# Patient Record
Sex: FEMALE | Race: WHITE | NOT HISPANIC OR LATINO | Employment: OTHER | URBAN - NONMETROPOLITAN AREA
[De-identification: names, ages, dates, MRNs, and addresses within clinical notes are randomized per-mention and may not be internally consistent; named-entity substitution may affect disease eponyms.]

---

## 2023-02-28 LAB
ALANINE AMINOTRANSFERASE (SGPT) (U/L) IN SER/PLAS: 13 U/L (ref 7–45)
ALBUMIN (G/DL) IN SER/PLAS: 4.2 G/DL (ref 3.4–5)
ALKALINE PHOSPHATASE (U/L) IN SER/PLAS: 94 U/L (ref 33–136)
ANION GAP IN SER/PLAS: 17 MMOL/L (ref 10–20)
ASPARTATE AMINOTRANSFERASE (SGOT) (U/L) IN SER/PLAS: 24 U/L (ref 9–39)
BILIRUBIN TOTAL (MG/DL) IN SER/PLAS: 0.5 MG/DL (ref 0–1.2)
CALCIUM (MG/DL) IN SER/PLAS: 8.8 MG/DL (ref 8.6–10.3)
CARBON DIOXIDE, TOTAL (MMOL/L) IN SER/PLAS: 28 MMOL/L (ref 21–32)
CHLORIDE (MMOL/L) IN SER/PLAS: 99 MMOL/L (ref 98–107)
CHOLESTEROL (MG/DL) IN SER/PLAS: 161 MG/DL (ref 0–199)
CHOLESTEROL IN HDL (MG/DL) IN SER/PLAS: 80.5 MG/DL
CHOLESTEROL/HDL RATIO: 2
CREATININE (MG/DL) IN SER/PLAS: 1.08 MG/DL (ref 0.5–1.05)
ERYTHROCYTE DISTRIBUTION WIDTH (RATIO) BY AUTOMATED COUNT: NORMAL
ERYTHROCYTE MEAN CORPUSCULAR HEMOGLOBIN CONCENTRATION (G/DL) BY AUTOMATED: NORMAL
ERYTHROCYTE MEAN CORPUSCULAR VOLUME (FL) BY AUTOMATED COUNT: NORMAL
ERYTHROCYTES (10*6/UL) IN BLOOD BY AUTOMATED COUNT: NORMAL
FERRITIN (UG/LL) IN SER/PLAS: 37 UG/L (ref 8–150)
GFR FEMALE: 55 ML/MIN/1.73M2
GLUCOSE (MG/DL) IN SER/PLAS: 86 MG/DL (ref 74–99)
HEMATOCRIT (%) IN BLOOD BY AUTOMATED COUNT: NORMAL
HEMOGLOBIN (G/DL) IN BLOOD: NORMAL
IRON (UG/DL) IN SER/PLAS: 39 UG/DL (ref 35–150)
IRON BINDING CAPACITY (UG/DL) IN SER/PLAS: >489 UG/DL (ref 240–445)
IRON SATURATION (%) IN SER/PLAS: ABNORMAL % (ref 25–45)
LDL: 50 MG/DL (ref 0–99)
LEUKOCYTES (10*3/UL) IN BLOOD BY AUTOMATED COUNT: NORMAL
NRBC (PER 100 WBCS) BY AUTOMATED COUNT: NORMAL
PLATELETS (10*3/UL) IN BLOOD AUTOMATED COUNT: NORMAL
POTASSIUM (MMOL/L) IN SER/PLAS: 4.5 MMOL/L (ref 3.5–5.3)
PROTEIN TOTAL: 6.9 G/DL (ref 6.4–8.2)
SODIUM (MMOL/L) IN SER/PLAS: 139 MMOL/L (ref 136–145)
TRIGLYCERIDE (MG/DL) IN SER/PLAS: 152 MG/DL (ref 0–149)
UREA NITROGEN (MG/DL) IN SER/PLAS: 19 MG/DL (ref 6–23)
VLDL: 30 MG/DL (ref 0–40)

## 2023-06-26 ENCOUNTER — OFFICE VISIT (OUTPATIENT)
Dept: PRIMARY CARE | Facility: CLINIC | Age: 73
End: 2023-06-26
Payer: MEDICARE

## 2023-06-26 VITALS
SYSTOLIC BLOOD PRESSURE: 132 MMHG | DIASTOLIC BLOOD PRESSURE: 60 MMHG | HEIGHT: 64 IN | WEIGHT: 174 LBS | OXYGEN SATURATION: 98 % | BODY MASS INDEX: 29.71 KG/M2 | HEART RATE: 63 BPM

## 2023-06-26 DIAGNOSIS — M54.41 ACUTE MIDLINE LOW BACK PAIN WITH BILATERAL SCIATICA: Primary | ICD-10-CM

## 2023-06-26 DIAGNOSIS — M54.42 ACUTE MIDLINE LOW BACK PAIN WITH BILATERAL SCIATICA: Primary | ICD-10-CM

## 2023-06-26 PROBLEM — I25.3 CARDIAC ANEURYSM: Status: ACTIVE | Noted: 2023-06-26

## 2023-06-26 PROBLEM — M81.0 OSTEOPOROSIS: Status: ACTIVE | Noted: 2023-06-26

## 2023-06-26 PROBLEM — B96.81: Status: RESOLVED | Noted: 2023-06-26 | Resolved: 2023-06-26

## 2023-06-26 PROBLEM — R92.8 ABNORMAL MAMMOGRAM: Status: RESOLVED | Noted: 2023-06-26 | Resolved: 2023-06-26

## 2023-06-26 PROBLEM — R50.9 FEVER, UNSPECIFIED: Status: RESOLVED | Noted: 2023-06-26 | Resolved: 2023-06-26

## 2023-06-26 PROBLEM — A37.90 PERTUSSIS: Status: RESOLVED | Noted: 2023-06-26 | Resolved: 2023-06-26

## 2023-06-26 PROBLEM — K25.3: Status: RESOLVED | Noted: 2023-06-26 | Resolved: 2023-06-26

## 2023-06-26 PROBLEM — L50.9 URTICARIA: Status: RESOLVED | Noted: 2023-06-26 | Resolved: 2023-06-26

## 2023-06-26 PROBLEM — R07.9 CHEST PAIN ON EXERTION: Status: RESOLVED | Noted: 2023-06-26 | Resolved: 2023-06-26

## 2023-06-26 PROBLEM — J45.909 ASTHMA (HHS-HCC): Status: ACTIVE | Noted: 2023-06-26

## 2023-06-26 PROBLEM — E78.5 HYPERLIPIDEMIA: Status: ACTIVE | Noted: 2023-06-26

## 2023-06-26 PROBLEM — R55 POSTURAL DIZZINESS WITH PRESYNCOPE: Status: RESOLVED | Noted: 2023-06-26 | Resolved: 2023-06-26

## 2023-06-26 PROBLEM — M79.18 MYOFASCIAL PAIN: Status: ACTIVE | Noted: 2023-06-26

## 2023-06-26 PROBLEM — G89.29 CHRONIC BILATERAL LOW BACK PAIN WITHOUT SCIATICA: Status: ACTIVE | Noted: 2023-06-26

## 2023-06-26 PROBLEM — D50.9 IRON DEFICIENCY ANEMIA: Status: ACTIVE | Noted: 2023-06-26

## 2023-06-26 PROBLEM — G47.33 OBSTRUCTIVE SLEEP APNEA: Status: ACTIVE | Noted: 2023-06-26

## 2023-06-26 PROBLEM — Z98.890 HISTORY OF ABDOMINAL SURGERY: Status: RESOLVED | Noted: 2023-06-26 | Resolved: 2023-06-26

## 2023-06-26 PROBLEM — R13.10 DYSPHAGIA: Status: RESOLVED | Noted: 2023-06-26 | Resolved: 2023-06-26

## 2023-06-26 PROBLEM — K21.9 GASTROESOPHAGEAL REFLUX DISEASE WITHOUT ESOPHAGITIS: Status: ACTIVE | Noted: 2023-06-26

## 2023-06-26 PROBLEM — R11.10 DRY HEAVES: Status: RESOLVED | Noted: 2023-06-26 | Resolved: 2023-06-26

## 2023-06-26 PROBLEM — M54.12 CERVICAL RADICULOPATHY: Status: ACTIVE | Noted: 2023-06-26

## 2023-06-26 PROBLEM — G89.29 CHRONIC RIGHT SACROILIAC PAIN: Status: ACTIVE | Noted: 2023-06-26

## 2023-06-26 PROBLEM — M47.812 SPONDYLOSIS OF CERVICAL REGION WITHOUT MYELOPATHY OR RADICULOPATHY: Status: RESOLVED | Noted: 2023-06-26 | Resolved: 2023-06-26

## 2023-06-26 PROBLEM — M54.16 LUMBAR RADICULITIS: Status: RESOLVED | Noted: 2023-06-26 | Resolved: 2023-06-26

## 2023-06-26 PROBLEM — R53.83 FATIGUE: Status: RESOLVED | Noted: 2023-06-26 | Resolved: 2023-06-26

## 2023-06-26 PROBLEM — T88.7XXA MEDICATION SIDE EFFECT: Status: RESOLVED | Noted: 2023-06-26 | Resolved: 2023-06-26

## 2023-06-26 PROBLEM — M19.011 PRIMARY OSTEOARTHRITIS OF RIGHT SHOULDER: Status: ACTIVE | Noted: 2023-06-26

## 2023-06-26 PROBLEM — E86.0 DEHYDRATION: Status: RESOLVED | Noted: 2023-06-26 | Resolved: 2023-06-26

## 2023-06-26 PROBLEM — Z86.010 HISTORY OF COLON POLYPS: Status: RESOLVED | Noted: 2023-06-26 | Resolved: 2023-06-26

## 2023-06-26 PROBLEM — Z95.810 CARDIAC DEFIBRILLATOR IN PLACE: Status: ACTIVE | Noted: 2023-06-26

## 2023-06-26 PROBLEM — R06.00 DYSPNEA: Status: RESOLVED | Noted: 2023-06-26 | Resolved: 2023-06-26

## 2023-06-26 PROBLEM — G47.37: Status: ACTIVE | Noted: 2023-06-26

## 2023-06-26 PROBLEM — H65.93 BSOM (BILATERAL SEROUS OTITIS MEDIA): Status: RESOLVED | Noted: 2023-06-26 | Resolved: 2023-06-26

## 2023-06-26 PROBLEM — Z86.0100 HISTORY OF COLON POLYPS: Status: RESOLVED | Noted: 2023-06-26 | Resolved: 2023-06-26

## 2023-06-26 PROBLEM — R52 SEVERE PAIN: Status: RESOLVED | Noted: 2023-06-26 | Resolved: 2023-06-26

## 2023-06-26 PROBLEM — R79.89 ELEVATED SERUM CREATININE: Status: RESOLVED | Noted: 2023-06-26 | Resolved: 2023-06-26

## 2023-06-26 PROBLEM — M06.9 RHEUMATOID ARTHRITIS (MULTI): Status: ACTIVE | Noted: 2023-06-26

## 2023-06-26 PROBLEM — K63.5 COLON POLYP: Status: ACTIVE | Noted: 2023-06-26

## 2023-06-26 PROBLEM — M53.3 CHRONIC RIGHT SACROILIAC PAIN: Status: ACTIVE | Noted: 2023-06-26

## 2023-06-26 PROBLEM — R05.8 DRY COUGH: Status: RESOLVED | Noted: 2023-06-26 | Resolved: 2023-06-26

## 2023-06-26 PROBLEM — M54.50 CHRONIC BILATERAL LOW BACK PAIN WITHOUT SCIATICA: Status: ACTIVE | Noted: 2023-06-26

## 2023-06-26 PROBLEM — J81.1 PULMONARY EDEMA (HHS-HCC): Status: RESOLVED | Noted: 2023-06-26 | Resolved: 2023-06-26

## 2023-06-26 PROBLEM — I25.10 ASCVD (ARTERIOSCLEROTIC CARDIOVASCULAR DISEASE): Status: ACTIVE | Noted: 2023-06-26

## 2023-06-26 PROBLEM — R20.0 NUMBNESS: Status: RESOLVED | Noted: 2023-06-26 | Resolved: 2023-06-26

## 2023-06-26 PROBLEM — M79.89 SWELLING OF LEFT HAND: Status: RESOLVED | Noted: 2023-06-26 | Resolved: 2023-06-26

## 2023-06-26 PROBLEM — M79.601 PAIN OF RIGHT UPPER EXTREMITY: Status: RESOLVED | Noted: 2023-06-26 | Resolved: 2023-06-26

## 2023-06-26 PROBLEM — R60.9 EDEMA: Status: RESOLVED | Noted: 2023-06-26 | Resolved: 2023-06-26

## 2023-06-26 PROBLEM — I49.9 IRREGULAR CARDIAC RHYTHM: Status: ACTIVE | Noted: 2023-06-26

## 2023-06-26 PROBLEM — R51.9 HEADACHE: Status: RESOLVED | Noted: 2023-06-26 | Resolved: 2023-06-26

## 2023-06-26 PROBLEM — I50.9 CONGESTIVE HEART FAILURE (MULTI): Status: ACTIVE | Noted: 2023-06-26

## 2023-06-26 PROBLEM — G56.20 ULNAR NEUROPATHY: Status: RESOLVED | Noted: 2023-06-26 | Resolved: 2023-06-26

## 2023-06-26 PROBLEM — T81.89XA NONHEALING SURGICAL WOUND: Status: RESOLVED | Noted: 2023-06-26 | Resolved: 2023-06-26

## 2023-06-26 PROBLEM — M96.1 CERVICAL POST-LAMINECTOMY SYNDROME: Status: ACTIVE | Noted: 2023-06-26

## 2023-06-26 PROBLEM — M51.16 DISPLACEMENT OF LUMBAR DISC WITH RADICULOPATHY: Status: ACTIVE | Noted: 2023-06-26

## 2023-06-26 PROBLEM — J90 PLEURAL EFFUSION, BILATERAL: Status: RESOLVED | Noted: 2023-06-26 | Resolved: 2023-06-26

## 2023-06-26 PROBLEM — I48.91 ATRIAL FIBRILLATION (MULTI): Status: ACTIVE | Noted: 2023-06-26

## 2023-06-26 PROBLEM — H65.91 RIGHT SEROUS OTITIS MEDIA: Status: RESOLVED | Noted: 2023-06-26 | Resolved: 2023-06-26

## 2023-06-26 PROBLEM — R68.89 FLU-LIKE SYMPTOMS: Status: RESOLVED | Noted: 2023-06-26 | Resolved: 2023-06-26

## 2023-06-26 PROBLEM — M47.816 LUMBAR SPONDYLOSIS: Status: RESOLVED | Noted: 2023-06-26 | Resolved: 2023-06-26

## 2023-06-26 PROBLEM — L40.9 PSORIASIS: Status: ACTIVE | Noted: 2023-06-26

## 2023-06-26 PROBLEM — T63.91XA TOXIC EFFECT OF VENOM: Status: RESOLVED | Noted: 2023-06-26 | Resolved: 2023-06-26

## 2023-06-26 PROBLEM — K56.609 SMALL BOWEL OBSTRUCTION (MULTI): Status: RESOLVED | Noted: 2023-06-26 | Resolved: 2023-06-26

## 2023-06-26 PROBLEM — G54.2 CERVICAL NEUROPATHY: Status: RESOLVED | Noted: 2023-06-26 | Resolved: 2023-06-26

## 2023-06-26 PROBLEM — G56.21 CUBITAL TUNNEL SYNDROME ON RIGHT: Status: ACTIVE | Noted: 2023-06-26

## 2023-06-26 PROBLEM — R42 POSTURAL DIZZINESS WITH PRESYNCOPE: Status: RESOLVED | Noted: 2023-06-26 | Resolved: 2023-06-26

## 2023-06-26 PROBLEM — R00.0 WIDE-COMPLEX TACHYCARDIA: Status: ACTIVE | Noted: 2023-06-26

## 2023-06-26 PROBLEM — M89.8X9 BONE PAIN: Status: RESOLVED | Noted: 2023-06-26 | Resolved: 2023-06-26

## 2023-06-26 PROBLEM — G56.10 MEDIAN NERVE NEUROPATHY: Status: RESOLVED | Noted: 2023-06-26 | Resolved: 2023-06-26

## 2023-06-26 PROBLEM — K42.9 UMBILICAL HERNIA: Status: ACTIVE | Noted: 2023-06-26

## 2023-06-26 PROBLEM — M85.80 OSTEOPENIA: Status: RESOLVED | Noted: 2023-06-26 | Resolved: 2023-06-26

## 2023-06-26 PROCEDURE — 1160F RVW MEDS BY RX/DR IN RCRD: CPT | Performed by: FAMILY MEDICINE

## 2023-06-26 PROCEDURE — 1159F MED LIST DOCD IN RCRD: CPT | Performed by: FAMILY MEDICINE

## 2023-06-26 PROCEDURE — 99213 OFFICE O/P EST LOW 20 MIN: CPT | Performed by: FAMILY MEDICINE

## 2023-06-26 PROCEDURE — 3008F BODY MASS INDEX DOCD: CPT | Performed by: FAMILY MEDICINE

## 2023-06-26 PROCEDURE — 1036F TOBACCO NON-USER: CPT | Performed by: FAMILY MEDICINE

## 2023-06-26 RX ORDER — DILTIAZEM HYDROCHLORIDE EXTENDED-RELEASE TABLETS 120 MG/1
120 TABLET, EXTENDED RELEASE ORAL DAILY
COMMUNITY

## 2023-06-26 RX ORDER — PANTOPRAZOLE SODIUM 40 MG/1
FOR SUSPENSION ORAL
COMMUNITY
Start: 2022-06-29 | End: 2023-09-15 | Stop reason: SDUPTHER

## 2023-06-26 RX ORDER — MONTELUKAST SODIUM 10 MG/1
10 TABLET ORAL NIGHTLY
COMMUNITY
End: 2024-02-22 | Stop reason: SDUPTHER

## 2023-06-26 RX ORDER — LOSARTAN POTASSIUM 50 MG/1
50 TABLET ORAL EVERY EVENING
COMMUNITY
Start: 2023-06-06 | End: 2023-08-28 | Stop reason: ALTCHOICE

## 2023-06-26 RX ORDER — EPINEPHRINE 0.3 MG/.3ML
1 INJECTION SUBCUTANEOUS AS NEEDED
COMMUNITY
Start: 2015-02-09

## 2023-06-26 RX ORDER — LANOLIN ALCOHOL/MO/W.PET/CERES
CREAM (GRAM) TOPICAL
COMMUNITY
Start: 2022-06-29 | End: 2023-08-28 | Stop reason: SDUPTHER

## 2023-06-26 RX ORDER — CLOBETASOL PROPIONATE 0.5 MG/G
CREAM TOPICAL 2 TIMES DAILY
COMMUNITY
End: 2023-08-28 | Stop reason: SDUPTHER

## 2023-06-26 RX ORDER — DICLOFENAC SODIUM 10 MG/G
4 GEL TOPICAL 2 TIMES DAILY PRN
COMMUNITY
Start: 2023-02-28 | End: 2023-08-28 | Stop reason: SDUPTHER

## 2023-06-26 RX ORDER — BUMETANIDE 2 MG/1
2 TABLET ORAL 2 TIMES DAILY
COMMUNITY
End: 2023-08-28 | Stop reason: SDUPTHER

## 2023-06-26 RX ORDER — RIVAROXABAN 20 MG/1
20 TABLET, FILM COATED ORAL
COMMUNITY
End: 2024-03-05 | Stop reason: HOSPADM

## 2023-06-26 RX ORDER — CHOLECALCIFEROL (VITAMIN D3) 25 MCG
TABLET ORAL
COMMUNITY
Start: 2022-06-29 | End: 2024-02-12 | Stop reason: WASHOUT

## 2023-06-26 RX ORDER — HYDROXYCHLOROQUINE SULFATE 200 MG/1
200 TABLET, FILM COATED ORAL DAILY
COMMUNITY
End: 2023-08-28 | Stop reason: SDUPTHER

## 2023-06-26 RX ORDER — OXYCODONE AND ACETAMINOPHEN 5; 325 MG/1; MG/1
1 TABLET ORAL 3 TIMES DAILY PRN
COMMUNITY
End: 2023-11-13 | Stop reason: SDUPTHER

## 2023-06-26 RX ORDER — NALOXONE HYDROCHLORIDE 4 MG/.1ML
SPRAY NASAL
COMMUNITY
Start: 2022-06-29 | End: 2024-04-22 | Stop reason: ALTCHOICE

## 2023-06-26 RX ORDER — ONDANSETRON 4 MG/1
4 TABLET, FILM COATED ORAL EVERY 6 HOURS PRN
COMMUNITY
End: 2024-02-15 | Stop reason: ALTCHOICE

## 2023-06-26 RX ORDER — PREDNISONE 10 MG/1
TABLET ORAL
Qty: 30 TABLET | Refills: 0 | Status: SHIPPED | OUTPATIENT
Start: 2023-06-26 | End: 2023-07-06

## 2023-06-26 RX ORDER — FERROUS SULFATE 325(65) MG
1 TABLET ORAL
COMMUNITY
Start: 2022-12-21 | End: 2023-08-28 | Stop reason: ALTCHOICE

## 2023-06-26 RX ORDER — NITROGLYCERIN 0.4 MG/1
0.4 TABLET SUBLINGUAL EVERY 5 MIN PRN
COMMUNITY
Start: 2015-07-09

## 2023-06-26 RX ORDER — BACLOFEN 10 MG/1
10 TABLET ORAL 3 TIMES DAILY
COMMUNITY
Start: 2022-06-29 | End: 2023-08-28 | Stop reason: SDUPTHER

## 2023-06-26 RX ORDER — POLYETHYLENE GLYCOL 3350 17 G/17G
17 POWDER, FOR SOLUTION ORAL DAILY
COMMUNITY

## 2023-06-26 RX ORDER — ROSUVASTATIN CALCIUM 20 MG/1
20 TABLET, COATED ORAL DAILY
COMMUNITY
End: 2023-12-29

## 2023-06-26 RX ORDER — FLUTICASONE PROPIONATE 50 MCG
1 SPRAY, SUSPENSION (ML) NASAL 2 TIMES DAILY
COMMUNITY
Start: 2017-10-11

## 2023-06-26 RX ORDER — DIGOXIN 125 MCG
125 TABLET ORAL DAILY
COMMUNITY
End: 2023-08-28 | Stop reason: SDUPTHER

## 2023-06-26 ASSESSMENT — ENCOUNTER SYMPTOMS
OCCASIONAL FEELINGS OF UNSTEADINESS: 0
DEPRESSION: 0
LOSS OF SENSATION IN FEET: 0

## 2023-06-26 NOTE — PROGRESS NOTES
Subjective   Patient ID: Trina Elias is a 72 y.o. female who presents for Back Pain (Having low back pain radiates down right leg /Started 10 days ago).  HPI  Had to jump out of the way of refrigerator falling down steps- 10 days ago  Landed on her tailbone  Pain is sharp in nature- some pain down back on right leg and on anterior thighs B/L  Worse- moving, sitting straight up  Better- nothing  Percocet and baclofen is not helping  No numbness  Legs will give out when gets the pain  No GI/ incontinence  No fever, chills  No weight loss      Current Outpatient Medications:     baclofen (Lioresal) 10 mg tablet, Take 1 tablet (10 mg) by mouth 3 times a day., Disp: , Rfl:     cholecalciferol (Vitamin D-3) 25 MCG (1000 UT) tablet, Take by mouth., Disp: , Rfl:     diclofenac sodium (Voltaren) 1 % gel gel, Apply 1 Application topically 2 times a day as needed., Disp: , Rfl:     EPINEPHrine 0.3 mg/0.3 mL injection syringe, Inject 0.3 mL (0.3 mg) as directed if needed., Disp: , Rfl:     ferrous sulfate 325 (65 Fe) MG tablet, Take 1 tablet (325 mg) by mouth 2 times a day with meals., Disp: , Rfl:     fluticasone (Flonase) 50 mcg/actuation nasal spray, Administer 1 spray into affected nostril(s) 2 times a day., Disp: , Rfl:     losartan (Cozaar) 50 mg tablet, Take 1 tablet (50 mg) by mouth once daily in the evening., Disp: , Rfl:     magnesium oxide (Mag-Ox) 400 mg (241.3 mg magnesium) tablet, Take by mouth., Disp: , Rfl:     naloxone (Narcan) 4 mg/0.1 mL nasal spray, Administer into affected nostril(s)., Disp: , Rfl:     nitroglycerin (Nitrostat) 0.4 mg SL tablet, Place 1 tablet (0.4 mg) under the tongue every 5 minutes if needed., Disp: , Rfl:     pantoprazole (ProtoNix) 40 mg packet, Take by mouth., Disp: , Rfl:     bumetanide (Bumex) 2 mg tablet, Take 1 tablet (2 mg) by mouth 2 times a day., Disp: , Rfl:     clobetasol (Temovate) 0.05 % cream, Apply topically 2 times a day., Disp: , Rfl:     digoxin (Lanoxin) 125 MCG  tablet, Take 1 tablet (125 mcg) by mouth once daily., Disp: , Rfl:     dilTIAZem LA (Cardizem LA) 120 mg 24 hr tablet, Take 1 tablet (120 mg) by mouth once daily., Disp: , Rfl:     hydroxychloroquine (Plaquenil) 200 mg tablet, Take 1 tablet (200 mg) by mouth once daily. Take with food., Disp: , Rfl:     montelukast (Singulair) 10 mg tablet, Take 1 tablet (10 mg) by mouth once daily at bedtime., Disp: , Rfl:     ondansetron (Zofran) 4 mg tablet, Take 1 tablet (4 mg) by mouth every 6 hours if needed., Disp: , Rfl:     oxyCODONE-acetaminophen (Percocet) 5-325 mg tablet, Take 1 tablet by mouth 3 times a day as needed for severe pain (7 - 10)., Disp: , Rfl:     polyethylene glycol (Miralax) 17 gram/dose powder, Take 17 g by mouth., Disp: , Rfl:     predniSONE (Deltasone) 10 mg tablet, Take 5 tablets (50 mg) by mouth once daily for 2 days, THEN 4 tablets (40 mg) once daily for 2 days, THEN 3 tablets (30 mg) once daily for 2 days, THEN 2 tablets (20 mg) once daily for 2 days, THEN 1 tablet (10 mg) once daily for 2 days., Disp: 30 tablet, Rfl: 0    rosuvastatin (Crestor) 20 mg tablet, Take 1 tablet (20 mg) by mouth once daily., Disp: , Rfl:     Xarelto 20 mg tablet, Take 1 tablet (20 mg) by mouth once daily in the evening. Take with meals., Disp: , Rfl:    Past Surgical History:   Procedure Laterality Date    APPENDECTOMY  2013    Appendectomy    CARDIAC CATHETERIZATION  2013    Cardiac Cath Procedure Summary    CARDIAC SURGERY  2014    Heart Surgery     SECTION, CLASSIC  2013     Section    CHOLECYSTECTOMY  2013    Cholecystectomy Laparoscopic    FOOT SURGERY  2013    Foot Surgery    GASTRIC FUNDOPLICATION  2013    Esophagogastric Fundoplasty Nissen Fundoplication    HERNIA REPAIR  2015    Hernia Repair    OTHER SURGICAL HISTORY  2019    [place] tooth extraction    OTHER SURGICAL HISTORY  2019    Carpal tunnel surgery    OTHER SURGICAL HISTORY   08/08/2019    Foot surgery    OTHER SURGICAL HISTORY  08/08/2019    Leg surgery    OTHER SURGICAL HISTORY  01/06/2020    Hand surgery    OTHER SURGICAL HISTORY  02/09/2015    Cardio-Defib Eval Dual Chamber With Reprogramming    OTHER SURGICAL HISTORY  01/11/2021    Intestinal surgery    ROTATOR CUFF REPAIR  08/13/2018    Rotator Cuff Repair      Past Medical History:   Diagnosis Date    Abnormal mammogram 06/26/2023    Acute sphenoidal sinusitis, unspecified 10/12/2017    Acute sphenoidal sinusitis, recurrence not specified    Bacterial meningitis, unspecified 01/26/2018    Acute bacterial meningitis    Bone pain 06/26/2023    BSOM (bilateral serous otitis media) 06/26/2023    Cervical neuropathy 06/26/2023    Cervicalgia 06/08/2021    Acute neck pain    Chest pain on exertion 06/26/2023    Chronic obstructive pulmonary disease, unspecified (CMS/HCC)     Chronic obstructive pulmonary disease    Cutaneous abscess of abdominal wall 11/17/2015    Abdominal wall abscess    Dehydration 06/26/2023    Dry cough 06/26/2023    Dry heaves 06/26/2023    Dyspnea 06/26/2023    Elevated serum creatinine 06/26/2023    Fatigue 06/26/2023    Fever, unspecified 06/26/2023    Headache 06/26/2023    History of abdominal surgery 06/26/2023    History of colon polyps 06/26/2023    Lumbar radiculitis 06/26/2023    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    Nonhealing surgical wound 06/26/2023    Numbness 06/26/2023    Osteopenia 06/26/2023    Other conditions influencing health status     Coronary Artery Disease    Other conditions influencing health status     Peptic Ulcer    Pain of right upper extremity 06/26/2023    Personal history of anaphylaxis 08/19/2015    History of anaphylaxis    Personal history of diseases of the skin and subcutaneous tissue     History of eczema    Personal history of infections of the central nervous system     History of bacterial meningitis    Personal history of other diseases of the digestive system  "11/17/2015    History of esophageal reflux    Personal history of other diseases of the digestive system     History of hiatal hernia    Personal history of other diseases of the nervous system and sense organs     History of carpal tunnel syndrome    Personal history of other diseases of the respiratory system 01/06/2020    History of acute pharyngitis    Personal history of other diseases of the respiratory system     Personal history of asthma    Personal history of other specified conditions 08/08/2019    History of epigastric pain    Personal history of other specified conditions 03/12/2018    History of angioedema    Postural dizziness with presyncope 06/26/2023    Pulmonary edema 06/26/2023    Radiculopathy, lumbar region 08/13/2018    Acute lumbar radiculopathy    Restless legs syndrome 11/17/2015    Restless legs syndrome    Right serous otitis media 06/26/2023    Severe pain 06/26/2023    Small bowel obstruction (CMS/HCC) 06/26/2023    Tarsal tunnel syndrome, unspecified lower limb     Tarsal tunnel syndrome    Thoracic aortic aneurysm, without rupture, unspecified (CMS/HCC) 09/08/2020    Aneurysm, aorta, thoracic    Toxic effect of venom 06/26/2023    Ulcer of the stomach caused by bacteria (h. pylori), acute 06/26/2023    Ulnar neuropathy 06/26/2023     Social History     Tobacco Use    Smoking status: Never    Smokeless tobacco: Never   Substance Use Topics    Alcohol use: Never    Drug use: Never      No family history on file.   Review of Systems    Objective   /60   Pulse 63   Ht 1.626 m (5' 4\")   Wt 78.9 kg (174 lb)   SpO2 98%   BMI 29.87 kg/m²    Physical Exam  Vitals and nursing note reviewed.   Constitutional:       General: She is not in acute distress.     Appearance: Normal appearance. She is not ill-appearing.   Cardiovascular:      Pulses: Normal pulses.   Musculoskeletal:      Comments: TTP in midline of low back L4-5 area with decreased flexion  Appears to be in pain  Pain upon " standing  Antalgic gait   Skin:     Capillary Refill: Capillary refill takes less than 2 seconds.   Neurological:      Mental Status: She is alert.      Sensory: No sensory deficit.      Motor: No weakness.      Deep Tendon Reflexes: Reflexes normal.   Psychiatric:         Mood and Affect: Mood normal.         Behavior: Behavior normal.      Comments: In pain         Assessment/Plan   Problem List Items Addressed This Visit    None  Visit Diagnoses       Acute midline low back pain with bilateral sciatica    -  Primary    Relevant Medications    predniSONE (Deltasone) 10 mg tablet    Other Relevant Orders    XR lumbar spine 2-3 views        Prednisone  Oxycodone per pain management  Heat  ? L5 compression fracture- to ortho if radiology agrees    Patient understands and agrees with treatment plan    Fredy Elias, DO

## 2023-08-03 DIAGNOSIS — A05.9 FOOD POISONING: Primary | ICD-10-CM

## 2023-08-03 RX ORDER — ONDANSETRON 8 MG/1
8 TABLET, ORALLY DISINTEGRATING ORAL EVERY 8 HOURS PRN
Qty: 20 TABLET | Refills: 0 | Status: SHIPPED | OUTPATIENT
Start: 2023-08-03 | End: 2023-08-10

## 2023-08-03 RX ORDER — LOPERAMIDE HCL 2 MG
2 TABLET ORAL 4 TIMES DAILY PRN
Qty: 30 TABLET | Refills: 0 | Status: SHIPPED | OUTPATIENT
Start: 2023-08-03 | End: 2023-08-13

## 2023-08-28 ENCOUNTER — OFFICE VISIT (OUTPATIENT)
Dept: PRIMARY CARE | Facility: CLINIC | Age: 73
End: 2023-08-28
Payer: MEDICARE

## 2023-08-28 VITALS
HEIGHT: 64 IN | SYSTOLIC BLOOD PRESSURE: 124 MMHG | OXYGEN SATURATION: 98 % | WEIGHT: 172 LBS | BODY MASS INDEX: 29.37 KG/M2 | DIASTOLIC BLOOD PRESSURE: 62 MMHG | HEART RATE: 64 BPM

## 2023-08-28 DIAGNOSIS — D50.8 IRON DEFICIENCY ANEMIA SECONDARY TO INADEQUATE DIETARY IRON INTAKE: ICD-10-CM

## 2023-08-28 DIAGNOSIS — J43.2 CENTRILOBULAR EMPHYSEMA (MULTI): Primary | ICD-10-CM

## 2023-08-28 DIAGNOSIS — I11.0 HYPERTENSIVE HEART DISEASE WITH HEART FAILURE (MULTI): ICD-10-CM

## 2023-08-28 DIAGNOSIS — Z79.899 ENCOUNTER FOR MONITORING DIGOXIN THERAPY: ICD-10-CM

## 2023-08-28 DIAGNOSIS — Z13.89 ENCOUNTER FOR SCREENING FOR OTHER DISORDER: ICD-10-CM

## 2023-08-28 DIAGNOSIS — R92.8 ABNORMAL MAMMOGRAM OF LEFT BREAST: ICD-10-CM

## 2023-08-28 DIAGNOSIS — I48.11 LONGSTANDING PERSISTENT ATRIAL FIBRILLATION (MULTI): ICD-10-CM

## 2023-08-28 DIAGNOSIS — D68.9 COAGULATION DEFECT, UNSPECIFIED (MULTI): ICD-10-CM

## 2023-08-28 DIAGNOSIS — M06.9 RHEUMATOID ARTHRITIS INVOLVING MULTIPLE SITES, UNSPECIFIED WHETHER RHEUMATOID FACTOR PRESENT (MULTI): ICD-10-CM

## 2023-08-28 DIAGNOSIS — E78.2 MIXED HYPERLIPIDEMIA: ICD-10-CM

## 2023-08-28 DIAGNOSIS — G89.29 CHRONIC BILATERAL LOW BACK PAIN WITHOUT SCIATICA: ICD-10-CM

## 2023-08-28 DIAGNOSIS — M54.50 CHRONIC BILATERAL LOW BACK PAIN WITHOUT SCIATICA: ICD-10-CM

## 2023-08-28 DIAGNOSIS — Z51.81 ENCOUNTER FOR MONITORING DIGOXIN THERAPY: ICD-10-CM

## 2023-08-28 PROBLEM — E11.9 TYPE 2 DIABETES MELLITUS WITHOUT COMPLICATION, WITHOUT LONG-TERM CURRENT USE OF INSULIN (MULTI): Status: ACTIVE | Noted: 2023-08-28

## 2023-08-28 LAB
ALANINE AMINOTRANSFERASE (SGPT) (U/L) IN SER/PLAS: 14 U/L (ref 7–45)
ALBUMIN (G/DL) IN SER/PLAS: 4.3 G/DL (ref 3.4–5)
ALKALINE PHOSPHATASE (U/L) IN SER/PLAS: 92 U/L (ref 33–136)
ANION GAP IN SER/PLAS: 16 MMOL/L (ref 10–20)
ASPARTATE AMINOTRANSFERASE (SGOT) (U/L) IN SER/PLAS: 16 U/L (ref 9–39)
BASOPHILS (10*3/UL) IN BLOOD BY AUTOMATED COUNT: 0.04 X10E9/L (ref 0–0.1)
BASOPHILS/100 LEUKOCYTES IN BLOOD BY AUTOMATED COUNT: 0.5 % (ref 0–2)
BILIRUBIN TOTAL (MG/DL) IN SER/PLAS: 0.6 MG/DL (ref 0–1.2)
CALCIUM (MG/DL) IN SER/PLAS: 9 MG/DL (ref 8.6–10.3)
CARBON DIOXIDE, TOTAL (MMOL/L) IN SER/PLAS: 29 MMOL/L (ref 21–32)
CHLORIDE (MMOL/L) IN SER/PLAS: 101 MMOL/L (ref 98–107)
CHOLESTEROL (MG/DL) IN SER/PLAS: 133 MG/DL (ref 0–199)
CHOLESTEROL IN HDL (MG/DL) IN SER/PLAS: 53.3 MG/DL
CHOLESTEROL/HDL RATIO: 2.5
CREATININE (MG/DL) IN SER/PLAS: 1.05 MG/DL (ref 0.5–1.05)
EOSINOPHILS (10*3/UL) IN BLOOD BY AUTOMATED COUNT: 0.19 X10E9/L (ref 0–0.4)
EOSINOPHILS/100 LEUKOCYTES IN BLOOD BY AUTOMATED COUNT: 2.4 % (ref 0–6)
ERYTHROCYTE DISTRIBUTION WIDTH (RATIO) BY AUTOMATED COUNT: 16.2 % (ref 11.5–14.5)
ERYTHROCYTE MEAN CORPUSCULAR HEMOGLOBIN CONCENTRATION (G/DL) BY AUTOMATED: 29.6 G/DL (ref 32–36)
ERYTHROCYTE MEAN CORPUSCULAR VOLUME (FL) BY AUTOMATED COUNT: 79 FL (ref 80–100)
ERYTHROCYTES (10*6/UL) IN BLOOD BY AUTOMATED COUNT: 4.04 X10E12/L (ref 4–5.2)
FERRITIN (UG/LL) IN SER/PLAS: 16 UG/L (ref 8–150)
GFR FEMALE: 56 ML/MIN/1.73M2
GLUCOSE (MG/DL) IN SER/PLAS: 89 MG/DL (ref 74–99)
HEMATOCRIT (%) IN BLOOD BY AUTOMATED COUNT: 31.8 % (ref 36–46)
HEMOGLOBIN (G/DL) IN BLOOD: 9.4 G/DL (ref 12–16)
IMMATURE GRANULOCYTES/100 LEUKOCYTES IN BLOOD BY AUTOMATED COUNT: 0.5 % (ref 0–0.9)
IRON (UG/DL) IN SER/PLAS: 26 UG/DL (ref 35–150)
IRON BINDING CAPACITY (UG/DL) IN SER/PLAS: >476 UG/DL (ref 240–445)
IRON SATURATION (%) IN SER/PLAS: ABNORMAL % (ref 25–45)
LDL: 55 MG/DL (ref 0–99)
LEUKOCYTES (10*3/UL) IN BLOOD BY AUTOMATED COUNT: 8 X10E9/L (ref 4.4–11.3)
LYMPHOCYTES (10*3/UL) IN BLOOD BY AUTOMATED COUNT: 1.58 X10E9/L (ref 0.8–3)
LYMPHOCYTES/100 LEUKOCYTES IN BLOOD BY AUTOMATED COUNT: 19.7 % (ref 13–44)
MONOCYTES (10*3/UL) IN BLOOD BY AUTOMATED COUNT: 0.72 X10E9/L (ref 0.05–0.8)
MONOCYTES/100 LEUKOCYTES IN BLOOD BY AUTOMATED COUNT: 9 % (ref 2–10)
NEUTROPHILS (10*3/UL) IN BLOOD BY AUTOMATED COUNT: 5.44 X10E9/L (ref 1.6–5.5)
NEUTROPHILS/100 LEUKOCYTES IN BLOOD BY AUTOMATED COUNT: 67.9 % (ref 40–80)
PLATELETS (10*3/UL) IN BLOOD AUTOMATED COUNT: 265 X10E9/L (ref 150–450)
POTASSIUM (MMOL/L) IN SER/PLAS: 3.8 MMOL/L (ref 3.5–5.3)
PROTEIN TOTAL: 7.2 G/DL (ref 6.4–8.2)
SODIUM (MMOL/L) IN SER/PLAS: 142 MMOL/L (ref 136–145)
THYROTROPIN (MIU/L) IN SER/PLAS BY DETECTION LIMIT <= 0.05 MIU/L: 1.71 MIU/L (ref 0.44–3.98)
TRIGLYCERIDE (MG/DL) IN SER/PLAS: 122 MG/DL (ref 0–149)
UREA NITROGEN (MG/DL) IN SER/PLAS: 16 MG/DL (ref 6–23)
VLDL: 24 MG/DL (ref 0–40)

## 2023-08-28 PROCEDURE — 3008F BODY MASS INDEX DOCD: CPT | Performed by: FAMILY MEDICINE

## 2023-08-28 PROCEDURE — 1159F MED LIST DOCD IN RCRD: CPT | Performed by: FAMILY MEDICINE

## 2023-08-28 PROCEDURE — 83550 IRON BINDING TEST: CPT

## 2023-08-28 PROCEDURE — 99214 OFFICE O/P EST MOD 30 MIN: CPT | Performed by: FAMILY MEDICINE

## 2023-08-28 PROCEDURE — 80061 LIPID PANEL: CPT

## 2023-08-28 PROCEDURE — 82728 ASSAY OF FERRITIN: CPT

## 2023-08-28 PROCEDURE — G0442 ANNUAL ALCOHOL SCREEN 15 MIN: HCPCS | Performed by: FAMILY MEDICINE

## 2023-08-28 PROCEDURE — 80053 COMPREHEN METABOLIC PANEL: CPT

## 2023-08-28 PROCEDURE — 1160F RVW MEDS BY RX/DR IN RCRD: CPT | Performed by: FAMILY MEDICINE

## 2023-08-28 PROCEDURE — 84443 ASSAY THYROID STIM HORMONE: CPT

## 2023-08-28 PROCEDURE — 83540 ASSAY OF IRON: CPT

## 2023-08-28 PROCEDURE — 85025 COMPLETE CBC W/AUTO DIFF WBC: CPT

## 2023-08-28 PROCEDURE — 1036F TOBACCO NON-USER: CPT | Performed by: FAMILY MEDICINE

## 2023-08-28 RX ORDER — DICLOFENAC SODIUM 10 MG/G
4 GEL TOPICAL 2 TIMES DAILY PRN
Qty: 450 G | Refills: 1 | Status: SHIPPED | OUTPATIENT
Start: 2023-08-28 | End: 2024-04-15 | Stop reason: SDUPTHER

## 2023-08-28 RX ORDER — BUMETANIDE 2 MG/1
2 TABLET ORAL 2 TIMES DAILY
COMMUNITY
Start: 2020-09-18

## 2023-08-28 RX ORDER — CALCIUM CARBONATE 300MG(750)
400 TABLET,CHEWABLE ORAL DAILY
COMMUNITY
Start: 2020-11-01

## 2023-08-28 RX ORDER — CLOBETASOL PROPIONATE 0.5 MG/G
CREAM TOPICAL 2 TIMES DAILY
COMMUNITY
Start: 2020-11-01 | End: 2023-08-28 | Stop reason: SDUPTHER

## 2023-08-28 RX ORDER — ALBUTEROL SULFATE 0.83 MG/ML
2.5 SOLUTION RESPIRATORY (INHALATION) EVERY 4 HOURS PRN
COMMUNITY
Start: 2020-11-01

## 2023-08-28 RX ORDER — HYDROXYCHLOROQUINE SULFATE 200 MG/1
200 TABLET, FILM COATED ORAL DAILY
COMMUNITY
Start: 2020-09-28 | End: 2024-02-22 | Stop reason: SDUPTHER

## 2023-08-28 RX ORDER — CLOBETASOL PROPIONATE 0.5 MG/G
CREAM TOPICAL 2 TIMES DAILY
Qty: 60 G | Refills: 3 | Status: SHIPPED | OUTPATIENT
Start: 2023-08-28 | End: 2024-04-15 | Stop reason: SDUPTHER

## 2023-08-28 RX ORDER — DIGOXIN 125 MCG
125 TABLET ORAL DAILY
COMMUNITY
Start: 2020-11-01

## 2023-08-28 RX ORDER — BACLOFEN 10 MG/1
10 TABLET ORAL 2 TIMES DAILY
Qty: 180 TABLET | Refills: 1 | Status: SHIPPED | OUTPATIENT
Start: 2023-08-28 | End: 2023-11-13 | Stop reason: ALTCHOICE

## 2023-08-28 ASSESSMENT — LIFESTYLE VARIABLES
HOW OFTEN DO YOU HAVE A DRINK CONTAINING ALCOHOL: NEVER
AUDIT-C TOTAL SCORE: 0
AUDIT-C TOTAL SCORE: 0
SKIP TO QUESTIONS 9-10: 1
HOW MANY STANDARD DRINKS CONTAINING ALCOHOL DO YOU HAVE ON A TYPICAL DAY: PATIENT DOES NOT DRINK
HOW OFTEN DO YOU HAVE SIX OR MORE DRINKS ON ONE OCCASION: NEVER

## 2023-08-28 NOTE — PATIENT INSTRUCTIONS

## 2023-08-28 NOTE — PROGRESS NOTES
Subjective   Patient ID: Trina Elias is a 72 y.o. female who presents for Follow-up (FOLLOW UPON HER BACK ).  HPI  No SE meds  No CP, SOB, HA, vision changes  No palpitations, dizziness  No numbness, weakness     Ophtho- 2/23  Dentist- 2/23  Colonoscopy- 2/21- repeat 5 years  HIPOLITO-  FOBT-  UA/Micro- 12/22  Mammo- 4/23  DEXA- 8/22  PAP- N/A  Lung CT- never smoked  Coronary Calcium CT Score-  AAA-  EKG-  Pneumovax- 10/21  Prevnar- refused  Flu- 1/23  Shingrix- recommended  Td-  Hep C-  Advance Directives-  MDD screen-  ETOH screen- 8/28/23  CV risk-    Current Outpatient Medications:     albuterol 2.5 mg /3 mL (0.083 %) nebulizer solution, Take 3 mL (2.5 mg) by nebulization every 4 hours if needed., Disp: , Rfl:     bumetanide (Bumex) 2 mg tablet, Take 1 tablet (2 mg) by mouth 2 times a day., Disp: , Rfl:     digoxin (Lanoxin) 125 MCG tablet, Take 1 tablet (125 mcg) by mouth once daily., Disp: , Rfl:     hydroxychloroquine (Plaquenil) 200 mg tablet, Take 1 tablet (200 mg) by mouth once daily., Disp: , Rfl:     magnesium oxide (Mag-Ox) 400 mg tablet, Take 1 tablet (400 mg) by mouth 2 times a day., Disp: , Rfl:     baclofen (Lioresal) 10 mg tablet, Take 1 tablet (10 mg) by mouth 2 times a day., Disp: 180 tablet, Rfl: 1    cholecalciferol (Vitamin D-3) 25 MCG (1000 UT) tablet, Take by mouth., Disp: , Rfl:     clobetasol (Temovate) 0.05 % cream, Apply topically 2 times a day., Disp: 60 g, Rfl: 3    diclofenac sodium (Voltaren) 1 % gel gel, Apply 1 Application topically 2 times a day as needed (joint pain)., Disp: 450 g, Rfl: 1    dilTIAZem LA (Cardizem LA) 120 mg 24 hr tablet, Take 1 tablet (120 mg) by mouth once daily., Disp: , Rfl:     EPINEPHrine 0.3 mg/0.3 mL injection syringe, Inject 0.3 mL (0.3 mg) as directed if needed., Disp: , Rfl:     fluticasone (Flonase) 50 mcg/actuation nasal spray, Administer 1 spray into affected nostril(s) 2 times a day., Disp: , Rfl:     montelukast (Singulair) 10 mg tablet, Take 1 tablet  (10 mg) by mouth once daily at bedtime., Disp: , Rfl:     naloxone (Narcan) 4 mg/0.1 mL nasal spray, Administer into affected nostril(s)., Disp: , Rfl:     nitroglycerin (Nitrostat) 0.4 mg SL tablet, Place 1 tablet (0.4 mg) under the tongue every 5 minutes if needed., Disp: , Rfl:     ondansetron (Zofran) 4 mg tablet, Take 1 tablet (4 mg) by mouth every 6 hours if needed., Disp: , Rfl:     oxyCODONE-acetaminophen (Percocet) 5-325 mg tablet, Take 1 tablet by mouth 3 times a day as needed for severe pain (7 - 10)., Disp: , Rfl:     pantoprazole (ProtoNix) 40 mg packet, Take by mouth., Disp: , Rfl:     polyethylene glycol (Miralax) 17 gram/dose powder, Take 17 g by mouth., Disp: , Rfl:     rosuvastatin (Crestor) 20 mg tablet, Take 1 tablet (20 mg) by mouth once daily., Disp: , Rfl:     Xarelto 20 mg tablet, Take 1 tablet (20 mg) by mouth once daily in the evening. Take with meals., Disp: , Rfl:    Past Surgical History:   Procedure Laterality Date    APPENDECTOMY  2013    Appendectomy    CARDIAC CATHETERIZATION  2013    Cardiac Cath Procedure Summary    CARDIAC SURGERY  2014    Heart Surgery     SECTION, CLASSIC  2013     Section    CHOLECYSTECTOMY  2013    Cholecystectomy Laparoscopic    FOOT SURGERY  2013    Foot Surgery    GASTRIC FUNDOPLICATION  2013    Esophagogastric Fundoplasty Nissen Fundoplication    HERNIA REPAIR  2015    Hernia Repair    OTHER SURGICAL HISTORY  2019    [place] tooth extraction    OTHER SURGICAL HISTORY  2019    Carpal tunnel surgery    OTHER SURGICAL HISTORY  2019    Foot surgery    OTHER SURGICAL HISTORY  2019    Leg surgery    OTHER SURGICAL HISTORY  2020    Hand surgery    OTHER SURGICAL HISTORY  2015    Cardio-Defib Eval Dual Chamber With Reprogramming    OTHER SURGICAL HISTORY  2021    Intestinal surgery    ROTATOR CUFF REPAIR  2018    Rotator Cuff Repair      Past Medical  History:   Diagnosis Date    Abnormal mammogram 06/26/2023    Acute sphenoidal sinusitis, unspecified 10/12/2017    Acute sphenoidal sinusitis, recurrence not specified    Bacterial meningitis, unspecified 01/26/2018    Acute bacterial meningitis    Bone pain 06/26/2023    BSOM (bilateral serous otitis media) 06/26/2023    Cervical neuropathy 06/26/2023    Cervicalgia 06/08/2021    Acute neck pain    Chest pain on exertion 06/26/2023    Chronic obstructive pulmonary disease, unspecified (CMS/HCC)     Chronic obstructive pulmonary disease    Cutaneous abscess of abdominal wall 11/17/2015    Abdominal wall abscess    Dehydration 06/26/2023    Dry cough 06/26/2023    Dry heaves 06/26/2023    Dyspnea 06/26/2023    Elevated serum creatinine 06/26/2023    Fatigue 06/26/2023    Fever, unspecified 06/26/2023    Headache 06/26/2023    History of abdominal surgery 06/26/2023    History of colon polyps 06/26/2023    Lumbar radiculitis 06/26/2023    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    Nonhealing surgical wound 06/26/2023    Numbness 06/26/2023    Osteopenia 06/26/2023    Other conditions influencing health status     Coronary Artery Disease    Other conditions influencing health status     Peptic Ulcer    Pain of right upper extremity 06/26/2023    Personal history of anaphylaxis 08/19/2015    History of anaphylaxis    Personal history of diseases of the skin and subcutaneous tissue     History of eczema    Personal history of infections of the central nervous system     History of bacterial meningitis    Personal history of other diseases of the digestive system 11/17/2015    History of esophageal reflux    Personal history of other diseases of the digestive system     History of hiatal hernia    Personal history of other diseases of the nervous system and sense organs     History of carpal tunnel syndrome    Personal history of other diseases of the respiratory system 01/06/2020    History of acute pharyngitis    Personal  "history of other diseases of the respiratory system     Personal history of asthma    Personal history of other specified conditions 08/08/2019    History of epigastric pain    Personal history of other specified conditions 03/12/2018    History of angioedema    Postural dizziness with presyncope 06/26/2023    Pulmonary edema 06/26/2023    Radiculopathy, lumbar region 08/13/2018    Acute lumbar radiculopathy    Restless legs syndrome 11/17/2015    Restless legs syndrome    Right serous otitis media 06/26/2023    Severe pain 06/26/2023    Small bowel obstruction (CMS/HCC) 06/26/2023    Tarsal tunnel syndrome, unspecified lower limb     Tarsal tunnel syndrome    Thoracic aortic aneurysm, without rupture, unspecified (CMS/HCC) 09/08/2020    Aneurysm, aorta, thoracic    Toxic effect of venom 06/26/2023    Ulcer of the stomach caused by bacteria (h. pylori), acute 06/26/2023    Ulnar neuropathy 06/26/2023     Social History     Tobacco Use    Smoking status: Never    Smokeless tobacco: Never   Substance Use Topics    Alcohol use: Never    Drug use: Never      No family history on file.   Review of Systems    Objective   /62   Pulse 64   Ht 1.626 m (5' 4\")   Wt 78 kg (172 lb)   SpO2 98%   BMI 29.52 kg/m²    Physical Exam  Vitals and nursing note reviewed.   Constitutional:       General: She is not in acute distress.     Appearance: Normal appearance.   HENT:      Head: Normocephalic and atraumatic.   Eyes:      Extraocular Movements: Extraocular movements intact.      Conjunctiva/sclera: Conjunctivae normal.      Pupils: Pupils are equal, round, and reactive to light.   Neck:      Vascular: No carotid bruit.   Cardiovascular:      Rate and Rhythm: Normal rate and regular rhythm.      Pulses: Normal pulses.      Heart sounds: Murmur heard.      Systolic murmur is present with a grade of 2/6.   Pulmonary:      Effort: Pulmonary effort is normal.      Breath sounds: Normal breath sounds.   Abdominal:      " General: Abdomen is flat. Bowel sounds are normal.      Palpations: Abdomen is soft. There is no mass.   Musculoskeletal:      Cervical back: Normal range of motion and neck supple.   Lymphadenopathy:      Cervical: No cervical adenopathy.   Skin:     Capillary Refill: Capillary refill takes less than 2 seconds.   Neurological:      General: No focal deficit present.      Mental Status: She is alert and oriented to person, place, and time.   Psychiatric:         Mood and Affect: Mood normal.         Behavior: Behavior normal.         Assessment/Plan   Problem List Items Addressed This Visit       Atrial fibrillation (CMS/HCC)    Relevant Medications    digoxin (Lanoxin) 125 MCG tablet    Chronic bilateral low back pain without sciatica    Relevant Medications    baclofen (Lioresal) 10 mg tablet    Mixed hyperlipidemia    Relevant Orders    Digoxin level    Lipid Panel    Iron deficiency anemia    Relevant Orders    CBC and Auto Differential    Ferritin    Iron and TIBC    Rheumatoid arthritis (CMS/HCC)    Relevant Medications    diclofenac sodium (Voltaren) 1 % gel gel    clobetasol (Temovate) 0.05 % cream    Centrilobular emphysema (CMS/HCC) - Primary    Coagulation defect, unspecified (CMS/HCC)    Type 2 diabetes mellitus without complication, without long-term current use of insulin (CMS/HCC)    Hypertensive heart disease with heart failure (CMS/HCC)    Relevant Medications    digoxin (Lanoxin) 125 MCG tablet     Other Visit Diagnoses       Encounter for screening for other disorder        Encounter for monitoring digoxin therapy        Relevant Orders    Digoxin level    Comprehensive Metabolic Panel    CBC and Auto Differential    TSH with reflex to Free T4 if abnormal    Abnormal mammogram of left breast        Relevant Orders    BI mammo left diagnostic tomosynthesis        Renewed/continued rest of medications  Checked labs  Updated Health Maintenance in HPI section  Osteoporosis- DEXA, vitamin D      Obesity- caloric restriction, increase CV exercise     Hyperlipidemia- continue meds, low fat/cholesterol diet     CHF- Bumetanide, Diltiazem     A. Fib/Wide-complex Tachycardia- digoxin, Xarelto, diltiazem, follow with cardiology     RA- Hydroxychloroquine, F/U rheumatology     Asthma- ventolin HFA prn, avoid allergens     Anemia- check CBC, iron levels, iron supplement     Cervical radiculopathy/LBP- f/u with pain management    COPD- avoid poor air quality, does not feel like need inhaler     F/U 6 months     Patient understands and agrees with treatment plan    Fredy Elias DO   Patient was identified as a fall risk.

## 2023-08-29 DIAGNOSIS — D50.8 IRON DEFICIENCY ANEMIA SECONDARY TO INADEQUATE DIETARY IRON INTAKE: Primary | ICD-10-CM

## 2023-08-29 PROBLEM — N18.31 STAGE 3A CHRONIC KIDNEY DISEASE (MULTI): Status: ACTIVE | Noted: 2023-08-29

## 2023-08-29 RX ORDER — FERROUS SULFATE 325(65) MG
1 TABLET ORAL
Qty: 90 TABLET | Refills: 1 | Status: SHIPPED | OUTPATIENT
Start: 2023-08-29 | End: 2024-02-15 | Stop reason: ALTCHOICE

## 2023-08-30 ENCOUNTER — TELEPHONE (OUTPATIENT)
Dept: PRIMARY CARE | Facility: CLINIC | Age: 73
End: 2023-08-30

## 2023-08-30 ENCOUNTER — OFFICE VISIT (OUTPATIENT)
Dept: PRIMARY CARE | Facility: CLINIC | Age: 73
End: 2023-08-30
Payer: MEDICARE

## 2023-08-30 VITALS
SYSTOLIC BLOOD PRESSURE: 126 MMHG | DIASTOLIC BLOOD PRESSURE: 72 MMHG | BODY MASS INDEX: 29.37 KG/M2 | WEIGHT: 172 LBS | OXYGEN SATURATION: 98 % | HEART RATE: 68 BPM | HEIGHT: 64 IN

## 2023-08-30 DIAGNOSIS — L03.012 CELLULITIS OF FINGER OF LEFT HAND: Primary | ICD-10-CM

## 2023-08-30 PROCEDURE — 99213 OFFICE O/P EST LOW 20 MIN: CPT | Performed by: FAMILY MEDICINE

## 2023-08-30 PROCEDURE — 3008F BODY MASS INDEX DOCD: CPT | Performed by: FAMILY MEDICINE

## 2023-08-30 PROCEDURE — 1160F RVW MEDS BY RX/DR IN RCRD: CPT | Performed by: FAMILY MEDICINE

## 2023-08-30 PROCEDURE — 1036F TOBACCO NON-USER: CPT | Performed by: FAMILY MEDICINE

## 2023-08-30 PROCEDURE — 1159F MED LIST DOCD IN RCRD: CPT | Performed by: FAMILY MEDICINE

## 2023-08-30 RX ORDER — PREDNISONE 10 MG/1
TABLET ORAL
Qty: 90 TABLET | Refills: 1 | Status: SHIPPED | OUTPATIENT
Start: 2023-08-30 | End: 2024-02-12 | Stop reason: WASHOUT

## 2023-08-30 RX ORDER — CLINDAMYCIN HYDROCHLORIDE 300 MG/1
300 CAPSULE ORAL 3 TIMES DAILY
Qty: 30 CAPSULE | Refills: 0 | Status: SHIPPED | OUTPATIENT
Start: 2023-08-30 | End: 2023-09-15 | Stop reason: ALTCHOICE

## 2023-08-30 NOTE — PROGRESS NOTES
Subjective   Patient ID: Trina Elias is a 72 y.o. female who presents for Hand Pain (Left hand swollen and painful ).  HPI  Started last night with pain and swelling around base of left thumb  Area red and hot  Worse letting it hang down, touching it and moving it  No numbness, weakness  No cuts on it  No injury  No fever, chills    Current Outpatient Medications:     albuterol 2.5 mg /3 mL (0.083 %) nebulizer solution, Take 3 mL (2.5 mg) by nebulization every 4 hours if needed., Disp: , Rfl:     baclofen (Lioresal) 10 mg tablet, Take 1 tablet (10 mg) by mouth 2 times a day., Disp: 180 tablet, Rfl: 1    bumetanide (Bumex) 2 mg tablet, Take 1 tablet (2 mg) by mouth 2 times a day., Disp: , Rfl:     cholecalciferol (Vitamin D-3) 25 MCG (1000 UT) tablet, Take by mouth., Disp: , Rfl:     clindamycin (Cleocin) 300 mg capsule, Take 1 capsule (300 mg) by mouth 3 times a day for 10 days., Disp: 30 capsule, Rfl: 0    clobetasol (Temovate) 0.05 % cream, Apply topically 2 times a day., Disp: 60 g, Rfl: 3    diclofenac sodium (Voltaren) 1 % gel gel, Apply 1 Application topically 2 times a day as needed (joint pain)., Disp: 450 g, Rfl: 1    digoxin (Lanoxin) 125 MCG tablet, Take 1 tablet (125 mcg) by mouth once daily., Disp: , Rfl:     dilTIAZem LA (Cardizem LA) 120 mg 24 hr tablet, Take 1 tablet (120 mg) by mouth once daily., Disp: , Rfl:     EPINEPHrine 0.3 mg/0.3 mL injection syringe, Inject 0.3 mL (0.3 mg) as directed if needed., Disp: , Rfl:     ferrous sulfate 325 (65 Fe) MG tablet, Take 1 tablet (325 mg) by mouth once daily with a meal., Disp: 90 tablet, Rfl: 1    fluticasone (Flonase) 50 mcg/actuation nasal spray, Administer 1 spray into affected nostril(s) 2 times a day., Disp: , Rfl:     hydroxychloroquine (Plaquenil) 200 mg tablet, Take 1 tablet (200 mg) by mouth once daily., Disp: , Rfl:     magnesium oxide (Mag-Ox) 400 mg tablet, Take 1 tablet (400 mg) by mouth 2 times a day., Disp: , Rfl:     montelukast  (Singulair) 10 mg tablet, Take 1 tablet (10 mg) by mouth once daily at bedtime., Disp: , Rfl:     naloxone (Narcan) 4 mg/0.1 mL nasal spray, Administer into affected nostril(s)., Disp: , Rfl:     nitroglycerin (Nitrostat) 0.4 mg SL tablet, Place 1 tablet (0.4 mg) under the tongue every 5 minutes if needed., Disp: , Rfl:     ondansetron (Zofran) 4 mg tablet, Take 1 tablet (4 mg) by mouth every 6 hours if needed., Disp: , Rfl:     oxyCODONE-acetaminophen (Percocet) 5-325 mg tablet, Take 1 tablet by mouth 3 times a day as needed for severe pain (7 - 10)., Disp: , Rfl:     pantoprazole (ProtoNix) 40 mg packet, Take by mouth., Disp: , Rfl:     polyethylene glycol (Miralax) 17 gram/dose powder, Take 17 g by mouth., Disp: , Rfl:     predniSONE (Deltasone) 10 mg tablet, 10-20 mg every day prn, Disp: 90 tablet, Rfl: 1    rosuvastatin (Crestor) 20 mg tablet, Take 1 tablet (20 mg) by mouth once daily., Disp: , Rfl:     Xarelto 20 mg tablet, Take 1 tablet (20 mg) by mouth once daily in the evening. Take with meals., Disp: , Rfl:    Past Surgical History:   Procedure Laterality Date    APPENDECTOMY  2013    Appendectomy    CARDIAC CATHETERIZATION  2013    Cardiac Cath Procedure Summary    CARDIAC SURGERY  2014    Heart Surgery     SECTION, CLASSIC  2013     Section    CHOLECYSTECTOMY  2013    Cholecystectomy Laparoscopic    FOOT SURGERY  2013    Foot Surgery    GASTRIC FUNDOPLICATION  2013    Esophagogastric Fundoplasty Nissen Fundoplication    HERNIA REPAIR  2015    Hernia Repair    OTHER SURGICAL HISTORY  2019    Banco tooth extraction    OTHER SURGICAL HISTORY  2019    Carpal tunnel surgery    OTHER SURGICAL HISTORY  2019    Foot surgery    OTHER SURGICAL HISTORY  2019    Leg surgery    OTHER SURGICAL HISTORY  2020    Hand surgery    OTHER SURGICAL HISTORY  2015    Cardio-Defib Eval Dual Chamber With Reprogramming    OTHER  SURGICAL HISTORY  01/11/2021    Intestinal surgery    ROTATOR CUFF REPAIR  08/13/2018    Rotator Cuff Repair      Past Medical History:   Diagnosis Date    Abnormal mammogram 06/26/2023    Acute sphenoidal sinusitis, unspecified 10/12/2017    Acute sphenoidal sinusitis, recurrence not specified    Bacterial meningitis, unspecified 01/26/2018    Acute bacterial meningitis    Bone pain 06/26/2023    BSOM (bilateral serous otitis media) 06/26/2023    Cervical neuropathy 06/26/2023    Cervicalgia 06/08/2021    Acute neck pain    Chest pain on exertion 06/26/2023    Chronic obstructive pulmonary disease, unspecified (CMS/HCC)     Chronic obstructive pulmonary disease    Cutaneous abscess of abdominal wall 11/17/2015    Abdominal wall abscess    Dehydration 06/26/2023    Dry cough 06/26/2023    Dry heaves 06/26/2023    Dyspnea 06/26/2023    Elevated serum creatinine 06/26/2023    Fatigue 06/26/2023    Fever, unspecified 06/26/2023    Headache 06/26/2023    History of abdominal surgery 06/26/2023    History of colon polyps 06/26/2023    Lumbar radiculitis 06/26/2023    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    Nonhealing surgical wound 06/26/2023    Numbness 06/26/2023    Osteopenia 06/26/2023    Other conditions influencing health status     Coronary Artery Disease    Other conditions influencing health status     Peptic Ulcer    Pain of right upper extremity 06/26/2023    Personal history of anaphylaxis 08/19/2015    History of anaphylaxis    Personal history of diseases of the skin and subcutaneous tissue     History of eczema    Personal history of infections of the central nervous system     History of bacterial meningitis    Personal history of other diseases of the digestive system 11/17/2015    History of esophageal reflux    Personal history of other diseases of the digestive system     History of hiatal hernia    Personal history of other diseases of the nervous system and sense organs     History of carpal tunnel  "syndrome    Personal history of other diseases of the respiratory system 01/06/2020    History of acute pharyngitis    Personal history of other diseases of the respiratory system     Personal history of asthma    Personal history of other specified conditions 08/08/2019    History of epigastric pain    Personal history of other specified conditions 03/12/2018    History of angioedema    Postural dizziness with presyncope 06/26/2023    Pulmonary edema 06/26/2023    Radiculopathy, lumbar region 08/13/2018    Acute lumbar radiculopathy    Restless legs syndrome 11/17/2015    Restless legs syndrome    Right serous otitis media 06/26/2023    Severe pain 06/26/2023    Small bowel obstruction (CMS/HCC) 06/26/2023    Tarsal tunnel syndrome, unspecified lower limb     Tarsal tunnel syndrome    Thoracic aortic aneurysm, without rupture, unspecified (CMS/HCC) 09/08/2020    Aneurysm, aorta, thoracic    Toxic effect of venom 06/26/2023    Ulcer of the stomach caused by bacteria (h. pylori), acute 06/26/2023    Ulnar neuropathy 06/26/2023     Social History     Tobacco Use    Smoking status: Never    Smokeless tobacco: Never   Substance Use Topics    Alcohol use: Never    Drug use: Never      No family history on file.   Review of Systems    Objective   /72   Pulse 68   Ht 1.626 m (5' 4\")   Wt 78 kg (172 lb)   SpO2 98%   BMI 29.52 kg/m²    Physical Exam  Vitals and nursing note reviewed.   Constitutional:       Appearance: Normal appearance.   Cardiovascular:      Pulses: Normal pulses.   Musculoskeletal:         General: Normal range of motion.   Skin:     Capillary Refill: Capillary refill takes less than 2 seconds.      Comments: Erythema and increase warmth with TTP at base of left thumb and adjacent hand   Neurological:      General: No focal deficit present.      Mental Status: She is alert and oriented to person, place, and time.      Sensory: No sensory deficit.      Motor: No weakness.   Psychiatric:         " Mood and Affect: Mood normal.         Behavior: Behavior normal.         Assessment/Plan   Problem List Items Addressed This Visit    None  Visit Diagnoses       Cellulitis of finger of left hand    -  Primary    Relevant Medications    clindamycin (Cleocin) 300 mg capsule    predniSONE (Deltasone) 10 mg tablet        Warm compress, Clindamycin    Told parent/patient that if no improvement in 2-3 days then please call. If worsens or new symptoms occur then please call when this occurs. If worsening then go to ER immediately      Patient understands and agrees with treatment plan    Fredy Elias, DO

## 2023-09-06 NOTE — TELEPHONE ENCOUNTER
Hemoccult cards available. Trina is currently inpatient.  Per Dr. Elias he will discuss with patient at Hospital follow up visit.

## 2023-09-07 ENCOUNTER — PATIENT OUTREACH (OUTPATIENT)
Dept: PRIMARY CARE | Facility: CLINIC | Age: 73
End: 2023-09-07
Payer: MEDICARE

## 2023-09-07 DIAGNOSIS — K56.609 SBO (SMALL BOWEL OBSTRUCTION) (MULTI): ICD-10-CM

## 2023-09-15 ENCOUNTER — OFFICE VISIT (OUTPATIENT)
Dept: PRIMARY CARE | Facility: CLINIC | Age: 73
End: 2023-09-15
Payer: MEDICARE

## 2023-09-15 VITALS
SYSTOLIC BLOOD PRESSURE: 128 MMHG | DIASTOLIC BLOOD PRESSURE: 74 MMHG | BODY MASS INDEX: 27.66 KG/M2 | HEIGHT: 64 IN | HEART RATE: 82 BPM | OXYGEN SATURATION: 98 % | WEIGHT: 162 LBS

## 2023-09-15 DIAGNOSIS — K56.609 SBO (SMALL BOWEL OBSTRUCTION) (MULTI): Primary | ICD-10-CM

## 2023-09-15 DIAGNOSIS — K29.00 ACUTE SUPERFICIAL GASTRITIS WITHOUT HEMORRHAGE: ICD-10-CM

## 2023-09-15 DIAGNOSIS — K27.9 PUD (PEPTIC ULCER DISEASE): ICD-10-CM

## 2023-09-15 DIAGNOSIS — D50.8 IRON DEFICIENCY ANEMIA SECONDARY TO INADEQUATE DIETARY IRON INTAKE: ICD-10-CM

## 2023-09-15 DIAGNOSIS — R10.13 EPIGASTRIC PAIN: ICD-10-CM

## 2023-09-15 LAB
BASOPHILS (10*3/UL) IN BLOOD BY AUTOMATED COUNT: 0.05 X10E9/L (ref 0–0.1)
BASOPHILS/100 LEUKOCYTES IN BLOOD BY AUTOMATED COUNT: 0.7 % (ref 0–2)
DIGOXIN (NG/ML) IN SER/PLAS: 1.06 NG/ML (ref 0.8–2)
EOSINOPHILS (10*3/UL) IN BLOOD BY AUTOMATED COUNT: 0.21 X10E9/L (ref 0–0.4)
EOSINOPHILS/100 LEUKOCYTES IN BLOOD BY AUTOMATED COUNT: 2.8 % (ref 0–6)
ERYTHROCYTE DISTRIBUTION WIDTH (RATIO) BY AUTOMATED COUNT: 15.6 % (ref 11.5–14.5)
ERYTHROCYTE MEAN CORPUSCULAR HEMOGLOBIN CONCENTRATION (G/DL) BY AUTOMATED: 29.7 G/DL (ref 32–36)
ERYTHROCYTE MEAN CORPUSCULAR VOLUME (FL) BY AUTOMATED COUNT: 77 FL (ref 80–100)
ERYTHROCYTES (10*6/UL) IN BLOOD BY AUTOMATED COUNT: 4.13 X10E12/L (ref 4–5.2)
HEMATOCRIT (%) IN BLOOD BY AUTOMATED COUNT: 32 % (ref 36–46)
HEMOGLOBIN (G/DL) IN BLOOD: 9.5 G/DL (ref 12–16)
HYPOCHROMIA (PRESENCE) IN BLOOD BY LIGHT MICROSCOPY: NORMAL
IMMATURE GRANULOCYTES/100 LEUKOCYTES IN BLOOD BY AUTOMATED COUNT: 0.7 % (ref 0–0.9)
LEUKOCYTES (10*3/UL) IN BLOOD BY AUTOMATED COUNT: 7.5 X10E9/L (ref 4.4–11.3)
LYMPHOCYTES (10*3/UL) IN BLOOD BY AUTOMATED COUNT: 1.8 X10E9/L (ref 0.8–3)
LYMPHOCYTES/100 LEUKOCYTES IN BLOOD BY AUTOMATED COUNT: 24.2 % (ref 13–44)
MONOCYTES (10*3/UL) IN BLOOD BY AUTOMATED COUNT: 0.75 X10E9/L (ref 0.05–0.8)
MONOCYTES/100 LEUKOCYTES IN BLOOD BY AUTOMATED COUNT: 10.1 % (ref 2–10)
NEUTROPHILS (10*3/UL) IN BLOOD BY AUTOMATED COUNT: 4.59 X10E9/L (ref 1.6–5.5)
NEUTROPHILS/100 LEUKOCYTES IN BLOOD BY AUTOMATED COUNT: 61.5 % (ref 40–80)
OVALOCYTES PRESENCE IN BLOOD BY LIGHT MICROSCOPY: NORMAL
PLATELETS (10*3/UL) IN BLOOD AUTOMATED COUNT: 365 X10E9/L (ref 150–450)
RBC MORPHOLOGY IN BLOOD: NORMAL

## 2023-09-15 PROCEDURE — 1159F MED LIST DOCD IN RCRD: CPT | Performed by: FAMILY MEDICINE

## 2023-09-15 PROCEDURE — 80162 ASSAY OF DIGOXIN TOTAL: CPT

## 2023-09-15 PROCEDURE — 99495 TRANSJ CARE MGMT MOD F2F 14D: CPT | Performed by: FAMILY MEDICINE

## 2023-09-15 PROCEDURE — 3008F BODY MASS INDEX DOCD: CPT | Performed by: FAMILY MEDICINE

## 2023-09-15 PROCEDURE — 1160F RVW MEDS BY RX/DR IN RCRD: CPT | Performed by: FAMILY MEDICINE

## 2023-09-15 PROCEDURE — 85025 COMPLETE CBC W/AUTO DIFF WBC: CPT

## 2023-09-15 PROCEDURE — 1036F TOBACCO NON-USER: CPT | Performed by: FAMILY MEDICINE

## 2023-09-15 RX ORDER — PANTOPRAZOLE SODIUM 40 MG/1
40 TABLET, DELAYED RELEASE ORAL 2 TIMES DAILY
Qty: 180 TABLET | Refills: 0 | Status: SHIPPED | OUTPATIENT
Start: 2023-09-15 | End: 2024-02-19

## 2023-09-15 RX ORDER — SUCRALFATE 1 G/1
1 TABLET ORAL
Qty: 60 TABLET | Refills: 0 | Status: SHIPPED | OUTPATIENT
Start: 2023-09-15 | End: 2023-09-30

## 2023-09-15 NOTE — PROGRESS NOTES
"Patient: Trina Elias  : 1950  PCP: Fredy Elias DO  MRN: [id number]  Program: No linked episodes     Trina Elias is a 73 y.o. female presenting today for follow-up after being discharged from the hospital 9 days ago. The main problem requiring admission was SBO. The discharge summary and/or Transitional Care Management documentation was reviewed. Medication reconciliation was performed as indicated via the \"[place] as Reviewed\" timestamp.     Trina Elias was contacted by Transitional Care Management services two days after her discharge. This encounter and supporting documentation was reviewed.    The complexity of medical decision making for this patient's transitional care is moderate.    Still having a lot of abdominal pain  Pain in upper abdomen  Burning pain- at times share  Better- ice cream  Worse- anything solid  No fever, chills  + nausea w/o vomiting  + diarrhea  No hematochezia  Still on Protonix  No CP, palpitations  + SOB this past week  No wheezing  Some pain that goes from front to back    Physical Exam  Vitals and nursing note reviewed.   Constitutional:       General: She is not in acute distress.     Appearance: Normal appearance.   HENT:      Head: Normocephalic and atraumatic.   Eyes:      Extraocular Movements: Extraocular movements intact.      Conjunctiva/sclera: Conjunctivae normal.      Pupils: Pupils are equal, round, and reactive to light.   Neck:      Vascular: No carotid bruit.   Cardiovascular:      Rate and Rhythm: Normal rate and regular rhythm.      Pulses: Normal pulses.      Heart sounds: Murmur heard.      Systolic murmur is present with a grade of 2/6.   Pulmonary:      Effort: Pulmonary effort is normal.      Breath sounds: Normal breath sounds.   Abdominal:      General: Abdomen is flat. Bowel sounds are normal.      Palpations: Abdomen is soft. There is no mass.      Tenderness: There is abdominal tenderness in the epigastric area.   Musculoskeletal:      Cervical " back: Normal range of motion and neck supple.   Lymphadenopathy:      Cervical: No cervical adenopathy.   Skin:     Capillary Refill: Capillary refill takes less than 2 seconds.   Neurological:      General: No focal deficit present.      Mental Status: She is alert and oriented to person, place, and time.   Psychiatric:         Mood and Affect: Mood normal.         Behavior: Behavior normal.         Assessment/Plan   Problem List Items Addressed This Visit       Iron deficiency anemia    Relevant Orders    CBC and Auto Differential (Completed)    Morphology (Completed)     Other Visit Diagnoses       SBO (small bowel obstruction) (CMS/HCC)    -  Primary    Epigastric pain        Acute superficial gastritis without hemorrhage        Relevant Medications    pantoprazole (ProtoNix) 40 mg EC tablet    sucralfate (Carafate) 1 gram tablet    PUD (peptic ulcer disease)        Relevant Medications    pantoprazole (ProtoNix) 40 mg EC tablet    sucralfate (Carafate) 1 gram tablet        SBO- resolved- limit opiates, continue fluids, fiber    Epigastric pain (suspect gastritis/PUD)- sucrafate, increase protonix to bid    Anemia- check labs, hemocult cards    Review of Systems    No family history on file.    Engagement  Call Start Time: 1542 (9/7/2023  3:36 PM)    Medications  Medications reviewed with patient/caregiver?: -- (No medication changes made.) (9/7/2023  3:36 PM)  Is the patient having any side effects they believe may be caused by any medication additions or changes?: No (9/7/2023  3:36 PM)  Does the patient have all medications ordered at discharge?: Yes (9/7/2023  3:36 PM)  Care Management Interventions: No intervention needed (9/7/2023  3:36 PM)    Appointments  Does the patient have a primary care provider?: Yes (9/7/2023  3:36 PM)  Care Management Interventions: -- (Pt states she will call to schedule hospital follow up.) (9/7/2023  3:36 PM)  Has the patient kept scheduled appointments due by today?: Not  applicable (9/7/2023  3:36 PM)  Care Management Interventions: Advised to schedule with specialist (9/7/2023  3:36 PM)    Self Management  What is the home health agency?: N/A (9/7/2023  3:36 PM)  What Durable Medical Equipment (DME) was ordered?: N/A (9/7/2023  3:36 PM)    Patient Teaching  Does the patient have access to their discharge instructions?: Yes (9/7/2023  3:36 PM)  Care Management Interventions: Reviewed instructions with patient (9/7/2023  3:36 PM)  What is the patient's perception of their health status since discharge?: Improving (Pt is still having abdominal discomfort and is taking tylenol. Pain is no longer severe. States bowels are moving too much now. She can't stop going.) (9/7/2023  3:36 PM)  Is the patient/caregiver able to teach back the hierarchy of who to call/visit for symptoms/problems? PCP, Specialist, Home Health nurse, Urgent Care, ED, 911: Yes (9/7/2023  3:36 PM)    Wrap Up  Wrap Up Additional Comments: Pt with h/o multiple intra-abdominal surgeries including ex lap with resection of small bowel as well as removal of mesh. Presented to ED with severe abdominal pain. Dx: SBO. Surgery consulted. No emergent surgery required. Treated with NG tube decompression and stool softner. Pt to follow up with her surgeon Dr. Grier. (9/7/2023  3:36 PM)        No follow-ups on file.

## 2023-09-19 ENCOUNTER — PATIENT OUTREACH (OUTPATIENT)
Dept: PRIMARY CARE | Facility: CLINIC | Age: 73
End: 2023-09-19

## 2023-09-19 ENCOUNTER — CLINICAL SUPPORT (OUTPATIENT)
Dept: PRIMARY CARE | Facility: CLINIC | Age: 73
End: 2023-09-19
Payer: MEDICARE

## 2023-09-19 DIAGNOSIS — J45.909 ASTHMA, UNSPECIFIED ASTHMA SEVERITY, UNSPECIFIED WHETHER COMPLICATED, UNSPECIFIED WHETHER PERSISTENT (HHS-HCC): Primary | ICD-10-CM

## 2023-09-19 DIAGNOSIS — D50.8 IRON DEFICIENCY ANEMIA SECONDARY TO INADEQUATE DIETARY IRON INTAKE: ICD-10-CM

## 2023-09-19 PROCEDURE — 82270 OCCULT BLOOD FECES: CPT | Performed by: FAMILY MEDICINE

## 2023-09-20 NOTE — PROGRESS NOTES
Call regarding appt with Dr. Elias on 9/15/2023 after hospitalization. At time of outreach call, the patient states she is still not feeling very well. The pt reports she is still nauseous and has epigastric discomfort. Pt verbalized understanding of medication changes made at appt, increasing pantoprazole to twice daily and taking Carafate four times daily. The pt is now awaiting culture results. Advised pt to call the office next week if she does not hear anything back.

## 2023-09-21 LAB
POC OCCULT BLOOD STOOL #1: POSITIVE
POC OCCULT BLOOD STOOL #2: POSITIVE
POC OCCULT BLOOD STOOL #3: POSITIVE

## 2023-10-02 ENCOUNTER — TELEPHONE (OUTPATIENT)
Dept: PRIMARY CARE | Facility: CLINIC | Age: 73
End: 2023-10-02
Payer: MEDICARE

## 2023-10-02 ENCOUNTER — APPOINTMENT (OUTPATIENT)
Dept: SURGERY | Facility: CLINIC | Age: 73
End: 2023-10-02
Payer: MEDICARE

## 2023-10-02 ENCOUNTER — HOSPITAL ENCOUNTER (OUTPATIENT)
Dept: CARDIOLOGY | Facility: CLINIC | Age: 73
Discharge: HOME | End: 2023-10-02
Payer: MEDICARE

## 2023-10-02 DIAGNOSIS — Z95.810 PRESENCE OF AUTOMATIC CARDIOVERTER/DEFIBRILLATOR (AICD): ICD-10-CM

## 2023-10-02 DIAGNOSIS — K92.2 GASTROINTESTINAL HEMORRHAGE, UNSPECIFIED GASTROINTESTINAL HEMORRHAGE TYPE: Primary | ICD-10-CM

## 2023-10-02 DIAGNOSIS — I42.9 CARDIOMYOPATHY, UNSPECIFIED TYPE (MULTI): ICD-10-CM

## 2023-10-02 DIAGNOSIS — I42.9 CARDIOMYOPATHY, UNSPECIFIED TYPE (MULTI): Primary | ICD-10-CM

## 2023-10-02 NOTE — TELEPHONE ENCOUNTER
The referral to Dr. Grier is in the chart- just needs to call her office and they will have the referral

## 2023-10-02 NOTE — PROGRESS NOTES
Subjective   Patient ID: Trina Elias is a 73 y.o. female who presents for No chief complaint on file..  HPI    Review of Systems    Objective   Physical Exam    Assessment/Plan

## 2023-10-02 NOTE — TELEPHONE ENCOUNTER
Pt wants a endoscopy and wasn't sure if she needed an upper GI or not but it needs to go to [facility] office in [place].

## 2023-10-10 PROBLEM — I50.40 COMBINED SYSTOLIC AND DIASTOLIC CONGESTIVE HEART FAILURE (MULTI): Status: ACTIVE | Noted: 2020-11-02

## 2023-10-10 PROBLEM — N18.9 CKD (CHRONIC KIDNEY DISEASE): Status: ACTIVE | Noted: 2023-10-10

## 2023-10-10 PROBLEM — Z86.718 PERSONAL HISTORY OF OTHER VENOUS THROMBOSIS AND EMBOLISM: Status: ACTIVE | Noted: 2020-11-02

## 2023-10-10 RX ORDER — DICLOFENAC SODIUM 10 MG/G
4 GEL TOPICAL 4 TIMES DAILY PRN
COMMUNITY
End: 2024-03-07 | Stop reason: SDUPTHER

## 2023-10-11 ENCOUNTER — CLINICAL SUPPORT (OUTPATIENT)
Dept: CARDIOLOGY | Facility: CLINIC | Age: 73
End: 2023-10-11
Payer: MEDICARE

## 2023-10-11 DIAGNOSIS — Z95.810 PRESENCE OF AUTOMATIC CARDIOVERTER/DEFIBRILLATOR (AICD): ICD-10-CM

## 2023-10-11 DIAGNOSIS — I47.20 VT (VENTRICULAR TACHYCARDIA) (MULTI): Primary | ICD-10-CM

## 2023-10-11 DIAGNOSIS — Z95.810 PRESENCE OF AUTOMATIC (IMPLANTABLE) CARDIAC DEFIBRILLATOR: ICD-10-CM

## 2023-10-11 DIAGNOSIS — I47.29 OTHER VENTRICULAR TACHYCARDIA (MULTI): ICD-10-CM

## 2023-10-13 ENCOUNTER — PATIENT OUTREACH (OUTPATIENT)
Dept: PRIMARY CARE | Facility: CLINIC | Age: 73
End: 2023-10-13
Payer: MEDICARE

## 2023-10-13 NOTE — PROGRESS NOTES
Call placed regarding one month post discharge follow up. At time of outreach call, the patient states she is still having stomach pain. She has a follow scheduled for 10/30/2023 with Dr. Grier regarding scheduling an endoscopy. Offered to send message to move appt forward, but patient states she has cardiac follow ups scheduled that she has to work around.

## 2023-10-30 ENCOUNTER — APPOINTMENT (OUTPATIENT)
Dept: SURGERY | Facility: CLINIC | Age: 73
End: 2023-10-30
Payer: MEDICARE

## 2023-11-06 ENCOUNTER — OFFICE VISIT (OUTPATIENT)
Dept: SURGERY | Facility: CLINIC | Age: 73
End: 2023-11-06
Payer: MEDICARE

## 2023-11-06 VITALS
HEIGHT: 64 IN | SYSTOLIC BLOOD PRESSURE: 125 MMHG | BODY MASS INDEX: 27.66 KG/M2 | HEART RATE: 62 BPM | OXYGEN SATURATION: 100 % | DIASTOLIC BLOOD PRESSURE: 44 MMHG | WEIGHT: 162 LBS

## 2023-11-06 DIAGNOSIS — K92.2 GASTROINTESTINAL HEMORRHAGE, UNSPECIFIED GASTROINTESTINAL HEMORRHAGE TYPE: ICD-10-CM

## 2023-11-06 PROCEDURE — 99213 OFFICE O/P EST LOW 20 MIN: CPT | Performed by: SURGERY

## 2023-11-06 PROCEDURE — 1160F RVW MEDS BY RX/DR IN RCRD: CPT | Performed by: SURGERY

## 2023-11-06 PROCEDURE — 1036F TOBACCO NON-USER: CPT | Performed by: SURGERY

## 2023-11-06 PROCEDURE — 1126F AMNT PAIN NOTED NONE PRSNT: CPT | Performed by: SURGERY

## 2023-11-06 PROCEDURE — 1159F MED LIST DOCD IN RCRD: CPT | Performed by: SURGERY

## 2023-11-06 ASSESSMENT — PAIN SCALES - GENERAL: PAINLEVEL: 0-NO PAIN

## 2023-11-06 NOTE — PROGRESS NOTES
Subjective   Patient ID: Trina Elias is a 73 y.o. female who presents for New Patient Visit (NPV-Endoscopy ).  HPI  This is a patient I have known for many years who has some tough to manage GI problems.  She has had multiple surgeries in the past and as a result has significant adhesive disease.  She intermittently is hospitalized for partial small bowel obstructions.  She has found that she really cannot eat solid food and as a result she has become somewhat anemic.  She states she pretty much lives on mashed potatoes and she has lost close to 20 pounds since her last hospitalization a month ago.  She is however moving her bowels without any difficulty and that is due to taking MiraLAX every day.  She also is having some acid indigestion heartburn and occasionally when she eats something solid feels like something is getting stuck in her throat.  Review of Systems the patient has a history of congestive heart failure and A-fib she takes Xarelto and has a pacemaker/defibrillator in place.    Objective   Physical Exam lungs are clear bilaterally heart is regular rate and rhythm abdomen is still protuberant but nontender.    Assessment/Plan this is a patient with very poor GI motility and significant adhesive disease who most likely would not be able to eat solid food for the rest of her life.  Certainly no high-fiber foods.  Because of the adhesive disease she has great difficulty in digesting these things and becomes obstructed very easily.  She is drinking water readily now and taking plenty of MiraLAX which should help.  I offered to her visits with a dietitian if she thought she needed more advice on some variety in other foods that are also soft that she can eat.  Also were planning an upper endoscopy to assess for the dysphagia.  We discussed the risks of that procedure including bleeding perforation and missed lesion and she agrees to proceed

## 2023-11-08 NOTE — PROGRESS NOTES
Subjective   Patient ID: Trina Elias is a 73 y.o. female.  HPI    Nancy follows up for interval reevaluation of her chronic cervical pain from cervical postlaminectomy syndrome with radiculitis and cervicalgia. She is also with low back pain from spondylosis, degenerative disc and radiculitis.      Percocet 5 mg 3 times daily and baclofen 10 mg in the evening help to reduce pain and improve function up to 80%. Average pain score is 5 out of 10 with medication. Since taking the Zofran 4 mg as needed prior to the Percocet this does help with the nausea and GI upset.      Toxicology consistent May 20, 2023. Annual controlled substance agreement and opioid risk tool are completed and scanned into the chart May 30, 2023.     Cannot take Lyrica due to negative side effects..     History of lumbar epidural steroid injection from Dr. Solis 2004 at L4 and L5 for degenerative disc.     Did follow with [facility] orthopedics 5 to 6 years ago had epidural steroid injection. History of bacterial meningitis and required hospitalization for 3 days and treated for 21 days with IV antibiotics as a complication from epidural.     Not an injection candidate.    Is with increased leg cramping at night.  Primary care doctor initiated baclofen 10 mg and increased it to twice a day.  Has not helped with leg cramps.    Discontinue baclofen.    Initiated gabapentin 100 mg to taper up to 300 mg in the evening to help with leg cramps.     On Xarelto for atrial fibrillation. Does have AICD with pacemaker. Did have cardioversion May 2, 2022. Return to normal sinus rhythm.     Last MRI of lumbar spine 2011 with degenerative disc.     Last CT of lumbar spine 2022 with degenerative disc.             For continuity:   Given the patient's report of reduced pain and improved functional ability without adverse effects, it is reasonable to treat with narcotic medications. The terms of the opioid agreement as well as the potential risks and adverse  effects of the patient's medication regimen were discussed in detail. This includes if applicable due to dosage of medication permission to discuss and coordinate care with other treatment providers relevant to the patients condition. The patient verbalized understanding.      Risks and side effects of chronic opioid therapy including but not limited to tolerance, dependence, constipation, hyperalgesia, cognitive side effects, addiction and possible death due to overuse and or misuse were discussed. I also discussed that such medications when co-administered with other sedative agents including but not limited to alcohol, benzodiazepines, sedative hypnotics and illegal drugs could pose life threatening consequences including death. I also explained the impact that the administration of such medication has on a patient with obstructive sleep apnea and continued recommendations for use of apnea devices if ordered are prescribed by other physicians. In order to effectively and safely treat the pain, I also emphasized the importance of compliance with the treatment plan, as well as compliance with the terms of the opioid agreement, which was reviewed in detail. I explained the importance of being responsible with the medications and to take these only as prescribed, never in excess and never for reasons other than pain reduction. The patient was counseled on keeping the medications safe and locked away from children and other adults as well as disposal methods and options. The patient understood the risks and instructions.      I also discussed with the patient in detail that based on the clinical response to the opioid medications and improvements of activities of daily living, sleep, and work performance in light of compliance with the treatment plan we can continue this form of therapy for the above chronic pain. The goal and rationale used for current treatment with chronic opioid medication is to control the pain  and alleviate disability induced by the chronic pain condition noted above after failures of other non-opioid and nonpharmacological modalities to treat the chronic pain and the symptoms associated with have failed. The patient understood the goals in terms of the above treatment plan and had no further questions prior to leaving the office today.      Of note, the above-mentioned diagnoses/conditions and expected fluctuating nature of pain, and pain characteristic changes may lead to prolonged functional impairment requiring frequent and multiple reassessments with continued high level medical decision making. As noted, medication and medication management may require opiate therapy in excess of a routine less than 30 day medication requirement. The patient may require daily opiate therapy necessitating month-long prescription medication as noted above in order to perform activities of daily living and achieve acceptable quality of life with respect to their chronic pain condition for the foreseeable future. We monitor our patient's carefully through drug monitoring, medication counts, urine drug testing specific to their medication as well as a myriad of other substances and with frequent follow-ups with interval reassement of the chronic pain condition, its pathophysiology and prognosis.      The level of clinical decision making at this office visit is high due to high risks and complications including mortality and morbidity related to acute and chronic pain with respects to life, bodily function, and treatment. Risks and clinical decisions with respect to under treatment, failure to maintain adequate treatment, and/or overtreatment complications and outcomes were discussed with the patient with respect to their chronic pain conditions, interventional therapies, as well as the use of various medications including possible controlled/dangerous medications. The amount and complexity of reviewed data at this in  subsequent office visits is high given patient's fluctuating clinical presentation, laboratory and radiographic reports, prescription monitoring program data, and medication history as well as other relevant data as noted above. Pertinent negatives and positives data was used in consideration for the above-mentioned high complexity.       Given the patient's total MED, general use of daily opiates, or other coadministered medications in various classes the patient was offered a prescription for Narcan. I instructed the patient that it is important that patient fill this medication in order to demonstrate understanding of the gravity of possible side effects including respiratory depression and risk of overdose of this opiate load or medication combination. As such patient will be required to bring Narcan prescription to follow-up appointments as part of compliance with continued opiate care.      With respect to opiate induced constipation I discussed multiple ways to combat this problem including staying hydrated and taking over-the-counter medications such as Dulcolax, Miralax and Senna. If these treatments are not effective we could consider such medications as Amitiza, Linzess and Movantik.      Disclaimer: This note was transcribed using an audio transcription device. As such, minor errors may be present with regard to spelling, punctuation, and inadvertent word insertion. Please disregard such errors.     Results/Data  Xray BN Spine, Lumbosacral; 2 or 3 Views [id number] 03:32PM Fredy Elias      Test Name Result Flag Reference   Xray Lumbar Spine AP + Lateral (Report)       FINAL REPORT  Interpreted by: MIGUELINA WATKINS   06/27/23 18:12  MRN: [id number]  Patient Name: VERNON CARTAGENA     STUDY:  SPINE, LUMBOSACRAL; 2 OR 3 VIEWS; 6/26/2023 3:32 pm     INDICATION:  Fell and landed on tailbone- worsened chronic pain- ? vertebral  fracture.     COMPARISON:  02/27/2013     ACCESSION NUMBER(S):  [id number]      ORDERING CLINICIAN:  SONJA SIMON     FINDINGS:  Lumbar spine, three views     Levocurvature of the lumbar spine. There is moderate disc space  narrowing osteophytosis throughout the lumbar spine. There is no  fracture. There is no spondylolisthesis. Mild facet disease lower  lumbar spine as well     IMPRESSION:  Moderate multilevel spondylosis throughout the lumbar spine. Mild  facet disease in the lower lumbar.     Electronically signed by: JUNE  06/27/23 18:12      Xray Shoulder Complete Min 2 Views [id number] 05:00PM Non Ambulatory, Provider   Ordering Provider: IRIS DRAKE [id number]      Test Name Result Flag Reference   Xray Shoulder Complete Min 2 Views (Report)       FINAL REPORT  Interpreted by: DAMARIS GIBBONS   02/25/23 18:08  MRN: [id number]  Patient Name: VERNON CARTAGENA     STUDY:  SHOULDER, CMPLT, MIN 2 VIEWS; Right; 2/25/2023 5:00 pm     INDICATION:  fall, landed on left arm, right shoulder pain, left hip pain .     COMPARISON:  Right shoulder radiographs dated 01/12/2023.     ACCESSION NUMBER(S):  [id number]     ORDERING CLINICIAN:  IRIS DRAKE     FINDINGS:  There is no evidence of acute fracture or dislocation. There is a  reverse shoulder arthroplasty without evidence of periprosthetic  fracture or malalignment. There is up to 3 mm lucency between the  humeral component of the prosthesis seen on axillary view, which may  represent non radiopaque filler material and is stable taking into  account differences in technique.     IMPRESSION:  Reverse right shoulder arthroplasty without evidence of hardware  complication.     Electronically signed by: SHAI  02/25/23 18:08      Xray Femur 2 View [id number] 05:00PM Non Ambulatory, Provider   Ordering Provider: IRIS DRAKE [id number]      Test Name Result Flag Reference   Xray Femur 2 View (Report)       FINAL REPORT  Interpreted by: DAMARIS GIBBONS   02/25/23 18:10  MRN: [id number]  Patient Name: VERNON CARTAGENA     STUDY:  PELVIS, 1 OR 2 VIEWS; FEMUR : MIN 2  VIEWS; Left; 2/25/2023 5:00 pm     INDICATION:  left fx .     COMPARISON:  Pelvic radiographs dated 02/06/2020.     ACCESSION NUMBER(S):  [id number]; [id number]     ORDERING CLINICIAN:  IRIS DRAKE     FINDINGS:  Femoral heads are maintained within their respective acetabula and  are normal in contour. There is mild bilateral acetabular spurring.  Ilioischial and iliopubic lines are maintained. The left femur  appears intact. There is serpiginous sclerosis within the distal  intramedullary femur which may represent an age-indeterminate  probably chronic bone marrow infarct.     IMPRESSION:  No evidence of fracture or dislocation. Serpiginous sclerosis in the  distal left femur may represent age-indeterminate possibly chronic  bone marrow infarct.        Electronically signed by: SHAI  02/25/23 18:10      Xray Pelvis 1 or 2 View [id number] 05:00PM Non Ambulatory, Provider   Ordering Provider: IRIS DRAKE [id number]      Test Name Result Flag Reference   Xray Pelvis 1 or 2 View (Report)       FINAL REPORT  Interpreted by: DAMARIS GIBBONS   02/25/23 18:10  MRN: [id number]  Patient Name: VERNON CARTAGENA     STUDY:  PELVIS, 1 OR 2 VIEWS; FEMUR : MIN 2 VIEWS; Left; 2/25/2023 5:00 pm     INDICATION:  left fx .     COMPARISON:  Pelvic radiographs dated 02/06/2020.     ACCESSION NUMBER(S):  [id number]; [id number]     ORDERING CLINICIAN:  IRIS DRAKE     FINDINGS:  Femoral heads are maintained within their respective acetabula and  are normal in contour. There is mild bilateral acetabular spurring.  Ilioischial and iliopubic lines are maintained. The left femur  appears intact. There is serpiginous sclerosis within the distal  intramedullary femur which may represent an age-indeterminate  probably chronic bone marrow infarct.     IMPRESSION:  No evidence of fracture or dislocation. Serpiginous sclerosis in the  distal left femur may represent age-indeterminate possibly chronic  bone marrow infarct.        Electronically signed by:  SHAI  02/25/23 18:10      CT L Spine without Contrast [id number] 09:33AM Latoya Harley      Test Name Result Flag Reference   CT L Spine without Contrast (Report)       FINAL REPORT  Interpreted by: MASOUD MATHIS FREDERICK, MD   07/14/22 10:40  MRN: [id number]  Patient Name: VERNON CARTAGENA     STUDY:  CT L-SPINE WO CONTRAST; 7/14/2022 9:33 am     INDICATION:  Fell 6 months ago after tripping. Landed on belly did not hit head.  Pain 10 out of 10 of low back along with muscle cramps and spasm  since then. Does have leg weakness. No other falls since that  initial fall 6 months ago. Failed conservative reyna M47.816: Lumbar  spondylosis M54.16: Lumbar radiculitis.     COMPARISON:  None.     ACCESSION NUMBER(S):  [id number]     ORDERING CLINICIAN:  LATOYA HARLEY     TECHNIQUE:  CT of the lumbar spine was performed. Multiplanar reconstructions  were made..     FINDINGS:  Bone density and vertebral body height are normal. The sacrum are  normal.  Images at each interspace reveal the following:  L1/L2  Normal exam  L2/L3  Normal exam  L3/L4  Vacuum disc phenomenon with mild circumferential bulging  intervertebral disc. No measurable canal stenosis or bony foraminal  narrowing. Marginal osteophyte formation bilaterally. Far lateral  neural foraminal stenosis not excluded  L4/L5  Mild bulging disc and facet hypertrophy. Suspected moderate STIR  central canal stenosis. No associated foraminal narrowing.  L5/S1  Narrowing of the intervertebral disc space with intra-articular gas.  Minimal marginal osteophyte formation without canal or foraminal  narrowing        IMPRESSION:  * Trace spondylosis     The examination was interpreted [facility]     Electronically signed by: MASOUD MATHIS  07/14/22 10:40      Xray Myelography Cervical with LP [id number] 10:47AM Connor Ga      Test Name Result Flag Reference   Xray Myelography Cervical with LP (Report)       FINAL REPORT  Interpreted by: SHAILESH PARKER ANDREW, MD  and BRIGIDA REESE  09/01/21 08:43  MRN: [id number]  Patient Name: VERNON CARTAGENA     STUDY:  MYELOGRAPHY, CERVICAL, WITH LUMBAR PUNCTURE; SPINAL PUNCTURE, LUMBAR,  DIAGNOSTIC; CT C-SPINE [facility]; 8/31/2021 10:47 am; 1/1/2018 8:15 pm;  8/31/2021 10:56 am     INDICATION:  Cervical radiculopathy; Signs/Symptoms: r/o meningitis. Myelogram  performed for evaluation of spinal stenosis as patient has a non MR  compatible cardiac pacemaker/AICD.     COMPARISON:  02/06/2020 CT head     ACCESSION NUMBER(S):  [id number]; [id number]; [id number]     ORDERING CLINICIAN:  SHEREEN HORVATH     TECHNIQUE:  After discussion of the risks, benefits and alternatives, standard  written hospital consent was obtained. The patient was placed prone  on the fluoroscopic table. The lower back was prepped and draped in  sterile fashion. One percent lidocaine was used for local  anesthesia. Under fluoroscopic guidance, a 22 gauge spinal needle  was advanced into the thecal sac at the L3-L4 level. 8 cc of  Omnipaque 300 was administered intrathecally under intermittent  fluoroscopic imaging. Contrast was advanced into the cervical region  tilting the patient/table head-down. Fluoroscopy time was 1.1  minutes. The patient tolerated the procedure well immediately after  the procedure.     Following the fluoroscopic myelogram, serial axial CT images were  obtained through the cervical spine, using a bone algorithm. Images  were reformatted into sagittal and coronal planes.     FINDINGS:  FLUOROSCOPIC MYELOGRAM: There is filling bilaterally of nerve root  sleeves without cutoff. There is normal alignment.     CT MYELOGRAM:     ALIGNMENT: Within normal limits.     VERTEBRAE/DISC SPACES: The vertebral body heights are intact. There  is solid operative fusion of the C5 through C7 vertebral bodies with  interbody spacing devices. The C4-5 disc height is decreased.     C1-2: There is no significant central canal or neural  foraminal  stenosis.     C2-3: The right lateral recess and neural foramen are mildly stenosed  by facet hypertrophy. The right facet joint is severely arthritic..     C3-4: The right neural foramen is mildly stenosed by uncovertebral  spurring and facet hypertrophy with disc bulge. More central disc  bulge indents the thecal sac but does not reach the cord. The right  facet joint is severely arthritic..     C4-5: Uncovertebral joint hypertrophy and facet arthrosis contribute  to moderate right-sided neural foraminal stenosis with mild to  moderate right lateral recess stenosis. The right facet joint is  severely arthritic with degenerative subarticular cysts posteriorly,  the inferior C4 facet cyst having vacuum phenomena. A Schmorl's node  with vacuum phenomena is also noted along the posterosuperior C5  vertebral body.     C5-6: There is discectomy and fusion with posterior osteophytosis  with effacement of the ventral thecal sac. No significant central  canal stenosis.. Mild bilateral neural foraminal narrowing.     C6-7: There is discectomy and fusion with posterior osteophytosis  with effacement of the ventral thecal sac. No significant central  canal stenosis.. Mild bilateral neural foraminal narrowing.     C7-T1: There is no significant central canal or neural foraminal  stenosis.     Incidental note is made of chronic blowout fracture of the posterior  aspect of the medial wall of the left orbit with depression of bone 6  mm similar to the prior CT. Orbital fat extends into the defect with  distortion of the posterior aspect of the medial rectus muscle.     IMPRESSION:  1. Multilevel degenerative changes are present as noted more  prominent on the right, specially at C4-5.     2. Old blowout fracture of posteromedial wall of left orbit. The  adjacent medial rectus muscle is distorted.     3. Shortly after CT imaging was completed, the patient developed  uncontrolled shaking with no focal neuro deficit,  shortness of  breath, hypotension or itching/urticaria. The patient had normal  oxygenation. Initially her only complaint of pain was a mild  headache. Shortly after the shaking began the patient also complained  of left chest/shoulder region discomfort and for this reason was  transferred to the emergency department for further evaluation.        IDr.Bury, supervised and was available throughout this procedure as  performed by fellow physician Dr. Dela Cruz. This study was  performed and interpreted at [facility]. I  agree with the procedural description and the findings as stated.     Electronically signed by: SHAILESH PARKER  09/01/21 08:43      Xray Cervical Spine Min 4 View [id number] 09:44AM Connor Luo      Test Name Result Flag Reference   Xray Cervical Spine Min 4 View (Report)       FINAL REPORT  Interpreted by: AUSTIN FLOOD KRISHNA, MD   06/10/21 18:03  MRN: [id number]  Patient Name: VERNON CARTAGENA     STUDY:  Cervical spine, 4 views.     INDICATION:  NECK PAIN.     COMPARISON:  None.     ACCESSION NUMBER(S):  [id number]     ORDERING CLINICIAN:  CONNOR LUO     FINDINGS:  Alignment is within normal limits.  Anterior cervical discectomy and fusion changes are observed at C5-6  and C6-7 with interbody bone grafts. Hardware is intact without  perihardware fractures or lucencies. There appears to be solid fusion  across the disc spaces.  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  Mild-to-moderate spondylosis noted at C4-5 with disc height loss and  osteophytes.  Left carotid region vascular calcifications noted.  Vertebral body heights are preserved. Posterior elements are intact.  No dynamic instability on flexion/extension views.  Prevertebral soft tissues are unremarkable.     IMPRESSION:  1. ACDF at C5-7 without hardware complication.  2. Mild to moderate C4-5 spondylosis.     Electronically signed by: AUSTIN FLOOD  06/10/21 18:03      CT C Spine  without Contrast [id number] 03:55PM Non Ambulatory, Provider   Ordering Provider: PHILIP ESQUEDA [id number]      Test Name Result Flag Reference   CT C Spine without Contrast (Report)       Interpreted by: MARLIN KEE  02/06/20 16:05  MRN: [id number]  Patient Name: VERNON CARTAGENA     STUDY:  CT C-SPINE WO CONTRAST; 2/6/2020 3:55 pm     INDICATION:  fall, neck pain.     COMPARISON:  None.     ACCESSION NUMBER(S):  [id number]     ORDERING CLINICIAN:  PHILIP ESQUEDA     TECHNIQUE:  Axial CT images of the cervical spine are obtained. Axial, coronal  and sagittal reconstructions are provided for review.     FINDINGS:  Status post fixation C5, C6 and C7.     Fractures: There is no evidence for an acute fracture of the cervical  spine.     Vertebral Alignment: Within normal limits.     Craniocervical Junction: The odontoid process and craniocervical  junction are intact.     Vertebrae/Disc Spaces: The cervical vertebral body heights are intact  and the disc spaces are preserved.     Prevertebral/Paraspinal Soft Tissues: The prevertebral and paraspinal  soft tissues are unremarkable.        IMPRESSION:  Status post fusion of C5 through C7. No evidence of acute fracture.     Electronically signed by: MARLIN KEE  02/06/20 16:05       Review of Systems   Constitutional: Negative.    Respiratory: Negative.     Cardiovascular: Negative.    Gastrointestinal: Negative.    Endocrine: Negative.    Genitourinary: Negative.    Musculoskeletal:  Positive for arthralgias, back pain, gait problem, joint swelling, myalgias, neck pain and neck stiffness.   Allergic/Immunologic: Negative.    Neurological:  Positive for weakness and numbness. Negative for dizziness, tremors, seizures, syncope, facial asymmetry, speech difficulty, light-headedness and headaches.   Hematological: Negative.    Psychiatric/Behavioral: Negative.         Objective   Physical Exam  Vitals and nursing note reviewed.   Constitutional:       Appearance: Normal  appearance.   HENT:      Head: Normocephalic and atraumatic.   Cardiovascular:      Pulses: Normal pulses.   Pulmonary:      Effort: Pulmonary effort is normal. No respiratory distress.   Musculoskeletal:      Right lower leg: No edema.      Left lower leg: No edema.      Comments: Range of motion of cervical spine limited secondary to pain and stiffness with axial rotation to the left and extension.    Active range of motion of left shoulder limited secondary to to pain with external rotation and abduction to the height of the shoulder.    Active range of motion of hips increases low back and hip pain.   Skin:     General: Skin is warm and dry.      Capillary Refill: Capillary refill takes 2 to 3 seconds.   Neurological:      Mental Status: She is alert and oriented to person, place, and time.      Cranial Nerves: No cranial nerve deficit.      Sensory: No sensory deficit.      Motor: Weakness present.      Gait: Gait abnormal.      Comments: Positive Neer's.  Positive Sharma.    Weakness with hip flexion and ankle dorsiflexion 4 out of 5.    Positive straight leg raise.    Negative hip impingement.    Ambulates with mildly antalgic gait.   Psychiatric:         Mood and Affect: Mood normal.         Behavior: Behavior normal.         Assessment/Plan   Problem List Items Addressed This Visit             ICD-10-CM    Cervical post-laminectomy syndrome - Primary M96.1    Relevant Medications    gabapentin (Neurontin) 100 mg capsule    oxyCODONE-acetaminophen (Percocet) 5-325 mg tablet (Start on 11/24/2023)    oxyCODONE-acetaminophen (Percocet) 5-325 mg tablet (Start on 12/22/2023)    oxyCODONE-acetaminophen (Percocet) 5-325 mg tablet (Start on 1/19/2024)    Cervical radiculopathy M54.12    Relevant Medications    gabapentin (Neurontin) 100 mg capsule    oxyCODONE-acetaminophen (Percocet) 5-325 mg tablet (Start on 11/24/2023)    oxyCODONE-acetaminophen (Percocet) 5-325 mg tablet (Start on 12/22/2023)     oxyCODONE-acetaminophen (Percocet) 5-325 mg tablet (Start on 1/19/2024)    Displacement of lumbar disc with radiculopathy M51.16    Relevant Medications    gabapentin (Neurontin) 100 mg capsule    oxyCODONE-acetaminophen (Percocet) 5-325 mg tablet (Start on 11/24/2023)    oxyCODONE-acetaminophen (Percocet) 5-325 mg tablet (Start on 12/22/2023)    oxyCODONE-acetaminophen (Percocet) 5-325 mg tablet (Start on 1/19/2024)    Primary osteoarthritis of right shoulder M19.011    Relevant Medications    gabapentin (Neurontin) 100 mg capsule    oxyCODONE-acetaminophen (Percocet) 5-325 mg tablet (Start on 11/24/2023)    oxyCODONE-acetaminophen (Percocet) 5-325 mg tablet (Start on 12/22/2023)    oxyCODONE-acetaminophen (Percocet) 5-325 mg tablet (Start on 1/19/2024)     Follow-up 12 weeks.    Reviewed and approved by NITA HARLEY on 11/13/23 at 11:23 AM.

## 2023-11-13 ENCOUNTER — OFFICE VISIT (OUTPATIENT)
Dept: PAIN MEDICINE | Facility: CLINIC | Age: 73
End: 2023-11-13
Payer: MEDICARE

## 2023-11-13 VITALS — SYSTOLIC BLOOD PRESSURE: 143 MMHG | HEART RATE: 62 BPM | DIASTOLIC BLOOD PRESSURE: 73 MMHG | RESPIRATION RATE: 16 BRPM

## 2023-11-13 DIAGNOSIS — M54.12 CERVICAL RADICULOPATHY: ICD-10-CM

## 2023-11-13 DIAGNOSIS — M19.011 PRIMARY OSTEOARTHRITIS OF RIGHT SHOULDER: ICD-10-CM

## 2023-11-13 DIAGNOSIS — M51.16 DISPLACEMENT OF LUMBAR DISC WITH RADICULOPATHY: ICD-10-CM

## 2023-11-13 DIAGNOSIS — M96.1 CERVICAL POST-LAMINECTOMY SYNDROME: Primary | ICD-10-CM

## 2023-11-13 PROCEDURE — 99213 OFFICE O/P EST LOW 20 MIN: CPT | Performed by: NURSE PRACTITIONER

## 2023-11-13 PROCEDURE — 1036F TOBACCO NON-USER: CPT | Performed by: NURSE PRACTITIONER

## 2023-11-13 PROCEDURE — 1125F AMNT PAIN NOTED PAIN PRSNT: CPT | Performed by: NURSE PRACTITIONER

## 2023-11-13 PROCEDURE — 1159F MED LIST DOCD IN RCRD: CPT | Performed by: NURSE PRACTITIONER

## 2023-11-13 PROCEDURE — 1160F RVW MEDS BY RX/DR IN RCRD: CPT | Performed by: NURSE PRACTITIONER

## 2023-11-13 RX ORDER — OXYCODONE AND ACETAMINOPHEN 5; 325 MG/1; MG/1
1 TABLET ORAL 3 TIMES DAILY PRN
Qty: 84 TABLET | Refills: 0 | Status: SHIPPED | OUTPATIENT
Start: 2024-01-19 | End: 2024-02-12 | Stop reason: SDUPTHER

## 2023-11-13 RX ORDER — OXYCODONE AND ACETAMINOPHEN 5; 325 MG/1; MG/1
1 TABLET ORAL 3 TIMES DAILY PRN
Qty: 84 TABLET | Refills: 0 | Status: SHIPPED | OUTPATIENT
Start: 2023-12-22 | End: 2024-02-12 | Stop reason: SDUPTHER

## 2023-11-13 RX ORDER — OXYCODONE AND ACETAMINOPHEN 5; 325 MG/1; MG/1
1 TABLET ORAL 3 TIMES DAILY PRN
Qty: 84 TABLET | Refills: 0 | Status: SHIPPED | OUTPATIENT
Start: 2023-11-24 | End: 2024-02-12 | Stop reason: SDUPTHER

## 2023-11-13 RX ORDER — GABAPENTIN 100 MG/1
300 CAPSULE ORAL NIGHTLY PRN
Qty: 90 CAPSULE | Refills: 5 | Status: SHIPPED | OUTPATIENT
Start: 2023-11-13 | End: 2024-02-12 | Stop reason: WASHOUT

## 2023-11-13 ASSESSMENT — ENCOUNTER SYMPTOMS
BACK PAIN: 1
NUMBNESS: 1
TREMORS: 0
GASTROINTESTINAL NEGATIVE: 1
NECK PAIN: 1
MYALGIAS: 1
LOSS OF SENSATION IN FEET: 0
DEPRESSION: 0
SPEECH DIFFICULTY: 0
RESPIRATORY NEGATIVE: 1
ENDOCRINE NEGATIVE: 1
LIGHT-HEADEDNESS: 0
JOINT SWELLING: 1
ALLERGIC/IMMUNOLOGIC NEGATIVE: 1
WEAKNESS: 1
SEIZURES: 0
HEMATOLOGIC/LYMPHATIC NEGATIVE: 1
DIZZINESS: 0
PSYCHIATRIC NEGATIVE: 1
FACIAL ASYMMETRY: 0
CONSTITUTIONAL NEGATIVE: 1
HEADACHES: 0
ARTHRALGIAS: 1
NECK STIFFNESS: 1
CARDIOVASCULAR NEGATIVE: 1
OCCASIONAL FEELINGS OF UNSTEADINESS: 1

## 2023-11-13 ASSESSMENT — PAIN DESCRIPTION - DESCRIPTORS: DESCRIPTORS: JABBING;SPASM

## 2023-11-13 ASSESSMENT — PAIN - FUNCTIONAL ASSESSMENT: PAIN_FUNCTIONAL_ASSESSMENT: 0-10

## 2023-11-13 ASSESSMENT — PAIN SCALES - GENERAL
PAINLEVEL: 5
PAINLEVEL_OUTOF10: 5 - MODERATE PAIN

## 2023-11-13 NOTE — PROGRESS NOTES
Pt here for routine refill of percocet.  About 2 weeks ago she fell onto a rail and has pain in Left shoulder as well as her ongoing neck and back pain.   Rates left shoulder pain 5/10, did not go to ED.

## 2023-11-22 ENCOUNTER — HOSPITAL ENCOUNTER (OUTPATIENT)
Dept: CARDIOLOGY | Facility: CLINIC | Age: 73
Discharge: HOME | End: 2023-11-22
Payer: MEDICARE

## 2023-11-22 DIAGNOSIS — I47.20 VT (VENTRICULAR TACHYCARDIA) (MULTI): ICD-10-CM

## 2023-11-22 DIAGNOSIS — Z95.810 PRESENCE OF AUTOMATIC CARDIOVERTER/DEFIBRILLATOR (AICD): ICD-10-CM

## 2023-11-22 DIAGNOSIS — I47.20 VT (VENTRICULAR TACHYCARDIA) (MULTI): Primary | ICD-10-CM

## 2023-11-22 PROCEDURE — 93290 INTERROG DEV EVAL ICPMS IP: CPT | Performed by: INTERNAL MEDICINE

## 2023-11-22 PROCEDURE — 93283 PRGRMG EVAL IMPLANTABLE DFB: CPT | Performed by: INTERNAL MEDICINE

## 2023-11-22 PROCEDURE — 93290 INTERROG DEV EVAL ICPMS IP: CPT

## 2023-12-05 ENCOUNTER — PATIENT OUTREACH (OUTPATIENT)
Dept: PRIMARY CARE | Facility: CLINIC | Age: 73
End: 2023-12-05
Payer: MEDICARE

## 2023-12-05 ENCOUNTER — ANESTHESIA EVENT (OUTPATIENT)
Dept: OPERATING ROOM | Facility: HOSPITAL | Age: 73
End: 2023-12-05
Payer: MEDICARE

## 2023-12-05 RX ORDER — DROPERIDOL 2.5 MG/ML
0.62 INJECTION, SOLUTION INTRAMUSCULAR; INTRAVENOUS ONCE AS NEEDED
Status: CANCELLED | OUTPATIENT
Start: 2023-12-05

## 2023-12-05 RX ORDER — ONDANSETRON HYDROCHLORIDE 2 MG/ML
4 INJECTION, SOLUTION INTRAVENOUS ONCE AS NEEDED
Status: CANCELLED | OUTPATIENT
Start: 2023-12-05

## 2023-12-05 RX ORDER — SODIUM CHLORIDE, SODIUM LACTATE, POTASSIUM CHLORIDE, CALCIUM CHLORIDE 600; 310; 30; 20 MG/100ML; MG/100ML; MG/100ML; MG/100ML
100 INJECTION, SOLUTION INTRAVENOUS CONTINUOUS
Status: CANCELLED | OUTPATIENT
Start: 2023-12-05

## 2023-12-05 NOTE — PROGRESS NOTES
Unable to reach patient for outreach call to wrap up TCM services. LVM with patient to return call for questions or concerns. The patient has met the 30 day and 90 day rehospitalization goals and is discharged from TCM services.

## 2023-12-06 ENCOUNTER — HOSPITAL ENCOUNTER (OUTPATIENT)
Dept: OPERATING ROOM | Facility: HOSPITAL | Age: 73
Setting detail: OUTPATIENT SURGERY
Discharge: HOME | End: 2023-12-06
Payer: MEDICARE

## 2023-12-06 ENCOUNTER — ANESTHESIA (OUTPATIENT)
Dept: OPERATING ROOM | Facility: HOSPITAL | Age: 73
End: 2023-12-06
Payer: MEDICARE

## 2023-12-06 VITALS
SYSTOLIC BLOOD PRESSURE: 113 MMHG | WEIGHT: 154.32 LBS | RESPIRATION RATE: 16 BRPM | BODY MASS INDEX: 27.34 KG/M2 | OXYGEN SATURATION: 100 % | TEMPERATURE: 98.1 F | HEIGHT: 63 IN | HEART RATE: 59 BPM | DIASTOLIC BLOOD PRESSURE: 50 MMHG

## 2023-12-06 DIAGNOSIS — R13.10 DYSPHAGIA, UNSPECIFIED TYPE: ICD-10-CM

## 2023-12-06 PROBLEM — Z79.01 ANTICOAGULANT LONG-TERM USE: Status: ACTIVE | Noted: 2023-12-06

## 2023-12-06 PROBLEM — Z95.0 PACEMAKER: Status: ACTIVE | Noted: 2023-12-06

## 2023-12-06 PROCEDURE — 43235 EGD DIAGNOSTIC BRUSH WASH: CPT | Performed by: SURGERY

## 2023-12-06 PROCEDURE — 3600000002 HC OR TIME - INITIAL BASE CHARGE - PROCEDURE LEVEL TWO: Performed by: REGISTERED NURSE

## 2023-12-06 PROCEDURE — 3600000007 HC OR TIME - EACH INCREMENTAL 1 MINUTE - PROCEDURE LEVEL TWO: Performed by: REGISTERED NURSE

## 2023-12-06 PROCEDURE — A43235 PR ESOPHAGOGASTRODUODENOSCOPY TRANSORAL DIAGNOSTIC: Performed by: REGISTERED NURSE

## 2023-12-06 PROCEDURE — 7100000009 HC PHASE TWO TIME - INITIAL BASE CHARGE: Performed by: REGISTERED NURSE

## 2023-12-06 PROCEDURE — 88305 TISSUE EXAM BY PATHOLOGIST: CPT | Performed by: STUDENT IN AN ORGANIZED HEALTH CARE EDUCATION/TRAINING PROGRAM

## 2023-12-06 PROCEDURE — 2500000004 HC RX 250 GENERAL PHARMACY W/ HCPCS (ALT 636 FOR OP/ED): Performed by: ANESTHESIOLOGY

## 2023-12-06 PROCEDURE — 7100000010 HC PHASE TWO TIME - EACH INCREMENTAL 1 MINUTE: Performed by: REGISTERED NURSE

## 2023-12-06 PROCEDURE — 3700000001 HC GENERAL ANESTHESIA TIME - INITIAL BASE CHARGE: Performed by: REGISTERED NURSE

## 2023-12-06 PROCEDURE — 2500000004 HC RX 250 GENERAL PHARMACY W/ HCPCS (ALT 636 FOR OP/ED): Performed by: REGISTERED NURSE

## 2023-12-06 PROCEDURE — 3700000002 HC GENERAL ANESTHESIA TIME - EACH INCREMENTAL 1 MINUTE: Performed by: REGISTERED NURSE

## 2023-12-06 PROCEDURE — 88305 TISSUE EXAM BY PATHOLOGIST: CPT | Mod: TC,SUR,GEALAB | Performed by: SURGERY

## 2023-12-06 RX ORDER — SODIUM CHLORIDE, SODIUM LACTATE, POTASSIUM CHLORIDE, CALCIUM CHLORIDE 600; 310; 30; 20 MG/100ML; MG/100ML; MG/100ML; MG/100ML
100 INJECTION, SOLUTION INTRAVENOUS CONTINUOUS
Status: DISCONTINUED | OUTPATIENT
Start: 2023-12-06 | End: 2023-12-07 | Stop reason: HOSPADM

## 2023-12-06 RX ORDER — PROPOFOL 10 MG/ML
INJECTION, EMULSION INTRAVENOUS AS NEEDED
Status: DISCONTINUED | OUTPATIENT
Start: 2023-12-06 | End: 2023-12-06

## 2023-12-06 RX ORDER — MIDAZOLAM HYDROCHLORIDE 1 MG/ML
INJECTION INTRAMUSCULAR; INTRAVENOUS AS NEEDED
Status: DISCONTINUED | OUTPATIENT
Start: 2023-12-06 | End: 2023-12-06

## 2023-12-06 RX ADMIN — PROPOFOL 20 MG: 10 INJECTION, EMULSION INTRAVENOUS at 10:20

## 2023-12-06 RX ADMIN — SODIUM CHLORIDE, POTASSIUM CHLORIDE, SODIUM LACTATE AND CALCIUM CHLORIDE 100 ML/HR: 600; 310; 30; 20 INJECTION, SOLUTION INTRAVENOUS at 09:37

## 2023-12-06 RX ADMIN — PROPOFOL 60 MG: 10 INJECTION, EMULSION INTRAVENOUS at 10:17

## 2023-12-06 RX ADMIN — MIDAZOLAM HYDROCHLORIDE 2 MG: 1 INJECTION, SOLUTION INTRAMUSCULAR; INTRAVENOUS at 10:12

## 2023-12-06 RX ADMIN — PROPOFOL 20 MG: 10 INJECTION, EMULSION INTRAVENOUS at 10:21

## 2023-12-06 SDOH — HEALTH STABILITY: MENTAL HEALTH: CURRENT SMOKER: 0

## 2023-12-06 ASSESSMENT — PAIN SCALES - GENERAL
PAINLEVEL_OUTOF10: 0 - NO PAIN
PAINLEVEL_OUTOF10: 0 - NO PAIN

## 2023-12-06 ASSESSMENT — PAIN - FUNCTIONAL ASSESSMENT: PAIN_FUNCTIONAL_ASSESSMENT: 0-10

## 2023-12-06 NOTE — ANESTHESIA POSTPROCEDURE EVALUATION
Patient: Trina Elias    Procedure Summary       Date: 12/06/23 Room / Location: [facility] OR    Anesthesia Start: 1012 Anesthesia Stop: 1032    Procedure: EGD Diagnosis: Dysphagia, unspecified type    Scheduled Providers: Lucy Grier MD Responsible Provider: BRIANA Sheriff    Anesthesia Type: MAC ASA Status: 3            Anesthesia Type: MAC    Vitals Value Taken Time   /50 12/06/23 1059   Temp 36.7 °C (98.1 °F) 12/06/23 1029   Pulse 59 12/06/23 1059   Resp 16 12/06/23 1059   SpO2 100 % 12/06/23 1059       Anesthesia Post Evaluation    Patient location during evaluation: PACU  Patient participation: complete - patient participated  Level of consciousness: awake and alert  Pain management: satisfactory to patient  Airway patency: patent  Cardiovascular status: acceptable and blood pressure returned to baseline  Respiratory status: acceptable  Hydration status: acceptable  Postoperative Nausea and Vomiting: none        There were no known notable events for this encounter.

## 2023-12-06 NOTE — ANESTHESIA PREPROCEDURE EVALUATION
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)   (+) Hypertensive heart disease with heart failure (CMS/HCC)   (+) Mixed hyperlipidemia   (+) Pacemaker ([employer] (2014) Pacemaker/AICD; DDD ; underlying SR, atrial paced/v sense; 3 VT episodes last on 2023; atrial pacing 40%.)      Endocrine (within normal limits)      GI   (+) Gastroesophageal reflux disease without esophagitis      /Renal   (+) CKD (chronic kidney disease)   (+) Stage 3a chronic kidney disease (CMS/HCC)      Neuro/Psych   (+) Cervical radiculopathy   (+) Cubital tunnel syndrome on right   (+) Displacement of lumbar disc with radiculopathy      Pulmonary   (+) Asthma   (+) Centrilobular emphysema (CMS/HCC)   (+) Obstructive sleep apnea   No history of complications Recent URI      Hematology   (+) Anticoagulant long-term use (Xarelto LD 12/3/2024)   (+) Coagulation defect, unspecified (CMS/HCC)   (+) Iron deficiency anemia      Musculoskeletal   (+) Chronic bilateral low back pain without sciatica   (+) Displacement of lumbar disc with radiculopathy   (+) Primary osteoarthritis of right shoulder   (+) Rheumatoid arthritis (CMS/HCC)      Other   (+) Rheumatoid arthritis (CMS/HCC)     Vitals:    23 0904   BP: (!) 139/43   Pulse: 63   Resp: 16   Temp: 36 °C (96.8 °F)   SpO2: 99%       Past Surgical History:   Procedure Laterality Date    APPENDECTOMY  2013    Appendectomy    CARDIAC CATHETERIZATION  2013    Cardiac Cath Procedure Summary    CARDIAC SURGERY  2014    Heart Surgery     SECTION, CLASSIC  2013      Section    CHOLECYSTECTOMY  2013    Cholecystectomy Laparoscopic    FOOT SURGERY  2013    Foot Surgery    GASTRIC FUNDOPLICATION  2013    Esophagogastric Fundoplasty Nissen Fundoplication    HERNIA REPAIR  2015    Hernia Repair    OTHER SURGICAL HISTORY  2019    Cameron tooth extraction    OTHER SURGICAL HISTORY  2019    Carpal tunnel surgery    OTHER SURGICAL HISTORY  2019    Foot surgery    OTHER SURGICAL HISTORY  2019    Leg surgery    OTHER SURGICAL HISTORY  2020    Hand surgery    OTHER SURGICAL HISTORY  2015    Cardio-Defib Eval Dual Chamber With Reprogramming    OTHER SURGICAL HISTORY  2021    Intestinal surgery    ROTATOR CUFF REPAIR  2018    Rotator Cuff Repair     Past Medical History:   Diagnosis Date    Abnormal mammogram 2023    Acute sphenoidal sinusitis, unspecified 10/12/2017    Acute sphenoidal sinusitis, recurrence not specified    Bacterial meningitis, unspecified 2018    Acute bacterial meningitis    Bone pain 2023    BSOM (bilateral serous otitis media) 2023    Cervical neuropathy 2023    Cervicalgia 2021    Acute neck pain    Chest pain on exertion 2023    Chronic obstructive pulmonary disease, unspecified (CMS/HCC)     Chronic obstructive pulmonary disease    Cutaneous abscess of abdominal wall 2015    Abdominal wall abscess    Dehydration 2023    Dry cough 2023    Dry heaves 2023    Dyspnea 2023    Elevated serum creatinine 2023    Fatigue 2023    Fever, unspecified 2023    Headache 2023    History of abdominal surgery 2023    History of colon polyps 2023    Lumbar radiculitis 2023    Median nerve neuropathy 2023    Nonhealing surgical wound 2023    Numbness 2023    Osteopenia 2023    Other conditions influencing health status     Coronary Artery Disease    Other conditions  influencing health status     Peptic Ulcer    Pain of right upper extremity 06/26/2023    Personal history of anaphylaxis 08/19/2015    History of anaphylaxis    Personal history of diseases of the skin and subcutaneous tissue     History of eczema    Personal history of infections of the central nervous system     History of bacterial meningitis    Personal history of other diseases of the digestive system 11/17/2015    History of esophageal reflux    Personal history of other diseases of the digestive system     History of hiatal hernia    Personal history of other diseases of the nervous system and sense organs     History of carpal tunnel syndrome    Personal history of other diseases of the respiratory system 01/06/2020    History of acute pharyngitis    Personal history of other diseases of the respiratory system     Personal history of asthma    Personal history of other specified conditions 08/08/2019    History of epigastric pain    Personal history of other specified conditions 03/12/2018    History of angioedema    Postural dizziness with presyncope 06/26/2023    Pulmonary edema 06/26/2023    Radiculopathy, lumbar region 08/13/2018    Acute lumbar radiculopathy    Restless legs syndrome 11/17/2015    Restless legs syndrome    Right serous otitis media 06/26/2023    Severe pain 06/26/2023    Small bowel obstruction (CMS/HCC) 06/26/2023    Tarsal tunnel syndrome, unspecified lower limb     Tarsal tunnel syndrome    Thoracic aortic aneurysm, without rupture, unspecified (CMS/HCC) 09/08/2020    Aneurysm, aorta, thoracic    Toxic effect of venom 06/26/2023    Ulcer of the stomach caused by bacteria (h. pylori), acute 06/26/2023    Ulnar neuropathy 06/26/2023       Current Outpatient Medications:     bumetanide (Bumex) 2 mg tablet, Take 1 tablet (2 mg) by mouth 2 times a day., Disp: , Rfl:     cholecalciferol (Vitamin D-3) 25 MCG (1000 UT) tablet, Take by mouth., Disp: , Rfl:     clobetasol (Temovate) 0.05 %  cream, Apply topically 2 times a day., Disp: 60 g, Rfl: 3    diclofenac sodium (Voltaren) 1 % gel gel, Apply 1 Application topically 2 times a day as needed (joint pain)., Disp: 450 g, Rfl: 1    diclofenac sodium (Voltaren) 1 % gel gel, Apply 1 Application topically 4 times a day as needed (max  16gm daily to any one affected joint)., Disp: , Rfl:     digoxin (Lanoxin) 125 MCG tablet, Take 1 tablet (125 mcg) by mouth once daily., Disp: , Rfl:     dilTIAZem LA (Cardizem LA) 120 mg 24 hr tablet, Take 1 tablet (120 mg) by mouth once daily., Disp: , Rfl:     gabapentin (Neurontin) 100 mg capsule, Take 3 capsules (300 mg) by mouth as needed at bedtime (Taper instructions given in office: may take up to 3 at bedtime.). (Patient taking differently: Take 1 capsule (100 mg) by mouth as needed at bedtime (Taper instructions given in office: may take up to 3 at bedtime.).), Disp: 90 capsule, Rfl: 5    hydroxychloroquine (Plaquenil) 200 mg tablet, Take 1 tablet (200 mg) by mouth once daily., Disp: , Rfl:     magnesium oxide (Mag-Ox) 400 mg tablet, Take 1 tablet (400 mg) by mouth 2 times a day., Disp: , Rfl:     montelukast (Singulair) 10 mg tablet, Take 1 tablet (10 mg) by mouth once daily at bedtime., Disp: , Rfl:     ondansetron (Zofran) 4 mg tablet, Take 1 tablet (4 mg) by mouth every 6 hours if needed., Disp: , Rfl:     oxyCODONE-acetaminophen (Percocet) 5-325 mg tablet, Take 1 tablet by mouth 3 times a day as needed for severe pain (7 - 10) or moderate pain (4 - 6). Do not start before November 24, 2023., Disp: 84 tablet, Rfl: 0    [START ON 12/22/2023] oxyCODONE-acetaminophen (Percocet) 5-325 mg tablet, Take 1 tablet by mouth 3 times a day as needed for severe pain (7 - 10) or moderate pain (4 - 6). Do not start before December 22, 2023., Disp: 84 tablet, Rfl: 0    [START ON 1/19/2024] oxyCODONE-acetaminophen (Percocet) 5-325 mg tablet, Take 1 tablet by mouth 3 times a day as needed for severe pain (7 - 10) or moderate  pain (4 - 6). Do not start before January 19, 2024., Disp: 84 tablet, Rfl: 0    pantoprazole (ProtoNix) 40 mg EC tablet, Take 1 tablet (40 mg) by mouth 2 times a day., Disp: 180 tablet, Rfl: 0    polyethylene glycol (Miralax) 17 gram/dose powder, Take 17 g by mouth., Disp: , Rfl:     rosuvastatin (Crestor) 20 mg tablet, Take 1 tablet (20 mg) by mouth once daily., Disp: , Rfl:     Xarelto 20 mg tablet, Take 1 tablet (20 mg) by mouth once daily in the evening. Take with meals., Disp: , Rfl:     albuterol 2.5 mg /3 mL (0.083 %) nebulizer solution, Take 3 mL (2.5 mg) by nebulization every 4 hours if needed., Disp: , Rfl:     EPINEPHrine 0.3 mg/0.3 mL injection syringe, Inject 0.3 mL (0.3 mg) as directed if needed. Per Pt has not needed., Disp: , Rfl:     ferrous sulfate 325 (65 Fe) MG tablet, Take 1 tablet (325 mg) by mouth once daily with a meal. (Patient not taking: Reported on 11/13/2023), Disp: 90 tablet, Rfl: 1    fluticasone (Flonase) 50 mcg/actuation nasal spray, Administer 1 spray into affected nostril(s) 2 times a day., Disp: , Rfl:     naloxone (Narcan) 4 mg/0.1 mL nasal spray, Administer into affected nostril(s)., Disp: , Rfl:     nitroglycerin (Nitrostat) 0.4 mg SL tablet, Place 1 tablet (0.4 mg) under the tongue every 5 minutes if needed., Disp: , Rfl:     predniSONE (Deltasone) 10 mg tablet, 10-20 mg every day prn (Patient not taking: Reported on 11/13/2023), Disp: 90 tablet, Rfl: 1    Current Facility-Administered Medications:     lactated Ringer's infusion, 100 mL/hr, intravenous, Continuous, Clay Blevins MD, Last Rate: 100 mL/hr at 12/06/23 0942, Continued by Anesthesia at 12/06/23 0942  Prior to Admission medications    Medication Sig Start Date End Date Taking? Authorizing Provider   bumetanide (Bumex) 2 mg tablet Take 1 tablet (2 mg) by mouth 2 times a day. 9/18/20  Yes Historical Provider, MD   cholecalciferol (Vitamin D-3) 25 MCG (1000 UT) tablet Take by mouth. 6/29/22  Yes Historical  Provider, MD   clobetasol (Temovate) 0.05 % cream Apply topically 2 times a day. 8/28/23  Yes Fredy Elias, DO   diclofenac sodium (Voltaren) 1 % gel gel Apply 1 Application topically 2 times a day as needed (joint pain). 8/28/23  Yes Fredy Elias, DO   diclofenac sodium (Voltaren) 1 % gel gel Apply 1 Application topically 4 times a day as needed (max  16gm daily to any one affected joint).   Yes Historical Provider, MD   digoxin (Lanoxin) 125 MCG tablet Take 1 tablet (125 mcg) by mouth once daily. 11/1/20  Yes Historical Provider, MD   dilTIAZem LA (Cardizem LA) 120 mg 24 hr tablet Take 1 tablet (120 mg) by mouth once daily.   Yes Historical Provider, MD   gabapentin (Neurontin) 100 mg capsule Take 3 capsules (300 mg) by mouth as needed at bedtime (Taper instructions given in office: may take up to 3 at bedtime.).  Patient taking differently: Take 1 capsule (100 mg) by mouth as needed at bedtime (Taper instructions given in office: may take up to 3 at bedtime.). 11/13/23  Yes DARSHANA Carnes   hydroxychloroquine (Plaquenil) 200 mg tablet Take 1 tablet (200 mg) by mouth once daily. 9/28/20  Yes Historical Provider, MD   magnesium oxide (Mag-Ox) 400 mg tablet Take 1 tablet (400 mg) by mouth 2 times a day. 11/1/20  Yes Historical Provider, MD   montelukast (Singulair) 10 mg tablet Take 1 tablet (10 mg) by mouth once daily at bedtime.   Yes Historical Provider, MD   ondansetron (Zofran) 4 mg tablet Take 1 tablet (4 mg) by mouth every 6 hours if needed.   Yes Historical Provider, MD   oxyCODONE-acetaminophen (Percocet) 5-325 mg tablet Take 1 tablet by mouth 3 times a day as needed for severe pain (7 - 10) or moderate pain (4 - 6). Do not start before November 24, 2023. 11/24/23  Yes DARSHANA Carnes   oxyCODONE-acetaminophen (Percocet) 5-325 mg tablet Take 1 tablet by mouth 3 times a day as needed for severe pain (7 - 10) or moderate pain (4 - 6). Do not start before December 22, 2023. 12/22/23   Yes Latoya Barkley APRN-CNP   oxyCODONE-acetaminophen (Percocet) 5-325 mg tablet Take 1 tablet by mouth 3 times a day as needed for severe pain (7 - 10) or moderate pain (4 - 6). Do not start before January 19, 2024. 1/19/24  Yes Latoya Barkley APRN-CNP   pantoprazole (ProtoNix) 40 mg EC tablet Take 1 tablet (40 mg) by mouth 2 times a day. 9/15/23 12/14/23 Yes Fredy Elias DO   polyethylene glycol (Miralax) 17 gram/dose powder Take 17 g by mouth.   Yes Historical Provider, MD   rosuvastatin (Crestor) 20 mg tablet Take 1 tablet (20 mg) by mouth once daily.   Yes Historical Provider, MD   Xarelto 20 mg tablet Take 1 tablet (20 mg) by mouth once daily in the evening. Take with meals.   Yes Historical Provider, MD   albuterol 2.5 mg /3 mL (0.083 %) nebulizer solution Take 3 mL (2.5 mg) by nebulization every 4 hours if needed. 11/1/20   Historical Provider, MD   EPINEPHrine 0.3 mg/0.3 mL injection syringe Inject 0.3 mL (0.3 mg) as directed if needed. Per Pt has not needed. 2/9/15   Historical Provider, MD   ferrous sulfate 325 (65 Fe) MG tablet Take 1 tablet (325 mg) by mouth once daily with a meal.  Patient not taking: Reported on 11/13/2023 8/29/23   Fredy Elias DO   fluticasone (Flonase) 50 mcg/actuation nasal spray Administer 1 spray into affected nostril(s) 2 times a day. 10/11/17   Historical Provider, MD   naloxone (Narcan) 4 mg/0.1 mL nasal spray Administer into affected nostril(s). 6/29/22   Historical Provider, MD   nitroglycerin (Nitrostat) 0.4 mg SL tablet Place 1 tablet (0.4 mg) under the tongue every 5 minutes if needed. 7/9/15   Historical Provider, MD   predniSONE (Deltasone) 10 mg tablet 10-20 mg every day prn  Patient not taking: Reported on 11/13/2023 8/30/23   Fredy Elias DO     Allergies   Allergen Reactions    Hydromorphone Anaphylaxis    Metoprolol Anaphylaxis    Abatacept Other     pulmonary edema, diarrhea, nausea    Bupropion Other    Cefepime Unknown    Ciprofloxacin Hives     Furosemide Itching    Hydrocodone-Acetaminophen Other    Levofloxacin Unknown    Methotrexate Hives and Itching     chest pain    Penicillins Hives    Pregabalin Other     caused pulmonary edema    Prochlorperazine Other     paralysis of the mouth with drooping    Aripiprazole Rash    Pineapple Rash     Social History     Tobacco Use    Smoking status: Never    Smokeless tobacco: Never   Substance Use Topics    Alcohol use: Never         Chemistry    Lab Results   Component Value Date/Time    NA CANCELED 09/07/2023 0722    K CANCELED 09/07/2023 0722    CL CANCELED 09/07/2023 0722    CO2 CANCELED 09/07/2023 0722    BUN CANCELED 09/07/2023 0722    CREATININE CANCELED 09/07/2023 0722    Lab Results   Component Value Date/Time    CALCIUM CANCELED 09/07/2023 0722    ALKPHOS CANCELED 09/07/2023 0722    AST CANCELED 09/07/2023 0722    ALT CANCELED 09/07/2023 0722    BILITOT CANCELED 09/07/2023 0722          Lab Results   Component Value Date/Time    WBC 7.5 09/15/2023 1143    HGB 9.5 (L) 09/15/2023 1143    HCT 32.0 (L) 09/15/2023 1143     09/15/2023 1143     Lab Results   Component Value Date/Time    PROTIME 19.0 (H) 09/01/2023 2347    INR 1.7 (H) 09/01/2023 2347     Clinical information reviewed:   Tobacco  Allergies  Meds  Problems  Med Hx  Surg Hx   Fam Hx  Soc   Hx      Cath 2/2022  CONCLUSIONS:  1. Normal coronary arteries. Tachycardia induced Cardiomyopathy.    Echo 2018  CONCLUSIONS:   1. The left ventricular systolic function is low normal with a 50% estimated ejection fraction.   2. Spectral Doppler shows a pseudonormal pattern of left ventricular diastolic filling.   3. There is mild aortic valve regurgitation.    NPO Detail:  NPO/Void Status  Date of Last Liquid: 12/06/23  Time of Last Liquid: 0730  Last Intake Type: Clear fluids         Physical Exam    Airway  Mallampati: III  TM distance: >3 FB  Neck ROM: limited  Comments: Decreased flexion   Cardiovascular   Rhythm: regular     Dental   (+)  upper dentures  Comments: Upper - full; Implants 17-32   Pulmonary   Breath sounds clear to auscultation     Abdominal            Anesthesia Plan    ASA 3     MAC   (IV sedation)  The patient is not a current smoker.    Anesthetic plan and risks discussed with patient.    Plan discussed with CRNA.

## 2023-12-06 NOTE — DISCHARGE INSTRUCTIONS

## 2023-12-13 LAB
LABORATORY COMMENT REPORT: NORMAL
PATH REPORT.FINAL DX SPEC: NORMAL
PATH REPORT.GROSS SPEC: NORMAL
PATH REPORT.RELEVANT HX SPEC: NORMAL
PATH REPORT.TOTAL CANCER: NORMAL

## 2023-12-15 ENCOUNTER — DOCUMENTATION (OUTPATIENT)
Dept: SURGERY | Facility: CLINIC | Age: 73
End: 2023-12-15
Payer: MEDICARE

## 2023-12-15 NOTE — PROGRESS NOTES
Spoke with patient regarding Dr. Araya recommendation of treatment for Slight Esophagitis/Hiatal Hernia with protonix or nexium.  Patient stated she was on protonix earlier by Dr. Elias just increased it to BID and she is getting much relief.  Asked to please call if treatment doesn't help in future, that we could try the Nexium.  She appreciated call and recommendation.

## 2023-12-29 DIAGNOSIS — E78.2 MIXED HYPERLIPIDEMIA: Primary | ICD-10-CM

## 2023-12-29 RX ORDER — ROSUVASTATIN CALCIUM 20 MG/1
20 TABLET, COATED ORAL DAILY
Qty: 90 TABLET | Refills: 0 | Status: SHIPPED | OUTPATIENT
Start: 2023-12-29 | End: 2024-04-22

## 2024-02-02 ENCOUNTER — HOSPITAL ENCOUNTER (OUTPATIENT)
Dept: RADIOLOGY | Facility: HOSPITAL | Age: 74
Discharge: HOME | End: 2024-02-02
Payer: MEDICARE

## 2024-02-02 DIAGNOSIS — M19.012 PRIMARY OSTEOARTHRITIS, LEFT SHOULDER: ICD-10-CM

## 2024-02-02 PROCEDURE — 73200 CT UPPER EXTREMITY W/O DYE: CPT | Mod: LT

## 2024-02-12 ENCOUNTER — OFFICE VISIT (OUTPATIENT)
Dept: PAIN MEDICINE | Facility: CLINIC | Age: 74
End: 2024-02-12
Payer: MEDICARE

## 2024-02-12 VITALS — HEART RATE: 66 BPM | RESPIRATION RATE: 16 BRPM | SYSTOLIC BLOOD PRESSURE: 150 MMHG | DIASTOLIC BLOOD PRESSURE: 71 MMHG

## 2024-02-12 DIAGNOSIS — M96.1 CERVICAL POST-LAMINECTOMY SYNDROME: ICD-10-CM

## 2024-02-12 DIAGNOSIS — G89.18 ACUTE POSTOPERATIVE PAIN OF LEFT SHOULDER: ICD-10-CM

## 2024-02-12 DIAGNOSIS — M54.12 CERVICAL RADICULOPATHY: ICD-10-CM

## 2024-02-12 DIAGNOSIS — M79.18 CERVICAL MYOFASCIAL PAIN SYNDROME: ICD-10-CM

## 2024-02-12 DIAGNOSIS — M51.16 DISPLACEMENT OF LUMBAR DISC WITH RADICULOPATHY: Primary | ICD-10-CM

## 2024-02-12 DIAGNOSIS — M19.011 PRIMARY OSTEOARTHRITIS OF RIGHT SHOULDER: ICD-10-CM

## 2024-02-12 DIAGNOSIS — M25.512 ACUTE POSTOPERATIVE PAIN OF LEFT SHOULDER: ICD-10-CM

## 2024-02-12 PROCEDURE — 99214 OFFICE O/P EST MOD 30 MIN: CPT | Performed by: NURSE PRACTITIONER

## 2024-02-12 PROCEDURE — 1125F AMNT PAIN NOTED PAIN PRSNT: CPT | Performed by: NURSE PRACTITIONER

## 2024-02-12 PROCEDURE — 1036F TOBACCO NON-USER: CPT | Performed by: NURSE PRACTITIONER

## 2024-02-12 PROCEDURE — 1159F MED LIST DOCD IN RCRD: CPT | Performed by: NURSE PRACTITIONER

## 2024-02-12 PROCEDURE — 1160F RVW MEDS BY RX/DR IN RCRD: CPT | Performed by: NURSE PRACTITIONER

## 2024-02-12 RX ORDER — BACLOFEN 10 MG/1
TABLET ORAL
Qty: 120 TABLET | Refills: 2 | Status: SHIPPED | OUTPATIENT
Start: 2024-02-12 | End: 2024-04-15 | Stop reason: SDUPTHER

## 2024-02-12 RX ORDER — OXYCODONE AND ACETAMINOPHEN 5; 325 MG/1; MG/1
1 TABLET ORAL 3 TIMES DAILY PRN
Qty: 84 TABLET | Refills: 0 | Status: SHIPPED | OUTPATIENT
Start: 2024-03-15 | End: 2024-03-05 | Stop reason: HOSPADM

## 2024-02-12 RX ORDER — BACLOFEN 10 MG/1
10 TABLET ORAL SEE ADMIN INSTRUCTIONS
Qty: 120 TABLET | Refills: 2 | Status: SHIPPED | OUTPATIENT
Start: 2024-02-12 | End: 2024-03-07 | Stop reason: SDUPTHER

## 2024-02-12 RX ORDER — OXYCODONE AND ACETAMINOPHEN 5; 325 MG/1; MG/1
1 TABLET ORAL 3 TIMES DAILY PRN
Qty: 84 TABLET | Refills: 0 | Status: SHIPPED | OUTPATIENT
Start: 2024-04-12 | End: 2024-03-05 | Stop reason: HOSPADM

## 2024-02-12 RX ORDER — BACLOFEN 10 MG/1
10 TABLET ORAL SEE ADMIN INSTRUCTIONS
Qty: 120 TABLET | Refills: 2 | Status: SHIPPED | OUTPATIENT
Start: 2024-02-12 | End: 2024-02-12 | Stop reason: SDUPTHER

## 2024-02-12 RX ORDER — OXYCODONE AND ACETAMINOPHEN 5; 325 MG/1; MG/1
1 TABLET ORAL 3 TIMES DAILY PRN
Qty: 84 TABLET | Refills: 0 | Status: SHIPPED | OUTPATIENT
Start: 2024-02-16 | End: 2024-04-10 | Stop reason: SDUPTHER

## 2024-02-12 RX ORDER — OXYCODONE AND ACETAMINOPHEN 5; 325 MG/1; MG/1
1 TABLET ORAL EVERY 4 HOURS PRN
Qty: 42 TABLET | Refills: 0 | Status: SHIPPED | OUTPATIENT
Start: 2024-03-21 | End: 2024-03-05 | Stop reason: HOSPADM

## 2024-02-12 ASSESSMENT — ENCOUNTER SYMPTOMS
BACK PAIN: 1
DIZZINESS: 0
CARDIOVASCULAR NEGATIVE: 1
ARTHRALGIAS: 1
PSYCHIATRIC NEGATIVE: 1
EYES NEGATIVE: 1
ALLERGIC/IMMUNOLOGIC NEGATIVE: 1
JOINT SWELLING: 1
NECK STIFFNESS: 1
HEADACHES: 0
NUMBNESS: 1
FACIAL ASYMMETRY: 0
SEIZURES: 0
MYALGIAS: 1
WEAKNESS: 1
NECK PAIN: 1
RESPIRATORY NEGATIVE: 1
ENDOCRINE NEGATIVE: 1
HEMATOLOGIC/LYMPHATIC NEGATIVE: 1
SPEECH DIFFICULTY: 0
TREMORS: 0
GASTROINTESTINAL NEGATIVE: 1
LIGHT-HEADEDNESS: 0
CONSTITUTIONAL NEGATIVE: 1

## 2024-02-12 ASSESSMENT — PAIN SCALES - GENERAL: PAINLEVEL: 7

## 2024-02-12 NOTE — PROGRESS NOTES
Subjective   Patient ID: Trina Elias is a 73 y.o. female presents for chronic pain management.    Med Refill  Associated symptoms include arthralgias, joint swelling, myalgias, neck pain, numbness and weakness. Pertinent negatives include no headaches.       Nancy follows up for interval reevaluation of her chronic cervical pain from cervical postlaminectomy syndrome with radiculitis and cervicalgia. She is also with low back pain from spondylosis, degenerative disc and radiculitis.      Percocet 5 mg 3 times daily reduces pain improves function up to 70% 3 hours. Average pain score is 5 out of 10 with medication with the exception of the left shoulder that is a 7 out of 10.  Since taking the Zofran 4 mg as needed prior to the Percocet this does help with the nausea and GI upset.     Reports that she has itchiness from Percocet and from gabapentin.  Discontinued the gabapentin last office visit.    Wanted to retry the baclofen to help with shoulder pain.  Will increase baclofen 10 mg once in the morning, once in the afternoon and 2 in the evening to help reduce pain and improve function since evening and nighttime is the worst for shoulder pain.    Scheduled to have a shoulder replacement by Dr. Gayle at March 21, 20 24.  Requesting that I take over to manage her postoperative pain.  Sent a separate prescription dated March 21, 2024 Percocet 5 mg every 4 hours #42 as needed for postoperative pain.     Toxicology consistent May 20, 2023. Annual controlled substance agreement and opioid risk tool are completed and scanned into the chart May 30, 2023.     Cannot take Lyrica due to negative side effects..     History of lumbar epidural steroid injection from Dr. Solis 2004 at L4 and L5 for degenerative disc.     Did follow with [facility] orthopedics 5 to 6 years ago had epidural steroid injection. History of bacterial meningitis and required hospitalization for 3 days and treated for 21 days with IV antibiotics as a  complication from epidural.     Not an injection candidate.     On Xarelto for atrial fibrillation. Does have AICD with pacemaker. Did have cardioversion May 2, 2022. Return to normal sinus rhythm.     Last MRI of lumbar spine 2011 with degenerative disc.     Last CT of lumbar spine 2022 with degenerative disc.             For continuity:   Given the patient's report of reduced pain and improved functional ability without adverse effects, it is reasonable to treat with narcotic medications. The terms of the opioid agreement as well as the potential risks and adverse effects of the patient's medication regimen were discussed in detail. This includes if applicable due to dosage of medication permission to discuss and coordinate care with other treatment providers relevant to the patients condition. The patient verbalized understanding.      Risks and side effects of chronic opioid therapy including but not limited to tolerance, dependence, constipation, hyperalgesia, cognitive side effects, addiction and possible death due to overuse and or misuse were discussed. I also discussed that such medications when co-administered with other sedative agents including but not limited to alcohol, benzodiazepines, sedative hypnotics and illegal drugs could pose life threatening consequences including death. I also explained the impact that the administration of such medication has on a patient with obstructive sleep apnea and continued recommendations for use of apnea devices if ordered are prescribed by other physicians. In order to effectively and safely treat the pain, I also emphasized the importance of compliance with the treatment plan, as well as compliance with the terms of the opioid agreement, which was reviewed in detail. I explained the importance of being responsible with the medications and to take these only as prescribed, never in excess and never for reasons other than pain reduction. The patient was counseled on  keeping the medications safe and locked away from children and other adults as well as disposal methods and options. The patient understood the risks and instructions.      I also discussed with the patient in detail that based on the clinical response to the opioid medications and improvements of activities of daily living, sleep, and work performance in light of compliance with the treatment plan we can continue this form of therapy for the above chronic pain. The goal and rationale used for current treatment with chronic opioid medication is to control the pain and alleviate disability induced by the chronic pain condition noted above after failures of other non-opioid and nonpharmacological modalities to treat the chronic pain and the symptoms associated with have failed. The patient understood the goals in terms of the above treatment plan and had no further questions prior to leaving the office today.      Of note, the above-mentioned diagnoses/conditions and expected fluctuating nature of pain, and pain characteristic changes may lead to prolonged functional impairment requiring frequent and multiple reassessments with continued high level medical decision making. As noted, medication and medication management may require opiate therapy in excess of a routine less than 30 day medication requirement. The patient may require daily opiate therapy necessitating month-long prescription medication as noted above in order to perform activities of daily living and achieve acceptable quality of life with respect to their chronic pain condition for the foreseeable future. We monitor our patient's carefully through drug monitoring, medication counts, urine drug testing specific to their medication as well as a myriad of other substances and with frequent follow-ups with interval reassement of the chronic pain condition, its pathophysiology and prognosis.      The level of clinical decision making at this office visit is  high due to high risks and complications including mortality and morbidity related to acute and chronic pain with respects to life, bodily function, and treatment. Risks and clinical decisions with respect to under treatment, failure to maintain adequate treatment, and/or overtreatment complications and outcomes were discussed with the patient with respect to their chronic pain conditions, interventional therapies, as well as the use of various medications including possible controlled/dangerous medications. The amount and complexity of reviewed data at this in subsequent office visits is high given patient's fluctuating clinical presentation, laboratory and radiographic reports, prescription monitoring program data, and medication history as well as other relevant data as noted above. Pertinent negatives and positives data was used in consideration for the above-mentioned high complexity.       Given the patient's total MED, general use of daily opiates, or other coadministered medications in various classes the patient was offered a prescription for Narcan. I instructed the patient that it is important that patient fill this medication in order to demonstrate understanding of the gravity of possible side effects including respiratory depression and risk of overdose of this opiate load or medication combination. As such patient will be required to bring Narcan prescription to follow-up appointments as part of compliance with continued opiate care.      With respect to opiate induced constipation I discussed multiple ways to combat this problem including staying hydrated and taking over-the-counter medications such as Dulcolax, Miralax and Senna. If these treatments are not effective we could consider such medications as Amitiza, Linzess and Movantik.      Disclaimer: This note was transcribed using an audio transcription device. As such, minor errors may be present with regard to spelling, punctuation, and  inadvertent word insertion. Please disregard such errors.     Results/Data  Xray BN Spine, Lumbosacral; 2 or 3 Views 26Jun2023 03:32PM Fredy Simon      Test Name Result Flag Reference   Xray Lumbar Spine AP + Lateral (Report)       FINAL REPORT  Interpreted by: MIGUELINA WATKINS   06/27/23 18:12  MRN: [id number]  Patient Name: VERNON CARTAGENA     STUDY:  SPINE, LUMBOSACRAL; 2 OR 3 VIEWS; 6/26/2023 3:32 pm     INDICATION:  Fell and landed on tailbone- worsened chronic pain- ? vertebral  fracture.     COMPARISON:  02/27/2013     ACCESSION NUMBER(S):  [id number]     ORDERING CLINICIAN:  FREDY SIMON     FINDINGS:  Lumbar spine, three views     Levocurvature of the lumbar spine. There is moderate disc space  narrowing osteophytosis throughout the lumbar spine. There is no  fracture. There is no spondylolisthesis. Mild facet disease lower  lumbar spine as well     IMPRESSION:  Moderate multilevel spondylosis throughout the lumbar spine. Mild  facet disease in the lower lumbar.     Electronically signed by: JUNE  06/27/23 18:12      Xray Shoulder Complete Min 2 Views [id number] 05:00PM Non Ambulatory, Provider   Ordering Provider: IRIS DRAKE [id number]      Test Name Result Flag Reference   Xray Shoulder Complete Min 2 Views (Report)       FINAL REPORT  Interpreted by: DAMARIS GIBBONS   02/25/23 18:08  MRN: [id number]  Patient Name: VERNON CARTAGENA     STUDY:  SHOULDER, CMPLT, MIN 2 VIEWS; Right; 2/25/2023 5:00 pm     INDICATION:  fall, landed on left arm, right shoulder pain, left hip pain .     COMPARISON:  Right shoulder radiographs dated 01/12/2023.     ACCESSION NUMBER(S):  [id number]     ORDERING CLINICIAN:  IRIS DRAEK     FINDINGS:  There is no evidence of acute fracture or dislocation. There is a  reverse shoulder arthroplasty without evidence of periprosthetic  fracture or malalignment. There is up to 3 mm lucency between the  humeral component of the prosthesis seen on axillary view, which may  represent non  radiopaque filler material and is stable taking into  account differences in technique.     IMPRESSION:  Reverse right shoulder arthroplasty without evidence of hardware  complication.     Electronically signed by: SHAI  02/25/23 18:08      Xray Femur 2 View [id number] 05:00PM Non Ambulatory, Provider   Ordering Provider: IRIS DRAKE [id number]      Test Name Result Flag Reference   Xray Femur 2 View (Report)       FINAL REPORT  Interpreted by: DAMARIS GIBBONS   02/25/23 18:10  MRN: [id number]  Patient Name: VERNON CARTAGENA     STUDY:  PELVIS, 1 OR 2 VIEWS; FEMUR : MIN 2 VIEWS; Left; 2/25/2023 5:00 pm     INDICATION:  left fx .     COMPARISON:  Pelvic radiographs dated 02/06/2020.     ACCESSION NUMBER(S):  [id number]; [id number]     ORDERING CLINICIAN:  IRIS DRAKE     FINDINGS:  Femoral heads are maintained within their respective acetabula and  are normal in contour. There is mild bilateral acetabular spurring.  Ilioischial and iliopubic lines are maintained. The left femur  appears intact. There is serpiginous sclerosis within the distal  intramedullary femur which may represent an age-indeterminate  probably chronic bone marrow infarct.     IMPRESSION:  No evidence of fracture or dislocation. Serpiginous sclerosis in the  distal left femur may represent age-indeterminate possibly chronic  bone marrow infarct.        Electronically signed by: SHAI  02/25/23 18:10      Xray Pelvis 1 or 2 View [id number] 05:00PM Non Ambulatory, Provider   Ordering Provider: IRIS DRAKE [id number]      Test Name Result Flag Reference   Xray Pelvis 1 or 2 View (Report)       FINAL REPORT  Interpreted by: DAMARIS GIBBONS   02/25/23 18:10  MRN: [id number]  Patient Name: VERNON CARTAGENA     STUDY:  PELVIS, 1 OR 2 VIEWS; FEMUR : MIN 2 VIEWS; Left; 2/25/2023 5:00 pm     INDICATION:  left fx .     COMPARISON:  Pelvic radiographs dated 02/06/2020.     ACCESSION NUMBER(S):  [id number]; [id number]     ORDERING CLINICIAN:  IRIS DRAKE     FINDINGS:  Femoral  heads are maintained within their respective acetabula and  are normal in contour. There is mild bilateral acetabular spurring.  Ilioischial and iliopubic lines are maintained. The left femur  appears intact. There is serpiginous sclerosis within the distal  intramedullary femur which may represent an age-indeterminate  probably chronic bone marrow infarct.     IMPRESSION:  No evidence of fracture or dislocation. Serpiginous sclerosis in the  distal left femur may represent age-indeterminate possibly chronic  bone marrow infarct.        Electronically signed by: SHAI  02/25/23 18:10      CT L Spine without Contrast [id number] 09:33AM Latoya Barkley      Test Name Result Flag Reference   CT L Spine without Contrast (Report)       FINAL REPORT  Interpreted by: MASOUD MATHIS FREDERICK, MD   07/14/22 10:40  MRN: [id number]  Patient Name: VERNON CARTAGENA     STUDY:  CT L-SPINE WO CONTRAST; 7/14/2022 9:33 am     INDICATION:  Fell 6 months ago after tripping. Landed on belly did not hit head.  Pain 10 out of 10 of low back along with muscle cramps and spasm  since then. Does have leg weakness. No other falls since that  initial fall 6 months ago. Failed conservative reyna M47.816: Lumbar  spondylosis M54.16: Lumbar radiculitis.     COMPARISON:  None.     ACCESSION NUMBER(S):  [id number]     ORDERING CLINICIAN:  LATOYA BARKLEY     TECHNIQUE:  CT of the lumbar spine was performed. Multiplanar reconstructions  were made..     FINDINGS:  Bone density and vertebral body height are normal. The sacrum are  normal.  Images at each interspace reveal the following:  L1/L2  Normal exam  L2/L3  Normal exam  L3/L4  Vacuum disc phenomenon with mild circumferential bulging  intervertebral disc. No measurable canal stenosis or bony foraminal  narrowing. Marginal osteophyte formation bilaterally. Far lateral  neural foraminal stenosis not excluded  L4/L5  Mild bulging disc and facet hypertrophy. Suspected moderate STIR  central canal stenosis.  No associated foraminal narrowing.  L5/S1  Narrowing of the intervertebral disc space with intra-articular gas.  Minimal marginal osteophyte formation without canal or foraminal  narrowing        IMPRESSION:  * Trace spondylosis     The examination was interpreted [facility]     Electronically signed by: MASOUD MATHIS  07/14/22 10:40      Xray Myelography Cervical with LP [id number] 10:47AM Connor Ga      Test Name Result Flag Reference   Xray Myelography Cervical with LP (Report)       FINAL REPORT  Interpreted by: SHAILESH PARKER ANDREW, MD and BRIGIDA REESE  09/01/21 08:43  MRN: [id number]  Patient Name: VERNON CARTAGENA     STUDY:  MYELOGRAPHY, CERVICAL, WITH LUMBAR PUNCTURE; SPINAL PUNCTURE, LUMBAR,  DIAGNOSTIC; CT C-SPINE [facility]; 8/31/2021 10:47 am; 1/1/2018 8:15 pm;  8/31/2021 10:56 am     INDICATION:  Cervical radiculopathy; Signs/Symptoms: r/o meningitis. Myelogram  performed for evaluation of spinal stenosis as patient has a non MR  compatible cardiac pacemaker/AICD.     COMPARISON:  02/06/2020 CT head     ACCESSION NUMBER(S):  [id number]; [id number]; [id number]     ORDERING CLINICIAN:  CONNOR HORVATH     TECHNIQUE:  After discussion of the risks, benefits and alternatives, standard  written hospital consent was obtained. The patient was placed prone  on the fluoroscopic table. The lower back was prepped and draped in  sterile fashion. One percent lidocaine was used for local  anesthesia. Under fluoroscopic guidance, a 22 gauge spinal needle  was advanced into the thecal sac at the L3-L4 level. 8 cc of  Omnipaque 300 was administered intrathecally under intermittent  fluoroscopic imaging. Contrast was advanced into the cervical region  tilting the patient/table head-down. Fluoroscopy time was 1.1  minutes. The patient tolerated the procedure well immediately after  the procedure.     Following the fluoroscopic myelogram, serial axial CT images were  obtained through the  cervical spine, using a bone algorithm. Images  were reformatted into sagittal and coronal planes.     FINDINGS:  FLUOROSCOPIC MYELOGRAM: There is filling bilaterally of nerve root  sleeves without cutoff. There is normal alignment.     CT MYELOGRAM:     ALIGNMENT: Within normal limits.     VERTEBRAE/DISC SPACES: The vertebral body heights are intact. There  is solid operative fusion of the C5 through C7 vertebral bodies with  interbody spacing devices. The C4-5 disc height is decreased.     C1-2: There is no significant central canal or neural foraminal  stenosis.     C2-3: The right lateral recess and neural foramen are mildly stenosed  by facet hypertrophy. The right facet joint is severely arthritic..     C3-4: The right neural foramen is mildly stenosed by uncovertebral  spurring and facet hypertrophy with disc bulge. More central disc  bulge indents the thecal sac but does not reach the cord. The right  facet joint is severely arthritic..     C4-5: Uncovertebral joint hypertrophy and facet arthrosis contribute  to moderate right-sided neural foraminal stenosis with mild to  moderate right lateral recess stenosis. The right facet joint is  severely arthritic with degenerative subarticular cysts posteriorly,  the inferior C4 facet cyst having vacuum phenomena. A Schmorl's node  with vacuum phenomena is also noted along the posterosuperior C5  vertebral body.     C5-6: There is discectomy and fusion with posterior osteophytosis  with effacement of the ventral thecal sac. No significant central  canal stenosis.. Mild bilateral neural foraminal narrowing.     C6-7: There is discectomy and fusion with posterior osteophytosis  with effacement of the ventral thecal sac. No significant central  canal stenosis.. Mild bilateral neural foraminal narrowing.     C7-T1: There is no significant central canal or neural foraminal  stenosis.     Incidental note is made of chronic blowout fracture of the posterior  aspect of the  medial wall of the left orbit with depression of bone 6  mm similar to the prior CT. Orbital fat extends into the defect with  distortion of the posterior aspect of the medial rectus muscle.     IMPRESSION:  1. Multilevel degenerative changes are present as noted more  prominent on the right, specially at C4-5.     2. Old blowout fracture of posteromedial wall of left orbit. The  adjacent medial rectus muscle is distorted.     3. Shortly after CT imaging was completed, the patient developed  uncontrolled shaking with no focal neuro deficit, shortness of  breath, hypotension or itching/urticaria. The patient had normal  oxygenation. Initially her only complaint of pain was a mild  headache. Shortly after the shaking began the patient also complained  of left chest/shoulder region discomfort and for this reason was  transferred to the emergency department for further evaluation.        I, , supervised and was available throughout this procedure as  performed by fellow physician Dr. Dela Cruz. This study was  performed and interpreted at [facility]. I  agree with the procedural description and the findings as stated.     Electronically signed by: SHAILESH PARKER  09/01/21 08:43      Xray Cervical Spine Min 4 View 08Jun2021 09:44AM Connor Luo      Test Name Result Flag Reference   Xray Cervical Spine Min 4 View (Report)       FINAL REPORT  Interpreted by: AUSTIN FLOOD KRISHNA, MD   06/10/21 18:03  MRN: [id number]  Patient Name: SIN CARTAGENAELA     STUDY:  Cervical spine, 4 views.     INDICATION:  NECK PAIN.     COMPARISON:  None.     ACCESSION NUMBER(S):  [id number]     ORDERING CLINICIAN:  CONNOR LUO     FINDINGS:  Alignment is within normal limits.  Anterior cervical discectomy and fusion changes are observed at C5-6  and C6-7 with interbody bone grafts. Hardware is intact without  perihardware fractures or lucencies. There appears to be solid fusion  across the disc  spaces.  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  Mild-to-moderate spondylosis noted at C4-5 with disc height loss and  osteophytes.  Left carotid region vascular calcifications noted.  Vertebral body heights are preserved. Posterior elements are intact.  No dynamic instability on flexion/extension views.  Prevertebral soft tissues are unremarkable.     IMPRESSION:  1. ACDF at C5-7 without hardware complication.  2. Mild to moderate C4-5 spondylosis.     Electronically signed by: AUSTIN FLOOD  06/10/21 18:03      CT C Spine without Contrast [id number] 03:55PM Non Ambulatory, Provider   Ordering Provider: PHILIP ESQUEDA [id number]      Test Name Result Flag Reference   CT C Spine without Contrast (Report)       Interpreted by: MARLIN KEE  02/06/20 16:05  MRN: [id number]  Patient Name: VERNON CARTAGENA     STUDY:  CT C-SPINE WO CONTRAST; 2/6/2020 3:55 pm     INDICATION:  fall, neck pain.     COMPARISON:  None.     ACCESSION NUMBER(S):  [id number]     ORDERING CLINICIAN:  PHILIP ESQUEDA     TECHNIQUE:  Axial CT images of the cervical spine are obtained. Axial, coronal  and sagittal reconstructions are provided for review.     FINDINGS:  Status post fixation C5, C6 and C7.     Fractures: There is no evidence for an acute fracture of the cervical  spine.     Vertebral Alignment: Within normal limits.     Craniocervical Junction: The odontoid process and craniocervical  junction are intact.     Vertebrae/Disc Spaces: The cervical vertebral body heights are intact  and the disc spaces are preserved.     Prevertebral/Paraspinal Soft Tissues: The prevertebral and paraspinal  soft tissues are unremarkable.        IMPRESSION:  Status post fusion of C5 through C7. No evidence of acute fracture.     Electronically signed by: MARLIN KEE  02/06/20 16:05       Review of Systems   Constitutional: Negative.    Eyes: Negative.    Respiratory: Negative.     Cardiovascular: Negative.    Gastrointestinal:  Negative.    Endocrine: Negative.    Genitourinary: Negative.    Musculoskeletal:  Positive for arthralgias, back pain, gait problem, joint swelling, myalgias, neck pain and neck stiffness.   Skin: Negative.    Allergic/Immunologic: Negative.    Neurological:  Positive for weakness and numbness. Negative for dizziness, tremors, seizures, syncope, facial asymmetry, speech difficulty, light-headedness and headaches.   Hematological: Negative.    Psychiatric/Behavioral: Negative.         Objective   Physical Exam  Vitals and nursing note reviewed.   Constitutional:       Appearance: Normal appearance.   HENT:      Head: Normocephalic and atraumatic.   Cardiovascular:      Pulses: Normal pulses.   Pulmonary:      Effort: Pulmonary effort is normal. No respiratory distress.   Musculoskeletal:      Right lower leg: No edema.      Left lower leg: No edema.      Comments: Range of motion of cervical spine limited secondary to pain and stiffness with axial rotation to the left and extension.    Active range of motion of left shoulder limited secondary to to pain with external rotation and abduction to the height of the shoulder.    Left arm in sling.    Active range of motion of hips increases low back and hip pain.   Skin:     General: Skin is warm and dry.      Capillary Refill: Capillary refill takes 2 to 3 seconds.   Neurological:      Mental Status: She is alert and oriented to person, place, and time.      Cranial Nerves: No cranial nerve deficit.      Sensory: No sensory deficit.      Motor: Weakness present.      Gait: Gait abnormal.      Comments: Positive Neer's.  Positive Sharma.    Weakness with hip flexion and ankle dorsiflexion 4 out of 5.    Positive straight leg raise.    Negative hip impingement.    Ambulates with mildly antalgic gait.   Psychiatric:         Mood and Affect: Mood normal.         Behavior: Behavior normal.         Assessment/Plan   Problem List Items Addressed This Visit    None  Follow-up  12 weeks.    Reviewed and approved by NITA HARLEY on 2/12/24 at 9:52 AM.

## 2024-02-15 ENCOUNTER — TELEMEDICINE (OUTPATIENT)
Dept: PRIMARY CARE | Facility: CLINIC | Age: 74
End: 2024-02-15
Payer: MEDICARE

## 2024-02-15 DIAGNOSIS — A08.4 VIRAL GASTROENTERITIS: Primary | ICD-10-CM

## 2024-02-15 PROCEDURE — 99442 PR PHYS/QHP TELEPHONE EVALUATION 11-20 MIN: CPT | Performed by: FAMILY MEDICINE

## 2024-02-15 PROCEDURE — 1036F TOBACCO NON-USER: CPT | Performed by: FAMILY MEDICINE

## 2024-02-15 PROCEDURE — 1160F RVW MEDS BY RX/DR IN RCRD: CPT | Performed by: FAMILY MEDICINE

## 2024-02-15 PROCEDURE — 1125F AMNT PAIN NOTED PAIN PRSNT: CPT | Performed by: FAMILY MEDICINE

## 2024-02-15 PROCEDURE — 1159F MED LIST DOCD IN RCRD: CPT | Performed by: FAMILY MEDICINE

## 2024-02-15 RX ORDER — FAMOTIDINE 40 MG/1
40 TABLET, FILM COATED ORAL DAILY
Qty: 30 TABLET | Refills: 0 | Status: SHIPPED | OUTPATIENT
Start: 2024-02-15 | End: 2024-05-02

## 2024-02-15 RX ORDER — ONDANSETRON 8 MG/1
8 TABLET, ORALLY DISINTEGRATING ORAL EVERY 8 HOURS PRN
Qty: 20 TABLET | Refills: 0 | Status: ON HOLD | OUTPATIENT
Start: 2024-02-15 | End: 2024-02-28

## 2024-02-15 NOTE — PROGRESS NOTES
Subjective   Patient ID: Trina Elias is a 73 y.o. female who presents for Diarrhea.  HPI  Sick for 4 days  + chills  + diarrhea  + nausea, vomiting  Pain above umbilicus- started before started vomiting  Hurts when eats  Still urinating  No coughing, wheezing  Taking ondansetron 4 mg  Tried imodium  Not like her blockages   not sick  Not sure if any customer sick  No questionable food and drinking    Current Outpatient Medications:     albuterol 2.5 mg /3 mL (0.083 %) nebulizer solution, Take 3 mL (2.5 mg) by nebulization every 4 hours if needed., Disp: , Rfl:     baclofen (Lioresal) 10 mg tablet, Take 1 tablet (10 mg) by mouth see administration instructions. Take one tablet in am, one tablet at noon, and one to two tablets at night  PRN, Disp: 120 tablet, Rfl: 2    baclofen (Lioresal) 10 mg tablet, One tablet in the morning and afternoon and 1 to 2 tablets in the evening., Disp: 120 tablet, Rfl: 2    bumetanide (Bumex) 2 mg tablet, Take 1 tablet (2 mg) by mouth 2 times a day., Disp: , Rfl:     clobetasol (Temovate) 0.05 % cream, Apply topically 2 times a day., Disp: 60 g, Rfl: 3    diclofenac sodium (Voltaren) 1 % gel gel, Apply 1 Application topically 2 times a day as needed (joint pain)., Disp: 450 g, Rfl: 1    diclofenac sodium (Voltaren) 1 % gel gel, Apply 1 Application topically 4 times a day as needed (max  16gm daily to any one affected joint)., Disp: , Rfl:     digoxin (Lanoxin) 125 MCG tablet, Take 1 tablet (125 mcg) by mouth once daily., Disp: , Rfl:     dilTIAZem LA (Cardizem LA) 120 mg 24 hr tablet, Take 1 tablet (120 mg) by mouth once daily., Disp: , Rfl:     EPINEPHrine 0.3 mg/0.3 mL injection syringe, Inject 0.3 mL (0.3 mg) as directed if needed. Per Pt has not needed., Disp: , Rfl:     famotidine (Pepcid) 40 mg tablet, Take 1 tablet (40 mg) by mouth once daily., Disp: 30 tablet, Rfl: 0    fluticasone (Flonase) 50 mcg/actuation nasal spray, Administer 1 spray into affected nostril(s) 2 times  a day., Disp: , Rfl:     hydroxychloroquine (Plaquenil) 200 mg tablet, Take 1 tablet (200 mg) by mouth once daily., Disp: , Rfl:     magnesium oxide (Mag-Ox) 400 mg tablet, Take 1 tablet (400 mg) by mouth 2 times a day., Disp: , Rfl:     montelukast (Singulair) 10 mg tablet, Take 1 tablet (10 mg) by mouth once daily at bedtime., Disp: , Rfl:     naloxone (Narcan) 4 mg/0.1 mL nasal spray, Administer into affected nostril(s)., Disp: , Rfl:     nitroglycerin (Nitrostat) 0.4 mg SL tablet, Place 1 tablet (0.4 mg) under the tongue every 5 minutes if needed., Disp: , Rfl:     ondansetron ODT (Zofran-ODT) 8 mg disintegrating tablet, Take 1 tablet (8 mg) by mouth every 8 hours if needed for nausea or vomiting for up to 7 days., Disp: 20 tablet, Rfl: 0    [START ON 2/16/2024] oxyCODONE-acetaminophen (Percocet) 5-325 mg tablet, Take 1 tablet by mouth 3 times a day as needed for severe pain (7 - 10) for up to 28 days. Do not start before February 16, 2024., Disp: 84 tablet, Rfl: 0    [START ON 3/15/2024] oxyCODONE-acetaminophen (Percocet) 5-325 mg tablet, Take 1 tablet by mouth 3 times a day as needed for severe pain (7 - 10) for up to 28 days. Do not start before March 15, 2024., Disp: 84 tablet, Rfl: 0    [START ON 4/12/2024] oxyCODONE-acetaminophen (Percocet) 5-325 mg tablet, Take 1 tablet by mouth 3 times a day as needed for severe pain (7 - 10) for up to 28 days. Do not start before April 12, 2024., Disp: 84 tablet, Rfl: 0    [START ON 3/21/2024] oxyCODONE-acetaminophen (Percocet) 5-325 mg tablet, Take 1 tablet by mouth every 4 hours if needed for severe pain (7 - 10) for up to 7 days. Post Op Rx for Shoulder Surgery Do not start before March 21, 2024., Disp: 42 tablet, Rfl: 0    pantoprazole (ProtoNix) 40 mg EC tablet, Take 1 tablet (40 mg) by mouth 2 times a day., Disp: 180 tablet, Rfl: 0    polyethylene glycol (Miralax) 17 gram/dose powder, Take 17 g by mouth., Disp: , Rfl:     rosuvastatin (Crestor) 20 mg tablet, TAKE  ONE TABLET BY MOUTH EVERY DAY, Disp: 90 tablet, Rfl: 0    Xarelto 20 mg tablet, Take 1 tablet (20 mg) by mouth once daily in the evening. Take with meals., Disp: , Rfl:    Past Surgical History:   Procedure Laterality Date    APPENDECTOMY  2013    Appendectomy    CARDIAC CATHETERIZATION  2013    Cardiac Cath Procedure Summary    CARDIAC SURGERY  2014    Heart Surgery     SECTION, CLASSIC  2013     Section    CHOLECYSTECTOMY  2013    Cholecystectomy Laparoscopic    FOOT SURGERY  2013    Foot Surgery    GASTRIC FUNDOPLICATION  2013    Esophagogastric Fundoplasty Nissen Fundoplication    HERNIA REPAIR  2015    Hernia Repair    OTHER SURGICAL HISTORY  2019    [facility] tooth extraction    OTHER SURGICAL HISTORY  2019    Carpal tunnel surgery    OTHER SURGICAL HISTORY  2019    Foot surgery    OTHER SURGICAL HISTORY  2019    Leg surgery    OTHER SURGICAL HISTORY  2020    Hand surgery    OTHER SURGICAL HISTORY  2015    Cardio-Defib Eval Dual Chamber With Reprogramming    OTHER SURGICAL HISTORY  2021    Intestinal surgery    ROTATOR CUFF REPAIR  2018    Rotator Cuff Repair      Past Medical History:   Diagnosis Date    Abnormal mammogram 2023    Acute sphenoidal sinusitis, unspecified 10/12/2017    Acute sphenoidal sinusitis, recurrence not specified    Bacterial meningitis, unspecified 2018    Acute bacterial meningitis    Bone pain 2023    BSOM (bilateral serous otitis media) 2023    Cervical neuropathy 2023    Cervicalgia 2021    Acute neck pain    Chest pain on exertion 2023    Chronic obstructive pulmonary disease, unspecified (CMS/HCC)     Chronic obstructive pulmonary disease    Cutaneous abscess of abdominal wall 2015    Abdominal wall abscess    Dehydration 2023    Dry cough 2023    Dry heaves 2023    Dyspnea 2023    Elevated serum  creatinine 06/26/2023    Fatigue 06/26/2023    Fever, unspecified 06/26/2023    Headache 06/26/2023    History of abdominal surgery 06/26/2023    History of colon polyps 06/26/2023    Lumbar radiculitis 06/26/2023    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    Nonhealing surgical wound 06/26/2023    Numbness 06/26/2023    Osteopenia 06/26/2023    Other conditions influencing health status     Coronary Artery Disease    Other conditions influencing health status     Peptic Ulcer    Pain of right upper extremity 06/26/2023    Personal history of anaphylaxis 08/19/2015    History of anaphylaxis    Personal history of diseases of the skin and subcutaneous tissue     History of eczema    Personal history of infections of the central nervous system     History of bacterial meningitis    Personal history of other diseases of the digestive system 11/17/2015    History of esophageal reflux    Personal history of other diseases of the digestive system     History of hiatal hernia    Personal history of other diseases of the nervous system and sense organs     History of carpal tunnel syndrome    Personal history of other diseases of the respiratory system 01/06/2020    History of acute pharyngitis    Personal history of other diseases of the respiratory system     Personal history of asthma    Personal history of other specified conditions 08/08/2019    History of epigastric pain    Personal history of other specified conditions 03/12/2018    History of angioedema    Postural dizziness with presyncope 06/26/2023    Pulmonary edema 06/26/2023    Radiculopathy, lumbar region 08/13/2018    Acute lumbar radiculopathy    Restless legs syndrome 11/17/2015    Restless legs syndrome    Right serous otitis media 06/26/2023    Severe pain 06/26/2023    Small bowel obstruction (CMS/HCC) 06/26/2023    Tarsal tunnel syndrome, unspecified lower limb     Tarsal tunnel syndrome    Thoracic aortic aneurysm, without rupture, unspecified (CMS/HCC)  "09/08/2020    Aneurysm, aorta, thoracic    Toxic effect of venom 06/26/2023    Ulcer of the stomach caused by bacteria (h. pylori), acute 06/26/2023    Ulnar neuropathy 06/26/2023     Social History     Tobacco Use    Smoking status: Never    Smokeless tobacco: Never   Substance Use Topics    Alcohol use: Never    Drug use: Never      Family History   Problem Relation Name Age of Onset    Asthma Daughter      Asthma Other      Colon cancer Other      Diabetes Other      Other (stroke syndrome) Other        Review of Systems    Objective   There were no vitals taken for this visit.   Physical Exam    Assessment/Plan   Problem List Items Addressed This Visit    None  Visit Diagnoses       Viral gastroenteritis    -  Primary    Relevant Medications    ondansetron ODT (Zofran-ODT) 8 mg disintegrating tablet    famotidine (Pepcid) 40 mg tablet        Fluids, BRAT diet  Not improving and urine output decreases more then to ER for IV, x-rays, labs    I discussed with the patient the potential benefits and risks of the use of telephone or video-conferencing that differ from in-person services (e.g., limits to patient confidentiality, limitations on the provider’s ability to observe the patient, limitations on the diagnostic tools available). I explained to the patient that I may determine at any point that telehealth services are not appropriate based on the patient’s circumstances and either party may therefore end the service to schedule an alternative in-person service or contact 911 to address a medical emergency. With the understanding of these risks, benefits and alternatives, the patient agreed to use the telephone or video-conferencing platform selected for this virtual session and further the patient confirmed his/her understanding that the services do not guarantee a specific outcome or recovery.  \"Spent 11 minutes with patient on phone discussing health concerns.\"      Patient understands and agrees with " treatment plan    Fredy Elias DO

## 2024-02-18 DIAGNOSIS — K29.00 ACUTE SUPERFICIAL GASTRITIS WITHOUT HEMORRHAGE: ICD-10-CM

## 2024-02-18 DIAGNOSIS — K27.9 PUD (PEPTIC ULCER DISEASE): ICD-10-CM

## 2024-02-19 ENCOUNTER — HOSPITAL ENCOUNTER (OUTPATIENT)
Dept: CARDIOLOGY | Facility: CLINIC | Age: 74
Discharge: HOME | End: 2024-02-19
Payer: MEDICARE

## 2024-02-19 DIAGNOSIS — I42.9 CARDIOMYOPATHY, UNSPECIFIED TYPE (MULTI): ICD-10-CM

## 2024-02-19 DIAGNOSIS — Z95.810 PRESENCE OF AUTOMATIC CARDIOVERTER/DEFIBRILLATOR (AICD): ICD-10-CM

## 2024-02-19 RX ORDER — PANTOPRAZOLE SODIUM 40 MG/1
40 TABLET, DELAYED RELEASE ORAL 2 TIMES DAILY
Qty: 180 TABLET | Refills: 0 | Status: SHIPPED | OUTPATIENT
Start: 2024-02-19 | End: 2024-06-11

## 2024-02-21 ENCOUNTER — HOSPITAL ENCOUNTER (OUTPATIENT)
Dept: CARDIOLOGY | Facility: CLINIC | Age: 74
Discharge: HOME | End: 2024-02-21
Payer: MEDICARE

## 2024-02-21 DIAGNOSIS — Z95.810 PRESENCE OF AUTOMATIC CARDIOVERTER/DEFIBRILLATOR (AICD): ICD-10-CM

## 2024-02-21 DIAGNOSIS — I47.20 VT (VENTRICULAR TACHYCARDIA) (MULTI): ICD-10-CM

## 2024-02-22 DIAGNOSIS — L40.9 PSORIASIS: Primary | ICD-10-CM

## 2024-02-22 DIAGNOSIS — J06.9 VIRAL UPPER RESPIRATORY TRACT INFECTION: ICD-10-CM

## 2024-02-22 RX ORDER — HYDROXYCHLOROQUINE SULFATE 200 MG/1
200 TABLET, FILM COATED ORAL DAILY
Qty: 90 TABLET | Refills: 0 | Status: SHIPPED | OUTPATIENT
Start: 2024-02-22 | End: 2024-04-15 | Stop reason: SDUPTHER

## 2024-02-22 RX ORDER — MONTELUKAST SODIUM 10 MG/1
10 TABLET ORAL NIGHTLY
Qty: 90 TABLET | Refills: 1 | Status: SHIPPED | OUTPATIENT
Start: 2024-02-22 | End: 2024-04-15 | Stop reason: SDUPTHER

## 2024-02-24 ENCOUNTER — APPOINTMENT (OUTPATIENT)
Dept: CARDIOLOGY | Facility: HOSPITAL | Age: 74
DRG: 811 | End: 2024-02-24
Payer: MEDICARE

## 2024-02-24 ENCOUNTER — HOSPITAL ENCOUNTER (INPATIENT)
Facility: HOSPITAL | Age: 74
LOS: 9 days | Discharge: HOME | DRG: 811 | End: 2024-03-05
Attending: STUDENT IN AN ORGANIZED HEALTH CARE EDUCATION/TRAINING PROGRAM | Admitting: INTERNAL MEDICINE
Payer: MEDICARE

## 2024-02-24 ENCOUNTER — APPOINTMENT (OUTPATIENT)
Dept: RADIOLOGY | Facility: HOSPITAL | Age: 74
DRG: 811 | End: 2024-02-24
Payer: MEDICARE

## 2024-02-24 DIAGNOSIS — K92.1 GASTROINTESTINAL HEMORRHAGE WITH MELENA: ICD-10-CM

## 2024-02-24 DIAGNOSIS — K27.9 PUD (PEPTIC ULCER DISEASE): ICD-10-CM

## 2024-02-24 DIAGNOSIS — R06.02 SOB (SHORTNESS OF BREATH): ICD-10-CM

## 2024-02-24 DIAGNOSIS — K29.00 ACUTE SUPERFICIAL GASTRITIS WITHOUT HEMORRHAGE: ICD-10-CM

## 2024-02-24 DIAGNOSIS — D64.9 ANEMIA REQUIRING TRANSFUSIONS: ICD-10-CM

## 2024-02-24 DIAGNOSIS — K83.8 DILATED CBD, ACQUIRED: ICD-10-CM

## 2024-02-24 DIAGNOSIS — M79.18 MYOFASCIAL PAIN: ICD-10-CM

## 2024-02-24 DIAGNOSIS — A08.4 VIRAL GASTROENTERITIS: ICD-10-CM

## 2024-02-24 DIAGNOSIS — I50.42 CHRONIC COMBINED SYSTOLIC AND DIASTOLIC CONGESTIVE HEART FAILURE (MULTI): ICD-10-CM

## 2024-02-24 DIAGNOSIS — R07.9 CHEST PAIN, UNSPECIFIED TYPE: Primary | ICD-10-CM

## 2024-02-24 DIAGNOSIS — J45.51 SEVERE PERSISTENT ASTHMA WITH ACUTE EXACERBATION (MULTI): ICD-10-CM

## 2024-02-24 LAB
ABO GROUP (TYPE) IN BLOOD: NORMAL
ALBUMIN SERPL BCP-MCNC: 4 G/DL (ref 3.4–5)
ALP SERPL-CCNC: 74 U/L (ref 33–136)
ALT SERPL W P-5'-P-CCNC: 10 U/L (ref 7–45)
ANION GAP SERPL CALC-SCNC: 12 MMOL/L (ref 10–20)
ANTIBODY SCREEN: NORMAL
AST SERPL W P-5'-P-CCNC: 11 U/L (ref 9–39)
BASOPHILS # BLD AUTO: 0.02 X10*3/UL (ref 0–0.1)
BASOPHILS NFR BLD AUTO: 0.2 %
BILIRUB SERPL-MCNC: 0.4 MG/DL (ref 0–1.2)
BUN SERPL-MCNC: 25 MG/DL (ref 6–23)
CALCIUM SERPL-MCNC: 9 MG/DL (ref 8.6–10.3)
CARDIAC TROPONIN I PNL SERPL HS: 26 NG/L (ref 0–13)
CARDIAC TROPONIN I PNL SERPL HS: 27 NG/L (ref 0–13)
CHLORIDE SERPL-SCNC: 101 MMOL/L (ref 98–107)
CO2 SERPL-SCNC: 27 MMOL/L (ref 21–32)
CREAT SERPL-MCNC: 1.3 MG/DL (ref 0.5–1.05)
EGFRCR SERPLBLD CKD-EPI 2021: 44 ML/MIN/1.73M*2
EOSINOPHIL # BLD AUTO: 0.23 X10*3/UL (ref 0–0.4)
EOSINOPHIL NFR BLD AUTO: 2.5 %
ERYTHROCYTE [DISTWIDTH] IN BLOOD BY AUTOMATED COUNT: 18.6 % (ref 11.5–14.5)
GLUCOSE SERPL-MCNC: 114 MG/DL (ref 74–99)
HCT VFR BLD AUTO: 23.6 % (ref 36–46)
HEMOCCULT SP1 STL QL: POSITIVE
HGB BLD-MCNC: 6.7 G/DL (ref 12–16)
IMM GRANULOCYTES # BLD AUTO: 0.05 X10*3/UL (ref 0–0.5)
IMM GRANULOCYTES NFR BLD AUTO: 0.5 % (ref 0–0.9)
LYMPHOCYTES # BLD AUTO: 1.48 X10*3/UL (ref 0.8–3)
LYMPHOCYTES NFR BLD AUTO: 16 %
MAGNESIUM SERPL-MCNC: 2.38 MG/DL (ref 1.6–2.4)
MCH RBC QN AUTO: 19.2 PG (ref 26–34)
MCHC RBC AUTO-ENTMCNC: 28.4 G/DL (ref 32–36)
MCV RBC AUTO: 68 FL (ref 80–100)
MONOCYTES # BLD AUTO: 0.8 X10*3/UL (ref 0.05–0.8)
MONOCYTES NFR BLD AUTO: 8.6 %
NEUTROPHILS # BLD AUTO: 6.68 X10*3/UL (ref 1.6–5.5)
NEUTROPHILS NFR BLD AUTO: 72.2 %
NRBC BLD-RTO: 0 /100 WBCS (ref 0–0)
PLATELET # BLD AUTO: 326 X10*3/UL (ref 150–450)
POTASSIUM SERPL-SCNC: 3.4 MMOL/L (ref 3.5–5.3)
PROT SERPL-MCNC: 7.1 G/DL (ref 6.4–8.2)
RBC # BLD AUTO: 3.49 X10*6/UL (ref 4–5.2)
RH FACTOR (ANTIGEN D): NORMAL
SODIUM SERPL-SCNC: 137 MMOL/L (ref 136–145)
WBC # BLD AUTO: 9.3 X10*3/UL (ref 4.4–11.3)

## 2024-02-24 PROCEDURE — 86920 COMPATIBILITY TEST SPIN: CPT

## 2024-02-24 PROCEDURE — 84484 ASSAY OF TROPONIN QUANT: CPT | Performed by: PHYSICIAN ASSISTANT

## 2024-02-24 PROCEDURE — 2500000004 HC RX 250 GENERAL PHARMACY W/ HCPCS (ALT 636 FOR OP/ED)

## 2024-02-24 PROCEDURE — G0378 HOSPITAL OBSERVATION PER HR: HCPCS

## 2024-02-24 PROCEDURE — 71045 X-RAY EXAM CHEST 1 VIEW: CPT | Performed by: RADIOLOGY

## 2024-02-24 PROCEDURE — 2500000004 HC RX 250 GENERAL PHARMACY W/ HCPCS (ALT 636 FOR OP/ED): Performed by: PHYSICIAN ASSISTANT

## 2024-02-24 PROCEDURE — 82270 OCCULT BLOOD FECES: CPT | Performed by: PHYSICIAN ASSISTANT

## 2024-02-24 PROCEDURE — 96361 HYDRATE IV INFUSION ADD-ON: CPT

## 2024-02-24 PROCEDURE — 93005 ELECTROCARDIOGRAM TRACING: CPT

## 2024-02-24 PROCEDURE — 85025 COMPLETE CBC W/AUTO DIFF WBC: CPT | Performed by: PHYSICIAN ASSISTANT

## 2024-02-24 PROCEDURE — 83735 ASSAY OF MAGNESIUM: CPT | Performed by: PHYSICIAN ASSISTANT

## 2024-02-24 PROCEDURE — 99223 1ST HOSP IP/OBS HIGH 75: CPT | Performed by: INTERNAL MEDICINE

## 2024-02-24 PROCEDURE — C9113 INJ PANTOPRAZOLE SODIUM, VIA: HCPCS | Performed by: PHYSICIAN ASSISTANT

## 2024-02-24 PROCEDURE — 96375 TX/PRO/DX INJ NEW DRUG ADDON: CPT

## 2024-02-24 PROCEDURE — 96374 THER/PROPH/DIAG INJ IV PUSH: CPT

## 2024-02-24 PROCEDURE — 36415 COLL VENOUS BLD VENIPUNCTURE: CPT | Performed by: PHYSICIAN ASSISTANT

## 2024-02-24 PROCEDURE — 86901 BLOOD TYPING SEROLOGIC RH(D): CPT | Performed by: PHYSICIAN ASSISTANT

## 2024-02-24 PROCEDURE — 80053 COMPREHEN METABOLIC PANEL: CPT | Performed by: PHYSICIAN ASSISTANT

## 2024-02-24 PROCEDURE — 71045 X-RAY EXAM CHEST 1 VIEW: CPT

## 2024-02-24 RX ORDER — PANTOPRAZOLE SODIUM 40 MG/10ML
80 INJECTION, POWDER, LYOPHILIZED, FOR SOLUTION INTRAVENOUS ONCE
Status: COMPLETED | OUTPATIENT
Start: 2024-02-24 | End: 2024-02-24

## 2024-02-24 RX ORDER — MORPHINE SULFATE 4 MG/ML
4 INJECTION INTRAVENOUS ONCE
Status: COMPLETED | OUTPATIENT
Start: 2024-02-24 | End: 2024-02-24

## 2024-02-24 RX ORDER — ACETAMINOPHEN 325 MG/1
650 TABLET ORAL ONCE
Status: COMPLETED | OUTPATIENT
Start: 2024-02-24 | End: 2024-02-24

## 2024-02-24 RX ORDER — MORPHINE SULFATE 4 MG/ML
INJECTION INTRAVENOUS
Status: COMPLETED
Start: 2024-02-24 | End: 2024-02-24

## 2024-02-24 RX ADMIN — MORPHINE SULFATE 4 MG: 4 INJECTION INTRAVENOUS at 21:25

## 2024-02-24 RX ADMIN — SODIUM CHLORIDE 500 ML: 9 INJECTION, SOLUTION INTRAVENOUS at 21:22

## 2024-02-24 RX ADMIN — MORPHINE SULFATE 4 MG: 4 INJECTION INTRAVENOUS at 23:38

## 2024-02-24 RX ADMIN — PANTOPRAZOLE SODIUM 80 MG: 40 INJECTION, POWDER, FOR SOLUTION INTRAVENOUS at 23:06

## 2024-02-24 RX ADMIN — ACETAMINOPHEN 650 MG: 325 TABLET ORAL at 21:22

## 2024-02-24 ASSESSMENT — COLUMBIA-SUICIDE SEVERITY RATING SCALE - C-SSRS
6. HAVE YOU EVER DONE ANYTHING, STARTED TO DO ANYTHING, OR PREPARED TO DO ANYTHING TO END YOUR LIFE?: NO
1. IN THE PAST MONTH, HAVE YOU WISHED YOU WERE DEAD OR WISHED YOU COULD GO TO SLEEP AND NOT WAKE UP?: NO
2. HAVE YOU ACTUALLY HAD ANY THOUGHTS OF KILLING YOURSELF?: NO

## 2024-02-24 ASSESSMENT — HEART SCORE
HEART SCORE: 6
ECG: NORMAL
HISTORY: MODERATELY SUSPICIOUS
RISK FACTORS: >2 RISK FACTORS OR HX OF ATHEROSCLEROTIC DISEASE
AGE: 65+
TROPONIN: 1-3 TIMES NORMAL LIMIT

## 2024-02-24 ASSESSMENT — PAIN DESCRIPTION - LOCATION
LOCATION: CHEST
LOCATION: CHEST

## 2024-02-24 ASSESSMENT — PAIN SCALES - GENERAL
PAINLEVEL_OUTOF10: 8
PAINLEVEL_OUTOF10: 8
PAINLEVEL_OUTOF10: 10 - WORST POSSIBLE PAIN

## 2024-02-24 ASSESSMENT — PAIN - FUNCTIONAL ASSESSMENT
PAIN_FUNCTIONAL_ASSESSMENT: 0-10

## 2024-02-24 ASSESSMENT — PAIN DESCRIPTION - ORIENTATION: ORIENTATION: LEFT

## 2024-02-24 ASSESSMENT — LIFESTYLE VARIABLES
EVER HAD A DRINK FIRST THING IN THE MORNING TO STEADY YOUR NERVES TO GET RID OF A HANGOVER: NO
EVER FELT BAD OR GUILTY ABOUT YOUR DRINKING: NO
HAVE PEOPLE ANNOYED YOU BY CRITICIZING YOUR DRINKING: NO
HAVE YOU EVER FELT YOU SHOULD CUT DOWN ON YOUR DRINKING: NO

## 2024-02-24 ASSESSMENT — PAIN DESCRIPTION - PROGRESSION: CLINICAL_PROGRESSION: NOT CHANGED

## 2024-02-24 ASSESSMENT — PAIN DESCRIPTION - PAIN TYPE: TYPE: ACUTE PAIN

## 2024-02-25 ENCOUNTER — APPOINTMENT (OUTPATIENT)
Dept: RADIOLOGY | Facility: HOSPITAL | Age: 74
DRG: 811 | End: 2024-02-25
Payer: MEDICARE

## 2024-02-25 ENCOUNTER — APPOINTMENT (OUTPATIENT)
Dept: CARDIOLOGY | Facility: HOSPITAL | Age: 74
DRG: 811 | End: 2024-02-25
Payer: MEDICARE

## 2024-02-25 LAB
AMYLASE SERPL-CCNC: 19 U/L (ref 29–103)
ANION GAP SERPL CALC-SCNC: 12 MMOL/L (ref 10–20)
BLOOD EXPIRATION DATE: NORMAL
BLOOD EXPIRATION DATE: NORMAL
BNP SERPL-MCNC: 279 PG/ML (ref 0–99)
BUN SERPL-MCNC: 21 MG/DL (ref 6–23)
CALCIUM SERPL-MCNC: 8.3 MG/DL (ref 8.6–10.3)
CARDIAC TROPONIN I PNL SERPL HS: 24 NG/L (ref 0–13)
CHLORIDE SERPL-SCNC: 103 MMOL/L (ref 98–107)
CO2 SERPL-SCNC: 24 MMOL/L (ref 21–32)
CREAT SERPL-MCNC: 1.06 MG/DL (ref 0.5–1.05)
DISPENSE STATUS: NORMAL
DISPENSE STATUS: NORMAL
EGFRCR SERPLBLD CKD-EPI 2021: 56 ML/MIN/1.73M*2
ERYTHROCYTE [DISTWIDTH] IN BLOOD BY AUTOMATED COUNT: 20 % (ref 11.5–14.5)
ERYTHROCYTE [DISTWIDTH] IN BLOOD BY AUTOMATED COUNT: 21.1 % (ref 11.5–14.5)
GLUCOSE SERPL-MCNC: 86 MG/DL (ref 74–99)
HCT VFR BLD AUTO: 26.6 % (ref 36–46)
HCT VFR BLD AUTO: 28.3 % (ref 36–46)
HGB BLD-MCNC: 7.9 G/DL (ref 12–16)
HGB BLD-MCNC: 8.1 G/DL (ref 12–16)
LIPASE SERPL-CCNC: 18 U/L (ref 9–82)
MCH RBC QN AUTO: 21.2 PG (ref 26–34)
MCH RBC QN AUTO: 21.6 PG (ref 26–34)
MCHC RBC AUTO-ENTMCNC: 28.6 G/DL (ref 32–36)
MCHC RBC AUTO-ENTMCNC: 29.7 G/DL (ref 32–36)
MCV RBC AUTO: 72 FL (ref 80–100)
MCV RBC AUTO: 76 FL (ref 80–100)
NRBC BLD-RTO: 0 /100 WBCS (ref 0–0)
NRBC BLD-RTO: 0 /100 WBCS (ref 0–0)
PLATELET # BLD AUTO: 231 X10*3/UL (ref 150–450)
PLATELET # BLD AUTO: 247 X10*3/UL (ref 150–450)
POTASSIUM SERPL-SCNC: 3.8 MMOL/L (ref 3.5–5.3)
PRODUCT BLOOD TYPE: 6200
PRODUCT BLOOD TYPE: 6200
PRODUCT CODE: NORMAL
PRODUCT CODE: NORMAL
RBC # BLD AUTO: 3.72 X10*6/UL (ref 4–5.2)
RBC # BLD AUTO: 3.75 X10*6/UL (ref 4–5.2)
SODIUM SERPL-SCNC: 135 MMOL/L (ref 136–145)
UNIT ABO: NORMAL
UNIT ABO: NORMAL
UNIT NUMBER: NORMAL
UNIT NUMBER: NORMAL
UNIT RH: NORMAL
UNIT RH: NORMAL
UNIT VOLUME: 350
UNIT VOLUME: 350
WBC # BLD AUTO: 6.8 X10*3/UL (ref 4.4–11.3)
WBC # BLD AUTO: 8 X10*3/UL (ref 4.4–11.3)
XM INTEP: NORMAL
XM INTEP: NORMAL

## 2024-02-25 PROCEDURE — 74177 CT ABD & PELVIS W/CONTRAST: CPT

## 2024-02-25 PROCEDURE — 2500000004 HC RX 250 GENERAL PHARMACY W/ HCPCS (ALT 636 FOR OP/ED)

## 2024-02-25 PROCEDURE — 85027 COMPLETE CBC AUTOMATED: CPT | Mod: 91 | Performed by: INTERNAL MEDICINE

## 2024-02-25 PROCEDURE — 1100000001 HC PRIVATE ROOM DAILY

## 2024-02-25 PROCEDURE — 83690 ASSAY OF LIPASE: CPT | Performed by: INTERNAL MEDICINE

## 2024-02-25 PROCEDURE — 2500000004 HC RX 250 GENERAL PHARMACY W/ HCPCS (ALT 636 FOR OP/ED): Performed by: INTERNAL MEDICINE

## 2024-02-25 PROCEDURE — 74177 CT ABD & PELVIS W/CONTRAST: CPT | Mod: FOREIGN READ | Performed by: RADIOLOGY

## 2024-02-25 PROCEDURE — 85027 COMPLETE CBC AUTOMATED: CPT | Performed by: INTERNAL MEDICINE

## 2024-02-25 PROCEDURE — 30233N1 TRANSFUSION OF NONAUTOLOGOUS RED BLOOD CELLS INTO PERIPHERAL VEIN, PERCUTANEOUS APPROACH: ICD-10-PCS | Performed by: STUDENT IN AN ORGANIZED HEALTH CARE EDUCATION/TRAINING PROGRAM

## 2024-02-25 PROCEDURE — P9016 RBC LEUKOCYTES REDUCED: HCPCS

## 2024-02-25 PROCEDURE — 84484 ASSAY OF TROPONIN QUANT: CPT | Performed by: INTERNAL MEDICINE

## 2024-02-25 PROCEDURE — 36415 COLL VENOUS BLD VENIPUNCTURE: CPT | Performed by: INTERNAL MEDICINE

## 2024-02-25 PROCEDURE — 36430 TRANSFUSION BLD/BLD COMPNT: CPT

## 2024-02-25 PROCEDURE — 2500000001 HC RX 250 WO HCPCS SELF ADMINISTERED DRUGS (ALT 637 FOR MEDICARE OP): Performed by: INTERNAL MEDICINE

## 2024-02-25 PROCEDURE — 82150 ASSAY OF AMYLASE: CPT | Performed by: INTERNAL MEDICINE

## 2024-02-25 PROCEDURE — 83880 ASSAY OF NATRIURETIC PEPTIDE: CPT | Performed by: INTERNAL MEDICINE

## 2024-02-25 PROCEDURE — 93005 ELECTROCARDIOGRAM TRACING: CPT

## 2024-02-25 PROCEDURE — 80048 BASIC METABOLIC PNL TOTAL CA: CPT | Performed by: INTERNAL MEDICINE

## 2024-02-25 PROCEDURE — 2550000001 HC RX 255 CONTRASTS: Performed by: INTERNAL MEDICINE

## 2024-02-25 PROCEDURE — C9113 INJ PANTOPRAZOLE SODIUM, VIA: HCPCS | Performed by: INTERNAL MEDICINE

## 2024-02-25 PROCEDURE — 2500000002 HC RX 250 W HCPCS SELF ADMINISTERED DRUGS (ALT 637 FOR MEDICARE OP, ALT 636 FOR OP/ED): Performed by: INTERNAL MEDICINE

## 2024-02-25 PROCEDURE — 99222 1ST HOSP IP/OBS MODERATE 55: CPT | Performed by: SURGERY

## 2024-02-25 PROCEDURE — 99233 SBSQ HOSP IP/OBS HIGH 50: CPT | Performed by: INTERNAL MEDICINE

## 2024-02-25 PROCEDURE — 2500000002 HC RX 250 W HCPCS SELF ADMINISTERED DRUGS (ALT 637 FOR MEDICARE OP, ALT 636 FOR OP/ED): Mod: MUE | Performed by: INTERNAL MEDICINE

## 2024-02-25 RX ORDER — NITROGLYCERIN 0.4 MG/1
0.4 TABLET SUBLINGUAL EVERY 5 MIN PRN
Status: DISCONTINUED | OUTPATIENT
Start: 2024-02-25 | End: 2024-03-05 | Stop reason: HOSPADM

## 2024-02-25 RX ORDER — DICLOFENAC SODIUM 10 MG/G
4 GEL TOPICAL 2 TIMES DAILY PRN
Status: DISCONTINUED | OUTPATIENT
Start: 2024-02-25 | End: 2024-03-05 | Stop reason: HOSPADM

## 2024-02-25 RX ORDER — FAMOTIDINE 20 MG/1
40 TABLET, FILM COATED ORAL DAILY
Status: DISCONTINUED | OUTPATIENT
Start: 2024-02-25 | End: 2024-02-26

## 2024-02-25 RX ORDER — POLYETHYLENE GLYCOL 3350 17 G/17G
17 POWDER, FOR SOLUTION ORAL 2 TIMES DAILY
Status: DISCONTINUED | OUTPATIENT
Start: 2024-02-25 | End: 2024-03-05 | Stop reason: HOSPADM

## 2024-02-25 RX ORDER — FLUTICASONE PROPIONATE 50 MCG
1 SPRAY, SUSPENSION (ML) NASAL 2 TIMES DAILY
Status: DISCONTINUED | OUTPATIENT
Start: 2024-02-25 | End: 2024-03-05 | Stop reason: HOSPADM

## 2024-02-25 RX ORDER — SUCRALFATE 1 G/10ML
1 SUSPENSION ORAL EVERY 6 HOURS SCHEDULED
Status: DISCONTINUED | OUTPATIENT
Start: 2024-02-25 | End: 2024-03-05 | Stop reason: HOSPADM

## 2024-02-25 RX ORDER — DILTIAZEM HYDROCHLORIDE 120 MG/1
120 CAPSULE, COATED, EXTENDED RELEASE ORAL DAILY
Status: DISCONTINUED | OUTPATIENT
Start: 2024-02-25 | End: 2024-03-05 | Stop reason: HOSPADM

## 2024-02-25 RX ORDER — ACETAMINOPHEN 325 MG/1
650 TABLET ORAL EVERY 4 HOURS PRN
Status: DISCONTINUED | OUTPATIENT
Start: 2024-02-25 | End: 2024-03-05 | Stop reason: HOSPADM

## 2024-02-25 RX ORDER — GUAIFENESIN 600 MG/1
600 TABLET, EXTENDED RELEASE ORAL EVERY 12 HOURS PRN
Status: DISCONTINUED | OUTPATIENT
Start: 2024-02-25 | End: 2024-03-05 | Stop reason: HOSPADM

## 2024-02-25 RX ORDER — ALUMINUM HYDROXIDE, MAGNESIUM HYDROXIDE, AND SIMETHICONE 1200; 120; 1200 MG/30ML; MG/30ML; MG/30ML
30 SUSPENSION ORAL EVERY 6 HOURS PRN
Status: DISCONTINUED | OUTPATIENT
Start: 2024-02-25 | End: 2024-03-05 | Stop reason: HOSPADM

## 2024-02-25 RX ORDER — ACETAMINOPHEN 650 MG/1
650 SUPPOSITORY RECTAL EVERY 4 HOURS PRN
Status: DISCONTINUED | OUTPATIENT
Start: 2024-02-25 | End: 2024-03-05 | Stop reason: HOSPADM

## 2024-02-25 RX ORDER — ALBUTEROL SULFATE 0.83 MG/ML
2.5 SOLUTION RESPIRATORY (INHALATION) EVERY 2 HOUR PRN
Status: DISCONTINUED | OUTPATIENT
Start: 2024-02-25 | End: 2024-03-05 | Stop reason: HOSPADM

## 2024-02-25 RX ORDER — CALCIUM CARBONATE 200(500)MG
500 TABLET,CHEWABLE ORAL 4 TIMES DAILY PRN
Status: DISCONTINUED | OUTPATIENT
Start: 2024-02-25 | End: 2024-03-05 | Stop reason: HOSPADM

## 2024-02-25 RX ORDER — MORPHINE SULFATE 4 MG/ML
4 INJECTION INTRAVENOUS ONCE
Status: COMPLETED | OUTPATIENT
Start: 2024-02-25 | End: 2024-02-25

## 2024-02-25 RX ORDER — MONTELUKAST SODIUM 10 MG/1
10 TABLET ORAL NIGHTLY
Status: DISCONTINUED | OUTPATIENT
Start: 2024-02-25 | End: 2024-03-05 | Stop reason: HOSPADM

## 2024-02-25 RX ORDER — ONDANSETRON 4 MG/1
4 TABLET, FILM COATED ORAL EVERY 8 HOURS PRN
Status: DISCONTINUED | OUTPATIENT
Start: 2024-02-25 | End: 2024-03-05 | Stop reason: HOSPADM

## 2024-02-25 RX ORDER — OXYCODONE AND ACETAMINOPHEN 5; 325 MG/1; MG/1
1 TABLET ORAL 3 TIMES DAILY PRN
Status: DISCONTINUED | OUTPATIENT
Start: 2024-02-25 | End: 2024-03-05 | Stop reason: HOSPADM

## 2024-02-25 RX ORDER — ROSUVASTATIN CALCIUM 20 MG/1
20 TABLET, COATED ORAL DAILY
Status: DISCONTINUED | OUTPATIENT
Start: 2024-02-25 | End: 2024-03-05 | Stop reason: HOSPADM

## 2024-02-25 RX ORDER — PANTOPRAZOLE SODIUM 40 MG/10ML
40 INJECTION, POWDER, LYOPHILIZED, FOR SOLUTION INTRAVENOUS DAILY
Status: DISCONTINUED | OUTPATIENT
Start: 2024-02-25 | End: 2024-02-29

## 2024-02-25 RX ORDER — MORPHINE SULFATE 4 MG/ML
INJECTION INTRAVENOUS
Status: COMPLETED
Start: 2024-02-25 | End: 2024-02-25

## 2024-02-25 RX ORDER — ALBUTEROL SULFATE 0.83 MG/ML
2.5 SOLUTION RESPIRATORY (INHALATION) EVERY 4 HOURS PRN
Status: DISCONTINUED | OUTPATIENT
Start: 2024-02-25 | End: 2024-02-25

## 2024-02-25 RX ORDER — ACETAMINOPHEN 160 MG/5ML
650 SOLUTION ORAL EVERY 4 HOURS PRN
Status: DISCONTINUED | OUTPATIENT
Start: 2024-02-25 | End: 2024-03-05 | Stop reason: HOSPADM

## 2024-02-25 RX ORDER — BACLOFEN 10 MG/1
10 TABLET ORAL 3 TIMES DAILY
Status: DISCONTINUED | OUTPATIENT
Start: 2024-02-25 | End: 2024-03-05 | Stop reason: HOSPADM

## 2024-02-25 RX ORDER — ONDANSETRON HYDROCHLORIDE 2 MG/ML
4 INJECTION, SOLUTION INTRAVENOUS EVERY 8 HOURS PRN
Status: DISCONTINUED | OUTPATIENT
Start: 2024-02-25 | End: 2024-03-05 | Stop reason: HOSPADM

## 2024-02-25 RX ORDER — DIGOXIN 125 MCG
125 TABLET ORAL DAILY
Status: DISCONTINUED | OUTPATIENT
Start: 2024-02-25 | End: 2024-03-05 | Stop reason: HOSPADM

## 2024-02-25 RX ORDER — HYDROXYCHLOROQUINE SULFATE 200 MG/1
200 TABLET, FILM COATED ORAL DAILY
Status: DISCONTINUED | OUTPATIENT
Start: 2024-02-25 | End: 2024-03-05 | Stop reason: HOSPADM

## 2024-02-25 RX ORDER — GUAIFENESIN/DEXTROMETHORPHAN 100-10MG/5
5 SYRUP ORAL EVERY 4 HOURS PRN
Status: DISCONTINUED | OUTPATIENT
Start: 2024-02-25 | End: 2024-03-05 | Stop reason: HOSPADM

## 2024-02-25 RX ADMIN — DILTIAZEM HYDROCHLORIDE 120 MG: 120 CAPSULE, COATED, EXTENDED RELEASE ORAL at 09:04

## 2024-02-25 RX ADMIN — IOHEXOL 90 ML: 350 INJECTION, SOLUTION INTRAVENOUS at 01:36

## 2024-02-25 RX ADMIN — BACLOFEN 10 MG: 10 TABLET ORAL at 14:58

## 2024-02-25 RX ADMIN — MONTELUKAST 10 MG: 10 TABLET, FILM COATED ORAL at 02:08

## 2024-02-25 RX ADMIN — MORPHINE SULFATE 4 MG: 4 INJECTION INTRAVENOUS at 00:47

## 2024-02-25 RX ADMIN — FLUTICASONE PROPIONATE 1 SPRAY: 50 SPRAY, METERED NASAL at 20:21

## 2024-02-25 RX ADMIN — BACLOFEN 10 MG: 10 TABLET ORAL at 09:04

## 2024-02-25 RX ADMIN — OXYCODONE HYDROCHLORIDE AND ACETAMINOPHEN 1 TABLET: 5; 325 TABLET ORAL at 09:10

## 2024-02-25 RX ADMIN — SUCRALFATE 1 G: 1 SUSPENSION ORAL at 17:23

## 2024-02-25 RX ADMIN — BACLOFEN 10 MG: 10 TABLET ORAL at 20:14

## 2024-02-25 RX ADMIN — POLYETHYLENE GLYCOL 3350 17 G: 17 POWDER, FOR SOLUTION ORAL at 20:14

## 2024-02-25 RX ADMIN — IRON SUCROSE 200 MG: 20 INJECTION, SOLUTION INTRAVENOUS at 11:52

## 2024-02-25 RX ADMIN — FLUTICASONE PROPIONATE 1 SPRAY: 50 SPRAY, METERED NASAL at 09:04

## 2024-02-25 RX ADMIN — SUCRALFATE 1 G: 1 SUSPENSION ORAL at 11:50

## 2024-02-25 RX ADMIN — MONTELUKAST 10 MG: 10 TABLET, FILM COATED ORAL at 20:14

## 2024-02-25 RX ADMIN — HYDROXYCHLOROQUINE SULFATE 200 MG: 200 TABLET, FILM COATED ORAL at 09:04

## 2024-02-25 RX ADMIN — ROSUVASTATIN CALCIUM 20 MG: 20 TABLET, FILM COATED ORAL at 09:04

## 2024-02-25 RX ADMIN — OXYCODONE HYDROCHLORIDE AND ACETAMINOPHEN 1 TABLET: 5; 325 TABLET ORAL at 17:23

## 2024-02-25 RX ADMIN — ACETAMINOPHEN 650 MG: 325 TABLET ORAL at 11:53

## 2024-02-25 RX ADMIN — POLYETHYLENE GLYCOL 3350 17 G: 17 POWDER, FOR SOLUTION ORAL at 15:03

## 2024-02-25 RX ADMIN — OXYCODONE HYDROCHLORIDE AND ACETAMINOPHEN 1 TABLET: 5; 325 TABLET ORAL at 04:50

## 2024-02-25 RX ADMIN — PANTOPRAZOLE SODIUM 40 MG: 40 INJECTION, POWDER, FOR SOLUTION INTRAVENOUS at 09:04

## 2024-02-25 RX ADMIN — SUCRALFATE 1 G: 1 SUSPENSION ORAL at 23:16

## 2024-02-25 RX ADMIN — DIGOXIN 125 MCG: 125 TABLET ORAL at 09:03

## 2024-02-25 RX ADMIN — FAMOTIDINE 40 MG: 20 TABLET ORAL at 09:04

## 2024-02-25 SDOH — SOCIAL STABILITY: SOCIAL INSECURITY: ARE YOU OR HAVE YOU BEEN THREATENED OR ABUSED PHYSICALLY, EMOTIONALLY, OR SEXUALLY BY ANYONE?: NO

## 2024-02-25 SDOH — SOCIAL STABILITY: SOCIAL INSECURITY: ARE THERE ANY APPARENT SIGNS OF INJURIES/BEHAVIORS THAT COULD BE RELATED TO ABUSE/NEGLECT?: NO

## 2024-02-25 SDOH — SOCIAL STABILITY: SOCIAL INSECURITY: HAVE YOU HAD THOUGHTS OF HARMING ANYONE ELSE?: NO

## 2024-02-25 SDOH — SOCIAL STABILITY: SOCIAL INSECURITY: DO YOU FEEL UNSAFE GOING BACK TO THE PLACE WHERE YOU ARE LIVING?: NO

## 2024-02-25 SDOH — SOCIAL STABILITY: SOCIAL INSECURITY: ABUSE: ADULT

## 2024-02-25 SDOH — SOCIAL STABILITY: SOCIAL INSECURITY: WERE YOU ABLE TO COMPLETE ALL THE BEHAVIORAL HEALTH SCREENINGS?: YES

## 2024-02-25 SDOH — SOCIAL STABILITY: SOCIAL INSECURITY: HAS ANYONE EVER THREATENED TO HURT YOUR FAMILY OR YOUR PETS?: NO

## 2024-02-25 SDOH — SOCIAL STABILITY: SOCIAL INSECURITY: DOES ANYONE TRY TO KEEP YOU FROM HAVING/CONTACTING OTHER FRIENDS OR DOING THINGS OUTSIDE YOUR HOME?: NO

## 2024-02-25 SDOH — SOCIAL STABILITY: SOCIAL INSECURITY: DO YOU FEEL ANYONE HAS EXPLOITED OR TAKEN ADVANTAGE OF YOU FINANCIALLY OR OF YOUR PERSONAL PROPERTY?: NO

## 2024-02-25 ASSESSMENT — COGNITIVE AND FUNCTIONAL STATUS - GENERAL
DAILY ACTIVITIY SCORE: 24
PATIENT BASELINE BEDBOUND: NO
MOBILITY SCORE: 24
DAILY ACTIVITIY SCORE: 24
MOBILITY SCORE: 24

## 2024-02-25 ASSESSMENT — ACTIVITIES OF DAILY LIVING (ADL)
PATIENT'S MEMORY ADEQUATE TO SAFELY COMPLETE DAILY ACTIVITIES?: YES
HEARING - RIGHT EAR: FUNCTIONAL
DRESSING YOURSELF: INDEPENDENT
TOILETING: INDEPENDENT
BATHING: INDEPENDENT
WALKS IN HOME: INDEPENDENT
ASSISTIVE_DEVICE: WALKER
ADEQUATE_TO_COMPLETE_ADL: YES
FEEDING YOURSELF: INDEPENDENT
HEARING - LEFT EAR: FUNCTIONAL
JUDGMENT_ADEQUATE_SAFELY_COMPLETE_DAILY_ACTIVITIES: YES
LACK_OF_TRANSPORTATION: NO
GROOMING: INDEPENDENT
LACK_OF_TRANSPORTATION: NO

## 2024-02-25 ASSESSMENT — ENCOUNTER SYMPTOMS
PSYCHIATRIC NEGATIVE: 1
EYES NEGATIVE: 1
MUSCULOSKELETAL NEGATIVE: 1
ROS GI COMMENTS: SEE HPI
ENDOCRINE NEGATIVE: 1
ACTIVITY CHANGE: 1
ALLERGIC/IMMUNOLOGIC NEGATIVE: 1
RESPIRATORY NEGATIVE: 1
NEUROLOGICAL NEGATIVE: 1
HEMATOLOGIC/LYMPHATIC NEGATIVE: 1

## 2024-02-25 ASSESSMENT — PAIN DESCRIPTION - LOCATION
LOCATION: ABDOMEN

## 2024-02-25 ASSESSMENT — LIFESTYLE VARIABLES
AUDIT-C TOTAL SCORE: 0
HOW OFTEN DO YOU HAVE A DRINK CONTAINING ALCOHOL: NEVER
SKIP TO QUESTIONS 9-10: 1
HOW OFTEN DO YOU HAVE 6 OR MORE DRINKS ON ONE OCCASION: NEVER
HOW MANY STANDARD DRINKS CONTAINING ALCOHOL DO YOU HAVE ON A TYPICAL DAY: PATIENT DOES NOT DRINK
AUDIT-C TOTAL SCORE: 0

## 2024-02-25 ASSESSMENT — PAIN - FUNCTIONAL ASSESSMENT
PAIN_FUNCTIONAL_ASSESSMENT: 0-10

## 2024-02-25 ASSESSMENT — PAIN SCALES - GENERAL
PAINLEVEL_OUTOF10: 2
PAINLEVEL_OUTOF10: 8
PAINLEVEL_OUTOF10: 3
PAINLEVEL_OUTOF10: 7
PAINLEVEL_OUTOF10: 0 - NO PAIN
PAINLEVEL_OUTOF10: 10 - WORST POSSIBLE PAIN
PAINLEVEL_OUTOF10: 4
PAINLEVEL_OUTOF10: 5 - MODERATE PAIN
PAINLEVEL_OUTOF10: 10 - WORST POSSIBLE PAIN

## 2024-02-25 ASSESSMENT — PATIENT HEALTH QUESTIONNAIRE - PHQ9
SUM OF ALL RESPONSES TO PHQ9 QUESTIONS 1 & 2: 0
1. LITTLE INTEREST OR PLEASURE IN DOING THINGS: NOT AT ALL
2. FEELING DOWN, DEPRESSED OR HOPELESS: NOT AT ALL

## 2024-02-25 NOTE — PROGRESS NOTES
02/25/24 1110   Discharge Planning   Living Arrangements Spouse/significant other   Support Systems Spouse/significant other   Assistance Needed Rollator   Type of Residence Private residence   Home or Post Acute Services None   Patient expects to be discharged to: Home/ TBD   Does the patient need discharge transport arranged? Yes   RoundTrip coordination needed? Yes   Has discharge transport been arranged? No   Financial Resource Strain   How hard is it for you to pay for the very basics like food, housing, medical care, and heating? Not hard   Housing Stability   In the last 12 months, was there a time when you were not able to pay the mortgage or rent on time? N   In the last 12 months, how many places have you lived? 1   In the last 12 months, was there a time when you did not have a steady place to sleep or slept in a shelter (including now)? N   Transportation Needs   In the past 12 months, has lack of transportation kept you from medical appointments or from getting medications? no   In the past 12 months, has lack of transportation kept you from meetings, work, or from getting things needed for daily living? No   Patient Choice   Provider Choice list and CMS website (https://medicare.gov/care-compare#search) for post-acute Quality and Resource Measure Data were provided and reviewed with: Patient   Patient / Family choosing to utilize agency / facility established prior to hospitalization No     2/25/2024: Patient POC: GI bleed and CP. Cardiology and surgery consulted. Patient unsure of needs, may be interested in C and would like follow up closer to NH.  to transport home.

## 2024-02-25 NOTE — CONSULTS
GENERAL SURGERY/TRAUMA  HISTORY AND PHYSICAL/CONSULT    Date: 2/25/2024 Time: 1:41 PM    Name: Trina Elias  MRN: [id number]    This is a 73 y.o. female who presented to the emergency department with chest pain.  She was found to be anemic with a hemoglobin of 6.7.  This is down from 9.5 in September 2023.  She has had episodes of anemia and GI bleeding in the past, but she states that her hemoglobin has never dropped this level.  She had a recent upper endoscopy by Dr. Grier in December 2023 that was completely normal.  3 years ago she had a colonoscopy that showed diverticulosis.  She does not endorse blood in her stool or on the tissue.  She states that she might have had some dark stools recently.  She is Hemoccult positive.  She received 1 unit of packed red blood cells yesterday which increased her hemoglobin to 8.1.  She is hemodynamically stable and there is no evidence of ongoing GI bleeding.  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  She is often admitted for small bowel obstructions which are always managed nonoperatively.  She currently is passing gas and having bowel movements normally.      PAST MEDICAL HISTORY:  Past Medical History:   Diagnosis Date    Abnormal mammogram 06/26/2023    Acute sphenoidal sinusitis, unspecified 10/12/2017    Acute sphenoidal sinusitis, recurrence not specified    Bacterial meningitis, unspecified 01/26/2018    Acute bacterial meningitis    Bone pain 06/26/2023    BSOM (bilateral serous otitis media) 06/26/2023    Cervical neuropathy 06/26/2023    Cervicalgia 06/08/2021    Acute neck pain    Chest pain on exertion 06/26/2023    Chronic obstructive pulmonary disease, unspecified (CMS/HCC)     Chronic obstructive pulmonary disease    Cutaneous abscess of abdominal wall 11/17/2015    Abdominal wall abscess    Dehydration 06/26/2023    Dry cough 06/26/2023    Dry heaves 06/26/2023    Dyspnea 06/26/2023    Elevated serum creatinine 06/26/2023     Fatigue 06/26/2023    Fever, unspecified 06/26/2023    Headache 06/26/2023    History of abdominal surgery 06/26/2023    History of colon polyps 06/26/2023    Lumbar radiculitis 06/26/2023    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    Nonhealing surgical wound 06/26/2023    Numbness 06/26/2023    Osteopenia 06/26/2023    Other conditions influencing health status     Coronary Artery Disease    Other conditions influencing health status     Peptic Ulcer    Pain of right upper extremity 06/26/2023    Personal history of anaphylaxis 08/19/2015    History of anaphylaxis    Personal history of diseases of the skin and subcutaneous tissue     History of eczema    Personal history of infections of the central nervous system     History of bacterial meningitis    Personal history of other diseases of the digestive system 11/17/2015    History of esophageal reflux    Personal history of other diseases of the digestive system     History of hiatal hernia    Personal history of other diseases of the nervous system and sense organs     History of carpal tunnel syndrome    Personal history of other diseases of the respiratory system 01/06/2020    History of acute pharyngitis    Personal history of other diseases of the respiratory system     Personal history of asthma    Personal history of other specified conditions 08/08/2019    History of epigastric pain    Personal history of other specified conditions 03/12/2018    History of angioedema    Postural dizziness with presyncope 06/26/2023    Pulmonary edema 06/26/2023    Radiculopathy, lumbar region 08/13/2018    Acute lumbar radiculopathy    Restless legs syndrome 11/17/2015    Restless legs syndrome    Right serous otitis media 06/26/2023    Severe pain 06/26/2023    Small bowel obstruction (CMS/HCC) 06/26/2023    Tarsal tunnel syndrome, unspecified lower limb     Tarsal tunnel syndrome    Thoracic aortic aneurysm, without rupture, unspecified (CMS/HCC) 09/08/2020    Aneurysm,  aorta, thoracic    Toxic effect of venom 2023    Ulcer of the stomach caused by bacteria (h. pylori), acute 2023    Ulnar neuropathy 2023        PAST SURGICAL HISTORY:  Past Surgical History:   Procedure Laterality Date    APPENDECTOMY  2013    Appendectomy    CARDIAC CATHETERIZATION  2013    Cardiac Cath Procedure Summary    CARDIAC SURGERY  2014    Heart Surgery     SECTION, CLASSIC  2013     Section    CHOLECYSTECTOMY  2013    Cholecystectomy Laparoscopic    FOOT SURGERY  2013    Foot Surgery    GASTRIC FUNDOPLICATION  2013    Esophagogastric Fundoplasty Nissen Fundoplication    HERNIA REPAIR  2015    Hernia Repair    OTHER SURGICAL HISTORY  2019    [facility] tooth extraction    OTHER SURGICAL HISTORY  2019    Carpal tunnel surgery    OTHER SURGICAL HISTORY  2019    Foot surgery    OTHER SURGICAL HISTORY  2019    Leg surgery    OTHER SURGICAL HISTORY  2020    Hand surgery    OTHER SURGICAL HISTORY  2015    Cardio-Defib Eval Dual Chamber With Reprogramming    OTHER SURGICAL HISTORY  2021    Intestinal surgery    ROTATOR CUFF REPAIR  2018    Rotator Cuff Repair       FAMILY HISTORY:  Family History   Problem Relation Name Age of Onset    Asthma Daughter      Asthma Other      Colon cancer Other      Diabetes Other      Other (stroke syndrome) Other          SOCIAL HISTORY:  Social History     Tobacco Use    Smoking status: Never    Smokeless tobacco: Never   Substance Use Topics    Alcohol use: Never    Drug use: Never       MEDICATIONS:  Prior to Admission Medications:    Current Facility-Administered Medications:     acetaminophen (Tylenol) tablet 650 mg, 650 mg, oral, q4h PRN, 650 mg at 24 1153 **OR** acetaminophen (Tylenol) oral liquid 650 mg, 650 mg, oral, q4h PRN **OR** acetaminophen (Tylenol) suppository 650 mg, 650 mg, rectal, q4h PRN, Ravin Anderson MD    albuterol 2.5 mg  /3 mL (0.083 %) nebulizer solution 2.5 mg, 2.5 mg, nebulization, q2h PRN, Ravin Anderson MD    alum-mag hydroxide-simeth (Mylanta) 200-200-20 mg/5 mL oral suspension 30 mL, 30 mL, oral, q6h PRN, Ravin Anderson MD    baclofen (Lioresal) tablet 10 mg, 10 mg, oral, TID, Ravin Anderson MD, 10 mg at 02/25/24 0904    calcium carbonate (Tums) chewable tablet 500 mg, 500 mg, oral, 4x daily PRN, Ravin Anderson MD    dextromethorphan-guaifenesin (Robitussin DM)  mg/5 mL oral liquid 5 mL, 5 mL, oral, q4h PRN, Ravin Anderson MD    diclofenac sodium (Voltaren) 1 % gel 4 g, 4 g, Topical, BID PRN, Ravin Anderson MD    digoxin (Lanoxin) tablet 125 mcg, 125 mcg, oral, Daily, Ravin Anderson MD, 125 mcg at 02/25/24 0903    dilTIAZem CD (Cardizem CD) 24 hr capsule 120 mg, 120 mg, oral, Daily, Ravin Anderson MD, 120 mg at 02/25/24 0904    famotidine (Pepcid) tablet 40 mg, 40 mg, oral, Daily, Ravin Anderson MD, 40 mg at 02/25/24 0904    fluticasone (Flonase) nasal spray 1 spray, 1 spray, Each Nostril, BID, Ravin Anderson MD, 1 spray at 02/25/24 0904    guaiFENesin (Mucinex) 12 hr tablet 600 mg, 600 mg, oral, q12h PRN, Ravin Anderson MD    hydroxychloroquine (Plaquenil) tablet 200 mg, 200 mg, oral, Daily, Ravin Anderson MD, 200 mg at 02/25/24 0904    montelukast (Singulair) tablet 10 mg, 10 mg, oral, Nightly, Ravin Anderson MD, 10 mg at 02/25/24 0208    nitroglycerin (Nitrostat) SL tablet 0.4 mg, 0.4 mg, sublingual, q5 min PRN, Ravin Anderson MD    ondansetron (Zofran) tablet 4 mg, 4 mg, oral, q8h PRN **OR** ondansetron (Zofran) injection 4 mg, 4 mg, intravenous, q8h PRN, Ravin Anderson MD    oxyCODONE-acetaminophen (Percocet) 5-325 mg per tablet 1 tablet, 1 tablet, oral, TID PRN, Ravin Anderson MD, 1 tablet at 02/25/24 0910    pantoprazole (ProtoNix) injection 40 mg, 40 mg, intravenous, Daily, Ravin Anderson MD, 40 mg at 02/25/24 0904    polyethylene glycol  (Glycolax, Miralax) packet 17 g, 17 g, oral, BID, Ayden Wiley MD    rosuvastatin (Crestor) tablet 20 mg, 20 mg, oral, Daily, Ravin Anderson MD, 20 mg at 02/25/24 0904    sucralfate (Carafate) suspension 1 g, 1 g, oral, q6h DWIGHT, Neo Gutierrez MD, 1 g at 02/25/24 1150    ALLERGIES:  Allergies   Allergen Reactions    Hydromorphone Anaphylaxis    Metoprolol Anaphylaxis    Abatacept Other     pulmonary edema, diarrhea, nausea    Bupropion Other    Cefepime Unknown    Ciprofloxacin Hives    Furosemide Itching    Hydrocodone-Acetaminophen Other    Levofloxacin Unknown    Methotrexate Hives and Itching     chest pain    Penicillins Hives    Pregabalin Other     caused pulmonary edema    Prochlorperazine Other     paralysis of the mouth with drooping    Aripiprazole Rash    Beta-Blockers (Beta-Adrenergic Blocking Agts) Other, Palpitations and Wheezing    Pineapple Rash       REVIEW OF SYSTEMS:  GENERAL: Negative for malaise, significant weight loss and fever  NECK: Negative for lumps, goiter, pain and significant neck swelling  RESPIRATORY: Negative for cough, wheezing or shortness of breath.  CARDIOVASCULAR: Chest pain  GI: Dark stools  : No history of dysuria, frequency or incontinence  MUSCULOSKELETAL: Negative for joint pain or swelling, back pain or muscle pain.  SKIN: Negative for lesions, rash, and itching.  PSYCH: Negative for sleep disturbance, mood disorder and recent psychosocial stressors.  ENDOCRINE: Negative for cold or heat intolerance, polyuria, polydipsia and goiter.    PHYSICAL EXAM:  Vitals:    02/25/24 0912   BP: 136/73   Pulse: 67   Resp:    Temp:    SpO2:      General appearance: NAD, AOx3  Skin: warm, no erythema or rashes  Lungs: clear to percussion and auscultation  Heart: regular rhythm and S1, S2 normal  Abdomen: Soft, mildly distended (the patient states this is her baseline), mildly tender to palpation diffusely without rebound or guarding (the patient states this is  her baseline)  Extremities: Normal exam of the extremities. No swelling or pain.    IMPRESSION:  Trina Elias is a 73 y.o. female with anemia and Hemoccult positive stool.  She has a known history of diverticulosis.  No active bleeding currently evident.    PLAN:  I had a long discussion about the need for endoscopy and colonoscopy with the patient.  As long as her hemoglobin remained stable, there is no need for urgent scopes.  We discussed that we would trend her hemoglobin until tomorrow and then make a decision on proceeding with scopes while inpatient or referring her for outpatient management.  Clear liquid diet until we decide about the need for endoscopy/colonoscopy.      Nnamdi Cleary MD  Bariatric and Minimally Invasive General Surgery

## 2024-02-25 NOTE — PROGRESS NOTES
Trina Elias is a 73 y.o. female on day 0 of admission presenting with Chest pain, unspecified type.      Subjective   Seen and examined, no new complaints.  No overnight events.  The plan discussed with the patient and RN.       Objective     Last Recorded Vitals  /73   Pulse 67   Temp 36.3 °C (97.3 °F) (Temporal)   Resp 18   Wt 72.8 kg (160 lb 8 oz)   SpO2 97%   Intake/Output last 3 Shifts:    Intake/Output Summary (Last 24 hours) at 2/25/2024 1209  Last data filed at 2/25/2024 0615  Gross per 24 hour   Intake 650 ml   Output --   Net 650 ml       Admission Weight  Weight: 69.9 kg (154 lb) (02/24/24 2030)    Daily Weight  02/25/24 : 72.8 kg (160 lb 8 oz)    Image Results  CT abdomen pelvis w IV contrast  Narrative: STUDY:  CT Abdomen and Pelvis with IV Contrast; 2/25/2024 1:50 AM  INDICATION:  Severe epigastric abdominal pain.  COMPARISON:  CXR 2/24/2024.  CT AP 9/2/2023, 2/25/2023.  ACCESSION NUMBER(S):  [id number]  ORDERING CLINICIAN:  LORA BEE  TECHNIQUE:  CT of the abdomen and pelvis was performed.  Contiguous axial images  were obtained at 3 mm slice thickness through the abdomen and pelvis.   Coronal and sagittal reconstructions at 3 mm slice thickness were  performed.  Omnipaque 350 90 mL was administered intravenously.  FINDINGS:  LOWER CHEST:  Cardiac size is enlarged.  No pericardial effusion.  Pacer leads are  seen in the right atrium and right ventricle.  There is a small  sliding-type hiatal hernia.  Slightly increased AP diameter of the  thorax at the lung bases suggests chronic changes of COPD.  Mosaic  attenuation of the lung bases is consistent with chronic small airway  disease and air trapping.    ABDOMEN:     LIVER:  No hepatomegaly.  Smooth surface contour.  Normal attenuation.     BILE DUCTS/GALLBLADDER:  The patient is status post cholecystectomy with moderate intrahepatic  biliary dilatation, commonly seen in postcholecystectomy patients,  however, the common bile duct  is markedly dilated, measuring 3.2 cm in  diameter.  This is slightly more pronounced when compared to the prior  exam.    STOMACH:  No abnormalities identified.  Postoperative changes are seen in the  region of the GE junction with multiple surgical clips noted of  unclear etiology, possibly prior fundoplication.       PANCREAS:  No masses or ductal dilatation.     SPLEEN:  No splenomegaly or focal splenic lesion.     ADRENAL GLANDS:  No thickening or nodules.     KIDNEYS AND URETERS:  Kidneys are normal in location.  Mild renal cortical thinning is seen  bilaterally.  Subcentimeter hypodensities in both kidneys appear to  represent simple cysts but are too small to definitively characterize.   Statistically these are most likely benign, follow-up only if  clinically indicated.  No renal or ureteral calculi.     PELVIS:     BLADDER:  No abnormalities identified.     REPRODUCTIVE ORGANS:  No acute uterine or adnexal pathology is identified.       BOWEL:  Minimal diverticulosis is noted in the sigmoid colon.  Appendix is not  identified and may be absent.  Terminal ileum is unremarkable.       VESSELS:  No abnormalities identified.  Abdominal aorta is normal in caliber.      PERITONEUM/RETROPERITONEUM/LYMPH NODES:  No free fluid.  No pneumoperitoneum.  No lymphadenopathy.     ABDOMINAL WALL:  No abnormalities identified.  Enteric anastomosis is noted in the  anterior mid abdomen in the infraumbilical region with postoperative  changes in the anterior infraumbilical abdominal wall.    SOFT TISSUES:   No abnormalities identified.     BONES:  No acute fracture or aggressive osseous lesion.  Multilevel mild to  moderate disc disease is seen in the visualized spine.  Impression: 1.  Status post cholecystectomy with a markedly dilated extrahepatic  biliary duct measuring up to 3.2 cm in diameter, increased from the  prior study.  This may be chronic, however, correlation with biliary  lab values is recommended, nonemergent  ERCP or MRCP may be of benefit  in this patient.  2.  Chronic postoperative changes are seen in the region of the GE  junction with a small sliding-type hiatal hernia, correlate with  patient's surgical history.  3.  Cardiomegaly.  4.  Chronic changes of suspected COPD.  5.  Minimal distal colonic diverticulosis.  Signed by Mesfin Seaman      Physical Exam  HENT:      Head: Normocephalic and atraumatic.      Nose: Nose normal.      Mouth/Throat:      Mouth: Mucous membranes are dry.      Pharynx: Oropharynx is clear. No oropharyngeal exudate or posterior oropharyngeal erythema.   Eyes:      General: No scleral icterus.        Right eye: No discharge.         Left eye: No discharge.      Conjunctiva/sclera: Conjunctivae normal.   Cardiovascular:      Rate and Rhythm: Normal rate and regular rhythm.      Heart sounds: No murmur heard.  Pulmonary:      Breath sounds: No wheezing, rhonchi or rales.   Abdominal:      Tenderness: There is no left CVA tenderness, guarding or rebound.      Comments: Multiple scars from prior surgeries. Ventral hernia  Tender in epigastric area.    Musculoskeletal:      Cervical back: Neck supple.      Right lower leg: No edema.      Left lower leg: No edema.   Lymphadenopathy:      Cervical: No cervical adenopathy.   Skin:     General: Skin is warm and dry.      Findings: No lesion or rash.   Neurological:      General: No focal deficit present.      Mental Status: She is alert and oriented to person, place, and time.   Psychiatric:         Mood and Affect: Mood normal.         Behavior: Behavior normal.         Relevant Results  Results for orders placed or performed during the hospital encounter of 02/24/24 (from the past 24 hour(s))   CBC and Auto Differential   Result Value Ref Range    WBC 9.3 4.4 - 11.3 x10*3/uL    nRBC 0.0 0.0 - 0.0 /100 WBCs    RBC 3.49 (L) 4.00 - 5.20 x10*6/uL    Hemoglobin 6.7 (L) 12.0 - 16.0 g/dL    Hematocrit 23.6 (L) 36.0 - 46.0 %    MCV 68 (L) 80 - 100 fL     MCH 19.2 (L) 26.0 - 34.0 pg    MCHC 28.4 (L) 32.0 - 36.0 g/dL    RDW 18.6 (H) 11.5 - 14.5 %    Platelets 326 150 - 450 x10*3/uL    Neutrophils % 72.2 40.0 - 80.0 %    Immature Granulocytes %, Automated 0.5 0.0 - 0.9 %    Lymphocytes % 16.0 13.0 - 44.0 %    Monocytes % 8.6 2.0 - 10.0 %    Eosinophils % 2.5 0.0 - 6.0 %    Basophils % 0.2 0.0 - 2.0 %    Neutrophils Absolute 6.68 (H) 1.60 - 5.50 x10*3/uL    Immature Granulocytes Absolute, Automated 0.05 0.00 - 0.50 x10*3/uL    Lymphocytes Absolute 1.48 0.80 - 3.00 x10*3/uL    Monocytes Absolute 0.80 0.05 - 0.80 x10*3/uL    Eosinophils Absolute 0.23 0.00 - 0.40 x10*3/uL    Basophils Absolute 0.02 0.00 - 0.10 x10*3/uL   Comprehensive Metabolic Panel   Result Value Ref Range    Glucose 114 (H) 74 - 99 mg/dL    Sodium 137 136 - 145 mmol/L    Potassium 3.4 (L) 3.5 - 5.3 mmol/L    Chloride 101 98 - 107 mmol/L    Bicarbonate 27 21 - 32 mmol/L    Anion Gap 12 10 - 20 mmol/L    Urea Nitrogen 25 (H) 6 - 23 mg/dL    Creatinine 1.30 (H) 0.50 - 1.05 mg/dL    eGFR 44 (L) >60 mL/min/1.73m*2    Calcium 9.0 8.6 - 10.3 mg/dL    Albumin 4.0 3.4 - 5.0 g/dL    Alkaline Phosphatase 74 33 - 136 U/L    Total Protein 7.1 6.4 - 8.2 g/dL    AST 11 9 - 39 U/L    Bilirubin, Total 0.4 0.0 - 1.2 mg/dL    ALT 10 7 - 45 U/L   Magnesium   Result Value Ref Range    Magnesium 2.38 1.60 - 2.40 mg/dL   Troponin I, High Sensitivity, Initial   Result Value Ref Range    Troponin I, High Sensitivity 27 (H) 0 - 13 ng/L   Type and screen   Result Value Ref Range    ABO TYPE A     Rh TYPE POS     ANTIBODY SCREEN NEG    Troponin, High Sensitivity, 1 Hour   Result Value Ref Range    Troponin I, High Sensitivity 26 (H) 0 - 13 ng/L   Occult Blood, Stool    Specimen: Stool   Result Value Ref Range    Occult Blood, Stool X1 Positive (A) Negative   Prepare RBC: 2 Units   Result Value Ref Range    PRODUCT CODE J2454A14     Unit Number [id number]     Unit ABO A     Unit RH POS     XM INTEP COMP     Dispense Status TR      Blood Expiration Date March 11, 2024 23:59 EDT     PRODUCT BLOOD TYPE 6200     UNIT VOLUME 350     PRODUCT CODE K1371V36     Unit Number [id number]     Unit ABO A     Unit RH POS     XM INTEP COMP     Dispense Status TR     Blood Expiration Date March 11, 2024 23:59 EDT     PRODUCT BLOOD TYPE 6200     UNIT VOLUME 350    Lipase   Result Value Ref Range    Lipase 18 9 - 82 U/L   Amylase   Result Value Ref Range    Amylase 19 (L) 29 - 103 U/L   Basic metabolic panel   Result Value Ref Range    Glucose 86 74 - 99 mg/dL    Sodium 135 (L) 136 - 145 mmol/L    Potassium 3.8 3.5 - 5.3 mmol/L    Chloride 103 98 - 107 mmol/L    Bicarbonate 24 21 - 32 mmol/L    Anion Gap 12 10 - 20 mmol/L    Urea Nitrogen 21 6 - 23 mg/dL    Creatinine 1.06 (H) 0.50 - 1.05 mg/dL    eGFR 56 (L) >60 mL/min/1.73m*2    Calcium 8.3 (L) 8.6 - 10.3 mg/dL   CBC   Result Value Ref Range    WBC 8.0 4.4 - 11.3 x10*3/uL    nRBC 0.0 0.0 - 0.0 /100 WBCs    RBC 3.75 (L) 4.00 - 5.20 x10*6/uL    Hemoglobin 8.1 (L) 12.0 - 16.0 g/dL    Hematocrit 28.3 (L) 36.0 - 46.0 %    MCV 76 (L) 80 - 100 fL    MCH 21.6 (L) 26.0 - 34.0 pg    MCHC 28.6 (L) 32.0 - 36.0 g/dL    RDW 21.1 (H) 11.5 - 14.5 %    Platelets 247 150 - 450 x10*3/uL   Troponin I, High Sensitivity   Result Value Ref Range    Troponin I, High Sensitivity 24 (H) 0 - 13 ng/L      Scheduled medications  baclofen, 10 mg, oral, TID  digoxin, 125 mcg, oral, Daily  dilTIAZem CD, 120 mg, oral, Daily  famotidine, 40 mg, oral, Daily  fluticasone, 1 spray, Each Nostril, BID  hydroxychloroquine, 200 mg, oral, Daily  montelukast, 10 mg, oral, Nightly  pantoprazole, 40 mg, intravenous, Daily  polyethylene glycol, 17 g, oral, BID  rosuvastatin, 20 mg, oral, Daily  sucralfate, 1 g, oral, q6h DWIGHT      Continuous medications     PRN medications  PRN medications: acetaminophen **OR** acetaminophen **OR** acetaminophen, albuterol, alum-mag hydroxide-simeth, calcium carbonate, dextromethorphan-guaifenesin, diclofenac  sodium, guaiFENesin, nitroglycerin, ondansetron **OR** ondansetron, oxyCODONE-acetaminophen     Assessment/Plan        Principal Problem:    Chest pain, unspecified type    Trina Elias is a 73 y.o. female with history of multiple abdominal surgeries, CAD, ICD/PPM placement, HTN and COPD, presenting with chest pain.      #Acute blood loss anemia  Hemodynamically stable, blood pressure stable today, afebrile  Baseline hemoglobin of 9  H/H 6.7/23.6  Secondary to GI bleeding  Status post 1 unit yesterday of packed blood red blood cells  Serial H/H  PPI  Keep the patient on clear liquid diet until surgical team decide on either doing colonoscopy or not  General surgery re: endoscopy consulted, recommendations are highly appreciated     #Severe abdominal pain  #dilated extrahepatic  biliary duct measuring up to 3.2 cm in diameter  -Amylase and lipase pending  -CT abdomen pelvis showed: Status post cholecystectomy with a markedly dilated extrahepatic  biliary duct measuring up to 3.2 cm in diameter, increased from the   prior study.  This may be chronic, however, correlation with biliary   lab values is recommended, nonemergent ERCP or MRCP may be of benefit  in this patient.   -Pain control.   -GI consulted recommendations are appreciated     #Near syncope  Appears to be from a combination of events including acute blood loss anemia, volume depletion and arrhythmia  Monitor with orthostatic vitals     #PPM/ICD problem  Has outpatient appointment to see electrophysiologist next week  Due to recurrent runs of v tach reported, will ask cardiology to see.      #Chest pain  Likely related to ischemia in the setting of anemia and its resultant effect on oxygen delivery to tissues   Transfusion should resolve this  Telemetry  Serial trop  Cardiology follow up  Echo and BMP ordered     #Hypokalemia  Replete and recheck level in am     #Borderline elevated troponin level  Likely type II MI in the setting of demand ischemia  resulting from acute blood loss anemia     #Dispo  -Pending improvement of symptoms, GI and general surgery recommendations, trending H&H, echo ordered.       Ayden Wiley MD

## 2024-02-25 NOTE — CONSULTS
Consults  History Of Present Illness:    Trina Elias is a 73 y.o. female presenting with Severe retrosternal and subxiphoid chest discomfort.    The discomfort is described as a burning sensation intermittent lasting for up to 20 to 30 minutes at a time and is severe.  She is uncomfortable despite receiving pain medications.  She denies any orthopnea paroxysmal nocturnal dyspnea lower extremity edema no recent change in weight.  No dysuria or hematuria.  No hematochezia or melena.  Recent upper endoscopy reportedly negative for an active source of bleeding.    She has not had any discharges from her defibrillator though the device is admitted a beeping noise about 1 week ago.    No fever or chills headache or blurred vision.  She has had fluctuating blood pressure and on occasion she has had weakness and a sensation of collapse.    Laboratory studies reviewed troponin 27, 26    Hemoglobin 5 months ago was 9.5 currently 6.7 hematocrit 32 5 months ago and now 23.6 MCV 68.  Glucose 114 potassium 3.4, BUN 25 creatinine 1.3.  Magnesium 2.38 stool occult blood positive.  Amylase and lipase are within normal limits.  BUN and creatinine 25 and 1.3 yesterday 21 and 1.06 today the BNP level elevated at 279 and it had been 189 5 months ago and 43 a year ago.    Old records reviewed    10/16/2023 Echocardiogram:  Technically good study.  Normal left ventricular size and function.  Estimated LVEF 60-65%.  Left atrial enlargement.  Moderate aortic stenosis. Mild-moderate aortic regurgitation. Mild to moderate mitral regurgitation.  Mild tricuspid regurgitation.  Borderline LVH and abnormal diastolic function.  Unable to estimate pulmonary arterial systolic pressure.   Normal estimated CVP.     5/16/2022 Echocardiogram:  Technically good study.  Normal left ventricular size and function.  Estimated LVEF 60-65%.  Left atrial enlargement.  Mild aortic stenosis. Mild-moderate aortic regurgitation. Mild to moderate mitral  regurgitation.  Mild tricuspid regurgitation.  Borderline LVH and abnormal diastolic function.  Normal pulmonary arterial systolic pressure.   Normal estimated CVP.     03/02/22:  EKG:  Underlying rhythm of atrial fibrillation with intermittent atrial pacing.    02/23/22:  Cardiac catheterization:  CONCLUSIONS:  1. Normal coronary arteries. Tachycardia induced Cardiomyopathy.    2/11/2022 Nuclear Stress Test:  Perfusion Findings: Stress perfusion images show a large area of moderate-severely reduced radiotracer uptake in the mid-distal anterolateral wall. There is minimal improvement in uptake apparent on the rest images in the above territory. Gated images showed severely reduced left ventricular contractility with anterolateral hypokinesis.  LV Function Findings:     LVEF: 31%   Global Function: Abnormal   LV Volume: Moderately enlarged   Regional Function: Abnormal  Summary:   1. No chest discomfort or ischemic ECG changes with LexiScan (regadenoson) infusion.   2. Severely reduced left ventricular systolic function.   3. Anterolateral wall heike-infarct ischmeia.    2/1/2021 Echocardiogram:  Normal left ventricular size and function.  Estimated LVEF 60-65%.  Left atrial enlargement.  Mild aortic stenosis.  Mild-moderate aortic regurgitation.  Mild to moderate mitral regurgitation.  Mild tricuspid regurgitation.  LVH and abnormal diastolic function.  No pulmonary arterial systolic HTN.   Normal estimated CVP.     11/16/20: EKG: Atrial paced, rate 59    8/1/18: Echocardiogram:   1. The left ventricular systolic function is low normal with a 50% estimated ejection fraction.   2. Spectral Doppler shows a pseudonormal pattern of left ventricular diastolic filling.   3. There is mild aortic valve regurgitation.    Problem List:   Paroxysmal atrial fibrillation  Ventricular tachycardia (disorder)  Cardiac defibrillator in situ (finding)  Obesity of adult, BMI 30-34.9 (disorder)  Dyslipidemia (disorder)  Gastroesophageal  reflux disease (disorder)  Electrocardiogram abnormal    Impression:  1. Ventricular tachycardia with associated syncope, status post [place] scientific ICD placement in June 2014.  2. Valvular Heart Disease  Aortic valvular disease with normal LV systolic function.  3. Normal coronary arteries by 2009 angiogram and 2/23/22 angiogram  4. Dyslipidemia  5. GERD  6. Obesity  7. Physical deconditioning  8. History of asthma  9 Paroxysmal Atrial Fibrillation, 11/2018, symptomatic at present  Status post Afib ablation in September 2008.  OAT=Xarelto  10. Abnormal EKG   November 2018 demonstrated atrial fibrillation.  11.  Thoracic aortic aneurysm, 4.1 cm, August of 2020.  12. Status post repeat AFib ablation and PVI in April of 2022.  13. Normal LV systolic function, mild mitral regurgitation.  14. History of abnormal EKG.  15. Reports low blood pressure readings, on losartan.  16. Moderate aortic stenosis, clinically asymptom    Past Medical History:  She has a past medical history of Abnormal mammogram (06/26/2023), Acute sphenoidal sinusitis, unspecified (10/12/2017), Bacterial meningitis, unspecified (01/26/2018), Bone pain (06/26/2023), BSOM (bilateral serous otitis media) (06/26/2023), Cervical neuropathy (06/26/2023), Cervicalgia (06/08/2021), Chest pain on exertion (06/26/2023), Chronic obstructive pulmonary disease, unspecified (CMS/HCC), Cutaneous abscess of abdominal wall (11/17/2015), Dehydration (06/26/2023), Dry cough (06/26/2023), Dry heaves (06/26/2023), Dyspnea (06/26/2023), Elevated serum creatinine (06/26/2023), Fatigue (06/26/2023), Fever, unspecified (06/26/2023), Headache (06/26/2023), History of abdominal surgery (06/26/2023), History of colon polyps (06/26/2023), Lumbar radiculitis (06/26/2023), 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), Nonhealing surgical wound (06/26/2023), Numbness (06/26/2023), Osteopenia (06/26/2023), Other conditions influencing health status, Other conditions influencing health  status, Pain of right upper extremity (2023), Personal history of anaphylaxis (2015), Personal history of diseases of the skin and subcutaneous tissue, Personal history of infections of the central nervous system, Personal history of other diseases of the digestive system (2015), Personal history of other diseases of the digestive system, Personal history of other diseases of the nervous system and sense organs, Personal history of other diseases of the respiratory system (2020), Personal history of other diseases of the respiratory system, Personal history of other specified conditions (2019), Personal history of other specified conditions (2018), Postural dizziness with presyncope (2023), Pulmonary edema (2023), Radiculopathy, lumbar region (2018), Restless legs syndrome (2015), Right serous otitis media (2023), Severe pain (2023), Small bowel obstruction (CMS/HCC) (2023), Tarsal tunnel syndrome, unspecified lower limb, Thoracic aortic aneurysm, without rupture, unspecified (CMS/HCC) (2020), Toxic effect of venom (2023), Ulcer of the stomach caused by bacteria (h. pylori), acute (2023), and Ulnar neuropathy (2023).    Past Surgical History:  She has a past surgical history that includes Cholecystectomy (2013); Appendectomy (2013); Cardiac catheterization (2013);  section, classic (2013); Gastric fundoplication (2013); Foot surgery (2013); Other surgical history (2019); Other surgical history (2019); Other surgical history (2019); Other surgical history (2019); Hernia repair (2015); Other surgical history (2020); Rotator cuff repair (2018); Other surgical history (2015); Cardiac surgery (2014); and Other surgical history (2021).      Social History:  She reports that she has never smoked. She has never used  smokeless tobacco. She reports that she does not drink alcohol and does not use drugs.    Family History:  Family History   Problem Relation Name Age of Onset    Asthma Daughter      Asthma Other      Colon cancer Other      Diabetes Other      Other (stroke syndrome) Other          Allergies:  Hydromorphone, Metoprolol, Abatacept, Bupropion, Cefepime, Ciprofloxacin, Furosemide, Hydrocodone-acetaminophen, Levofloxacin, Methotrexate, Penicillins, Pregabalin, Prochlorperazine, Aripiprazole, Beta-blockers (beta-adrenergic blocking agts), and Pineapple    Outpatient Medications:  Current Outpatient Medications   Medication Instructions    albuterol 2.5 mg, nebulization, Every 4 hours PRN    baclofen (Lioresal) 10 mg tablet One tablet in the morning and afternoon and 1 to 2 tablets in the evening.    baclofen (LIORESAL) 10 mg, oral, See admin instructions, Take one tablet in am, one tablet at noon, and one to two tablets at night <BR>PRN    bumetanide (BUMEX) 2 mg, oral, 2 times daily    clobetasol (Temovate) 0.05 % cream Topical, 2 times daily    diclofenac sodium (Voltaren) 1 % gel gel 1 Application, Topical, 2 times daily PRN    diclofenac sodium (VOLTAREN) 4 g, Topical, 4 times daily PRN    digoxin (LANOXIN) 125 mcg, oral, Daily    dilTIAZem LA (CARDIZEM LA) 120 mg, oral, Daily    EPINEPHrine 0.3 mg/0.3 mL injection syringe 1 Syringe, injection, As needed, Per Pt has not needed.     famotidine (PEPCID) 40 mg, oral, Daily    fluticasone (Flonase) 50 mcg/actuation nasal spray 1 spray, nasal, 2 times daily    hydroxychloroquine (PLAQUENIL) 200 mg, oral, Daily    magnesium oxide (MAG-OX) 400 mg, oral, 2 times daily    montelukast (SINGULAIR) 10 mg, oral, Nightly    naloxone (Narcan) 4 mg/0.1 mL nasal spray nasal    nitroglycerin (NITROSTAT) 0.4 mg, sublingual, Every 5 min PRN    oxyCODONE-acetaminophen (Percocet) 5-325 mg tablet 1 tablet, oral, 3 times daily PRN    [START ON 3/15/2024] oxyCODONE-acetaminophen (Percocet)  5-325 mg tablet 1 tablet, oral, 3 times daily PRN    [START ON 4/12/2024] oxyCODONE-acetaminophen (Percocet) 5-325 mg tablet 1 tablet, oral, 3 times daily PRN    [START ON 3/21/2024] oxyCODONE-acetaminophen (Percocet) 5-325 mg tablet 1 tablet, oral, Every 4 hours PRN, Post Op Rx for Shoulder Surgery    pantoprazole (PROTONIX) 40 mg, oral, 2 times daily    polyethylene glycol (MIRALAX) 17 g, oral    rosuvastatin (CRESTOR) 20 mg, oral, Daily    Xarelto 20 mg, oral, Daily with evening meal         Inpatient Medications:  PRN medications   Medication    acetaminophen    Or    acetaminophen    Or    acetaminophen    albuterol    alum-mag hydroxide-simeth    calcium carbonate    dextromethorphan-guaifenesin    diclofenac sodium    guaiFENesin    nitroglycerin    ondansetron    Or    ondansetron    oxyCODONE-acetaminophen     Continuous Medications   Medication Dose Last Rate       Last Labs:  Results for orders placed or performed during the hospital encounter of 02/24/24 (from the past 96 hour(s))   CBC and Auto Differential   Result Value Ref Range    WBC 9.3 4.4 - 11.3 x10*3/uL    nRBC 0.0 0.0 - 0.0 /100 WBCs    RBC 3.49 (L) 4.00 - 5.20 x10*6/uL    Hemoglobin 6.7 (L) 12.0 - 16.0 g/dL    Hematocrit 23.6 (L) 36.0 - 46.0 %    MCV 68 (L) 80 - 100 fL    MCH 19.2 (L) 26.0 - 34.0 pg    MCHC 28.4 (L) 32.0 - 36.0 g/dL    RDW 18.6 (H) 11.5 - 14.5 %    Platelets 326 150 - 450 x10*3/uL    Neutrophils % 72.2 40.0 - 80.0 %    Immature Granulocytes %, Automated 0.5 0.0 - 0.9 %    Lymphocytes % 16.0 13.0 - 44.0 %    Monocytes % 8.6 2.0 - 10.0 %    Eosinophils % 2.5 0.0 - 6.0 %    Basophils % 0.2 0.0 - 2.0 %    Neutrophils Absolute 6.68 (H) 1.60 - 5.50 x10*3/uL    Immature Granulocytes Absolute, Automated 0.05 0.00 - 0.50 x10*3/uL    Lymphocytes Absolute 1.48 0.80 - 3.00 x10*3/uL    Monocytes Absolute 0.80 0.05 - 0.80 x10*3/uL    Eosinophils Absolute 0.23 0.00 - 0.40 x10*3/uL    Basophils Absolute 0.02 0.00 - 0.10 x10*3/uL    Comprehensive Metabolic Panel   Result Value Ref Range    Glucose 114 (H) 74 - 99 mg/dL    Sodium 137 136 - 145 mmol/L    Potassium 3.4 (L) 3.5 - 5.3 mmol/L    Chloride 101 98 - 107 mmol/L    Bicarbonate 27 21 - 32 mmol/L    Anion Gap 12 10 - 20 mmol/L    Urea Nitrogen 25 (H) 6 - 23 mg/dL    Creatinine 1.30 (H) 0.50 - 1.05 mg/dL    eGFR 44 (L) >60 mL/min/1.73m*2    Calcium 9.0 8.6 - 10.3 mg/dL    Albumin 4.0 3.4 - 5.0 g/dL    Alkaline Phosphatase 74 33 - 136 U/L    Total Protein 7.1 6.4 - 8.2 g/dL    AST 11 9 - 39 U/L    Bilirubin, Total 0.4 0.0 - 1.2 mg/dL    ALT 10 7 - 45 U/L   Magnesium   Result Value Ref Range    Magnesium 2.38 1.60 - 2.40 mg/dL   Troponin I, High Sensitivity, Initial   Result Value Ref Range    Troponin I, High Sensitivity 27 (H) 0 - 13 ng/L   Type and screen   Result Value Ref Range    ABO TYPE A     Rh TYPE POS     ANTIBODY SCREEN NEG    Troponin, High Sensitivity, 1 Hour   Result Value Ref Range    Troponin I, High Sensitivity 26 (H) 0 - 13 ng/L   Occult Blood, Stool    Specimen: Stool   Result Value Ref Range    Occult Blood, Stool X1 Positive (A) Negative   Prepare RBC: 2 Units   Result Value Ref Range    PRODUCT CODE U2268I38     Unit Number [id number]     Unit ABO A     Unit RH POS     XM INTEP COMP     Dispense Status TR     Blood Expiration Date March 11, 2024 23:59 EDT     PRODUCT BLOOD TYPE 6200     UNIT VOLUME 350     PRODUCT CODE Y4307O76     Unit Number [id number]     Unit ABO A     Unit RH POS     XM INTEP COMP     Dispense Status TR     Blood Expiration Date March 11, 2024 23:59 EDT     PRODUCT BLOOD TYPE 6200     UNIT VOLUME 350    Lipase   Result Value Ref Range    Lipase 18 9 - 82 U/L   Amylase   Result Value Ref Range    Amylase 19 (L) 29 - 103 U/L   Basic metabolic panel   Result Value Ref Range    Glucose 86 74 - 99 mg/dL    Sodium 135 (L) 136 - 145 mmol/L    Potassium 3.8 3.5 - 5.3 mmol/L    Chloride 103 98 - 107 mmol/L    Bicarbonate 24 21 - 32 mmol/L     Anion Gap 12 10 - 20 mmol/L    Urea Nitrogen 21 6 - 23 mg/dL    Creatinine 1.06 (H) 0.50 - 1.05 mg/dL    eGFR 56 (L) >60 mL/min/1.73m*2    Calcium 8.3 (L) 8.6 - 10.3 mg/dL   CBC   Result Value Ref Range    WBC 8.0 4.4 - 11.3 x10*3/uL    nRBC 0.0 0.0 - 0.0 /100 WBCs    RBC 3.75 (L) 4.00 - 5.20 x10*6/uL    Hemoglobin 8.1 (L) 12.0 - 16.0 g/dL    Hematocrit 28.3 (L) 36.0 - 46.0 %    MCV 76 (L) 80 - 100 fL    MCH 21.6 (L) 26.0 - 34.0 pg    MCHC 28.6 (L) 32.0 - 36.0 g/dL    RDW 21.1 (H) 11.5 - 14.5 %    Platelets 247 150 - 450 x10*3/uL   B-type natriuretic peptide   Result Value Ref Range     (H) 0 - 99 pg/mL   Troponin I, High Sensitivity   Result Value Ref Range    Troponin I, High Sensitivity 24 (H) 0 - 13 ng/L   CBC   Result Value Ref Range    WBC 6.8 4.4 - 11.3 x10*3/uL    nRBC 0.0 0.0 - 0.0 /100 WBCs    RBC 3.72 (L) 4.00 - 5.20 x10*6/uL    Hemoglobin 7.9 (L) 12.0 - 16.0 g/dL    Hematocrit 26.6 (L) 36.0 - 46.0 %    MCV 72 (L) 80 - 100 fL    MCH 21.2 (L) 26.0 - 34.0 pg    MCHC 29.7 (L) 32.0 - 36.0 g/dL    RDW 20.0 (H) 11.5 - 14.5 %    Platelets 231 150 - 450 x10*3/uL          Last Recorded Vitals:  Vitals:    02/25/24 0615 02/25/24 0912 02/25/24 1509 02/25/24 1511   BP: 105/60 136/73 100/53    BP Location:   Left arm    Patient Position:   Lying    Pulse: 58 67 80 60   Resp: 18  18    Temp: 36.3 °C (97.3 °F)  35.8 °C (96.4 °F)    TempSrc: Temporal  Tympanic    SpO2:   95%    Weight:       Height:         I/O last 3 completed shifts:  In: 650 (8.9 mL/kg) [Blood:650]  Out: - (0 mL/kg)   Weight: 72.8 kg       Physical Exam:  Constitutional: Well developed, awake/alert/oriented x3, no distress, alert and cooperative  Eyes: PERRL, EOMI, clear sclera  ENMT: mucous membranes moist, no apparent injury, no lesions seen  Head/Neck: Neck supple, no apparent injury, thyroid without mass or tenderness, No JVD, trachea midline, no bruits  Respiratory/Thorax: Patent airways, CTAB, normal breath sounds with good chest  expansion, thorax symmetric  Cardiovascular: Regular, rate and rhythm, no murmurs, 2+ equal pulses of the extremities, normal S 1and S 2  Gastrointestinal: Nondistended, soft, non-tender, no rebound tenderness or guarding, no masses palpable, no organomegaly, +BS, no bruits  Musculoskeletal: ROM intact, no joint swelling, normal strength  Extremities: normal extremities, no cyanosis edema, contusions or wounds, no clubbing  Neurological: alert and oriented x3, intact senses, motor, response and reflexes, normal strength  Lymphatic: No significant lymphadenopathy  Psychological: Appropriate mood and behavior  Skin: Warm and dry, no lesions, no rashes     Assessment/Plan   Problem List Items Addressed This Visit          Cardiac and Vasculature    * (Principal) Chest pain, unspecified type - Primary     Other Visit Diagnoses       Anemia requiring transfusions        SOB (shortness of breath)        Relevant Orders    Transthoracic Echo (TTE) Complete           Patient has been admitted to the hospital and monitored on telemetry.  She has chronic blood loss iron deficiency anemia related to rivaroxaban use.    Endoscopy has been unrevealing for active source of bleeding.    Agree with plans to transfuse 2 units of packed red blood cells, discontinue rivaroxaban, administer intravenous iron infusions.    Elevated troponin most likely due to demand injury from severe anemia.  Last cardiac catheterization showed normal epicardial coronary arteries.  She has a well-functioning defibrillator unclear etiology of beeping sound and this will be verified.    Most recent device check available for review in 2023 shows adequate battery life, occasional episodes of nonsustained ventricular tachycardia.    There has been no evidence of atrial fibrillation since last pulmonary vein isolation.  Procedure done in May 2022.  Safe to discontinue anticoagulation at this time.    Continue proton pump inhibitors and I added Carafate 1 g 4  times daily, patient already also on famotidine.    Continue rosuvastatin for hyper lipidemia.    She will be referred for Watchman procedure so long-term anticoagulation for AF does not need to be resumed for any future recurrences of paroxysmal atrial flutter or fibrillation    Peripheral IV 02/24/24 20 G Left;Posterior Forearm (Active)   Site Assessment Clean;Dry;Intact 02/25/24 1100   Line Status Infusing 02/25/24 1100   Dressing Status Dry;Clean 02/25/24 1100   Number of days: 1       Code Status:  Full Code    I spent 30 minutes in the professional and overall care of this patient.        Neo Gutierrez MD

## 2024-02-25 NOTE — H&P
History Of Present Illness  Trina Elias is a 73 y.o. female with history of multiple abdominal surgeries, CAD, ICD/PPM placement, HTN and COPD, presenting with chest pain and fluctuating BP readings. She reports being in her USOH until the last 3 days when she began having intermittent episodes of substernal chest pain. She says she checked her BP and was getting high (up to 150/90) and low ( down to 98/40) readings. She reports feeling lightheaded and being near syncopal. Tonight, she felt like she was going to collapse with standing so her  brought her to the ED. Of note is that her ICD/PPM device began beeping a week ago. [facility] ago and they told her that she was having runs of v tach and that she has 2 years of battery life in it. But now, the device has been beeping every hour. Pt's labs on presentation in the ED were notable mainly for H/H of 6.7/23.6 with microcytic indices. She denied hematochezia or melena even though her stool was positive for occult blood. During my assessment, pt suddenly began complaining of severe upper abdominal pain. She recently had an EGD by Dr. Grier that did not show a source of bleeding.       Past Medical History  Past Medical History:   Diagnosis Date    Abnormal mammogram 06/26/2023    Acute sphenoidal sinusitis, unspecified 10/12/2017    Acute sphenoidal sinusitis, recurrence not specified    Bacterial meningitis, unspecified 01/26/2018    Acute bacterial meningitis    Bone pain 06/26/2023    BSOM (bilateral serous otitis media) 06/26/2023    Cervical neuropathy 06/26/2023    Cervicalgia 06/08/2021    Acute neck pain    Chest pain on exertion 06/26/2023    Chronic obstructive pulmonary disease, unspecified (CMS/HCC)     Chronic obstructive pulmonary disease    Cutaneous abscess of abdominal wall 11/17/2015    Abdominal wall abscess    Dehydration 06/26/2023    Dry cough 06/26/2023    Dry heaves 06/26/2023    Dyspnea 06/26/2023     Elevated serum creatinine 06/26/2023    Fatigue 06/26/2023    Fever, unspecified 06/26/2023    Headache 06/26/2023    History of abdominal surgery 06/26/2023    History of colon polyps 06/26/2023    Lumbar radiculitis 06/26/2023    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    Nonhealing surgical wound 06/26/2023    Numbness 06/26/2023    Osteopenia 06/26/2023    Other conditions influencing health status     Coronary Artery Disease    Other conditions influencing health status     Peptic Ulcer    Pain of right upper extremity 06/26/2023    Personal history of anaphylaxis 08/19/2015    History of anaphylaxis    Personal history of diseases of the skin and subcutaneous tissue     History of eczema    Personal history of infections of the central nervous system     History of bacterial meningitis    Personal history of other diseases of the digestive system 11/17/2015    History of esophageal reflux    Personal history of other diseases of the digestive system     History of hiatal hernia    Personal history of other diseases of the nervous system and sense organs     History of carpal tunnel syndrome    Personal history of other diseases of the respiratory system 01/06/2020    History of acute pharyngitis    Personal history of other diseases of the respiratory system     Personal history of asthma    Personal history of other specified conditions 08/08/2019    History of epigastric pain    Personal history of other specified conditions 03/12/2018    History of angioedema    Postural dizziness with presyncope 06/26/2023    Pulmonary edema 06/26/2023    Radiculopathy, lumbar region 08/13/2018    Acute lumbar radiculopathy    Restless legs syndrome 11/17/2015    Restless legs syndrome    Right serous otitis media 06/26/2023    Severe pain 06/26/2023    Small bowel obstruction (CMS/HCC) 06/26/2023    Tarsal tunnel syndrome, unspecified lower limb     Tarsal tunnel syndrome    Thoracic aortic aneurysm, without rupture,  unspecified (CMS/[facility]) 2020    Aneurysm, aorta, thoracic    Toxic effect of venom 2023    Ulcer of the stomach caused by bacteria (h. pylori), acute 2023    Ulnar neuropathy 2023       Past Surgical History  Past Surgical History:   Procedure Laterality Date    APPENDECTOMY  2013    Appendectomy    CARDIAC CATHETERIZATION  2013    Cardiac Cath Procedure Summary    CARDIAC SURGERY  2014    Heart Surgery     SECTION, CLASSIC  2013     Section    CHOLECYSTECTOMY  2013    Cholecystectomy Laparoscopic    FOOT SURGERY  2013    Foot Surgery    GASTRIC FUNDOPLICATION  2013    Esophagogastric Fundoplasty Nissen Fundoplication    HERNIA REPAIR  2015    Hernia Repair    OTHER SURGICAL HISTORY  2019    [place] tooth extraction    OTHER SURGICAL HISTORY  2019    Carpal tunnel surgery    OTHER SURGICAL HISTORY  2019    Foot surgery    OTHER SURGICAL HISTORY  2019    Leg surgery    OTHER SURGICAL HISTORY  2020    Hand surgery    OTHER SURGICAL HISTORY  2015    Cardio-Defib Eval Dual Chamber With Reprogramming    OTHER SURGICAL HISTORY  2021    Intestinal surgery    ROTATOR CUFF REPAIR  2018    Rotator Cuff Repair        Social History  She reports that she has never smoked. She has never used smokeless tobacco. She reports that she does not drink alcohol and does not use drugs.    Family History  Family History   Problem Relation Name Age of Onset    Asthma Daughter      Asthma Other      Colon cancer Other      Diabetes Other      Other (stroke syndrome) Other          Allergies  Hydromorphone, Metoprolol, Abatacept, Bupropion, Cefepime, Ciprofloxacin, Furosemide, Hydrocodone-acetaminophen, Levofloxacin, Methotrexate, Penicillins, Pregabalin, Prochlorperazine, Aripiprazole, Beta-blockers (beta-adrenergic blocking agts), and Pineapple    Review of Systems   Constitutional:  Positive for activity  "change.   HENT: Negative.     Eyes: Negative.    Respiratory: Negative.     Cardiovascular:         See HPI   Gastrointestinal:         See HPI   Endocrine: Negative.    Genitourinary: Negative.    Musculoskeletal: Negative.    Skin: Negative.    Allergic/Immunologic: Negative.    Neurological: Negative.    Hematological: Negative.    Psychiatric/Behavioral: Negative.          Physical Exam  Constitutional:       General: She is in acute distress.      Appearance: She is ill-appearing. She is not toxic-appearing or diaphoretic.   HENT:      Head: Normocephalic and atraumatic.      Nose: Nose normal.      Mouth/Throat:      Mouth: Mucous membranes are dry.      Pharynx: Oropharynx is clear. No oropharyngeal exudate or posterior oropharyngeal erythema.   Eyes:      General: No scleral icterus.        Right eye: No discharge.         Left eye: No discharge.      Conjunctiva/sclera: Conjunctivae normal.   Cardiovascular:      Rate and Rhythm: Normal rate and regular rhythm.      Heart sounds: No murmur heard.  Pulmonary:      Breath sounds: No wheezing, rhonchi or rales.   Abdominal:      Tenderness: There is no left CVA tenderness, guarding or rebound.      Comments: Multiple scars from prior surgeries. Ventral hernia  Tender in epigastric area.    Musculoskeletal:      Cervical back: Neck supple.      Right lower leg: No edema.      Left lower leg: No edema.   Lymphadenopathy:      Cervical: No cervical adenopathy.   Skin:     General: Skin is warm and dry.      Findings: No lesion or rash.   Neurological:      General: No focal deficit present.      Mental Status: She is alert and oriented to person, place, and time.   Psychiatric:         Mood and Affect: Mood normal.         Behavior: Behavior normal.          Last Recorded Vitals  Blood pressure (!) 114/42, pulse 61, temperature 37.4 °C (99.3 °F), temperature source Tympanic, resp. rate 14, height 1.626 m (5' 4\"), weight 69.9 kg (154 lb), SpO2 95 %.    Relevant " Results   Latest Reference Range & Units 02/24/24 20:30 02/24/24 21:33 02/24/24 21:37   GLUCOSE 74 - 99 mg/dL 114 (H)     SODIUM 136 - 145 mmol/L 137     POTASSIUM 3.5 - 5.3 mmol/L 3.4 (L)     CHLORIDE 98 - 107 mmol/L 101     Bicarbonate 21 - 32 mmol/L 27     Anion Gap 10 - 20 mmol/L 12     Blood Urea Nitrogen 6 - 23 mg/dL 25 (H)     Creatinine 0.50 - 1.05 mg/dL 1.30 (H)     EGFR >60 mL/min/1.73m*2 44 (L)     Calcium 8.6 - 10.3 mg/dL 9.0     Albumin 3.4 - 5.0 g/dL 4.0     Alkaline Phosphatase 33 - 136 U/L 74     ALT 7 - 45 U/L 10     AST 9 - 39 U/L 11     Bilirubin Total 0.0 - 1.2 mg/dL 0.4     Total Protein 6.4 - 8.2 g/dL 7.1     MAGNESIUM 1.60 - 2.40 mg/dL 2.38     Troponin I, High Sensitivity 0 - 13 ng/L 27 (H) 26 (H)    WBC 4.4 - 11.3 x10*3/uL 9.3     nRBC 0.0 - 0.0 /100 WBCs 0.0     RBC 4.00 - 5.20 x10*6/uL 3.49 (L)     HEMOGLOBIN 12.0 - 16.0 g/dL 6.7 (L)     HEMATOCRIT 36.0 - 46.0 % 23.6 (L)     MCV 80 - 100 fL 68 (L)     MCH 26.0 - 34.0 pg 19.2 (L)     MCHC 32.0 - 36.0 g/dL 28.4 (L)     RED CELL DISTRIBUTION WIDTH 11.5 - 14.5 % 18.6 (H)     Platelets 150 - 450 x10*3/uL 326     Neutrophils % 40.0 - 80.0 % 72.2     Immature Granulocytes %, Automated 0.0 - 0.9 % 0.5     Lymphocytes % 13.0 - 44.0 % 16.0     Monocytes % 2.0 - 10.0 % 8.6     Eosinophils % 0.0 - 6.0 % 2.5     Basophils % 0.0 - 2.0 % 0.2     Neutrophils Absolute 1.60 - 5.50 x10*3/uL 6.68 (H)     Immature Granulocytes Absolute, Automated 0.00 - 0.50 x10*3/uL 0.05     Lymphocytes Absolute 0.80 - 3.00 x10*3/uL 1.48     Monocytes Absolute 0.05 - 0.80 x10*3/uL 0.80     Eosinophils Absolute 0.00 - 0.40 x10*3/uL 0.23     Basophils Absolute 0.00 - 0.10 x10*3/uL 0.02     OCCULT BLOOD, STOOL X1 Negative    Positive !   (H): Data is abnormally high  (L): Data is abnormally low  !: Data is abnormal    XR CHEST:     IMPRESSION:  No acute cardiopulmonary process is evident.     Assessment/Plan   Acute blood loss anemia  H/H 6.7/23.6  Secondary to GI  bleeding  Urgent PRBC transfusion  Serial H/H  PPI  General surgery re: endoscopy consulted    GI Bleeding  As in #1  PPI  Gen surgery consulted    Severe abdominal pain  Primarily epigastric  Will check amylase and lipase  Will check CT abd/pelvis stat  Pain control.     Near syncope  Appears to be from a combination of events including acute blood loss anemia, volume depletion and arrhythmia  Monitor with orthostatic vitals    PPM/ICD problem  Has outpatient appointment to see electrophysiologist next week  Due to recurrent runs of v tach reported, will ask cardiology to see.     Chest pain  Likely related to ischemia in the setting of anemia and its resultant effect on oxygen delivery to tissues   Transfusion should resolve this  Telemetry  Serial trop  Cardiology follow up    Hypokalemia  Replete and recheck level in am    Borderline elevated troponin level  Likely type II MI in the setting of demand ischemia resulting from acute blood loss anemia        I spent 60 minutes in the professional and overall care of this patient.      Ravin Anderson MD

## 2024-02-25 NOTE — ED PROVIDER NOTES
HPI   Chief Complaint   Patient presents with    Chest Pain       This is a 73-year-old female coming in for chest pain.  She reports that over the last 3 days she has developed intermittent sharp pain to the mid sternum.  She states that is a 10 out of 10 when it occurs however then alleviates after a few seconds.  Is happening more frequently over the last 3 days.  She does have an ICD and did have interrogation recently of the ICD however has not heard results.  Patient denies any shortness of breath associated with this.  She has not had any vomiting.  She does report that she has had some diarrhea however that has since resolved.  Patient does have extensive cardiac history and sees Dr. Soto.  Patient has not had any cough congestion or URI symptoms.  She denies any fevers or chills.      History provided by:  Patient                      Epi Coma Scale Score: 15                     Patient History   Past Medical History:   Diagnosis Date    Abnormal mammogram 06/26/2023    Acute sphenoidal sinusitis, unspecified 10/12/2017    Acute sphenoidal sinusitis, recurrence not specified    Bacterial meningitis, unspecified 01/26/2018    Acute bacterial meningitis    Bone pain 06/26/2023    BSOM (bilateral serous otitis media) 06/26/2023    Cervical neuropathy 06/26/2023    Cervicalgia 06/08/2021    Acute neck pain    Chest pain on exertion 06/26/2023    Chronic obstructive pulmonary disease, unspecified (CMS/HCC)     Chronic obstructive pulmonary disease    Cutaneous abscess of abdominal wall 11/17/2015    Abdominal wall abscess    Dehydration 06/26/2023    Dry cough 06/26/2023    Dry heaves 06/26/2023    Dyspnea 06/26/2023    Elevated serum creatinine 06/26/2023    Fatigue 06/26/2023    Fever, unspecified 06/26/2023    Headache 06/26/2023    History of abdominal surgery 06/26/2023    History of colon polyps 06/26/2023    Lumbar radiculitis 06/26/2023    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    Nonhealing surgical  wound 06/26/2023    Numbness 06/26/2023    Osteopenia 06/26/2023    Other conditions influencing health status     Coronary Artery Disease    Other conditions influencing health status     Peptic Ulcer    Pain of right upper extremity 06/26/2023    Personal history of anaphylaxis 08/19/2015    History of anaphylaxis    Personal history of diseases of the skin and subcutaneous tissue     History of eczema    Personal history of infections of the central nervous system     History of bacterial meningitis    Personal history of other diseases of the digestive system 11/17/2015    History of esophageal reflux    Personal history of other diseases of the digestive system     History of hiatal hernia    Personal history of other diseases of the nervous system and sense organs     History of carpal tunnel syndrome    Personal history of other diseases of the respiratory system 01/06/2020    History of acute pharyngitis    Personal history of other diseases of the respiratory system     Personal history of asthma    Personal history of other specified conditions 08/08/2019    History of epigastric pain    Personal history of other specified conditions 03/12/2018    History of angioedema    Postural dizziness with presyncope 06/26/2023    Pulmonary edema 06/26/2023    Radiculopathy, lumbar region 08/13/2018    Acute lumbar radiculopathy    Restless legs syndrome 11/17/2015    Restless legs syndrome    Right serous otitis media 06/26/2023    Severe pain 06/26/2023    Small bowel obstruction (CMS/HCC) 06/26/2023    Tarsal tunnel syndrome, unspecified lower limb     Tarsal tunnel syndrome    Thoracic aortic aneurysm, without rupture, unspecified (CMS/HCC) 09/08/2020    Aneurysm, aorta, thoracic    Toxic effect of venom 06/26/2023    Ulcer of the stomach caused by bacteria (h. pylori), acute 06/26/2023    Ulnar neuropathy 06/26/2023     Past Surgical History:   Procedure Laterality Date    APPENDECTOMY  03/08/2013     Appendectomy    CARDIAC CATHETERIZATION  2013    Cardiac Cath Procedure Summary    CARDIAC SURGERY  2014    Heart Surgery     SECTION, CLASSIC  2013     Section    CHOLECYSTECTOMY  2013    Cholecystectomy Laparoscopic    FOOT SURGERY  2013    Foot Surgery    GASTRIC FUNDOPLICATION  2013    Esophagogastric Fundoplasty Nissen Fundoplication    HERNIA REPAIR  2015    Hernia Repair    OTHER SURGICAL HISTORY  2019    [place] tooth extraction    OTHER SURGICAL HISTORY  2019    Carpal tunnel surgery    OTHER SURGICAL HISTORY  2019    Foot surgery    OTHER SURGICAL HISTORY  2019    Leg surgery    OTHER SURGICAL HISTORY  2020    Hand surgery    OTHER SURGICAL HISTORY  2015    Cardio-Defib Eval Dual Chamber With Reprogramming    OTHER SURGICAL HISTORY  2021    Intestinal surgery    ROTATOR CUFF REPAIR  2018    Rotator Cuff Repair     Family History   Problem Relation Name Age of Onset    Asthma Daughter      Asthma Other      Colon cancer Other      Diabetes Other      Other (stroke syndrome) Other       Social History     Tobacco Use    Smoking status: Never    Smokeless tobacco: Never   Substance Use Topics    Alcohol use: Never    Drug use: Never       Physical Exam   ED Triage Vitals   Temperature Heart Rate Respirations BP   24   37.4 °C (99.3 °F) 73 18 140/61      Pulse Ox Temp Source Heart Rate Source Patient Position   24   100 % Tympanic Monitor Lying      BP Location FiO2 (%)     24 --     Left arm        Physical Exam  Vitals and nursing note reviewed.   Constitutional:       General: She is not in acute distress.     Appearance: Normal appearance. She is not ill-appearing or toxic-appearing.   HENT:      Head: Normocephalic and atraumatic.      Nose: Nose normal.      Mouth/Throat:      Mouth:  Mucous membranes are moist.      Pharynx: Oropharynx is clear.   Eyes:      Extraocular Movements: Extraocular movements intact.      Conjunctiva/sclera: Conjunctivae normal.      Pupils: Pupils are equal, round, and reactive to light.   Cardiovascular:      Rate and Rhythm: Normal rate and regular rhythm.      Pulses: Normal pulses.      Heart sounds: Normal heart sounds.   Pulmonary:      Effort: Pulmonary effort is normal. No respiratory distress.      Breath sounds: Normal breath sounds.   Abdominal:      General: Abdomen is flat. Bowel sounds are normal.      Palpations: Abdomen is soft.      Tenderness: There is no abdominal tenderness.   Musculoskeletal:         General: Normal range of motion.      Cervical back: Normal range of motion and neck supple.   Skin:     General: Skin is warm and dry.      Coloration: Skin is not jaundiced or pale.      Findings: No bruising.   Neurological:      General: No focal deficit present.      Mental Status: She is alert and oriented to person, place, and time. Mental status is at baseline.   Psychiatric:         Mood and Affect: Mood normal.         Behavior: Behavior normal.         ED Course & MDM   ED Course as of 02/24/24 2217   Sat Feb 24, 2024 2105 EKG completed at 2025 on my interpretation shows normal sinus rhythm with a ventricular rate of 74 bpm.  KS interval is 160 ms.  Slight T wave depression in leads 456. [RS]      ED Course User Index  [RS] Suhail Schrader PA-C         Diagnoses as of 02/24/24 2217   Chest pain, unspecified type   Anemia requiring transfusions       Medical Decision Making  Summary:  Medical Decision Making:   Patient presented as described in HPI. Patient case including ROS, PE, and treatment and plan discussed with ED attending if attached as cosigner. Results from labs and or imaging included below if completed. Trina Elias  is a 73 y.o. coming in for Patient presents with:  Chest Pain  .  Due to patient's complaint lab work imaging  was completed.  Workup for cardiac nature was initiated.  Consulted patient's cardiologist team, Dr. Larson, because of patient's cardiac history.  First troponin is 27.  Patient's hemoglobin however is decreased.  Prior hemoglobin is from 5 months ago.  Current hemoglobin is now 6.7.  She is on anticoagulation.  She states that she has had upper endoscopies completed and has not seen any bleeding.  This has been occurring for multiple months where she is having dark stools.  Patient is given 80 mg of Protonix IV.  She was consented for blood and does agree for blood transfusion.  Repeat troponin is still pending.  Spoke with hospitalist about patient's findings and plan that include observing in the hospital with consultation to cardiology.  They would like endoscopy consult as well.  Patient will be hospitalized under the hospitalist services with consultations to cardiology as well as general surgery, Dr. Cleary.       Shared decision making was completed for required hospital stay. I also explained the plan and treatment course. Patient/guardian is in agreement with plan, treatment course, and state that they will comply.    Labs Reviewed  OCCULT BLOOD X1, STOOL - Abnormal     Occult Blood, Stool X1        Positive (*)            CBC WITH AUTO DIFFERENTIAL - Abnormal     WBC                           9.3                    nRBC                          0.0                    RBC                           3.49 (*)               Hemoglobin                    6.7 (*)                Hematocrit                    23.6 (*)               MCV                           68 (*)                 MCH                           19.2 (*)               MCHC                          28.4 (*)               RDW                           18.6 (*)               Platelets                     326                    Neutrophils %                 72.2                   Immature Granulocytes %, Automated   0.5                     Lymphocytes %                 16.0                   Monocytes %                   8.6                    Eosinophils %                 2.5                    Basophils %                   0.2                    Neutrophils Absolute          6.68 (*)               Immature Granulocytes Absolute, Au*   0.05                   Lymphocytes Absolute          1.48                   Monocytes Absolute            0.80                   Eosinophils Absolute          0.23                   Basophils Absolute            0.02                COMPREHENSIVE METABOLIC PANEL - Abnormal     Glucose                       114 (*)                Sodium                        137                    Potassium                     3.4 (*)                Chloride                      101                    Bicarbonate                   27                     Anion Gap                     12                     Urea Nitrogen                 25 (*)                 Creatinine                    1.30 (*)               eGFR                          44 (*)                 Calcium                       9.0                    Albumin                       4.0                    Alkaline Phosphatase          74                     Total Protein                 7.1                    AST                           11                     Bilirubin, Total              0.4                    ALT                           10                  SERIAL TROPONIN-INITIAL - Abnormal     Troponin I, High Sensitivity   27 (*)                   Narrative: Less than 99th percentile of normal range cutoff-                Female and children under 18 years old <14 ng/L; Male <21 ng/L: Negative                Repeat testing should be performed if clinically indicated.                                 Female and children under 18 years old 14-50 ng/L; Male 21-50 ng/L:                Consistent with possible cardiac damage and possible increased clinical                  risk. Serial measurements may help to assess extent of myocardial damage.                                 >50 ng/L: Consistent with cardiac damage, increased clinical risk and                myocardial infarction. Serial measurements may help assess extent of                 myocardial damage.                                  NOTE: Children less than 1 year old may have higher baseline troponin                 levels and results should be interpreted in conjunction with the overall                 clinical context.                                 NOTE: Troponin I testing is performed using a different                 testing methodology at [facility] than at other                 [facility]. Direct result comparisons should only                 be made within the same method.  SERIAL TROPONIN, 1 HOUR - Abnormal     Troponin I, High Sensitivity   26 (*)                   Narrative: Less than 99th percentile of normal range cutoff-                Female and children under 18 years old <14 ng/L; Male <21 ng/L: Negative                Repeat testing should be performed if clinically indicated.                                 Female and children under 18 years old 14-50 ng/L; Male 21-50 ng/L:                Consistent with possible cardiac damage and possible increased clinical                 risk. Serial measurements may help to assess extent of myocardial damage.                                 >50 ng/L: Consistent with cardiac damage, increased clinical risk and                myocardial infarction. Serial measurements may help assess extent of                 myocardial damage.                                  NOTE: Children less than 1 year old may have higher baseline troponin                 levels and results should be interpreted in conjunction with the overall                 clinical context.                                 NOTE: Troponin I testing is performed using a different                  testing methodology at [facility] than at other                 [facility]. Direct result comparisons should only                 be made within the same method.  MAGNESIUM - Normal     Magnesium                     2.38                TROPONIN SERIES- (INITIAL, 1 HR)       Narrative: The following orders were created for panel order Troponin I Series, High Sensitivity (0, 1 HR).                Procedure                               Abnormality         Status                                   ---------                               -----------         ------                                   Troponin I, High Sensiti...[[id number]]  Abnormal            Final result                             Troponin, High Sensitivi...[[id number]]  Abnormal            Final result                                             Please view results for these tests on the individual orders.  TYPE AND SCREEN  PREPARE RBC   XR chest 1 view   Final Result    No acute cardiopulmonary process is evident.          MACRO:    None          Signed by: Nixon Orantes 2/24/2024 8:47 PM    Dictation workstation:   [id number]         Tests/Medications/Escalations of Care considered but not given: As in [facility]    Patient care discussed with: N/A  Social Determinants affecting care: N/A    Final diagnosis and disposition as documented     ED Course as of 02/24/24 2220  ------------------------------------------------------------  Time: 02/24 2105  Comment: EKG completed at 2025 on my interpretation shows normal sinus rhythm with a ventricular rate of 74 bpm.  MN interval is 160 ms.  Slight T wave depression in leads 456.  By: Suhail Schrader PA-C    ------------------------------------------------------------  Diagnoses as of 02/24/24 2220  Chest pain, unspecified type  Anemia requiring transfusions       Shared decision making was completed and determined that patient will be hospitalized. I discussed the differential; results  and admit plan with the patient and/or family/friend/caregiver if present.  I emphasized the importance of hospitalization need for re-evaluation/continued monitoring/interventions. They agreed that if they feel their condition is worsening or if they have any other concern they should alert staff immediately for further assistance. I gave the patient an opportunity to ask all questions they had and answered all of them accordingly. The patient and/or family/friend/caregiver expressed understanding verbally and that they would comply.    Disposition:  Hospitalize to [facility] floor under Dr. Anderson per their orders. Discussed findings and treatment done here in ED with admitting physician. It would be a risk to discharge the patient in their condition due to possibility of deterioration in their condition and the need for urgent interventions.    This note has been transcribed using voice recognition and may contain grammatical errors, misplaced words, incorrect words, incorrect phrases or other errors.         Procedure  Procedures     Suhail Schrader PA-C  02/24/24 1685

## 2024-02-25 NOTE — H&P (VIEW-ONLY)
GENERAL SURGERY/TRAUMA  HISTORY AND PHYSICAL/CONSULT    Date: 2/25/2024 Time: 1:41 PM    Name: Trina Elias  MRN: [id number]    This is a 73 y.o. female who presented to the emergency department with chest pain.  She was found to be anemic with a hemoglobin of 6.7.  This is down from 9.5 in September 2023.  She has had episodes of anemia and GI bleeding in the past, but she states that her hemoglobin has never dropped this level.  She had a recent upper endoscopy by Dr. Grier in December 2023 that was completely normal.  3 years ago she had a colonoscopy that showed diverticulosis.  She does not endorse blood in her stool or on the tissue.  She states that she might have had some dark stools recently.  She is Hemoccult positive.  She received 1 unit of packed red blood cells yesterday which increased her hemoglobin to 8.1.  She is hemodynamically stable and there is no evidence of ongoing GI bleeding.  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  She is often admitted for small bowel obstructions which are always managed nonoperatively.  She currently is passing gas and having bowel movements normally.      PAST MEDICAL HISTORY:  Past Medical History:   Diagnosis Date    Abnormal mammogram 06/26/2023    Acute sphenoidal sinusitis, unspecified 10/12/2017    Acute sphenoidal sinusitis, recurrence not specified    Bacterial meningitis, unspecified 01/26/2018    Acute bacterial meningitis    Bone pain 06/26/2023    BSOM (bilateral serous otitis media) 06/26/2023    Cervical neuropathy 06/26/2023    Cervicalgia 06/08/2021    Acute neck pain    Chest pain on exertion 06/26/2023    Chronic obstructive pulmonary disease, unspecified (CMS/HCC)     Chronic obstructive pulmonary disease    Cutaneous abscess of abdominal wall 11/17/2015    Abdominal wall abscess    Dehydration 06/26/2023    Dry cough 06/26/2023    Dry heaves 06/26/2023    Dyspnea 06/26/2023    Elevated serum creatinine 06/26/2023     Fatigue 06/26/2023    Fever, unspecified 06/26/2023    Headache 06/26/2023    History of abdominal surgery 06/26/2023    History of colon polyps 06/26/2023    Lumbar radiculitis 06/26/2023    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    Nonhealing surgical wound 06/26/2023    Numbness 06/26/2023    Osteopenia 06/26/2023    Other conditions influencing health status     Coronary Artery Disease    Other conditions influencing health status     Peptic Ulcer    Pain of right upper extremity 06/26/2023    Personal history of anaphylaxis 08/19/2015    History of anaphylaxis    Personal history of diseases of the skin and subcutaneous tissue     History of eczema    Personal history of infections of the central nervous system     History of bacterial meningitis    Personal history of other diseases of the digestive system 11/17/2015    History of esophageal reflux    Personal history of other diseases of the digestive system     History of hiatal hernia    Personal history of other diseases of the nervous system and sense organs     History of carpal tunnel syndrome    Personal history of other diseases of the respiratory system 01/06/2020    History of acute pharyngitis    Personal history of other diseases of the respiratory system     Personal history of asthma    Personal history of other specified conditions 08/08/2019    History of epigastric pain    Personal history of other specified conditions 03/12/2018    History of angioedema    Postural dizziness with presyncope 06/26/2023    Pulmonary edema 06/26/2023    Radiculopathy, lumbar region 08/13/2018    Acute lumbar radiculopathy    Restless legs syndrome 11/17/2015    Restless legs syndrome    Right serous otitis media 06/26/2023    Severe pain 06/26/2023    Small bowel obstruction (CMS/HCC) 06/26/2023    Tarsal tunnel syndrome, unspecified lower limb     Tarsal tunnel syndrome    Thoracic aortic aneurysm, without rupture, unspecified (CMS/HCC) 09/08/2020    Aneurysm,  aorta, thoracic    Toxic effect of venom 2023    Ulcer of the stomach caused by bacteria (h. pylori), acute 2023    Ulnar neuropathy 2023        PAST SURGICAL HISTORY:  Past Surgical History:   Procedure Laterality Date    APPENDECTOMY  2013    Appendectomy    CARDIAC CATHETERIZATION  2013    Cardiac Cath Procedure Summary    CARDIAC SURGERY  2014    Heart Surgery     SECTION, CLASSIC  2013     Section    CHOLECYSTECTOMY  2013    Cholecystectomy Laparoscopic    FOOT SURGERY  2013    Foot Surgery    GASTRIC FUNDOPLICATION  2013    Esophagogastric Fundoplasty Nissen Fundoplication    HERNIA REPAIR  2015    Hernia Repair    OTHER SURGICAL HISTORY  2019    [place] tooth extraction    OTHER SURGICAL HISTORY  2019    Carpal tunnel surgery    OTHER SURGICAL HISTORY  2019    Foot surgery    OTHER SURGICAL HISTORY  2019    Leg surgery    OTHER SURGICAL HISTORY  2020    Hand surgery    OTHER SURGICAL HISTORY  2015    Cardio-Defib Eval Dual Chamber With Reprogramming    OTHER SURGICAL HISTORY  2021    Intestinal surgery    ROTATOR CUFF REPAIR  2018    Rotator Cuff Repair       FAMILY HISTORY:  Family History   Problem Relation Name Age of Onset    Asthma Daughter      Asthma Other      Colon cancer Other      Diabetes Other      Other (stroke syndrome) Other          SOCIAL HISTORY:  Social History     Tobacco Use    Smoking status: Never    Smokeless tobacco: Never   Substance Use Topics    Alcohol use: Never    Drug use: Never       MEDICATIONS:  Prior to Admission Medications:    Current Facility-Administered Medications:     acetaminophen (Tylenol) tablet 650 mg, 650 mg, oral, q4h PRN, 650 mg at 24 1153 **OR** acetaminophen (Tylenol) oral liquid 650 mg, 650 mg, oral, q4h PRN **OR** acetaminophen (Tylenol) suppository 650 mg, 650 mg, rectal, q4h PRN, Raivn Anderson MD    albuterol 2.5 mg  /3 mL (0.083 %) nebulizer solution 2.5 mg, 2.5 mg, nebulization, q2h PRN, Ravin Anderson MD    alum-mag hydroxide-simeth (Mylanta) 200-200-20 mg/5 mL oral suspension 30 mL, 30 mL, oral, q6h PRN, Ravin Anderson MD    baclofen (Lioresal) tablet 10 mg, 10 mg, oral, TID, Ravin Anderson MD, 10 mg at 02/25/24 0904    calcium carbonate (Tums) chewable tablet 500 mg, 500 mg, oral, 4x daily PRN, Ravin Anderson MD    dextromethorphan-guaifenesin (Robitussin DM)  mg/5 mL oral liquid 5 mL, 5 mL, oral, q4h PRN, Ravin Anderson MD    diclofenac sodium (Voltaren) 1 % gel 4 g, 4 g, Topical, BID PRN, Ravin Anderson MD    digoxin (Lanoxin) tablet 125 mcg, 125 mcg, oral, Daily, Ravin Anderson MD, 125 mcg at 02/25/24 0903    dilTIAZem CD (Cardizem CD) 24 hr capsule 120 mg, 120 mg, oral, Daily, Ravin Anderson MD, 120 mg at 02/25/24 0904    famotidine (Pepcid) tablet 40 mg, 40 mg, oral, Daily, Ravin Anderson MD, 40 mg at 02/25/24 0904    fluticasone (Flonase) nasal spray 1 spray, 1 spray, Each Nostril, BID, Ravin Anderson MD, 1 spray at 02/25/24 0904    guaiFENesin (Mucinex) 12 hr tablet 600 mg, 600 mg, oral, q12h PRN, Ravin Anderson MD    hydroxychloroquine (Plaquenil) tablet 200 mg, 200 mg, oral, Daily, Ravin Anderson MD, 200 mg at 02/25/24 0904    montelukast (Singulair) tablet 10 mg, 10 mg, oral, Nightly, Ravin Anderson MD, 10 mg at 02/25/24 0208    nitroglycerin (Nitrostat) SL tablet 0.4 mg, 0.4 mg, sublingual, q5 min PRN, Ravin Anderson MD    ondansetron (Zofran) tablet 4 mg, 4 mg, oral, q8h PRN **OR** ondansetron (Zofran) injection 4 mg, 4 mg, intravenous, q8h PRN, Ravin Anderson MD    oxyCODONE-acetaminophen (Percocet) 5-325 mg per tablet 1 tablet, 1 tablet, oral, TID PRN, Ravin Anderson MD, 1 tablet at 02/25/24 0910    pantoprazole (ProtoNix) injection 40 mg, 40 mg, intravenous, Daily, Ravin Anderson MD, 40 mg at 02/25/24 0904    polyethylene glycol  (Glycolax, Miralax) packet 17 g, 17 g, oral, BID, Ayden Wiley MD    rosuvastatin (Crestor) tablet 20 mg, 20 mg, oral, Daily, Ravin Anderson MD, 20 mg at 02/25/24 0904    sucralfate (Carafate) suspension 1 g, 1 g, oral, q6h DWIGHT, Neo Gutierrez MD, 1 g at 02/25/24 1150    ALLERGIES:  Allergies   Allergen Reactions    Hydromorphone Anaphylaxis    Metoprolol Anaphylaxis    Abatacept Other     pulmonary edema, diarrhea, nausea    Bupropion Other    Cefepime Unknown    Ciprofloxacin Hives    Furosemide Itching    Hydrocodone-Acetaminophen Other    Levofloxacin Unknown    Methotrexate Hives and Itching     chest pain    Penicillins Hives    Pregabalin Other     caused pulmonary edema    Prochlorperazine Other     paralysis of the mouth with drooping    Aripiprazole Rash    Beta-Blockers (Beta-Adrenergic Blocking Agts) Other, Palpitations and Wheezing    Pineapple Rash       REVIEW OF SYSTEMS:  GENERAL: Negative for malaise, significant weight loss and fever  NECK: Negative for lumps, goiter, pain and significant neck swelling  RESPIRATORY: Negative for cough, wheezing or shortness of breath.  CARDIOVASCULAR: Chest pain  GI: Dark stools  : No history of dysuria, frequency or incontinence  MUSCULOSKELETAL: Negative for joint pain or swelling, back pain or muscle pain.  SKIN: Negative for lesions, rash, and itching.  PSYCH: Negative for sleep disturbance, mood disorder and recent psychosocial stressors.  ENDOCRINE: Negative for cold or heat intolerance, polyuria, polydipsia and goiter.    PHYSICAL EXAM:  Vitals:    02/25/24 0912   BP: 136/73   Pulse: 67   Resp:    Temp:    SpO2:      General appearance: NAD, AOx3  Skin: warm, no erythema or rashes  Lungs: clear to percussion and auscultation  Heart: regular rhythm and S1, S2 normal  Abdomen: Soft, mildly distended (the patient states this is her baseline), mildly tender to palpation diffusely without rebound or guarding (the patient states this is  her baseline)  Extremities: Normal exam of the extremities. No swelling or pain.    IMPRESSION:  Trina Elias is a 73 y.o. female with anemia and Hemoccult positive stool.  She has a known history of diverticulosis.  No active bleeding currently evident.    PLAN:  I had a long discussion about the need for endoscopy and colonoscopy with the patient.  As long as her hemoglobin remained stable, there is no need for urgent scopes.  We discussed that we would trend her hemoglobin until tomorrow and then make a decision on proceeding with scopes while inpatient or referring her for outpatient management.  Clear liquid diet until we decide about the need for endoscopy/colonoscopy.      Nnamdi Cleary MD  Bariatric and Minimally Invasive General Surgery

## 2024-02-26 ENCOUNTER — APPOINTMENT (OUTPATIENT)
Dept: PRIMARY CARE | Facility: CLINIC | Age: 74
End: 2024-02-26
Payer: MEDICARE

## 2024-02-26 ENCOUNTER — APPOINTMENT (OUTPATIENT)
Dept: RADIOLOGY | Facility: HOSPITAL | Age: 74
DRG: 811 | End: 2024-02-26
Payer: MEDICARE

## 2024-02-26 LAB
ANION GAP SERPL CALC-SCNC: 12 MMOL/L (ref 10–20)
BASOPHILS # BLD AUTO: 0.02 X10*3/UL (ref 0–0.1)
BASOPHILS NFR BLD AUTO: 0.3 %
BUN SERPL-MCNC: 14 MG/DL (ref 6–23)
CALCIUM SERPL-MCNC: 8.3 MG/DL (ref 8.6–10.3)
CHLORIDE SERPL-SCNC: 104 MMOL/L (ref 98–107)
CO2 SERPL-SCNC: 26 MMOL/L (ref 21–32)
CREAT SERPL-MCNC: 1.01 MG/DL (ref 0.5–1.05)
EGFRCR SERPLBLD CKD-EPI 2021: 59 ML/MIN/1.73M*2
EOSINOPHIL # BLD AUTO: 0.11 X10*3/UL (ref 0–0.4)
EOSINOPHIL NFR BLD AUTO: 1.5 %
ERYTHROCYTE [DISTWIDTH] IN BLOOD BY AUTOMATED COUNT: 20.8 % (ref 11.5–14.5)
GLUCOSE SERPL-MCNC: 76 MG/DL (ref 74–99)
HCT VFR BLD AUTO: 27.3 % (ref 36–46)
HGB BLD-MCNC: 8.2 G/DL (ref 12–16)
IMM GRANULOCYTES # BLD AUTO: 0.05 X10*3/UL (ref 0–0.5)
IMM GRANULOCYTES NFR BLD AUTO: 0.7 % (ref 0–0.9)
LYMPHOCYTES # BLD AUTO: 0.97 X10*3/UL (ref 0.8–3)
LYMPHOCYTES NFR BLD AUTO: 12.9 %
MAGNESIUM SERPL-MCNC: 2.64 MG/DL (ref 1.6–2.4)
MCH RBC QN AUTO: 21.4 PG (ref 26–34)
MCHC RBC AUTO-ENTMCNC: 30 G/DL (ref 32–36)
MCV RBC AUTO: 71 FL (ref 80–100)
MONOCYTES # BLD AUTO: 0.75 X10*3/UL (ref 0.05–0.8)
MONOCYTES NFR BLD AUTO: 10 %
NEUTROPHILS # BLD AUTO: 5.6 X10*3/UL (ref 1.6–5.5)
NEUTROPHILS NFR BLD AUTO: 74.6 %
NRBC BLD-RTO: 0 /100 WBCS (ref 0–0)
OVALOCYTES BLD QL SMEAR: NORMAL
PLATELET # BLD AUTO: 263 X10*3/UL (ref 150–450)
POLYCHROMASIA BLD QL SMEAR: NORMAL
POTASSIUM SERPL-SCNC: 4.1 MMOL/L (ref 3.5–5.3)
RBC # BLD AUTO: 3.83 X10*6/UL (ref 4–5.2)
RBC MORPH BLD: NORMAL
SODIUM SERPL-SCNC: 138 MMOL/L (ref 136–145)
WBC # BLD AUTO: 7.5 X10*3/UL (ref 4.4–11.3)

## 2024-02-26 PROCEDURE — 2500000004 HC RX 250 GENERAL PHARMACY W/ HCPCS (ALT 636 FOR OP/ED): Performed by: INTERNAL MEDICINE

## 2024-02-26 PROCEDURE — 99232 SBSQ HOSP IP/OBS MODERATE 35: CPT | Performed by: SURGERY

## 2024-02-26 PROCEDURE — 76705 ECHO EXAM OF ABDOMEN: CPT

## 2024-02-26 PROCEDURE — 2500000002 HC RX 250 W HCPCS SELF ADMINISTERED DRUGS (ALT 637 FOR MEDICARE OP, ALT 636 FOR OP/ED): Performed by: INTERNAL MEDICINE

## 2024-02-26 PROCEDURE — 94664 DEMO&/EVAL PT USE INHALER: CPT

## 2024-02-26 PROCEDURE — 2500000002 HC RX 250 W HCPCS SELF ADMINISTERED DRUGS (ALT 637 FOR MEDICARE OP, ALT 636 FOR OP/ED): Mod: MUE | Performed by: INTERNAL MEDICINE

## 2024-02-26 PROCEDURE — 36415 COLL VENOUS BLD VENIPUNCTURE: CPT | Performed by: INTERNAL MEDICINE

## 2024-02-26 PROCEDURE — 83735 ASSAY OF MAGNESIUM: CPT | Performed by: NURSE PRACTITIONER

## 2024-02-26 PROCEDURE — 1100000001 HC PRIVATE ROOM DAILY

## 2024-02-26 PROCEDURE — 99222 1ST HOSP IP/OBS MODERATE 55: CPT | Performed by: PHYSICIAN ASSISTANT

## 2024-02-26 PROCEDURE — 94640 AIRWAY INHALATION TREATMENT: CPT

## 2024-02-26 PROCEDURE — 99232 SBSQ HOSP IP/OBS MODERATE 35: CPT | Performed by: STUDENT IN AN ORGANIZED HEALTH CARE EDUCATION/TRAINING PROGRAM

## 2024-02-26 PROCEDURE — 85025 COMPLETE CBC W/AUTO DIFF WBC: CPT | Performed by: INTERNAL MEDICINE

## 2024-02-26 PROCEDURE — 76705 ECHO EXAM OF ABDOMEN: CPT | Performed by: RADIOLOGY

## 2024-02-26 PROCEDURE — 80048 BASIC METABOLIC PNL TOTAL CA: CPT | Performed by: INTERNAL MEDICINE

## 2024-02-26 PROCEDURE — 2500000001 HC RX 250 WO HCPCS SELF ADMINISTERED DRUGS (ALT 637 FOR MEDICARE OP): Performed by: INTERNAL MEDICINE

## 2024-02-26 PROCEDURE — C9113 INJ PANTOPRAZOLE SODIUM, VIA: HCPCS | Performed by: INTERNAL MEDICINE

## 2024-02-26 PROCEDURE — 2500000001 HC RX 250 WO HCPCS SELF ADMINISTERED DRUGS (ALT 637 FOR MEDICARE OP): Performed by: SURGERY

## 2024-02-26 RX ORDER — POLYETHYLENE GLYCOL 3350, SODIUM CHLORIDE, SODIUM BICARBONATE, POTASSIUM CHLORIDE 420; 11.2; 5.72; 1.48 G/4L; G/4L; G/4L; G/4L
4000 POWDER, FOR SOLUTION ORAL ONCE
Status: COMPLETED | OUTPATIENT
Start: 2024-02-26 | End: 2024-02-26

## 2024-02-26 RX ORDER — IPRATROPIUM BROMIDE AND ALBUTEROL SULFATE 2.5; .5 MG/3ML; MG/3ML
3 SOLUTION RESPIRATORY (INHALATION)
Status: DISCONTINUED | OUTPATIENT
Start: 2024-02-26 | End: 2024-02-26

## 2024-02-26 RX ADMIN — DIGOXIN 125 MCG: 125 TABLET ORAL at 10:08

## 2024-02-26 RX ADMIN — MONTELUKAST 10 MG: 10 TABLET, FILM COATED ORAL at 20:10

## 2024-02-26 RX ADMIN — HYDROXYCHLOROQUINE SULFATE 200 MG: 200 TABLET, FILM COATED ORAL at 10:08

## 2024-02-26 RX ADMIN — OXYCODONE HYDROCHLORIDE AND ACETAMINOPHEN 1 TABLET: 5; 325 TABLET ORAL at 10:06

## 2024-02-26 RX ADMIN — ONDANSETRON 4 MG: 2 INJECTION INTRAMUSCULAR; INTRAVENOUS at 20:10

## 2024-02-26 RX ADMIN — SUCRALFATE 1 G: 1 SUSPENSION ORAL at 10:08

## 2024-02-26 RX ADMIN — SUCRALFATE 1 G: 1 SUSPENSION ORAL at 17:01

## 2024-02-26 RX ADMIN — BACLOFEN 10 MG: 10 TABLET ORAL at 14:55

## 2024-02-26 RX ADMIN — BACLOFEN 10 MG: 10 TABLET ORAL at 10:08

## 2024-02-26 RX ADMIN — ALBUTEROL SULFATE 2.5 MG: 2.5 SOLUTION RESPIRATORY (INHALATION) at 20:28

## 2024-02-26 RX ADMIN — SUCRALFATE 1 G: 1 SUSPENSION ORAL at 20:51

## 2024-02-26 RX ADMIN — PANTOPRAZOLE SODIUM 40 MG: 40 INJECTION, POWDER, FOR SOLUTION INTRAVENOUS at 10:09

## 2024-02-26 RX ADMIN — DILTIAZEM HYDROCHLORIDE 120 MG: 120 CAPSULE, COATED, EXTENDED RELEASE ORAL at 10:08

## 2024-02-26 RX ADMIN — OXYCODONE HYDROCHLORIDE AND ACETAMINOPHEN 1 TABLET: 5; 325 TABLET ORAL at 22:20

## 2024-02-26 RX ADMIN — ROSUVASTATIN CALCIUM 20 MG: 20 TABLET, FILM COATED ORAL at 10:07

## 2024-02-26 RX ADMIN — POLYETHYLENE GLYCOL 3350 17 G: 17 POWDER, FOR SOLUTION ORAL at 20:10

## 2024-02-26 RX ADMIN — BACLOFEN 10 MG: 10 TABLET ORAL at 20:10

## 2024-02-26 RX ADMIN — FLUTICASONE PROPIONATE 1 SPRAY: 50 SPRAY, METERED NASAL at 20:11

## 2024-02-26 RX ADMIN — POLYETHYLENE GLYCOL 3350, SODIUM SULFATE ANHYDROUS, SODIUM BICARBONATE, SODIUM CHLORIDE, POTASSIUM CHLORIDE 4000 ML: 236; 22.74; 6.74; 5.86; 2.97 POWDER, FOR SOLUTION ORAL at 14:55

## 2024-02-26 RX ADMIN — FAMOTIDINE 40 MG: 20 TABLET ORAL at 10:07

## 2024-02-26 RX ADMIN — POLYETHYLENE GLYCOL 3350 17 G: 17 POWDER, FOR SOLUTION ORAL at 10:09

## 2024-02-26 RX ADMIN — ONDANSETRON 4 MG: 2 INJECTION INTRAMUSCULAR; INTRAVENOUS at 10:48

## 2024-02-26 ASSESSMENT — PAIN SCALES - GENERAL
PAINLEVEL_OUTOF10: 3
PAINLEVEL_OUTOF10: 7
PAINLEVEL_OUTOF10: 6

## 2024-02-26 ASSESSMENT — COGNITIVE AND FUNCTIONAL STATUS - GENERAL
WALKING IN HOSPITAL ROOM: A LITTLE
STANDING UP FROM CHAIR USING ARMS: A LITTLE
DAILY ACTIVITIY SCORE: 24
MOBILITY SCORE: 20
MOBILITY SCORE: 24
DAILY ACTIVITIY SCORE: 24
CLIMB 3 TO 5 STEPS WITH RAILING: A LITTLE
MOVING TO AND FROM BED TO CHAIR: A LITTLE

## 2024-02-26 ASSESSMENT — PAIN - FUNCTIONAL ASSESSMENT
PAIN_FUNCTIONAL_ASSESSMENT: 0-10
PAIN_FUNCTIONAL_ASSESSMENT: 0-10

## 2024-02-26 NOTE — PROGRESS NOTES
"Trina Elias is a 73 y.o. female on day 1 of admission presenting with Chest pain, unspecified type.      Subjective   She is resting in the bed, still with abd pain. Denies any palpitations, shortness of breath or chest pain.       Review of systems:  Constitutional: negative for fever, chills, or malaise  Neuro: negative for dizziness, headache, numbness, tingling  ENT: Negative for nasal congestion or sore throat  Resp: negative for shortness of breath, cough, or wheezing  CV: negative for chest pain, palpitations  : negative for dysuria, frequency, or urgency  Skin: negative for lesions, wounds, or rash  Musculoskeletal: Negative for weakness, myalgia, or arthralgia  Endocrine: Negative for polyuria or polydipsia         Objective   Constitutional: Well developed, awake/alert/oriented x3, no distress, alert and cooperative  Eyes: PERRL, EOMI, clear sclera  ENMT: mucous membranes moist, no apparent injury, no lesions seen  Head/Neck: Neck supple, no apparent injury, thyroid without mass or tenderness, No JVD, trachea midline, no bruits  Respiratory/Thorax: Patent airways, CTAB, normal breath sounds with good chest expansion, thorax symmetric  Cardiovascular: Regular, rate and rhythm, no murmurs, 2+ equal pulses of the extremities, normal S 1and S 2  Gastrointestinal: Nondistended, soft, tender to palpation, no masses palpable, no organomegaly, +BS, no bruits  Musculoskeletal: ROM intact, no joint swelling, normal strength  Extremities: normal extremities, no cyanosis edema, contusions or wounds, no clubbing  Neurological: alert and oriented x3, intact senses, motor, response and reflexes, normal strength  Lymphatic: No significant lymphadenopathy  Psychological: Appropriate mood and behavior  Skin: Warm and dry, no lesions, no rashes      Last Recorded Vitals  /55 (BP Location: Right arm, Patient Position: Lying)   Pulse 69   Temp 36.4 °C (97.5 °F) (Temporal)   Resp 18   Ht 1.626 m (5' 4\")   Wt 72.8 " kg (160 lb 8 oz)   SpO2 99%   BMI 27.55 kg/m²     Intake/Output last 3 Shifts:  I/O last 3 completed shifts:  In: 1010 (13.9 mL/kg) [P.O.:360; Blood:650]  Out: - (0 mL/kg)   Weight: 72.8 kg   I/O this shift:  In: 480 [P.O.:480]  Out: -     Relevant Results  Scheduled medications  baclofen, 10 mg, oral, TID  digoxin, 125 mcg, oral, Daily  dilTIAZem CD, 120 mg, oral, Daily  fluticasone, 1 spray, Each Nostril, BID  hydroxychloroquine, 200 mg, oral, Daily  montelukast, 10 mg, oral, Nightly  pantoprazole, 40 mg, intravenous, Daily  polyethylene glycol, 17 g, oral, BID  rosuvastatin, 20 mg, oral, Daily  sucralfate, 1 g, oral, q6h DWIGHT      Continuous medications     PRN medications  PRN medications: acetaminophen **OR** acetaminophen **OR** acetaminophen, albuterol, alum-mag hydroxide-simeth, calcium carbonate, dextromethorphan-guaifenesin, diclofenac sodium, guaiFENesin, nitroglycerin, ondansetron **OR** ondansetron, oxyCODONE-acetaminophen    Results for orders placed or performed during the hospital encounter of 02/24/24 (from the past 24 hour(s))   CBC and Auto Differential   Result Value Ref Range    WBC 7.5 4.4 - 11.3 x10*3/uL    nRBC 0.0 0.0 - 0.0 /100 WBCs    RBC 3.83 (L) 4.00 - 5.20 x10*6/uL    Hemoglobin 8.2 (L) 12.0 - 16.0 g/dL    Hematocrit 27.3 (L) 36.0 - 46.0 %    MCV 71 (L) 80 - 100 fL    MCH 21.4 (L) 26.0 - 34.0 pg    MCHC 30.0 (L) 32.0 - 36.0 g/dL    RDW 20.8 (H) 11.5 - 14.5 %    Platelets 263 150 - 450 x10*3/uL    Neutrophils % 74.6 40.0 - 80.0 %    Immature Granulocytes %, Automated 0.7 0.0 - 0.9 %    Lymphocytes % 12.9 13.0 - 44.0 %    Monocytes % 10.0 2.0 - 10.0 %    Eosinophils % 1.5 0.0 - 6.0 %    Basophils % 0.3 0.0 - 2.0 %    Neutrophils Absolute 5.60 (H) 1.60 - 5.50 x10*3/uL    Immature Granulocytes Absolute, Automated 0.05 0.00 - 0.50 x10*3/uL    Lymphocytes Absolute 0.97 0.80 - 3.00 x10*3/uL    Monocytes Absolute 0.75 0.05 - 0.80 x10*3/uL    Eosinophils Absolute 0.11 0.00 - 0.40 x10*3/uL     Basophils Absolute 0.02 0.00 - 0.10 x10*3/uL   Basic metabolic panel   Result Value Ref Range    Glucose 76 74 - 99 mg/dL    Sodium 138 136 - 145 mmol/L    Potassium 4.1 3.5 - 5.3 mmol/L    Chloride 104 98 - 107 mmol/L    Bicarbonate 26 21 - 32 mmol/L    Anion Gap 12 10 - 20 mmol/L    Urea Nitrogen 14 6 - 23 mg/dL    Creatinine 1.01 0.50 - 1.05 mg/dL    eGFR 59 (L) >60 mL/min/1.73m*2    Calcium 8.3 (L) 8.6 - 10.3 mg/dL   Morphology   Result Value Ref Range    RBC Morphology See Below     Polychromasia Mild     Ovalocytes Few    Magnesium   Result Value Ref Range    Magnesium 2.64 (H) 1.60 - 2.40 mg/dL       US abdomen limited liver   Final Result   Mild intrahepatic biliary ductal dilatation status post   cholecystectomy. Additionally, the common bile duct measures up to   2.1 cm in caliber, similar to recent CT examination. No sonographic   choledocholithiasis. As previously advised, further evaluation with   ERCP or MRCP may be considered.        MACRO:   None             Signed by: Suhail Silveira 2/26/2024 9:50 AM   Dictation workstation:   [id number]      CT abdomen pelvis w IV contrast   Final Result   1.  Status post cholecystectomy with a markedly dilated extrahepatic   biliary duct measuring up to 3.2 cm in diameter, increased from the   prior study.  This may be chronic, however, correlation with biliary   lab values is recommended, nonemergent ERCP or MRCP may be of benefit   in this patient.   2.  Chronic postoperative changes are seen in the region of the GE   junction with a small sliding-type hiatal hernia, correlate with   patient's surgical history.   3.  Cardiomegaly.   4.  Chronic changes of suspected COPD.   5.  Minimal distal colonic diverticulosis.   Signed by Mesfin Seaman      XR chest 1 view   Final Result   No acute cardiopulmonary process is evident.        MACRO:   None        Signed by: Nixon Orantes 2/24/2024 8:47 PM   Dictation workstation:   [id number]      Transthoracic Echo (TTE)  Complete    (Results Pending)       No echocardiogram results found for the past 12 months         Assessment/Plan   Principal Problem:    Chest pain, unspecified type    Patient has been admitted to the hospital and monitored on telemetry.  She has chronic blood loss iron deficiency anemia related to rivaroxaban use.     Endoscopy has been unrevealing for active source of bleeding.     S/p packed red blood cells, discontinue rivaroxaban, administer intravenous iron infusions.     Elevated troponin most likely due to demand injury from severe anemia.  Last cardiac catheterization showed normal epicardial coronary arteries.  She has a well-functioning defibrillator unclear etiology of beeping sound and this will be verified.     Most recent device check available for review in 2023 shows adequate battery life, occasional episodes of nonsustained ventricular tachycardia.     There has been no evidence of atrial fibrillation since last pulmonary vein isolation.  Procedure done in May 2022.  Safe to discontinue anticoagulation at this time.     Continue proton pump inhibitors and I added Carafate 1 g 4 times daily, patient already also on famotidine.     Continue rosuvastatin for hyper lipidemia.     She will be referred for Watchman procedure so long-term anticoagulation for AF does not need to be resumed for any future recurrences of paroxysmal atrial flutter or fibrillation    Discussed with GI for possible MRI related to abd pain->ICD is NOT MRI compatible    Amelia Kiser, APRN-CNP

## 2024-02-26 NOTE — CONSULTS
Consults    Reason For Consult  CBD dilatation 3.2cm on CT scan     History Of Present Illness  Trina Elias is a 73 y.o. female previous medical history multiple abdominal surgeries, ICD/PPM placement, HTN, COPD presenting with abdominal pain, near syncope.  Patient reports over the past 3 days she been having intermittent chest pain and feeling lightheaded.  She felt like she was going to collapse therefore was brought to the ED.  She reports that her ICD/PPM device began beeping a week ago and she was having runs of V. tach.  She was noted to have hemoglobin of 6.7 with microcytic indices.  She denies any knowledge of BRBPR, melena, fever, chills, vomiting.  She reports that she does have abdominal pain in the epigastric and right upper quadrant and this has been a chronic issue for the past several years.  She has seen Dr. Grier for this multiple times and most recently had EGD in December 2023 which noted no acute abnormalities.  Last colonoscopy was in February 2021 which noted 1 small hyperplastic polyp. Hgb: 8.2 s/p prbc transfusion. She reports she has had issues with anemia in the past. She is on xarelto for afib, but per Dr. Larson can be discontinued at this time. GI was consulted for CBD diltation noted 3.2cm on CT scan, however on US it was noted that CBD was 2.1cm. Chronically over the past several years CBD has been dilated at 1.5cm. MRCP was recommended, however was cancelled due to PPM/ICD. LFTs have been normal this admission.      Past Medical History  She has a past medical history of Abnormal mammogram (06/26/2023), Acute sphenoidal sinusitis, unspecified (10/12/2017), Bacterial meningitis, unspecified (01/26/2018), Bone pain (06/26/2023), BSOM (bilateral serous otitis media) (06/26/2023), Cervical neuropathy (06/26/2023), Cervicalgia (06/08/2021), Chest pain on exertion (06/26/2023), Chronic obstructive pulmonary disease, unspecified (CMS/HCC), Cutaneous abscess of abdominal wall  (11/17/2015), Dehydration (06/26/2023), Dry cough (06/26/2023), Dry heaves (06/26/2023), Dyspnea (06/26/2023), Elevated serum creatinine (06/26/2023), Fatigue (06/26/2023), Fever, unspecified (06/26/2023), Headache (06/26/2023), History of abdominal surgery (06/26/2023), History of colon polyps (06/26/2023), Lumbar radiculitis (06/26/2023), 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), Nonhealing surgical wound (06/26/2023), Numbness (06/26/2023), Osteopenia (06/26/2023), Other conditions influencing health status, Other conditions influencing health status, Pain of right upper extremity (06/26/2023), Personal history of anaphylaxis (08/19/2015), Personal history of diseases of the skin and subcutaneous tissue, Personal history of infections of the central nervous system, Personal history of other diseases of the digestive system (11/17/2015), Personal history of other diseases of the digestive system, Personal history of other diseases of the nervous system and sense organs, Personal history of other diseases of the respiratory system (01/06/2020), Personal history of other diseases of the respiratory system, Personal history of other specified conditions (08/08/2019), Personal history of other specified conditions (03/12/2018), Postural dizziness with presyncope (06/26/2023), Pulmonary edema (06/26/2023), Radiculopathy, lumbar region (08/13/2018), Restless legs syndrome (11/17/2015), Right serous otitis media (06/26/2023), Severe pain (06/26/2023), Small bowel obstruction (CMS/HCC) (06/26/2023), Tarsal tunnel syndrome, unspecified lower limb, Thoracic aortic aneurysm, without rupture, unspecified (CMS/HCC) (09/08/2020), Toxic effect of venom (06/26/2023), Ulcer of the stomach caused by bacteria (h. pylori), acute (06/26/2023), and Ulnar neuropathy (06/26/2023).    Surgical History  She has a past surgical history that includes Cholecystectomy (03/08/2013); Appendectomy (03/08/2013); Cardiac catheterization (03/08/2013);  " section, classic (2013); Gastric fundoplication (2013); Foot surgery (2013); Other surgical history (2019); Other surgical history (2019); Other surgical history (2019); Other surgical history (2019); Hernia repair (2015); Other surgical history (2020); Rotator cuff repair (2018); Other surgical history (2015); Cardiac surgery (2014); and Other surgical history (2021).     Social History  She reports that she has never smoked. She has never used smokeless tobacco. She reports that she does not drink alcohol and does not use drugs.    Family History  Family History   Problem Relation Name Age of Onset    Asthma Daughter      Asthma Other      Colon cancer Other      Diabetes Other      Other (stroke syndrome) Other          Allergies  Hydromorphone, Metoprolol, Abatacept, Bupropion, Cefepime, Ciprofloxacin, Furosemide, Hydrocodone-acetaminophen, Levofloxacin, Methotrexate, Penicillins, Pregabalin, Prochlorperazine, Aripiprazole, Beta-blockers (beta-adrenergic blocking agts), and Pineapple    Review of Systems    I performed a complete 10 point review of systems and it is negative except as noted in HPI.  All other systems have been reviewed and are negative.    Physical Exam   BP (!) 142/99 (BP Location: Right arm, Patient Position: Lying)   Pulse 60   Temp 36.1 °C (97 °F)   Resp 20   Ht 1.626 m (5' 4\")   Wt 72.8 kg (160 lb 8 oz)   SpO2 94%   BMI 27.55 kg/m²   Constitutional: well nourished, well appearing. NAD. Alert and cooperative  Skin: no jaundice   Eyes: anicteric, normal conjunctiva  ENT: MMM  Pulmonary: easy and nonlabored on RA  Abdomen: soft, mild TTP epigastric, ND. No ascites.  MSK: MAEx4  Extremities: no edema  Neuro: aaox3. No asterixis.   Psych: appropriate mood and behavior    Last Recorded Vitals  Blood pressure (!) 142/99, pulse 60, temperature 36.1 °C (97 °F), resp. rate 20, height 1.626 m (5' 4\"), weight " 72.8 kg (160 lb 8 oz), SpO2 94 %.    Relevant Results  Lab Results   Component Value Date    WBC 7.5 02/26/2024    HGB 8.2 (L) 02/26/2024    HCT 27.3 (L) 02/26/2024    MCV 71 (L) 02/26/2024     02/26/2024      Lab Results   Component Value Date    GLUCOSE 76 02/26/2024    CALCIUM 8.3 (L) 02/26/2024     02/26/2024    K 4.1 02/26/2024    CO2 26 02/26/2024     02/26/2024    BUN 14 02/26/2024    CREATININE 1.01 02/26/2024      Lab Results   Component Value Date    ALT 10 02/24/2024    AST 11 02/24/2024    ALKPHOS 74 02/24/2024    BILITOT 0.4 02/24/2024    === 02/24/24 ===    CT ABDOMEN PELVIS W IV CONTRAST    - Impression -  1.  Status post cholecystectomy with a markedly dilated extrahepatic  biliary duct measuring up to 3.2 cm in diameter, increased from the  prior study.  This may be chronic, however, correlation with biliary  lab values is recommended, nonemergent ERCP or MRCP may be of benefit  in this patient.  2.  Chronic postoperative changes are seen in the region of the GE  junction with a small sliding-type hiatal hernia, correlate with  patient's surgical history.  3.  Cardiomegaly.  4.  Chronic changes of suspected COPD.  5.  Minimal distal colonic diverticulosis.  Signed by Mesfin Seaman === 02/24/24 ===    XR CHEST 1 VIEW    - Impression -  No acute cardiopulmonary process is evident.    MACRO:  None    Signed by: Nixon Orantes 2/24/2024 8:47 PM  Dictation workstation:   [id number]      Assessment/Plan     72 yo female admitted for near syncope. Found to have acute on chronic anemia requiring PRBC transfusion, possibly r/t AVM vs inflammatory polyp. GI consulted for CBD dilatation; chronically dilated to 15mm but up to 21mm per US today.     -Agree with colonoscopy with Dr. Cleary tomorrow  -CLD today; NPO at MN  -PPI BID  -Xarelto discontinued by cardiology  -CBC, BMP daily  -Discussed PPM/ICD with cardiology; they will look into if pacemaker is MRI compatible. If it is, we will plan  for nonurgent MRCP (inpatient vs outpatient pending timing of rep availability)  -If MRCP is unable to be completed, will refer pt for ERCP nonurgently  -Supportive care per primary    Thank you for the consult. GI will continue to follow. Please dochalo with any questions.    Plan discussed with Dr. Blackburn, who is in agreement.     Tasha Lozoya PA-C     I spent 60 minutes in the professional and overall care of this patient.

## 2024-02-26 NOTE — PROGRESS NOTES
"GENERAL SURGERY PROGRESS NOTE    PATIENT NAME: Trina Elias  MRN: [id number]  DATE: 2/26/2024    SUMMARY OF CURRENT STATUS  - symptomatic anemia with some subjective dark stools and hemoccult positive  - no Bms since she was admitted  - hgb stable after transfusion of 1u PRBC on admission  - denies any major abdominal pain, nausea, SOB, chest pain, leg pain      ON EXAMINATION:  /55 (BP Location: Right arm, Patient Position: Lying)   Pulse 69   Temp 36.4 °C (97.5 °F) (Temporal)   Resp 18   Ht 1.626 m (5' 4\")   Wt 72.8 kg (160 lb 8 oz)   SpO2 99%   BMI 27.55 kg/m²   APPEARANCE: NAD, looks well.  ABDOMEN:  Soft, mild tenderness diffusely      INS/OUTS:    Intake/Output Summary (Last 24 hours) at 2/26/2024 1402  Last data filed at 2/26/2024 1334  Gross per 24 hour   Intake 840 ml   Output --   Net 840 ml         BLOOD WORK:  Results for orders placed or performed during the hospital encounter of 02/24/24 (from the past 24 hour(s))   CBC and Auto Differential   Result Value Ref Range    WBC 7.5 4.4 - 11.3 x10*3/uL    nRBC 0.0 0.0 - 0.0 /100 WBCs    RBC 3.83 (L) 4.00 - 5.20 x10*6/uL    Hemoglobin 8.2 (L) 12.0 - 16.0 g/dL    Hematocrit 27.3 (L) 36.0 - 46.0 %    MCV 71 (L) 80 - 100 fL    MCH 21.4 (L) 26.0 - 34.0 pg    MCHC 30.0 (L) 32.0 - 36.0 g/dL    RDW 20.8 (H) 11.5 - 14.5 %    Platelets 263 150 - 450 x10*3/uL    Neutrophils % 74.6 40.0 - 80.0 %    Immature Granulocytes %, Automated 0.7 0.0 - 0.9 %    Lymphocytes % 12.9 13.0 - 44.0 %    Monocytes % 10.0 2.0 - 10.0 %    Eosinophils % 1.5 0.0 - 6.0 %    Basophils % 0.3 0.0 - 2.0 %    Neutrophils Absolute 5.60 (H) 1.60 - 5.50 x10*3/uL    Immature Granulocytes Absolute, Automated 0.05 0.00 - 0.50 x10*3/uL    Lymphocytes Absolute 0.97 0.80 - 3.00 x10*3/uL    Monocytes Absolute 0.75 0.05 - 0.80 x10*3/uL    Eosinophils Absolute 0.11 0.00 - 0.40 x10*3/uL    Basophils Absolute 0.02 0.00 - 0.10 x10*3/uL   Basic metabolic panel   Result Value Ref Range    Glucose 76 74 " - 99 mg/dL    Sodium 138 136 - 145 mmol/L    Potassium 4.1 3.5 - 5.3 mmol/L    Chloride 104 98 - 107 mmol/L    Bicarbonate 26 21 - 32 mmol/L    Anion Gap 12 10 - 20 mmol/L    Urea Nitrogen 14 6 - 23 mg/dL    Creatinine 1.01 0.50 - 1.05 mg/dL    eGFR 59 (L) >60 mL/min/1.73m*2    Calcium 8.3 (L) 8.6 - 10.3 mg/dL   Morphology   Result Value Ref Range    RBC Morphology See Below     Polychromasia Mild     Ovalocytes Few    Magnesium   Result Value Ref Range    Magnesium 2.64 (H) 1.60 - 2.40 mg/dL       IMPRESSION:  - Pt is doing well - hgb stable after transfusion - no evidence of ongoing GI bleeding  - No new issues/concerns    PLAN:  - clear liquid diet  - plan for EGD and colonoscopy tomorrow - initiate golytely prep today      Nnamdi Cleary MD  Bariatric and Minimally Invasive General Surgery

## 2024-02-26 NOTE — CONSULTS
"     Recommendation(s):  Diet per team    Nutrition Assessment     73 y.o. female adm with Chest pain, unspecified type, near syncope, some diarrhea, symptomatic anemia with some subjective dark stools and hemoccult positive     Food and Nutrient History: Per [facility] pt denies appetite changes, states unsure of weight changes. Per past medical records not significant weight changes.  Per GI note (2/26) \"She denies any knowledge of BRBPR, melena, fever, chills, vomiting. She reports that she does have abdominal pain in the epigastric and right upper quadrant and this has been a chronic issue for the past several years. She has seen Dr. Grier for this multiple times and most recently had EGD in December 2023 which noted no acute abnormalities. Last colonoscopy was in February 2021 which noted 1 small hyperplastic polyp.\" GI consulted for CBD dilated. Plan for colonoscopy tomorrow.     Difficulty chewing/swallowing: none noted       Per Flowsheet Percent Meal intake:    Dietary Orders (From admission, onward)       Start     Ordered    02/27/24 0001  NPO Diet Except: Ice chips, Sips with meds; Effective midnight  Diet effective midnight        Question Answer Comment   Except: Ice chips    Except: Sips with meds        02/26/24 1101    02/26/24 1026  Adult diet Clear Liquid  Diet effective now        Question:  Diet type  Answer:  Clear Liquid    02/26/24 1026    02/25/24 0057  May Participate in Room Service  Once        Question:  .  Answer:  Yes    02/25/24 0056                  Feeding assistance level:      Current Facility-Administered Medications:     acetaminophen (Tylenol) tablet 650 mg, 650 mg, oral, q4h PRN, 650 mg at 02/25/24 1153 **OR** acetaminophen (Tylenol) oral liquid 650 mg, 650 mg, oral, q4h PRN **OR** acetaminophen (Tylenol) suppository 650 mg, 650 mg, rectal, q4h PRN, Ravin Anderson MD    albuterol 2.5 mg /3 mL (0.083 %) nebulizer solution 2.5 mg, 2.5 mg, nebulization, q2h PRN, Ravin Anderson, " MD    alum-mag hydroxide-simeth (Mylanta) 200-200-20 mg/5 mL oral suspension 30 mL, 30 mL, oral, q6h PRN, Ravin Anderson MD    baclofen (Lioresal) tablet 10 mg, 10 mg, oral, TID, Ravin Anderson MD, 10 mg at 02/26/24 1455    calcium carbonate (Tums) chewable tablet 500 mg, 500 mg, oral, 4x daily PRN, Ravin Anderson MD    dextromethorphan-guaifenesin (Robitussin DM)  mg/5 mL oral liquid 5 mL, 5 mL, oral, q4h PRN, Ravin Anderson MD    diclofenac sodium (Voltaren) 1 % gel 4 g, 4 g, Topical, BID PRN, Ravin Anderson MD    digoxin (Lanoxin) tablet 125 mcg, 125 mcg, oral, Daily, Ravin Anderson MD, 125 mcg at 02/26/24 1008    dilTIAZem CD (Cardizem CD) 24 hr capsule 120 mg, 120 mg, oral, Daily, Ravin Anderson MD, 120 mg at 02/26/24 1008    fluticasone (Flonase) nasal spray 1 spray, 1 spray, Each Nostril, BID, Ravin Anderson MD, 1 spray at 02/25/24 2021    guaiFENesin (Mucinex) 12 hr tablet 600 mg, 600 mg, oral, q12h PRN, Ravin Anderson MD    hydroxychloroquine (Plaquenil) tablet 200 mg, 200 mg, oral, Daily, Ravin Anderson MD, 200 mg at 02/26/24 1008    ipratropium-albuteroL (Duo-Neb) 0.5-2.5 mg/3 mL nebulizer solution 3 mL, 3 mL, nebulization, q6h, Carlo Benitez DO    montelukast (Singulair) tablet 10 mg, 10 mg, oral, Nightly, Ravin Anderson MD, 10 mg at 02/25/24 2014    nitroglycerin (Nitrostat) SL tablet 0.4 mg, 0.4 mg, sublingual, q5 min PRN, Ravin Anderson MD    ondansetron (Zofran) tablet 4 mg, 4 mg, oral, q8h PRN **OR** ondansetron (Zofran) injection 4 mg, 4 mg, intravenous, q8h PRN, Ravin Anderson MD, 4 mg at 02/26/24 1048    oxyCODONE-acetaminophen (Percocet) 5-325 mg per tablet 1 tablet, 1 tablet, oral, TID PRN, Ravin Anderson MD, 1 tablet at 02/26/24 1006    pantoprazole (ProtoNix) injection 40 mg, 40 mg, intravenous, Daily, Ravin Anderson MD, 40 mg at 02/26/24 1009    polyethylene glycol (Glycolax, Miralax) packet 17 g, 17 g, oral, BID, Ayden  "Rashaad Wiley MD, 17 g at 02/26/24 1009    rosuvastatin (Crestor) tablet 20 mg, 20 mg, oral, Daily, Ravin Anderson MD, 20 mg at 02/26/24 1007    sucralfate (Carafate) suspension 1 g, 1 g, oral, q6h DWIGHT, Neo Gutierrez MD, 1 g at 02/26/24 1701  Results from last 7 days   Lab Units 02/26/24  0609 02/25/24  0713 02/24/24  2030   GLUCOSE mg/dL 76 86 114*   SODIUM mmol/L 138 135* 137   POTASSIUM mmol/L 4.1 3.8 3.4*   CHLORIDE mmol/L 104 103 101   CO2 mmol/L 26 24 27   BUN mg/dL 14 21 25*   CREATININE mg/dL 1.01 1.06* 1.30*   EGFR mL/min/1.73m*2 59* 56* 44*   CALCIUM mg/dL 8.3* 8.3* 9.0   MAGNESIUM mg/dL 2.64*  --  2.38     No results found for: \"HGBA1C\"        Per Flowsheet:  Gastrointestinal  Gastrointestinal (WDL): Exceptions to WDL  Abdomen Inspection: Surgical scar  Abdominal Tenderness: Tenderness  Bowel Sounds: All quadrants  Bowel Sounds (All Quadrants): Active  Last BM Date: 02/24/24  Last bowel movement documented: 02/24/24  Past Medical History:   Diagnosis Date    Abnormal mammogram 06/26/2023    Acute sphenoidal sinusitis, unspecified 10/12/2017    Acute sphenoidal sinusitis, recurrence not specified    Bacterial meningitis, unspecified 01/26/2018    Acute bacterial meningitis    Bone pain 06/26/2023    BSOM (bilateral serous otitis media) 06/26/2023    Cervical neuropathy 06/26/2023    Cervicalgia 06/08/2021    Acute neck pain    Chest pain on exertion 06/26/2023    Chronic obstructive pulmonary disease, unspecified (CMS/HCC)     Chronic obstructive pulmonary disease    Cutaneous abscess of abdominal wall 11/17/2015    Abdominal wall abscess    Dehydration 06/26/2023    Dry cough 06/26/2023    Dry heaves 06/26/2023    Dyspnea 06/26/2023    Elevated serum creatinine 06/26/2023    Fatigue 06/26/2023    Fever, unspecified 06/26/2023    Headache 06/26/2023    History of abdominal surgery 06/26/2023    History of colon polyps 06/26/2023    Lumbar radiculitis 06/26/2023    Median nerve neuropathy " 06/26/2023    Nonhealing surgical wound 06/26/2023    Numbness 06/26/2023    Osteopenia 06/26/2023    Other conditions influencing health status     Coronary Artery Disease    Other conditions influencing health status     Peptic Ulcer    Pain of right upper extremity 06/26/2023    Personal history of anaphylaxis 08/19/2015    History of anaphylaxis    Personal history of diseases of the skin and subcutaneous tissue     History of eczema    Personal history of infections of the central nervous system     History of bacterial meningitis    Personal history of other diseases of the digestive system 11/17/2015    History of esophageal reflux    Personal history of other diseases of the digestive system     History of hiatal hernia    Personal history of other diseases of the nervous system and sense organs     History of carpal tunnel syndrome    Personal history of other diseases of the respiratory system 01/06/2020    History of acute pharyngitis    Personal history of other diseases of the respiratory system     Personal history of asthma    Personal history of other specified conditions 08/08/2019    History of epigastric pain    Personal history of other specified conditions 03/12/2018    History of angioedema    Postural dizziness with presyncope 06/26/2023    Pulmonary edema 06/26/2023    Radiculopathy, lumbar region 08/13/2018    Acute lumbar radiculopathy    Restless legs syndrome 11/17/2015    Restless legs syndrome    Right serous otitis media 06/26/2023    Severe pain 06/26/2023    Small bowel obstruction (CMS/HCC) 06/26/2023    Tarsal tunnel syndrome, unspecified lower limb     Tarsal tunnel syndrome    Thoracic aortic aneurysm, without rupture, unspecified (CMS/HCC) 09/08/2020    Aneurysm, aorta, thoracic    Toxic effect of venom 06/26/2023    Ulcer of the stomach caused by bacteria (h. pylori), acute 06/26/2023    Ulnar neuropathy 06/26/2023      Past Surgical History:   Procedure Laterality Date     "APPENDECTOMY  2013    Appendectomy    CARDIAC CATHETERIZATION  2013    Cardiac Cath Procedure Summary    CARDIAC SURGERY  2014    Heart Surgery     SECTION, CLASSIC  2013     Section    CHOLECYSTECTOMY  2013    Cholecystectomy Laparoscopic    FOOT SURGERY  2013    Foot Surgery    GASTRIC FUNDOPLICATION  2013    Esophagogastric Fundoplasty Nissen Fundoplication    HERNIA REPAIR  2015    Hernia Repair    OTHER SURGICAL HISTORY  2019    [place] tooth extraction    OTHER SURGICAL HISTORY  2019    Carpal tunnel surgery    OTHER SURGICAL HISTORY  2019    Foot surgery    OTHER SURGICAL HISTORY  2019    Leg surgery    OTHER SURGICAL HISTORY  2020    Hand surgery    OTHER SURGICAL HISTORY  2015    Cardio-Defib Eval Dual Chamber With Reprogramming    OTHER SURGICAL HISTORY  2021    Intestinal surgery    ROTATOR CUFF REPAIR  2018    Rotator Cuff Repair      Allergies: Hydromorphone, Metoprolol, Abatacept, Bupropion, Cefepime, Ciprofloxacin, Furosemide, Hydrocodone-acetaminophen, Levofloxacin, Methotrexate, Penicillins, Pregabalin, Prochlorperazine, Aripiprazole, Beta-blockers (beta-adrenergic blocking agts), and Pineapple     Anthropometrics:  Height: 162.6 cm (5' 4\")  Weight: 72.8 kg (160 lb 8 oz)  BMI (Calculated): 27.54    Daily Weight  24 : 72.8 kg (160 lb 8 oz)  23 : 70 kg (154 lb 5.2 oz)  23 : 73.5 kg (162 lb)  09/15/23 : 73.5 kg (162 lb)  23 : 78 kg (172 lb)  23 : 78 kg (172 lb)  23 : 78.9 kg (174 lb)  23 : 74.8 kg (165 lb)  22 : 77.6 kg (171 lb)  22 : 81.2 kg (179 lb)         Skin: none noted (please see nursing/wound notes for further details)  Edema: none noted  Pain Score: 7    Estimated Nutritional Needs:  Energy Needs: 1820-2184kcal (25-30kcal/kg CBW, 72.8kg)  Protein Needs: 73g (1g/kg CBW)  Fluid Needs: (1mL/kcal) or per MD    Nutrition Focused " Physical Findings: defer(2/26)                       Nutrition Diagnosis        Patient has Nutrition Diagnosis: No                                       Nutrition Interventions/Recommendations   Intervention:  Coordination of Care: Earlene BHAKTA  Individualized Nutrition Prescription Provided for : Await diet advancements    Education Documentation  Available if needs arise    Goals: less than 5 days NPO/Clear liquid       Nutrition Monitoring and Evaluation   Weights  Labs  PO intake/diet advancement

## 2024-02-26 NOTE — PROGRESS NOTES
02/26/24 1516   Discharge Planning   Living Arrangements Spouse/significant other   Support Systems Spouse/significant other   Assistance Needed patient alert and oriented x3, spouse assists with ADL's as needed   Type of Residence Private residence   Home or Post Acute Services None   Patient expects to be discharged to: Home     2/26/2024 1500: Spoke to patient and spouse at bedside who deny any home going needs at this time.

## 2024-02-26 NOTE — CARE PLAN
The patient's goals for the shift include      The clinical goals for the shift include Patient will remain free from injury througout shift    Over the shift, the patient did not make progress toward the following goals. Barriers to progression include . Recommendations to address these barriers include   Problem: Safety - Adult  Goal: Free from fall injury  Outcome: Progressing   .

## 2024-02-26 NOTE — PROGRESS NOTES
Trina Elias is a 73 y.o. female on day 1 of admission presenting with Chest pain, unspecified type.      Subjective   Patient sitting upright in bed in no acute distress.  However there is an audible wheeze noted during conversation.  Patient discussed concerns over her pacemaker and inability to get MRI.  Will follow-up with cardiology about pacemaker incompatibility with MRI.  If able to get MRI we will obtain along with MRI of shoulder on her left as her orthopedic surgeon would like one.     Objective     Last Recorded Vitals  /55 (BP Location: Right arm, Patient Position: Lying)   Pulse 69   Temp 36.4 °C (97.5 °F) (Temporal)   Resp 18   Wt 72.8 kg (160 lb 8 oz)   SpO2 99%   Intake/Output last 3 Shifts:    Intake/Output Summary (Last 24 hours) at 2/26/2024 1714  Last data filed at 2/26/2024 1334  Gross per 24 hour   Intake 840 ml   Output --   Net 840 ml       Admission Weight  Weight: 69.9 kg (154 lb) (02/24/24 2030)    Daily Weight  02/25/24 : 72.8 kg (160 lb 8 oz)    Image Results  US abdomen limited liver  Narrative: Interpreted By:  Suhail Silveira,   STUDY:  US ABDOMEN LIMITED LIVER;  2/26/2024 8:28 am      INDICATION:  Signs/Symptoms:pain.      COMPARISON:  CT abdomen pelvis dated 02/25/2024.      ACCESSION NUMBER(S):  [id number]      ORDERING CLINICIAN:  NIKOLE LOVE      TECHNIQUE:  Multiple grayscale and color Doppler images of the right upper  quadrant were obtained.      FINDINGS:  LIVER:  The liver measures 17.6 cm and is grossly unremarkable and free of  any focal lesions.      GALLBLADDER:  Prior cholecystectomy. Unremarkable sonographic assessment within the  region of the gallbladder fossa.      BILE DUCTS:  Mild intrahepatic biliary dilatation is identified; the common bile  duct measures up to 2.1 cm, similar to CT examination.      PANCREAS:  The pancreas is poorly visualized due to overlying bowel gas.      RIGHT KIDNEY:  The right kidney measures 10.5 cm in length. The  renal cortical  echogenicity and thickness are within normal limit.  No  hydronephrosis or renal calculi are seen.      PERITONEUM:  There is no free or loculated fluid seen in the abdomen.      Impression: Mild intrahepatic biliary ductal dilatation status post  cholecystectomy. Additionally, the common bile duct measures up to  2.1 cm in caliber, similar to recent CT examination. No sonographic  choledocholithiasis. As previously advised, further evaluation with  ERCP or MRCP may be considered.      MACRO:  None          Signed by: Suhail Silveira 2/26/2024 9:50 AM  Dictation workstation:   [id number]  ECG 12 lead  Sinus rhythm with 1st degree AV block  Nonspecific ST and T wave abnormality  Abnormal ECG  When compared with ECG of 24-FEB-2024 20:25, (unconfirmed)  UT interval has increased  ECG 12 lead  Normal sinus rhythm  Nonspecific ST and T wave abnormality  Abnormal ECG  When compared with ECG of 01-SEP-2023 23:53,  Previous ECG has undetermined rhythm, needs review  T wave inversion now evident in Lateral leads  ECG 12 lead  Atrial-paced rhythm with premature ventricular or aberrantly conducted complexes  ST & T wave abnormality, consider lateral ischemia  Abnormal ECG  When compared with ECG of 24-FEB-2024 21:39, (unconfirmed)  Electronic atrial pacemaker has replaced Sinus rhythm  Nonspecific T wave abnormality now evident in Inferior leads  Inverted T waves have replaced nonspecific T wave abnormality in Lateral leads      Physical Exam  Constitutional: patient is alert and cooperative with exam  skin: dry and warm  Eyes: EOMI and clear sclera  ENMT: moist mucous membranes  Head/Neck: Neck supple  Respiratory/Thorax: Clear to auscultation bilaterally, no wheezing or crackles appreciated  Cardiovascular: regular rate and rhythm, S1 and S2 present, no murmurs heard  Gastrointestinal: bowel sounds present, no pain or tenderness upon palpation, soft and nondistended  Extremities: +2 radial, posterior tibial,  and pedal pulse, no lower extremity edema noted  Neurological: AxOx3    Relevant Results  Scheduled medications  baclofen, 10 mg, oral, TID  digoxin, 125 mcg, oral, Daily  dilTIAZem CD, 120 mg, oral, Daily  fluticasone, 1 spray, Each Nostril, BID  hydroxychloroquine, 200 mg, oral, Daily  ipratropium-albuteroL, 3 mL, nebulization, q6h  montelukast, 10 mg, oral, Nightly  pantoprazole, 40 mg, intravenous, Daily  polyethylene glycol, 17 g, oral, BID  rosuvastatin, 20 mg, oral, Daily  sucralfate, 1 g, oral, q6h DWIGHT      Continuous medications     PRN medications  PRN medications: acetaminophen **OR** acetaminophen **OR** acetaminophen, albuterol, alum-mag hydroxide-simeth, calcium carbonate, dextromethorphan-guaifenesin, diclofenac sodium, guaiFENesin, nitroglycerin, ondansetron **OR** ondansetron, oxyCODONE-acetaminophen  Results for orders placed or performed during the hospital encounter of 02/24/24 (from the past 24 hour(s))   CBC and Auto Differential   Result Value Ref Range    WBC 7.5 4.4 - 11.3 x10*3/uL    nRBC 0.0 0.0 - 0.0 /100 WBCs    RBC 3.83 (L) 4.00 - 5.20 x10*6/uL    Hemoglobin 8.2 (L) 12.0 - 16.0 g/dL    Hematocrit 27.3 (L) 36.0 - 46.0 %    MCV 71 (L) 80 - 100 fL    MCH 21.4 (L) 26.0 - 34.0 pg    MCHC 30.0 (L) 32.0 - 36.0 g/dL    RDW 20.8 (H) 11.5 - 14.5 %    Platelets 263 150 - 450 x10*3/uL    Neutrophils % 74.6 40.0 - 80.0 %    Immature Granulocytes %, Automated 0.7 0.0 - 0.9 %    Lymphocytes % 12.9 13.0 - 44.0 %    Monocytes % 10.0 2.0 - 10.0 %    Eosinophils % 1.5 0.0 - 6.0 %    Basophils % 0.3 0.0 - 2.0 %    Neutrophils Absolute 5.60 (H) 1.60 - 5.50 x10*3/uL    Immature Granulocytes Absolute, Automated 0.05 0.00 - 0.50 x10*3/uL    Lymphocytes Absolute 0.97 0.80 - 3.00 x10*3/uL    Monocytes Absolute 0.75 0.05 - 0.80 x10*3/uL    Eosinophils Absolute 0.11 0.00 - 0.40 x10*3/uL    Basophils Absolute 0.02 0.00 - 0.10 x10*3/uL   Basic metabolic panel   Result Value Ref Range    Glucose 76 74 - 99 mg/dL     Sodium 138 136 - 145 mmol/L    Potassium 4.1 3.5 - 5.3 mmol/L    Chloride 104 98 - 107 mmol/L    Bicarbonate 26 21 - 32 mmol/L    Anion Gap 12 10 - 20 mmol/L    Urea Nitrogen 14 6 - 23 mg/dL    Creatinine 1.01 0.50 - 1.05 mg/dL    eGFR 59 (L) >60 mL/min/1.73m*2    Calcium 8.3 (L) 8.6 - 10.3 mg/dL   Morphology   Result Value Ref Range    RBC Morphology See Below     Polychromasia Mild     Ovalocytes Few    Magnesium   Result Value Ref Range    Magnesium 2.64 (H) 1.60 - 2.40 mg/dL          Assessment/Plan      Principal Problem:    Chest pain, unspecified type    Trina Elias is a 73 y.o. female with history of multiple abdominal surgeries, CAD, ICD/PPM placement, HTN and COPD, presenting with chest pain.     #Acute blood loss anemia  Hemodynamically stable, blood pressure stable today, afebrile  Baseline hemoglobin of 9  H/H 8.2/27.3.  Secondary to GI bleeding  PPI  Keep the patient on clear liquid diet scopes on 2/27  General surgery       #Severe abdominal pain  #dilated extrahepatic  biliary duct measuring up to 3.2 cm in diameter  -Amylase 19 and lipase 18  -CT abdomen pelvis showed: Status post cholecystectomy with a markedly dilated extrahepatic  biliary duct measuring up to 3.2 cm in diameter, increased from the prior study.  This may be chronic, however, correlation with biliary lab values is recommended, nonemergent ERCP or MRCP may be of benefit  in this patient.   -Pacemaker is MRI compatible we will plan for nonurgent MRCP   -Pain control.   -GI consulted recommendations are appreciated     #Near syncope  Appears to be from a combination of events including acute blood loss anemia, volume depletion and arrhythmia  Monitor with orthostatic vitals     #PPM/ICD problem  Has outpatient appointment to see electrophysiologist next week  Due to recurrent runs of v tach reported, will ask cardiology to see.     #Dispo  -Pending improvement of symptoms, GI and general surgery recommendations, trending H&H, echo  ordered. Scopes to be preformed on 2/27    Carlo Benitez, DO

## 2024-02-26 NOTE — NURSING NOTE
Patient resting comfortably in bed. Scheduled to have ultrasound of liver this morning, time unknown. Patient has been NPO since midnight. Morning Sulcrafate on hold till after ultrasound. Patient aware, call light within reach.

## 2024-02-27 ENCOUNTER — APPOINTMENT (OUTPATIENT)
Dept: CARDIOLOGY | Facility: HOSPITAL | Age: 74
DRG: 811 | End: 2024-02-27
Payer: MEDICARE

## 2024-02-27 ENCOUNTER — ANESTHESIA (OUTPATIENT)
Dept: OPERATING ROOM | Facility: HOSPITAL | Age: 74
DRG: 811 | End: 2024-02-27
Payer: MEDICARE

## 2024-02-27 ENCOUNTER — ANESTHESIA EVENT (OUTPATIENT)
Dept: OPERATING ROOM | Facility: HOSPITAL | Age: 74
DRG: 811 | End: 2024-02-27
Payer: MEDICARE

## 2024-02-27 ENCOUNTER — APPOINTMENT (OUTPATIENT)
Dept: OPERATING ROOM | Facility: HOSPITAL | Age: 74
DRG: 811 | End: 2024-02-27
Payer: MEDICARE

## 2024-02-27 LAB
ALBUMIN SERPL BCP-MCNC: 3.3 G/DL (ref 3.4–5)
ANION GAP SERPL CALC-SCNC: 13 MMOL/L (ref 10–20)
AORTIC VALVE MEAN GRADIENT: 16 MMHG
AORTIC VALVE PEAK VELOCITY: 2.62 M/S
AV PEAK GRADIENT: 27.5 MMHG
AVA (PEAK VEL): 1.13 CM2
AVA (VTI): 1.13 CM2
BASOPHILS # BLD AUTO: 0.03 X10*3/UL (ref 0–0.1)
BASOPHILS NFR BLD AUTO: 0.4 %
BUN SERPL-MCNC: 11 MG/DL (ref 6–23)
CALCIUM SERPL-MCNC: 8.2 MG/DL (ref 8.6–10.3)
CHLORIDE SERPL-SCNC: 102 MMOL/L (ref 98–107)
CO2 SERPL-SCNC: 28 MMOL/L (ref 21–32)
CREAT SERPL-MCNC: 0.89 MG/DL (ref 0.5–1.05)
EGFRCR SERPLBLD CKD-EPI 2021: 69 ML/MIN/1.73M*2
EJECTION FRACTION APICAL 4 CHAMBER: 55.9
EJECTION FRACTION: 56 %
EOSINOPHIL # BLD AUTO: 0.13 X10*3/UL (ref 0–0.4)
EOSINOPHIL NFR BLD AUTO: 1.6 %
ERYTHROCYTE [DISTWIDTH] IN BLOOD BY AUTOMATED COUNT: 21.2 % (ref 11.5–14.5)
GLUCOSE SERPL-MCNC: 76 MG/DL (ref 74–99)
HCT VFR BLD AUTO: 25.5 % (ref 36–46)
HGB BLD-MCNC: 7.6 G/DL (ref 12–16)
HYPOCHROMIA BLD QL SMEAR: NORMAL
IMM GRANULOCYTES # BLD AUTO: 0.07 X10*3/UL (ref 0–0.5)
IMM GRANULOCYTES NFR BLD AUTO: 0.9 % (ref 0–0.9)
LEFT ATRIUM VOLUME AREA LENGTH INDEX BSA: 46.1 ML/M2
LEFT VENTRICLE INTERNAL DIMENSION DIASTOLE: 5.7 CM (ref 3.5–6)
LEFT VENTRICULAR OUTFLOW TRACT DIAMETER: 1.95 CM
LYMPHOCYTES # BLD AUTO: 0.94 X10*3/UL (ref 0.8–3)
LYMPHOCYTES NFR BLD AUTO: 11.6 %
MAGNESIUM SERPL-MCNC: 2.25 MG/DL (ref 1.6–2.4)
MCH RBC QN AUTO: 21.2 PG (ref 26–34)
MCHC RBC AUTO-ENTMCNC: 29.8 G/DL (ref 32–36)
MCV RBC AUTO: 71 FL (ref 80–100)
MITRAL VALVE E/A RATIO: 2.06
MITRAL VALVE E/E' RATIO: 21.21
MONOCYTES # BLD AUTO: 1.18 X10*3/UL (ref 0.05–0.8)
MONOCYTES NFR BLD AUTO: 14.5 %
NEUTROPHILS # BLD AUTO: 5.76 X10*3/UL (ref 1.6–5.5)
NEUTROPHILS NFR BLD AUTO: 71 %
NRBC BLD-RTO: 0.2 /100 WBCS (ref 0–0)
OVALOCYTES BLD QL SMEAR: NORMAL
PHOSPHATE SERPL-MCNC: 3.8 MG/DL (ref 2.5–4.9)
PLATELET # BLD AUTO: 245 X10*3/UL (ref 150–450)
POLYCHROMASIA BLD QL SMEAR: NORMAL
POTASSIUM SERPL-SCNC: 3.6 MMOL/L (ref 3.5–5.3)
RBC # BLD AUTO: 3.58 X10*6/UL (ref 4–5.2)
RBC MORPH BLD: NORMAL
RIGHT VENTRICLE FREE WALL PEAK S': 18 CM/S
RIGHT VENTRICLE PEAK SYSTOLIC PRESSURE: 50.9 MMHG
SODIUM SERPL-SCNC: 139 MMOL/L (ref 136–145)
TRICUSPID ANNULAR PLANE SYSTOLIC EXCURSION: 2.7 CM
WBC # BLD AUTO: 8.1 X10*3/UL (ref 4.4–11.3)

## 2024-02-27 PROCEDURE — 3600000002 HC OR TIME - INITIAL BASE CHARGE - PROCEDURE LEVEL TWO: Performed by: NURSE ANESTHETIST, CERTIFIED REGISTERED

## 2024-02-27 PROCEDURE — 43235 EGD DIAGNOSTIC BRUSH WASH: CPT | Performed by: SURGERY

## 2024-02-27 PROCEDURE — C9113 INJ PANTOPRAZOLE SODIUM, VIA: HCPCS | Performed by: INTERNAL MEDICINE

## 2024-02-27 PROCEDURE — 7100000001 HC RECOVERY ROOM TIME - INITIAL BASE CHARGE: Performed by: NURSE ANESTHETIST, CERTIFIED REGISTERED

## 2024-02-27 PROCEDURE — 2500000005 HC RX 250 GENERAL PHARMACY W/O HCPCS: Performed by: NURSE ANESTHETIST, CERTIFIED REGISTERED

## 2024-02-27 PROCEDURE — 3700000001 HC GENERAL ANESTHESIA TIME - INITIAL BASE CHARGE: Performed by: NURSE ANESTHETIST, CERTIFIED REGISTERED

## 2024-02-27 PROCEDURE — 2500000004 HC RX 250 GENERAL PHARMACY W/ HCPCS (ALT 636 FOR OP/ED): Performed by: INTERNAL MEDICINE

## 2024-02-27 PROCEDURE — 3600000007 HC OR TIME - EACH INCREMENTAL 1 MINUTE - PROCEDURE LEVEL TWO: Performed by: NURSE ANESTHETIST, CERTIFIED REGISTERED

## 2024-02-27 PROCEDURE — 1100000001 HC PRIVATE ROOM DAILY

## 2024-02-27 PROCEDURE — A45378 PR COLONOSCOPY,DIAGNOSTIC: Performed by: NURSE ANESTHETIST, CERTIFIED REGISTERED

## 2024-02-27 PROCEDURE — 0DJ08ZZ INSPECTION OF UPPER INTESTINAL TRACT, VIA NATURAL OR ARTIFICIAL OPENING ENDOSCOPIC: ICD-10-PCS | Performed by: SURGERY

## 2024-02-27 PROCEDURE — 3700000002 HC GENERAL ANESTHESIA TIME - EACH INCREMENTAL 1 MINUTE: Performed by: NURSE ANESTHETIST, CERTIFIED REGISTERED

## 2024-02-27 PROCEDURE — 2500000001 HC RX 250 WO HCPCS SELF ADMINISTERED DRUGS (ALT 637 FOR MEDICARE OP): Performed by: INTERNAL MEDICINE

## 2024-02-27 PROCEDURE — 0DJD8ZZ INSPECTION OF LOWER INTESTINAL TRACT, VIA NATURAL OR ARTIFICIAL OPENING ENDOSCOPIC: ICD-10-PCS | Performed by: SURGERY

## 2024-02-27 PROCEDURE — 2500000004 HC RX 250 GENERAL PHARMACY W/ HCPCS (ALT 636 FOR OP/ED): Performed by: NURSE ANESTHETIST, CERTIFIED REGISTERED

## 2024-02-27 PROCEDURE — 99233 SBSQ HOSP IP/OBS HIGH 50: CPT | Performed by: INTERNAL MEDICINE

## 2024-02-27 PROCEDURE — 99232 SBSQ HOSP IP/OBS MODERATE 35: CPT | Performed by: PHYSICIAN ASSISTANT

## 2024-02-27 PROCEDURE — 93306 TTE W/DOPPLER COMPLETE: CPT

## 2024-02-27 PROCEDURE — 2500000002 HC RX 250 W HCPCS SELF ADMINISTERED DRUGS (ALT 637 FOR MEDICARE OP, ALT 636 FOR OP/ED): Performed by: INTERNAL MEDICINE

## 2024-02-27 PROCEDURE — 7100000002 HC RECOVERY ROOM TIME - EACH INCREMENTAL 1 MINUTE: Performed by: NURSE ANESTHETIST, CERTIFIED REGISTERED

## 2024-02-27 PROCEDURE — 83735 ASSAY OF MAGNESIUM: CPT | Performed by: STUDENT IN AN ORGANIZED HEALTH CARE EDUCATION/TRAINING PROGRAM

## 2024-02-27 PROCEDURE — 45378 DIAGNOSTIC COLONOSCOPY: CPT | Performed by: SURGERY

## 2024-02-27 PROCEDURE — 85025 COMPLETE CBC W/AUTO DIFF WBC: CPT | Performed by: INTERNAL MEDICINE

## 2024-02-27 PROCEDURE — 2500000004 HC RX 250 GENERAL PHARMACY W/ HCPCS (ALT 636 FOR OP/ED): Performed by: ANESTHESIOLOGY

## 2024-02-27 PROCEDURE — 36415 COLL VENOUS BLD VENIPUNCTURE: CPT | Performed by: INTERNAL MEDICINE

## 2024-02-27 PROCEDURE — 80069 RENAL FUNCTION PANEL: CPT | Performed by: STUDENT IN AN ORGANIZED HEALTH CARE EDUCATION/TRAINING PROGRAM

## 2024-02-27 PROCEDURE — 2500000002 HC RX 250 W HCPCS SELF ADMINISTERED DRUGS (ALT 637 FOR MEDICARE OP, ALT 636 FOR OP/ED): Mod: MUE | Performed by: INTERNAL MEDICINE

## 2024-02-27 RX ORDER — PROPOFOL 10 MG/ML
INJECTION, EMULSION INTRAVENOUS CONTINUOUS PRN
Status: DISCONTINUED | OUTPATIENT
Start: 2024-02-27 | End: 2024-02-27

## 2024-02-27 RX ORDER — FENTANYL CITRATE 50 UG/ML
INJECTION, SOLUTION INTRAMUSCULAR; INTRAVENOUS AS NEEDED
Status: DISCONTINUED | OUTPATIENT
Start: 2024-02-27 | End: 2024-02-27

## 2024-02-27 RX ORDER — PHENYLEPHRINE HCL IN 0.9% NACL 0.4MG/10ML
SYRINGE (ML) INTRAVENOUS AS NEEDED
Status: DISCONTINUED | OUTPATIENT
Start: 2024-02-27 | End: 2024-02-27

## 2024-02-27 RX ORDER — SODIUM CHLORIDE, SODIUM LACTATE, POTASSIUM CHLORIDE, CALCIUM CHLORIDE 600; 310; 30; 20 MG/100ML; MG/100ML; MG/100ML; MG/100ML
100 INJECTION, SOLUTION INTRAVENOUS CONTINUOUS
Status: DISCONTINUED | OUTPATIENT
Start: 2024-02-27 | End: 2024-02-29

## 2024-02-27 RX ORDER — PROPOFOL 10 MG/ML
INJECTION, EMULSION INTRAVENOUS AS NEEDED
Status: DISCONTINUED | OUTPATIENT
Start: 2024-02-27 | End: 2024-02-27

## 2024-02-27 RX ORDER — LIDOCAINE HYDROCHLORIDE 10 MG/ML
INJECTION, SOLUTION EPIDURAL; INFILTRATION; INTRACAUDAL; PERINEURAL AS NEEDED
Status: DISCONTINUED | OUTPATIENT
Start: 2024-02-27 | End: 2024-02-27

## 2024-02-27 RX ADMIN — SUCRALFATE 1 G: 1 SUSPENSION ORAL at 09:58

## 2024-02-27 RX ADMIN — SODIUM CHLORIDE, POTASSIUM CHLORIDE, SODIUM LACTATE AND CALCIUM CHLORIDE 100 ML/HR: 600; 310; 30; 20 INJECTION, SOLUTION INTRAVENOUS at 13:30

## 2024-02-27 RX ADMIN — PROPOFOL 30 MG: 10 INJECTION, EMULSION INTRAVENOUS at 13:48

## 2024-02-27 RX ADMIN — PANTOPRAZOLE SODIUM 40 MG: 40 INJECTION, POWDER, FOR SOLUTION INTRAVENOUS at 10:30

## 2024-02-27 RX ADMIN — OXYCODONE HYDROCHLORIDE AND ACETAMINOPHEN 1 TABLET: 5; 325 TABLET ORAL at 11:33

## 2024-02-27 RX ADMIN — DILTIAZEM HYDROCHLORIDE 120 MG: 120 CAPSULE, COATED, EXTENDED RELEASE ORAL at 09:51

## 2024-02-27 RX ADMIN — PROPOFOL 50 MCG/KG/MIN: 10 INJECTION, EMULSION INTRAVENOUS at 13:41

## 2024-02-27 RX ADMIN — FLUTICASONE PROPIONATE 1 SPRAY: 50 SPRAY, METERED NASAL at 21:03

## 2024-02-27 RX ADMIN — Medication 80 MCG: at 13:53

## 2024-02-27 RX ADMIN — FLUTICASONE PROPIONATE 1 SPRAY: 50 SPRAY, METERED NASAL at 09:51

## 2024-02-27 RX ADMIN — Medication 120 MCG: at 14:04

## 2024-02-27 RX ADMIN — ROSUVASTATIN CALCIUM 20 MG: 20 TABLET, FILM COATED ORAL at 09:51

## 2024-02-27 RX ADMIN — HYDROXYCHLOROQUINE SULFATE 200 MG: 200 TABLET, FILM COATED ORAL at 09:51

## 2024-02-27 RX ADMIN — BACLOFEN 10 MG: 10 TABLET ORAL at 20:58

## 2024-02-27 RX ADMIN — Medication 80 MCG: at 13:58

## 2024-02-27 RX ADMIN — OXYCODONE HYDROCHLORIDE AND ACETAMINOPHEN 1 TABLET: 5; 325 TABLET ORAL at 20:58

## 2024-02-27 RX ADMIN — FENTANYL CITRATE 50 MCG: 50 INJECTION, SOLUTION INTRAMUSCULAR; INTRAVENOUS at 13:34

## 2024-02-27 RX ADMIN — PROPOFOL 40 MG: 10 INJECTION, EMULSION INTRAVENOUS at 13:37

## 2024-02-27 RX ADMIN — ALBUTEROL SULFATE 2.5 MG: 2.5 SOLUTION RESPIRATORY (INHALATION) at 13:31

## 2024-02-27 RX ADMIN — LIDOCAINE HYDROCHLORIDE 50 MG: 10 INJECTION, SOLUTION EPIDURAL; INFILTRATION; INTRACAUDAL; PERINEURAL at 13:37

## 2024-02-27 RX ADMIN — MONTELUKAST 10 MG: 10 TABLET, FILM COATED ORAL at 20:58

## 2024-02-27 RX ADMIN — SUCRALFATE 1 G: 1 SUSPENSION ORAL at 16:39

## 2024-02-27 RX ADMIN — BACLOFEN 10 MG: 10 TABLET ORAL at 09:51

## 2024-02-27 SDOH — HEALTH STABILITY: MENTAL HEALTH: CURRENT SMOKER: 0

## 2024-02-27 ASSESSMENT — COGNITIVE AND FUNCTIONAL STATUS - GENERAL
STANDING UP FROM CHAIR USING ARMS: A LITTLE
WALKING IN HOSPITAL ROOM: A LITTLE
MOBILITY SCORE: 20
DAILY ACTIVITIY SCORE: 24
CLIMB 3 TO 5 STEPS WITH RAILING: A LITTLE
MOVING TO AND FROM BED TO CHAIR: A LITTLE

## 2024-02-27 ASSESSMENT — PAIN SCALES - GENERAL
PAINLEVEL_OUTOF10: 7
PAINLEVEL_OUTOF10: 0 - NO PAIN
PAINLEVEL_OUTOF10: 7

## 2024-02-27 ASSESSMENT — PAIN - FUNCTIONAL ASSESSMENT
PAIN_FUNCTIONAL_ASSESSMENT: 0-10
PAIN_FUNCTIONAL_ASSESSMENT: 0-10

## 2024-02-27 NOTE — NURSING NOTE
2010 IV Zofran given per request for c/o nausea, see MAR. Oral medication tolerated well. Notified Respiratory pt requesting breathing treatment.     2028 Resp therapy to floor to give pt breathing treatment for cough, SOB, and wheezing, patient placed on 2L O2 for comfort.     0130 Patient up to the BSC for small watery BM, last of Golytely drink finished at this time and patient aware she is now NPO until procedure. Bed alarm on for safety denies needs.

## 2024-02-27 NOTE — ANESTHESIA POSTPROCEDURE EVALUATION
Patient: Trina Elias    Procedure Summary       Date: 02/27/24 Room / Location: [facility] OR    Anesthesia Start: 1331 Anesthesia Stop: 1421    Procedures:       EGD      COLONOSCOPY Diagnosis:       Anemia requiring transfusions      Gastrointestinal hemorrhage with melena    Scheduled Providers: Nnamdi Cleary MD Responsible Provider: BRIANA Goss    Anesthesia Type: MAC ASA Status: 3            Anesthesia Type: MAC    Vitals Value Taken Time   /40 02/27/24 1450   Temp 36.5 °C (97.7 °F) 02/27/24 1415   Pulse 60 02/27/24 1450   Resp 12 02/27/24 1450   SpO2 98 % 02/27/24 1450       Anesthesia Post Evaluation    Patient location during evaluation: PACU  Patient participation: complete - patient participated  Level of consciousness: awake  Pain management: adequate  Multimodal analgesia pain management approach  Airway patency: patent  Two or more strategies used to mitigate risk of obstructive sleep apnea  Cardiovascular status: acceptable  Respiratory status: acceptable  Hydration status: acceptable  Postoperative Nausea and Vomiting: none        No notable events documented.

## 2024-02-27 NOTE — PROGRESS NOTES
"Trina Elias is a 73 y.o. female on day 2 of admission presenting with Chest pain, unspecified type.    Subjective   Was nauseated while drinking bowel prep for colonoscopy but otherwise no complaints.        Objective     Physical Exam  Constitutional: well nourished, well appearing. NAD. Alert and cooperative  Skin: no jaundice   Eyes: anicteric, normal conjunctiva  ENT: MMM  Pulmonary: easy and nonlabored on RA  Abdomen: soft, NT, ND. No ascites.  MSK: MAEx4  Extremities: no edema  Neuro: aaox3  Psych: appropriate mood and behavior  Last Recorded Vitals  Blood pressure 115/56, pulse 60, temperature 36 °C (96.8 °F), temperature source Temporal, resp. rate 18, height 1.626 m (5' 4\"), weight 72.8 kg (160 lb 8 oz), SpO2 100 %.  Intake/Output last 3 Shifts:  I/O last 3 completed shifts:  In: 840 (11.5 mL/kg) [P.O.:840]  Out: - (0 mL/kg)   Weight: 72.8 kg     Relevant Results      Scheduled medications  baclofen, 10 mg, oral, TID  digoxin, 125 mcg, oral, Daily  dilTIAZem CD, 120 mg, oral, Daily  fluticasone, 1 spray, Each Nostril, BID  hydroxychloroquine, 200 mg, oral, Daily  montelukast, 10 mg, oral, Nightly  pantoprazole, 40 mg, intravenous, Daily  polyethylene glycol, 17 g, oral, BID  rosuvastatin, 20 mg, oral, Daily  sucralfate, 1 g, oral, q6h DWIGHT      Continuous medications     PRN medications  PRN medications: acetaminophen **OR** acetaminophen **OR** acetaminophen, albuterol, alum-mag hydroxide-simeth, calcium carbonate, dextromethorphan-guaifenesin, diclofenac sodium, guaiFENesin, nitroglycerin, ondansetron **OR** ondansetron, oxyCODONE-acetaminophen, oxygen     Results for orders placed or performed during the hospital encounter of 02/24/24 (from the past 24 hour(s))   CBC and Auto Differential   Result Value Ref Range    WBC 8.1 4.4 - 11.3 x10*3/uL    nRBC 0.2 (H) 0.0 - 0.0 /100 WBCs    RBC 3.58 (L) 4.00 - 5.20 x10*6/uL    Hemoglobin 7.6 (L) 12.0 - 16.0 g/dL    Hematocrit 25.5 (L) 36.0 - 46.0 %    MCV 71 (L) " 80 - 100 fL    MCH 21.2 (L) 26.0 - 34.0 pg    MCHC 29.8 (L) 32.0 - 36.0 g/dL    RDW 21.2 (H) 11.5 - 14.5 %    Platelets 245 150 - 450 x10*3/uL    Neutrophils % 71.0 40.0 - 80.0 %    Immature Granulocytes %, Automated 0.9 0.0 - 0.9 %    Lymphocytes % 11.6 13.0 - 44.0 %    Monocytes % 14.5 2.0 - 10.0 %    Eosinophils % 1.6 0.0 - 6.0 %    Basophils % 0.4 0.0 - 2.0 %    Neutrophils Absolute 5.76 (H) 1.60 - 5.50 x10*3/uL    Immature Granulocytes Absolute, Automated 0.07 0.00 - 0.50 x10*3/uL    Lymphocytes Absolute 0.94 0.80 - 3.00 x10*3/uL    Monocytes Absolute 1.18 (H) 0.05 - 0.80 x10*3/uL    Eosinophils Absolute 0.13 0.00 - 0.40 x10*3/uL    Basophils Absolute 0.03 0.00 - 0.10 x10*3/uL   Renal Function Panel   Result Value Ref Range    Glucose 76 74 - 99 mg/dL    Sodium 139 136 - 145 mmol/L    Potassium 3.6 3.5 - 5.3 mmol/L    Chloride 102 98 - 107 mmol/L    Bicarbonate 28 21 - 32 mmol/L    Anion Gap 13 10 - 20 mmol/L    Urea Nitrogen 11 6 - 23 mg/dL    Creatinine 0.89 0.50 - 1.05 mg/dL    eGFR 69 >60 mL/min/1.73m*2    Calcium 8.2 (L) 8.6 - 10.3 mg/dL    Phosphorus 3.8 2.5 - 4.9 mg/dL    Albumin 3.3 (L) 3.4 - 5.0 g/dL   Magnesium   Result Value Ref Range    Magnesium 2.25 1.60 - 2.40 mg/dL   Morphology   Result Value Ref Range    RBC Morphology See Below     Polychromasia Mild     Hypochromia Mild     Ovalocytes Few    Transthoracic Echo (TTE) Complete   Result Value Ref Range    BSA 1.81 m2        US abdomen limited liver    Result Date: 2/26/2024  Interpreted By:  Suhail Silveira, STUDY: US ABDOMEN LIMITED LIVER;  2/26/2024 8:28 am   INDICATION: Signs/Symptoms:pain.   COMPARISON: CT abdomen pelvis dated 02/25/2024.   ACCESSION NUMBER(S): [id number]   ORDERING CLINICIAN: NIKOLE LOVE   TECHNIQUE: Multiple grayscale and color Doppler images of the right upper quadrant were obtained.   FINDINGS: LIVER: The liver measures 17.6 cm and is grossly unremarkable and free of any focal lesions.   GALLBLADDER: Prior  cholecystectomy. Unremarkable sonographic assessment within the region of the gallbladder fossa.   BILE DUCTS: Mild intrahepatic biliary dilatation is identified; the common bile duct measures up to 2.1 cm, similar to CT examination.   PANCREAS: The pancreas is poorly visualized due to overlying bowel gas.   RIGHT KIDNEY: The right kidney measures 10.5 cm in length. The renal cortical echogenicity and thickness are within normal limit.  No hydronephrosis or renal calculi are seen.   PERITONEUM: There is no free or loculated fluid seen in the abdomen.       Mild intrahepatic biliary ductal dilatation status post cholecystectomy. Additionally, the common bile duct measures up to 2.1 cm in caliber, similar to recent CT examination. No sonographic choledocholithiasis. As previously advised, further evaluation with ERCP or MRCP may be considered.   MACRO: None     Signed by: Suhail Silveira 2/26/2024 9:50 AM Dictation workstation:   [id number]    ECG 12 lead    Result Date: 2/26/2024  Normal sinus rhythm Nonspecific ST and T wave abnormality Abnormal ECG When compared with ECG of 01-SEP-2023 23:53, Previous ECG has undetermined rhythm, needs review T wave inversion now evident in Lateral leads    ECG 12 lead    Result Date: 2/26/2024  Sinus rhythm with 1st degree AV block Nonspecific ST and T wave abnormality Abnormal ECG When compared with ECG of 24-FEB-2024 20:25, (unconfirmed) MI interval has increased    ECG 12 lead    Result Date: 2/26/2024  Atrial-paced rhythm with premature ventricular or aberrantly conducted complexes ST & T wave abnormality, consider lateral ischemia Abnormal ECG When compared with ECG of 24-FEB-2024 21:39, (unconfirmed) Electronic atrial pacemaker has replaced Sinus rhythm Nonspecific T wave abnormality now evident in Inferior leads Inverted T waves have replaced nonspecific T wave abnormality in Lateral leads    CT abdomen pelvis w IV contrast    Result Date: 2/25/2024  STUDY: CT Abdomen and  Pelvis with IV Contrast; 2/25/2024 1:50 AM INDICATION: Severe epigastric abdominal pain. COMPARISON: CXR 2/24/2024.  CT AP 9/2/2023, 2/25/2023. ACCESSION NUMBER(S): [id number] ORDERING CLINICIAN: LORA BEE TECHNIQUE: CT of the abdomen and pelvis was performed.  Contiguous axial images were obtained at 3 mm slice thickness through the abdomen and pelvis. Coronal and sagittal reconstructions at 3 mm slice thickness were performed.  Omnipaque 350 90 mL was administered intravenously. FINDINGS: LOWER CHEST: Cardiac size is enlarged.  No pericardial effusion.  Pacer leads are seen in the right atrium and right ventricle.  There is a small sliding-type hiatal hernia.  Slightly increased AP diameter of the thorax at the lung bases suggests chronic changes of COPD.  Mosaic attenuation of the lung bases is consistent with chronic small airway disease and air trapping.  ABDOMEN:  LIVER: No hepatomegaly.  Smooth surface contour.  Normal attenuation.  BILE DUCTS/GALLBLADDER: The patient is status post cholecystectomy with moderate intrahepatic biliary dilatation, commonly seen in postcholecystectomy patients, however, the common bile duct is markedly dilated, measuring 3.2 cm in diameter.  This is slightly more pronounced when compared to the prior exam.  STOMACH: No abnormalities identified.  Postoperative changes are seen in the region of the GE junction with multiple surgical clips noted of unclear etiology, possibly prior fundoplication.   PANCREAS: No masses or ductal dilatation.  SPLEEN: No splenomegaly or focal splenic lesion.  ADRENAL GLANDS: No thickening or nodules.  KIDNEYS AND URETERS: Kidneys are normal in location.  Mild renal cortical thinning is seen bilaterally.  Subcentimeter hypodensities in both kidneys appear to represent simple cysts but are too small to definitively characterize.  Statistically these are most likely benign, follow-up only if clinically indicated.  No renal or ureteral calculi.   PELVIS:  BLADDER: No abnormalities identified.  REPRODUCTIVE ORGANS: No acute uterine or adnexal pathology is identified.   BOWEL: Minimal diverticulosis is noted in the sigmoid colon.  Appendix is not identified and may be absent.  Terminal ileum is unremarkable.   VESSELS: No abnormalities identified.  Abdominal aorta is normal in caliber.  PERITONEUM/RETROPERITONEUM/LYMPH NODES: No free fluid.  No pneumoperitoneum. No lymphadenopathy.  ABDOMINAL WALL: No abnormalities identified.  Enteric anastomosis is noted in the anterior mid abdomen in the infraumbilical region with postoperative changes in the anterior infraumbilical abdominal wall.  SOFT TISSUES: No abnormalities identified.  BONES: No acute fracture or aggressive osseous lesion.  Multilevel mild to moderate disc disease is seen in the visualized spine.    1.  Status post cholecystectomy with a markedly dilated extrahepatic biliary duct measuring up to 3.2 cm in diameter, increased from the prior study.  This may be chronic, however, correlation with biliary lab values is recommended, nonemergent ERCP or MRCP may be of benefit in this patient. 2.  Chronic postoperative changes are seen in the region of the GE junction with a small sliding-type hiatal hernia, correlate with patient's surgical history. 3.  Cardiomegaly. 4.  Chronic changes of suspected COPD. 5.  Minimal distal colonic diverticulosis. Signed by Mesfin WOLFF chest 1 view    Result Date: 2/24/2024  Interpreted By:  Nixon Orantes, STUDY: Chest dated  2/24/2024.   INDICATION: Signs/Symptoms:Chest Pain   COMPARISON: Chest dated 09/03/2023.   ACCESSION NUMBER(S): [id number]   ORDERING CLINICIAN: CECE HOOD   TECHNIQUE: One view of the chest.   FINDINGS: The lungs are clear.  No pneumothorax or effusion is evident. The cardiomediastinal silhouette is  enlarged but similar to the prior exam.There is a pulse generator on the left chest wall with leads tracking over the heart.There is a  reverse right shoulder arthroplasty.Clips are seen over the upper abdomen. Degenerative changes seen of the spine and shoulders.       No acute cardiopulmonary process is evident.   MACRO: None   Signed by: Nixon Orantes 2/24/2024 8:47 PM Dictation workstation:   [id number]    CT shoulder left wo IV contrast    Result Date: 2/2/2024  Interpreted By:  Dori Magallanes, STUDY: CT SHOULDER LEFT WO IV CONTRAST; ;  2/2/2024 8:22 am   INDICATION: Signs/Symptoms:ct left shoulder wo. Left shoulder osteoarthritis   COMPARISON: None.   ACCESSION NUMBER(S): [id number]   ORDERING CLINICIAN: IRIS CARPENTER   TECHNIQUE: Serial axial CT images obtained of the left shoulder. Images reformatted in the coronal and sagittal projection.   All CT examinations are performed with 1 or more of the following dose reduction techniques: Automated exposure control, adjustment of mA and/or kv according to patient's size, or use of iterative reconstruction techniques.   FINDINGS: Left glenohumeral articulation demonstrates mild osteophytosis along inferior margin of the humeral head neck junction. No flattening of the humeral head contour. There is minimal subcortical sclerosis superior margin of the humeral head.   Osseous glenoid demonstrates mild osteophytosis about the glenoid rim. No subcortical sclerosis demonstrated. No significant subcortical cystic change demonstrated. There is neutral glenoid version. No significant atrophy of the rotator cuff musculature.   Remainder of the visualized scapula is unremarkable. Acromioclavicular joint demonstrates mild osteoarthritic degenerative change.   Included portions visualized left ribs are unremarkable. Degenerative discogenic changes in the thoracic spine. Included portions visualized lung parenchyma demonstrates calcified granuloma at the left lung apex. Scattered bulla are demonstrated.           1. Mild left glenohumeral joint osteoarthritis.     MACRO: None   Signed by: Dori Magallanes  2/2/2024 9:34 AM Dictation workstation:   [id number]         Assessment/Plan   Principal Problem:    Chest pain, unspecified type      74 yo female admitted for near syncope. Found to have acute on chronic anemia requiring PRBC transfusion, possibly r/t AVM vs inflammatory polyp. GI consulted for CBD dilatation; chronically dilated to 15mm but up to 21mm per US today.      -EGD/colonoscopy today with Dr. Cleary   -NPO  -PPI BID  -Xarelto discontinued by cardiology  -CBC, BMP daily  -Plan for EUS outpatient to further assess dilated CBD. Discussed with patient and advanced endoscopists at [facility]. They will coordinate.   -Supportive care per primary     I spent 30 minutes in the professional and overall care of this patient.    Thank you for the consult. GI will sign off. Please dochalo with any questions.    Plan discussed with Dr. Blackburn, who is in agreement.     Tasha Lozoya PA-C

## 2024-02-27 NOTE — ANESTHESIA PREPROCEDURE EVALUATION
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 Unexplained anemia and dark stools   (+) Gastroesophageal reflux disease without esophagitis      /Renal   (+) CKD (chronic kidney disease)   (+) Stage 3a chronic kidney disease (CMS/HCC)      Neuro/Psych   (+) Cervical radiculopathy   (+) Cubital tunnel syndrome on right   (+) Displacement of lumbar disc with radiculopathy      Pulmonary   (+) Asthma   (+) Centrilobular emphysema (CMS/HCC)   (+) Obstructive sleep apnea      GI/Hepatic (within normal limits)      Hematology   (+) Anticoagulant long-term use   (+) Coagulation defect, unspecified (CMS/HCC)   (+) Iron deficiency anemia      Musculoskeletal   (+) Chronic bilateral low back pain without sciatica   (+) Displacement of lumbar disc with radiculopathy   (+) Primary osteoarthritis of right shoulder   (+) Rheumatoid arthritis (CMS/HCC)      Other   (+) Rheumatoid arthritis (CMS/HCC)       Clinical information reviewed:   Tobacco  Allergies  Meds  Problems  Med Hx  Surg Hx   Fam Hx  Soc   Hx        NPO Detail:  NPO/Void Status  Date of Last Liquid: 02/27/24  Time of Last Liquid: 0030         Physical Exam    Airway  Mallampati: II  TM distance: >3 FB  Neck ROM: limited     Cardiovascular - normal exam  Rhythm: regular  Rate: normal     Dental   (+) upper  dentures, lower dentures     Pulmonary - normal exam  Breath sounds clear to auscultation     Abdominal          Anesthesia Plan    History of general anesthesia?: yes  History of complications of general anesthesia?: no    ASA 3     MAC     The patient is not a current smoker.    intravenous induction   Anesthetic plan and risks discussed with patient.  Use of blood products discussed with patient who.    Plan discussed with CRNA and attending.

## 2024-02-27 NOTE — PROGRESS NOTES
Trina Cartagena is a 73 y.o. female on day 2 of admission presenting with Chest pain, unspecified type.      Subjective   Seen and examined, no new complaints.  No overnight events.  The plan discussed with the patient and RN.  Off the floor getting scopes.    Objective     Last Recorded Vitals  /56 (BP Location: Right arm, Patient Position: Lying)   Pulse 60   Temp 36 °C (96.8 °F) (Temporal)   Resp 18   Wt 72.8 kg (160 lb 8 oz)   SpO2 100%   Intake/Output last 3 Shifts:    Intake/Output Summary (Last 24 hours) at 2/27/2024 1132  Last data filed at 2/26/2024 1334  Gross per 24 hour   Intake 480 ml   Output --   Net 480 ml         Admission Weight  Weight: 69.9 kg (154 lb) (02/24/24 2030)    Daily Weight  02/25/24 : 72.8 kg (160 lb 8 oz)    Image Results  Transthoracic Echo (TTE) [facility], [address] [place] [address]                Tel [phone] and Fax [phone]    TRANSTHORACIC ECHOCARDIOGRAM REPORT       Patient Name:      TRINA CARTAGENA         Reading Physician:    [id number] Nohelia Soto MD  Study Date:        2/27/2024            Ordering Provider:    [id number] NIKOLE LOVE  MRN/PID:           [id number]             Fellow:  Accession#:        [id number]         Nurse:  Date of Birth/Age: 1950 / 73 years Sonographer:          Dai Diaz RDCS  Gender:            F                    Additional Staff:  Height:            162.56 cm            Admit Date:           2/24/2024  Weight:            72.57 kg             Admission Status:     Inpatient -                                                                Routine  BSA / BMI:         1.78 m2 / 27.46      Encounter#:           [id number]                     kg/m2                                           Department Location:  [facility] Non                                                                Invasive  Blood Pressure: 131 /59 mmHg    Study Type:    TRANSTHORACIC ECHO (TTE) COMPLETE  Diagnosis/ICD: Shortness of breath-R06.02  Indication:    Dyspnea  CPT Code:      Echo Complete w Full Doppler-09303    Patient History:  Pertinent History: A-Fib and Chest Pain. elevated troponin, elevated BNP, mod                     AS.    Study Detail: The following Echo studies were performed: 2D, M-Mode, Doppler and                color flow.       PHYSICIAN INTERPRETATION:  Left Ventricle: The left ventricular systolic function is normal, with an estimated ejection fraction of 55-60%. There are no regional wall motion abnormalities. The left ventricular cavity size is normal. Spectral Doppler shows an impaired relaxation pattern of left ventricular diastolic filling.  Left Atrium: The left atrium is mildly dilated.  Right Ventricle: The right ventricle is normal in size. There is normal right ventricular global systolic function.  Right Atrium: The right atrium is normal in size.  Aortic Valve: The aortic valve is trileaflet. There is mild to moderate aortic valve cusp calcification. There is evidence of moderate aortic valve stenosis.  There is trivial aortic valve regurgitation. The peak instantaneous gradient of the aortic valve is 27.5 mmHg. The mean gradient of the aortic valve is 16.0 mmHg.  Mitral Valve: The mitral valve is mildly thickened. There is evidence of mild mitral valve stenosis. The doppler estimated mean and peak diastolic pressure gradients are 7.2 mmHg and 16.3 mmHg respectively. There is mild mitral annular calcification. There is mild to moderate mitral valve regurgitation.  Tricuspid Valve: The tricuspid valve is structurally normal. There is mild to moderate tricuspid regurgitation.  Pulmonic Valve: The pulmonic valve is not well visualized. There is trace to mild pulmonic valve  regurgitation.  Pericardium: There is no pericardial effusion noted.  Aorta: The aortic root is normal.  Pulmonary Artery: The tricuspid regurgitant velocity is 3.46 m/s, and with an estimated right atrial pressure of 3 mmHg, the estimated pulmonary artery pressure is mild to moderately elevated with the RVSP at 50.9 mmHg.       CONCLUSIONS:   1. Left ventricular systolic function is normal with a 55-60% estimated ejection fraction.   2. Spectral Doppler shows an impaired relaxation pattern of left ventricular diastolic filling.   3. Mild to moderate mitral valve regurgitation.   4. Mild to moderate tricuspid regurgitation visualized.   5. Moderate aortic valve stenosis.   6. Mild to moderately elevated pulmonary artery pressure.    QUANTITATIVE DATA SUMMARY:  2D MEASUREMENTS:                            Normal Ranges:  IVSd:          0.97 cm    (0.6-1.1cm)  LVPWd:         0.98 cm    (0.6-1.1cm)  LVIDd:         5.70 cm    (3.9-5.9cm)  LVIDs:         4.26 cm  LV Mass Index: 123.2 g/m2  LV % FS        25.2 %    LA VOLUME:                               Normal Ranges:  LA Vol A4C:       77.4 ml    (22+/-6mL/m2)  LA Vol A2C:       83.5 ml  LA Vol BP:        82.1 ml  LA Vol Index A4C: 43.5 ml/m2  LA Vol Index A2C: 46.9 ml/m2  LA Vol Index BP:  46.1 ml/m2  LA Volume Index:  46.1 ml/m2  LA Vol A4C:       72.2 ml  LA Vol A2C:       77.0 ml    RA VOLUME BY A/L METHOD:                        Normal Ranges:  RA Area A4C: 21.8 cm2    M-MODE MEASUREMENTS:                   Normal Ranges:  Ao Root: 3.30 cm (2.0-3.7cm)  LAs:     4.72 cm (2.7-4.0cm)    LV SYSTOLIC FUNCTION BY 2D PLANIMETRY (MOD):                      Normal Ranges:  EF-A4C View: 55.9 % (>=55%)  EF-A2C View: 56.5 %  EF-Biplane:  55.9 %    LV DIASTOLIC FUNCTION:                              Normal Ranges:  MV Peak E:      1.91 m/s    (0.7-1.2 m/s)  MV Peak A:      0.93 m/s    (0.42-0.7 m/s)  E/A Ratio:      2.06        (1.0-2.2)  MV e'           0.09 m/s    (>8.0)  MV  lateral e'   0.09 m/s  MV medial e'    0.08 m/s  E/e' Ratio:     21.21       (<8.0)  PulmV Sys Ham:  62.35 cm/s  PulmV Alvarez Ham: 121.32 cm/s  PulmV S/D Ham:  0.51    MITRAL VALVE:                        Normal Ranges:  MV Vmax:    2.02 m/s  (<=1.3m/s)  MV peak P.3 mmHg (<5mmHg)  MV mean P.2 mmHg  (<2mmHg)  MV VTI:     48.44 cm  (10-13cm)  MV DT:      167 msec  (150-240msec)    MITRAL INSUFFICIENCY:                          Normal Ranges:  MR VTI:     192.66 cm  MR Vmax:    568.98 cm/s  MR Volume:  33.56 ml  MR Flow Rt: 99.12 ml/s  MR EROA:    0.17 cm2    AORTIC VALVE:                                     Normal Ranges:  AoV Vmax:                2.62 m/s  (<=1.7m/s)  AoV Peak P.5 mmHg (<20mmHg)  AoV Mean P.0 mmHg (1.7-11.5mmHg)  LVOT Max Ham:            0.99 m/s  (<=1.1m/s)  AoV VTI:                 58.21 cm  (18-25cm)  LVOT VTI:                21.96 cm  LVOT Diameter:           1.95 cm   (1.8-2.4cm)  AoV Area, VTI:           1.13 cm2  (2.5-5.5cm2)  AoV Area,Vmax:           1.13 cm2  (2.5-4.5cm2)  AoV Dimensionless Index: 0.38    AORTIC INSUFFICIENCY:  AI Vmax:       3.91 m/s  AI Half-time:  520 msec  AI Decel Time: 1794 msec  AI Decel Rate: 218.17 cm/s2       RIGHT VENTRICLE:  RV Basal 4.30 cm  RV Mid   2.60 cm  RV Major 7.7 cm  TAPSE:   27.0 mm  RV s'    0.18 m/s    TRICUSPID VALVE/RVSP:                              Normal Ranges:  Peak TR Velocity: 3.46 m/s  RV Syst Pressure: 50.9 mmHg (< 30mmHg)    PULMONIC VALVE:                       Normal Ranges:  PV Max Ham: 1.5 m/s  (0.6-0.9m/s)  PV Max P.8 mmHg    Pulmonary Veins:  PulmV Alvarez Ham: 121.32 cm/s  PulmV S/D Ham:  0.51  PulmV Sys Ham:  62.35 cm/s       [id number] Nohelia Soto MD  Electronically signed on 2024 at 11:22:42 AM       ** Final **      Physical Exam  Constitutional: patient is alert and cooperative with exam  skin: dry and warm  Eyes: EOMI and clear sclera  ENMT: moist mucous membranes  Head/Neck: Neck  supple  Respiratory/Thorax: Clear to auscultation bilaterally, no wheezing or crackles appreciated  Cardiovascular: regular rate and rhythm, S1 and S2 present, no murmurs heard  Gastrointestinal: bowel sounds present, no pain or tenderness upon palpation, soft and nondistended  Extremities: +2 radial, posterior tibial, and pedal pulse, no lower extremity edema noted  Neurological: AxOx3    Relevant Results  Scheduled medications  baclofen, 10 mg, oral, TID  digoxin, 125 mcg, oral, Daily  dilTIAZem CD, 120 mg, oral, Daily  fluticasone, 1 spray, Each Nostril, BID  hydroxychloroquine, 200 mg, oral, Daily  montelukast, 10 mg, oral, Nightly  pantoprazole, 40 mg, intravenous, Daily  polyethylene glycol, 17 g, oral, BID  rosuvastatin, 20 mg, oral, Daily  sucralfate, 1 g, oral, q6h DWIGHT      Continuous medications     PRN medications  PRN medications: acetaminophen **OR** acetaminophen **OR** acetaminophen, albuterol, alum-mag hydroxide-simeth, calcium carbonate, dextromethorphan-guaifenesin, diclofenac sodium, guaiFENesin, nitroglycerin, ondansetron **OR** ondansetron, oxyCODONE-acetaminophen, oxygen  Results for orders placed or performed during the hospital encounter of 02/24/24 (from the past 24 hour(s))   CBC and Auto Differential   Result Value Ref Range    WBC 8.1 4.4 - 11.3 x10*3/uL    nRBC 0.2 (H) 0.0 - 0.0 /100 WBCs    RBC 3.58 (L) 4.00 - 5.20 x10*6/uL    Hemoglobin 7.6 (L) 12.0 - 16.0 g/dL    Hematocrit 25.5 (L) 36.0 - 46.0 %    MCV 71 (L) 80 - 100 fL    MCH 21.2 (L) 26.0 - 34.0 pg    MCHC 29.8 (L) 32.0 - 36.0 g/dL    RDW 21.2 (H) 11.5 - 14.5 %    Platelets 245 150 - 450 x10*3/uL    Neutrophils % 71.0 40.0 - 80.0 %    Immature Granulocytes %, Automated 0.9 0.0 - 0.9 %    Lymphocytes % 11.6 13.0 - 44.0 %    Monocytes % 14.5 2.0 - 10.0 %    Eosinophils % 1.6 0.0 - 6.0 %    Basophils % 0.4 0.0 - 2.0 %    Neutrophils Absolute 5.76 (H) 1.60 - 5.50 x10*3/uL    Immature Granulocytes Absolute, Automated 0.07 0.00 - 0.50  x10*3/uL    Lymphocytes Absolute 0.94 0.80 - 3.00 x10*3/uL    Monocytes Absolute 1.18 (H) 0.05 - 0.80 x10*3/uL    Eosinophils Absolute 0.13 0.00 - 0.40 x10*3/uL    Basophils Absolute 0.03 0.00 - 0.10 x10*3/uL   Renal Function Panel   Result Value Ref Range    Glucose 76 74 - 99 mg/dL    Sodium 139 136 - 145 mmol/L    Potassium 3.6 3.5 - 5.3 mmol/L    Chloride 102 98 - 107 mmol/L    Bicarbonate 28 21 - 32 mmol/L    Anion Gap 13 10 - 20 mmol/L    Urea Nitrogen 11 6 - 23 mg/dL    Creatinine 0.89 0.50 - 1.05 mg/dL    eGFR 69 >60 mL/min/1.73m*2    Calcium 8.2 (L) 8.6 - 10.3 mg/dL    Phosphorus 3.8 2.5 - 4.9 mg/dL    Albumin 3.3 (L) 3.4 - 5.0 g/dL   Magnesium   Result Value Ref Range    Magnesium 2.25 1.60 - 2.40 mg/dL   Morphology   Result Value Ref Range    RBC Morphology See Below     Polychromasia Mild     Hypochromia Mild     Ovalocytes Few    Transthoracic Echo (TTE) Complete   Result Value Ref Range    AV pk denilson 2.62 m/s    AV mn grad 16.0 mmHg    LVOT diam 1.95 cm    LV biplane EF 56 %    MV avg E/e' ratio 21.21     MV E/A ratio 2.06     LA vol index A/L 46.1 ml/m2    Tricuspid annular plane systolic excursion 2.7 cm    RV free wall pk S' 18.00 cm/s    LVIDd 5.70 cm    RVSP 50.9 mmHg    Aortic Valve Area by Continuity of VTI 1.13 cm2    Aortic Valve Area by Continuity of Peak Velocity 1.13 cm2    AV pk grad 27.5 mmHg    LV A4C EF 55.9           Assessment/Plan      Principal Problem:    Chest pain, unspecified type    Trina Elias is a 73 y.o. female with history of multiple abdominal surgeries, CAD, ICD/PPM placement, HTN and COPD, presenting with chest pain.     #Acute blood loss anemia  Hemodynamically stable, blood pressure stable today, afebrile  Baseline hemoglobin of 9  H/H 8.2/27.3.  Secondary to GI bleeding  PPI  General surgery on board plan for EGD and colonoscopy today     #Severe abdominal pain  #dilated extrahepatic  biliary duct measuring up to 3.2 cm in diameter  -Amylase 19 and lipase 18  -CT  abdomen pelvis showed: Status post cholecystectomy with a markedly dilated extrahepatic  biliary duct measuring up to 3.2 cm in diameter, increased from the prior study.  This may be chronic, however, correlation with biliary lab values is recommended, nonemergent ERCP or MRCP may be of benefit  in this patient.   -Liver ultrasound showed Mild intrahepatic biliary ductal dilatation status post  cholecystectomy. Additionally, the common bile duct measures up to  2.1 cm in caliber, similar to recent CT examination. No sonographic  choledocholithiasis.   -Pacemaker is MRI compatible we will plan for nonurgent MRCP   -Pain control.   -GI consulted, plan for EUS outpatient     #Near syncope  Appears to be from a combination of events including acute blood loss anemia, volume depletion and arrhythmia  Monitor with orthostatic vitals  Echo showed normal ejection fraction, moderate valvular abnormalities  Cardiology referral as outpatient     #PPM/ICD problem  Has outpatient appointment to see electrophysiologist next week  Due to recurrent runs of v tach reported, will ask cardiology to see.     #Dispo  -Pending improvement of symptoms, GI and general surgery recommendations, Scopes to be preformed on 2/27    Ayden Wiley MD

## 2024-02-28 ENCOUNTER — APPOINTMENT (OUTPATIENT)
Dept: RADIOLOGY | Facility: HOSPITAL | Age: 74
DRG: 811 | End: 2024-02-28
Payer: MEDICARE

## 2024-02-28 LAB
ANION GAP SERPL CALC-SCNC: 11 MMOL/L (ref 10–20)
APPEARANCE UR: CLEAR
BASOPHILS # BLD AUTO: 0.01 X10*3/UL (ref 0–0.1)
BASOPHILS NFR BLD AUTO: 0.1 %
BILIRUB UR STRIP.AUTO-MCNC: NEGATIVE MG/DL
BNP SERPL-MCNC: 332 PG/ML (ref 0–99)
BUN SERPL-MCNC: 9 MG/DL (ref 6–23)
CALCIUM SERPL-MCNC: 8.5 MG/DL (ref 8.6–10.3)
CHLORIDE SERPL-SCNC: 103 MMOL/L (ref 98–107)
CO2 SERPL-SCNC: 28 MMOL/L (ref 21–32)
COLOR UR: ABNORMAL
CREAT SERPL-MCNC: 0.8 MG/DL (ref 0.5–1.05)
EGFRCR SERPLBLD CKD-EPI 2021: 78 ML/MIN/1.73M*2
EOSINOPHIL # BLD AUTO: 0.14 X10*3/UL (ref 0–0.4)
EOSINOPHIL NFR BLD AUTO: 1.9 %
ERYTHROCYTE [DISTWIDTH] IN BLOOD BY AUTOMATED COUNT: 21.5 % (ref 11.5–14.5)
FLUAV RNA RESP QL NAA+PROBE: NOT DETECTED
FLUBV RNA RESP QL NAA+PROBE: NOT DETECTED
GLUCOSE SERPL-MCNC: 80 MG/DL (ref 74–99)
GLUCOSE UR STRIP.AUTO-MCNC: NEGATIVE MG/DL
HCT VFR BLD AUTO: 24.7 % (ref 36–46)
HGB BLD-MCNC: 7.3 G/DL (ref 12–16)
HYPOCHROMIA BLD QL SMEAR: NORMAL
IMM GRANULOCYTES # BLD AUTO: 0.05 X10*3/UL (ref 0–0.5)
IMM GRANULOCYTES NFR BLD AUTO: 0.7 % (ref 0–0.9)
KETONES UR STRIP.AUTO-MCNC: NEGATIVE MG/DL
LEUKOCYTE ESTERASE UR QL STRIP.AUTO: NEGATIVE
LYMPHOCYTES # BLD AUTO: 0.85 X10*3/UL (ref 0.8–3)
LYMPHOCYTES NFR BLD AUTO: 11.5 %
MCH RBC QN AUTO: 21.4 PG (ref 26–34)
MCHC RBC AUTO-ENTMCNC: 29.6 G/DL (ref 32–36)
MCV RBC AUTO: 72 FL (ref 80–100)
MONOCYTES # BLD AUTO: 0.9 X10*3/UL (ref 0.05–0.8)
MONOCYTES NFR BLD AUTO: 12.1 %
NEUTROPHILS # BLD AUTO: 5.47 X10*3/UL (ref 1.6–5.5)
NEUTROPHILS NFR BLD AUTO: 73.7 %
NITRITE UR QL STRIP.AUTO: NEGATIVE
NRBC BLD-RTO: 0 /100 WBCS (ref 0–0)
PH UR STRIP.AUTO: 7 [PH]
PLATELET # BLD AUTO: 228 X10*3/UL (ref 150–450)
POLYCHROMASIA BLD QL SMEAR: NORMAL
POTASSIUM SERPL-SCNC: 3.8 MMOL/L (ref 3.5–5.3)
PROT UR STRIP.AUTO-MCNC: NEGATIVE MG/DL
RBC # BLD AUTO: 3.41 X10*6/UL (ref 4–5.2)
RBC # UR STRIP.AUTO: NEGATIVE /UL
RBC MORPH BLD: NORMAL
SARS-COV-2 RNA RESP QL NAA+PROBE: NOT DETECTED
SODIUM SERPL-SCNC: 138 MMOL/L (ref 136–145)
SP GR UR STRIP.AUTO: 1
UROBILINOGEN UR STRIP.AUTO-MCNC: <2 MG/DL
WBC # BLD AUTO: 7.4 X10*3/UL (ref 4.4–11.3)

## 2024-02-28 PROCEDURE — 99233 SBSQ HOSP IP/OBS HIGH 50: CPT | Performed by: INTERNAL MEDICINE

## 2024-02-28 PROCEDURE — 36415 COLL VENOUS BLD VENIPUNCTURE: CPT | Performed by: INTERNAL MEDICINE

## 2024-02-28 PROCEDURE — 71045 X-RAY EXAM CHEST 1 VIEW: CPT | Performed by: RADIOLOGY

## 2024-02-28 PROCEDURE — 83880 ASSAY OF NATRIURETIC PEPTIDE: CPT | Performed by: INTERNAL MEDICINE

## 2024-02-28 PROCEDURE — 84145 PROCALCITONIN (PCT): CPT | Mod: GEALAB | Performed by: INTERNAL MEDICINE

## 2024-02-28 PROCEDURE — 2500000004 HC RX 250 GENERAL PHARMACY W/ HCPCS (ALT 636 FOR OP/ED): Performed by: INTERNAL MEDICINE

## 2024-02-28 PROCEDURE — 81003 URINALYSIS AUTO W/O SCOPE: CPT | Performed by: INTERNAL MEDICINE

## 2024-02-28 PROCEDURE — 2500000002 HC RX 250 W HCPCS SELF ADMINISTERED DRUGS (ALT 637 FOR MEDICARE OP, ALT 636 FOR OP/ED): Performed by: INTERNAL MEDICINE

## 2024-02-28 PROCEDURE — C9113 INJ PANTOPRAZOLE SODIUM, VIA: HCPCS | Performed by: INTERNAL MEDICINE

## 2024-02-28 PROCEDURE — 94640 AIRWAY INHALATION TREATMENT: CPT

## 2024-02-28 PROCEDURE — 1100000001 HC PRIVATE ROOM DAILY

## 2024-02-28 PROCEDURE — 2500000001 HC RX 250 WO HCPCS SELF ADMINISTERED DRUGS (ALT 637 FOR MEDICARE OP): Performed by: INTERNAL MEDICINE

## 2024-02-28 PROCEDURE — 80048 BASIC METABOLIC PNL TOTAL CA: CPT | Performed by: INTERNAL MEDICINE

## 2024-02-28 PROCEDURE — 87502 INFLUENZA DNA AMP PROBE: CPT | Performed by: INTERNAL MEDICINE

## 2024-02-28 PROCEDURE — 71045 X-RAY EXAM CHEST 1 VIEW: CPT

## 2024-02-28 PROCEDURE — 85025 COMPLETE CBC W/AUTO DIFF WBC: CPT | Performed by: INTERNAL MEDICINE

## 2024-02-28 RX ORDER — ALBUTEROL SULFATE 0.83 MG/ML
2.5 SOLUTION RESPIRATORY (INHALATION)
Status: DISCONTINUED | OUTPATIENT
Start: 2024-02-29 | End: 2024-02-29

## 2024-02-28 RX ADMIN — BACLOFEN 10 MG: 10 TABLET ORAL at 21:12

## 2024-02-28 RX ADMIN — MONTELUKAST 10 MG: 10 TABLET, FILM COATED ORAL at 21:12

## 2024-02-28 RX ADMIN — OXYCODONE HYDROCHLORIDE AND ACETAMINOPHEN 1 TABLET: 5; 325 TABLET ORAL at 06:01

## 2024-02-28 RX ADMIN — OXYCODONE HYDROCHLORIDE AND ACETAMINOPHEN 1 TABLET: 5; 325 TABLET ORAL at 21:12

## 2024-02-28 RX ADMIN — FLUTICASONE PROPIONATE 1 SPRAY: 50 SPRAY, METERED NASAL at 08:48

## 2024-02-28 RX ADMIN — FLUTICASONE PROPIONATE 1 SPRAY: 50 SPRAY, METERED NASAL at 21:11

## 2024-02-28 RX ADMIN — SUCRALFATE 1 G: 1 SUSPENSION ORAL at 12:00

## 2024-02-28 RX ADMIN — POLYETHYLENE GLYCOL 3350 17 G: 17 POWDER, FOR SOLUTION ORAL at 21:12

## 2024-02-28 RX ADMIN — SUCRALFATE 1 G: 1 SUSPENSION ORAL at 16:15

## 2024-02-28 RX ADMIN — BACLOFEN 10 MG: 10 TABLET ORAL at 08:44

## 2024-02-28 RX ADMIN — DIGOXIN 125 MCG: 125 TABLET ORAL at 08:44

## 2024-02-28 RX ADMIN — ROSUVASTATIN CALCIUM 20 MG: 20 TABLET, FILM COATED ORAL at 08:44

## 2024-02-28 RX ADMIN — DILTIAZEM HYDROCHLORIDE 120 MG: 120 CAPSULE, COATED, EXTENDED RELEASE ORAL at 08:47

## 2024-02-28 RX ADMIN — SUCRALFATE 1 G: 1 SUSPENSION ORAL at 06:01

## 2024-02-28 RX ADMIN — BACLOFEN 10 MG: 10 TABLET ORAL at 16:15

## 2024-02-28 RX ADMIN — HYDROXYCHLOROQUINE SULFATE 200 MG: 200 TABLET, FILM COATED ORAL at 08:46

## 2024-02-28 RX ADMIN — PANTOPRAZOLE SODIUM 40 MG: 40 INJECTION, POWDER, FOR SOLUTION INTRAVENOUS at 08:44

## 2024-02-28 RX ADMIN — SUCRALFATE 1 G: 1 SUSPENSION ORAL at 21:12

## 2024-02-28 RX ADMIN — ALBUTEROL SULFATE 2.5 MG: 2.5 SOLUTION RESPIRATORY (INHALATION) at 21:11

## 2024-02-28 RX ADMIN — OXYCODONE HYDROCHLORIDE AND ACETAMINOPHEN 1 TABLET: 5; 325 TABLET ORAL at 13:08

## 2024-02-28 RX ADMIN — GUAIFENESIN AND DEXTROMETHORPHAN 5 ML: 100; 10 SYRUP ORAL at 21:12

## 2024-02-28 ASSESSMENT — COGNITIVE AND FUNCTIONAL STATUS - GENERAL
MOBILITY SCORE: 24
DAILY ACTIVITIY SCORE: 24
MOBILITY SCORE: 24
DAILY ACTIVITIY SCORE: 24

## 2024-02-28 ASSESSMENT — PAIN - FUNCTIONAL ASSESSMENT
PAIN_FUNCTIONAL_ASSESSMENT: 0-10

## 2024-02-28 ASSESSMENT — PAIN SCALES - GENERAL
PAINLEVEL_OUTOF10: 0 - NO PAIN
PAINLEVEL_OUTOF10: 6
PAINLEVEL_OUTOF10: 8
PAINLEVEL_OUTOF10: 0 - NO PAIN
PAINLEVEL_OUTOF10: 7

## 2024-02-28 ASSESSMENT — PAIN DESCRIPTION - LOCATION
LOCATION: SHOULDER
LOCATION: SHOULDER

## 2024-02-28 ASSESSMENT — ACTIVITIES OF DAILY LIVING (ADL): LACK_OF_TRANSPORTATION: NO

## 2024-02-28 ASSESSMENT — PAIN DESCRIPTION - ORIENTATION
ORIENTATION: LEFT
ORIENTATION: LEFT

## 2024-02-28 NOTE — PROGRESS NOTES
Trina Elias is a 73 y.o. female on day 3 of admission presenting with Chest pain, unspecified type.      Subjective   Seen and examined, patient is feeling better this morning, requesting telemetry diet as tolerated diet, diet advanced today, no new complaints.  No overnight events.  The plan discussed with the patient and RN.  Off the floor getting scopes.    Objective     Last Recorded Vitals  /51   Pulse 60   Temp 36.6 °C (97.9 °F)   Resp 18   Wt 72.8 kg (160 lb 8 oz)   SpO2 94%   Intake/Output last 3 Shifts:    Intake/Output Summary (Last 24 hours) at 2/28/2024 1205  Last data filed at 2/27/2024 1421  Gross per 24 hour   Intake 700 ml   Output --   Net 700 ml         Admission Weight  Weight: 69.9 kg (154 lb) (02/24/24 2030)    Daily Weight  02/25/24 : 72.8 kg (160 lb 8 oz)    Image Results  Colonoscopy Diagnostic  Table formatting from the original result was not included.  Impression  Extensive diverticulosis of moderate severity and causing mild luminal   narrowing in the sigmoid colon  The ileocecal valve, cecum, ascending colon, transverse colon, descending   colon and rectum appeared normal.  No evidence of bleeding.    Findings  Multiple medium, extensive diverticula of moderate severity with no   inflammation containing no content and causing mild luminal narrowing   (traversable) in the sigmoid colon; no bleeding was identified  The ileocecal valve, cecum, ascending colon, transverse colon, descending   colon and rectum appeared normal.;    Recommendation   No further screening colonoscopies necessary    Age greater than 65       Indication  Anemia requiring transfusions, Gastrointestinal hemorrhage with melena    Staff  Staff Role   Nnamdi Cleary MD Proceduralist     Medications  lactated Ringer's infusion 300 mL*    *From user-documented volume   (Totals for administrations occurring from 1330 to 1413 on 02/27/24)     Preprocedure  A history and physical has been performed, and patient  medication   allergies have been reviewed. The patient's tolerance of previous   anesthesia has been reviewed. The risks and benefits of the procedure and   the sedation options and risks were discussed with the patient. All   questions were answered and informed consent obtained.    Details of the Procedure  The patient underwent moderate sedation, which was administered by the   procedural nurse. The patient's blood pressure, ECG, ETCO2, heart rate,   level of consciousness, oxygen and respirations were monitored throughout   the procedure. A digital rectal exam was performed. A perianal exam was   performed. The scope was introduced through the anus and advanced to the   cecum. Retroflexion was performed in the rectum. The quality of bowel   preparation was evaluated using the Kerman Bowel Preparation Scale with   scores of: right colon = 2, transverse colon = 3, left colon = 3. The   total BBPS score was 8. Bowel prep was adequate. The patient's estimated   blood loss was minimal (<5 mL). The procedure was moderately difficult due   to adhesions and tortuous colon. In response to procedure difficulty,   counter pressure was applied. The patient tolerated the procedure well.   There were no apparent adverse events.     Events  Procedure Events   Event Event Time   ENDO SCOPE IN TIME 2/27/2024  1:39 PM   ENDO SCOPE OUT TIME 2/27/2024  1:46 PM   ENDO SCOPE IN TIME 2/27/2024  1:52 PM   ENDO CECUM REACHED 2/27/2024  2:00 PM   ENDO SCOPE OUT TIME 2/27/2024  2:11 PM     Specimens  No specimens collected    Procedure Location  [place] [address] [place] [address]  [phone]    Referring Provider  No referring provider defined for this encounter.    Procedure Provider  No name on file  EGD  Table formatting from the original result was not included.  Impression  Healthy previous fundoplication in the lower third of the esophagus, GE   junction and cardia  The  upper third of the esophagus, middle third of the esophagus, lower   third of the esophagus, fundus of the stomach, body of the stomach,   antrum, pylorus, duodenal bulb, 1st part of the duodenum and 2nd part of   the duodenum appeared normal.    Findings  Healthy previous fundoplication in the lower third of the esophagus, GE   junction and cardia; no bleeding was identified. Loose fundoplication  The upper third of the esophagus, middle third of the esophagus, lower   third of the esophagus, fundus of the stomach, body of the stomach,   antrum, pylorus, duodenal bulb, 1st part of the duodenum and 2nd part of   the duodenum appeared normal.    Recommendation   Follow up with PCP     Indication  Anemia requiring transfusions, Gastrointestinal hemorrhage with melena    Staff  Staff Role   Nnamdi Cleary MD Proceduralist     Medications  No administrations occurring from 1330 to 1346 on 02/27/24     Preprocedure  A history and physical has been performed, and patient medication   allergies have been reviewed. The patient's tolerance of previous   anesthesia has been reviewed. The risks and benefits of the procedure and   the sedation options and risks were discussed with the patient. All   questions were answered and informed consent obtained.    Details of the Procedure  The patient underwent moderate sedation, which was administered by the   procedural nurse. The patient's blood pressure, ECG, ETCO2, heart rate,   level of consciousness, oxygen and respirations were monitored throughout   the procedure. The scope was introduced through the mouth and advanced to   the second part of the duodenum. Retroflexion was performed in the cardia.   Prior to the procedure, the patient's H. Pylori status was unknown. The   patient's estimated blood loss was minimal (<5 mL). The procedure was not   difficult. The patient tolerated the procedure well. There were no   apparent adverse events.     Events  Procedure Events   Event  Event Time   ENDO SCOPE IN TIME 2/27/2024  1:39 PM   ENDO SCOPE OUT TIME 2/27/2024  1:46 PM     Specimens  No specimens collected    Procedure Location  [address] [address] [phone]    Referring Provider  No referring provider defined for this encounter.    Procedure Provider  No name on file  Transthoracic Echo (TTE) Complete     [facility], [address] [place], [place] [address]                Tel [phone] and Fax [phone]    TRANSTHORACIC ECHOCARDIOGRAM REPORT       Patient Name:      VERNON SALDANA VIA         Reading Physician:    [id number] Nohelia Soto MD  Study Date:        2/27/2024            Ordering Provider:    [id number] NIKOLE LOVE  MRN/PID:           [id number]             Fellow:  Accession#:        [id number]         Nurse:  Date of Birth/Age: 1950 / 73 years Sonographer:          Dai Diaz RDCS  Gender:            F                    Additional Staff:  Height:            162.56 cm            Admit Date:           2/24/2024  Weight:            72.57 kg             Admission Status:     Inpatient -                                                                Routine  BSA / BMI:         1.78 m2 / 27.46      Encounter#:           [id number]                     kg/m2                                          Department Location:  [facility] Non                                                                Invasive  Blood Pressure: 131 /59 mmHg    Study Type:    TRANSTHORACIC ECHO (TTE) COMPLETE  Diagnosis/ICD: Shortness of breath-R06.02  Indication:    Dyspnea  CPT Code:      Echo Complete w Full [id number]    Patient History:  Pertinent History: A-Fib and Chest Pain. elevated troponin, elevated BNP,  mod                     AS.    Study Detail: The following Echo studies were performed: 2D, M-Mode, Doppler and                color flow.       PHYSICIAN INTERPRETATION:  Left Ventricle: The left ventricular systolic function is normal, with an estimated ejection fraction of 55-60%. There are no regional wall motion abnormalities. The left ventricular cavity size is normal. Spectral Doppler shows an impaired relaxation pattern of left ventricular diastolic filling.  Left Atrium: The left atrium is mildly dilated.  Right Ventricle: The right ventricle is normal in size. There is normal right ventricular global systolic function.  Right Atrium: The right atrium is normal in size.  Aortic Valve: The aortic valve is trileaflet. There is mild to moderate aortic valve cusp calcification. There is evidence of moderate aortic valve stenosis.  There is trivial aortic valve regurgitation. The peak instantaneous gradient of the aortic valve is 27.5 mmHg. The mean gradient of the aortic valve is 16.0 mmHg.  Mitral Valve: The mitral valve is mildly thickened. There is evidence of mild mitral valve stenosis. The doppler estimated mean and peak diastolic pressure gradients are 7.2 mmHg and 16.3 mmHg respectively. There is mild mitral annular calcification. There is mild to moderate mitral valve regurgitation.  Tricuspid Valve: The tricuspid valve is structurally normal. There is mild to moderate tricuspid regurgitation.  Pulmonic Valve: The pulmonic valve is not well visualized. There is trace to mild pulmonic valve regurgitation.  Pericardium: There is no pericardial effusion noted.  Aorta: The aortic root is normal.  Pulmonary Artery: The tricuspid regurgitant velocity is 3.46 m/s, and with an estimated right atrial pressure of 3 mmHg, the estimated pulmonary artery pressure is mild to moderately elevated with the RVSP at 50.9 mmHg.       CONCLUSIONS:   1. Left ventricular systolic function is normal with a 55-60% estimated  ejection fraction.   2. Spectral Doppler shows an impaired relaxation pattern of left ventricular diastolic filling.   3. Mild to moderate mitral valve regurgitation.   4. Mild to moderate tricuspid regurgitation visualized.   5. Moderate aortic valve stenosis.   6. Mild to moderately elevated pulmonary artery pressure.    QUANTITATIVE DATA SUMMARY:  2D MEASUREMENTS:                            Normal Ranges:  IVSd:          0.97 cm    (0.6-1.1cm)  LVPWd:         0.98 cm    (0.6-1.1cm)  LVIDd:         5.70 cm    (3.9-5.9cm)  LVIDs:         4.26 cm  LV Mass Index: 123.2 g/m2  LV % FS        25.2 %    LA VOLUME:                               Normal Ranges:  LA Vol A4C:       77.4 ml    (22+/-6mL/m2)  LA Vol A2C:       83.5 ml  LA Vol BP:        82.1 ml  LA Vol Index A4C: 43.5 ml/m2  LA Vol Index A2C: 46.9 ml/m2  LA Vol Index BP:  46.1 ml/m2  LA Volume Index:  46.1 ml/m2  LA Vol A4C:       72.2 ml  LA Vol A2C:       77.0 ml    RA VOLUME BY A/L METHOD:                        Normal Ranges:  RA Area A4C: 21.8 cm2    M-MODE MEASUREMENTS:                   Normal Ranges:  Ao Root: 3.30 cm (2.0-3.7cm)  LAs:     4.72 cm (2.7-4.0cm)    LV SYSTOLIC FUNCTION BY 2D PLANIMETRY (MOD):                      Normal Ranges:  EF-A4C View: 55.9 % (>=55%)  EF-A2C View: 56.5 %  EF-Biplane:  55.9 %    LV DIASTOLIC FUNCTION:                              Normal Ranges:  MV Peak E:      1.91 m/s    (0.7-1.2 m/s)  MV Peak A:      0.93 m/s    (0.42-0.7 m/s)  E/A Ratio:      2.06        (1.0-2.2)  MV e'           0.09 m/s    (>8.0)  MV lateral e'   0.09 m/s  MV medial e'    0.08 m/s  E/e' Ratio:     21.21       (<8.0)  PulmV Sys Ham:  62.35 cm/s  PulmV Alvarez Ham: 121.32 cm/s  PulmV S/D Ham:  0.51    MITRAL VALVE:                        Normal Ranges:  MV Vmax:    2.02 m/s  (<=1.3m/s)  MV peak P.3 mmHg (<5mmHg)  MV mean P.2 mmHg  (<2mmHg)  MV VTI:     48.44 cm  (10-13cm)  MV DT:      167 msec  (150-240msec)    MITRAL INSUFFICIENCY:                           Normal Ranges:  MR VTI:     192.66 cm  MR Vmax:    568.98 cm/s  MR Volume:  33.56 ml  MR Flow Rt: 99.12 ml/s  MR EROA:    0.17 cm2    AORTIC VALVE:                                     Normal Ranges:  AoV Vmax:                2.62 m/s  (<=1.7m/s)  AoV Peak P.5 mmHg (<20mmHg)  AoV Mean P.0 mmHg (1.7-11.5mmHg)  LVOT Max Ham:            0.99 m/s  (<=1.1m/s)  AoV VTI:                 58.21 cm  (18-25cm)  LVOT VTI:                21.96 cm  LVOT Diameter:           1.95 cm   (1.8-2.4cm)  AoV Area, VTI:           1.13 cm2  (2.5-5.5cm2)  AoV Area,Vmax:           1.13 cm2  (2.5-4.5cm2)  AoV Dimensionless Index: 0.38    AORTIC INSUFFICIENCY:  AI Vmax:       3.91 m/s  AI Half-time:  520 msec  AI Decel Time: 1794 msec  AI Decel Rate: 218.17 cm/s2       RIGHT VENTRICLE:  RV Basal 4.30 cm  RV Mid   2.60 cm  RV Major 7.7 cm  TAPSE:   27.0 mm  RV s'    0.18 m/s    TRICUSPID VALVE/RVSP:                              Normal Ranges:  Peak TR Velocity: 3.46 m/s  RV Syst Pressure: 50.9 mmHg (< 30mmHg)    PULMONIC VALVE:                       Normal Ranges:  PV Max Ham: 1.5 m/s  (0.6-0.9m/s)  PV Max P.8 mmHg    Pulmonary Veins:  PulmV Alvarez Ham: 121.32 cm/s  PulmV S/D Ham:  0.51  PulmV Sys Ham:  62.35 cm/s       [id number] Nohelia Soto MD  Electronically signed on 2024 at 11:22:42 AM       ** Final **      Physical Exam  Constitutional: patient is alert and cooperative with exam  skin: dry and warm  Eyes: EOMI and clear sclera  ENMT: moist mucous membranes  Head/Neck: Neck supple  Respiratory/Thorax: Clear to auscultation bilaterally, no wheezing or crackles appreciated  Cardiovascular: regular rate and rhythm, S1 and S2 present, no murmurs heard  Gastrointestinal: bowel sounds present, no pain or tenderness upon palpation, soft and nondistended  Extremities: +2 radial, posterior tibial, and pedal pulse, no lower extremity edema noted  Neurological: AxOx3    Relevant Results  Scheduled  medications  baclofen, 10 mg, oral, TID  digoxin, 125 mcg, oral, Daily  dilTIAZem CD, 120 mg, oral, Daily  fluticasone, 1 spray, Each Nostril, BID  hydroxychloroquine, 200 mg, oral, Daily  montelukast, 10 mg, oral, Nightly  pantoprazole, 40 mg, intravenous, Daily  polyethylene glycol, 17 g, oral, BID  rosuvastatin, 20 mg, oral, Daily  sucralfate, 1 g, oral, q6h DWIGHT      Continuous medications  lactated Ringer's, 100 mL/hr, Last Rate: Stopped (02/27/24 1421)    PRN medications  PRN medications: acetaminophen **OR** acetaminophen **OR** acetaminophen, albuterol, alum-mag hydroxide-simeth, calcium carbonate, dextromethorphan-guaifenesin, diclofenac sodium, guaiFENesin, nitroglycerin, ondansetron **OR** ondansetron, oxyCODONE-acetaminophen, oxygen  Results for orders placed or performed during the hospital encounter of 02/24/24 (from the past 24 hour(s))   CBC and Auto Differential   Result Value Ref Range    WBC 7.4 4.4 - 11.3 x10*3/uL    nRBC 0.0 0.0 - 0.0 /100 WBCs    RBC 3.41 (L) 4.00 - 5.20 x10*6/uL    Hemoglobin 7.3 (L) 12.0 - 16.0 g/dL    Hematocrit 24.7 (L) 36.0 - 46.0 %    MCV 72 (L) 80 - 100 fL    MCH 21.4 (L) 26.0 - 34.0 pg    MCHC 29.6 (L) 32.0 - 36.0 g/dL    RDW 21.5 (H) 11.5 - 14.5 %    Platelets 228 150 - 450 x10*3/uL    Neutrophils % 73.7 40.0 - 80.0 %    Immature Granulocytes %, Automated 0.7 0.0 - 0.9 %    Lymphocytes % 11.5 13.0 - 44.0 %    Monocytes % 12.1 2.0 - 10.0 %    Eosinophils % 1.9 0.0 - 6.0 %    Basophils % 0.1 0.0 - 2.0 %    Neutrophils Absolute 5.47 1.60 - 5.50 x10*3/uL    Immature Granulocytes Absolute, Automated 0.05 0.00 - 0.50 x10*3/uL    Lymphocytes Absolute 0.85 0.80 - 3.00 x10*3/uL    Monocytes Absolute 0.90 (H) 0.05 - 0.80 x10*3/uL    Eosinophils Absolute 0.14 0.00 - 0.40 x10*3/uL    Basophils Absolute 0.01 0.00 - 0.10 x10*3/uL   Basic metabolic panel   Result Value Ref Range    Glucose 80 74 - 99 mg/dL    Sodium 138 136 - 145 mmol/L    Potassium 3.8 3.5 - 5.3 mmol/L    Chloride  103 98 - 107 mmol/L    Bicarbonate 28 21 - 32 mmol/L    Anion Gap 11 10 - 20 mmol/L    Urea Nitrogen 9 6 - 23 mg/dL    Creatinine 0.80 0.50 - 1.05 mg/dL    eGFR 78 >60 mL/min/1.73m*2    Calcium 8.5 (L) 8.6 - 10.3 mg/dL   Morphology   Result Value Ref Range    RBC Morphology See Below     Polychromasia Mild     Hypochromia Mild           Assessment/Plan      Principal Problem:    Chest pain, unspecified type  Active Problems:    Coronary artery disease involving native coronary artery    Trina Elias is a 73 y.o. female with history of multiple abdominal surgeries, CAD, ICD/PPM placement, HTN and COPD, presenting with chest pain.     #Acute blood loss anemia  Hemodynamically stable, blood pressure stable today  -Patient is still on clear liquid diet  General surgery on board, status post EGD and colonoscopy yesterday with no signs of active bleeding     #Fever  -Chest x-ray, flu, urinalysis ordered  -Will continue to monitor    #Severe abdominal pain  #dilated extrahepatic  biliary duct measuring up to 3.2 cm in diameter  -Amylase 19 and lipase 18  -CT abdomen pelvis showed: Status post cholecystectomy with a markedly dilated extrahepatic  biliary duct measuring up to 3.2 cm in diameter, increased from the prior study.  This may be chronic, however, correlation with biliary lab values is recommended, nonemergent ERCP or MRCP may be of benefit  in this patient.   -Liver ultrasound showed Mild intrahepatic biliary ductal dilatation status post  cholecystectomy. Additionally, the common bile duct measures up to  2.1 cm in caliber, similar to recent CT examination. No sonographic  choledocholithiasis.   -Pacemaker is MRI compatible we will plan for nonurgent MRCP   -Pain control.   -GI consulted, plan for EUS outpatient     #Near syncope  Appears to be from a combination of events including acute blood loss anemia, volume depletion and arrhythmia  Monitor with orthostatic vitals  Echo showed normal ejection fraction,  moderate valvular abnormalities  Seen by cardiology, anticoagulation DC'd, Watchman device as outpatient  Cardiology referral as outpatient     #PPM/ICD problem  Has outpatient appointment to see electrophysiologist next week  Due to recurrent runs of v tach reported, will ask cardiology to see.     #Dispo  -Pending improvement of symptoms    Ayden Wiley MD

## 2024-02-28 NOTE — PROGRESS NOTES
02/28/24 0951   Discharge Planning   Living Arrangements Spouse/significant other   Support Systems Spouse/significant other   Assistance Needed A&OX3; independent with ADLs with assist x1 at times; room air baseline and room air now   Type of Residence Private residence   Home or Post Acute Services None   Patient expects to be discharged to: home no needs   Does the patient need discharge transport arranged? Yes   RoundTrip coordination needed? Yes   Has discharge transport been arranged? No   Financial Resource Strain   How hard is it for you to pay for the very basics like food, housing, medical care, and heating? Not hard   Housing Stability   In the last 12 months, was there a time when you were not able to pay the mortgage or rent on time? N   In the last 12 months, how many places have you lived? 1   In the last 12 months, was there a time when you did not have a steady place to sleep or slept in a shelter (including now)? N   Transportation Needs   In the past 12 months, has lack of transportation kept you from medical appointments or from getting medications? no   In the past 12 months, has lack of transportation kept you from meetings, work, or from getting things needed for daily living? No        03/01/24 0849   Discharge Planning   Living Arrangements Spouse/significant other   Support Systems Spouse/significant other   Assistance Needed A&OX3; independent with ADLs with assist x1 at times; room air baseline and room air now   Type of Residence Private residence   Home or Post Acute Services None   Patient expects to be discharged to: home no needs   Does the patient need discharge transport arranged? Yes   RoundTrip coordination needed? Yes   Has discharge transport been arranged? No   Financial Resource Strain   How hard is it for you to pay for the very basics like food, housing, medical care, and heating? Not hard   Housing Stability   In the last 12 months, was there a time when you were not  able to pay the mortgage or rent on time? N   In the last 12 months, how many places have you lived? 1   In the last 12 months, was there a time when you did not have a steady place to sleep or slept in a shelter (including now)? N   Transportation Needs   In the past 12 months, has lack of transportation kept you from medical appointments or from getting medications? no   In the past 12 months, has lack of transportation kept you from meetings, work, or from getting things needed for daily living? No      03/05/24 1039   Discharge Planning   Living Arrangements Spouse/significant other   Support Systems Spouse/significant other   Assistance Needed A&OX3; independent with ADLs with assist x1 at times; room air baseline and room air now   Type of Residence Private residence   Home or Post Acute Services None   Patient expects to be discharged to: home with spouse. Denies home going needs   Does the patient need discharge transport arranged? Yes   RoundTrip coordination needed? Yes   Has discharge transport been arranged? No   Financial Resource Strain   How hard is it for you to pay for the very basics like food, housing, medical care, and heating? Not hard   Housing Stability   In the last 12 months, was there a time when you were not able to pay the mortgage or rent on time? N   In the last 12 months, how many places have you lived? 1   In the last 12 months, was there a time when you did not have a steady place to sleep or slept in a shelter (including now)? N   Transportation Needs   In the past 12 months, has lack of transportation kept you from medical appointments or from getting medications? no   In the past 12 months, has lack of transportation kept you from meetings, work, or from getting things needed for daily living? No       02/28/2024 0953am  Spoke with patient bedside. Patient denying and home going needs. Patient aware likely dc today and she is able to arrange transport home from .      02/28/2024 1547pm  Made aware patient not discharging today. Met with patient bedside and she is aware of no dc today.     03/01/2024 0850am  Met with patient bedside. Patient c/o left jaw pain - xrays completed but no results. Patient still denying any home going needs.     03/05/2024 1040am  Made aware patient ready to discharge today. Met with patient and she denies any home going needs. Patient to phone her  for transport home (RN on floor to alert patient when to have  come for ride) IMM obtained and patient cleared for dc from Transitions standpoint.

## 2024-02-28 NOTE — PROGRESS NOTES
"Trina Elias is a 73 y.o. female on day 3 of admission presenting with Chest pain, unspecified type.      Subjective   She is resting in the chair, states abd pain better.     S/p EGD/Colonoscopy yesterday, no signs of bleeding.       Review of systems:  Constitutional: negative for fever, chills, or malaise  Neuro: negative for dizziness, headache, numbness, tingling  ENT: Negative for nasal congestion or sore throat  Resp: negative for shortness of breath, cough, or wheezing  CV: negative for chest pain, palpitations  : negative for dysuria, frequency, or urgency  Skin: negative for lesions, wounds, or rash  Musculoskeletal: Negative for weakness, myalgia, or arthralgia  Endocrine: Negative for polyuria or polydipsia         Objective   Constitutional: Well developed, awake/alert/oriented x3, no distress, alert and cooperative  Eyes: PERRL, EOMI, clear sclera  ENMT: mucous membranes moist, no apparent injury, no lesions seen  Head/Neck: Neck supple, no apparent injury, thyroid without mass or tenderness, No JVD, trachea midline, no bruits  Respiratory/Thorax: Patent airways, CTAB, normal breath sounds with good chest expansion, thorax symmetric  Cardiovascular: Regular, rate and rhythm, no murmurs, 2+ equal pulses of the extremities, normal S 1and S 2  Gastrointestinal: Nondistended, soft, tender to palpation, no masses palpable, no organomegaly, +BS, no bruits  Musculoskeletal: ROM intact, no joint swelling, normal strength  Extremities: normal extremities, no cyanosis edema, contusions or wounds, no clubbing  Neurological: alert and oriented x3, intact senses, motor, response and reflexes, normal strength  Lymphatic: No significant lymphadenopathy  Psychological: Appropriate mood and behavior  Skin: Warm and dry, no lesions, no rashes      Last Recorded Vitals  BP (!) 107/40   Pulse 60   Temp 37.2 °C (99 °F)   Resp 18   Ht 1.626 m (5' 4\")   Wt 72.8 kg (160 lb 8 oz)   SpO2 98%   BMI 27.55 kg/m² "     Intake/Output last 3 Shifts:  I/O last 3 completed shifts:  In: 700 (9.6 mL/kg) [I.V.:700 (9.6 mL/kg)]  Out: - (0 mL/kg)   Weight: 72.8 kg   No intake/output data recorded.    Relevant Results  Scheduled medications  baclofen, 10 mg, oral, TID  digoxin, 125 mcg, oral, Daily  dilTIAZem CD, 120 mg, oral, Daily  fluticasone, 1 spray, Each Nostril, BID  hydroxychloroquine, 200 mg, oral, Daily  montelukast, 10 mg, oral, Nightly  pantoprazole, 40 mg, intravenous, Daily  polyethylene glycol, 17 g, oral, BID  rosuvastatin, 20 mg, oral, Daily  sucralfate, 1 g, oral, q6h DWIGHT      Continuous medications  lactated Ringer's, 100 mL/hr, Last Rate: Stopped (02/27/24 1421)    PRN medications  PRN medications: acetaminophen **OR** acetaminophen **OR** acetaminophen, albuterol, alum-mag hydroxide-simeth, calcium carbonate, dextromethorphan-guaifenesin, diclofenac sodium, guaiFENesin, nitroglycerin, ondansetron **OR** ondansetron, oxyCODONE-acetaminophen, oxygen    Results for orders placed or performed during the hospital encounter of 02/24/24 (from the past 24 hour(s))   CBC and Auto Differential   Result Value Ref Range    WBC 7.4 4.4 - 11.3 x10*3/uL    nRBC 0.0 0.0 - 0.0 /100 WBCs    RBC 3.41 (L) 4.00 - 5.20 x10*6/uL    Hemoglobin 7.3 (L) 12.0 - 16.0 g/dL    Hematocrit 24.7 (L) 36.0 - 46.0 %    MCV 72 (L) 80 - 100 fL    MCH 21.4 (L) 26.0 - 34.0 pg    MCHC 29.6 (L) 32.0 - 36.0 g/dL    RDW 21.5 (H) 11.5 - 14.5 %    Platelets 228 150 - 450 x10*3/uL    Neutrophils % 73.7 40.0 - 80.0 %    Immature Granulocytes %, Automated 0.7 0.0 - 0.9 %    Lymphocytes % 11.5 13.0 - 44.0 %    Monocytes % 12.1 2.0 - 10.0 %    Eosinophils % 1.9 0.0 - 6.0 %    Basophils % 0.1 0.0 - 2.0 %    Neutrophils Absolute 5.47 1.60 - 5.50 x10*3/uL    Immature Granulocytes Absolute, Automated 0.05 0.00 - 0.50 x10*3/uL    Lymphocytes Absolute 0.85 0.80 - 3.00 x10*3/uL    Monocytes Absolute 0.90 (H) 0.05 - 0.80 x10*3/uL    Eosinophils Absolute 0.14 0.00 - 0.40  x10*3/uL    Basophils Absolute 0.01 0.00 - 0.10 x10*3/uL   Basic metabolic panel   Result Value Ref Range    Glucose 80 74 - 99 mg/dL    Sodium 138 136 - 145 mmol/L    Potassium 3.8 3.5 - 5.3 mmol/L    Chloride 103 98 - 107 mmol/L    Bicarbonate 28 21 - 32 mmol/L    Anion Gap 11 10 - 20 mmol/L    Urea Nitrogen 9 6 - 23 mg/dL    Creatinine 0.80 0.50 - 1.05 mg/dL    eGFR 78 >60 mL/min/1.73m*2    Calcium 8.5 (L) 8.6 - 10.3 mg/dL   Morphology   Result Value Ref Range    RBC Morphology See Below     Polychromasia Mild     Hypochromia Mild    Influenza A, and B PCR   Result Value Ref Range    Flu A Result Not Detected Not Detected    Flu B Result Not Detected Not Detected   Sars-CoV-2 PCR   Result Value Ref Range    Coronavirus 2019, PCR Not Detected Not Detected       Transthoracic Echo (TTE) Complete   Final Result      US abdomen limited liver   Final Result   Mild intrahepatic biliary ductal dilatation status post   cholecystectomy. Additionally, the common bile duct measures up to   2.1 cm in caliber, similar to recent CT examination. No sonographic   choledocholithiasis. As previously advised, further evaluation with   ERCP or MRCP may be considered.        MACRO:   None             Signed by: Suhail Silveira 2/26/2024 9:50 AM   Dictation workstation:   [id number]      CT abdomen pelvis w IV contrast   Final Result   1.  Status post cholecystectomy with a markedly dilated extrahepatic   biliary duct measuring up to 3.2 cm in diameter, increased from the   prior study.  This may be chronic, however, correlation with biliary   lab values is recommended, nonemergent ERCP or MRCP may be of benefit   in this patient.   2.  Chronic postoperative changes are seen in the region of the GE   junction with a small sliding-type hiatal hernia, correlate with   patient's surgical history.   3.  Cardiomegaly.   4.  Chronic changes of suspected COPD.   5.  Minimal distal colonic diverticulosis.   Signed by Mesfin WOLFF  chest 1 view   Final Result   No acute cardiopulmonary process is evident.        MACRO:   None        Signed by: Nixon Orantes 2/24/2024 8:47 PM   Dictation workstation:   [id number]      XR chest 1 view    (Results Pending)       Transthoracic Echo (TTE) Complete    Result Date: 2/27/2024   [facility], [address] [place] [address]               Tel [phone] and Fax [phone] TRANSTHORACIC ECHOCARDIOGRAM REPORT  Patient Name:      VERNON SALDANA VIA         Reading Physician:    [id number] Nohelia Soto MD Study Date:        2/27/2024            Ordering Provider:    [id number] NIKOLE LOVE MRN/PID:           [id number]             Fellow: Accession#:        [id number]         Nurse: Date of Birth/Age: 1950 / 73 years Sonographer:          Dai Diaz RDCS Gender:            F                    Additional Staff: Height:            162.56 cm            Admit Date:           2/24/2024 Weight:            72.57 kg             Admission Status:     Inpatient -                                                               Routine BSA / BMI:         1.78 m2 / 27.46      Encounter#:           [id number]                    kg/m2                                         Department Location:  [facility]                                                               Invasive Blood Pressure: 131 /59 mmHg Study Type:    TRANSTHORACIC ECHO (TTE) COMPLETE Diagnosis/ICD: Shortness of breath-R06.02 Indication:    Dyspnea CPT Code:      Echo Complete w Full [id number] Patient History: Pertinent History: A-Fib and Chest Pain. elevated troponin, elevated BNP, mod                    AS. Study Detail: The following Echo studies were performed: 2D, M-Mode, Doppler and               color flow.  PHYSICIAN  INTERPRETATION: Left Ventricle: The left ventricular systolic function is normal, with an estimated ejection fraction of 55-60%. There are no regional wall motion abnormalities. The left ventricular cavity size is normal. Spectral Doppler shows an impaired relaxation pattern of left ventricular diastolic filling. Left Atrium: The left atrium is mildly dilated. Right Ventricle: The right ventricle is normal in size. There is normal right ventricular global systolic function. Right Atrium: The right atrium is normal in size. Aortic Valve: The aortic valve is trileaflet. There is mild to moderate aortic valve cusp calcification. There is evidence of moderate aortic valve stenosis. There is trivial aortic valve regurgitation. The peak instantaneous gradient of the aortic valve is 27.5 mmHg. The mean gradient of the aortic valve is 16.0 mmHg. Mitral Valve: The mitral valve is mildly thickened. There is evidence of mild mitral valve stenosis. The doppler estimated mean and peak diastolic pressure gradients are 7.2 mmHg and 16.3 mmHg respectively. There is mild mitral annular calcification. There is mild to moderate mitral valve regurgitation. Tricuspid Valve: The tricuspid valve is structurally normal. There is mild to moderate tricuspid regurgitation. Pulmonic Valve: The pulmonic valve is not well visualized. There is trace to mild pulmonic valve regurgitation. Pericardium: There is no pericardial effusion noted. Aorta: The aortic root is normal. Pulmonary Artery: The tricuspid regurgitant velocity is 3.46 m/s, and with an estimated right atrial pressure of 3 mmHg, the estimated pulmonary artery pressure is mild to moderately elevated with the RVSP at 50.9 mmHg.  CONCLUSIONS:  1. Left ventricular systolic function is normal with a 55-60% estimated ejection fraction.  2. Spectral Doppler shows an impaired relaxation pattern of left ventricular diastolic filling.  3. Mild to moderate mitral valve regurgitation.  4. Mild to  moderate tricuspid regurgitation visualized.  5. Moderate aortic valve stenosis.  6. Mild to moderately elevated pulmonary artery pressure. QUANTITATIVE DATA SUMMARY: 2D MEASUREMENTS:                           Normal Ranges: IVSd:          0.97 cm    (0.6-1.1cm) LVPWd:         0.98 cm    (0.6-1.1cm) LVIDd:         5.70 cm    (3.9-5.9cm) LVIDs:         4.26 cm LV Mass Index: 123.2 g/m2 LV % FS        25.2 % LA VOLUME:                              Normal Ranges: LA Vol A4C:       77.4 ml    (22+/-6mL/m2) LA Vol A2C:       83.5 ml LA Vol BP:        82.1 ml LA Vol Index A4C: 43.5 ml/m2 LA Vol Index A2C: 46.9 ml/m2 LA Vol Index BP:  46.1 ml/m2 LA Volume Index:  46.1 ml/m2 LA Vol A4C:       72.2 ml LA Vol A2C:       77.0 ml RA VOLUME BY A/L METHOD:                       Normal Ranges: RA Area A4C: 21.8 cm2 M-MODE MEASUREMENTS:                  Normal Ranges: Ao Root: 3.30 cm (2.0-3.7cm) LAs:     4.72 cm (2.7-4.0cm) LV SYSTOLIC FUNCTION BY 2D PLANIMETRY (MOD):                     Normal Ranges: EF-A4C View: 55.9 % (>=55%) EF-A2C View: 56.5 % EF-Biplane:  55.9 % LV DIASTOLIC FUNCTION:                             Normal Ranges: MV Peak E:      1.91 m/s    (0.7-1.2 m/s) MV Peak A:      0.93 m/s    (0.42-0.7 m/s) E/A Ratio:      2.06        (1.0-2.2) MV e'           0.09 m/s    (>8.0) MV lateral e'   0.09 m/s MV medial e'    0.08 m/s E/e' Ratio:     21.21       (<8.0) PulmV Sys Ham:  62.35 cm/s PulmV Alvarez Ham: 121.32 cm/s PulmV S/D Ham:  0.51 MITRAL VALVE:                       Normal Ranges: MV Vmax:    2.02 m/s  (<=1.3m/s) MV peak P.3 mmHg (<5mmHg) MV mean P.2 mmHg  (<2mmHg) MV VTI:     48.44 cm  (10-13cm) MV DT:      167 msec  (150-240msec) MITRAL INSUFFICIENCY:                         Normal Ranges: MR VTI:     192.66 cm MR Vmax:    568.98 cm/s MR Volume:  33.56 ml MR Flow Rt: 99.12 ml/s MR EROA:    0.17 cm2 AORTIC VALVE:                                    Normal Ranges: AoV Vmax:                2.62 m/s   (<=1.7m/s) AoV Peak P.5 mmHg (<20mmHg) AoV Mean P.0 mmHg (1.7-11.5mmHg) LVOT Max Ham:            0.99 m/s  (<=1.1m/s) AoV VTI:                 58.21 cm  (18-25cm) LVOT VTI:                21.96 cm LVOT Diameter:           1.95 cm   (1.8-2.4cm) AoV Area, VTI:           1.13 cm2  (2.5-5.5cm2) AoV Area,Vmax:           1.13 cm2  (2.5-4.5cm2) AoV Dimensionless Index: 0.38 AORTIC INSUFFICIENCY: AI Vmax:       3.91 m/s AI Half-time:  520 msec AI Decel Time: 1794 msec AI Decel Rate: 218.17 cm/s2  RIGHT VENTRICLE: RV Basal 4.30 cm RV Mid   2.60 cm RV Major 7.7 cm TAPSE:   27.0 mm RV s'    0.18 m/s TRICUSPID VALVE/RVSP:                             Normal Ranges: Peak TR Velocity: 3.46 m/s RV Syst Pressure: 50.9 mmHg (< 30mmHg) PULMONIC VALVE:                      Normal Ranges: PV Max Ham: 1.5 m/s  (0.6-0.9m/s) PV Max P.8 mmHg Pulmonary Veins: PulmV Alvarez Ham: 121.32 cm/s PulmV S/D Ham:  0.51 PulmV Sys Ham:  62.35 cm/s  [id number] Nohelia Soto MD Electronically signed on 2024 at 11:22:42 AM  ** Final **           Assessment/Plan   Principal Problem:    Chest pain, unspecified type  Active Problems:    Coronary artery disease involving native coronary artery    Patient has been admitted to the hospital and monitored on telemetry.  She has chronic blood loss iron deficiency anemia related to rivaroxaban use.     Endoscopies has been unrevealing for active source of bleeding.     S/p packed red blood cells, discontinue rivaroxaban, administer intravenous iron infusions.     Elevated troponin most likely due to demand injury from severe anemia.  Last cardiac catheterization showed normal epicardial coronary arteries.  She has a well-functioning defibrillator unclear etiology of beeping sound and this will be verified.     Most recent device check available for review in  shows adequate battery life, occasional episodes of nonsustained ventricular tachycardia.     There has been no  evidence of atrial fibrillation since last pulmonary vein isolation.  Procedure done in May 2022.  Safe to discontinue anticoagulation at this time.     Continue proton pump inhibitors and Carafate 1 g 4 times daily, patient already also on famotidine.     Continue rosuvastatin for hyper lipidemia.     She will be referred for Watchman procedure so long-term anticoagulation for AF does not need to be resumed for any future recurrences of paroxysmal atrial flutter or fibrillation    Discussed with GI for possible MRI related to abd pain->ICD is NOT MRI compatible    Amelia Kiser, APRN-CNP

## 2024-02-29 ENCOUNTER — APPOINTMENT (OUTPATIENT)
Dept: RADIOLOGY | Facility: HOSPITAL | Age: 74
DRG: 811 | End: 2024-02-29
Payer: MEDICARE

## 2024-02-29 LAB
ANION GAP SERPL CALC-SCNC: 12 MMOL/L (ref 10–20)
BASOPHILS # BLD AUTO: 0.02 X10*3/UL (ref 0–0.1)
BASOPHILS NFR BLD AUTO: 0.3 %
BUN SERPL-MCNC: 11 MG/DL (ref 6–23)
BURR CELLS BLD QL SMEAR: NORMAL
CALCIUM SERPL-MCNC: 8.3 MG/DL (ref 8.6–10.3)
CHLORIDE SERPL-SCNC: 105 MMOL/L (ref 98–107)
CO2 SERPL-SCNC: 25 MMOL/L (ref 21–32)
CREAT SERPL-MCNC: 0.84 MG/DL (ref 0.5–1.05)
EGFRCR SERPLBLD CKD-EPI 2021: 74 ML/MIN/1.73M*2
EOSINOPHIL # BLD AUTO: 0.15 X10*3/UL (ref 0–0.4)
EOSINOPHIL NFR BLD AUTO: 2.5 %
ERYTHROCYTE [DISTWIDTH] IN BLOOD BY AUTOMATED COUNT: 21.8 % (ref 11.5–14.5)
GLUCOSE SERPL-MCNC: 140 MG/DL (ref 74–99)
HCT VFR BLD AUTO: 24.4 % (ref 36–46)
HGB BLD-MCNC: 7.2 G/DL (ref 12–16)
HYPOCHROMIA BLD QL SMEAR: NORMAL
IMM GRANULOCYTES # BLD AUTO: 0.03 X10*3/UL (ref 0–0.5)
IMM GRANULOCYTES NFR BLD AUTO: 0.5 % (ref 0–0.9)
LYMPHOCYTES # BLD AUTO: 1.18 X10*3/UL (ref 0.8–3)
LYMPHOCYTES NFR BLD AUTO: 19.6 %
MCH RBC QN AUTO: 21.4 PG (ref 26–34)
MCHC RBC AUTO-ENTMCNC: 29.5 G/DL (ref 32–36)
MCV RBC AUTO: 72 FL (ref 80–100)
MONOCYTES # BLD AUTO: 0.61 X10*3/UL (ref 0.05–0.8)
MONOCYTES NFR BLD AUTO: 10.1 %
NEUTROPHILS # BLD AUTO: 4.02 X10*3/UL (ref 1.6–5.5)
NEUTROPHILS NFR BLD AUTO: 67 %
NRBC BLD-RTO: 0 /100 WBCS (ref 0–0)
PLATELET # BLD AUTO: 213 X10*3/UL (ref 150–450)
POLYCHROMASIA BLD QL SMEAR: NORMAL
POTASSIUM SERPL-SCNC: 3.4 MMOL/L (ref 3.5–5.3)
PROCALCITONIN SERPL-MCNC: 0.09 NG/ML
RBC # BLD AUTO: 3.37 X10*6/UL (ref 4–5.2)
RBC MORPH BLD: NORMAL
SCHISTOCYTES BLD QL SMEAR: NORMAL
SODIUM SERPL-SCNC: 139 MMOL/L (ref 136–145)
WBC # BLD AUTO: 6 X10*3/UL (ref 4.4–11.3)

## 2024-02-29 PROCEDURE — 2500000001 HC RX 250 WO HCPCS SELF ADMINISTERED DRUGS (ALT 637 FOR MEDICARE OP): Performed by: NURSE PRACTITIONER

## 2024-02-29 PROCEDURE — 70150 X-RAY EXAM OF FACIAL BONES: CPT

## 2024-02-29 PROCEDURE — 80048 BASIC METABOLIC PNL TOTAL CA: CPT | Performed by: INTERNAL MEDICINE

## 2024-02-29 PROCEDURE — 1200000002 HC GENERAL ROOM WITH TELEMETRY DAILY

## 2024-02-29 PROCEDURE — 2500000002 HC RX 250 W HCPCS SELF ADMINISTERED DRUGS (ALT 637 FOR MEDICARE OP, ALT 636 FOR OP/ED): Performed by: INTERNAL MEDICINE

## 2024-02-29 PROCEDURE — 85025 COMPLETE CBC W/AUTO DIFF WBC: CPT | Performed by: INTERNAL MEDICINE

## 2024-02-29 PROCEDURE — 94640 AIRWAY INHALATION TREATMENT: CPT | Mod: MUE

## 2024-02-29 PROCEDURE — 2500000004 HC RX 250 GENERAL PHARMACY W/ HCPCS (ALT 636 FOR OP/ED): Performed by: INTERNAL MEDICINE

## 2024-02-29 PROCEDURE — 36415 COLL VENOUS BLD VENIPUNCTURE: CPT | Performed by: INTERNAL MEDICINE

## 2024-02-29 PROCEDURE — 2500000001 HC RX 250 WO HCPCS SELF ADMINISTERED DRUGS (ALT 637 FOR MEDICARE OP): Performed by: INTERNAL MEDICINE

## 2024-02-29 PROCEDURE — 70150 X-RAY EXAM OF FACIAL BONES: CPT | Performed by: STUDENT IN AN ORGANIZED HEALTH CARE EDUCATION/TRAINING PROGRAM

## 2024-02-29 PROCEDURE — 2500000002 HC RX 250 W HCPCS SELF ADMINISTERED DRUGS (ALT 637 FOR MEDICARE OP, ALT 636 FOR OP/ED): Performed by: NURSE PRACTITIONER

## 2024-02-29 PROCEDURE — C9113 INJ PANTOPRAZOLE SODIUM, VIA: HCPCS | Performed by: INTERNAL MEDICINE

## 2024-02-29 PROCEDURE — 2500000004 HC RX 250 GENERAL PHARMACY W/ HCPCS (ALT 636 FOR OP/ED): Performed by: NURSE PRACTITIONER

## 2024-02-29 PROCEDURE — 99233 SBSQ HOSP IP/OBS HIGH 50: CPT | Performed by: INTERNAL MEDICINE

## 2024-02-29 RX ORDER — POTASSIUM CHLORIDE 20 MEQ/1
40 TABLET, EXTENDED RELEASE ORAL ONCE
Status: COMPLETED | OUTPATIENT
Start: 2024-02-29 | End: 2024-02-29

## 2024-02-29 RX ORDER — PANTOPRAZOLE SODIUM 40 MG/1
40 TABLET, DELAYED RELEASE ORAL
Status: DISCONTINUED | OUTPATIENT
Start: 2024-02-29 | End: 2024-03-02

## 2024-02-29 RX ORDER — BUMETANIDE 1 MG/1
2 TABLET ORAL
Status: DISCONTINUED | OUTPATIENT
Start: 2024-02-29 | End: 2024-03-05 | Stop reason: HOSPADM

## 2024-02-29 RX ORDER — OXYCODONE HYDROCHLORIDE 5 MG/1
5 TABLET ORAL ONCE
Status: COMPLETED | OUTPATIENT
Start: 2024-02-29 | End: 2024-02-29

## 2024-02-29 RX ORDER — MORPHINE SULFATE 4 MG/ML
3 INJECTION INTRAVENOUS ONCE
Status: COMPLETED | OUTPATIENT
Start: 2024-02-29 | End: 2024-02-29

## 2024-02-29 RX ORDER — DOXYCYCLINE HYCLATE 20 MG
20 TABLET ORAL 2 TIMES DAILY
Status: DISCONTINUED | OUTPATIENT
Start: 2024-02-29 | End: 2024-02-29 | Stop reason: DRUGHIGH

## 2024-02-29 RX ORDER — IPRATROPIUM BROMIDE AND ALBUTEROL SULFATE 2.5; .5 MG/3ML; MG/3ML
3 SOLUTION RESPIRATORY (INHALATION)
Status: DISCONTINUED | OUTPATIENT
Start: 2024-02-29 | End: 2024-03-01

## 2024-02-29 RX ORDER — BUMETANIDE 0.25 MG/ML
2 INJECTION INTRAMUSCULAR; INTRAVENOUS ONCE
Status: COMPLETED | OUTPATIENT
Start: 2024-02-29 | End: 2024-02-29

## 2024-02-29 RX ORDER — PANTOPRAZOLE SODIUM 40 MG/1
40 TABLET, DELAYED RELEASE ORAL
Status: DISCONTINUED | OUTPATIENT
Start: 2024-03-01 | End: 2024-02-29

## 2024-02-29 RX ORDER — DOXYCYCLINE HYCLATE 100 MG
100 TABLET ORAL EVERY 12 HOURS SCHEDULED
Status: DISCONTINUED | OUTPATIENT
Start: 2024-02-29 | End: 2024-03-01

## 2024-02-29 RX ADMIN — IPRATROPIUM BROMIDE AND ALBUTEROL SULFATE 3 ML: 2.5; .5 SOLUTION RESPIRATORY (INHALATION) at 19:27

## 2024-02-29 RX ADMIN — MORPHINE SULFATE 3 MG: 4 INJECTION INTRAVENOUS at 18:58

## 2024-02-29 RX ADMIN — ALBUTEROL SULFATE 2.5 MG: 2.5 SOLUTION RESPIRATORY (INHALATION) at 14:36

## 2024-02-29 RX ADMIN — SUCRALFATE 1 G: 1 SUSPENSION ORAL at 05:14

## 2024-02-29 RX ADMIN — MONTELUKAST 10 MG: 10 TABLET, FILM COATED ORAL at 21:18

## 2024-02-29 RX ADMIN — BUMETANIDE 2 MG: 1 TABLET ORAL at 17:32

## 2024-02-29 RX ADMIN — PANTOPRAZOLE SODIUM 40 MG: 40 TABLET, DELAYED RELEASE ORAL at 13:59

## 2024-02-29 RX ADMIN — ROSUVASTATIN CALCIUM 20 MG: 20 TABLET, FILM COATED ORAL at 08:33

## 2024-02-29 RX ADMIN — METHYLPREDNISOLONE SODIUM SUCCINATE 125 MG: 125 INJECTION, POWDER, FOR SOLUTION INTRAMUSCULAR; INTRAVENOUS at 17:31

## 2024-02-29 RX ADMIN — DOXYCYCLINE HYCLATE 100 MG: 100 TABLET, COATED ORAL at 21:18

## 2024-02-29 RX ADMIN — DILTIAZEM HYDROCHLORIDE 120 MG: 120 CAPSULE, COATED, EXTENDED RELEASE ORAL at 08:33

## 2024-02-29 RX ADMIN — DIGOXIN 125 MCG: 125 TABLET ORAL at 08:33

## 2024-02-29 RX ADMIN — POLYETHYLENE GLYCOL 3350 17 G: 17 POWDER, FOR SOLUTION ORAL at 21:18

## 2024-02-29 RX ADMIN — BACLOFEN 10 MG: 10 TABLET ORAL at 15:37

## 2024-02-29 RX ADMIN — OXYCODONE HYDROCHLORIDE AND ACETAMINOPHEN 1 TABLET: 5; 325 TABLET ORAL at 08:35

## 2024-02-29 RX ADMIN — BACLOFEN 10 MG: 10 TABLET ORAL at 21:18

## 2024-02-29 RX ADMIN — ALBUTEROL SULFATE 2.5 MG: 2.5 SOLUTION RESPIRATORY (INHALATION) at 08:32

## 2024-02-29 RX ADMIN — POTASSIUM CHLORIDE 40 MEQ: 1500 TABLET, EXTENDED RELEASE ORAL at 13:59

## 2024-02-29 RX ADMIN — ALBUTEROL SULFATE 2.5 MG: 2.5 SOLUTION RESPIRATORY (INHALATION) at 05:34

## 2024-02-29 RX ADMIN — BUMETANIDE 2 MG: 0.25 INJECTION INTRAMUSCULAR; INTRAVENOUS at 14:00

## 2024-02-29 RX ADMIN — SUCRALFATE 1 G: 1 SUSPENSION ORAL at 15:37

## 2024-02-29 RX ADMIN — SUCRALFATE 1 G: 1 SUSPENSION ORAL at 08:32

## 2024-02-29 RX ADMIN — OXYCODONE HYDROCHLORIDE 5 MG: 5 TABLET ORAL at 17:31

## 2024-02-29 RX ADMIN — FLUTICASONE PROPIONATE 1 SPRAY: 50 SPRAY, METERED NASAL at 08:37

## 2024-02-29 RX ADMIN — OXYCODONE HYDROCHLORIDE AND ACETAMINOPHEN 1 TABLET: 5; 325 TABLET ORAL at 15:37

## 2024-02-29 RX ADMIN — BACLOFEN 10 MG: 10 TABLET ORAL at 08:33

## 2024-02-29 RX ADMIN — SUCRALFATE 1 G: 1 SUSPENSION ORAL at 21:18

## 2024-02-29 RX ADMIN — OXYCODONE HYDROCHLORIDE AND ACETAMINOPHEN 1 TABLET: 5; 325 TABLET ORAL at 22:37

## 2024-02-29 RX ADMIN — HYDROXYCHLOROQUINE SULFATE 200 MG: 200 TABLET, FILM COATED ORAL at 08:33

## 2024-02-29 RX ADMIN — POLYETHYLENE GLYCOL 3350 17 G: 17 POWDER, FOR SOLUTION ORAL at 08:34

## 2024-02-29 RX ADMIN — PANTOPRAZOLE SODIUM 40 MG: 40 INJECTION, POWDER, FOR SOLUTION INTRAVENOUS at 08:33

## 2024-02-29 ASSESSMENT — COGNITIVE AND FUNCTIONAL STATUS - GENERAL
DAILY ACTIVITIY SCORE: 24
MOBILITY SCORE: 24

## 2024-02-29 ASSESSMENT — PAIN DESCRIPTION - ORIENTATION
ORIENTATION: LEFT
ORIENTATION: LEFT

## 2024-02-29 ASSESSMENT — PAIN - FUNCTIONAL ASSESSMENT
PAIN_FUNCTIONAL_ASSESSMENT: 0-10

## 2024-02-29 ASSESSMENT — PAIN SCALES - GENERAL
PAINLEVEL_OUTOF10: 2
PAINLEVEL_OUTOF10: 10 - WORST POSSIBLE PAIN
PAINLEVEL_OUTOF10: 7
PAINLEVEL_OUTOF10: 7

## 2024-02-29 ASSESSMENT — PAIN DESCRIPTION - LOCATION: LOCATION: EAR

## 2024-02-29 NOTE — PROGRESS NOTES
"Trina Elias is a 73 y.o. female on day 4 of admission presenting with Chest pain, unspecified type.      Subjective   She is resting in the chair, states she did not sleep well last night. Up coughing and short of breath. CXR showed CHF. Still with shortness of breath today.       Review of systems:  Constitutional: negative for fever, chills, or malaise  Neuro: negative for dizziness, headache, numbness, tingling  ENT: Negative for nasal congestion or sore throat  CV: negative for chest pain, palpitations  : negative for dysuria, frequency, or urgency  Skin: negative for lesions, wounds, or rash  Musculoskeletal: Negative for weakness, myalgia, or arthralgia  Endocrine: Negative for polyuria or polydipsia         Objective   Constitutional: Well developed, awake/alert/oriented x3, no distress, alert and cooperative  Eyes: PERRL, EOMI, clear sclera  ENMT: mucous membranes moist, no apparent injury, no lesions seen  Head/Neck: Neck supple, no apparent injury, thyroid without mass or tenderness, No JVD, trachea midline, no bruits  Respiratory/Thorax: Patent airways, expiratory wheezes throughout with good chest expansion, thorax symmetric  Cardiovascular: Regular, rate and rhythm, no murmurs, 2+ equal pulses of the extremities, normal S 1and S 2  Gastrointestinal: Nondistended, soft, tender to palpation, no masses palpable, no organomegaly, +BS, no bruits  Musculoskeletal: ROM intact, no joint swelling, normal strength  Extremities: normal extremities, no cyanosis edema, contusions or wounds, no clubbing  Neurological: alert and oriented x3, intact senses, motor, response and reflexes, normal strength  Lymphatic: No significant lymphadenopathy  Psychological: Appropriate mood and behavior  Skin: Warm and dry, no lesions, no rashes      Last Recorded Vitals  /69   Pulse 60   Temp 36.5 °C (97.7 °F) (Temporal)   Resp 18   Ht 1.626 m (5' 4\")   Wt 72.8 kg (160 lb 8 oz)   SpO2 95%   BMI 27.55 kg/m² "     Intake/Output last 3 Shifts:  I/O last 3 completed shifts:  In: 240 (3.3 mL/kg) [P.O.:240]  Out: - (0 mL/kg)   Weight: 72.8 kg   No intake/output data recorded.    Relevant Results  Scheduled medications  albuterol, 2.5 mg, nebulization, TID  baclofen, 10 mg, oral, TID  bumetanide, 2 mg, oral, BID with meals  digoxin, 125 mcg, oral, Daily  dilTIAZem CD, 120 mg, oral, Daily  doxycylcine, 100 mg, oral, q12h DWIGHT  fluticasone, 1 spray, Each Nostril, BID  hydroxychloroquine, 200 mg, oral, Daily  ipratropium-albuteroL, 3 mL, nebulization, q4h  methylPREDNISolone sodium succinate (PF), 125 mg, intravenous, q24h  montelukast, 10 mg, oral, Nightly  pantoprazole, 40 mg, oral, Daily before breakfast  polyethylene glycol, 17 g, oral, BID  rosuvastatin, 20 mg, oral, Daily  sucralfate, 1 g, oral, q6h DWIGHT      Continuous medications     PRN medications  PRN medications: acetaminophen **OR** acetaminophen **OR** acetaminophen, albuterol, alum-mag hydroxide-simeth, calcium carbonate, dextromethorphan-guaifenesin, diclofenac sodium, guaiFENesin, nitroglycerin, ondansetron **OR** ondansetron, oxyCODONE-acetaminophen, oxygen    Results for orders placed or performed during the hospital encounter of 02/24/24 (from the past 24 hour(s))   CBC and Auto Differential   Result Value Ref Range    WBC 6.0 4.4 - 11.3 x10*3/uL    nRBC 0.0 0.0 - 0.0 /100 WBCs    RBC 3.37 (L) 4.00 - 5.20 x10*6/uL    Hemoglobin 7.2 (L) 12.0 - 16.0 g/dL    Hematocrit 24.4 (L) 36.0 - 46.0 %    MCV 72 (L) 80 - 100 fL    MCH 21.4 (L) 26.0 - 34.0 pg    MCHC 29.5 (L) 32.0 - 36.0 g/dL    RDW 21.8 (H) 11.5 - 14.5 %    Platelets 213 150 - 450 x10*3/uL    Neutrophils % 67.0 40.0 - 80.0 %    Immature Granulocytes %, Automated 0.5 0.0 - 0.9 %    Lymphocytes % 19.6 13.0 - 44.0 %    Monocytes % 10.1 2.0 - 10.0 %    Eosinophils % 2.5 0.0 - 6.0 %    Basophils % 0.3 0.0 - 2.0 %    Neutrophils Absolute 4.02 1.60 - 5.50 x10*3/uL    Immature Granulocytes Absolute, Automated 0.03  0.00 - 0.50 x10*3/uL    Lymphocytes Absolute 1.18 0.80 - 3.00 x10*3/uL    Monocytes Absolute 0.61 0.05 - 0.80 x10*3/uL    Eosinophils Absolute 0.15 0.00 - 0.40 x10*3/uL    Basophils Absolute 0.02 0.00 - 0.10 x10*3/uL   Basic metabolic panel   Result Value Ref Range    Glucose 140 (H) 74 - 99 mg/dL    Sodium 139 136 - 145 mmol/L    Potassium 3.4 (L) 3.5 - 5.3 mmol/L    Chloride 105 98 - 107 mmol/L    Bicarbonate 25 21 - 32 mmol/L    Anion Gap 12 10 - 20 mmol/L    Urea Nitrogen 11 6 - 23 mg/dL    Creatinine 0.84 0.50 - 1.05 mg/dL    eGFR 74 >60 mL/min/1.73m*2    Calcium 8.3 (L) 8.6 - 10.3 mg/dL   Morphology   Result Value Ref Range    RBC Morphology See Below     Polychromasia Mild     Hypochromia Mild     RBC Fragments Few     [place] Cells Few        XR chest 1 view   Final Result   New perihilar and basilar interstitial edema with small left effusion.        Signed by: Rony Kowalski 2/28/2024 2:37 PM   Dictation workstation:   [id number]      Transthoracic Echo (TTE) Complete   Final Result      US abdomen limited liver   Final Result   Mild intrahepatic biliary ductal dilatation status post   cholecystectomy. Additionally, the common bile duct measures up to   2.1 cm in caliber, similar to recent CT examination. No sonographic   choledocholithiasis. As previously advised, further evaluation with   ERCP or MRCP may be considered.        MACRO:   None             Signed by: Suhail Silveira 2/26/2024 9:50 AM   Dictation workstation:   [id number]      CT abdomen pelvis w IV contrast   Final Result   1.  Status post cholecystectomy with a markedly dilated extrahepatic   biliary duct measuring up to 3.2 cm in diameter, increased from the   prior study.  This may be chronic, however, correlation with biliary   lab values is recommended, nonemergent ERCP or MRCP may be of benefit   in this patient.   2.  Chronic postoperative changes are seen in the region of the GE   junction with a small sliding-type hiatal hernia,  correlate with   patient's surgical history.   3.  Cardiomegaly.   4.  Chronic changes of suspected COPD.   5.  Minimal distal colonic diverticulosis.   Signed by Mesfin Seaman      XR chest 1 view   Final Result   No acute cardiopulmonary process is evident.        MACRO:   None        Signed by: Nixon Orantes 2/24/2024 8:47 PM   Dictation workstation:   [id number]      XR facial bones 3+ views    (Results Pending)       Transthoracic Echo (TTE) Complete    Result Date: 2/27/2024   [facility], [address] [place] [address]               Tel [phone] and Fax [phone] TRANSTHORACIC ECHOCARDIOGRAM REPORT  Patient Name:      VERNON SALDANA VIA         Reading Physician:    [id number] Nohelia Soto MD Study Date:        2/27/2024            Ordering Provider:    [id number] NIKOLE LOVE MRN/PID:           [id number]             Fellow: Accession#:        [id number]         Nurse: Date of Birth/Age: 1950 / 73 years Sonographer:          Dai Diaz                                                               [facility] Gender:            F                    Additional Staff: Height:            162.56 cm            Admit Date:           2/24/2024 Weight:            72.57 kg             Admission Status:     Inpatient -                                                               Routine BSA / BMI:         1.78 m2 / 27.46      Encounter#:           [id number]                    kg/m2                                         Department Location:  [facility] Non                                                               Invasive Blood Pressure: 131 /59 mmHg Study Type:    TRANSTHORACIC ECHO (TTE) COMPLETE Diagnosis/ICD: Shortness of breath-R06.02 Indication:    Dyspnea CPT Code:      Echo Complete w Full Doppler-87010 Patient History: Pertinent History: A-Fib and  Chest Pain. elevated troponin, elevated BNP, mod                    AS. Study Detail: The following Echo studies were performed: 2D, M-Mode, Doppler and               color flow.  PHYSICIAN INTERPRETATION: Left Ventricle: The left ventricular systolic function is normal, with an estimated ejection fraction of 55-60%. There are no regional wall motion abnormalities. The left ventricular cavity size is normal. Spectral Doppler shows an impaired relaxation pattern of left ventricular diastolic filling. Left Atrium: The left atrium is mildly dilated. Right Ventricle: The right ventricle is normal in size. There is normal right ventricular global systolic function. Right Atrium: The right atrium is normal in size. Aortic Valve: The aortic valve is trileaflet. There is mild to moderate aortic valve cusp calcification. There is evidence of moderate aortic valve stenosis. There is trivial aortic valve regurgitation. The peak instantaneous gradient of the aortic valve is 27.5 mmHg. The mean gradient of the aortic valve is 16.0 mmHg. Mitral Valve: The mitral valve is mildly thickened. There is evidence of mild mitral valve stenosis. The doppler estimated mean and peak diastolic pressure gradients are 7.2 mmHg and 16.3 mmHg respectively. There is mild mitral annular calcification. There is mild to moderate mitral valve regurgitation. Tricuspid Valve: The tricuspid valve is structurally normal. There is mild to moderate tricuspid regurgitation. Pulmonic Valve: The pulmonic valve is not well visualized. There is trace to mild pulmonic valve regurgitation. Pericardium: There is no pericardial effusion noted. Aorta: The aortic root is normal. Pulmonary Artery: The tricuspid regurgitant velocity is 3.46 m/s, and with an estimated right atrial pressure of 3 mmHg, the estimated pulmonary artery pressure is mild to moderately elevated with the RVSP at 50.9 mmHg.  CONCLUSIONS:  1. Left ventricular systolic function is normal with a  55-60% estimated ejection fraction.  2. Spectral Doppler shows an impaired relaxation pattern of left ventricular diastolic filling.  3. Mild to moderate mitral valve regurgitation.  4. Mild to moderate tricuspid regurgitation visualized.  5. Moderate aortic valve stenosis.  6. Mild to moderately elevated pulmonary artery pressure. QUANTITATIVE DATA SUMMARY: 2D MEASUREMENTS:                           Normal Ranges: IVSd:          0.97 cm    (0.6-1.1cm) LVPWd:         0.98 cm    (0.6-1.1cm) LVIDd:         5.70 cm    (3.9-5.9cm) LVIDs:         4.26 cm LV Mass Index: 123.2 g/m2 LV % FS        25.2 % LA VOLUME:                              Normal Ranges: LA Vol A4C:       77.4 ml    (22+/-6mL/m2) LA Vol A2C:       83.5 ml LA Vol BP:        82.1 ml LA Vol Index A4C: 43.5 ml/m2 LA Vol Index A2C: 46.9 ml/m2 LA Vol Index BP:  46.1 ml/m2 LA Volume Index:  46.1 ml/m2 LA Vol A4C:       72.2 ml LA Vol A2C:       77.0 ml RA VOLUME BY A/L METHOD:                       Normal Ranges: RA Area A4C: 21.8 cm2 M-MODE MEASUREMENTS:                  Normal Ranges: Ao Root: 3.30 cm (2.0-3.7cm) LAs:     4.72 cm (2.7-4.0cm) LV SYSTOLIC FUNCTION BY 2D PLANIMETRY (MOD):                     Normal Ranges: EF-A4C View: 55.9 % (>=55%) EF-A2C View: 56.5 % EF-Biplane:  55.9 % LV DIASTOLIC FUNCTION:                             Normal Ranges: MV Peak E:      1.91 m/s    (0.7-1.2 m/s) MV Peak A:      0.93 m/s    (0.42-0.7 m/s) E/A Ratio:      2.06        (1.0-2.2) MV e'           0.09 m/s    (>8.0) MV lateral e'   0.09 m/s MV medial e'    0.08 m/s E/e' Ratio:     21.21       (<8.0) PulmV Sys Ham:  62.35 cm/s PulmV Alvarez Ham: 121.32 cm/s PulmV S/D Ham:  0.51 MITRAL VALVE:                       Normal Ranges: MV Vmax:    2.02 m/s  (<=1.3m/s) MV peak P.3 mmHg (<5mmHg) MV mean P.2 mmHg  (<2mmHg) MV VTI:     48.44 cm  (10-13cm) MV DT:      167 msec  (150-240msec) MITRAL INSUFFICIENCY:                         Normal Ranges: MR VTI:     192.66 cm MR  Vmax:    568.98 cm/s MR Volume:  33.56 ml MR Flow Rt: 99.12 ml/s MR EROA:    0.17 cm2 AORTIC VALVE:                                    Normal Ranges: AoV Vmax:                2.62 m/s  (<=1.7m/s) AoV Peak P.5 mmHg (<20mmHg) AoV Mean P.0 mmHg (1.7-11.5mmHg) LVOT Max Ham:            0.99 m/s  (<=1.1m/s) AoV VTI:                 58.21 cm  (18-25cm) LVOT VTI:                21.96 cm LVOT Diameter:           1.95 cm   (1.8-2.4cm) AoV Area, VTI:           1.13 cm2  (2.5-5.5cm2) AoV Area,Vmax:           1.13 cm2  (2.5-4.5cm2) AoV Dimensionless Index: 0.38 AORTIC INSUFFICIENCY: AI Vmax:       3.91 m/s AI Half-time:  520 msec AI Decel Time: 1794 msec AI Decel Rate: 218.17 cm/s2  RIGHT VENTRICLE: RV Basal 4.30 cm RV Mid   2.60 cm RV Major 7.7 cm TAPSE:   27.0 mm RV s'    0.18 m/s TRICUSPID VALVE/RVSP:                             Normal Ranges: Peak TR Velocity: 3.46 m/s RV Syst Pressure: 50.9 mmHg (< 30mmHg) PULMONIC VALVE:                      Normal Ranges: PV Max Ham: 1.5 m/s  (0.6-0.9m/s) PV Max P.8 mmHg Pulmonary Veins: PulmV Alvarez Ham: 121.32 cm/s PulmV S/D Ham:  0.51 PulmV Sys Ham:  62.35 cm/s  [id number] Nohelia Soto MD Electronically signed on 2024 at 11:22:42 AM  ** Final **           Assessment/Plan   Principal Problem:    Chest pain, unspecified type  Active Problems:    Coronary artery disease involving native coronary artery    Patient has been admitted to the hospital and monitored on telemetry.  She has chronic blood loss iron deficiency anemia related to rivaroxaban use.     Endoscopies has been unrevealing for active source of bleeding.     S/p packed red blood cells, discontinue rivaroxaban, administer intravenous iron infusions.     Elevated troponin most likely due to demand injury from severe anemia.  Last cardiac catheterization showed normal epicardial coronary arteries.  She has a well-functioning defibrillator unclear etiology of beeping sound and this will be  verified.     Most recent device check available for review in 2023 shows adequate battery life, occasional episodes of nonsustained ventricular tachycardia.     There has been no evidence of atrial fibrillation since last pulmonary vein isolation.  Procedure done in May 2022.  Safe to discontinue anticoagulation at this time.     Continue proton pump inhibitors and Carafate 1 g 4 times daily, patient already also on famotidine.     Continue rosuvastatin for hyper lipidemia.     She will be referred for Watchman procedure so long-term anticoagulation for AF does not need to be resumed for any future recurrences of paroxysmal atrial flutter or fibrillation    Discussed with GI for possible MRI related to abd pain->ICD is NOT MRI compatible    Overnight developed shortness of breath and cough. CXR showed CHF. Home dose bumex has been on hold. Give Bumex 2mg IVx1 now and resume home dose. Re assess in am.      Amelia Kiser, NAILA-CNP

## 2024-02-29 NOTE — PROGRESS NOTES
Trina Elias is a 73 y.o. female on day 4 of admission presenting with Chest pain, unspecified type.      Subjective   Seen and examined, feeling tired this morning, and reported productive cough of yellow sputum, also she reported new pain when she is trying to chew the food; no new complaints.  No overnight events.  The plan discussed with the patient and RN.      Objective     Last Recorded Vitals  /69   Pulse 60   Temp 36.5 °C (97.7 °F) (Temporal)   Resp 18   Wt 72.8 kg (160 lb 8 oz)   SpO2 95%   Intake/Output last 3 Shifts:  No intake or output data in the 24 hours ending 02/29/24 1606      Admission Weight  Weight: 69.9 kg (154 lb) (02/24/24 2030)    Daily Weight  02/25/24 : 72.8 kg (160 lb 8 oz)    Image Results  XR chest 1 view  Narrative: Interpreted By:  Rony Kowalski,   STUDY:  XR CHEST 1 VIEW      INDICATION:  Signs/Symptoms:Fever, SOB.      COMPARISON:  February 24, 2020 for      ACCESSION NUMBER(S):  [id number]      ORDERING CLINICIAN:  NIKOLE LOVE      FINDINGS:  Cardiomegaly.      New perihilar and basilar interstitial edema with small left effusion.      Pacemaker noted unchanged. No evidence.      Impression: New perihilar and basilar interstitial edema with small left effusion.      Signed by: Rony Kowalski 2/28/2024 2:37 PM  Dictation workstation:   [id number]      Physical Exam  Constitutional: patient is alert and cooperative with exam  skin: dry and warm  Eyes: EOMI and clear sclera  ENMT: moist mucous membranes  Head/Neck: Neck supple  Respiratory/Thorax: Clear to auscultation bilaterally, no wheezing or crackles appreciated  Cardiovascular: regular rate and rhythm, S1 and S2 present, no murmurs heard  Gastrointestinal: bowel sounds present, no pain or tenderness upon palpation, soft and nondistended  Extremities: +2 radial, posterior tibial, and pedal pulse, no lower extremity edema noted  Neurological: AxOx3    Relevant Results  Scheduled medications  albuterol, 2.5 mg,  nebulization, TID  baclofen, 10 mg, oral, TID  bumetanide, 2 mg, oral, BID with meals  digoxin, 125 mcg, oral, Daily  dilTIAZem CD, 120 mg, oral, Daily  doxycycline, 20 mg, oral, BID  fluticasone, 1 spray, Each Nostril, BID  hydroxychloroquine, 200 mg, oral, Daily  ipratropium-albuteroL, 3 mL, nebulization, q4h  methylPREDNISolone sodium succinate (PF), 125 mg, intravenous, q24h  montelukast, 10 mg, oral, Nightly  pantoprazole, 40 mg, oral, Daily before breakfast  polyethylene glycol, 17 g, oral, BID  rosuvastatin, 20 mg, oral, Daily  sucralfate, 1 g, oral, q6h DWIGHT      Continuous medications     PRN medications  PRN medications: acetaminophen **OR** acetaminophen **OR** acetaminophen, albuterol, alum-mag hydroxide-simeth, calcium carbonate, dextromethorphan-guaifenesin, diclofenac sodium, guaiFENesin, nitroglycerin, ondansetron **OR** ondansetron, oxyCODONE-acetaminophen, oxygen  Results for orders placed or performed during the hospital encounter of 02/24/24 (from the past 24 hour(s))   CBC and Auto Differential   Result Value Ref Range    WBC 6.0 4.4 - 11.3 x10*3/uL    nRBC 0.0 0.0 - 0.0 /100 WBCs    RBC 3.37 (L) 4.00 - 5.20 x10*6/uL    Hemoglobin 7.2 (L) 12.0 - 16.0 g/dL    Hematocrit 24.4 (L) 36.0 - 46.0 %    MCV 72 (L) 80 - 100 fL    MCH 21.4 (L) 26.0 - 34.0 pg    MCHC 29.5 (L) 32.0 - 36.0 g/dL    RDW 21.8 (H) 11.5 - 14.5 %    Platelets 213 150 - 450 x10*3/uL    Neutrophils % 67.0 40.0 - 80.0 %    Immature Granulocytes %, Automated 0.5 0.0 - 0.9 %    Lymphocytes % 19.6 13.0 - 44.0 %    Monocytes % 10.1 2.0 - 10.0 %    Eosinophils % 2.5 0.0 - 6.0 %    Basophils % 0.3 0.0 - 2.0 %    Neutrophils Absolute 4.02 1.60 - 5.50 x10*3/uL    Immature Granulocytes Absolute, Automated 0.03 0.00 - 0.50 x10*3/uL    Lymphocytes Absolute 1.18 0.80 - 3.00 x10*3/uL    Monocytes Absolute 0.61 0.05 - 0.80 x10*3/uL    Eosinophils Absolute 0.15 0.00 - 0.40 x10*3/uL    Basophils Absolute 0.02 0.00 - 0.10 x10*3/uL   Basic metabolic  panel   Result Value Ref Range    Glucose 140 (H) 74 - 99 mg/dL    Sodium 139 136 - 145 mmol/L    Potassium 3.4 (L) 3.5 - 5.3 mmol/L    Chloride 105 98 - 107 mmol/L    Bicarbonate 25 21 - 32 mmol/L    Anion Gap 12 10 - 20 mmol/L    Urea Nitrogen 11 6 - 23 mg/dL    Creatinine 0.84 0.50 - 1.05 mg/dL    eGFR 74 >60 mL/min/1.73m*2    Calcium 8.3 (L) 8.6 - 10.3 mg/dL   Morphology   Result Value Ref Range    RBC Morphology See Below     Polychromasia Mild     Hypochromia Mild     RBC Fragments Few     [place] Cells Few           Assessment/Plan      Principal Problem:    Chest pain, unspecified type  Active Problems:    Coronary artery disease involving native coronary artery    Trina Elias is a 73 y.o. female with history of multiple abdominal surgeries, CAD, ICD/PPM placement, HTN and COPD, presenting with chest pain.     #Acute blood loss anemia  General surgery on board, status post EGD and colonoscopy with no signs of active bleeding     # Shortness of breath likely secondary to asthma exacerbation in the setting of pleural edema  #Fever  -Afebrile for the last 24 hours  -CXR New perihilar and basilar interstitial edema with small left effusion.   -Flu and COVID-negative  -  -Procalcitonin 0.09  -Cardiology on board and recommended IV Bumex  -DuoNebs started every 4, methylprednisolone started, doxycycline started for 5 days    #Severe abdominal pain  #dilated extrahepatic  biliary duct measuring up to 3.2 cm in diameter  -Amylase 19 and lipase 18  -CT abdomen pelvis showed: Status post cholecystectomy with a markedly dilated extrahepatic  biliary duct measuring up to 3.2 cm in diameter, increased from the prior study.  This may be chronic, however, correlation with biliary lab values is recommended, nonemergent ERCP or MRCP may be of benefit  in this patient.   -Liver ultrasound showed Mild intrahepatic biliary ductal dilatation status post  cholecystectomy. Additionally, the common bile duct measures up  to  2.1 cm in caliber, similar to recent CT examination. No sonographic  choledocholithiasis.   -Pacemaker is MRI compatible we will plan for nonurgent MRCP   -Pain control.   -GI consulted, plan for EUS outpatient     #Near syncope  Appears to be from a combination of events including acute blood loss anemia, volume depletion and arrhythmia  Monitor with orthostatic vitals  Echo showed normal ejection fraction, moderate valvular abnormalities  Seen by cardiology, anticoagulation DC'd, Watchman device as outpatient  Cardiology referral as outpatient     #PPM/ICD problem  Has outpatient appointment to see electrophysiologist next week  Due to recurrent runs of v tach reported, will ask cardiology to see.     #Dispo  -Pending improvement of symptoms, facial x-ray, IV Bumex started today, discharge pending improvement of symptoms    Ayden Wiley MD

## 2024-03-01 ENCOUNTER — APPOINTMENT (OUTPATIENT)
Dept: RADIOLOGY | Facility: HOSPITAL | Age: 74
DRG: 811 | End: 2024-03-01
Payer: MEDICARE

## 2024-03-01 LAB
ANION GAP SERPL CALC-SCNC: 16 MMOL/L (ref 10–20)
ATRIAL RATE: 86 BPM
BASOPHILS # BLD AUTO: 0 X10*3/UL (ref 0–0.1)
BASOPHILS NFR BLD AUTO: 0 %
BUN SERPL-MCNC: 13 MG/DL (ref 6–23)
CALCIUM SERPL-MCNC: 9.2 MG/DL (ref 8.6–10.3)
CHLORIDE SERPL-SCNC: 103 MMOL/L (ref 98–107)
CO2 SERPL-SCNC: 27 MMOL/L (ref 21–32)
CREAT SERPL-MCNC: 0.77 MG/DL (ref 0.5–1.05)
CRP SERPL-MCNC: 10.48 MG/DL
EGFRCR SERPLBLD CKD-EPI 2021: 82 ML/MIN/1.73M*2
EOSINOPHIL # BLD AUTO: 0 X10*3/UL (ref 0–0.4)
EOSINOPHIL NFR BLD AUTO: 0 %
ERYTHROCYTE [DISTWIDTH] IN BLOOD BY AUTOMATED COUNT: 22.1 % (ref 11.5–14.5)
ERYTHROCYTE [SEDIMENTATION RATE] IN BLOOD BY WESTERGREN METHOD: 81 MM/H (ref 0–30)
GLUCOSE SERPL-MCNC: 161 MG/DL (ref 74–99)
HCT VFR BLD AUTO: 26.9 % (ref 36–46)
HGB BLD-MCNC: 8.1 G/DL (ref 12–16)
HYPOCHROMIA BLD QL SMEAR: NORMAL
IMM GRANULOCYTES # BLD AUTO: 0.09 X10*3/UL (ref 0–0.5)
IMM GRANULOCYTES NFR BLD AUTO: 1.3 % (ref 0–0.9)
LYMPHOCYTES # BLD AUTO: 0.44 X10*3/UL (ref 0.8–3)
LYMPHOCYTES NFR BLD AUTO: 6.2 %
MCH RBC QN AUTO: 21.6 PG (ref 26–34)
MCHC RBC AUTO-ENTMCNC: 30.1 G/DL (ref 32–36)
MCV RBC AUTO: 72 FL (ref 80–100)
MONOCYTES # BLD AUTO: 0.09 X10*3/UL (ref 0.05–0.8)
MONOCYTES NFR BLD AUTO: 1.3 %
NEUTROPHILS # BLD AUTO: 6.44 X10*3/UL (ref 1.6–5.5)
NEUTROPHILS NFR BLD AUTO: 91.2 %
NRBC BLD-RTO: 0 /100 WBCS (ref 0–0)
OVALOCYTES BLD QL SMEAR: NORMAL
PLATELET # BLD AUTO: 271 X10*3/UL (ref 150–450)
POTASSIUM SERPL-SCNC: 4.2 MMOL/L (ref 3.5–5.3)
PR INTERVAL: 132 MS
Q ONSET: 217 MS
QRS COUNT: 12 BEATS
QRS DURATION: 94 MS
QT INTERVAL: 404 MS
QTC CALCULATION(BAZETT): 442 MS
QTC FREDERICIA: 429 MS
R AXIS: 29 DEGREES
RBC # BLD AUTO: 3.75 X10*6/UL (ref 4–5.2)
RBC MORPH BLD: NORMAL
SCHISTOCYTES BLD QL SMEAR: NORMAL
SODIUM SERPL-SCNC: 142 MMOL/L (ref 136–145)
T AXIS: 264 DEGREES
T OFFSET: 419 MS
VENTRICULAR RATE: 72 BPM
WBC # BLD AUTO: 7.1 X10*3/UL (ref 4.4–11.3)

## 2024-03-01 PROCEDURE — 80048 BASIC METABOLIC PNL TOTAL CA: CPT | Performed by: INTERNAL MEDICINE

## 2024-03-01 PROCEDURE — 85652 RBC SED RATE AUTOMATED: CPT | Performed by: INTERNAL MEDICINE

## 2024-03-01 PROCEDURE — 2500000002 HC RX 250 W HCPCS SELF ADMINISTERED DRUGS (ALT 637 FOR MEDICARE OP, ALT 636 FOR OP/ED): Performed by: INTERNAL MEDICINE

## 2024-03-01 PROCEDURE — 2500000004 HC RX 250 GENERAL PHARMACY W/ HCPCS (ALT 636 FOR OP/ED): Performed by: INTERNAL MEDICINE

## 2024-03-01 PROCEDURE — 94760 N-INVAS EAR/PLS OXIMETRY 1: CPT

## 2024-03-01 PROCEDURE — 1200000002 HC GENERAL ROOM WITH TELEMETRY DAILY

## 2024-03-01 PROCEDURE — 94640 AIRWAY INHALATION TREATMENT: CPT | Mod: MUE

## 2024-03-01 PROCEDURE — 36415 COLL VENOUS BLD VENIPUNCTURE: CPT | Performed by: INTERNAL MEDICINE

## 2024-03-01 PROCEDURE — 99222 1ST HOSP IP/OBS MODERATE 55: CPT

## 2024-03-01 PROCEDURE — 86140 C-REACTIVE PROTEIN: CPT | Performed by: INTERNAL MEDICINE

## 2024-03-01 PROCEDURE — 99233 SBSQ HOSP IP/OBS HIGH 50: CPT | Performed by: INTERNAL MEDICINE

## 2024-03-01 PROCEDURE — 85025 COMPLETE CBC W/AUTO DIFF WBC: CPT | Performed by: INTERNAL MEDICINE

## 2024-03-01 PROCEDURE — 2500000002 HC RX 250 W HCPCS SELF ADMINISTERED DRUGS (ALT 637 FOR MEDICARE OP, ALT 636 FOR OP/ED): Mod: MUE | Performed by: INTERNAL MEDICINE

## 2024-03-01 PROCEDURE — 70486 CT MAXILLOFACIAL W/O DYE: CPT

## 2024-03-01 PROCEDURE — 2500000004 HC RX 250 GENERAL PHARMACY W/ HCPCS (ALT 636 FOR OP/ED): Performed by: NURSE PRACTITIONER

## 2024-03-01 PROCEDURE — 70486 CT MAXILLOFACIAL W/O DYE: CPT | Performed by: RADIOLOGY

## 2024-03-01 PROCEDURE — 2500000004 HC RX 250 GENERAL PHARMACY W/ HCPCS (ALT 636 FOR OP/ED)

## 2024-03-01 RX ORDER — PREDNISONE 20 MG/1
20 TABLET ORAL DAILY
Status: DISCONTINUED | OUTPATIENT
Start: 2024-03-01 | End: 2024-03-01

## 2024-03-01 RX ORDER — DIGOXIN 0.25 MG/ML
125 INJECTION INTRAMUSCULAR; INTRAVENOUS DAILY
Status: DISCONTINUED | OUTPATIENT
Start: 2024-03-01 | End: 2024-03-04

## 2024-03-01 RX ORDER — MORPHINE SULFATE 2 MG/ML
1 INJECTION, SOLUTION INTRAMUSCULAR; INTRAVENOUS EVERY 4 HOURS PRN
Status: DISCONTINUED | OUTPATIENT
Start: 2024-03-01 | End: 2024-03-01

## 2024-03-01 RX ORDER — PREDNISONE 5 MG/1
5 TABLET ORAL DAILY
Status: DISCONTINUED | OUTPATIENT
Start: 2024-03-10 | End: 2024-03-01

## 2024-03-01 RX ORDER — MORPHINE SULFATE 4 MG/ML
3 INJECTION INTRAVENOUS EVERY 4 HOURS PRN
Status: DISCONTINUED | OUTPATIENT
Start: 2024-03-01 | End: 2024-03-05 | Stop reason: HOSPADM

## 2024-03-01 RX ORDER — AMITRIPTYLINE HYDROCHLORIDE 25 MG/1
25 TABLET, FILM COATED ORAL 2 TIMES DAILY
Status: DISCONTINUED | OUTPATIENT
Start: 2024-03-01 | End: 2024-03-05 | Stop reason: HOSPADM

## 2024-03-01 RX ORDER — MORPHINE SULFATE 2 MG/ML
2 INJECTION, SOLUTION INTRAMUSCULAR; INTRAVENOUS ONCE
Status: COMPLETED | OUTPATIENT
Start: 2024-03-01 | End: 2024-03-01

## 2024-03-01 RX ORDER — DEXAMETHASONE SODIUM PHOSPHATE 4 MG/ML
4 INJECTION, SOLUTION INTRA-ARTICULAR; INTRALESIONAL; INTRAMUSCULAR; INTRAVENOUS; SOFT TISSUE DAILY
Status: COMPLETED | OUTPATIENT
Start: 2024-03-01 | End: 2024-03-02

## 2024-03-01 RX ORDER — PREDNISONE 5 MG/1
10 TABLET ORAL DAILY
Status: DISCONTINUED | OUTPATIENT
Start: 2024-03-07 | End: 2024-03-01

## 2024-03-01 RX ORDER — BUMETANIDE 0.25 MG/ML
1 INJECTION INTRAMUSCULAR; INTRAVENOUS
Status: DISCONTINUED | OUTPATIENT
Start: 2024-03-01 | End: 2024-03-03

## 2024-03-01 RX ORDER — IPRATROPIUM BROMIDE AND ALBUTEROL SULFATE 2.5; .5 MG/3ML; MG/3ML
3 SOLUTION RESPIRATORY (INHALATION)
Status: DISCONTINUED | OUTPATIENT
Start: 2024-03-01 | End: 2024-03-02

## 2024-03-01 RX ADMIN — MORPHINE SULFATE 3 MG: 4 INJECTION INTRAVENOUS at 21:05

## 2024-03-01 RX ADMIN — SUCRALFATE 1 G: 1 SUSPENSION ORAL at 15:04

## 2024-03-01 RX ADMIN — BUMETANIDE 1 MG: 0.25 INJECTION INTRAMUSCULAR; INTRAVENOUS at 17:42

## 2024-03-01 RX ADMIN — SUCRALFATE 1 G: 1 SUSPENSION ORAL at 21:10

## 2024-03-01 RX ADMIN — POLYETHYLENE GLYCOL 3350 17 G: 17 POWDER, FOR SOLUTION ORAL at 20:43

## 2024-03-01 RX ADMIN — DIGOXIN 125 MCG: 0.25 INJECTION INTRAMUSCULAR; INTRAVENOUS at 11:53

## 2024-03-01 RX ADMIN — IPRATROPIUM BROMIDE AND ALBUTEROL SULFATE 3 ML: 2.5; .5 SOLUTION RESPIRATORY (INHALATION) at 14:10

## 2024-03-01 RX ADMIN — MORPHINE SULFATE 2 MG: 2 INJECTION, SOLUTION INTRAMUSCULAR; INTRAVENOUS at 01:29

## 2024-03-01 RX ADMIN — DOXYCYCLINE 100 MG: 100 INJECTION, POWDER, LYOPHILIZED, FOR SOLUTION INTRAVENOUS at 21:59

## 2024-03-01 RX ADMIN — MORPHINE SULFATE 1 MG: 2 INJECTION, SOLUTION INTRAMUSCULAR; INTRAVENOUS at 08:06

## 2024-03-01 RX ADMIN — BUMETANIDE 1 MG: 0.25 INJECTION INTRAMUSCULAR; INTRAVENOUS at 13:44

## 2024-03-01 RX ADMIN — IPRATROPIUM BROMIDE AND ALBUTEROL SULFATE 3 ML: 2.5; .5 SOLUTION RESPIRATORY (INHALATION) at 03:36

## 2024-03-01 RX ADMIN — MORPHINE SULFATE 3 MG: 4 INJECTION INTRAVENOUS at 16:36

## 2024-03-01 RX ADMIN — DEXAMETHASONE SODIUM PHOSPHATE 4 MG: 4 INJECTION, SOLUTION INTRAMUSCULAR; INTRAVENOUS at 10:50

## 2024-03-01 RX ADMIN — MORPHINE SULFATE 3 MG: 4 INJECTION INTRAVENOUS at 11:49

## 2024-03-01 RX ADMIN — SUCRALFATE 1 G: 1 SUSPENSION ORAL at 04:14

## 2024-03-01 RX ADMIN — FLUTICASONE PROPIONATE 1 SPRAY: 50 SPRAY, METERED NASAL at 20:44

## 2024-03-01 RX ADMIN — IPRATROPIUM BROMIDE AND ALBUTEROL SULFATE 3 ML: 2.5; .5 SOLUTION RESPIRATORY (INHALATION) at 08:18

## 2024-03-01 ASSESSMENT — ENCOUNTER SYMPTOMS
HEMATOLOGIC/LYMPHATIC NEGATIVE: 1
ENDOCRINE NEGATIVE: 1
CONSTITUTIONAL NEGATIVE: 1
PSYCHIATRIC NEGATIVE: 1
GASTROINTESTINAL NEGATIVE: 1
EYES NEGATIVE: 1
CARDIOVASCULAR NEGATIVE: 1
NEUROLOGICAL NEGATIVE: 1
MUSCULOSKELETAL NEGATIVE: 1
RESPIRATORY NEGATIVE: 1

## 2024-03-01 ASSESSMENT — VISUAL ACUITY: VA_NORMAL: 1

## 2024-03-01 ASSESSMENT — PAIN - FUNCTIONAL ASSESSMENT
PAIN_FUNCTIONAL_ASSESSMENT: 0-10

## 2024-03-01 ASSESSMENT — COGNITIVE AND FUNCTIONAL STATUS - GENERAL
DAILY ACTIVITIY SCORE: 24
MOBILITY SCORE: 24
DAILY ACTIVITIY SCORE: 24
MOBILITY SCORE: 24

## 2024-03-01 ASSESSMENT — PAIN DESCRIPTION - ORIENTATION
ORIENTATION: OTHER (COMMENT)
ORIENTATION: LEFT

## 2024-03-01 ASSESSMENT — ACTIVITIES OF DAILY LIVING (ADL): LACK_OF_TRANSPORTATION: NO

## 2024-03-01 ASSESSMENT — PAIN SCALES - GENERAL
PAINLEVEL_OUTOF10: 5 - MODERATE PAIN
PAINLEVEL_OUTOF10: 8
PAINLEVEL_OUTOF10: 3
PAINLEVEL_OUTOF10: 5 - MODERATE PAIN
PAINLEVEL_OUTOF10: 10 - WORST POSSIBLE PAIN
PAINLEVEL_OUTOF10: 8
PAINLEVEL_OUTOF10: 9
PAINLEVEL_OUTOF10: 5 - MODERATE PAIN
PAINLEVEL_OUTOF10: 8
PAINLEVEL_OUTOF10: 3

## 2024-03-01 ASSESSMENT — PAIN DESCRIPTION - LOCATION: LOCATION: FACE

## 2024-03-01 NOTE — PROGRESS NOTES
"Trina Elias is a 73 y.o. female on day 5 of admission presenting with Chest pain, unspecified type.      Subjective   She is resting in the bed, complains of severe jaw pain and inability to open her mouth due to an arthritis flare. States her shortness of breath is improved.       Review of systems:  Constitutional: negative for fever, chills, or malaise  Neuro: negative for dizziness, headache, numbness, tingling  ENT: Negative for nasal congestion or sore throat  CV: negative for chest pain, palpitations  : negative for dysuria, frequency, or urgency  Skin: negative for lesions, wounds, or rash  Musculoskeletal: Negative for weakness  Endocrine: Negative for polyuria or polydipsia         Objective   Constitutional: Well developed, awake/alert/oriented x3, no distress, alert and cooperative  Eyes: PERRL, EOMI, clear sclera  ENMT: unable to open mouth due to pain  Head/Neck: Neck supple, no apparent injury, thyroid without mass or tenderness, No JVD, trachea midline, no bruits  Respiratory/Thorax: Patent airways, expiratory wheezes throughout with good chest expansion, thorax symmetric  Cardiovascular: Regular, rate and rhythm, no murmurs, 2+ equal pulses of the extremities, normal S 1and S 2  Gastrointestinal: Nondistended, soft, tender to palpation, no masses palpable, no organomegaly, +BS, no bruits  Musculoskeletal: ROM intact, no joint swelling, normal strength  Extremities: normal extremities, no cyanosis edema, contusions or wounds, no clubbing  Neurological: alert and oriented x3, intact senses, motor, response and reflexes, normal strength  Lymphatic: No significant lymphadenopathy  Psychological: Appropriate mood and behavior  Skin: Warm and dry, no lesions, no rashes      Last Recorded Vitals  /68   Pulse 70   Temp 36.7 °C (98.1 °F)   Resp 18   Ht 1.626 m (5' 4\")   Wt 72.8 kg (160 lb 8 oz)   SpO2 98%   BMI 27.55 kg/m²     Intake/Output last 3 Shifts:  No intake/output data recorded.  No " intake/output data recorded.    Relevant Results  Scheduled medications  amitriptyline, 25 mg, oral, BID  baclofen, 10 mg, oral, TID  bumetanide, 1 mg, intravenous, BID with meals  [Held by provider] bumetanide, 2 mg, oral, BID with meals  dexAMETHasone, 4 mg, intravenous, Daily  digoxin, 125 mcg, intravenous, Daily  [Held by provider] digoxin, 125 mcg, oral, Daily  dilTIAZem CD, 120 mg, oral, Daily  doxycylcine, 100 mg, oral, q12h DWIGHT  fluticasone, 1 spray, Each Nostril, BID  hydroxychloroquine, 200 mg, oral, Daily  ipratropium-albuteroL, 3 mL, nebulization, 4x daily  montelukast, 10 mg, oral, Nightly  pantoprazole, 40 mg, oral, Daily before breakfast  polyethylene glycol, 17 g, oral, BID  [START ON 3/3/2024] predniSONE, 25 mg, oral, Daily  predniSONE, 30 mg, oral, Daily  rosuvastatin, 20 mg, oral, Daily  sucralfate, 1 g, oral, q6h DWIGHT      Continuous medications     PRN medications  PRN medications: acetaminophen **OR** acetaminophen **OR** acetaminophen, albuterol, alum-mag hydroxide-simeth, calcium carbonate, dextromethorphan-guaifenesin, diclofenac sodium, guaiFENesin, morphine, nitroglycerin, ondansetron **OR** ondansetron, oxyCODONE-acetaminophen, oxygen    Results for orders placed or performed during the hospital encounter of 02/24/24 (from the past 24 hour(s))   CBC and Auto Differential   Result Value Ref Range    WBC 7.1 4.4 - 11.3 x10*3/uL    nRBC 0.0 0.0 - 0.0 /100 WBCs    RBC 3.75 (L) 4.00 - 5.20 x10*6/uL    Hemoglobin 8.1 (L) 12.0 - 16.0 g/dL    Hematocrit 26.9 (L) 36.0 - 46.0 %    MCV 72 (L) 80 - 100 fL    MCH 21.6 (L) 26.0 - 34.0 pg    MCHC 30.1 (L) 32.0 - 36.0 g/dL    RDW 22.1 (H) 11.5 - 14.5 %    Platelets 271 150 - 450 x10*3/uL    Neutrophils % 91.2 40.0 - 80.0 %    Immature Granulocytes %, Automated 1.3 (H) 0.0 - 0.9 %    Lymphocytes % 6.2 13.0 - 44.0 %    Monocytes % 1.3 2.0 - 10.0 %    Eosinophils % 0.0 0.0 - 6.0 %    Basophils % 0.0 0.0 - 2.0 %    Neutrophils Absolute 6.44 (H) 1.60 - 5.50  x10*3/uL    Immature Granulocytes Absolute, Automated 0.09 0.00 - 0.50 x10*3/uL    Lymphocytes Absolute 0.44 (L) 0.80 - 3.00 x10*3/uL    Monocytes Absolute 0.09 0.05 - 0.80 x10*3/uL    Eosinophils Absolute 0.00 0.00 - 0.40 x10*3/uL    Basophils Absolute 0.00 0.00 - 0.10 x10*3/uL   Basic metabolic panel   Result Value Ref Range    Glucose 161 (H) 74 - 99 mg/dL    Sodium 142 136 - 145 mmol/L    Potassium 4.2 3.5 - 5.3 mmol/L    Chloride 103 98 - 107 mmol/L    Bicarbonate 27 21 - 32 mmol/L    Anion Gap 16 10 - 20 mmol/L    Urea Nitrogen 13 6 - 23 mg/dL    Creatinine 0.77 0.50 - 1.05 mg/dL    eGFR 82 >60 mL/min/1.73m*2    Calcium 9.2 8.6 - 10.3 mg/dL   Sedimentation rate, automated   Result Value Ref Range    Sedimentation Rate 81 (H) 0 - 30 mm/h   C-Reactive Protein   Result Value Ref Range    C-Reactive Protein 10.48 (H) <1.00 mg/dL   Morphology   Result Value Ref Range    RBC Morphology See Below     Hypochromia Mild     RBC Fragments Few     Ovalocytes Few        CT sinus wo IV contrast   Final Result   Pansinusitis as described in detail above. Element of chronic   sinusitis involving the right maxillary region with   mineralized/hyperdense secretions versus fungal components.   Extensive, near-complete opacification of a majority of the ethmoidal   air cells with unchanged remote left lamina papyracea fracture.        Relatively midline nasal septum.        MACRO:   None        Signed by: Suhail Silveira 3/1/2024 2:02 PM   Dictation workstation:   [id number]      XR facial bones 3+ views   Final Result   1. Findings concerning for near complete opacification of right   maxillary sinus and partial opacification of left maxillary sinus,   which can be seen with maxillary sinus disease. Recommend correlation   with patient's symptomatology. A CT scan of the sinuses can be   performed for further evaluation.   2. No acute fracture deformity within limits of radiographic   evaluation.                  Signed by: Annabel  Kevin 3/1/2024 9:42 AM   Dictation workstation:   [id number]      XR chest 1 view   Final Result   New perihilar and basilar interstitial edema with small left effusion.        Signed by: Rony Kowalski 2/28/2024 2:37 PM   Dictation workstation:   [id number]      Transthoracic Echo (TTE) Complete   Final Result      US abdomen limited liver   Final Result   Mild intrahepatic biliary ductal dilatation status post   cholecystectomy. Additionally, the common bile duct measures up to   2.1 cm in caliber, similar to recent CT examination. No sonographic   choledocholithiasis. As previously advised, further evaluation with   ERCP or MRCP may be considered.        MACRO:   None             Signed by: Suhail Silveira 2/26/2024 9:50 AM   Dictation workstation:   [id number]      CT abdomen pelvis w IV contrast   Final Result   1.  Status post cholecystectomy with a markedly dilated extrahepatic   biliary duct measuring up to 3.2 cm in diameter, increased from the   prior study.  This may be chronic, however, correlation with biliary   lab values is recommended, nonemergent ERCP or MRCP may be of benefit   in this patient.   2.  Chronic postoperative changes are seen in the region of the GE   junction with a small sliding-type hiatal hernia, correlate with   patient's surgical history.   3.  Cardiomegaly.   4.  Chronic changes of suspected COPD.   5.  Minimal distal colonic diverticulosis.   Signed by Mesfin Seaman      XR chest 1 view   Final Result   No acute cardiopulmonary process is evident.        MACRO:   None        Signed by: Nixon Orantes 2/24/2024 8:47 PM   Dictation workstation:   [id number]          Transthoracic Echo (TTE) Complete    Result Date: 2/27/2024   [facility], [address] [place] [address]               Tel [phone] and Fax [phone] TRANSTHORACIC ECHOCARDIOGRAM REPORT  Patient Name:      VERNON SALDANA VIA         Reading Physician:    [id number] Nohelia Soto                                                                MD Study Date:        2/27/2024            Ordering Provider:    [id number] NIKOLE OLVERAHMAN                                                               CHAGO ZALDIVARCOOPER MRN/PID:           [id number]             Fellow: Accession#:        [id number]         Nurse: Date of Birth/Age: 1950 / 73 years Sonographer:          Dai Diaz                                                               RD Gender:            F                    Additional Staff: Height:            162.56 cm            Admit Date:           2/24/2024 Weight:            72.57 kg             Admission Status:     Inpatient -                                                               Routine BSA / BMI:         1.78 m2 / 27.46      Encounter#:           [id number]                    kg/m2                                         Department Location:  [facility]                                                               Invasive Blood Pressure: 131 /59 mmHg Study Type:    TRANSTHORACIC ECHO (TTE) COMPLETE Diagnosis/ICD: Shortness of breath-R06.02 Indication:    Dyspnea CPT Code:      Echo Complete w Full [id number] Patient History: Pertinent History: A-Fib and Chest Pain. elevated troponin, elevated BNP, mod                    AS. Study Detail: The following Echo studies were performed: 2D, M-Mode, Doppler and               color flow.  PHYSICIAN INTERPRETATION: Left Ventricle: The left ventricular systolic function is normal, with an estimated ejection fraction of 55-60%. There are no regional wall motion abnormalities. The left ventricular cavity size is normal. Spectral Doppler shows an impaired relaxation pattern of left ventricular diastolic filling. Left Atrium: The left atrium is mildly dilated. Right Ventricle: The right ventricle is normal in size. There is normal right ventricular global systolic function. Right Atrium: The right atrium is normal in size. Aortic Valve: The aortic  valve is trileaflet. There is mild to moderate aortic valve cusp calcification. There is evidence of moderate aortic valve stenosis. There is trivial aortic valve regurgitation. The peak instantaneous gradient of the aortic valve is 27.5 mmHg. The mean gradient of the aortic valve is 16.0 mmHg. Mitral Valve: The mitral valve is mildly thickened. There is evidence of mild mitral valve stenosis. The doppler estimated mean and peak diastolic pressure gradients are 7.2 mmHg and 16.3 mmHg respectively. There is mild mitral annular calcification. There is mild to moderate mitral valve regurgitation. Tricuspid Valve: The tricuspid valve is structurally normal. There is mild to moderate tricuspid regurgitation. Pulmonic Valve: The pulmonic valve is not well visualized. There is trace to mild pulmonic valve regurgitation. Pericardium: There is no pericardial effusion noted. Aorta: The aortic root is normal. Pulmonary Artery: The tricuspid regurgitant velocity is 3.46 m/s, and with an estimated right atrial pressure of 3 mmHg, the estimated pulmonary artery pressure is mild to moderately elevated with the RVSP at 50.9 mmHg.  CONCLUSIONS:  1. Left ventricular systolic function is normal with a 55-60% estimated ejection fraction.  2. Spectral Doppler shows an impaired relaxation pattern of left ventricular diastolic filling.  3. Mild to moderate mitral valve regurgitation.  4. Mild to moderate tricuspid regurgitation visualized.  5. Moderate aortic valve stenosis.  6. Mild to moderately elevated pulmonary artery pressure. QUANTITATIVE DATA SUMMARY: 2D MEASUREMENTS:                           Normal Ranges: IVSd:          0.97 cm    (0.6-1.1cm) LVPWd:         0.98 cm    (0.6-1.1cm) LVIDd:         5.70 cm    (3.9-5.9cm) LVIDs:         4.26 cm LV Mass Index: 123.2 g/m2 LV % FS        25.2 % LA VOLUME:                              Normal Ranges: LA Vol A4C:       77.4 ml    (22+/-6mL/m2) LA Vol A2C:       83.5 ml LA Vol BP:         82.1 ml LA Vol Index A4C: 43.5 ml/m2 LA Vol Index A2C: 46.9 ml/m2 LA Vol Index BP:  46.1 ml/m2 LA Volume Index:  46.1 ml/m2 LA Vol A4C:       72.2 ml LA Vol A2C:       77.0 ml RA VOLUME BY A/L METHOD:                       Normal Ranges: RA Area A4C: 21.8 cm2 M-MODE MEASUREMENTS:                  Normal Ranges: Ao Root: 3.30 cm (2.0-3.7cm) LAs:     4.72 cm (2.7-4.0cm) LV SYSTOLIC FUNCTION BY 2D PLANIMETRY (MOD):                     Normal Ranges: EF-A4C View: 55.9 % (>=55%) EF-A2C View: 56.5 % EF-Biplane:  55.9 % LV DIASTOLIC FUNCTION:                             Normal Ranges: MV Peak E:      1.91 m/s    (0.7-1.2 m/s) MV Peak A:      0.93 m/s    (0.42-0.7 m/s) E/A Ratio:      2.06        (1.0-2.2) MV e'           0.09 m/s    (>8.0) MV lateral e'   0.09 m/s MV medial e'    0.08 m/s E/e' Ratio:     21.21       (<8.0) PulmV Sys Ham:  62.35 cm/s PulmV Alvarez Ham: 121.32 cm/s PulmV S/D Ham:  0.51 MITRAL VALVE:                       Normal Ranges: MV Vmax:    2.02 m/s  (<=1.3m/s) MV peak P.3 mmHg (<5mmHg) MV mean P.2 mmHg  (<2mmHg) MV VTI:     48.44 cm  (10-13cm) MV DT:      167 msec  (150-240msec) MITRAL INSUFFICIENCY:                         Normal Ranges: MR VTI:     192.66 cm MR Vmax:    568.98 cm/s MR Volume:  33.56 ml MR Flow Rt: 99.12 ml/s MR EROA:    0.17 cm2 AORTIC VALVE:                                    Normal Ranges: AoV Vmax:                2.62 m/s  (<=1.7m/s) AoV Peak P.5 mmHg (<20mmHg) AoV Mean P.0 mmHg (1.7-11.5mmHg) LVOT Max Ham:            0.99 m/s  (<=1.1m/s) AoV VTI:                 58.21 cm  (18-25cm) LVOT VTI:                21.96 cm LVOT Diameter:           1.95 cm   (1.8-2.4cm) AoV Area, VTI:           1.13 cm2  (2.5-5.5cm2) AoV Area,Vmax:           1.13 cm2  (2.5-4.5cm2) AoV Dimensionless Index: 0.38 AORTIC INSUFFICIENCY: AI Vmax:       3.91 m/s AI Half-time:  520 msec AI Decel Time: 1794 msec AI Decel Rate: 218.17 cm/s2  RIGHT VENTRICLE: RV Basal 4.30  cm RV Mid   2.60 cm RV Major 7.7 cm TAPSE:   27.0 mm RV s'    0.18 m/s TRICUSPID VALVE/RVSP:                             Normal Ranges: Peak TR Velocity: 3.46 m/s RV Syst Pressure: 50.9 mmHg (< 30mmHg) PULMONIC VALVE:                      Normal Ranges: PV Max Ham: 1.5 m/s  (0.6-0.9m/s) PV Max P.8 mmHg Pulmonary Veins: PulmV Alvarez Ham: 121.32 cm/s PulmV S/D Ham:  0.51 PulmV Sys Ham:  62.35 cm/s  [id number] Nohelia Soto MD Electronically signed on 2024 at 11:22:42 AM  ** Final **           Assessment/Plan   Principal Problem:    Chest pain, unspecified type  Active Problems:    Coronary artery disease involving native coronary artery    Patient has been admitted to the hospital and monitored on telemetry.  She has chronic blood loss iron deficiency anemia related to rivaroxaban use.     Endoscopies has been unrevealing for active source of bleeding.     S/p packed red blood cells, discontinue rivaroxaban, administer intravenous iron infusions.     Elevated troponin most likely due to demand injury from severe anemia.  Last cardiac catheterization showed normal epicardial coronary arteries.  She has a well-functioning defibrillator unclear etiology of beeping sound and this will be verified.     Most recent device check available for review in  shows adequate battery life, occasional episodes of nonsustained ventricular tachycardia.     There has been no evidence of atrial fibrillation since last pulmonary vein isolation.  Procedure done in May 2022.  Safe to discontinue anticoagulation at this time.     Continue proton pump inhibitors and Carafate 1 g 4 times daily, patient already also on famotidine.     Continue rosuvastatin for hyper lipidemia.     She will be referred for Watchman procedure so long-term anticoagulation for AF does not need to be resumed for any future recurrences of paroxysmal atrial flutter or fibrillation    Discussed with GI for possible MRI related to abd pain->ICD is NOT MRI  compatible    Overnight Wednesday, developed shortness of breath and cough. CXR showed CHF. Home dose bumex has been on hold. Given Bumex 2mg IVx1 and resumed home dose. Now unable to take PO due to inability to open mouth. Change Bumex to IV. Change Digoxin to IV      Amelia Kiser, APRN-CNP

## 2024-03-01 NOTE — NURSING NOTE
Assumed care of patient. Patient is A&Ox3 and is resting in bed. Call light in reach. Patient is paced on telemetry @ 61bpm.

## 2024-03-01 NOTE — CONSULTS
Inpatient consult to Neurology  Consult performed by: Diomedes Dominguez MD  Consult ordered by: Ayden Wiley MD      Consult: Left Facial Pain  History Of Present Illness  Trina Elias is a 73 y.o. female with history of multiple abdominal surgeries, CAD, ICD/PPM placement, HTN and COPD, presenting with chest pain and fluctuating BP readings. She reports being in her USOH until the last 3 days when she began having intermittent episodes of substernal chest pain. She says she checked her BP and was getting high (up to 150/90) and low ( down to 98/40) readings. She reports feeling lightheaded and being near syncopal. Tonight, she felt like she was going to collapse with standing so her  brought her to the ED. Pt's labs on presentation in the ED were notable mainly for H/H of 6.7/23.6 with microcytic indices. She denied hematochezia or melena even though her stool was positive for occult blood.   Patient states that 4 days ago, she noticed left facial pain. Pain is 10/10. She states due to pain, she can't even open her mouth. Patient denies fever, chest pain, nausea, vomiting, abdominal pain, leg pain or leg swelling     Past Medical and Surgical History    Past Medical History:   Diagnosis Date    Abnormal mammogram 06/26/2023    Acute sphenoidal sinusitis, unspecified 10/12/2017    Acute sphenoidal sinusitis, recurrence not specified    Anemia     Asthma     Atrial fibrillation (CMS/HCC)     Bacterial meningitis, unspecified 01/26/2018    Acute bacterial meningitis    Bone pain 06/26/2023    BSOM (bilateral serous otitis media) 06/26/2023    CAD (coronary artery disease)     Cervical neuropathy 06/26/2023    Cervicalgia 06/08/2021    Acute neck pain    Chest pain on exertion 06/26/2023    Chronic obstructive pulmonary disease, unspecified (CMS/HCC)     Chronic obstructive pulmonary disease    Cutaneous abscess of abdominal wall 11/17/2015    Abdominal wall abscess    Dehydration 06/26/2023    Dry  cough 06/26/2023    Dry heaves 06/26/2023    Dyspnea 06/26/2023    Elevated serum creatinine 06/26/2023    Fatigue 06/26/2023    Fever, unspecified 06/26/2023    Gastrointestinal hemorrhage with melena     Headache 06/26/2023    History of abdominal surgery 06/26/2023    History of colon polyps 06/26/2023    Lumbar radiculitis 06/26/2023    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    Nonhealing surgical wound 06/26/2023    Numbness 06/26/2023    Osteopenia 06/26/2023    Other conditions influencing health status     Coronary Artery Disease    Other conditions influencing health status     Peptic Ulcer    Pain of right upper extremity 06/26/2023    Personal history of anaphylaxis 08/19/2015    History of anaphylaxis    Personal history of diseases of the skin and subcutaneous tissue     History of eczema    Personal history of infections of the central nervous system     History of bacterial meningitis    Personal history of other diseases of the digestive system 11/17/2015    History of esophageal reflux    Personal history of other diseases of the digestive system     History of hiatal hernia    Personal history of other diseases of the nervous system and sense organs     History of carpal tunnel syndrome    Personal history of other diseases of the respiratory system 01/06/2020    History of acute pharyngitis    Personal history of other diseases of the respiratory system     Personal history of asthma    Personal history of other specified conditions 08/08/2019    History of epigastric pain    Personal history of other specified conditions 03/12/2018    History of angioedema    Postural dizziness with presyncope 06/26/2023    Presence of combination internal cardiac defibrillator (ICD) and pacemaker     Pulmonary edema 06/26/2023    Radiculopathy, lumbar region 08/13/2018    Acute lumbar radiculopathy    Restless legs syndrome 11/17/2015    Restless legs syndrome    Right serous otitis media 06/26/2023    Severe pain  2023    Small bowel obstruction (CMS/[facility]) 2023    Tarsal tunnel syndrome, unspecified lower limb     Tarsal tunnel syndrome    Thoracic aortic aneurysm, without rupture, unspecified (CMS/[facility]) 2020    Aneurysm, aorta, thoracic    Toxic effect of venom 2023    Ulcer of the stomach caused by bacteria (h. pylori), acute 2023    Ulnar neuropathy 2023          Past Surgical History:   Procedure Laterality Date    APPENDECTOMY  2013    Appendectomy    CARDIAC CATHETERIZATION  2013    Cardiac Cath Procedure Summary    CARDIAC SURGERY  2014    Heart Surgery     SECTION, CLASSIC  2013     Section    CHOLECYSTECTOMY  2013    Cholecystectomy Laparoscopic    FOOT SURGERY  2013    Foot Surgery    GASTRIC FUNDOPLICATION  2013    Esophagogastric Fundoplasty Nissen Fundoplication    HERNIA REPAIR  2015    Hernia Repair    OTHER SURGICAL HISTORY  2019    Turpin tooth extraction    OTHER SURGICAL HISTORY  2019    Carpal tunnel surgery    OTHER SURGICAL HISTORY  2019    Foot surgery    OTHER SURGICAL HISTORY  2019    Leg surgery    OTHER SURGICAL HISTORY  2020    Hand surgery    OTHER SURGICAL HISTORY  2015    Cardio-Defib Eval Dual Chamber With Reprogramming    OTHER SURGICAL HISTORY  2021    Intestinal surgery    ROTATOR CUFF REPAIR  2018    Rotator Cuff Repair        Social History  Social History     Tobacco Use    Smoking status: Never    Smokeless tobacco: Never   Substance Use Topics    Alcohol use: Never    Drug use: Never     Allergies  Hydromorphone, Metoprolol, Abatacept, Bupropion, Cefepime, Ciprofloxacin, Furosemide, Hydrocodone-acetaminophen, Levofloxacin, Methotrexate, Penicillins, Pregabalin, Prochlorperazine, Aripiprazole, Beta-blockers (beta-adrenergic blocking agts), and Pineapple  Medications Prior to Admission   Medication Sig Dispense Refill Last Dose    albuterol 2.5  mg /3 mL (0.083 %) nebulizer solution Take 3 mL (2.5 mg) by nebulization every 4 hours if needed.       baclofen (Lioresal) 10 mg tablet Take 1 tablet (10 mg) by mouth see administration instructions. Take one tablet in am, one tablet at noon, and one to two tablets at night    tablet 2     baclofen (Lioresal) 10 mg tablet One tablet in the morning and afternoon and 1 to 2 tablets in the evening. 120 tablet 2     bumetanide (Bumex) 2 mg tablet Take 1 tablet (2 mg) by mouth 2 times a day.       clobetasol (Temovate) 0.05 % cream Apply topically 2 times a day. 60 g 3     diclofenac sodium (Voltaren) 1 % gel gel Apply 1 Application topically 2 times a day as needed (joint pain). 450 g 1     diclofenac sodium (Voltaren) 1 % gel gel Apply 1 Application topically 4 times a day as needed (max  16gm daily to any one affected joint).       digoxin (Lanoxin) 125 MCG tablet Take 1 tablet (125 mcg) by mouth once daily.       dilTIAZem LA (Cardizem LA) 120 mg 24 hr tablet Take 1 tablet (120 mg) by mouth once daily.       EPINEPHrine 0.3 mg/0.3 mL injection syringe Inject 0.3 mL (0.3 mg) as directed if needed. Per Pt has not needed.       famotidine (Pepcid) 40 mg tablet Take 1 tablet (40 mg) by mouth once daily. 30 tablet 0     fluticasone (Flonase) 50 mcg/actuation nasal spray Administer 1 spray into affected nostril(s) 2 times a day.       hydroxychloroquine (Plaquenil) 200 mg tablet Take 1 tablet (200 mg) by mouth once daily. 90 tablet 0     magnesium oxide (Mag-Ox) 400 mg tablet Take 1 tablet (400 mg) by mouth 2 times a day.       montelukast (Singulair) 10 mg tablet Take 1 tablet (10 mg) by mouth once daily at bedtime. 90 tablet 1     naloxone (Narcan) 4 mg/0.1 mL nasal spray Administer into affected nostril(s).       nitroglycerin (Nitrostat) 0.4 mg SL tablet Place 1 tablet (0.4 mg) under the tongue every 5 minutes if needed.       [] ondansetron ODT (Zofran-ODT) 8 mg disintegrating tablet Take 1 tablet (8  mg) by mouth every 8 hours if needed for nausea or vomiting for up to 7 days. 20 tablet 0     oxyCODONE-acetaminophen (Percocet) 5-325 mg tablet Take 1 tablet by mouth 3 times a day as needed for severe pain (7 - 10) for up to 28 days. Do not start before February 16, 2024. 84 tablet 0     [START ON 3/15/2024] oxyCODONE-acetaminophen (Percocet) 5-325 mg tablet Take 1 tablet by mouth 3 times a day as needed for severe pain (7 - 10) for up to 28 days. Do not start before March 15, 2024. 84 tablet 0     [START ON 4/12/2024] oxyCODONE-acetaminophen (Percocet) 5-325 mg tablet Take 1 tablet by mouth 3 times a day as needed for severe pain (7 - 10) for up to 28 days. Do not start before April 12, 2024. 84 tablet 0     [START ON 3/21/2024] oxyCODONE-acetaminophen (Percocet) 5-325 mg tablet Take 1 tablet by mouth every 4 hours if needed for severe pain (7 - 10) for up to 7 days. Post Op Rx for Shoulder Surgery Do not start before March 21, 2024. 42 tablet 0     pantoprazole (ProtoNix) 40 mg EC tablet TAKE ONE TABLET BY MOUTH TWO TIMES A DAY (Patient taking differently: Take 20 mg by mouth 2 times a day.) 180 tablet 0     polyethylene glycol (Miralax) 17 gram/dose powder Take 17 g by mouth.       rosuvastatin (Crestor) 20 mg tablet TAKE ONE TABLET BY MOUTH EVERY DAY 90 tablet 0     Xarelto 20 mg tablet Take 1 tablet (20 mg) by mouth once daily in the evening. Take with meals.          Review of Systems   Constitutional: Negative.    HENT:          Left facial pain   Eyes: Negative.    Respiratory: Negative.     Cardiovascular: Negative.    Gastrointestinal: Negative.    Endocrine: Negative.    Genitourinary: Negative.    Musculoskeletal: Negative.    Skin: Negative.    Neurological: Negative.    Hematological: Negative.    Psychiatric/Behavioral: Negative.           Cardiovascular system: S1-S2 intact  Respiratory clear breath sound in both lungs    Neurological Exam  Mental Status  Awake, alert and oriented to person, place  "and time. Oriented to person, place and time. Recent and remote memory are intact. Speech is normal. Language is fluent with no aphasia. Attention and concentration are normal.    Cranial Nerves  CN I: Sense of smell is normal.  CN II: Visual acuity is normal. Visual fields full to confrontation.  CN III, IV, VI: Extraocular movements intact bilaterally. Normal lids and orbits bilaterally. Pupils equal round and reactive to light bilaterally.  CN V:  Right: Tenderness on palpation of left maxilla area.  CN VII: Full and symmetric facial movement.  CN VIII: Hearing is normal.  CN IX, X: Palate elevates symmetrically. Normal gag reflex.  CN XI: Shoulder shrug strength is normal.  CN XII: Tongue midline without atrophy or fasciculations.    Motor   Strength is 5/5 throughout all four extremities.    Sensory  Sensation is intact to light touch, pinprick, vibration and proprioception in all four extremities.Sensation: Left facial tenderness.     Reflexes  Deep tendon reflexes are 2+ and symmetric in all four extremities.        Last Recorded Vitals  Blood pressure 132/74, pulse 72, temperature 36.4 °C (97.5 °F), temperature source Temporal, resp. rate 18, height 1.626 m (5' 4\"), weight 72.8 kg (160 lb 8 oz), SpO2 96 %.    Relevant Results      Current Facility-Administered Medications:     acetaminophen (Tylenol) tablet 650 mg, 650 mg, oral, q4h PRN, 650 mg at 02/25/24 1153 **OR** acetaminophen (Tylenol) oral liquid 650 mg, 650 mg, oral, q4h PRN **OR** acetaminophen (Tylenol) suppository 650 mg, 650 mg, rectal, q4h PRN, Ravin Anderson MD    albuterol 2.5 mg /3 mL (0.083 %) nebulizer solution 2.5 mg, 2.5 mg, nebulization, q2h PRN, Ravin Anderson MD, 2.5 mg at 02/29/24 0832    alum-mag hydroxide-simeth (Mylanta) 200-200-20 mg/5 mL oral suspension 30 mL, 30 mL, oral, q6h PRN, Ravin Anderson MD    amitriptyline (Elavil) tablet 25 mg, 25 mg, oral, BID, Diomedes Dominguez MD    baclofen (Lioresal) tablet 10 mg, 10 mg, " oral, TID, Ravin Anderson MD, 10 mg at 02/29/24 2118    bumetanide (Bumex) tablet 2 mg, 2 mg, oral, BID with meals, Amelia Kiser, NAILA-CNP, 2 mg at 02/29/24 1732    calcium carbonate (Tums) chewable tablet 500 mg, 500 mg, oral, 4x daily PRN, Ravin Anderson MD    dextromethorphan-guaifenesin (Robitussin DM)  mg/5 mL oral liquid 5 mL, 5 mL, oral, q4h PRN, Ravin Anderson MD, 5 mL at 02/28/24 2112    diclofenac sodium (Voltaren) 1 % gel 4 g, 4 g, Topical, BID PRN, Ravin Anderson MD    digoxin (Lanoxin) tablet 125 mcg, 125 mcg, oral, Daily, Ravin Anderson MD, 125 mcg at 02/29/24 0833    dilTIAZem CD (Cardizem CD) 24 hr capsule 120 mg, 120 mg, oral, Daily, Ravin Anderson MD, 120 mg at 02/29/24 0833    doxycycline (Vibra-Tabs) tablet 100 mg, 100 mg, oral, q12h DWIGHT, Ayden Wiley MD, 100 mg at 02/29/24 2118    fluticasone (Flonase) nasal spray 1 spray, 1 spray, Each Nostril, BID, Ravin Anderson MD, 1 spray at 02/29/24 0837    guaiFENesin (Mucinex) 12 hr tablet 600 mg, 600 mg, oral, q12h PRN, Ravin Anderson MD    hydroxychloroquine (Plaquenil) tablet 200 mg, 200 mg, oral, Daily, Ravin Anderson MD, 200 mg at 02/29/24 0833    ipratropium-albuteroL (Duo-Neb) 0.5-2.5 mg/3 mL nebulizer solution 3 mL, 3 mL, nebulization, 4x daily, Ayden Wiley MD, 3 mL at 03/01/24 0818    montelukast (Singulair) tablet 10 mg, 10 mg, oral, Nightly, Ravin Anderson MD, 10 mg at 02/29/24 2118    morphine injection 1 mg, 1 mg, intravenous, q4h PRN, Ayden Wiley MD, 1 mg at 03/01/24 0806    nitroglycerin (Nitrostat) SL tablet 0.4 mg, 0.4 mg, sublingual, q5 min PRN, Ravin Anderson MD    ondansetron (Zofran) tablet 4 mg, 4 mg, oral, q8h PRN **OR** ondansetron (Zofran) injection 4 mg, 4 mg, intravenous, q8h PRN, Ravin Anderson MD, 4 mg at 02/26/24 2010    oxyCODONE-acetaminophen (Percocet) 5-325 mg per tablet 1 tablet, 1 tablet, oral, TID PRN, Ravin Anderson,  MD, 1 tablet at 02/29/24 2237    oxygen (O2) therapy, , inhalation, Continuous PRN - O2/gases, Carlo Benitez DO, 2 L/min at 02/28/24 2111    pantoprazole (ProtoNix) EC tablet 40 mg, 40 mg, oral, Daily before breakfast, Nikole Wiley MD, 40 mg at 02/29/24 1359    polyethylene glycol (Glycolax, Miralax) packet 17 g, 17 g, oral, BID, Nikole Wiley MD, 17 g at 02/29/24 2118    [START ON 3/3/2024] predniSONE (Deltasone) tablet 25 mg, 25 mg, oral, Daily, Diomedes Dominguez MD    predniSONE (Deltasone) tablet 30 mg, 30 mg, oral, Daily, Diomedes Dominguez MD    rosuvastatin (Crestor) tablet 20 mg, 20 mg, oral, Daily, Ravin Anderson MD, 20 mg at 02/29/24 0833    sucralfate (Carafate) suspension 1 g, 1 g, oral, q6h DWIGHT, Neo Gutierrez MD, 1 g at 03/01/24 0414     Continuous medications    PRN medications, reviewed                Epi Coma Scale  Best Eye Response: Spontaneous  Best Verbal Response: Oriented  Best Motor Response: Follows commands  [place] Coma Scale Score: 15                       I have personally reviewed the following imaging for brain (CT/ MR) and results and discussed in detail with the patient/care giver/family     XR chest 1 view    Result Date: 2/28/2024  Interpreted By:  Rony Kowalski, STUDY: XR CHEST 1 VIEW   INDICATION: Signs/Symptoms:Fever, SOB.   COMPARISON: February 24, 2020 for   ACCESSION NUMBER(S): [id number]   ORDERING CLINICIAN: NIKOLE WILEY   FINDINGS: Cardiomegaly.   New perihilar and basilar interstitial edema with small left effusion.   Pacemaker noted unchanged. No evidence.       New perihilar and basilar interstitial edema with small left effusion.   Signed by: Rony Kowalski 2/28/2024 2:37 PM Dictation workstation:   [id number]    Colonoscopy Diagnostic    Result Date: 2/27/2024  Table formatting from the original result was not included. Impression Extensive diverticulosis of moderate severity and causing mild luminal narrowing in the sigmoid  colon The ileocecal valve, cecum, ascending colon, transverse colon, descending colon and rectum appeared normal. No evidence of bleeding. Findings Multiple medium, extensive diverticula of moderate severity with no inflammation containing no content and causing mild luminal narrowing (traversable) in the sigmoid colon; no bleeding was identified The ileocecal valve, cecum, ascending colon, transverse colon, descending colon and rectum appeared normal.; Recommendation  No further screening colonoscopies necessary  Age greater than 65  Indication Anemia requiring transfusions, Gastrointestinal hemorrhage with melena Staff Staff Role Nnamdi Cleary MD Proceduralist Medications lactated Ringer's infusion 300 mL*  *From user-documented volume (Totals for administrations occurring from 1330 to 1413 on 02/27/24) Preprocedure A history and physical has been performed, and patient medication allergies have been reviewed. The patient's tolerance of previous anesthesia has been reviewed. The risks and benefits of the procedure and the sedation options and risks were discussed with the patient. All questions were answered and informed consent obtained. Details of the Procedure The patient underwent moderate sedation, which was administered by the procedural nurse. The patient's blood pressure, ECG, ETCO2, heart rate, level of consciousness, oxygen and respirations were monitored throughout the procedure. A digital rectal exam was performed. A perianal exam was performed. The scope was introduced through the anus and advanced to the cecum. Retroflexion was performed in the rectum. The quality of bowel preparation was evaluated using the [place] Bowel Preparation Scale with scores of: right colon = 2, transverse colon = 3, left colon = 3. The total BBPS score was 8. Bowel prep was adequate. The patient's estimated blood loss was minimal (<5 mL). The procedure was moderately difficult due to adhesions and tortuous colon. In  response to procedure difficulty, counter pressure was applied. The patient tolerated the procedure well. There were no apparent adverse events. Events Procedure Events Event Event Time ENDO SCOPE IN TIME 2/27/2024  1:39 PM ENDO SCOPE OUT TIME 2/27/2024  1:46 PM ENDO SCOPE IN TIME 2/27/2024  1:52 PM ENDO CECUM REACHED 2/27/2024  2:00 PM ENDO SCOPE OUT TIME 2/27/2024  2:11 PM Specimens No specimens collected Procedure Location [place] [address] [place] [address] [phone] Referring Provider No referring provider defined for this encounter. Procedure Provider No name on file     EGD    Result Date: 2/27/2024  Table formatting from the original result was not included. Impression Healthy previous fundoplication in the lower third of the esophagus, GE junction and cardia The upper third of the esophagus, middle third of the esophagus, lower third of the esophagus, fundus of the stomach, body of the stomach, antrum, pylorus, duodenal bulb, 1st part of the duodenum and 2nd part of the duodenum appeared normal. Findings Healthy previous fundoplication in the lower third of the esophagus, GE junction and cardia; no bleeding was identified. Loose fundoplication The upper third of the esophagus, middle third of the esophagus, lower third of the esophagus, fundus of the stomach, body of the stomach, antrum, pylorus, duodenal bulb, 1st part of the duodenum and 2nd part of the duodenum appeared normal. Recommendation  Follow up with PCP Indication Anemia requiring transfusions, Gastrointestinal hemorrhage with melena Staff Staff Role Nnamdi Cleary MD Proceduralist Medications No administrations occurring from 1330 to 1346 on 02/27/24 Preprocedure A history and physical has been performed, and patient medication allergies have been reviewed. The patient's tolerance of previous anesthesia has been reviewed. The risks and benefits of the procedure and the sedation options  and risks were discussed with the patient. All questions were answered and informed consent obtained. Details of the Procedure The patient underwent moderate sedation, which was administered by the procedural nurse. The patient's blood pressure, ECG, ETCO2, heart rate, level of consciousness, oxygen and respirations were monitored throughout the procedure. The scope was introduced through the mouth and advanced to the second part of the duodenum. Retroflexion was performed in the cardia. Prior to the procedure, the patient's H. Pylori status was unknown. The patient's estimated blood loss was minimal (<5 mL). The procedure was not difficult. The patient tolerated the procedure well. There were no apparent adverse events. Events Procedure Events Event Event Time ENDO SCOPE IN TIME 2/27/2024  1:39 PM ENDO SCOPE OUT TIME 2/27/2024  1:46 PM Specimens No specimens collected Procedure Location [address] [address] [phone] Referring Provider No referring provider defined for this encounter. Procedure Provider No name on file     Transthoracic Echo (TTE) Complete    Result Date: 2/27/2024   [facility], [address] [place] [address]               Tel [phone] and Fax [phone] TRANSTHORACIC ECHOCARDIOGRAM REPORT  Patient Name:      VERNON SALDANA VIA         Reading Physician:    [id number] Nohelia Soto MD Study Date:        2/27/2024            Ordering Provider:    [id number] NIKOLE LOVE MRN/PID:           [id number]             Fellow: Accession#:        [id number]         Nurse: Date of Birth/Age: 1950 / 73 years Sonographer:          Dai Diaz RD Gender:            F                    Additional Staff: Height:             162.56 cm            Admit Date:           2/24/2024 Weight:            72.57 kg             Admission Status:     Inpatient -                                                               Routine BSA / BMI:         1.78 m2 / 27.46      Encounter#:           [id number]                    kg/m2                                         Department Location:  [facility]                                                               Invasive Blood Pressure: 131 /59 mmHg Study Type:    TRANSTHORACIC ECHO (TTE) COMPLETE Diagnosis/ICD: Shortness of breath-R06.02 Indication:    Dyspnea CPT Code:      Echo Complete w Full [id number] Patient History: Pertinent History: A-Fib and Chest Pain. elevated troponin, elevated BNP, mod                    AS. Study Detail: The following Echo studies were performed: 2D, M-Mode, Doppler and               color flow.  PHYSICIAN INTERPRETATION: Left Ventricle: The left ventricular systolic function is normal, with an estimated ejection fraction of 55-60%. There are no regional wall motion abnormalities. The left ventricular cavity size is normal. Spectral Doppler shows an impaired relaxation pattern of left ventricular diastolic filling. Left Atrium: The left atrium is mildly dilated. Right Ventricle: The right ventricle is normal in size. There is normal right ventricular global systolic function. Right Atrium: The right atrium is normal in size. Aortic Valve: The aortic valve is trileaflet. There is mild to moderate aortic valve cusp calcification. There is evidence of moderate aortic valve stenosis. There is trivial aortic valve regurgitation. The peak instantaneous gradient of the aortic valve is 27.5 mmHg. The mean gradient of the aortic valve is 16.0 mmHg. Mitral Valve: The mitral valve is mildly thickened. There is evidence of mild mitral valve stenosis. The doppler estimated mean and peak diastolic pressure gradients are 7.2 mmHg and 16.3 mmHg respectively. There is mild  mitral annular calcification. There is mild to moderate mitral valve regurgitation. Tricuspid Valve: The tricuspid valve is structurally normal. There is mild to moderate tricuspid regurgitation. Pulmonic Valve: The pulmonic valve is not well visualized. There is trace to mild pulmonic valve regurgitation. Pericardium: There is no pericardial effusion noted. Aorta: The aortic root is normal. Pulmonary Artery: The tricuspid regurgitant velocity is 3.46 m/s, and with an estimated right atrial pressure of 3 mmHg, the estimated pulmonary artery pressure is mild to moderately elevated with the RVSP at 50.9 mmHg.  CONCLUSIONS:  1. Left ventricular systolic function is normal with a 55-60% estimated ejection fraction.  2. Spectral Doppler shows an impaired relaxation pattern of left ventricular diastolic filling.  3. Mild to moderate mitral valve regurgitation.  4. Mild to moderate tricuspid regurgitation visualized.  5. Moderate aortic valve stenosis.  6. Mild to moderately elevated pulmonary artery pressure. QUANTITATIVE DATA SUMMARY: 2D MEASUREMENTS:                           Normal Ranges: IVSd:          0.97 cm    (0.6-1.1cm) LVPWd:         0.98 cm    (0.6-1.1cm) LVIDd:         5.70 cm    (3.9-5.9cm) LVIDs:         4.26 cm LV Mass Index: 123.2 g/m2 LV % FS        25.2 % LA VOLUME:                              Normal Ranges: LA Vol A4C:       77.4 ml    (22+/-6mL/m2) LA Vol A2C:       83.5 ml LA Vol BP:        82.1 ml LA Vol Index A4C: 43.5 ml/m2 LA Vol Index A2C: 46.9 ml/m2 LA Vol Index BP:  46.1 ml/m2 LA Volume Index:  46.1 ml/m2 LA Vol A4C:       72.2 ml LA Vol A2C:       77.0 ml RA VOLUME BY A/L METHOD:                       Normal Ranges: RA Area A4C: 21.8 cm2 M-MODE MEASUREMENTS:                  Normal Ranges: Ao Root: 3.30 cm (2.0-3.7cm) LAs:     4.72 cm (2.7-4.0cm) LV SYSTOLIC FUNCTION BY 2D PLANIMETRY (MOD):                     Normal Ranges: EF-A4C View: 55.9 % (>=55%) EF-A2C View: 56.5 % EF-Biplane:  55.9 %  LV DIASTOLIC FUNCTION:                             Normal Ranges: MV Peak E:      1.91 m/s    (0.7-1.2 m/s) MV Peak A:      0.93 m/s    (0.42-0.7 m/s) E/A Ratio:      2.06        (1.0-2.2) MV e'           0.09 m/s    (>8.0) MV lateral e'   0.09 m/s MV medial e'    0.08 m/s E/e' Ratio:     21.21       (<8.0) PulmV Sys Ham:  62.35 cm/s PulmV Alvarez Ham: 121.32 cm/s PulmV S/D Ham:  0.51 MITRAL VALVE:                       Normal Ranges: MV Vmax:    2.02 m/s  (<=1.3m/s) MV peak P.3 mmHg (<5mmHg) MV mean P.2 mmHg  (<2mmHg) MV VTI:     48.44 cm  (10-13cm) MV DT:      167 msec  (150-240msec) MITRAL INSUFFICIENCY:                         Normal Ranges: MR VTI:     192.66 cm MR Vmax:    568.98 cm/s MR Volume:  33.56 ml MR Flow Rt: 99.12 ml/s MR EROA:    0.17 cm2 AORTIC VALVE:                                    Normal Ranges: AoV Vmax:                2.62 m/s  (<=1.7m/s) AoV Peak P.5 mmHg (<20mmHg) AoV Mean P.0 mmHg (1.7-11.5mmHg) LVOT Max Ham:            0.99 m/s  (<=1.1m/s) AoV VTI:                 58.21 cm  (18-25cm) LVOT VTI:                21.96 cm LVOT Diameter:           1.95 cm   (1.8-2.4cm) AoV Area, VTI:           1.13 cm2  (2.5-5.5cm2) AoV Area,Vmax:           1.13 cm2  (2.5-4.5cm2) AoV Dimensionless Index: 0.38 AORTIC INSUFFICIENCY: AI Vmax:       3.91 m/s AI Half-time:  520 msec AI Decel Time: 1794 msec AI Decel Rate: 218.17 cm/s2  RIGHT VENTRICLE: RV Basal 4.30 cm RV Mid   2.60 cm RV Major 7.7 cm TAPSE:   27.0 mm RV s'    0.18 m/s TRICUSPID VALVE/RVSP:                             Normal Ranges: Peak TR Velocity: 3.46 m/s RV Syst Pressure: 50.9 mmHg (< 30mmHg) PULMONIC VALVE:                      Normal Ranges: PV Max Ham: 1.5 m/s  (0.6-0.9m/s) PV Max P.8 mmHg Pulmonary Veins: PulmV Alvarez Ham: 121.32 cm/s PulmV S/D Ham:  0.51 PulmV Sys Ham:  62.35 cm/s  [id number] Nohelia Soto MD Electronically signed on 2024 at 11:22:42 AM  ** Final **     US abdomen limited  liver    Result Date: 2/26/2024  Interpreted By:  Suhail Silveira, STUDY: US ABDOMEN LIMITED LIVER;  2/26/2024 8:28 am   INDICATION: Signs/Symptoms:pain.   COMPARISON: CT abdomen pelvis dated 02/25/2024.   ACCESSION NUMBER(S): [id number]   ORDERING CLINICIAN: NIKOLE LOVE   TECHNIQUE: Multiple grayscale and color Doppler images of the right upper quadrant were obtained.   FINDINGS: LIVER: The liver measures 17.6 cm and is grossly unremarkable and free of any focal lesions.   GALLBLADDER: Prior cholecystectomy. Unremarkable sonographic assessment within the region of the gallbladder fossa.   BILE DUCTS: Mild intrahepatic biliary dilatation is identified; the common bile duct measures up to 2.1 cm, similar to CT examination.   PANCREAS: The pancreas is poorly visualized due to overlying bowel gas.   RIGHT KIDNEY: The right kidney measures 10.5 cm in length. The renal cortical echogenicity and thickness are within normal limit.  No hydronephrosis or renal calculi are seen.   PERITONEUM: There is no free or loculated fluid seen in the abdomen.       Mild intrahepatic biliary ductal dilatation status post cholecystectomy. Additionally, the common bile duct measures up to 2.1 cm in caliber, similar to recent CT examination. No sonographic choledocholithiasis. As previously advised, further evaluation with ERCP or MRCP may be considered.   MACRO: None     Signed by: Suhail Silveira 2/26/2024 9:50 AM Dictation workstation:   [id number]    ECG 12 lead    Result Date: 2/26/2024  Normal sinus rhythm Nonspecific ST and T wave abnormality Abnormal ECG When compared with ECG of 01-SEP-2023 23:53, Previous ECG has undetermined rhythm, needs review T wave inversion now evident in Lateral leads    ECG 12 lead    Result Date: 2/26/2024  Sinus rhythm with 1st degree AV block Nonspecific ST and T wave abnormality Abnormal ECG When compared with ECG of 24-FEB-2024 20:25, (unconfirmed) MA interval has increased    ECG 12  lead    Result Date: 2/26/2024  Atrial-paced rhythm with premature ventricular or aberrantly conducted complexes ST & T wave abnormality, consider lateral ischemia Abnormal ECG When compared with ECG of 24-FEB-2024 21:39, (unconfirmed) Electronic atrial pacemaker has replaced Sinus rhythm Nonspecific T wave abnormality now evident in Inferior leads Inverted T waves have replaced nonspecific T wave abnormality in Lateral leads    CT abdomen pelvis w IV contrast    Result Date: 2/25/2024  STUDY: CT Abdomen and Pelvis with IV Contrast; 2/25/2024 1:50 AM INDICATION: Severe epigastric abdominal pain. COMPARISON: CXR 2/24/2024.  CT AP 9/2/2023, 2/25/2023. ACCESSION NUMBER(S): [id number] ORDERING CLINICIAN: LORA BEE TECHNIQUE: CT of the abdomen and pelvis was performed.  Contiguous axial images were obtained at 3 mm slice thickness through the abdomen and pelvis. Coronal and sagittal reconstructions at 3 mm slice thickness were performed.  Omnipaque 350 90 mL was administered intravenously. FINDINGS: LOWER CHEST: Cardiac size is enlarged.  No pericardial effusion.  Pacer leads are seen in the right atrium and right ventricle.  There is a small sliding-type hiatal hernia.  Slightly increased AP diameter of the thorax at the lung bases suggests chronic changes of COPD.  Mosaic attenuation of the lung bases is consistent with chronic small airway disease and air trapping.  ABDOMEN:  LIVER: No hepatomegaly.  Smooth surface contour.  Normal attenuation.  BILE DUCTS/GALLBLADDER: The patient is status post cholecystectomy with moderate intrahepatic biliary dilatation, commonly seen in postcholecystectomy patients, however, the common bile duct is markedly dilated, measuring 3.2 cm in diameter.  This is slightly more pronounced when compared to the prior exam.  STOMACH: No abnormalities identified.  Postoperative changes are seen in the region of the GE junction with multiple surgical clips noted of unclear etiology,  possibly prior fundoplication.   PANCREAS: No masses or ductal dilatation.  SPLEEN: No splenomegaly or focal splenic lesion.  ADRENAL GLANDS: No thickening or nodules.  KIDNEYS AND URETERS: Kidneys are normal in location.  Mild renal cortical thinning is seen bilaterally.  Subcentimeter hypodensities in both kidneys appear to represent simple cysts but are too small to definitively characterize.  Statistically these are most likely benign, follow-up only if clinically indicated.  No renal or ureteral calculi.  PELVIS:  BLADDER: No abnormalities identified.  REPRODUCTIVE ORGANS: No acute uterine or adnexal pathology is identified.   BOWEL: Minimal diverticulosis is noted in the sigmoid colon.  Appendix is not identified and may be absent.  Terminal ileum is unremarkable.   VESSELS: No abnormalities identified.  Abdominal aorta is normal in caliber.  PERITONEUM/RETROPERITONEUM/LYMPH NODES: No free fluid.  No pneumoperitoneum. No lymphadenopathy.  ABDOMINAL WALL: No abnormalities identified.  Enteric anastomosis is noted in the anterior mid abdomen in the infraumbilical region with postoperative changes in the anterior infraumbilical abdominal wall.  SOFT TISSUES: No abnormalities identified.  BONES: No acute fracture or aggressive osseous lesion.  Multilevel mild to moderate disc disease is seen in the visualized spine.    1.  Status post cholecystectomy with a markedly dilated extrahepatic biliary duct measuring up to 3.2 cm in diameter, increased from the prior study.  This may be chronic, however, correlation with biliary lab values is recommended, nonemergent ERCP or MRCP may be of benefit in this patient. 2.  Chronic postoperative changes are seen in the region of the GE junction with a small sliding-type hiatal hernia, correlate with patient's surgical history. 3.  Cardiomegaly. 4.  Chronic changes of suspected COPD. 5.  Minimal distal colonic diverticulosis. Signed by Mesfin Seaman    XR chest 1 view    Result  Date: 2/24/2024  Interpreted By:  Nixon Orantes, STUDY: Chest dated  2/24/2024.   INDICATION: Signs/Symptoms:Chest Pain   COMPARISON: Chest dated 09/03/2023.   ACCESSION NUMBER(S): [id number]   ORDERING CLINICIAN: CECE HOOD   TECHNIQUE: One view of the chest.   FINDINGS: The lungs are clear.  No pneumothorax or effusion is evident. The cardiomediastinal silhouette is  enlarged but similar to the prior exam.There is a pulse generator on the left chest wall with leads tracking over the heart.There is a reverse right shoulder arthroplasty.Clips are seen over the upper abdomen. Degenerative changes seen of the spine and shoulders.       No acute cardiopulmonary process is evident.   MACRO: None   Signed by: Nixon Orantes 2/24/2024 8:47 PM Dictation workstation:   [id number]    CT shoulder left wo IV contrast    Result Date: 2/2/2024  Interpreted By:  Dori Magallanes, STUDY: CT SHOULDER LEFT WO IV CONTRAST; ;  2/2/2024 8:22 am   INDICATION: Signs/Symptoms:ct left shoulder wo. Left shoulder osteoarthritis   COMPARISON: None.   ACCESSION NUMBER(S): [id number]   ORDERING CLINICIAN: IRIS CARPENTER   TECHNIQUE: Serial axial CT images obtained of the left shoulder. Images reformatted in the coronal and sagittal projection.   All CT examinations are performed with 1 or more of the following dose reduction techniques: Automated exposure control, adjustment of mA and/or kv according to patient's size, or use of iterative reconstruction techniques.   FINDINGS: Left glenohumeral articulation demonstrates mild osteophytosis along inferior margin of the humeral head neck junction. No flattening of the humeral head contour. There is minimal subcortical sclerosis superior margin of the humeral head.   Osseous glenoid demonstrates mild osteophytosis about the glenoid rim. No subcortical sclerosis demonstrated. No significant subcortical cystic change demonstrated. There is neutral glenoid version. No significant atrophy of  the rotator cuff musculature.   Remainder of the visualized scapula is unremarkable. Acromioclavicular joint demonstrates mild osteoarthritic degenerative change.   Included portions visualized left ribs are unremarkable. Degenerative discogenic changes in the thoracic spine. Included portions visualized lung parenchyma demonstrates calcified granuloma at the left lung apex. Scattered bulla are demonstrated.           1. Mild left glenohumeral joint osteoarthritis.     MACRO: None   Signed by: Dori Magallanes 2/2/2024 9:34 AM Dictation workstation:   [id number]                          Assessment/Plan     Trina Elias is a 73 y.o. female with history of multiple abdominal surgeries, CAD, ICD/PPM placement, HTN and COPD, presenting with chest pain and fluctuating BP readings. She reports being in her USOH until the last 3 days when she began having intermittent episodes of substernal chest pain.     # Trigeminal Neuralgia  # Rheumatoid Arthritis Flare up    Recommendation  - Patient symptoms likely triggered by RA arthritis flare in in the setting of acute stressful event.  - Due to patient not able to open her mouth, Will try Dexamethasone 4 mg for 2 days then transition to oral prednisone when able.   - Outpatient Rheumatology Follow up    Thank You for the Consult. Neurology will continue to follow up on Patient.     Patient/Family Education: Extensive time was spent educating the patient on relevant anatomy, clinical findings and imaging, as well as discussing the potential diagnoses as discussed above.  Pharmacology: as above. Exercise: I discussed the importance of maintaining a daily exercise program, including stretching and strengthening. Preventative strategies were reviewed, specifically avoidance of any exercises that exacerbate pain.Return to online virtual visit/ clinic visit for follow-up with SUNNY Martinez in 2 weeks or sooner as needed.The patient expressed understanding and agreement with the  assessment and plan.  Patient encouraged to contact us should they have any questions, concerns, or any changes in symptoms. Thank you for allowing me to participate in the care of your patient      Diomedes Dominguez MD

## 2024-03-01 NOTE — PROGRESS NOTES
Trina Elias is a 73 y.o. female on day 5 of admission presenting with Chest pain, unspecified type.      Subjective   Seen and examined, b/l jaw pain is getting worse, she unable to chew or open her moth, morphine is helping a little.  No overnight events.  The plan discussed with the patient and RN. Plan discussed with neurology.      Objective     Last Recorded Vitals  /68   Pulse 70   Temp 36.7 °C (98.1 °F)   Resp 18   Wt 72.8 kg (160 lb 8 oz)   SpO2 98%   Intake/Output last 3 Shifts:  No intake or output data in the 24 hours ending 03/01/24 1322      Admission Weight  Weight: 69.9 kg (154 lb) (02/24/24 2030)    Daily Weight  02/25/24 : 72.8 kg (160 lb 8 oz)    Image Results  XR facial bones 3+ views  Narrative: Interpreted By:  Annabel Brown,   STUDY:  XR FACIAL BONES 3+ VIEWS; ;  2/29/2024 5:11 pm      INDICATION:  Signs/Symptoms:Facial pain.      COMPARISON:  None.      ACCESSION NUMBER(S):  [id number]      ORDERING CLINICIAN:  NIKOLE LOVE      FINDINGS:  The right maxillary sinus is not well aerated. However the left  maxillary sinuses is relatively well aerated with possible partial  opacification along the inferior aspect towards the left maxillary  alveolar bone. Bilateral frontal sinuses and ethmoidal air cells are  well aerated. Sphenoid sinuses are well aerated. Bilateral mastoid  air cells are well aerated. Dental implants are noted in the  mandible. Visualized C1-C2 articulation demonstrates moderate  degenerative changes. No acute fracture is noted.      Impression: 1. Findings concerning for near complete opacification of right  maxillary sinus and partial opacification of left maxillary sinus,  which can be seen with maxillary sinus disease. Recommend correlation  with patient's symptomatology. A CT scan of the sinuses can be  performed for further evaluation.  2. No acute fracture deformity within limits of radiographic  evaluation.              Signed by: Annabel Brown  3/1/2024 9:42 AM  Dictation workstation:   [id number]      Physical Exam  Constitutional: patient is alert and cooperative with exam  skin: dry and warm  Eyes: EOMI and clear sclera  ENMT: moist mucous membranes  Head/Neck: Neck supple  Respiratory/Thorax: Clear to auscultation bilaterally, no wheezing or crackles appreciated  Cardiovascular: regular rate and rhythm, S1 and S2 present, no murmurs heard  Gastrointestinal: bowel sounds present, no pain or tenderness upon palpation, soft and nondistended  Extremities: +2 radial, posterior tibial, and pedal pulse, no lower extremity edema noted  Neurological: AxOx3    Relevant Results  Scheduled medications  amitriptyline, 25 mg, oral, BID  baclofen, 10 mg, oral, TID  bumetanide, 1 mg, intravenous, BID with meals  [Held by provider] bumetanide, 2 mg, oral, BID with meals  dexAMETHasone, 4 mg, intravenous, Daily  digoxin, 125 mcg, intravenous, Daily  [Held by provider] digoxin, 125 mcg, oral, Daily  dilTIAZem CD, 120 mg, oral, Daily  doxycylcine, 100 mg, oral, q12h DWIGHT  fluticasone, 1 spray, Each Nostril, BID  hydroxychloroquine, 200 mg, oral, Daily  ipratropium-albuteroL, 3 mL, nebulization, 4x daily  montelukast, 10 mg, oral, Nightly  pantoprazole, 40 mg, oral, Daily before breakfast  polyethylene glycol, 17 g, oral, BID  [START ON 3/3/2024] predniSONE, 25 mg, oral, Daily  predniSONE, 30 mg, oral, Daily  rosuvastatin, 20 mg, oral, Daily  sucralfate, 1 g, oral, q6h DWIGHT      Continuous medications     PRN medications  PRN medications: acetaminophen **OR** acetaminophen **OR** acetaminophen, albuterol, alum-mag hydroxide-simeth, calcium carbonate, dextromethorphan-guaifenesin, diclofenac sodium, guaiFENesin, morphine, nitroglycerin, ondansetron **OR** ondansetron, oxyCODONE-acetaminophen, oxygen  Results for orders placed or performed during the hospital encounter of 02/24/24 (from the past 24 hour(s))   CBC and Auto Differential   Result Value Ref Range    WBC 7.1 4.4  - 11.3 x10*3/uL    nRBC 0.0 0.0 - 0.0 /100 WBCs    RBC 3.75 (L) 4.00 - 5.20 x10*6/uL    Hemoglobin 8.1 (L) 12.0 - 16.0 g/dL    Hematocrit 26.9 (L) 36.0 - 46.0 %    MCV 72 (L) 80 - 100 fL    MCH 21.6 (L) 26.0 - 34.0 pg    MCHC 30.1 (L) 32.0 - 36.0 g/dL    RDW 22.1 (H) 11.5 - 14.5 %    Platelets 271 150 - 450 x10*3/uL    Neutrophils % 91.2 40.0 - 80.0 %    Immature Granulocytes %, Automated 1.3 (H) 0.0 - 0.9 %    Lymphocytes % 6.2 13.0 - 44.0 %    Monocytes % 1.3 2.0 - 10.0 %    Eosinophils % 0.0 0.0 - 6.0 %    Basophils % 0.0 0.0 - 2.0 %    Neutrophils Absolute 6.44 (H) 1.60 - 5.50 x10*3/uL    Immature Granulocytes Absolute, Automated 0.09 0.00 - 0.50 x10*3/uL    Lymphocytes Absolute 0.44 (L) 0.80 - 3.00 x10*3/uL    Monocytes Absolute 0.09 0.05 - 0.80 x10*3/uL    Eosinophils Absolute 0.00 0.00 - 0.40 x10*3/uL    Basophils Absolute 0.00 0.00 - 0.10 x10*3/uL   Basic metabolic panel   Result Value Ref Range    Glucose 161 (H) 74 - 99 mg/dL    Sodium 142 136 - 145 mmol/L    Potassium 4.2 3.5 - 5.3 mmol/L    Chloride 103 98 - 107 mmol/L    Bicarbonate 27 21 - 32 mmol/L    Anion Gap 16 10 - 20 mmol/L    Urea Nitrogen 13 6 - 23 mg/dL    Creatinine 0.77 0.50 - 1.05 mg/dL    eGFR 82 >60 mL/min/1.73m*2    Calcium 9.2 8.6 - 10.3 mg/dL   Sedimentation rate, automated   Result Value Ref Range    Sedimentation Rate 81 (H) 0 - 30 mm/h   C-Reactive Protein   Result Value Ref Range    C-Reactive Protein 10.48 (H) <1.00 mg/dL   Morphology   Result Value Ref Range    RBC Morphology See Below     Hypochromia Mild     RBC Fragments Few     Ovalocytes Few           Assessment/Plan      Principal Problem:    Chest pain, unspecified type  Active Problems:    Coronary artery disease involving native coronary artery    Trina Elias is a 73 y.o. female with history of multiple abdominal surgeries, CAD, ICD/PPM placement, HTN and COPD, presenting with chest pain.       #BL trigeminal neuralgia  #Rheumatoid Arthritis Flare up  -Focal deficits no  ocular changes  -ESR and CRP elevated  -Face x-ray showed signs of opacities  -Pain control with oxycodone, morphine for breakthrough  -CT sinuses ordered diet changed to full liquid  -Neurology on board, patient started on dexamethasone  -Rheumatology follow-up as outpatient    #Acute blood loss anemia  General surgery on board, status post EGD and colonoscopy with no signs of active bleeding     # Shortness of breath likely secondary to asthma exacerbation in the setting of pleural edema  #Fever  -Afebrile for the last 24 hours  -CXR New perihilar and basilar interstitial edema with small left effusion.   -Flu and COVID-negative  -  -Procalcitonin 0.09  -Cardiology on board and recommended IV Bumex  -DuoNebs started every 4, methylprednisolone , doxycycline started for 5 days    #Severe abdominal pain  #dilated extrahepatic  biliary duct measuring up to 3.2 cm in diameter  -Amylase 19 and lipase 18  -CT abdomen pelvis showed: Status post cholecystectomy with a markedly dilated extrahepatic  biliary duct measuring up to 3.2 cm in diameter, increased from the prior study.  This may be chronic, however, correlation with biliary lab values is recommended, nonemergent ERCP or MRCP may be of benefit  in this patient.   -Liver ultrasound showed Mild intrahepatic biliary ductal dilatation status post  cholecystectomy. Additionally, the common bile duct measures up to  2.1 cm in caliber, similar to recent CT examination. No sonographic  choledocholithiasis.   -Pacemaker is MRI compatible we will plan for nonurgent MRCP   -Pain control.   -GI consulted, plan for EUS outpatient     #Near syncope  Appears to be from a combination of events including acute blood loss anemia, volume depletion and arrhythmia  Monitor with orthostatic vitals  Echo showed normal ejection fraction, moderate valvular abnormalities  Seen by cardiology, anticoagulation DC'd, Watchman device as outpatient  Cardiology referral as outpatient      #PPM/ICD problem  Has outpatient appointment to see electrophysiologist next week  Due to recurrent runs of v tach reported, will ask cardiology to see.     #Dispo  -Pending improvement of symptoms,   Ayden Wiley MD

## 2024-03-01 NOTE — CARE PLAN
The patient's goals for the shift include      The clinical goals for the shift include observe for obvious obstructions such as secretions, blood clots, or food particles      Problem: Nutrition  Goal: Less than 5 days NPO/clear liquids  Outcome: Progressing  Goal: Oral intake greater than 50%  Outcome: Progressing  Goal: Oral intake greater 75%  Outcome: Progressing  Goal: Consume prescribed supplement  Outcome: Progressing  Goal: Adequate PO fluid intake  Outcome: Progressing  Goal: Nutrition support goals are met within 48 hrs  Outcome: Progressing  Goal: Nutrition support is meeting 75% of nutrient needs  Outcome: Progressing  Goal: Tube feed tolerance  Outcome: Progressing  Goal: BG  mg/dL  Outcome: Progressing  Goal: Lab values WNL  Outcome: Progressing  Goal: Electrolytes WNL  Outcome: Progressing  Goal: Promote healing  Outcome: Progressing  Goal: Maintain stable weight  Outcome: Progressing  Goal: Reduce weight from edema/fluid  Outcome: Progressing  Goal: Gradual weight gain  Outcome: Progressing  Goal: Improve ostomy output  Outcome: Progressing     Problem: Pain - Adult  Goal: Verbalizes/displays adequate comfort level or baseline comfort level  Outcome: Progressing     Problem: Safety - Adult  Goal: Free from fall injury  Outcome: Progressing     Problem: Discharge Planning  Goal: Discharge to home or other facility with appropriate resources  Outcome: Progressing     Problem: Chronic Conditions and Co-morbidities  Goal: Patient's chronic conditions and co-morbidity symptoms are monitored and maintained or improved  Outcome: Progressing

## 2024-03-01 NOTE — PROGRESS NOTES
Nutrition Progress Note  Diet order updated to Full liquid diet per MD orders.     [facility]    Time spent: 15 minute.

## 2024-03-02 LAB — GLUCOSE BLD MANUAL STRIP-MCNC: 99 MG/DL (ref 74–99)

## 2024-03-02 PROCEDURE — 2500000004 HC RX 250 GENERAL PHARMACY W/ HCPCS (ALT 636 FOR OP/ED)

## 2024-03-02 PROCEDURE — 2500000004 HC RX 250 GENERAL PHARMACY W/ HCPCS (ALT 636 FOR OP/ED): Performed by: INTERNAL MEDICINE

## 2024-03-02 PROCEDURE — 99233 SBSQ HOSP IP/OBS HIGH 50: CPT | Performed by: INTERNAL MEDICINE

## 2024-03-02 PROCEDURE — 82947 ASSAY GLUCOSE BLOOD QUANT: CPT

## 2024-03-02 PROCEDURE — 2500000004 HC RX 250 GENERAL PHARMACY W/ HCPCS (ALT 636 FOR OP/ED): Performed by: NURSE PRACTITIONER

## 2024-03-02 PROCEDURE — 2500000002 HC RX 250 W HCPCS SELF ADMINISTERED DRUGS (ALT 637 FOR MEDICARE OP, ALT 636 FOR OP/ED): Performed by: INTERNAL MEDICINE

## 2024-03-02 PROCEDURE — 2500000002 HC RX 250 W HCPCS SELF ADMINISTERED DRUGS (ALT 637 FOR MEDICARE OP, ALT 636 FOR OP/ED): Mod: MUE | Performed by: INTERNAL MEDICINE

## 2024-03-02 PROCEDURE — C9113 INJ PANTOPRAZOLE SODIUM, VIA: HCPCS | Performed by: NURSE PRACTITIONER

## 2024-03-02 PROCEDURE — 94640 AIRWAY INHALATION TREATMENT: CPT | Mod: MUE

## 2024-03-02 PROCEDURE — 1200000002 HC GENERAL ROOM WITH TELEMETRY DAILY

## 2024-03-02 RX ORDER — ESOMEPRAZOLE MAGNESIUM 40 MG/1
40 GRANULE, DELAYED RELEASE ORAL
Status: DISCONTINUED | OUTPATIENT
Start: 2024-03-02 | End: 2024-03-05 | Stop reason: HOSPADM

## 2024-03-02 RX ORDER — PANTOPRAZOLE SODIUM 40 MG/10ML
40 INJECTION, POWDER, LYOPHILIZED, FOR SOLUTION INTRAVENOUS
Status: DISCONTINUED | OUTPATIENT
Start: 2024-03-02 | End: 2024-03-05 | Stop reason: HOSPADM

## 2024-03-02 RX ORDER — IPRATROPIUM BROMIDE AND ALBUTEROL SULFATE 2.5; .5 MG/3ML; MG/3ML
3 SOLUTION RESPIRATORY (INHALATION)
Status: DISCONTINUED | OUTPATIENT
Start: 2024-03-02 | End: 2024-03-05 | Stop reason: HOSPADM

## 2024-03-02 RX ORDER — PANTOPRAZOLE SODIUM 40 MG/1
40 TABLET, DELAYED RELEASE ORAL
Status: DISCONTINUED | OUTPATIENT
Start: 2024-03-02 | End: 2024-03-05 | Stop reason: HOSPADM

## 2024-03-02 RX ADMIN — IPRATROPIUM BROMIDE AND ALBUTEROL SULFATE 3 ML: 2.5; .5 SOLUTION RESPIRATORY (INHALATION) at 06:56

## 2024-03-02 RX ADMIN — DOXYCYCLINE 100 MG: 100 INJECTION, POWDER, LYOPHILIZED, FOR SOLUTION INTRAVENOUS at 09:58

## 2024-03-02 RX ADMIN — SUCRALFATE 1 G: 1 SUSPENSION ORAL at 22:00

## 2024-03-02 RX ADMIN — POLYETHYLENE GLYCOL 3350 17 G: 17 POWDER, FOR SOLUTION ORAL at 20:29

## 2024-03-02 RX ADMIN — SUCRALFATE 1 G: 1 SUSPENSION ORAL at 04:14

## 2024-03-02 RX ADMIN — MORPHINE SULFATE 3 MG: 4 INJECTION INTRAVENOUS at 20:29

## 2024-03-02 RX ADMIN — FLUTICASONE PROPIONATE 1 SPRAY: 50 SPRAY, METERED NASAL at 21:15

## 2024-03-02 RX ADMIN — PANTOPRAZOLE SODIUM 40 MG: 40 INJECTION, POWDER, FOR SOLUTION INTRAVENOUS at 06:02

## 2024-03-02 RX ADMIN — MORPHINE SULFATE 3 MG: 4 INJECTION INTRAVENOUS at 06:57

## 2024-03-02 RX ADMIN — DOXYCYCLINE 100 MG: 100 INJECTION, POWDER, LYOPHILIZED, FOR SOLUTION INTRAVENOUS at 21:15

## 2024-03-02 RX ADMIN — BUMETANIDE 1 MG: 0.25 INJECTION INTRAMUSCULAR; INTRAVENOUS at 09:57

## 2024-03-02 RX ADMIN — DEXAMETHASONE SODIUM PHOSPHATE 4 MG: 4 INJECTION, SOLUTION INTRAMUSCULAR; INTRAVENOUS at 09:58

## 2024-03-02 RX ADMIN — IPRATROPIUM BROMIDE AND ALBUTEROL SULFATE 3 ML: 2.5; .5 SOLUTION RESPIRATORY (INHALATION) at 20:40

## 2024-03-02 RX ADMIN — MORPHINE SULFATE 3 MG: 4 INJECTION INTRAVENOUS at 02:00

## 2024-03-02 RX ADMIN — IPRATROPIUM BROMIDE AND ALBUTEROL SULFATE 3 ML: 2.5; .5 SOLUTION RESPIRATORY (INHALATION) at 14:15

## 2024-03-02 RX ADMIN — BUMETANIDE 1 MG: 0.25 INJECTION INTRAMUSCULAR; INTRAVENOUS at 16:37

## 2024-03-02 RX ADMIN — MORPHINE SULFATE 3 MG: 4 INJECTION INTRAVENOUS at 13:50

## 2024-03-02 RX ADMIN — DIGOXIN 125 MCG: 0.25 INJECTION INTRAMUSCULAR; INTRAVENOUS at 09:58

## 2024-03-02 ASSESSMENT — PAIN SCALES - GENERAL
PAINLEVEL_OUTOF10: 8
PAINLEVEL_OUTOF10: 8
PAINLEVEL_OUTOF10: 5 - MODERATE PAIN
PAINLEVEL_OUTOF10: 7
PAINLEVEL_OUTOF10: 7
PAINLEVEL_OUTOF10: 5 - MODERATE PAIN
PAINLEVEL_OUTOF10: 3

## 2024-03-02 ASSESSMENT — PAIN - FUNCTIONAL ASSESSMENT
PAIN_FUNCTIONAL_ASSESSMENT: 0-10

## 2024-03-02 ASSESSMENT — COGNITIVE AND FUNCTIONAL STATUS - GENERAL
MOBILITY SCORE: 24
MOBILITY SCORE: 24
DAILY ACTIVITIY SCORE: 24
DAILY ACTIVITIY SCORE: 24

## 2024-03-02 ASSESSMENT — PAIN DESCRIPTION - LOCATION: LOCATION: JAW

## 2024-03-02 NOTE — PROGRESS NOTES
Trina Elias is a 73 y.o. female on day 6 of admission presenting with Chest pain, unspecified type.      Subjective   Seen and examined, b/l jaw pain is improving but she still cannot open her mouth or chew.  No overnight events.  The plan discussed with the patient and RN. Plan discussed with neurology.      Objective     Last Recorded Vitals  /61   Pulse 76   Temp 36.4 °C (97.5 °F) (Temporal)   Resp 22   Wt 72.8 kg (160 lb 8 oz)   SpO2 97%   Intake/Output last 3 Shifts:    Intake/Output Summary (Last 24 hours) at 3/2/2024 1456  Last data filed at 3/2/2024 1058  Gross per 24 hour   Intake 260 ml   Output 400 ml   Net -140 ml         Admission Weight  Weight: 69.9 kg (154 lb) (02/24/24 2030)    Daily Weight  02/25/24 : 72.8 kg (160 lb 8 oz)    Image Results  ECG 12 lead  Atrial-paced rhythm with premature ventricular or aberrantly conducted complexes  Abnormal ECG  When compared with ECG of 24-FEB-2024 21:39, (unconfirmed)  Electronic atrial pacemaker has replaced Sinus rhythm  Poor data quality  Confirmed by Neo Gutierrez ([id number]) on 3/1/2024 7:16:38 PM  CT sinus wo IV contrast  Narrative: Interpreted By:  Suhail Silveira,   STUDY:  CT SINUS WO IV CONTRAST;  3/1/2024 12:25 pm      INDICATION:  Signs/Symptoms:Pain.      COMPARISON:  Head CT dated 02/06/2020.      ACCESSION NUMBER(S):  [id number]      ORDERING CLINICIAN:  NIKOLE LOVE      TECHNIQUE:  Axial noncontrast CT images of the paranasal sinuses were obtained  and reconstructed in the coronal plane.      FINDINGS:  Frontal sinuses: Mucosal thickening involving the inferior aspects at  the frontoethmoidal recesses bilaterally. Otherwise minimal scattered  mucosal thickening of the frontal sinuses without significant  opacification.      Ethmoid sinuses: Near-complete opacification of the anterior ethmoids  bilaterally. There is unchanged remote left lamina papyracea fracture  component with herniated orbital fat and mild asymmetry of  the left  medial rectus muscle. Near-complete opacification of the left  posterior ethmoids as well as partial opacification of the right  posterior ethmoidal air cells.      Maxillary sinuses: Polypoid, mineralized hyperdense secretions within  the right maxillary paranasal sinus. Additional mucosal thickening of  the maxillary paranasal sinuses with possible small amount of  dependent secretions bilaterally. Partial opacification on the right.  Additionally, there is an element of hyperostosis involving the right  maxillary paranasal sinus.      Sphenoid sinuses: Hypoplastic left sphenoid sinus. Mucosal thickening  with near-complete opacification of the left sphenoid sinus. Moderate  to marked mucosal thickening of the right sphenoid sinus with partial  opacification.      Frontoethmoidal recesses: Occluded bilaterally.      Ostiomeatal complexes: Occluded bilaterally.      Sphenoethmoid foramina: Occluded bilaterally.      Nasal septum and nasal passages: Relatively midline nasal septum.  Mucosal thickening and secretions throughout the bilateral nasal  passages.      Mastoid Air Cells: Small amount of dependent fluid within the right  mastoid with element of hyperostosis suggesting chronicity. Remainder  of the mastoids are well aerated.      Additional findings: Visualized portions of the brain demonstrates no  acute intracranial process. Maxilla is edentulous. Bilateral native  lens extractions.      Impression: Pansinusitis as described in detail above. Element of chronic  sinusitis involving the right maxillary region with  mineralized/hyperdense secretions versus fungal components.  Extensive, near-complete opacification of a majority of the ethmoidal  air cells with unchanged remote left lamina papyracea fracture.      Relatively midline nasal septum.      MACRO:  None      Signed by: Suhail Silveira 3/1/2024 2:02 PM  Dictation workstation:   [id number]  XR facial bones 3+ views  Narrative: Interpreted  By:  Annabel Brown,   STUDY:  XR FACIAL BONES 3+ VIEWS; ;  2/29/2024 5:11 pm      INDICATION:  Signs/Symptoms:Facial pain.      COMPARISON:  None.      ACCESSION NUMBER(S):  [id number]      ORDERING CLINICIAN:  NIKOLE LOVE      FINDINGS:  The right maxillary sinus is not well aerated. However the left  maxillary sinuses is relatively well aerated with possible partial  opacification along the inferior aspect towards the left maxillary  alveolar bone. Bilateral frontal sinuses and ethmoidal air cells are  well aerated. Sphenoid sinuses are well aerated. Bilateral mastoid  air cells are well aerated. Dental implants are noted in the  mandible. Visualized C1-C2 articulation demonstrates moderate  degenerative changes. No acute fracture is noted.      Impression: 1. Findings concerning for near complete opacification of right  maxillary sinus and partial opacification of left maxillary sinus,  which can be seen with maxillary sinus disease. Recommend correlation  with patient's symptomatology. A CT scan of the sinuses can be  performed for further evaluation.  2. No acute fracture deformity within limits of radiographic  evaluation.              Signed by: Annabel Brown 3/1/2024 9:42 AM  Dictation workstation:   [id number]      Physical Exam  Constitutional: patient is alert and cooperative with exam  skin: dry and warm  Eyes: EOMI and clear sclera  ENMT: moist mucous membranes  Head/Neck: Neck supple  Respiratory/Thorax: Clear to auscultation bilaterally, no wheezing or crackles appreciated  Cardiovascular: regular rate and rhythm, S1 and S2 present, no murmurs heard  Gastrointestinal: bowel sounds present, no pain or tenderness upon palpation, soft and nondistended  Extremities: +2 radial, posterior tibial, and pedal pulse, no lower extremity edema noted  Neurological: AxOx3, no focal deficits    Relevant Results  Scheduled medications  amitriptyline, 25 mg, oral, BID  baclofen, 10 mg, oral,  TID  bumetanide, 1 mg, intravenous, BID with meals  [Held by provider] bumetanide, 2 mg, oral, BID with meals  digoxin, 125 mcg, intravenous, Daily  [Held by provider] digoxin, 125 mcg, oral, Daily  dilTIAZem CD, 120 mg, oral, Daily  doxycycline, 100 mg, intravenous, q12h  pantoprazole, 40 mg, oral, Daily before breakfast   Or  esomeprazole, 40 mg, nasoduodenal tube, Daily before breakfast   Or  pantoprazole, 40 mg, intravenous, Daily before breakfast  fluticasone, 1 spray, Each Nostril, BID  hydroxychloroquine, 200 mg, oral, Daily  ipratropium-albuteroL, 3 mL, nebulization, TID  montelukast, 10 mg, oral, Nightly  polyethylene glycol, 17 g, oral, BID  [START ON 3/3/2024] predniSONE, 25 mg, oral, Daily  predniSONE, 30 mg, oral, Daily  rosuvastatin, 20 mg, oral, Daily  sucralfate, 1 g, oral, q6h DWIGHT      Continuous medications     PRN medications  PRN medications: acetaminophen **OR** acetaminophen **OR** acetaminophen, albuterol, alum-mag hydroxide-simeth, calcium carbonate, dextromethorphan-guaifenesin, diclofenac sodium, guaiFENesin, morphine, nitroglycerin, ondansetron **OR** ondansetron, oxyCODONE-acetaminophen, oxygen  Results for orders placed or performed during the hospital encounter of 02/24/24 (from the past 24 hour(s))   POCT GLUCOSE   Result Value Ref Range    POCT Glucose 99 74 - 99 mg/dL          Assessment/Plan      Principal Problem:    Chest pain, unspecified type  Active Problems:    Coronary artery disease involving native coronary artery    Trina Elias is a 73 y.o. female with history of multiple abdominal surgeries, CAD, ICD/PPM placement, HTN and COPD, presenting with chest pain.       #BL trigeminal neuralgia  #Rheumatoid Arthritis Flare up  -Focal deficits no ocular changes  -ESR and CRP elevated  -Face x-ray showed signs of opacities  -Pain control with oxycodone, morphine for breakthrough  -CT sinuses showed sinusitis  -Neurology on board, patient started on dexamethasone yesterday with  improvement  -Rheumatology follow-up as outpatient  -Will consider pain management consult manage continues to get a    #Acute blood loss anemia  General surgery on board, status post EGD and colonoscopy with no signs of active bleeding     # Shortness of breath likely secondary to asthma exacerbation in the setting of pleural edema  -Cardiology on board and  IV Bumex  -DuoNebs started every 4, methylprednisolone , doxycycline started for 5 days    #dilated extrahepatic  biliary duct measuring up to 3.2 cm in diameter  -Amylase 19 and lipase 18  -CT abdomen pelvis showed: Status post cholecystectomy with a markedly dilated extrahepatic  biliary duct measuring up to 3.2 cm in diameter, increased from the prior study.  This may be chronic, however, correlation with biliary lab values is recommended, nonemergent ERCP or MRCP may be of benefit  in this patient.   -Liver ultrasound showed Mild intrahepatic biliary ductal dilatation status post  cholecystectomy. Additionally, the common bile duct measures up to  2.1 cm in caliber, similar to recent CT examination. No sonographic  choledocholithiasis.   -Pacemaker is MRI compatible we will plan for nonurgent MRCP   -Pain control.   -GI consulted, plan for EUS outpatient     #Near syncope  Appears to be from a combination of events including acute blood loss anemia, volume depletion and arrhythmia  Monitor with orthostatic vitals  Echo showed normal ejection fraction, moderate valvular abnormalities  Seen by cardiology, anticoagulation DC'd, Watchman device as outpatient  Cardiology referral as outpatient     #PPM/ICD problem  Has outpatient appointment to see electrophysiologist next week  Due to recurrent runs of v tach reported, will ask cardiology to see.     #Dispo  -Pending improvement of symptoms.   Ayden Wiley MD

## 2024-03-02 NOTE — CARE PLAN
The patient's goals for the shift include  pain control and rest    The clinical goals for the shift include observe for obvious obstructions such as secretions, blood clots, or food particles

## 2024-03-02 NOTE — CARE PLAN
Problem: Pain - Adult  Goal: Verbalizes/displays adequate comfort level or baseline comfort level  Outcome: Progressing     Problem: Safety - Adult  Goal: Free from fall injury  Outcome: Progressing     Problem: Discharge Planning  Goal: Discharge to home or other facility with appropriate resources  Outcome: Progressing     Problem: Chronic Conditions and Co-morbidities  Goal: Patient's chronic conditions and co-morbidity symptoms are monitored and maintained or improved  Outcome: Progressing   The patient's goals for the shift include      The clinical goals for the shift include patient will nbe able to eat/take PO meds by end of day    Patient was able to drink a an ensure protein drink but was unable to take any PO meds.

## 2024-03-02 NOTE — PROGRESS NOTES
Trina Elias is a 73 y.o. female on day 6 of admission presenting with Chest pain, unspecified type.      Subjective     Trina was sitting up in her chair complaining of jaw pain, unable to open her mouth. She is wheezy but reports her shortness of breath has improved since earlier this morning    Review of systems:  Constitutional: negative for fever, chills, or malaise  Neuro: negative for dizziness, headache, numbness, tingling  ENT: Negative for nasal congestion or sore throat  Resp: positive for shortness of breath, cough, or wheezing  CV: negative for chest pain, palpitations  GI: negative for abd pain, nausea, vomiting or diarrhea  : negative for dysuria, frequency, or urgency  Skin: negative for lesions, wounds, or rash  Musculoskeletal: Negative for weakness, myalgia, or arthralgia  Endocrine: Negative for polyuria or polydipsia         Objective   Constitutional: Well developed, awake/alert/oriented x3, no distress, alert and cooperative  Eyes: PERRL, EOMI, clear sclera  ENMT: mucous membranes moist, no apparent injury, no lesions seen  Head/Neck: Neck supple, no apparent injury, thyroid without mass or tenderness, No JVD, trachea midline, no bruits  Respiratory/Thorax: Patent airways, CTAB, normal breath sounds with good chest expansion, thorax symmetric, wheezing throughout  Cardiovascular: irregular, rate and rhythm, no murmurs, 2+ equal pulses of the extremities, normal S 1and S 2  Gastrointestinal: Nondistended, soft, non-tender, no rebound tenderness or guarding, no masses palpable, no organomegaly, +BS, no bruits  Musculoskeletal: ROM intact, no joint swelling, normal strength  Extremities: normal extremities, no cyanosis edema, contusions or wounds, no clubbing  Neurological: alert and oriented x3, intact senses, motor, response and reflexes, normal strength  Lymphatic: No significant lymphadenopathy  Psychological: Appropriate mood and behavior  Skin: Warm and dry, no lesions, no  "rashes      Last Recorded Vitals  /61   Pulse 76   Temp 36.4 °C (97.5 °F) (Temporal)   Resp 22   Ht 1.626 m (5' 4\")   Wt 72.8 kg (160 lb 8 oz)   SpO2 98%   BMI 27.55 kg/m²     Intake/Output last 3 Shifts:  I/O last 3 completed shifts:  In: 121.5 (1.7 mL/kg) [I.V.:21.5 (0.3 mL/kg); IV Piggyback:100]  Out: 400 (5.5 mL/kg) [Urine:400 (0.2 mL/kg/hr)]  Weight: 72.8 kg   No intake/output data recorded.    Relevant Results  Scheduled medications  amitriptyline, 25 mg, oral, BID  baclofen, 10 mg, oral, TID  bumetanide, 1 mg, intravenous, BID with meals  [Held by provider] bumetanide, 2 mg, oral, BID with meals  digoxin, 125 mcg, intravenous, Daily  [Held by provider] digoxin, 125 mcg, oral, Daily  dilTIAZem CD, 120 mg, oral, Daily  doxycycline, 100 mg, intravenous, q12h  pantoprazole, 40 mg, oral, Daily before breakfast   Or  esomeprazole, 40 mg, nasoduodenal tube, Daily before breakfast   Or  pantoprazole, 40 mg, intravenous, Daily before breakfast  fluticasone, 1 spray, Each Nostril, BID  hydroxychloroquine, 200 mg, oral, Daily  ipratropium-albuteroL, 3 mL, nebulization, TID  montelukast, 10 mg, oral, Nightly  polyethylene glycol, 17 g, oral, BID  [START ON 3/3/2024] predniSONE, 25 mg, oral, Daily  predniSONE, 30 mg, oral, Daily  rosuvastatin, 20 mg, oral, Daily  sucralfate, 1 g, oral, q6h DWIGHT      Continuous medications     PRN medications  PRN medications: acetaminophen **OR** acetaminophen **OR** acetaminophen, albuterol, alum-mag hydroxide-simeth, calcium carbonate, dextromethorphan-guaifenesin, diclofenac sodium, guaiFENesin, morphine, nitroglycerin, ondansetron **OR** ondansetron, oxyCODONE-acetaminophen, oxygen    No results found for this or any previous visit (from the past 24 hour(s)).    CT sinus wo IV contrast   Final Result   Pansinusitis as described in detail above. Element of chronic   sinusitis involving the right maxillary region with   mineralized/hyperdense secretions versus fungal " components.   Extensive, near-complete opacification of a majority of the ethmoidal   air cells with unchanged remote left lamina papyracea fracture.        Relatively midline nasal septum.        MACRO:   None        Signed by: Suhail Silveira 3/1/2024 2:02 PM   Dictation workstation:   [id number]      XR facial bones 3+ views   Final Result   1. Findings concerning for near complete opacification of right   maxillary sinus and partial opacification of left maxillary sinus,   which can be seen with maxillary sinus disease. Recommend correlation   with patient's symptomatology. A CT scan of the sinuses can be   performed for further evaluation.   2. No acute fracture deformity within limits of radiographic   evaluation.                  Signed by: Annabel Brown 3/1/2024 9:42 AM   Dictation workstation:   [id number]      XR chest 1 view   Final Result   New perihilar and basilar interstitial edema with small left effusion.        Signed by: Rony Kowalski 2/28/2024 2:37 PM   Dictation workstation:   [id number]      Transthoracic Echo (TTE) Complete   Final Result      US abdomen limited liver   Final Result   Mild intrahepatic biliary ductal dilatation status post   cholecystectomy. Additionally, the common bile duct measures up to   2.1 cm in caliber, similar to recent CT examination. No sonographic   choledocholithiasis. As previously advised, further evaluation with   ERCP or MRCP may be considered.        MACRO:   None             Signed by: Suhail Silveira 2/26/2024 9:50 AM   Dictation workstation:   [id number]      CT abdomen pelvis w IV contrast   Final Result   1.  Status post cholecystectomy with a markedly dilated extrahepatic   biliary duct measuring up to 3.2 cm in diameter, increased from the   prior study.  This may be chronic, however, correlation with biliary   lab values is recommended, nonemergent ERCP or MRCP may be of benefit   in this patient.   2.  Chronic postoperative changes are seen in  the region of the GE   junction with a small sliding-type hiatal hernia, correlate with   patient's surgical history.   3.  Cardiomegaly.   4.  Chronic changes of suspected COPD.   5.  Minimal distal colonic diverticulosis.   Signed by Mesfin Seaman      XR chest 1 view   Final Result   No acute cardiopulmonary process is evident.        MACRO:   None        Signed by: Nixon Orantes 2/24/2024 8:47 PM   Dictation workstation:   [id number]          Transthoracic Echo (TTE) Complete    Result Date: 2/27/2024   [facility], [address] [place] [address]               Tel [phone] and Fax [phone] TRANSTHORACIC ECHOCARDIOGRAM REPORT  Patient Name:      VERNON SALDANA VIA         Reading Physician:    [id number] Nohelia Soto MD Study Date:        2/27/2024            Ordering Provider:    [id number] NIKOLE LOVE MRN/PID:           [id number]             Fellow: Accession#:        [id number]         Nurse: Date of Birth/Age: 1950 / 73 years Sonographer:          Dai Diaz                                                               [facility] Gender:            F                    Additional Staff: Height:            162.56 cm            Admit Date:           2/24/2024 Weight:            72.57 kg             Admission Status:     Inpatient -                                                               Routine BSA / BMI:         1.78 m2 / 27.46      Encounter#:           [id number]                    kg/m2                                         Department Location:  [facility] Non                                                               Invasive Blood Pressure: 131 /59 mmHg Study Type:    TRANSTHORACIC ECHO (TTE) COMPLETE Diagnosis/ICD: Shortness of breath-R06.02 Indication:    Dyspnea CPT Code:      Echo Complete w Full Doppler-20012 Patient History:  Pertinent History: A-Fib and Chest Pain. elevated troponin, elevated BNP, mod                    AS. Study Detail: The following Echo studies were performed: 2D, M-Mode, Doppler and               color flow.  PHYSICIAN INTERPRETATION: Left Ventricle: The left ventricular systolic function is normal, with an estimated ejection fraction of 55-60%. There are no regional wall motion abnormalities. The left ventricular cavity size is normal. Spectral Doppler shows an impaired relaxation pattern of left ventricular diastolic filling. Left Atrium: The left atrium is mildly dilated. Right Ventricle: The right ventricle is normal in size. There is normal right ventricular global systolic function. Right Atrium: The right atrium is normal in size. Aortic Valve: The aortic valve is trileaflet. There is mild to moderate aortic valve cusp calcification. There is evidence of moderate aortic valve stenosis. There is trivial aortic valve regurgitation. The peak instantaneous gradient of the aortic valve is 27.5 mmHg. The mean gradient of the aortic valve is 16.0 mmHg. Mitral Valve: The mitral valve is mildly thickened. There is evidence of mild mitral valve stenosis. The doppler estimated mean and peak diastolic pressure gradients are 7.2 mmHg and 16.3 mmHg respectively. There is mild mitral annular calcification. There is mild to moderate mitral valve regurgitation. Tricuspid Valve: The tricuspid valve is structurally normal. There is mild to moderate tricuspid regurgitation. Pulmonic Valve: The pulmonic valve is not well visualized. There is trace to mild pulmonic valve regurgitation. Pericardium: There is no pericardial effusion noted. Aorta: The aortic root is normal. Pulmonary Artery: The tricuspid regurgitant velocity is 3.46 m/s, and with an estimated right atrial pressure of 3 mmHg, the estimated pulmonary artery pressure is mild to moderately elevated with the RVSP at 50.9 mmHg.  CONCLUSIONS:  1. Left ventricular systolic  function is normal with a 55-60% estimated ejection fraction.  2. Spectral Doppler shows an impaired relaxation pattern of left ventricular diastolic filling.  3. Mild to moderate mitral valve regurgitation.  4. Mild to moderate tricuspid regurgitation visualized.  5. Moderate aortic valve stenosis.  6. Mild to moderately elevated pulmonary artery pressure. QUANTITATIVE DATA SUMMARY: 2D MEASUREMENTS:                           Normal Ranges: IVSd:          0.97 cm    (0.6-1.1cm) LVPWd:         0.98 cm    (0.6-1.1cm) LVIDd:         5.70 cm    (3.9-5.9cm) LVIDs:         4.26 cm LV Mass Index: 123.2 g/m2 LV % FS        25.2 % LA VOLUME:                              Normal Ranges: LA Vol A4C:       77.4 ml    (22+/-6mL/m2) LA Vol A2C:       83.5 ml LA Vol BP:        82.1 ml LA Vol Index A4C: 43.5 ml/m2 LA Vol Index A2C: 46.9 ml/m2 LA Vol Index BP:  46.1 ml/m2 LA Volume Index:  46.1 ml/m2 LA Vol A4C:       72.2 ml LA Vol A2C:       77.0 ml RA VOLUME BY A/L METHOD:                       Normal Ranges: RA Area A4C: 21.8 cm2 M-MODE MEASUREMENTS:                  Normal Ranges: Ao Root: 3.30 cm (2.0-3.7cm) LAs:     4.72 cm (2.7-4.0cm) LV SYSTOLIC FUNCTION BY 2D PLANIMETRY (MOD):                     Normal Ranges: EF-A4C View: 55.9 % (>=55%) EF-A2C View: 56.5 % EF-Biplane:  55.9 % LV DIASTOLIC FUNCTION:                             Normal Ranges: MV Peak E:      1.91 m/s    (0.7-1.2 m/s) MV Peak A:      0.93 m/s    (0.42-0.7 m/s) E/A Ratio:      2.06        (1.0-2.2) MV e'           0.09 m/s    (>8.0) MV lateral e'   0.09 m/s MV medial e'    0.08 m/s E/e' Ratio:     21.21       (<8.0) PulmV Sys Ham:  62.35 cm/s PulmV Alvarez Ham: 121.32 cm/s PulmV S/D Ham:  0.51 MITRAL VALVE:                       Normal Ranges: MV Vmax:    2.02 m/s  (<=1.3m/s) MV peak P.3 mmHg (<5mmHg) MV mean P.2 mmHg  (<2mmHg) MV VTI:     48.44 cm  (10-13cm) MV DT:      167 msec  (150-240msec) MITRAL INSUFFICIENCY:                         Normal  Ranges: MR VTI:     192.66 cm MR Vmax:    568.98 cm/s MR Volume:  33.56 ml MR Flow Rt: 99.12 ml/s MR EROA:    0.17 cm2 AORTIC VALVE:                                    Normal Ranges: AoV Vmax:                2.62 m/s  (<=1.7m/s) AoV Peak P.5 mmHg (<20mmHg) AoV Mean P.0 mmHg (1.7-11.5mmHg) LVOT Max Ham:            0.99 m/s  (<=1.1m/s) AoV VTI:                 58.21 cm  (18-25cm) LVOT VTI:                21.96 cm LVOT Diameter:           1.95 cm   (1.8-2.4cm) AoV Area, VTI:           1.13 cm2  (2.5-5.5cm2) AoV Area,Vmax:           1.13 cm2  (2.5-4.5cm2) AoV Dimensionless Index: 0.38 AORTIC INSUFFICIENCY: AI Vmax:       3.91 m/s AI Half-time:  520 msec AI Decel Time: 1794 msec AI Decel Rate: 218.17 cm/s2  RIGHT VENTRICLE: RV Basal 4.30 cm RV Mid   2.60 cm RV Major 7.7 cm TAPSE:   27.0 mm RV s'    0.18 m/s TRICUSPID VALVE/RVSP:                             Normal Ranges: Peak TR Velocity: 3.46 m/s RV Syst Pressure: 50.9 mmHg (< 30mmHg) PULMONIC VALVE:                      Normal Ranges: PV Max Ham: 1.5 m/s  (0.6-0.9m/s) PV Max P.8 mmHg Pulmonary Veins: PulmV Alvarez Ham: 121.32 cm/s PulmV S/D Ham:  0.51 PulmV Sys Ham:  62.35 cm/s  [id number] Nohelia Soto MD Electronically signed on 2024 at 11:22:42 AM  ** Final **           Assessment/Plan   Principal Problem:    Chest pain, unspecified type  Active Problems:    Coronary artery disease involving native coronary artery      Patient has been admitted to the hospital and monitored on telemetry.  She has chronic blood loss iron deficiency anemia related to rivaroxaban use.     Endoscopies has been unrevealing for active source of bleeding.     S/p packed red blood cells, discontinue rivaroxaban, administer intravenous iron infusions.     Elevated troponin most likely due to demand injury from severe anemia.  Last cardiac catheterization showed normal epicardial coronary arteries.  She has a well-functioning defibrillator unclear etiology  of beeping sound and this will be verified.     Most recent device check available for review in 2023 shows adequate battery life, occasional episodes of nonsustained ventricular tachycardia.     There has been no evidence of atrial fibrillation since last pulmonary vein isolation.  Procedure done in May 2022.  Safe to discontinue anticoagulation at this time.     Continue proton pump inhibitors and Carafate 1 g 4 times daily, patient already also on famotidine.     Continue rosuvastatin for hyper lipidemia.     She will be referred for Watchman procedure so long-term anticoagulation for AF does not need to be resumed for any future recurrences of paroxysmal atrial flutter or fibrillation     Discussed with GI for possible MRI related to abd pain->ICD is NOT MRI compatible     Overnight Wednesday, developed shortness of breath and cough. CXR showed CHF. Home dose bumex has been on hold. Given Bumex 2mg IVx1 and resumed home dose. Now unable to take PO due to inability to open mouth. Change Bumex to IV. Change Digoxin to IV    Continue cardiac medications IV until she is able to open mouth.       Lucy Kelly, APRN-CNP

## 2024-03-03 LAB
ATRIAL RATE: 72 BPM
ATRIAL RATE: 74 BPM
PR INTERVAL: 160 MS
PR INTERVAL: 408 MS
Q ONSET: 217 MS
Q ONSET: 219 MS
QRS COUNT: 12 BEATS
QRS COUNT: 13 BEATS
QRS DURATION: 90 MS
QRS DURATION: 94 MS
QT INTERVAL: 396 MS
QT INTERVAL: 400 MS
QTC CALCULATION(BAZETT): 438 MS
QTC CALCULATION(BAZETT): 439 MS
QTC FREDERICIA: 425 MS
QTC FREDERICIA: 425 MS
R AXIS: 33 DEGREES
R AXIS: 34 DEGREES
T AXIS: 75 DEGREES
T AXIS: 77 DEGREES
T OFFSET: 415 MS
T OFFSET: 419 MS
VENTRICULAR RATE: 72 BPM
VENTRICULAR RATE: 74 BPM

## 2024-03-03 PROCEDURE — 2500000004 HC RX 250 GENERAL PHARMACY W/ HCPCS (ALT 636 FOR OP/ED): Performed by: INTERNAL MEDICINE

## 2024-03-03 PROCEDURE — 2500000002 HC RX 250 W HCPCS SELF ADMINISTERED DRUGS (ALT 637 FOR MEDICARE OP, ALT 636 FOR OP/ED): Performed by: INTERNAL MEDICINE

## 2024-03-03 PROCEDURE — 99233 SBSQ HOSP IP/OBS HIGH 50: CPT | Performed by: INTERNAL MEDICINE

## 2024-03-03 PROCEDURE — 2500000004 HC RX 250 GENERAL PHARMACY W/ HCPCS (ALT 636 FOR OP/ED): Performed by: NURSE PRACTITIONER

## 2024-03-03 PROCEDURE — 2500000001 HC RX 250 WO HCPCS SELF ADMINISTERED DRUGS (ALT 637 FOR MEDICARE OP): Performed by: INTERNAL MEDICINE

## 2024-03-03 PROCEDURE — C9113 INJ PANTOPRAZOLE SODIUM, VIA: HCPCS | Performed by: NURSE PRACTITIONER

## 2024-03-03 PROCEDURE — 2500000004 HC RX 250 GENERAL PHARMACY W/ HCPCS (ALT 636 FOR OP/ED)

## 2024-03-03 PROCEDURE — 2500000001 HC RX 250 WO HCPCS SELF ADMINISTERED DRUGS (ALT 637 FOR MEDICARE OP): Performed by: NURSE PRACTITIONER

## 2024-03-03 PROCEDURE — 2500000001 HC RX 250 WO HCPCS SELF ADMINISTERED DRUGS (ALT 637 FOR MEDICARE OP)

## 2024-03-03 PROCEDURE — 94640 AIRWAY INHALATION TREATMENT: CPT | Mod: MUE

## 2024-03-03 PROCEDURE — 1200000002 HC GENERAL ROOM WITH TELEMETRY DAILY

## 2024-03-03 RX ORDER — SUCRALFATE 1 G/10ML
1 SUSPENSION ORAL EVERY 6 HOURS SCHEDULED
Qty: 1200 ML | Refills: 0 | Status: CANCELLED | OUTPATIENT
Start: 2024-03-03 | End: 2024-04-02

## 2024-03-03 RX ORDER — DOXYCYCLINE HYCLATE 100 MG
100 TABLET ORAL EVERY 12 HOURS SCHEDULED
Status: DISCONTINUED | OUTPATIENT
Start: 2024-03-03 | End: 2024-03-05 | Stop reason: HOSPADM

## 2024-03-03 RX ADMIN — SUCRALFATE 1 G: 1 SUSPENSION ORAL at 21:48

## 2024-03-03 RX ADMIN — FLUTICASONE PROPIONATE 1 SPRAY: 50 SPRAY, METERED NASAL at 21:50

## 2024-03-03 RX ADMIN — MORPHINE SULFATE 3 MG: 4 INJECTION INTRAVENOUS at 03:16

## 2024-03-03 RX ADMIN — AMITRIPTYLINE HYDROCHLORIDE 25 MG: 25 TABLET, FILM COATED ORAL at 09:35

## 2024-03-03 RX ADMIN — DIGOXIN 125 MCG: 0.25 INJECTION INTRAMUSCULAR; INTRAVENOUS at 09:14

## 2024-03-03 RX ADMIN — MORPHINE SULFATE 3 MG: 4 INJECTION INTRAVENOUS at 09:21

## 2024-03-03 RX ADMIN — DOXYCYCLINE HYCLATE 100 MG: 100 TABLET, COATED ORAL at 11:06

## 2024-03-03 RX ADMIN — SUCRALFATE 1 G: 1 SUSPENSION ORAL at 03:07

## 2024-03-03 RX ADMIN — BUMETANIDE 2 MG: 1 TABLET ORAL at 16:59

## 2024-03-03 RX ADMIN — GUAIFENESIN AND DEXTROMETHORPHAN 5 ML: 100; 10 SYRUP ORAL at 09:36

## 2024-03-03 RX ADMIN — OXYCODONE HYDROCHLORIDE AND ACETAMINOPHEN 1 TABLET: 5; 325 TABLET ORAL at 15:41

## 2024-03-03 RX ADMIN — GUAIFENESIN AND DEXTROMETHORPHAN 5 ML: 100; 10 SYRUP ORAL at 21:59

## 2024-03-03 RX ADMIN — OXYCODONE HYDROCHLORIDE AND ACETAMINOPHEN 1 TABLET: 5; 325 TABLET ORAL at 21:53

## 2024-03-03 RX ADMIN — AMITRIPTYLINE HYDROCHLORIDE 25 MG: 25 TABLET, FILM COATED ORAL at 21:47

## 2024-03-03 RX ADMIN — BUMETANIDE 1 MG: 0.25 INJECTION INTRAMUSCULAR; INTRAVENOUS at 09:14

## 2024-03-03 RX ADMIN — BACLOFEN 10 MG: 10 TABLET ORAL at 09:36

## 2024-03-03 RX ADMIN — SUCRALFATE 1 G: 1 SUSPENSION ORAL at 09:38

## 2024-03-03 RX ADMIN — DOXYCYCLINE HYCLATE 100 MG: 100 TABLET, COATED ORAL at 21:47

## 2024-03-03 RX ADMIN — PREDNISONE 25 MG: 20 TABLET ORAL at 09:32

## 2024-03-03 RX ADMIN — ROSUVASTATIN CALCIUM 20 MG: 20 TABLET, FILM COATED ORAL at 09:36

## 2024-03-03 RX ADMIN — IPRATROPIUM BROMIDE AND ALBUTEROL SULFATE 3 ML: 2.5; .5 SOLUTION RESPIRATORY (INHALATION) at 14:58

## 2024-03-03 RX ADMIN — BACLOFEN 10 MG: 10 TABLET ORAL at 15:34

## 2024-03-03 RX ADMIN — IPRATROPIUM BROMIDE AND ALBUTEROL SULFATE 3 ML: 2.5; .5 SOLUTION RESPIRATORY (INHALATION) at 20:02

## 2024-03-03 RX ADMIN — ALBUTEROL SULFATE 2.5 MG: 2.5 SOLUTION RESPIRATORY (INHALATION) at 07:56

## 2024-03-03 RX ADMIN — SUCRALFATE 1 G: 1 SUSPENSION ORAL at 15:34

## 2024-03-03 RX ADMIN — HYDROXYCHLOROQUINE SULFATE 200 MG: 200 TABLET, FILM COATED ORAL at 09:35

## 2024-03-03 RX ADMIN — PANTOPRAZOLE SODIUM 40 MG: 40 INJECTION, POWDER, FOR SOLUTION INTRAVENOUS at 06:24

## 2024-03-03 RX ADMIN — POLYETHYLENE GLYCOL 3350 17 G: 17 POWDER, FOR SOLUTION ORAL at 09:37

## 2024-03-03 RX ADMIN — MONTELUKAST 10 MG: 10 TABLET, FILM COATED ORAL at 21:47

## 2024-03-03 RX ADMIN — DILTIAZEM HYDROCHLORIDE 120 MG: 120 CAPSULE, COATED, EXTENDED RELEASE ORAL at 09:35

## 2024-03-03 RX ADMIN — BACLOFEN 10 MG: 10 TABLET ORAL at 21:47

## 2024-03-03 ASSESSMENT — PAIN DESCRIPTION - LOCATION
LOCATION: JAW
LOCATION: JAW

## 2024-03-03 ASSESSMENT — COGNITIVE AND FUNCTIONAL STATUS - GENERAL
MOBILITY SCORE: 24
DAILY ACTIVITIY SCORE: 24
DAILY ACTIVITIY SCORE: 24
MOBILITY SCORE: 24

## 2024-03-03 ASSESSMENT — PAIN SCALES - GENERAL
PAINLEVEL_OUTOF10: 5 - MODERATE PAIN
PAINLEVEL_OUTOF10: 7
PAINLEVEL_OUTOF10: 7
PAINLEVEL_OUTOF10: 5 - MODERATE PAIN
PAINLEVEL_OUTOF10: 7

## 2024-03-03 ASSESSMENT — PAIN - FUNCTIONAL ASSESSMENT
PAIN_FUNCTIONAL_ASSESSMENT: 0-10

## 2024-03-03 NOTE — CARE PLAN
Problem: Nutrition  Goal: Adequate PO fluid intake  Outcome: Progressing     Problem: Pain - Adult  Goal: Verbalizes/displays adequate comfort level or baseline comfort level  Outcome: Progressing     Problem: Safety - Adult  Goal: Free from fall injury  Outcome: Progressing     Problem: Discharge Planning  Goal: Discharge to home or other facility with appropriate resources  Outcome: Progressing     Problem: Chronic Conditions and Co-morbidities  Goal: Patient's chronic conditions and co-morbidity symptoms are monitored and maintained or improved  Outcome: Progressing   The patient's goals for the shift include      The clinical goals for the shift include patient will tolerate eating regular food by end of day    Patient took all PO medications today and drank one Ensure. Patient reports that her jaw is able to open more than yesterday. Patient did not want to try regular food today.

## 2024-03-03 NOTE — PROGRESS NOTES
Trina Elias is a 73 y.o. female on day 7 of admission presenting with Chest pain, unspecified type.      Subjective   Seen and examined, b/l jaw pain is improving and she is able to swallow the p.o. meds today. no overnight events.  The plan discussed with the patient and RN. Plan discussed with neurology.      Objective     Last Recorded Vitals  /68   Pulse 75   Temp 36.3 °C (97.3 °F) (Temporal)   Resp 20   Wt 72.8 kg (160 lb 8 oz)   SpO2 100%   Intake/Output last 3 Shifts:    Intake/Output Summary (Last 24 hours) at 3/3/2024 1114  Last data filed at 3/2/2024 2215  Gross per 24 hour   Intake 300 ml   Output --   Net 300 ml         Admission Weight  Weight: 69.9 kg (154 lb) (02/24/24 2030)    Daily Weight  02/25/24 : 72.8 kg (160 lb 8 oz)    Image Results  ECG 12 lead  Atrial-paced rhythm with premature ventricular or aberrantly conducted complexes  Abnormal ECG  When compared with ECG of 24-FEB-2024 21:39, (unconfirmed)  Electronic atrial pacemaker has replaced Sinus rhythm  Poor data quality  Confirmed by Neo Gutierrez ([id number]) on 3/1/2024 7:16:38 PM  CT sinus wo IV contrast  Narrative: Interpreted By:  Suhail Silveira,   STUDY:  CT SINUS WO IV CONTRAST;  3/1/2024 12:25 pm      INDICATION:  Signs/Symptoms:Pain.      COMPARISON:  Head CT dated 02/06/2020.      ACCESSION NUMBER(S):  [id number]      ORDERING CLINICIAN:  NIKOLE LOVE      TECHNIQUE:  Axial noncontrast CT images of the paranasal sinuses were obtained  and reconstructed in the coronal plane.      FINDINGS:  Frontal sinuses: Mucosal thickening involving the inferior aspects at  the frontoethmoidal recesses bilaterally. Otherwise minimal scattered  mucosal thickening of the frontal sinuses without significant  opacification.      Ethmoid sinuses: Near-complete opacification of the anterior ethmoids  bilaterally. There is unchanged remote left lamina papyracea fracture  component with herniated orbital fat and mild asymmetry of the  left  medial rectus muscle. Near-complete opacification of the left  posterior ethmoids as well as partial opacification of the right  posterior ethmoidal air cells.      Maxillary sinuses: Polypoid, mineralized hyperdense secretions within  the right maxillary paranasal sinus. Additional mucosal thickening of  the maxillary paranasal sinuses with possible small amount of  dependent secretions bilaterally. Partial opacification on the right.  Additionally, there is an element of hyperostosis involving the right  maxillary paranasal sinus.      Sphenoid sinuses: Hypoplastic left sphenoid sinus. Mucosal thickening  with near-complete opacification of the left sphenoid sinus. Moderate  to marked mucosal thickening of the right sphenoid sinus with partial  opacification.      Frontoethmoidal recesses: Occluded bilaterally.      Ostiomeatal complexes: Occluded bilaterally.      Sphenoethmoid foramina: Occluded bilaterally.      Nasal septum and nasal passages: Relatively midline nasal septum.  Mucosal thickening and secretions throughout the bilateral nasal  passages.      Mastoid Air Cells: Small amount of dependent fluid within the right  mastoid with element of hyperostosis suggesting chronicity. Remainder  of the mastoids are well aerated.      Additional findings: Visualized portions of the brain demonstrates no  acute intracranial process. Maxilla is edentulous. Bilateral native  lens extractions.      Impression: Pansinusitis as described in detail above. Element of chronic  sinusitis involving the right maxillary region with  mineralized/hyperdense secretions versus fungal components.  Extensive, near-complete opacification of a majority of the ethmoidal  air cells with unchanged remote left lamina papyracea fracture.      Relatively midline nasal septum.      MACRO:  None      Signed by: Suhail Silveira 3/1/2024 2:02 PM  Dictation workstation:   [id number]  XR facial bones 3+ views  Narrative: Interpreted By:   Annabel Brown,   STUDY:  XR FACIAL BONES 3+ VIEWS; ;  2/29/2024 5:11 pm      INDICATION:  Signs/Symptoms:Facial pain.      COMPARISON:  None.      ACCESSION NUMBER(S):  [id number]      ORDERING CLINICIAN:  NIKOLE LOVE      FINDINGS:  The right maxillary sinus is not well aerated. However the left  maxillary sinuses is relatively well aerated with possible partial  opacification along the inferior aspect towards the left maxillary  alveolar bone. Bilateral frontal sinuses and ethmoidal air cells are  well aerated. Sphenoid sinuses are well aerated. Bilateral mastoid  air cells are well aerated. Dental implants are noted in the  mandible. Visualized C1-C2 articulation demonstrates moderate  degenerative changes. No acute fracture is noted.      Impression: 1. Findings concerning for near complete opacification of right  maxillary sinus and partial opacification of left maxillary sinus,  which can be seen with maxillary sinus disease. Recommend correlation  with patient's symptomatology. A CT scan of the sinuses can be  performed for further evaluation.  2. No acute fracture deformity within limits of radiographic  evaluation.              Signed by: Annabel Brown 3/1/2024 9:42 AM  Dictation workstation:   [id number]      Physical Exam  Constitutional: patient is alert and cooperative with exam  skin: dry and warm  Eyes: EOMI and clear sclera  ENMT: moist mucous membranes  Head/Neck: Neck supple  Respiratory/Thorax: Clear to auscultation bilaterally, no wheezing or crackles appreciated  Cardiovascular: regular rate and rhythm, S1 and S2 present, no murmurs heard  Gastrointestinal: bowel sounds present, no pain or tenderness upon palpation, soft and nondistended  Extremities: +2 radial, posterior tibial, and pedal pulse, no lower extremity edema noted  Neurological: AxOx3, no focal deficits    Relevant Results  Scheduled medications  amitriptyline, 25 mg, oral, BID  baclofen, 10 mg, oral, TID  bumetanide,  1 mg, intravenous, BID with meals  [Held by provider] bumetanide, 2 mg, oral, BID with meals  digoxin, 125 mcg, intravenous, Daily  [Held by provider] digoxin, 125 mcg, oral, Daily  dilTIAZem CD, 120 mg, oral, Daily  doxycycline, 100 mg, oral, q12h DWIGHT  pantoprazole, 40 mg, oral, Daily before breakfast   Or  esomeprazole, 40 mg, nasoduodenal tube, Daily before breakfast   Or  pantoprazole, 40 mg, intravenous, Daily before breakfast  fluticasone, 1 spray, Each Nostril, BID  hydroxychloroquine, 200 mg, oral, Daily  ipratropium-albuteroL, 3 mL, nebulization, TID  montelukast, 10 mg, oral, Nightly  polyethylene glycol, 17 g, oral, BID  predniSONE, 25 mg, oral, Daily  rosuvastatin, 20 mg, oral, Daily  sucralfate, 1 g, oral, q6h DWIGHT      Continuous medications     PRN medications  PRN medications: acetaminophen **OR** acetaminophen **OR** acetaminophen, albuterol, alum-mag hydroxide-simeth, calcium carbonate, dextromethorphan-guaifenesin, diclofenac sodium, guaiFENesin, morphine, nitroglycerin, ondansetron **OR** ondansetron, oxyCODONE-acetaminophen, oxygen  Results for orders placed or performed during the hospital encounter of 02/24/24 (from the past 24 hour(s))   POCT GLUCOSE   Result Value Ref Range    POCT Glucose 99 74 - 99 mg/dL          Assessment/Plan      Principal Problem:    Chest pain, unspecified type  Active Problems:    Coronary artery disease involving native coronary artery    Trina Elias is a 73 y.o. female with history of multiple abdominal surgeries, CAD, ICD/PPM placement, HTN and COPD, presenting with chest pain.  The patient was initially managed for anemia and she got scoped with general surgery with no findings of active bleeding.  Hospital course complicated by pleural effusions and cardiology started the patient on IV Bumex.  Also the patient developed bilateral trigeminal neuralgia, neurology following, patient is on steroids.        #BL trigeminal neuralgia  #Rheumatoid Arthritis Flare  up  -Focal deficits no ocular changes  -ESR and CRP elevated  -Face x-ray showed signs of opacities  -Pain control with oxycodone, morphine for breakthrough  -CT sinuses showed sinusitis  -Neurology on board, patient started on dexamethasone with improvement  -Rheumatology follow-up as outpatient  -Will consider pain management consult for possible PCA pump pain get worse    #Acute blood loss anemia  -General surgery on board, status post EGD and colonoscopy with no signs of active bleeding     # Shortness of breath likely secondary to asthma exacerbation in the setting of pleural edema  -Cardiology on board and  IV Bumex  -DuoNebs started every 4, methylprednisolone , doxycycline started for 5 days    #dilated extrahepatic  biliary duct measuring up to 3.2 cm in diameter  -CT abdomen pelvis showed: Status post cholecystectomy with a markedly dilated extrahepatic  biliary duct measuring up to 3.2 cm in diameter, increased from the prior study.  This may be chronic, however, correlation with biliary lab values is recommended, nonemergent ERCP or MRCP may be of benefit  in this patient.   -Liver ultrasound showed Mild intrahepatic biliary ductal dilatation status post  cholecystectomy. Additionally, the common bile duct measures up to  2.1 cm in caliber, similar to recent CT examination. No sonographic  choledocholithiasis.   -Pacemaker is MRI compatible we will plan for nonurgent MRCP   -Pain control.   -GI consulted, plan for EUS outpatient     #Near syncope  -Echo showed normal ejection fraction, moderate valvular abnormalities  Seen by cardiology, anticoagulation DC'd, Watchman device as outpatient  -Cardiology referral as outpatient     #PPM/ICD problem  Has outpatient appointment to see electrophysiologist next week  Due to recurrent runs of v tach reported, will ask cardiology to see.     #Dispo  -Pending improvement of symptoms, and pain control.  Ayden Wiley MD

## 2024-03-03 NOTE — CARE PLAN
The patient's goals for the shift include  pain control and rest    The clinical goals for the shift include patient will be able to eat/take PO meds by day of discharge

## 2024-03-04 ENCOUNTER — APPOINTMENT (OUTPATIENT)
Dept: RADIOLOGY | Facility: HOSPITAL | Age: 74
DRG: 811 | End: 2024-03-04
Payer: MEDICARE

## 2024-03-04 LAB
ALBUMIN SERPL BCP-MCNC: 3.5 G/DL (ref 3.4–5)
ANION GAP SERPL CALC-SCNC: 16 MMOL/L (ref 10–20)
BASOPHILS # BLD AUTO: 0.03 X10*3/UL (ref 0–0.1)
BASOPHILS NFR BLD AUTO: 0.3 %
BUN SERPL-MCNC: 19 MG/DL (ref 6–23)
CALCIUM SERPL-MCNC: 8.8 MG/DL (ref 8.6–10.3)
CHLORIDE SERPL-SCNC: 99 MMOL/L (ref 98–107)
CO2 SERPL-SCNC: 30 MMOL/L (ref 21–32)
CREAT SERPL-MCNC: 0.85 MG/DL (ref 0.5–1.05)
EGFRCR SERPLBLD CKD-EPI 2021: 73 ML/MIN/1.73M*2
EOSINOPHIL # BLD AUTO: 0.05 X10*3/UL (ref 0–0.4)
EOSINOPHIL NFR BLD AUTO: 0.6 %
ERYTHROCYTE [DISTWIDTH] IN BLOOD BY AUTOMATED COUNT: 23.5 % (ref 11.5–14.5)
GLUCOSE SERPL-MCNC: 90 MG/DL (ref 74–99)
HCT VFR BLD AUTO: 30.3 % (ref 36–46)
HGB BLD-MCNC: 9 G/DL (ref 12–16)
IMM GRANULOCYTES # BLD AUTO: 0.18 X10*3/UL (ref 0–0.5)
IMM GRANULOCYTES NFR BLD AUTO: 2.1 % (ref 0–0.9)
LYMPHOCYTES # BLD AUTO: 1.17 X10*3/UL (ref 0.8–3)
LYMPHOCYTES NFR BLD AUTO: 13.6 %
MAGNESIUM SERPL-MCNC: 2 MG/DL (ref 1.6–2.4)
MCH RBC QN AUTO: 21.6 PG (ref 26–34)
MCHC RBC AUTO-ENTMCNC: 29.7 G/DL (ref 32–36)
MCV RBC AUTO: 73 FL (ref 80–100)
MONOCYTES # BLD AUTO: 0.65 X10*3/UL (ref 0.05–0.8)
MONOCYTES NFR BLD AUTO: 7.6 %
NEUTROPHILS # BLD AUTO: 6.52 X10*3/UL (ref 1.6–5.5)
NEUTROPHILS NFR BLD AUTO: 75.8 %
NRBC BLD-RTO: 0 /100 WBCS (ref 0–0)
PHOSPHATE SERPL-MCNC: 3.9 MG/DL (ref 2.5–4.9)
PLATELET # BLD AUTO: 265 X10*3/UL (ref 150–450)
POTASSIUM SERPL-SCNC: 3.8 MMOL/L (ref 3.5–5.3)
RBC # BLD AUTO: 4.17 X10*6/UL (ref 4–5.2)
SODIUM SERPL-SCNC: 141 MMOL/L (ref 136–145)
WBC # BLD AUTO: 8.6 X10*3/UL (ref 4.4–11.3)

## 2024-03-04 PROCEDURE — 85025 COMPLETE CBC W/AUTO DIFF WBC: CPT | Performed by: INTERNAL MEDICINE

## 2024-03-04 PROCEDURE — 2500000004 HC RX 250 GENERAL PHARMACY W/ HCPCS (ALT 636 FOR OP/ED): Performed by: NURSE PRACTITIONER

## 2024-03-04 PROCEDURE — 99233 SBSQ HOSP IP/OBS HIGH 50: CPT | Performed by: STUDENT IN AN ORGANIZED HEALTH CARE EDUCATION/TRAINING PROGRAM

## 2024-03-04 PROCEDURE — 2500000002 HC RX 250 W HCPCS SELF ADMINISTERED DRUGS (ALT 637 FOR MEDICARE OP, ALT 636 FOR OP/ED): Mod: MUE | Performed by: INTERNAL MEDICINE

## 2024-03-04 PROCEDURE — 80069 RENAL FUNCTION PANEL: CPT | Performed by: INTERNAL MEDICINE

## 2024-03-04 PROCEDURE — 94762 N-INVAS EAR/PLS OXIMTRY CONT: CPT

## 2024-03-04 PROCEDURE — 2500000004 HC RX 250 GENERAL PHARMACY W/ HCPCS (ALT 636 FOR OP/ED)

## 2024-03-04 PROCEDURE — 36415 COLL VENOUS BLD VENIPUNCTURE: CPT | Performed by: INTERNAL MEDICINE

## 2024-03-04 PROCEDURE — 2500000001 HC RX 250 WO HCPCS SELF ADMINISTERED DRUGS (ALT 637 FOR MEDICARE OP): Performed by: NURSE PRACTITIONER

## 2024-03-04 PROCEDURE — 83735 ASSAY OF MAGNESIUM: CPT | Performed by: INTERNAL MEDICINE

## 2024-03-04 PROCEDURE — 2500000004 HC RX 250 GENERAL PHARMACY W/ HCPCS (ALT 636 FOR OP/ED): Performed by: STUDENT IN AN ORGANIZED HEALTH CARE EDUCATION/TRAINING PROGRAM

## 2024-03-04 PROCEDURE — 2500000002 HC RX 250 W HCPCS SELF ADMINISTERED DRUGS (ALT 637 FOR MEDICARE OP, ALT 636 FOR OP/ED): Performed by: SURGERY

## 2024-03-04 PROCEDURE — 2500000004 HC RX 250 GENERAL PHARMACY W/ HCPCS (ALT 636 FOR OP/ED): Performed by: SURGERY

## 2024-03-04 PROCEDURE — 2500000001 HC RX 250 WO HCPCS SELF ADMINISTERED DRUGS (ALT 637 FOR MEDICARE OP): Performed by: INTERNAL MEDICINE

## 2024-03-04 PROCEDURE — 2500000001 HC RX 250 WO HCPCS SELF ADMINISTERED DRUGS (ALT 637 FOR MEDICARE OP)

## 2024-03-04 PROCEDURE — 1200000002 HC GENERAL ROOM WITH TELEMETRY DAILY

## 2024-03-04 PROCEDURE — 2500000002 HC RX 250 W HCPCS SELF ADMINISTERED DRUGS (ALT 637 FOR MEDICARE OP, ALT 636 FOR OP/ED): Mod: MUE | Performed by: SURGERY

## 2024-03-04 PROCEDURE — C9113 INJ PANTOPRAZOLE SODIUM, VIA: HCPCS | Performed by: NURSE PRACTITIONER

## 2024-03-04 PROCEDURE — 71045 X-RAY EXAM CHEST 1 VIEW: CPT

## 2024-03-04 PROCEDURE — 2500000001 HC RX 250 WO HCPCS SELF ADMINISTERED DRUGS (ALT 637 FOR MEDICARE OP): Performed by: SURGERY

## 2024-03-04 PROCEDURE — 94640 AIRWAY INHALATION TREATMENT: CPT | Mod: MUE

## 2024-03-04 PROCEDURE — 2500000004 HC RX 250 GENERAL PHARMACY W/ HCPCS (ALT 636 FOR OP/ED): Performed by: INTERNAL MEDICINE

## 2024-03-04 PROCEDURE — 71045 X-RAY EXAM CHEST 1 VIEW: CPT | Performed by: STUDENT IN AN ORGANIZED HEALTH CARE EDUCATION/TRAINING PROGRAM

## 2024-03-04 PROCEDURE — 2500000002 HC RX 250 W HCPCS SELF ADMINISTERED DRUGS (ALT 637 FOR MEDICARE OP, ALT 636 FOR OP/ED): Performed by: INTERNAL MEDICINE

## 2024-03-04 RX ORDER — ONDANSETRON HYDROCHLORIDE 2 MG/ML
4 INJECTION, SOLUTION INTRAVENOUS ONCE AS NEEDED
Status: DISCONTINUED | OUTPATIENT
Start: 2024-03-04 | End: 2024-03-05 | Stop reason: HOSPADM

## 2024-03-04 RX ORDER — METOCLOPRAMIDE HYDROCHLORIDE 5 MG/ML
10 INJECTION INTRAMUSCULAR; INTRAVENOUS ONCE AS NEEDED
Status: DISCONTINUED | OUTPATIENT
Start: 2024-03-04 | End: 2024-03-05 | Stop reason: HOSPADM

## 2024-03-04 RX ORDER — ACETAMINOPHEN 325 MG/1
650 TABLET ORAL EVERY 4 HOURS PRN
Status: DISCONTINUED | OUTPATIENT
Start: 2024-03-04 | End: 2024-03-05 | Stop reason: HOSPADM

## 2024-03-04 RX ORDER — SODIUM CHLORIDE, SODIUM LACTATE, POTASSIUM CHLORIDE, CALCIUM CHLORIDE 600; 310; 30; 20 MG/100ML; MG/100ML; MG/100ML; MG/100ML
100 INJECTION, SOLUTION INTRAVENOUS CONTINUOUS
Status: DISCONTINUED | OUTPATIENT
Start: 2024-03-04 | End: 2024-03-04

## 2024-03-04 RX ORDER — LIDOCAINE HYDROCHLORIDE 10 MG/ML
0.1 INJECTION, SOLUTION EPIDURAL; INFILTRATION; INTRACAUDAL; PERINEURAL ONCE
Status: DISCONTINUED | OUTPATIENT
Start: 2024-03-04 | End: 2024-03-05 | Stop reason: HOSPADM

## 2024-03-04 RX ADMIN — OXYCODONE HYDROCHLORIDE AND ACETAMINOPHEN 1 TABLET: 5; 325 TABLET ORAL at 09:27

## 2024-03-04 RX ADMIN — IPRATROPIUM BROMIDE AND ALBUTEROL SULFATE 3 ML: 2.5; .5 SOLUTION RESPIRATORY (INHALATION) at 19:12

## 2024-03-04 RX ADMIN — IPRATROPIUM BROMIDE AND ALBUTEROL SULFATE 3 ML: 2.5; .5 SOLUTION RESPIRATORY (INHALATION) at 07:50

## 2024-03-04 RX ADMIN — FLUTICASONE PROPIONATE 1 SPRAY: 50 SPRAY, METERED NASAL at 21:05

## 2024-03-04 RX ADMIN — BACLOFEN 10 MG: 10 TABLET ORAL at 14:55

## 2024-03-04 RX ADMIN — AMITRIPTYLINE HYDROCHLORIDE 25 MG: 25 TABLET, FILM COATED ORAL at 09:23

## 2024-03-04 RX ADMIN — SUCRALFATE 1 G: 1 SUSPENSION ORAL at 09:23

## 2024-03-04 RX ADMIN — MONTELUKAST 10 MG: 10 TABLET, FILM COATED ORAL at 20:55

## 2024-03-04 RX ADMIN — MORPHINE SULFATE 3 MG: 4 INJECTION INTRAVENOUS at 21:31

## 2024-03-04 RX ADMIN — FLUTICASONE PROPIONATE 1 SPRAY: 50 SPRAY, METERED NASAL at 09:24

## 2024-03-04 RX ADMIN — METHYLPREDNISOLONE SODIUM SUCCINATE 40 MG: 40 INJECTION, POWDER, FOR SOLUTION INTRAMUSCULAR; INTRAVENOUS at 21:04

## 2024-03-04 RX ADMIN — DILTIAZEM HYDROCHLORIDE 120 MG: 120 CAPSULE, COATED, EXTENDED RELEASE ORAL at 09:24

## 2024-03-04 RX ADMIN — BACLOFEN 10 MG: 10 TABLET ORAL at 20:55

## 2024-03-04 RX ADMIN — OXYCODONE HYDROCHLORIDE AND ACETAMINOPHEN 1 TABLET: 5; 325 TABLET ORAL at 16:56

## 2024-03-04 RX ADMIN — SUCRALFATE 1 G: 1 SUSPENSION ORAL at 16:23

## 2024-03-04 RX ADMIN — GUAIFENESIN AND DEXTROMETHORPHAN 5 ML: 100; 10 SYRUP ORAL at 09:27

## 2024-03-04 RX ADMIN — PREDNISONE 25 MG: 20 TABLET ORAL at 09:22

## 2024-03-04 RX ADMIN — PANTOPRAZOLE SODIUM 40 MG: 40 INJECTION, POWDER, FOR SOLUTION INTRAVENOUS at 06:05

## 2024-03-04 RX ADMIN — ROSUVASTATIN CALCIUM 20 MG: 20 TABLET, FILM COATED ORAL at 09:23

## 2024-03-04 RX ADMIN — DOXYCYCLINE HYCLATE 100 MG: 100 TABLET, COATED ORAL at 20:55

## 2024-03-04 RX ADMIN — BUMETANIDE 2 MG: 1 TABLET ORAL at 16:54

## 2024-03-04 RX ADMIN — DOXYCYCLINE HYCLATE 100 MG: 100 TABLET, COATED ORAL at 09:22

## 2024-03-04 RX ADMIN — HYDROXYCHLOROQUINE SULFATE 200 MG: 200 TABLET, FILM COATED ORAL at 09:22

## 2024-03-04 RX ADMIN — SUCRALFATE 1 G: 1 SUSPENSION ORAL at 21:04

## 2024-03-04 RX ADMIN — BACLOFEN 10 MG: 10 TABLET ORAL at 09:22

## 2024-03-04 RX ADMIN — AMITRIPTYLINE HYDROCHLORIDE 25 MG: 25 TABLET, FILM COATED ORAL at 20:55

## 2024-03-04 RX ADMIN — BUMETANIDE 2 MG: 1 TABLET ORAL at 09:22

## 2024-03-04 RX ADMIN — IPRATROPIUM BROMIDE AND ALBUTEROL SULFATE 3 ML: 2.5; .5 SOLUTION RESPIRATORY (INHALATION) at 15:33

## 2024-03-04 RX ADMIN — POLYETHYLENE GLYCOL 3350 17 G: 17 POWDER, FOR SOLUTION ORAL at 20:56

## 2024-03-04 RX ADMIN — DIGOXIN 125 MCG: 0.25 INJECTION INTRAMUSCULAR; INTRAVENOUS at 09:20

## 2024-03-04 RX ADMIN — ACETAMINOPHEN 650 MG: 325 TABLET ORAL at 21:29

## 2024-03-04 RX ADMIN — GUAIFENESIN AND DEXTROMETHORPHAN 5 ML: 100; 10 SYRUP ORAL at 04:44

## 2024-03-04 RX ADMIN — SUCRALFATE 1 G: 1 SUSPENSION ORAL at 03:28

## 2024-03-04 RX ADMIN — METHYLPREDNISOLONE SODIUM SUCCINATE 40 MG: 40 INJECTION, POWDER, FOR SOLUTION INTRAMUSCULAR; INTRAVENOUS at 10:00

## 2024-03-04 ASSESSMENT — COGNITIVE AND FUNCTIONAL STATUS - GENERAL
MOBILITY SCORE: 24
DAILY ACTIVITIY SCORE: 24
MOBILITY SCORE: 24
DAILY ACTIVITIY SCORE: 24

## 2024-03-04 ASSESSMENT — PAIN DESCRIPTION - LOCATION
LOCATION: FACE
LOCATION: JAW

## 2024-03-04 ASSESSMENT — PAIN - FUNCTIONAL ASSESSMENT
PAIN_FUNCTIONAL_ASSESSMENT: 0-10
PAIN_FUNCTIONAL_ASSESSMENT: 0-10

## 2024-03-04 ASSESSMENT — PAIN SCALES - GENERAL
PAINLEVEL_OUTOF10: 6
PAINLEVEL_OUTOF10: 5 - MODERATE PAIN
PAINLEVEL_OUTOF10: 8

## 2024-03-04 NOTE — PROGRESS NOTES
03/04/24 1131   Discharge Planning   Living Arrangements Spouse/significant other   Support Systems Spouse/significant other   Assistance Needed A&0x3, independent with ADLs with assist x1 at times; room air baseline and room air now   Type of Residence Private residence   Home or Post Acute Services None   Patient expects to be discharged to: home with spouse, continues to deny any HHC needs, on room air   Does the patient need discharge transport arranged? No   RoundTrip coordination needed? No   Has discharge transport been arranged? No

## 2024-03-04 NOTE — CARE PLAN
The patient's goals for the shift include      The clinical goals for the shift include Accurate measurement and documentation of I&Os and rest      Problem: Nutrition  Goal: Less than 5 days NPO/clear liquids  Outcome: Progressing  Goal: Oral intake greater than 50%  Outcome: Progressing  Goal: Oral intake greater 75%  Outcome: Progressing  Goal: Consume prescribed supplement  Outcome: Progressing  Goal: Adequate PO fluid intake  Outcome: Progressing  Goal: Nutrition support goals are met within 48 hrs  Outcome: Progressing  Goal: Nutrition support is meeting 75% of nutrient needs  Outcome: Progressing  Goal: Tube feed tolerance  Outcome: Progressing  Goal: BG  mg/dL  Outcome: Progressing  Goal: Lab values WNL  Outcome: Progressing  Goal: Electrolytes WNL  Outcome: Progressing  Goal: Promote healing  Outcome: Progressing  Goal: Maintain stable weight  Outcome: Progressing  Goal: Reduce weight from edema/fluid  Outcome: Progressing  Goal: Gradual weight gain  Outcome: Progressing  Goal: Improve ostomy output  Outcome: Progressing     Problem: Pain - Adult  Goal: Verbalizes/displays adequate comfort level or baseline comfort level  Outcome: Progressing     Problem: Safety - Adult  Goal: Free from fall injury  Outcome: Progressing     Problem: Discharge Planning  Goal: Discharge to home or other facility with appropriate resources  Outcome: Progressing     Problem: Chronic Conditions and Co-morbidities  Goal: Patient's chronic conditions and co-morbidity symptoms are monitored and maintained or improved  Outcome: Progressing

## 2024-03-04 NOTE — PROGRESS NOTES
"Trina Elias is a 73 y.o. female on day 8 of admission presenting with Chest pain, unspecified type.    Subjective   Pt is having SOB this AM. She continues to have pain all over.        Objective     Physical Exam  Vitals and nursing note reviewed.   Constitutional:       General: She is not in acute distress.     Appearance: Normal appearance. She is not ill-appearing or toxic-appearing.   HENT:      Head: Normocephalic and atraumatic.   Eyes:      Extraocular Movements: Extraocular movements intact.      Conjunctiva/sclera: Conjunctivae normal.      Pupils: Pupils are equal, round, and reactive to light.   Cardiovascular:      Rate and Rhythm: Normal rate and regular rhythm.      Heart sounds: No murmur heard.     No gallop.   Pulmonary:      Effort: Pulmonary effort is normal. No respiratory distress.      Breath sounds: Wheezing and rhonchi present. No rales.      Comments: + conversational dyspnea  Abdominal:      General: Abdomen is flat. Bowel sounds are normal. There is no distension.      Palpations: Abdomen is soft. There is no mass.      Tenderness: There is no abdominal tenderness.   Musculoskeletal:         General: No swelling or tenderness. Normal range of motion.      Cervical back: Normal range of motion and neck supple.   Skin:     General: Skin is warm and dry.   Neurological:      General: No focal deficit present.      Mental Status: She is alert and oriented to person, place, and time. Mental status is at baseline.   Psychiatric:         Mood and Affect: Mood normal.         Behavior: Behavior normal.         Thought Content: Thought content normal.         Judgment: Judgment normal.       Last Recorded Vitals:  /63 (BP Location: Left arm, Patient Position: Sitting)   Pulse 75   Temp 36.3 °C (97.3 °F) (Temporal)   Resp 16   Ht 1.626 m (5' 4\")   Wt 72.8 kg (160 lb 8 oz)   SpO2 93%   BMI 27.55 kg/m²      Scheduled medications:  amitriptyline, 25 mg, oral, BID  baclofen, 10 mg, oral, " TID  bumetanide, 2 mg, oral, BID with meals  digoxin, 125 mcg, oral, Daily  dilTIAZem CD, 120 mg, oral, Daily  doxycycline, 100 mg, oral, q12h DWIGHT  pantoprazole, 40 mg, oral, Daily before breakfast   Or  esomeprazole, 40 mg, nasoduodenal tube, Daily before breakfast   Or  pantoprazole, 40 mg, intravenous, Daily before breakfast  fluticasone, 1 spray, Each Nostril, BID  hydroxychloroquine, 200 mg, oral, Daily  ipratropium-albuteroL, 3 mL, nebulization, TID  lidocaine, 0.1 mL, subcutaneous, Once  methylPREDNISolone sodium succinate (PF), 40 mg, intravenous, q12h  montelukast, 10 mg, oral, Nightly  perflutren lipid microspheres, 0.5-10 mL of dilution, intravenous, Once in imaging  perflutren protein A microsphere, 0.5 mL, intravenous, Once in imaging  polyethylene glycol, 17 g, oral, BID  rosuvastatin, 20 mg, oral, Daily  sucralfate, 1 g, oral, q6h DWIGHT  sulfur hexafluoride microsphr, 2 mL, intravenous, Once in imaging      Continuous medications:     PRN medications:  PRN medications: acetaminophen **OR** acetaminophen **OR** acetaminophen, acetaminophen, albuterol, alum-mag hydroxide-simeth, calcium carbonate, dextromethorphan-guaifenesin, diclofenac sodium, guaiFENesin, metoclopramide, morphine, nitroglycerin, ondansetron **OR** ondansetron, ondansetron, oxyCODONE-acetaminophen, oxygen     Relevant Results:  Results for orders placed or performed during the hospital encounter of 02/24/24 (from the past 24 hour(s))   CBC and Auto Differential   Result Value Ref Range    WBC 8.6 4.4 - 11.3 x10*3/uL    nRBC 0.0 0.0 - 0.0 /100 WBCs    RBC 4.17 4.00 - 5.20 x10*6/uL    Hemoglobin 9.0 (L) 12.0 - 16.0 g/dL    Hematocrit 30.3 (L) 36.0 - 46.0 %    MCV 73 (L) 80 - 100 fL    MCH 21.6 (L) 26.0 - 34.0 pg    MCHC 29.7 (L) 32.0 - 36.0 g/dL    RDW 23.5 (H) 11.5 - 14.5 %    Platelets 265 150 - 450 x10*3/uL    Neutrophils % 75.8 40.0 - 80.0 %    Immature Granulocytes %, Automated 2.1 (H) 0.0 - 0.9 %    Lymphocytes % 13.6 13.0 - 44.0 %     Monocytes % 7.6 2.0 - 10.0 %    Eosinophils % 0.6 0.0 - 6.0 %    Basophils % 0.3 0.0 - 2.0 %    Neutrophils Absolute 6.52 (H) 1.60 - 5.50 x10*3/uL    Immature Granulocytes Absolute, Automated 0.18 0.00 - 0.50 x10*3/uL    Lymphocytes Absolute 1.17 0.80 - 3.00 x10*3/uL    Monocytes Absolute 0.65 0.05 - 0.80 x10*3/uL    Eosinophils Absolute 0.05 0.00 - 0.40 x10*3/uL    Basophils Absolute 0.03 0.00 - 0.10 x10*3/uL   Renal Function Panel   Result Value Ref Range    Glucose 90 74 - 99 mg/dL    Sodium 141 136 - 145 mmol/L    Potassium 3.8 3.5 - 5.3 mmol/L    Chloride 99 98 - 107 mmol/L    Bicarbonate 30 21 - 32 mmol/L    Anion Gap 16 10 - 20 mmol/L    Urea Nitrogen 19 6 - 23 mg/dL    Creatinine 0.85 0.50 - 1.05 mg/dL    eGFR 73 >60 mL/min/1.73m*2    Calcium 8.8 8.6 - 10.3 mg/dL    Phosphorus 3.9 2.5 - 4.9 mg/dL    Albumin 3.5 3.4 - 5.0 g/dL   Magnesium   Result Value Ref Range    Magnesium 2.00 1.60 - 2.40 mg/dL       No results found.          Assessment/Plan   Principal Problem:    Chest pain, unspecified type  Active Problems:    Coronary artery disease involving native coronary artery    Trina Elias is a 73 y.o. female with history of multiple abdominal surgeries, CAD, ICD/PPM placement, HTN and COPD, presenting with chest pain.  The patient was initially managed for anemia and she got scoped with general surgery with no findings of active bleeding. Hospital course complicated by pleural effusions and cardiology started the patient on IV Bumex.  Also the patient developed bilateral trigeminal neuralgia, neurology following, patient is on steroids.      SOB 2/2 Asthma exacerbation, CAP, and pleural effusion  - O2 PRN  - duonebs  - IV solumedrol  - cardio following  - doxycyline  - singulair    BL trigeminal neuralgia  Rheumatoid Arthritis Flare up  -Focal deficits no ocular changes  -Pain control with oxycodone, morphine for breakthrough  -CT sinuses showed sinusitis  - Neurology following  - change to IV solumedrol  given asthma flare up  -Rheumatology follow-up as outpatient  - plaquenil    Acute blood loss anemia  Sp 2U PRBC  Hg now stable  - General surgery on board, status post EGD and colonoscopy with no signs of active bleeding     dilated extrahepatic  biliary duct measuring up to 3.2 cm in diameter  -Liver ultrasound showed Mild intrahepatic biliary ductal dilatation status post  cholecystectomy. Additionally, the common bile duct measures up to  2.1 cm in caliber, similar to recent CT examination. No sonographic  choledocholithiasis.   - Pacemaker is NOT MRI compatible   - Pain control.   - GI consulted, plan for EUS outpatient     GERD  - protonix  - carafate    Near syncope, PPM/ICD problem  Has outpatient appointment to see electrophysiologist next week  Due to recurrent runs of v tach reported, will ask cardiology to see.   -Echo showed normal ejection fraction, moderate valvular abnormalities  Seen by cardiology, anticoagulation DC'd, Watchman device as outpatient  - cardio following  - outpt watchman eval  - digoxin  - cardizem    HLD  - statin    Headaches  - elavil    DVT ppx: SCD  Dispo: monitor clinically          Juliette Walker MD  Hospitalist

## 2024-03-05 ENCOUNTER — PHARMACY VISIT (OUTPATIENT)
Dept: PHARMACY | Facility: CLINIC | Age: 74
End: 2024-03-05
Payer: COMMERCIAL

## 2024-03-05 VITALS
OXYGEN SATURATION: 96 % | HEART RATE: 65 BPM | DIASTOLIC BLOOD PRESSURE: 67 MMHG | BODY MASS INDEX: 27.4 KG/M2 | TEMPERATURE: 96.8 F | WEIGHT: 160.5 LBS | HEIGHT: 64 IN | RESPIRATION RATE: 16 BRPM | SYSTOLIC BLOOD PRESSURE: 113 MMHG

## 2024-03-05 LAB
ALBUMIN SERPL BCP-MCNC: 3.9 G/DL (ref 3.4–5)
ALP SERPL-CCNC: 86 U/L (ref 33–136)
ALT SERPL W P-5'-P-CCNC: 17 U/L (ref 7–45)
ANION GAP SERPL CALC-SCNC: 17 MMOL/L (ref 10–20)
AST SERPL W P-5'-P-CCNC: 12 U/L (ref 9–39)
BASOPHILS # BLD AUTO: 0.02 X10*3/UL (ref 0–0.1)
BASOPHILS NFR BLD AUTO: 0.3 %
BILIRUB SERPL-MCNC: 0.6 MG/DL (ref 0–1.2)
BUN SERPL-MCNC: 24 MG/DL (ref 6–23)
CALCIUM SERPL-MCNC: 9.1 MG/DL (ref 8.6–10.3)
CHLORIDE SERPL-SCNC: 98 MMOL/L (ref 98–107)
CO2 SERPL-SCNC: 28 MMOL/L (ref 21–32)
CREAT SERPL-MCNC: 0.98 MG/DL (ref 0.5–1.05)
EGFRCR SERPLBLD CKD-EPI 2021: 61 ML/MIN/1.73M*2
EOSINOPHIL # BLD AUTO: 0 X10*3/UL (ref 0–0.4)
EOSINOPHIL NFR BLD AUTO: 0 %
ERYTHROCYTE [DISTWIDTH] IN BLOOD BY AUTOMATED COUNT: 23.2 % (ref 11.5–14.5)
GLUCOSE SERPL-MCNC: 132 MG/DL (ref 74–99)
HCT VFR BLD AUTO: 32.7 % (ref 36–46)
HGB BLD-MCNC: 9.7 G/DL (ref 12–16)
IMM GRANULOCYTES # BLD AUTO: 0.23 X10*3/UL (ref 0–0.5)
IMM GRANULOCYTES NFR BLD AUTO: 3 % (ref 0–0.9)
LYMPHOCYTES # BLD AUTO: 0.67 X10*3/UL (ref 0.8–3)
LYMPHOCYTES NFR BLD AUTO: 8.8 %
MCH RBC QN AUTO: 21.3 PG (ref 26–34)
MCHC RBC AUTO-ENTMCNC: 29.7 G/DL (ref 32–36)
MCV RBC AUTO: 72 FL (ref 80–100)
MONOCYTES # BLD AUTO: 0.5 X10*3/UL (ref 0.05–0.8)
MONOCYTES NFR BLD AUTO: 6.5 %
NEUTROPHILS # BLD AUTO: 6.22 X10*3/UL (ref 1.6–5.5)
NEUTROPHILS NFR BLD AUTO: 81.4 %
NRBC BLD-RTO: 0 /100 WBCS (ref 0–0)
OVALOCYTES BLD QL SMEAR: NORMAL
PLATELET # BLD AUTO: 306 X10*3/UL (ref 150–450)
POTASSIUM SERPL-SCNC: 3.9 MMOL/L (ref 3.5–5.3)
PROT SERPL-MCNC: 7.2 G/DL (ref 6.4–8.2)
RBC # BLD AUTO: 4.55 X10*6/UL (ref 4–5.2)
RBC MORPH BLD: NORMAL
SCHISTOCYTES BLD QL SMEAR: NORMAL
SODIUM SERPL-SCNC: 139 MMOL/L (ref 136–145)
WBC # BLD AUTO: 7.6 X10*3/UL (ref 4.4–11.3)

## 2024-03-05 PROCEDURE — 85025 COMPLETE CBC W/AUTO DIFF WBC: CPT | Performed by: STUDENT IN AN ORGANIZED HEALTH CARE EDUCATION/TRAINING PROGRAM

## 2024-03-05 PROCEDURE — 2500000002 HC RX 250 W HCPCS SELF ADMINISTERED DRUGS (ALT 637 FOR MEDICARE OP, ALT 636 FOR OP/ED): Performed by: SURGERY

## 2024-03-05 PROCEDURE — 2500000001 HC RX 250 WO HCPCS SELF ADMINISTERED DRUGS (ALT 637 FOR MEDICARE OP): Performed by: SURGERY

## 2024-03-05 PROCEDURE — 2500000002 HC RX 250 W HCPCS SELF ADMINISTERED DRUGS (ALT 637 FOR MEDICARE OP, ALT 636 FOR OP/ED): Performed by: NURSE PRACTITIONER

## 2024-03-05 PROCEDURE — 2500000001 HC RX 250 WO HCPCS SELF ADMINISTERED DRUGS (ALT 637 FOR MEDICARE OP)

## 2024-03-05 PROCEDURE — 36415 COLL VENOUS BLD VENIPUNCTURE: CPT | Performed by: STUDENT IN AN ORGANIZED HEALTH CARE EDUCATION/TRAINING PROGRAM

## 2024-03-05 PROCEDURE — 84075 ASSAY ALKALINE PHOSPHATASE: CPT | Performed by: STUDENT IN AN ORGANIZED HEALTH CARE EDUCATION/TRAINING PROGRAM

## 2024-03-05 PROCEDURE — RXMED WILLOW AMBULATORY MEDICATION CHARGE

## 2024-03-05 PROCEDURE — 2500000001 HC RX 250 WO HCPCS SELF ADMINISTERED DRUGS (ALT 637 FOR MEDICARE OP): Performed by: NURSE PRACTITIONER

## 2024-03-05 PROCEDURE — 99239 HOSP IP/OBS DSCHRG MGMT >30: CPT | Performed by: STUDENT IN AN ORGANIZED HEALTH CARE EDUCATION/TRAINING PROGRAM

## 2024-03-05 PROCEDURE — 2500000001 HC RX 250 WO HCPCS SELF ADMINISTERED DRUGS (ALT 637 FOR MEDICARE OP): Performed by: INTERNAL MEDICINE

## 2024-03-05 PROCEDURE — 2500000004 HC RX 250 GENERAL PHARMACY W/ HCPCS (ALT 636 FOR OP/ED): Performed by: STUDENT IN AN ORGANIZED HEALTH CARE EDUCATION/TRAINING PROGRAM

## 2024-03-05 RX ORDER — ONDANSETRON 8 MG/1
8 TABLET, ORALLY DISINTEGRATING ORAL EVERY 8 HOURS PRN
Qty: 20 TABLET | Refills: 0 | Status: SHIPPED | OUTPATIENT
Start: 2024-03-05 | End: 2024-03-12

## 2024-03-05 RX ORDER — SUCRALFATE 1 G/10ML
1 SUSPENSION ORAL EVERY 6 HOURS SCHEDULED
Qty: 1200 ML | Refills: 0 | Status: SHIPPED | OUTPATIENT
Start: 2024-03-05 | End: 2024-04-04

## 2024-03-05 RX ORDER — AMITRIPTYLINE HYDROCHLORIDE 25 MG/1
25 TABLET, FILM COATED ORAL 2 TIMES DAILY
Qty: 60 TABLET | Refills: 0 | Status: SHIPPED | OUTPATIENT
Start: 2024-03-05 | End: 2024-05-03 | Stop reason: HOSPADM

## 2024-03-05 RX ORDER — PREDNISONE 10 MG/1
TABLET ORAL
Qty: 20 TABLET | Refills: 0 | Status: SHIPPED | OUTPATIENT
Start: 2024-03-05 | End: 2024-03-13

## 2024-03-05 RX ORDER — DOXYCYCLINE 100 MG/1
100 CAPSULE ORAL EVERY 12 HOURS SCHEDULED
Qty: 6 CAPSULE | Refills: 0 | Status: SHIPPED | OUTPATIENT
Start: 2024-03-05 | End: 2024-03-08

## 2024-03-05 RX ADMIN — SUCRALFATE 1 G: 1 SUSPENSION ORAL at 09:50

## 2024-03-05 RX ADMIN — BACLOFEN 10 MG: 10 TABLET ORAL at 09:50

## 2024-03-05 RX ADMIN — DOXYCYCLINE HYCLATE 100 MG: 100 TABLET, COATED ORAL at 09:50

## 2024-03-05 RX ADMIN — PANTOPRAZOLE SODIUM 40 MG: 40 TABLET, DELAYED RELEASE ORAL at 06:01

## 2024-03-05 RX ADMIN — ROSUVASTATIN CALCIUM 20 MG: 20 TABLET, FILM COATED ORAL at 09:50

## 2024-03-05 RX ADMIN — FLUTICASONE PROPIONATE 1 SPRAY: 50 SPRAY, METERED NASAL at 09:52

## 2024-03-05 RX ADMIN — SUCRALFATE 1 G: 1 SUSPENSION ORAL at 03:01

## 2024-03-05 RX ADMIN — AMITRIPTYLINE HYDROCHLORIDE 25 MG: 25 TABLET, FILM COATED ORAL at 09:50

## 2024-03-05 RX ADMIN — METHYLPREDNISOLONE SODIUM SUCCINATE 40 MG: 40 INJECTION, POWDER, FOR SOLUTION INTRAMUSCULAR; INTRAVENOUS at 10:49

## 2024-03-05 RX ADMIN — OXYCODONE HYDROCHLORIDE AND ACETAMINOPHEN 1 TABLET: 5; 325 TABLET ORAL at 05:58

## 2024-03-05 RX ADMIN — HYDROXYCHLOROQUINE SULFATE 200 MG: 200 TABLET, FILM COATED ORAL at 09:51

## 2024-03-05 RX ADMIN — DIGOXIN 125 MCG: 125 TABLET ORAL at 09:51

## 2024-03-05 RX ADMIN — DILTIAZEM HYDROCHLORIDE 120 MG: 120 CAPSULE, COATED, EXTENDED RELEASE ORAL at 09:50

## 2024-03-05 RX ADMIN — BUMETANIDE 2 MG: 1 TABLET ORAL at 09:50

## 2024-03-05 ASSESSMENT — COGNITIVE AND FUNCTIONAL STATUS - GENERAL
DAILY ACTIVITIY SCORE: 24
MOBILITY SCORE: 24

## 2024-03-05 ASSESSMENT — ACTIVITIES OF DAILY LIVING (ADL): LACK_OF_TRANSPORTATION: NO

## 2024-03-05 ASSESSMENT — PAIN SCALES - GENERAL
PAINLEVEL_OUTOF10: 8
PAINLEVEL_OUTOF10: 3

## 2024-03-05 ASSESSMENT — PAIN DESCRIPTION - LOCATION: LOCATION: JAW

## 2024-03-05 NOTE — DISCHARGE SUMMARY
Discharge Diagnosis  Chest pain, unspecified type    Issues Requiring Follow-Up  GI for EUS  Cardio for jacki wong  Rheumatology follow up    Discharge Meds     Your medication list        START taking these medications        Instructions Last Dose Given Next Dose Due   amitriptyline 25 mg tablet  Commonly known as: Elavil      Take 1 tablet (25 mg) by mouth 2 times a day.       doxycycline 100 mg capsule  Commonly known as: Vibramycin      Take 1 capsule (100 mg) by mouth every 12 hours for 3 days. Take with a full glass of water and do not lie down for at least 30 minutes after.       predniSONE 10 mg tablet  Commonly known as: Deltasone  Start taking on: March 5, 2024      Take 4 tablets (40 mg) by mouth once daily for 2 days, THEN 3 tablets (30 mg) once daily for 2 days, THEN 2 tablets (20 mg) once daily for 2 days, THEN 1 tablet (10 mg) once daily for 2 days.       sucralfate 100 mg/mL suspension  Commonly known as: Carafate      Take 10 mL (1 g) by mouth every 6 hours.              CHANGE how you take these medications        Instructions Last Dose Given Next Dose Due   oxyCODONE-acetaminophen 5-325 mg tablet  Commonly known as: Percocet  What changed: Another medication with the same name was removed. Continue taking this medication, and follow the directions you see here.      Take 1 tablet by mouth 3 times a day as needed for severe pain (7 - 10) for up to 28 days. Do not start before February 16, 2024.              CONTINUE taking these medications        Instructions Last Dose Given Next Dose Due   albuterol 2.5 mg /3 mL (0.083 %) nebulizer solution           baclofen 10 mg tablet  Commonly known as: Lioresal      Take 1 tablet (10 mg) by mouth see administration instructions. Take one tablet in am, one tablet at noon, and one to two tablets at night   PRN       baclofen 10 mg tablet  Commonly known as: Lioresal      One tablet in the morning and afternoon and 1 to 2 tablets in the evening.        bumetanide 2 mg tablet  Commonly known as: Bumex           Cardizem  mg 24 hr tablet  Generic drug: dilTIAZem LA           clobetasol 0.05 % cream  Commonly known as: Temovate      Apply topically 2 times a day.       diclofenac sodium 1 % gel  Commonly known as: Voltaren           diclofenac sodium 1 % gel  Commonly known as: Voltaren      Apply 1 Application topically 2 times a day as needed (joint pain).       digoxin 125 MCG tablet  Commonly known as: Lanoxin           EPINEPHrine 0.3 mg/0.3 mL injection syringe  Commonly known as: Epipen           famotidine 40 mg tablet  Commonly known as: Pepcid      Take 1 tablet (40 mg) by mouth once daily.       fluticasone 50 mcg/actuation nasal spray  Commonly known as: Flonase           hydroxychloroquine 200 mg tablet  Commonly known as: Plaquenil      Take 1 tablet (200 mg) by mouth once daily.       magnesium oxide 400 mg tablet  Commonly known as: Mag-Ox           Miralax 17 gram/dose powder  Generic drug: polyethylene glycol           montelukast 10 mg tablet  Commonly known as: Singulair      Take 1 tablet (10 mg) by mouth once daily at bedtime.       naloxone 4 mg/0.1 mL nasal spray  Commonly known as: Narcan           nitroglycerin 0.4 mg SL tablet  Commonly known as: Nitrostat           ondansetron ODT 8 mg disintegrating tablet  Commonly known as: Zofran-ODT      Take 1 tablet (8 mg) by mouth every 8 hours if needed for nausea or vomiting for up to 7 days.       pantoprazole 40 mg EC tablet  Commonly known as: ProtoNix      TAKE ONE TABLET BY MOUTH TWO TIMES A DAY       rosuvastatin 20 mg tablet  Commonly known as: Crestor      TAKE ONE TABLET BY MOUTH EVERY DAY              STOP taking these medications      Xarelto 20 mg tablet  Generic drug: rivaroxaban                  Where to Get Your Medications        These medications were sent to [facility]  [address] [place] [address]      Hours: 9 AM to 5 PM Mon-Fri Phone: [phone]    amitriptyline 25 mg tablet  doxycycline 100 mg capsule  ondansetron ODT 8 mg disintegrating tablet  predniSONE 10 mg tablet  sucralfate 100 mg/mL suspension         Test Results Pending At Discharge  Pending Labs       No current pending labs.            Hospital Course   Trina Elias is a 73 y.o. female with history of multiple abdominal surgeries, CAD, ICD/PPM placement, HTN and COPD, presenting with chest pain. The patient was initially managed for anemia and she got scoped with general surgery with no findings of active bleeding. Hospital course complicated by pleural effusions and cardiology started the patient on IV Bumex.  Also the patient developed bilateral trigeminal neuralgia, neurology following, patient is on steroids.      SOB 2/2 Asthma exacerbation, CAP, and pleural effusion  - O2 PRN  - duonebs  - IV solumedrol, will need steroid taper for home going  - cardio following  - doxycyline  - singulair    BL trigeminal neuralgia  Rheumatoid Arthritis Flare up  -Focal deficits no ocular changes  -Pain control with oxycodone, morphine for breakthrough  -CT sinuses showed sinusitis  - Neurology following  - change to IV solumedrol given asthma flare up, will need taper for home going  -Rheumatology follow-up as outpatient  - plaquenil     Acute blood loss anemia  Sp 2U PRBC  Hg now stable  - General surgery on board, status post EGD and colonoscopy with no signs of active bleeding     dilated extrahepatic  biliary duct measuring up to 3.2 cm in diameter  -Liver ultrasound showed Mild intrahepatic biliary ductal dilatation status post  cholecystectomy. Additionally, the common bile duct measures up to  2.1 cm in caliber, similar to recent CT examination. No sonographic  choledocholithiasis.   - Pacemaker is NOT MRI compatible   - Pain control.   - GI consulted, plan for EUS outpatient     GERD  - protonix  - carafate     Near syncope, PPM/ICD problem  Has outpatient appointment to see electrophysiologist  next week  Due to recurrent runs of v tach reported, will ask cardiology to see.   -Echo showed normal ejection fraction, moderate valvular abnormalities  Seen by cardiology, anticoagulation DC'd, Watchman device as outpatient  - cardio following  - outpt watchman eval  - digoxin  - cardizem     HLD  - statin     Headaches  - elavil    Pt is hemodynamically stable for discharge at this time with close outpatient follow ups.     The patient was discharged in satisfactory condition.   Medications and side effect profile reviewed with patient.  More than 30 minutes were spent in coordinating patient discharge     Pertinent Physical Exam At Time of Discharge  Physical Exam  Vitals and nursing note reviewed.   Constitutional:       General: She is not in acute distress.     Appearance: Normal appearance. She is not ill-appearing or toxic-appearing.   HENT:      Head: Normocephalic and atraumatic.   Eyes:      Extraocular Movements: Extraocular movements intact.      Conjunctiva/sclera: Conjunctivae normal.      Pupils: Pupils are equal, round, and reactive to light.   Cardiovascular:      Rate and Rhythm: Normal rate and regular rhythm.      Heart sounds: No murmur heard.     No gallop.   Pulmonary:      Effort: Pulmonary effort is normal. No respiratory distress.      Breath sounds: Wheezing and rhonchi present. No rales.      Comments: overall improved exam  Abdominal:      General: Abdomen is flat. Bowel sounds are normal. There is no distension.      Palpations: Abdomen is soft. There is no mass.      Tenderness: There is no abdominal tenderness.   Musculoskeletal:         General: No swelling or tenderness. Normal range of motion.      Cervical back: Normal range of motion and neck supple.   Skin:     General: Skin is warm and dry.   Neurological:      General: No focal deficit present.      Mental Status: She is alert and oriented to person, place, and time. Mental status is at baseline.   Psychiatric:         Mood  and Affect: Mood normal.         Behavior: Behavior normal.         Thought Content: Thought content normal.         Judgment: Judgment normal.     Outpatient Follow-Up  Future Appointments   Date Time Provider Department Center   3/7/2024  8:00 AM Ezio Kaur MD [address]   5/6/2024  9:45 AM Latoya Barkley APRN-CNP [facility]         Juliette Walker MD  hospitalist

## 2024-03-05 NOTE — NURSING NOTE
1136 Discharge instructions reviewed with pt. All questions and concerns addressed. IV removed. Medications provided to pt from outpt pharmacy.

## 2024-03-05 NOTE — CARE PLAN
The patient's goals for the shift include      The clinical goals for the shift include Patient will have a decrease in c/o pain by end of shift    Over the shift, the patient did not make progress toward the following goals. Barriers to progression include . Recommendations to address these barriers include   Problem: Pain - Adult  Goal: Verbalizes/displays adequate comfort level or baseline comfort level  3/4/2024 2223 by Walt Vaughn RN  Outcome: Progressing  3/4/2024 2223 by Walt Vaughn RN  Flowsheets (Taken 3/4/2024 2223)  Verbalizes/displays adequate comfort level or baseline comfort level: Encourage patient to monitor pain and request assistance   .

## 2024-03-06 ENCOUNTER — HOSPITAL ENCOUNTER (OUTPATIENT)
Dept: CARDIOLOGY | Facility: CLINIC | Age: 74
Discharge: HOME | End: 2024-03-06
Payer: MEDICARE

## 2024-03-06 ENCOUNTER — PATIENT OUTREACH (OUTPATIENT)
Dept: PRIMARY CARE | Facility: CLINIC | Age: 74
End: 2024-03-06
Payer: MEDICARE

## 2024-03-06 DIAGNOSIS — Z95.810 PRESENCE OF AUTOMATIC CARDIOVERTER/DEFIBRILLATOR (AICD): ICD-10-CM

## 2024-03-06 DIAGNOSIS — I47.20 VT (VENTRICULAR TACHYCARDIA) (MULTI): ICD-10-CM

## 2024-03-06 NOTE — PROGRESS NOTES
I had the pleasure seeing Trina SALDANA Via She is being referred here by Vito Miramontes for evaluation regarding NSVT    Chief Complaint   Patient presents with    Atrial Fibrillation    Rapid Heart Rate    Cardiomyopathy     She was last seen by EP in March 2022.  She is here for Watchman consult due to recent GI bleed.     [id number] Score is 4        Current Outpatient Medications   Medication Instructions    albuterol 2.5 mg, nebulization, Every 4 hours PRN    amitriptyline (ELAVIL) 25 mg, oral, 2 times daily    apixaban (ELIQUIS) 5 mg, oral, 2 times daily    baclofen (Lioresal) 10 mg tablet One tablet in the morning and afternoon and 1 to 2 tablets in the evening.    bumetanide (BUMEX) 2 mg, oral, 2 times daily    clobetasol (Temovate) 0.05 % cream Topical, 2 times daily    diclofenac sodium (Voltaren) 1 % gel gel 1 Application, Topical, 2 times daily PRN    digoxin (LANOXIN) 125 mcg, oral, Daily    dilTIAZem LA (CARDIZEM LA) 120 mg, oral, Daily    doxycycline (VIBRAMYCIN) 100 mg, oral, Every 12 hours scheduled, Take with a full glass of water and do not lie down for at least 30 minutes after.    EPINEPHrine 0.3 mg/0.3 mL injection syringe 1 Syringe, injection, As needed, Per Pt has not needed.     famotidine (PEPCID) 40 mg, oral, Daily    fluticasone (Flonase) 50 mcg/actuation nasal spray 1 spray, nasal, 2 times daily    hydroxychloroquine (PLAQUENIL) 200 mg, oral, Daily    magnesium oxide (MAG-OX) 400 mg, oral, 2 times daily    montelukast (SINGULAIR) 10 mg, oral, Nightly    naloxone (Narcan) 4 mg/0.1 mL nasal spray nasal    nitroglycerin (NITROSTAT) 0.4 mg, sublingual, Every 5 min PRN    ondansetron ODT (ZOFRAN-ODT) 8 mg, oral, Every 8 hours PRN    oxyCODONE-acetaminophen (Percocet) 5-325 mg tablet 1 tablet, oral, 3 times daily PRN    pantoprazole (PROTONIX) 40 mg, oral, 2 times daily    polyethylene glycol (MIRALAX) 17 g, oral    predniSONE (Deltasone) 10 mg tablet Take 4 tablets (40 mg) by mouth once daily  for 2 days, THEN 3 tablets (30 mg) once daily for 2 days, THEN 2 tablets (20 mg) once daily for 2 days, THEN 1 tablet (10 mg) once daily for 2 days.    rosuvastatin (CRESTOR) 20 mg, oral, Daily    sucralfate (CARAFATE) 1 g, oral, Every 6 hours scheduled        Trina Elias is a 73 y.o. with:     HTN, COPD, CAD  Acute blood loss anemia  - (Feb 2024) s/p 2U PRBC s/p EGD -neg for active bleed  BARBARA and Cardiac Aneurysm  Recurrent Syncope d/t VT- s/p ILR (12/2012) implanted.  (July 2013) Episode of syncope that appears to be correlated with an 11 second round of ventricular tachycardia recorded by the loop recorder.  S/p EPS (June 2014) neg for inducible arrhythmias.  (ILR removed when ICD implanted)   Tachycardia induced Cardiomyopathy - s/p DC ICD (JUNE 2014) [employer] E142 ENERGEN  SN: [id number]   AF - s/p AF ablation (2008).   pAF (Nov 2018) s/p repeat RFA (9/17/2018) with me, PVI and posterior wall RFA with significant scarring present .  Device interrogation (Nov 2018) showed persistent AFduring mapping of the left atria. Increase Flecainide s/p dccv.       March 2022:  EF of 30% (this is down from her previous EF in 2021 which was normal 60 to 65%).   Device interrogation on 2/21/2022 showed that she is in A. fib 100% of the time.  Dr. Soto recently increased Diltiazem to 120 mg BID and Bumex 2 mg BID.  Allergy (anaphylaxis) to BB.    (3/9/22) pt in normal sinus rhythm. She has been monitoring heart rates at home and they have been staying in the 110s to 120s.    Feb 24-Mar 5, 2024:  (2/24) admitted for dyspnea and chest pain work-up and found to have pleural effusions.  Found to have acute blood loss anemia s/p 2u PRBC. EGD neg for active bleed.  Cardiology consulted - plan for Watchman device WING closure as out pt.        TESTING    -ECHO/ TTE:  (2/27/24) LVEF 55-60%.  Impaired relaxation pattern LV diastolic filling. Mod AV stenosis, and mild-mod MR and TR.  Mild-mod elevated PA pressure  PASP 50.9 mmHg.  "   -ECHO/ TTE:  (10/16/23) LVEF 60-65%.  LA dilated, Mod AV stenosis,     -LHC: (2/23/22) Normal coronary arteries.  Tachycardia induced cardiomyopathy.        Objective   Physical Exam  BP 92/61 (BP Location: Left arm, Patient Position: Sitting)   Pulse 85   Ht 1.626 m (5' 4\")   Wt 65.9 kg (145 lb 3 oz)   SpO2 98%   BMI 24.92 kg/m²     General Appearance:  Alert, cooperative, no distress, appears stated age   Head:  Normocephalic, without obvious abnormality, atraumatic   Eyes:  PERRL, conjunctiva/corneas clear, EOM's intact, fundi benign, both eyes   Ears:  Normal TM's and external ear canals, both ears   Nose: Nares normal, septum midline, mucosa normal, no drainage or sinus tenderness   Throat: Lips, mucosa, and tongue normal; teeth and gums normal   Neck: Supple, symmetrical, trachea midline, no adenopathy, thyroid: not enlarged, symmetric, no tenderness/mass/nodules, no carotid bruit or JVD   Back:   Symmetric, no curvature, ROM normal, no CVA tenderness   Lungs:   Clear to auscultation bilaterally, respirations unlabored   Chest Wall:  No tenderness or deformity   Heart:  Regular rate and rhythm, S1, S2 normal, no murmur, rub or gallop   Abdomen:   Soft, non-tender, bowel sounds active all four quadrants,  no masses, no organomegaly   Genitalia:  Normal male   Rectal:  Normal tone, normal prostate, no masses or tenderness;  guaiac negative stool   Extremities: Extremities normal, atraumatic, no cyanosis or edema   Pulses: 2+ and symmetric   Skin: Skin color, texture, turgor normal, no rashes or lesions   Lymph nodes: Cervical, supraclavicular, and axillary nodes normal   Neurologic: Normal           Assessment/Plan   Atrial fibrillation (CMS/HCC)  Since the ablation she has been in NSR predominantly save for few brief episodes. Off antiarrhythmics!     The main issue has been GI bleeding requiring transfusions while on Rivaroxaban. We discussed several options 1. Observe - she has not had significant AF " since the ablation which was in 2018 and has the device so we can monitor AF recurrence. If episode longer than 24 hour is experienced we can certainly restart anticoagulation 2. Change Xarelto to Eliquis that has less GI bleeding. 3. Consider Watchman. At this time she opted for Eliquis as she is very reluctant to have any procedures.     Wide-complex tachycardia  She has been having episodes of non sustained VT. This has been an issue for many years now. Her EF is preserved, EPS was negative , her VT is on a basis of triggered activity certainly made worse with anemia and prednisone. Since she is asymptomatic we will continue to observe.

## 2024-03-07 ENCOUNTER — OFFICE VISIT (OUTPATIENT)
Dept: CARDIOLOGY | Facility: CLINIC | Age: 74
End: 2024-03-07
Payer: MEDICARE

## 2024-03-07 VITALS
BODY MASS INDEX: 24.79 KG/M2 | OXYGEN SATURATION: 98 % | WEIGHT: 145.19 LBS | HEART RATE: 85 BPM | HEIGHT: 64 IN | DIASTOLIC BLOOD PRESSURE: 61 MMHG | SYSTOLIC BLOOD PRESSURE: 92 MMHG

## 2024-03-07 DIAGNOSIS — I48.11 LONGSTANDING PERSISTENT ATRIAL FIBRILLATION (MULTI): ICD-10-CM

## 2024-03-07 PROCEDURE — 99215 OFFICE O/P EST HI 40 MIN: CPT | Performed by: INTERNAL MEDICINE

## 2024-03-07 PROCEDURE — 1036F TOBACCO NON-USER: CPT | Performed by: INTERNAL MEDICINE

## 2024-03-07 PROCEDURE — 1111F DSCHRG MED/CURRENT MED MERGE: CPT | Performed by: INTERNAL MEDICINE

## 2024-03-07 PROCEDURE — 93010 ELECTROCARDIOGRAM REPORT: CPT | Performed by: INTERNAL MEDICINE

## 2024-03-07 PROCEDURE — 1160F RVW MEDS BY RX/DR IN RCRD: CPT | Performed by: INTERNAL MEDICINE

## 2024-03-07 PROCEDURE — 93005 ELECTROCARDIOGRAM TRACING: CPT | Performed by: INTERNAL MEDICINE

## 2024-03-07 PROCEDURE — 1159F MED LIST DOCD IN RCRD: CPT | Performed by: INTERNAL MEDICINE

## 2024-03-07 PROCEDURE — 1126F AMNT PAIN NOTED NONE PRSNT: CPT | Performed by: INTERNAL MEDICINE

## 2024-03-07 ASSESSMENT — PAIN SCALES - GENERAL: PAINLEVEL: 0-NO PAIN

## 2024-03-07 ASSESSMENT — PATIENT HEALTH QUESTIONNAIRE - PHQ9
SUM OF ALL RESPONSES TO PHQ9 QUESTIONS 1 AND 2: 0
1. LITTLE INTEREST OR PLEASURE IN DOING THINGS: NOT AT ALL
2. FEELING DOWN, DEPRESSED OR HOPELESS: NOT AT ALL

## 2024-03-07 NOTE — ASSESSMENT & PLAN NOTE
She has been having episodes of non sustained VT. This has been an issue for many years now. Her EF is preserved, EPS was negative , her VT is on a basis of triggered activity certainly made worse with anemia and prednisone. Since she is asymptomatic we will continue to observe.

## 2024-03-07 NOTE — ASSESSMENT & PLAN NOTE
Since the ablation she has been in NSR predominantly save for few brief episodes. Off antiarrhythmics!     The main issue has been GI bleeding requiring transfusions while on Rivaroxaban. We discussed several options 1. Observe - she has not had significant AF since the ablation which was in 2018 and has the device so we can monitor AF recurrence. If episode longer than 24 hour is experienced we can certainly restart anticoagulation 2. Change Xarelto to Eliquis that has less GI bleeding. 3. Consider Watchman. At this time she opted for Eliquis as she is very reluctant to have any procedures.

## 2024-03-08 ENCOUNTER — HOSPITAL ENCOUNTER (OUTPATIENT)
Dept: CARDIOLOGY | Facility: CLINIC | Age: 74
Discharge: HOME | End: 2024-03-08
Payer: MEDICARE

## 2024-03-08 DIAGNOSIS — Z95.810 PRESENCE OF AUTOMATIC CARDIOVERTER/DEFIBRILLATOR (AICD): ICD-10-CM

## 2024-03-08 DIAGNOSIS — I42.9 CARDIOMYOPATHY, UNSPECIFIED TYPE (MULTI): ICD-10-CM

## 2024-03-08 LAB
ATRIAL RATE: 82 BPM
P AXIS: 103 DEGREES
P OFFSET: 193 MS
P ONSET: 171 MS
PR INTERVAL: 94 MS
Q ONSET: 218 MS
QRS COUNT: 14 BEATS
QRS DURATION: 96 MS
QT INTERVAL: 408 MS
QTC CALCULATION(BAZETT): 476 MS
QTC FREDERICIA: 452 MS
R AXIS: 36 DEGREES
T AXIS: 117 DEGREES
T OFFSET: 422 MS
VENTRICULAR RATE: 82 BPM

## 2024-03-11 NOTE — SIGNIFICANT EVENT
Follow Up Phone Call    Outgoing phone call    Spoke to: Trina SALDANA Via Relationship:self   Phone number: [phone]      Outcome: I left a message on answering machine   Chief Complaint   Patient presents with    Chest Pain          Diagnosis:Not applicable

## 2024-03-12 NOTE — SIGNIFICANT EVENT
"Follow Up Phone Call    Outgoing phone call    Spoke to: Trina SALDANA Via Relationship:self   Phone number: [phone]      Outcome: contacted patient/ family   Chief Complaint   Patient presents with    Chest Pain          Diagnosis:Not applicable    States her condition has not changed since discharge. She saw her PCP and she was given more steroids. She reports just being \"all around miserable.\" She had not scheduled her other follow up appointments stating I am too sick to go.     Patient aware to continue to follow up with her PCP and notify of any changes. Voiced understanding.    This nurse called and scheduled a GI appointment, the first available is in July.  will call patient.    "

## 2024-03-18 ENCOUNTER — HOSPITAL ENCOUNTER (OUTPATIENT)
Dept: CARDIOLOGY | Facility: CLINIC | Age: 74
Discharge: HOME | End: 2024-03-18
Payer: MEDICARE

## 2024-03-18 DIAGNOSIS — I42.9 CARDIOMYOPATHY, UNSPECIFIED TYPE (MULTI): ICD-10-CM

## 2024-03-18 DIAGNOSIS — Z95.810 PRESENCE OF AUTOMATIC CARDIOVERTER/DEFIBRILLATOR (AICD): ICD-10-CM

## 2024-04-10 DIAGNOSIS — M19.011 PRIMARY OSTEOARTHRITIS OF RIGHT SHOULDER: ICD-10-CM

## 2024-04-10 DIAGNOSIS — M54.12 CERVICAL RADICULOPATHY: ICD-10-CM

## 2024-04-10 DIAGNOSIS — M51.16 DISPLACEMENT OF LUMBAR DISC WITH RADICULOPATHY: ICD-10-CM

## 2024-04-10 DIAGNOSIS — M96.1 CERVICAL POST-LAMINECTOMY SYNDROME: ICD-10-CM

## 2024-04-10 RX ORDER — OXYCODONE AND ACETAMINOPHEN 5; 325 MG/1; MG/1
1 TABLET ORAL 3 TIMES DAILY PRN
Qty: 84 TABLET | Refills: 0 | Status: SHIPPED | OUTPATIENT
Start: 2024-04-12 | End: 2024-05-03 | Stop reason: HOSPADM

## 2024-04-10 RX ORDER — OXYCODONE AND ACETAMINOPHEN 5; 325 MG/1; MG/1
1 TABLET ORAL EVERY 4 HOURS PRN
Qty: 42 TABLET | Refills: 0 | Status: SHIPPED | OUTPATIENT
Start: 2024-05-01 | End: 2024-05-03 | Stop reason: HOSPADM

## 2024-04-10 NOTE — TELEPHONE ENCOUNTER
Calling for refill on percocet,April rx not at pharmacy. Looks like it was cx. She never filled acute rx provider sent in feb for extra 42 tabs for her  post op pain due to surgery cx. It has been rescheduled to 5/2/24. ASKING FOR PROVIDER TO RE send that as well.

## 2024-04-15 ENCOUNTER — HOSPITAL ENCOUNTER (OUTPATIENT)
Dept: RADIOLOGY | Facility: CLINIC | Age: 74
Discharge: HOME | End: 2024-04-15
Payer: MEDICARE

## 2024-04-15 ENCOUNTER — OFFICE VISIT (OUTPATIENT)
Dept: PRIMARY CARE | Facility: CLINIC | Age: 74
End: 2024-04-15
Payer: MEDICARE

## 2024-04-15 VITALS
HEIGHT: 64 IN | WEIGHT: 154 LBS | BODY MASS INDEX: 26.29 KG/M2 | HEART RATE: 61 BPM | SYSTOLIC BLOOD PRESSURE: 128 MMHG | DIASTOLIC BLOOD PRESSURE: 62 MMHG | OXYGEN SATURATION: 99 %

## 2024-04-15 DIAGNOSIS — Z01.818 PREOP EXAMINATION: ICD-10-CM

## 2024-04-15 DIAGNOSIS — Z00.00 ROUTINE GENERAL MEDICAL EXAMINATION AT HEALTH CARE FACILITY: Primary | ICD-10-CM

## 2024-04-15 DIAGNOSIS — J30.1 SEASONAL ALLERGIC RHINITIS DUE TO POLLEN: ICD-10-CM

## 2024-04-15 DIAGNOSIS — I50.42 CHRONIC COMBINED SYSTOLIC AND DIASTOLIC CONGESTIVE HEART FAILURE (MULTI): ICD-10-CM

## 2024-04-15 DIAGNOSIS — J45.20 MILD INTERMITTENT ASTHMA, UNSPECIFIED WHETHER COMPLICATED (HHS-HCC): ICD-10-CM

## 2024-04-15 DIAGNOSIS — L40.9 PSORIASIS: ICD-10-CM

## 2024-04-15 DIAGNOSIS — J43.2 CENTRILOBULAR EMPHYSEMA (MULTI): ICD-10-CM

## 2024-04-15 DIAGNOSIS — K04.7 DENTAL ABSCESS: ICD-10-CM

## 2024-04-15 DIAGNOSIS — D68.9 COAGULATION DEFECT, UNSPECIFIED (MULTI): ICD-10-CM

## 2024-04-15 DIAGNOSIS — M06.9 RHEUMATOID ARTHRITIS INVOLVING MULTIPLE SITES, UNSPECIFIED WHETHER RHEUMATOID FACTOR PRESENT (MULTI): ICD-10-CM

## 2024-04-15 DIAGNOSIS — M79.18 CERVICAL MYOFASCIAL PAIN SYNDROME: ICD-10-CM

## 2024-04-15 DIAGNOSIS — N18.31 STAGE 3A CHRONIC KIDNEY DISEASE (MULTI): ICD-10-CM

## 2024-04-15 DIAGNOSIS — Z71.89 CARDIAC RISK COUNSELING: ICD-10-CM

## 2024-04-15 DIAGNOSIS — I48.11 LONGSTANDING PERSISTENT ATRIAL FIBRILLATION (MULTI): ICD-10-CM

## 2024-04-15 DIAGNOSIS — I11.0 HYPERTENSIVE HEART DISEASE WITH HEART FAILURE (MULTI): ICD-10-CM

## 2024-04-15 DIAGNOSIS — R82.998 OTHER ABNORMAL FINDINGS IN URINE: ICD-10-CM

## 2024-04-15 DIAGNOSIS — E78.2 MIXED HYPERLIPIDEMIA: ICD-10-CM

## 2024-04-15 PROBLEM — N18.9 CKD (CHRONIC KIDNEY DISEASE): Status: RESOLVED | Noted: 2023-10-10 | Resolved: 2024-04-15

## 2024-04-15 LAB
ALBUMIN SERPL BCP-MCNC: 4.4 G/DL (ref 3.4–5)
ALP SERPL-CCNC: 76 U/L (ref 33–136)
ALT SERPL W P-5'-P-CCNC: 15 U/L (ref 7–45)
ANION GAP SERPL CALC-SCNC: 17 MMOL/L (ref 10–20)
APTT PPP: 30 SECONDS (ref 27–38)
AST SERPL W P-5'-P-CCNC: 22 U/L (ref 9–39)
BASOPHILS # BLD AUTO: 0.03 X10*3/UL (ref 0–0.1)
BASOPHILS NFR BLD AUTO: 0.5 %
BILIRUB SERPL-MCNC: 0.6 MG/DL (ref 0–1.2)
BUN SERPL-MCNC: 20 MG/DL (ref 6–23)
CALCIUM SERPL-MCNC: 9.3 MG/DL (ref 8.6–10.3)
CHLORIDE SERPL-SCNC: 100 MMOL/L (ref 98–107)
CHOLEST SERPL-MCNC: 146 MG/DL (ref 0–199)
CHOLESTEROL/HDL RATIO: 2.6
CO2 SERPL-SCNC: 31 MMOL/L (ref 21–32)
CREAT SERPL-MCNC: 1.18 MG/DL (ref 0.5–1.05)
EGFRCR SERPLBLD CKD-EPI 2021: 49 ML/MIN/1.73M*2
EOSINOPHIL # BLD AUTO: 0.08 X10*3/UL (ref 0–0.4)
EOSINOPHIL NFR BLD AUTO: 1.4 %
ERYTHROCYTE [DISTWIDTH] IN BLOOD BY AUTOMATED COUNT: 23.2 % (ref 11.5–14.5)
GLUCOSE SERPL-MCNC: 95 MG/DL (ref 74–99)
HCT VFR BLD AUTO: 34.5 % (ref 36–46)
HDLC SERPL-MCNC: 56.5 MG/DL
HGB BLD-MCNC: 10.3 G/DL (ref 12–16)
IMM GRANULOCYTES # BLD AUTO: 0.03 X10*3/UL (ref 0–0.5)
IMM GRANULOCYTES NFR BLD AUTO: 0.5 % (ref 0–0.9)
INR PPP: 1 (ref 0.9–1.1)
LDLC SERPL CALC-MCNC: 68 MG/DL
LYMPHOCYTES # BLD AUTO: 1.6 X10*3/UL (ref 0.8–3)
LYMPHOCYTES NFR BLD AUTO: 28.8 %
MCH RBC QN AUTO: 22.9 PG (ref 26–34)
MCHC RBC AUTO-ENTMCNC: 29.9 G/DL (ref 32–36)
MCV RBC AUTO: 77 FL (ref 80–100)
MONOCYTES # BLD AUTO: 0.6 X10*3/UL (ref 0.05–0.8)
MONOCYTES NFR BLD AUTO: 10.8 %
NEUTROPHILS # BLD AUTO: 3.21 X10*3/UL (ref 1.6–5.5)
NEUTROPHILS NFR BLD AUTO: 58 %
NON HDL CHOLESTEROL: 90 MG/DL (ref 0–149)
NRBC BLD-RTO: 0 /100 WBCS (ref 0–0)
OVALOCYTES BLD QL SMEAR: NORMAL
PLATELET # BLD AUTO: 302 X10*3/UL (ref 150–450)
POC APPEARANCE, URINE: CLEAR
POC BILIRUBIN, URINE: NEGATIVE
POC BLOOD, URINE: NEGATIVE
POC COLOR, URINE: YELLOW
POC GLUCOSE, URINE: NEGATIVE MG/DL
POC KETONES, URINE: NEGATIVE MG/DL
POC LEUKOCYTES, URINE: ABNORMAL
POC NITRITE,URINE: NEGATIVE
POC PH, URINE: 7 PH
POC PROTEIN, URINE: NEGATIVE MG/DL
POC SPECIFIC GRAVITY, URINE: 1.01
POC UROBILINOGEN, URINE: 0.2 EU/DL
POTASSIUM SERPL-SCNC: 4.1 MMOL/L (ref 3.5–5.3)
PROT SERPL-MCNC: 7.2 G/DL (ref 6.4–8.2)
PROTHROMBIN TIME: 11.5 SECONDS (ref 9.8–12.8)
RBC # BLD AUTO: 4.5 X10*6/UL (ref 4–5.2)
RBC MORPH BLD: NORMAL
SCHISTOCYTES BLD QL SMEAR: NORMAL
SODIUM SERPL-SCNC: 144 MMOL/L (ref 136–145)
TRIGL SERPL-MCNC: 107 MG/DL (ref 0–149)
TSH SERPL-ACNC: 0.92 MIU/L (ref 0.44–3.98)
VLDL: 21 MG/DL (ref 0–40)
WBC # BLD AUTO: 5.6 X10*3/UL (ref 4.4–11.3)

## 2024-04-15 PROCEDURE — 1170F FXNL STATUS ASSESSED: CPT | Performed by: FAMILY MEDICINE

## 2024-04-15 PROCEDURE — 1036F TOBACCO NON-USER: CPT | Performed by: FAMILY MEDICINE

## 2024-04-15 PROCEDURE — 85025 COMPLETE CBC W/AUTO DIFF WBC: CPT

## 2024-04-15 PROCEDURE — 36415 COLL VENOUS BLD VENIPUNCTURE: CPT

## 2024-04-15 PROCEDURE — G0446 INTENS BEHAVE THER CARDIO DX: HCPCS | Performed by: FAMILY MEDICINE

## 2024-04-15 PROCEDURE — 84443 ASSAY THYROID STIM HORMONE: CPT

## 2024-04-15 PROCEDURE — 71046 X-RAY EXAM CHEST 2 VIEWS: CPT

## 2024-04-15 PROCEDURE — 85610 PROTHROMBIN TIME: CPT

## 2024-04-15 PROCEDURE — G0439 PPPS, SUBSEQ VISIT: HCPCS | Performed by: FAMILY MEDICINE

## 2024-04-15 PROCEDURE — 71046 X-RAY EXAM CHEST 2 VIEWS: CPT | Performed by: RADIOLOGY

## 2024-04-15 PROCEDURE — 1160F RVW MEDS BY RX/DR IN RCRD: CPT | Performed by: FAMILY MEDICINE

## 2024-04-15 PROCEDURE — 80061 LIPID PANEL: CPT

## 2024-04-15 PROCEDURE — 1124F ACP DISCUSS-NO DSCNMKR DOCD: CPT | Performed by: FAMILY MEDICINE

## 2024-04-15 PROCEDURE — 81003 URINALYSIS AUTO W/O SCOPE: CPT | Performed by: FAMILY MEDICINE

## 2024-04-15 PROCEDURE — 1159F MED LIST DOCD IN RCRD: CPT | Performed by: FAMILY MEDICINE

## 2024-04-15 PROCEDURE — 99214 OFFICE O/P EST MOD 30 MIN: CPT | Performed by: FAMILY MEDICINE

## 2024-04-15 PROCEDURE — 80053 COMPREHEN METABOLIC PANEL: CPT

## 2024-04-15 PROCEDURE — 85730 THROMBOPLASTIN TIME PARTIAL: CPT

## 2024-04-15 RX ORDER — HYDROXYCHLOROQUINE SULFATE 200 MG/1
200 TABLET, FILM COATED ORAL DAILY
Qty: 90 TABLET | Refills: 3 | Status: SHIPPED | OUTPATIENT
Start: 2024-04-15

## 2024-04-15 RX ORDER — MONTELUKAST SODIUM 10 MG/1
10 TABLET ORAL NIGHTLY
Qty: 90 TABLET | Refills: 3 | Status: SHIPPED | OUTPATIENT
Start: 2024-04-15 | End: 2025-04-10

## 2024-04-15 RX ORDER — CLINDAMYCIN HYDROCHLORIDE 300 MG/1
300 CAPSULE ORAL 2 TIMES DAILY
Qty: 20 CAPSULE | Refills: 0 | Status: SHIPPED | OUTPATIENT
Start: 2024-04-15 | End: 2024-05-03 | Stop reason: HOSPADM

## 2024-04-15 RX ORDER — BACLOFEN 10 MG/1
TABLET ORAL
Qty: 120 TABLET | Refills: 2 | Status: SHIPPED | OUTPATIENT
Start: 2024-04-15 | End: 2024-05-06 | Stop reason: SDUPTHER

## 2024-04-15 RX ORDER — CLOBETASOL PROPIONATE 0.5 MG/G
CREAM TOPICAL 2 TIMES DAILY
Qty: 60 G | Refills: 3 | Status: SHIPPED | OUTPATIENT
Start: 2024-04-15

## 2024-04-15 RX ORDER — DICLOFENAC SODIUM 10 MG/G
4 GEL TOPICAL 2 TIMES DAILY PRN
Qty: 450 G | Refills: 1 | Status: SHIPPED | OUTPATIENT
Start: 2024-04-15

## 2024-04-15 ASSESSMENT — ENCOUNTER SYMPTOMS
COLOR CHANGE: 0
DIARRHEA: 0
COUGH: 0
OCCASIONAL FEELINGS OF UNSTEADINESS: 0
DEPRESSION: 0
EYE PAIN: 0
CHEST TIGHTNESS: 0
BLOOD IN STOOL: 0
ARTHRALGIAS: 1
BRUISES/BLEEDS EASILY: 0
WEAKNESS: 0
DIZZINESS: 0
TREMORS: 0
WHEEZING: 0
LOSS OF SENSATION IN FEET: 0
ADENOPATHY: 0
DYSPHORIC MOOD: 0
POLYDIPSIA: 0
FEVER: 0
NAUSEA: 0
EYE REDNESS: 0
DYSURIA: 0
SORE THROAT: 0
ABDOMINAL PAIN: 0
CONSTIPATION: 0
HEMATURIA: 0
VOMITING: 0
TROUBLE SWALLOWING: 0
SHORTNESS OF BREATH: 0
NUMBNESS: 0
BACK PAIN: 0
POLYPHAGIA: 0
FATIGUE: 0
NERVOUS/ANXIOUS: 0
HEADACHES: 0
CHILLS: 0
FREQUENCY: 0
PALPITATIONS: 0

## 2024-04-15 ASSESSMENT — ACTIVITIES OF DAILY LIVING (ADL)
DRESSING: INDEPENDENT
MANAGING_FINANCES: INDEPENDENT
BATHING: INDEPENDENT
DOING_HOUSEWORK: INDEPENDENT
TAKING_MEDICATION: INDEPENDENT
GROCERY_SHOPPING: INDEPENDENT

## 2024-04-15 ASSESSMENT — PATIENT HEALTH QUESTIONNAIRE - PHQ9
1. LITTLE INTEREST OR PLEASURE IN DOING THINGS: NOT AT ALL
2. FEELING DOWN, DEPRESSED OR HOPELESS: NOT AT ALL
SUM OF ALL RESPONSES TO PHQ9 QUESTIONS 1 AND 2: 0

## 2024-04-15 NOTE — PROGRESS NOTES
Subjective   Reason for Visit: Trina Elias is an 73 y.o. female here for a Medicare Wellness visit.     Past Medical, Surgical, and Family History reviewed and updated in chart.    Reviewed all medications by prescribing practitioner or clinical pharmacist (such as prescriptions, OTCs, herbal therapies and supplements) and documented in the medical record.    HPI  Dr. Daly doing Left Reverse Total Shoulder  Shoulder has been bothering her for awhile  Uses percocet if needs  It swells  Sharp, achy pain  No bruising    No SE meds  No CP, SOB, HA, vision changes  No palpitations, dizziness  No numbness, weakness     Ophtho- due 5/24  Dentist- less then 1 year  Colonoscopy- 2/21- repeat 5 years  HIPOLITO-  FOBT-  UA/Micro- 12/22  Mammo- 4/23  DEXA- 8/22  PAP- N/A  Lung CT- never smoked  Coronary Calcium CT Score-  AAA-  EKG-  Pneumovax- 10/21  Prevnar- refused  Flu- 1/23  Shingrix- recommended  Td-  Hep C-  Advance Directives- does not have  MDD screen- 4/24  ETOH screen- 8/28/23  CV risk- 4/24  AWV- 4/24  DEBBIE-    Patient Care Team:  Fredy Elias DO as PCP - General  Fredy Elias DO as PCP - [facility] Attributed Provider  Gary Brown DO as Consulting Physician (Orthopaedic Surgery)  Oli Daly DO as Surgeon (Orthopaedic Surgery)  Nohelia Soto MD as Consulting Physician (Cardiology)  Ayden Wiley MD as Consulting Physician (Hospitalist)  Ezio Kaur MD as Consulting Physician (Cardiology)  Neo Gutierrez MD as Consulting Physician (Cardiology)  Nnamdi Cleary MD as Surgeon (Bariatrics)  Tasha Lozoya PA-C as Physician Assistant (Gastroenterology)  Martinez Nation MD as Referring Physician (Cardiology)     Review of Systems   Constitutional:  Negative for chills, fatigue and fever.   HENT:  Negative for congestion, ear discharge, ear pain, hearing loss, nosebleeds, sore throat, tinnitus and trouble swallowing.    Eyes:  Negative for pain, redness and visual  "disturbance.   Respiratory:  Negative for cough, chest tightness, shortness of breath and wheezing.    Cardiovascular:  Negative for chest pain, palpitations and leg swelling.   Gastrointestinal:  Negative for abdominal pain, blood in stool, constipation, diarrhea, nausea and vomiting.   Endocrine: Negative for cold intolerance, heat intolerance, polydipsia, polyphagia and polyuria.   Genitourinary:  Negative for dysuria, frequency, hematuria and urgency.   Musculoskeletal:  Positive for arthralgias. Negative for back pain and gait problem.   Skin:  Negative for color change and rash.   Neurological:  Negative for dizziness, tremors, syncope, weakness, numbness and headaches.   Hematological:  Negative for adenopathy. Does not bruise/bleed easily.   Psychiatric/Behavioral:  Negative for dysphoric mood. The patient is not nervous/anxious.        Objective   Vitals:  /62   Pulse 61   Ht 1.626 m (5' 4\")   Wt 69.9 kg (154 lb)   SpO2 99%   BMI 26.43 kg/m²       Physical Exam  Vitals and nursing note reviewed.   Constitutional:       General: She is not in acute distress.     Appearance: Normal appearance.   HENT:      Head: Normocephalic and atraumatic.      Right Ear: Tympanic membrane, ear canal and external ear normal.      Left Ear: Tympanic membrane, ear canal and external ear normal.      Nose: Nose normal.      Mouth/Throat:      Mouth: Mucous membranes are moist.      Pharynx: Oropharynx is clear.   Eyes:      Extraocular Movements: Extraocular movements intact.      Conjunctiva/sclera: Conjunctivae normal.      Pupils: Pupils are equal, round, and reactive to light.   Neck:      Vascular: No carotid bruit.   Cardiovascular:      Rate and Rhythm: Normal rate and regular rhythm.      Pulses: Normal pulses.      Heart sounds: Murmur heard.      Systolic murmur is present with a grade of 2/6.   Pulmonary:      Effort: Pulmonary effort is normal.      Breath sounds: Normal breath sounds.   Abdominal:      " General: Abdomen is flat. Bowel sounds are normal.      Palpations: Abdomen is soft. There is no mass.   Musculoskeletal:      Cervical back: Normal range of motion and neck supple.   Lymphadenopathy:      Cervical: No cervical adenopathy.   Skin:     Capillary Refill: Capillary refill takes less than 2 seconds.   Neurological:      General: No focal deficit present.      Mental Status: She is alert and oriented to person, place, and time.      Cranial Nerves: No cranial nerve deficit.      Sensory: No sensory deficit.      Motor: No weakness.      Coordination: Coordination normal.      Deep Tendon Reflexes: Reflexes normal.   Psychiatric:         Mood and Affect: Mood normal.         Behavior: Behavior normal.         Assessment/Plan   Problem List Items Addressed This Visit       Stage 3a chronic kidney disease (Multi)    Relevant Orders    CBC and Auto Differential (Completed)    POCT UA Automated manually resulted (Completed)    Comprehensive Metabolic Panel (Completed)    Morphology (Completed)    Rheumatoid arthritis (Multi)    Relevant Medications    diclofenac sodium (Voltaren) 1 % gel    clobetasol (Temovate) 0.05 % cream    Psoriasis    Relevant Medications    hydroxychloroquine (Plaquenil) 200 mg tablet    Other Relevant Orders    Comprehensive Metabolic Panel (Completed)    Mixed hyperlipidemia    Relevant Orders    Lipid Panel (Completed)    TSH with reflex to Free T4 if abnormal (Completed)    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)    Relevant Orders    CBC and Auto Differential (Completed)    Morphology (Completed)    Coagulation defect, unspecified (Multi)    Relevant Orders    Protime-INR (Completed)    APTT (Completed)    Centrilobular emphysema (Multi)    Atrial fibrillation (Multi)    Asthma (HHS-HCC)    Relevant Orders    XR chest 2 views    Hypertensive heart disease with heart failure (Multi)     Other Visit Diagnoses       Routine general medical examination at  health care facility    -  Primary    Cardiac risk counseling        Cervical myofascial pain syndrome        Relevant Medications    baclofen (Lioresal) 10 mg tablet    Dental abscess        Relevant Medications    clindamycin (Cleocin) 300 mg capsule    Seasonal allergic rhinitis due to pollen        Relevant Medications    montelukast (Singulair) 10 mg tablet    Preop examination        Relevant Orders    Urine Culture    Other abnormal findings in urine        Relevant Orders    Urine Culture        Renewed/continued rest of medications  Checked labs  Updated Health Maintenance in HPI section  Osteoporosis- DEXA, vitamin D     Obesity- caloric restriction, increase CV exercise     Hyperlipidemia- continue meds, low fat/cholesterol diet     CHF with HD- Bumetanide, Diltiazem     A. Fib/Wide-complex Tachycardia- digoxin, diltiazem, follow with cardiology     RA- Hydroxychloroquine, F/U rheumatology     Asthma- ventolin HFA prn, avoid allergens     Anemia- check CBC, iron levels, iron supplement     Cervical radiculopathy/LBP- f/u with pain management     COPD/Asthma- avoid poor air quality, does not feel like need inhaler    Coagulation Defect- off anticoagulants, will get watchman    CKD- limit NSAID's, increase fluids    Dental Abscess- clindamycin, warm compress    AR- allergen avoidance, sigulair    Psoriasis- clobetasol, moisturizer     F/U 6 months     Hold aspirin for 5-7 days prior to surgery and restart 2-3 days after or as recommended by surgeon. No ibuprofen, naproxen or other anti-inflammatory during that period as well. You may take acetaminophen (tylenol) instead.    Cleared for surgery     Cardiovascular risk discussed and, if needed, lifestyle modifications recommended, including nutritional choices, exercise, and elimination of habits contributing to risk. We agreed on a plan to reduce the current cardiovascular risk. See the ASCVD Risk  Plus (10.9%) for data discussed regarding risk and  risk reduction opportunities. Aspirin use/disuse was discussed after reviewing updated guidelines

## 2024-04-18 ENCOUNTER — APPOINTMENT (OUTPATIENT)
Dept: PREADMISSION TESTING | Facility: HOSPITAL | Age: 74
End: 2024-04-18
Payer: MEDICARE

## 2024-04-22 ENCOUNTER — PRE-ADMISSION TESTING (OUTPATIENT)
Dept: PREADMISSION TESTING | Facility: HOSPITAL | Age: 74
End: 2024-04-22
Payer: MEDICARE

## 2024-04-22 VITALS
HEIGHT: 64 IN | BODY MASS INDEX: 26.63 KG/M2 | WEIGHT: 156 LBS | OXYGEN SATURATION: 100 % | TEMPERATURE: 97.2 F | SYSTOLIC BLOOD PRESSURE: 128 MMHG | HEART RATE: 61 BPM | DIASTOLIC BLOOD PRESSURE: 54 MMHG | RESPIRATION RATE: 16 BRPM

## 2024-04-22 DIAGNOSIS — Z01.818 PREOP TESTING: Primary | ICD-10-CM

## 2024-04-22 DIAGNOSIS — E78.2 MIXED HYPERLIPIDEMIA: ICD-10-CM

## 2024-04-22 PROCEDURE — 87081 CULTURE SCREEN ONLY: CPT | Mod: TRILAB,WESLAB

## 2024-04-22 PROCEDURE — 99214 OFFICE O/P EST MOD 30 MIN: CPT

## 2024-04-22 RX ORDER — CHLORHEXIDINE GLUCONATE ORAL RINSE 1.2 MG/ML
SOLUTION DENTAL
Qty: 473 ML | Refills: 0 | Status: ON HOLD | OUTPATIENT
Start: 2024-05-01 | End: 2024-05-02 | Stop reason: ALTCHOICE

## 2024-04-22 RX ORDER — ROSUVASTATIN CALCIUM 20 MG/1
20 TABLET, COATED ORAL DAILY
Qty: 90 TABLET | Refills: 0 | Status: SHIPPED | OUTPATIENT
Start: 2024-04-22

## 2024-04-22 ASSESSMENT — DUKE ACTIVITY SCORE INDEX (DASI)
CAN YOU DO HEAVY WORK AROUND THE HOUSE LIKE SCRUBBING FLOORS OR LIFTING AND MOVING HEAVY FURNITURE: NO
CAN YOU DO MODERATE WORK AROUND THE HOUSE LIKE VACUUMING, SWEEPING FLOORS OR CARRYING GROCERIES: YES
CAN YOU DO LIGHT WORK AROUND THE HOUSE LIKE DUSTING OR WASHING DISHES: YES
CAN YOU RUN A SHORT DISTANCE: YES
CAN YOU PARTICIPATE IN MODERATE RECREATIONAL ACTIVITIES LIKE GOLF, BOWLING, DANCING, DOUBLES TENNIS OR THROWING A BASEBALL OR FOOTBALL: YES
CAN YOU TAKE CARE OF YOURSELF (EAT, DRESS, BATHE, OR USE TOILET): YES
CAN YOU CLIMB A FLIGHT OF STAIRS OR WALK UP A HILL: YES
CAN YOU PARTICIPATE IN STRENOUS SPORTS LIKE SWIMMING, SINGLES TENNIS, FOOTBALL, BASKETBALL, OR SKIING: NO
TOTAL_SCORE: 42.7
CAN YOU DO YARD WORK LIKE RAKING LEAVES, WEEDING OR PUSHING A MOWER: YES
CAN YOU HAVE SEXUAL RELATIONS: YES
DASI METS SCORE: 8
CAN YOU WALK A BLOCK OR TWO ON LEVEL GROUND: YES
CAN YOU WALK INDOORS, SUCH AS AROUND YOUR HOUSE: YES

## 2024-04-22 ASSESSMENT — CHADS2 SCORE
AGE GREATER THAN OR EQUAL TO 75: NO
HYPERTENSION: YES
DIABETES: NO
CHADS2 SCORE: 2
PRIOR STROKE OR TIA OR THROMBOEMBOLISM: NO
CHF: YES

## 2024-04-22 ASSESSMENT — ENCOUNTER SYMPTOMS
HEMATOLOGIC/LYMPHATIC NEGATIVE: 1
ARTHRALGIAS: 1
RESPIRATORY NEGATIVE: 1
NEUROLOGICAL NEGATIVE: 1
CONSTITUTIONAL NEGATIVE: 1
PSYCHIATRIC NEGATIVE: 1
ENDOCRINE NEGATIVE: 1
EYES NEGATIVE: 1
GASTROINTESTINAL NEGATIVE: 1
ALLERGIC/IMMUNOLOGIC NEGATIVE: 1
CARDIOVASCULAR NEGATIVE: 1

## 2024-04-22 NOTE — CPM/PAT H&P
CPM/PAT Evaluation       Name: Trina Elias (Trina Elias)  /Age: 1950/73 y.o.     In-Person       Chief Complaint: Preadmission Testing    HPI  Trina is a 73 year old female who presents today for preadmission testing for upcoming surgery with Dr. Daly scheduled 24:    Left reverse total shoulder arthroplasty  Diagnosis: Arthritis of shoulder region, left    Patient is in no acute distress and denies fever, chills, SOB, and chest pain.        Past Medical History:   Diagnosis Date    Abnormal mammogram 2023    Acute sphenoidal sinusitis, unspecified 10/12/2017    Acute sphenoidal sinusitis, recurrence not specified    Anemia     Asthma ([facility])     Atrial fibrillation (Multi)     Bacterial meningitis, unspecified ([facility]) 2018    Acute bacterial meningitis    Bone pain 2023    BSOM (bilateral serous otitis media) 2023    CAD (coronary artery disease)     Cervical neuropathy 2023    Cervicalgia 2021    Acute neck pain    Chest pain on exertion 2023    Chronic obstructive pulmonary disease, unspecified (Multi)     Chronic obstructive pulmonary disease    CKD (chronic kidney disease)     Cutaneous abscess of abdominal wall 2015    Abdominal wall abscess    Dehydration 2023    Dry cough 2023    Dry heaves 2023    Dyspnea 2023    Elevated serum creatinine 2023    Fatigue 2023    Fever, unspecified 2023    Gastrointestinal hemorrhage with melena     Headache 2023    History of abdominal surgery 2023    History of colon polyps 2023    Lumbar radiculitis 2023    Median nerve neuropathy 2023    Nonhealing surgical wound 2023    Numbness 2023    Osteopenia 2023    Other conditions influencing health status     Coronary Artery Disease    Other conditions influencing health status     Peptic Ulcer    Pain of right upper extremity 2023    Personal history of anaphylaxis  2015    History of anaphylaxis    Personal history of diseases of the skin and subcutaneous tissue     History of eczema    Personal history of infections of the central nervous system     History of bacterial meningitis    Personal history of other diseases of the digestive system 2015    History of esophageal reflux    Personal history of other diseases of the digestive system     History of hiatal hernia    Personal history of other diseases of the nervous system and sense organs     History of carpal tunnel syndrome    Personal history of other diseases of the respiratory system 2020    History of acute pharyngitis    Personal history of other diseases of the respiratory system     Personal history of asthma    Personal history of other specified conditions 2019    History of epigastric pain    Personal history of other specified conditions 2018    History of angioedema    Postural dizziness with presyncope 2023    Presence of combination internal cardiac defibrillator (ICD) and pacemaker     Pulmonary edema ([facility]) 2023    Radiculopathy, lumbar region 2018    Acute lumbar radiculopathy    Restless legs syndrome 2015    Restless legs syndrome    Right serous otitis media 2023    Severe pain 2023    Small bowel obstruction (Multi) 2023    Tarsal tunnel syndrome, unspecified lower limb     Tarsal tunnel syndrome    Thoracic aortic aneurysm, without rupture, unspecified (CMS-HCC) 2020    Aneurysm, aorta, thoracic    Toxic effect of venom 2023    Ulcer of the stomach caused by bacteria (h. pylori), acute 2023    Ulnar neuropathy 2023       Past Surgical History:   Procedure Laterality Date    APPENDECTOMY  2013    Appendectomy    CARDIAC CATHETERIZATION  2013    Cardiac Cath Procedure Summary    CARDIAC SURGERY  2014    Heart Surgery     SECTION, CLASSIC  2013     Section     CHOLECYSTECTOMY  03/08/2013    Cholecystectomy Laparoscopic    FOOT SURGERY  03/08/2013    Foot Surgery    GASTRIC FUNDOPLICATION  03/08/2013    Esophagogastric Fundoplasty Nissen Fundoplication    HERNIA REPAIR  06/22/2015    Hernia Repair    OTHER SURGICAL HISTORY  08/08/2019    [place] tooth extraction    OTHER SURGICAL HISTORY  08/08/2019    Carpal tunnel surgery    OTHER SURGICAL HISTORY  08/08/2019    Foot surgery    OTHER SURGICAL HISTORY  08/08/2019    Leg surgery    OTHER SURGICAL HISTORY  01/06/2020    Hand surgery    OTHER SURGICAL HISTORY  02/09/2015    Cardio-Defib Eval Dual Chamber With Reprogramming    OTHER SURGICAL HISTORY  01/11/2021    Intestinal surgery    ROTATOR CUFF REPAIR  08/13/2018    Rotator Cuff Repair     Social History     Tobacco Use    Smoking status: Never    Smokeless tobacco: Never   Substance Use Topics    Alcohol use: Never      Patient  has no history on file for sexual activity.    Family History   Problem Relation Name Age of Onset    Asthma Daughter      Asthma Other      Colon cancer Other      Diabetes Other      Other (stroke syndrome) Other         Allergies   Allergen Reactions    Hydromorphone Anaphylaxis    Metoprolol Anaphylaxis    Abatacept Other     pulmonary edema, diarrhea, nausea    Bupropion Other    Cefepime Unknown    Ciprofloxacin Hives    Furosemide Itching    Hydrocodone-Acetaminophen Other    Levofloxacin Unknown    Methotrexate Hives and Itching     chest pain    Penicillins Hives    Pregabalin Other     caused pulmonary edema    Prochlorperazine Other     paralysis of the mouth with drooping    Aripiprazole Rash    Beta-Blockers (Beta-Adrenergic Blocking Agts) Other, Palpitations and Wheezing    Pineapple Rash     Current Outpatient Medications   Medication Instructions    albuterol 2.5 mg, nebulization, Every 4 hours PRN    amitriptyline (ELAVIL) 25 mg, oral, 2 times daily    baclofen (Lioresal) 10 mg tablet One tablet in the morning and afternoon and 1  "to 2 tablets in the evening.    bumetanide (BUMEX) 2 mg, oral, 2 times daily    [START ON 5/1/2024] chlorhexidine (Peridex) 0.12 % solution Use as directed    clindamycin (CLEOCIN) 300 mg, oral, 2 times daily    clobetasol (Temovate) 0.05 % cream Topical, 2 times daily    diclofenac sodium (VOLTAREN) 4 g, Topical, 2 times daily PRN    digoxin (LANOXIN) 125 mcg, oral, Daily    dilTIAZem LA (CARDIZEM LA) 120 mg, oral, Daily    EPINEPHrine 0.3 mg/0.3 mL injection syringe 1 Syringe, injection, As needed, Per Pt has not needed.     famotidine (PEPCID) 40 mg, oral, Daily    fluticasone (Flonase) 50 mcg/actuation nasal spray 1 spray, nasal, 2 times daily    hydroxychloroquine (PLAQUENIL) 200 mg, oral, Daily    magnesium oxide (MAG-OX) 400 mg, oral, Daily    montelukast (SINGULAIR) 10 mg, oral, Nightly    nitroglycerin (NITROSTAT) 0.4 mg, sublingual, Every 5 min PRN    oxyCODONE-acetaminophen (Percocet) 5-325 mg tablet 1 tablet, oral, 3 times daily PRN    [START ON 5/1/2024] oxyCODONE-acetaminophen (Percocet) 5-325 mg tablet 1 tablet, oral, Every 4 hours PRN    pantoprazole (PROTONIX) 40 mg, oral, 2 times daily    polyethylene glycol (MIRALAX) 17 g, oral, Daily    rosuvastatin (CRESTOR) 20 mg, oral, Daily      Review of Systems   Constitutional: Negative.    HENT: Negative.     Eyes: Negative.    Respiratory: Negative.     Cardiovascular: Negative.    Gastrointestinal: Negative.    Endocrine: Negative.    Genitourinary: Negative.    Musculoskeletal:  Positive for arthralgias.   Skin: Negative.    Allergic/Immunologic: Negative.    Neurological: Negative.    Hematological: Negative.    Psychiatric/Behavioral: Negative.          /54   Pulse 61   Temp 36.2 °C (97.2 °F) (Temporal)   Resp 16   Ht 1.626 m (5' 4\")   Wt 70.8 kg (156 lb)   SpO2 100%   BMI 26.78 kg/m²       Physical Exam  Vitals reviewed.   HENT:      Head: Normocephalic and atraumatic.      Nose: Nose normal.      Mouth/Throat:      Mouth: Mucous " membranes are moist.   Eyes:      Extraocular Movements: Extraocular movements intact.      Conjunctiva/sclera: Conjunctivae normal.      Pupils: Pupils are equal, round, and reactive to light.   Cardiovascular:      Rate and Rhythm: Normal rate and regular rhythm.      Pulses: Normal pulses.      Heart sounds: Murmur heard.   Pulmonary:      Effort: Pulmonary effort is normal.      Breath sounds: Normal breath sounds.   Abdominal:      General: Bowel sounds are normal.      Palpations: Abdomen is soft.   Genitourinary:     Comments: Deferred  Musculoskeletal:      Cervical back: Normal range of motion and neck supple.      Comments: Deferred to Ortho Surgeon   Skin:     General: Skin is warm and dry.   Neurological:      General: No focal deficit present.      Mental Status: She is alert and oriented to person, place, and time.   Psychiatric:         Mood and Affect: Mood normal.         Behavior: Behavior normal.          PAT AIRWAY:   Airway:     Mallampati::  III   lower implants   upper dentures        ASA: III  DASI: 42.7  METS: 8  CHADS: 2, 4.0 %  RCRI: 2, 6.6 %  Stop Bang: 3      Assessment and Plan:     Left reverse total shoulder arthroplasty  Diagnosis: Arthritis of shoulder region, left  Stage 3a chronic kidney disease- creatinine 1.18, GFR 49 4/15/24  Mixed hyperlipidemia- Lipid panel WNL 4/15/24, taking rosuvastatin 20 mg daily  CHF-Followed by cardiologist managed with bumetanide 2 mg BID, diltiazem 120 mg daily, digioxin 125 mcg daily.  TTE 2/27/24-    1. Left ventricular systolic function is normal with a 55-60% estimated ejection fraction.   2. Spectral Doppler shows an impaired relaxation pattern of left ventricular diastolic   filling.   3. Mild to moderate mitral valve regurgitation.   4. Mild to moderate tricuspid regurgitation visualized.   5. Moderate aortic valve stenosis.   6. Mild to moderately elevated pulmonary artery pressure.  *Per Dr. Daly, Dr. Soto will provide cardiac clearance.    Afib-Followed by cardiologist managed with digoxin 125 mcg daily , diltiazem 120 mg daily  Asthma/centrilobular emphysema-controlled, albuterol 2.5 mg nebulizer PRN.  Coagulation defect-followed by cardiologist, not on anticoagulation therapy, has appointment this afternoon (per patient) for possible Watchman device.  Anemia - CBC w/Diff 4/15/24, H &H 1.3 & 34.5 trending up, platelets 302.  Pacemaker/AICD-Device checked 3/6/24, See Epic Cardiology section    Crescencio Zazueta, APRN-CNP    ADDENDUM:  Cardiac clearance given by Dr. Nohelia Soto on 1/30/2024 - moderate risk for surgery - patient instructed to stop Xarelto 3 days prior to surgery- note scanned into MEDIA TAB.  Xarelto was not on her medication list - called patient to clarify and she stated she was taken off Xarelto 1 month ago

## 2024-04-22 NOTE — PREPROCEDURE INSTRUCTIONS
PAT DISCHARGE INSTRUCTIONS    Please call the Same Day Surgery (SDS) Department of the hospital where your procedure will be performed after 2:00 PM the day before your surgery. If you are scheduled on a Monday, or a Tuesday following a Monday holiday, you will need to call on the last business day prior to your surgery.    [facility]  [address] [place], [address]  [phone]    [facility]  [address] [place] [address] [phone]    [facility]  [address]  [place] [id number]  [phone]    Please let your surgeon know if:      You develop any open sores, shingles, burning or painful urination as these may increase your risk of an infection.   You no longer wish to have the surgery.   Any other personal circumstances change that may lead to the need to cancel or defer this surgery-such as being sick or getting admitted to any hospital within one week of your planned procedure.    Your contact details change, such as a change of address or phone number.    Starting now:     Please DO NOT drink alcohol or smoke for 24 hours before surgery. It is well known that quitting smoking can make a huge difference to your health and recovery from surgery. The longer you abstain from smoking, the better your chances of a healthy recovery. If you need help with quitting, call 7-800-QUIT-NOW to be connected to a trained counselor who will discuss the best methods to help you quit.     Before your surgery:    Please stop all supplements 7 days prior to surgery. Or as directed by your surgeon.   Please stop taking NSAID pain medicine such as Advil and Motrin 7 days before surgery.    If you develop any fever, cough, cold, rashes, cuts, scratches, scrapes, urinary symptoms or infection anywhere on your body (including teeth and gums) prior to surgery, please call your surgeon’s office as soon as  possible. This may require treatment to reduce the chance of cancellation on the day of surgery.    The day before your surgery:   Get a good night’s rest.  Use the special soap for bathing if you have been instructed to use one.    Scheduled surgery times may change and you will be notified if this occurs - please check your personal voicemail for any updates.     On the morning of surgery:   Wear comfortable, loose fitting clothes which open in the front. Please do not wear moisturizers, creams, lotions, makeup or perfume.    Please bring with you to surgery:   Photo ID and insurance card   Current list of medicines and allergies   Pacemaker/ Defibrillator/Heart stent cards   CPAP machine and mask    Slings/ splints/ crutches   A copy of your complete advanced directive/DHPOA.    Please do NOT bring with you to surgery:   All jewelry and valuables should be left at home.   Prosthetic devices such as contact lenses, hearing aids, dentures, eyelash extensions, hairpins and body piercings must be removed prior to going in to the surgical suite.    After outpatient surgery:   A responsible adult MUST accompany you at the time of discharge and stay with you for 24 hours after your surgery. You may NOT drive yourself home after surgery.    Do not drive, operate machinery, make critical decisions or do activities that require co-ordination or balance until after a night’s sleep.   Do not drink alcoholic beverages for 24 hours.   Instructions for resuming your medications will be provided by your surgeon.    CALL YOUR DOCTOR AFTER SURGERY IF YOU HAVE:     Chills and/or a fever of 101° F or higher.    Redness, swelling, pus or drainage from your surgical wound or a bad smell from the wound.    Lightheadedness, fainting or confusion.    Persistent vomiting (throwing up) and are not able to eat or drink for 12 hours.    Three or more loose, watery bowel movements in 24 hours (diarrhea).   Difficulty or pain while urinating(  after non-urological surgery)    Pain and swelling in your legs, especially if it is only on one side.    Difficulty breathing or are breathing faster than normal.    Any new concerning symptoms.          Patient Information: Pre-Operative Infection Prevention Measures     Why did I have my nose swabbed?  The purpose of the swab is to identify Staphylococcus aureus inside your nose. The swab was sent to the laboratory for culture.  A positive swab/culture for Staphylococcus aureus is called colonization or carriage.      What is Staphylococcus aureus?  Staphylococcus aureus, also known as “staph”, is a germ found on the skin or in the nose of healthy people.  Sometimes Staphylococcus aureus can get into the body and cause an infection.  This can be minor (such as pimples, boils, or other skin problems).  It might also be serious (such as a blood infection, pneumonia, or a surgical site infection).    What is Staphylococcus aureus colonization or carriage?  Colonization or carriage means that a person has the germ but is not sick from it.  These bacteria can be spread on the hands or when breathing or sneezing.    How is Staphylococcus aureus spread?  It is most often spread by close contact with a person or item that carries it.    What happens if my culture is positive for Staphylococcus aureus?  Your doctor/medical team will use this information to guide any antibiotic treatment which may be necessary.  Regardless of the culture results, we will clean the inside of your nose with a betadine swab just before you have your surgery.      Will I get an infection if I have Staphylococcus aureus in my nose or on my skin?  Anyone can get an infection with Staphylococcus aureus.  However, the best way to reduce your risk of infection is to follow the instructions provided to you for the use of your CHG soap and dental rinse.        Patient Information: Oral/Dental Rinse    What is oral/dental rinse?   It is a mouthwash.  It is a way of cleaning the mouth with a germ-killing solution before your surgery.  The solution contains chlorhexidine, commonly known as CHG.   It is used inside the mouth to kill a bacteria known as Staphylococcus aureus.  Let your doctor know if you are allergic to Chlorhexidine.    Why do I need to use CHG oral/dental rinse?  The CHG oral/dental rinse helps to kill a bacteria in your mouth known as Staphylococcus aureus.     This reduces the risk of infection at the surgical site.      Using your CHG oral/dental rinse  STEPS:  Use your CHG oral/dental rinse after you brush your teeth the night before (at bedtime) and the morning of your surgery.  Follow all directions on your prescription label.    Use the cap on the container to measure 15ml   Swish (gargle if you can) the mouthwash in your mouth for at least 30 seconds, (do not swallow) and spit out  After you use your CHG rinse, do not rinse your mouth with water, drink or eat.  Please refer to the prescription label for the appropriate time to resume oral intake      What side effects might I have using the CHG oral/dental rinse?  CHG rinse will stick to plaque on the teeth.  Brush and floss just before use.  Teeth brushing will help avoid staining of plaque during use.      Patient Information: Home Preoperative Antibacterial Shower      What is a home preoperative antibacterial shower?  This shower is a way of cleaning the skin with a germ-killing solution before surgery.  The solution contains chlorhexidine, commonly known as CHG.  CHG is a skin cleanser with germ-killing ability.  Let your doctor know if you are allergic to chlorhexidine.    Why do I need to take a preoperative antibacterial shower?  Skin is not sterile.  It is best to try to make your skin as free of germs as possible before surgery.  Proper cleansing with a germ-killing soap before surgery can lower the number of germs on your skin.  This helps to reduce the risk of infection at the  surgical site.  Following the instructions listed below will help you prepare your skin for surgery.      How do I use the solution?  Steps:  Begin using your CHG soap 5 days before your scheduled surgery on ________________________.    First, wash and rinse your hair using the CHG soap. Keep CHG soap away from ear canals and eyes.  Rinse completely, do not condition.  Hair extensions should be removed.  Wash your face with your normal soap and rinse.    Apply the CHG solution to a clean wet washcloth.  Turn the water off or move away from the water spray to avoid premature rinsing of the CHG soap as you are applying.   Firmly lather your entire body from the neck down.  Do not use on your face.  Pay special attention to the area(s) where your incision(s) will be located unless they are on your face.  Avoid scrubbing your skin too hard.  The important point is to have the CHG soap sit on your skin for 3 minutes.    When the 3 minutes are up, turn on the water and rinse the CHG solution off your body completely.   DO NOT wash with regular soap after you have used the CHG soap solution  Pat yourself dry with a clean, freshly-laundered towel.  DO NOT apply powders, deodorants, or lotions.  Dress in clean, freshly laundered nightclothes.    Be sure to sleep with clean, freshly laundered sheets.  Be aware that CHG will cause stains on fabrics; if you wash them with bleach after use.  Rinse your washcloth and other linens that have contact with CHG completely.  Use only non-chlorine detergents to launder the items used.   The morning of surgery is the fifth day.  Repeat the above steps and dress in clean comfortable clothing     Whom should I contact if I have any questions regarding the use of CHG soap?  Call the [facility] at [phone] if you have any questions.               Why must I stop eating and drinking near surgery time?  With sedation,  food or liquid in your stomach can enter your lungs causing serious complications  Increases nausea and vomiting    When do I need to stop eating and drinking before my surgery?   Do not eat or drink after midnight the night before your surgery/procedure.  You may have small sips of water to take your medication.           Medication List            Accurate as of April 22, 2024  7:59 AM. Always use your most recent med list.                albuterol 2.5 mg /3 mL (0.083 %) nebulizer solution  Medication Adjustments for Surgery: Other (Comment)  Notes to patient: CONTINUE IF NEEDED     amitriptyline 25 mg tablet  Commonly known as: Elavil  Take 1 tablet (25 mg) by mouth 2 times a day.  Medication Adjustments for Surgery: Take morning of surgery with sip of water, no other fluids     baclofen 10 mg tablet  Commonly known as: Lioresal  One tablet in the morning and afternoon and 1 to 2 tablets in the evening.  Medication Adjustments for Surgery: Other (Comment)  Notes to patient: CONTINUE IF NEEDED     bumetanide 2 mg tablet  Commonly known as: Bumex  Medication Adjustments for Surgery: Take morning of surgery with sip of water, no other fluids     Cardizem  mg 24 hr tablet  Generic drug: dilTIAZem LA  Medication Adjustments for Surgery: Take morning of surgery with sip of water, no other fluids     chlorhexidine 0.12 % solution  Commonly known as: Peridex  Use as directed Do not start before May 1, 2024.  Start taking on: May 1, 2024     clindamycin 300 mg capsule  Commonly known as: Cleocin  Take 1 capsule (300 mg) by mouth 2 times a day for 10 days.  Medication Adjustments for Surgery: Other (Comment)  Notes to patient: PT IS DONE TAKING CLINDAMYCIN     clobetasol 0.05 % cream  Commonly known as: Temovate  Apply topically 2 times a day.  Medication Adjustments for Surgery: Other (Comment)  Notes to patient: CONTINUE IF NEEDED     diclofenac sodium 1 % gel  Commonly known as: Voltaren  Apply 4.5 inches (4 g)  topically 2 times a day as needed (joint pain).  Medication Adjustments for Surgery: Stop 7 days before surgery     digoxin 125 MCG tablet  Commonly known as: Lanoxin  Medication Adjustments for Surgery: Take morning of surgery with sip of water, no other fluids     EPINEPHrine 0.3 mg/0.3 mL injection syringe  Commonly known as: Epipen     famotidine 40 mg tablet  Commonly known as: Pepcid  Take 1 tablet (40 mg) by mouth once daily.  Medication Adjustments for Surgery: Take morning of surgery with sip of water, no other fluids     fluticasone 50 mcg/actuation nasal spray  Commonly known as: Flonase  Medication Adjustments for Surgery: Other (Comment)  Notes to patient: CONTINUE IF NEEDED     hydroxychloroquine 200 mg tablet  Commonly known as: Plaquenil  Take 1 tablet (200 mg) by mouth once daily.  Medication Adjustments for Surgery: Take morning of surgery with sip of water, no other fluids     magnesium oxide 400 mg tablet  Commonly known as: Mag-Ox  Medication Adjustments for Surgery: Stop 7 days before surgery     Miralax 17 gram/dose powder  Generic drug: polyethylene glycol  Medication Adjustments for Surgery: Other (Comment)  Notes to patient: CONTINUE PER USUAL/TAKE NIGHT BEFORE SURGERY     montelukast 10 mg tablet  Commonly known as: Singulair  Take 1 tablet (10 mg) by mouth once daily at bedtime.  Medication Adjustments for Surgery: Other (Comment)  Notes to patient: CONTINUE PER USUAL/TAKE NIGHT BEFORE SURGERY     nitroglycerin 0.4 mg SL tablet  Commonly known as: Nitrostat  Medication Adjustments for Surgery: Other (Comment)     * oxyCODONE-acetaminophen 5-325 mg tablet  Commonly known as: Percocet  Take 1 tablet by mouth 3 times a day as needed for severe pain (7 - 10) for up to 28 days. Do not start before April 12, 2024.  Medication Adjustments for Surgery: Other (Comment)  Notes to patient: CONTINUE IF NEEDED     * oxyCODONE-acetaminophen 5-325 mg tablet  Commonly known as: Percocet  Take 1 tablet by  mouth every 4 hours if needed for severe pain (7 - 10). Do not start before May 1, 2024.  Start taking on: May 1, 2024  Medication Adjustments for Surgery: Other (Comment)  Notes to patient: CONTINUE IF NEEDED     pantoprazole 40 mg EC tablet  Commonly known as: ProtoNix  TAKE ONE TABLET BY MOUTH TWO TIMES A DAY  Medication Adjustments for Surgery: Take morning of surgery with sip of water, no other fluids     rosuvastatin 20 mg tablet  Commonly known as: Crestor  TAKE ONE TABLET BY MOUTH EVERY DAY  Medication Adjustments for Surgery: Take morning of surgery with sip of water, no other fluids           * This list has 2 medication(s) that are the same as other medications prescribed for you. Read the directions carefully, and ask your doctor or other care provider to review them with you.

## 2024-04-22 NOTE — H&P (VIEW-ONLY)
CPM/PAT Evaluation       Name: Trina Elias (Trina Elias)  /Age: 1950/73 y.o.     In-Person       Chief Complaint: Preadmission Testing    HPI  Trina is a 73 year old female who presents today for preadmission testing for upcoming surgery with Dr. Daly scheduled 24:    Left reverse total shoulder arthroplasty  Diagnosis: Arthritis of shoulder region, left    Patient is in no acute distress and denies fever, chills, SOB, and chest pain.        Past Medical History:   Diagnosis Date    Abnormal mammogram 2023    Acute sphenoidal sinusitis, unspecified 10/12/2017    Acute sphenoidal sinusitis, recurrence not specified    Anemia     Asthma ([facility])     Atrial fibrillation (Multi)     Bacterial meningitis, unspecified ([facility]) 2018    Acute bacterial meningitis    Bone pain 2023    BSOM (bilateral serous otitis media) 2023    CAD (coronary artery disease)     Cervical neuropathy 2023    Cervicalgia 2021    Acute neck pain    Chest pain on exertion 2023    Chronic obstructive pulmonary disease, unspecified (Multi)     Chronic obstructive pulmonary disease    CKD (chronic kidney disease)     Cutaneous abscess of abdominal wall 2015    Abdominal wall abscess    Dehydration 2023    Dry cough 2023    Dry heaves 2023    Dyspnea 2023    Elevated serum creatinine 2023    Fatigue 2023    Fever, unspecified 2023    Gastrointestinal hemorrhage with melena     Headache 2023    History of abdominal surgery 2023    History of colon polyps 2023    Lumbar radiculitis 2023    Median nerve neuropathy 2023    Nonhealing surgical wound 2023    Numbness 2023    Osteopenia 2023    Other conditions influencing health status     Coronary Artery Disease    Other conditions influencing health status     Peptic Ulcer    Pain of right upper extremity 2023    Personal history of anaphylaxis  2015    History of anaphylaxis    Personal history of diseases of the skin and subcutaneous tissue     History of eczema    Personal history of infections of the central nervous system     History of bacterial meningitis    Personal history of other diseases of the digestive system 2015    History of esophageal reflux    Personal history of other diseases of the digestive system     History of hiatal hernia    Personal history of other diseases of the nervous system and sense organs     History of carpal tunnel syndrome    Personal history of other diseases of the respiratory system 2020    History of acute pharyngitis    Personal history of other diseases of the respiratory system     Personal history of asthma    Personal history of other specified conditions 2019    History of epigastric pain    Personal history of other specified conditions 2018    History of angioedema    Postural dizziness with presyncope 2023    Presence of combination internal cardiac defibrillator (ICD) and pacemaker     Pulmonary edema ([facility]) 2023    Radiculopathy, lumbar region 2018    Acute lumbar radiculopathy    Restless legs syndrome 2015    Restless legs syndrome    Right serous otitis media 2023    Severe pain 2023    Small bowel obstruction (Multi) 2023    Tarsal tunnel syndrome, unspecified lower limb     Tarsal tunnel syndrome    Thoracic aortic aneurysm, without rupture, unspecified (CMS-HCC) 2020    Aneurysm, aorta, thoracic    Toxic effect of venom 2023    Ulcer of the stomach caused by bacteria (h. pylori), acute 2023    Ulnar neuropathy 2023       Past Surgical History:   Procedure Laterality Date    APPENDECTOMY  2013    Appendectomy    CARDIAC CATHETERIZATION  2013    Cardiac Cath Procedure Summary    CARDIAC SURGERY  2014    Heart Surgery     SECTION, CLASSIC  2013     Section     CHOLECYSTECTOMY  03/08/2013    Cholecystectomy Laparoscopic    FOOT SURGERY  03/08/2013    Foot Surgery    GASTRIC FUNDOPLICATION  03/08/2013    Esophagogastric Fundoplasty Nissen Fundoplication    HERNIA REPAIR  06/22/2015    Hernia Repair    OTHER SURGICAL HISTORY  08/08/2019    [place] tooth extraction    OTHER SURGICAL HISTORY  08/08/2019    Carpal tunnel surgery    OTHER SURGICAL HISTORY  08/08/2019    Foot surgery    OTHER SURGICAL HISTORY  08/08/2019    Leg surgery    OTHER SURGICAL HISTORY  01/06/2020    Hand surgery    OTHER SURGICAL HISTORY  02/09/2015    Cardio-Defib Eval Dual Chamber With Reprogramming    OTHER SURGICAL HISTORY  01/11/2021    Intestinal surgery    ROTATOR CUFF REPAIR  08/13/2018    Rotator Cuff Repair     Social History     Tobacco Use    Smoking status: Never    Smokeless tobacco: Never   Substance Use Topics    Alcohol use: Never      Patient  has no history on file for sexual activity.    Family History   Problem Relation Name Age of Onset    Asthma Daughter      Asthma Other      Colon cancer Other      Diabetes Other      Other (stroke syndrome) Other         Allergies   Allergen Reactions    Hydromorphone Anaphylaxis    Metoprolol Anaphylaxis    Abatacept Other     pulmonary edema, diarrhea, nausea    Bupropion Other    Cefepime Unknown    Ciprofloxacin Hives    Furosemide Itching    Hydrocodone-Acetaminophen Other    Levofloxacin Unknown    Methotrexate Hives and Itching     chest pain    Penicillins Hives    Pregabalin Other     caused pulmonary edema    Prochlorperazine Other     paralysis of the mouth with drooping    Aripiprazole Rash    Beta-Blockers (Beta-Adrenergic Blocking Agts) Other, Palpitations and Wheezing    Pineapple Rash     Current Outpatient Medications   Medication Instructions    albuterol 2.5 mg, nebulization, Every 4 hours PRN    amitriptyline (ELAVIL) 25 mg, oral, 2 times daily    baclofen (Lioresal) 10 mg tablet One tablet in the morning and afternoon and 1  "to 2 tablets in the evening.    bumetanide (BUMEX) 2 mg, oral, 2 times daily    [START ON 5/1/2024] chlorhexidine (Peridex) 0.12 % solution Use as directed    clindamycin (CLEOCIN) 300 mg, oral, 2 times daily    clobetasol (Temovate) 0.05 % cream Topical, 2 times daily    diclofenac sodium (VOLTAREN) 4 g, Topical, 2 times daily PRN    digoxin (LANOXIN) 125 mcg, oral, Daily    dilTIAZem LA (CARDIZEM LA) 120 mg, oral, Daily    EPINEPHrine 0.3 mg/0.3 mL injection syringe 1 Syringe, injection, As needed, Per Pt has not needed.     famotidine (PEPCID) 40 mg, oral, Daily    fluticasone (Flonase) 50 mcg/actuation nasal spray 1 spray, nasal, 2 times daily    hydroxychloroquine (PLAQUENIL) 200 mg, oral, Daily    magnesium oxide (MAG-OX) 400 mg, oral, Daily    montelukast (SINGULAIR) 10 mg, oral, Nightly    nitroglycerin (NITROSTAT) 0.4 mg, sublingual, Every 5 min PRN    oxyCODONE-acetaminophen (Percocet) 5-325 mg tablet 1 tablet, oral, 3 times daily PRN    [START ON 5/1/2024] oxyCODONE-acetaminophen (Percocet) 5-325 mg tablet 1 tablet, oral, Every 4 hours PRN    pantoprazole (PROTONIX) 40 mg, oral, 2 times daily    polyethylene glycol (MIRALAX) 17 g, oral, Daily    rosuvastatin (CRESTOR) 20 mg, oral, Daily      Review of Systems   Constitutional: Negative.    HENT: Negative.     Eyes: Negative.    Respiratory: Negative.     Cardiovascular: Negative.    Gastrointestinal: Negative.    Endocrine: Negative.    Genitourinary: Negative.    Musculoskeletal:  Positive for arthralgias.   Skin: Negative.    Allergic/Immunologic: Negative.    Neurological: Negative.    Hematological: Negative.    Psychiatric/Behavioral: Negative.          /54   Pulse 61   Temp 36.2 °C (97.2 °F) (Temporal)   Resp 16   Ht 1.626 m (5' 4\")   Wt 70.8 kg (156 lb)   SpO2 100%   BMI 26.78 kg/m²       Physical Exam  Vitals reviewed.   HENT:      Head: Normocephalic and atraumatic.      Nose: Nose normal.      Mouth/Throat:      Mouth: Mucous " membranes are moist.   Eyes:      Extraocular Movements: Extraocular movements intact.      Conjunctiva/sclera: Conjunctivae normal.      Pupils: Pupils are equal, round, and reactive to light.   Cardiovascular:      Rate and Rhythm: Normal rate and regular rhythm.      Pulses: Normal pulses.      Heart sounds: Murmur heard.   Pulmonary:      Effort: Pulmonary effort is normal.      Breath sounds: Normal breath sounds.   Abdominal:      General: Bowel sounds are normal.      Palpations: Abdomen is soft.   Genitourinary:     Comments: Deferred  Musculoskeletal:      Cervical back: Normal range of motion and neck supple.      Comments: Deferred to Ortho Surgeon   Skin:     General: Skin is warm and dry.   Neurological:      General: No focal deficit present.      Mental Status: She is alert and oriented to person, place, and time.   Psychiatric:         Mood and Affect: Mood normal.         Behavior: Behavior normal.          PAT AIRWAY:   Airway:     Mallampati::  III   lower implants   upper dentures        ASA: III  DASI: 42.7  METS: 8  CHADS: 2, 4.0 %  RCRI: 2, 6.6 %  Stop Bang: 3      Assessment and Plan:     Left reverse total shoulder arthroplasty  Diagnosis: Arthritis of shoulder region, left  Stage 3a chronic kidney disease- creatinine 1.18, GFR 49 4/15/24  Mixed hyperlipidemia- Lipid panel WNL 4/15/24, taking rosuvastatin 20 mg daily  CHF-Followed by cardiologist managed with bumetanide 2 mg BID, diltiazem 120 mg daily, digioxin 125 mcg daily.  TTE 2/27/24-    1. Left ventricular systolic function is normal with a 55-60% estimated ejection fraction.   2. Spectral Doppler shows an impaired relaxation pattern of left ventricular diastolic   filling.   3. Mild to moderate mitral valve regurgitation.   4. Mild to moderate tricuspid regurgitation visualized.   5. Moderate aortic valve stenosis.   6. Mild to moderately elevated pulmonary artery pressure.  *Per Dr. Daly, Dr. Soto will provide cardiac clearance.    Afib-Followed by cardiologist managed with digoxin 125 mcg daily , diltiazem 120 mg daily  Asthma/centrilobular emphysema-controlled, albuterol 2.5 mg nebulizer PRN.  Coagulation defect-followed by cardiologist, not on anticoagulation therapy, has appointment this afternoon (per patient) for possible Watchman device.  Anemia - CBC w/Diff 4/15/24, H &H 1.3 & 34.5 trending up, platelets 302.  Pacemaker/AICD-Device checked 3/6/24, See Epic Cardiology section    Crescencio Zazueta, APRN-CNP    ADDENDUM:  Cardiac clearance given by Dr. Nohelia Soto on 1/30/2024 - moderate risk for surgery - patient instructed to stop Xarelto 3 days prior to surgery- note scanned into MEDIA TAB.  Xarelto was not on her medication list - called patient to clarify and she stated she was taken off Xarelto 1 month ago

## 2024-04-23 ENCOUNTER — HOSPITAL ENCOUNTER (OUTPATIENT)
Dept: CARDIOLOGY | Facility: CLINIC | Age: 74
Discharge: HOME | End: 2024-04-23
Payer: MEDICARE

## 2024-04-23 DIAGNOSIS — Z95.810 PRESENCE OF AUTOMATIC CARDIOVERTER/DEFIBRILLATOR (AICD): ICD-10-CM

## 2024-04-23 DIAGNOSIS — I42.9 CARDIOMYOPATHY, UNSPECIFIED TYPE (MULTI): ICD-10-CM

## 2024-04-24 LAB — STAPHYLOCOCCUS SPEC CULT: NORMAL

## 2024-05-02 ENCOUNTER — APPOINTMENT (OUTPATIENT)
Dept: RADIOLOGY | Facility: HOSPITAL | Age: 74
End: 2024-05-02
Payer: MEDICARE

## 2024-05-02 ENCOUNTER — ANESTHESIA (OUTPATIENT)
Dept: OPERATING ROOM | Facility: HOSPITAL | Age: 74
End: 2024-05-02
Payer: MEDICARE

## 2024-05-02 ENCOUNTER — ANESTHESIA EVENT (OUTPATIENT)
Dept: OPERATING ROOM | Facility: HOSPITAL | Age: 74
End: 2024-05-02
Payer: MEDICARE

## 2024-05-02 ENCOUNTER — HOSPITAL ENCOUNTER (OUTPATIENT)
Facility: HOSPITAL | Age: 74
LOS: 1 days | Discharge: HOME | End: 2024-05-03
Attending: ORTHOPAEDIC SURGERY | Admitting: ORTHOPAEDIC SURGERY
Payer: MEDICARE

## 2024-05-02 DIAGNOSIS — M19.012 ARTHRITIS OF LEFT SHOULDER REGION: Primary | ICD-10-CM

## 2024-05-02 PROCEDURE — 7100000001 HC RECOVERY ROOM TIME - INITIAL BASE CHARGE: Performed by: ORTHOPAEDIC SURGERY

## 2024-05-02 PROCEDURE — 2500000004 HC RX 250 GENERAL PHARMACY W/ HCPCS (ALT 636 FOR OP/ED): Performed by: ANESTHESIOLOGY

## 2024-05-02 PROCEDURE — A6213 FOAM DRG >16<=48 SQ IN W/BDR: HCPCS | Performed by: ORTHOPAEDIC SURGERY

## 2024-05-02 PROCEDURE — 7100000002 HC RECOVERY ROOM TIME - EACH INCREMENTAL 1 MINUTE: Performed by: ORTHOPAEDIC SURGERY

## 2024-05-02 PROCEDURE — 73030 X-RAY EXAM OF SHOULDER: CPT | Mod: LT

## 2024-05-02 PROCEDURE — 94760 N-INVAS EAR/PLS OXIMETRY 1: CPT

## 2024-05-02 PROCEDURE — 2500000004 HC RX 250 GENERAL PHARMACY W/ HCPCS (ALT 636 FOR OP/ED): Mod: JZ | Performed by: ORTHOPAEDIC SURGERY

## 2024-05-02 PROCEDURE — G0378 HOSPITAL OBSERVATION PER HR: HCPCS

## 2024-05-02 PROCEDURE — 2500000004 HC RX 250 GENERAL PHARMACY W/ HCPCS (ALT 636 FOR OP/ED): Performed by: ANESTHESIOLOGIST ASSISTANT

## 2024-05-02 PROCEDURE — 2500000006 HC RX 250 W HCPCS SELF ADMINISTERED DRUGS (ALT 637 FOR ALL PAYERS): Mod: MUE | Performed by: PHYSICIAN ASSISTANT

## 2024-05-02 PROCEDURE — 64450 NJX AA&/STRD OTHER PN/BRANCH: CPT | Performed by: ANESTHESIOLOGY

## 2024-05-02 PROCEDURE — 2500000005 HC RX 250 GENERAL PHARMACY W/O HCPCS: Performed by: ANESTHESIOLOGIST ASSISTANT

## 2024-05-02 PROCEDURE — 3600000005 HC OR TIME - INITIAL BASE CHARGE - PROCEDURE LEVEL FIVE: Performed by: ORTHOPAEDIC SURGERY

## 2024-05-02 PROCEDURE — 2720000007 HC OR 272 NO HCPCS: Performed by: ORTHOPAEDIC SURGERY

## 2024-05-02 PROCEDURE — 99100 ANES PT EXTEME AGE<1 YR&>70: CPT | Performed by: ANESTHESIOLOGY

## 2024-05-02 PROCEDURE — C1776 JOINT DEVICE (IMPLANTABLE): HCPCS | Performed by: ORTHOPAEDIC SURGERY

## 2024-05-02 PROCEDURE — A23472 PR RECONSTR TOTAL SHOULDER IMPLANT: Performed by: ANESTHESIOLOGIST ASSISTANT

## 2024-05-02 PROCEDURE — C1713 ANCHOR/SCREW BN/BN,TIS/BN: HCPCS | Performed by: ORTHOPAEDIC SURGERY

## 2024-05-02 PROCEDURE — 7100000011 HC EXTENDED STAY RECOVERY HOURLY - NURSING UNIT

## 2024-05-02 PROCEDURE — 2500000001 HC RX 250 WO HCPCS SELF ADMINISTERED DRUGS (ALT 637 FOR MEDICARE OP): Performed by: ORTHOPAEDIC SURGERY

## 2024-05-02 PROCEDURE — A23472 PR RECONSTR TOTAL SHOULDER IMPLANT: Performed by: ANESTHESIOLOGY

## 2024-05-02 PROCEDURE — 3600000010 HC OR TIME - EACH INCREMENTAL 1 MINUTE - PROCEDURE LEVEL FIVE: Performed by: ORTHOPAEDIC SURGERY

## 2024-05-02 PROCEDURE — 2780000003 HC OR 278 NO HCPCS: Performed by: ORTHOPAEDIC SURGERY

## 2024-05-02 PROCEDURE — 2500000004 HC RX 250 GENERAL PHARMACY W/ HCPCS (ALT 636 FOR OP/ED): Performed by: PHYSICIAN ASSISTANT

## 2024-05-02 PROCEDURE — 97161 PT EVAL LOW COMPLEX 20 MIN: CPT | Mod: GP

## 2024-05-02 PROCEDURE — 3700000002 HC GENERAL ANESTHESIA TIME - EACH INCREMENTAL 1 MINUTE: Performed by: ORTHOPAEDIC SURGERY

## 2024-05-02 PROCEDURE — 94762 N-INVAS EAR/PLS OXIMTRY CONT: CPT | Mod: 59

## 2024-05-02 PROCEDURE — 3700000001 HC GENERAL ANESTHESIA TIME - INITIAL BASE CHARGE: Performed by: ORTHOPAEDIC SURGERY

## 2024-05-02 PROCEDURE — 97110 THERAPEUTIC EXERCISES: CPT | Mod: GP

## 2024-05-02 PROCEDURE — A4649 SURGICAL SUPPLIES: HCPCS | Performed by: ORTHOPAEDIC SURGERY

## 2024-05-02 PROCEDURE — 2500000001 HC RX 250 WO HCPCS SELF ADMINISTERED DRUGS (ALT 637 FOR MEDICARE OP): Performed by: PHYSICIAN ASSISTANT

## 2024-05-02 PROCEDURE — 96372 THER/PROPH/DIAG INJ SC/IM: CPT | Performed by: ORTHOPAEDIC SURGERY

## 2024-05-02 DEVICE — GLENOSPHERE, VERSA-DIAL, 36MM, STANDARD: Type: IMPLANTABLE DEVICE | Site: SHOULDER | Status: FUNCTIONAL

## 2024-05-02 DEVICE — IMPLANTABLE DEVICE: Type: IMPLANTABLE DEVICE | Site: SHOULDER | Status: FUNCTIONAL

## 2024-05-02 DEVICE — IMPLANTABLE DEVICE: Type: IMPLANTABLE DEVICE | Site: SHOULDER | Status: NON-FUNCTIONAL

## 2024-05-02 DEVICE — HUMERAL TRAY, STD +3 TAPER OFFSET, 40MM DIA: Type: IMPLANTABLE DEVICE | Site: SHOULDER | Status: FUNCTIONAL

## 2024-05-02 RX ORDER — SODIUM CHLORIDE, SODIUM LACTATE, POTASSIUM CHLORIDE, CALCIUM CHLORIDE 600; 310; 30; 20 MG/100ML; MG/100ML; MG/100ML; MG/100ML
50 INJECTION, SOLUTION INTRAVENOUS CONTINUOUS
Status: DISCONTINUED | OUTPATIENT
Start: 2024-05-02 | End: 2024-05-02

## 2024-05-02 RX ORDER — ONDANSETRON HYDROCHLORIDE 2 MG/ML
4 INJECTION, SOLUTION INTRAVENOUS ONCE AS NEEDED
Status: DISCONTINUED | OUTPATIENT
Start: 2024-05-02 | End: 2024-05-02 | Stop reason: HOSPADM

## 2024-05-02 RX ORDER — LIDOCAINE HYDROCHLORIDE 10 MG/ML
INJECTION, SOLUTION EPIDURAL; INFILTRATION; INTRACAUDAL; PERINEURAL AS NEEDED
Status: DISCONTINUED | OUTPATIENT
Start: 2024-05-02 | End: 2024-05-02

## 2024-05-02 RX ORDER — FENTANYL CITRATE 50 UG/ML
25 INJECTION, SOLUTION INTRAMUSCULAR; INTRAVENOUS EVERY 5 MIN PRN
Status: DISCONTINUED | OUTPATIENT
Start: 2024-05-02 | End: 2024-05-02 | Stop reason: HOSPADM

## 2024-05-02 RX ORDER — SIMETHICONE 80 MG
80 TABLET,CHEWABLE ORAL 4 TIMES DAILY PRN
Status: DISCONTINUED | OUTPATIENT
Start: 2024-05-02 | End: 2024-05-03 | Stop reason: HOSPADM

## 2024-05-02 RX ORDER — TALC
3 POWDER (GRAM) TOPICAL NIGHTLY PRN
Status: DISCONTINUED | OUTPATIENT
Start: 2024-05-02 | End: 2024-05-03 | Stop reason: HOSPADM

## 2024-05-02 RX ORDER — FAMOTIDINE 20 MG/1
40 TABLET, FILM COATED ORAL DAILY
Status: DISCONTINUED | OUTPATIENT
Start: 2024-05-03 | End: 2024-05-03 | Stop reason: HOSPADM

## 2024-05-02 RX ORDER — ONDANSETRON HYDROCHLORIDE 2 MG/ML
INJECTION, SOLUTION INTRAVENOUS AS NEEDED
Status: DISCONTINUED | OUTPATIENT
Start: 2024-05-02 | End: 2024-05-02

## 2024-05-02 RX ORDER — ACETAMINOPHEN 10 MG/ML
1000 INJECTION, SOLUTION INTRAVENOUS ONCE
Status: DISCONTINUED | OUTPATIENT
Start: 2024-05-02 | End: 2024-05-02 | Stop reason: HOSPADM

## 2024-05-02 RX ORDER — PANTOPRAZOLE SODIUM 20 MG/1
20 TABLET, DELAYED RELEASE ORAL 2 TIMES DAILY
Status: DISCONTINUED | OUTPATIENT
Start: 2024-05-02 | End: 2024-05-03 | Stop reason: HOSPADM

## 2024-05-02 RX ORDER — SODIUM CHLORIDE, SODIUM LACTATE, POTASSIUM CHLORIDE, CALCIUM CHLORIDE 600; 310; 30; 20 MG/100ML; MG/100ML; MG/100ML; MG/100ML
100 INJECTION, SOLUTION INTRAVENOUS CONTINUOUS
Status: DISCONTINUED | OUTPATIENT
Start: 2024-05-02 | End: 2024-05-03 | Stop reason: HOSPADM

## 2024-05-02 RX ORDER — FENTANYL CITRATE 50 UG/ML
100 INJECTION, SOLUTION INTRAMUSCULAR; INTRAVENOUS ONCE
Status: DISCONTINUED | OUTPATIENT
Start: 2024-05-02 | End: 2024-05-02

## 2024-05-02 RX ORDER — DIGOXIN 125 MCG
125 TABLET ORAL DAILY
Status: DISCONTINUED | OUTPATIENT
Start: 2024-05-03 | End: 2024-05-03 | Stop reason: HOSPADM

## 2024-05-02 RX ORDER — PROPOFOL 10 MG/ML
INJECTION, EMULSION INTRAVENOUS AS NEEDED
Status: DISCONTINUED | OUTPATIENT
Start: 2024-05-02 | End: 2024-05-02

## 2024-05-02 RX ORDER — ONDANSETRON 4 MG/1
4 TABLET, ORALLY DISINTEGRATING ORAL EVERY 8 HOURS PRN
Status: DISCONTINUED | OUTPATIENT
Start: 2024-05-02 | End: 2024-05-03 | Stop reason: HOSPADM

## 2024-05-02 RX ORDER — OXYCODONE HYDROCHLORIDE 5 MG/1
5 TABLET ORAL EVERY 6 HOURS PRN
Status: DISCONTINUED | OUTPATIENT
Start: 2024-05-02 | End: 2024-05-02

## 2024-05-02 RX ORDER — ACETAMINOPHEN 325 MG/1
650 TABLET ORAL EVERY 4 HOURS PRN
Status: DISCONTINUED | OUTPATIENT
Start: 2024-05-02 | End: 2024-05-03 | Stop reason: HOSPADM

## 2024-05-02 RX ORDER — VANCOMYCIN 1 G/200ML
1000 INJECTION, SOLUTION INTRAVENOUS ONCE
Status: COMPLETED | OUTPATIENT
Start: 2024-05-02 | End: 2024-05-02

## 2024-05-02 RX ORDER — VANCOMYCIN HYDROCHLORIDE 1 G/20ML
INJECTION, POWDER, LYOPHILIZED, FOR SOLUTION INTRAVENOUS AS NEEDED
Status: DISCONTINUED | OUTPATIENT
Start: 2024-05-02 | End: 2024-05-02 | Stop reason: HOSPADM

## 2024-05-02 RX ORDER — OXYCODONE HYDROCHLORIDE 5 MG/1
5 TABLET ORAL EVERY 4 HOURS PRN
Status: DISCONTINUED | OUTPATIENT
Start: 2024-05-02 | End: 2024-05-03 | Stop reason: HOSPADM

## 2024-05-02 RX ORDER — ENOXAPARIN SODIUM 100 MG/ML
40 INJECTION SUBCUTANEOUS EVERY 24 HOURS
Status: DISCONTINUED | OUTPATIENT
Start: 2024-05-02 | End: 2024-05-03 | Stop reason: HOSPADM

## 2024-05-02 RX ORDER — ALBUTEROL SULFATE 0.83 MG/ML
2.5 SOLUTION RESPIRATORY (INHALATION) ONCE AS NEEDED
Status: DISCONTINUED | OUTPATIENT
Start: 2024-05-02 | End: 2024-05-02 | Stop reason: HOSPADM

## 2024-05-02 RX ORDER — OXYCODONE HYDROCHLORIDE 5 MG/1
10 TABLET ORAL EVERY 4 HOURS PRN
Status: DISCONTINUED | OUTPATIENT
Start: 2024-05-02 | End: 2024-05-03 | Stop reason: HOSPADM

## 2024-05-02 RX ORDER — ALUMINUM HYDROXIDE, MAGNESIUM HYDROXIDE, AND SIMETHICONE 1200; 120; 1200 MG/30ML; MG/30ML; MG/30ML
20 SUSPENSION ORAL EVERY 4 HOURS PRN
Status: DISCONTINUED | OUTPATIENT
Start: 2024-05-02 | End: 2024-05-03 | Stop reason: HOSPADM

## 2024-05-02 RX ORDER — NALOXONE HYDROCHLORIDE 0.4 MG/ML
0.2 INJECTION, SOLUTION INTRAMUSCULAR; INTRAVENOUS; SUBCUTANEOUS EVERY 5 MIN PRN
Status: DISCONTINUED | OUTPATIENT
Start: 2024-05-02 | End: 2024-05-03 | Stop reason: HOSPADM

## 2024-05-02 RX ORDER — BISACODYL 5 MG
10 TABLET, DELAYED RELEASE (ENTERIC COATED) ORAL DAILY PRN
Status: DISCONTINUED | OUTPATIENT
Start: 2024-05-02 | End: 2024-05-03 | Stop reason: HOSPADM

## 2024-05-02 RX ORDER — SODIUM CHLORIDE, SODIUM LACTATE, POTASSIUM CHLORIDE, CALCIUM CHLORIDE 600; 310; 30; 20 MG/100ML; MG/100ML; MG/100ML; MG/100ML
100 INJECTION, SOLUTION INTRAVENOUS CONTINUOUS
Status: DISCONTINUED | OUTPATIENT
Start: 2024-05-02 | End: 2024-05-02 | Stop reason: HOSPADM

## 2024-05-02 RX ORDER — DICLOFENAC SODIUM 10 MG/G
4 GEL TOPICAL 2 TIMES DAILY PRN
Status: DISCONTINUED | OUTPATIENT
Start: 2024-05-02 | End: 2024-05-03 | Stop reason: HOSPADM

## 2024-05-02 RX ORDER — FENTANYL CITRATE 50 UG/ML
50 INJECTION, SOLUTION INTRAMUSCULAR; INTRAVENOUS ONCE
Status: COMPLETED | OUTPATIENT
Start: 2024-05-02 | End: 2024-05-02

## 2024-05-02 RX ORDER — POLYETHYLENE GLYCOL 3350 17 G/17G
17 POWDER, FOR SOLUTION ORAL DAILY
Status: DISCONTINUED | OUTPATIENT
Start: 2024-05-02 | End: 2024-05-03 | Stop reason: HOSPADM

## 2024-05-02 RX ORDER — PHENYLEPHRINE HCL IN 0.9% NACL 1 MG/10 ML
SYRINGE (ML) INTRAVENOUS AS NEEDED
Status: DISCONTINUED | OUTPATIENT
Start: 2024-05-02 | End: 2024-05-02

## 2024-05-02 RX ORDER — MIDAZOLAM HYDROCHLORIDE 1 MG/ML
2 INJECTION, SOLUTION INTRAMUSCULAR; INTRAVENOUS ONCE
Status: COMPLETED | OUTPATIENT
Start: 2024-05-02 | End: 2024-05-02

## 2024-05-02 RX ORDER — ATORVASTATIN CALCIUM 40 MG/1
40 TABLET, FILM COATED ORAL NIGHTLY
Status: DISCONTINUED | OUTPATIENT
Start: 2024-05-03 | End: 2024-05-03 | Stop reason: HOSPADM

## 2024-05-02 RX ORDER — HYDROXYCHLOROQUINE SULFATE 200 MG/1
200 TABLET, FILM COATED ORAL DAILY
Status: DISCONTINUED | OUTPATIENT
Start: 2024-05-03 | End: 2024-05-03 | Stop reason: HOSPADM

## 2024-05-02 RX ORDER — FENTANYL CITRATE 50 UG/ML
50 INJECTION, SOLUTION INTRAMUSCULAR; INTRAVENOUS EVERY 5 MIN PRN
Status: DISCONTINUED | OUTPATIENT
Start: 2024-05-02 | End: 2024-05-02 | Stop reason: HOSPADM

## 2024-05-02 RX ORDER — MORPHINE SULFATE 2 MG/ML
2 INJECTION, SOLUTION INTRAMUSCULAR; INTRAVENOUS EVERY 4 HOURS PRN
Status: DISCONTINUED | OUTPATIENT
Start: 2024-05-02 | End: 2024-05-03 | Stop reason: HOSPADM

## 2024-05-02 RX ORDER — ROCURONIUM BROMIDE 10 MG/ML
INJECTION, SOLUTION INTRAVENOUS AS NEEDED
Status: DISCONTINUED | OUTPATIENT
Start: 2024-05-02 | End: 2024-05-02

## 2024-05-02 RX ORDER — ONDANSETRON HYDROCHLORIDE 2 MG/ML
4 INJECTION, SOLUTION INTRAVENOUS EVERY 8 HOURS PRN
Status: DISCONTINUED | OUTPATIENT
Start: 2024-05-02 | End: 2024-05-03 | Stop reason: HOSPADM

## 2024-05-02 RX ORDER — DILTIAZEM HYDROCHLORIDE 120 MG/1
120 CAPSULE, COATED, EXTENDED RELEASE ORAL DAILY
Status: DISCONTINUED | OUTPATIENT
Start: 2024-05-03 | End: 2024-05-02

## 2024-05-02 RX ORDER — VANCOMYCIN 1 G/200ML
1 INJECTION, SOLUTION INTRAVENOUS EVERY 12 HOURS
Qty: 200 ML | Refills: 0 | Status: COMPLETED | OUTPATIENT
Start: 2024-05-02 | End: 2024-05-02

## 2024-05-02 RX ORDER — ATORVASTATIN CALCIUM 40 MG/1
40 TABLET, FILM COATED ORAL DAILY
Status: DISCONTINUED | OUTPATIENT
Start: 2024-05-03 | End: 2024-05-02

## 2024-05-02 RX ORDER — PHENYLEPHRINE 10 MG/250 ML(40 MCG/ML)IN 0.9 % SOD.CHLORIDE INTRAVENOUS
CONTINUOUS PRN
Status: DISCONTINUED | OUTPATIENT
Start: 2024-05-02 | End: 2024-05-02

## 2024-05-02 RX ORDER — FENTANYL CITRATE 50 UG/ML
12.5 INJECTION, SOLUTION INTRAMUSCULAR; INTRAVENOUS EVERY 5 MIN PRN
Status: DISCONTINUED | OUTPATIENT
Start: 2024-05-02 | End: 2024-05-02 | Stop reason: HOSPADM

## 2024-05-02 RX ORDER — BACLOFEN 10 MG/1
5 TABLET ORAL NIGHTLY
Status: DISCONTINUED | OUTPATIENT
Start: 2024-05-02 | End: 2024-05-03 | Stop reason: HOSPADM

## 2024-05-02 RX ORDER — BUTYROSPERMUM PARKII(SHEA BUTTER), SIMMONDSIA CHINENSIS (JOJOBA) SEED OIL, ALOE BARBADENSIS LEAF EXTRACT .01; 1; 3.5 G/100G; G/100G; G/100G
250 LIQUID TOPICAL 2 TIMES DAILY
COMMUNITY

## 2024-05-02 RX ORDER — DILTIAZEM HYDROCHLORIDE 120 MG/1
120 CAPSULE, COATED, EXTENDED RELEASE ORAL NIGHTLY
Status: DISCONTINUED | OUTPATIENT
Start: 2024-05-03 | End: 2024-05-03 | Stop reason: HOSPADM

## 2024-05-02 RX ORDER — HYDRALAZINE HYDROCHLORIDE 20 MG/ML
5 INJECTION INTRAMUSCULAR; INTRAVENOUS EVERY 30 MIN PRN
Status: DISCONTINUED | OUTPATIENT
Start: 2024-05-02 | End: 2024-05-02 | Stop reason: HOSPADM

## 2024-05-02 RX ORDER — BUMETANIDE 2 MG/1
2 TABLET ORAL 2 TIMES DAILY
Status: DISCONTINUED | OUTPATIENT
Start: 2024-05-02 | End: 2024-05-03 | Stop reason: HOSPADM

## 2024-05-02 RX ADMIN — MIDAZOLAM HYDROCHLORIDE 2 MG: 1 INJECTION, SOLUTION INTRAMUSCULAR; INTRAVENOUS at 08:46

## 2024-05-02 RX ADMIN — VANCOMYCIN 1000 MG: 1 INJECTION, SOLUTION INTRAVENOUS at 07:53

## 2024-05-02 RX ADMIN — VANCOMYCIN 1 G: 1 INJECTION, SOLUTION INTRAVENOUS at 20:45

## 2024-05-02 RX ADMIN — SUGAMMADEX 200 MG: 100 INJECTION, SOLUTION INTRAVENOUS at 11:05

## 2024-05-02 RX ADMIN — PROPOFOL 50 MG: 10 INJECTION, EMULSION INTRAVENOUS at 09:29

## 2024-05-02 RX ADMIN — OXYCODONE HYDROCHLORIDE 10 MG: 5 TABLET ORAL at 22:32

## 2024-05-02 RX ADMIN — FENTANYL CITRATE 50 MCG: 50 INJECTION INTRAMUSCULAR; INTRAVENOUS at 08:47

## 2024-05-02 RX ADMIN — PROPOFOL 150 MG: 10 INJECTION, EMULSION INTRAVENOUS at 09:41

## 2024-05-02 RX ADMIN — BACLOFEN 5 MG: 10 TABLET ORAL at 20:40

## 2024-05-02 RX ADMIN — MORPHINE SULFATE 2 MG: 2 INJECTION, SOLUTION INTRAMUSCULAR; INTRAVENOUS at 15:12

## 2024-05-02 RX ADMIN — ONDANSETRON 4 MG: 2 INJECTION INTRAMUSCULAR; INTRAVENOUS at 10:48

## 2024-05-02 RX ADMIN — LIDOCAINE HYDROCHLORIDE 5 ML: 10 INJECTION, SOLUTION EPIDURAL; INFILTRATION; INTRACAUDAL; PERINEURAL at 09:41

## 2024-05-02 RX ADMIN — PROPOFOL 40 MG: 10 INJECTION, EMULSION INTRAVENOUS at 11:07

## 2024-05-02 RX ADMIN — Medication 0.3 MCG/KG/MIN: at 09:53

## 2024-05-02 RX ADMIN — SODIUM CHLORIDE, SODIUM LACTATE, POTASSIUM CHLORIDE, AND CALCIUM CHLORIDE 100 ML/HR: 600; 310; 30; 20 INJECTION, SOLUTION INTRAVENOUS at 17:57

## 2024-05-02 RX ADMIN — ROCURONIUM BROMIDE 30 MG: 10 INJECTION, SOLUTION INTRAVENOUS at 09:41

## 2024-05-02 RX ADMIN — LIDOCAINE HYDROCHLORIDE 4 ML: 10 INJECTION, SOLUTION EPIDURAL; INFILTRATION; INTRACAUDAL; PERINEURAL at 09:29

## 2024-05-02 RX ADMIN — Medication 100 MCG: at 09:50

## 2024-05-02 RX ADMIN — OXYCODONE HYDROCHLORIDE 10 MG: 5 TABLET ORAL at 16:20

## 2024-05-02 RX ADMIN — PANTOPRAZOLE SODIUM 20 MG: 20 TABLET, DELAYED RELEASE ORAL at 20:40

## 2024-05-02 RX ADMIN — ENOXAPARIN SODIUM 40 MG: 100 INJECTION SUBCUTANEOUS at 17:00

## 2024-05-02 RX ADMIN — SODIUM CHLORIDE, SODIUM LACTATE, POTASSIUM CHLORIDE, AND CALCIUM CHLORIDE 100 ML/HR: 600; 310; 30; 20 INJECTION, SOLUTION INTRAVENOUS at 07:53

## 2024-05-02 SDOH — SOCIAL STABILITY: SOCIAL INSECURITY: ARE YOU OR HAVE YOU BEEN THREATENED OR ABUSED PHYSICALLY, EMOTIONALLY, OR SEXUALLY BY ANYONE?: NO

## 2024-05-02 SDOH — SOCIAL STABILITY: SOCIAL INSECURITY: DO YOU FEEL ANYONE HAS EXPLOITED OR TAKEN ADVANTAGE OF YOU FINANCIALLY OR OF YOUR PERSONAL PROPERTY?: NO

## 2024-05-02 SDOH — SOCIAL STABILITY: SOCIAL INSECURITY: HAVE YOU HAD THOUGHTS OF HARMING ANYONE ELSE?: NO

## 2024-05-02 SDOH — SOCIAL STABILITY: SOCIAL INSECURITY: DO YOU FEEL UNSAFE GOING BACK TO THE PLACE WHERE YOU ARE LIVING?: NO

## 2024-05-02 SDOH — SOCIAL STABILITY: SOCIAL INSECURITY: WERE YOU ABLE TO COMPLETE ALL THE BEHAVIORAL HEALTH SCREENINGS?: YES

## 2024-05-02 SDOH — SOCIAL STABILITY: SOCIAL INSECURITY: HAVE YOU HAD ANY THOUGHTS OF HARMING ANYONE ELSE?: NO

## 2024-05-02 SDOH — SOCIAL STABILITY: SOCIAL INSECURITY: ABUSE: ADULT

## 2024-05-02 SDOH — HEALTH STABILITY: MENTAL HEALTH: CURRENT SMOKER: 0

## 2024-05-02 SDOH — SOCIAL STABILITY: SOCIAL INSECURITY: DOES ANYONE TRY TO KEEP YOU FROM HAVING/CONTACTING OTHER FRIENDS OR DOING THINGS OUTSIDE YOUR HOME?: NO

## 2024-05-02 SDOH — SOCIAL STABILITY: SOCIAL INSECURITY: ARE THERE ANY APPARENT SIGNS OF INJURIES/BEHAVIORS THAT COULD BE RELATED TO ABUSE/NEGLECT?: NO

## 2024-05-02 SDOH — SOCIAL STABILITY: SOCIAL INSECURITY: HAS ANYONE EVER THREATENED TO HURT YOUR FAMILY OR YOUR PETS?: NO

## 2024-05-02 ASSESSMENT — PAIN SCALES - GENERAL
PAINLEVEL_OUTOF10: 0 - NO PAIN
PAINLEVEL_OUTOF10: 4
PAINLEVEL_OUTOF10: 0 - NO PAIN
PAINLEVEL_OUTOF10: 5 - MODERATE PAIN
PAINLEVEL_OUTOF10: 8
PAIN_LEVEL: 0
PAINLEVEL_OUTOF10: 8
PAINLEVEL_OUTOF10: 0 - NO PAIN
PAINLEVEL_OUTOF10: 3
PAINLEVEL_OUTOF10: 0 - NO PAIN
PAINLEVEL_OUTOF10: 5 - MODERATE PAIN
PAINLEVEL_OUTOF10: 5 - MODERATE PAIN
PAINLEVEL_OUTOF10: 9
PAINLEVEL_OUTOF10: 0 - NO PAIN
PAINLEVEL_OUTOF10: 6
PAINLEVEL_OUTOF10: 4
PAINLEVEL_OUTOF10: 0 - NO PAIN

## 2024-05-02 ASSESSMENT — COGNITIVE AND FUNCTIONAL STATUS - GENERAL
MOVING TO AND FROM BED TO CHAIR: A LITTLE
MOBILITY SCORE: 24
HELP NEEDED FOR BATHING: A LITTLE
WALKING IN HOSPITAL ROOM: A LITTLE
CLIMB 3 TO 5 STEPS WITH RAILING: A LITTLE
DAILY ACTIVITIY SCORE: 20
MOBILITY SCORE: 21
TOILETING: A LITTLE
PATIENT BASELINE BEDBOUND: NO
DRESSING REGULAR LOWER BODY CLOTHING: A LITTLE
DRESSING REGULAR UPPER BODY CLOTHING: A LITTLE

## 2024-05-02 ASSESSMENT — LIFESTYLE VARIABLES
HOW MANY STANDARD DRINKS CONTAINING ALCOHOL DO YOU HAVE ON A TYPICAL DAY: PATIENT DOES NOT DRINK
HOW OFTEN DO YOU HAVE A DRINK CONTAINING ALCOHOL: MONTHLY OR LESS
AUDIT-C TOTAL SCORE: 1
SKIP TO QUESTIONS 9-10: 1
AUDIT-C TOTAL SCORE: 1
HOW OFTEN DO YOU HAVE 6 OR MORE DRINKS ON ONE OCCASION: NEVER

## 2024-05-02 ASSESSMENT — ACTIVITIES OF DAILY LIVING (ADL)
WALKS IN HOME: INDEPENDENT
DRESSING YOURSELF: NEEDS ASSISTANCE
JUDGMENT_ADEQUATE_SAFELY_COMPLETE_DAILY_ACTIVITIES: YES
HEARING - RIGHT EAR: FUNCTIONAL
HEARING - LEFT EAR: FUNCTIONAL
PATIENT'S MEMORY ADEQUATE TO SAFELY COMPLETE DAILY ACTIVITIES?: YES
TOILETING: INDEPENDENT
GROOMING: NEEDS ASSISTANCE
BATHING: NEEDS ASSISTANCE
ADLS_ADDRESSED: NO
JUDGMENT_ADEQUATE_SAFELY_COMPLETE_DAILY_ACTIVITIES: YES
HEARING - LEFT EAR: FUNCTIONAL
PATIENT'S MEMORY ADEQUATE TO SAFELY COMPLETE DAILY ACTIVITIES?: YES
GROOMING: INDEPENDENT
FEEDING YOURSELF: INDEPENDENT
ADEQUATE_TO_COMPLETE_ADL: YES
FEEDING YOURSELF: INDEPENDENT
LACK_OF_TRANSPORTATION: NO
BATHING: NEEDS ASSISTANCE
ADEQUATE_TO_COMPLETE_ADL: YES
DRESSING YOURSELF: NEEDS ASSISTANCE
HEARING - RIGHT EAR: FUNCTIONAL
TOILETING: INDEPENDENT

## 2024-05-02 ASSESSMENT — PAIN - FUNCTIONAL ASSESSMENT
PAIN_FUNCTIONAL_ASSESSMENT: WONG-BAKER FACES
PAIN_FUNCTIONAL_ASSESSMENT: 0-10
PAIN_FUNCTIONAL_ASSESSMENT: 0-10
PAIN_FUNCTIONAL_ASSESSMENT: WONG-BAKER FACES
PAIN_FUNCTIONAL_ASSESSMENT: 0-10

## 2024-05-02 ASSESSMENT — COLUMBIA-SUICIDE SEVERITY RATING SCALE - C-SSRS
2. HAVE YOU ACTUALLY HAD ANY THOUGHTS OF KILLING YOURSELF?: NO
6. HAVE YOU EVER DONE ANYTHING, STARTED TO DO ANYTHING, OR PREPARED TO DO ANYTHING TO END YOUR LIFE?: NO
1. IN THE PAST MONTH, HAVE YOU WISHED YOU WERE DEAD OR WISHED YOU COULD GO TO SLEEP AND NOT WAKE UP?: NO

## 2024-05-02 ASSESSMENT — PATIENT HEALTH QUESTIONNAIRE - PHQ9
1. LITTLE INTEREST OR PLEASURE IN DOING THINGS: NOT AT ALL
SUM OF ALL RESPONSES TO PHQ9 QUESTIONS 1 & 2: 0
2. FEELING DOWN, DEPRESSED OR HOPELESS: NOT AT ALL

## 2024-05-02 NOTE — PRE-PROCEDURE NOTE
0725 Hx BARBARA, does not use her Bipap.  Bilateral knee high dante hose applied.  Swabbed bilateral nostrils with 3M for shoulder replacement.

## 2024-05-02 NOTE — PROGRESS NOTES
Physical Therapy    Physical Therapy Evaluation & Treatment    Patient Name: Trina Elias  MRN: [id number]  Today's Date: 5/2/2024   Time Calculation  Start Time: 1626  Stop Time: 1655  Time Calculation (min): 29 min    Assessment/Plan   PT Assessment  PT Assessment Results: Decreased strength, Decreased range of motion, Decreased endurance, Impaired sensation, Orthopedic restrictions, Pain  Rehab Prognosis: Good  Evaluation/Treatment Tolerance: Patient tolerated treatment well, Patient limited by pain (Limited by L LE pain.)  Strengths: Ability to acquire knowledge, Access to adaptive/assistive products, Attitude of self, Housing layout, Rehab experience, Support of Caregivers, Capable of completing ADLs semi/independent, Support and attitude of living partners  End of Session Communication: Bedside nurse  Assessment Comment: Pt. is a 73 y.o. female s/p L reverse TSA. Reviewed HEP and Reverse TSA precautions. Pt. verbalized understanding. Did not ambulate or perform transfers this session due to L LE pain when weightbearing due to IV line in foot. Pt. demo numbness and difficulty with muscle activation in L UE due to residual effects of anesthesia. Pt. would benefit from skilled PT to progress rehab goals.  End of Session Patient Position: Up in chair (Needs within reach, Ice on L shoulder.)   IP OR SWING BED PT PLAN  Inpatient or Swing Bed: Inpatient  PT Plan  Treatment/Interventions: Bed mobility, Transfer training, Gait training, Stair training, Balance training, Neuromuscular re-education, Strengthening, Endurance training, Range of motion, Therapeutic exercise, Therapeutic activity, Home exercise program, Positioning  PT Plan: Skilled PT  PT Frequency: 5 times per week  PT Discharge Recommendations: Low intensity level of continued care  Equipment Recommended upon Discharge: Straight cane, Wheeled walker (AD with adherence to L UE NWBing precaution.)  PT Recommended Transfer Status: Contact guard (Gait belt as  needed for improved safety.)      Subjective     General Visit Information:  General  Reason for Referral: L Reverse TSA.  Referred By: Dr. Daly  Past Medical History Relevant to Rehab: Anemia, Asthma, atrial fibrillation, CAD, cervical neuropathy, CKD, Osteopenia, Pulmonary edema, cardiac catherization, herinal repair, foot surgery, R Reverse TSA, Leg surgery, and hand surgery.  Family/Caregiver Present: No  Prior to Session Communication: Bedside nurse  Patient Position Received: Up in chair  General Comment: Dressing dry and intact. Pt. reports Weightbearing on L LE painful due to IV line placement.  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 reported falling out back entrance which has 4 steps. Pt. reports she tripped on last step. Denied serious injuries or head trauma. Pt. reported another fall at night when ambulating to the bathroom.  Precautions:  Precautions  UE Weight Bearing Status: Left Non-Weight Bearing (In sling unless with therapy per physician order.)  Medical Precautions: Fall precautions  Post-Surgical Precautions: Left shoulder precautions    Objective   Pain:  Pain Assessment  Pain Assessment: 0-10  Pain Score: 5 - Moderate pain  Pain Type: Chronic pain  Pain Location: Back (Reports L shoulder is not in pain but low back painful due to previous low back pain.)  Pain Orientation: Lower  Pain  Interventions: Repositioned, Cold applied (Cold applied to Left shoulder, repositioned in chair with pillow behind low back.)  Response to Interventions: Low back pain decreased minimally due to repositioning.  Cognition:  Cognition  Overall Cognitive Status: Within Functional Limits  Attention: Within Functional Limits  Memory: Within Funtional Limits  Safety/Judgement: Within Functional Limits    General Assessments:  Activity Tolerance  Endurance: Endurance does not limit participation in activity    Sensation  Light Touch: Severe deficits in the LUE (C5-T1. but can partially sense C8.)  Proprioception: Severe deficits in the LUE (Able to assist with contraction of hand/wrist, but difficulty sensing where joint is in space.)  Sensation Comment: Deficits due to residual effects of anesthesia.      Perception  Inattention/Neglect: Appears intact  Initiation: Appears intact  Motor Planning: Appears intact      Coordination  Movements are Fluid and Coordinated: Yes    Postural Control  Postural Control: Within Functional Limits    Static Sitting Balance  Static Sitting-Balance Support: Feet supported, Right upper extremity supported  Static Sitting-Level of Assistance: Close supervision  Dynamic Sitting Balance  Dynamic Sitting-Balance Support: Right upper extremity supported, Feet supported  Dynamic Sitting-Balance:  (Assiting with L UE management during doffing/donning L Ue sling.)       Functional Assessments:  ADL  ADL's Addressed: No    Bed Mobility  Bed Mobility: No    Transfers  Transfer: No (Pt. transfered to chair with arms with sit to stand and stand to sit transfers with no incidnet per patient and nurse report. Did not assess at this time due to Pt. reporting severe L LE when weightbearing due to L LE IV.)    Ambulation/Gait Training  Ambulation/Gait Training Performed: No (Pt. ambulated with nurse to the bathroom with no incident per nurse report. Did not assess at this time due to Pt. reporting severe L  LE when weightbearing due to L LE IV.)    Stairs  Stairs: No  Extremity/Trunk Assessments:  RUE   RUE : Within Functional Limits  LUE   LUE: Exceptions to WFL  LUE AROM (degrees)  LUE AROM Comment: Limited due to surgical precautions.  LUE PROM (degrees)  LUE PROM Comment: Limited due to surgical precautions. Elbow and wrist motions within functional limitis.  LUE Strength  LUE Overall Strength: Due to  precautions, Other (Comment) (Difficutly with hand/wrist and elbow motions due to residual effects of anesthesia.)  RLE   RLE : Within Functional Limits  LLE   LLE : Within Functional Limits  Treatments:  Therapeutic Exercise  Therapeutic Exercise Performed: Yes  Therapeutic Exercise Activity 1: L AAROM opening/closing hand 1x15, L AAROM wrist supination/pronation 1x10 each direction, L AAROM wrist flexion/extension 1x10 each direction, L AAROM elbow flexion/extension 1x20 each direction. Did not perform pendulum exercise this session due to patient reported numbness.  Outcome Measures:  [facility] Basic Mobility  Turning from your back to your side while in a flat bed without using bedrails: None  Moving from lying on your back to sitting on the side of a flat bed without using bedrails: None  Moving to and from bed to chair (including a wheelchair): A little  Standing up from a chair using your arms (e.g. wheelchair or bedside chair): None  To walk in hospital room: A little  Climbing 3-5 steps with railing: A little  Basic Mobility - Total Score: 21    Encounter Problems       Encounter Problems (Active)       Balance       LTG - Patient will demonstrate Intervention to enhance balance for safe completion of daily activities with least restrictive assistive device  (Progressing)       Start:  05/02/24            LTG - Patient will maintain balance to allow for safe mobility with least restrictive assistive device  (Progressing)       Start:  05/02/24               Compromised Skin Integrity       LTG - Patient will  maintain/improve skin integrity through proper skin care techniques (Progressing)       Start:  05/02/24               Mobility       LTG - Patient will ambulate household distance with least restrictive assistive device  (Progressing)       Start:  05/02/24            LTG - Patient will demonstrate safe mobility requirements with least restrictive assistive device  (Progressing)       Start:  05/02/24               PT Transfers       LTG - Patient will demonstrate safe transfer techniques with least restrictive assistive device  (Progressing)       Start:  05/02/24               Pain          Safety       LTG- Pt. will adhere to Left reverse total shoulder precautions during ADLs and transfers as noted by physician.  (Progressing)       Start:  05/02/24                   Education Documentation  Handouts, taught by Rufino Garcia PT at 5/2/2024  4:56 PM.  Learner: Patient  Readiness: Acceptance  Method: Explanation, Demonstration, Handout  Response: Verbalizes Understanding, Demonstrated Understanding    Precautions, taught by Rufino Garcia PT at 5/2/2024  4:56 PM.  Learner: Patient  Readiness: Acceptance  Method: Explanation, Demonstration, Handout  Response: Verbalizes Understanding, Demonstrated Understanding    Home Exercise Program, taught by Rufino Garcia PT at 5/2/2024  4:56 PM.  Learner: Patient  Readiness: Acceptance  Method: Explanation, Demonstration, Handout  Response: Verbalizes Understanding, Demonstrated Understanding    Mobility Training, taught by Rufino Garcia PT at 5/2/2024  4:56 PM.  Learner: Patient  Readiness: Acceptance  Method: Explanation, Demonstration, Handout  Response: Verbalizes Understanding, Demonstrated Understanding    Education Comments  No comments found.    Rufino Garcia, PT, DPT

## 2024-05-02 NOTE — ANESTHESIA PROCEDURE NOTES
Airway  Date/Time: 5/2/2024 9:45 AM  Urgency: elective      Staffing  Performed: ALEXANDRA   Authorized by: Sy Leal MD    Performed by: ALEXANDRA Barba  Patient location during procedure: OR    Indications and Patient Condition  Indications for airway management: anesthesia  Spontaneous Ventilation: absent  Sedation level: deep  Preoxygenated: yes  Mask difficulty assessment: 1 - vent by mask    Final Airway Details  Final airway type: endotracheal airway      Successful airway: ETT  Cuffed: yes   Successful intubation technique: direct laryngoscopy  Facilitating devices/methods: intubating stylet  Blade: Jade  Blade size: #3  ETT size (mm): 7.0  Placement verified by: chest auscultation and capnometry   Measured from: teeth  Number of attempts at approach: 1

## 2024-05-02 NOTE — PERIOPERATIVE NURSING NOTE
Pt received from OR via cart, moniotrs on, report received. Pt drowsy, assessment completed as documented. IV noted in Left foot with LR infusing from OR.     Right hand IV removed intra-op, drsg D/I, mild edema.  Operative extremity drsg D/I, no oozing or hematoma noted. Iceman applied, operable. Remaining assessment completed as documented, orders reviewed/released.

## 2024-05-02 NOTE — CONSULTS
Inpatient consult to Medicine  Consult performed by: Kenya Urbina PA-C  Consult ordered by: Oli Daly DO  Reason for consult: AFIB, Cardiomyopathy, ICD, RA, CKD 3a        History and Physical    Trina Elias is a 73 y.o. female on day 1 of admission presenting with Arthritis of left shoulder region.  Room: 222    Subjective   73 year old female with extensive pmhx: treatment resistant A FIB, wide complex tachy, ICD, cardiomyopathy, asthma, COPD, CKD 3a, RA, recent acute blood loss in Feb-March thought to be due to DOAC as GI studies were negative and severe central BARBARA. She is s/p Left rTSR and doing well. Hemodynamically stable. She is on telemetry.    PMHx:  Past Medical History:   Diagnosis Date    Asthma (HHS-HCC)     Atrial fibrillation (Multi)     Resistent to treatment: s/p DCCVs and ablations    Central sleep apnea     Per PSG 11/20/18; severe central sleep apnea, .6, mild obstructive component    Cervical myofascial pain syndrome     Baclofen    Chronic anemia     CKD stage 3a, GFR 45-59 ml/min (Multi)     COPD (chronic obstructive pulmonary disease) (Multi)     DVT of axillary vein, acute right (Multi) 01/20/2018    When PICC line was removed. U/S + Partial nonocclusive DVT in the axillary and proximal basilic vein in 2018    H/O being hospitalized 02/2024 Feb 24-Mar 5, 2024:  (2/24) admitted for dyspnea and chest pain work-up and found to have pleural effusions.  Found to have acute blood loss anemia s/p 2u PRBC. EGD neg for active bleed.  Cardiology consulted - plan for Watchman device WING closure as out pt.    History of Helicobacter pylori infection     3/2013, 1/2021    Hx of syncope     Recurrent Syncope d/t VT- s/p ILR (12/2012) implanted.  (July 2013) Episode of syncope that appears to be correlated with an 11 second round of ventricular tachycardia recorded by the loop recorder.  S/p EPS (June 2014) neg for inducible arrhythmias. (ILR removed when ICD implanted)    ICD  (implantable cardioverter-defibrillator) in place     Placed 06/12/2014    Mitral valve regurgitation     Mild-Moderate per Echo 02/25/2024    Moderate aortic stenosis by prior echocardiogram     Echo 2/25/2024    Osteopenia 06/26/2023    Personal history of anaphylaxis     See allergy list    Psoriasis     PUD (peptic ulcer disease)     H/O nonbleeding gastric ulcers with small hiatal hernia on 1/6/2021 EGD    Pulmonary hypertension (Multi)     Mild - Moderate per Echo 2/25/2024    Radiculopathy, lumbar region 08/13/2018    Acute lumbar radiculopathy    Restless legs syndrome 11/17/2015    Restless legs syndrome    Rheumatoid arthritis (Multi)     Seasonal allergic rhinitis due to pollen     Small bowel obstruction (Multi) 06/26/2023    H/O due to Severe Adhesions s/p Adhesiolysis x2 in 2015 and 2020    Wide-complex tachycardia     Per cardiology 3/7/2024: VT is on a basis of triggered activity certainly made worse with anemia and prednisone. Since she is asymptomatic we will continue to observe.        Past Surgical Hx:  Past Surgical History:  10/22/2015: ABDOMINAL ADHESION SURGERY      Comment:  Exploratory laparotomy. Extensive lysis of adhesions.                Small-bowel repair.  10/18/2020: ABDOMINAL ADHESION SURGERY      Comment:  Exploratory laparotomy, massive lysis of adhesions,                small-bowel resection, decompression of small bowel and                removal of mesh.  05/02/2022: ABLATION OF DYSRHYTHMIC FOCUS      Comment:  09/17/2018, 05/02/2022 for A FIB  No date: ACHILLES TENDON SURGERY; Right  03/21/2016: ANTERIOR CERVICAL DISCECTOMY W/ FUSION      Comment:  Anterior C5 and C6 discectomies and interbody fusion of                C5-C6, C6-C7  No date: APPENDECTOMY      Comment:  Open surgery  09/21/2022: BREAST BIOPSY; Left  12/05/2012: CARDIAC ASSIST DEVICE INSERTION      Comment:  Loop recorder placement 2012, Removed in 2014 w/ ICD                placed  02/23/2022: CARDIAC  CATHETERIZATION      Comment:  Normal coronary arteries. Tachycardia induced                Cardiomyopathy.  2014: CARDIAC DEFIBRILLATOR PLACEMENT      Comment:  Dual chamber ICD  2018: CARDIOVERSION      Comment:  2018, 2018  No date: CARPAL TUNNEL RELEASE; Bilateral  No date:  SECTION, CLASSIC      Comment:  x2  No date: CHOLECYSTECTOMY      Comment:  Open surgery  2024: COLONOSCOPY      Comment:  Extensive diverticulosis of moderate severity and                causing mild luminal narrowing in the sigmoid colon  2008: FINGER SURGERY; Left      Comment:  Left index finger metacarpophalangeal fusion  No date: FOOT SURGERY; Bilateral      Comment:  B/L Tarsal Tunnel Release  2011: IR INJECTION EPIDURAL STEROID      Comment:  Lumbar spine; , , 2011: IR INJECTION EPIDURAL STEROID      Comment:  Cervical spine 2007  2006: LEG SURGERY; Right      Comment:  Right distal tibia bone tumor excision and biopsy w/                pellet bone graft.  Plantar wart removal R foot.  10/13/2010: NERVE SURGERY; Right      Comment:  Release of Right tarsal tunnel syndrome w/ severe                adhesion scar tissue  : NISSEN FUNDOPLICATION      Comment:  Open surgery  10/24/2015: PICC LINE INSERTION WO IMAGING      Comment:  Placed for stable IV access  2024: REVERSE TOTAL SHOULDER ARTHROPLASTY; Bilateral      Comment:  Right 2023, Left 2024  No date: ROTATOR CUFF REPAIR  No date: ULNAR NERVE TRANSPOSITION; Left  06/15/2015: VENTRAL HERNIA REPAIR      Comment:  Laparoscopic double ventral hernia repair.  No date: WISDOM TOOTH EXTRACTION  2020: WOUND DEBRIDEMENT      Comment:  Excisional debridement of abdominal wound for wound                dehiscence    Social history:   reports that she has never smoked. She has never used smokeless tobacco. She reports that she does not drink alcohol and does not use drugs.    Family history:   Family  History   Problem Relation Name Age of Onset    Asthma Daughter      Asthma Other      Colon cancer Other      Diabetes Other      Other (stroke syndrome) Other         Allergies   Allergen Reactions    Abatacept Shortness of breath     pulmonary edema, diarrhea, nausea    Hydrocodone-Acetaminophen Anaphylaxis    Hydromorphone Anaphylaxis    Metoprolol Anaphylaxis    Penicillins Hives    Pregabalin Shortness of breath     caused pulmonary edema    Prochlorperazine Itching     paralysis of the mouth with drooping    Methotrexate Hives and Itching     chest pain    Aripiprazole Rash    Beta-Blockers (Beta-Adrenergic Blocking Agts) Other, Palpitations and Wheezing    Bupropion Nausea/vomiting    Cefepime Itching    Ciprofloxacin Hives    Furosemide Itching    Levofloxacin Itching    Pineapple Rash       Home Medication:   albuterol 2.5 mg /3 mL (0.083 %) nebulizer solution Take 3 mL (2.5 mg) by nebulization every 4 hours if needed.   baclofen (Lioresal) 10 mg tablet One tablet in the morning and afternoon and 1 to 2 tablets in the evening.   bumetanide (Bumex) 2 mg tablet Take 1 tablet (2 mg) by mouth 2 times a day.   clobetasol (Temovate) 0.05 % cream Apply topically 2 times a day.   diclofenac sodium (Voltaren) 1 % gel Apply 4.5 inches (4 g) topically 2 times a day as needed (joint pain).   digoxin (Lanoxin) 125 MCG tablet Take 1 tablet (125 mcg) by mouth once daily.   dilTIAZem LA (Cardizem LA) 120 mg 24 hr tablet Take 1 tablet (120 mg) by mouth once daily.   EPINEPHrine 0.3 mg/0.3 mL injection syringe Inject 0.3 mL (0.3 mg) as directed if needed. Per Pt has not needed.   famotidine (Pepcid) 40 mg tablet Take 1 tablet (40 mg) by mouth once daily.   fluticasone (Flonase) 50 mcg/actuation nasal spray Administer 1 spray into affected nostril(s) 2 times a day.   hydroxychloroquine (Plaquenil) 200 mg tablet Take 1 tablet (200 mg) by mouth once daily.   magnesium oxide (Mag-Ox) 400 mg tablet Take 1 tablet (400 mg) by  "mouth once daily.   montelukast (Singulair) 10 mg tablet Take 1 tablet (10 mg) by mouth once daily at bedtime.   nitroglycerin (Nitrostat) 0.4 mg SL tablet Place 1 tablet (0.4 mg) under the tongue every 5 minutes if needed.   pantoprazole (ProtoNix) 40 mg EC tablet TAKE ONE TABLET BY MOUTH TWO TIMES A DAY    Patient taking differently: Take 20 mg by mouth 2 times a day.   polyethylene glycol (Miralax) 17 gram/dose powder Take 17 g by mouth once daily.   rosuvastatin (Crestor) 20 mg tablet TAKE ONE TABLET BY MOUTH EVERY DAY   saccharomyces boulardii (Florastor) 250 mg capsule Take 1 capsule (250 mg) by mouth 2 times a day.       Last Recorded Vitals  Blood pressure (!) 120/45, pulse 59, temperature 36.1 °C (97 °F), temperature source Temporal, resp. rate 16, height 1.626 m (5' 4\"), weight 72.6 kg (160 lb 0.9 oz), SpO2 99%.    Physical Exam  General: Patient in no acute distress, lying in bed  HEENT: EOMI, moist mucous membranes, anicteric  Neck: No JVD, no cervical lymphadenopathy  Heart: RRR, no murmurs, rubs, or gallops  Lungs: Clear to auscultation bilaterally, no wheezing, rhonchi, or rales  Abdomen: Soft, nontender, nondistended, no organomegaly  Extremities: No peripheral edema, Left arm in sling w/ block still intact  Skin: No rash or cyanosis  Neurological: AOx3, non focal  Psychiatric: Cooperative and pleasant    Recent Results:   Latest Reference Range & Units 04/15/24 11:28   GLUCOSE 74 - 99 mg/dL 95   SODIUM 136 - 145 mmol/L 144   POTASSIUM 3.5 - 5.3 mmol/L 4.1   CHLORIDE 98 - 107 mmol/L 100   Bicarbonate 21 - 32 mmol/L 31   Anion Gap 10 - 20 mmol/L 17   Blood Urea Nitrogen 6 - 23 mg/dL 20   Creatinine 0.50 - 1.05 mg/dL 1.18 (H)   EGFR >60 mL/min/1.73m*2 49 (L)   Calcium 8.6 - 10.3 mg/dL 9.3   INR 0.9 - 1.1  1.0   Protime 9.8 - 12.8 seconds 11.5   aPTT 27 - 38 seconds 30   LEUKOCYTES (10*3/UL) IN BLOOD BY AUTOMATED COUNT, Slovenian 4.4 - 11.3 x10*3/uL 5.6   nRBC 0.0 - 0.0 /100 WBCs 0.0   ERYTHROCYTES " (10*6/UL) IN BLOOD BY AUTOMATED COUNT, Dominican 4.00 - 5.20 x10*6/uL 4.50   HEMOGLOBIN 12.0 - 16.0 g/dL 10.3 (L)   HEMATOCRIT 36.0 - 46.0 % 34.5 (L)   MCV 80 - 100 fL 77 (L)   MCH 26.0 - 34.0 pg 22.9 (L)   MCHC 32.0 - 36.0 g/dL 29.9 (L)   RED CELL DISTRIBUTION WIDTH 11.5 - 14.5 % 23.2 (H)   PLATELETS (10*3/UL) IN BLOOD AUTOMATED COUNT, Dominican 150 - 450 x10*3/uL 302       Assessment/Plan   1) s/p L rTSR    Ortho management   PT eval/treat  Incentive spirometry  BM management  Supportive Care  Antibiotic prophylaxis per ortho  DVT prophylaxis per ortho  2) DVT prophylaxis   40 mg Lovenox daily, SCDs  3) treatment resistant A FIB, wide complex tachy, ICD, cardiomyopathy   Continue cardiac medications.  Telemetry.  4) severe BARBARA, asthma, COPD   Respiratory therapy to evaluate and treat. Keep Po2 >92%  5) rheumatoid arthritis   Hydroxychloroquine         I spent 65 minutes in the professional and overall care of this patient.      Kenya Urbina PA-C

## 2024-05-02 NOTE — ANESTHESIA PROCEDURE NOTES
Peripheral Block    Patient location during procedure: pre-op  Start time: 5/2/2024 8:50 AM  End time: 5/2/2024 8:52 AM  Reason for block: at surgeon's request and post-op pain management  Staffing  Performed: attending   Authorized by: Sy Leal MD    Performed by: Sy Leal MD  Preanesthetic Checklist  Completed: patient identified, IV checked, site marked, risks and benefits discussed, surgical consent, monitors and equipment checked, pre-op evaluation and timeout performed   Timeout performed at: 5/2/2024 8:45 AM  Peripheral Block  Patient position: laying flat  Prep: ChloraPrep  Patient monitoring: heart rate, cardiac monitor and continuous pulse ox  Block type: interscalene  Laterality: left  Injection technique: single-shot  Guidance: ultrasound guided  Needle  Needle type: short-bevel   Needle gauge: 22 G  Needle length: 5 cm  Needle localization: ultrasound guidance     image stored in chart  Test dose: negative  Assessment  Injection assessment: no paresthesia on injection, negative aspiration for heme, incremental injection and local visualized surrounding nerve on ultrasound  Paresthesia pain: none  Heart rate change: no  Slow fractionated injection: yes  Additional Notes  Ropivicaine 20 ml 0.5% with 5 mg decadron preservative free.    Patient able to flex and extend hand post nerve block.    Ultrasound guidance used-- able to visualize needle, Nerve roots and local anesthetic  filling around the nerve roots as it is injected.

## 2024-05-02 NOTE — PROGRESS NOTES
TCC met with patient at the bedside to discuss discharge plan.   Patient admitted inpatient following left reverse total shoulder with Dr. Daly.  Plan is home tomorrow with no skilled needs.  Plan is [facility] when home.  Patient will arrange.     05/02/24 1513   Discharge Planning   Living Arrangements Spouse/significant other   Support Systems Spouse/significant other   Assistance Needed ADL's   Type of Residence Private residence   Number of Stairs to Enter Residence 0  (has a ramp)   Number of Stairs Within Residence 0   Do you have animals or pets at home? No   Home or Post Acute Services None   Patient expects to be discharged to: Home with spouse   Does the patient need discharge transport arranged? No   Financial Resource Strain   How hard is it for you to pay for the very basics like food, housing, medical care, and heating? Not hard   Housing Stability   In the last 12 months, was there a time when you were not able to pay the mortgage or rent on time? N   In the last 12 months, how many places have you lived? 1   In the last 12 months, was there a time when you did not have a steady place to sleep or slept in a shelter (including now)? N   Transportation Needs   In the past 12 months, has lack of transportation kept you from medical appointments or from getting medications? no   In the past 12 months, has lack of transportation kept you from meetings, work, or from getting things needed for daily living? No

## 2024-05-02 NOTE — ANESTHESIA PREPROCEDURE EVALUATION
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)   (+) Coronary artery disease involving native coronary artery   (+) Mixed hyperlipidemia   (+) Pacemaker      Pulmonary   (+) Asthma (HHS-HCC)   (+) Centrilobular emphysema (Multi)   (+) Obstructive sleep apnea      Neuro   (+) Cervical radiculopathy   (+) Cubital tunnel syndrome on right   (+) Displacement of lumbar disc with radiculopathy      GI   (+) Gastroesophageal reflux disease without esophagitis      Hematology   (+) Anticoagulant long-term use   (+) Coagulation defect, unspecified (Multi)   (+) Iron deficiency anemia      Musculoskeletal   (+) Chronic bilateral low back pain without sciatica   (+) Displacement of lumbar disc with radiculopathy   (+) Primary osteoarthritis of right shoulder   (+) Rheumatoid arthritis (Multi)       Clinical information reviewed:    Allergies                NPO Detail:  No data recorded     Physical Exam    Airway  Mallampati: III  TM distance: >3 FB  Neck ROM: full     Cardiovascular   Comments: deferred   Dental   (+) upper dentures, implants  Comments: Implants lower jaw, dentures upper   Pulmonary   Comments: deferred   Abdominal     Comments: deferred           Anesthesia Plan    History of general anesthesia?: yes  History of complications of general anesthesia?: no    ASA 3     regional and general   (Left interscalene brachial plexus block)  The patient is not a current smoker.  Education provided regarding risk of obstructive sleep apnea.  intravenous induction    Postoperative administration of opioids is intended.  Anesthetic plan and risks discussed with patient.    Plan discussed with CAA.

## 2024-05-02 NOTE — OP NOTE
LEFT REVERSE TOTAL SHOULDER ARTHROPLASTY (YASMIN BIOMET,PATIENT WOULD LIKE TO NOTE IN HER CHART THAT SHE IS A VERY DIFFICULT BLOOD DRAW) (L) Operative Note     Date: 2024  OR Location: [facility]    Name: Trina Elias, : 1950, Age: 73 y.o., MRN: [id number], Sex: female    Diagnosis  Pre-op Diagnosis     * Arthritis of shoulder region, left [M19.012] Post-op Diagnosis     * Arthritis of shoulder region, left [M19.012]     Procedures  LEFT REVERSE TOTAL SHOULDER ARTHROPLASTY (YASMIN BIOMET,PATIENT WOULD LIKE TO NOTE IN HER CHART THAT SHE IS A VERY DIFFICULT BLOOD DRAW)  [address] - [place] ARTHROPLASTY GLENOHUMERAL JOINT TOTAL SHOULDER      Surgeons      * Oli Daly - Primary    Resident/Fellow/Other Assistant:  Surgeons and Role:  * No surgeons found with a matching role *    Procedure Summary  Anesthesia: * No anesthesia type entered *  ASA: III  Anesthesia Staff: Anesthesiologist: Sy Leal MD  C-AA: ALXEANDRA Barba  Estimated Blood Loss: 100mL  Intra-op Medications:   Administrations occurring from 0900 to 1115 on 24:   Medication Name Total Dose   vancomycin (Vancocin) vial for injection 1 g   lactated Ringer's infusion Cannot be calculated   vancomycin (Xellia) 1 g in 200 mL (Xellia) IVPB 1,000 mg Cannot be calculated              Anesthesia Record               Intraprocedure I/O Totals          Intake    Phenylephrine Drip 0.00 mL    The total shown is the total volume documented since Anesthesia Start was filed.    Total Intake 0 mL       Output    Est. Blood Loss 50 mL    Total Output 50 mL       Net    Net Volume -50 mL          Specimen: No specimens collected     Staff:   Circulator: Conchita Curran RN; Allyson Cunningham RN  Scrub Person: Anu Lowery PA-C; Issac Ochoa; Trish Wharton         Drains and/or Catheters: * None in log *    Tourniquet Times:         Implants:  Implants       Type Name Action Serial No.      Screw PIN, STEINMANN, THREADED TIP, 1/8 X 2.5 IN, LONG -  [id number] Used, Not Implanted      Joint MINI BASEPLATE, COMP, W/ TAPER ADAPTER - [id number] Implanted      Screw SCREW, HEXALOBE, LOCKING, 4.75 X 15MM - [id number] Implanted      Screw SCREW, HEXALOBE, LOCKING, 4.75 X 15MM - [id number] Implanted      Screw SCREW, HEXALOBE, LOCKING, 4.75 X 15MM - [id number] Implanted      Screw SCREW, COMP, LOCKING, 3.5 HEX, 4.75 X 25MM - [id number] Implanted      Joint CENTRAL SCREW, 6.5 X 30MM - [id number] Implanted      Joint GLENOSPHERE, VERSA-DIAL, 36MM, STANDARD - [id number] Implanted      Joint STEM, COMP PRIMARY, 8MM MINI - [id number] Implanted      Joint HUMERAL. BEARING, 36MM STD VI TE - [id number] Implanted      Joint HUMERAL TRAY, STD +3 TAPER OFFSET, 40MM KIM - [id number] Implanted               Findings: Near full-thickness tear of the intra-articular portion of long head of the bicep.  Complete articular cartilage wear of the humeral head.  Intact rotator cuff.    Indications: Trina Elias is an 73 y.o. female who is having surgery for M19.012.     The patient was seen in the preoperative area. The risks, benefits, complications, treatment options, non-operative alternatives, expected recovery and outcomes were discussed with the patient. The possibilities of reaction to medication, pulmonary aspiration, injury to surrounding structures, bleeding, recurrent infection, the need for additional procedures, failure to diagnose a condition, and creating a complication requiring transfusion or operation were discussed with the patient. The patient concurred with the proposed plan, giving informed consent.  The site of surgery was properly noted/marked if necessary per policy. The patient has been actively warmed in preoperative area. Preoperative antibiotics have been ordered and given within 1 hours of incision. Venous thrombosis prophylaxis have been ordered including bilateral sequential compression devices    Procedure Details: After obtaining informed surgical consent and the  administration of prophylactic antibiotics the patient underwent successful regional block in the preoperative holding area.  She was then brought to the operating room and placed supine on the operative table where successful general anesthetic was administered.  The patient was then placed into a well-padded beachchair position.  Sterile prep and drape of the left shoulder was completed utilizing standard orthopedic protocol.  12 cm deltopectoral incision was made with dissection carried down to the cephalic vein.  Hemostasis obtained with electrocautery.  Cephalic vein and deltoid retracted laterally.  Clavipectoral fascia was debrided.  Abundant subdeltoid and subacromial adhesions were released.  Blunt dissection identified the axillary nerve which was carefully protected throughout the procedure.  Bicep tendon sheath incised.  Bicep tenodesis completed at the level of the pectoralis major insertion.  Tenotomy completed proximal to that.  Bicipital tendon sheath incised proximally and then the rotator interval was also open.  Subscapularis peel completed in addition to release of the anterior capsular structures with dislocation of the glenohumeral joint.  Above findings were noted.  Starter awl was inserted into the humeral head with sequential reaming to an 8 mm stem for the [employer] comprehensive reverse shoulder system.  Humeral head osteotomy completed at 20 degrees of retroversion.  Broaching completed to an 8 stem.  Attention turned to the glenoid.  Retractors carefully placed.  Anterior capsular release completed in addition to debridement of the residual glenoid labrum and biceps stump.  Guidepin inserted into the glenoid followed by reaming of the glenoid surface.  Irrigation performed.  Mini baseplate press-fit into position and then secured with a central screw and 4 peripheral locking screws.  36 mm glenosphere and standard offset press-fit into position with stability confirmed.  Attention  turned back to the humerus and trialing of humeral trays completed.  +3 offset tray providing appropriate fit.  Trial components removed from the humerus followed by thorough irrigation.  8 mm stem and +3 offset tray and standard poly press-fit into position followed by relocation of the glenohumeral joint.  Stability confirmed.  Final irrigation completed followed by placement of vancomycin powder deep to the wound and wound closure.Soft sterile dressing was applied followed by an abduction sling.  Patient was then awakened, extubated, transferred to hospital bed and taken to the postanesthesia care unit in stable condition.  Complications:  None; patient tolerated the procedure well.    Disposition: PACU - hemodynamically stable.  Condition: stable         Additional Details: Patient will be admitted for observation.  Probable discharge to home tomorrow.  Consultation to be obtained with the hospitalist and physical therapy.  Prophylactic antibiotics and DVT prophylaxis also to be completed.    Attending Attestation:     Oli Daly  Phone Number: [phone]

## 2024-05-02 NOTE — CARE PLAN
Problem: Balance  Goal: LTG - Patient will demonstrate Intervention to enhance balance for safe completion of daily activities with least restrictive assistive device   Outcome: Progressing  Goal: LTG - Patient will maintain balance to allow for safe mobility with least restrictive assistive device   Outcome: Progressing     Problem: Mobility  Goal: LTG - Patient will ambulate household distance with least restrictive assistive device   Outcome: Progressing  Goal: LTG - Patient will demonstrate safe mobility requirements with least restrictive assistive device   Outcome: Progressing     Problem: Safety  Goal: LTG- Pt. will adhere to Left reverse total shoulder precautions during ADLs and transfers as noted by physician.   Outcome: Progressing     Problem: PT Transfers  Goal: LTG - Patient will demonstrate safe transfer techniques with least restrictive assistive device   Outcome: Progressing     Problem: Pain  Goal: LTG - Patient will demonstrate intervention for managing pain  Outcome: Progressing     Problem: Compromised Skin Integrity  Goal: LTG - Patient will maintain/improve skin integrity through proper skin care techniques  Outcome: Progressing

## 2024-05-02 NOTE — NURSING NOTE
Pt oriented to plan of care, room, call light and safety precautions including falls risk. Pt currently sitting up in the chair. Was medicated for severe pain earlier, at 15:00 with morphine 2 mg with relief. Received Oxy 10 mg at 16:15 to maintain pain under control. Pt connected to tele with A-pacing HR 60. Plan is discharge pt to home tomorrow. Will continue with plan of care.

## 2024-05-02 NOTE — ANESTHESIA PREPROCEDURE EVALUATION
Patient: Trina SALDANA Via    Procedure Information       Date/Time: 05/02/24 0900    Procedure: LEFT REVERSE TOTAL SHOULDER ARTHROPLASTY (YASMIN BIOMET,PATIENT WOULD LIKE TO NOTE IN HER CHART THAT SHE IS A VERY DIFFICULT BLOOD DRAW) (Left: Shoulder)    Location: [facility] / [facility]    Surgeons: Oli Daly, DO            Relevant Problems   Cardiac  TE 2/27/24-    1. Left ventricular systolic function is normal with a 55-60% estimated ejection fraction.   2. Spectral Doppler shows an impaired relaxation pattern of left ventricular diastolic   filling.   3. Mild to moderate mitral valve regurgitation.   4. Mild to moderate tricuspid regurgitation visualized.   5. Moderate aortic valve stenosis.   6. Mild to moderately elevated pulmonary artery pressure.     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)   (+) Coronary artery disease involving native coronary artery   (+) Mixed hyperlipidemia   (+) Pacemaker      Pulmonary   (+) Asthma (HHS-HCC)   (+) Centrilobular emphysema (Multi)   (+) Obstructive sleep apnea      Neuro   (+) Cervical radiculopathy   (+) Cubital tunnel syndrome on right   (+) Displacement of lumbar disc with radiculopathy      GI   (+) Gastroesophageal reflux disease without esophagitis      Hematology   (+) Anticoagulant long-term use   (+) Coagulation defect, unspecified (Multi)   (+) Iron deficiency anemia      Musculoskeletal   (+) Chronic bilateral low back pain without sciatica   (+) Displacement of lumbar disc with radiculopathy   (+) Primary osteoarthritis of right shoulder   (+) Rheumatoid arthritis (Multi)       Clinical information reviewed:    Allergies                NPO Detail:  No data recorded     Physical Exam    Airway  Mallampati: II  TM distance: >3 FB  Neck ROM: full     Cardiovascular   Comments: deferred   Dental   (+) upper dentures, implants  Comments: Implants lower jaw,  dentures upper   Pulmonary   Comments: deferred   Abdominal     Comments: deferred           Anesthesia Plan    History of general anesthesia?: yes  History of complications of general anesthesia?: no    ASA 3     regional and general   (Left interscalene brachial plexus block)  The patient is not a current smoker.  Education provided regarding risk of obstructive sleep apnea.  intravenous induction   Postoperative administration of opioids is intended.  Anesthetic plan and risks discussed with patient.    Plan discussed with CAA.

## 2024-05-02 NOTE — PROGRESS NOTES
Respiratory Therapy Note  RT to bedside for respiratory care evaluation post-op. PT with CSA and BARBARA history. History of BiPAP use. PT reports stopping BiPAP therapy due to intolerance to machine. PT refuses machines offered at facility. PT informed she will be placed on NOC pulse oximetry monitoring. PT instructed on IS. VSS. No distress. RN aware.

## 2024-05-02 NOTE — ANESTHESIA POSTPROCEDURE EVALUATION
Patient: Trina Elias    Procedure Summary       Date: 05/02/24 Room / Location: [facility]    Anesthesia Start: 0920 Anesthesia Stop: 1124    Procedure: LEFT REVERSE TOTAL SHOULDER ARTHROPLASTY (YASMIN BIOMET,PATIENT WOULD LIKE TO NOTE IN HER CHART THAT SHE IS A VERY DIFFICULT BLOOD DRAW) (Left: Shoulder) Diagnosis:       Arthritis of shoulder region, left      (M19.012)    Surgeons: Oli Daly DO Responsible Provider: Sy Leal MD    Anesthesia Type: regional, general ASA Status: 3            Anesthesia Type: regional, general    Vitals Value Taken Time   /63 05/02/24 1220   Temp 36.3 °C (97.3 °F) 05/02/24 1220   Pulse 60 05/02/24 1220   Resp 16 05/02/24 1220   SpO2 96 % 05/02/24 1220       Anesthesia Post Evaluation    Patient location during evaluation: PACU  Patient participation: complete - patient participated  Level of consciousness: awake  Pain score: 0  Pain management: adequate  Multimodal analgesia pain management approach  Airway patency: patent  Two or more strategies used to mitigate risk of obstructive sleep apnea  Cardiovascular status: acceptable  Respiratory status: acceptable  Hydration status: acceptable  Postoperative Nausea and Vomiting: none        There were no known notable events for this encounter.

## 2024-05-02 NOTE — NURSING NOTE
Arrived from PACU  Patient's vitals were done    Assisted from the bathroom  Vitals done   Lunch encouraged   Call light within reach

## 2024-05-03 VITALS
DIASTOLIC BLOOD PRESSURE: 62 MMHG | OXYGEN SATURATION: 97 % | WEIGHT: 160.05 LBS | HEART RATE: 62 BPM | SYSTOLIC BLOOD PRESSURE: 143 MMHG | RESPIRATION RATE: 18 BRPM | BODY MASS INDEX: 27.33 KG/M2 | TEMPERATURE: 97.2 F | HEIGHT: 64 IN

## 2024-05-03 LAB
ANION GAP SERPL CALC-SCNC: 14 MMOL/L (ref 10–20)
BUN SERPL-MCNC: 20 MG/DL (ref 6–23)
CALCIUM SERPL-MCNC: 8.4 MG/DL (ref 8.6–10.3)
CHLORIDE SERPL-SCNC: 105 MMOL/L (ref 98–107)
CO2 SERPL-SCNC: 24 MMOL/L (ref 21–32)
CREAT SERPL-MCNC: 0.9 MG/DL (ref 0.5–1.05)
EGFRCR SERPLBLD CKD-EPI 2021: 68 ML/MIN/1.73M*2
ERYTHROCYTE [DISTWIDTH] IN BLOOD BY AUTOMATED COUNT: 20.1 % (ref 11.5–14.5)
GLUCOSE SERPL-MCNC: 129 MG/DL (ref 74–99)
HCT VFR BLD AUTO: 26.3 % (ref 36–46)
HGB BLD-MCNC: 8.3 G/DL (ref 12–16)
MCH RBC QN AUTO: 23.9 PG (ref 26–34)
MCHC RBC AUTO-ENTMCNC: 31.6 G/DL (ref 32–36)
MCV RBC AUTO: 76 FL (ref 80–100)
NRBC BLD-RTO: ABNORMAL /100{WBCS}
PLATELET # BLD AUTO: 226 X10*3/UL (ref 150–450)
POTASSIUM SERPL-SCNC: 3.9 MMOL/L (ref 3.5–5.3)
RBC # BLD AUTO: 3.47 X10*6/UL (ref 4–5.2)
SODIUM SERPL-SCNC: 139 MMOL/L (ref 136–145)
WBC # BLD AUTO: 12 X10*3/UL (ref 4.4–11.3)

## 2024-05-03 PROCEDURE — 2500000001 HC RX 250 WO HCPCS SELF ADMINISTERED DRUGS (ALT 637 FOR MEDICARE OP): Performed by: STUDENT IN AN ORGANIZED HEALTH CARE EDUCATION/TRAINING PROGRAM

## 2024-05-03 PROCEDURE — 97530 THERAPEUTIC ACTIVITIES: CPT | Mod: GP

## 2024-05-03 PROCEDURE — 2500000001 HC RX 250 WO HCPCS SELF ADMINISTERED DRUGS (ALT 637 FOR MEDICARE OP): Performed by: PHYSICIAN ASSISTANT

## 2024-05-03 PROCEDURE — 7100000011 HC EXTENDED STAY RECOVERY HOURLY - NURSING UNIT

## 2024-05-03 PROCEDURE — 2500000004 HC RX 250 GENERAL PHARMACY W/ HCPCS (ALT 636 FOR OP/ED): Performed by: PHYSICIAN ASSISTANT

## 2024-05-03 PROCEDURE — 80048 BASIC METABOLIC PNL TOTAL CA: CPT | Performed by: ORTHOPAEDIC SURGERY

## 2024-05-03 PROCEDURE — 85027 COMPLETE CBC AUTOMATED: CPT | Performed by: ORTHOPAEDIC SURGERY

## 2024-05-03 PROCEDURE — 36415 COLL VENOUS BLD VENIPUNCTURE: CPT | Performed by: ORTHOPAEDIC SURGERY

## 2024-05-03 PROCEDURE — 2500000001 HC RX 250 WO HCPCS SELF ADMINISTERED DRUGS (ALT 637 FOR MEDICARE OP): Performed by: ORTHOPAEDIC SURGERY

## 2024-05-03 PROCEDURE — 97110 THERAPEUTIC EXERCISES: CPT | Mod: GP

## 2024-05-03 RX ORDER — ACETAMINOPHEN 325 MG/1
650 TABLET ORAL 4 TIMES DAILY
Qty: 30 TABLET | Refills: 0 | Status: SHIPPED | OUTPATIENT
Start: 2024-05-03 | End: 2024-05-03

## 2024-05-03 RX ORDER — ACETAMINOPHEN 325 MG/1
325 TABLET ORAL EVERY 6 HOURS PRN
Qty: 30 TABLET | Refills: 0 | Status: SHIPPED | OUTPATIENT
Start: 2024-05-03 | End: 2024-06-02

## 2024-05-03 RX ORDER — NAPROXEN SODIUM 220 MG/1
81 TABLET, FILM COATED ORAL DAILY
Qty: 21 TABLET | Refills: 0 | Status: SHIPPED | OUTPATIENT
Start: 2024-05-03 | End: 2024-05-24

## 2024-05-03 RX ORDER — OXYCODONE AND ACETAMINOPHEN 10; 325 MG/1; MG/1
1 TABLET ORAL EVERY 6 HOURS PRN
Qty: 28 TABLET | Refills: 0 | Status: SHIPPED | OUTPATIENT
Start: 2024-05-03 | End: 2024-05-10

## 2024-05-03 RX ADMIN — MORPHINE SULFATE 2 MG: 2 INJECTION, SOLUTION INTRAMUSCULAR; INTRAVENOUS at 06:50

## 2024-05-03 RX ADMIN — FAMOTIDINE 40 MG: 20 TABLET, FILM COATED ORAL at 09:42

## 2024-05-03 RX ADMIN — OXYCODONE 5 MG: 5 TABLET ORAL at 09:42

## 2024-05-03 RX ADMIN — DIGOXIN 125 MCG: 125 TABLET ORAL at 09:42

## 2024-05-03 RX ADMIN — OXYCODONE HYDROCHLORIDE 10 MG: 5 TABLET ORAL at 03:23

## 2024-05-03 RX ADMIN — HYDROXYCHLOROQUINE SULFATE 200 MG: 200 TABLET ORAL at 09:42

## 2024-05-03 ASSESSMENT — COGNITIVE AND FUNCTIONAL STATUS - GENERAL
STANDING UP FROM CHAIR USING ARMS: A LITTLE
MOBILITY SCORE: 22
TOILETING: A LITTLE
TURNING FROM BACK TO SIDE WHILE IN FLAT BAD: A LITTLE
TURNING FROM BACK TO SIDE WHILE IN FLAT BAD: A LITTLE
MOVING TO AND FROM BED TO CHAIR: A LITTLE
WALKING IN HOSPITAL ROOM: A LITTLE
CLIMB 3 TO 5 STEPS WITH RAILING: A LITTLE
WALKING IN HOSPITAL ROOM: A LITTLE
MOVING FROM LYING ON BACK TO SITTING ON SIDE OF FLAT BED WITH BEDRAILS: A LITTLE
CLIMB 3 TO 5 STEPS WITH RAILING: A LITTLE
HELP NEEDED FOR BATHING: A LOT
WALKING IN HOSPITAL ROOM: A LITTLE
DAILY ACTIVITIY SCORE: 16
STANDING UP FROM CHAIR USING ARMS: A LITTLE
CLIMB 3 TO 5 STEPS WITH RAILING: A LITTLE
MOVING TO AND FROM BED TO CHAIR: A LITTLE
MOBILITY SCORE: 18
MOBILITY SCORE: 18
MOVING FROM LYING ON BACK TO SITTING ON SIDE OF FLAT BED WITH BEDRAILS: A LITTLE
DRESSING REGULAR LOWER BODY CLOTHING: A LOT
DRESSING REGULAR UPPER BODY CLOTHING: A LOT
PERSONAL GROOMING: A LITTLE

## 2024-05-03 ASSESSMENT — PAIN - FUNCTIONAL ASSESSMENT
PAIN_FUNCTIONAL_ASSESSMENT: 0-10

## 2024-05-03 ASSESSMENT — PAIN DESCRIPTION - ORIENTATION: ORIENTATION: LEFT

## 2024-05-03 ASSESSMENT — PAIN SCALES - GENERAL
PAINLEVEL_OUTOF10: 9
PAINLEVEL_OUTOF10: 3
PAINLEVEL_OUTOF10: 8
PAINLEVEL_OUTOF10: 5 - MODERATE PAIN
PAINLEVEL_OUTOF10: 6
PAINLEVEL_OUTOF10: 7
PAINLEVEL_OUTOF10: 2
PAINLEVEL_OUTOF10: 5 - MODERATE PAIN

## 2024-05-03 ASSESSMENT — PAIN DESCRIPTION - LOCATION: LOCATION: SHOULDER

## 2024-05-03 NOTE — PROGRESS NOTES
Interdisciplinary Rounds were completed at the bedside with Patient.  Staff participating in rounds included: Clinical Nurse Orthopedic Coordinator Transitional Care Coordinator Nursing Leadership Hospitalist MD/PA Physical Therapist.  Topics discussed included: Today's Plan of Care Discharge Plan and Accommodations Physical Therapy Medications/Preferred Pharmacy and the Patient was given the opportunity to ask additional questions or bring up any concerns at that time.  During the final discharge discussion and review of instructions, they will have another opportunity to review questions or concerns prior to leaving our care.  Patient was given information on who to call post-discharge should new questions or concerns arise.      The patient's plan includes:    Discharge Date/Disposition:  Home, Today with, No Services Necessary  Discharge Needs: No Equipment Needs Identified  Medications/Pharmacy: Patient will  discharge prescriptions at primary pharmacy on file

## 2024-05-03 NOTE — PROGRESS NOTES
05/03/24 0913   Discharge Planning   Assistance Needed HOME with no skilled needs   Home or Post Acute Services None   Patient expects to be discharged to: HOME with no skilled needs   Does the patient need discharge transport arranged? No     Anticipate discharge home today with OP therapy.

## 2024-05-03 NOTE — NURSING NOTE
Assumed care of patient at 1900. Patient lying in chair. Asking to use the restroom. She has IV access in top of left foot. IV disconnected while ambulating to restroom. She would like to try to sleep in recliner tonight because of her chronic back pain. She has no questions or concerns at this time. States she is still having some numbness in operative arm. Fingers are starting to tingle on that side. She is not having any pain in her shoulder, all of the pain she is having is in her lower back. Safety measures in place, call light and personal belongings within reach.

## 2024-05-03 NOTE — PROGRESS NOTES
Patient seen, chart reviewed.  States that she has chronic back and foot pain, and those are her only complaints at this time.  She follows with pain management.  Vital signs are stable.  States that she is starting to have some sensation back in her hand.            Violeta Kwon MD

## 2024-05-03 NOTE — PROGRESS NOTES
Physical Therapy Treatment    Patient Name: Trina Elias  MRN: [id number]  Today's Date: 5/3/2024  Time Calculation  Start Time: 0852  Stop Time: 0920  Time Calculation (min): 28 min       Assessment/Plan   PT Assessment  PT Assessment Results: Decreased strength, Decreased range of motion, Decreased endurance, Orthopedic restrictions, Pain  Rehab Prognosis: Good  Evaluation/Treatment Tolerance: Patient tolerated treatment well  End of Session Communication: Bedside nurse  Assessment Comment: Pt reports high levels of pain however is able to complete ther ex for HEP and complete dressing with assist from PT. Further functional mobility deferred due to IV in L foot. Recommend discharge home with 24 hour supervision and outpatient PT once cleared by surgeon.  End of Session Patient Position: Up in chair, BLE elevated, ice to surgical site, call light in reach, needs met, RN aware.  PT Plan  Inpatient/Swing Bed or Outpatient: Inpatient  PT Plan  Treatment/Interventions: Bed mobility, Transfer training, Gait training, Stair training, Balance training, Strengthening, Endurance training, Range of motion, Therapeutic activity, Home exercise program, Therapeutic exercise, Positioning  PT Plan: Skilled PT  PT Frequency: 5 times per week  PT Discharge Recommendations: Low intensity level of continued care  Equipment Recommended upon Discharge: Straight cane  PT Recommended Transfer Status: Stand by assist  PT - OK to Discharge: Yes      General Visit Information:   PT  Visit  PT Received On: 05/03/24  Response to Previous Treatment: Patient with no complaints from previous session. (Pt reports high pain levels this date. RN notified.)  General  Family/Caregiver Present: No  Prior to Session Communication: Bedside nurse  Patient Position Received: Up in chair    Subjective   Precautions:  Precautions  UE Weight Bearing Status: Left Non-Weight Bearing  LE Weight Bearing Status: Weight Bearing as Tolerated  Medical Precautions:  Fall precautions  Post-Surgical Precautions: Left shoulder precautions    Objective   Pain:  Pain Assessment  Pain Assessment: 0-10  Pain Score: 8  Pain Type: Surgical pain  Pain Location: Shoulder  Pain Orientation: Left    Activity Tolerance:  Activity Tolerance  Endurance:  (limited assessment due to IV in L foot.)  Treatments:  Therapeutic Exercise  Therapeutic Exercise Performed: Yes  LUE assisted elbow flexion/extension, wrist pronation/supination, wrist flexion/extension, and hand opening/closing x 10 reps.    Therapeutic Activity  Therapeutic Activity Performed: Yes  Therapeutic Activity 1: PT instructed patient in donning shirt onto surgical UE first. PT assisted.  PT also assisted in adjusting sling for appropriate fit and comfort. PT instructed patient in donning and doffing sling.    Transfers  Transfer: Yes  Transfer 1  Transfer From 1: Chair with arms to  Transfer to 1: Chair with arms  Technique 1: Sit to stand, Stand to sit  Transfer Level of Assistance 1: Close supervision    Outcome Measures:  [facility] Basic Mobility  Turning from your back to your side while in a flat bed without using bedrails: None  Moving from lying on your back to sitting on the side of a flat bed without using bedrails: None  Moving to and from bed to chair (including a wheelchair): None  Standing up from a chair using your arms (e.g. wheelchair or bedside chair): None  To walk in hospital room: A little  Climbing 3-5 steps with railing: A little  Basic Mobility - Total Score: 22      Deyanira Hernandez, PT, DPT

## 2024-05-03 NOTE — PROGRESS NOTES
"Trina Elias is a 73 y.o. female on day 1 of admission presenting with Arthritis of left shoulder region.    Subjective   Seen and examined medically stable for discharge appreciate input from orthopedic surgery       Objective     Physical Exam  Vitals and nursing note reviewed.   Constitutional:       Appearance: Normal appearance.   HENT:      Head: Normocephalic and atraumatic.      Mouth/Throat:      Mouth: Mucous membranes are moist.   Eyes:      Extraocular Movements: Extraocular movements intact.      Pupils: Pupils are equal, round, and reactive to light.   Cardiovascular:      Rate and Rhythm: Normal rate and regular rhythm.      Pulses: Normal pulses.      Heart sounds: Normal heart sounds.   Pulmonary:      Effort: Pulmonary effort is normal.      Breath sounds: Normal breath sounds.   Abdominal:      Palpations: Abdomen is soft.   Musculoskeletal:         General: Normal range of motion.      Cervical back: Normal range of motion and neck supple.      Comments: Left shoulder immobilizer   Skin:     General: Skin is warm.      Capillary Refill: Capillary refill takes less than 2 seconds.   Neurological:      General: No focal deficit present.      Mental Status: She is alert and oriented to person, place, and time. Mental status is at baseline.   Psychiatric:         Mood and Affect: Mood normal.         Behavior: Behavior normal.         Last Recorded Vitals  Blood pressure 143/62, pulse 62, temperature 36.2 °C (97.2 °F), temperature source Temporal, resp. rate 18, height 1.626 m (5' 4\"), weight 72.6 kg (160 lb 0.9 oz), SpO2 97%.  Intake/Output last 3 Shifts:  I/O last 3 completed shifts:  In: 2720 (37.5 mL/kg) [P.O.:480; I.V.:2040 (28.1 mL/kg); IV Piggyback:200]  Out: 1250 (17.2 mL/kg) [Urine:1200 (0.5 mL/kg/hr); Blood:50]  Weight: 72.6 kg     Relevant Results              Results for orders placed or performed during the hospital encounter of 05/02/24 (from the past 24 hour(s))   Basic metabolic panel "   Result Value Ref Range    Glucose 129 (H) 74 - 99 mg/dL    Sodium 139 136 - 145 mmol/L    Potassium 3.9 3.5 - 5.3 mmol/L    Chloride 105 98 - 107 mmol/L    Bicarbonate 24 21 - 32 mmol/L    Anion Gap 14 10 - 20 mmol/L    Urea Nitrogen 20 6 - 23 mg/dL    Creatinine 0.90 0.50 - 1.05 mg/dL    eGFR 68 >60 mL/min/1.73m*2    Calcium 8.4 (L) 8.6 - 10.3 mg/dL   CBC POD 1   Result Value Ref Range    WBC 12.0 (H) 4.4 - 11.3 x10*3/uL    nRBC      RBC 3.47 (L) 4.00 - 5.20 x10*6/uL    Hemoglobin 8.3 (L) 12.0 - 16.0 g/dL    Hematocrit 26.3 (L) 36.0 - 46.0 %    MCV 76 (L) 80 - 100 fL    MCH 23.9 (L) 26.0 - 34.0 pg    MCHC 31.6 (L) 32.0 - 36.0 g/dL    RDW 20.1 (H) 11.5 - 14.5 %    Platelets 226 150 - 450 x10*3/uL     Scheduled medications  atorvastatin, 40 mg, oral, Nightly  baclofen, 5 mg, oral, Nightly  [Held by provider] bumetanide, 2 mg, oral, BID  digoxin, 125 mcg, oral, Daily  dilTIAZem CD, 120 mg, oral, Nightly  enoxaparin, 40 mg, subcutaneous, q24h  famotidine, 40 mg, oral, Daily  hydroxychloroquine, 200 mg, oral, Daily  pantoprazole, 20 mg, oral, BID  polyethylene glycol, 17 g, oral, Daily      Continuous medications  lactated Ringer's, 100 mL/hr, Last Rate: 100 mL/hr (05/03/24 0405)      PRN medications  PRN medications: acetaminophen, alum-mag hydroxide-simeth, benzocaine-menthol, bisacodyl, diclofenac sodium, HYDROmorphone, lubricating eye drops, melatonin, morphine, naloxone, ondansetron ODT **OR** ondansetron, oxyCODONE, oxyCODONE, simethicone, sodium chloride                 Assessment/Plan   Principal Problem:    Arthritis of left shoulder region    Medically stable for discharge       I spent 40 minutes in the professional and overall care of this patient.      Allen Garvin, DO

## 2024-05-03 NOTE — CARE PLAN
The patient's goals for the shift include pain management.    The clinical goals for the shift include Pain management.

## 2024-05-03 NOTE — NURSING NOTE
Patient's tele monitor continued to show varying heart rates even thought she is supposed to be paced with her pacemaker/defibrillator to be in the low 70's. Heart rate dropped to mid 40s at one point, went to check on patient and she stated she keeps having dizzy spells and does not feel right. She appears more groggy and lethargic compared to just one hour ago when I assessed her. Informed hospitalist of this and the ordered to get ECG. Results showed atrial paced rhythm with prolonged AV conduction with occasional premature ventricular complexes and premature atrial complexes, nonspecific t wave abnormality, and general abnormal ecg. Hospitalist reviewed results. Wants to hold off on pain medication at this time. When moving patient from chair to bed for ecg, she had some buckling but she also has neuropathy in her legs and feet and has frequent falls at home.

## 2024-05-03 NOTE — NURSING NOTE
Left shoulder dressing intact with no noted drainage. Circulation adequate to left lower extremity.

## 2024-05-03 NOTE — NURSING NOTE
Patient states she has not had any recurrences of this dizziness recently. She requested pain medication as she was having a lot of pain in her shoulder and all numbness has resolved. Call light still within reach.

## 2024-05-03 NOTE — CARE PLAN
Problem: Pain  Goal: My pain/discomfort is manageable  Outcome: Progressing     Problem: Safety  Goal: Patient will be injury free during hospitalization  Outcome: Progressing  Goal: I will remain free of falls  Outcome: Progressing     Problem: Daily Care  Goal: Daily care needs are met  Outcome: Progressing     Problem: Psychosocial Needs  Goal: Demonstrates ability to cope with hospitalization/illness  Outcome: Progressing  Goal: Collaborate with me, my family, and caregiver to identify my specific goals  Outcome: Progressing     Problem: Discharge Barriers  Goal: My discharge needs are met  Outcome: Progressing     Problem: Skin  Goal: Decreased wound size/increased tissue granulation at next dressing change  Outcome: Progressing  Goal: Participates in plan/prevention/treatment measures  Outcome: Progressing  Goal: Prevent/manage excess moisture  Outcome: Progressing  Goal: Prevent/minimize sheer/friction injuries  Outcome: Progressing  Goal: Promote/optimize nutrition  Outcome: Progressing  Goal: Promote skin healing  Outcome: Progressing     Problem: Pain  Goal: LTG - Patient will demonstrate intervention for managing pain  Outcome: Progressing     Problem: Pain - Adult  Goal: Verbalizes/displays adequate comfort level or baseline comfort level  Outcome: Progressing     Problem: Safety - Adult  Goal: Free from fall injury  Outcome: Progressing     Problem: Discharge Planning  Goal: Discharge to home or other facility with appropriate resources  Outcome: Progressing     Problem: Chronic Conditions and Co-morbidities  Goal: Patient's chronic conditions and co-morbidity symptoms are monitored and maintained or improved  Outcome: Progressing     Problem: Pain  Goal: Takes deep breaths with improved pain control throughout the shift  Outcome: Progressing  Goal: Turns in bed with improved pain control throughout the shift  Outcome: Progressing  Goal: Walks with improved pain control throughout the shift  Outcome:  Progressing  Goal: Performs ADL's with improved pain control throughout shift  Outcome: Progressing  Goal: Participates in PT with improved pain control throughout the shift  Outcome: Progressing  Goal: Free from opioid side effects throughout the shift  Outcome: Progressing  Goal: Free from acute confusion related to pain meds throughout the shift  Outcome: Progressing

## 2024-05-03 NOTE — DISCHARGE SUMMARY
Discharge Diagnosis  Arthritis of left shoulder region    Issues Requiring Follow-Up  Left shoulder arthroplasty    Test Results Pending At Discharge  Pending Labs       No current pending labs.            Hospital Course   Patient admitted on 5/2/2024 and underwent left reverse shoulder arthroplasty.  Postoperatively she was admitted for observation.  Consultation obtained with the hospitalist as well as physical therapy.  Prophylactic antibiotics and DVT prophylaxis also completed.  Discharged home on the first postoperative day    Pertinent Physical Exam At Time of Discharge  Physical Exam patient is alert and oriented.  Dressing is clean and dry.  Vitals are currently stable.  Grossly neurologically intact and capillary refill is brisk.    Home Medications     Medication List      START taking these medications     aspirin 81 mg chewable tablet; Chew 1 tablet (81 mg) once daily for 21   days.   oxyCODONE-acetaminophen  mg tablet; Commonly known as: Percocet;   Take 1 tablet by mouth every 6 hours if needed for severe pain (7 - 10)   for up to 7 days.; Replaces: oxyCODONE-acetaminophen 5-325 mg tablet     CONTINUE taking these medications     albuterol 2.5 mg /3 mL (0.083 %) nebulizer solution   baclofen 10 mg tablet; Commonly known as: Lioresal; One tablet in the   morning and afternoon and 1 to 2 tablets in the evening.   bumetanide 2 mg tablet; Commonly known as: Bumex   clobetasol 0.05 % cream; Commonly known as: Temovate; Apply topically 2   times a day.   diclofenac sodium 1 % gel; Commonly known as: Voltaren; Apply 4.5 inches   (4 g) topically 2 times a day as needed (joint pain).   digoxin 125 MCG tablet; Commonly known as: Lanoxin   EPINEPHrine 0.3 mg/0.3 mL injection syringe; Commonly known as: Epipen   famotidine 40 mg tablet; Commonly known as: Pepcid; Take 1 tablet (40   mg) by mouth once daily.   fluticasone 50 mcg/actuation nasal spray; Commonly known as: Flonase   hydroxychloroquine 200 mg  tablet; Commonly known as: Plaquenil; Take 1   tablet (200 mg) by mouth once daily.   magnesium oxide 400 mg tablet; Commonly known as: Mag-Ox   Miralax 17 gram/dose powder; Generic drug: polyethylene glycol   montelukast 10 mg tablet; Commonly known as: Singulair; Take 1 tablet   (10 mg) by mouth once daily at bedtime.   nitroglycerin 0.4 mg SL tablet; Commonly known as: Nitrostat   pantoprazole 40 mg EC tablet; Commonly known as: ProtoNix; TAKE ONE   TABLET BY MOUTH TWO TIMES A DAY   rosuvastatin 20 mg tablet; Commonly known as: Crestor; TAKE ONE TABLET   BY MOUTH EVERY DAY   saccharomyces boulardii 250 mg capsule; Commonly known as: Florastor     STOP taking these medications     amitriptyline 25 mg tablet; Commonly known as: Elavil   clindamycin 300 mg capsule; Commonly known as: Cleocin   oxyCODONE-acetaminophen 5-325 mg tablet; Commonly known as: Percocet;   Replaced by: oxyCODONE-acetaminophen  mg tablet     ASK your doctor about these medications     Cardizem  mg 24 hr tablet; Generic drug: dilTIAZem LA       Outpatient Follow-Up  Future Appointments   Date Time Provider Department Center   5/6/2024  9:45 AM NAILA Carnes-CNP [facility]   5/13/2024  8:20 AM Carlos Harley MD [id number] [facility]   7/17/2024  3:00 PM Vinicio Garland MD [id number] [facility]       Oli Daly DO

## 2024-05-03 NOTE — NURSING NOTE
Pt. Vital signs obtained.  Pt. Ordered breakfast.  Pt. Got up to the chair.  Call light within reach.  Chair alarm on.  Ice man refilled.

## 2024-05-06 ENCOUNTER — OFFICE VISIT (OUTPATIENT)
Dept: PAIN MEDICINE | Facility: CLINIC | Age: 74
End: 2024-05-06
Payer: MEDICARE

## 2024-05-06 VITALS
SYSTOLIC BLOOD PRESSURE: 141 MMHG | HEART RATE: 69 BPM | OXYGEN SATURATION: 99 % | RESPIRATION RATE: 16 BRPM | DIASTOLIC BLOOD PRESSURE: 100 MMHG

## 2024-05-06 DIAGNOSIS — M79.18 CERVICAL MYOFASCIAL PAIN SYNDROME: ICD-10-CM

## 2024-05-06 DIAGNOSIS — M96.1 CERVICAL POST-LAMINECTOMY SYNDROME: Primary | ICD-10-CM

## 2024-05-06 DIAGNOSIS — M19.012 ARTHRITIS OF LEFT SHOULDER REGION: ICD-10-CM

## 2024-05-06 DIAGNOSIS — M79.18 MYOFASCIAL PAIN: ICD-10-CM

## 2024-05-06 DIAGNOSIS — M51.16 DISPLACEMENT OF LUMBAR DISC WITH RADICULOPATHY: ICD-10-CM

## 2024-05-06 PROCEDURE — 1036F TOBACCO NON-USER: CPT | Performed by: NURSE PRACTITIONER

## 2024-05-06 PROCEDURE — 99214 OFFICE O/P EST MOD 30 MIN: CPT | Performed by: NURSE PRACTITIONER

## 2024-05-06 PROCEDURE — 1160F RVW MEDS BY RX/DR IN RCRD: CPT | Performed by: NURSE PRACTITIONER

## 2024-05-06 PROCEDURE — 1111F DSCHRG MED/CURRENT MED MERGE: CPT | Performed by: NURSE PRACTITIONER

## 2024-05-06 PROCEDURE — 1159F MED LIST DOCD IN RCRD: CPT | Performed by: NURSE PRACTITIONER

## 2024-05-06 RX ORDER — BACLOFEN 10 MG/1
TABLET ORAL
Qty: 120 TABLET | Refills: 2 | Status: SHIPPED | OUTPATIENT
Start: 2024-05-06

## 2024-05-06 RX ORDER — OXYCODONE AND ACETAMINOPHEN 5; 325 MG/1; MG/1
1 TABLET ORAL 3 TIMES DAILY PRN
Qty: 84 TABLET | Refills: 0 | Status: SHIPPED | OUTPATIENT
Start: 2024-06-07 | End: 2024-07-05

## 2024-05-06 RX ORDER — OXYCODONE AND ACETAMINOPHEN 5; 325 MG/1; MG/1
1 TABLET ORAL 3 TIMES DAILY PRN
Qty: 84 TABLET | Refills: 0 | Status: SHIPPED | OUTPATIENT
Start: 2024-07-05 | End: 2024-08-02

## 2024-05-06 RX ORDER — OXYCODONE AND ACETAMINOPHEN 5; 325 MG/1; MG/1
1 TABLET ORAL 3 TIMES DAILY PRN
Qty: 84 TABLET | Refills: 0 | Status: SHIPPED | OUTPATIENT
Start: 2024-05-10 | End: 2024-06-07

## 2024-05-06 ASSESSMENT — ENCOUNTER SYMPTOMS
PSYCHIATRIC NEGATIVE: 1
HEADACHES: 0
LIGHT-HEADEDNESS: 0
FACIAL ASYMMETRY: 0
TREMORS: 0
ARTHRALGIAS: 1
MYALGIAS: 1
BACK PAIN: 1
CARDIOVASCULAR NEGATIVE: 1
ALLERGIC/IMMUNOLOGIC NEGATIVE: 1
PAIN: 1
NUMBNESS: 1
NECK STIFFNESS: 1
EYES NEGATIVE: 1
RESPIRATORY NEGATIVE: 1
SPEECH DIFFICULTY: 0
CONSTITUTIONAL NEGATIVE: 1
HEMATOLOGIC/LYMPHATIC NEGATIVE: 1
GASTROINTESTINAL NEGATIVE: 1
ENDOCRINE NEGATIVE: 1
JOINT SWELLING: 1
DIZZINESS: 0
SEIZURES: 0
WEAKNESS: 1
NECK PAIN: 1

## 2024-05-06 NOTE — PROGRESS NOTES
Subjective   Patient ID: Trina Elias is a 73 y.o. female presents for chronic pain management.    Med Refill  Associated symptoms include arthralgias, joint swelling, myalgias, neck pain, numbness and weakness. Pertinent negatives include no headaches.   Pain  Associated symptoms include joint swelling and weakness. Pertinent negatives include no headaches.       Nancy follows up for interval reevaluation of her chronic cervical pain from cervical postlaminectomy syndrome with radiculitis and cervicalgia. She is also with low back pain from spondylosis, degenerative disc and radiculitis.     Is now status left total shoulder replacement May 2, 2024 with Dr. Daly.  Noted on pharmacy report for postoperative pain Percocet 10 mg every 6 hours was filled.  Currently take this medication to help control her pain along with icing and activity modification.  Scheduled for physical therapy in 3 to 4 weeks.  Currently in a shoulder adduction sling immobilizer today.    Did go to the ED since last office visit for chest pain and VT February 24 through March 5.     Percocet 5 mg 3 times daily reduces pain improves function up to 70% for up to 3 hours. Average pain score is 7 out of 10 with medication. Since taking the Zofran 4 mg as needed prior to the Percocet this does help with the nausea and GI upset.     Wanted to retry the baclofen to help with shoulder pain last office visit.  Increased baclofen 10 mg once in the morning, once in the afternoon and 2 in the evening to help reduce pain and improve function since evening and nighttime is the worst for shoulder pain.  This was ordered but never picked up.  Also noted that primary care doctor ordered and again not picked up so I ordered this 1 more time today and [facility] to [facility].    Toxicology consistent May 20, 2023. UDS today. Annual controlled substance agreement and opioid risk tool are completed and scanned into the chart May 6, 2024.     Cannot take Lyrica due to  negative side effects..     History of lumbar epidural steroid injection from Dr. Solis 2004 at L4 and L5 for degenerative disc.     Did follow with [facility] orthopedics 5 to 6 years ago had epidural steroid injection. History of bacterial meningitis and required hospitalization for 3 days and treated for 21 days with IV antibiotics as a complication from epidural.     Not an injection candidate.     On Xarelto for atrial fibrillation. Does have AICD with pacemaker. Did have cardioversion May 2, 2022. Return to normal sinus rhythm.     Last MRI of lumbar spine 2011 with degenerative disc.     Last CT of lumbar spine 2022 with degenerative disc.             For continuity:   Given the patient's report of reduced pain and improved functional ability without adverse effects, it is reasonable to treat with narcotic medications. The terms of the opioid agreement as well as the potential risks and adverse effects of the patient's medication regimen were discussed in detail. This includes if applicable due to dosage of medication permission to discuss and coordinate care with other treatment providers relevant to the patients condition. The patient verbalized understanding.      Risks and side effects of chronic opioid therapy including but not limited to tolerance, dependence, constipation, hyperalgesia, cognitive side effects, addiction and possible death due to overuse and or misuse were discussed. I also discussed that such medications when co-administered with other sedative agents including but not limited to alcohol, benzodiazepines, sedative hypnotics and illegal drugs could pose life threatening consequences including death. I also explained the impact that the administration of such medication has on a patient with obstructive sleep apnea and continued recommendations for use of apnea devices if ordered are prescribed by other physicians. In order to effectively and safely treat the pain, I also emphasized  the importance of compliance with the treatment plan, as well as compliance with the terms of the opioid agreement, which was reviewed in detail. I explained the importance of being responsible with the medications and to take these only as prescribed, never in excess and never for reasons other than pain reduction. The patient was counseled on keeping the medications safe and locked away from children and other adults as well as disposal methods and options. The patient understood the risks and instructions.      I also discussed with the patient in detail that based on the clinical response to the opioid medications and improvements of activities of daily living, sleep, and work performance in light of compliance with the treatment plan we can continue this form of therapy for the above chronic pain. The goal and rationale used for current treatment with chronic opioid medication is to control the pain and alleviate disability induced by the chronic pain condition noted above after failures of other non-opioid and nonpharmacological modalities to treat the chronic pain and the symptoms associated with have failed. The patient understood the goals in terms of the above treatment plan and had no further questions prior to leaving the office today.      Of note, the above-mentioned diagnoses/conditions and expected fluctuating nature of pain, and pain characteristic changes may lead to prolonged functional impairment requiring frequent and multiple reassessments with continued high level medical decision making. As noted, medication and medication management may require opiate therapy in excess of a routine less than 30 day medication requirement. The patient may require daily opiate therapy necessitating month-long prescription medication as noted above in order to perform activities of daily living and achieve acceptable quality of life with respect to their chronic pain condition for the foreseeable future. We  monitor our patient's carefully through drug monitoring, medication counts, urine drug testing specific to their medication as well as a myriad of other substances and with frequent follow-ups with interval reassement of the chronic pain condition, its pathophysiology and prognosis.      The level of clinical decision making at this office visit is high due to high risks and complications including mortality and morbidity related to acute and chronic pain with respects to life, bodily function, and treatment. Risks and clinical decisions with respect to under treatment, failure to maintain adequate treatment, and/or overtreatment complications and outcomes were discussed with the patient with respect to their chronic pain conditions, interventional therapies, as well as the use of various medications including possible controlled/dangerous medications. The amount and complexity of reviewed data at this in subsequent office visits is high given patient's fluctuating clinical presentation, laboratory and radiographic reports, prescription monitoring program data, and medication history as well as other relevant data as noted above. Pertinent negatives and positives data was used in consideration for the above-mentioned high complexity.       Given the patient's total MED, general use of daily opiates, or other coadministered medications in various classes the patient was offered a prescription for Narcan. I instructed the patient that it is important that patient fill this medication in order to demonstrate understanding of the gravity of possible side effects including respiratory depression and risk of overdose of this opiate load or medication combination. As such patient will be required to bring Narcan prescription to follow-up appointments as part of compliance with continued opiate care.      With respect to opiate induced constipation I discussed multiple ways to combat this problem including staying hydrated  and taking over-the-counter medications such as Dulcolax, Miralax and Senna. If these treatments are not effective we could consider such medications as Amitiza, Linzess and Movantik.      Disclaimer: This note was transcribed using an audio transcription device. As such, minor errors may be present with regard to spelling, punctuation, and inadvertent word insertion. Please disregard such errors.     Narrative & Impression   Interpreted By:  Tanvi Lopez,   STUDY:  Left shoulder, two views      INDICATION:  Signs/Symptoms:post-op.      COMPARISON:  05/16/2018.      ACCESSION NUMBER(S):  [id number]      ORDERING CLINICIAN:  IRIS CARPENTER      FINDINGS:  No acute fracture or malalignment.  Immediate postsurgical changes of left reverse total shoulder  arthroplasty. Hardware is intact without perihardware fractures or  lucencies. Expected postsurgical soft tissue gas within the  surrounding soft tissues. Left subclavian AICD/pacemaker device noted.      IMPRESSION:  1. Immediate postsurgical changes of left reverse shoulder  arthroplasty without hardware complication.      MACRO:      None.      Signed by: Tanvi Lopez 5/2/2024 12:16 PM  Dictation workstation:   [id number]       Results/Data  Xray BN Spine, Lumbosacral; 2 or 3 Views [id number] 03:32PM Fredy Simon      Test Name Result Flag Reference   Xray Lumbar Spine AP + Lateral (Report)       FINAL REPORT  Interpreted by: MIGUELINA WATKINS   06/27/23 18:12  MRN: [id number]  Patient Name: VERNON CARTAGENA     STUDY:  SPINE, LUMBOSACRAL; 2 OR 3 VIEWS; 6/26/2023 3:32 pm     INDICATION:  Fell and landed on tailbone- worsened chronic pain- ? vertebral  fracture.     COMPARISON:  02/27/2013     ACCESSION NUMBER(S):  [id number]     ORDERING CLINICIAN:  FREDY SIMON     FINDINGS:  Lumbar spine, three views     Levocurvature of the lumbar spine. There is moderate disc space  narrowing osteophytosis throughout the lumbar spine. There is no  fracture. There is no  spondylolisthesis. Mild facet disease lower  lumbar spine as well     IMPRESSION:  Moderate multilevel spondylosis throughout the lumbar spine. Mild  facet disease in the lower lumbar.     Electronically signed by: UJNE  06/27/23 18:12      Xray Shoulder Complete Min 2 Views [id number] 05:00PM Non Ambulatory, Provider   Ordering Provider: IRIS DRAKE [id number]      Test Name Result Flag Reference   Xray Shoulder Complete Min 2 Views (Report)       FINAL REPORT  Interpreted by: DAMARIS GIBBONS   02/25/23 18:08  MRN: [id number]  Patient Name: VERNON CARTAGENA     STUDY:  SHOULDER, CMPLT, MIN 2 VIEWS; Right; 2/25/2023 5:00 pm     INDICATION:  fall, landed on left arm, right shoulder pain, left hip pain .     COMPARISON:  Right shoulder radiographs dated 01/12/2023.     ACCESSION NUMBER(S):  [id number]     ORDERING CLINICIAN:  IRIS DRAKE     FINDINGS:  There is no evidence of acute fracture or dislocation. There is a  reverse shoulder arthroplasty without evidence of periprosthetic  fracture or malalignment. There is up to 3 mm lucency between the  humeral component of the prosthesis seen on axillary view, which may  represent non radiopaque filler material and is stable taking into  account differences in technique.     IMPRESSION:  Reverse right shoulder arthroplasty without evidence of hardware  complication.     Electronically signed by: SHAI  02/25/23 18:08      Xray Femur 2 View [id number] 05:00PM Non Ambulatory, Provider   Ordering Provider: IRIS DRAKE [id number]      Test Name Result Flag Reference   Xray Femur 2 View (Report)       FINAL REPORT  Interpreted by: DAMARIS GIBBONS   02/25/23 18:10  MRN: [id number]  Patient Name: SIN CARTAGENAELA     STUDY:  PELVIS, 1 OR 2 VIEWS; FEMUR : MIN 2 VIEWS; Left; 2/25/2023 5:00 pm     INDICATION:  left fx .     COMPARISON:  Pelvic radiographs dated 02/06/2020.     ACCESSION NUMBER(S):  [id number]; [id number]     ORDERING CLINICIAN:  IRIS DRAKE     FINDINGS:  Femoral heads are  maintained within their respective acetabula and  are normal in contour. There is mild bilateral acetabular spurring.  Ilioischial and iliopubic lines are maintained. The left femur  appears intact. There is serpiginous sclerosis within the distal  intramedullary femur which may represent an age-indeterminate  probably chronic bone marrow infarct.     IMPRESSION:  No evidence of fracture or dislocation. Serpiginous sclerosis in the  distal left femur may represent age-indeterminate possibly chronic  bone marrow infarct.        Electronically signed by: SHAI  02/25/23 18:10      Xray Pelvis 1 or 2 View [id number] 05:00PM Non Ambulatory, Provider   Ordering Provider: IRIS DRAKE [id number]      Test Name Result Flag Reference   Xray Pelvis 1 or 2 View (Report)       FINAL REPORT  Interpreted by: DAMARIS GIBBONS   02/25/23 18:10  MRN: [id number]  Patient Name: VERNON CARTAGENA     STUDY:  PELVIS, 1 OR 2 VIEWS; FEMUR : MIN 2 VIEWS; Left; 2/25/2023 5:00 pm     INDICATION:  left fx .     COMPARISON:  Pelvic radiographs dated 02/06/2020.     ACCESSION NUMBER(S):  [id number]; [id number]     ORDERING CLINICIAN:  IRIS DRAKE     FINDINGS:  Femoral heads are maintained within their respective acetabula and  are normal in contour. There is mild bilateral acetabular spurring.  Ilioischial and iliopubic lines are maintained. The left femur  appears intact. There is serpiginous sclerosis within the distal  intramedullary femur which may represent an age-indeterminate  probably chronic bone marrow infarct.     IMPRESSION:  No evidence of fracture or dislocation. Serpiginous sclerosis in the  distal left femur may represent age-indeterminate possibly chronic  bone marrow infarct.        Electronically signed by: SHAI  02/25/23 18:10      CT L Spine without Contrast [id number] 09:33AM Latoya Barkley      Test Name Result Flag Reference   CT L Spine without Contrast (Report)       FINAL REPORT  Interpreted by: MASOUD MATHIS FREDERICK, MD    07/14/22 10:40  MRN: [id number]  Patient Name: VERNON CARTAGENA     STUDY:  CT L-SPINE WO CONTRAST; 7/14/2022 9:33 am     INDICATION:  Fell 6 months ago after tripping. Landed on belly did not hit head.  Pain 10 out of 10 of low back along with muscle cramps and spasm  since then. Does have leg weakness. No other falls since that  initial fall 6 months ago. Failed conservative reyna M47.816: Lumbar  spondylosis M54.16: Lumbar radiculitis.     COMPARISON:  None.     ACCESSION NUMBER(S):  [id number]     ORDERING CLINICIAN:  NITA HARLEY     TECHNIQUE:  CT of the lumbar spine was performed. Multiplanar reconstructions  were made..     FINDINGS:  Bone density and vertebral body height are normal. The sacrum are  normal.  Images at each interspace reveal the following:  L1/L2  Normal exam  L2/L3  Normal exam  L3/L4  Vacuum disc phenomenon with mild circumferential bulging  intervertebral disc. No measurable canal stenosis or bony foraminal  narrowing. Marginal osteophyte formation bilaterally. Far lateral  neural foraminal stenosis not excluded  L4/L5  Mild bulging disc and facet hypertrophy. Suspected moderate STIR  central canal stenosis. No associated foraminal narrowing.  L5/S1  Narrowing of the intervertebral disc space with intra-articular gas.  Minimal marginal osteophyte formation without canal or foraminal  narrowing        IMPRESSION:  * Trace spondylosis     The examination was interpreted [facility]     Electronically signed by: MASOUD MATHIS  07/14/22 10:40      Xray Myelography Cervical with LP [id number] 10:47AM Connor Ga      Test Name Result Flag Reference   Xray Myelography Cervical with LP (Report)       FINAL REPORT  Interpreted by: SHAILESH PARKER ANDREW, MD and BRIGIDA REESE  09/01/21 08:43  MRN: [id number]  Patient Name: VERNON CARTAGENA     STUDY:  MYELOGRAPHY, CERVICAL, WITH LUMBAR PUNCTURE; SPINAL PUNCTURE, LUMBAR,  DIAGNOSTIC; CT C-SPINE [facility]; 8/31/2021 10:47 am; 1/1/2018 8:15  pm;  8/31/2021 10:56 am     INDICATION:  Cervical radiculopathy; Signs/Symptoms: r/o meningitis. Myelogram  performed for evaluation of spinal stenosis as patient has a non MR  compatible cardiac pacemaker/AICD.     COMPARISON:  02/06/2020 CT head     ACCESSION NUMBER(S):  [id number]; [id number]; [id number]     ORDERING CLINICIAN:  SHEREEN LUO; TAMARA HORVATH     TECHNIQUE:  After discussion of the risks, benefits and alternatives, standard  written hospital consent was obtained. The patient was placed prone  on the fluoroscopic table. The lower back was prepped and draped in  sterile fashion. One percent lidocaine was used for local  anesthesia. Under fluoroscopic guidance, a 22 gauge spinal needle  was advanced into the thecal sac at the L3-L4 level. 8 cc of  Omnipaque 300 was administered intrathecally under intermittent  fluoroscopic imaging. Contrast was advanced into the cervical region  tilting the patient/table head-down. Fluoroscopy time was 1.1  minutes. The patient tolerated the procedure well immediately after  the procedure.     Following the fluoroscopic myelogram, serial axial CT images were  obtained through the cervical spine, using a bone algorithm. Images  were reformatted into sagittal and coronal planes.     FINDINGS:  FLUOROSCOPIC MYELOGRAM: There is filling bilaterally of nerve root  sleeves without cutoff. There is normal alignment.     CT MYELOGRAM:     ALIGNMENT: Within normal limits.     VERTEBRAE/DISC SPACES: The vertebral body heights are intact. There  is solid operative fusion of the C5 through C7 vertebral bodies with  interbody spacing devices. The C4-5 disc height is decreased.     C1-2: There is no significant central canal or neural foraminal  stenosis.     C2-3: The right lateral recess and neural foramen are mildly stenosed  by facet hypertrophy. The right facet joint is severely arthritic..     C3-4: The right neural foramen is mildly stenosed by uncovertebral  spurring and facet  hypertrophy with disc bulge. More central disc  bulge indents the thecal sac but does not reach the cord. The right  facet joint is severely arthritic..     C4-5: Uncovertebral joint hypertrophy and facet arthrosis contribute  to moderate right-sided neural foraminal stenosis with mild to  moderate right lateral recess stenosis. The right facet joint is  severely arthritic with degenerative subarticular cysts posteriorly,  the inferior C4 facet cyst having vacuum phenomena. A Schmorl's node  with vacuum phenomena is also noted along the posterosuperior C5  vertebral body.     C5-6: There is discectomy and fusion with posterior osteophytosis  with effacement of the ventral thecal sac. No significant central  canal stenosis.. Mild bilateral neural foraminal narrowing.     C6-7: There is discectomy and fusion with posterior osteophytosis  with effacement of the ventral thecal sac. No significant central  canal stenosis.. Mild bilateral neural foraminal narrowing.     C7-T1: There is no significant central canal or neural foraminal  stenosis.     Incidental note is made of chronic blowout fracture of the posterior  aspect of the medial wall of the left orbit with depression of bone 6  mm similar to the prior CT. Orbital fat extends into the defect with  distortion of the posterior aspect of the medial rectus muscle.     IMPRESSION:  1. Multilevel degenerative changes are present as noted more  prominent on the right, specially at C4-5.     2. Old blowout fracture of posteromedial wall of left orbit. The  adjacent medial rectus muscle is distorted.     3. Shortly after CT imaging was completed, the patient developed  uncontrolled shaking with no focal neuro deficit, shortness of  breath, hypotension or itching/urticaria. The patient had normal  oxygenation. Initially her only complaint of pain was a mild  headache. Shortly after the shaking began the patient also complained  of left chest/shoulder region discomfort and  for this reason was  transferred to the emergency department for further evaluation.        I, , supervised and was available throughout this procedure as  performed by fellow physician Dr. Dela Cruz. This study was  performed and interpreted at [facility]. I  agree with the procedural description and the findings as stated.     Electronically signed by: SHAILESH PARKER  09/01/21 08:43      Xray Cervical Spine Min 4 View [id number] 09:44AM Connor Luo      Test Name Result Flag Reference   Xray Cervical Spine Min 4 View (Report)       FINAL REPORT  Interpreted by: AUSTIN FLOOD KRISHNA, MD   06/10/21 18:03  MRN: [id number]  Patient Name: VERNON CARTAGENA     STUDY:  Cervical spine, 4 views.     INDICATION:  NECK PAIN.     COMPARISON:  None.     ACCESSION NUMBER(S):  [id number]     ORDERING CLINICIAN:  CONNOR LUO     FINDINGS:  Alignment is within normal limits.  Anterior cervical discectomy and fusion changes are observed at C5-6  and C6-7 with interbody bone grafts. Hardware is intact without  perihardware fractures or lucencies. There appears to be solid fusion  across the disc spaces.  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  Mild-to-moderate spondylosis noted at C4-5 with disc height loss and  osteophytes.  Left carotid region vascular calcifications noted.  Vertebral body heights are preserved. Posterior elements are intact.  No dynamic instability on flexion/extension views.  Prevertebral soft tissues are unremarkable.     IMPRESSION:  1. ACDF at C5-7 without hardware complication.  2. Mild to moderate C4-5 spondylosis.     Electronically signed by: AUSTIN FLOOD  06/10/21 18:03      CT C Spine without Contrast [id number] 03:55PM Non Ambulatory, Provider   Ordering Provider: PHILIP ESQUEDA [id number]      Test Name Result Flag Reference   CT C Spine without Contrast (Report)       Interpreted by: MARLIN KEE  02/06/20 16:05  MRN: [id number]  Patient  Name: VERNON CARTAGENA     STUDY:  CT C-SPINE WO CONTRAST; 2/6/2020 3:55 pm     INDICATION:  fall, neck pain.     COMPARISON:  None.     ACCESSION NUMBER(S):  [id number]     ORDERING CLINICIAN:  PHILIP ESQUEDA     TECHNIQUE:  Axial CT images of the cervical spine are obtained. Axial, coronal  and sagittal reconstructions are provided for review.     FINDINGS:  Status post fixation C5, C6 and C7.     Fractures: There is no evidence for an acute fracture of the cervical  spine.     Vertebral Alignment: Within normal limits.     Craniocervical Junction: The odontoid process and craniocervical  junction are intact.     Vertebrae/Disc Spaces: The cervical vertebral body heights are intact  and the disc spaces are preserved.     Prevertebral/Paraspinal Soft Tissues: The prevertebral and paraspinal  soft tissues are unremarkable.        IMPRESSION:  Status post fusion of C5 through C7. No evidence of acute fracture.     Electronically signed by: MARLIN KEE  02/06/20 16:05       Review of Systems   Constitutional: Negative.    HENT: Negative.     Eyes: Negative.    Respiratory: Negative.     Cardiovascular: Negative.    Gastrointestinal: Negative.    Endocrine: Negative.    Genitourinary: Negative.    Musculoskeletal:  Positive for arthralgias, back pain, gait problem, joint swelling, myalgias, neck pain and neck stiffness.   Skin: Negative.    Allergic/Immunologic: Negative.    Neurological:  Positive for weakness and numbness. Negative for dizziness, tremors, seizures, syncope, facial asymmetry, speech difficulty, light-headedness and headaches.   Hematological: Negative.    Psychiatric/Behavioral: Negative.         Objective   Physical Exam  Vitals and nursing note reviewed.   Constitutional:       Appearance: Normal appearance.   HENT:      Head: Normocephalic and atraumatic.   Cardiovascular:      Pulses: Normal pulses.   Pulmonary:      Effort: Pulmonary effort is normal. No respiratory distress.   Musculoskeletal:       Right lower leg: No edema.      Left lower leg: No edema.      Comments: Range of motion of cervical spine limited secondary to pain and stiffness with axial rotation to the left and extension.    Active range of motion of left shoulder limited secondary to to pain with external rotation and abduction to the height of the shoulder.     Left arm in adductor and immobilizer sling.    Able to move hand and wrist in sling.  Sensation intact to light touch to fingers and hand and left sling.    Active range of motion of hips increases low back and hip pain.   Skin:     General: Skin is warm and dry.      Capillary Refill: Capillary refill takes 2 to 3 seconds.   Neurological:      Mental Status: She is alert and oriented to person, place, and time.      Cranial Nerves: No cranial nerve deficit.      Sensory: No sensory deficit.      Motor: Weakness present.      Gait: Gait abnormal.      Comments: Positive Neer's.  Positive Sharma.    Weakness with hip flexion and ankle dorsiflexion 4 out of 5.    Positive straight leg raise.    Negative hip impingement.    Ambulates with mildly antalgic gait.   Psychiatric:         Mood and Affect: Mood normal.         Behavior: Behavior normal.         Assessment/Plan   Problem List Items Addressed This Visit    None  Follow-up 12 weeks.    Reviewed and approved by NITA HARLEY on 5/6/24 at 9:39 AM.

## 2024-05-13 ENCOUNTER — OFFICE VISIT (OUTPATIENT)
Dept: RHEUMATOLOGY | Facility: CLINIC | Age: 74
End: 2024-05-13
Payer: MEDICARE

## 2024-05-13 ENCOUNTER — HOSPITAL ENCOUNTER (OUTPATIENT)
Dept: RADIOLOGY | Facility: CLINIC | Age: 74
Discharge: HOME | End: 2024-05-13
Payer: MEDICARE

## 2024-05-13 ENCOUNTER — LAB (OUTPATIENT)
Dept: LAB | Facility: LAB | Age: 74
End: 2024-05-13
Payer: MEDICARE

## 2024-05-13 ENCOUNTER — HOSPITAL ENCOUNTER (OUTPATIENT)
Dept: CARDIOLOGY | Facility: CLINIC | Age: 74
Discharge: HOME | End: 2024-05-13
Payer: MEDICARE

## 2024-05-13 VITALS — DIASTOLIC BLOOD PRESSURE: 56 MMHG | WEIGHT: 160 LBS | BODY MASS INDEX: 27.46 KG/M2 | SYSTOLIC BLOOD PRESSURE: 107 MMHG

## 2024-05-13 DIAGNOSIS — Z95.810 PRESENCE OF AUTOMATIC CARDIOVERTER/DEFIBRILLATOR (AICD): ICD-10-CM

## 2024-05-13 DIAGNOSIS — M05.79 RHEUMATOID ARTHRITIS INVOLVING MULTIPLE SITES WITH POSITIVE RHEUMATOID FACTOR (MULTI): ICD-10-CM

## 2024-05-13 DIAGNOSIS — M79.18 MYOFASCIAL PAIN: ICD-10-CM

## 2024-05-13 DIAGNOSIS — M05.79 RHEUMATOID ARTHRITIS INVOLVING MULTIPLE SITES WITH POSITIVE RHEUMATOID FACTOR (MULTI): Primary | ICD-10-CM

## 2024-05-13 DIAGNOSIS — I42.9 CARDIOMYOPATHY, UNSPECIFIED TYPE (MULTI): ICD-10-CM

## 2024-05-13 LAB
CRP SERPL-MCNC: 1.55 MG/DL
ERYTHROCYTE [SEDIMENTATION RATE] IN BLOOD BY WESTERGREN METHOD: 29 MM/H (ref 0–30)
URATE SERPL-MCNC: 7.5 MG/DL (ref 2.3–6.7)

## 2024-05-13 PROCEDURE — 73110 X-RAY EXAM OF WRIST: CPT | Mod: 50

## 2024-05-13 PROCEDURE — 1160F RVW MEDS BY RX/DR IN RCRD: CPT | Performed by: INTERNAL MEDICINE

## 2024-05-13 PROCEDURE — 73110 X-RAY EXAM OF WRIST: CPT | Mod: BILATERAL PROCEDURE | Performed by: STUDENT IN AN ORGANIZED HEALTH CARE EDUCATION/TRAINING PROGRAM

## 2024-05-13 PROCEDURE — 73130 X-RAY EXAM OF HAND: CPT | Mod: BILATERAL PROCEDURE | Performed by: STUDENT IN AN ORGANIZED HEALTH CARE EDUCATION/TRAINING PROGRAM

## 2024-05-13 PROCEDURE — 1036F TOBACCO NON-USER: CPT | Performed by: INTERNAL MEDICINE

## 2024-05-13 PROCEDURE — 99214 OFFICE O/P EST MOD 30 MIN: CPT | Performed by: INTERNAL MEDICINE

## 2024-05-13 PROCEDURE — 86140 C-REACTIVE PROTEIN: CPT

## 2024-05-13 PROCEDURE — 1159F MED LIST DOCD IN RCRD: CPT | Performed by: INTERNAL MEDICINE

## 2024-05-13 PROCEDURE — 85652 RBC SED RATE AUTOMATED: CPT

## 2024-05-13 PROCEDURE — 36415 COLL VENOUS BLD VENIPUNCTURE: CPT

## 2024-05-13 PROCEDURE — 1111F DSCHRG MED/CURRENT MED MERGE: CPT | Performed by: INTERNAL MEDICINE

## 2024-05-13 PROCEDURE — 73130 X-RAY EXAM OF HAND: CPT | Mod: 50

## 2024-05-13 PROCEDURE — 84550 ASSAY OF BLOOD/URIC ACID: CPT

## 2024-05-13 RX ORDER — LEFLUNOMIDE 10 MG/1
10 TABLET ORAL DAILY
Qty: 30 TABLET | Refills: 1 | Status: SHIPPED | OUTPATIENT
Start: 2024-05-13 | End: 2024-06-12

## 2024-05-13 NOTE — PROGRESS NOTES
Subjective  . Trina Elias is a 73 y.o. female who presents for No chief complaint on file..    HPI.  73-year-old female with history of seropositive RA (+ve RF, CCP), positive HLA-B27, chronic low back pain secondary to DDD/spondylosis, chronic pain, s/p C-spine fusion surgery, VT/V-fib s/p pacemaker and AICD,HFpEF, recurrent SBO, HTN, GERD s/p Nissen fundoplication, eczema s/p bilateral shoulder  arthroplasty, bilateral tarsal tunnel release surgery, s/p right carpal tunnel surgery referred for joint pain evaluation.    She was diagnosed with RA in 2018.  She was following rheumatologist .  Her last visit with rheumatology was in April 2020.  She could not tolerate methotrexate due to chest pain and hives.  Orencia was discontinued due to fluid retention.  She could not tolerate rituximab due to side effects.  She was on hydroxychloroquine 200 mg daily since then.    She reports persistent pain in her hands.  Pain is worse in the morning up to 8/10.  Feels swelling of the DIP and PIP joints.  He has no stiffness in the hands.  She feels intermittent tingling in the right hand.  She was told that it may be due to carpal tunnel syndrome reoccurrence.  Uses diclofenac gel.    She reports chronic low back pain that gets worse upon prolonged standing, bending over, laying flat and walking for long distance.  Lumbar spine x-ray obtained in June 2023 showed moderate multilevel spondylosis throughout the lumbar spine and mild facet disease.  She also reports history of C-spine fusion surgery.  She carries a diagnosis of postlaminectomy syndrome.  She follows pain management.    She underwent left shoulder arthroplasty on May 2, 2024.    No history of psoriasis, iritis, uveitis or inflammatory bowel disease.  However reports history of eczema and recurrent small bowel obstructions.  She has been diagnosed with diverticulosis.  He has multiple abdominal surgeries in the past.    Social history: She is .   She does not smoke or drink.  She owns a bakery.  Family history: Family history of arthritis and autoimmune disease is unknown.    Review of Systems   All other systems reviewed and are negative.    Objective  .   Blood pressure 107/56, weight 72.6 kg (160 lb).    Physical Exam.  Gen. AAO x3, NAD.  HEENT: No pallor or icterus, PERRLA, EOMI. Parotid glands  not enlarged. No cervical lymphadenopathy .  Skin: No rashes.  Heart: S1, S2/irregular. II/VI EMMA.  Lungs: CTA B.  Abdomen: Soft, NT/ND, BS regular.  MSK: Bilateral CMC squaring.  Right CMC with positive grinding and tenderness.  Left second MCP fused.  Bilateral Heberden and Dung nodes.  Handgrip fair.  Left wrist with tenderness on flexion extension.  Bilateral wrist without erythema, warmth and swelling.  Bilateral elbow without swelling and tenderness.  Right shoulder with slightly reduced forward flexion.  Bilateral Parminder's negative.  Bilateral knee without swelling and tenderness.  Bilateral ankle and MTPs without swelling .  Neuro: CN II-XII intact. Sensation to touch intact.Strength 5/5 throughout. DTR 2+ and symmetrical.  Psych:Appropriate mood and behavior  EXT: No edema    Assessment/Plan . 73-year-old female with history of seropositive RA (+ve RF, CCP), positive HLA-B27, chronic low back pain secondary to DDD/spondylosis, chronic pain, s/p C-spine fusion surgery, VT/V-fib s/p pacemaker and AICD,HFpEF, recurrent SBO, HTN, GERD s/p Nissen fundoplication, eczema s/p bilateral shoulder  arthroplasty, bilateral tarsal tunnel release surgery, s/p right carpal tunnel surgery referred for joint pain evaluation.    #1: Seropositive RA.  She also has features of osteoarthritis.  She could not tolerate methotrexate, Orencia and rituximab.    -She is on hydroxychloroquine 200 mg daily since diagnosis.  Eye exam up-to-date.  -Begin leflunomide 10 mg daily.  -Obtain x-ray of the hands.      Follow-up in 2 months.     This note was partially generated using  the Dragon Voice recognition system. There may be some incorrect wording, spelling and/or spelling errors or punctuation errors that were not corrected prior to committing the note to the medical record.    Problem List Items Addressed This Visit       Myofascial pain    Rheumatoid arthritis (Multi) - Primary    Relevant Medications    leflunomide (Arava) 10 mg tablet    Other Relevant Orders    C-reactive protein    Sedimentation Rate    Uric acid    XR hand 3+ views bilateral    XR wrist 3+ views bilateral            Active Ambulatory Problems     Diagnosis Date Noted    Coronary artery disease involving native coronary artery 06/26/2023    Asthma ([facility]) 06/26/2023    Atrial fibrillation (Multi) 06/26/2023    Myofascial pain 06/26/2023    Cardiac aneurysm 06/26/2023    Cardiac defibrillator in place 06/26/2023    Obstructive sleep apnea 06/26/2023    Central sleep apnea in conditions classified elsewhere 06/26/2023    Cervical post-laminectomy syndrome 06/26/2023    Cervical radiculopathy 06/26/2023    Chronic bilateral low back pain without sciatica 06/26/2023    Chronic right sacroiliac pain 06/26/2023    Colon polyp 06/26/2023    Cubital tunnel syndrome on right 06/26/2023    Displacement of lumbar disc with radiculopathy 06/26/2023    Gastroesophageal reflux disease without esophagitis 06/26/2023    Mixed hyperlipidemia 06/26/2023    Iron deficiency anemia 06/26/2023    Irregular cardiac rhythm 06/26/2023    Osteoporosis 06/26/2023    Primary osteoarthritis of right shoulder 06/26/2023    Psoriasis 06/26/2023    Rheumatoid arthritis (Multi) 06/26/2023    Umbilical hernia 06/26/2023    Wide-complex tachycardia 06/26/2023    Centrilobular emphysema (Multi) 08/28/2023    Coagulation defect, unspecified (Multi) 08/28/2023    Hypertensive heart disease with heart failure (Multi) 06/26/2023    Stage 3a chronic kidney disease (Multi) 08/29/2023    Personal history of other venous thrombosis and embolism 11/02/2020     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) 11/02/2020    Pacemaker 12/06/2023    Anticoagulant long-term use 12/06/2023    Chest pain, unspecified type 02/24/2024    Arthritis of left shoulder region 05/02/2024     Resolved Ambulatory Problems     Diagnosis Date Noted    Abnormal mammogram 06/26/2023    Bone pain 06/26/2023    BSOM (bilateral serous otitis media) 06/26/2023    Right serous otitis media 06/26/2023    Cervical neuropathy 06/26/2023    Chest pain on exertion 06/26/2023    Dehydration 06/26/2023    Dry cough 06/26/2023    Dry heaves 06/26/2023    Dysphagia 06/26/2023    Dyspnea 06/26/2023    Edema 06/26/2023    Elevated serum creatinine 06/26/2023    Fatigue 06/26/2023    Fever, unspecified 06/26/2023    Flu-like symptoms 06/26/2023    Headache 06/26/2023    History of abdominal surgery 06/26/2023    History of colon polyps 06/26/2023    Lumbar radiculitis 06/26/2023    Lumbar spondylosis 06/26/2023    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    Ulnar neuropathy 06/26/2023    Medication side effect 06/26/2023    Nonhealing surgical wound 06/26/2023    Numbness 06/26/2023    Osteopenia 06/26/2023    Pain of right upper extremity 06/26/2023    Pertussis 06/26/2023    Pleural effusion, bilateral 06/26/2023    Postural dizziness with presyncope 06/26/2023    Pulmonary edema ([facility]) 06/26/2023    Severe pain 06/26/2023    Small bowel obstruction (Multi) 06/26/2023    Spondylosis of cervical region without myelopathy or radiculopathy 06/26/2023    Swelling of left hand 06/26/2023    Toxic effect of venom 06/26/2023    Ulcer of the stomach caused by bacteria (h. pylori), acute 06/26/2023    Urticaria 06/26/2023    CKD (chronic kidney disease) 10/10/2023     Past Medical History:   Diagnosis Date    Central sleep apnea     Cervical myofascial pain syndrome     Chronic anemia     CKD stage 3a, GFR 45-59 ml/min (Multi)     COPD (chronic obstructive pulmonary disease) (Multi)     DVT of  axillary vein, acute right (Multi) 2018    H/O being hospitalized 2024    History of Helicobacter pylori infection     Hx of syncope     ICD (implantable cardioverter-defibrillator) in place     Mitral valve regurgitation     Moderate aortic stenosis by prior echocardiogram     Personal history of anaphylaxis     PUD (peptic ulcer disease)     Pulmonary hypertension (Multi)     Radiculopathy, lumbar region 2018    Restless legs syndrome 2015    Seasonal allergic rhinitis due to pollen        Family History   Problem Relation Name Age of Onset    Asthma Daughter      Asthma Other      Colon cancer Other      Diabetes Other      Other (stroke syndrome) Other         Past Surgical History:   Procedure Laterality Date    ABDOMINAL ADHESION SURGERY  10/22/2015    Exploratory laparotomy. Extensive lysis of adhesions. Small-bowel repair.    ABDOMINAL ADHESION SURGERY  10/18/2020    Exploratory laparotomy, massive lysis of adhesions, small-bowel resection, decompression of small bowel and removal of mesh.    ABLATION OF DYSRHYTHMIC FOCUS  2022, 2022 for A FIB    ACHILLES TENDON SURGERY Right     ANTERIOR CERVICAL DISCECTOMY W/ FUSION  2016    Anterior C5 and C6 discectomies and interbody fusion of C5-C6, C6-C7    APPENDECTOMY      Open surgery    BREAST BIOPSY Left 2022    CARDIAC ASSIST DEVICE INSERTION  2012    Loop recorder placement , Removed in  w/ ICD placed    CARDIAC CATHETERIZATION  2022    Normal coronary arteries. Tachycardia induced Cardiomyopathy.    CARDIAC DEFIBRILLATOR PLACEMENT  2014    Dual chamber ICD    CARDIOVERSION  2018, 2018    CARPAL TUNNEL RELEASE Bilateral      SECTION, CLASSIC      x2    CHOLECYSTECTOMY      Open surgery    COLONOSCOPY  2024    Extensive diverticulosis of moderate severity and causing mild luminal narrowing in the sigmoid colon    FINGER SURGERY Left  12/17/2008    Left index finger metacarpophalangeal fusion    FOOT SURGERY Bilateral     B/L Tarsal Tunnel Release    IR INJECTION EPIDURAL STEROID  01/28/2011    Lumbar spine; 2008, 2009, 2011    IR INJECTION EPIDURAL STEROID  2007    Cervical spine 2007    LEG SURGERY Right 09/13/2006    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  Plantar wart removal R foot.    NERVE SURGERY Right 10/13/2010    Release of Right tarsal tunnel syndrome w/ severe adhesion scar tissue    NISSEN FUNDOPLICATION  1991    Open surgery    PICC LINE INSERTION WO IMAGING  10/24/2015    Placed for stable IV access    REVERSE TOTAL SHOULDER ARTHROPLASTY Bilateral 05/02/2024    Right 1/12/2023, Left 5/2/2024    ROTATOR CUFF REPAIR      ULNAR NERVE TRANSPOSITION Left     VENTRAL HERNIA REPAIR  06/15/2015    Laparoscopic double ventral hernia repair.    WISDOM TOOTH EXTRACTION      WOUND DEBRIDEMENT  11/27/2020    Excisional debridement of abdominal wound for wound dehiscence       Social History     Tobacco Use   Smoking Status Never   Smokeless Tobacco Never       Allergies  Abatacept, Hydrocodone-acetaminophen, Hydromorphone, Metoprolol, Penicillins, Pregabalin, Prochlorperazine, Methotrexate, Aripiprazole, Beta-blockers (beta-adrenergic blocking agts), Bupropion, Cefepime, Ciprofloxacin, Furosemide, Levofloxacin, and Pineapple    Current Meds  Current Outpatient Medications   Medication Instructions    acetaminophen (TYLENOL) 325 mg, oral, Every 6 hours PRN    albuterol 2.5 mg, nebulization, Every 4 hours PRN    aspirin 81 mg, oral, Daily    baclofen (Lioresal) 10 mg tablet One tablet in the morning and afternoon and 1 to 2 tablets in the evening.    bumetanide (BUMEX) 2 mg, oral, 2 times daily    clobetasol (Temovate) 0.05 % cream Topical, 2 times daily    diclofenac sodium (VOLTAREN) 4 g, Topical, 2 times daily PRN    digoxin (LANOXIN) 125 mcg, oral, Daily    dilTIAZem LA (CARDIZEM LA) 120 mg, oral, Daily     EPINEPHrine 0.3 mg/0.3 mL injection syringe 1 Syringe, injection, As needed, Per Pt has not needed.     famotidine (PEPCID) 40 mg, oral, Daily    fluticasone (Flonase) 50 mcg/actuation nasal spray 1 spray, nasal, 2 times daily    hydroxychloroquine (PLAQUENIL) 200 mg, oral, Daily    leflunomide (ARAVA) 10 mg, oral, Daily    magnesium oxide (MAG-OX) 400 mg, oral, Daily    montelukast (SINGULAIR) 10 mg, oral, Nightly    nitroglycerin (NITROSTAT) 0.4 mg, sublingual, Every 5 min PRN    oxyCODONE-acetaminophen (Percocet)  mg tablet 1 tablet, oral, Every 6 hours PRN    oxyCODONE-acetaminophen (Percocet) 5-325 mg tablet 1 tablet, oral, 3 times daily PRN    [START ON 6/7/2024] oxyCODONE-acetaminophen (Percocet) 5-325 mg tablet 1 tablet, oral, 3 times daily PRN    [START ON 7/5/2024] oxyCODONE-acetaminophen (Percocet) 5-325 mg tablet 1 tablet, oral, 3 times daily PRN    pantoprazole (PROTONIX) 40 mg, oral, 2 times daily    polyethylene glycol (MIRALAX) 17 g, oral, Daily    rosuvastatin (CRESTOR) 20 mg, oral, Daily    saccharomyces boulardii (FLORASTOR) 250 mg, oral, 2 times daily        Lab Results   Component Value Date     (H) 08/13/2018    SEDRATE 81 (H) 03/01/2024    CRP 10.48 (H) 03/01/2024        Carlos Harley MD

## 2024-05-13 NOTE — PATIENT INSTRUCTIONS
Begin leflunomide 10 mg daily.  Continue hydroxychloroquine once a day.  Call me if any question.  Follow-up in 2 months.

## 2024-05-13 NOTE — LETTER
May 13, 2024     Fredy Elias DO  [address] [facility] [id number]    Patient: Trina Elias   YOB: 1950   Date of Visit: 5/13/2024       Dear Dr. Fredy Elias DO:    Thank you for referring Trina Elias to me for evaluation. Below are my notes for this consultation.  If you have questions, please do not hesitate to call me. I look forward to following your patient along with you.       Sincerely,     Carlos Harley MD      CC: No Recipients  ______________________________________________________________________________________    Subjective . Trina Elias is a 73 y.o. female who presents for No chief complaint on file..    HPI.  73-year-old female with history of seropositive RA (+ve RF, CCP), positive HLA-B27, chronic low back pain secondary to DDD/spondylosis, chronic pain, s/p C-spine fusion surgery, VT/V-fib s/p pacemaker and AICD,HFpEF, recurrent SBO, HTN, GERD s/p Nissen fundoplication, eczema s/p bilateral shoulder  arthroplasty, bilateral tarsal tunnel release surgery, s/p right carpal tunnel surgery referred for joint pain evaluation.    She was diagnosed with RA in 2018.  She was following rheumatologist .  Her last visit with rheumatology was in April 2020.  She could not tolerate methotrexate due to chest pain and hives.  Orencia was discontinued due to fluid retention.  She could not tolerate rituximab due to side effects.  She was on hydroxychloroquine 200 mg daily since then.    She reports persistent pain in her hands.  Pain is worse in the morning up to 8/10.  Feels swelling of the DIP and PIP joints.  He has no stiffness in the hands.  She feels intermittent tingling in the right hand.  She was told that it may be due to carpal tunnel syndrome reoccurrence.  Uses diclofenac gel.    She reports chronic low back pain that gets worse upon prolonged standing, bending over, laying flat and walking for long distance.  Lumbar spine x-ray obtained in June 2023 showed  moderate multilevel spondylosis throughout the lumbar spine and mild facet disease.  She also reports history of C-spine fusion surgery.  She carries a diagnosis of postlaminectomy syndrome.  She follows pain management.    She underwent left shoulder arthroplasty on May 2, 2024.    No history of psoriasis, iritis, uveitis or inflammatory bowel disease.  However reports history of eczema and recurrent small bowel obstructions.  She has been diagnosed with diverticulosis.  He has multiple abdominal surgeries in the past.    Social history: She is .  She does not smoke or drink.  She owns a bakery.  Family history: Family history of arthritis and autoimmune disease is unknown.    Review of Systems   All other systems reviewed and are negative.    Objective .   Blood pressure 107/56, weight 72.6 kg (160 lb).    Physical Exam.  Gen. AAO x3, NAD.  HEENT: No pallor or icterus, PERRLA, EOMI. Parotid glands  not enlarged. No cervical lymphadenopathy .  Skin: No rashes.  Heart: S1, S2/irregular. II/VI EMMA.  Lungs: CTA B.  Abdomen: Soft, NT/ND, BS regular.  MSK: Bilateral CMC squaring.  Right CMC with positive grinding and tenderness.  Left second MCP fused.  Bilateral [place]berden and Dung nodes.  Handgrip fair.  Left wrist with tenderness on flexion extension.  Bilateral wrist without erythema, warmth and swelling.  Bilateral elbow without swelling and tenderness.  Right shoulder with slightly reduced forward flexion.  Bilateral Parminder's negative.  Bilateral knee without swelling and tenderness.  Bilateral ankle and MTPs without swelling .  Neuro: CN II-XII intact. Sensation to touch intact.Strength 5/5 throughout. DTR 2+ and symmetrical.  Psych:Appropriate mood and behavior  EXT: No edema    Assessment/Plan. 73-year-old female with history of seropositive RA (+ve RF, CCP), positive HLA-B27, chronic low back pain secondary to DDD/spondylosis, chronic pain, s/p C-spine fusion surgery, VT/V-fib s/p pacemaker and  AICD,HFpEF, recurrent SBO, HTN, GERD s/p Nissen fundoplication, eczema s/p bilateral shoulder  arthroplasty, bilateral tarsal tunnel release surgery, s/p right carpal tunnel surgery referred for joint pain evaluation.    #1: Seropositive RA.  She also has features of osteoarthritis.  She could not tolerate methotrexate, Orencia and rituximab.    -She is on hydroxychloroquine 200 mg daily since diagnosis.  Eye exam up-to-date.  -Begin leflunomide 10 mg daily.  -Obtain x-ray of the hands.      Follow-up in 2 months.     This note was partially generated using the Dragon Voice recognition system. There may be some incorrect wording, spelling and/or spelling errors or punctuation errors that were not corrected prior to committing the note to the medical record.    Problem List Items Addressed This Visit       Myofascial pain    Rheumatoid arthritis (Multi) - Primary    Relevant Medications    leflunomide (Arava) 10 mg tablet    Other Relevant Orders    C-reactive protein    Sedimentation Rate    Uric acid    XR hand 3+ views bilateral    XR wrist 3+ views bilateral            Active Ambulatory Problems     Diagnosis Date Noted   • Coronary artery disease involving native coronary artery 06/26/2023   • Asthma ([facility]) 06/26/2023   • Atrial fibrillation (Multi) 06/26/2023   • Myofascial pain 06/26/2023   • Cardiac aneurysm 06/26/2023   • Cardiac defibrillator in place 06/26/2023   • Obstructive sleep apnea 06/26/2023   • Central sleep apnea in conditions classified elsewhere 06/26/2023   • Cervical post-laminectomy syndrome 06/26/2023   • Cervical radiculopathy 06/26/2023   • Chronic bilateral low back pain without sciatica 06/26/2023   • Chronic right sacroiliac pain 06/26/2023   • Colon polyp 06/26/2023   • Cubital tunnel syndrome on right 06/26/2023   • Displacement of lumbar disc with radiculopathy 06/26/2023   • Gastroesophageal reflux disease without esophagitis 06/26/2023   • Mixed hyperlipidemia 06/26/2023   •  Iron deficiency anemia 06/26/2023   • Irregular cardiac rhythm 06/26/2023   • Osteoporosis 06/26/2023   • Primary osteoarthritis of right shoulder 06/26/2023   • Psoriasis 06/26/2023   • Rheumatoid arthritis (Multi) 06/26/2023   • Umbilical hernia 06/26/2023   • Wide-complex tachycardia 06/26/2023   • Centrilobular emphysema (Multi) 08/28/2023   • Coagulation defect, unspecified (Multi) 08/28/2023   • Hypertensive heart disease with heart failure (Multi) 06/26/2023   • Stage 3a chronic kidney disease (Multi) 08/29/2023   • Personal history of other venous thrombosis and embolism 11/02/2020   • 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) 11/02/2020   • Pacemaker 12/06/2023   • Anticoagulant long-term use 12/06/2023   • Chest pain, unspecified type 02/24/2024   • Arthritis of left shoulder region 05/02/2024     Resolved Ambulatory Problems     Diagnosis Date Noted   • Abnormal mammogram 06/26/2023   • Bone pain 06/26/2023   • BSOM (bilateral serous otitis media) 06/26/2023   • Right serous otitis media 06/26/2023   • Cervical neuropathy 06/26/2023   • Chest pain on exertion 06/26/2023   • Dehydration 06/26/2023   • Dry cough 06/26/2023   • Dry heaves 06/26/2023   • Dysphagia 06/26/2023   • Dyspnea 06/26/2023   • Edema 06/26/2023   • Elevated serum creatinine 06/26/2023   • Fatigue 06/26/2023   • Fever, unspecified 06/26/2023   • Flu-like symptoms 06/26/2023   • Headache 06/26/2023   • History of abdominal surgery 06/26/2023   • History of colon polyps 06/26/2023   • Lumbar radiculitis 06/26/2023   • Lumbar spondylosis 06/26/2023   • 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   • Ulnar neuropathy 06/26/2023   • Medication side effect 06/26/2023   • Nonhealing surgical wound 06/26/2023   • Numbness 06/26/2023   • Osteopenia 06/26/2023   • Pain of right upper extremity 06/26/2023   • Pertussis 06/26/2023   • Pleural effusion, bilateral 06/26/2023   • Postural dizziness with presyncope 06/26/2023   •  Pulmonary edema (HHS-HCC) 06/26/2023   • Severe pain 06/26/2023   • Small bowel obstruction (Multi) 06/26/2023   • Spondylosis of cervical region without myelopathy or radiculopathy 06/26/2023   • Swelling of left hand 06/26/2023   • Toxic effect of venom 06/26/2023   • Ulcer of the stomach caused by bacteria (h. pylori), acute 06/26/2023   • Urticaria 06/26/2023   • CKD (chronic kidney disease) 10/10/2023     Past Medical History:   Diagnosis Date   • Central sleep apnea    • Cervical myofascial pain syndrome    • Chronic anemia    • CKD stage 3a, GFR 45-59 ml/min (Multi)    • COPD (chronic obstructive pulmonary disease) (Multi)    • DVT of axillary vein, acute right (Multi) 01/20/2018   • H/O being hospitalized 02/2024   • History of Helicobacter pylori infection    • Hx of syncope    • ICD (implantable cardioverter-defibrillator) in place    • Mitral valve regurgitation    • Moderate aortic stenosis by prior echocardiogram    • Personal history of anaphylaxis    • PUD (peptic ulcer disease)    • Pulmonary hypertension (Multi)    • Radiculopathy, lumbar region 08/13/2018   • Restless legs syndrome 11/17/2015   • Seasonal allergic rhinitis due to pollen        Family History   Problem Relation Name Age of Onset   • Asthma Daughter     • Asthma Other     • Colon cancer Other     • Diabetes Other     • Other (stroke syndrome) Other         Past Surgical History:   Procedure Laterality Date   • ABDOMINAL ADHESION SURGERY  10/22/2015    Exploratory laparotomy. Extensive lysis of adhesions. Small-bowel repair.   • ABDOMINAL ADHESION SURGERY  10/18/2020    Exploratory laparotomy, massive lysis of adhesions, small-bowel resection, decompression of small bowel and removal of mesh.   • ABLATION OF DYSRHYTHMIC FOCUS  05/02/2022 09/17/2018, 05/02/2022 for A FIB   • ACHILLES TENDON SURGERY Right    • ANTERIOR CERVICAL DISCECTOMY W/ FUSION  03/21/2016    Anterior C5 and C6 discectomies and interbody fusion of C5-C6, C6-C7    • APPENDECTOMY      Open surgery   • BREAST BIOPSY Left 2022   • CARDIAC ASSIST DEVICE INSERTION  2012    Loop recorder placement , Removed in  w/ ICD placed   • CARDIAC CATHETERIZATION  2022    Normal coronary arteries. Tachycardia induced Cardiomyopathy.   • CARDIAC DEFIBRILLATOR PLACEMENT  2014    Dual chamber ICD   • CARDIOVERSION  2018, 2018   • CARPAL TUNNEL RELEASE Bilateral    •  SECTION, CLASSIC      x2   • CHOLECYSTECTOMY      Open surgery   • COLONOSCOPY  2024    Extensive diverticulosis of moderate severity and causing mild luminal narrowing in the sigmoid colon   • FINGER SURGERY Left 2008    Left index finger metacarpophalangeal fusion   • FOOT SURGERY Bilateral     B/L Tarsal Tunnel Release   • IR INJECTION EPIDURAL STEROID  2011    Lumbar spine; , ,    • IR INJECTION EPIDURAL STEROID      Cervical spine    • LEG SURGERY Right 2006    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  Plantar wart removal R foot.   • NERVE SURGERY Right 10/13/2010    Release of Right tarsal tunnel syndrome w/ severe adhesion scar tissue   • NISSEN FUNDOPLICATION      Open surgery   • PICC LINE INSERTION WO IMAGING  10/24/2015    Placed for stable IV access   • REVERSE TOTAL SHOULDER ARTHROPLASTY Bilateral 2024    Right 2023, Left 2024   • ROTATOR CUFF REPAIR     • ULNAR NERVE TRANSPOSITION Left    • VENTRAL HERNIA REPAIR  06/15/2015    Laparoscopic double ventral hernia repair.   • WISDOM TOOTH EXTRACTION     • WOUND DEBRIDEMENT  2020    Excisional debridement of abdominal wound for wound dehiscence       Social History     Tobacco Use   Smoking Status Never   Smokeless Tobacco Never       Allergies  Abatacept, Hydrocodone-acetaminophen, Hydromorphone, Metoprolol, Penicillins, Pregabalin, Prochlorperazine, Methotrexate, Aripiprazole, Beta-blockers  (beta-adrenergic blocking agts), Bupropion, Cefepime, Ciprofloxacin, Furosemide, Levofloxacin, and Pineapple    Current Meds  Current Outpatient Medications   Medication Instructions   • acetaminophen (TYLENOL) 325 mg, oral, Every 6 hours PRN   • albuterol 2.5 mg, nebulization, Every 4 hours PRN   • aspirin 81 mg, oral, Daily   • baclofen (Lioresal) 10 mg tablet One tablet in the morning and afternoon and 1 to 2 tablets in the evening.   • bumetanide (BUMEX) 2 mg, oral, 2 times daily   • clobetasol (Temovate) 0.05 % cream Topical, 2 times daily   • diclofenac sodium (VOLTAREN) 4 g, Topical, 2 times daily PRN   • digoxin (LANOXIN) 125 mcg, oral, Daily   • dilTIAZem LA (CARDIZEM LA) 120 mg, oral, Daily   • EPINEPHrine 0.3 mg/0.3 mL injection syringe 1 Syringe, injection, As needed, Per Pt has not needed.    • famotidine (PEPCID) 40 mg, oral, Daily   • fluticasone (Flonase) 50 mcg/actuation nasal spray 1 spray, nasal, 2 times daily   • hydroxychloroquine (PLAQUENIL) 200 mg, oral, Daily   • leflunomide (ARAVA) 10 mg, oral, Daily   • magnesium oxide (MAG-OX) 400 mg, oral, Daily   • montelukast (SINGULAIR) 10 mg, oral, Nightly   • nitroglycerin (NITROSTAT) 0.4 mg, sublingual, Every 5 min PRN   • oxyCODONE-acetaminophen (Percocet)  mg tablet 1 tablet, oral, Every 6 hours PRN   • oxyCODONE-acetaminophen (Percocet) 5-325 mg tablet 1 tablet, oral, 3 times daily PRN   • [START ON 6/7/2024] oxyCODONE-acetaminophen (Percocet) 5-325 mg tablet 1 tablet, oral, 3 times daily PRN   • [START ON 7/5/2024] oxyCODONE-acetaminophen (Percocet) 5-325 mg tablet 1 tablet, oral, 3 times daily PRN   • pantoprazole (PROTONIX) 40 mg, oral, 2 times daily   • polyethylene glycol (MIRALAX) 17 g, oral, Daily   • rosuvastatin (CRESTOR) 20 mg, oral, Daily   • saccharomyces boulardii (FLORASTOR) 250 mg, oral, 2 times daily        Lab Results   Component Value Date     (H) 08/13/2018    SEDRATE 81 (H) 03/01/2024    CRP 10.48 (H) 03/01/2024         Carlos Harley MD

## 2024-06-04 NOTE — PROGRESS NOTES
Cardio: Dr. Soto    I was asked by Dr. Soto to evaluate this patient in consultation for evaluation of left atrial appendage closure.    The patient is a 74 y/o lady with pmh of VT s/p ICD (no CAD), TAA, mild aortic stenosis (PV 2.62, MG16),  HF with improved EF. The patient has normal LVEF.    The patient has parox Afib s/p PVI x 2. The patient was on xarelto for CVA prevention till 3 months ago when she presented to ER with chest pain and was found to have profound anemia. She needed multiple units of pRBC transfusions. She underwent EGD and colonoscopy which was non-revealing. However, considering everything a shared decision was made to keep her off OAC. She currently stays off AC and hence unprotected from CVA.     The patient was wearing arm sling due to recent shoulder surgery. She reports needing GI follow up for dilated bile ducts noted on CT.    Given the patient's significant anemia needing multiple blood transfusions,  the patient is referred for consideration of left atrial appendage closure for stroke risk reduction.       ROS:  Constitutional: fatigue  Eyes: no acute eye problems, no blurred vision, no diplopia, no eye pain  ENT:  no acute hearing loss, no earache, no sore throat  Cardiovascular: no dyspnea on exertion, no chest pain  Respiratory: no chronic cough, not coughing up sputum, no wheezing that is consistent with asthma  Gastrointestinal: no acute bowel complaints  Musculoskeletal: no acute arthralgias, no acute myalgias, no acute joint swelling  Skin: no skin rashes, no change in skin color and pigmentation, no skin lesions and no skin lumps.   Neurological: no headaches, no dizziness, no tingling, no fainting and no limb weakness.   Psychiatric:  no suicidal ideation, no confusion, no personality change and no emotional problems.   Hematologic/Lymphatic: no bleeding issues, other then mentioned in HPI  All other systems have been reviewed and are negative for complaint.     Physical  Exam:     Visit Vitals  Smoking Status Never        Constitutional: alert and in no acute distress. Left arm sling noted.  Eyes: no erythema, swelling or discharge from the eye .   Ears, Nose, Mouth, and Throat: external inspection of ears and nose is normal , lips, teeth, and gums are normal with good dentition  and oropharynx normal with no erythema, edema, exudate or lesions .   Neck: neck is supple, symmetric, trachea midline, no masses  and no thyromegaly .   Pulmonary: no increased work of breathing or signs of respiratory distress , lungs clear to auscultation. , normal percussion of chest  and chest palpation normal .   Cardiovascular: RRR, 2/6 EMMA,  no leg edema, non-displaced PMI, no S3 or S4  Abdomen: abdomen non-tender, no masses  and no hepatomegaly .           Skin:  no skin lesions          Neurologic: non-focal neurologic examination.      Psychiatric judgment and insight is normal , oriented to person, place and time , normal mood and affect .       Labs:    Results for orders placed or performed during the hospital encounter of 10/11/22   Coagulation Screen   Result Value Ref Range    Protime 14.2 (H) 9.8 - 13.4 sec    INR 1.2 (H) 0.9 - 1.1    aPTT 36 26 - 39 sec   Basic Metabolic Panel   Result Value Ref Range    Glucose 97 74 - 99 mg/dL    Sodium 140 136 - 145 mmol/L    Potassium 4.6 3.5 - 5.3 mmol/L    Chloride 108 (H) 98 - 107 mmol/L    Bicarbonate 23 21 - 32 mmol/L    Anion Gap 14 10 - 20 mmol/L    Urea Nitrogen 5 (L) 6 - 23 mg/dL    Creatinine 0.59 0.50 - 1.05 mg/dL    GFR Female >90 >90 mL/min/1.73m2    Calcium 8.4 (L) 8.6 - 10.3 mg/dL   Renal Function Panel   Result Value Ref Range    Glucose 109 (H) 74 - 99 mg/dL    Sodium 139 136 - 145 mmol/L    Potassium 4.3 3.5 - 5.3 mmol/L    Chloride 106 98 - 107 mmol/L    Bicarbonate 26 21 - 32 mmol/L    Anion Gap 11 10 - 20 mmol/L    Urea Nitrogen 6 6 - 23 mg/dL    Creatinine 0.77 0.50 - 1.05 mg/dL    GFR Female 82 >90 mL/min/1.73m2    Calcium 8.3  (L) 8.6 - 10.3 mg/dL    Phosphorus 3.6 2.5 - 4.9 mg/dL    Albumin 3.4 3.4 - 5.0 g/dL   Magnesium   Result Value Ref Range    Magnesium 2.18 1.60 - 2.40 mg/dL   Magnesium   Result Value Ref Range    Magnesium 2.24 1.60 - 2.40 mg/dL   Magnesium   Result Value Ref Range    Magnesium 1.87 1.60 - 2.40 mg/dL   Renal Function Panel   Result Value Ref Range    Glucose 102 (H) 74 - 99 mg/dL    Sodium 139 136 - 145 mmol/L    Potassium 4.2 3.5 - 5.3 mmol/L    Chloride 108 (H) 98 - 107 mmol/L    Bicarbonate 25 21 - 32 mmol/L    Anion Gap 10 10 - 20 mmol/L    Urea Nitrogen 7 6 - 23 mg/dL    Creatinine 0.83 0.50 - 1.05 mg/dL    GFR Female 75 >90 mL/min/1.73m2    Calcium 8.4 (L) 8.6 - 10.3 mg/dL    Phosphorus 3.7 2.5 - 4.9 mg/dL    Albumin 3.3 (L) 3.4 - 5.0 g/dL   Renal Function Panel   Result Value Ref Range    Glucose 73 (L) 74 - 99 mg/dL    Sodium 140 136 - 145 mmol/L    Potassium 3.5 3.5 - 5.3 mmol/L    Chloride 102 98 - 107 mmol/L    Bicarbonate 26 21 - 32 mmol/L    Anion Gap 16 10 - 20 mmol/L    Urea Nitrogen 13 6 - 23 mg/dL    Creatinine 0.87 0.50 - 1.05 mg/dL    GFR Female 71 >90 mL/min/1.73m2    Calcium 8.7 8.6 - 10.3 mg/dL    Phosphorus 4.4 2.5 - 4.9 mg/dL    Albumin 3.6 3.4 - 5.0 g/dL   Renal Function Panel   Result Value Ref Range    Glucose 105 (H) 74 - 99 mg/dL    Sodium 137 136 - 145 mmol/L    Potassium 4.4 3.5 - 5.3 mmol/L    Chloride 102 98 - 107 mmol/L    Bicarbonate 28 21 - 32 mmol/L    Anion Gap 11 10 - 20 mmol/L    Urea Nitrogen 7 6 - 23 mg/dL    Creatinine 0.84 0.50 - 1.05 mg/dL    GFR Female 74 >90 mL/min/1.73m2    Calcium 8.6 8.6 - 10.3 mg/dL    Phosphorus 3.3 2.5 - 4.9 mg/dL    Albumin 3.4 3.4 - 5.0 g/dL   Troponin I, High Sensitivity   Result Value Ref Range    Troponin I 17 (H) 0 - 13 ng/L   Troponin I, High Sensitivity   Result Value Ref Range    Troponin I 20 (H) 0 - 13 ng/L   B-Type Natriuretic Peptide   Result Value Ref Range    BNP 43 0 - 99 pg/mL   Comprehensive Metabolic Panel   Result Value Ref  Range    Glucose 87 74 - 99 mg/dL    Sodium 141 136 - 145 mmol/L    Potassium 3.4 (L) 3.5 - 5.3 mmol/L    Chloride 102 98 - 107 mmol/L    Bicarbonate 32 21 - 32 mmol/L    Anion Gap 10 10 - 20 mmol/L    Urea Nitrogen 20 6 - 23 mg/dL    Creatinine 1.08 (H) 0.50 - 1.05 mg/dL    GFR Female 55 (A) >90 mL/min/1.73m2    Calcium 8.9 8.6 - 10.3 mg/dL    Albumin 3.9 3.4 - 5.0 g/dL    Alkaline Phosphatase 78 33 - 136 U/L    Total Protein 6.5 6.4 - 8.2 g/dL    AST 14 9 - 39 U/L    Total Bilirubin 0.4 0.0 - 1.2 mg/dL    ALT (SGPT) 14 7 - 45 U/L   Magnesium   Result Value Ref Range    Magnesium 2.04 1.60 - 2.40 mg/dL   Magnesium   Result Value Ref Range    Magnesium 1.92 1.60 - 2.40 mg/dL   Magnesium   Result Value Ref Range    Magnesium 2.14 1.60 - 2.40 mg/dL   Renal Function Panel   Result Value Ref Range    Glucose 118 (H) 74 - 99 mg/dL    Sodium 139 136 - 145 mmol/L    Potassium 4.0 3.5 - 5.3 mmol/L    Chloride 101 98 - 107 mmol/L    Bicarbonate 28 21 - 32 mmol/L    Anion Gap 14 10 - 20 mmol/L    Urea Nitrogen 9 6 - 23 mg/dL    Creatinine 0.86 0.50 - 1.05 mg/dL    GFR Female 72 >90 mL/min/1.73m2    Calcium 8.4 (L) 8.6 - 10.3 mg/dL    Phosphorus 3.3 2.5 - 4.9 mg/dL    Albumin 3.5 3.4 - 5.0 g/dL   Renal Function Panel   Result Value Ref Range    Glucose 89 74 - 99 mg/dL    Sodium 139 136 - 145 mmol/L    Potassium 3.9 3.5 - 5.3 mmol/L    Chloride 103 98 - 107 mmol/L    Bicarbonate 28 21 - 32 mmol/L    Anion Gap 12 10 - 20 mmol/L    Urea Nitrogen 15 6 - 23 mg/dL    Creatinine 0.90 0.50 - 1.05 mg/dL    GFR Female 68 >90 mL/min/1.73m2    Calcium 8.5 (L) 8.6 - 10.3 mg/dL    Phosphorus 4.1 2.5 - 4.9 mg/dL    Albumin 3.5 3.4 - 5.0 g/dL   Lactate   Result Value Ref Range    Lactate 0.6 0.4 - 2.0 mmol/L   Hepatic Function Panel   Result Value Ref Range    Albumin 3.5 3.4 - 5.0 g/dL    Total Bilirubin 0.5 0.0 - 1.2 mg/dL    Bilirubin, Direct 0.1 0.0 - 0.3 mg/dL    Alkaline Phosphatase 86 33 - 136 U/L    ALT (SGPT) 18 7 - 45 U/L    AST 24  9 - 39 U/L    Total Protein 5.6 (L) 6.4 - 8.2 g/dL   CBC   Result Value Ref Range    WBC 7.2 4.4 - 11.3 x10E9/L    RBC 3.89 (L) 4.00 - 5.20 x10E12/L    Hemoglobin 10.2 (L) 12.0 - 16.0 g/dL    Hematocrit 31.8 (L) 36.0 - 46.0 %    MCV 82 80 - 100 fL    MCHC 32.1 32.0 - 36.0 g/dL    Platelets 205 150 - 450 x10E9/L    RDW 14.6 (H) 11.5 - 14.5 %   CBC   Result Value Ref Range    WBC 7.4 4.4 - 11.3 x10E9/L    RBC 3.91 (L) 4.00 - 5.20 x10E12/L    Hemoglobin 10.3 (L) 12.0 - 16.0 g/dL    Hematocrit 32.0 (L) 36.0 - 46.0 %    MCV 82 80 - 100 fL    MCHC 32.2 32.0 - 36.0 g/dL    Platelets 214 150 - 450 x10E9/L    RDW 14.6 (H) 11.5 - 14.5 %   Magnesium   Result Value Ref Range    Magnesium 2.23 1.60 - 2.40 mg/dL   CBC   Result Value Ref Range    WBC 8.5 4.4 - 11.3 x10E9/L    RBC 4.21 4.00 - 5.20 x10E12/L    Hemoglobin 11.0 (L) 12.0 - 16.0 g/dL    Hematocrit 35.0 (L) 36.0 - 46.0 %    MCV 83 80 - 100 fL    MCHC 31.4 (L) 32.0 - 36.0 g/dL    Platelets 166 150 - 450 x10E9/L    RDW 14.3 11.5 - 14.5 %   CBC   Result Value Ref Range    WBC 6.6 4.4 - 11.3 x10E9/L    RBC 4.04 4.00 - 5.20 x10E12/L    Hemoglobin 10.6 (L) 12.0 - 16.0 g/dL    Hematocrit 33.6 (L) 36.0 - 46.0 %    MCV 83 80 - 100 fL    MCHC 31.5 (L) 32.0 - 36.0 g/dL    Platelets 178 150 - 450 x10E9/L    RDW 14.6 (H) 11.5 - 14.5 %   Magnesium   Result Value Ref Range    Magnesium 2.38 1.60 - 2.40 mg/dL   Sars-CoV-2 PCR, Symptomatic   Result Value Ref Range    SARS-CoV-2 Result NOT DETECTED Not Detected   CBC   Result Value Ref Range    WBC 9.4 4.4 - 11.3 x10E9/L    RBC 3.96 (L) 4.00 - 5.20 x10E12/L    Hemoglobin 10.5 (L) 12.0 - 16.0 g/dL    Hematocrit 32.9 (L) 36.0 - 46.0 %    MCV 83 80 - 100 fL    MCHC 31.9 (L) 32.0 - 36.0 g/dL    Platelets 185 150 - 450 x10E9/L    RDW 14.3 11.5 - 14.5 %   CBC and Auto Differential   Result Value Ref Range    WBC 7.3 4.4 - 11.3 x10E9/L    RBC 4.43 4.00 - 5.20 x10E12/L    Hemoglobin 11.6 (L) 12.0 - 16.0 g/dL    Hematocrit 37.5 36.0 - 46.0 %     MCV 85 80 - 100 fL    MCHC 30.9 (L) 32.0 - 36.0 g/dL    Platelets 197 150 - 450 x10E9/L    RDW 17.2 (H) 11.5 - 14.5 %    Neutrophils % 63.0 40.0 - 80.0 %    Immature Granulocytes %, Automated 0.7 0.0 - 0.9 %    Lymphocytes % 25.5 13.0 - 44.0 %    Monocytes % 8.6 2.0 - 10.0 %    Eosinophils % 1.8 0.0 - 6.0 %    Basophils % 0.4 0.0 - 2.0 %    Neutrophils Absolute 4.57 1.60 - 5.50 x10E9/L    Lymphocytes Absolute 1.85 0.80 - 3.00 x10E9/L    Monocytes Absolute 0.62 0.05 - 0.80 x10E9/L    Eosinophils Absolute 0.13 0.00 - 0.40 x10E9/L    Basophils Absolute 0.03 0.00 - 0.10 x10E9/L   Protime-INR   Result Value Ref Range    Protime 20.5 (H) 9.8 - 13.4 sec    INR 1.8 (H) 0.9 - 1.1   Troponin I, High Sensitivity   Result Value Ref Range    Troponin I 18 (H) 0 - 13 ng/L   Lipase   Result Value Ref Range    Lipase 14 9 - 82 U/L   aPTT   Result Value Ref Range    aPTT 43 (H) 26 - 39 sec   CBC   Result Value Ref Range    WBC 10.1 4.4 - 11.3 x10E9/L    RBC 4.06 4.00 - 5.20 x10E12/L    Hemoglobin 10.7 (L) 12.0 - 16.0 g/dL    Hematocrit 33.9 (L) 36.0 - 46.0 %    MCV 83 80 - 100 fL    MCHC 31.6 (L) 32.0 - 36.0 g/dL    Platelets 206 150 - 450 x10E9/L    RDW 14.4 11.5 - 14.5 %   Renal Function Panel   Result Value Ref Range    Glucose 73 (L) 74 - 99 mg/dL    Sodium 141 136 - 145 mmol/L    Potassium 3.8 3.5 - 5.3 mmol/L    Chloride 101 98 - 107 mmol/L    Bicarbonate 30 21 - 32 mmol/L    Anion Gap 14 10 - 20 mmol/L    Urea Nitrogen 12 6 - 23 mg/dL    Creatinine 0.84 0.50 - 1.05 mg/dL    GFR Female 74 >90 mL/min/1.73m2    Calcium 8.8 8.6 - 10.3 mg/dL    Phosphorus 4.5 2.5 - 4.9 mg/dL    Albumin 3.6 3.4 - 5.0 g/dL          Medications:    Current Outpatient Medications   Medication Instructions    albuterol 2.5 mg, nebulization, Every 4 hours PRN    baclofen (Lioresal) 10 mg tablet One tablet in the morning and afternoon and 1 to 2 tablets in the evening.    bumetanide (BUMEX) 2 mg, oral, 2 times daily    clobetasol (Temovate) 0.05 % cream  Topical, 2 times daily    diclofenac sodium (VOLTAREN) 4 g, Topical, 2 times daily PRN    digoxin (LANOXIN) 125 mcg, oral, Daily    dilTIAZem LA (CARDIZEM LA) 120 mg, oral, Daily    EPINEPHrine 0.3 mg/0.3 mL injection syringe 1 Syringe, injection, As needed, Per Pt has not needed.     famotidine (PEPCID) 40 mg, oral, Daily    fluticasone (Flonase) 50 mcg/actuation nasal spray 1 spray, nasal, 2 times daily    hydroxychloroquine (PLAQUENIL) 200 mg, oral, Daily    leflunomide (ARAVA) 10 mg, oral, Daily    magnesium oxide (MAG-OX) 400 mg, oral, Daily    montelukast (SINGULAIR) 10 mg, oral, Nightly    nitroglycerin (NITROSTAT) 0.4 mg, sublingual, Every 5 min PRN    oxyCODONE-acetaminophen (Percocet)  mg tablet 1 tablet, oral, Every 6 hours PRN    oxyCODONE-acetaminophen (Percocet) 5-325 mg tablet 1 tablet, oral, 3 times daily PRN    [START ON 6/7/2024] oxyCODONE-acetaminophen (Percocet) 5-325 mg tablet 1 tablet, oral, 3 times daily PRN    [START ON 7/5/2024] oxyCODONE-acetaminophen (Percocet) 5-325 mg tablet 1 tablet, oral, 3 times daily PRN    pantoprazole (PROTONIX) 40 mg, oral, 2 times daily    polyethylene glycol (MIRALAX) 17 g, oral, Daily    rosuvastatin (CRESTOR) 20 mg, oral, Daily    saccharomyces boulardii (FLORASTOR) 250 mg, oral, 2 times daily          Assessment:      This is a 72 y/o lady with pmh of VT s/p ICD (no CAD), TAA, mild aortic stenosis (PV 2.62, MG16),  HF with improved EF.  The patient has parox atrial fibrillation with PVI x 2. She was previously on xarelto for CVA prevention. However she presented to the hospital with CP and found to have profound anemia needing multiple transfusions. GI work up has been unrevealing. She is referred to us to discuss LAAC.     The CHADS-VASC score is 4 and HAS-BLED score is 4. The patient is at increased risk of both bleeding and stroke.  As such the patient is a reasonable candidate for consideration of left atrial appendage occluder placement.    Today  we discussed the left atrial appendage closure procedure. The patient was given written educational handout materials and watched an educational video. All risks, benefits and alternative were discussed.     The risks discussed included but were not limited to vascular complications, sedation related complications, risk of MI, CVA, device embolization, pericardial tamponade and death. The patient verbalized understanding and decided to proceed.        Plan will be for preprocedural cardiac CT. Following device implant, strategy will be for dual antiplatelet therapy with aspirin and clopidogrel for 6 months then aspirin for life.        The patient will need CT scan/SULY  4 months after the procedure, to evaluate the device for position, thrombus and heike-device leak    Thank you, Dr. Soto, for this consultation and for allowing me to participate in the care of this patient.

## 2024-06-05 ENCOUNTER — OFFICE VISIT (OUTPATIENT)
Dept: CARDIOLOGY | Facility: CLINIC | Age: 74
End: 2024-06-05
Payer: MEDICARE

## 2024-06-05 VITALS
WEIGHT: 157 LBS | BODY MASS INDEX: 26.8 KG/M2 | SYSTOLIC BLOOD PRESSURE: 150 MMHG | HEART RATE: 64 BPM | HEIGHT: 64 IN | OXYGEN SATURATION: 98 % | DIASTOLIC BLOOD PRESSURE: 68 MMHG

## 2024-06-05 DIAGNOSIS — I48.91 ATRIAL FIBRILLATION, UNSPECIFIED TYPE (MULTI): ICD-10-CM

## 2024-06-05 PROCEDURE — 99214 OFFICE O/P EST MOD 30 MIN: CPT | Performed by: INTERNAL MEDICINE

## 2024-06-05 PROCEDURE — 1159F MED LIST DOCD IN RCRD: CPT | Performed by: INTERNAL MEDICINE

## 2024-06-05 ASSESSMENT — ENCOUNTER SYMPTOMS
DEPRESSION: 0
OCCASIONAL FEELINGS OF UNSTEADINESS: 0
LOSS OF SENSATION IN FEET: 0

## 2024-06-10 ENCOUNTER — HOSPITAL ENCOUNTER (OUTPATIENT)
Dept: CARDIOLOGY | Facility: CLINIC | Age: 74
Discharge: HOME | End: 2024-06-10
Payer: MEDICARE

## 2024-06-10 DIAGNOSIS — K29.00 ACUTE SUPERFICIAL GASTRITIS WITHOUT HEMORRHAGE: ICD-10-CM

## 2024-06-10 DIAGNOSIS — I42.9 CARDIOMYOPATHY, UNSPECIFIED TYPE (MULTI): ICD-10-CM

## 2024-06-10 DIAGNOSIS — K27.9 PUD (PEPTIC ULCER DISEASE): ICD-10-CM

## 2024-06-10 DIAGNOSIS — Z95.810 PRESENCE OF AUTOMATIC CARDIOVERTER/DEFIBRILLATOR (AICD): ICD-10-CM

## 2024-06-10 PROCEDURE — 93296 REM INTERROG EVL PM/IDS: CPT

## 2024-06-10 PROCEDURE — 93295 DEV INTERROG REMOTE 1/2/MLT: CPT | Performed by: INTERNAL MEDICINE

## 2024-06-11 RX ORDER — PANTOPRAZOLE SODIUM 40 MG/1
40 TABLET, DELAYED RELEASE ORAL 2 TIMES DAILY
Qty: 180 TABLET | Refills: 0 | Status: SHIPPED | OUTPATIENT
Start: 2024-06-11

## 2024-07-01 ENCOUNTER — LAB (OUTPATIENT)
Dept: LAB | Facility: LAB | Age: 74
End: 2024-07-01
Payer: MEDICARE

## 2024-07-01 DIAGNOSIS — I48.91 ATRIAL FIBRILLATION, UNSPECIFIED TYPE (MULTI): ICD-10-CM

## 2024-07-01 LAB
ANION GAP SERPL CALC-SCNC: 17 MMOL/L (ref 10–20)
BUN SERPL-MCNC: 28 MG/DL (ref 6–23)
CALCIUM SERPL-MCNC: 9.1 MG/DL (ref 8.6–10.3)
CHLORIDE SERPL-SCNC: 99 MMOL/L (ref 98–107)
CO2 SERPL-SCNC: 28 MMOL/L (ref 21–32)
CREAT SERPL-MCNC: 1.4 MG/DL (ref 0.5–1.05)
EGFRCR SERPLBLD CKD-EPI 2021: 40 ML/MIN/1.73M*2
ERYTHROCYTE [DISTWIDTH] IN BLOOD BY AUTOMATED COUNT: 15.5 % (ref 11.5–14.5)
GLUCOSE SERPL-MCNC: 123 MG/DL (ref 74–99)
HCT VFR BLD AUTO: 32.1 % (ref 36–46)
HGB BLD-MCNC: 9.6 G/DL (ref 12–16)
MCH RBC QN AUTO: 23.2 PG (ref 26–34)
MCHC RBC AUTO-ENTMCNC: 29.9 G/DL (ref 32–36)
MCV RBC AUTO: 78 FL (ref 80–100)
NRBC BLD-RTO: 0 /100 WBCS (ref 0–0)
PLATELET # BLD AUTO: 255 X10*3/UL (ref 150–450)
POTASSIUM SERPL-SCNC: 4.8 MMOL/L (ref 3.5–5.3)
RBC # BLD AUTO: 4.14 X10*6/UL (ref 4–5.2)
SODIUM SERPL-SCNC: 139 MMOL/L (ref 136–145)
WBC # BLD AUTO: 6.9 X10*3/UL (ref 4.4–11.3)

## 2024-07-01 PROCEDURE — 85027 COMPLETE CBC AUTOMATED: CPT

## 2024-07-01 PROCEDURE — 80048 BASIC METABOLIC PNL TOTAL CA: CPT

## 2024-07-01 PROCEDURE — 36415 COLL VENOUS BLD VENIPUNCTURE: CPT

## 2024-07-08 ENCOUNTER — APPOINTMENT (OUTPATIENT)
Dept: PRIMARY CARE | Facility: CLINIC | Age: 74
End: 2024-07-08
Payer: MEDICARE

## 2024-07-15 ENCOUNTER — APPOINTMENT (OUTPATIENT)
Dept: RHEUMATOLOGY | Facility: CLINIC | Age: 74
End: 2024-07-15
Payer: MEDICARE

## 2024-07-15 DIAGNOSIS — L40.9 PSORIASIS: ICD-10-CM

## 2024-07-15 DIAGNOSIS — M05.79 RHEUMATOID ARTHRITIS INVOLVING MULTIPLE SITES WITH POSITIVE RHEUMATOID FACTOR (MULTI): Primary | ICD-10-CM

## 2024-07-15 PROBLEM — I48.91 ATRIAL FIBRILLATION, UNSPECIFIED TYPE (MULTI): Status: ACTIVE | Noted: 2024-07-15

## 2024-07-15 PROCEDURE — 99213 OFFICE O/P EST LOW 20 MIN: CPT | Performed by: INTERNAL MEDICINE

## 2024-07-15 RX ORDER — SULFASALAZINE 500 MG/1
500 TABLET ORAL 2 TIMES DAILY
Qty: 60 TABLET | Refills: 2 | Status: SHIPPED | OUTPATIENT
Start: 2024-07-15 | End: 2024-08-14

## 2024-07-15 RX ORDER — HYDROXYCHLOROQUINE SULFATE 200 MG/1
300 TABLET, FILM COATED ORAL DAILY
Qty: 135 TABLET | Refills: 0 | Status: SHIPPED | OUTPATIENT
Start: 2024-07-15 | End: 2024-10-13

## 2024-07-15 NOTE — DISCHARGE INSTRUCTIONS
Watchman Discharge Instructions  Anticoagulation Plan:  You are being discharged on Aspirin and Plavix for 6 months (Plavix will be stopped after 6 months and you will remain on 81mg Aspirin for life).  You will have a 4 month CTA to assess the effectiveness of the Watchman Device.     General Instructions:  DO NOT drink any alcoholic drinks or take any non-prescriptive medications that contain alcohol for the first 24 hours.   DO NOT make any important decisions for the first 24 hours.  Activity:  You are advised to go directly home from the hospital.   DO NOT lift anything heavier than 10 pounds for one week, this allows for proper healing of the groin.   No excessive exercise or treadmill use for one week. You may walk and do stairs, slowly.   No sexual activities for 24 hours after you arrive home.    Wound Care:  If slight bleeding should occur at site, lie down and have someone apply firm pressure just above the puncture site for 5 minutes.  If it continues or is profuse, call 911. Always notify your doctor if bleeding occurs.   Keep site clean and dry. Let air dry or you may use a simple bandaid.   Gently cleanse the puncture site in your groin with soap and water only.   You may experience some tenderness, bruising or minimal inflammation.  If you have any concerns, you may contact the Cath Lab or if any of these symptoms become excessive, contact your cardiologist or go to the emergency room.   No tub baths, soaking, or swimming for one week.   May shower the next day after your procedure.    Diet:  You may resume your normal diet. However it is better to start with liquids such as juices then soup and crackers, and gradually work up to solid foods.    Other Instructions:  If you develop difficulty breathing, rash, hives, severe nausea, vomiting, light-headedness or any signs of infection, immediately contact your doctor and go to the nearest emergency room.   You must take your aspirin, clopidogrel  (Plavix), prasugrel (Effient), or ticagrelor (Brilinta) every day without missing a single dose.  If you are getting low on these medications, contact your physician immediately for a refill.  - CALL 911 IF YOU HAVE ANY OF THE SIGNS AND SYMPTOMS OF HEART FAILURE: 1. Chest pain 2. Significant Shortness of breath 3. Fainting.     Call Provider If (Home-going Patients):  Breathing faster than normal.   Breathing harder than normal or having retractions.   Fever of 100.4 F (38 C) or higher.   Chills.   Drinking less than normal.   Urinating less than normal, over 1 day.   Acting very sleepy and difficult to awaken.   Vomiting (throwing up) and not able to eat or drink for 12 hours.   3 or more loose, watery bowel movements in 24 hours (diarrhea).   Any new concerning symptoms.    Please call the STRUCTURAL HEART TEAM LINE if you have any questions or concerns - [phone]

## 2024-07-15 NOTE — PROGRESS NOTES
"Subjective  . Trina Elias is a 73 y.o. female who presents for No chief complaint on file..    HPI. 73-year-old female with history of seropositive RA (+ve RF, CCP), positive HLA-B27, chronic low back pain secondary to DDD/spondylosis, chronic pain, s/p C-spine fusion surgery, VT/V-fib s/p pacemaker and AICD,HFpEF, recurrent SBO, HTN, GERD s/p Nissen fundoplication, eczema s/p bilateral shoulder  arthroplasty, bilateral tarsal tunnel release surgery, s/p right carpal tunnel surgery and s/p left shoulder arthroplasty presented for follow-up.    She could not tolerate leflunomide due to dizziness and gait instability.  She continues to have pain in her hands and wrist with associated stiffness.  X-ray of the hands showed degenerative changes.  No erosions were noted.    Immunosuppression: Hydroxychloroquine.    Past immunosuppression:  1.  Methotrexate.  Chest pain and hives.  2.  Orencia.  3.  Rituximab.  4.  Leflunomide    Review of Systems   All other systems reviewed and are negative.    Objective       5/2/2024     7:03 PM 5/2/2024    11:14 PM 5/3/2024     3:40 AM 5/3/2024     7:17 AM 5/6/2024     9:38 AM 5/13/2024     8:22 AM 6/5/2024     3:53 PM   Vitals   Systolic 112 139 142 143 141 107 150   Diastolic 54 49 63 62 100 56 68   Heart Rate 58 69 70 62 69  64   Temp 36.2 °C (97.2 °F) 36.8 °C (98.2 °F) 36.6 °C (97.9 °F) 36.2 °C (97.2 °F)      Resp 17 17 16 18 16     Height (in)       1.626 m (5' 4\")   Weight (lb)      160 157   BMI      27.46 kg/m2 26.95 kg/m2   BSA (m2)      1.81 m2 1.79 m2   Visit Report     Report Report Report      Physical Exam.  Gen. AAO x3, NAD.  HEENT: No pallor or icterus, PERRLA, EOMI.  No cervical lymphadenopathy .  Skin: No rashes.  Heart: S1, S2/ RRR.   Lungs: CTA B.  Abdomen: Soft, NT/ND.  MSK: Bilateral CMC squaring.  Right CMC with positive grinding.  Left second MCP fused.  Bilateral [place] and Dung nodes.  Right 2 - 3 - 4 MCP with diffuse tenderness without swelling.  " Handgrip fair.  Bilateral elbow without swelling and tenderness.  Bilateral knee without swelling and tenderness.  Bilateral ankle and MTPs without swelling and tenderness.  Neuro: Sensation to touch intact.Strength 5/5 throughout.   Psych:Appropriate mood and behavior  EXT: No edema    Assessment/Plan  . 73-year-old female with history of seropositive RA (+ve RF, CCP), positive HLA-B27, chronic low back pain secondary to DDD/spondylosis, chronic pain, s/p C-spine fusion surgery, VT/V-fib s/p pacemaker and AICD,HFpEF, recurrent SBO, HTN, GERD s/p Nissen fundoplication, eczema s/p bilateral shoulder  arthroplasty, bilateral tarsal tunnel release surgery, s/p right carpal tunnel surgery and s/p left shoulder arthroplasty presented for follow-up.      #1: Seropositive RA.  She has features of OA in hands.  No erosions noted on recent x-rays.  She could not tolerate leflunomide due to dizziness.  -Increase hydroxychloroquine to 1-1/2 pill daily.  -Begin sulfasalazine 1 pill twice a day.    Follow-up in 3 months.     This note was partially generated using the Dragon Voice recognition system. There may be some incorrect wording, spelling and/or spelling errors or punctuation errors that were not corrected prior to committing the note to the medical record.      Problem List Items Addressed This Visit       Psoriasis    Relevant Medications    hydroxychloroquine (Plaquenil) 200 mg tablet    Rheumatoid arthritis (Multi) - Primary    Relevant Medications    sulfaSALAzine (Azulfidine) 500 mg tablet            Active Ambulatory Problems     Diagnosis Date Noted    Coronary artery disease involving native coronary artery 06/26/2023    Asthma ([facility]) 06/26/2023    Atrial fibrillation (Multi) 06/26/2023    Myofascial pain 06/26/2023    Cardiac aneurysm 06/26/2023    Cardiac defibrillator in place 06/26/2023    Obstructive sleep apnea 06/26/2023    Central sleep apnea in conditions classified elsewhere 06/26/2023    Cervical  post-laminectomy syndrome 06/26/2023    Cervical radiculopathy 06/26/2023    Chronic bilateral low back pain without sciatica 06/26/2023    Chronic right sacroiliac pain 06/26/2023    Colon polyp 06/26/2023    Cubital tunnel syndrome on right 06/26/2023    Displacement of lumbar disc with radiculopathy 06/26/2023    Gastroesophageal reflux disease without esophagitis 06/26/2023    Mixed hyperlipidemia 06/26/2023    Iron deficiency anemia 06/26/2023    Irregular cardiac rhythm 06/26/2023    Osteoporosis 06/26/2023    Primary osteoarthritis of right shoulder 06/26/2023    Psoriasis 06/26/2023    Rheumatoid arthritis (Multi) 06/26/2023    Umbilical hernia 06/26/2023    Wide-complex tachycardia 06/26/2023    Centrilobular emphysema (Multi) 08/28/2023    Coagulation defect, unspecified (Multi) 08/28/2023    Hypertensive heart disease with heart failure (Multi) 06/26/2023    Stage 3a chronic kidney disease (Multi) 08/29/2023    Personal history of other venous thrombosis and embolism 11/02/2020    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) 11/02/2020    Pacemaker 12/06/2023    Anticoagulant long-term use 12/06/2023    Chest pain, unspecified type 02/24/2024    Arthritis of left shoulder region 05/02/2024    Atrial fibrillation, unspecified type (Multi) 07/15/2024     Resolved Ambulatory Problems     Diagnosis Date Noted    Abnormal mammogram 06/26/2023    Bone pain 06/26/2023    BSOM (bilateral serous otitis media) 06/26/2023    Right serous otitis media 06/26/2023    Cervical neuropathy 06/26/2023    Chest pain on exertion 06/26/2023    Dehydration 06/26/2023    Dry cough 06/26/2023    Dry heaves 06/26/2023    Dysphagia 06/26/2023    Dyspnea 06/26/2023    Edema 06/26/2023    Elevated serum creatinine 06/26/2023    Fatigue 06/26/2023    Fever, unspecified 06/26/2023    Flu-like symptoms 06/26/2023    Headache 06/26/2023    History of abdominal surgery 06/26/2023    History of colon polyps 06/26/2023     Lumbar radiculitis 06/26/2023    Lumbar spondylosis 06/26/2023    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    Ulnar neuropathy 06/26/2023    Medication side effect 06/26/2023    Nonhealing surgical wound 06/26/2023    Numbness 06/26/2023    Osteopenia 06/26/2023    Pain of right upper extremity 06/26/2023    Pertussis 06/26/2023    Pleural effusion, bilateral 06/26/2023    Postural dizziness with presyncope 06/26/2023    Pulmonary edema ([facility]) 06/26/2023    Severe pain 06/26/2023    Small bowel obstruction (Multi) 06/26/2023    Spondylosis of cervical region without myelopathy or radiculopathy 06/26/2023    Swelling of left hand 06/26/2023    Toxic effect of venom 06/26/2023    Ulcer of the stomach caused by bacteria (h. pylori), acute 06/26/2023    Urticaria 06/26/2023    CKD (chronic kidney disease) 10/10/2023     Past Medical History:   Diagnosis Date    Central sleep apnea     Cervical myofascial pain syndrome     Chronic anemia     CKD stage 3a, GFR 45-59 ml/min (Multi)     COPD (chronic obstructive pulmonary disease) (Multi)     DVT of axillary vein, acute right (Multi) 01/20/2018    H/O being hospitalized 02/2024    History of Helicobacter pylori infection     Hx of syncope     ICD (implantable cardioverter-defibrillator) in place     Mitral valve regurgitation     Moderate aortic stenosis by prior echocardiogram     Personal history of anaphylaxis     PUD (peptic ulcer disease)     Pulmonary hypertension (Multi)     Radiculopathy, lumbar region 08/13/2018    Restless legs syndrome 11/17/2015    Seasonal allergic rhinitis due to pollen        Family History   Problem Relation Name Age of Onset    Asthma Daughter      Asthma Other      Colon cancer Other      Diabetes Other      Other (stroke syndrome) Other         Past Surgical History:   Procedure Laterality Date    ABDOMINAL ADHESION SURGERY  10/22/2015    Exploratory laparotomy. Extensive lysis of adhesions. Small-bowel repair.    ABDOMINAL ADHESION  SURGERY  10/18/2020    Exploratory laparotomy, massive lysis of adhesions, small-bowel resection, decompression of small bowel and removal of mesh.    ABLATION OF DYSRHYTHMIC FOCUS  2022, 2022 for A FIB    ACHILLES TENDON SURGERY Right     ANTERIOR CERVICAL DISCECTOMY W/ FUSION  2016    Anterior C5 and C6 discectomies and interbody fusion of C5-C6, C6-C7    APPENDECTOMY      Open surgery    BREAST BIOPSY Left 2022    CARDIAC ASSIST DEVICE INSERTION  2012    Loop recorder placement , Removed in  w/ ICD placed    CARDIAC CATHETERIZATION  2022    Normal coronary arteries. Tachycardia induced Cardiomyopathy.    CARDIAC DEFIBRILLATOR PLACEMENT  2014    Dual chamber ICD    CARDIOVERSION  2018, 2018    CARPAL TUNNEL RELEASE Bilateral      SECTION, CLASSIC      x2    CHOLECYSTECTOMY      Open surgery    COLONOSCOPY  2024    Extensive diverticulosis of moderate severity and causing mild luminal narrowing in the sigmoid colon    FINGER SURGERY Left 2008    Left index finger metacarpophalangeal fusion    FOOT SURGERY Bilateral     B/L Tarsal Tunnel Release    IR INJECTION EPIDURAL STEROID  2011    Lumbar spine; , ,     IR INJECTION EPIDURAL STEROID  2007    Cervical spine 2007    LEG SURGERY Right 2006    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  Plantar wart removal R foot.    NERVE SURGERY Right 10/13/2010    Release of Right tarsal tunnel syndrome w/ severe adhesion scar tissue    NISSEN FUNDOPLICATION      Open surgery    PICC LINE INSERTION WO IMAGING  10/24/2015    Placed for stable IV access    REVERSE TOTAL SHOULDER ARTHROPLASTY Bilateral 2024    Right 2023, Left 2024    ROTATOR CUFF REPAIR      ULNAR NERVE TRANSPOSITION Left     VENTRAL HERNIA REPAIR  06/15/2015    Laparoscopic double ventral hernia repair.    WISDOM TOOTH EXTRACTION       WOUND DEBRIDEMENT  11/27/2020    Excisional debridement of abdominal wound for wound dehiscence       Social History     Tobacco Use   Smoking Status Never   Smokeless Tobacco Never       Allergies  Abatacept, Hydrocodone-acetaminophen, Hydromorphone, Metoprolol, Penicillins, Pregabalin, Prochlorperazine, Methotrexate, Aripiprazole, Beta-blockers (beta-adrenergic blocking agts), Bupropion, Cefepime, Ciprofloxacin, Furosemide, Levofloxacin, and Pineapple    Current Meds  Current Outpatient Medications   Medication Instructions    albuterol 2.5 mg, nebulization, Every 4 hours PRN    baclofen (Lioresal) 10 mg tablet One tablet in the morning and afternoon and 1 to 2 tablets in the evening.    bumetanide (BUMEX) 2 mg, oral, 2 times daily    clobetasol (Temovate) 0.05 % cream Topical, 2 times daily    diclofenac sodium (VOLTAREN) 4 g, Topical, 2 times daily PRN    digoxin (LANOXIN) 125 mcg, oral, Daily    dilTIAZem LA (CARDIZEM LA) 120 mg, oral, Daily    EPINEPHrine 0.3 mg/0.3 mL injection syringe 1 Syringe, injection, As needed, Per Pt has not needed.     famotidine (PEPCID) 40 mg, oral, Daily    fluticasone (Flonase) 50 mcg/actuation nasal spray 1 spray, nasal, 2 times daily    hydroxychloroquine (PLAQUENIL) 300 mg, oral, Daily    magnesium oxide (MAG-OX) 400 mg, oral, Daily    montelukast (SINGULAIR) 10 mg, oral, Nightly    nitroglycerin (NITROSTAT) 0.4 mg, sublingual, Every 5 min PRN    oxyCODONE-acetaminophen (Percocet)  mg tablet 1 tablet, oral, Every 6 hours PRN    oxyCODONE-acetaminophen (Percocet) 5-325 mg tablet 1 tablet, oral, 3 times daily PRN    oxyCODONE-acetaminophen (Percocet) 5-325 mg tablet 1 tablet, oral, 3 times daily PRN    oxyCODONE-acetaminophen (Percocet) 5-325 mg tablet 1 tablet, oral, 3 times daily PRN    pantoprazole (PROTONIX) 40 mg, oral, 2 times daily    polyethylene glycol (MIRALAX) 17 g, oral, Daily    rosuvastatin (CRESTOR) 20 mg, oral, Daily    saccharomyces boulardii (FLORASTOR)  250 mg, oral, 2 times daily    sulfaSALAzine (AZULFIDINE) 500 mg, oral, 2 times daily        Lab Results   Component Value Date     (H) 08/13/2018    SEDRATE 29 05/13/2024    CRP 1.55 (H) 05/13/2024    URICACID 7.5 (H) 05/13/2024             Carlos Harley MD

## 2024-07-15 NOTE — PATIENT INSTRUCTIONS
Take hydroxychloroquine 1-1/2 pill daily and sulfasalazine 1 pill twice a day.  Call me if any question.  Follow-up in 3 months.

## 2024-07-16 ENCOUNTER — APPOINTMENT (OUTPATIENT)
Dept: CARDIOLOGY | Facility: HOSPITAL | Age: 74
DRG: 274 | End: 2024-07-16
Payer: MEDICARE

## 2024-07-16 ENCOUNTER — HOSPITAL ENCOUNTER (OUTPATIENT)
Dept: CARDIOLOGY | Facility: HOSPITAL | Age: 74
Discharge: HOME | End: 2024-07-16
Payer: MEDICARE

## 2024-07-16 ENCOUNTER — HOSPITAL ENCOUNTER (INPATIENT)
Facility: HOSPITAL | Age: 74
LOS: 1 days | Discharge: HOME | DRG: 274 | End: 2024-07-16
Attending: INTERNAL MEDICINE | Admitting: INTERNAL MEDICINE
Payer: MEDICARE

## 2024-07-16 ENCOUNTER — HOSPITAL ENCOUNTER (OUTPATIENT)
Dept: RADIOLOGY | Facility: HOSPITAL | Age: 74
Discharge: HOME | End: 2024-07-16
Payer: MEDICARE

## 2024-07-16 ENCOUNTER — HOSPITAL ENCOUNTER (OUTPATIENT)
Dept: CARDIOLOGY | Facility: CLINIC | Age: 74
Setting detail: SURGERY ADMIT
Discharge: HOME | DRG: 274 | End: 2024-07-16
Payer: MEDICARE

## 2024-07-16 VITALS
HEIGHT: 64 IN | WEIGHT: 155 LBS | DIASTOLIC BLOOD PRESSURE: 64 MMHG | RESPIRATION RATE: 12 BRPM | HEART RATE: 62 BPM | SYSTOLIC BLOOD PRESSURE: 134 MMHG | OXYGEN SATURATION: 100 % | BODY MASS INDEX: 26.46 KG/M2

## 2024-07-16 DIAGNOSIS — Z95.810 CARDIAC DEFIBRILLATOR IN PLACE: ICD-10-CM

## 2024-07-16 DIAGNOSIS — Z95.0 PACEMAKER: ICD-10-CM

## 2024-07-16 DIAGNOSIS — I47.20 VENTRICULAR TACHYCARDIA (MULTI): ICD-10-CM

## 2024-07-16 DIAGNOSIS — I48.91 ATRIAL FIBRILLATION, UNSPECIFIED TYPE (MULTI): Primary | ICD-10-CM

## 2024-07-16 DIAGNOSIS — Z01.810 PREOP CARDIOVASCULAR EXAM: ICD-10-CM

## 2024-07-16 DIAGNOSIS — I48.20 CHRONIC ATRIAL FIBRILLATION, UNSPECIFIED (MULTI): ICD-10-CM

## 2024-07-16 DIAGNOSIS — I42.9 CARDIOMYOPATHY, UNSPECIFIED TYPE (MULTI): ICD-10-CM

## 2024-07-16 DIAGNOSIS — Z98.890 OTHER SPECIFIED POSTPROCEDURAL STATES: ICD-10-CM

## 2024-07-16 DIAGNOSIS — I48.91 ATRIAL FIBRILLATION, UNSPECIFIED TYPE (MULTI): ICD-10-CM

## 2024-07-16 DIAGNOSIS — Z95.810 PRESENCE OF AUTOMATIC CARDIOVERTER/DEFIBRILLATOR (AICD): ICD-10-CM

## 2024-07-16 DIAGNOSIS — Z09 POSTOP CHECK: ICD-10-CM

## 2024-07-16 LAB
ACT BLD: 240 SEC (ref 83–199)
ACT BLD: NORMAL S
EJECTION FRACTION: 53 %
EJECTION FRACTION: 53 %

## 2024-07-16 PROCEDURE — 93005 ELECTROCARDIOGRAM TRACING: CPT

## 2024-07-16 PROCEDURE — 93287 PERI-PX DEVICE EVAL & PRGR: CPT | Performed by: INTERNAL MEDICINE

## 2024-07-16 PROCEDURE — 2550000001 HC RX 255 CONTRASTS: Performed by: INTERNAL MEDICINE

## 2024-07-16 PROCEDURE — 2500000005 HC RX 250 GENERAL PHARMACY W/O HCPCS: Performed by: INTERNAL MEDICINE

## 2024-07-16 PROCEDURE — 7100000010 HC PHASE TWO TIME - EACH INCREMENTAL 1 MINUTE: Performed by: INTERNAL MEDICINE

## 2024-07-16 PROCEDURE — 75572 CT HRT W/3D IMAGE: CPT

## 2024-07-16 PROCEDURE — 2500000004 HC RX 250 GENERAL PHARMACY W/ HCPCS (ALT 636 FOR OP/ED): Performed by: NURSE PRACTITIONER

## 2024-07-16 PROCEDURE — 2780000003 HC OR 278 NO HCPCS: Performed by: INTERNAL MEDICINE

## 2024-07-16 PROCEDURE — 93308 TTE F-UP OR LMTD: CPT

## 2024-07-16 PROCEDURE — 33208 INSRT HEART PM ATRIAL & VENT: CPT | Performed by: INTERNAL MEDICINE

## 2024-07-16 PROCEDURE — 2720000007 HC OR 272 NO HCPCS: Performed by: INTERNAL MEDICINE

## 2024-07-16 PROCEDURE — 85347 COAGULATION TIME ACTIVATED: CPT

## 2024-07-16 PROCEDURE — 02L70DK OCCLUSION OF LEFT ATRIAL APPENDAGE WITH INTRALUMINAL DEVICE, OPEN APPROACH: ICD-10-PCS

## 2024-07-16 PROCEDURE — C1759 CATH, INTRA ECHOCARDIOGRAPHY: HCPCS | Performed by: INTERNAL MEDICINE

## 2024-07-16 PROCEDURE — 33240 INSRT PULSE GEN W/SINGL LEAD: CPT | Performed by: INTERNAL MEDICINE

## 2024-07-16 PROCEDURE — C1894 INTRO/SHEATH, NON-LASER: HCPCS | Performed by: INTERNAL MEDICINE

## 2024-07-16 PROCEDURE — 99153 MOD SED SAME PHYS/QHP EA: CPT | Performed by: INTERNAL MEDICINE

## 2024-07-16 PROCEDURE — C1893 INTRO/SHEATH, FIXED,NON-PEEL: HCPCS | Performed by: INTERNAL MEDICINE

## 2024-07-16 PROCEDURE — 7100000009 HC PHASE TWO TIME - INITIAL BASE CHARGE: Performed by: INTERNAL MEDICINE

## 2024-07-16 PROCEDURE — 93287 PERI-PX DEVICE EVAL & PRGR: CPT

## 2024-07-16 PROCEDURE — 99152 MOD SED SAME PHYS/QHP 5/>YRS: CPT | Performed by: INTERNAL MEDICINE

## 2024-07-16 PROCEDURE — 93308 TTE F-UP OR LMTD: CPT | Performed by: INTERNAL MEDICINE

## 2024-07-16 PROCEDURE — C1889 IMPLANT/INSERT DEVICE, NOC: HCPCS | Performed by: INTERNAL MEDICINE

## 2024-07-16 PROCEDURE — 2500000004 HC RX 250 GENERAL PHARMACY W/ HCPCS (ALT 636 FOR OP/ED): Performed by: INTERNAL MEDICINE

## 2024-07-16 PROCEDURE — 2500000001 HC RX 250 WO HCPCS SELF ADMINISTERED DRUGS (ALT 637 FOR MEDICARE OP): Performed by: INTERNAL MEDICINE

## 2024-07-16 PROCEDURE — 93010 ELECTROCARDIOGRAM REPORT: CPT | Performed by: INTERNAL MEDICINE

## 2024-07-16 PROCEDURE — 1200000002 HC GENERAL ROOM WITH TELEMETRY DAILY

## 2024-07-16 PROCEDURE — C1760 CLOSURE DEV, VASC: HCPCS | Performed by: INTERNAL MEDICINE

## 2024-07-16 PROCEDURE — 93308 TTE F-UP OR LMTD: CPT | Performed by: STUDENT IN AN ORGANIZED HEALTH CARE EDUCATION/TRAINING PROGRAM

## 2024-07-16 DEVICE — LEFT ATRIAL APPENDAGE CLOSURE DEVICE WITH DELIVERY SYSTEM
Type: IMPLANTABLE DEVICE | Site: HEART | Status: FUNCTIONAL
Brand: WATCHMAN FLX™ PRO

## 2024-07-16 RX ORDER — SENNOSIDES 8.6 MG/1
2 TABLET ORAL 2 TIMES DAILY
Status: DISCONTINUED | OUTPATIENT
Start: 2024-07-16 | End: 2024-07-16 | Stop reason: HOSPADM

## 2024-07-16 RX ORDER — PROTAMINE SULFATE 10 MG/ML
INJECTION, SOLUTION INTRAVENOUS CONTINUOUS PRN
Status: COMPLETED | OUTPATIENT
Start: 2024-07-16 | End: 2024-07-16

## 2024-07-16 RX ORDER — ACETAMINOPHEN 650 MG/1
650 SUPPOSITORY RECTAL EVERY 6 HOURS PRN
Status: DISCONTINUED | OUTPATIENT
Start: 2024-07-16 | End: 2024-07-16 | Stop reason: HOSPADM

## 2024-07-16 RX ORDER — VANCOMYCIN HYDROCHLORIDE 1 G/200ML
1000 INJECTION, SOLUTION INTRAVENOUS ONCE
Status: COMPLETED | OUTPATIENT
Start: 2024-07-16 | End: 2024-07-16

## 2024-07-16 RX ORDER — ACETAMINOPHEN 325 MG/1
650 TABLET ORAL EVERY 6 HOURS PRN
Status: DISCONTINUED | OUTPATIENT
Start: 2024-07-16 | End: 2024-07-16 | Stop reason: HOSPADM

## 2024-07-16 RX ORDER — ACETAMINOPHEN 160 MG/5ML
650 SOLUTION ORAL EVERY 6 HOURS PRN
Status: DISCONTINUED | OUTPATIENT
Start: 2024-07-16 | End: 2024-07-16 | Stop reason: HOSPADM

## 2024-07-16 RX ORDER — NAPROXEN SODIUM 220 MG/1
324 TABLET, FILM COATED ORAL ONCE
Status: DISCONTINUED | OUTPATIENT
Start: 2024-07-16 | End: 2024-07-16 | Stop reason: HOSPADM

## 2024-07-16 RX ORDER — ONDANSETRON 4 MG/1
4 TABLET, FILM COATED ORAL EVERY 8 HOURS PRN
Status: DISCONTINUED | OUTPATIENT
Start: 2024-07-16 | End: 2024-07-16 | Stop reason: HOSPADM

## 2024-07-16 RX ORDER — CHOLECALCIFEROL (VITAMIN D3) 25 MCG
1000 TABLET ORAL DAILY
COMMUNITY

## 2024-07-16 RX ORDER — CLOPIDOGREL BISULFATE 75 MG/1
75 TABLET ORAL DAILY
Qty: 30 TABLET | Refills: 11 | Status: CANCELLED | OUTPATIENT
Start: 2024-07-16 | End: 2025-07-16

## 2024-07-16 RX ORDER — LIDOCAINE HYDROCHLORIDE AND EPINEPHRINE 20; 10 MG/ML; UG/ML
5 INJECTION, SOLUTION INFILTRATION; PERINEURAL ONCE AS NEEDED
Status: DISCONTINUED | OUTPATIENT
Start: 2024-07-16 | End: 2024-07-16 | Stop reason: HOSPADM

## 2024-07-16 RX ORDER — HEPARIN SODIUM 1000 [USP'U]/ML
INJECTION, SOLUTION INTRAVENOUS; SUBCUTANEOUS AS NEEDED
Status: DISCONTINUED | OUTPATIENT
Start: 2024-07-16 | End: 2024-07-16 | Stop reason: HOSPADM

## 2024-07-16 RX ORDER — SUCRALFATE 1 G/10ML
1 SUSPENSION ORAL 4 TIMES DAILY PRN
COMMUNITY

## 2024-07-16 RX ORDER — CLOPIDOGREL BISULFATE 75 MG/1
75 TABLET ORAL DAILY
Qty: 30 TABLET | Refills: 11 | Status: SHIPPED | OUTPATIENT
Start: 2024-07-16 | End: 2025-07-16

## 2024-07-16 RX ORDER — MIDAZOLAM HYDROCHLORIDE 1 MG/ML
INJECTION, SOLUTION INTRAMUSCULAR; INTRAVENOUS AS NEEDED
Status: DISCONTINUED | OUTPATIENT
Start: 2024-07-16 | End: 2024-07-16 | Stop reason: HOSPADM

## 2024-07-16 RX ORDER — FENTANYL CITRATE 50 UG/ML
INJECTION, SOLUTION INTRAMUSCULAR; INTRAVENOUS AS NEEDED
Status: DISCONTINUED | OUTPATIENT
Start: 2024-07-16 | End: 2024-07-16 | Stop reason: HOSPADM

## 2024-07-16 RX ORDER — NAPROXEN SODIUM 220 MG/1
81 TABLET, FILM COATED ORAL DAILY
Status: DISCONTINUED | OUTPATIENT
Start: 2024-07-16 | End: 2024-07-16 | Stop reason: HOSPADM

## 2024-07-16 RX ORDER — ONDANSETRON HYDROCHLORIDE 2 MG/ML
4 INJECTION, SOLUTION INTRAVENOUS EVERY 8 HOURS PRN
Status: DISCONTINUED | OUTPATIENT
Start: 2024-07-16 | End: 2024-07-16 | Stop reason: HOSPADM

## 2024-07-16 RX ORDER — SODIUM CHLORIDE, SODIUM LACTATE, POTASSIUM CHLORIDE, CALCIUM CHLORIDE 600; 310; 30; 20 MG/100ML; MG/100ML; MG/100ML; MG/100ML
75 INJECTION, SOLUTION INTRAVENOUS CONTINUOUS
Status: SHIPPED | OUTPATIENT
Start: 2024-07-17 | End: 2024-07-16

## 2024-07-16 RX ORDER — LIDOCAINE HYDROCHLORIDE 10 MG/ML
INJECTION, SOLUTION EPIDURAL; INFILTRATION; INTRACAUDAL; PERINEURAL AS NEEDED
Status: DISCONTINUED | OUTPATIENT
Start: 2024-07-16 | End: 2024-07-16 | Stop reason: HOSPADM

## 2024-07-16 RX ORDER — CLOPIDOGREL BISULFATE 300 MG/1
TABLET, FILM COATED ORAL AS NEEDED
Status: DISCONTINUED | OUTPATIENT
Start: 2024-07-16 | End: 2024-07-16 | Stop reason: HOSPADM

## 2024-07-16 RX ORDER — CLOPIDOGREL BISULFATE 75 MG/1
75 TABLET ORAL DAILY
Status: DISCONTINUED | OUTPATIENT
Start: 2024-07-16 | End: 2024-07-16 | Stop reason: HOSPADM

## 2024-07-16 NOTE — DISCHARGE SUMMARY
[facility] D/C summary statement:    The patient underwent successful Left atrial appendage closure with a 35 mm WATCHMAN FLX under ICE guidance.  pre and post procedural TTE was obtained and demonstrated no significant changes.  The patient tolerated the post procedural monitoring period without any bleeding, arrhythmia or hypotension.      S/p WING closure on 07/16/2024    Access: Dual right femoral vein access s/p 1 perclose and 1 vascade closure devices.     Device: After confirmation of the device position on fluoroscopy and ICE, anchor with a tug test, compression and seal a 35 mm Watchman was successfully deployed without any immediate complications.     No effusion on ICE was present.     Post procedural TTE: No significant pericardial effusion.     The patient was discharge home in satisfactory condition.

## 2024-07-16 NOTE — PROGRESS NOTES
Pharmacy Medication History Review    Trina Elias is a 73 y.o. female admitted for Atrial fibrillation (Multi). Pharmacy reviewed the patient's [id number] medications and allergies for accuracy.    The list below reflects the updated PTA list. Comments regarding how patient may be taking medications differently can be found in the Admit Orders Activity  Prior to Admission Medications   Prescriptions Last Dose Informant   EPINEPHrine 0.3 mg/0.3 mL injection syringe  Self   Sig: Inject 0.3 mL (0.3 mg) into the muscle 1 time if needed for anaphylaxis.   albuterol 2.5 mg /3 mL (0.083 %) nebulizer solution Past Month Self   Sig: Take 3 mL (2.5 mg) by nebulization every 4 hours if needed.   baclofen (Lioresal) 10 mg tablet 7/16/2024 Self   Sig: One tablet in the morning and afternoon and 1 to 2 tablets in the evening.   Patient taking differently: One tablet in the morning and 2 tablets in the evening.   bumetanide (Bumex) 2 mg tablet 7/16/2024 Self   Sig: Take 1 tablet (2 mg) by mouth 2 times a day.   cholecalciferol (Vitamin D3) 25 MCG (1000 UT) tablet 7/16/2024 Self   Sig: Take 1 tablet (1,000 Units) by mouth once daily.   clobetasol (Temovate) 0.05 % cream Past Week Self   Sig: Apply topically 2 times a day.   diclofenac sodium (Voltaren) 1 % gel 7/16/2024 Self   Sig: Apply 4.5 inches (4 g) topically 2 times a day as needed (joint pain).   Patient taking differently: Apply 4.5 inches (4 g) topically 4 times a day as needed (joint pain).   digoxin (Lanoxin) 125 MCG tablet 7/16/2024 Self   Sig: Take 1 tablet (125 mcg) by mouth once daily.   dilTIAZem LA (Cardizem LA) 120 mg 24 hr tablet 7/15/2024 Self   Sig: Take 1 tablet (120 mg) by mouth once daily.   famotidine (Pepcid) 40 mg tablet     Sig: Take 1 tablet (40 mg) by mouth once daily.   hydroxychloroquine (Plaquenil) 200 mg tablet 7/16/2024 Self   Sig: Take 1.5 tablets (300 mg) by mouth once daily.   magnesium oxide (Mag-Ox) 400 mg tablet 7/16/2024 Self    Sig: Take 1 tablet (400 mg) by mouth once daily.   montelukast (Singulair) 10 mg tablet 7/15/2024 Self   Sig: Take 1 tablet (10 mg) by mouth once daily at bedtime.   nitroglycerin (Nitrostat) 0.4 mg SL tablet  Self   Sig: Place 1 tablet (0.4 mg) under the tongue every 5 minutes if needed.   oxyCODONE-acetaminophen (Percocet) 5-325 mg tablet Past Week Self   Sig: Take 1 tablet by mouth 3 times a day as needed for severe pain (7 - 10) for up to 28 days. Do not fill before July 5, 2024.   pantoprazole (ProtoNix) 40 mg EC tablet 7/16/2024 Self   Sig: TAKE ONE TABLET BY MOUTH TWO TIMES A DAY   polyethylene glycol (Miralax) 17 gram/dose powder 7/15/2024 Self   Sig: Take 17 g by mouth once daily.   rosuvastatin (Crestor) 20 mg tablet 7/15/2024 Self   Sig: TAKE ONE TABLET BY MOUTH EVERY DAY   saccharomyces boulardii (Florastor) 250 mg capsule 7/15/2024 Self   Sig: Take 1 capsule (250 mg) by mouth 2 times a day.   sucralfate (Carafate) 100 mg/mL suspension  Self   Sig: Take 10 mL (1 g) by mouth 4 times a day.   sulfaSALAzine (Azulfidine) 500 mg tablet  Self   Sig: Take 1 tablet (500 mg) by mouth 2 times a day.      Facility-Administered Medications: None        The list below reflects the updated allergy list. Please review each documented allergy for additional clarification and justification.  Allergies  Reviewed by Bridget Bland, PharmD on 7/16/2024        Severity Reactions Comments    Abatacept High Shortness of breath pulmonary edema, diarrhea, nausea    Hydrocodone-acetaminophen High Anaphylaxis     Hydromorphone High Anaphylaxis     Metoprolol High Anaphylaxis     Penicillins High Hives     Pregabalin High Shortness of breath caused pulmonary edema    Prochlorperazine High Itching paralysis of the mouth with drooping    Methotrexate Medium Hives, Itching chest pain    Aripiprazole Low Rash     Beta-blockers (beta-adrenergic Blocking Agts) Low Other, Palpitations, Wheezing     Bupropion Low Nausea/vomiting      Cefepime Low Itching     Ciprofloxacin Low Hives     Furosemide Low Itching     Levofloxacin Low Itching     Pineapple Low Rash             Patient was unable to be assessed for M2B at discharge. Pharmacy has been updated to [facility]. M2B service not offered prior to surgery, please reassess prior to patient discharge if Meds to Beds is desired.     Sources used to complete the med history include: Patient interview - had list of medications that she updated 7/16 AM/ Pharmacy - [facility]/ chart review - Rheumatology visit 7/15/24/ [email or web address]    Bridget Bland PharmD  Transitions of Care Pharmacist  [facility]  Please reach out via Secure Chat for questions, or if no response call [facility] or [facility]

## 2024-07-16 NOTE — POST-PROCEDURE NOTE
S/p WING closure    Access: Dual right femoral vein access s/p proglide and vascade closure devices.     Device: After confirmation of the device position on fluoroscopy and ICE, anchor with a tug test, compression and seal a 35 mm with 22% compression Watchman was successfully deployed without any immediate complications.     No effusion on ICE was present pre and post watchman deployment.     Recommendations:   ASA and Plavix for 6 months followed by ASA for life   CT in 4 months  Access site monitoring.   TTE today  Likely discharge home today once recovery and monitoring period is complete.

## 2024-07-16 NOTE — PRE-SEDATION DOCUMENTATION
Patient: Trina Elias  MRN: [id number]    Pre-sedation Evaluation:  Sedation necessary for: Analgesia    History of Present Illness:     Past Medical History:   Diagnosis Date    Asthma ([facility])     Atrial fibrillation (Multi)     Resistent to treatment: s/p DCCVs and ablations    Central sleep apnea     Per PSG 11/20/18; severe central sleep apnea, .6, mild obstructive component    Cervical myofascial pain syndrome     Baclofen    Chronic anemia     CKD stage 3a, GFR 45-59 ml/min (Multi)     COPD (chronic obstructive pulmonary disease) (Multi)     DVT of axillary vein, acute right (Multi) 01/20/2018    When PICC line was removed. U/S + Partial nonocclusive DVT in the axillary and proximal basilic vein in 2018    H/O being hospitalized 02/2024 Feb 24-Mar 5, 2024:  (2/24) admitted for dyspnea and chest pain work-up and found to have pleural effusions.  Found to have acute blood loss anemia s/p 2u PRBC. EGD neg for active bleed.  Cardiology consulted - plan for Watchman device WING closure as out pt.    History of Helicobacter pylori infection     3/2013, 1/2021    Hx of syncope     Recurrent Syncope d/t VT- s/p ILR (12/2012) implanted.  (July 2013) Episode of syncope that appears to be correlated with an 11 second round of ventricular tachycardia recorded by the loop recorder.  S/p EPS (June 2014) neg for inducible arrhythmias. (ILR removed when ICD implanted)    ICD (implantable cardioverter-defibrillator) in place     Placed 06/12/2014    Mitral valve regurgitation     Mild-Moderate per Echo 02/25/2024    Moderate aortic stenosis by prior echocardiogram     Echo 2/25/2024    Osteopenia 06/26/2023    Personal history of anaphylaxis     See allergy list    Psoriasis     PUD (peptic ulcer disease)     H/O nonbleeding gastric ulcers with small hiatal hernia on 1/6/2021 EGD    Pulmonary hypertension (Multi)     Mild - Moderate per Echo 2/25/2024    Radiculopathy, lumbar region 08/13/2018    Acute lumbar  radiculopathy    Restless legs syndrome 11/17/2015    Restless legs syndrome    Rheumatoid arthritis (Multi)     Seasonal allergic rhinitis due to pollen     Small bowel obstruction (Multi) 06/26/2023    H/O due to Severe Adhesions s/p Adhesiolysis x2 in 2015 and 2020    Wide-complex tachycardia     Per cardiology 3/7/2024: VT is on a basis of triggered activity certainly made worse with anemia and prednisone. Since she is asymptomatic we will continue to observe.       Principle problems:  Patient Active Problem List    Diagnosis Date Noted    Atrial fibrillation, unspecified type (Multi) 07/15/2024    Arthritis of left shoulder region 05/02/2024    Chest pain, unspecified type 02/24/2024    Pacemaker 12/06/2023    Anticoagulant long-term use 12/06/2023    Stage 3a chronic kidney disease (Multi) 08/29/2023    Centrilobular emphysema (Multi) 08/28/2023    Coagulation defect, unspecified (Multi) 08/28/2023    Coronary artery disease involving native coronary artery 06/26/2023    Asthma ([facility]) 06/26/2023    Atrial fibrillation (Multi) 06/26/2023    Myofascial pain 06/26/2023    Cardiac aneurysm 06/26/2023    Cardiac defibrillator in place 06/26/2023    Obstructive sleep apnea 06/26/2023    Central sleep apnea in conditions classified elsewhere 06/26/2023    Cervical post-laminectomy syndrome 06/26/2023    Cervical radiculopathy 06/26/2023    Chronic bilateral low back pain without sciatica 06/26/2023    Chronic right sacroiliac pain 06/26/2023    Colon polyp 06/26/2023    Cubital tunnel syndrome on right 06/26/2023    Displacement of lumbar disc with radiculopathy 06/26/2023    Gastroesophageal reflux disease without esophagitis 06/26/2023    Mixed hyperlipidemia 06/26/2023    Iron deficiency anemia 06/26/2023    Irregular cardiac rhythm 06/26/2023    Osteoporosis 06/26/2023    Primary osteoarthritis of right shoulder 06/26/2023    Psoriasis 06/26/2023    Rheumatoid arthritis (Multi) 06/26/2023    Umbilical hernia  06/26/2023    Wide-complex tachycardia 06/26/2023    Hypertensive heart disease with heart failure (Multi) 06/26/2023    Personal history of other venous thrombosis and embolism 11/02/2020    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) 11/02/2020     Allergies:  Allergies   Allergen Reactions    Abatacept Shortness of breath     pulmonary edema, diarrhea, nausea    Hydrocodone-Acetaminophen Anaphylaxis    Hydromorphone Anaphylaxis    Metoprolol Anaphylaxis    Penicillins Hives    Pregabalin Shortness of breath     caused pulmonary edema    Prochlorperazine Itching     paralysis of the mouth with drooping    Methotrexate Hives and Itching     chest pain    Aripiprazole Rash    Beta-Blockers (Beta-Adrenergic Blocking Agts) Other, Palpitations and Wheezing    Bupropion Nausea/vomiting    Cefepime Itching    Ciprofloxacin Hives    Furosemide Itching    Levofloxacin Itching    Pineapple Rash     PTA/Current Medications:  Medications Prior to Admission   Medication Sig Dispense Refill Last Dose    albuterol 2.5 mg /3 mL (0.083 %) nebulizer solution Take 3 mL (2.5 mg) by nebulization every 4 hours if needed.   Past Month    baclofen (Lioresal) 10 mg tablet One tablet in the morning and afternoon and 1 to 2 tablets in the evening. (Patient taking differently: One tablet in the morning and 2 tablets in the evening.) 120 tablet 2 7/16/2024    bumetanide (Bumex) 2 mg tablet Take 1 tablet (2 mg) by mouth 2 times a day.   7/16/2024    cholecalciferol (Vitamin D3) 25 MCG (1000 UT) tablet Take 1 tablet (1,000 Units) by mouth once daily.   7/16/2024    clobetasol (Temovate) 0.05 % cream Apply topically 2 times a day. 60 g 3 Past Week    diclofenac sodium (Voltaren) 1 % gel Apply 4.5 inches (4 g) topically 2 times a day as needed (joint pain). (Patient taking differently: Apply 4.5 inches (4 g) topically 4 times a day as needed (joint pain).) 450 g 1 7/16/2024    digoxin (Lanoxin) 125 MCG tablet Take 1 tablet  (125 mcg) by mouth once daily.   7/16/2024    dilTIAZem LA (Cardizem LA) 120 mg 24 hr tablet Take 1 tablet (120 mg) by mouth once daily.   7/15/2024    hydroxychloroquine (Plaquenil) 200 mg tablet Take 1.5 tablets (300 mg) by mouth once daily. 135 tablet 0 7/16/2024    magnesium oxide (Mag-Ox) 400 mg tablet Take 1 tablet (400 mg) by mouth once daily.   7/16/2024    montelukast (Singulair) 10 mg tablet Take 1 tablet (10 mg) by mouth once daily at bedtime. 90 tablet 3 7/15/2024    oxyCODONE-acetaminophen (Percocet) 5-325 mg tablet Take 1 tablet by mouth 3 times a day as needed for severe pain (7 - 10) for up to 28 days. Do not fill before July 5, 2024. 84 tablet 0 Past Week    pantoprazole (ProtoNix) 40 mg EC tablet TAKE ONE TABLET BY MOUTH TWO TIMES A  tablet 0 7/16/2024    polyethylene glycol (Miralax) 17 gram/dose powder Take 17 g by mouth once daily.   7/15/2024    rosuvastatin (Crestor) 20 mg tablet TAKE ONE TABLET BY MOUTH EVERY DAY 90 tablet 0 7/15/2024    saccharomyces boulardii (Florastor) 250 mg capsule Take 1 capsule (250 mg) by mouth 2 times a day.   7/15/2024    EPINEPHrine 0.3 mg/0.3 mL injection syringe Inject 0.3 mL (0.3 mg) into the muscle 1 time if needed for anaphylaxis.       famotidine (Pepcid) 40 mg tablet Take 1 tablet (40 mg) by mouth once daily. 30 tablet 0     nitroglycerin (Nitrostat) 0.4 mg SL tablet Place 1 tablet (0.4 mg) under the tongue every 5 minutes if needed.       sucralfate (Carafate) 100 mg/mL suspension Take 10 mL (1 g) by mouth 4 times a day.       sulfaSALAzine (Azulfidine) 500 mg tablet Take 1 tablet (500 mg) by mouth 2 times a day. 60 tablet 2      Current Facility-Administered Medications   Medication Dose Route Frequency Provider Last Rate Last Admin    aspirin chewable tablet 324 mg  324 mg oral Once Gillian DARSHANA Szymanski        [START ON 7/17/2024] lactated Ringer's infusion  75 mL/hr intravenous Continuous DARSHANA Polk 75 mL/hr at 07/16/24  0907 75 mL/hr at 24 0907    vancomycin (Vancocin) 1,000 mg in dextrose 5%  mL  1,000 mg intravenous Once GillianNAILA Canonn-EZ         Past Surgical History:   has a past surgical history that includes Cholecystectomy; Appendectomy; Cardiac catheterization (2022);  section, classic; Rotator cuff repair; Abdominal adhesion surgery (10/22/2015); Abdominal adhesion surgery (10/18/2020); Ventral hernia repair (06/15/2015); Nerve Surgery (Right, 10/13/2010); Nissen fundoplication (); Finger surgery (Left, 2008); Leg Surgery (Right, 2006); Breast biopsy (Left, 2022); Ablation of dysrhythmic focus (2022); Wound debridement (2020); Cardioversion (2018); Cardiac assist device insertion (2012); [place] tooth extraction; IR injection epidural steroid (2011); IR injection epidural steroid (); Reverse total shoulder arthroplasty (Bilateral, 2024); Cardiac defibrillator placement (2014); Carpal tunnel release (Bilateral); Foot surgery (Bilateral); Ulnar nerve transposition (Left); Achilles tendon surgery (Right); Anterior cervical discectomy w/ fusion (2016); Colonoscopy (2024); and picc line insertion wo imaging (10/24/2015).    Recent sedation/surgery (24 hours) No    Review of Systems:  Please check all that apply: Cardiac Disease    Pregnancy test completed prior to procedure on any menstruating female: none        NPO guidelines met: Yes    Physical Exam    Airway  Mallampati: II     Cardiovascular   Rhythm: irregular  Rate: normal     Dental - normal exam     Pulmonary - normal exam         Plan    ASA 3     Mild

## 2024-07-17 ENCOUNTER — APPOINTMENT (OUTPATIENT)
Dept: GASTROENTEROLOGY | Facility: CLINIC | Age: 74
End: 2024-07-17
Payer: MEDICARE

## 2024-07-19 LAB
ATRIAL RATE: 60 BPM
P AXIS: 91 DEGREES
PR INTERVAL: 254 MS
Q ONSET: 220 MS
QRS COUNT: 9 BEATS
QRS DURATION: 86 MS
QT INTERVAL: 432 MS
QTC CALCULATION(BAZETT): 432 MS
QTC FREDERICIA: 432 MS
R AXIS: 40 DEGREES
T AXIS: -24 DEGREES
T OFFSET: 436 MS
VENTRICULAR RATE: 60 BPM

## 2024-07-24 ENCOUNTER — APPOINTMENT (OUTPATIENT)
Dept: RADIOLOGY | Facility: HOSPITAL | Age: 74
DRG: 392 | End: 2024-07-24
Payer: MEDICARE

## 2024-07-24 ENCOUNTER — APPOINTMENT (OUTPATIENT)
Dept: CARDIOLOGY | Facility: HOSPITAL | Age: 74
DRG: 392 | End: 2024-07-24
Payer: MEDICARE

## 2024-07-24 ENCOUNTER — HOSPITAL ENCOUNTER (INPATIENT)
Facility: HOSPITAL | Age: 74
LOS: 4 days | Discharge: HOME | End: 2024-07-28
Attending: EMERGENCY MEDICINE | Admitting: INTERNAL MEDICINE
Payer: MEDICARE

## 2024-07-24 DIAGNOSIS — K59.00 CONSTIPATION, UNSPECIFIED CONSTIPATION TYPE: ICD-10-CM

## 2024-07-24 DIAGNOSIS — R07.9 ACUTE CHEST PAIN: Primary | ICD-10-CM

## 2024-07-24 DIAGNOSIS — R07.9 CHEST PAIN, UNSPECIFIED TYPE: ICD-10-CM

## 2024-07-24 LAB
ALBUMIN SERPL BCP-MCNC: 3.9 G/DL (ref 3.4–5)
ALP SERPL-CCNC: 78 U/L (ref 33–136)
ALT SERPL W P-5'-P-CCNC: 14 U/L (ref 7–45)
ANION GAP SERPL CALC-SCNC: 15 MMOL/L (ref 10–20)
APPEARANCE UR: CLEAR
AST SERPL W P-5'-P-CCNC: 16 U/L (ref 9–39)
BASOPHILS # BLD AUTO: 0.04 X10*3/UL (ref 0–0.1)
BASOPHILS NFR BLD AUTO: 0.4 %
BILIRUB SERPL-MCNC: 0.5 MG/DL (ref 0–1.2)
BILIRUB UR STRIP.AUTO-MCNC: NEGATIVE MG/DL
BNP SERPL-MCNC: 324 PG/ML (ref 0–99)
BUN SERPL-MCNC: 26 MG/DL (ref 6–23)
CALCIUM SERPL-MCNC: 8.9 MG/DL (ref 8.6–10.3)
CARDIAC TROPONIN I PNL SERPL HS: 134 NG/L (ref 0–13)
CARDIAC TROPONIN I PNL SERPL HS: 182 NG/L (ref 0–13)
CARDIAC TROPONIN I PNL SERPL HS: 338 NG/L (ref 0–13)
CHLORIDE SERPL-SCNC: 100 MMOL/L (ref 98–107)
CO2 SERPL-SCNC: 28 MMOL/L (ref 21–32)
COLOR UR: ABNORMAL
CREAT SERPL-MCNC: 1.22 MG/DL (ref 0.5–1.05)
CRP SERPL-MCNC: 0.86 MG/DL
EGFRCR SERPLBLD CKD-EPI 2021: 47 ML/MIN/1.73M*2
EOSINOPHIL # BLD AUTO: 0.15 X10*3/UL (ref 0–0.4)
EOSINOPHIL NFR BLD AUTO: 1.6 %
ERYTHROCYTE [DISTWIDTH] IN BLOOD BY AUTOMATED COUNT: 15.9 % (ref 11.5–14.5)
ERYTHROCYTE [SEDIMENTATION RATE] IN BLOOD BY WESTERGREN METHOD: 30 MM/H (ref 0–30)
GLUCOSE SERPL-MCNC: 94 MG/DL (ref 74–99)
GLUCOSE UR STRIP.AUTO-MCNC: NORMAL MG/DL
HCT VFR BLD AUTO: 28.9 % (ref 36–46)
HGB BLD-MCNC: 8.9 G/DL (ref 12–16)
IMM GRANULOCYTES # BLD AUTO: 0.05 X10*3/UL (ref 0–0.5)
IMM GRANULOCYTES NFR BLD AUTO: 0.5 % (ref 0–0.9)
INR PPP: 1.1 (ref 0.9–1.1)
KETONES UR STRIP.AUTO-MCNC: NEGATIVE MG/DL
LACTATE SERPL-SCNC: 1 MMOL/L (ref 0.4–2)
LEUKOCYTE ESTERASE UR QL STRIP.AUTO: NEGATIVE
LIPASE SERPL-CCNC: 13 U/L (ref 9–82)
LYMPHOCYTES # BLD AUTO: 1.28 X10*3/UL (ref 0.8–3)
LYMPHOCYTES NFR BLD AUTO: 13.8 %
MAGNESIUM SERPL-MCNC: 2.41 MG/DL (ref 1.6–2.4)
MCH RBC QN AUTO: 23.2 PG (ref 26–34)
MCHC RBC AUTO-ENTMCNC: 30.8 G/DL (ref 32–36)
MCV RBC AUTO: 75 FL (ref 80–100)
MONOCYTES # BLD AUTO: 0.87 X10*3/UL (ref 0.05–0.8)
MONOCYTES NFR BLD AUTO: 9.4 %
NEUTROPHILS # BLD AUTO: 6.86 X10*3/UL (ref 1.6–5.5)
NEUTROPHILS NFR BLD AUTO: 74.3 %
NITRITE UR QL STRIP.AUTO: NEGATIVE
NRBC BLD-RTO: 0 /100 WBCS (ref 0–0)
PH UR STRIP.AUTO: 6 [PH]
PLATELET # BLD AUTO: 256 X10*3/UL (ref 150–450)
POTASSIUM SERPL-SCNC: 3.9 MMOL/L (ref 3.5–5.3)
PROT SERPL-MCNC: 6.8 G/DL (ref 6.4–8.2)
PROT UR STRIP.AUTO-MCNC: NEGATIVE MG/DL
PROTHROMBIN TIME: 12.8 SECONDS (ref 9.8–12.8)
RBC # BLD AUTO: 3.84 X10*6/UL (ref 4–5.2)
RBC # UR STRIP.AUTO: NEGATIVE /UL
SODIUM SERPL-SCNC: 139 MMOL/L (ref 136–145)
SP GR UR STRIP.AUTO: 1.04
UROBILINOGEN UR STRIP.AUTO-MCNC: NORMAL MG/DL
WBC # BLD AUTO: 9.3 X10*3/UL (ref 4.4–11.3)

## 2024-07-24 PROCEDURE — 94760 N-INVAS EAR/PLS OXIMETRY 1: CPT

## 2024-07-24 PROCEDURE — 85610 PROTHROMBIN TIME: CPT | Performed by: HEALTH CARE PROVIDER

## 2024-07-24 PROCEDURE — 96376 TX/PRO/DX INJ SAME DRUG ADON: CPT

## 2024-07-24 PROCEDURE — 93005 ELECTROCARDIOGRAM TRACING: CPT

## 2024-07-24 PROCEDURE — 85025 COMPLETE CBC W/AUTO DIFF WBC: CPT | Performed by: HEALTH CARE PROVIDER

## 2024-07-24 PROCEDURE — 36415 COLL VENOUS BLD VENIPUNCTURE: CPT | Performed by: HEALTH CARE PROVIDER

## 2024-07-24 PROCEDURE — 83605 ASSAY OF LACTIC ACID: CPT | Performed by: HEALTH CARE PROVIDER

## 2024-07-24 PROCEDURE — 1200000002 HC GENERAL ROOM WITH TELEMETRY DAILY

## 2024-07-24 PROCEDURE — 2550000001 HC RX 255 CONTRASTS: Performed by: HEALTH CARE PROVIDER

## 2024-07-24 PROCEDURE — 84484 ASSAY OF TROPONIN QUANT: CPT

## 2024-07-24 PROCEDURE — 71045 X-RAY EXAM CHEST 1 VIEW: CPT | Performed by: RADIOLOGY

## 2024-07-24 PROCEDURE — 2500000004 HC RX 250 GENERAL PHARMACY W/ HCPCS (ALT 636 FOR OP/ED)

## 2024-07-24 PROCEDURE — 85652 RBC SED RATE AUTOMATED: CPT

## 2024-07-24 PROCEDURE — 84484 ASSAY OF TROPONIN QUANT: CPT | Performed by: HEALTH CARE PROVIDER

## 2024-07-24 PROCEDURE — 86140 C-REACTIVE PROTEIN: CPT

## 2024-07-24 PROCEDURE — 71275 CT ANGIOGRAPHY CHEST: CPT | Performed by: RADIOLOGY

## 2024-07-24 PROCEDURE — 71045 X-RAY EXAM CHEST 1 VIEW: CPT

## 2024-07-24 PROCEDURE — 83735 ASSAY OF MAGNESIUM: CPT | Performed by: HEALTH CARE PROVIDER

## 2024-07-24 PROCEDURE — 83690 ASSAY OF LIPASE: CPT | Performed by: HEALTH CARE PROVIDER

## 2024-07-24 PROCEDURE — 83880 ASSAY OF NATRIURETIC PEPTIDE: CPT | Performed by: HEALTH CARE PROVIDER

## 2024-07-24 PROCEDURE — 71275 CT ANGIOGRAPHY CHEST: CPT

## 2024-07-24 PROCEDURE — 2500000004 HC RX 250 GENERAL PHARMACY W/ HCPCS (ALT 636 FOR OP/ED): Performed by: HEALTH CARE PROVIDER

## 2024-07-24 PROCEDURE — 2500000001 HC RX 250 WO HCPCS SELF ADMINISTERED DRUGS (ALT 637 FOR MEDICARE OP)

## 2024-07-24 PROCEDURE — 81003 URINALYSIS AUTO W/O SCOPE: CPT | Performed by: HEALTH CARE PROVIDER

## 2024-07-24 PROCEDURE — C9113 INJ PANTOPRAZOLE SODIUM, VIA: HCPCS | Performed by: HEALTH CARE PROVIDER

## 2024-07-24 PROCEDURE — 2500000002 HC RX 250 W HCPCS SELF ADMINISTERED DRUGS (ALT 637 FOR MEDICARE OP, ALT 636 FOR OP/ED)

## 2024-07-24 PROCEDURE — 96374 THER/PROPH/DIAG INJ IV PUSH: CPT

## 2024-07-24 PROCEDURE — 80053 COMPREHEN METABOLIC PANEL: CPT | Performed by: HEALTH CARE PROVIDER

## 2024-07-24 PROCEDURE — 99285 EMERGENCY DEPT VISIT HI MDM: CPT | Mod: 25

## 2024-07-24 PROCEDURE — 96375 TX/PRO/DX INJ NEW DRUG ADDON: CPT

## 2024-07-24 PROCEDURE — 96361 HYDRATE IV INFUSION ADD-ON: CPT

## 2024-07-24 RX ORDER — POLYETHYLENE GLYCOL 3350 17 G/17G
17 POWDER, FOR SOLUTION ORAL DAILY
Status: DISCONTINUED | OUTPATIENT
Start: 2024-07-24 | End: 2024-07-28 | Stop reason: HOSPADM

## 2024-07-24 RX ORDER — SUCRALFATE 1 G/10ML
1 SUSPENSION ORAL 4 TIMES DAILY PRN
Status: DISCONTINUED | OUTPATIENT
Start: 2024-07-24 | End: 2024-07-28 | Stop reason: HOSPADM

## 2024-07-24 RX ORDER — BACLOFEN 10 MG/1
10 TABLET ORAL EVERY MORNING
Status: DISCONTINUED | OUTPATIENT
Start: 2024-07-25 | End: 2024-07-28 | Stop reason: HOSPADM

## 2024-07-24 RX ORDER — ALBUTEROL SULFATE 0.83 MG/ML
2.5 SOLUTION RESPIRATORY (INHALATION) EVERY 2 HOUR PRN
Status: DISCONTINUED | OUTPATIENT
Start: 2024-07-24 | End: 2024-07-28 | Stop reason: HOSPADM

## 2024-07-24 RX ORDER — ONDANSETRON HYDROCHLORIDE 2 MG/ML
4 INJECTION, SOLUTION INTRAVENOUS ONCE
Status: COMPLETED | OUTPATIENT
Start: 2024-07-24 | End: 2024-07-24

## 2024-07-24 RX ORDER — ACETAMINOPHEN 325 MG/1
325 TABLET ORAL EVERY 6 HOURS PRN
COMMUNITY

## 2024-07-24 RX ORDER — HYDROXYCHLOROQUINE SULFATE 200 MG/1
300 TABLET, FILM COATED ORAL DAILY
Status: DISCONTINUED | OUTPATIENT
Start: 2024-07-25 | End: 2024-07-28 | Stop reason: HOSPADM

## 2024-07-24 RX ORDER — PANTOPRAZOLE SODIUM 40 MG/1
40 TABLET, DELAYED RELEASE ORAL 2 TIMES DAILY
Status: DISCONTINUED | OUTPATIENT
Start: 2024-07-24 | End: 2024-07-28 | Stop reason: HOSPADM

## 2024-07-24 RX ORDER — BUMETANIDE 1 MG/1
2 TABLET ORAL 2 TIMES DAILY
Status: DISCONTINUED | OUTPATIENT
Start: 2024-07-24 | End: 2024-07-28 | Stop reason: HOSPADM

## 2024-07-24 RX ORDER — DILTIAZEM HYDROCHLORIDE 120 MG/1
120 CAPSULE, COATED, EXTENDED RELEASE ORAL NIGHTLY
Status: DISCONTINUED | OUTPATIENT
Start: 2024-07-24 | End: 2024-07-28 | Stop reason: HOSPADM

## 2024-07-24 RX ORDER — FAMOTIDINE 20 MG/1
40 TABLET, FILM COATED ORAL DAILY PRN
Status: DISCONTINUED | OUTPATIENT
Start: 2024-07-24 | End: 2024-07-28 | Stop reason: HOSPADM

## 2024-07-24 RX ORDER — LANOLIN ALCOHOL/MO/W.PET/CERES
400 CREAM (GRAM) TOPICAL DAILY
Status: DISCONTINUED | OUTPATIENT
Start: 2024-07-25 | End: 2024-07-28 | Stop reason: HOSPADM

## 2024-07-24 RX ORDER — MONTELUKAST SODIUM 10 MG/1
10 TABLET ORAL NIGHTLY
Status: DISCONTINUED | OUTPATIENT
Start: 2024-07-24 | End: 2024-07-28 | Stop reason: HOSPADM

## 2024-07-24 RX ORDER — HEPARIN SODIUM 5000 [USP'U]/ML
5000 INJECTION, SOLUTION INTRAVENOUS; SUBCUTANEOUS EVERY 8 HOURS
Status: DISCONTINUED | OUTPATIENT
Start: 2024-07-24 | End: 2024-07-28 | Stop reason: HOSPADM

## 2024-07-24 RX ORDER — OXYCODONE HYDROCHLORIDE 5 MG/1
5 TABLET ORAL EVERY 6 HOURS PRN
Status: DISCONTINUED | OUTPATIENT
Start: 2024-07-24 | End: 2024-07-28 | Stop reason: HOSPADM

## 2024-07-24 RX ORDER — ONDANSETRON HYDROCHLORIDE 2 MG/ML
4 INJECTION, SOLUTION INTRAVENOUS EVERY 6 HOURS PRN
Status: DISCONTINUED | OUTPATIENT
Start: 2024-07-24 | End: 2024-07-28 | Stop reason: HOSPADM

## 2024-07-24 RX ORDER — ROSUVASTATIN CALCIUM 20 MG/1
20 TABLET, COATED ORAL DAILY
Status: DISCONTINUED | OUTPATIENT
Start: 2024-07-24 | End: 2024-07-28 | Stop reason: HOSPADM

## 2024-07-24 RX ORDER — ACETAMINOPHEN 325 MG/1
975 TABLET ORAL EVERY 6 HOURS
Status: DISCONTINUED | OUTPATIENT
Start: 2024-07-24 | End: 2024-07-28 | Stop reason: HOSPADM

## 2024-07-24 RX ORDER — DIGOXIN 125 MCG
125 TABLET ORAL DAILY
Status: DISCONTINUED | OUTPATIENT
Start: 2024-07-25 | End: 2024-07-28 | Stop reason: HOSPADM

## 2024-07-24 RX ORDER — OXYCODONE HYDROCHLORIDE 5 MG/1
10 TABLET ORAL EVERY 6 HOURS PRN
Status: DISCONTINUED | OUTPATIENT
Start: 2024-07-24 | End: 2024-07-28 | Stop reason: HOSPADM

## 2024-07-24 RX ORDER — SULFASALAZINE 500 MG/1
500 TABLET ORAL 2 TIMES DAILY
Status: DISCONTINUED | OUTPATIENT
Start: 2024-07-24 | End: 2024-07-28 | Stop reason: HOSPADM

## 2024-07-24 RX ORDER — ALBUTEROL SULFATE 0.83 MG/ML
2.5 SOLUTION RESPIRATORY (INHALATION) EVERY 4 HOURS PRN
Status: DISCONTINUED | OUTPATIENT
Start: 2024-07-24 | End: 2024-07-24

## 2024-07-24 RX ORDER — NITROGLYCERIN 0.4 MG/1
0.4 TABLET SUBLINGUAL EVERY 5 MIN PRN
Status: DISCONTINUED | OUTPATIENT
Start: 2024-07-24 | End: 2024-07-28 | Stop reason: HOSPADM

## 2024-07-24 RX ORDER — PANTOPRAZOLE SODIUM 40 MG/10ML
40 INJECTION, POWDER, LYOPHILIZED, FOR SOLUTION INTRAVENOUS DAILY
Status: DISCONTINUED | OUTPATIENT
Start: 2024-07-24 | End: 2024-07-24

## 2024-07-24 RX ORDER — FENTANYL CITRATE 50 UG/ML
50 INJECTION, SOLUTION INTRAMUSCULAR; INTRAVENOUS ONCE
Status: COMPLETED | OUTPATIENT
Start: 2024-07-24 | End: 2024-07-24

## 2024-07-24 RX ORDER — BACLOFEN 10 MG/1
20 TABLET ORAL NIGHTLY
Status: DISCONTINUED | OUTPATIENT
Start: 2024-07-24 | End: 2024-07-28 | Stop reason: HOSPADM

## 2024-07-24 RX ORDER — CLOPIDOGREL BISULFATE 75 MG/1
75 TABLET ORAL DAILY
Status: DISCONTINUED | OUTPATIENT
Start: 2024-07-24 | End: 2024-07-28 | Stop reason: HOSPADM

## 2024-07-24 RX ORDER — FENTANYL CITRATE 50 UG/ML
25 INJECTION, SOLUTION INTRAMUSCULAR; INTRAVENOUS ONCE
Status: COMPLETED | OUTPATIENT
Start: 2024-07-24 | End: 2024-07-24

## 2024-07-24 RX ADMIN — CLOPIDOGREL 75 MG: 75 TABLET ORAL at 18:05

## 2024-07-24 RX ADMIN — FENTANYL CITRATE 25 MCG: 50 INJECTION INTRAMUSCULAR; INTRAVENOUS at 14:56

## 2024-07-24 RX ADMIN — PANTOPRAZOLE SODIUM 40 MG: 40 TABLET, DELAYED RELEASE ORAL at 21:09

## 2024-07-24 RX ADMIN — ROSUVASTATIN CALCIUM 20 MG: 20 TABLET, FILM COATED ORAL at 18:05

## 2024-07-24 RX ADMIN — SULFASALAZINE 500 MG: 500 TABLET ORAL at 21:08

## 2024-07-24 RX ADMIN — ONDANSETRON 4 MG: 2 INJECTION INTRAMUSCULAR; INTRAVENOUS at 20:09

## 2024-07-24 RX ADMIN — IOHEXOL 75 ML: 350 INJECTION, SOLUTION INTRAVENOUS at 12:45

## 2024-07-24 RX ADMIN — SODIUM CHLORIDE, POTASSIUM CHLORIDE, SODIUM LACTATE AND CALCIUM CHLORIDE 500 ML: 600; 310; 30; 20 INJECTION, SOLUTION INTRAVENOUS at 11:53

## 2024-07-24 RX ADMIN — BACLOFEN 20 MG: 10 TABLET ORAL at 21:09

## 2024-07-24 RX ADMIN — FENTANYL CITRATE 50 MCG: 50 INJECTION INTRAMUSCULAR; INTRAVENOUS at 11:55

## 2024-07-24 RX ADMIN — ACETAMINOPHEN 975 MG: 325 TABLET ORAL at 18:05

## 2024-07-24 RX ADMIN — PANTOPRAZOLE SODIUM 40 MG: 40 INJECTION, POWDER, FOR SOLUTION INTRAVENOUS at 13:15

## 2024-07-24 RX ADMIN — PROMETHAZINE HYDROCHLORIDE 12.5 MG: 25 INJECTION INTRAMUSCULAR; INTRAVENOUS at 14:49

## 2024-07-24 RX ADMIN — ONDANSETRON 4 MG: 2 INJECTION, SOLUTION INTRAMUSCULAR; INTRAVENOUS at 11:55

## 2024-07-24 RX ADMIN — MONTELUKAST 10 MG: 10 TABLET, FILM COATED ORAL at 21:09

## 2024-07-24 SDOH — SOCIAL STABILITY: SOCIAL INSECURITY: ARE THERE ANY APPARENT SIGNS OF INJURIES/BEHAVIORS THAT COULD BE RELATED TO ABUSE/NEGLECT?: NO

## 2024-07-24 SDOH — SOCIAL STABILITY: SOCIAL INSECURITY: ABUSE: ADULT

## 2024-07-24 SDOH — SOCIAL STABILITY: SOCIAL INSECURITY: ARE YOU OR HAVE YOU BEEN THREATENED OR ABUSED PHYSICALLY, EMOTIONALLY, OR SEXUALLY BY ANYONE?: NO

## 2024-07-24 SDOH — SOCIAL STABILITY: SOCIAL INSECURITY: HAVE YOU HAD ANY THOUGHTS OF HARMING ANYONE ELSE?: NO

## 2024-07-24 SDOH — SOCIAL STABILITY: SOCIAL INSECURITY: DO YOU FEEL ANYONE HAS EXPLOITED OR TAKEN ADVANTAGE OF YOU FINANCIALLY OR OF YOUR PERSONAL PROPERTY?: NO

## 2024-07-24 SDOH — SOCIAL STABILITY: SOCIAL INSECURITY: HAVE YOU HAD THOUGHTS OF HARMING ANYONE ELSE?: NO

## 2024-07-24 SDOH — SOCIAL STABILITY: SOCIAL INSECURITY: DO YOU FEEL UNSAFE GOING BACK TO THE PLACE WHERE YOU ARE LIVING?: NO

## 2024-07-24 SDOH — SOCIAL STABILITY: SOCIAL INSECURITY: WERE YOU ABLE TO COMPLETE ALL THE BEHAVIORAL HEALTH SCREENINGS?: YES

## 2024-07-24 SDOH — SOCIAL STABILITY: SOCIAL INSECURITY: HAS ANYONE EVER THREATENED TO HURT YOUR FAMILY OR YOUR PETS?: NO

## 2024-07-24 SDOH — SOCIAL STABILITY: SOCIAL INSECURITY: DOES ANYONE TRY TO KEEP YOU FROM HAVING/CONTACTING OTHER FRIENDS OR DOING THINGS OUTSIDE YOUR HOME?: NO

## 2024-07-24 ASSESSMENT — COGNITIVE AND FUNCTIONAL STATUS - GENERAL
MOVING TO AND FROM BED TO CHAIR: A LITTLE
DAILY ACTIVITIY SCORE: 24
STANDING UP FROM CHAIR USING ARMS: A LITTLE
WALKING IN HOSPITAL ROOM: A LITTLE
CLIMB 3 TO 5 STEPS WITH RAILING: A LITTLE
WALKING IN HOSPITAL ROOM: A LITTLE
MOBILITY SCORE: 20
STANDING UP FROM CHAIR USING ARMS: A LITTLE
MOVING TO AND FROM BED TO CHAIR: A LITTLE
CLIMB 3 TO 5 STEPS WITH RAILING: A LITTLE
MOBILITY SCORE: 20
DAILY ACTIVITIY SCORE: 24
PATIENT BASELINE BEDBOUND: NO

## 2024-07-24 ASSESSMENT — ENCOUNTER SYMPTOMS
ABDOMINAL PAIN: 0
SHORTNESS OF BREATH: 1
LIGHT-HEADEDNESS: 1
CHILLS: 0
NAUSEA: 1
COUGH: 0
VOMITING: 0
DIAPHORESIS: 0
FEVER: 0

## 2024-07-24 ASSESSMENT — ACTIVITIES OF DAILY LIVING (ADL)
JUDGMENT_ADEQUATE_SAFELY_COMPLETE_DAILY_ACTIVITIES: YES
TOILETING: NEEDS ASSISTANCE
HEARING - LEFT EAR: FUNCTIONAL
FEEDING YOURSELF: INDEPENDENT
GROOMING: INDEPENDENT
LACK_OF_TRANSPORTATION: NO
ADEQUATE_TO_COMPLETE_ADL: YES
BATHING: INDEPENDENT
WALKS IN HOME: NEEDS ASSISTANCE
HEARING - RIGHT EAR: FUNCTIONAL
ASSISTIVE_DEVICE: EYEGLASSES
PATIENT'S MEMORY ADEQUATE TO SAFELY COMPLETE DAILY ACTIVITIES?: YES
DRESSING YOURSELF: INDEPENDENT

## 2024-07-24 ASSESSMENT — PAIN DESCRIPTION - DESCRIPTORS
DESCRIPTORS: PRESSURE

## 2024-07-24 ASSESSMENT — PAIN DESCRIPTION - PAIN TYPE: TYPE: ACUTE PAIN

## 2024-07-24 ASSESSMENT — PATIENT HEALTH QUESTIONNAIRE - PHQ9
2. FEELING DOWN, DEPRESSED OR HOPELESS: NOT AT ALL
SUM OF ALL RESPONSES TO PHQ9 QUESTIONS 1 & 2: 0
1. LITTLE INTEREST OR PLEASURE IN DOING THINGS: NOT AT ALL

## 2024-07-24 ASSESSMENT — PAIN SCALES - GENERAL
PAINLEVEL_OUTOF10: 7
PAINLEVEL_OUTOF10: 4
PAINLEVEL_OUTOF10: 6

## 2024-07-24 ASSESSMENT — LIFESTYLE VARIABLES
EVER FELT BAD OR GUILTY ABOUT YOUR DRINKING: NO
HOW OFTEN DO YOU HAVE 6 OR MORE DRINKS ON ONE OCCASION: NEVER
EVER HAD A DRINK FIRST THING IN THE MORNING TO STEADY YOUR NERVES TO GET RID OF A HANGOVER: NO
AUDIT-C TOTAL SCORE: 0
HOW MANY STANDARD DRINKS CONTAINING ALCOHOL DO YOU HAVE ON A TYPICAL DAY: PATIENT DOES NOT DRINK
HAVE YOU EVER FELT YOU SHOULD CUT DOWN ON YOUR DRINKING: NO
HOW OFTEN DO YOU HAVE A DRINK CONTAINING ALCOHOL: NEVER
TOTAL SCORE: 0
AUDIT-C TOTAL SCORE: 0
SKIP TO QUESTIONS 9-10: 1
HAVE PEOPLE ANNOYED YOU BY CRITICIZING YOUR DRINKING: NO

## 2024-07-24 ASSESSMENT — PAIN DESCRIPTION - LOCATION
LOCATION: CHEST

## 2024-07-24 ASSESSMENT — PAIN - FUNCTIONAL ASSESSMENT: PAIN_FUNCTIONAL_ASSESSMENT: 0-10

## 2024-07-24 ASSESSMENT — PAIN DESCRIPTION - ORIENTATION
ORIENTATION: LEFT
ORIENTATION: LEFT

## 2024-07-24 NOTE — H&P
Subjective   Subjective:   HPI:  Trina Elias is a 73 y.o. female with a PMH of multiple abdominal surgeries, A-fib, CAD, ICD/PPM placement, HLD, COPD/asthma, CKD, and RA presenting with substernal chest pain. She had a watchman procedure done on 7/16/2024 and she reports that after the procedure (starting the day of) she has been experiencing chest pain, shortness of breath, nausea, and lightheadedness on exertion. Over the last few days it has been progressively getting worse and starting this morning the symptoms have been constant. The chest pain, nausea, and shortness of breath are now present at rest and the chest pain is more severe. She describes the chest pain as a constant heavy pressure that is exacerbated by deep breaths. States that the chest pain improves with sitting up. She also endorses a constant pounding headache that started this morning. She denies any diaphoresis, syncope, vomiting, arm pain, chills, fever, abdominal pain.     The ED attempted to contact Dr. Dickinson (performed the watchman procedure), but were not able to get hold of him. They spoke to his colleague, Dr. De La Torre, who reported that the elevated troponin levels are expected from the watchman procedure and stated we should watch the patient overnight and trend troponin levels.       ED COURSE:  Vitals:   - /60 , HR 60 , RR 13 , SpO2 96, T 36.4C  Labs:  - WBC 9.3, HGB 8.9,   - Na 139, Cl 100, K 3.9, bicarb 28, BUN 26, Cr 1.22  - Troponin 134->182  -   - Lipase 13  Imaging:  - CXR: limited study. Cardiomegaly without acute infiltrates  - CT angio: no evidence of acute PE  - EKG (interpreted in ED): sinus rhythm, occasional PVCs, ventricular rate 71 normal QRS duration no prolonged QT/QTc no obvious ST elevation, depression or acute ischemic findings.   Interventions:   - Zofran IV 4 mg, LR bolus 500 mL, fentanyl IV (50 mcg and 25 mcg)    PMH: as per HPI.  Allergies: Reviewed  Medications: reviewed.    Code Status:  "Full code    ED Course:   Vitals:  BP (!) 120/47 (BP Location: Left arm, Patient Position: Sitting)   Pulse 68   Temp 36.4 °C (97.5 °F) (Temporal)   Resp 14   Wt 70.3 kg (155 lb)   SpO2 95%     Labs:  Lab Results   Component Value Date    WBC 9.3 07/24/2024    HGB 8.9 (L) 07/24/2024    HCT 28.9 (L) 07/24/2024    MCV 75 (L) 07/24/2024     07/24/2024     Lab Results   Component Value Date    GLUCOSE 94 07/24/2024    CALCIUM 8.9 07/24/2024     07/24/2024    K 3.9 07/24/2024    CO2 28 07/24/2024     07/24/2024    BUN 26 (H) 07/24/2024    CREATININE 1.22 (H) 07/24/2024     Lab Results   Component Value Date    TROPHS 182 (HH) 07/24/2024     Lab Results   Component Value Date     (H) 07/24/2024   No results found for: \"DDIMERVTE\"  Imaging:  XR chest 1 view   Final Result   Limited study. Cardiomegaly without acute infiltrates.        Signed by: Javier Dawson 7/24/2024 1:34 PM   Dictation workstation:   [id number]      CT angio chest for pulmonary embolism   Final Result   1. Allowing for presumed artifact no evidence of acute pulmonary   embolism.   2. Increased lung heterogeneity/mosaic attenuation, may be secondary   to air trapping, atelectasis, obstructive small airways disease or   pulmonary arterial hypertension.        MACRO:   None        Signed by: Jassi Cha 7/24/2024 1:25 PM   Dictation workstation:   [id number]         Interventions:  Medications   pantoprazole (ProtoNix) injection 40 mg (40 mg intravenous Given 7/24/24 1315)   lactated Ringer's bolus 500 mL (0 mL intravenous Stopped 7/24/24 1311)   ondansetron (Zofran) injection 4 mg (4 mg intravenous Given 7/24/24 1155)   fentaNYL PF (Sublimaze) injection 50 mcg (50 mcg intravenous Given 7/24/24 1155)   iohexol (OMNIPaque) 350 mg iodine/mL solution 75 mL (75 mL intravenous Given 7/24/24 1245)   promethazine (Phenergan) 12.5 mg in sodium chloride 0.9% 50 mL IV (0 mg intravenous Stopped 7/24/24 6601)   fentaNYL PF " (Sublimaze) injection 25 mcg (25 mcg intravenous Given 24 5065)       Past Medical History:  She has a past medical history of Asthma ([facility]), Atrial fibrillation (Multi), Central sleep apnea, Cervical myofascial pain syndrome, Chronic anemia, CKD stage 3a, GFR 45-59 ml/min (Multi), COPD (chronic obstructive pulmonary disease) (Multi), DVT of axillary vein, acute right (Multi) (2018), H/O being hospitalized (2024), History of Helicobacter pylori infection, syncope, ICD (implantable cardioverter-defibrillator) in place, Mitral valve regurgitation, Moderate aortic stenosis by prior echocardiogram, Osteopenia (2023), Personal history of anaphylaxis, Psoriasis, PUD (peptic ulcer disease), Pulmonary hypertension (Multi), Radiculopathy, lumbar region (2018), Restless legs syndrome (2015), Rheumatoid arthritis (Multi), Seasonal allergic rhinitis due to pollen, Small bowel obstruction (Multi) (2023), and Wide-complex tachycardia.    She has no past medical history of Chronic kidney disease.    Past Surgical History:  She has a past surgical history that includes Cholecystectomy; Appendectomy; Cardiac catheterization (2022);  section, classic; Rotator cuff repair; Abdominal adhesion surgery (10/22/2015); Abdominal adhesion surgery (10/18/2020); Ventral hernia repair (06/15/2015); Nerve Surgery (Right, 10/13/2010); Nissen fundoplication (); Finger surgery (Left, 2008); Leg Surgery (Right, 2006); Breast biopsy (Left, 2022); Ablation of dysrhythmic focus (2022); Wound debridement (2020); Cardioversion (2018); Cardiac assist device insertion (2012); [place] tooth extraction; IR injection epidural steroid (2011); IR injection epidural steroid (); Reverse total shoulder arthroplasty (Bilateral, 2024); Cardiac defibrillator placement (2014); Carpal tunnel release (Bilateral); Foot surgery (Bilateral); Ulnar nerve  "transposition (Left); Achilles tendon surgery (Right); Anterior cervical discectomy w/ fusion (03/21/2016); Colonoscopy (02/27/2024); picc line insertion wo imaging (10/24/2015); and Cardiac catheterization (N/A, 7/16/2024).    Social History:  She reports that she has never smoked. She has never used smokeless tobacco. She reports that she does not drink alcohol and does not use drugs.    Family History:  Family History   Problem Relation Name Age of Onset    Asthma Daughter      Asthma Other      Colon cancer Other      Diabetes Other      Other (stroke syndrome) Other       Allergies:  Abatacept, Hydrocodone-acetaminophen, Hydromorphone, Metoprolol, Penicillins, Pregabalin, Prochlorperazine, Methotrexate, Aripiprazole, Beta-blockers (beta-adrenergic blocking agts), Bupropion, Cefepime, Ciprofloxacin, Furosemide, Levofloxacin, and Pineapple    Home Medications:  (Not in a hospital admission)    Review Of Systems:  11-point ROS was performed and is negative except as noted below and in the HPI.     Review of Systems   Constitutional:  Negative for chills, diaphoresis and fever.   Respiratory:  Positive for shortness of breath. Negative for cough.    Cardiovascular:  Positive for chest pain. Negative for leg swelling.   Gastrointestinal:  Positive for nausea. Negative for abdominal pain and vomiting.   Neurological:  Positive for light-headedness.        Objective   Objective:     BP (!) 120/47 (BP Location: Left arm, Patient Position: Sitting)   Pulse 68   Temp 36.4 °C (97.5 °F) (Temporal)   Resp 14   Ht 1.626 m (5' 4\")   Wt 70.3 kg (155 lb)   SpO2 95%   BMI 26.61 kg/m²     Physical Exam  Constitutional:       General: She is in acute distress.      Appearance: Normal appearance.   Cardiovascular:      Rate and Rhythm: Normal rate and regular rhythm.      Pulses: Normal pulses.      Heart sounds: Normal heart sounds. No murmur heard.     No friction rub. No gallop.   Pulmonary:      Effort: Pulmonary effort " is normal. No respiratory distress.      Breath sounds: Normal breath sounds. No wheezing.   Abdominal:      General: Abdomen is flat. Bowel sounds are normal. There is no distension.      Palpations: Abdomen is soft.      Tenderness: There is no abdominal tenderness.   Musculoskeletal:         General: No swelling or tenderness.   Neurological:      General: No focal deficit present.      Mental Status: She is alert and oriented to person, place, and time.       Lab Work:     Lab Results   Component Value Date    WBC 9.3 07/24/2024    HGB 8.9 (L) 07/24/2024    HCT 28.9 (L) 07/24/2024    MCV 75 (L) 07/24/2024     07/24/2024     Lab Results   Component Value Date    GLUCOSE 94 07/24/2024    CALCIUM 8.9 07/24/2024     07/24/2024    K 3.9 07/24/2024    CO2 28 07/24/2024     07/24/2024    BUN 26 (H) 07/24/2024    CREATININE 1.22 (H) 07/24/2024     Thyroid Stimulating Hormone   Date Value Ref Range Status   04/15/2024 0.92 0.44 - 3.98 mIU/L Final     TSH   Date Value Ref Range Status   08/28/2023 1.71 0.44 - 3.98 mIU/L Final     Comment:      TSH testing is performed using different testing    methodology at [facility] than at other    [facility]. Direct result comparisons should    only be made within the same method.   12/19/2022 1.34 0.44 - 3.98 mIU/L Final     Comment:      TSH testing is performed using different testing    methodology at [facility] than at other    [facility]. Direct result comparisons should    only be made within the same method.       Vitamin D, 25-Hydroxy   Date Value Ref Range Status   10/18/2021 28 (A) ng/mL Final     Comment:     .  DEFICIENCY:         < 20   NG/ML  INSUFFICIENCY:      20-29  NG/ML  SUFFICIENCY:         NG/ML    THIS ASSAY ACCURATELY QUANTIFIES THE SUM OF  VITAMIN D3, 25-HYDROXY AND VIT D2,25-HYDROXY.       Cultures:   No results found for the last 90 days.    Images:     XR chest 1 view   Final Result    Limited study. Cardiomegaly without acute infiltrates.        Signed by: Javier Dawson 7/24/2024 1:34 PM   Dictation workstation:   [id number]      CT angio chest for pulmonary embolism   Final Result   1. Allowing for presumed artifact no evidence of acute pulmonary   embolism.   2. Increased lung heterogeneity/mosaic attenuation, may be secondary   to air trapping, atelectasis, obstructive small airways disease or   pulmonary arterial hypertension.        MACRO:   None        Signed by: Jassi Cha 7/24/2024 1:25 PM   Dictation workstation:   [id number]         Medications:     Scheduled Meds: Pending    Continuous Meds: Pending     PRN Meds: Pending       Assessment & Plan:     Trina Elias is a 73 y.o. female with a PMH of multiple abdominal surgeries, A-fib, CAD, ICD/PPM placement, HLD, COPD/asthma, CKD, and RA admitted with substernal chest pain. S/p watchman procedure on 7/16/24.     ACUTE MEDICAL ISSUES:  #Chest pain:   #Elevated troponins  - Constant heavy substernal chest pain  - EKG in ED: sinus rhythm, occasional PVCs, ventricular rate 71 normal QRS duration no prolonged QT/QTc no obvious ST elevation, depression or acute ischemic findings.   - CAD vs Pericarditis vs GERD  - Lipase normal   - CT angio showed no evidence of PE. CXR no acute pathologies.  PLAN:  - TTE pending  - Telemetry  - Trend troponins   - Cardiology not recommending heparin at this time  - Cardiology consulted (patient follows with Dr. Soto, but Dr. Larson on service this week)    CHRONIC MEDICAL ISSUES:  #HLD - continue home meds   #A-fib - continue home meds   #Asthma/COPD - continue home meds     Fluid: Replete PRN  Electrolytes: Replete PRN  Nutrition: Regular diet  GI ppx: Continue home meds  DVT/PE ppx: SubQ heparin  Abx: None   IV Lines: PIV  O2: RA    Dispo: Pending TTE and troponin. Estimated length of stay 1-2 days.    Alli Vaughn, PGY-1  Transitional Year    Disclaimer: Documentation completed with the  information available at the time of input. The times in the chart may not be reflective of actual patient care times, interventions, or procedures. Documentation occurs after the physical care of the   patient.

## 2024-07-24 NOTE — PROGRESS NOTES
07/24/24 1406   Discharge Planning   Living Arrangements Spouse/significant other   Support Systems Spouse/significant other   Assistance Needed A&OX3; independent with ADLs with no DME; drives; room air baseline and currently room air; on Plavix prior to hospitalization   Type of Residence Private residence   Number of Stairs to Enter Residence 0   Number of Stairs Within Residence 0   Do you have animals or pets at home? No   Who is requesting discharge planning? Provider   Home or Post Acute Services None   Expected Discharge Disposition Home  (Pending cardiology workup but patient and spouse deny any home going needs at this time.)   Does the patient need discharge transport arranged? No   Financial Resource Strain   How hard is it for you to pay for the very basics like food, housing, medical care, and heating? Not hard   Housing Stability   In the last 12 months, was there a time when you were not able to pay the mortgage or rent on time? N   In the past 12 months, how many times have you moved where you were living? 1   At any time in the past 12 months, were you homeless or living in a shelter (including now)? N   Transportation Needs   In the past 12 months, has lack of transportation kept you from medical appointments or from getting medications? no   In the past 12 months, has lack of transportation kept you from meetings, work, or from getting things needed for daily living? No     07/24/2024 1407pm  Spoke with patient and patient's spouse bedside in ED

## 2024-07-24 NOTE — PROGRESS NOTES
Pharmacy Medication History Review    Trina Elias is a 73 y.o. female admitted for No Principal Problem: There is no principal problem currently on the Problem List. Please update the Problem List and refresh.. Pharmacy reviewed the patient's [id number] medications and allergies for accuracy.    The list below reflectives the updated PTA list. Please review each medication in order reconciliation for additional clarification and justification.  Prior to Admission Medications   Prescriptions Last Dose Informant Patient Reported? Taking?   EPINEPHrine 0.3 mg/0.3 mL injection syringe  Self Yes Yes   Sig: Inject 0.3 mL (0.3 mg) into the muscle 1 time if needed for anaphylaxis.   acetaminophen (Tylenol) 325 mg tablet Unknown Self Yes Yes   Sig: Take 1 tablet (325 mg) by mouth every 6 hours if needed for mild pain (1 - 3).   albuterol 2.5 mg /3 mL (0.083 %) nebulizer solution Unknown Self Yes Yes   Sig: Take 3 mL (2.5 mg) by nebulization every 4 hours if needed.   baclofen (Lioresal) 10 mg tablet 7/24/2024 at am Self No Yes   Sig: One tablet in the morning and afternoon and 1 to 2 tablets in the evening.   Patient taking differently: One tablet in the morning and 2 tablets in the evening.   bumetanide (Bumex) 2 mg tablet 7/24/2024 at am Self Yes Yes   Sig: Take 1 tablet (2 mg) by mouth 2 times a day.   cholecalciferol (Vitamin D3) 25 MCG (1000 UT) tablet 7/24/2024 at am Self Yes Yes   Sig: Take 1 tablet (1,000 Units) by mouth once daily.   clobetasol (Temovate) 0.05 % cream Unknown Self No Yes   Sig: Apply topically 2 times a day.   Patient taking differently: Apply 1 Application topically 2 times a day as needed.   clopidogrel (Plavix) 75 mg tablet 7/24/2024 at am Self No Yes   Sig: Take 1 tablet (75 mg) by mouth once daily for 365 doses.   diclofenac sodium (Voltaren) 1 % gel Unknown Self No Yes   Sig: Apply 4.5 inches (4 g) topically 2 times a day as needed (joint pain).   digoxin (Lanoxin) 125 MCG tablet  7/24/2024 at am Self Yes Yes   Sig: Take 1 tablet (125 mcg) by mouth once daily.   dilTIAZem LA (Cardizem LA) 120 mg 24 hr tablet 7/23/2024 at pm Self Yes Yes   Sig: Take 1 tablet (120 mg) by mouth once daily.   famotidine (Pepcid) 40 mg tablet   No Yes   Sig: Take 1 tablet (40 mg) by mouth once daily.   Patient taking differently: Take 1 tablet (40 mg) by mouth once daily as needed.   hydroxychloroquine (Plaquenil) 200 mg tablet 7/24/2024 at am Self No Yes   Sig: Take 1.5 tablets (300 mg) by mouth once daily.   magnesium oxide (Mag-Ox) 400 mg tablet 7/24/2024 at am Self Yes Yes   Sig: Take 1 tablet (400 mg) by mouth once daily.   montelukast (Singulair) 10 mg tablet 7/23/2024 at pm Self No Yes   Sig: Take 1 tablet (10 mg) by mouth once daily at bedtime.   nitroglycerin (Nitrostat) 0.4 mg SL tablet  Self Yes Yes   Sig: Place 1 tablet (0.4 mg) under the tongue every 5 minutes if needed.   oxyCODONE-acetaminophen (Percocet) 5-325 mg tablet 7/23/2024 Self No Yes   Sig: Take 1 tablet by mouth 3 times a day as needed for severe pain (7 - 10) for up to 28 days. Do not fill before July 5, 2024.   pantoprazole (ProtoNix) 40 mg EC tablet 7/24/2024 at am Self No Yes   Sig: TAKE ONE TABLET BY MOUTH TWO TIMES A DAY   polyethylene glycol (Miralax) 17 gram/dose powder 7/23/2024 at am Self Yes Yes   Sig: Take 17 g by mouth once daily.   rosuvastatin (Crestor) 20 mg tablet 7/23/2024 at pm Self No Yes   Sig: TAKE ONE TABLET BY MOUTH EVERY DAY   saccharomyces boulardii (Florastor) 250 mg capsule 7/24/2024 at am Self Yes Yes   Sig: Take 1 capsule (250 mg) by mouth 2 times a day.   sucralfate (Carafate) 100 mg/mL suspension Unknown Self Yes Yes   Sig: Take 10 mL (1 g) by mouth 4 times a day as needed.   sulfaSALAzine (Azulfidine) 500 mg tablet 7/24/2024 at am Self No Yes   Sig: Take 1 tablet (500 mg) by mouth 2 times a day.      Facility-Administered Medications: None           The list below reflectives the updated allergy list.  Please review each documented allergy for additional clarification and justification.  Allergies  Reviewed by Fernando Joseph RN on 7/24/2024        Severity Reactions Comments    Abatacept High Shortness of breath pulmonary edema, diarrhea, nausea    Hydrocodone-acetaminophen High Anaphylaxis     Hydromorphone High Anaphylaxis     Metoprolol High Anaphylaxis     Penicillins High Hives     Pregabalin High Shortness of breath caused pulmonary edema    Prochlorperazine High Itching paralysis of the mouth with drooping    Methotrexate Medium Hives, Itching chest pain    Aripiprazole Low Rash     Beta-blockers (beta-adrenergic Blocking Agts) Low Other, Palpitations, Wheezing     Bupropion Low Nausea/vomiting     Cefepime Low Itching     Ciprofloxacin Low Hives     Furosemide Low Itching     Levofloxacin Low Itching     Pineapple Low Rash             Below are additional concerns with the patient's PTA list.      Rosa Parham

## 2024-07-24 NOTE — ED PROVIDER NOTES
HPI   Chief Complaint   Patient presents with    Chest Pain    Shortness of Breath    Dizziness       CC: chest pain  HPI:   73 y.o. female with history of multiple abdominal surgeries, CAD, ICD/PPM placement, HTN and COPD, presenting with chest pain/heaviness S/p watchman procedure on 7/16/2024, patient reported chest pain, heaviness started this morning she described as pressure substernally.  Associated with nausea, dry heaving, no reported fevers, chills, she denies any abdominal pain.  Patient denies any fever, chills, denies any episodes of hemoptysis hematemesis hematochezia or melanotic stools.  Discomfort is localized to the substernal region mostly.  Denies any headache, dizziness.    Additional Limitations to History:   External Records Reviewed: I reviewed recent and relevant outside records including   History Obtained From:     Past Medical History: Per HPI  Medications: Reviewed in EMR and with patient  Allergies:  Reviewed in EMR  Past Surgical History:   Social History:     ------------------------------------------------------------------------------------------------------  Physical Exam:  --Vital signs reviewed in nursing triage note, EMR flow sheets, and at patient's bedside  GEN:  A&Ox3, no acute distress, appears comfortable.  Conversational and appropriate.  No confusion or gross mental status changes.  EYES: EOMI, non-injected sclera.  ENT: Moist mucous membranes, no apparent injuries or lesions.   CARDIO: Normal rate and regular rhythm. No murmurs, rubs, or gallops.  2+ equal pulses of the distal extremities.   PULM: Clear to auscultation bilaterally. No rales, rhonchi, or wheezes. Good symmetric chest expansion.  GI: Soft, non-tender, non-distended. No rebound tenderness or guarding.  SKIN: Warm and dry, no rashes or lesions.  MSK: ROM intact the extremities without contractures.   EXT: No peripheral edema, contusions, or wounds.   NEURO: Cranial nerves II-XII grossly intact. Sensation to  light touch intact and equal bilaterally in upper and lower extremities.  Symmetric 5/5 strength in upper and lower extremities.  PSYCH: Appropriate mood and behavior, converses and responds appropriately during exam.  -------------------------------------------------------------------------------------------------------    Medical Decision Making:  Patient seen and evaluated by ED attending. On arrival the patient     Differential Diagnoses Considered:   Chronic Medical Conditions Significantly Affecting Care:   Diagnostic testing considered: [PERC, D-Dimer, PECARN, etc.]    - EKG interpreted by myself sinus rhythm, occasional PVCs, ventricular rate 71 normal QRS duration no prolonged QT/QTc no obvious ST elevation, depression or acute ischemic findings.  73-year-old female with a history of  - I independently interpreted: [CXR, CT, POCUS, etc. including your interpretation]  - Labs notable for     Escalation of Care: Appropriate for   Social Determinants of Health Significantly Affecting Care: [Homelessness, lacking transportation, uninsured, unable to afford medications]  Prescription Drug Consideration: [Antibiotics, antivirals, pain medications, etc.]  Discussion of Management with Other Providers:  I discussed the patient/results with: [admitting team, consultant, radiologist, social work, EPAT, case management, PT/OT, RT, PCP, etc.]      Gilson Aragon PA-C              Patient History   Past Medical History:   Diagnosis Date    Asthma ([facility])     Atrial fibrillation (Multi)     Resistent to treatment: s/p DCCVs and ablations    Central sleep apnea     Per PSG 11/20/18; severe central sleep apnea, .6, mild obstructive component    Cervical myofascial pain syndrome     Baclofen    Chronic anemia     CKD stage 3a, GFR 45-59 ml/min (Multi)     COPD (chronic obstructive pulmonary disease) (Multi)     DVT of axillary vein, acute right (Multi) 01/20/2018    When PICC line was removed. U/S + Partial  nonocclusive DVT in the axillary and proximal basilic vein in 2018    H/O being hospitalized 02/2024 Feb 24-Mar 5, 2024:  (2/24) admitted for dyspnea and chest pain work-up and found to have pleural effusions.  Found to have acute blood loss anemia s/p 2u PRBC. EGD neg for active bleed.  Cardiology consulted - plan for Watchman device WING closure as out pt.    History of Helicobacter pylori infection     3/2013, 1/2021    Hx of syncope     Recurrent Syncope d/t VT- s/p ILR (12/2012) implanted.  (July 2013) Episode of syncope that appears to be correlated with an 11 second round of ventricular tachycardia recorded by the loop recorder.  S/p EPS (June 2014) neg for inducible arrhythmias. (ILR removed when ICD implanted)    ICD (implantable cardioverter-defibrillator) in place     Placed 06/12/2014    Mitral valve regurgitation     Mild-Moderate per Echo 02/25/2024    Moderate aortic stenosis by prior echocardiogram     Echo 2/25/2024    Osteopenia 06/26/2023    Personal history of anaphylaxis     See allergy list    Psoriasis     PUD (peptic ulcer disease)     H/O nonbleeding gastric ulcers with small hiatal hernia on 1/6/2021 EGD    Pulmonary hypertension (Multi)     Mild - Moderate per Echo 2/25/2024    Radiculopathy, lumbar region 08/13/2018    Acute lumbar radiculopathy    Restless legs syndrome 11/17/2015    Restless legs syndrome    Rheumatoid arthritis (Multi)     Seasonal allergic rhinitis due to pollen     Small bowel obstruction (Multi) 06/26/2023    H/O due to Severe Adhesions s/p Adhesiolysis x2 in 2015 and 2020    Wide-complex tachycardia     Per cardiology 3/7/2024: VT is on a basis of triggered activity certainly made worse with anemia and prednisone. Since she is asymptomatic we will continue to observe.     Past Surgical History:   Procedure Laterality Date    ABDOMINAL ADHESION SURGERY  10/22/2015    Exploratory laparotomy. Extensive lysis of adhesions. Small-bowel repair.    ABDOMINAL ADHESION  SURGERY  10/18/2020    Exploratory laparotomy, massive lysis of adhesions, small-bowel resection, decompression of small bowel and removal of mesh.    ABLATION OF DYSRHYTHMIC FOCUS  2022, 2022 for A FIB    ACHILLES TENDON SURGERY Right     ANTERIOR CERVICAL DISCECTOMY W/ FUSION  2016    Anterior C5 and C6 discectomies and interbody fusion of C5-C6, C6-C7    APPENDECTOMY      Open surgery    BREAST BIOPSY Left 2022    CARDIAC ASSIST DEVICE INSERTION  2012    Loop recorder placement , Removed in  w/ ICD placed    CARDIAC CATHETERIZATION  2022    Normal coronary arteries. Tachycardia induced Cardiomyopathy.    CARDIAC CATHETERIZATION N/A 2024    Procedure: LAAO (Left Atrial Appendage Occlusion);  Surgeon: Reid Dickinson MD;  Location: [place] [id number] Cardiac Cath Lab;  Service: Cardiovascular;  Laterality: N/A;  Same day CT at 0815    CARDIAC DEFIBRILLATOR PLACEMENT  2014    Dual chamber ICD    CARDIOVERSION  2018, 2018    CARPAL TUNNEL RELEASE Bilateral      SECTION, CLASSIC      x2    CHOLECYSTECTOMY      Open surgery    COLONOSCOPY  2024    Extensive diverticulosis of moderate severity and causing mild luminal narrowing in the sigmoid colon    FINGER SURGERY Left 2008    Left index finger metacarpophalangeal fusion    FOOT SURGERY Bilateral     B/L Tarsal Tunnel Release    IR INJECTION EPIDURAL STEROID  2011    Lumbar spine; , ,     IR INJECTION EPIDURAL STEROID  2007    Cervical spine 2007    LEG SURGERY Right 2006    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  Plantar wart removal R foot.    NERVE SURGERY Right 10/13/2010    Release of Right tarsal tunnel syndrome w/ severe adhesion scar tissue    NISSEN FUNDOPLICATION      Open surgery    PICC LINE INSERTION WO IMAGING  10/24/2015    Placed for stable IV access    REVERSE TOTAL SHOULDER  ARTHROPLASTY Bilateral 05/02/2024    Right 1/12/2023, Left 5/2/2024    ROTATOR CUFF REPAIR      ULNAR NERVE TRANSPOSITION Left     VENTRAL HERNIA REPAIR  06/15/2015    Laparoscopic double ventral hernia repair.    WISDOM TOOTH EXTRACTION      WOUND DEBRIDEMENT  11/27/2020    Excisional debridement of abdominal wound for wound dehiscence     Family History   Problem Relation Name Age of Onset    Asthma Daughter      Asthma Other      Colon cancer Other      Diabetes Other      Other (stroke syndrome) Other       Social History     Tobacco Use    Smoking status: Never    Smokeless tobacco: Never   Vaping Use    Vaping status: Never Used   Substance Use Topics    Alcohol use: Never    Drug use: Never       Physical Exam   ED Triage Vitals [07/24/24 1135]   Temperature Heart Rate Respirations BP   36.4 °C (97.5 °F) 75 18 (!) 119/47      Pulse Ox Temp Source Heart Rate Source Patient Position   100 % Temporal Monitor Sitting      BP Location FiO2 (%)     Right arm --       Physical Exam      ED Course & MDM   Diagnoses as of 07/25/24 0820   Acute chest pain                       [employer] Coma Scale Score: 15                        Medical Decision Making  73-year-old female with history of cardiomyopathy, A-fib, status post Watchman procedure on 7/16/2024 with acute chest pain, low suspicion for acute coronary event or pulmonary emboli, elevated troponin likely some demand ischemia or postprocedural, patient is hemodynamically stable and nontoxic-appearing well-hydrated no acute distress, I discussed findings with Dr. De La Torre  at [facility] covering for Dr. Dickinson, did not believe symptoms were secondary to Watchman procedure.  I also discussed the findings with Dr. Larson, patient given, Zofran and Phenergan, this may be GI related, patient will be placed in observation for further serial troponins, echo, cardio consult        Procedure  Procedures     Gilson Aragon PA-C  07/24/24 1503       Gilson Aragon,  YOLANDA  07/25/24 0824

## 2024-07-24 NOTE — HOSPITAL COURSE
Trina Elias is a 73 y.o. female with a PMH of multiple abdominal surgeries, A-fib, CAD, ICD/PPM placement, HLD, COPD/asthma, CKD, and RA presenting with substernal chest pain. She had a watchman procedure done on 7/16/2024 and she reports that after the procedure (starting the day of) she has been experiencing chest pain, shortness of breath, nausea, and lightheadedness on exertion. Over the last few days it has been progressively getting worse and starting this morning the symptoms have been constant. The chest pain, nausea, and shortness of breath are now present at rest and the chest pain is more severe. She describes the chest pain as a constant heavy pressure that is exacerbated by deep breaths. States that the chest pain improves with sitting up. She also endorses a constant pounding headache that started this morning. She denies any diaphoresis, syncope, vomiting, arm pain, chills, fever, abdominal pain.  In the ED she received zofran IV 4 mg, LR bolus 500 mL, and fentanyl IV. Troponin was elevated, 134 -> 182. CT angio showed no evidence of PE and CXR revealed no acute changes. EKG showed sinus rhythm, occasional PVCs, ventricular rate 71 normal QRS duration no prolonged QT/QTc no obvious ST elevation, depression or acute ischemic findings. Patient was then admitted to medicine service.      Medicine Course  TTE revealed no evidence of pericardial effusion. Her BP was low during her visit, down to 85/46, so she received a LR bolus. Patient's pain was well controlled after pain meds (oxycodone PRN) and rest. Denied any symptoms of nausea, vomiting, diaphoresis.. Troponin peaked at 355 and then started to decrease. There were no findings on telemetry.    Cardiology reviewed patient's echocardiogram completed 7/25 (mildly decreased LVEF, no pericardial effusion) as well as patient's LHC in 2022 with normal coronary arteries. Noted that chest pain and elevated troponins likely secondary to patient's recent  Watchman procedure on July 16th.    She experienced severe nausea after eating the night (7/26) and has abdominal pain so a CT abdomen was ordered to rule out SBO (she has had many in the past). CT revealed diffuse colonic fecal burden, but no bowel obstruction. Patient given fleet enema with resulting bowel movement. Patient stated she is no longer having chest pain and that her abdominal pain had improved after her bowel movement. She would like to go home where she plans to continue using laxatives to resolve her constipation. Per cardiology, patient received one iron infusion before discharge. Patient discharged with plan to follow-up with cardiology.

## 2024-07-24 NOTE — ED TRIAGE NOTES
Pt BIBS with c/o chest pain, SOB, dizziness and nausea. PT s/p Watchman's procedure on Tuesday 7/16/24. Pt reports chest pain and SOB ongoing since procedure and worsening over he past few days.

## 2024-07-25 ENCOUNTER — APPOINTMENT (OUTPATIENT)
Dept: CARDIOLOGY | Facility: HOSPITAL | Age: 74
DRG: 392 | End: 2024-07-25
Payer: MEDICARE

## 2024-07-25 LAB
ALBUMIN SERPL BCP-MCNC: 3.4 G/DL (ref 3.4–5)
ANION GAP SERPL CALC-SCNC: 10 MMOL/L (ref 10–20)
AORTIC VALVE MEAN GRADIENT: 8 MMHG
AORTIC VALVE PEAK VELOCITY: 1.91 M/S
ATRIAL RATE: 60 BPM
AV PEAK GRADIENT: 14.6 MMHG
AVA (PEAK VEL): 2.19 CM2
AVA (VTI): 2.24 CM2
BUN SERPL-MCNC: 23 MG/DL (ref 6–23)
CALCIUM SERPL-MCNC: 8.5 MG/DL (ref 8.6–10.3)
CARDIAC TROPONIN I PNL SERPL HS: 323 NG/L (ref 0–13)
CARDIAC TROPONIN I PNL SERPL HS: 332 NG/L (ref 0–13)
CARDIAC TROPONIN I PNL SERPL HS: 355 NG/L (ref 0–13)
CHLORIDE SERPL-SCNC: 104 MMOL/L (ref 98–107)
CO2 SERPL-SCNC: 29 MMOL/L (ref 21–32)
CREAT SERPL-MCNC: 1.2 MG/DL (ref 0.5–1.05)
DIGOXIN SERPL-MCNC: 0.98 NG/ML (ref 0.8–?)
EGFRCR SERPLBLD CKD-EPI 2021: 48 ML/MIN/1.73M*2
EJECTION FRACTION APICAL 4 CHAMBER: 42.7
EJECTION FRACTION: 48 %
ERYTHROCYTE [DISTWIDTH] IN BLOOD BY AUTOMATED COUNT: 15.9 % (ref 11.5–14.5)
GLUCOSE SERPL-MCNC: 92 MG/DL (ref 74–99)
HCT VFR BLD AUTO: 26.9 % (ref 36–46)
HGB BLD-MCNC: 8.2 G/DL (ref 12–16)
HOLD SPECIMEN: NORMAL
LEFT ATRIUM VOLUME AREA LENGTH INDEX BSA: 26.4 ML/M2
LEFT VENTRICLE INTERNAL DIMENSION DIASTOLE: 5.27 CM (ref 3.5–6)
LEFT VENTRICULAR OUTFLOW TRACT DIAMETER: 2 CM
MAGNESIUM SERPL-MCNC: 2.49 MG/DL (ref 1.6–2.4)
MCH RBC QN AUTO: 23 PG (ref 26–34)
MCHC RBC AUTO-ENTMCNC: 30.5 G/DL (ref 32–36)
MCV RBC AUTO: 76 FL (ref 80–100)
NRBC BLD-RTO: 0 /100 WBCS (ref 0–0)
P AXIS: 91 DEGREES
PHOSPHATE SERPL-MCNC: 4.6 MG/DL (ref 2.5–4.9)
PLATELET # BLD AUTO: 222 X10*3/UL (ref 150–450)
POTASSIUM SERPL-SCNC: 3.8 MMOL/L (ref 3.5–5.3)
PR INTERVAL: 254 MS
Q ONSET: 220 MS
QRS COUNT: 9 BEATS
QRS DURATION: 86 MS
QT INTERVAL: 432 MS
QTC CALCULATION(BAZETT): 432 MS
QTC FREDERICIA: 432 MS
R AXIS: 40 DEGREES
RBC # BLD AUTO: 3.56 X10*6/UL (ref 4–5.2)
RIGHT VENTRICLE FREE WALL PEAK S': 11.9 CM/S
RIGHT VENTRICLE PEAK SYSTOLIC PRESSURE: 26.6 MMHG
SODIUM SERPL-SCNC: 139 MMOL/L (ref 136–145)
T AXIS: -24 DEGREES
T OFFSET: 436 MS
TRICUSPID ANNULAR PLANE SYSTOLIC EXCURSION: 2.8 CM
VENTRICULAR RATE: 60 BPM
WBC # BLD AUTO: 7 X10*3/UL (ref 4.4–11.3)

## 2024-07-25 PROCEDURE — 2500000004 HC RX 250 GENERAL PHARMACY W/ HCPCS (ALT 636 FOR OP/ED)

## 2024-07-25 PROCEDURE — 9420000001 HC RT PATIENT EDUCATION 5 MIN

## 2024-07-25 PROCEDURE — 84484 ASSAY OF TROPONIN QUANT: CPT

## 2024-07-25 PROCEDURE — 84100 ASSAY OF PHOSPHORUS: CPT

## 2024-07-25 PROCEDURE — 83735 ASSAY OF MAGNESIUM: CPT

## 2024-07-25 PROCEDURE — 2500000001 HC RX 250 WO HCPCS SELF ADMINISTERED DRUGS (ALT 637 FOR MEDICARE OP)

## 2024-07-25 PROCEDURE — 2500000002 HC RX 250 W HCPCS SELF ADMINISTERED DRUGS (ALT 637 FOR MEDICARE OP, ALT 636 FOR OP/ED)

## 2024-07-25 PROCEDURE — 99222 1ST HOSP IP/OBS MODERATE 55: CPT

## 2024-07-25 PROCEDURE — 93306 TTE W/DOPPLER COMPLETE: CPT

## 2024-07-25 PROCEDURE — 1200000002 HC GENERAL ROOM WITH TELEMETRY DAILY

## 2024-07-25 PROCEDURE — 85027 COMPLETE CBC AUTOMATED: CPT

## 2024-07-25 PROCEDURE — 93005 ELECTROCARDIOGRAM TRACING: CPT

## 2024-07-25 PROCEDURE — 94664 DEMO&/EVAL PT USE INHALER: CPT

## 2024-07-25 PROCEDURE — 36415 COLL VENOUS BLD VENIPUNCTURE: CPT

## 2024-07-25 PROCEDURE — 80162 ASSAY OF DIGOXIN TOTAL: CPT | Performed by: NURSE PRACTITIONER

## 2024-07-25 RX ADMIN — MONTELUKAST 10 MG: 10 TABLET, FILM COATED ORAL at 20:50

## 2024-07-25 RX ADMIN — CLOPIDOGREL 75 MG: 75 TABLET ORAL at 08:59

## 2024-07-25 RX ADMIN — SULFASALAZINE 500 MG: 500 TABLET ORAL at 08:59

## 2024-07-25 RX ADMIN — ACETAMINOPHEN 975 MG: 325 TABLET ORAL at 12:12

## 2024-07-25 RX ADMIN — PANTOPRAZOLE SODIUM 40 MG: 40 TABLET, DELAYED RELEASE ORAL at 08:59

## 2024-07-25 RX ADMIN — HEPARIN SODIUM 5000 UNITS: 5000 INJECTION INTRAVENOUS; SUBCUTANEOUS at 15:40

## 2024-07-25 RX ADMIN — ACETAMINOPHEN 975 MG: 325 TABLET ORAL at 05:24

## 2024-07-25 RX ADMIN — DILTIAZEM HYDROCHLORIDE 120 MG: 120 CAPSULE, COATED, EXTENDED RELEASE ORAL at 20:51

## 2024-07-25 RX ADMIN — BACLOFEN 10 MG: 10 TABLET ORAL at 08:59

## 2024-07-25 RX ADMIN — ACETAMINOPHEN 975 MG: 325 TABLET ORAL at 22:57

## 2024-07-25 RX ADMIN — ACETAMINOPHEN 975 MG: 325 TABLET ORAL at 00:31

## 2024-07-25 RX ADMIN — POLYETHYLENE GLYCOL 3350 17 G: 17 POWDER, FOR SOLUTION ORAL at 08:59

## 2024-07-25 RX ADMIN — OXYCODONE HYDROCHLORIDE 5 MG: 5 TABLET ORAL at 09:06

## 2024-07-25 RX ADMIN — ONDANSETRON 4 MG: 2 INJECTION INTRAMUSCULAR; INTRAVENOUS at 17:12

## 2024-07-25 RX ADMIN — OXYCODONE HYDROCHLORIDE 5 MG: 5 TABLET ORAL at 02:14

## 2024-07-25 RX ADMIN — SODIUM CHLORIDE, POTASSIUM CHLORIDE, SODIUM LACTATE AND CALCIUM CHLORIDE 1000 ML: 600; 310; 30; 20 INJECTION, SOLUTION INTRAVENOUS at 14:52

## 2024-07-25 RX ADMIN — OXYCODONE HYDROCHLORIDE 5 MG: 5 TABLET ORAL at 22:57

## 2024-07-25 RX ADMIN — OXYCODONE HYDROCHLORIDE 5 MG: 5 TABLET ORAL at 15:40

## 2024-07-25 RX ADMIN — HYDROXYCHLOROQUINE SULFATE 300 MG: 200 TABLET, FILM COATED ORAL at 08:58

## 2024-07-25 RX ADMIN — BACLOFEN 20 MG: 10 TABLET ORAL at 20:51

## 2024-07-25 RX ADMIN — PANTOPRAZOLE SODIUM 40 MG: 40 TABLET, DELAYED RELEASE ORAL at 20:51

## 2024-07-25 RX ADMIN — ROSUVASTATIN CALCIUM 20 MG: 20 TABLET, FILM COATED ORAL at 08:59

## 2024-07-25 RX ADMIN — HEPARIN SODIUM 5000 UNITS: 5000 INJECTION INTRAVENOUS; SUBCUTANEOUS at 22:57

## 2024-07-25 RX ADMIN — DIGOXIN 125 MCG: 125 TABLET ORAL at 08:59

## 2024-07-25 RX ADMIN — SULFASALAZINE 500 MG: 500 TABLET ORAL at 20:50

## 2024-07-25 ASSESSMENT — PAIN SCALES - GENERAL
PAINLEVEL_OUTOF10: 0 - NO PAIN
PAINLEVEL_OUTOF10: 6
PAINLEVEL_OUTOF10: 5 - MODERATE PAIN
PAINLEVEL_OUTOF10: 3
PAINLEVEL_OUTOF10: 2
PAINLEVEL_OUTOF10: 3
PAINLEVEL_OUTOF10: 5 - MODERATE PAIN
PAINLEVEL_OUTOF10: 6
PAINLEVEL_OUTOF10: 0 - NO PAIN
PAINLEVEL_OUTOF10: 6

## 2024-07-25 ASSESSMENT — ENCOUNTER SYMPTOMS
SHORTNESS OF BREATH: 1
SHORTNESS OF BREATH: 0
NAUSEA: 1
WHEEZING: 0
CHILLS: 0
PALPITATIONS: 0
FATIGUE: 1
VOMITING: 0
FATIGUE: 0
CHEST TIGHTNESS: 0
ABDOMINAL PAIN: 0
CONSTIPATION: 0
WEAKNESS: 1
ABDOMINAL DISTENTION: 0
DIZZINESS: 1
COUGH: 0
DIAPHORESIS: 0
DIARRHEA: 0
FEVER: 0

## 2024-07-25 ASSESSMENT — PAIN SCALES - PAIN ASSESSMENT IN ADVANCED DEMENTIA (PAINAD): TOTALSCORE: MEDICATION (SEE MAR)

## 2024-07-25 ASSESSMENT — COGNITIVE AND FUNCTIONAL STATUS - GENERAL
MOVING TO AND FROM BED TO CHAIR: A LITTLE
DAILY ACTIVITIY SCORE: 24
WALKING IN HOSPITAL ROOM: A LITTLE
MOBILITY SCORE: 20
STANDING UP FROM CHAIR USING ARMS: A LITTLE
CLIMB 3 TO 5 STEPS WITH RAILING: A LITTLE

## 2024-07-25 ASSESSMENT — PAIN DESCRIPTION - LOCATION
LOCATION: BACK
LOCATION: BACK
LOCATION: HEAD
LOCATION: HEAD
LOCATION: BACK

## 2024-07-25 ASSESSMENT — PAIN - FUNCTIONAL ASSESSMENT
PAIN_FUNCTIONAL_ASSESSMENT: 0-10

## 2024-07-25 ASSESSMENT — PAIN DESCRIPTION - ORIENTATION: ORIENTATION: LEFT

## 2024-07-25 NOTE — CONSULTS
Do you have any home inhalers?   yes   Do you get relief when using it?    yes   Spacer education and have them teach back. yes  Do you have a pulmonary Dr.? no   Do you currently smoke or vape or have you ever?   never  Do you have a Primary Dr.? Fredy Elias, DO    This RT to see patient for COPD consult. The patient was given a COPD booklet with educational materials regarding pulmonary issues. I educated patient about the disease process and how it affected the lungs making it difficult to breathe. Patient given  pulmonary office phone number to make an appt. Patient was very receptive to all information given.    Spacer- The patient was given an aerochamber (spacer) to be administered with a meter dose inhaler at home. I instructed the patient on how to use the spacer with the proper technique to get the best results. In return, the patient demonstrated spacer training appropriately with a good understanding of how it is utilized. I also discussed the names, reasons, and side effects of respiratory medications that are prescribed at home, including does and frequency.    Peak flow- The patient was given a peak flow meter to help measure the rate of forced air out of the lungs. This can be used to help recognize the early onset of breathing issues that can lead to an asthma attack by monitoring the normal values predicted for this patient. I instructed the patient to use appropriate respiratory medications prescribed helping alleviate symptoms by following the asthma action plan discussed.

## 2024-07-25 NOTE — CONSULTS
"Nutrition Assessment Note  Nutrition Assessment      Reason for Assessment  Reason for Assessment: Admission nursing screening (MST= 2, weight loss, eating poorly)    History:  Food and Nutrient History  Energy Intake: Good > 75 %  Food and Nutrient History: Pt off floor at time of visit; RN reports appetite is good. Weights stable per EMR review. Skin intact, no edema noted. Intervention not indicated at this time. Re-consult as needed    Anthropometrics:  Height: 162.6 cm (5' 4\")  Weight: 72.2 kg (159 lb 1.6 oz)  BMI (Calculated): 27.3    Weight Change: 2.65  Wt Readings from Last 10 Encounters:   07/24/24 72.2 kg (159 lb 1.6 oz)   07/16/24 70.3 kg (155 lb)   06/05/24 71.2 kg (157 lb)   05/13/24 72.6 kg (160 lb)   05/02/24 72.6 kg (160 lb 0.9 oz)   04/22/24 70.8 kg (156 lb)   04/15/24 69.9 kg (154 lb)   03/07/24 65.9 kg (145 lb 3 oz)   02/25/24 72.8 kg (160 lb 8 oz)   12/06/23 70 kg (154 lb 5.2 oz)          Follow Up  Time Spent (min): 15 minutes  Last Date of Nutrition Visit: 07/25/24  Nutrition Follow-Up Needed?: Dietitian to reassess per policy  Follow up Comment: Intervention not indicated       "

## 2024-07-25 NOTE — CARE PLAN
The clinical goals for the shift include Pt will deny chest pain or pressure this shift    Over the shift, the patient did not make progress toward the following goals. Barriers to progression include none. Recommendations to address these barriers include none.

## 2024-07-25 NOTE — CONSULTS
Inpatient consult to Cardiology  Consult performed by: Amelia Kiser, NAILA-CNP  Consult ordered by: Latoya Augustin DO  Reason for consult: elevated troponin, chest pain          History Of Present Illness  Trina Elias is a 73 y.o. female presenting with complaints of nausea, dizziness, poor PO intake, and chest pressure. She had a watchman implant placed on July 16th at [facility]. She states when she got home she was very weak, dizzy and had nausea. She would try to eat or drink something and feel like she was going to throw up. The chest pressure would worsen with deep breathing and movement.     Lab work in the ER showed glucose 94, sodium 139, potassium 3.9, BUN/Cr 26/1.22, magnesium 2.41, lactate 1.0, , troponin 134, INR 1.1, WBC 9.3, H&H 8.9/28.9, CTA of the chest negative for PE, showed increased lung heterogeneity/mosaic attenuation may be 2/2 air trapping, atelectasis, obstructive small airway disease or pulmonary arterial hypertension. CXR showed cardiomegaly without acute infiltrates.    EKG showed atrial paced, non specific T wave abnormality. /47 on arrival.       Past Medical History  Past Medical History:   Diagnosis Date    Asthma ([facility])     Atrial fibrillation (Multi)     Resistent to treatment: s/p DCCVs and ablations    Central sleep apnea     Per PSG 11/20/18; severe central sleep apnea, .6, mild obstructive component    Cervical myofascial pain syndrome     Baclofen    Chronic anemia     CKD stage 3a, GFR 45-59 ml/min (Multi)     COPD (chronic obstructive pulmonary disease) (Multi)     DVT of axillary vein, acute right (Multi) 01/20/2018    When PICC line was removed. U/S + Partial nonocclusive DVT in the axillary and proximal basilic vein in 2018    H/O being hospitalized 02/2024 Feb 24-Mar 5, 2024:  (2/24) admitted for dyspnea and chest pain work-up and found to have pleural effusions.  Found to have acute blood loss anemia s/p 2u PRBC. EGD neg for active  bleed.  Cardiology consulted - plan for Watchman device WING closure as out pt.    History of Helicobacter pylori infection     3/2013, 1/2021    Hx of syncope     Recurrent Syncope d/t VT- s/p ILR (12/2012) implanted.  (July 2013) Episode of syncope that appears to be correlated with an 11 second round of ventricular tachycardia recorded by the loop recorder.  S/p EPS (June 2014) neg for inducible arrhythmias. (ILR removed when ICD implanted)    ICD (implantable cardioverter-defibrillator) in place     Placed 06/12/2014    Mitral valve regurgitation     Mild-Moderate per Echo 02/25/2024    Moderate aortic stenosis by prior echocardiogram     Echo 2/25/2024    Osteopenia 06/26/2023    Personal history of anaphylaxis     See allergy list    Psoriasis     PUD (peptic ulcer disease)     H/O nonbleeding gastric ulcers with small hiatal hernia on 1/6/2021 EGD    Pulmonary hypertension (Multi)     Mild - Moderate per Echo 2/25/2024    Radiculopathy, lumbar region 08/13/2018    Acute lumbar radiculopathy    Restless legs syndrome 11/17/2015    Restless legs syndrome    Rheumatoid arthritis (Multi)     Seasonal allergic rhinitis due to pollen     Small bowel obstruction (Multi) 06/26/2023    H/O due to Severe Adhesions s/p Adhesiolysis x2 in 2015 and 2020    Wide-complex tachycardia     Per cardiology 3/7/2024: VT is on a basis of triggered activity certainly made worse with anemia and prednisone. Since she is asymptomatic we will continue to observe.       Surgical History  Past Surgical History:   Procedure Laterality Date    ABDOMINAL ADHESION SURGERY  10/22/2015    Exploratory laparotomy. Extensive lysis of adhesions. Small-bowel repair.    ABDOMINAL ADHESION SURGERY  10/18/2020    Exploratory laparotomy, massive lysis of adhesions, small-bowel resection, decompression of small bowel and removal of mesh.    ABLATION OF DYSRHYTHMIC FOCUS  05/02/2022 09/17/2018, 05/02/2022 for A FIB    ACHILLES TENDON SURGERY Right      ANTERIOR CERVICAL DISCECTOMY W/ FUSION  2016    Anterior C5 and C6 discectomies and interbody fusion of C5-C6, C6-C7    APPENDECTOMY      Open surgery    BREAST BIOPSY Left 2022    CARDIAC ASSIST DEVICE INSERTION  2012    Loop recorder placement , Removed in  w/ ICD placed    CARDIAC CATHETERIZATION  2022    Normal coronary arteries. Tachycardia induced Cardiomyopathy.    CARDIAC CATHETERIZATION N/A 2024    Procedure: LAAO (Left Atrial Appendage Occlusion);  Surgeon: Reid Dickinson MD;  Location: [place] [address] Cardiac Cath Lab;  Service: Cardiovascular;  Laterality: N/A;  Same day CT at 0815    CARDIAC DEFIBRILLATOR PLACEMENT  2014    Dual chamber ICD    CARDIOVERSION  2018, 2018    CARPAL TUNNEL RELEASE Bilateral      SECTION, CLASSIC      x2    CHOLECYSTECTOMY      Open surgery    COLONOSCOPY  2024    Extensive diverticulosis of moderate severity and causing mild luminal narrowing in the sigmoid colon    FINGER SURGERY Left 2008    Left index finger metacarpophalangeal fusion    FOOT SURGERY Bilateral     B/L Tarsal Tunnel Release    IR INJECTION EPIDURAL STEROID  2011    Lumbar spine; , ,     IR INJECTION EPIDURAL STEROID  2007    Cervical spine 2007    LEG SURGERY Right 2006    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  Plantar wart removal R foot.    NERVE SURGERY Right 10/13/2010    Release of Right tarsal tunnel syndrome w/ severe adhesion scar tissue    NISSEN FUNDOPLICATION  1991    Open surgery    PICC LINE INSERTION WO IMAGING  10/24/2015    Placed for stable IV access    REVERSE TOTAL SHOULDER ARTHROPLASTY Bilateral 2024    Right 2023, Left 2024    ROTATOR CUFF REPAIR      ULNAR NERVE TRANSPOSITION Left     VENTRAL HERNIA REPAIR  06/15/2015    Laparoscopic double ventral hernia repair.    WISDOM TOOTH EXTRACTION      WOUND DEBRIDEMENT  2020     Excisional debridement of abdominal wound for wound dehiscence        Social History  She reports that she has never smoked. She has never used smokeless tobacco. She reports that she does not drink alcohol and does not use drugs.    Family History  Family History   Problem Relation Name Age of Onset    Asthma Daughter      Asthma Other      Colon cancer Other      Diabetes Other      Other (stroke syndrome) Other          Allergies  Abatacept, Hydrocodone-acetaminophen, Hydromorphone, Metoprolol, Penicillins, Pregabalin, Prochlorperazine, Methotrexate, Aripiprazole, Beta-blockers (beta-adrenergic blocking agts), Bupropion, Cefepime, Ciprofloxacin, Furosemide, Levofloxacin, and Pineapple    Review of Systems  Review of Systems   Constitutional:  Positive for fatigue. Negative for chills and fever.   Respiratory:  Negative for cough and shortness of breath.    Cardiovascular:  Positive for chest pain. Negative for palpitations and leg swelling.   Gastrointestinal:  Positive for nausea.   Musculoskeletal:  Negative for gait problem.   Neurological:  Positive for dizziness and weakness.   All other systems reviewed and are negative.         Physical Exam  Constitutional: Well developed, awake/alert/oriented x3, no distress, alert and cooperative  Eyes: PERRL, EOMI, clear sclera  ENMT: mucous membranes moist, no apparent injury, no lesions seen  Head/Neck: Neck supple, no apparent injury, thyroid without mass or tenderness, No JVD, trachea midline, no bruits  Respiratory/Thorax: Patent airways, CTAB, normal breath sounds with good chest expansion, thorax symmetric  Cardiovascular: Regular, rate and rhythm, no murmurs, 2+ equal pulses of the extremities, normal S 1and S 2  Gastrointestinal: Nondistended, soft, non-tender, no rebound tenderness or guarding, no masses palpable, no organomegaly, +BS, no bruits  Musculoskeletal: ROM intact, no joint swelling, normal strength  Extremities: normal extremities, no cyanosis  "edema, contusions or wounds, no clubbing  Neurological: alert and oriented x3, intact senses, motor, response and reflexes, normal strength  Lymphatic: No significant lymphadenopathy  Psychological: Appropriate mood and behavior  Skin: Warm and dry, no lesions, no rashes       Last Recorded Vitals  Blood pressure 96/56, pulse 60, temperature 36.6 °C (97.9 °F), temperature source Temporal, resp. rate 17, height 1.626 m (5' 4\"), weight 72.2 kg (159 lb 1.6 oz), SpO2 98%.    Relevant Results    Scheduled medications  acetaminophen, 975 mg, oral, q6h  baclofen, 10 mg, oral, q AM  baclofen, 20 mg, oral, Nightly  [Held by provider] bumetanide, 2 mg, oral, BID  clopidogrel, 75 mg, oral, Daily  digoxin, 125 mcg, oral, Daily  dilTIAZem CD, 120 mg, oral, Nightly  heparin (porcine), 5,000 Units, subcutaneous, q8h  hydroxychloroquine, 300 mg, oral, Daily  lactated Ringer's, 1,000 mL, intravenous, Once  [Held by provider] magnesium oxide, 400 mg, oral, Daily  montelukast, 10 mg, oral, Nightly  pantoprazole, 40 mg, oral, BID  polyethylene glycol, 17 g, oral, Daily  rosuvastatin, 20 mg, oral, Daily  sulfaSALAzine, 500 mg, oral, BID      Continuous medications     PRN medications  PRN medications: albuterol, famotidine, nitroglycerin, ondansetron, oxyCODONE, oxyCODONE, sucralfate    Results for orders placed or performed during the hospital encounter of 07/24/24 (from the past 24 hour(s))   Troponin I, High Sensitivity   Result Value Ref Range    Troponin I, High Sensitivity 338 (HH) 0 - 13 ng/L   ECG 12 lead   Result Value Ref Range    Ventricular Rate 60 BPM    Atrial Rate 500 BPM    VA Interval 276 ms    QRS Duration 92 ms    QT Interval 418 ms    QTC Calculation(Bazett) 418 ms    R Axis 37 degrees    T Axis 219 degrees    QRS Count 10 beats    Q Onset 218 ms    T Offset 427 ms    QTC Fredericia 418 ms   Urinalysis with Reflex Culture and Microscopic   Result Value Ref Range    Color, Urine Light-Yellow Light-Yellow, Yellow, " Dark-Yellow    Appearance, Urine Clear Clear    Specific Gravity, Urine 1.039 (N) 1.005 - 1.035    pH, Urine 6.0 5.0, 5.5, 6.0, 6.5, 7.0, 7.5, 8.0    Protein, Urine NEGATIVE NEGATIVE, 10 (TRACE), 20 (TRACE) mg/dL    Glucose, Urine Normal Normal mg/dL    Blood, Urine NEGATIVE NEGATIVE    Ketones, Urine NEGATIVE NEGATIVE mg/dL    Bilirubin, Urine NEGATIVE NEGATIVE    Urobilinogen, Urine Normal Normal mg/dL    Nitrite, Urine NEGATIVE NEGATIVE    Leukocyte Esterase, Urine NEGATIVE NEGATIVE   Extra Urine Gray Tube   Result Value Ref Range    Extra Tube Hold for add-ons.    Troponin I, High Sensitivity   Result Value Ref Range    Troponin I, High Sensitivity 332 (HH) 0 - 13 ng/L   Troponin I, High Sensitivity   Result Value Ref Range    Troponin I, High Sensitivity 355 (HH) 0 - 13 ng/L   ECG 12 lead   Result Value Ref Range    Ventricular Rate 60 BPM    Atrial Rate 300 BPM    AZ Interval 290 ms    QRS Duration 86 ms    QT Interval 408 ms    QTC Calculation(Bazett) 408 ms    R Axis 55 degrees    T Axis -81 degrees    QRS Count 10 beats    Q Onset 220 ms    T Offset 424 ms    QTC Fredericia 408 ms   Magnesium   Result Value Ref Range    Magnesium 2.49 (H) 1.60 - 2.40 mg/dL   Renal Function Panel   Result Value Ref Range    Glucose 92 74 - 99 mg/dL    Sodium 139 136 - 145 mmol/L    Potassium 3.8 3.5 - 5.3 mmol/L    Chloride 104 98 - 107 mmol/L    Bicarbonate 29 21 - 32 mmol/L    Anion Gap 10 10 - 20 mmol/L    Urea Nitrogen 23 6 - 23 mg/dL    Creatinine 1.20 (H) 0.50 - 1.05 mg/dL    eGFR 48 (L) >60 mL/min/1.73m*2    Calcium 8.5 (L) 8.6 - 10.3 mg/dL    Phosphorus 4.6 2.5 - 4.9 mg/dL    Albumin 3.4 3.4 - 5.0 g/dL   CBC   Result Value Ref Range    WBC 7.0 4.4 - 11.3 x10*3/uL    nRBC 0.0 0.0 - 0.0 /100 WBCs    RBC 3.56 (L) 4.00 - 5.20 x10*6/uL    Hemoglobin 8.2 (L) 12.0 - 16.0 g/dL    Hematocrit 26.9 (L) 36.0 - 46.0 %    MCV 76 (L) 80 - 100 fL    MCH 23.0 (L) 26.0 - 34.0 pg    MCHC 30.5 (L) 32.0 - 36.0 g/dL    RDW 15.9 (H) 11.5 -  14.5 %    Platelets 222 150 - 450 x10*3/uL   Troponin I, High Sensitivity   Result Value Ref Range    Troponin I, High Sensitivity 323 (HH) 0 - 13 ng/L   Transthoracic Echo (TTE) Complete   Result Value Ref Range    BSA 1.81 m2       Transthoracic Echo (TTE) Complete         XR chest 1 view   Final Result   Limited study. Cardiomegaly without acute infiltrates.        Signed by: Javier Dawson 7/24/2024 1:34 PM   Dictation workstation:   [id number]      CT angio chest for pulmonary embolism   Final Result   1. Allowing for presumed artifact no evidence of acute pulmonary   embolism.   2. Increased lung heterogeneity/mosaic attenuation, may be secondary   to air trapping, atelectasis, obstructive small airways disease or   pulmonary arterial hypertension.        MACRO:   None        Signed by: Jassi Cha 7/24/2024 1:25 PM   Dictation workstation:   [id number]          Transthoracic Echo (TTE) Limited    Result Date: 7/16/2024   [facility], [address] [place] [address]                Tel [phone] and Fax [phone] TRANSTHORACIC ECHOCARDIOGRAM REPORT  Patient Name:      VERNON SALDANA VIA         Reading Physician:    [id number] Monica Chen MD Study Date:        7/16/2024            Ordering Provider:    [id number] KRISTAL SNYDER MRN/PID:           [id number]             Fellow: Accession#:        [id number]         Nurse: Date of Birth/Age: 1950 / 73 years Sonographer:          Tobi Carlos RDCS Gender:            F                    Additional Staff: Height:            162.56 cm            Admit Date:           7/16/2024 Weight:            70.31 kg             Admission Status:     Inpatient -                                                               Routine BSA / BMI:         1.76 m2 /  26.61      Encounter#:           [id number]                    kg/m2 Blood Pressure:    /                    Department Location:  [facility]                                                               Cath Lab Study Type:    TRANSTHORACIC ECHO (TTE) LIMITED Diagnosis/ICD: Unspecified atrial fibrillation-I48.91 Indication:    Atrial fibrillation; Preop cardiovascular exam CPT Code:      Echo Limited-10866 Patient History: Pertinent History: A-fib; CAD; BARBARA; HTN; HLD; CKD; PPM. Study Detail: The following Echo studies were performed: 2D and M-Mode.               Technically challenging study due to body habitus and patient               lying in supine position.  PHYSICIAN INTERPRETATION: Left Ventricle: Left ventricular ejection fraction is low normal, by visual estimate at 50-55%. The patient is in atrial fibrillation which may influence the estimate of left ventricular function and transvalvular flows. Wall motion is abnormal. The left ventricular cavity size was not assessed. Left ventricular diastolic filling was not assessed. Possible inferior wall motion hypokinesis. Left Atrium: The left atrium was not assessed. Right Ventricle: The right ventricle was not well visualized. There is normal right ventricular global systolic function. A device is visualized in the right ventricle. Right Atrium: The right atrium was not assessed. There is a device visualized in the right atrium. Aortic Valve: The aortic valve is trileaflet. There is mild aortic valve cusp calcification. Aortic valve regurgitation was not assessed. Mitral Valve: The mitral valve is mildly thickened. There is moderate mitral annular calcification. Mitral valve regurgitation was not assessed. Tricuspid Valve: The tricuspid valve was not well visualized. Tricuspid regurgitation was not assessed. Pulmonic Valve: The pulmonic valve is not well visualized. Pulmonic valve regurgitation was not assessed. Pericardium: There is a trivial pericardial  effusion. Aorta: The aortic root is normal. Systemic Veins: The inferior vena cava appears to be of normal size. There is IVC inspiratory collapse greater than 50%. In comparison to the previous echocardiogram(s): Compared with the prior exam from 2/27/2024 there are no significant changes though today's exam is only a limited study withno assessment of valvular function.  CONCLUSIONS:  1. Left ventricular ejection fraction is low normal, by visual estimate at 50-55%.  2. Possible inferior wall motion hypokinesis.  3. There is normal right ventricular global systolic function.  4. There is moderate mitral annular calcification.  5. Compared with the prior exam from 2/27/2024 there are no significant changes though today's exam is only a limited study withno assessment of valvular function.  6. The patient is in atrial fibrillation which may influence the estimate of left ventricular function and transvalvular flows. QUANTITATIVE DATA SUMMARY: AORTA MEASUREMENTS:                      Normal Ranges: Ao Sinus, d: 3.45 cm (2.1-3.5cm) LV SYSTOLIC FUNCTION BY 2D PLANIMETRY (MOD):                      Normal Ranges: EF-Visual:      53 % LV EF Reported: 53 % TRICUSPID VALVE/RVSP:                   Normal Ranges: IVC Diam: 2.03 cm  [id number] Monica Chen MD Electronically signed on 7/16/2024 at 3:11:21 PM  ** Final **     Transthoracic Echo (TTE) Limited    Result Date: 7/16/2024   [facility], [address] [place] [address]                Tel [phone] and Fax [phone] TRANSTHORACIC ECHOCARDIOGRAM REPORT  Patient Name:      VERNON SALDANA VIA         Reading Physician:    [id number] Neftali Calvillo MD Study Date:        7/16/2024            Ordering Provider:    [id number] KRISTAL SNYDER MRN/PID:           [id number]             Fellow:               [id number] Verdugo                                                                Jairo JOVEL Accession#:        [id number]         Nurse: Date of Birth/Age: 1950 / 73 years Sonographer:          Tobi Carlos RDCS Gender:            F                    Additional Staff: Height:            162.56 cm            Admit Date:           7/16/2024 Weight:            71.22 kg             Admission Status:     Inpatient -                                                               Routine BSA / BMI:         1.76 m2 / 26.95      Encounter#:           [id number]                    kg/m2 Blood Pressure:    117/55 mmHg          Department Location:  [facility]                                                               Cath Lab Study Type:    TRANSTHORACIC ECHO (TTE) LIMITED Diagnosis/ICD: Other specified postprocedural states-Z98.890 Indication:    Post LAAO CPT Code:      Echo Limited-37774 Patient History: Pertinent History: CAD; A-fib; BARBARA: HTN; HLD; CKD; PPM. Study Detail: The following Echo studies were performed: 2D and M-Mode.               Technically challenging study due to body habitus.  PHYSICIAN INTERPRETATION: Left Ventricle: Left ventricular ejection fraction is low normal, by visual estimate at 50-55%. There are no regional wall motion abnormalities. The left ventricular cavity size is normal. Left ventricular diastolic filling was not assessed. Left Atrium: The left atrium was not assessed. Right Ventricle: The right ventricle was not assessed. Right ventricular systolic function not assessed. A device is visualized in the right ventricle. Right Atrium: The right atrium was not assessed. There is a device visualized in the right atrium. Aortic Valve: The aortic valve is trileaflet. There is mild aortic valve cusp calcification. Aortic valve regurgitation was not assessed. Mitral Valve: The mitral valve is mildly thickened. There is moderate mitral annular calcification. Mitral  valve regurgitation was not assessed. Tricuspid Valve: The tricuspid valve was not well visualized. Tricuspid regurgitation was not assessed. Right ventricular systolic pressure could not be accurately assessed in this patient. Pulmonic Valve: The pulmonic valve was not assessed. Pulmonic valve regurgitation was not assessed. Pericardium: There is a trivial pericardial effusion. Aorta: The aortic root is normal. Systemic Veins: The inferior vena cava was not well visualized. In comparison to the previous echocardiogram(s): Compared with study dated 2/27/2024, no significant change.  CONCLUSIONS:  1. Left ventricular ejection fraction is low normal, by visual estimate at 50-55%.  2. There is moderate mitral annular calcification.  3. There is a trivial pericardial effusion.  4. Compared with study dated 2/27/2024, no significant change. QUANTITATIVE DATA SUMMARY: LV SYSTOLIC FUNCTION BY 2D PLANIMETRY (MOD):                      Normal Ranges: EF-Visual:      53 % LV EF Reported: 53 %  [id number] Neftali Calvillo MD Electronically signed on 7/16/2024 at 3:02:13 PM  ** Final **     Transthoracic Echo (TTE) Complete    Result Date: 2/27/2024   [facility], [address] [place] [address]               Tel [phone] and Fax [phone] TRANSTHORACIC ECHOCARDIOGRAM REPORT  Patient Name:      VERNON SALDANA VIA         Reading Physician:    [id number] Nohelia Soto MD Study Date:        2/27/2024            Ordering Provider:    [id number] NIKOLE LOVE MRN/PID:           [id number]             Fellow: Accession#:        [id number]         Nurse: Date of Birth/Age: 1950 / 73 years Sonographer:          Dai Diaz                                                               [facility] Gender:            F                    Additional Staff: Height:            162.56 cm             Admit Date:           2/24/2024 Weight:            72.57 kg             Admission Status:     Inpatient -                                                               Routine BSA / BMI:         1.78 m2 / 27.46      Encounter#:           [id number]                    kg/m2                                         Department Location:  [facility]                                                               Invasive Blood Pressure: 131 /59 mmHg Study Type:    TRANSTHORACIC ECHO (TTE) COMPLETE Diagnosis/ICD: Shortness of breath-R06.02 Indication:    Dyspnea CPT Code:      Echo Complete w Full Doppler-[id number] Patient History: Pertinent History: A-Fib and Chest Pain. elevated troponin, elevated BNP, mod                    AS. Study Detail: The following Echo studies were performed: 2D, M-Mode, Doppler and               color flow.  PHYSICIAN INTERPRETATION: Left Ventricle: The left ventricular systolic function is normal, with an estimated ejection fraction of 55-60%. There are no regional wall motion abnormalities. The left ventricular cavity size is normal. Spectral Doppler shows an impaired relaxation pattern of left ventricular diastolic filling. Left Atrium: The left atrium is mildly dilated. Right Ventricle: The right ventricle is normal in size. There is normal right ventricular global systolic function. Right Atrium: The right atrium is normal in size. Aortic Valve: The aortic valve is trileaflet. There is mild to moderate aortic valve cusp calcification. There is evidence of moderate aortic valve stenosis. There is trivial aortic valve regurgitation. The peak instantaneous gradient of the aortic valve is 27.5 mmHg. The mean gradient of the aortic valve is 16.0 mmHg. Mitral Valve: The mitral valve is mildly thickened. There is evidence of mild mitral valve stenosis. The doppler estimated mean and peak diastolic pressure gradients are 7.2 mmHg and 16.3 mmHg respectively. There is mild mitral annular  calcification. There is mild to moderate mitral valve regurgitation. Tricuspid Valve: The tricuspid valve is structurally normal. There is mild to moderate tricuspid regurgitation. Pulmonic Valve: The pulmonic valve is not well visualized. There is trace to mild pulmonic valve regurgitation. Pericardium: There is no pericardial effusion noted. Aorta: The aortic root is normal. Pulmonary Artery: The tricuspid regurgitant velocity is 3.46 m/s, and with an estimated right atrial pressure of 3 mmHg, the estimated pulmonary artery pressure is mild to moderately elevated with the RVSP at 50.9 mmHg.  CONCLUSIONS:  1. Left ventricular systolic function is normal with a 55-60% estimated ejection fraction.  2. Spectral Doppler shows an impaired relaxation pattern of left ventricular diastolic filling.  3. Mild to moderate mitral valve regurgitation.  4. Mild to moderate tricuspid regurgitation visualized.  5. Moderate aortic valve stenosis.  6. Mild to moderately elevated pulmonary artery pressure. QUANTITATIVE DATA SUMMARY: 2D MEASUREMENTS:                           Normal Ranges: IVSd:          0.97 cm    (0.6-1.1cm) LVPWd:         0.98 cm    (0.6-1.1cm) LVIDd:         5.70 cm    (3.9-5.9cm) LVIDs:         4.26 cm LV Mass Index: 123.2 g/m2 LV % FS        25.2 % LA VOLUME:                              Normal Ranges: LA Vol A4C:       77.4 ml    (22+/-6mL/m2) LA Vol A2C:       83.5 ml LA Vol BP:        82.1 ml LA Vol Index A4C: 43.5 ml/m2 LA Vol Index A2C: 46.9 ml/m2 LA Vol Index BP:  46.1 ml/m2 LA Volume Index:  46.1 ml/m2 LA Vol A4C:       72.2 ml LA Vol A2C:       77.0 ml RA VOLUME BY A/L METHOD:                       Normal Ranges: RA Area A4C: 21.8 cm2 M-MODE MEASUREMENTS:                  Normal Ranges: Ao Root: 3.30 cm (2.0-3.7cm) LAs:     4.72 cm (2.7-4.0cm) LV SYSTOLIC FUNCTION BY 2D PLANIMETRY (MOD):                     Normal Ranges: EF-A4C View: 55.9 % (>=55%) EF-A2C View: 56.5 % EF-Biplane:  55.9 % LV DIASTOLIC  FUNCTION:                             Normal Ranges: MV Peak E:      1.91 m/s    (0.7-1.2 m/s) MV Peak A:      0.93 m/s    (0.42-0.7 m/s) E/A Ratio:      2.06        (1.0-2.2) MV e'           0.09 m/s    (>8.0) MV lateral e'   0.09 m/s MV medial e'    0.08 m/s E/e' Ratio:     21.21       (<8.0) PulmV Sys Ham:  62.35 cm/s PulmV Alvarez Ham: 121.32 cm/s PulmV S/D Ham:  0.51 MITRAL VALVE:                       Normal Ranges: MV Vmax:    2.02 m/s  (<=1.3m/s) MV peak P.3 mmHg (<5mmHg) MV mean P.2 mmHg  (<2mmHg) MV VTI:     48.44 cm  (10-13cm) MV DT:      167 msec  (150-240msec) MITRAL INSUFFICIENCY:                         Normal Ranges: MR VTI:     192.66 cm MR Vmax:    568.98 cm/s MR Volume:  33.56 ml MR Flow Rt: 99.12 ml/s MR EROA:    0.17 cm2 AORTIC VALVE:                                    Normal Ranges: AoV Vmax:                2.62 m/s  (<=1.7m/s) AoV Peak P.5 mmHg (<20mmHg) AoV Mean P.0 mmHg (1.7-11.5mmHg) LVOT Max Ham:            0.99 m/s  (<=1.1m/s) AoV VTI:                 58.21 cm  (18-25cm) LVOT VTI:                21.96 cm LVOT Diameter:           1.95 cm   (1.8-2.4cm) AoV Area, VTI:           1.13 cm2  (2.5-5.5cm2) AoV Area,Vmax:           1.13 cm2  (2.5-4.5cm2) AoV Dimensionless Index: 0.38 AORTIC INSUFFICIENCY: AI Vmax:       3.91 m/s AI Half-time:  520 msec AI Decel Time: 1794 msec AI Decel Rate: 218.17 cm/s2  RIGHT VENTRICLE: RV Basal 4.30 cm RV Mid   2.60 cm RV Major 7.7 cm TAPSE:   27.0 mm RV s'    0.18 m/s TRICUSPID VALVE/RVSP:                             Normal Ranges: Peak TR Velocity: 3.46 m/s RV Syst Pressure: 50.9 mmHg (< 30mmHg) PULMONIC VALVE:                      Normal Ranges: PV Max Ham: 1.5 m/s  (0.6-0.9m/s) PV Max P.8 mmHg Pulmonary Veins: PulmV Alvarez Ham: 121.32 cm/s PulmV S/D Ham:  0.51 PulmV Sys Ham:  62.35 cm/s  [id number] Nohelia Soto MD Electronically signed on 2024 at 11:22:42 AM  ** Final **         Assessment/Plan   [facility] in  showed  normal coronary arteries    Echo from July 16th:  1. Left ventricular ejection fraction is low normal, by visual estimate at 50-55%.   2. There is moderate mitral annular calcification.   3. There is a trivial pericardial effusion.      Chest pain, elevated troponins  -Just had watchman procedure July 16th  -[facility] as above  -CTA negative  -Troponin peaked at 355, now downtrending  -EKG atrial paced with nonspecific T wave abnormalities  -Echo reviewed by Dr. Larson, mildly decreased LVEF, no pericardial effusion  -Chest pain and elevated troponins most likely from recent procedure  -Cont plavix, statin  -Allergy to BB  -Possibly GERD, cont PPI    2. Paroxysmal atrial fibrillation  -Currently atrial paced  -[employer] PCM/ICD interrogated, no episodes since prior to watchman, battery life 1 year  -Not on AC, has watchman  -Cont plavix  -Cont digoxin and diltiazem  -Check digoxin level  -Monitor on tele    3. Chronic anemia  -appears stable  -Monitor    4. Hyperlipidemia  -Cont statin    Discussed with Dr. Larson and hospitalist      NAILA Guerra-CNP

## 2024-07-25 NOTE — PROGRESS NOTES
"Subjective   Subjective:   History Of Present Illness:  Trina Elias is a 73 y.o. female with a PMH of multiple abdominal surgeries, A-fib, CAD, ICD/PPM placement, HLD, COPD/asthma, CKD, and RA presenting with substernal chest pain. She was seen and evaluated at bedside today. Overnight, patient was in a lot of pain so she received oxy which improved her pain. She endorses substernal pressure, SOB, and nausea. She denies any vomiting, chills, or fever. This morning the substernal pain was improved from yesterday, but she reports that it gets worse with exertion.     Review Of Systems:  11-point ROS was performed and is negative except as noted below and in the HPI.     Review of Systems   Constitutional:  Negative for chills, diaphoresis, fatigue and fever.   Respiratory:  Positive for shortness of breath. Negative for cough, chest tightness and wheezing.    Cardiovascular:  Positive for chest pain. Negative for palpitations and leg swelling.   Gastrointestinal:  Positive for nausea. Negative for abdominal distention, abdominal pain, constipation, diarrhea and vomiting.        Objective   Objective:     BP (!) 105/42 (Patient Position: Sitting)   Pulse 65   Temp 36.4 °C (97.5 °F) (Temporal)   Resp 18   Ht 1.626 m (5' 4\")   Wt 72.2 kg (159 lb 1.6 oz)   SpO2 96%   BMI 27.31 kg/m²     Physical Exam  Constitutional:       General: She is not in acute distress.     Appearance: Normal appearance. She is not ill-appearing.   Cardiovascular:      Rate and Rhythm: Normal rate and regular rhythm.      Pulses: Normal pulses.      Heart sounds: Normal heart sounds. No murmur heard.     No friction rub. No gallop.   Pulmonary:      Effort: Pulmonary effort is normal. No respiratory distress.      Breath sounds: Normal breath sounds. No wheezing or rhonchi.   Abdominal:      General: Abdomen is flat. Bowel sounds are normal. There is no distension.      Palpations: Abdomen is soft.      Tenderness: There is no abdominal " tenderness.   Neurological:      Mental Status: She is alert.       Lab Work:     Lab Results   Component Value Date    WBC 7.0 07/25/2024    HGB 8.2 (L) 07/25/2024    HCT 26.9 (L) 07/25/2024    MCV 76 (L) 07/25/2024     07/25/2024     Lab Results   Component Value Date    GLUCOSE 92 07/25/2024    CALCIUM 8.5 (L) 07/25/2024     07/25/2024    K 3.8 07/25/2024    CO2 29 07/25/2024     07/25/2024    BUN 23 07/25/2024    CREATININE 1.20 (H) 07/25/2024     Thyroid Stimulating Hormone   Date Value Ref Range Status   04/15/2024 0.92 0.44 - 3.98 mIU/L Final     TSH   Date Value Ref Range Status   08/28/2023 1.71 0.44 - 3.98 mIU/L Final     Comment:      TSH testing is performed using different testing    methodology at [facility] than at other    system hospitals. Direct result comparisons should    only be made within the same method.   12/19/2022 1.34 0.44 - 3.98 mIU/L Final     Comment:      TSH testing is performed using different testing    methodology at [facility] than at other    [facility]. Direct result comparisons should    only be made within the same method.       Vitamin D, 25-Hydroxy   Date Value Ref Range Status   10/18/2021 28 (A) ng/mL Final     Comment:     .  DEFICIENCY:         < 20   NG/ML  INSUFFICIENCY:      20-29  NG/ML  SUFFICIENCY:         NG/ML    THIS ASSAY ACCURATELY QUANTIFIES THE SUM OF  VITAMIN D3, 25-HYDROXY AND VIT D2,25-HYDROXY.       Cultures:   No results found for the last 90 days.    Images:     XR chest 1 view   Final Result   Limited study. Cardiomegaly without acute infiltrates.        Signed by: Javier Dawson 7/24/2024 1:34 PM   Dictation workstation:   [id number]      CT angio chest for pulmonary embolism   Final Result   1. Allowing for presumed artifact no evidence of acute pulmonary   embolism.   2. Increased lung heterogeneity/mosaic attenuation, may be secondary   to air trapping, atelectasis, obstructive small  airways disease or   pulmonary arterial hypertension.        MACRO:   None        Signed by: Jassi Cha 7/24/2024 1:25 PM   Dictation workstation:   [id number]         Medications:     Scheduled:  acetaminophen, 975 mg, oral, q6h  baclofen, 10 mg, oral, q AM  baclofen, 20 mg, oral, Nightly  [Held by provider] bumetanide, 2 mg, oral, BID  clopidogrel, 75 mg, oral, Daily  digoxin, 125 mcg, oral, Daily  dilTIAZem CD, 120 mg, oral, Nightly  heparin (porcine), 5,000 Units, subcutaneous, q8h  hydroxychloroquine, 300 mg, oral, Daily  magnesium oxide, 400 mg, oral, Daily  montelukast, 10 mg, oral, Nightly  pantoprazole, 40 mg, oral, BID  polyethylene glycol, 17 g, oral, Daily  rosuvastatin, 20 mg, oral, Daily  sulfaSALAzine, 500 mg, oral, BID    Continuous:     PRN:  PRN medications: albuterol, famotidine, nitroglycerin, ondansetron, oxyCODONE, oxyCODONE, sucralfate     Assessment & Plan:   Trina Elias is a 73 y.o. female with a PMH of multiple abdominal surgeries, A-fib, CAD, ICD/PPM placement, HLD, COPD/asthma, CKD, and RA admitted with substernal chest pain. S/p watchman procedure on 7/16/24.      ACUTE MEDICAL ISSUES:  #Chest pain: (CAD vs Pericarditis vs Pericardial effusion)  #Elevated troponins  - Constant heavy substernal chest pain  - EKG in ED: sinus rhythm, occasional PVCs, ventricular rate 71 normal QRS duration no prolonged QT/QTc no obvious ST elevation, depression or acute ischemic findings.   - Lipase normal   - CT angio showed no evidence of PE. CXR no acute pathologies.  - Troponin peaked at 355 (most recently 323)  - Hypotensive this morning (98/59) which is concerning for pericardial tamponade  PLAN:  - TTE pending (rule out pericardial effusion)  - Telemetry  - Cardiology not recommending heparin at this time  - Cardiology consulted (patient follows with Dr. Soto, but Dr. Larson on service this week)  - SL Nitroglycerin PRN and oxycodone PRN for chest pain     CHRONIC MEDICAL ISSUES:  #HLD -  continue home meds   #A-fib - continue home meds   #Asthma/COPD - continue home meds      Fluid: Replete PRN  Electrolytes: Replete PRN  Nutrition: Regular diet  GI ppx: Continue home meds  DVT/PE ppx: SubQ heparin  Abx: None   IV Lines: PIV  O2: RA     Dispo: Pending TTE. Estimated length of stay 1-2 days. Cardiology following.    Alli Vaughn, PGY-1  Transitional Year    Disclaimer: Documentation completed with the information available at the time of input. The times in the chart may not be reflective of actual patient care times, interventions, or procedures. Documentation occurs after the physical care of the patient.

## 2024-07-26 ENCOUNTER — APPOINTMENT (OUTPATIENT)
Dept: CARDIOLOGY | Facility: HOSPITAL | Age: 74
DRG: 392 | End: 2024-07-26
Payer: MEDICARE

## 2024-07-26 LAB
ALBUMIN SERPL BCP-MCNC: 3.3 G/DL (ref 3.4–5)
AMPHETAMINES UR QL SCN: ABNORMAL
ANION GAP SERPL CALC-SCNC: 12 MMOL/L (ref 10–20)
ATRIAL RATE: 66 BPM
BARBITURATES UR QL SCN: ABNORMAL
BENZODIAZ UR QL SCN: ABNORMAL
BUN SERPL-MCNC: 13 MG/DL (ref 6–23)
BZE UR QL SCN: ABNORMAL
CALCIUM SERPL-MCNC: 8.3 MG/DL (ref 8.6–10.3)
CANNABINOIDS UR QL SCN: ABNORMAL
CHLORIDE SERPL-SCNC: 105 MMOL/L (ref 98–107)
CO2 SERPL-SCNC: 26 MMOL/L (ref 21–32)
CREAT SERPL-MCNC: 0.97 MG/DL (ref 0.5–1.05)
EGFRCR SERPLBLD CKD-EPI 2021: 62 ML/MIN/1.73M*2
ERYTHROCYTE [DISTWIDTH] IN BLOOD BY AUTOMATED COUNT: 15.9 % (ref 11.5–14.5)
FENTANYL+NORFENTANYL UR QL SCN: ABNORMAL
GLUCOSE SERPL-MCNC: 79 MG/DL (ref 74–99)
HCT VFR BLD AUTO: 26.1 % (ref 36–46)
HGB BLD-MCNC: 7.8 G/DL (ref 12–16)
MAGNESIUM SERPL-MCNC: 2.49 MG/DL (ref 1.6–2.4)
MCH RBC QN AUTO: 23.1 PG (ref 26–34)
MCHC RBC AUTO-ENTMCNC: 29.9 G/DL (ref 32–36)
MCV RBC AUTO: 77 FL (ref 80–100)
METHADONE UR QL SCN: ABNORMAL
NRBC BLD-RTO: 0 /100 WBCS (ref 0–0)
OPIATES UR QL SCN: ABNORMAL
OXYCODONE+OXYMORPHONE UR QL SCN: ABNORMAL
PCP UR QL SCN: ABNORMAL
PHOSPHATE SERPL-MCNC: 4.1 MG/DL (ref 2.5–4.9)
PLATELET # BLD AUTO: 226 X10*3/UL (ref 150–450)
POTASSIUM SERPL-SCNC: 3.9 MMOL/L (ref 3.5–5.3)
Q ONSET: 219 MS
Q ONSET: 222 MS
QRS COUNT: 12 BEATS
QRS COUNT: 12 BEATS
QRS DURATION: 82 MS
QRS DURATION: 88 MS
QT INTERVAL: 396 MS
QT INTERVAL: 404 MS
QTC CALCULATION(BAZETT): 430 MS
QTC CALCULATION(BAZETT): 460 MS
QTC FREDERICIA: 419 MS
QTC FREDERICIA: 441 MS
R AXIS: 54 DEGREES
R AXIS: 55 DEGREES
RBC # BLD AUTO: 3.37 X10*6/UL (ref 4–5.2)
SODIUM SERPL-SCNC: 139 MMOL/L (ref 136–145)
T AXIS: 179 DEGREES
T AXIS: 34 DEGREES
T OFFSET: 420 MS
T OFFSET: 421 MS
VENTRICULAR RATE: 71 BPM
VENTRICULAR RATE: 78 BPM
WBC # BLD AUTO: 5.6 X10*3/UL (ref 4.4–11.3)

## 2024-07-26 PROCEDURE — 2500000004 HC RX 250 GENERAL PHARMACY W/ HCPCS (ALT 636 FOR OP/ED)

## 2024-07-26 PROCEDURE — 99232 SBSQ HOSP IP/OBS MODERATE 35: CPT

## 2024-07-26 PROCEDURE — 85027 COMPLETE CBC AUTOMATED: CPT

## 2024-07-26 PROCEDURE — 2500000001 HC RX 250 WO HCPCS SELF ADMINISTERED DRUGS (ALT 637 FOR MEDICARE OP)

## 2024-07-26 PROCEDURE — 93005 ELECTROCARDIOGRAM TRACING: CPT

## 2024-07-26 PROCEDURE — 80069 RENAL FUNCTION PANEL: CPT

## 2024-07-26 PROCEDURE — 80307 DRUG TEST PRSMV CHEM ANLYZR: CPT

## 2024-07-26 PROCEDURE — 2500000002 HC RX 250 W HCPCS SELF ADMINISTERED DRUGS (ALT 637 FOR MEDICARE OP, ALT 636 FOR OP/ED)

## 2024-07-26 PROCEDURE — 94760 N-INVAS EAR/PLS OXIMETRY 1: CPT

## 2024-07-26 PROCEDURE — 83735 ASSAY OF MAGNESIUM: CPT

## 2024-07-26 PROCEDURE — 1200000002 HC GENERAL ROOM WITH TELEMETRY DAILY

## 2024-07-26 PROCEDURE — 36415 COLL VENOUS BLD VENIPUNCTURE: CPT

## 2024-07-26 RX ORDER — PROCHLORPERAZINE MALEATE 5 MG
10 TABLET ORAL EVERY 6 HOURS PRN
Status: DISCONTINUED | OUTPATIENT
Start: 2024-07-26 | End: 2024-07-26

## 2024-07-26 RX ORDER — PROCHLORPERAZINE EDISYLATE 5 MG/ML
10 INJECTION INTRAMUSCULAR; INTRAVENOUS EVERY 6 HOURS PRN
Status: DISCONTINUED | OUTPATIENT
Start: 2024-07-26 | End: 2024-07-26

## 2024-07-26 RX ORDER — BUSPIRONE HYDROCHLORIDE 15 MG/1
7.5 TABLET ORAL ONCE
Status: COMPLETED | OUTPATIENT
Start: 2024-07-26 | End: 2024-07-26

## 2024-07-26 RX ORDER — PROCHLORPERAZINE 25 MG/1
25 SUPPOSITORY RECTAL EVERY 12 HOURS PRN
Status: DISCONTINUED | OUTPATIENT
Start: 2024-07-26 | End: 2024-07-26

## 2024-07-26 RX ADMIN — BUSPIRONE HYDROCHLORIDE 7.5 MG: 15 TABLET ORAL at 20:50

## 2024-07-26 RX ADMIN — OXYCODONE HYDROCHLORIDE 10 MG: 5 TABLET ORAL at 16:31

## 2024-07-26 RX ADMIN — ROSUVASTATIN CALCIUM 20 MG: 20 TABLET, FILM COATED ORAL at 09:03

## 2024-07-26 RX ADMIN — SULFASALAZINE 500 MG: 500 TABLET ORAL at 09:02

## 2024-07-26 RX ADMIN — HEPARIN SODIUM 5000 UNITS: 5000 INJECTION INTRAVENOUS; SUBCUTANEOUS at 16:30

## 2024-07-26 RX ADMIN — ACETAMINOPHEN 975 MG: 325 TABLET ORAL at 12:09

## 2024-07-26 RX ADMIN — ONDANSETRON 4 MG: 2 INJECTION INTRAMUSCULAR; INTRAVENOUS at 12:08

## 2024-07-26 RX ADMIN — ACETAMINOPHEN 975 MG: 325 TABLET ORAL at 06:07

## 2024-07-26 RX ADMIN — DIGOXIN 125 MCG: 125 TABLET ORAL at 09:03

## 2024-07-26 RX ADMIN — HEPARIN SODIUM 5000 UNITS: 5000 INJECTION INTRAVENOUS; SUBCUTANEOUS at 06:07

## 2024-07-26 RX ADMIN — BACLOFEN 20 MG: 10 TABLET ORAL at 20:50

## 2024-07-26 RX ADMIN — PROMETHAZINE HYDROCHLORIDE 12.5 MG: 25 INJECTION INTRAMUSCULAR; INTRAVENOUS at 18:08

## 2024-07-26 RX ADMIN — PANTOPRAZOLE SODIUM 40 MG: 40 TABLET, DELAYED RELEASE ORAL at 09:02

## 2024-07-26 RX ADMIN — HYDROXYCHLOROQUINE SULFATE 300 MG: 200 TABLET, FILM COATED ORAL at 09:02

## 2024-07-26 RX ADMIN — PANTOPRAZOLE SODIUM 40 MG: 40 TABLET, DELAYED RELEASE ORAL at 20:50

## 2024-07-26 RX ADMIN — SULFASALAZINE 500 MG: 500 TABLET ORAL at 20:55

## 2024-07-26 RX ADMIN — ACETAMINOPHEN 975 MG: 325 TABLET ORAL at 20:50

## 2024-07-26 RX ADMIN — DILTIAZEM HYDROCHLORIDE 120 MG: 120 CAPSULE, COATED, EXTENDED RELEASE ORAL at 20:50

## 2024-07-26 RX ADMIN — MONTELUKAST 10 MG: 10 TABLET, FILM COATED ORAL at 20:50

## 2024-07-26 RX ADMIN — CLOPIDOGREL 75 MG: 75 TABLET ORAL at 09:03

## 2024-07-26 RX ADMIN — BACLOFEN 10 MG: 10 TABLET ORAL at 09:03

## 2024-07-26 ASSESSMENT — ENCOUNTER SYMPTOMS
COUGH: 0
DIAPHORESIS: 1
CHILLS: 0
VOMITING: 0
DIARRHEA: 0
FATIGUE: 0
FEVER: 0
ABDOMINAL PAIN: 0
WHEEZING: 0
CONSTIPATION: 0
CHEST TIGHTNESS: 0
SHORTNESS OF BREATH: 1
PALPITATIONS: 0
NAUSEA: 1

## 2024-07-26 ASSESSMENT — PAIN SCALES - GENERAL
PAINLEVEL_OUTOF10: 6
PAINLEVEL_OUTOF10: 7
PAINLEVEL_OUTOF10: 7
PAINLEVEL_OUTOF10: 2
PAINLEVEL_OUTOF10: 0 - NO PAIN
PAINLEVEL_OUTOF10: 0 - NO PAIN

## 2024-07-26 ASSESSMENT — PAIN - FUNCTIONAL ASSESSMENT
PAIN_FUNCTIONAL_ASSESSMENT: 0-10

## 2024-07-26 ASSESSMENT — PAIN DESCRIPTION - LOCATION
LOCATION: BACK
LOCATION: BACK

## 2024-07-26 ASSESSMENT — ACTIVITIES OF DAILY LIVING (ADL): LACK_OF_TRANSPORTATION: NO

## 2024-07-26 NOTE — PROGRESS NOTES
07/26/24 1415   Discharge Planning   Living Arrangements Spouse/significant other   Support Systems Spouse/significant other   Assistance Needed Alert and oriented x 3; Independent with ADL's, No DME used,  Drives; Room air baseline and currently room air   Type of Residence Private residence   Number of Stairs to Enter Residence 0   Number of Stairs Within Residence 0   Do you have animals or pets at home? No   Who is requesting discharge planning? Provider   Home or Post Acute Services None   Expected Discharge Disposition Home   Does the patient need discharge transport arranged? No   Financial Resource Strain   How hard is it for you to pay for the very basics like food, housing, medical care, and heating? Not hard   Housing Stability   In the last 12 months, was there a time when you were not able to pay the mortgage or rent on time? N   In the past 12 months, how many times have you moved where you were living? 0   At any time in the past 12 months, were you homeless or living in a shelter (including now)? N   Transportation Needs   In the past 12 months, has lack of transportation kept you from medical appointments or from getting medications? no   In the past 12 months, has lack of transportation kept you from meetings, work, or from getting things needed for daily living? No   Patient Choice   Provider Choice list and CMS website (https://medicare.gov/care-compare#search) for post-acute Quality and Resource Measure Data were provided and reviewed with: Patient   Patient / Family choosing to utilize agency / facility established prior to hospitalization No

## 2024-07-26 NOTE — DISCHARGE SUMMARY
Discharge Diagnosis  Acute chest pain    Issues Requiring Follow-Up  Chest pain   - Do not take Bumex until 7/28 (only if you are eating and drinking well). You can continue all other home meds as previously prescribed.   - When you follow up with Dr. Soto on 7/30, please discuss the Bumex dose since your blood pressure has been low.     Discharge Meds     Your medication list        CHANGE how you take these medications        Instructions Last Dose Given Next Dose Due   baclofen 10 mg tablet  Commonly known as: Lioresal  What changed: additional instructions      One tablet in the morning and afternoon and 1 to 2 tablets in the evening.       famotidine 40 mg tablet  Commonly known as: Pepcid  What changed:   when to take this  reasons to take this      Take 1 tablet (40 mg) by mouth once daily.              CONTINUE taking these medications        Instructions Last Dose Given Next Dose Due   acetaminophen 325 mg tablet  Commonly known as: Tylenol           albuterol 2.5 mg /3 mL (0.083 %) nebulizer solution           bumetanide 2 mg tablet  Commonly known as: Bumex           Cardizem  mg 24 hr tablet  Generic drug: dilTIAZem LA           clobetasol 0.05 % cream  Commonly known as: Temovate      Apply topically 2 times a day.       clopidogrel 75 mg tablet  Commonly known as: Plavix      Take 1 tablet (75 mg) by mouth once daily for 365 doses.       diclofenac sodium 1 % gel  Commonly known as: Voltaren      Apply 4.5 inches (4 g) topically 2 times a day as needed (joint pain).       digoxin 125 MCG tablet  Commonly known as: Lanoxin           EPINEPHrine 0.3 mg/0.3 mL injection syringe  Commonly known as: Epipen           hydroxychloroquine 200 mg tablet  Commonly known as: Plaquenil      Take 1.5 tablets (300 mg) by mouth once daily.       magnesium oxide 400 mg tablet  Commonly known as: Mag-Ox           Miralax 17 gram/dose powder  Generic drug: polyethylene glycol           montelukast 10 mg  tablet  Commonly known as: Singulair      Take 1 tablet (10 mg) by mouth once daily at bedtime.       nitroglycerin 0.4 mg SL tablet  Commonly known as: Nitrostat           oxyCODONE-acetaminophen 5-325 mg tablet  Commonly known as: Percocet      Take 1 tablet by mouth 3 times a day as needed for severe pain (7 - 10) for up to 28 days. Do not fill before July 5, 2024.       pantoprazole 40 mg EC tablet  Commonly known as: ProtoNix      TAKE ONE TABLET BY MOUTH TWO TIMES A DAY       rosuvastatin 20 mg tablet  Commonly known as: Crestor      TAKE ONE TABLET BY MOUTH EVERY DAY       saccharomyces boulardii 250 mg capsule  Commonly known as: Florastor           sucralfate 100 mg/mL suspension  Commonly known as: Carafate           sulfaSALAzine 500 mg tablet  Commonly known as: Azulfidine      Take 1 tablet (500 mg) by mouth 2 times a day.       Vitamin D3 25 MCG (1000 UT) tablet  Generic drug: cholecalciferol                    Test Results Pending At Discharge  Pending Labs       No current pending labs.            ED Course  Trina Elias is a 73 y.o. female with a PMH of multiple abdominal surgeries, A-fib, CAD, ICD/PPM placement, HLD, COPD/asthma, CKD, and RA presenting with substernal chest pain. She had a watchman procedure done on 7/16/2024 and she reports that after the procedure (starting the day of) she has been experiencing chest pain, shortness of breath, nausea, and lightheadedness on exertion. Over the last few days it has been progressively getting worse and starting this morning the symptoms have been constant. The chest pain, nausea, and shortness of breath are now present at rest and the chest pain is more severe. She describes the chest pain as a constant heavy pressure that is exacerbated by deep breaths. States that the chest pain improves with sitting up. She also endorses a constant pounding headache that started this morning. She denies any diaphoresis, syncope, vomiting, arm pain, chills, fever,  abdominal pain.  In the ED she received zofran IV 4 mg, LR bolus 500 mL, and fentanyl IV. Troponin was elevated, 134 -> 182. CT angio showed no evidence of PE and CXR revealed no acute changes. EKG showed sinus rhythm, occasional PVCs, ventricular rate 71 normal QRS duration no prolonged QT/QTc no obvious ST elevation, depression or acute ischemic findings. Patient was then admitted to medicine service.     Medicine Course  TTE revealed no evidence of pericardial effusion. Her BP was low during her visit, down to 85/46, so she received a LR bolus. Patient's pain was well controlled after pain meds (oxycodone PRN) and rest. Denied any symptoms of nausea, vomiting, diaphoresis.. Troponin peaked at 355 and then started to decrease. There were no findings on telemetry. She was discharged on 7/26 with a follow up cardiology appointment on 7/30.     Pertinent Physical Exam At Time of Discharge  Physical Exam  Constitutional:       General: She is not in acute distress.     Appearance: Normal appearance. She is not ill-appearing.   Cardiovascular:      Rate and Rhythm: Normal rate and regular rhythm.      Pulses: Normal pulses.      Heart sounds: Normal heart sounds. No murmur heard.     No friction rub. No gallop.   Pulmonary:      Effort: Pulmonary effort is normal. No respiratory distress.      Breath sounds: Normal breath sounds. No wheezing, rhonchi or rales.   Abdominal:      General: Abdomen is flat. Bowel sounds are normal. There is no distension.      Palpations: Abdomen is soft.      Tenderness: There is no abdominal tenderness.   Neurological:      Mental Status: She is alert.         Outpatient Follow-Up  Future Appointments   Date Time Provider Department Center   7/29/2024  9:00 AM NAILA Carnes-CNP [id number] [facility]   10/8/2024  8:20 AM Carlos Harley MD [id number] [facility]   11/12/2024  8:40 AM Carlos Harley MD [id number] [facility]   11/15/2024 10:00 AM U CT 1 UCT [facility] Rad         Alli Vaughn MD

## 2024-07-26 NOTE — PROGRESS NOTES
Trina Elias is a 73 y.o. female on day 2 of admission presenting with Acute chest pain.      Subjective   She is sitting up in the bed, received IVF bolus yesterday and states she feels better today. No complaints.       Review of systems:  Constitutional: negative for fever, chills, or malaise  Neuro: negative for dizziness, headache, numbness, tingling  ENT: Negative for nasal congestion or sore throat  Resp: negative for shortness of breath, cough, or wheezing  CV: negative for chest pain, palpitations  GI: negative for abd pain, nausea, vomiting or diarrhea  : negative for dysuria, frequency, or urgency  Skin: negative for lesions, wounds, or rash  Musculoskeletal: Negative for weakness, myalgia, or arthralgia  Endocrine: Negative for polyuria or polydipsia         Objective   Constitutional: Well developed, awake/alert/oriented x3, no distress, alert and cooperative  Eyes: PERRL, EOMI, clear sclera  ENMT: mucous membranes moist, no apparent injury, no lesions seen  Head/Neck: Neck supple, no apparent injury, thyroid without mass or tenderness, No JVD, trachea midline, no bruits  Respiratory/Thorax: Patent airways, CTAB, normal breath sounds with good chest expansion, thorax symmetric  Cardiovascular: Regular, rate and rhythm, no murmurs, 2+ equal pulses of the extremities, normal S 1and S 2  Gastrointestinal: Nondistended, soft, non-tender, no rebound tenderness or guarding, no masses palpable, no organomegaly, +BS, no bruits  Musculoskeletal: ROM intact, no joint swelling, normal strength  Extremities: normal extremities, no cyanosis edema, contusions or wounds, no clubbing  Neurological: alert and oriented x3, intact senses, motor, response and reflexes, normal strength  Lymphatic: No significant lymphadenopathy  Psychological: Appropriate mood and behavior  Skin: Warm and dry, no lesions, no rashes      Last Recorded Vitals  /66   Pulse 60   Temp 36.8 °C (98.2 °F) (Temporal)   Resp 18   Ht 1.626  "m (5' 4\")   Wt 72.2 kg (159 lb 1.6 oz)   SpO2 94%   BMI 27.31 kg/m²     Intake/Output last 3 Shifts:  I/O last 3 completed shifts:  In: 1306.7 (18.1 mL/kg) [P.O.:340; IV Piggyback:966.7]  Out: 600 (8.3 mL/kg) [Urine:600 (0.2 mL/kg/hr)]  Weight: 72.2 kg   I/O this shift:  In: 60 [P.O.:60]  Out: -     Relevant Results  Scheduled medications  acetaminophen, 975 mg, oral, q6h  baclofen, 10 mg, oral, q AM  baclofen, 20 mg, oral, Nightly  [Held by provider] bumetanide, 2 mg, oral, BID  clopidogrel, 75 mg, oral, Daily  digoxin, 125 mcg, oral, Daily  dilTIAZem CD, 120 mg, oral, Nightly  heparin (porcine), 5,000 Units, subcutaneous, q8h  hydroxychloroquine, 300 mg, oral, Daily  [Held by provider] magnesium oxide, 400 mg, oral, Daily  montelukast, 10 mg, oral, Nightly  pantoprazole, 40 mg, oral, BID  polyethylene glycol, 17 g, oral, Daily  rosuvastatin, 20 mg, oral, Daily  sulfaSALAzine, 500 mg, oral, BID      Continuous medications     PRN medications  PRN medications: albuterol, famotidine, nitroglycerin, ondansetron, oxyCODONE, oxyCODONE, sucralfate    Results for orders placed or performed during the hospital encounter of 07/24/24 (from the past 24 hour(s))   Transthoracic Echo (TTE) Complete   Result Value Ref Range    AV pk denilson 1.91 m/s    AV mn grad 8.0 mmHg    LVOT diam 2.00 cm    LA vol index A/L 26.4 ml/m2    Tricuspid annular plane systolic excursion 2.8 cm    LV EF 48 %    RV free wall pk S' 11.90 cm/s    LVIDd 5.27 cm    RVSP 26.6 mmHg    Aortic Valve Area by Continuity of VTI 2.24 cm2    Aortic Valve Area by Continuity of Peak Velocity 2.19 cm2    AV pk grad 14.6 mmHg    LV A4C EF 42.7    CBC   Result Value Ref Range    WBC 5.6 4.4 - 11.3 x10*3/uL    nRBC 0.0 0.0 - 0.0 /100 WBCs    RBC 3.37 (L) 4.00 - 5.20 x10*6/uL    Hemoglobin 7.8 (L) 12.0 - 16.0 g/dL    Hematocrit 26.1 (L) 36.0 - 46.0 %    MCV 77 (L) 80 - 100 fL    MCH 23.1 (L) 26.0 - 34.0 pg    MCHC 29.9 (L) 32.0 - 36.0 g/dL    RDW 15.9 (H) 11.5 - 14.5 % "    Platelets 226 150 - 450 x10*3/uL   Magnesium   Result Value Ref Range    Magnesium 2.49 (H) 1.60 - 2.40 mg/dL   Renal Function Panel   Result Value Ref Range    Glucose 79 74 - 99 mg/dL    Sodium 139 136 - 145 mmol/L    Potassium 3.9 3.5 - 5.3 mmol/L    Chloride 105 98 - 107 mmol/L    Bicarbonate 26 21 - 32 mmol/L    Anion Gap 12 10 - 20 mmol/L    Urea Nitrogen 13 6 - 23 mg/dL    Creatinine 0.97 0.50 - 1.05 mg/dL    eGFR 62 >60 mL/min/1.73m*2    Calcium 8.3 (L) 8.6 - 10.3 mg/dL    Phosphorus 4.1 2.5 - 4.9 mg/dL    Albumin 3.3 (L) 3.4 - 5.0 g/dL       Transthoracic Echo (TTE) Complete   Final Result      XR chest 1 view   Final Result   Limited study. Cardiomegaly without acute infiltrates.        Signed by: Javier Dawson 7/24/2024 1:34 PM   Dictation workstation:   [id number]      CT angio chest for pulmonary embolism   Final Result   1. Allowing for presumed artifact no evidence of acute pulmonary   embolism.   2. Increased lung heterogeneity/mosaic attenuation, may be secondary   to air trapping, atelectasis, obstructive small airways disease or   pulmonary arterial hypertension.        MACRO:   None        Signed by: Jassi Cha 7/24/2024 1:25 PM   Dictation workstation:   [id number]          Transthoracic Echo (TTE) Complete    Result Date: 7/25/2024   [facility], [address] [place] [address]               Tel [phone] and Fax [phone] TRANSTHORACIC ECHOCARDIOGRAM REPORT  Patient Name:      VERNON SALDANA VIA         Reading Physician:    [id number] Neo Gutierrez MD Study Date:        7/25/2024            Ordering Provider:    [id number] STEPHON YARBROUGH MRN/PID:           [id number]             Fellow: Accession#:        [id number]         Nurse: Date of Birth/Age: 1950 / 73 years Sonographer:          Viola Lau                                                                [facility] Gender:            F                    Additional Staff: Height:            162.56 cm            Admit Date:           7/24/2024 Weight:            72.12 kg             Admission Status:     Inpatient -                                                               Routine BSA / BMI:         1.77 m2 / 27.29      Encounter#:           [id number]                    kg/m2 Blood Pressure:    108/69 mmHg          Department Location:  [facility] Non                                                               Invasive Study Type:    TRANSTHORACIC ECHO (TTE) COMPLETE Diagnosis/ICD: Chest pain, unspecified-R07.9 Indication:    CP CPT Code:      Echo Complete w Full Doppler-66615 Patient History: Pertinent History: A-Fib, CAD, Hyperlipidemia, COPD, Chest Pain and S/P Left                    shoulder surgery,Watchman, CKD. Study Detail: The following Echo studies were performed: 2D, M-Mode, Doppler and               color flow. Technically challenging study due to patient lying in               supine position and S/P left shoulder surgery. Patient has a               defibrillator.  PHYSICIAN INTERPRETATION: Left Ventricle: The left ventricular systolic function is mildly decreased, with a visually estimated ejection fraction of 45-50%. There are no regional wall motion abnormalities. The left ventricular cavity size is normal. There is mild concentric left ventricular hypertrophy. Spectral Doppler shows a pseudonormal pattern of left ventricular diastolic filling. Left Atrium: The left atrium is normal in size. Right Ventricle: The right ventricle is normal in size. There is normal right ventricular global systolic function. A device is visualized in the right ventricle. Right Atrium: The right atrium is normal in size. Aortic Valve: The aortic valve is trileaflet. There is mild aortic valve cusp calcification. There is evidence of mildly elevated transaortic gradients consistent  with sclerosis of the aortic valve. The aortic valve dimensionless index is 0.71. There is moderate aortic valve regurgitation. The peak instantaneous gradient of the aortic valve is 14.6 mmHg. The mean gradient of the aortic valve is 8.0 mmHg. Mitral Valve: The mitral valve is normal in structure. There is moderate mitral annular calcification. There is mild mitral valve regurgitation. Tricuspid Valve: The tricuspid valve is structurally normal. There is mild tricuspid regurgitation. Pulmonic Valve: The pulmonic valve is structurally normal. There is physiologic pulmonic valve regurgitation. Pericardium: There is no pericardial effusion noted. Aorta: The aortic root is normal. Pulmonary Artery: The tricuspid regurgitant velocity is 2.43 m/s, and with an estimated right atrial pressure of 3 mmHg, the estimated pulmonary artery pressure is normal with the RVSP at 26.6 mmHg. Pulmonary Veins: The pulmonary veins appear normal and return normally to the left atrium. Systemic Veins: The inferior vena cava appears to be of normal size. There is IVC inspiratory collapse greater than 50%.  CONCLUSIONS:  1. The left ventricular systolic function is mildly decreased, with a visually estimated ejection fraction of 45-50%.  2. Spectral Doppler shows a pseudonormal pattern of left ventricular diastolic filling.  3. There is normal right ventricular global systolic function.  4. There is moderate mitral annular calcification.  5. Aortic valve sclerosis.  6. Moderate aortic valve regurgitation.  7. Normal estimated PASP and CVP. QUANTITATIVE DATA SUMMARY: 2D MEASUREMENTS:                           Normal Ranges: IVSd:          0.97 cm    (0.6-1.1cm) LVPWd:         1.16 cm    (0.6-1.1cm) LVIDd:         5.27 cm    (3.9-5.9cm) LVIDs:         4.48 cm LV Mass Index: 122.0 g/m2 LV % FS        15.0 % LA VOLUME:                               Normal Ranges: LA Vol A4C:        46.9 ml    (22+/-6mL/m2) LA Vol A2C:        46.7 ml LA Vol BP:          46.9 ml LA Vol Index A4C:  26.5ml/m2 LA Vol Index A2C:  26.3 ml/m2 LA Vol Index BP:   26.4 ml/m2 LA Area A4C:       17.6 cm2 LA Area A2C:       17.5 cm2 LA Major Axis A4C: 5.6 cm LA Major Axis A2C: 5.6 cm LA Volume Index:   24.7 ml/m2 LA Vol A4C:        42.8 ml LA Vol A2C:        43.8 ml RA VOLUME BY A/L METHOD:                       Normal Ranges: RA Area A4C: 23.0 cm2 AORTA MEASUREMENTS:                    Normal Ranges: Asc Ao, d: 3.70 cm (2.1-3.4cm) LV SYSTOLIC FUNCTION BY 2D PLANIMETRY (MOD):                      Normal Ranges: EF-A4C View:    43 % (>=55%) EF-A2C View:    42 % EF-Biplane:     44 % EF-Visual:      48 % LV EF Reported: 48 % LV DIASTOLIC FUNCTION:                     Normal Ranges: MV Peak E: 1.46 m/s (0.7-1.2 m/s) MITRAL VALVE:                      Normal Ranges: MV Vmax:    1.31 m/s (<=1.3m/s) MV peak P.9 mmHg (<5mmHg) MV mean PG: 3.0 mmHg (<2mmHg) MV DT:      197 msec (150-240msec) MITRAL INSUFFICIENCY:                           Normal Ranges: PISA Radius:  0.3 cm MR VTI:       178.00 cm MR Vmax:      496.00 cm/s MR Alias Ham: 35.1 cm/s MR Volume:    7.12 ml MR Flow Rt:   19.85 ml/s MR EROA:      0.04 cm2 AORTIC VALVE:                                    Normal Ranges: AoV Vmax:                1.91 m/s  (<=1.7m/s) AoV Peak P.6 mmHg (<20mmHg) AoV Mean P.0 mmHg  (1.7-11.5mmHg) LVOT Max Ham:            1.33 m/s  (<=1.1m/s) AoV VTI:                 37.70 cm  (18-25cm) LVOT VTI:                26.90 cm LVOT Diameter:           2.00 cm   (1.8-2.4cm) AoV Area, VTI:           2.24 cm2  (2.5-5.5cm2) AoV Area,Vmax:           2.19 cm2  (2.5-4.5cm2) AoV Dimensionless Index: 0.71 AORTIC INSUFFICIENCY: AI Vmax:       4.01 m/s AI Half-time:  441 msec AI Decel Rate: 267.00 cm/s2  RIGHT VENTRICLE: RV Basal 3.67 cm RV Mid   2.60 cm RV Major 8.8 cm TAPSE:   28.0 mm RV s'    0.12 m/s TRICUSPID VALVE/RVSP:                             Normal Ranges: Peak TR Velocity: 2.43  m/s RV Syst Pressure: 26.6 mmHg (< 30mmHg) IVC Diam:         1.58 cm PULMONIC VALVE:                      Normal Ranges: PV Max Ham: 1.5 m/s  (0.6-0.9m/s) PV Max P.6 mmHg  [id number] Neo Gutierrez MD Electronically signed on 2024 at 6:23:17 PM  ** Final **     Transthoracic Echo (TTE) Limited    Result Date: 2024   [facility], [address] [place] [address]                Tel [phone] and Fax [phone] TRANSTHORACIC ECHOCARDIOGRAM REPORT  Patient Name:      VERNON SALDANA VIA         Reading Physician:    [id number] Monica Chen MD Study Date:        2024            Ordering Provider:    [id number] KRISTAL SNYDER MRN/PID:           [id number]             Fellow: Accession#:        [id number]         Nurse: Date of Birth/Age: 1950 / 73 years Sonographer:          Tobi Carlos RDCS Gender:            F                    Additional Staff: Height:            162.56 cm            Admit Date:           2024 Weight:            70.31 kg             Admission Status:     Inpatient -                                                               Routine BSA / BMI:         1.76 m2 / 26.61      Encounter#:           [id number]                    kg/m2 Blood Pressure:    /                    Department Location:  [facility]                                                               Cath Lab Study Type:    TRANSTHORACIC ECHO (TTE) LIMITED Diagnosis/ICD: Unspecified atrial fibrillation-I48.91 Indication:    Atrial fibrillation; Preop cardiovascular exam CPT Code:      Echo Limited-65991 Patient History: Pertinent History: A-fib; CAD; BARBARA; HTN; HLD; CKD; PPM. Study Detail: The following Echo studies were performed: 2D and M-Mode.               Technically challenging study due to body  habitus and patient               lying in supine position.  PHYSICIAN INTERPRETATION: Left Ventricle: Left ventricular ejection fraction is low normal, by visual estimate at 50-55%. The patient is in atrial fibrillation which may influence the estimate of left ventricular function and transvalvular flows. Wall motion is abnormal. The left ventricular cavity size was not assessed. Left ventricular diastolic filling was not assessed. Possible inferior wall motion hypokinesis. Left Atrium: The left atrium was not assessed. Right Ventricle: The right ventricle was not well visualized. There is normal right ventricular global systolic function. A device is visualized in the right ventricle. Right Atrium: The right atrium was not assessed. There is a device visualized in the right atrium. Aortic Valve: The aortic valve is trileaflet. There is mild aortic valve cusp calcification. Aortic valve regurgitation was not assessed. Mitral Valve: The mitral valve is mildly thickened. There is moderate mitral annular calcification. Mitral valve regurgitation was not assessed. Tricuspid Valve: The tricuspid valve was not well visualized. Tricuspid regurgitation was not assessed. Pulmonic Valve: The pulmonic valve is not well visualized. Pulmonic valve regurgitation was not assessed. Pericardium: There is a trivial pericardial effusion. Aorta: The aortic root is normal. Systemic Veins: The inferior vena cava appears to be of normal size. There is IVC inspiratory collapse greater than 50%. In comparison to the previous echocardiogram(s): Compared with the prior exam from 2/27/2024 there are no significant changes though today's exam is only a limited study withno assessment of valvular function.  CONCLUSIONS:  1. Left ventricular ejection fraction is low normal, by visual estimate at 50-55%.  2. Possible inferior wall motion hypokinesis.  3. There is normal right ventricular global systolic function.  4. There is moderate mitral  annular calcification.  5. Compared with the prior exam from 2/27/2024 there are no significant changes though today's exam is only a limited study withno assessment of valvular function.  6. The patient is in atrial fibrillation which may influence the estimate of left ventricular function and transvalvular flows. QUANTITATIVE DATA SUMMARY: AORTA MEASUREMENTS:                      Normal Ranges: Ao Sinus, d: 3.45 cm (2.1-3.5cm) LV SYSTOLIC FUNCTION BY 2D PLANIMETRY (MOD):                      Normal Ranges: EF-Visual:      53 % LV EF Reported: 53 % TRICUSPID VALVE/RVSP:                   Normal Ranges: IVC Diam: 2.03 cm  [id number] Monica Chen MD Electronically signed on 7/16/2024 at 3:11:21 PM  ** Final **     Transthoracic Echo (TTE) Limited    Result Date: 7/16/2024   [facility], [address] [place] [address]                Tel [phone] and Fax [phone] TRANSTHORACIC ECHOCARDIOGRAM REPORT  Patient Name:      VERNON SALDANA VIA         Reading Physician:    [id number] Neftali Calvillo MD Study Date:        7/16/2024            Ordering Provider:    [id number] KRISTAL SNYDER MRN/PID:           [id number]             Fellow:               [id number] Lucina Gill MD Accession#:        [id number]         Nurse: Date of Birth/Age: 1950 / 73 years Sonographer:          Tobi Carlos RDCS Gender:            F                    Additional Staff: Height:            162.56 cm            Admit Date:           7/16/2024 Weight:            71.22 kg             Admission Status:     Inpatient -                                                               Routine BSA / BMI:         1.76 m2 / 26.95      Encounter#:           [id number]                    kg/m2  Blood Pressure:    117/55 mmHg          Department Location:  [facility]                                                               Cath Lab Study Type:    TRANSTHORACIC ECHO (TTE) LIMITED Diagnosis/ICD: Other specified postprocedural states-Z98.890 Indication:    Post LAAO CPT Code:      Echo Limited-71755 Patient History: Pertinent History: CAD; A-fib; BARBARA: HTN; HLD; CKD; PPM. Study Detail: The following Echo studies were performed: 2D and M-Mode.               Technically challenging study due to body habitus.  PHYSICIAN INTERPRETATION: Left Ventricle: Left ventricular ejection fraction is low normal, by visual estimate at 50-55%. There are no regional wall motion abnormalities. The left ventricular cavity size is normal. Left ventricular diastolic filling was not assessed. Left Atrium: The left atrium was not assessed. Right Ventricle: The right ventricle was not assessed. Right ventricular systolic function not assessed. A device is visualized in the right ventricle. Right Atrium: The right atrium was not assessed. There is a device visualized in the right atrium. Aortic Valve: The aortic valve is trileaflet. There is mild aortic valve cusp calcification. Aortic valve regurgitation was not assessed. Mitral Valve: The mitral valve is mildly thickened. There is moderate mitral annular calcification. Mitral valve regurgitation was not assessed. Tricuspid Valve: The tricuspid valve was not well visualized. Tricuspid regurgitation was not assessed. Right ventricular systolic pressure could not be accurately assessed in this patient. Pulmonic Valve: The pulmonic valve was not assessed. Pulmonic valve regurgitation was not assessed. Pericardium: There is a trivial pericardial effusion. Aorta: The aortic root is normal. Systemic Veins: The inferior vena cava was not well visualized. In comparison to the previous echocardiogram(s): Compared with study dated 2/27/2024, no significant change.  CONCLUSIONS:  1. Left  ventricular ejection fraction is low normal, by visual estimate at 50-55%.  2. There is moderate mitral annular calcification.  3. There is a trivial pericardial effusion.  4. Compared with study dated 2/27/2024, no significant change. QUANTITATIVE DATA SUMMARY: LV SYSTOLIC FUNCTION BY 2D PLANIMETRY (MOD):                      Normal Ranges: EF-Visual:      53 % LV EF Reported: 53 %  [id number] Neftali Calvillo MD Electronically signed on 7/16/2024 at 3:02:13 PM  ** Final **     Transthoracic Echo (TTE) Complete    Result Date: 2/27/2024   [facility], [address] [place] [address]               Tel [phone] and Fax [phone] TRANSTHORACIC ECHOCARDIOGRAM REPORT  Patient Name:      VERNON SALDANA VIA         Reading Physician:    [id number] Nohelia Soto MD Study Date:        2/27/2024            Ordering Provider:    [id number] NIKOLE LOVE MRN/PID:           [id number]             Fellow: Accession#:        [id number]         Nurse: Date of Birth/Age: 1950 / 73 years Sonographer:          Dai Diaz                                                               [facility] Gender:            F                    Additional Staff: Height:            162.56 cm            Admit Date:           2/24/2024 Weight:            72.57 kg             Admission Status:     Inpatient -                                                               Routine BSA / BMI:         1.78 m2 / 27.46      Encounter#:           [id number]                    kg/m2                                         Department Location:  [facility] Non                                                               Invasive Blood Pressure: 131 /59 mmHg Study Type:    TRANSTHORACIC ECHO (TTE) COMPLETE Diagnosis/ICD: Shortness of breath-R06.02 Indication:    Dyspnea CPT Code:      Echo Complete w Full Doppler-54744  Patient History: Pertinent History: A-Fib and Chest Pain. elevated troponin, elevated BNP, mod                    AS. Study Detail: The following Echo studies were performed: 2D, M-Mode, Doppler and               color flow.  PHYSICIAN INTERPRETATION: Left Ventricle: The left ventricular systolic function is normal, with an estimated ejection fraction of 55-60%. There are no regional wall motion abnormalities. The left ventricular cavity size is normal. Spectral Doppler shows an impaired relaxation pattern of left ventricular diastolic filling. Left Atrium: The left atrium is mildly dilated. Right Ventricle: The right ventricle is normal in size. There is normal right ventricular global systolic function. Right Atrium: The right atrium is normal in size. Aortic Valve: The aortic valve is trileaflet. There is mild to moderate aortic valve cusp calcification. There is evidence of moderate aortic valve stenosis. There is trivial aortic valve regurgitation. The peak instantaneous gradient of the aortic valve is 27.5 mmHg. The mean gradient of the aortic valve is 16.0 mmHg. Mitral Valve: The mitral valve is mildly thickened. There is evidence of mild mitral valve stenosis. The doppler estimated mean and peak diastolic pressure gradients are 7.2 mmHg and 16.3 mmHg respectively. There is mild mitral annular calcification. There is mild to moderate mitral valve regurgitation. Tricuspid Valve: The tricuspid valve is structurally normal. There is mild to moderate tricuspid regurgitation. Pulmonic Valve: The pulmonic valve is not well visualized. There is trace to mild pulmonic valve regurgitation. Pericardium: There is no pericardial effusion noted. Aorta: The aortic root is normal. Pulmonary Artery: The tricuspid regurgitant velocity is 3.46 m/s, and with an estimated right atrial pressure of 3 mmHg, the estimated pulmonary artery pressure is mild to moderately elevated with the RVSP at 50.9 mmHg.  CONCLUSIONS:  1. Left  ventricular systolic function is normal with a 55-60% estimated ejection fraction.  2. Spectral Doppler shows an impaired relaxation pattern of left ventricular diastolic filling.  3. Mild to moderate mitral valve regurgitation.  4. Mild to moderate tricuspid regurgitation visualized.  5. Moderate aortic valve stenosis.  6. Mild to moderately elevated pulmonary artery pressure. QUANTITATIVE DATA SUMMARY: 2D MEASUREMENTS:                           Normal Ranges: IVSd:          0.97 cm    (0.6-1.1cm) LVPWd:         0.98 cm    (0.6-1.1cm) LVIDd:         5.70 cm    (3.9-5.9cm) LVIDs:         4.26 cm LV Mass Index: 123.2 g/m2 LV % FS        25.2 % LA VOLUME:                              Normal Ranges: LA Vol A4C:       77.4 ml    (22+/-6mL/m2) LA Vol A2C:       83.5 ml LA Vol BP:        82.1 ml LA Vol Index A4C: 43.5 ml/m2 LA Vol Index A2C: 46.9 ml/m2 LA Vol Index BP:  46.1 ml/m2 LA Volume Index:  46.1 ml/m2 LA Vol A4C:       72.2 ml LA Vol A2C:       77.0 ml RA VOLUME BY A/L METHOD:                       Normal Ranges: RA Area A4C: 21.8 cm2 M-MODE MEASUREMENTS:                  Normal Ranges: Ao Root: 3.30 cm (2.0-3.7cm) LAs:     4.72 cm (2.7-4.0cm) LV SYSTOLIC FUNCTION BY 2D PLANIMETRY (MOD):                     Normal Ranges: EF-A4C View: 55.9 % (>=55%) EF-A2C View: 56.5 % EF-Biplane:  55.9 % LV DIASTOLIC FUNCTION:                             Normal Ranges: MV Peak E:      1.91 m/s    (0.7-1.2 m/s) MV Peak A:      0.93 m/s    (0.42-0.7 m/s) E/A Ratio:      2.06        (1.0-2.2) MV e'           0.09 m/s    (>8.0) MV lateral e'   0.09 m/s MV medial e'    0.08 m/s E/e' Ratio:     21.21       (<8.0) PulmV Sys Ham:  62.35 cm/s PulmV Alvarez Ahm: 121.32 cm/s PulmV S/D Ham:  0.51 MITRAL VALVE:                       Normal Ranges: MV Vmax:    2.02 m/s  (<=1.3m/s) MV peak P.3 mmHg (<5mmHg) MV mean P.2 mmHg  (<2mmHg) MV VTI:     48.44 cm  (10-13cm) MV DT:      167 msec  (150-240msec) MITRAL INSUFFICIENCY:                          Normal Ranges: MR VTI:     192.66 cm MR Vmax:    568.98 cm/s MR Volume:  33.56 ml MR Flow Rt: 99.12 ml/s MR EROA:    0.17 cm2 AORTIC VALVE:                                    Normal Ranges: AoV Vmax:                2.62 m/s  (<=1.7m/s) AoV Peak P.5 mmHg (<20mmHg) AoV Mean P.0 mmHg (1.7-11.5mmHg) LVOT Max Ham:            0.99 m/s  (<=1.1m/s) AoV VTI:                 58.21 cm  (18-25cm) LVOT VTI:                21.96 cm LVOT Diameter:           1.95 cm   (1.8-2.4cm) AoV Area, VTI:           1.13 cm2  (2.5-5.5cm2) AoV Area,Vmax:           1.13 cm2  (2.5-4.5cm2) AoV Dimensionless Index: 0.38 AORTIC INSUFFICIENCY: AI Vmax:       3.91 m/s AI Half-time:  520 msec AI Decel Time: 1794 msec AI Decel Rate: 218.17 cm/s2  RIGHT VENTRICLE: RV Basal 4.30 cm RV Mid   2.60 cm RV Major 7.7 cm TAPSE:   27.0 mm RV s'    0.18 m/s TRICUSPID VALVE/RVSP:                             Normal Ranges: Peak TR Velocity: 3.46 m/s RV Syst Pressure: 50.9 mmHg (< 30mmHg) PULMONIC VALVE:                      Normal Ranges: PV Max Ham: 1.5 m/s  (0.6-0.9m/s) PV Max P.8 mmHg Pulmonary Veins: PulmV Alvarez Ham: 121.32 cm/s PulmV S/D Ham:  0.51 PulmV Sys Ham:  62.35 cm/s  [id number] Nohelia Soto MD Electronically signed on 2024 at 11:22:42 AM  ** Final **           Assessment/Plan   Principal Problem:    Acute chest pain    [facility] in  showed normal coronary arteries     Echo from :  1. Left ventricular ejection fraction is low normal, by visual estimate at 50-55%.   2. There is moderate mitral annular calcification.   3. There is a trivial pericardial effusion.        Chest pain, elevated troponins  -Just had watchman procedure   -[facility] as above  -CTA negative  -Troponin peaked at 355, now downtrending  -EKG atrial paced with nonspecific T wave abnormalities  -Echo reviewed by Dr. Larson, mildly decreased LVEF, no pericardial effusion  -Chest pain and elevated troponins most likely from recent  procedure  -Cont plavix, statin  -Allergy to BB  -Possibly GERD, cont PPI     2. Paroxysmal atrial fibrillation  -Currently atrial paced  -[place] [facility] PCM/ICD interrogated, no episodes since prior to watchman, battery life 1 year  -Not on AC, has watchman  -Cont plavix  -Cont digoxin and diltiazem  -digoxin level WNL  -Monitor on tele     3. Chronic anemia  -appears stable  -Monitor     4. Hyperlipidemia  -Cont statin        NAILA Guerra-CNP

## 2024-07-26 NOTE — PROGRESS NOTES
"Subjective   Subjective:   History Of Present Illness:  Trina Elias is a 73 y.o. female was seen and evaluated at bedside today. Overnight, no reported acute events. In the morning, patient was in no acute distress. She reports that her chest pain has improved.    However, in the afternoon she was very nauseous with diaphoresis and SOB. She reports that she did not have any chest pain.    Review Of Systems:  11-point ROS was performed and is negative except as noted below and in the HPI.     Review of Systems   Constitutional:  Positive for diaphoresis. Negative for chills, fatigue and fever.   Respiratory:  Positive for shortness of breath. Negative for cough, chest tightness and wheezing.    Cardiovascular:  Negative for chest pain, palpitations and leg swelling.   Gastrointestinal:  Positive for nausea. Negative for abdominal pain, constipation, diarrhea and vomiting.        Objective   Objective:     /66   Pulse 60   Temp 36.8 °C (98.2 °F) (Temporal)   Resp 18   Ht 1.626 m (5' 4\")   Wt 72.2 kg (159 lb 1.6 oz)   SpO2 94%   BMI 27.31 kg/m²     Physical Exam  Constitutional:       General: She is in acute distress.      Appearance: Normal appearance. She is ill-appearing.   Cardiovascular:      Rate and Rhythm: Normal rate and regular rhythm.      Pulses: Normal pulses.      Heart sounds: Normal heart sounds. No murmur heard.     No friction rub. No gallop.   Pulmonary:      Effort: Pulmonary effort is normal.      Breath sounds: Normal breath sounds. No wheezing, rhonchi or rales.   Abdominal:      General: Abdomen is flat. Bowel sounds are normal. There is no distension.      Palpations: Abdomen is soft.      Tenderness: There is no abdominal tenderness.   Skin:     General: Skin is dry.   Neurological:      Mental Status: She is alert and oriented to person, place, and time.       Lab Work:     Lab Results   Component Value Date    WBC 5.6 07/26/2024    HGB 7.8 (L) 07/26/2024    HCT 26.1 (L) " 07/26/2024    MCV 77 (L) 07/26/2024     07/26/2024     Lab Results   Component Value Date    GLUCOSE 79 07/26/2024    CALCIUM 8.3 (L) 07/26/2024     07/26/2024    K 3.9 07/26/2024    CO2 26 07/26/2024     07/26/2024    BUN 13 07/26/2024    CREATININE 0.97 07/26/2024     Thyroid Stimulating Hormone   Date Value Ref Range Status   04/15/2024 0.92 0.44 - 3.98 mIU/L Final     TSH   Date Value Ref Range Status   08/28/2023 1.71 0.44 - 3.98 mIU/L Final     Comment:      TSH testing is performed using different testing    methodology at [facility] than at other    [facility]. Direct result comparisons should    only be made within the same method.   12/19/2022 1.34 0.44 - 3.98 mIU/L Final     Comment:      TSH testing is performed using different testing    methodology at [facility] than at other    [facility]. Direct result comparisons should    only be made within the same method.       Vitamin D, 25-Hydroxy   Date Value Ref Range Status   10/18/2021 28 (A) ng/mL Final     Comment:     .  DEFICIENCY:         < 20   NG/ML  INSUFFICIENCY:      20-29  NG/ML  SUFFICIENCY:         NG/ML    THIS ASSAY ACCURATELY QUANTIFIES THE SUM OF  VITAMIN D3, 25-HYDROXY AND VIT D2,25-HYDROXY.       Cultures:   No results found for the last 90 days.    Images:     XR chest 1 view   Final Result   Limited study. Cardiomegaly without acute infiltrates.        Signed by: Javier Dawson 7/24/2024 1:34 PM   Dictation workstation:   [id number]      CT angio chest for pulmonary embolism   Final Result   1. Allowing for presumed artifact no evidence of acute pulmonary   embolism.   2. Increased lung heterogeneity/mosaic attenuation, may be secondary   to air trapping, atelectasis, obstructive small airways disease or   pulmonary arterial hypertension.        MACRO:   None        Signed by: Jassi Cha 7/24/2024 1:25 PM   Dictation workstation:   [id number]         Medications:      Scheduled:  acetaminophen, 975 mg, oral, q6h  baclofen, 10 mg, oral, q AM  baclofen, 20 mg, oral, Nightly  [Held by provider] bumetanide, 2 mg, oral, BID  busPIRone, 7.5 mg, oral, Once  clopidogrel, 75 mg, oral, Daily  digoxin, 125 mcg, oral, Daily  dilTIAZem CD, 120 mg, oral, Nightly  heparin (porcine), 5,000 Units, subcutaneous, q8h  hydroxychloroquine, 300 mg, oral, Daily  [Held by provider] magnesium oxide, 400 mg, oral, Daily  montelukast, 10 mg, oral, Nightly  pantoprazole, 40 mg, oral, BID  polyethylene glycol, 17 g, oral, Daily  promethazine, 12.5 mg, intravenous, Once  rosuvastatin, 20 mg, oral, Daily  sulfaSALAzine, 500 mg, oral, BID    Continuous:     PRN:  PRN medications: albuterol, famotidine, nitroglycerin, ondansetron, oxyCODONE, oxyCODONE, sucralfate     Assessment & Plan:   Trina Elias is a 73 y.o. female with a PMH of multiple abdominal surgeries, A-fib, CAD, ICD/PPM placement, HLD, COPD/asthma, CKD, and RA admitted with substernal chest pain. S/p watchman procedure on 7/16/24.      ACUTE MEDICAL ISSUES:  #Chest pain: (Ddx includes CAD, Pericarditis or GERD)  #Elevated troponins  - Constant heavy substernal chest pain  - EKG in ED: sinus rhythm, occasional PVCs, ventricular rate 71 normal QRS duration no prolonged QT/QTc no obvious ST elevation, depression or acute ischemic findings.   - Lipase normal   - CT angio showed no evidence of PE. CXR no acute pathologies.  - Troponin peaked at 355 (most recently 323)  - Blood pressure was 108/66 this morning  - TTE revealed no pericardial effusion    PLAN:  - Telemetry  - Cardiology not recommending heparin at this time  - Cardiology following  - Continue plavix, statin, PPI  - SL Nitroglycerin PRN and oxycodone PRN for chest pain  - Ordered an EKG due to episode of severe nausea and diaphoresis     CHRONIC MEDICAL ISSUES:  #HLD - continue home meds   #A-fib - continue home meds   #Asthma/COPD - continue home meds      Fluid: Replete  PRN  Electrolytes: Replete PRN  Nutrition: Regular diet  GI ppx: Continue home meds  DVT/PE ppx: SubQ heparin  Abx: None   IV Lines: PIV  O2: RA     Dispo: Estimated length of stay 1-2 days. Cardiology following.    Alli Vaughn, PGY-1  Transitional Year    Disclaimer: Documentation completed with the information available at the time of input. The times in the chart may not be reflective of actual patient care times, interventions, or procedures. Documentation occurs after the physical care of the patient.

## 2024-07-26 NOTE — CARE PLAN
The clinical goals for the shift include Pt will report no nausea    Over the shift, the patient did not make progress toward the following goals. Barriers to progression include none. Recommendations to address these barriers include none.

## 2024-07-26 NOTE — CARE PLAN
The patient's goals for the shift include      The clinical goals for the shift include patient will have no chest pain/pressure during shift      Problem: Pain - Adult  Goal: Verbalizes/displays adequate comfort level or baseline comfort level  Outcome: Progressing     Problem: Safety - Adult  Goal: Free from fall injury  Outcome: Progressing     Problem: Discharge Planning  Goal: Discharge to home or other facility with appropriate resources  Outcome: Progressing

## 2024-07-27 ENCOUNTER — APPOINTMENT (OUTPATIENT)
Dept: RADIOLOGY | Facility: HOSPITAL | Age: 74
DRG: 392 | End: 2024-07-27
Payer: MEDICARE

## 2024-07-27 LAB
ALBUMIN SERPL BCP-MCNC: 3.5 G/DL (ref 3.4–5)
ANION GAP SERPL CALC-SCNC: 13 MMOL/L (ref 10–20)
BUN SERPL-MCNC: 11 MG/DL (ref 6–23)
CALCIUM SERPL-MCNC: 8.7 MG/DL (ref 8.6–10.3)
CHLORIDE SERPL-SCNC: 106 MMOL/L (ref 98–107)
CO2 SERPL-SCNC: 25 MMOL/L (ref 21–32)
CREAT SERPL-MCNC: 0.98 MG/DL (ref 0.5–1.05)
EGFRCR SERPLBLD CKD-EPI 2021: 61 ML/MIN/1.73M*2
ERYTHROCYTE [DISTWIDTH] IN BLOOD BY AUTOMATED COUNT: 16.1 % (ref 11.5–14.5)
GLUCOSE SERPL-MCNC: 84 MG/DL (ref 74–99)
HCT VFR BLD AUTO: 30.3 % (ref 36–46)
HGB BLD-MCNC: 8.5 G/DL (ref 12–16)
MAGNESIUM SERPL-MCNC: 2.72 MG/DL (ref 1.6–2.4)
MCH RBC QN AUTO: 22.7 PG (ref 26–34)
MCHC RBC AUTO-ENTMCNC: 28.1 G/DL (ref 32–36)
MCV RBC AUTO: 81 FL (ref 80–100)
NRBC BLD-RTO: 0 /100 WBCS (ref 0–0)
PHOSPHATE SERPL-MCNC: 3.9 MG/DL (ref 2.5–4.9)
PLATELET # BLD AUTO: 242 X10*3/UL (ref 150–450)
POTASSIUM SERPL-SCNC: 4.1 MMOL/L (ref 3.5–5.3)
RBC # BLD AUTO: 3.74 X10*6/UL (ref 4–5.2)
SODIUM SERPL-SCNC: 140 MMOL/L (ref 136–145)
WBC # BLD AUTO: 5.8 X10*3/UL (ref 4.4–11.3)

## 2024-07-27 PROCEDURE — 1200000002 HC GENERAL ROOM WITH TELEMETRY DAILY

## 2024-07-27 PROCEDURE — 2500000004 HC RX 250 GENERAL PHARMACY W/ HCPCS (ALT 636 FOR OP/ED)

## 2024-07-27 PROCEDURE — 99232 SBSQ HOSP IP/OBS MODERATE 35: CPT

## 2024-07-27 PROCEDURE — 83735 ASSAY OF MAGNESIUM: CPT

## 2024-07-27 PROCEDURE — 2500000001 HC RX 250 WO HCPCS SELF ADMINISTERED DRUGS (ALT 637 FOR MEDICARE OP)

## 2024-07-27 PROCEDURE — 74176 CT ABD & PELVIS W/O CONTRAST: CPT

## 2024-07-27 PROCEDURE — 85027 COMPLETE CBC AUTOMATED: CPT

## 2024-07-27 PROCEDURE — 2500000005 HC RX 250 GENERAL PHARMACY W/O HCPCS

## 2024-07-27 PROCEDURE — 2500000002 HC RX 250 W HCPCS SELF ADMINISTERED DRUGS (ALT 637 FOR MEDICARE OP, ALT 636 FOR OP/ED)

## 2024-07-27 PROCEDURE — 80069 RENAL FUNCTION PANEL: CPT

## 2024-07-27 PROCEDURE — 36415 COLL VENOUS BLD VENIPUNCTURE: CPT

## 2024-07-27 RX ADMIN — HEPARIN SODIUM 5000 UNITS: 5000 INJECTION INTRAVENOUS; SUBCUTANEOUS at 00:18

## 2024-07-27 RX ADMIN — ONDANSETRON 4 MG: 2 INJECTION INTRAMUSCULAR; INTRAVENOUS at 13:13

## 2024-07-27 RX ADMIN — ROSUVASTATIN CALCIUM 20 MG: 20 TABLET, FILM COATED ORAL at 08:20

## 2024-07-27 RX ADMIN — CLOPIDOGREL 75 MG: 75 TABLET ORAL at 08:20

## 2024-07-27 RX ADMIN — OXYCODONE HYDROCHLORIDE 10 MG: 5 TABLET ORAL at 14:02

## 2024-07-27 RX ADMIN — BACLOFEN 20 MG: 10 TABLET ORAL at 22:20

## 2024-07-27 RX ADMIN — PANTOPRAZOLE SODIUM 40 MG: 40 TABLET, DELAYED RELEASE ORAL at 22:20

## 2024-07-27 RX ADMIN — SODIUM PHOSPHATE 1 ENEMA: 7; 19 ENEMA RECTAL at 14:06

## 2024-07-27 RX ADMIN — PANTOPRAZOLE SODIUM 40 MG: 40 TABLET, DELAYED RELEASE ORAL at 08:20

## 2024-07-27 RX ADMIN — DILTIAZEM HYDROCHLORIDE 120 MG: 120 CAPSULE, COATED, EXTENDED RELEASE ORAL at 22:22

## 2024-07-27 RX ADMIN — PROMETHAZINE HYDROCHLORIDE 12.5 MG: 25 INJECTION INTRAMUSCULAR; INTRAVENOUS at 14:41

## 2024-07-27 RX ADMIN — ACETAMINOPHEN 975 MG: 325 TABLET ORAL at 06:38

## 2024-07-27 RX ADMIN — HYDROXYCHLOROQUINE SULFATE 300 MG: 200 TABLET, FILM COATED ORAL at 08:33

## 2024-07-27 RX ADMIN — HEPARIN SODIUM 5000 UNITS: 5000 INJECTION INTRAVENOUS; SUBCUTANEOUS at 08:20

## 2024-07-27 RX ADMIN — MONTELUKAST 10 MG: 10 TABLET, FILM COATED ORAL at 22:22

## 2024-07-27 RX ADMIN — HEPARIN SODIUM 5000 UNITS: 5000 INJECTION INTRAVENOUS; SUBCUTANEOUS at 23:04

## 2024-07-27 RX ADMIN — DIGOXIN 125 MCG: 125 TABLET ORAL at 08:20

## 2024-07-27 RX ADMIN — SUCRALFATE 1 G: 1 SUSPENSION ORAL at 08:20

## 2024-07-27 RX ADMIN — ACETAMINOPHEN 975 MG: 325 TABLET ORAL at 22:22

## 2024-07-27 RX ADMIN — BACLOFEN 10 MG: 10 TABLET ORAL at 08:20

## 2024-07-27 RX ADMIN — SULFASALAZINE 500 MG: 500 TABLET ORAL at 08:33

## 2024-07-27 RX ADMIN — SULFASALAZINE 500 MG: 500 TABLET ORAL at 22:20

## 2024-07-27 ASSESSMENT — COGNITIVE AND FUNCTIONAL STATUS - GENERAL
MOBILITY SCORE: 22
CLIMB 3 TO 5 STEPS WITH RAILING: A LITTLE
MOBILITY SCORE: 22
WALKING IN HOSPITAL ROOM: A LITTLE
DAILY ACTIVITIY SCORE: 24
WALKING IN HOSPITAL ROOM: A LITTLE
CLIMB 3 TO 5 STEPS WITH RAILING: A LITTLE
DAILY ACTIVITIY SCORE: 24

## 2024-07-27 ASSESSMENT — ENCOUNTER SYMPTOMS
CONSTIPATION: 1
VOMITING: 0
NAUSEA: 0
FEVER: 0
COUGH: 0
DIARRHEA: 0
FATIGUE: 0
DIAPHORESIS: 0
SHORTNESS OF BREATH: 0
CHEST TIGHTNESS: 0
PALPITATIONS: 0
CHILLS: 0
DYSURIA: 0
ABDOMINAL PAIN: 1

## 2024-07-27 ASSESSMENT — PAIN SCALES - GENERAL
PAINLEVEL_OUTOF10: 7
PAINLEVEL_OUTOF10: 3
PAINLEVEL_OUTOF10: 0 - NO PAIN
PAINLEVEL_OUTOF10: 0 - NO PAIN
PAINLEVEL_OUTOF10: 2

## 2024-07-27 ASSESSMENT — PAIN - FUNCTIONAL ASSESSMENT
PAIN_FUNCTIONAL_ASSESSMENT: 0-10

## 2024-07-27 ASSESSMENT — PAIN DESCRIPTION - LOCATION: LOCATION: BACK

## 2024-07-27 NOTE — CARE PLAN
Problem: Pain - Adult  Goal: Verbalizes/displays adequate comfort level or baseline comfort level  Outcome: Progressing     Problem: Safety - Adult  Goal: Free from fall injury  Outcome: Progressing   The patient's goals for the shift include      The clinical goals for the shift include pt will remain free from nausea during shift

## 2024-07-27 NOTE — PROGRESS NOTES
"Subjective   Subjective:   History Of Present Illness:  Trina Elias is a 73 y.o. female was seen and evaluated at bedside today. Overnight, no reported acute events. Patient was feeling much better this morning but she has not tried to eat anything since her episode of severe nausea yesterday (after eating). She states that she also has some abdominal pain now, and is concerned this is a bowel obstruction (she has had many in the past). She had 1 small BM yesterday. She denies any chest pain, vomiting, or diaphoresis. In the afternoon, she was feeling very nauseous with severe abdominal pain.     Review Of Systems:  11-point ROS was performed and is negative except as noted below and in the HPI.     Review of Systems   Constitutional:  Negative for chills, diaphoresis, fatigue and fever.   Respiratory:  Negative for cough, chest tightness and shortness of breath.    Cardiovascular:  Negative for chest pain and palpitations.   Gastrointestinal:  Positive for abdominal pain and constipation. Negative for diarrhea, nausea and vomiting.   Genitourinary:  Negative for dysuria.        Objective   Objective:     /64 (BP Location: Right arm, Patient Position: Lying)   Pulse 59   Temp 36.5 °C (97.7 °F) (Temporal)   Resp 16   Ht 1.626 m (5' 4\")   Wt 72.2 kg (159 lb 1.6 oz)   SpO2 93%   BMI 27.31 kg/m²     Physical Exam  Constitutional:       General: She is not in acute distress.     Appearance: Normal appearance. She is not ill-appearing.   Cardiovascular:      Rate and Rhythm: Normal rate and regular rhythm.      Pulses: Normal pulses.      Heart sounds: Normal heart sounds. No murmur heard.     No friction rub. No gallop.   Pulmonary:      Effort: Pulmonary effort is normal. No respiratory distress.      Breath sounds: No wheezing, rhonchi or rales.   Abdominal:      General: Abdomen is flat. Bowel sounds are normal.      Tenderness: There is abdominal tenderness. There is no guarding or rebound. "   Neurological:      Mental Status: She is alert.       Lab Work:     Lab Results   Component Value Date    WBC 5.8 07/27/2024    HGB 8.5 (L) 07/27/2024    HCT 30.3 (L) 07/27/2024    MCV 81 07/27/2024     07/27/2024     Lab Results   Component Value Date    GLUCOSE 84 07/27/2024    CALCIUM 8.7 07/27/2024     07/27/2024    K 4.1 07/27/2024    CO2 25 07/27/2024     07/27/2024    BUN 11 07/27/2024    CREATININE 0.98 07/27/2024     Thyroid Stimulating Hormone   Date Value Ref Range Status   04/15/2024 0.92 0.44 - 3.98 mIU/L Final     TSH   Date Value Ref Range Status   08/28/2023 1.71 0.44 - 3.98 mIU/L Final     Comment:      TSH testing is performed using different testing    methodology at [facility] than at other    [facility]. Direct result comparisons should    only be made within the same method.   12/19/2022 1.34 0.44 - 3.98 mIU/L Final     Comment:      TSH testing is performed using different testing    methodology at [facility] than at other    [facility]. Direct result comparisons should    only be made within the same method.       Vitamin D, 25-Hydroxy   Date Value Ref Range Status   10/18/2021 28 (A) ng/mL Final     Comment:     .  DEFICIENCY:         < 20   NG/ML  INSUFFICIENCY:      20-29  NG/ML  SUFFICIENCY:         NG/ML    THIS ASSAY ACCURATELY QUANTIFIES THE SUM OF  VITAMIN D3, 25-HYDROXY AND VIT D2,25-HYDROXY.       Cultures:   No results found for the last 90 days.    Images:     XR chest 1 view   Final Result   Limited study. Cardiomegaly without acute infiltrates.        Signed by: Javier Dawson 7/24/2024 1:34 PM   Dictation workstation:   [id number]      CT angio chest for pulmonary embolism   Final Result   1. Allowing for presumed artifact no evidence of acute pulmonary   embolism.   2. Increased lung heterogeneity/mosaic attenuation, may be secondary   to air trapping, atelectasis, obstructive small airways disease or    pulmonary arterial hypertension.        MACRO:   None        Signed by: Jassi Cha 7/24/2024 1:25 PM   Dictation workstation:   [id number]         Medications:     Scheduled:  acetaminophen, 975 mg, oral, q6h  baclofen, 10 mg, oral, q AM  baclofen, 20 mg, oral, Nightly  [Held by provider] bumetanide, 2 mg, oral, BID  clopidogrel, 75 mg, oral, Daily  digoxin, 125 mcg, oral, Daily  dilTIAZem CD, 120 mg, oral, Nightly  heparin (porcine), 5,000 Units, subcutaneous, q8h  hydroxychloroquine, 300 mg, oral, Daily  [Held by provider] magnesium oxide, 400 mg, oral, Daily  montelukast, 10 mg, oral, Nightly  pantoprazole, 40 mg, oral, BID  polyethylene glycol, 17 g, oral, Daily  rosuvastatin, 20 mg, oral, Daily  sulfaSALAzine, 500 mg, oral, BID    Continuous:     PRN:  PRN medications: albuterol, famotidine, nitroglycerin, ondansetron, oxyCODONE, oxyCODONE, sucralfate     Assessment & Plan:   Trina Elias is a 73 y.o. female with a PMH of multiple abdominal surgeries, A-fib, CAD, ICD/PPM placement, HLD, COPD/asthma, CKD, and RA admitted with substernal chest pain. S/p watchman procedure on 7/16/24.      ACUTE MEDICAL ISSUES:  #Chest pain: (CAD vs Pericarditis vs GERD)  #Elevated troponins  - Substernal chest pain has improved  - EKG in ED: sinus rhythm, occasional PVCs, ventricular rate 71 normal QRS duration no prolonged QT/QTc no obvious ST elevation, depression or acute ischemic findings.   - Lipase normal   - CT angio showed no evidence of PE. CXR no acute pathologies.  - Troponin peaked at 355 (most recently 323)  - TTE revealed no pericardial effusion  - STAT EKG yesterday was normal (she was experiencing severe nausea and SOB)  PLAN:  - Telemetry  - Cardiology not recommending heparin at this time  - Cardiology following  - Continue plavix, statin, PPI  - SL Nitroglycerin PRN and oxycodone PRN for chest pain    #Abdominal pain/nausea  - Severe nausea after eating last night  - Had a small BM yesterday, but this  was her first one in about 5 days  - Abdominal tenderness on exam this morning  - Has had many SBOs in the past due to adhesions  - CT abdomen showed no signs of bowel obstruction but a lot of stool throughout the colon  PLAN:  - Started phenergan as this previously helped her nausea  - Ordered fleet enema      CHRONIC MEDICAL ISSUES:  #HLD - continue home meds   #A-fib - continue home meds   #Asthma/COPD - continue home meds      Fluid: Replete PRN  Electrolytes: Replete PRN  Nutrition: Regular diet  GI ppx: Continue home meds  DVT/PE ppx: SubQ heparin  Abx: None   IV Lines: PIV  O2: RA     Dispo: Estimated length of stay 1-2 days.    Alli Vaughn, PGY-1  Transitional Year    Disclaimer: Documentation completed with the information available at the time of input. The times in the chart may not be reflective of actual patient care times, interventions, or procedures. Documentation occurs after the physical care of the patient.

## 2024-07-27 NOTE — PROGRESS NOTES
Trina Elias is a 73 y.o. female on day 3 of admission presenting with Acute chest pain.      Subjective     Trina was laying in bed, complaining of nausea and dry heaving, requesting nausea medications .    Review of systems:  Constitutional: negative for fever, chills, or malaise  Neuro: negative for dizziness, headache, numbness, tingling  ENT: Negative for nasal congestion or sore throat  Resp: negative for shortness of breath, cough, or wheezing  CV: negative for chest pain, palpitations  GI: positive for abd pain, nausea, vomiting or diarrhea  : negative for dysuria, frequency, or urgency  Skin: negative for lesions, wounds, or rash  Musculoskeletal: Negative for weakness, myalgia, or arthralgia  Endocrine: Negative for polyuria or polydipsia         Objective   Constitutional: Well developed, awake/alert/oriented x3, no distress, alert and cooperative  Eyes: PERRL, EOMI, clear sclera  ENMT: mucous membranes moist, no apparent injury, no lesions seen  Head/Neck: Neck supple, no apparent injury, thyroid without mass or tenderness, No JVD, trachea midline, no bruits  Respiratory/Thorax: Patent airways, CTAB, normal breath sounds with good chest expansion, thorax symmetric  Cardiovascular: Regular, rate and rhythm, no murmurs, 2+ equal pulses of the extremities, normal S 1and S 2  Gastrointestinal: Nondistended, soft, non-tender, no rebound tenderness or guarding, no masses palpable, no organomegaly, +BS, no bruits  Musculoskeletal: ROM intact, no joint swelling, normal strength  Extremities: normal extremities, no cyanosis edema, contusions or wounds, no clubbing  Neurological: alert and oriented x3, intact senses, motor, response and reflexes, normal strength  Lymphatic: No significant lymphadenopathy  Psychological: Appropriate mood and behavior  Skin: Warm and dry, no lesions, no rashes      Last Recorded Vitals  /69 (Patient Position: Lying)   Pulse 59   Temp 36.4 °C (97.5 °F) (Temporal)   Resp  "16   Ht 1.626 m (5' 4\")   Wt 72.2 kg (159 lb 1.6 oz)   SpO2 95%   BMI 27.31 kg/m²     Intake/Output last 3 Shifts:  I/O last 3 completed shifts:  In: 300 (4.2 mL/kg) [P.O.:300]  Out: - (0 mL/kg)   Weight: 72.2 kg   No intake/output data recorded.    Relevant Results  Scheduled medications  acetaminophen, 975 mg, oral, q6h  baclofen, 10 mg, oral, q AM  baclofen, 20 mg, oral, Nightly  [Held by provider] bumetanide, 2 mg, oral, BID  clopidogrel, 75 mg, oral, Daily  digoxin, 125 mcg, oral, Daily  dilTIAZem CD, 120 mg, oral, Nightly  heparin (porcine), 5,000 Units, subcutaneous, q8h  hydroxychloroquine, 300 mg, oral, Daily  [Held by provider] magnesium oxide, 400 mg, oral, Daily  montelukast, 10 mg, oral, Nightly  pantoprazole, 40 mg, oral, BID  polyethylene glycol, 17 g, oral, Daily  rosuvastatin, 20 mg, oral, Daily  sulfaSALAzine, 500 mg, oral, BID      Continuous medications     PRN medications  PRN medications: albuterol, famotidine, nitroglycerin, ondansetron, oxyCODONE, oxyCODONE, promethazine, sucralfate    Results for orders placed or performed during the hospital encounter of 07/24/24 (from the past 24 hour(s))   Drug Screen, Urine   Result Value Ref Range    Amphetamine Screen, Urine Presumptive Negative Presumptive Negative    Barbiturate Screen, Urine Presumptive Negative Presumptive Negative    Benzodiazepines Screen, Urine Presumptive Negative Presumptive Negative    Cannabinoid Screen, Urine Presumptive Negative Presumptive Negative    Cocaine Metabolite Screen, Urine Presumptive Negative Presumptive Negative    Fentanyl Screen, Urine Presumptive Positive (A) Presumptive Negative    Opiate Screen, Urine Presumptive Negative Presumptive Negative    Oxycodone Screen, Urine Presumptive Positive (A) Presumptive Negative    PCP Screen, Urine Presumptive Negative Presumptive Negative    Methadone Screen, Urine Presumptive Negative Presumptive Negative   Magnesium   Result Value Ref Range    Magnesium 2.72 " (H) 1.60 - 2.40 mg/dL   Renal Function Panel   Result Value Ref Range    Glucose 84 74 - 99 mg/dL    Sodium 140 136 - 145 mmol/L    Potassium 4.1 3.5 - 5.3 mmol/L    Chloride 106 98 - 107 mmol/L    Bicarbonate 25 21 - 32 mmol/L    Anion Gap 13 10 - 20 mmol/L    Urea Nitrogen 11 6 - 23 mg/dL    Creatinine 0.98 0.50 - 1.05 mg/dL    eGFR 61 >60 mL/min/1.73m*2    Calcium 8.7 8.6 - 10.3 mg/dL    Phosphorus 3.9 2.5 - 4.9 mg/dL    Albumin 3.5 3.4 - 5.0 g/dL   CBC   Result Value Ref Range    WBC 5.8 4.4 - 11.3 x10*3/uL    nRBC 0.0 0.0 - 0.0 /100 WBCs    RBC 3.74 (L) 4.00 - 5.20 x10*6/uL    Hemoglobin 8.5 (L) 12.0 - 16.0 g/dL    Hematocrit 30.3 (L) 36.0 - 46.0 %    MCV 81 80 - 100 fL    MCH 22.7 (L) 26.0 - 34.0 pg    MCHC 28.1 (L) 32.0 - 36.0 g/dL    RDW 16.1 (H) 11.5 - 14.5 %    Platelets 242 150 - 450 x10*3/uL       CT abdomen pelvis wo IV contrast   Final Result   1.  Diffuse relatively voluminous colonic feces with rectum distended   with fecal bolus   2. Colonic diverticulosis but no evidence of acute diverticulitis   3. Small bilateral pleural effusions             MACRO:   None        Signed by: Jasmin Grimaldo 7/27/2024 12:32 PM   Dictation workstation:   [id number]      Transthoracic Echo (TTE) Complete   Final Result      XR chest 1 view   Final Result   Limited study. Cardiomegaly without acute infiltrates.        Signed by: Javier Dawson 7/24/2024 1:34 PM   Dictation workstation:   [id number]      CT angio chest for pulmonary embolism   Final Result   1. Allowing for presumed artifact no evidence of acute pulmonary   embolism.   2. Increased lung heterogeneity/mosaic attenuation, may be secondary   to air trapping, atelectasis, obstructive small airways disease or   pulmonary arterial hypertension.        MACRO:   None        Signed by: Jassi Cha 7/24/2024 1:25 PM   Dictation workstation:   [id number]          Transthoracic Echo (TTE) Complete    Result Date: 7/25/2024   [facility],  [address] [address]               Tel [phone] and Fax [phone] TRANSTHORACIC ECHOCARDIOGRAM REPORT  Patient Name:      VERNON SALDANA VIA         Reading Physician:    [id number] Neo Gutierrez MD Study Date:        7/25/2024            Ordering Provider:    [id number] STEPHONSITA YARBROUGH MRN/PID:           [id number]             Fellow: Accession#:        [id number]         Nurse: Date of Birth/Age: 1950 / 73 years Sonographer:          Viola Lau                                                               RD Gender:            F                    Additional Staff: Height:            162.56 cm            Admit Date:           7/24/2024 Weight:            72.12 kg             Admission Status:     Inpatient -                                                               Routine BSA / BMI:         1.77 m2 / 27.29      Encounter#:           [id number]                    kg/m2 Blood Pressure:    108/69 mmHg          Department Location:  [facility] Non                                                               Invasive Study Type:    TRANSTHORACIC ECHO (TTE) COMPLETE Diagnosis/ICD: Chest pain, unspecified-R07.9 Indication:    CP CPT Code:      Echo Complete w Full Doppler-91396 Patient History: Pertinent History: A-Fib, CAD, Hyperlipidemia, COPD, Chest Pain and S/P Left                    shoulder surgery,[place]man, CKD. Study Detail: The following Echo studies were performed: 2D, M-Mode, Doppler and               color flow. Technically challenging study due to patient lying in               supine position and S/P left shoulder surgery. Patient has a               defibrillator.  PHYSICIAN INTERPRETATION: Left Ventricle: The left ventricular systolic function is mildly decreased, with a visually estimated ejection fraction of 45-50%. There are no regional  wall motion abnormalities. The left ventricular cavity size is normal. There is mild concentric left ventricular hypertrophy. Spectral Doppler shows a pseudonormal pattern of left ventricular diastolic filling. Left Atrium: The left atrium is normal in size. Right Ventricle: The right ventricle is normal in size. There is normal right ventricular global systolic function. A device is visualized in the right ventricle. Right Atrium: The right atrium is normal in size. Aortic Valve: The aortic valve is trileaflet. There is mild aortic valve cusp calcification. There is evidence of mildly elevated transaortic gradients consistent with sclerosis of the aortic valve. The aortic valve dimensionless index is 0.71. There is moderate aortic valve regurgitation. The peak instantaneous gradient of the aortic valve is 14.6 mmHg. The mean gradient of the aortic valve is 8.0 mmHg. Mitral Valve: The mitral valve is normal in structure. There is moderate mitral annular calcification. There is mild mitral valve regurgitation. Tricuspid Valve: The tricuspid valve is structurally normal. There is mild tricuspid regurgitation. Pulmonic Valve: The pulmonic valve is structurally normal. There is physiologic pulmonic valve regurgitation. Pericardium: There is no pericardial effusion noted. Aorta: The aortic root is normal. Pulmonary Artery: The tricuspid regurgitant velocity is 2.43 m/s, and with an estimated right atrial pressure of 3 mmHg, the estimated pulmonary artery pressure is normal with the RVSP at 26.6 mmHg. Pulmonary Veins: The pulmonary veins appear normal and return normally to the left atrium. Systemic Veins: The inferior vena cava appears to be of normal size. There is IVC inspiratory collapse greater than 50%.  CONCLUSIONS:  1. The left ventricular systolic function is mildly decreased, with a visually estimated ejection fraction of 45-50%.  2. Spectral Doppler shows a pseudonormal pattern of left ventricular diastolic  filling.  3. There is normal right ventricular global systolic function.  4. There is moderate mitral annular calcification.  5. Aortic valve sclerosis.  6. Moderate aortic valve regurgitation.  7. Normal estimated PASP and CVP. QUANTITATIVE DATA SUMMARY: 2D MEASUREMENTS:                           Normal Ranges: IVSd:          0.97 cm    (0.6-1.1cm) LVPWd:         1.16 cm    (0.6-1.1cm) LVIDd:         5.27 cm    (3.9-5.9cm) LVIDs:         4.48 cm LV Mass Index: 122.0 g/m2 LV % FS        15.0 % LA VOLUME:                               Normal Ranges: LA Vol A4C:        46.9 ml    (22+/-6mL/m2) LA Vol A2C:        46.7 ml LA Vol BP:         46.9 ml LA Vol Index A4C:  26.5ml/m2 LA Vol Index A2C:  26.3 ml/m2 LA Vol Index BP:   26.4 ml/m2 LA Area A4C:       17.6 cm2 LA Area A2C:       17.5 cm2 LA Major Axis A4C: 5.6 cm LA Major Axis A2C: 5.6 cm LA Volume Index:   24.7 ml/m2 LA Vol A4C:        42.8 ml LA Vol A2C:        43.8 ml RA VOLUME BY A/L METHOD:                       Normal Ranges: RA Area A4C: 23.0 cm2 AORTA MEASUREMENTS:                    Normal Ranges: Asc Ao, d: 3.70 cm (2.1-3.4cm) LV SYSTOLIC FUNCTION BY 2D PLANIMETRY (MOD):                      Normal Ranges: EF-A4C View:    43 % (>=55%) EF-A2C View:    42 % EF-Biplane:     44 % EF-Visual:      48 % LV EF Reported: 48 % LV DIASTOLIC FUNCTION:                     Normal Ranges: MV Peak E: 1.46 m/s (0.7-1.2 m/s) MITRAL VALVE:                      Normal Ranges: MV Vmax:    1.31 m/s (<=1.3m/s) MV peak P.9 mmHg (<5mmHg) MV mean PG: 3.0 mmHg (<2mmHg) MV DT:      197 msec (150-240msec) MITRAL INSUFFICIENCY:                           Normal Ranges: PISA Radius:  0.3 cm MR VTI:       178.00 cm MR Vmax:      496.00 cm/s MR Alias Ham: 35.1 cm/s MR Volume:    7.12 ml MR Flow Rt:   19.85 ml/s MR EROA:      0.04 cm2 AORTIC VALVE:                                    Normal Ranges: AoV Vmax:                1.91 m/s  (<=1.7m/s) AoV Peak P.6 mmHg (<20mmHg)  AoV Mean P.0 mmHg  (1.7-11.5mmHg) LVOT Max Ham:            1.33 m/s  (<=1.1m/s) AoV VTI:                 37.70 cm  (18-25cm) LVOT VTI:                26.90 cm LVOT Diameter:           2.00 cm   (1.8-2.4cm) AoV Area, VTI:           2.24 cm2  (2.5-5.5cm2) AoV Area,Vmax:           2.19 cm2  (2.5-4.5cm2) AoV Dimensionless Index: 0.71 AORTIC INSUFFICIENCY: AI Vmax:       4.01 m/s AI Half-time:  441 msec AI Decel Rate: 267.00 cm/s2  RIGHT VENTRICLE: RV Basal 3.67 cm RV Mid   2.60 cm RV Major 8.8 cm TAPSE:   28.0 mm RV s'    0.12 m/s TRICUSPID VALVE/RVSP:                             Normal Ranges: Peak TR Velocity: 2.43 m/s RV Syst Pressure: 26.6 mmHg (< 30mmHg) IVC Diam:         1.58 cm PULMONIC VALVE:                      Normal Ranges: PV Max Ham: 1.5 m/s  (0.6-0.9m/s) PV Max P.6 mmHg  [id number] Neo Gutierrez MD Electronically signed on 2024 at 6:23:17 PM  ** Final **     Transthoracic Echo (TTE) Limited    Result Date: 2024   [facility] [address] [place] [address]                Tel [phone] and Fax [phone] TRANSTHORACIC ECHOCARDIOGRAM REPORT  Patient Name:      VERNON SALDANA MITZI         Reading Physician:    [id number] Monica Chen MD Study Date:        2024            Ordering Provider:    [id number] KRISTAL SNYDER MRN/PID:           [id number]             Fellow: Accession#:        [id number]         Nurse: Date of Birth/Age: 1950 / 73 years Sonographer:          Tobi Carlos RDCS Gender:            F                    Additional Staff: Height:            162.56 cm            Admit Date:           2024 Weight:            70.31 kg             Admission Status:     Inpatient -                                                               Routine BSA / BMI:          1.76 m2 / 26.61      Encounter#:           [id number]                    kg/m2 Blood Pressure:    /                    Department Location:  [facility]                                                               Cath Lab Study Type:    TRANSTHORACIC ECHO (TTE) LIMITED Diagnosis/ICD: Unspecified atrial fibrillation-I48.91 Indication:    Atrial fibrillation; Preop cardiovascular exam CPT Code:      Echo Limited-[id number] Patient History: Pertinent History: A-fib; CAD; BARBARA; HTN; HLD; CKD; PPM. Study Detail: The following Echo studies were performed: 2D and M-Mode.               Technically challenging study due to body habitus and patient               lying in supine position.  PHYSICIAN INTERPRETATION: Left Ventricle: Left ventricular ejection fraction is low normal, by visual estimate at 50-55%. The patient is in atrial fibrillation which may influence the estimate of left ventricular function and transvalvular flows. Wall motion is abnormal. The left ventricular cavity size was not assessed. Left ventricular diastolic filling was not assessed. Possible inferior wall motion hypokinesis. Left Atrium: The left atrium was not assessed. Right Ventricle: The right ventricle was not well visualized. There is normal right ventricular global systolic function. A device is visualized in the right ventricle. Right Atrium: The right atrium was not assessed. There is a device visualized in the right atrium. Aortic Valve: The aortic valve is trileaflet. There is mild aortic valve cusp calcification. Aortic valve regurgitation was not assessed. Mitral Valve: The mitral valve is mildly thickened. There is moderate mitral annular calcification. Mitral valve regurgitation was not assessed. Tricuspid Valve: The tricuspid valve was not well visualized. Tricuspid regurgitation was not assessed. Pulmonic Valve: The pulmonic valve is not well visualized. Pulmonic valve regurgitation was not assessed. Pericardium: There is a  trivial pericardial effusion. Aorta: The aortic root is normal. Systemic Veins: The inferior vena cava appears to be of normal size. There is IVC inspiratory collapse greater than 50%. In comparison to the previous echocardiogram(s): Compared with the prior exam from 2/27/2024 there are no significant changes though today's exam is only a limited study withno assessment of valvular function.  CONCLUSIONS:  1. Left ventricular ejection fraction is low normal, by visual estimate at 50-55%.  2. Possible inferior wall motion hypokinesis.  3. There is normal right ventricular global systolic function.  4. There is moderate mitral annular calcification.  5. Compared with the prior exam from 2/27/2024 there are no significant changes though today's exam is only a limited study withno assessment of valvular function.  6. The patient is in atrial fibrillation which may influence the estimate of left ventricular function and transvalvular flows. QUANTITATIVE DATA SUMMARY: AORTA MEASUREMENTS:                      Normal Ranges: Ao Sinus, d: 3.45 cm (2.1-3.5cm) LV SYSTOLIC FUNCTION BY 2D PLANIMETRY (MOD):                      Normal Ranges: EF-Visual:      53 % LV EF Reported: 53 % TRICUSPID VALVE/RVSP:                   Normal Ranges: IVC Diam: 2.03 cm  [id number] Monica Chen MD Electronically signed on 7/16/2024 at 3:11:21 PM  ** Final **     Transthoracic Echo (TTE) Limited    Result Date: 7/16/2024   [facility], [address] [place] [address]                Tel [phone] and Fax [phone] TRANSTHORACIC ECHOCARDIOGRAM REPORT  Patient Name:      VERNON SALDANA MITZI         Reading Physician:    [id number] Neftali Calvillo MD Study Date:        7/16/2024            Ordering Provider:    [id number] KRISTAL SNYDER MRN/PID:           [id number]             Fellow:               [id number] Verdugo                                                                Jairo JOVEL Accession#:        [id number]         Nurse: Date of Birth/Age: 1950 / 73 years Sonographer:          Tobi Carlos RDCS Gender:            F                    Additional Staff: Height:            162.56 cm            Admit Date:           7/16/2024 Weight:            71.22 kg             Admission Status:     Inpatient -                                                               Routine BSA / BMI:         1.76 m2 / 26.95      Encounter#:           [id number]                    kg/m2 Blood Pressure:    117/55 mmHg          Department Location:  [facility]                                                               Cath Lab Study Type:    TRANSTHORACIC ECHO (TTE) LIMITED Diagnosis/ICD: Other specified postprocedural states-Z98.890 Indication:    Post LAAO CPT Code:      Echo Limited-38235 Patient History: Pertinent History: CAD; A-fib; BARBARA: HTN; HLD; CKD; PPM. Study Detail: The following Echo studies were performed: 2D and M-Mode.               Technically challenging study due to body habitus.  PHYSICIAN INTERPRETATION: Left Ventricle: Left ventricular ejection fraction is low normal, by visual estimate at 50-55%. There are no regional wall motion abnormalities. The left ventricular cavity size is normal. Left ventricular diastolic filling was not assessed. Left Atrium: The left atrium was not assessed. Right Ventricle: The right ventricle was not assessed. Right ventricular systolic function not assessed. A device is visualized in the right ventricle. Right Atrium: The right atrium was not assessed. There is a device visualized in the right atrium. Aortic Valve: The aortic valve is trileaflet. There is mild aortic valve cusp calcification. Aortic valve regurgitation was not assessed. Mitral Valve: The mitral valve is mildly thickened. There is moderate mitral annular  calcification. Mitral valve regurgitation was not assessed. Tricuspid Valve: The tricuspid valve was not well visualized. Tricuspid regurgitation was not assessed. Right ventricular systolic pressure could not be accurately assessed in this patient. Pulmonic Valve: The pulmonic valve was not assessed. Pulmonic valve regurgitation was not assessed. Pericardium: There is a trivial pericardial effusion. Aorta: The aortic root is normal. Systemic Veins: The inferior vena cava was not well visualized. In comparison to the previous echocardiogram(s): Compared with study dated 2/27/2024, no significant change.  CONCLUSIONS:  1. Left ventricular ejection fraction is low normal, by visual estimate at 50-55%.  2. There is moderate mitral annular calcification.  3. There is a trivial pericardial effusion.  4. Compared with study dated 2/27/2024, no significant change. QUANTITATIVE DATA SUMMARY: LV SYSTOLIC FUNCTION BY 2D PLANIMETRY (MOD):                      Normal Ranges: EF-Visual:      53 % LV EF Reported: 53 %  [id number] Neftali Calvillo MD Electronically signed on 7/16/2024 at 3:02:13 PM  ** Final **     Transthoracic Echo (TTE) Complete    Result Date: 2/27/2024   [facility], [address] [place] [address]               Tel [phone] and Fax [phone] TRANSTHORACIC ECHOCARDIOGRAM REPORT  Patient Name:      VERNON SALDANA VIA         Reading Physician:    [id number] Nohelia Soto MD Study Date:        2/27/2024            Ordering Provider:    [id number] NIKOLE LOVE MRN/PID:           [id number]             Fellow: Accession#:        [id number]         Nurse: Date of Birth/Age: 1950 / 73 years Sonographer:          Dai Diaz                                                               [facility] Gender:            F                    Additional Staff: Height:             162.56 cm            Admit Date:           2/24/2024 Weight:            72.57 kg             Admission Status:     Inpatient -                                                               Routine BSA / BMI:         1.78 m2 / 27.46      Encounter#:           [id number]                    kg/m2                                         Department Location:  [facility] Non                                                               Invasive Blood Pressure: 131 /59 mmHg Study Type:    TRANSTHORACIC ECHO (TTE) COMPLETE Diagnosis/ICD: Shortness of breath-R06.02 Indication:    Dyspnea CPT Code:      Echo Complete w Full Doppler-33589 Patient History: Pertinent History: A-Fib and Chest Pain. elevated troponin, elevated BNP, mod                    AS. Study Detail: The following Echo studies were performed: 2D, M-Mode, Doppler and               color flow.  PHYSICIAN INTERPRETATION: Left Ventricle: The left ventricular systolic function is normal, with an estimated ejection fraction of 55-60%. There are no regional wall motion abnormalities. The left ventricular cavity size is normal. Spectral Doppler shows an impaired relaxation pattern of left ventricular diastolic filling. Left Atrium: The left atrium is mildly dilated. Right Ventricle: The right ventricle is normal in size. There is normal right ventricular global systolic function. Right Atrium: The right atrium is normal in size. Aortic Valve: The aortic valve is trileaflet. There is mild to moderate aortic valve cusp calcification. There is evidence of moderate aortic valve stenosis. There is trivial aortic valve regurgitation. The peak instantaneous gradient of the aortic valve is 27.5 mmHg. The mean gradient of the aortic valve is 16.0 mmHg. Mitral Valve: The mitral valve is mildly thickened. There is evidence of mild mitral valve stenosis. The doppler estimated mean and peak diastolic pressure gradients are 7.2 mmHg and 16.3 mmHg respectively. There is mild  mitral annular calcification. There is mild to moderate mitral valve regurgitation. Tricuspid Valve: The tricuspid valve is structurally normal. There is mild to moderate tricuspid regurgitation. Pulmonic Valve: The pulmonic valve is not well visualized. There is trace to mild pulmonic valve regurgitation. Pericardium: There is no pericardial effusion noted. Aorta: The aortic root is normal. Pulmonary Artery: The tricuspid regurgitant velocity is 3.46 m/s, and with an estimated right atrial pressure of 3 mmHg, the estimated pulmonary artery pressure is mild to moderately elevated with the RVSP at 50.9 mmHg.  CONCLUSIONS:  1. Left ventricular systolic function is normal with a 55-60% estimated ejection fraction.  2. Spectral Doppler shows an impaired relaxation pattern of left ventricular diastolic filling.  3. Mild to moderate mitral valve regurgitation.  4. Mild to moderate tricuspid regurgitation visualized.  5. Moderate aortic valve stenosis.  6. Mild to moderately elevated pulmonary artery pressure. QUANTITATIVE DATA SUMMARY: 2D MEASUREMENTS:                           Normal Ranges: IVSd:          0.97 cm    (0.6-1.1cm) LVPWd:         0.98 cm    (0.6-1.1cm) LVIDd:         5.70 cm    (3.9-5.9cm) LVIDs:         4.26 cm LV Mass Index: 123.2 g/m2 LV % FS        25.2 % LA VOLUME:                              Normal Ranges: LA Vol A4C:       77.4 ml    (22+/-6mL/m2) LA Vol A2C:       83.5 ml LA Vol BP:        82.1 ml LA Vol Index A4C: 43.5 ml/m2 LA Vol Index A2C: 46.9 ml/m2 LA Vol Index BP:  46.1 ml/m2 LA Volume Index:  46.1 ml/m2 LA Vol A4C:       72.2 ml LA Vol A2C:       77.0 ml RA VOLUME BY A/L METHOD:                       Normal Ranges: RA Area A4C: 21.8 cm2 M-MODE MEASUREMENTS:                  Normal Ranges: Ao Root: 3.30 cm (2.0-3.7cm) LAs:     4.72 cm (2.7-4.0cm) LV SYSTOLIC FUNCTION BY 2D PLANIMETRY (MOD):                     Normal Ranges: EF-A4C View: 55.9 % (>=55%) EF-A2C View: 56.5 % EF-Biplane:  55.9 %  LV DIASTOLIC FUNCTION:                             Normal Ranges: MV Peak E:      1.91 m/s    (0.7-1.2 m/s) MV Peak A:      0.93 m/s    (0.42-0.7 m/s) E/A Ratio:      2.06        (1.0-2.2) MV e'           0.09 m/s    (>8.0) MV lateral e'   0.09 m/s MV medial e'    0.08 m/s E/e' Ratio:     21.21       (<8.0) PulmV Sys Ham:  62.35 cm/s PulmV Alvarez Ham: 121.32 cm/s PulmV S/D Ahm:  0.51 MITRAL VALVE:                       Normal Ranges: MV Vmax:    2.02 m/s  (<=1.3m/s) MV peak P.3 mmHg (<5mmHg) MV mean P.2 mmHg  (<2mmHg) MV VTI:     48.44 cm  (10-13cm) MV DT:      167 msec  (150-240msec) MITRAL INSUFFICIENCY:                         Normal Ranges: MR VTI:     192.66 cm MR Vmax:    568.98 cm/s MR Volume:  33.56 ml MR Flow Rt: 99.12 ml/s MR EROA:    0.17 cm2 AORTIC VALVE:                                    Normal Ranges: AoV Vmax:                2.62 m/s  (<=1.7m/s) AoV Peak P.5 mmHg (<20mmHg) AoV Mean P.0 mmHg (1.7-11.5mmHg) LVOT Max Ham:            0.99 m/s  (<=1.1m/s) AoV VTI:                 58.21 cm  (18-25cm) LVOT VTI:                21.96 cm LVOT Diameter:           1.95 cm   (1.8-2.4cm) AoV Area, VTI:           1.13 cm2  (2.5-5.5cm2) AoV Area,Vmax:           1.13 cm2  (2.5-4.5cm2) AoV Dimensionless Index: 0.38 AORTIC INSUFFICIENCY: AI Vmax:       3.91 m/s AI Half-time:  520 msec AI Decel Time: 1794 msec AI Decel Rate: 218.17 cm/s2  RIGHT VENTRICLE: RV Basal 4.30 cm RV Mid   2.60 cm RV Major 7.7 cm TAPSE:   27.0 mm RV s'    0.18 m/s TRICUSPID VALVE/RVSP:                             Normal Ranges: Peak TR Velocity: 3.46 m/s RV Syst Pressure: 50.9 mmHg (< 30mmHg) PULMONIC VALVE:                      Normal Ranges: PV Max Ham: 1.5 m/s  (0.6-0.9m/s) PV Max P.8 mmHg Pulmonary Veins: PulmV Alvarez Ham: 121.32 cm/s PulmV S/D Ham:  0.51 PulmV Sys Ham:  62.35 cm/s  [id number] Nohelia Soto MD Electronically signed on 2024 at 11:22:42 AM  ** Final **           Assessment/Plan    Principal Problem:    Acute chest pain      [facility] in 2022 showed normal coronary arteries     Echo from July 16th:  1. Left ventricular ejection fraction is low normal, by visual estimate at 50-55%.   2. There is moderate mitral annular calcification.   3. There is a trivial pericardial effusion.        Chest pain, elevated troponins  -Just had watchman procedure July 16th  -[facility] as above  -CTA negative  -Troponin peaked at 355, now downtrending  -EKG atrial paced with nonspecific T wave abnormalities  -Echo reviewed by Dr. Larson, mildly decreased LVEF, no pericardial effusion  -Chest pain and elevated troponins most likely from recent procedure  -Cont plavix, statin  -Allergy to BB  -Possibly GERD, cont PPI  -Nausea unlikely related to cardiac procedure     2. Paroxysmal atrial fibrillation  -Currently atrial paced  -[employer] [employer] PCM/ICD interrogated, no episodes since prior to watchman, battery life 1 year  -Not on AC, has watchman  -Cont plavix  -Cont digoxin and diltiazem  -digoxin level WNL  -Monitor on tele     3. Chronic anemia  -appears stable  -Monitor     4. Hyperlipidemia  -Cont statin       Lucy Kelly, NAILA-CNP

## 2024-07-28 VITALS
TEMPERATURE: 97.7 F | RESPIRATION RATE: 16 BRPM | HEART RATE: 60 BPM | DIASTOLIC BLOOD PRESSURE: 58 MMHG | OXYGEN SATURATION: 95 % | BODY MASS INDEX: 27.16 KG/M2 | SYSTOLIC BLOOD PRESSURE: 101 MMHG | WEIGHT: 159.1 LBS | HEIGHT: 64 IN

## 2024-07-28 PROBLEM — K59.00 CONSTIPATION: Status: ACTIVE | Noted: 2024-07-28

## 2024-07-28 LAB
ALBUMIN SERPL BCP-MCNC: 3.2 G/DL (ref 3.4–5)
ANION GAP SERPL CALC-SCNC: 11 MMOL/L (ref 10–20)
ATRIAL RATE: 300 BPM
ATRIAL RATE: 500 BPM
ATRIAL RATE: 55 BPM
BUN SERPL-MCNC: 12 MG/DL (ref 6–23)
CALCIUM SERPL-MCNC: 8.3 MG/DL (ref 8.6–10.3)
CHLORIDE SERPL-SCNC: 106 MMOL/L (ref 98–107)
CO2 SERPL-SCNC: 27 MMOL/L (ref 21–32)
CREAT SERPL-MCNC: 0.98 MG/DL (ref 0.5–1.05)
EGFRCR SERPLBLD CKD-EPI 2021: 61 ML/MIN/1.73M*2
ERYTHROCYTE [DISTWIDTH] IN BLOOD BY AUTOMATED COUNT: 15.9 % (ref 11.5–14.5)
GLUCOSE SERPL-MCNC: 85 MG/DL (ref 74–99)
HCT VFR BLD AUTO: 27.6 % (ref 36–46)
HGB BLD-MCNC: 8 G/DL (ref 12–16)
MAGNESIUM SERPL-MCNC: 2.43 MG/DL (ref 1.6–2.4)
MCH RBC QN AUTO: 23.1 PG (ref 26–34)
MCHC RBC AUTO-ENTMCNC: 29 G/DL (ref 32–36)
MCV RBC AUTO: 80 FL (ref 80–100)
NRBC BLD-RTO: 0 /100 WBCS (ref 0–0)
PHOSPHATE SERPL-MCNC: 4.4 MG/DL (ref 2.5–4.9)
PLATELET # BLD AUTO: 238 X10*3/UL (ref 150–450)
POTASSIUM SERPL-SCNC: 4.4 MMOL/L (ref 3.5–5.3)
PR INTERVAL: 276 MS
PR INTERVAL: 290 MS
Q ONSET: 197 MS
Q ONSET: 218 MS
Q ONSET: 220 MS
QRS COUNT: 10 BEATS
QRS DURATION: 172 MS
QRS DURATION: 86 MS
QRS DURATION: 92 MS
QT INTERVAL: 408 MS
QT INTERVAL: 418 MS
QT INTERVAL: 488 MS
QTC CALCULATION(BAZETT): 408 MS
QTC CALCULATION(BAZETT): 418 MS
QTC CALCULATION(BAZETT): 495 MS
QTC FREDERICIA: 408 MS
QTC FREDERICIA: 418 MS
QTC FREDERICIA: 493 MS
R AXIS: 249 DEGREES
R AXIS: 37 DEGREES
R AXIS: 55 DEGREES
RBC # BLD AUTO: 3.47 X10*6/UL (ref 4–5.2)
SODIUM SERPL-SCNC: 140 MMOL/L (ref 136–145)
T AXIS: -81 DEGREES
T AXIS: 219 DEGREES
T AXIS: 82 DEGREES
T OFFSET: 424 MS
T OFFSET: 427 MS
T OFFSET: 441 MS
VENTRICULAR RATE: 60 BPM
VENTRICULAR RATE: 60 BPM
VENTRICULAR RATE: 62 BPM
WBC # BLD AUTO: 5 X10*3/UL (ref 4.4–11.3)

## 2024-07-28 PROCEDURE — 85027 COMPLETE CBC AUTOMATED: CPT

## 2024-07-28 PROCEDURE — 2500000004 HC RX 250 GENERAL PHARMACY W/ HCPCS (ALT 636 FOR OP/ED)

## 2024-07-28 PROCEDURE — 36415 COLL VENOUS BLD VENIPUNCTURE: CPT

## 2024-07-28 PROCEDURE — 80069 RENAL FUNCTION PANEL: CPT

## 2024-07-28 PROCEDURE — 2500000001 HC RX 250 WO HCPCS SELF ADMINISTERED DRUGS (ALT 637 FOR MEDICARE OP)

## 2024-07-28 PROCEDURE — 83735 ASSAY OF MAGNESIUM: CPT

## 2024-07-28 PROCEDURE — 2500000002 HC RX 250 W HCPCS SELF ADMINISTERED DRUGS (ALT 637 FOR MEDICARE OP, ALT 636 FOR OP/ED)

## 2024-07-28 PROCEDURE — 99238 HOSP IP/OBS DSCHRG MGMT 30/<: CPT

## 2024-07-28 RX ORDER — AMOXICILLIN 250 MG
1 CAPSULE ORAL DAILY
Qty: 30 TABLET | Refills: 0 | Status: SHIPPED | OUTPATIENT
Start: 2024-07-28 | End: 2024-08-27

## 2024-07-28 RX ORDER — BISACODYL 10 MG/1
10 SUPPOSITORY RECTAL DAILY PRN
Qty: 5 SUPPOSITORY | Refills: 0 | Status: ON HOLD | OUTPATIENT
Start: 2024-07-28 | End: 2024-08-10

## 2024-07-28 RX ADMIN — HEPARIN SODIUM 5000 UNITS: 5000 INJECTION INTRAVENOUS; SUBCUTANEOUS at 08:34

## 2024-07-28 RX ADMIN — ROSUVASTATIN CALCIUM 20 MG: 20 TABLET, FILM COATED ORAL at 08:35

## 2024-07-28 RX ADMIN — BACLOFEN 10 MG: 10 TABLET ORAL at 08:34

## 2024-07-28 RX ADMIN — CLOPIDOGREL 75 MG: 75 TABLET ORAL at 08:34

## 2024-07-28 RX ADMIN — OXYCODONE HYDROCHLORIDE 5 MG: 5 TABLET ORAL at 01:01

## 2024-07-28 RX ADMIN — PANTOPRAZOLE SODIUM 40 MG: 40 TABLET, DELAYED RELEASE ORAL at 08:34

## 2024-07-28 RX ADMIN — IRON SUCROSE 100 MG: 20 INJECTION, SOLUTION INTRAVENOUS at 12:22

## 2024-07-28 RX ADMIN — ACETAMINOPHEN 975 MG: 325 TABLET ORAL at 05:21

## 2024-07-28 RX ADMIN — HYDROXYCHLOROQUINE SULFATE 300 MG: 200 TABLET, FILM COATED ORAL at 08:34

## 2024-07-28 RX ADMIN — SULFASALAZINE 500 MG: 500 TABLET ORAL at 08:34

## 2024-07-28 RX ADMIN — DIGOXIN 125 MCG: 125 TABLET ORAL at 08:34

## 2024-07-28 ASSESSMENT — PAIN - FUNCTIONAL ASSESSMENT
PAIN_FUNCTIONAL_ASSESSMENT: 0-10
PAIN_FUNCTIONAL_ASSESSMENT: 0-10

## 2024-07-28 ASSESSMENT — PAIN SCALES - GENERAL
PAINLEVEL_OUTOF10: 0 - NO PAIN
PAINLEVEL_OUTOF10: 5 - MODERATE PAIN

## 2024-07-28 ASSESSMENT — PAIN DESCRIPTION - LOCATION: LOCATION: BACK

## 2024-07-28 NOTE — PROGRESS NOTES
07/28/24 1200   Discharge Planning   Assistance Needed Initial IMM obtained less than 48 hours ago so no DC IMM was needed.

## 2024-07-28 NOTE — DISCHARGE SUMMARY
Discharge Diagnosis  Acute chest pain and  Elevated Troponins secondary to recent cardiac procedure (Watchman)  Constipation    Issues Requiring Follow-Up  Constipation -> continue home miralax, take heike-colace, use Dulcolax suppository prn    Discharge Meds     Your medication list        START taking these medications        Instructions Last Dose Given Next Dose Due   bisacodyl 10 mg suppository  Commonly known as: Dulcolax      Insert 1 suppository (10 mg) into the rectum once daily as needed for constipation for up to 5 days.       sennosides-docusate sodium 8.6-50 mg tablet  Commonly known as: Heike-Colace      Take 1 tablet by mouth once daily.              CHANGE how you take these medications        Instructions Last Dose Given Next Dose Due   baclofen 10 mg tablet  Commonly known as: Lioresal  What changed: additional instructions      One tablet in the morning and afternoon and 1 to 2 tablets in the evening.       famotidine 40 mg tablet  Commonly known as: Pepcid  What changed:   when to take this  reasons to take this      Take 1 tablet (40 mg) by mouth once daily.              CONTINUE taking these medications        Instructions Last Dose Given Next Dose Due   acetaminophen 325 mg tablet  Commonly known as: Tylenol           albuterol 2.5 mg /3 mL (0.083 %) nebulizer solution           bumetanide 2 mg tablet  Commonly known as: Bumex           Cardizem  mg 24 hr tablet  Generic drug: dilTIAZem LA           clobetasol 0.05 % cream  Commonly known as: Temovate      Apply topically 2 times a day.       clopidogrel 75 mg tablet  Commonly known as: Plavix      Take 1 tablet (75 mg) by mouth once daily for 365 doses.       diclofenac sodium 1 % gel  Commonly known as: Voltaren      Apply 4.5 inches (4 g) topically 2 times a day as needed (joint pain).       digoxin 125 MCG tablet  Commonly known as: Lanoxin           EPINEPHrine 0.3 mg/0.3 mL injection syringe  Commonly known as: Epipen            hydroxychloroquine 200 mg tablet  Commonly known as: Plaquenil      Take 1.5 tablets (300 mg) by mouth once daily.       Miralax 17 gram/dose powder  Generic drug: polyethylene glycol           montelukast 10 mg tablet  Commonly known as: Singulair      Take 1 tablet (10 mg) by mouth once daily at bedtime.       nitroglycerin 0.4 mg SL tablet  Commonly known as: Nitrostat           oxyCODONE-acetaminophen 5-325 mg tablet  Commonly known as: Percocet      Take 1 tablet by mouth 3 times a day as needed for severe pain (7 - 10) for up to 28 days. Do not fill before July 5, 2024.       pantoprazole 40 mg EC tablet  Commonly known as: ProtoNix      TAKE ONE TABLET BY MOUTH TWO TIMES A DAY       rosuvastatin 20 mg tablet  Commonly known as: Crestor      TAKE ONE TABLET BY MOUTH EVERY DAY       saccharomyces boulardii 250 mg capsule  Commonly known as: Florastor           sucralfate 100 mg/mL suspension  Commonly known as: Carafate           sulfaSALAzine 500 mg tablet  Commonly known as: Azulfidine      Take 1 tablet (500 mg) by mouth 2 times a day.       Vitamin D3 25 MCG (1000 UT) tablet  Generic drug: cholecalciferol                  STOP taking these medications      magnesium oxide 400 mg tablet  Commonly known as: Mag-Ox                  Where to Get Your Medications        These medications were sent to [id number] [place] [id number]      Phone: [phone]   bisacodyl 10 mg suppository  sennosides-docusate sodium 8.6-50 mg tablet         Test Results Pending At Discharge  Pending Labs       No current pending labs.            Hospital Course  Trina Elias is a 73 y.o. female with a PMH of multiple abdominal surgeries, A-fib, CAD, ICD/PPM placement, HLD, COPD/asthma, CKD, and RA presenting with substernal chest pain. She had a watchman procedure done on 7/16/2024 and she reports that after the procedure (starting the day of) she has been  experiencing chest pain, shortness of breath, nausea, and lightheadedness on exertion. Over the last few days it has been progressively getting worse and starting this morning the symptoms have been constant. The chest pain, nausea, and shortness of breath are now present at rest and the chest pain is more severe. She describes the chest pain as a constant heavy pressure that is exacerbated by deep breaths. States that the chest pain improves with sitting up. She also endorses a constant pounding headache that started this morning. She denies any diaphoresis, syncope, vomiting, arm pain, chills, fever, abdominal pain.  In the ED she received zofran IV 4 mg, LR bolus 500 mL, and fentanyl IV. Troponin was elevated, 134 -> 182. CT angio showed no evidence of PE and CXR revealed no acute changes. EKG showed sinus rhythm, occasional PVCs, ventricular rate 71 normal QRS duration no prolonged QT/QTc no obvious ST elevation, depression or acute ischemic findings. Patient was then admitted to medicine service.      Medicine Course  TTE revealed no evidence of pericardial effusion. Her BP was low during her visit, down to 85/46, so she received a LR bolus. Patient's pain was well controlled after pain meds (oxycodone PRN) and rest. Denied any symptoms of nausea, vomiting, diaphoresis.. Troponin peaked at 355 and then started to decrease. There were no findings on telemetry.    Cardiology reviewed patient's echocardiogram completed 7/25 (mildly decreased LVEF, no pericardial effusion) as well as patient's [facility] in 2022 with normal coronary arteries. Noted that chest pain and elevated troponins likely secondary to patient's recent Watchman procedure on July 16th.    She experienced severe nausea after eating the night (7/26) and has abdominal pain so a CT abdomen was ordered to rule out SBO (she has had many in the past). CT revealed diffuse colonic fecal burden, but no bowel obstruction. Patient given fleet enema with resulting  bowel movement. Patient stated she is no longer having chest pain and that her abdominal pain had improved after her bowel movement. She would like to go home where she plans to continue using laxatives to resolve her constipation. Per cardiology, patient received one iron infusion before discharge. Patient discharged with plan to follow-up with cardiology.    Discharge Instructions  1) Following recommendations were made on your discharge day:  - Do not take Bumex until 7/28 (only if you are eating and drinking well). You can continue all other home meds as previously prescribed.   - When you follow up with Dr. Soto on 7/30, please discuss the Bumex dose since your blood pressure has been low.   - Continue taking your home Miralax. Take 1-2 Lily-Colace pills every night for constipation. You can also use 1 Dulcolax suppository per day as needed while resolving your constipation.  - Stop taking your Mag-Ox. Your Magnesium has been high while in the hospital. Follow-up with your primary care doctor to determine if/when you should continue taking Magnesium supplements.    2) Please, follow-up with your primary care provider within 7 to 14 days after leaving the hospital. /appointment services has been requested to make an appointment for you, however if you do not hear back from them within 1 to 2 days, please call your primary physician's office to schedule an appointment. Bring your photo ID and insurance card to your appointment.   [facility]  services can be reached at [phone] and appointment services can be reached at [phone].    - Please, call   or appointment services and schedule a follow-up with your PCP within 1-2 weeks after you leave the hospital.    3) If you experience any worsening symptoms or have any concerns, please contact your primary care provider to schedule an appointment. If you cannot get in touch with your primary care physician, please  return to the nearest emergency room or urgent care clinic for an evaluation and treatment.    Thank you for choosing [facility] and allowing us to partake in your medical care!    - Your primary care inpatient team.     Pertinent Physical Exam At Time of Discharge  Physical Exam  Constitutional:       General: She is not in acute distress.     Appearance: Normal appearance. She is not ill-appearing.   Cardiovascular:      Rate and Rhythm: Normal rate and regular rhythm.      Pulses: Normal pulses.      Heart sounds: Normal heart sounds. No murmur heard.     No friction rub. No gallop.   Pulmonary:      Effort: Pulmonary effort is normal. No respiratory distress.      Breath sounds: No wheezing, rhonchi or rales.   Abdominal:      General: Abdomen is flat. Bowel sounds are normal.      Tenderness: There is abdominal tenderness. There is no guarding or rebound.   Neurological:      Mental Status: She is alert.     Outpatient Follow-Up  Future Appointments   Date Time Provider Department Center   7/29/2024  9:00 AM NAILA Carnes-CNP [email or web address] [facility]   10/8/2024  8:20 AM Carlos Harley MD [id number] [facility]   11/12/2024  8:40 AM Carlos Harley MD [id number] [facility]   11/15/2024 10:00 AM U CT 1 UCT PARUL Franco, DO  Internal Medicine PGY-2

## 2024-07-28 NOTE — PROGRESS NOTES
Subjective Data:  No chest pain less nausea less abdominal pain    Comprehensive 10-point review of systems negative otherwise as noted above in HPI      Overnight Events:    None     Objective Data:  Last Recorded Vitals:  Vitals:    07/27/24 2022 07/28/24 0047 07/28/24 0530 07/28/24 1028   BP: 118/71 114/68 108/63 101/58   BP Location: Right arm  Right arm    Patient Position: Lying  Lying Lying   Pulse: 60 60 60 60   Resp: 16 16 16 16   Temp: 37.5 °C (99.5 °F) 36.4 °C (97.5 °F) 36.4 °C (97.5 °F) 36.5 °C (97.7 °F)   TempSrc: Temporal Temporal Temporal Temporal   SpO2: 92% 94% 92% 95%   Weight:       Height:           Last Labs:  CBC - 7/28/2024:  6:40 AM  5.0 8.0 238    27.6      CMP - 7/28/2024:  6:40 AM  8.3 6.8 16 --- 0.5   4.4 3.2 14 78      PTT - 4/15/2024: 11:28 AM  1.1   12.8 30     TROPHS   Date/Time Value Ref Range Status   07/25/2024 03:30  0 - 13 ng/L Final     Comment:     Previous result verified on 7/24/2024 1220 on specimen/case [id number] called with component SoundropHS for procedure Troponin I, High Sensitivity, Initial with value 134 ng/L.   07/25/2024 12:41  0 - 13 ng/L Final     Comment:     Previous result verified on 7/24/2024 1220 on specimen/case [id number] called with component TRPHS for procedure Troponin I, High Sensitivity, Initial with value 134 ng/L.   07/24/2024 11:32  0 - 13 ng/L Final     Comment:     Previous result verified on 7/24/2024 1220 on specimen/case [id number] called with component TRPHS for procedure Troponin I, High Sensitivity, Initial with value 134 ng/L.     BNP   Date/Time Value Ref Range Status   07/24/2024 11:47  0 - 99 pg/mL Final   02/28/2024 05:43  0 - 99 pg/mL Final     LDLCALC   Date/Time Value Ref Range Status   04/15/2024 11:28 AM 68 <=99 mg/dL Final     Comment:                                 Near   Borderline      AGE      Desirable  Optimal    High     High     Very High     0-19 Y     0 - 109     ---    110-129    >/= 130     ----    20-24 Y     0 - 119     ---    120-159   >/= 160     ----      >24 Y     0 -  99   100-129  130-159   160-189     >/=190       VLDL   Date/Time Value Ref Range Status   04/15/2024 11:28 AM 21 0 - 40 mg/dL Final   08/28/2023 09:33 AM 24 0 - 40 mg/dL Final   02/28/2023 09:50 AM 30 0 - 40 mg/dL Final   12/19/2022 05:21 PM 27 0 - 40 mg/dL Final      Last I/O:  I/O last 3 completed shifts:  In: 600 (8.3 mL/kg) [P.O.:600]  Out: - (0 mL/kg)   Weight: 72.2 kg     Ejection Fractions:  EF   Date/Time Value Ref Range Status   07/25/2024 11:54 AM 48 %    07/16/2024 01:30 PM 53 %    07/16/2024 09:55 AM 53 %      Cath:  Cardiac Catheterization Procedure 07/16/2024    Physical Exam:  Constitutional: Well developed, awake/alert/oriented x3, no distress, alert and cooperative  Eyes: PERRL, EOMI, clear sclera  ENMT: mucous membranes moist, no apparent injury, no lesions seen  Head/Neck: Neck supple, no apparent injury, thyroid without mass or tenderness, No JVD, trachea midline, no bruits  Respiratory/Thorax: Patent airways, CTAB, normal breath sounds with good chest expansion, thorax symmetric  Cardiovascular: Regular, rate and rhythm, no murmurs, 2+ equal pulses of the extremities, normal S 1and S 2  Gastrointestinal: Nondistended, soft, non-tender, no rebound tenderness or guarding, no masses palpable, no organomegaly, +BS, no bruits  Musculoskeletal: ROM intact, no joint swelling, normal strength  Extremities: normal extremities, no cyanosis edema, contusions or wounds, no clubbing  Neurological: alert and oriented x3, intact senses, motor, response and reflexes, normal strength  Lymphatic: No significant lymphadenopathy  Psychological: Appropriate mood and behavior  Skin: Warm and dry, no lesions, no rashes      Assessment/Plan   Principal Problem:    Acute chest pain        C in 2022 showed normal coronary arteries     Echo from July 16th:  1. Left ventricular ejection fraction is low normal, by visual  estimate at 50-55%.   2. There is moderate mitral annular calcification.   3. There is a trivial pericardial effusion.        Chest pain, elevated troponins  -Just had watchman procedure July 16th  -[facility] as above  -CTA negative  -Troponin peaked at 355, now downtrending  -EKG atrial paced with nonspecific T wave abnormalities  -Echo reviewed mildly decreased LVEF, no pericardial effusion  -Chest pain and elevated troponins most likely from recent procedure  -Cont plavix, statin  -Allergy to BB  -Possibly GERD, cont PPI  -Nausea unlikely related to cardiac procedure     2. Paroxysmal atrial fibrillation  -Currently atrial paced  -[employer] PCM/ICD interrogated, no episodes since prior to watchman, battery life 1 year  -Not on AC, has watchman  -Cont plavix  -Cont digoxin and diltiazem  -digoxin level WNL  -Monitor on tele     3. Chronic anemia iron deficiency B12 low normal  -Unable to tolerate oral iron, advise parenteral iron infusions  -Monitor     4. Hyperlipidemia  -Cont statin     Peripheral IV 07/24/24 20 G Proximal;Right;Anterior Forearm (Active)   Site Assessment Clean;Dry;Intact 07/27/24 2215   Line Status Saline locked 07/27/24 2215   Dressing Status Clean;Dry 07/27/24 2215   Number of days: 4       Code Status:  Full Code    I spent 20 minutes in the professional and overall care of this patient.        Neo Gutierrez MD

## 2024-07-29 ENCOUNTER — PATIENT OUTREACH (OUTPATIENT)
Dept: PRIMARY CARE | Facility: CLINIC | Age: 74
End: 2024-07-29

## 2024-07-29 ENCOUNTER — APPOINTMENT (OUTPATIENT)
Dept: PAIN MEDICINE | Facility: CLINIC | Age: 74
End: 2024-07-29
Payer: MEDICARE

## 2024-07-29 VITALS
OXYGEN SATURATION: 97 % | SYSTOLIC BLOOD PRESSURE: 132 MMHG | RESPIRATION RATE: 16 BRPM | HEART RATE: 63 BPM | DIASTOLIC BLOOD PRESSURE: 59 MMHG

## 2024-07-29 DIAGNOSIS — M96.1 CERVICAL POST-LAMINECTOMY SYNDROME: Primary | ICD-10-CM

## 2024-07-29 DIAGNOSIS — Z79.891 ENCOUNTER FOR LONG-TERM USE OF OPIATE ANALGESIC: ICD-10-CM

## 2024-07-29 DIAGNOSIS — R11.0 NAUSEA: ICD-10-CM

## 2024-07-29 DIAGNOSIS — M79.18 CERVICAL MYOFASCIAL PAIN SYNDROME: ICD-10-CM

## 2024-07-29 PROCEDURE — 1159F MED LIST DOCD IN RCRD: CPT | Performed by: NURSE PRACTITIONER

## 2024-07-29 PROCEDURE — 1111F DSCHRG MED/CURRENT MED MERGE: CPT | Performed by: NURSE PRACTITIONER

## 2024-07-29 PROCEDURE — 99213 OFFICE O/P EST LOW 20 MIN: CPT | Performed by: NURSE PRACTITIONER

## 2024-07-29 PROCEDURE — 1160F RVW MEDS BY RX/DR IN RCRD: CPT | Performed by: NURSE PRACTITIONER

## 2024-07-29 PROCEDURE — 1036F TOBACCO NON-USER: CPT | Performed by: NURSE PRACTITIONER

## 2024-07-29 PROCEDURE — 1125F AMNT PAIN NOTED PAIN PRSNT: CPT | Performed by: NURSE PRACTITIONER

## 2024-07-29 RX ORDER — NALOXONE HYDROCHLORIDE 4 MG/.1ML
1 SPRAY NASAL AS NEEDED
Qty: 2 EACH | Refills: 0 | Status: SHIPPED | OUTPATIENT
Start: 2024-07-29

## 2024-07-29 RX ORDER — BACLOFEN 10 MG/1
TABLET ORAL
Qty: 90 TABLET | Refills: 2 | Status: SHIPPED | OUTPATIENT
Start: 2024-07-29

## 2024-07-29 RX ORDER — ONDANSETRON 8 MG/1
8 TABLET, ORALLY DISINTEGRATING ORAL DAILY PRN
Qty: 30 TABLET | Refills: 2 | Status: SHIPPED | OUTPATIENT
Start: 2024-07-29 | End: 2024-10-27

## 2024-07-29 RX ORDER — OXYCODONE AND ACETAMINOPHEN 5; 325 MG/1; MG/1
1 TABLET ORAL 3 TIMES DAILY PRN
Qty: 84 TABLET | Refills: 0 | Status: SHIPPED | OUTPATIENT
Start: 2024-09-28 | End: 2024-10-26

## 2024-07-29 RX ORDER — OXYCODONE AND ACETAMINOPHEN 5; 325 MG/1; MG/1
1 TABLET ORAL 3 TIMES DAILY PRN
Qty: 84 TABLET | Refills: 0 | Status: SHIPPED | OUTPATIENT
Start: 2024-08-31 | End: 2024-09-28

## 2024-07-29 RX ORDER — OXYCODONE AND ACETAMINOPHEN 5; 325 MG/1; MG/1
1 TABLET ORAL 3 TIMES DAILY PRN
Qty: 84 TABLET | Refills: 0 | Status: SHIPPED | OUTPATIENT
Start: 2024-08-03 | End: 2024-08-31

## 2024-07-29 ASSESSMENT — ENCOUNTER SYMPTOMS
SEIZURES: 0
JOINT SWELLING: 1
LIGHT-HEADEDNESS: 0
GASTROINTESTINAL NEGATIVE: 1
BACK PAIN: 1
ALLERGIC/IMMUNOLOGIC NEGATIVE: 1
FACIAL ASYMMETRY: 0
ENDOCRINE NEGATIVE: 1
NECK STIFFNESS: 1
DIZZINESS: 0
CONSTITUTIONAL NEGATIVE: 1
HEADACHES: 0
WEAKNESS: 1
ARTHRALGIAS: 1
TREMORS: 0
MYALGIAS: 1
SPEECH DIFFICULTY: 0
CARDIOVASCULAR NEGATIVE: 1
NUMBNESS: 1
NECK PAIN: 1
RESPIRATORY NEGATIVE: 1
HEMATOLOGIC/LYMPHATIC NEGATIVE: 1
EYES NEGATIVE: 1
PSYCHIATRIC NEGATIVE: 1
PAIN: 1

## 2024-07-29 ASSESSMENT — PAIN SCALES - GENERAL: PAINLEVEL: 5

## 2024-07-29 NOTE — PROGRESS NOTES
Discharge Facility: [facility]  Discharge Diagnosis: Acute chest pain, Elevated troponins secondary to recent cardiac procedure (Watchman) 7/16/2024, Constipation  Admission Date: 7/24/2024  Discharge Date: 7/28/2024    PCP Appointment Date: Message to office to schedule  Specialist Appointment Date: 7/29/2024 09:00 AM Latoya Barkley CNP, Pain Management,  7/30/2024 2:20 PM Dr. Soto, Cardiology  Hospital Encounter and Summary Linked: Yes  See discharge assessment below for further details    Medications  Medications reviewed with patient/caregiver?: Yes (Patient) (7/30/2024 10:36 AM)  Is the patient having any side effects they believe may be caused by any medication additions or changes?: No (7/30/2024 10:36 AM)  Does the patient have all medications ordered at discharge?: Yes (7/30/2024 10:36 AM)  Care Management Interventions: No intervention needed (7/30/2024 10:36 AM)  Prescription Comments: NEW: Lily-colace and Ducolax suppositories HELD: Bumex and magnesium oxide (7/30/2024 10:36 AM)  Is the patient taking all medications as directed (includes completed medication regime)?: Yes (7/30/2024 10:36 AM)  Care Management Interventions: Provided patient education (7/30/2024 10:36 AM)  Medication Comments: Advised patient to discuss Bumex with Dr. Soto at follow up appt. Concerns for hypotension in hospital. (7/30/2024 10:36 AM)    Appointments  Does the patient have a primary care provider?: Yes (7/30/2024 10:36 AM)  Care Management Interventions: -- (Message sent to office to schedule.) (7/30/2024 10:36 AM)  Care Management Interventions: Advised patient to keep appointment (Has appt today with Dr. Soto, Cardiology.) (7/30/2024 10:36 AM)    Self Management  What is the home health agency?: N/A (7/30/2024 10:36 AM)  What Durable Medical Equipment (DME) was ordered?: N/A (7/30/2024 10:36 AM)    Patient Teaching  Does the patient have access to their discharge instructions?: Yes (7/30/2024 10:36 AM)  Care  Management Interventions: Reviewed instructions with patient (7/30/2024 10:36 AM)  What is the patient's perception of their health status since discharge?: Improving (Stated abdominal pain at baseline. Constipation is resolving.) (7/30/2024 10:36 AM)  Is the patient/caregiver able to teach back the hierarchy of who to call/visit for symptoms/problems? PCP, Specialist, Home Health nurse, Urgent Care, ED, 911: Yes (7/30/2024 10:36 AM)  Patient/Caregiver Education Comments: Patient verbalized understanding of discharge instructions. Provided contact information for nonurgent questions or concerns. (7/30/2024 10:36 AM)    Wrap Up  Wrap Up Additional Comments: 73yoF with PMHx of multiple abdominal surgeries, A-fib, CAD, ICD/PPM placement, HLD, COPD/asthma, CKD, and RA presenting with substernal chest pain. She had a watchman procedure done on 7/16/2024 and she reported that after the procedure (starting the day of) she had been experiencing chest pain, shortness of breath, nausea, and lightheadedness on exertion. Patient found to be hypotensive 85/46 and treated with fluids. Cardiology consulted and thought chest pain and elevated troponins related to recent watchman procedure. Patient found to have large stool burden on CT. Once constipation treated, symptoms resolved. Discharged to home self care. Bumex on hold until seen by cardiology due to hypotension. Mag ox also on hold until seen by PCP due to high levels. (7/30/2024 10:36 AM)

## 2024-07-29 NOTE — PROGRESS NOTES
Subjective   Patient ID: Trina Elias is a 73 y.o. female presents for chronic pain management.      Nancy follows up for interval reevaluation of her chronic cervical pain from cervical postlaminectomy syndrome with radiculitis and cervicalgia. She is also with low back pain from spondylosis, degenerative disc and radiculitis.     Percocet 5 mg 3 times daily reduces pain improves function between 50 and 60% for up to 3 hours. Average pain score is 4-5 out of 10 with medication. Since taking the Zofran 8 mg as needed prior to the Percocet this does help with the nausea and GI upset.     Requesting Percocet 10 mg 3 times daily as she had this medication in the hospital up to 4 times daily following placement of the Watchman July 24 through July 28, 2024.  Discussed with her that since she had acute chest pain they wanted to control her acute pain by increasing the medication as this is a temporary measure until resolved.  I cannot increase her medication from Percocet 5 mg to Percocet 10 mg.  Will keep her at the same.    Toxicology consistent May 6, 2024.  Annual controlled substance agreement and opioid risk tool are completed and scanned into the chart May 6, 2024.     Cannot take Lyrica due to negative side effects..     History of lumbar epidural steroid injection from Dr. Solis 2004 at L4 and L5 for degenerative disc.     Did follow with [facility] orthopedics 5 to 6 years ago had epidural steroid injection. History of bacterial meningitis and required hospitalization for 3 days and treated for 21 days with IV antibiotics as a complication from epidural.     Not an injection candidate.     On Xarelto for atrial fibrillation and watchman placement July 2024.  Does have AICD with pacemaker. Did have cardioversion May 2, 2022. Return to normal sinus rhythm.     Last MRI of lumbar spine 2011 with degenerative disc.     Last CT of lumbar spine 2022 with degenerative disc.             For continuity:   Given the  patient's report of reduced pain and improved functional ability without adverse effects, it is reasonable to treat with narcotic medications. The terms of the opioid agreement as well as the potential risks and adverse effects of the patient's medication regimen were discussed in detail. This includes if applicable due to dosage of medication permission to discuss and coordinate care with other treatment providers relevant to the patients condition. The patient verbalized understanding.      Risks and side effects of chronic opioid therapy including but not limited to tolerance, dependence, constipation, hyperalgesia, cognitive side effects, addiction and possible death due to overuse and or misuse were discussed. I also discussed that such medications when co-administered with other sedative agents including but not limited to alcohol, benzodiazepines, sedative hypnotics and illegal drugs could pose life threatening consequences including death. I also explained the impact that the administration of such medication has on a patient with obstructive sleep apnea and continued recommendations for use of apnea devices if ordered are prescribed by other physicians. In order to effectively and safely treat the pain, I also emphasized the importance of compliance with the treatment plan, as well as compliance with the terms of the opioid agreement, which was reviewed in detail. I explained the importance of being responsible with the medications and to take these only as prescribed, never in excess and never for reasons other than pain reduction. The patient was counseled on keeping the medications safe and locked away from children and other adults as well as disposal methods and options. The patient understood the risks and instructions.      I also discussed with the patient in detail that based on the clinical response to the opioid medications and improvements of activities of daily living, sleep, and work  performance in light of compliance with the treatment plan we can continue this form of therapy for the above chronic pain. The goal and rationale used for current treatment with chronic opioid medication is to control the pain and alleviate disability induced by the chronic pain condition noted above after failures of other non-opioid and nonpharmacological modalities to treat the chronic pain and the symptoms associated with have failed. The patient understood the goals in terms of the above treatment plan and had no further questions prior to leaving the office today.      Of note, the above-mentioned diagnoses/conditions and expected fluctuating nature of pain, and pain characteristic changes may lead to prolonged functional impairment requiring frequent and multiple reassessments with continued high level medical decision making. As noted, medication and medication management may require opiate therapy in excess of a routine less than 30 day medication requirement. The patient may require daily opiate therapy necessitating month-long prescription medication as noted above in order to perform activities of daily living and achieve acceptable quality of life with respect to their chronic pain condition for the foreseeable future. We monitor our patient's carefully through drug monitoring, medication counts, urine drug testing specific to their medication as well as a myriad of other substances and with frequent follow-ups with interval reassement of the chronic pain condition, its pathophysiology and prognosis.      The level of clinical decision making at this office visit is high due to high risks and complications including mortality and morbidity related to acute and chronic pain with respects to life, bodily function, and treatment. Risks and clinical decisions with respect to under treatment, failure to maintain adequate treatment, and/or overtreatment complications and outcomes were discussed with the  patient with respect to their chronic pain conditions, interventional therapies, as well as the use of various medications including possible controlled/dangerous medications. The amount and complexity of reviewed data at this in subsequent office visits is high given patient's fluctuating clinical presentation, laboratory and radiographic reports, prescription monitoring program data, and medication history as well as other relevant data as noted above. Pertinent negatives and positives data was used in consideration for the above-mentioned high complexity.       Given the patient's total MED, general use of daily opiates, or other coadministered medications in various classes the patient was offered a prescription for Narcan. I instructed the patient that it is important that patient fill this medication in order to demonstrate understanding of the gravity of possible side effects including respiratory depression and risk of overdose of this opiate load or medication combination. As such patient will be required to bring Narcan prescription to follow-up appointments as part of compliance with continued opiate care.      With respect to opiate induced constipation I discussed multiple ways to combat this problem including staying hydrated and taking over-the-counter medications such as Dulcolax, Miralax and Senna. If these treatments are not effective we could consider such medications as Amitiza, Linzess and Movantik.      Disclaimer: This note was transcribed using an audio transcription device. As such, minor errors may be present with regard to spelling, punctuation, and inadvertent word insertion. Please disregard such errors.     Narrative & Impression   Interpreted By:  Tanvi Lopez,   STUDY:  Left shoulder, two views      INDICATION:  Signs/Symptoms:post-op.      COMPARISON:  05/16/2018.      ACCESSION NUMBER(S):  [id number]      ORDERING CLINICIAN:  IRIS CARPENTER      FINDINGS:  No acute fracture or  malalignment.  Immediate postsurgical changes of left reverse total shoulder  arthroplasty. Hardware is intact without perihardware fractures or  lucencies. Expected postsurgical soft tissue gas within the  surrounding soft tissues. Left subclavian AICD/pacemaker device noted.      IMPRESSION:  1. Immediate postsurgical changes of left reverse shoulder  arthroplasty without hardware complication.      MACRO:      None.      Signed by: Tanvi Lopez 5/2/2024 12:16 PM  Dictation workstation:   [id number]       Results/Data  Xray BN Spine, Lumbosacral; 2 or 3 Views [id number] 03:32PM Fredy Simon      Test Name Result Flag Reference   Xray Lumbar Spine AP + Lateral (Report)       FINAL REPORT  Interpreted by: MIGUELINA WATKINS   06/27/23 18:12  MRN: [id number]  Patient Name: VERNON CARTAGENA     STUDY:  SPINE, LUMBOSACRAL; 2 OR 3 VIEWS; 6/26/2023 3:32 pm     INDICATION:  Fell and landed on tailbone- worsened chronic pain- ? vertebral  fracture.     COMPARISON:  02/27/2013     ACCESSION NUMBER(S):  [id number]     ORDERING CLINICIAN:  FREDY SIMON     FINDINGS:  Lumbar spine, three views     Levocurvature of the lumbar spine. There is moderate disc space  narrowing osteophytosis throughout the lumbar spine. There is no  fracture. There is no spondylolisthesis. Mild facet disease lower  lumbar spine as well     IMPRESSION:  Moderate multilevel spondylosis throughout the lumbar spine. Mild  facet disease in the lower lumbar.     Electronically signed by: JUNE  06/27/23 18:12      Xray Shoulder Complete Min 2 Views [id number] 05:00PM Non Ambulatory, Provider   Ordering Provider: IRIS DRAKE [id number]      Test Name Result Flag Reference   Xray Shoulder Complete Min 2 Views (Report)       FINAL REPORT  Interpreted by: DAMARIS GIBBONS   02/25/23 18:08  MRN: [id number]  Patient Name: VERNON CARTAGENA     STUDY:  SHOULDER, CMPLT, MIN 2 VIEWS; Right; 2/25/2023 5:00 pm     INDICATION:  fall, landed on left arm, right shoulder  pain, left hip pain .     COMPARISON:  Right shoulder radiographs dated 01/12/2023.     ACCESSION NUMBER(S):  [id number]     ORDERING CLINICIAN:  IRIS DRAKE     FINDINGS:  There is no evidence of acute fracture or dislocation. There is a  reverse shoulder arthroplasty without evidence of periprosthetic  fracture or malalignment. There is up to 3 mm lucency between the  humeral component of the prosthesis seen on axillary view, which may  represent non radiopaque filler material and is stable taking into  account differences in technique.     IMPRESSION:  Reverse right shoulder arthroplasty without evidence of hardware  complication.     Electronically signed by: SHAI  02/25/23 18:08      Xray Femur 2 View [id number] 05:00PM Non Ambulatory, Provider   Ordering Provider: IRIS DRAKE [id number]      Test Name Result Flag Reference   Xray Femur 2 View (Report)       FINAL REPORT  Interpreted by: DAMARIS GIBBONS   02/25/23 18:10  MRN: [id number]  Patient Name: VERNON CARTAGENA     STUDY:  PELVIS, 1 OR 2 VIEWS; FEMUR : MIN 2 VIEWS; Left; 2/25/2023 5:00 pm     INDICATION:  left fx .     COMPARISON:  Pelvic radiographs dated 02/06/2020.     ACCESSION NUMBER(S):  [id number]; [id number]     ORDERING CLINICIAN:  IRIS DRAKE     FINDINGS:  Femoral heads are maintained within their respective acetabula and  are normal in contour. There is mild bilateral acetabular spurring.  Ilioischial and iliopubic lines are maintained. The left femur  appears intact. There is serpiginous sclerosis within the distal  intramedullary femur which may represent an age-indeterminate  probably chronic bone marrow infarct.     IMPRESSION:  No evidence of fracture or dislocation. Serpiginous sclerosis in the  distal left femur may represent age-indeterminate possibly chronic  bone marrow infarct.        Electronically signed by: SHAI  02/25/23 18:10      Xray Pelvis 1 or 2 View [id number] 05:00PM Non Ambulatory, Provider   Ordering Provider: IRIS DRAKE [id number]       Test Name Result Flag Reference   Xray Pelvis 1 or 2 View (Report)       FINAL REPORT  Interpreted by: DAMARIS GIBBONS   02/25/23 18:10  MRN: [id number]  Patient Name: VERNON CARTAGENA     STUDY:  PELVIS, 1 OR 2 VIEWS; FEMUR : MIN 2 VIEWS; Left; 2/25/2023 5:00 pm     INDICATION:  left fx .     COMPARISON:  Pelvic radiographs dated 02/06/2020.     ACCESSION NUMBER(S):  [id number]; [id number]     ORDERING CLINICIAN:  IRIS DRAKE     FINDINGS:  Femoral heads are maintained within their respective acetabula and  are normal in contour. There is mild bilateral acetabular spurring.  Ilioischial and iliopubic lines are maintained. The left femur  appears intact. There is serpiginous sclerosis within the distal  intramedullary femur which may represent an age-indeterminate  probably chronic bone marrow infarct.     IMPRESSION:  No evidence of fracture or dislocation. Serpiginous sclerosis in the  distal left femur may represent age-indeterminate possibly chronic  bone marrow infarct.        Electronically signed by: SHAI  02/25/23 18:10      CT L Spine without Contrast [id number] 09:33AM Nita Harley      Test Name Result Flag Reference   CT L Spine without Contrast (Report)       FINAL REPORT  Interpreted by: MASOUD MATHIS FREDERICK, MD   07/14/22 10:40  MRN: [id number]  Patient Name: VERNON CARTAGENA     STUDY:  CT L-SPINE WO CONTRAST; 7/14/2022 9:33 am     INDICATION:  Fell 6 months ago after tripping. Landed on belly did not hit head.  Pain 10 out of 10 of low back along with muscle cramps and spasm  since then. Does have leg weakness. No other falls since that  initial fall 6 months ago. Failed conservative reyna M47.816: Lumbar  spondylosis M54.16: Lumbar radiculitis.     COMPARISON:  None.     ACCESSION NUMBER(S):  [id number]     ORDERING CLINICIAN:  NITA HARLEY     TECHNIQUE:  CT of the lumbar spine was performed. Multiplanar reconstructions  were made..     FINDINGS:  Bone density and vertebral body height are normal.  The sacrum are  normal.  Images at each interspace reveal the following:  L1/L2  Normal exam  L2/L3  Normal exam  L3/L4  Vacuum disc phenomenon with mild circumferential bulging  intervertebral disc. No measurable canal stenosis or bony foraminal  narrowing. Marginal osteophyte formation bilaterally. Far lateral  neural foraminal stenosis not excluded  L4/L5  Mild bulging disc and facet hypertrophy. Suspected moderate STIR  central canal stenosis. No associated foraminal narrowing.  L5/S1  Narrowing of the intervertebral disc space with intra-articular gas.  Minimal marginal osteophyte formation without canal or foraminal  narrowing        IMPRESSION:  * Trace spondylosis     The examination was interpreted [facility]     Electronically signed by: MASOUD MATHIS  07/14/22 10:40      Xray Myelography Cervical with LP [id number] 10:47AM Connor Ga      Test Name Result Flag Reference   Xray Myelography Cervical with LP (Report)       FINAL REPORT  Interpreted by: SHAILESH PARKER ANDREW, MD and BRIGIDA REESE  09/01/21 08:43  MRN: [id number]  Patient Name: VERNON CARTAGENA     STUDY:  MYELOGRAPHY, CERVICAL, WITH LUMBAR PUNCTURE; SPINAL PUNCTURE, LUMBAR,  DIAGNOSTIC; CT C-SPINE [facility]; 8/31/2021 10:47 am; 1/1/2018 8:15 pm;  8/31/2021 10:56 am     INDICATION:  Cervical radiculopathy; Signs/Symptoms: r/o meningitis. Myelogram  performed for evaluation of spinal stenosis as patient has a non MR  compatible cardiac pacemaker/AICD.     COMPARISON:  02/06/2020 CT head     ACCESSION NUMBER(S):  [id number]; [id number]; [id number]     ORDERING CLINICIAN:  CONNOR HORVATH     TECHNIQUE:  After discussion of the risks, benefits and alternatives, standard  written hospital consent was obtained. The patient was placed prone  on the fluoroscopic table. The lower back was prepped and draped in  sterile fashion. One percent lidocaine was used for local  anesthesia. Under fluoroscopic guidance, a 22 gauge  spinal needle  was advanced into the thecal sac at the L3-L4 level. 8 cc of  Omnipaque 300 was administered intrathecally under intermittent  fluoroscopic imaging. Contrast was advanced into the cervical region  tilting the patient/table head-down. Fluoroscopy time was 1.1  minutes. The patient tolerated the procedure well immediately after  the procedure.     Following the fluoroscopic myelogram, serial axial CT images were  obtained through the cervical spine, using a bone algorithm. Images  were reformatted into sagittal and coronal planes.     FINDINGS:  FLUOROSCOPIC MYELOGRAM: There is filling bilaterally of nerve root  sleeves without cutoff. There is normal alignment.     CT MYELOGRAM:     ALIGNMENT: Within normal limits.     VERTEBRAE/DISC SPACES: The vertebral body heights are intact. There  is solid operative fusion of the C5 through C7 vertebral bodies with  interbody spacing devices. The C4-5 disc height is decreased.     C1-2: There is no significant central canal or neural foraminal  stenosis.     C2-3: The right lateral recess and neural foramen are mildly stenosed  by facet hypertrophy. The right facet joint is severely arthritic..     C3-4: The right neural foramen is mildly stenosed by uncovertebral  spurring and facet hypertrophy with disc bulge. More central disc  bulge indents the thecal sac but does not reach the cord. The right  facet joint is severely arthritic..     C4-5: Uncovertebral joint hypertrophy and facet arthrosis contribute  to moderate right-sided neural foraminal stenosis with mild to  moderate right lateral recess stenosis. The right facet joint is  severely arthritic with degenerative subarticular cysts posteriorly,  the inferior C4 facet cyst having vacuum phenomena. A Schmorl's node  with vacuum phenomena is also noted along the posterosuperior C5  vertebral body.     C5-6: There is discectomy and fusion with posterior osteophytosis  with effacement of the ventral thecal sac.  No significant central  canal stenosis.. Mild bilateral neural foraminal narrowing.     C6-7: There is discectomy and fusion with posterior osteophytosis  with effacement of the ventral thecal sac. No significant central  canal stenosis.. Mild bilateral neural foraminal narrowing.     C7-T1: There is no significant central canal or neural foraminal  stenosis.     Incidental note is made of chronic blowout fracture of the posterior  aspect of the medial wall of the left orbit with depression of bone 6  mm similar to the prior CT. Orbital fat extends into the defect with  distortion of the posterior aspect of the medial rectus muscle.     IMPRESSION:  1. Multilevel degenerative changes are present as noted more  prominent on the right, specially at C4-5.     2. Old blowout fracture of posteromedial wall of left orbit. The  adjacent medial rectus muscle is distorted.     3. Shortly after CT imaging was completed, the patient developed  uncontrolled shaking with no focal neuro deficit, shortness of  breath, hypotension or itching/urticaria. The patient had normal  oxygenation. Initially her only complaint of pain was a mild  headache. Shortly after the shaking began the patient also complained  of left chest/shoulder region discomfort and for this reason was  transferred to the emergency department for further evaluation.        IDr.Bury, supervised and was available throughout this procedure as  performed by fellow physician Dr. Dela Cruz. This study was  performed and interpreted at [facility]. I  agree with the procedural description and the findings as stated.     Electronically signed by: SHAILESH PARKER  09/01/21 08:43      Xray Cervical Spine Min 4 View [id number] 09:44AM Connor Ga      Test Name Result Flag Reference   Xray Cervical Spine Min 4 View (Report)       FINAL REPORT  Interpreted by: AUSTIN FLOOD KRISHNA, MD   06/10/21 18:03  MRN: [id number]  Patient Name: VIA,  VERNON     STUDY:  Cervical spine, 4 views.     INDICATION:  NECK PAIN.     COMPARISON:  None.     ACCESSION NUMBER(S):  [id number]     ORDERING CLINICIAN:  SHEREEN LUO     FINDINGS:  Alignment is within normal limits.  Anterior cervical discectomy and fusion changes are observed at C5-6  and C6-7 with interbody bone grafts. Hardware is intact without  perihardware fractures or lucencies. There appears to be solid fusion  across the disc spaces.  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  Mild-to-moderate spondylosis noted at C4-5 with disc height loss and  osteophytes.  Left carotid region vascular calcifications noted.  Vertebral body heights are preserved. Posterior elements are intact.  No dynamic instability on flexion/extension views.  Prevertebral soft tissues are unremarkable.     IMPRESSION:  1. ACDF at C5-7 without hardware complication.  2. Mild to moderate C4-5 spondylosis.     Electronically signed by: AUSTIN FLOOD  06/10/21 18:03      CT C Spine without Contrast [id number] 03:55PM Non Ambulatory, Provider   Ordering Provider: PHILIP ESQUEDA [id number]      Test Name Result Flag Reference   CT C Spine without Contrast (Report)       Interpreted by: MARLIN KEE  02/06/20 16:05  MRN: [id number]  Patient Name: VIA, VERNON     STUDY:  CT C-SPINE WO CONTRAST; 2/6/2020 3:55 pm     INDICATION:  fall, neck pain.     COMPARISON:  None.     ACCESSION NUMBER(S):  [id number]     ORDERING CLINICIAN:  PHILIP ESQUEDA     TECHNIQUE:  Axial CT images of the cervical spine are obtained. Axial, coronal  and sagittal reconstructions are provided for review.     FINDINGS:  Status post fixation C5, C6 and C7.     Fractures: There is no evidence for an acute fracture of the cervical  spine.     Vertebral Alignment: Within normal limits.     Craniocervical Junction: The odontoid process and craniocervical  junction are intact.     Vertebrae/Disc Spaces: The cervical vertebral body heights are intact  and  the disc spaces are preserved.     Prevertebral/Paraspinal Soft Tissues: The prevertebral and paraspinal  soft tissues are unremarkable.        IMPRESSION:  Status post fusion of C5 through C7. No evidence of acute fracture.     Electronically signed by: MARLIN KEE  02/06/20 16:05       Review of Systems   Constitutional: Negative.    HENT: Negative.     Eyes: Negative.    Respiratory: Negative.     Cardiovascular: Negative.    Gastrointestinal: Negative.    Endocrine: Negative.    Genitourinary: Negative.    Musculoskeletal:  Positive for arthralgias, back pain, gait problem, joint swelling, myalgias, neck pain and neck stiffness.   Skin: Negative.    Allergic/Immunologic: Negative.    Neurological:  Positive for weakness and numbness. Negative for dizziness, tremors, seizures, syncope, facial asymmetry, speech difficulty, light-headedness and headaches.   Hematological: Negative.    Psychiatric/Behavioral: Negative.         Objective   Physical Exam  Vitals and nursing note reviewed.   Constitutional:       Appearance: Normal appearance.   HENT:      Head: Normocephalic and atraumatic.   Cardiovascular:      Pulses: Normal pulses.   Pulmonary:      Effort: Pulmonary effort is normal. No respiratory distress.   Musculoskeletal:      Right lower leg: No edema.      Left lower leg: No edema.      Comments: Range of motion of cervical spine limited secondary to pain and stiffness with axial rotation to the left and extension.    Active range of motion of left shoulder limited secondary to to pain with external rotation and abduction to the height of the shoulder.     Left arm in adductor and immobilizer sling.    Able to move hand and wrist in sling.  Sensation intact to light touch to fingers and hand and left sling.    Active range of motion of hips increases low back and hip pain.   Skin:     General: Skin is warm and dry.      Capillary Refill: Capillary refill takes 2 to 3 seconds.   Neurological:       Mental Status: She is alert and oriented to person, place, and time.      Cranial Nerves: No cranial nerve deficit.      Sensory: No sensory deficit.      Motor: Weakness present.      Gait: Gait abnormal.      Comments: Positive Neer's.  Positive Sharma.    Weakness with hip flexion and ankle dorsiflexion 4 out of 5.    Positive straight leg raise.    Negative hip impingement.    Ambulates with mildly antalgic gait.   Psychiatric:         Mood and Affect: Mood normal.         Behavior: Behavior normal.     Trina was seen today for med refill and pain.  Diagnoses and all orders for this visit:  Nausea (Primary)  -     ondansetron ODT (Zofran-ODT) 8 mg disintegrating tablet; Take 1 tablet (8 mg) by mouth once daily as needed for nausea or vomiting.  Cervical post-laminectomy syndrome  -     oxyCODONE-acetaminophen (Percocet) 5-325 mg tablet; Take 1 tablet by mouth 3 times a day as needed for severe pain (7 - 10) for up to 28 days. Do not fill before August 3, 2024.  -     oxyCODONE-acetaminophen (Percocet) 5-325 mg tablet; Take 1 tablet by mouth 3 times a day as needed for severe pain (7 - 10) for up to 28 days. Do not fill before August 31, 2024.  -     oxyCODONE-acetaminophen (Percocet) 5-325 mg tablet; Take 1 tablet by mouth 3 times a day as needed for severe pain (7 - 10) for up to 28 days. Do not fill before September 28, 2024.  Encounter for long-term use of opiate analgesic  -     naloxone (Narcan) 4 mg/0.1 mL nasal spray; Administer 1 spray (4 mg) into affected nostril(s) if needed for opioid reversal or respiratory depression. May repeat every 2-3 minutes if needed, alternating nostrils, until medical assistance becomes available.  Cervical myofascial pain syndrome  -     baclofen (Lioresal) 10 mg tablet; One tablet in the morning and 2 tablets in the evening.     Follow-up 12 weeks.    Reviewed and approved by NITA HARLEY on 7/29/24 at 10:03 AM.

## 2024-07-30 NOTE — SIGNIFICANT EVENT
Follow Up Phone Call    Outgoing phone call    Spoke to: Trina SALDANA Via Relationship:self   Phone number: [phone]      Outcome: I left a message on answering machine   Chief Complaint   Patient presents with    Chest Pain    Shortness of Breath    Dizziness          Diagnosis:Not applicable

## 2024-08-01 ENCOUNTER — APPOINTMENT (OUTPATIENT)
Dept: PRIMARY CARE | Facility: CLINIC | Age: 74
End: 2024-08-01
Payer: MEDICARE

## 2024-08-01 NOTE — SIGNIFICANT EVENT
Follow Up Phone Call    Outgoing phone call    Spoke to: Trina LES Via Relationship:self   Phone number: [phone]      Outcome: contacted patient/ family   Chief Complaint   Patient presents with    Chest Pain    Shortness of Breath    Dizziness          Diagnosis:Not applicable    States she is feeling better. No further questions or concerns.

## 2024-08-05 DIAGNOSIS — J45.909 ASTHMA, UNSPECIFIED ASTHMA SEVERITY, UNSPECIFIED WHETHER COMPLICATED, UNSPECIFIED WHETHER PERSISTENT (HHS-HCC): Primary | ICD-10-CM

## 2024-08-05 DIAGNOSIS — I50.42 CHRONIC COMBINED SYSTOLIC AND DIASTOLIC CONGESTIVE HEART FAILURE (MULTI): ICD-10-CM

## 2024-08-08 ENCOUNTER — APPOINTMENT (OUTPATIENT)
Dept: CARDIOLOGY | Facility: HOSPITAL | Age: 74
End: 2024-08-08
Payer: MEDICARE

## 2024-08-08 ENCOUNTER — APPOINTMENT (OUTPATIENT)
Dept: RADIOLOGY | Facility: HOSPITAL | Age: 74
End: 2024-08-08
Payer: MEDICARE

## 2024-08-08 ENCOUNTER — HOSPITAL ENCOUNTER (INPATIENT)
Facility: HOSPITAL | Age: 74
LOS: 2 days | Discharge: HOME | End: 2024-08-10
Attending: STUDENT IN AN ORGANIZED HEALTH CARE EDUCATION/TRAINING PROGRAM | Admitting: INTERNAL MEDICINE
Payer: MEDICARE

## 2024-08-08 DIAGNOSIS — R07.9 CHEST PAIN, UNSPECIFIED TYPE: ICD-10-CM

## 2024-08-08 DIAGNOSIS — K59.00 CONSTIPATION, UNSPECIFIED CONSTIPATION TYPE: ICD-10-CM

## 2024-08-08 DIAGNOSIS — J96.01 ACUTE RESPIRATORY FAILURE WITH HYPOXIA (MULTI): ICD-10-CM

## 2024-08-08 DIAGNOSIS — R06.02 SHORTNESS OF BREATH: Primary | ICD-10-CM

## 2024-08-08 DIAGNOSIS — I50.9 ACUTE ON CHRONIC HEART FAILURE, UNSPECIFIED HEART FAILURE TYPE (MULTI): ICD-10-CM

## 2024-08-08 DIAGNOSIS — I11.0 HYPERTENSIVE HEART DISEASE WITH HEART FAILURE (MULTI): ICD-10-CM

## 2024-08-08 DIAGNOSIS — J45.901 EXACERBATION OF ASTHMA, UNSPECIFIED ASTHMA SEVERITY, UNSPECIFIED WHETHER PERSISTENT (HHS-HCC): ICD-10-CM

## 2024-08-08 LAB
ANION GAP SERPL CALC-SCNC: 14 MMOL/L (ref 10–20)
BASE EXCESS BLDV CALC-SCNC: 2.5 MMOL/L (ref -2–3)
BASOPHILS # BLD AUTO: 0.03 X10*3/UL (ref 0–0.1)
BASOPHILS NFR BLD AUTO: 0.4 %
BNP SERPL-MCNC: 672 PG/ML (ref 0–99)
BODY TEMPERATURE: ABNORMAL
BUN SERPL-MCNC: 29 MG/DL (ref 6–23)
CALCIUM SERPL-MCNC: 8.8 MG/DL (ref 8.6–10.3)
CARDIAC TROPONIN I PNL SERPL HS: 27 NG/L (ref 0–13)
CARDIAC TROPONIN I PNL SERPL HS: 35 NG/L (ref 0–13)
CHLORIDE SERPL-SCNC: 104 MMOL/L (ref 98–107)
CO2 SERPL-SCNC: 25 MMOL/L (ref 21–32)
CREAT SERPL-MCNC: 1.11 MG/DL (ref 0.5–1.05)
D DIMER PPP FEU-MCNC: 1103 NG/ML FEU
DIGOXIN SERPL-MCNC: 0.77 NG/ML (ref 0.8–?)
EGFRCR SERPLBLD CKD-EPI 2021: 53 ML/MIN/1.73M*2
EOSINOPHIL # BLD AUTO: 0.18 X10*3/UL (ref 0–0.4)
EOSINOPHIL NFR BLD AUTO: 2.7 %
ERYTHROCYTE [DISTWIDTH] IN BLOOD BY AUTOMATED COUNT: 17.6 % (ref 11.5–14.5)
FLUAV RNA RESP QL NAA+PROBE: NOT DETECTED
FLUBV RNA RESP QL NAA+PROBE: NOT DETECTED
GLUCOSE BLD MANUAL STRIP-MCNC: 119 MG/DL (ref 74–99)
GLUCOSE BLD MANUAL STRIP-MCNC: 151 MG/DL (ref 74–99)
GLUCOSE BLD MANUAL STRIP-MCNC: 168 MG/DL (ref 74–99)
GLUCOSE SERPL-MCNC: 90 MG/DL (ref 74–99)
HCO3 BLDV-SCNC: 27.9 MMOL/L (ref 22–26)
HCT VFR BLD AUTO: 25.6 % (ref 36–46)
HGB BLD-MCNC: 7.7 G/DL (ref 12–16)
IMM GRANULOCYTES # BLD AUTO: 0.04 X10*3/UL (ref 0–0.5)
IMM GRANULOCYTES NFR BLD AUTO: 0.6 % (ref 0–0.9)
INHALED O2 CONCENTRATION: 100 %
IRON SATN MFR SERPL: 6 % (ref 25–45)
IRON SERPL-MCNC: 21 UG/DL (ref 35–150)
LYMPHOCYTES # BLD AUTO: 1.69 X10*3/UL (ref 0.8–3)
LYMPHOCYTES NFR BLD AUTO: 25.3 %
MAGNESIUM SERPL-MCNC: 2.31 MG/DL (ref 1.6–2.4)
MCH RBC QN AUTO: 23 PG (ref 26–34)
MCHC RBC AUTO-ENTMCNC: 30.1 G/DL (ref 32–36)
MCV RBC AUTO: 76 FL (ref 80–100)
MONOCYTES # BLD AUTO: 0.72 X10*3/UL (ref 0.05–0.8)
MONOCYTES NFR BLD AUTO: 10.8 %
NEUTROPHILS # BLD AUTO: 4.01 X10*3/UL (ref 1.6–5.5)
NEUTROPHILS NFR BLD AUTO: 60.2 %
NRBC BLD-RTO: 0 /100 WBCS (ref 0–0)
OXYHGB MFR BLDV: 45.4 % (ref 45–75)
PCO2 BLDV: 45 MM HG (ref 41–51)
PH BLDV: 7.4 PH (ref 7.33–7.43)
PLATELET # BLD AUTO: 246 X10*3/UL (ref 150–450)
PO2 BLDV: 31 MM HG (ref 35–45)
POTASSIUM SERPL-SCNC: 4 MMOL/L (ref 3.5–5.3)
RBC # BLD AUTO: 3.35 X10*6/UL (ref 4–5.2)
SAO2 % BLDV: 46 % (ref 45–75)
SARS-COV-2 RNA RESP QL NAA+PROBE: NOT DETECTED
SODIUM SERPL-SCNC: 139 MMOL/L (ref 136–145)
TIBC SERPL-MCNC: 364 UG/DL (ref 240–445)
UIBC SERPL-MCNC: 343 UG/DL (ref 110–370)
WBC # BLD AUTO: 6.7 X10*3/UL (ref 4.4–11.3)

## 2024-08-08 PROCEDURE — 80162 ASSAY OF DIGOXIN TOTAL: CPT

## 2024-08-08 PROCEDURE — 2500000002 HC RX 250 W HCPCS SELF ADMINISTERED DRUGS (ALT 637 FOR MEDICARE OP, ALT 636 FOR OP/ED): Performed by: INTERNAL MEDICINE

## 2024-08-08 PROCEDURE — 71045 X-RAY EXAM CHEST 1 VIEW: CPT

## 2024-08-08 PROCEDURE — 82805 BLOOD GASES W/O2 SATURATION: CPT | Performed by: STUDENT IN AN ORGANIZED HEALTH CARE EDUCATION/TRAINING PROGRAM

## 2024-08-08 PROCEDURE — 83880 ASSAY OF NATRIURETIC PEPTIDE: CPT | Performed by: STUDENT IN AN ORGANIZED HEALTH CARE EDUCATION/TRAINING PROGRAM

## 2024-08-08 PROCEDURE — 2500000004 HC RX 250 GENERAL PHARMACY W/ HCPCS (ALT 636 FOR OP/ED): Performed by: STUDENT IN AN ORGANIZED HEALTH CARE EDUCATION/TRAINING PROGRAM

## 2024-08-08 PROCEDURE — 94640 AIRWAY INHALATION TREATMENT: CPT | Mod: 59

## 2024-08-08 PROCEDURE — 94664 DEMO&/EVAL PT USE INHALER: CPT

## 2024-08-08 PROCEDURE — 2550000001 HC RX 255 CONTRASTS: Performed by: STUDENT IN AN ORGANIZED HEALTH CARE EDUCATION/TRAINING PROGRAM

## 2024-08-08 PROCEDURE — 2500000005 HC RX 250 GENERAL PHARMACY W/O HCPCS: Performed by: STUDENT IN AN ORGANIZED HEALTH CARE EDUCATION/TRAINING PROGRAM

## 2024-08-08 PROCEDURE — 94640 AIRWAY INHALATION TREATMENT: CPT

## 2024-08-08 PROCEDURE — 36415 COLL VENOUS BLD VENIPUNCTURE: CPT | Performed by: STUDENT IN AN ORGANIZED HEALTH CARE EDUCATION/TRAINING PROGRAM

## 2024-08-08 PROCEDURE — 2500000001 HC RX 250 WO HCPCS SELF ADMINISTERED DRUGS (ALT 637 FOR MEDICARE OP)

## 2024-08-08 PROCEDURE — 93005 ELECTROCARDIOGRAM TRACING: CPT

## 2024-08-08 PROCEDURE — 94660 CPAP INITIATION&MGMT: CPT

## 2024-08-08 PROCEDURE — 83550 IRON BINDING TEST: CPT | Performed by: NURSE PRACTITIONER

## 2024-08-08 PROCEDURE — 80048 BASIC METABOLIC PNL TOTAL CA: CPT | Performed by: STUDENT IN AN ORGANIZED HEALTH CARE EDUCATION/TRAINING PROGRAM

## 2024-08-08 PROCEDURE — 87636 SARSCOV2 & INF A&B AMP PRB: CPT | Performed by: STUDENT IN AN ORGANIZED HEALTH CARE EDUCATION/TRAINING PROGRAM

## 2024-08-08 PROCEDURE — 85025 COMPLETE CBC W/AUTO DIFF WBC: CPT | Performed by: STUDENT IN AN ORGANIZED HEALTH CARE EDUCATION/TRAINING PROGRAM

## 2024-08-08 PROCEDURE — 2500000004 HC RX 250 GENERAL PHARMACY W/ HCPCS (ALT 636 FOR OP/ED): Performed by: INTERNAL MEDICINE

## 2024-08-08 PROCEDURE — 83735 ASSAY OF MAGNESIUM: CPT | Performed by: STUDENT IN AN ORGANIZED HEALTH CARE EDUCATION/TRAINING PROGRAM

## 2024-08-08 PROCEDURE — 2500000002 HC RX 250 W HCPCS SELF ADMINISTERED DRUGS (ALT 637 FOR MEDICARE OP, ALT 636 FOR OP/ED)

## 2024-08-08 PROCEDURE — 96375 TX/PRO/DX INJ NEW DRUG ADDON: CPT

## 2024-08-08 PROCEDURE — 71275 CT ANGIOGRAPHY CHEST: CPT | Performed by: RADIOLOGY

## 2024-08-08 PROCEDURE — 94667 MNPJ CHEST WALL 1ST: CPT

## 2024-08-08 PROCEDURE — 85379 FIBRIN DEGRADATION QUANT: CPT | Performed by: STUDENT IN AN ORGANIZED HEALTH CARE EDUCATION/TRAINING PROGRAM

## 2024-08-08 PROCEDURE — 94668 MNPJ CHEST WALL SBSQ: CPT

## 2024-08-08 PROCEDURE — 82947 ASSAY GLUCOSE BLOOD QUANT: CPT

## 2024-08-08 PROCEDURE — 94760 N-INVAS EAR/PLS OXIMETRY 1: CPT

## 2024-08-08 PROCEDURE — 84484 ASSAY OF TROPONIN QUANT: CPT | Performed by: STUDENT IN AN ORGANIZED HEALTH CARE EDUCATION/TRAINING PROGRAM

## 2024-08-08 PROCEDURE — 2500000004 HC RX 250 GENERAL PHARMACY W/ HCPCS (ALT 636 FOR OP/ED)

## 2024-08-08 PROCEDURE — 71275 CT ANGIOGRAPHY CHEST: CPT

## 2024-08-08 PROCEDURE — 71045 X-RAY EXAM CHEST 1 VIEW: CPT | Performed by: RADIOLOGY

## 2024-08-08 PROCEDURE — 99291 CRITICAL CARE FIRST HOUR: CPT | Performed by: STUDENT IN AN ORGANIZED HEALTH CARE EDUCATION/TRAINING PROGRAM

## 2024-08-08 PROCEDURE — 96365 THER/PROPH/DIAG IV INF INIT: CPT

## 2024-08-08 PROCEDURE — 99223 1ST HOSP IP/OBS HIGH 75: CPT

## 2024-08-08 PROCEDURE — 2500000004 HC RX 250 GENERAL PHARMACY W/ HCPCS (ALT 636 FOR OP/ED): Performed by: NURSE PRACTITIONER

## 2024-08-08 PROCEDURE — 96366 THER/PROPH/DIAG IV INF ADDON: CPT

## 2024-08-08 PROCEDURE — 2060000001 HC INTERMEDIATE ICU ROOM DAILY

## 2024-08-08 PROCEDURE — 2500000005 HC RX 250 GENERAL PHARMACY W/O HCPCS: Performed by: INTERNAL MEDICINE

## 2024-08-08 RX ORDER — FERROUS SULFATE 325(65) MG
65 TABLET ORAL
Status: DISCONTINUED | OUTPATIENT
Start: 2024-08-09 | End: 2024-08-09

## 2024-08-08 RX ORDER — PANTOPRAZOLE SODIUM 40 MG/1
40 TABLET, DELAYED RELEASE ORAL 2 TIMES DAILY
Status: DISCONTINUED | OUTPATIENT
Start: 2024-08-08 | End: 2024-08-10 | Stop reason: HOSPADM

## 2024-08-08 RX ORDER — PREDNISONE 20 MG/1
40 TABLET ORAL DAILY
Status: DISCONTINUED | OUTPATIENT
Start: 2024-08-09 | End: 2024-08-10 | Stop reason: HOSPADM

## 2024-08-08 RX ORDER — MONTELUKAST SODIUM 10 MG/1
10 TABLET ORAL NIGHTLY
Status: DISCONTINUED | OUTPATIENT
Start: 2024-08-08 | End: 2024-08-10 | Stop reason: HOSPADM

## 2024-08-08 RX ORDER — CLOPIDOGREL BISULFATE 75 MG/1
75 TABLET ORAL DAILY
Status: DISCONTINUED | OUTPATIENT
Start: 2024-08-08 | End: 2024-08-10 | Stop reason: HOSPADM

## 2024-08-08 RX ORDER — IPRATROPIUM BROMIDE AND ALBUTEROL SULFATE 2.5; .5 MG/3ML; MG/3ML
9 SOLUTION RESPIRATORY (INHALATION) ONCE
Status: COMPLETED | OUTPATIENT
Start: 2024-08-08 | End: 2024-08-08

## 2024-08-08 RX ORDER — ALBUTEROL SULFATE 0.83 MG/ML
2.5 SOLUTION RESPIRATORY (INHALATION) EVERY 2 HOUR PRN
Status: DISCONTINUED | OUTPATIENT
Start: 2024-08-08 | End: 2024-08-10 | Stop reason: HOSPADM

## 2024-08-08 RX ORDER — NALOXONE HYDROCHLORIDE 0.4 MG/ML
0.4 INJECTION, SOLUTION INTRAMUSCULAR; INTRAVENOUS; SUBCUTANEOUS AS NEEDED
Status: DISCONTINUED | OUTPATIENT
Start: 2024-08-08 | End: 2024-08-10 | Stop reason: HOSPADM

## 2024-08-08 RX ORDER — DICLOFENAC SODIUM 10 MG/G
4 GEL TOPICAL 2 TIMES DAILY PRN
Status: DISCONTINUED | OUTPATIENT
Start: 2024-08-08 | End: 2024-08-10 | Stop reason: HOSPADM

## 2024-08-08 RX ORDER — IPRATROPIUM BROMIDE AND ALBUTEROL SULFATE 2.5; .5 MG/3ML; MG/3ML
SOLUTION RESPIRATORY (INHALATION)
Status: COMPLETED
Start: 2024-08-08 | End: 2024-08-08

## 2024-08-08 RX ORDER — BUMETANIDE 0.25 MG/ML
2 INJECTION INTRAMUSCULAR; INTRAVENOUS ONCE
Status: COMPLETED | OUTPATIENT
Start: 2024-08-08 | End: 2024-08-08

## 2024-08-08 RX ORDER — DIGOXIN 125 MCG
125 TABLET ORAL DAILY
Status: DISCONTINUED | OUTPATIENT
Start: 2024-08-08 | End: 2024-08-10 | Stop reason: HOSPADM

## 2024-08-08 RX ORDER — OXYCODONE AND ACETAMINOPHEN 5; 325 MG/1; MG/1
1 TABLET ORAL EVERY 8 HOURS PRN
Status: DISCONTINUED | OUTPATIENT
Start: 2024-08-08 | End: 2024-08-10 | Stop reason: HOSPADM

## 2024-08-08 RX ORDER — POLYETHYLENE GLYCOL 3350 17 G/17G
17 POWDER, FOR SOLUTION ORAL DAILY
Status: DISCONTINUED | OUTPATIENT
Start: 2024-08-08 | End: 2024-08-10 | Stop reason: HOSPADM

## 2024-08-08 RX ORDER — ROSUVASTATIN CALCIUM 20 MG/1
20 TABLET, COATED ORAL DAILY
Status: DISCONTINUED | OUTPATIENT
Start: 2024-08-08 | End: 2024-08-10 | Stop reason: HOSPADM

## 2024-08-08 RX ORDER — SULFASALAZINE 500 MG/1
500 TABLET ORAL 2 TIMES DAILY
Status: DISCONTINUED | OUTPATIENT
Start: 2024-08-08 | End: 2024-08-10 | Stop reason: HOSPADM

## 2024-08-08 RX ORDER — BISACODYL 10 MG/1
10 SUPPOSITORY RECTAL DAILY PRN
Status: DISCONTINUED | OUTPATIENT
Start: 2024-08-08 | End: 2024-08-10 | Stop reason: HOSPADM

## 2024-08-08 RX ORDER — SUCRALFATE 1 G/10ML
1 SUSPENSION ORAL 4 TIMES DAILY PRN
Status: DISCONTINUED | OUTPATIENT
Start: 2024-08-08 | End: 2024-08-10 | Stop reason: HOSPADM

## 2024-08-08 RX ORDER — ONDANSETRON HYDROCHLORIDE 2 MG/ML
4 INJECTION, SOLUTION INTRAVENOUS EVERY 6 HOURS PRN
Status: DISCONTINUED | OUTPATIENT
Start: 2024-08-08 | End: 2024-08-10 | Stop reason: HOSPADM

## 2024-08-08 RX ORDER — ENOXAPARIN SODIUM 100 MG/ML
40 INJECTION SUBCUTANEOUS EVERY 24 HOURS
Status: DISCONTINUED | OUTPATIENT
Start: 2024-08-08 | End: 2024-08-10 | Stop reason: HOSPADM

## 2024-08-08 RX ORDER — BUMETANIDE 1 MG/1
2 TABLET ORAL DAILY
Status: DISCONTINUED | OUTPATIENT
Start: 2024-08-08 | End: 2024-08-10 | Stop reason: HOSPADM

## 2024-08-08 RX ORDER — ALBUTEROL SULFATE 0.83 MG/ML
2.5 SOLUTION RESPIRATORY (INHALATION) 3 TIMES DAILY
Status: DISCONTINUED | OUTPATIENT
Start: 2024-08-08 | End: 2024-08-08

## 2024-08-08 RX ORDER — POLYETHYLENE GLYCOL 3350 17 G/17G
17 POWDER, FOR SOLUTION ORAL DAILY
Status: DISCONTINUED | OUTPATIENT
Start: 2024-08-08 | End: 2024-08-08

## 2024-08-08 RX ORDER — BUMETANIDE 0.25 MG/ML
2 INJECTION INTRAMUSCULAR; INTRAVENOUS ONCE
Status: DISCONTINUED | OUTPATIENT
Start: 2024-08-08 | End: 2024-08-08

## 2024-08-08 RX ORDER — CLOBETASOL PROPIONATE 0.5 MG/G
1 CREAM TOPICAL 2 TIMES DAILY PRN
Status: DISCONTINUED | OUTPATIENT
Start: 2024-08-08 | End: 2024-08-10 | Stop reason: HOSPADM

## 2024-08-08 RX ORDER — MAGNESIUM SULFATE HEPTAHYDRATE 40 MG/ML
2 INJECTION, SOLUTION INTRAVENOUS ONCE
Status: COMPLETED | OUTPATIENT
Start: 2024-08-08 | End: 2024-08-08

## 2024-08-08 RX ORDER — DILTIAZEM HYDROCHLORIDE 120 MG/1
120 CAPSULE, COATED, EXTENDED RELEASE ORAL NIGHTLY
Status: DISCONTINUED | OUTPATIENT
Start: 2024-08-08 | End: 2024-08-10 | Stop reason: HOSPADM

## 2024-08-08 RX ORDER — NITROGLYCERIN 0.4 MG/1
0.4 TABLET SUBLINGUAL EVERY 5 MIN PRN
Status: DISCONTINUED | OUTPATIENT
Start: 2024-08-08 | End: 2024-08-10 | Stop reason: HOSPADM

## 2024-08-08 RX ORDER — HYDROXYCHLOROQUINE SULFATE 200 MG/1
300 TABLET, FILM COATED ORAL DAILY
Status: DISCONTINUED | OUTPATIENT
Start: 2024-08-08 | End: 2024-08-10 | Stop reason: HOSPADM

## 2024-08-08 RX ORDER — FAMOTIDINE 20 MG/1
40 TABLET, FILM COATED ORAL DAILY
Status: DISCONTINUED | OUTPATIENT
Start: 2024-08-08 | End: 2024-08-10 | Stop reason: HOSPADM

## 2024-08-08 RX ORDER — ALBUTEROL SULFATE 0.83 MG/ML
2.5 SOLUTION RESPIRATORY (INHALATION)
Status: DISCONTINUED | OUTPATIENT
Start: 2024-08-08 | End: 2024-08-10 | Stop reason: HOSPADM

## 2024-08-08 RX ORDER — BUMETANIDE 0.25 MG/ML
1 INJECTION INTRAMUSCULAR; INTRAVENOUS ONCE
Status: COMPLETED | OUTPATIENT
Start: 2024-08-08 | End: 2024-08-08

## 2024-08-08 SDOH — ECONOMIC STABILITY: HOUSING INSECURITY: AT ANY TIME IN THE PAST 12 MONTHS, WERE YOU HOMELESS OR LIVING IN A SHELTER (INCLUDING NOW)?: NO

## 2024-08-08 SDOH — SOCIAL STABILITY: SOCIAL INSECURITY: HAVE YOU HAD ANY THOUGHTS OF HARMING ANYONE ELSE?: NO

## 2024-08-08 SDOH — SOCIAL STABILITY: SOCIAL INSECURITY: WERE YOU ABLE TO COMPLETE ALL THE BEHAVIORAL HEALTH SCREENINGS?: YES

## 2024-08-08 SDOH — SOCIAL STABILITY: SOCIAL INSECURITY: HAS ANYONE EVER THREATENED TO HURT YOUR FAMILY OR YOUR PETS?: NO

## 2024-08-08 SDOH — ECONOMIC STABILITY: TRANSPORTATION INSECURITY
IN THE PAST 12 MONTHS, HAS THE LACK OF TRANSPORTATION KEPT YOU FROM MEDICAL APPOINTMENTS OR FROM GETTING MEDICATIONS?: NO

## 2024-08-08 SDOH — ECONOMIC STABILITY: INCOME INSECURITY: IN THE LAST 12 MONTHS, WAS THERE A TIME WHEN YOU WERE NOT ABLE TO PAY THE MORTGAGE OR RENT ON TIME?: NO

## 2024-08-08 SDOH — SOCIAL STABILITY: SOCIAL INSECURITY: DOES ANYONE TRY TO KEEP YOU FROM HAVING/CONTACTING OTHER FRIENDS OR DOING THINGS OUTSIDE YOUR HOME?: NO

## 2024-08-08 SDOH — SOCIAL STABILITY: SOCIAL INSECURITY: DO YOU FEEL UNSAFE GOING BACK TO THE PLACE WHERE YOU ARE LIVING?: NO

## 2024-08-08 SDOH — SOCIAL STABILITY: SOCIAL INSECURITY: DO YOU FEEL ANYONE HAS EXPLOITED OR TAKEN ADVANTAGE OF YOU FINANCIALLY OR OF YOUR PERSONAL PROPERTY?: NO

## 2024-08-08 SDOH — ECONOMIC STABILITY: HOUSING INSECURITY: IN THE PAST 12 MONTHS, HOW MANY TIMES HAVE YOU MOVED WHERE YOU WERE LIVING?: 0

## 2024-08-08 SDOH — SOCIAL STABILITY: SOCIAL INSECURITY: ARE YOU OR HAVE YOU BEEN THREATENED OR ABUSED PHYSICALLY, EMOTIONALLY, OR SEXUALLY BY ANYONE?: NO

## 2024-08-08 SDOH — SOCIAL STABILITY: SOCIAL INSECURITY: ABUSE: ADULT

## 2024-08-08 SDOH — ECONOMIC STABILITY: INCOME INSECURITY: HOW HARD IS IT FOR YOU TO PAY FOR THE VERY BASICS LIKE FOOD, HOUSING, MEDICAL CARE, AND HEATING?: NOT HARD AT ALL

## 2024-08-08 SDOH — SOCIAL STABILITY: SOCIAL INSECURITY: ARE THERE ANY APPARENT SIGNS OF INJURIES/BEHAVIORS THAT COULD BE RELATED TO ABUSE/NEGLECT?: NO

## 2024-08-08 SDOH — SOCIAL STABILITY: SOCIAL INSECURITY: HAVE YOU HAD THOUGHTS OF HARMING ANYONE ELSE?: NO

## 2024-08-08 SDOH — ECONOMIC STABILITY: TRANSPORTATION INSECURITY
IN THE PAST 12 MONTHS, HAS LACK OF TRANSPORTATION KEPT YOU FROM MEETINGS, WORK, OR FROM GETTING THINGS NEEDED FOR DAILY LIVING?: NO

## 2024-08-08 ASSESSMENT — ACTIVITIES OF DAILY LIVING (ADL)
TOILETING: INDEPENDENT
PATIENT'S MEMORY ADEQUATE TO SAFELY COMPLETE DAILY ACTIVITIES?: YES
BATHING: INDEPENDENT
FEEDING YOURSELF: INDEPENDENT
JUDGMENT_ADEQUATE_SAFELY_COMPLETE_DAILY_ACTIVITIES: YES
HEARING - RIGHT EAR: FUNCTIONAL
DRESSING YOURSELF: INDEPENDENT
HEARING - LEFT EAR: FUNCTIONAL
ADEQUATE_TO_COMPLETE_ADL: YES
WALKS IN HOME: INDEPENDENT
GROOMING: INDEPENDENT

## 2024-08-08 ASSESSMENT — PAIN - FUNCTIONAL ASSESSMENT
PAIN_FUNCTIONAL_ASSESSMENT: 0-10

## 2024-08-08 ASSESSMENT — COGNITIVE AND FUNCTIONAL STATUS - GENERAL
DAILY ACTIVITIY SCORE: 24
DAILY ACTIVITIY SCORE: 24
MOBILITY SCORE: 24
PATIENT BASELINE BEDBOUND: NO
MOBILITY SCORE: 24
DAILY ACTIVITIY SCORE: 24
CLIMB 3 TO 5 STEPS WITH RAILING: A LITTLE
MOBILITY SCORE: 23

## 2024-08-08 ASSESSMENT — ENCOUNTER SYMPTOMS
DIZZINESS: 0
SHORTNESS OF BREATH: 1
FEVER: 0
COUGH: 0
ABDOMINAL DISTENTION: 0
CHILLS: 0
NAUSEA: 1
PALPITATIONS: 0
ABDOMINAL PAIN: 0
WEAKNESS: 0

## 2024-08-08 ASSESSMENT — COLUMBIA-SUICIDE SEVERITY RATING SCALE - C-SSRS
2. HAVE YOU ACTUALLY HAD ANY THOUGHTS OF KILLING YOURSELF?: NO
1. IN THE PAST MONTH, HAVE YOU WISHED YOU WERE DEAD OR WISHED YOU COULD GO TO SLEEP AND NOT WAKE UP?: NO
6. HAVE YOU EVER DONE ANYTHING, STARTED TO DO ANYTHING, OR PREPARED TO DO ANYTHING TO END YOUR LIFE?: NO

## 2024-08-08 ASSESSMENT — PAIN SCALES - GENERAL
PAINLEVEL_OUTOF10: 8
PAINLEVEL_OUTOF10: 7
PAINLEVEL_OUTOF10: 0 - NO PAIN
PAINLEVEL_OUTOF10: 0 - NO PAIN
PAINLEVEL_OUTOF10: 4
PAINLEVEL_OUTOF10: 7
PAINLEVEL_OUTOF10: 0 - NO PAIN

## 2024-08-08 ASSESSMENT — LIFESTYLE VARIABLES
SUBSTANCE_ABUSE_PAST_12_MONTHS: NO
HAVE YOU EVER FELT YOU SHOULD CUT DOWN ON YOUR DRINKING: NO
HOW OFTEN DO YOU HAVE A DRINK CONTAINING ALCOHOL: NEVER
TOTAL SCORE: 0
AUDIT-C TOTAL SCORE: 0
HAVE PEOPLE ANNOYED YOU BY CRITICIZING YOUR DRINKING: NO
EVER FELT BAD OR GUILTY ABOUT YOUR DRINKING: NO
AUDIT-C TOTAL SCORE: 0
EVER HAD A DRINK FIRST THING IN THE MORNING TO STEADY YOUR NERVES TO GET RID OF A HANGOVER: NO
HOW OFTEN DO YOU HAVE 6 OR MORE DRINKS ON ONE OCCASION: NEVER
HOW MANY STANDARD DRINKS CONTAINING ALCOHOL DO YOU HAVE ON A TYPICAL DAY: PATIENT DOES NOT DRINK
PRESCIPTION_ABUSE_PAST_12_MONTHS: NO
SKIP TO QUESTIONS 9-10: 1

## 2024-08-08 ASSESSMENT — PATIENT HEALTH QUESTIONNAIRE - PHQ9
SUM OF ALL RESPONSES TO PHQ9 QUESTIONS 1 & 2: 0
2. FEELING DOWN, DEPRESSED OR HOPELESS: NOT AT ALL
1. LITTLE INTEREST OR PLEASURE IN DOING THINGS: NOT AT ALL

## 2024-08-08 ASSESSMENT — PAIN DESCRIPTION - LOCATION
LOCATION: BACK

## 2024-08-08 NOTE — CARE PLAN
The patient's goals for the shift include      The clinical goals for the shift include patient remain hemodynamically stable during shift      Problem: Pain  Goal: Takes deep breaths with improved pain control throughout the shift  Outcome: Progressing     Problem: Pain  Goal: Turns in bed with improved pain control throughout the shift  Outcome: Progressing     Problem: Pain  Goal: Walks with improved pain control throughout the shift  Outcome: Progressing     Problem: Pain  Goal: Performs ADL's with improved pain control throughout shift  Outcome: Progressing

## 2024-08-08 NOTE — PROGRESS NOTES
08/08/24 0920   Discharge Planning   Living Arrangements Spouse/significant other   Support Systems Spouse/significant other   Assistance Needed Patient is A&Ox3, independent with ADL's, has a cane and walker to use for ambulation PRN, room air at baseline (currently requiring acute O2), doesn't drive ( transports). Patient denies further home going needs.   Type of Residence Private residence   Number of Stairs to Enter Residence 0   Number of Stairs Within Residence 0   Do you have animals or pets at home? No   Who is requesting discharge planning? Provider   Home or Post Acute Services None   Expected Discharge Disposition Home   Does the patient need discharge transport arranged? No

## 2024-08-08 NOTE — PROGRESS NOTES
"Trina Elias is a 73 y.o. female on day 0 of admission presenting with Shortness of breath.    Subjective   Patient reports she is still having SOB, denies cp, f,c,n,v,d.     Objective     Visit Vitals  BP (!) 111/49   Pulse 75   Temp 36.2 °C (97.2 °F) (Temporal)   Resp 20       Physical Exam  Physical Exam  Constitutional:       General: She is in moderate distress     Appearance: She is ill-appearing  HENT:      Head: Normocephalic and atraumatic.   Cardiovascular:      Rate and Rhythm: RRR  Pulmonary:      Effort: Increased WOB on 4L     Breath sounds: Wheezing, bilateral bibasilar crackles  Abdominal:      Palpations: Abdomen is soft.      Tenderness: There is no abdominal tenderness.   Musculoskeletal:         General: No swelling.      Right lower leg: No edema.      Left lower leg: No edema.   Neurological:      Mental Status: She is oriented to person, place, and time.     Last Recorded Vitals  Blood pressure (!) 111/49, pulse 75, temperature 36.2 °C (97.2 °F), temperature source Temporal, resp. rate 20, height 1.626 m (5' 4.02\"), weight 79 kg (174 lb 2.6 oz), SpO2 100%.  Intake/Output last 3 Shifts:  I/O last 3 completed shifts:  In: - (0 mL/kg)   Out: 600 (7.6 mL/kg) [Urine:600 (0.2 mL/kg/hr)]  Weight: 79 kg       Assessment/Plan   Principal Problem:    Shortness of breath    Assessment and Plan   Trina Elias, a 74 y/o F with PMHx of hypertension, hyperlipidemia, HFmrEF/systolic (EF45-50% in July 2024) and diastolic HF, paroxysmal A-fib status post PCM/ICD and watchman procedure, central sleep apnea on BiPAP at night, COPD/asthma not on nasal cannula oxygen at home, chronic anemia/iron deficiency anemia, CKD, multiple abdominal surgeries, and RA presented to the ED with dyspnea on exertion.     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 Not on O2 at home   -CT Angio chest negative for PE, noted moderate cardiomegaly. Small effusions, right-greater-than-left. Probable underlying mild CHF/edema seen in the lungs  -Follows with Dr. Soto/[place]; recently decreased to 2mg once daily from twice daily (2mg in the am and 1mg in the pm total 3mg) iso of hypotension  -S/p 2mg IV bumex in the ED and placed on bipap   -  -Echo 07/25/2024 showed The left ventricular systolic function is mildly decreased, with a visually estimated ejection fraction of 45-50%.Spectral Doppler shows a pseudonormal pattern of left ventricular diastolic filling. There is normal right ventricular global systolic function. There is moderate mitral annular calcification. Aortic valve sclerosis. Moderate aortic valve regurgitation. Normal estimated PASP and CVP.    Plan  -cardiology c/s; to evaluate in the am: appreciate recs  -Started prednisone 40mg daily and albuterol nebs TID. Continue home montelukast  -Wean O2 as tolerated    Chronic Medical Issues:   #HTN: Continue home Cardizem   #HLD: Continue home statin   #CKD: Cr on admission 1.1 (baseline 0.9-1~) Strict Is Os, Monitor kidney function and avoid nephrotoxic meds. Received contrast in the ED.    #Chronic anemia/GRACIE: Hgb on admission 7.7 (baseline 8-9) cannot tolerate PO iron supplement; Consider inpatient IV Iron vs outpatient. No concern for acute bleed    #Nausea/GERD: continue Zofran PRN, famotidine, and PPI   #Chronic pain: Continue home percocet TID PRN    #A-fib status post PCM/ICD and watchman procedure: Continue home Digoxin (ordered level), Cardizem and Plavix. Not on DOAC given watchman   #RA: continue home Hydroxychloroquine and Sulfasalazine. Voltaren gel PRN     #Constipation: continue home miralax and bisacodyl PRN      Fluids: PO  Electrolytes: replete as needed  Nutrition: Cardiac   GI Prophylaxis: PPI    DVT Prophylaxis: Lovenox subcutaneous     Paulino Appiah MD

## 2024-08-08 NOTE — ED TRIAGE NOTES
Patient from home with c/o shortness of breath. Patient has hx of asthma. Patient received 1 duo neb from ems. Patient has labored respirations and audible wheezing. Patient states she has been short of breath for the past week

## 2024-08-08 NOTE — H&P
Patient: Trina Elias   Age: 73 y.o.   Gender: female   Room/Bed: 240/240-A     Attending: Sy Tyler DO  Code Status:  Full Code    Chief Complaint:  Patient: Trina Elias is a 73 y.o. female who presented to the hospital for dyspnea.     History of Presenting Illness  Trina Elias, a 74 y/o F with PMHx of hypertension, hyperlipidemia, HFmrEF (EF50-55% in July 2024), paroxysmal A-fib status post PCM/ICD, central sleep apnea on BiPAP at night, COPD/asthma not on nasal cannula oxygen at home, chronic anemia/iron deficiency anemia, CKD, multiple abdominal surgeries, and RA presented to the ED with dyspnea on exertion that got worst in the last week. She follows with Dr. Soto/Neo for her Cardiac Hx who she saw on 7/30 and her Bumex regimen decreased to 2mg once daily instead of twice daily (2mg in the am and 1mg in the pm total 3mg). She denies any fever, chills, sweats, chest pain, palpitations, vomiting, diarrhea, melena, hematochezia, urinary symptoms including frequency urgency dysuria or worsening leg swelling.  She does mention that she have nausea which is chronic for her which she managed with Zofran as needed.  She denies any recent illness or upper respiratory symptoms.  She does not smoke, drink, or use drugs. Denies sick contacts.     12 point Review of Systems negative unless stated above.    In the ED:  Vitals temperature 36.8 pulse 76 respiration 24 /64 O2 sat 100% on nasal cannula  CBC showed WBC 6.7 hemoglobin 7.7 MCV 76 hematocrit 25.6 platelets 246  BMP showed glucose 90 sodium 139 potassium 4 chloride 104 bicarb 25 BUN 29 creatinine 1.11 calcium 8.8   Magnesium 2.31    Troponin 35-> 27  D-dimer 1103  COVID and flu negative  VBG showed pH 7.4 pCO2 45 pO2 31  Digoxin 0.77 chest x-ray showed stable small right pleural effusion and mild bibasilar atelectasis   CT angio chest was negative for PE but showed cardiomegaly/CHF with no significant pulmonary effusion.    She received  Bumex IV 2 mg once, DuoNeb, magnesium sulfate 2 g, and Solu-Medrol 125 mg IV once  Patient was placed on BiPAP and her pressure dropped, she received 500 mL of IV fluids      Past Medical History   Past Medical History:   Diagnosis Date    Asthma (HHS-HCC)     Atrial fibrillation (Multi)     Resistent to treatment: s/p DCCVs and ablations    Central sleep apnea     Per PSG 11/20/18; severe central sleep apnea, .6, mild obstructive component    Cervical myofascial pain syndrome     Baclofen    Chronic anemia     CKD stage 3a, GFR 45-59 ml/min (Multi)     COPD (chronic obstructive pulmonary disease) (Multi)     DVT of axillary vein, acute right (Multi) 01/20/2018    When PICC line was removed. U/S + Partial nonocclusive DVT in the axillary and proximal basilic vein in 2018    H/O being hospitalized 02/2024 Feb 24-Mar 5, 2024:  (2/24) admitted for dyspnea and chest pain work-up and found to have pleural effusions.  Found to have acute blood loss anemia s/p 2u PRBC. EGD neg for active bleed.  Cardiology consulted - plan for Watchman device WING closure as out pt.    History of Helicobacter pylori infection     3/2013, 1/2021    Hx of syncope     Recurrent Syncope d/t VT- s/p ILR (12/2012) implanted.  (July 2013) Episode of syncope that appears to be correlated with an 11 second round of ventricular tachycardia recorded by the loop recorder.  S/p EPS (June 2014) neg for inducible arrhythmias. (ILR removed when ICD implanted)    ICD (implantable cardioverter-defibrillator) in place     Placed 06/12/2014    Mitral valve regurgitation     Mild-Moderate per Echo 02/25/2024    Moderate aortic stenosis by prior echocardiogram     Echo 2/25/2024    Osteopenia 06/26/2023    Personal history of anaphylaxis     See allergy list    Psoriasis     PUD (peptic ulcer disease)     H/O nonbleeding gastric ulcers with small hiatal hernia on 1/6/2021 EGD    Pulmonary hypertension (Multi)     Mild - Moderate per Echo 2/25/2024     Radiculopathy, lumbar region 2018    Acute lumbar radiculopathy    Restless legs syndrome 2015    Restless legs syndrome    Rheumatoid arthritis (Multi)     Seasonal allergic rhinitis due to pollen     Small bowel obstruction (Multi) 2023    H/O due to Severe Adhesions s/p Adhesiolysis x2 in  and     Wide-complex tachycardia     Per cardiology 3/7/2024: VT is on a basis of triggered activity certainly made worse with anemia and prednisone. Since she is asymptomatic we will continue to observe.      Past Surgical History:   Past Surgical History:   Procedure Laterality Date    ABDOMINAL ADHESION SURGERY  10/22/2015    Exploratory laparotomy. Extensive lysis of adhesions. Small-bowel repair.    ABDOMINAL ADHESION SURGERY  10/18/2020    Exploratory laparotomy, massive lysis of adhesions, small-bowel resection, decompression of small bowel and removal of mesh.    ABLATION OF DYSRHYTHMIC FOCUS  2022, 2022 for A FIB    ACHILLES TENDON SURGERY Right     ANTERIOR CERVICAL DISCECTOMY W/ FUSION  2016    Anterior C5 and C6 discectomies and interbody fusion of C5-C6, C6-C7    APPENDECTOMY      Open surgery    BREAST BIOPSY Left 2022    CARDIAC ASSIST DEVICE INSERTION  2012    Loop recorder placement , Removed in  w/ ICD placed    CARDIAC CATHETERIZATION  2022    Normal coronary arteries. Tachycardia induced Cardiomyopathy.    CARDIAC CATHETERIZATION N/A 2024    Procedure: LAAO (Left Atrial Appendage Occlusion);  Surgeon: Reid Dickinson MD;  Location: [place] [address] [facility]iac [facility];  Service: Cardiovascular;  Laterality: N/A;  Same day CT at 0815    CARDIAC DEFIBRILLATOR PLACEMENT  2014    Dual chamber ICD    CARDIOVERSION  2018, 2018    CARPAL TUNNEL RELEASE Bilateral      SECTION, CLASSIC      x2    CHOLECYSTECTOMY      Open surgery    COLONOSCOPY  2024    Extensive diverticulosis of moderate  severity and causing mild luminal narrowing in the sigmoid colon    FINGER SURGERY Left 12/17/2008    Left index finger metacarpophalangeal fusion    FOOT SURGERY Bilateral     B/L Tarsal Tunnel Release    IR INJECTION EPIDURAL STEROID  01/28/2011    Lumbar spine; 2008, 2009, 2011    IR INJECTION EPIDURAL STEROID  2007    Cervical spine 2007    LEG SURGERY Right 09/13/2006    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  Plantar wart removal R foot.    NERVE SURGERY Right 10/13/2010    Release of Right tarsal tunnel syndrome w/ severe adhesion scar tissue    NISSEN FUNDOPLICATION  1991    Open surgery    PICC LINE INSERTION WO IMAGING  10/24/2015    Placed for stable IV access    REVERSE TOTAL SHOULDER ARTHROPLASTY Bilateral 05/02/2024    Right 1/12/2023, Left 5/2/2024    ROTATOR CUFF REPAIR      ULNAR NERVE TRANSPOSITION Left     VENTRAL HERNIA REPAIR  06/15/2015    Laparoscopic double ventral hernia repair.    WISDOM TOOTH EXTRACTION      WOUND DEBRIDEMENT  11/27/2020    Excisional debridement of abdominal wound for wound dehiscence     Family History:   Family History   Problem Relation Name Age of Onset    Asthma Daughter      Asthma Other      Colon cancer Other      Diabetes Other      Other (stroke syndrome) Other       Social History:   Tobacco Use: Low Risk  (7/29/2024)    Patient History     Smoking Tobacco Use: Never     Smokeless Tobacco Use: Never     Passive Exposure: Not on file      Social History     Substance and Sexual Activity   Alcohol Use Never       Outpatient Medications:  Current Outpatient Medications   Medication Instructions    acetaminophen (TYLENOL) 325 mg, oral, Every 6 hours PRN    albuterol 2.5 mg, nebulization, Every 4 hours PRN    baclofen (Lioresal) 10 mg tablet One tablet in the morning and 2 tablets in the evening.    bumetanide (BUMEX) 2 mg, oral, Daily    cholecalciferol (VITAMIN D3) 1,000 Units, oral, Daily    clobetasol (Temovate) 0.05 % cream  Topical, 2 times daily    clopidogrel (PLAVIX) 75 mg, oral, Daily    diclofenac sodium (VOLTAREN) 4 g, Topical, 2 times daily PRN    digoxin (LANOXIN) 125 mcg, oral, Daily    dilTIAZem LA (CARDIZEM LA) 120 mg, oral, Nightly    famotidine (PEPCID) 40 mg, oral, Daily    hydroxychloroquine (PLAQUENIL) 300 mg, oral, Daily    montelukast (SINGULAIR) 10 mg, oral, Nightly    naloxone (NARCAN) 4 mg, nasal, As needed, May repeat every 2-3 minutes if needed, alternating nostrils, until medical assistance becomes available.    nitroglycerin (NITROSTAT) 0.4 mg, sublingual, Every 5 min PRN    ondansetron ODT (ZOFRAN-ODT) 8 mg, oral, Daily PRN    oxyCODONE-acetaminophen (Percocet) 5-325 mg tablet 1 tablet, oral, 3 times daily PRN    [START ON 8/31/2024] oxyCODONE-acetaminophen (Percocet) 5-325 mg tablet 1 tablet, oral, 3 times daily PRN    [START ON 9/28/2024] oxyCODONE-acetaminophen (Percocet) 5-325 mg tablet 1 tablet, oral, 3 times daily PRN    pantoprazole (PROTONIX) 40 mg, oral, 2 times daily    polyethylene glycol (MIRALAX) 17 g, oral, Daily    rosuvastatin (CRESTOR) 20 mg, oral, Daily    saccharomyces boulardii (FLORASTOR) 250 mg, oral, 2 times daily    sennosides-docusate sodium (Lily-Colace) 8.6-50 mg tablet 1 tablet, oral, Daily    sucralfate (CARAFATE) 1 g, oral, 4 times daily PRN    sulfaSALAzine (AZULFIDINE) 500 mg, oral, 2 times daily        ED Course   Vitals - BP (!) 111/49   Pulse 75   Temp 36.2 °C (97.2 °F) (Temporal)   Resp 20   Wt 70.3 kg (155 lb)   SpO2 96%     Labs:   CBC:  Recent Labs     08/08/24  0153 07/28/24  0640 07/27/24  0629   WBC 6.7 5.0 5.8   HGB 7.7* 8.0* 8.5*   HCT 25.6* 27.6* 30.3*    238 242   MCV 76* 80 81     CMP:  Recent Labs     08/08/24  0153 07/28/24  0640 07/27/24  0629    140 140   K 4.0 4.4 4.1    106 106   CO2 25 27 25   ANIONGAP 14 11 13   BUN 29* 12 11   CREATININE 1.11* 0.98 0.98   EGFR 53* 61 61   GLUCOSE 90 85 84     Recent Labs     07/28/24  0640  "07/27/24  0629 07/26/24  0713 07/25/24  0330 07/24/24  1254 07/24/24  1147 04/15/24  1128 03/05/24  0700 02/27/24  0630 02/25/24  0117 09/03/23  0644 09/01/23  2347   ALBUMIN 3.2* 3.5 3.3*   < >  --  3.9 4.4 3.9   < >  --    < > 4.3   ALKPHOS  --   --   --   --   --  78 76 86  --   --    < > 88   ALT  --   --   --   --   --  14 15 17  --   --    < > 13   AST  --   --   --   --   --  16 22 12  --   --    < > 15   BILITOT  --   --   --   --   --  0.5 0.6 0.6  --   --    < > 0.5   LIPASE  --   --   --   --  13  --   --   --   --  18  --  11    < > = values in this interval not displayed.     Calcium/Phos:   Lab Results   Component Value Date    CALCIUM 8.8 08/08/2024    PHOS 4.4 07/28/2024      COAG:   Recent Labs     07/24/24  1147 04/15/24  1128 09/01/23  2347   INR 1.1 1.0 1.7*     CRP:   Lab Results   Component Value Date    CRP 0.86 07/24/2024      [unfilled]   ENDO:  Recent Labs     04/15/24  1128 08/28/23  0933 12/19/22  1721 09/23/18  1830   TSH 0.92 1.71 1.34 0.75      CARDIAC:   Recent Labs     08/08/24  0251 08/08/24  0153 07/25/24  0330 07/24/24  1254 07/24/24  1147 02/28/24  0543   TROPHS 27* 35* 323*   < > 134*  --    BNP  --  672*  --   --  324* 332*    < > = values in this interval not displayed.     Recent Labs     04/15/24  1128 08/28/23  0933 02/28/23  0950 12/19/22  1721   CHOL 146 133 161 135   LDLF  --  55 50 58   LDLCALC 68  --   --   --    HDL 56.5 53.3 80.5 49.9   TRIG 107 122 152* 135     No data recorded    Micro/ID:   No results found for the last 90 days.                   No lab exists for component: \"AGALPCRNB\"   .ID  Lab Results   Component Value Date    BLOODCULT  09/02/2023     No Growth at 1 days~No Growth at 2 days~No Growth at 3 days~NO GROWTH at 4 days - FINAL REPORT       Imaging:   No orders to display     Encounter Date: 07/24/24   ECG 12 lead   Result Value    Ventricular Rate 62    Atrial Rate 55    QRS Duration 172    QT Interval 488    QTC Calculation(Bazett) 495    R " Axis 249    T Axis 82    QRS Count 10    Q Onset 197    T Offset 441    QTC Fredericia 493    Narrative    Ventricular-paced rhythm with occasional Premature ventricular complexes  Abnormal ECG  When compared with ECG of 25-JUL-2024 01:22, (unconfirmed)  Electronic ventricular pacemaker has replaced Electronic atrial pacemaker  Confirmed by Neo Gutierrez ([id number]) on 7/28/2024 7:33:02 PM     Transthoracic Echo (TTE) Complete    Result Date: 7/25/2024   [facility], [address] [place] [address]               Tel [phone] and Fax [phone] TRANSTHORACIC ECHOCARDIOGRAM REPORT  Patient Name:      VERNON SALDANA VIA         Reading Physician:    [id number] Neo Gutierrez MD Study Date:        7/25/2024            Ordering Provider:    [id number] STEPHON YARBROUGH MRN/PID:           [id number]             Fellow: Accession#:        [id number]         Nurse: Date of Birth/Age: 1950 / 73 years Sonographer:          Viola Lau                                                               [facility] Gender:            F                    Additional Staff: Height:            162.56 cm            Admit Date:           7/24/2024 Weight:            72.12 kg             Admission Status:     Inpatient -                                                               Routine BSA / BMI:         1.77 m2 / 27.29      Encounter#:           [id number]                    kg/m2 Blood Pressure:    108/69 mmHg          Department Location:  [facility] Non                                                               Invasive Study Type:    TRANSTHORACIC ECHO (TTE) COMPLETE Diagnosis/ICD: Chest pain, unspecified-R07.9 Indication:    CP CPT Code:      Echo Complete w Full Doppler-[id number] Patient History: Pertinent History: A-Fib, CAD, Hyperlipidemia, COPD, Chest Pain and S/P Left                     shoulder surgery,Watchman, CKD. Study Detail: The following Echo studies were performed: 2D, M-Mode, Doppler and               color flow. Technically challenging study due to patient lying in               supine position and S/P left shoulder surgery. Patient has a               defibrillator.  PHYSICIAN INTERPRETATION: Left Ventricle: The left ventricular systolic function is mildly decreased, with a visually estimated ejection fraction of 45-50%. There are no regional wall motion abnormalities. The left ventricular cavity size is normal. There is mild concentric left ventricular hypertrophy. Spectral Doppler shows a pseudonormal pattern of left ventricular diastolic filling. Left Atrium: The left atrium is normal in size. Right Ventricle: The right ventricle is normal in size. There is normal right ventricular global systolic function. A device is visualized in the right ventricle. Right Atrium: The right atrium is normal in size. Aortic Valve: The aortic valve is trileaflet. There is mild aortic valve cusp calcification. There is evidence of mildly elevated transaortic gradients consistent with sclerosis of the aortic valve. The aortic valve dimensionless index is 0.71. There is moderate aortic valve regurgitation. The peak instantaneous gradient of the aortic valve is 14.6 mmHg. The mean gradient of the aortic valve is 8.0 mmHg. Mitral Valve: The mitral valve is normal in structure. There is moderate mitral annular calcification. There is mild mitral valve regurgitation. Tricuspid Valve: The tricuspid valve is structurally normal. There is mild tricuspid regurgitation. Pulmonic Valve: The pulmonic valve is structurally normal. There is physiologic pulmonic valve regurgitation. Pericardium: There is no pericardial effusion noted. Aorta: The aortic root is normal. Pulmonary Artery: The tricuspid regurgitant velocity is 2.43 m/s, and with an estimated right atrial pressure of 3 mmHg, the estimated  pulmonary artery pressure is normal with the RVSP at 26.6 mmHg. Pulmonary Veins: The pulmonary veins appear normal and return normally to the left atrium. Systemic Veins: The inferior vena cava appears to be of normal size. There is IVC inspiratory collapse greater than 50%.  CONCLUSIONS:  1. The left ventricular systolic function is mildly decreased, with a visually estimated ejection fraction of 45-50%.  2. Spectral Doppler shows a pseudonormal pattern of left ventricular diastolic filling.  3. There is normal right ventricular global systolic function.  4. There is moderate mitral annular calcification.  5. Aortic valve sclerosis.  6. Moderate aortic valve regurgitation.  7. Normal estimated PASP and CVP. QUANTITATIVE DATA SUMMARY: 2D MEASUREMENTS:                           Normal Ranges: IVSd:          0.97 cm    (0.6-1.1cm) LVPWd:         1.16 cm    (0.6-1.1cm) LVIDd:         5.27 cm    (3.9-5.9cm) LVIDs:         4.48 cm LV Mass Index: 122.0 g/m2 LV % FS        15.0 % LA VOLUME:                               Normal Ranges: LA Vol A4C:        46.9 ml    (22+/-6mL/m2) LA Vol A2C:        46.7 ml LA Vol BP:         46.9 ml LA Vol Index A4C:  26.5ml/m2 LA Vol Index A2C:  26.3 ml/m2 LA Vol Index BP:   26.4 ml/m2 LA Area A4C:       17.6 cm2 LA Area A2C:       17.5 cm2 LA Major Axis A4C: 5.6 cm LA Major Axis A2C: 5.6 cm LA Volume Index:   24.7 ml/m2 LA Vol A4C:        42.8 ml LA Vol A2C:        43.8 ml RA VOLUME BY A/L METHOD:                       Normal Ranges: RA Area A4C: 23.0 cm2 AORTA MEASUREMENTS:                    Normal Ranges: Asc Ao, d: 3.70 cm (2.1-3.4cm) LV SYSTOLIC FUNCTION BY 2D PLANIMETRY (MOD):                      Normal Ranges: EF-A4C View:    43 % (>=55%) EF-A2C View:    42 % EF-Biplane:     44 % EF-Visual:      48 % LV EF Reported: 48 % LV DIASTOLIC FUNCTION:                     Normal Ranges: MV Peak E: 1.46 m/s (0.7-1.2 m/s) MITRAL VALVE:                      Normal Ranges: MV Vmax:    1.31 m/s  (<=1.3m/s) MV peak P.9 mmHg (<5mmHg) MV mean PG: 3.0 mmHg (<2mmHg) MV DT:      197 msec (150-240msec) MITRAL INSUFFICIENCY:                           Normal Ranges: PISA Radius:  0.3 cm MR VTI:       178.00 cm MR Vmax:      496.00 cm/s MR Alias Ham: 35.1 cm/s MR Volume:    7.12 ml MR Flow Rt:   19.85 ml/s MR EROA:      0.04 cm2 AORTIC VALVE:                                    Normal Ranges: AoV Vmax:                1.91 m/s  (<=1.7m/s) AoV Peak P.6 mmHg (<20mmHg) AoV Mean P.0 mmHg  (1.7-11.5mmHg) LVOT Max Ham:            1.33 m/s  (<=1.1m/s) AoV VTI:                 37.70 cm  (18-25cm) LVOT VTI:                26.90 cm LVOT Diameter:           2.00 cm   (1.8-2.4cm) AoV Area, VTI:           2.24 cm2  (2.5-5.5cm2) AoV Area,Vmax:           2.19 cm2  (2.5-4.5cm2) AoV Dimensionless Index: 0.71 AORTIC INSUFFICIENCY: AI Vmax:       4.01 m/s AI Half-time:  441 msec AI Decel Rate: 267.00 cm/s2  RIGHT VENTRICLE: RV Basal 3.67 cm RV Mid   2.60 cm RV Major 8.8 cm TAPSE:   28.0 mm RV s'    0.12 m/s TRICUSPID VALVE/RVSP:                             Normal Ranges: Peak TR Velocity: 2.43 m/s RV Syst Pressure: 26.6 mmHg (< 30mmHg) IVC Diam:         1.58 cm PULMONIC VALVE:                      Normal Ranges: PV Max Ham: 1.5 m/s  (0.6-0.9m/s) PV Max P.6 mmHg  [id number] Neo Gutierrez MD Electronically signed on 2024 at 6:23:17 PM  ** Final **     Transthoracic Echo (TTE) Limited    Result Date: 2024   [facility], [address] [place] [address]                Tel [phone] and Fax [phone] TRANSTHORACIC ECHOCARDIOGRAM REPORT  Patient Name:      VERNON SALDANA VIA         Reading Physician:    [id number] Monica Chen MD Study Date:        2024            Ordering Provider:    [id number] KRISTAL SNYDER MRN/PID:           [id number]              Fellow: Accession#:        [id number]         Nurse: Date of Birth/Age: 1950 / 73 years Sonographer:          Tobi Carlos RDCS Gender:            F                    Additional Staff: Height:            162.56 cm            Admit Date:           7/16/2024 Weight:            70.31 kg             Admission Status:     Inpatient -                                                               Routine BSA / BMI:         1.76 m2 / 26.61      Encounter#:           [id number]                    kg/m2 Blood Pressure:    /                    Department Location:  [facility]                                                               Cath Lab Study Type:    TRANSTHORACIC ECHO (TTE) LIMITED Diagnosis/ICD: Unspecified atrial fibrillation-I48.91 Indication:    Atrial fibrillation; Preop cardiovascular exam CPT Code:      Echo Limited-[id number] Patient History: Pertinent History: A-fib; CAD; BARBARA; HTN; HLD; CKD; PPM. Study Detail: The following Echo studies were performed: 2D and M-Mode.               Technically challenging study due to body habitus and patient               lying in supine position.  PHYSICIAN INTERPRETATION: Left Ventricle: Left ventricular ejection fraction is low normal, by visual estimate at 50-55%. The patient is in atrial fibrillation which may influence the estimate of left ventricular function and transvalvular flows. Wall motion is abnormal. The left ventricular cavity size was not assessed. Left ventricular diastolic filling was not assessed. Possible inferior wall motion hypokinesis. Left Atrium: The left atrium was not assessed. Right Ventricle: The right ventricle was not well visualized. There is normal right ventricular global systolic function. A device is visualized in the right ventricle. Right Atrium: The right atrium was not assessed. There is a device visualized in the right atrium. Aortic Valve: The aortic valve  is trileaflet. There is mild aortic valve cusp calcification. Aortic valve regurgitation was not assessed. Mitral Valve: The mitral valve is mildly thickened. There is moderate mitral annular calcification. Mitral valve regurgitation was not assessed. Tricuspid Valve: The tricuspid valve was not well visualized. Tricuspid regurgitation was not assessed. Pulmonic Valve: The pulmonic valve is not well visualized. Pulmonic valve regurgitation was not assessed. Pericardium: There is a trivial pericardial effusion. Aorta: The aortic root is normal. Systemic Veins: The inferior vena cava appears to be of normal size. There is IVC inspiratory collapse greater than 50%. In comparison to the previous echocardiogram(s): Compared with the prior exam from 2/27/2024 there are no significant changes though today's exam is only a limited study withno assessment of valvular function.  CONCLUSIONS:  1. Left ventricular ejection fraction is low normal, by visual estimate at 50-55%.  2. Possible inferior wall motion hypokinesis.  3. There is normal right ventricular global systolic function.  4. There is moderate mitral annular calcification.  5. Compared with the prior exam from 2/27/2024 there are no significant changes though today's exam is only a limited study withno assessment of valvular function.  6. The patient is in atrial fibrillation which may influence the estimate of left ventricular function and transvalvular flows. QUANTITATIVE DATA SUMMARY: AORTA MEASUREMENTS:                      Normal Ranges: Ao Sinus, d: 3.45 cm (2.1-3.5cm) LV SYSTOLIC FUNCTION BY 2D PLANIMETRY (MOD):                      Normal Ranges: EF-Visual:      53 % LV EF Reported: 53 % TRICUSPID VALVE/RVSP:                   Normal Ranges: IVC Diam: 2.03 cm  [id number] Monica Chen MD Electronically signed on 7/16/2024 at 3:11:21 PM  ** Final **     Transthoracic Echo (TTE) Limited    Result Date: 7/16/2024   [facility], [address] [place] [address],  [place] [address]                Tel [phone] and Fax [phone] TRANSTHORACIC ECHOCARDIOGRAM REPORT  Patient Name:      VERNON SALDANA VIA         Reading Physician:    [id number] Neftali Calvillo MD Study Date:        7/16/2024            Ordering Provider:    [id number] KRISTAL SNYDER MRN/PID:           [id number]             Fellow:               [id number] Lucina Gill MD Accession#:        [id number]         Nurse: Date of Birth/Age: 1950 / 73 years Sonographer:          Tobi Carlos RDCS Gender:            F                    Additional Staff: Height:            162.56 cm            Admit Date:           7/16/2024 Weight:            71.22 kg             Admission Status:     Inpatient -                                                               Routine BSA / BMI:         1.76 m2 / 26.95      Encounter#:           [id number]                    kg/m2 Blood Pressure:    117/55 mmHg          Department Location:  [facility]                                                               Cath Lab Study Type:    TRANSTHORACIC ECHO (TTE) LIMITED Diagnosis/ICD: Other specified postprocedural states-Z98.890 Indication:    Post LAAO CPT Code:      Echo Limited-65838 Patient History: Pertinent History: CAD; A-fib; BARBARA: HTN; HLD; CKD; PPM. Study Detail: The following Echo studies were performed: 2D and M-Mode.               Technically challenging study due to body habitus.  PHYSICIAN INTERPRETATION: Left Ventricle: Left ventricular ejection fraction is low normal, by visual estimate at 50-55%. There are no regional wall motion abnormalities. The left ventricular cavity size is normal. Left ventricular diastolic filling was not assessed. Left Atrium: The left atrium  was not assessed. Right Ventricle: The right ventricle was not assessed. Right ventricular systolic function not assessed. A device is visualized in the right ventricle. Right Atrium: The right atrium was not assessed. There is a device visualized in the right atrium. Aortic Valve: The aortic valve is trileaflet. There is mild aortic valve cusp calcification. Aortic valve regurgitation was not assessed. Mitral Valve: The mitral valve is mildly thickened. There is moderate mitral annular calcification. Mitral valve regurgitation was not assessed. Tricuspid Valve: The tricuspid valve was not well visualized. Tricuspid regurgitation was not assessed. Right ventricular systolic pressure could not be accurately assessed in this patient. Pulmonic Valve: The pulmonic valve was not assessed. Pulmonic valve regurgitation was not assessed. Pericardium: There is a trivial pericardial effusion. Aorta: The aortic root is normal. Systemic Veins: The inferior vena cava was not well visualized. In comparison to the previous echocardiogram(s): Compared with study dated 2/27/2024, no significant change.  CONCLUSIONS:  1. Left ventricular ejection fraction is low normal, by visual estimate at 50-55%.  2. There is moderate mitral annular calcification.  3. There is a trivial pericardial effusion.  4. Compared with study dated 2/27/2024, no significant change. QUANTITATIVE DATA SUMMARY: LV SYSTOLIC FUNCTION BY 2D PLANIMETRY (MOD):                      Normal Ranges: EF-Visual:      53 % LV EF Reported: 53 %  [id number] Neftali Calvillo MD Electronically signed on 7/16/2024 at 3:02:13 PM  ** Final **     Transthoracic Echo (TTE) Complete    Result Date: 2/27/2024   [facility], [address] [place] [address]               Tel [phone] and Fax [phone] TRANSTHORACIC ECHOCARDIOGRAM REPORT  Patient Name:      VERNON SALDANA VIA         Reading Physician:    [id number] Nohelia Soto                                                                MD Study Date:        2/27/2024            Ordering Provider:    [id number] NIKOLE OLVERAHMAN                                                               CHAGO ZALDIVARCOOPER MRN/PID:           [id number]             Fellow: Accession#:        [id number]         Nurse: Date of Birth/Age: 1950 / 73 years Sonographer:          Dai Diaz                                                               RDCS Gender:            F                    Additional Staff: Height:            162.56 cm            Admit Date:           2/24/2024 Weight:            72.57 kg             Admission Status:     Inpatient -                                                               Routine BSA / BMI:         1.78 m2 / 27.46      Encounter#:           [id number]                    kg/m2                                         Department Location:  [facility] Non                                                               Invasive Blood Pressure: 131 /59 mmHg Study Type:    TRANSTHORACIC ECHO (TTE) COMPLETE Diagnosis/ICD: Shortness of breath-R06.02 Indication:    Dyspnea CPT Code:      Echo Complete w Full Doppler-53952 Patient History: Pertinent History: A-Fib and Chest Pain. elevated troponin, elevated BNP, mod                    AS. Study Detail: The following Echo studies were performed: 2D, M-Mode, Doppler and               color flow.  PHYSICIAN INTERPRETATION: Left Ventricle: The left ventricular systolic function is normal, with an estimated ejection fraction of 55-60%. There are no regional wall motion abnormalities. The left ventricular cavity size is normal. Spectral Doppler shows an impaired relaxation pattern of left ventricular diastolic filling. Left Atrium: The left atrium is mildly dilated. Right Ventricle: The right ventricle is normal in size. There is normal right ventricular global systolic function. Right Atrium: The right atrium is normal in size. Aortic Valve: The aortic valve is trileaflet. There is mild  to moderate aortic valve cusp calcification. There is evidence of moderate aortic valve stenosis. There is trivial aortic valve regurgitation. The peak instantaneous gradient of the aortic valve is 27.5 mmHg. The mean gradient of the aortic valve is 16.0 mmHg. Mitral Valve: The mitral valve is mildly thickened. There is evidence of mild mitral valve stenosis. The doppler estimated mean and peak diastolic pressure gradients are 7.2 mmHg and 16.3 mmHg respectively. There is mild mitral annular calcification. There is mild to moderate mitral valve regurgitation. Tricuspid Valve: The tricuspid valve is structurally normal. There is mild to moderate tricuspid regurgitation. Pulmonic Valve: The pulmonic valve is not well visualized. There is trace to mild pulmonic valve regurgitation. Pericardium: There is no pericardial effusion noted. Aorta: The aortic root is normal. Pulmonary Artery: The tricuspid regurgitant velocity is 3.46 m/s, and with an estimated right atrial pressure of 3 mmHg, the estimated pulmonary artery pressure is mild to moderately elevated with the RVSP at 50.9 mmHg.  CONCLUSIONS:  1. Left ventricular systolic function is normal with a 55-60% estimated ejection fraction.  2. Spectral Doppler shows an impaired relaxation pattern of left ventricular diastolic filling.  3. Mild to moderate mitral valve regurgitation.  4. Mild to moderate tricuspid regurgitation visualized.  5. Moderate aortic valve stenosis.  6. Mild to moderately elevated pulmonary artery pressure. QUANTITATIVE DATA SUMMARY: 2D MEASUREMENTS:                           Normal Ranges: IVSd:          0.97 cm    (0.6-1.1cm) LVPWd:         0.98 cm    (0.6-1.1cm) LVIDd:         5.70 cm    (3.9-5.9cm) LVIDs:         4.26 cm LV Mass Index: 123.2 g/m2 LV % FS        25.2 % LA VOLUME:                              Normal Ranges: LA Vol A4C:       77.4 ml    (22+/-6mL/m2) LA Vol A2C:       83.5 ml LA Vol BP:        82.1 ml LA Vol Index A4C: 43.5 ml/m2  LA Vol Index A2C: 46.9 ml/m2 LA Vol Index BP:  46.1 ml/m2 LA Volume Index:  46.1 ml/m2 LA Vol A4C:       72.2 ml LA Vol A2C:       77.0 ml RA VOLUME BY A/L METHOD:                       Normal Ranges: RA Area A4C: 21.8 cm2 M-MODE MEASUREMENTS:                  Normal Ranges: Ao Root: 3.30 cm (2.0-3.7cm) LAs:     4.72 cm (2.7-4.0cm) LV SYSTOLIC FUNCTION BY 2D PLANIMETRY (MOD):                     Normal Ranges: EF-A4C View: 55.9 % (>=55%) EF-A2C View: 56.5 % EF-Biplane:  55.9 % LV DIASTOLIC FUNCTION:                             Normal Ranges: MV Peak E:      1.91 m/s    (0.7-1.2 m/s) MV Peak A:      0.93 m/s    (0.42-0.7 m/s) E/A Ratio:      2.06        (1.0-2.2) MV e'           0.09 m/s    (>8.0) MV lateral e'   0.09 m/s MV medial e'    0.08 m/s E/e' Ratio:     21.21       (<8.0) PulmV Sys Ham:  62.35 cm/s PulmV Alvarez Ham: 121.32 cm/s PulmV S/D Ham:  0.51 MITRAL VALVE:                       Normal Ranges: MV Vmax:    2.02 m/s  (<=1.3m/s) MV peak P.3 mmHg (<5mmHg) MV mean P.2 mmHg  (<2mmHg) MV VTI:     48.44 cm  (10-13cm) MV DT:      167 msec  (150-240msec) MITRAL INSUFFICIENCY:                         Normal Ranges: MR VTI:     192.66 cm MR Vmax:    568.98 cm/s MR Volume:  33.56 ml MR Flow Rt: 99.12 ml/s MR EROA:    0.17 cm2 AORTIC VALVE:                                    Normal Ranges: AoV Vmax:                2.62 m/s  (<=1.7m/s) AoV Peak P.5 mmHg (<20mmHg) AoV Mean P.0 mmHg (1.7-11.5mmHg) LVOT Max Ham:            0.99 m/s  (<=1.1m/s) AoV VTI:                 58.21 cm  (18-25cm) LVOT VTI:                21.96 cm LVOT Diameter:           1.95 cm   (1.8-2.4cm) AoV Area, VTI:           1.13 cm2  (2.5-5.5cm2) AoV Area,Vmax:           1.13 cm2  (2.5-4.5cm2) AoV Dimensionless Index: 0.38 AORTIC INSUFFICIENCY: AI Vmax:       3.91 m/s AI Half-time:  520 msec AI Decel Time: 1794 msec AI Decel Rate: 218.17 cm/s2  RIGHT VENTRICLE: RV Basal 4.30 cm RV Mid   2.60 cm RV Major 7.7 cm  TAPSE:   27.0 mm RV s'    0.18 m/s TRICUSPID VALVE/RVSP:                             Normal Ranges: Peak TR Velocity: 3.46 m/s RV Syst Pressure: 50.9 mmHg (< 30mmHg) PULMONIC VALVE:                      Normal Ranges: PV Max Ham: 1.5 m/s  (0.6-0.9m/s) PV Max P.8 mmHg Pulmonary Veins: PulmV Alvarez Ham: 121.32 cm/s PulmV S/D Ham:  0.51 PulmV Sys Ham:  62.35 cm/s  [id number] Nohelia Soto MD Electronically signed on 2024 at 11:22:42 AM  ** Final **    CT angio chest for pulmonary embolism    Result Date: 2024  Interpreted By:  Campbell Pearson, STUDY: CT ANGIO CHEST FOR PULMONARY EMBOLISM;  2024 3:17 am   INDICATION: Signs/Symptoms:Concern for PE.   COMPARISON: 2024   ACCESSION NUMBER(S): [id number]   ORDERING CLINICIAN: SONJA VEGA   TECHNIQUE: Contiguous axial images of the chest were obtained after the intravenous administration of iodinated contrast using angiographic PE protocol. Coronal and sagittal reformatted images were reconstructed from the axial data. MIP images were created on an independent workstation and reviewed.   FINDINGS: There is streak artifact related to hardware both shoulders as well as left sided pacemaker generator.   No significant pulmonary embolism detected.   There is moderate cardiomegaly.   Small effusions, right-greater-than-left.   Probable underlying mild CHF/edema seen in the lungs.   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       Cardiomegaly/CHF.   No significant pulmonary effusion.   MACRO: None.   Signed by: Campbell Pearson 2024 3:29 AM Dictation workstation:   [id number]    XR chest 1 view    Result Date: 2024  Interpreted By:  Ivette Guillory, STUDY: XR CHEST 1 VIEW;  2024 2:13 am   INDICATION: Signs/Symptoms:SOB.   COMPARISON: 2024   ACCESSION NUMBER(S): [id number]   ORDERING CLINICIAN: SONJA VEGA   FINDINGS:     CARDIOMEDIASTINAL SILHOUETTE: Stable cardiomegaly. ICD leads overlie the right atrium and  right ventricle.   LUNGS: Strandy opacities most consistent atelectasis. Stable small right pleural effusion. No pneumothorax.   ABDOMEN: No remarkable upper abdominal findings.   BONES: No acute osseous abnormality. Reverse bilateral shoulder arthroplasties         Stable small right pleural effusion. Mild bibasilar atelectasis.   MACRO: None   Signed by: Ivette Guillory 8/8/2024 2:20 AM Dictation workstation:   [id number]     Interventions:  Medications   oxygen (O2) therapy (30 percent inhalation Start 8/8/24 0407)   enoxaparin (Lovenox) syringe 40 mg (has no administration in time range)   albuterol 2.5 mg /3 mL (0.083 %) nebulizer solution 2.5 mg (has no administration in time range)   predniSONE (Deltasone) tablet 40 mg (has no administration in time range)   ipratropium-albuteroL (Duo-Neb) 0.5-2.5 mg/3 mL nebulizer solution 9 mL (9 mL nebulization Given 8/8/24 0156)   methylPREDNISolone sod succinate (SOLU-Medrol) injection 125 mg (125 mg intravenous Given 8/8/24 0157)   magnesium sulfate 2 g in sterile water for injection 50 mL (0 g intravenous Stopped 8/8/24 0505)   iohexol (OMNIPaque) 350 mg iodine/mL solution 75 mL (75 mL intravenous Given 8/8/24 0318)   sodium chloride 0.9 % bolus 500 mL (0 mL intravenous Stopped 8/8/24 0505)   bumetanide (Bumex) injection 2 mg (2 mg intravenous Given 8/8/24 0356)       Objective Data  Physical Exam  Constitutional:       General: She is in acute distress.      Appearance: She is ill-appearing.   HENT:      Head: Normocephalic and atraumatic.   Cardiovascular:      Rate and Rhythm: Tachycardia present.   Pulmonary:      Effort: Respiratory distress (on BIPAP) present.      Breath sounds: Wheezing present.   Abdominal:      Palpations: Abdomen is soft.      Tenderness: There is no abdominal tenderness.   Musculoskeletal:         General: No swelling.      Right lower leg: No edema.      Left lower leg: No edema.   Neurological:      Mental Status: She is oriented to  person, place, and time.          FiO2 (%):  [30 %] 30 %     Assessment and Plan   Trina Elias, a 72 y/o F with PMHx of hypertension, hyperlipidemia, HFmrEF/systolic (EF45-50% in July 2024) and diastolic HF, paroxysmal A-fib status post PCM/ICD and watchman procedure, central sleep apnea on BiPAP at night, COPD/asthma not on nasal cannula oxygen at home, chronic anemia/iron deficiency anemia, CKD, multiple abdominal surgeries, and RA presented to the ED with dyspnea on exertion.    Acute Medical Issues:  #Acute hypoxic respiratory failure 2/2 COPD exacerbation and volume overload ISO acute decompensated HF-Hx of HFmrEF/systolic (EF45-50% in July 2024) and diastolic HF  #Hx Asthma/COPD   #Central sleep apnea on BIPAP at night   -Patient admitted for dyspnea on exertion in the last week. No on O2 aat home   -CT Angio chest negative for PE, noted moderate cardiomegaly. Small effusions, right-greater-than-left. Probable underlying mild CHF/edema seen in the lungs  -Follows with Dr. Soto/[place]; recently decreased to 2mg once daily instead of twice daily (2mg in the am and 1mg in the pm total 3mg)  -ED consulted cardiology; evaluate in the am  -S/p 2mg IV bumex in the ED and placed on bipap   -  -VBG showed pH 7.4 pCO2 45 pO2 31 (no acidosis or hypercapnea)   -Restarted home Bumex 2mg daily; Cardiology consulted in the ED   -Started prednisone 40mg daily and albuterol nebs TID. Continue home montelukast  -Echo 07/25/2024 showed The left ventricular systolic function is mildly decreased, with a visually estimated ejection fraction of 45-50%.Spectral Doppler shows a pseudonormal pattern of left ventricular diastolic filling. There is normal right ventricular global systolic function. There is moderate mitral annular calcification. Aortic valve sclerosis. Moderate aortic valve regurgitation. Normal estimated PASP and CVP.  -Wean O2 as tolerated    #troponemia   -Denies CP, likely demand ischemia   -Troponin trended  down; 35-27   -ECG showed paced rhythm no acute ST elevation    Chronic Medical Issues:   #HTN: Continue home Cardizem and     #HLD: Continue home statin    #CKD: Cr on admission 1.1 (baseline 0.9-1~) Strict Is Os, Monitor kidney function and avoid nephrotoxic meds. Received contrast in the ED.     #Chronic anemia/GRACIE: Hgb on admission 7.7 (baseline 8-9) cannot tolerate PO iron supplement; Consider inpatient IV Iron vs outpatient. No concern for acute bleed     #Nausea/GERD: continue Zofran PRN, famotidine, and PPI    #Chronic pain: Continue home percocet TID PRN     #A-fib status post PCM/ICD and watchman procedure: Continue home Digoxin (ordered level), Cardizem and Plavix. Not on DOAC given watchman    #RA: continue home Hydroxychloroquine and Sulfasalazine. Voltaren gel PRN      #Constipation: continue home miralax and bisacodyl PRN       Fluids: PO  Electrolytes: replete as needed  Nutrition: Cardiac   GI Prophylaxis: PPI   DVT Prophylaxis: Lovenox subcutaneous     Dispo: Patient admitted for the acute medical condition above. ELOS<48hours.

## 2024-08-08 NOTE — DOCUMENTATION CLARIFICATION NOTE
"    PATIENT:               VERNON CARTAGENA  ACCT #:                  [id number]  MRN:                       [id number]  :                       1950  ADMIT DATE:       2024 11:21 AM  DISCH DATE:        2024 1:02 PM  RESPONDING PROVIDER #:        [id number]          PROVIDER RESPONSE TEXT:    Chest pain is related to or is a complication of the Watchman procedure    CDI QUERY TEXT:    Clarification    Instruction:    Based on your assessment of the patient and the clinical information, please provide the requested documentation by clicking on the appropriate radio button and enter any additional information if prompted.    Question: Please further clarify if a relationship exists between the chest pain and Watchman    When answering this query, please exercise your independent professional judgment. The fact that a question is being asked, does not imply that any particular answer is desired or expected.    The patient's clinical indicators include:  Clinical Information: 73 y.o. female with a PMH of multiple abdominal surgeries, A-fib, CAD, ICD/PPM placement, HLD, COPD/asthma, CKD, and RA admitted with substernal chest pain. S/p watchman procedure on 24.    Clinical Indicators:  Admitting VS:  120/47, 68, 36.4, 14, SpO2 95, BMI 26    EKG in ED: sinus rhythm, occasional PVCs, ventricular rate 71 normal QRS duration no prolonged QT/QTc no obvious ST elevation, depression or acute ischemic findings.  ECHO: The left ventricular systolic function is normal, with an estimated ejection fraction of 55-60%. There are no regional wall motion abnormalities. The left ventricular cavity size is normal.    -had watchman procedure     Trop: 134/182/338/332/355/323 (-),     H and P noted \"Elevated troponins--- Constant heavy substernal chest pain\"    Cardiac consult noted \"Chest pain and elevated troponins most likely from recent procedure\"    Discharge Diagnosis is \"Elevated Troponins secondary " "to recent cardiac procedure (Watchman)\"    Treatment: monitoring cardiac labs, Plavix 75mg, digoxin 125mcg and diltiazem 120mg    Risk Factors: HLD, paroxysmal afib  Options provided:  -- Chest pain is related to or is a complication of the Watchman procedure  -- Chest pain is unrelated to Watchman procedure  -- Other - I will add my own diagnosis  -- Refer to Clinical Documentation Reviewer    Query created by: Belia Emmanuel on 8/5/2024 12:25 PM      Electronically signed by:  WINTER COYNE MD 8/7/2024 10:26 PM          "

## 2024-08-08 NOTE — ED PROVIDER NOTES
History/Exam limitations: none  HPI was provided by patient    HPI:    Chief Complaint   Patient presents with    Shortness of Breath        Trina Elias is a 73 y.o. female presents with chief complaint of shortness of breath.  Began just prior to arrival.  EMS arrived she was hypoxic in the high 80s.  Was placed on nonrebreather.  Given a DuoNeb.  Upon arrival here wheezing tachypneic.  She has a history of asthma/COPD and feels similar and the symptoms have been ongoing for the past week.  Denies any recent trauma recent mobilization history of PEs or DVTs.  States she tried breathing treatments and were not working at home.  Has a cough that is nonproductive.  Denies any exacerbating factors.       ROS: All other review of systems are negative except as noted above and HPI or ROS.   CONSTITUTIONAL:       fever, chills  ENT:       sore throat, congestion, rhinorrhea  CARDIOVASCULAR:       chest pain, palpitations, swelling  RESPIRATORY:       cough, wheeze, shortness of breath  GI:       nausea, vomiting, diarrhea, abdominal pain  GENITOURINARY:       dysuria, hematuria, frequency  MUSCULOSKELETAL:       deformity, neck pain  SKIN:       rash, lesion  NEUROLOGIC:       headache, numbness, focal weakness  NOTES: All systems reviewed, negative except as described above       Physical Exam:  GENERAL: Alert, oriented , cooperative,  in no acute distress.  HEAD: normocephalic, atraumatic  SKIN:  dry skin, no lesions.  EYES: PERRL, EOMs intact,  Conjunctiva pink with no erythema or exudates. No scleral icterus.   ENT: No external deformities. Nares patent, mucus membranes moist.  Pharynx clear, uvula midline.   NECK: Supple, without meningismus. Trachea at midline. No lymphadenopathy.  PULMONARY: no crackles, rhonchi, tachypneic with diffuse wheezing bilateral CARDIAC: Regular rate and regular rhythm.  Pulses 2+ in radials and dorsal pedal pulses bilaterally.  No murmur, rub, gallop.  No edema.  ABDOMEN: Soft,  nontender, active bowel sounds.  No palpable organomegaly.  No rebound or guarding.  No CVA tenderness.  No pulsatile masses.  : Exam deferred.  MUSCULOSKELETAL: Full range of motion throughout, no deformity.   NEUROLOGICAL:  no focal neuro deficits.  neurovascular intact in bilateral upper and lower extremities  PSYCHIATRIC: Appropriate mood and affect. Calm.       MDM/ED COURSE:      The patient presented for evaluation of shortness of breath.  Differential includes but not limited to asthma exacerbation, CHF, Pneumonia, viral illness, PE.  Imaging studies if performed were independently reviewed and interpreted by myself and confirmed by radiologist.  Patient treated symptomatically given DuoNeb treatments and steroid here in the ED.  Upon reevaluation at bedside after treatments there was some improvement with her symptoms.        Patient requires continued workup and management of their symptoms and will be admitted to the hospital for further evaluation and treatment.        I discussed the differential, results and plan with the patient and/or family/friend/caregiver if present.          Note: This note was dictated by speech recognition. Minor errors in transcription may be present.    ED Course as of 08/08/24 0431   Thu Aug 08, 2024   0151 EKG interpreted by me shows atrial paced rhythm with prolonged AV conduction and supraventricular and frequent PVCs.  Nonspecific ST-T wave abnormalities.  No STEMI.  Rate 76 bpm.  Compared to prior EKG atrial paced has replaced ventricular paced rhythm. [WL]   0258 On reevaluation having some improvement to her wheezing.  D-dimer elevated will get a CT of the chest.  Will give her magnesium.  BNP also elevated along with troponin. [WL]   0258 BNP(!): 672 [WL]   0258 Troponin I, High Sensitivity(!): 35 [WL]   0259 Cxr shows Stable small right pleural effusion. Mild bibasilar atelectasis. [WL]   0259 Creatinine(!): 1.11 [WL]   0303 Last echo shows  1. The left  ventricular systolic function is mildly decreased, with a visually estimated ejection fraction of 45-50%.   2. Spectral Doppler shows a pseudonormal pattern of left ventricular diastolic filling.   3. There is normal right ventricular global systolic function.   4. There is moderate mitral annular calcification.   5. Aortic valve sclerosis.   6. Moderate aortic valve regurgitation.   7. Normal estimated PASP and CVP.   [WL]   0314 watchman procedure done on 7/16/2024 [WL]   0315 On reevaluation after breathing treatments still tachypneic.  States she does use CPAP BiPAP at home.  Will place her on BiPAP. [WL]   0323 Troponin I, High Sensitivity(!): 35 [WL]   0323 Troponin I, High Sensitivity(!): 27 [WL]   0346 Ct shows Cardiomegaly/CHF.      No significant pulmonary effusion.   [WL]   0346 Given elevated BNP findings on CT for heart failure respiratory failure we will give her an IV dose of her Bumex. [WL]   0423 On reevaluation doing and feeling way better on the BiPAP no longer tachypneic working to breathe. [WL]   0423 Consult made to hospitalist service spoke with Evelin who agrees on admission [WL]      ED Course User Index  [WL] Fredy Lopes DO         Diagnoses as of 08/08/24 0431   Shortness of breath   Exacerbation of asthma, unspecified asthma severity, unspecified whether persistent (HHS-HCC)   Acute on chronic heart failure, unspecified heart failure type (Multi)   Acute respiratory failure with hypoxia (Multi)         Past Medical History:   Diagnosis Date    Asthma (HHS-HCC)     Atrial fibrillation (Multi)     Resistent to treatment: s/p DCCVs and ablations    Central sleep apnea     Per PSG 11/20/18; severe central sleep apnea, .6, mild obstructive component    Cervical myofascial pain syndrome     Baclofen    Chronic anemia     CKD stage 3a, GFR 45-59 ml/min (Multi)     COPD (chronic obstructive pulmonary disease) (Multi)     DVT of axillary vein, acute right (Multi) 01/20/2018    When PICC  line was removed. U/S + Partial nonocclusive DVT in the axillary and proximal basilic vein in 2018    H/O being hospitalized 02/2024 Feb 24-Mar 5, 2024:  (2/24) admitted for dyspnea and chest pain work-up and found to have pleural effusions.  Found to have acute blood loss anemia s/p 2u PRBC. EGD neg for active bleed.  Cardiology consulted - plan for Watchman device WING closure as out pt.    History of Helicobacter pylori infection     3/2013, 1/2021    Hx of syncope     Recurrent Syncope d/t VT- s/p ILR (12/2012) implanted.  (July 2013) Episode of syncope that appears to be correlated with an 11 second round of ventricular tachycardia recorded by the loop recorder.  S/p EPS (June 2014) neg for inducible arrhythmias. (ILR removed when ICD implanted)    ICD (implantable cardioverter-defibrillator) in place     Placed 06/12/2014    Mitral valve regurgitation     Mild-Moderate per Echo 02/25/2024    Moderate aortic stenosis by prior echocardiogram     Echo 2/25/2024    Osteopenia 06/26/2023    Personal history of anaphylaxis     See allergy list    Psoriasis     PUD (peptic ulcer disease)     H/O nonbleeding gastric ulcers with small hiatal hernia on 1/6/2021 EGD    Pulmonary hypertension (Multi)     Mild - Moderate per Echo 2/25/2024    Radiculopathy, lumbar region 08/13/2018    Acute lumbar radiculopathy    Restless legs syndrome 11/17/2015    Restless legs syndrome    Rheumatoid arthritis (Multi)     Seasonal allergic rhinitis due to pollen     Small bowel obstruction (Multi) 06/26/2023    H/O due to Severe Adhesions s/p Adhesiolysis x2 in 2015 and 2020    Wide-complex tachycardia     Per cardiology 3/7/2024: VT is on a basis of triggered activity certainly made worse with anemia and prednisone. Since she is asymptomatic we will continue to observe.      Social History     Socioeconomic History    Marital status:    Tobacco Use    Smoking status: Never    Smokeless tobacco: Never   Vaping Use    Vaping  status: Never Used   Substance and Sexual Activity    Alcohol use: Never    Drug use: Never     Social Determinants of Health     Financial Resource Strain: Low Risk  (7/26/2024)    Overall Financial Resource Strain (CARDIA)     Difficulty of Paying Living Expenses: Not hard at all   Transportation Needs: No Transportation Needs (7/26/2024)    PRAPARE - Transportation     Lack of Transportation (Medical): No     Lack of Transportation (Non-Medical): No   Housing Stability: Low Risk  (7/26/2024)    Housing Stability Vital Sign     Unable to Pay for Housing in the Last Year: No     Number of Times Moved in the Last Year: 0     Homeless in the Last Year: No     Current Outpatient Medications   Medication Instructions    acetaminophen (TYLENOL) 325 mg, oral, Every 6 hours PRN    albuterol 2.5 mg, nebulization, Every 4 hours PRN    baclofen (Lioresal) 10 mg tablet One tablet in the morning and 2 tablets in the evening.    bumetanide (BUMEX) 2 mg, oral, 2 times daily    cholecalciferol (VITAMIN D3) 1,000 Units, oral, Daily    clobetasol (Temovate) 0.05 % cream Topical, 2 times daily    clopidogrel (PLAVIX) 75 mg, oral, Daily    diclofenac sodium (VOLTAREN) 4 g, Topical, 2 times daily PRN    digoxin (LANOXIN) 125 mcg, oral, Daily    dilTIAZem LA (CARDIZEM LA) 120 mg, oral, Daily    EPINEPHrine (EPIPEN) 0.3 mg, intramuscular, Once as needed    famotidine (PEPCID) 40 mg, oral, Daily    hydroxychloroquine (PLAQUENIL) 300 mg, oral, Daily    montelukast (SINGULAIR) 10 mg, oral, Nightly    naloxone (NARCAN) 4 mg, nasal, As needed, May repeat every 2-3 minutes if needed, alternating nostrils, until medical assistance becomes available.    nitroglycerin (NITROSTAT) 0.4 mg, sublingual, Every 5 min PRN    ondansetron ODT (ZOFRAN-ODT) 8 mg, oral, Daily PRN    oxyCODONE-acetaminophen (Percocet) 5-325 mg tablet 1 tablet, oral, 3 times daily PRN    [START ON 8/31/2024] oxyCODONE-acetaminophen (Percocet) 5-325 mg tablet 1 tablet, oral, 3  times daily PRN    [START ON 9/28/2024] oxyCODONE-acetaminophen (Percocet) 5-325 mg tablet 1 tablet, oral, 3 times daily PRN    pantoprazole (PROTONIX) 40 mg, oral, 2 times daily    polyethylene glycol (MIRALAX) 17 g, oral, Daily    rosuvastatin (CRESTOR) 20 mg, oral, Daily    saccharomyces boulardii (FLORASTOR) 250 mg, oral, 2 times daily    sennosides-docusate sodium (Lily-Colace) 8.6-50 mg tablet 1 tablet, oral, Daily    sucralfate (CARAFATE) 1 g, oral, 4 times daily PRN    sulfaSALAzine (AZULFIDINE) 500 mg, oral, 2 times daily     Allergies   Allergen Reactions    Abatacept Shortness of breath     pulmonary edema, diarrhea, nausea    Hydrocodone-Acetaminophen Anaphylaxis    Hydromorphone Anaphylaxis    Metoprolol Anaphylaxis    Penicillins Hives    Pregabalin Shortness of breath     caused pulmonary edema    Prochlorperazine Itching     paralysis of the mouth with drooping    Methotrexate Hives and Itching     chest pain    Aripiprazole Rash    Beta-Blockers (Beta-Adrenergic Blocking Agts) Other, Palpitations and Wheezing    Bupropion Nausea/vomiting    Cefepime Itching    Ciprofloxacin Hives    Furosemide Itching    Levofloxacin Itching    Pineapple Rash             ED Triage Vitals [08/08/24 0141]   Temperature Heart Rate Respirations BP   36.8 °C (98.2 °F) 76 (!) 24 143/64      Pulse Ox Temp src Heart Rate Source Patient Position   100 % -- -- --      BP Location FiO2 (%)     -- --               Labs and Imaging  CT angio chest for pulmonary embolism   Final Result   Cardiomegaly/CHF.        No significant pulmonary effusion.        MACRO:   None.        Signed by: Campbell Pearson 8/8/2024 3:29 AM   Dictation workstation:   [id number]      XR chest 1 view   Final Result   Stable small right pleural effusion. Mild bibasilar atelectasis.        MACRO:   None        Signed by: Ivette Guillory 8/8/2024 2:20 AM   Dictation workstation:   [id number]        Labs Reviewed   BLOOD GAS VENOUS - Abnormal       Result  Value    POCT pH, Venous 7.40      POCT pCO2, Venous 45      POCT pO2, Venous 31 (*)     POCT SO2, Venous 46      POCT Oxy Hemoglobin, Venous 45.4      POCT Base Excess, Venous 2.5      POCT HCO3 Calculated, Venous 27.9 (*)     Patient Temperature        FiO2 100     CBC WITH AUTO DIFFERENTIAL - Abnormal    WBC 6.7      nRBC 0.0      RBC 3.35 (*)     Hemoglobin 7.7 (*)     Hematocrit 25.6 (*)     MCV 76 (*)     MCH 23.0 (*)     MCHC 30.1 (*)     RDW 17.6 (*)     Platelets 246      Neutrophils % 60.2      Immature Granulocytes %, Automated 0.6      Lymphocytes % 25.3      Monocytes % 10.8      Eosinophils % 2.7      Basophils % 0.4      Neutrophils Absolute 4.01      Immature Granulocytes Absolute, Automated 0.04      Lymphocytes Absolute 1.69      Monocytes Absolute 0.72      Eosinophils Absolute 0.18      Basophils Absolute 0.03     BASIC METABOLIC PANEL - Abnormal    Glucose 90      Sodium 139      Potassium 4.0      Chloride 104      Bicarbonate 25      Anion Gap 14      Urea Nitrogen 29 (*)     Creatinine 1.11 (*)     eGFR 53 (*)     Calcium 8.8     B-TYPE NATRIURETIC PEPTIDE - Abnormal     (*)     Narrative:        <100 pg/mL - Heart failure unlikely  100-299 pg/mL - Intermediate probability of acute heart                  failure exacerbation. Correlate with clinical                  context and patient history.    >=300 pg/mL - Heart Failure likely. Correlate with clinical                  context and patient history.    BNP testing is performed using different testing methodology at [facility] than at other [facility]. Direct result comparisons should only be made within the same method.      D-DIMER, NON VTE - Abnormal    D-Dimer Non VTE, Quant (ng/mL FEU) 1,103 (*)     Narrative:     The D-Dimer assay is reported in ng/mL Fibrinogen Equivalent Units (FEU). The results of this assay should NOT be used for the exclusion of Deep Vein Thrombosis and/or Pulmonary Embolism.   SERIAL  TROPONIN-INITIAL - Abnormal    Troponin I, High Sensitivity 35 (*)     Narrative:     Less than 99th percentile of normal range cutoff-  Female and children under 18 years old <14 ng/L; Male <21 ng/L: Negative  Repeat testing should be performed if clinically indicated.     Female and children under 18 years old 14-50 ng/L; Male 21-50 ng/L:  Consistent with possible cardiac damage and possible increased clinical   risk. Serial measurements may help to assess extent of myocardial damage.     >50 ng/L: Consistent with cardiac damage, increased clinical risk and  myocardial infarction. Serial measurements may help assess extent of   myocardial damage.      NOTE: Children less than 1 year old may have higher baseline troponin   levels and results should be interpreted in conjunction with the overall   clinical context.     NOTE: Troponin I testing is performed using a different   testing methodology at [facility] than at other   [facility]. Direct result comparisons should only   be made within the same method.   SERIAL TROPONIN, 1 HOUR - Abnormal    Troponin I, High Sensitivity 27 (*)     Narrative:     Less than 99th percentile of normal range cutoff-  Female and children under 18 years old <14 ng/L; Male <21 ng/L: Negative  Repeat testing should be performed if clinically indicated.     Female and children under 18 years old 14-50 ng/L; Male 21-50 ng/L:  Consistent with possible cardiac damage and possible increased clinical   risk. Serial measurements may help to assess extent of myocardial damage.     >50 ng/L: Consistent with cardiac damage, increased clinical risk and  myocardial infarction. Serial measurements may help assess extent of   myocardial damage.      NOTE: Children less than 1 year old may have higher baseline troponin   levels and results should be interpreted in conjunction with the overall   clinical context.     NOTE: Troponin I testing is performed using a different   testing  methodology at [facility] than at [facility]. Direct result comparisons should only   be made within the same method.   INFLUENZA A AND B PCR - Normal    Flu A Result Not Detected      Flu B Result Not Detected      Narrative:     This assay is an in vitro diagnostic multiplex nucleic acid amplification test for the detection and discrimination of Influenza A & B from nasopharyngeal specimens, and has been validated for use at [facility]. Negative results do not preclude Influenza A/B infections, and should not be used as the sole basis for diagnosis, treatment, or other management decisions. If Influenza A/B and RSV PCR results are negative, testing for Parainfluenza virus, Adenovirus and Metapneumovirus is routinely performed for [facility] pediatric oncology and intensive care inpatients, and is available on other patients by placing an add-on request.   SARS-COV-2 PCR - Normal    Coronavirus 2019, PCR Not Detected      Narrative:     This assay has received FDA Emergency Use Authorization (EUA) and is only authorized for the duration of time that circumstances exist to justify the authorization of the emergency use of in vitro diagnostic tests for the detection of SARS-CoV-2 virus and/or diagnosis of COVID-19 infection under section 564(b)(1) of the Act, 21 U.S.C. 360bbb-3(b)(1). This assay is an in vitro diagnostic nucleic acid amplification test for the qualitative detection of SARS-CoV-2 from nasopharyngeal specimens and has been validated for use at [facility]. Negative results do not preclude COVID-19 infections and should not be used as the sole basis for diagnosis, treatment, or other management decisions.     MAGNESIUM - Normal    Magnesium 2.31     TROPONIN SERIES- (INITIAL, 1 HR)    Narrative:     The following orders were created for panel order Troponin I Series, High Sensitivity (0, 1 HR).  Procedure                                Abnormality         Status                     ---------                               -----------         ------                     Troponin I, High Sensiti...[[id number]]  Abnormal            Final result               Troponin, High Sensitivi...[[id number]]  Abnormal            Final result                 Please view results for these tests on the individual orders.           Procedure  Critical Care    Performed by: Fredy Lopes DO  Authorized by: Fredy Lopes DO    Critical care provider statement:     Critical care time (minutes):  37    Critical care time was exclusive of:  Separately billable procedures and treating other patients    Critical care was necessary to treat or prevent imminent or life-threatening deterioration of the following conditions:  Respiratory failure    Critical care was time spent personally by me on the following activities:  Ordering and performing treatments and interventions, ordering and review of laboratory studies, ordering and review of radiographic studies, pulse oximetry, re-evaluation of patient's condition, review of old charts, examination of patient, evaluation of patient's response to treatment, obtaining history from patient or surrogate and development of treatment plan with patient or surrogate    Care discussed with: admitting provider                      Fredy Lopes DO  08/08/24 [id number]

## 2024-08-08 NOTE — PROGRESS NOTES
"Trina Elias is a 73 y.o. female on day 1 of admission presenting with Shortness of breath.     Subjective   Patient reports she is feeling better but is still experiencing increased respiratory effort. She reports that she feels better when the bed is up. She is currently on 4L NC and and is reporting SOB while in bed. Patient denies any CP, palpitations, chills, vomiting, and diarrhea.     Objective     Physical Exam  Constitutional:       General: She is in acute distress.      Appearance: She is ill-appearing.   Cardiovascular:      Pulses: Normal pulses.      Heart sounds: Normal heart sounds.      Comments: No JVD present  Pulmonary:      Effort: Respiratory distress present.      Breath sounds: No stridor. No wheezing, rhonchi or rales.      Comments: Currently on 4L NC  Chest:      Chest wall: No tenderness.   Musculoskeletal:      Right lower leg: No edema.      Left lower leg: No edema.   Neurological:      General: No focal deficit present.      Mental Status: She is alert and oriented to person, place, and time.       Last Recorded Vitals  Blood pressure (!) 111/49, pulse 75, temperature 36.2 °C (97.2 °F), temperature source Temporal, resp. rate 20, height 1.626 m (5' 4.02\"), weight 79 kg (174 lb 2.6 oz), SpO2 100%.    Intake/Output last 3 Shifts:  Intake: 120 mL (120 mL/79 kg = 1.5 mL/kg)  Output: 600 mL      Assessment/Plan   Trina Elias, a 74 y/o F with PMHx of hypertension, hyperlipidemia, HFmrEF/systolic (EF45-50% in July 2024) and diastolic HF, paroxysmal A-fib status post PCM/ICD and watchman procedure, central sleep apnea on BiPAP at night, COPD/asthma not on nasal cannula oxygen at home, chronic anemia/iron deficiency anemia, CKD, multiple abdominal surgeries, and RA presented to the ED with dyspnea on exertion.     Acute Medical Issues:  #Acute hypoxic respiratory failure 2/2 COPD exacerbation and volume overload ISO acute decompensated HF-Hx of HFmrEF/systolic (EF45-50% in July 2024) and " "diastolic HF  #Hx Asthma/COPD   #Central sleep apnea on BIPAP at night   - S/p 2mg IV bumex in the ED and placed on BIPAP (ED reports her saturation was in the \"high 80s\" upon admission)  - Cardiology ordered ECG 12 lead and are waiting on results.   - Will follow Cardiology's recommendation about possibly resuming her home Bumex 2 mg twice daily.   - Continue prednisone 40mg daily and albuterol nebs TID. Continue home montelukast  - Continue to monitor Is & Os  - Wean O2 as tolerated     #Troponemia   -Denies CP, likely demand ischemia   -Troponin trended down; 35-27  - Continue to monitor troponin levels   -ECG showed paced rhythm no acute ST elevation     Chronic Medical Issues:   #HTN: Continue home Cardizem and      #HLD: Continue home statin     #CKD: Cr on admission 1.1 (baseline 0.9-1~) Strict Is Os, Monitor kidney function and avoid nephrotoxic meds. Received contrast in the ED.      #Chronic anemia/GRACIE: Hgb on admission 7.7 (baseline 8-9) cannot tolerate PO iron supplement; Considering IV iron administration while inpatient.      #Nausea/GERD: continue Zofran PRN, famotidine, and PPI     #Chronic pain: Continue home percocet TID PRN      #A-fib status post PCM/ICD and watchman procedure: Continue home Digoxin (ordered level), Cardizem and Plavix. Not on DOAC given watchman     #RA: continue home Hydroxychloroquine and Sulfasalazine. Voltaren gel PRN       #Constipation: continue home miralax and bisacodyl PRN         Fluids: PO  Electrolytes: replete as needed  Nutrition: Cardiac   GI Prophylaxis: PPI   DVT Prophylaxis: Lovenox subcutaneous      Dispo: Patient admitted for the acute medical condition above. ELOS<48hours.      Javier Cardenas, MS3   8/8/24 at 10:27 AM.       "

## 2024-08-08 NOTE — CONSULTS
Inpatient consult to Cardiology  Consult performed by: Amelia Kiser, NAILA-CNP  Consult ordered by: Fredy Lopes DO  Reason for consult: CHF          History Of Present Illness  Trina Elias is a 73 y.o. female presenting with complaints of shortness of breath and lower extremity edema. She saw Dr. Soto for follow up on July 30th and he decreased her Bumex to daily instead of BID due to low BP readings and dizziness at home. She states since then, she started getting more short of breath and noticed her legs were swelling. She denies any chest pain. Has ongoing nausea.     Lab work in the ER showed glucose 90, sodium 139, potassium 4.0, BUN/Cr 29/1.11, , troponin 35, d dimer 1103, WBC 6.7, H&H 7.7/25.6, CXR showed stable small right pleural effusion, digoxin level 0.77, CTA showed cardiomegaly and CHF.    EKG showed atrial paced with PVCs.    She was given a dose of Bumex IV and admitted for further care.       Past Medical History  Past Medical History:   Diagnosis Date    Asthma ([facility])     Atrial fibrillation (Multi)     Resistent to treatment: s/p DCCVs and ablations    Central sleep apnea     Per PSG 11/20/18; severe central sleep apnea, .6, mild obstructive component    Cervical myofascial pain syndrome     Baclofen    Chronic anemia     CKD stage 3a, GFR 45-59 ml/min (Multi)     COPD (chronic obstructive pulmonary disease) (Multi)     DVT of axillary vein, acute right (Multi) 01/20/2018    When PICC line was removed. U/S + Partial nonocclusive DVT in the axillary and proximal basilic vein in 2018    H/O being hospitalized 02/2024 Feb 24-Mar 5, 2024:  (2/24) admitted for dyspnea and chest pain work-up and found to have pleural effusions.  Found to have acute blood loss anemia s/p 2u PRBC. EGD neg for active bleed.  Cardiology consulted - plan for Watchman device WING closure as out pt.    History of Helicobacter pylori infection     3/2013, 1/2021    Hx of syncope     Recurrent Syncope  d/t VT- s/p ILR (12/2012) implanted.  (July 2013) Episode of syncope that appears to be correlated with an 11 second round of ventricular tachycardia recorded by the loop recorder.  S/p EPS (June 2014) neg for inducible arrhythmias. (ILR removed when ICD implanted)    ICD (implantable cardioverter-defibrillator) in place     Placed 06/12/2014    Mitral valve regurgitation     Mild-Moderate per Echo 02/25/2024    Moderate aortic stenosis by prior echocardiogram     Echo 2/25/2024    Osteopenia 06/26/2023    Personal history of anaphylaxis     See allergy list    Psoriasis     PUD (peptic ulcer disease)     H/O nonbleeding gastric ulcers with small hiatal hernia on 1/6/2021 EGD    Pulmonary hypertension (Multi)     Mild - Moderate per Echo 2/25/2024    Radiculopathy, lumbar region 08/13/2018    Acute lumbar radiculopathy    Restless legs syndrome 11/17/2015    Restless legs syndrome    Rheumatoid arthritis (Multi)     Seasonal allergic rhinitis due to pollen     Small bowel obstruction (Multi) 06/26/2023    H/O due to Severe Adhesions s/p Adhesiolysis x2 in 2015 and 2020    Wide-complex tachycardia     Per cardiology 3/7/2024: VT is on a basis of triggered activity certainly made worse with anemia and prednisone. Since she is asymptomatic we will continue to observe.       Surgical History  Past Surgical History:   Procedure Laterality Date    ABDOMINAL ADHESION SURGERY  10/22/2015    Exploratory laparotomy. Extensive lysis of adhesions. Small-bowel repair.    ABDOMINAL ADHESION SURGERY  10/18/2020    Exploratory laparotomy, massive lysis of adhesions, small-bowel resection, decompression of small bowel and removal of mesh.    ABLATION OF DYSRHYTHMIC FOCUS  05/02/2022 09/17/2018, 05/02/2022 for A FIB    ACHILLES TENDON SURGERY Right     ANTERIOR CERVICAL DISCECTOMY W/ FUSION  03/21/2016    Anterior C5 and C6 discectomies and interbody fusion of C5-C6, C6-C7    APPENDECTOMY      Open surgery    BREAST BIOPSY Left  2022    CARDIAC ASSIST DEVICE INSERTION  2012    Loop recorder placement , Removed in  w/ ICD placed    CARDIAC CATHETERIZATION  2022    Normal coronary arteries. Tachycardia induced Cardiomyopathy.    CARDIAC CATHETERIZATION N/A 2024    Procedure: LAAO (Left Atrial Appendage Occlusion);  Surgeon: Reid Dickinson MD;  Location: [place] [address] Cardiac Cath Lab;  Service: Cardiovascular;  Laterality: N/A;  Same day CT at 0815    CARDIAC DEFIBRILLATOR PLACEMENT  2014    Dual chamber ICD    CARDIOVERSION  2018, 2018    CARPAL TUNNEL RELEASE Bilateral      SECTION, CLASSIC      x2    CHOLECYSTECTOMY      Open surgery    COLONOSCOPY  2024    Extensive diverticulosis of moderate severity and causing mild luminal narrowing in the sigmoid colon    FINGER SURGERY Left 2008    Left index finger metacarpophalangeal fusion    FOOT SURGERY Bilateral     B/L Tarsal Tunnel Release    IR INJECTION EPIDURAL STEROID  2011    Lumbar spine; , ,     IR INJECTION EPIDURAL STEROID      Cervical spine 2007    LEG SURGERY Right 2006    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  Plantar wart removal R foot.    NERVE SURGERY Right 10/13/2010    Release of Right tarsal tunnel syndrome w/ severe adhesion scar tissue    NISSEN FUNDOPLICATION      Open surgery    PICC LINE INSERTION WO IMAGING  10/24/2015    Placed for stable IV access    REVERSE TOTAL SHOULDER ARTHROPLASTY Bilateral 2024    Right 2023, Left 2024    ROTATOR CUFF REPAIR      ULNAR NERVE TRANSPOSITION Left     VENTRAL HERNIA REPAIR  06/15/2015    Laparoscopic double ventral hernia repair.    WISDOM TOOTH EXTRACTION      WOUND DEBRIDEMENT  2020    Excisional debridement of abdominal wound for wound dehiscence        Social History  She reports that she has never smoked. She has never used smokeless tobacco. She reports that  she does not drink alcohol and does not use drugs.    Family History  Family History   Problem Relation Name Age of Onset    Asthma Daughter      Asthma Other      Colon cancer Other      Diabetes Other      Other (stroke syndrome) Other          Allergies  Abatacept, Hydrocodone-acetaminophen, Hydromorphone, Metoprolol, Penicillins, Pregabalin, Prochlorperazine, Methotrexate, Aripiprazole, Beta-blockers (beta-adrenergic blocking agts), Bupropion, Cefepime, Ciprofloxacin, Furosemide, Levofloxacin, and Pineapple    Review of Systems  Review of Systems   Constitutional:  Negative for chills and fever.   Respiratory:  Positive for shortness of breath. Negative for cough.    Cardiovascular:  Positive for leg swelling. Negative for chest pain and palpitations.   Gastrointestinal:  Positive for nausea. Negative for abdominal distention and abdominal pain.   Neurological:  Negative for dizziness and weakness.   All other systems reviewed and are negative.         Physical Exam  Constitutional: Well developed, awake/alert/oriented x3, no distress, alert and cooperative  Eyes: PERRL, EOMI, clear sclera  ENMT: mucous membranes moist, no apparent injury, no lesions seen  Head/Neck: Neck supple, no apparent injury, thyroid without mass or tenderness, positive JVD, trachea midline, no bruits  Respiratory/Thorax: Patent airways, rales throughout with good chest expansion, thorax symmetric  Cardiovascular: Regular, rate and rhythm, no murmurs, 2+ equal pulses of the extremities, normal S 1and S 2  Gastrointestinal: Nondistended, soft, non-tender, no rebound tenderness or guarding, no masses palpable, no organomegaly, +BS, no bruits  Musculoskeletal: ROM intact, no joint swelling, normal strength  Extremities: normal extremities, no cyanosis edema, contusions or wounds, no clubbing  Neurological: alert and oriented x3, intact senses, motor, response and reflexes, normal strength  Lymphatic: No significant  "lymphadenopathy  Psychological: Appropriate mood and behavior  Skin: Warm and dry, no lesions, no rashes       Last Recorded Vitals  Blood pressure 120/53, pulse 69, temperature 36.4 °C (97.6 °F), resp. rate 21, height 1.626 m (5' 4.02\"), weight 79 kg (174 lb 2.6 oz), SpO2 100%.    Relevant Results    Scheduled medications  albuterol, 2.5 mg, nebulization, TID  bumetanide, 1 mg, intravenous, Once  bumetanide, 2 mg, oral, Daily  clopidogrel, 75 mg, oral, Daily  digoxin, 125 mcg, oral, Daily  dilTIAZem CD, 120 mg, oral, Nightly  enoxaparin, 40 mg, subcutaneous, q24h  famotidine, 40 mg, oral, Daily  hydroxychloroquine, 300 mg, oral, Daily  montelukast, 10 mg, oral, Nightly  pantoprazole, 40 mg, oral, BID  polyethylene glycol, 17 g, oral, Daily  [START ON 8/9/2024] predniSONE, 40 mg, oral, Daily  rosuvastatin, 20 mg, oral, Daily  sulfaSALAzine, 500 mg, oral, BID      Continuous medications     PRN medications  PRN medications: albuterol, bisacodyl, clobetasol, diclofenac sodium, naloxone, nitroglycerin, ondansetron, oxyCODONE-acetaminophen, oxygen, oxygen, sucralfate    Results for orders placed or performed during the hospital encounter of 08/08/24 (from the past 24 hour(s))   Blood Gas Venous   Result Value Ref Range    POCT pH, Venous 7.40 7.33 - 7.43 pH    POCT pCO2, Venous 45 41 - 51 mm Hg    POCT pO2, Venous 31 (L) 35 - 45 mm Hg    POCT SO2, Venous 46 45 - 75 %    POCT Oxy Hemoglobin, Venous 45.4 45.0 - 75.0 %    POCT Base Excess, Venous 2.5 -2.0 - 3.0 mmol/L    POCT HCO3 Calculated, Venous 27.9 (H) 22.0 - 26.0 mmol/L    Patient Temperature      FiO2 100 %   CBC and Auto Differential   Result Value Ref Range    WBC 6.7 4.4 - 11.3 x10*3/uL    nRBC 0.0 0.0 - 0.0 /100 WBCs    RBC 3.35 (L) 4.00 - 5.20 x10*6/uL    Hemoglobin 7.7 (L) 12.0 - 16.0 g/dL    Hematocrit 25.6 (L) 36.0 - 46.0 %    MCV 76 (L) 80 - 100 fL    MCH 23.0 (L) 26.0 - 34.0 pg    MCHC 30.1 (L) 32.0 - 36.0 g/dL    RDW 17.6 (H) 11.5 - 14.5 %    Platelets " 246 150 - 450 x10*3/uL    Neutrophils % 60.2 40.0 - 80.0 %    Immature Granulocytes %, Automated 0.6 0.0 - 0.9 %    Lymphocytes % 25.3 13.0 - 44.0 %    Monocytes % 10.8 2.0 - 10.0 %    Eosinophils % 2.7 0.0 - 6.0 %    Basophils % 0.4 0.0 - 2.0 %    Neutrophils Absolute 4.01 1.60 - 5.50 x10*3/uL    Immature Granulocytes Absolute, Automated 0.04 0.00 - 0.50 x10*3/uL    Lymphocytes Absolute 1.69 0.80 - 3.00 x10*3/uL    Monocytes Absolute 0.72 0.05 - 0.80 x10*3/uL    Eosinophils Absolute 0.18 0.00 - 0.40 x10*3/uL    Basophils Absolute 0.03 0.00 - 0.10 x10*3/uL   Basic Metabolic Panel   Result Value Ref Range    Glucose 90 74 - 99 mg/dL    Sodium 139 136 - 145 mmol/L    Potassium 4.0 3.5 - 5.3 mmol/L    Chloride 104 98 - 107 mmol/L    Bicarbonate 25 21 - 32 mmol/L    Anion Gap 14 10 - 20 mmol/L    Urea Nitrogen 29 (H) 6 - 23 mg/dL    Creatinine 1.11 (H) 0.50 - 1.05 mg/dL    eGFR 53 (L) >60 mL/min/1.73m*2    Calcium 8.8 8.6 - 10.3 mg/dL   B-Type Natriuretic Peptide   Result Value Ref Range     (H) 0 - 99 pg/mL   Magnesium   Result Value Ref Range    Magnesium 2.31 1.60 - 2.40 mg/dL   D-Dimer, Non VTE   Result Value Ref Range    D-Dimer Non VTE, Quant (ng/mL FEU) 1,103 (H) <=500 ng/mL FEU   Troponin I, High Sensitivity, Initial   Result Value Ref Range    Troponin I, High Sensitivity 35 (H) 0 - 13 ng/L   Influenza A, and B PCR   Result Value Ref Range    Flu A Result Not Detected Not Detected    Flu B Result Not Detected Not Detected   Sars-CoV-2 PCR   Result Value Ref Range    Coronavirus 2019, PCR Not Detected Not Detected   ECG 12 Lead   Result Value Ref Range    Ventricular Rate 76 BPM    Atrial Rate 59 BPM    OK Interval 224 ms    QRS Duration 82 ms    QT Interval 374 ms    QTC Calculation(Bazett) 420 ms    R Axis 38 degrees    T Axis -53 degrees    QRS Count 12 beats    Q Onset 222 ms    T Offset 409 ms    QTC Fredericia 404 ms   Troponin, High Sensitivity, 1 Hour   Result Value Ref Range    Troponin I, High  Sensitivity 27 (H) 0 - 13 ng/L   Digoxin   Result Value Ref Range    Digoxin  0.77 (L) 0.80 - <2.00 ng/mL   Iron and TIBC   Result Value Ref Range    Iron 21 (L) 35 - 150 ug/dL    UIBC 343 110 - 370 ug/dL    TIBC 364 240 - 445 ug/dL    % Saturation 6 (L) 25 - 45 %   POCT GLUCOSE   Result Value Ref Range    POCT Glucose 168 (H) 74 - 99 mg/dL   POCT GLUCOSE   Result Value Ref Range    POCT Glucose 151 (H) 74 - 99 mg/dL       CT angio chest for pulmonary embolism   Final Result   Cardiomegaly/CHF.        No significant pulmonary effusion.        MACRO:   None.        Signed by: Campbell Pearson 8/8/2024 3:29 AM   Dictation workstation:   [id number]      XR chest 1 view   Final Result   Stable small right pleural effusion. Mild bibasilar atelectasis.        MACRO:   None        Signed by: Ivette Guillory 8/8/2024 2:20 AM   Dictation workstation:   [id number]          Transthoracic Echo (TTE) Complete    Result Date: 7/25/2024   [facility], [address] [place] [address]               Tel [phone] and Fax [phone] TRANSTHORACIC ECHOCARDIOGRAM REPORT  Patient Name:      VERNON SALDANA VIA         Reading Physician:    [id number] Neo Gutierrez MD Study Date:        7/25/2024            Ordering Provider:    [id number] STEPHON YARBROUGH MRN/PID:           [id number]             Fellow: Accession#:        [id number]         Nurse: Date of Birth/Age: 1950 / 73 years Sonographer:          Viola Lau RDCS Gender:            F                    Additional Staff: Height:            162.56 cm            Admit Date:           7/24/2024 Weight:            72.12 kg             Admission Status:     Inpatient -                                                               Routine BSA / BMI:         1.77 m2 / 27.29       Encounter#:           [id number]                    kg/m2 Blood Pressure:    108/69 mmHg          Department Location:  [facility] Non                                                               Invasive Study Type:    TRANSTHORACIC ECHO (TTE) COMPLETE Diagnosis/ICD: Chest pain, unspecified-R07.9 Indication:    CP CPT Code:      Echo Complete w Full Doppler-72751 Patient History: Pertinent History: A-Fib, CAD, Hyperlipidemia, COPD, Chest Pain and S/P Left                    shoulder surgery,Watchman, CKD. Study Detail: The following Echo studies were performed: 2D, M-Mode, Doppler and               color flow. Technically challenging study due to patient lying in               supine position and S/P left shoulder surgery. Patient has a               defibrillator.  PHYSICIAN INTERPRETATION: Left Ventricle: The left ventricular systolic function is mildly decreased, with a visually estimated ejection fraction of 45-50%. There are no regional wall motion abnormalities. The left ventricular cavity size is normal. There is mild concentric left ventricular hypertrophy. Spectral Doppler shows a pseudonormal pattern of left ventricular diastolic filling. Left Atrium: The left atrium is normal in size. Right Ventricle: The right ventricle is normal in size. There is normal right ventricular global systolic function. A device is visualized in the right ventricle. Right Atrium: The right atrium is normal in size. Aortic Valve: The aortic valve is trileaflet. There is mild aortic valve cusp calcification. There is evidence of mildly elevated transaortic gradients consistent with sclerosis of the aortic valve. The aortic valve dimensionless index is 0.71. There is moderate aortic valve regurgitation. The peak instantaneous gradient of the aortic valve is 14.6 mmHg. The mean gradient of the aortic valve is 8.0 mmHg. Mitral Valve: The mitral valve is normal in structure. There is moderate mitral annular calcification. There  is mild mitral valve regurgitation. Tricuspid Valve: The tricuspid valve is structurally normal. There is mild tricuspid regurgitation. Pulmonic Valve: The pulmonic valve is structurally normal. There is physiologic pulmonic valve regurgitation. Pericardium: There is no pericardial effusion noted. Aorta: The aortic root is normal. Pulmonary Artery: The tricuspid regurgitant velocity is 2.43 m/s, and with an estimated right atrial pressure of 3 mmHg, the estimated pulmonary artery pressure is normal with the RVSP at 26.6 mmHg. Pulmonary Veins: The pulmonary veins appear normal and return normally to the left atrium. Systemic Veins: The inferior vena cava appears to be of normal size. There is IVC inspiratory collapse greater than 50%.  CONCLUSIONS:  1. The left ventricular systolic function is mildly decreased, with a visually estimated ejection fraction of 45-50%.  2. Spectral Doppler shows a pseudonormal pattern of left ventricular diastolic filling.  3. There is normal right ventricular global systolic function.  4. There is moderate mitral annular calcification.  5. Aortic valve sclerosis.  6. Moderate aortic valve regurgitation.  7. Normal estimated PASP and CVP. QUANTITATIVE DATA SUMMARY: 2D MEASUREMENTS:                           Normal Ranges: IVSd:          0.97 cm    (0.6-1.1cm) LVPWd:         1.16 cm    (0.6-1.1cm) LVIDd:         5.27 cm    (3.9-5.9cm) LVIDs:         4.48 cm LV Mass Index: 122.0 g/m2 LV % FS        15.0 % LA VOLUME:                               Normal Ranges: LA Vol A4C:        46.9 ml    (22+/-6mL/m2) LA Vol A2C:        46.7 ml LA Vol BP:         46.9 ml LA Vol Index A4C:  26.5ml/m2 LA Vol Index A2C:  26.3 ml/m2 LA Vol Index BP:   26.4 ml/m2 LA Area A4C:       17.6 cm2 LA Area A2C:       17.5 cm2 LA Major Axis A4C: 5.6 cm LA Major Axis A2C: 5.6 cm LA Volume Index:   24.7 ml/m2 LA Vol A4C:        42.8 ml LA Vol A2C:        43.8 ml RA VOLUME BY A/L METHOD:                       Normal  Ranges: RA Area A4C: 23.0 cm2 AORTA MEASUREMENTS:                    Normal Ranges: Asc Ao, d: 3.70 cm (2.1-3.4cm) LV SYSTOLIC FUNCTION BY 2D PLANIMETRY (MOD):                      Normal Ranges: EF-A4C View:    43 % (>=55%) EF-A2C View:    42 % EF-Biplane:     44 % EF-Visual:      48 % LV EF Reported: 48 % LV DIASTOLIC FUNCTION:                     Normal Ranges: MV Peak E: 1.46 m/s (0.7-1.2 m/s) MITRAL VALVE:                      Normal Ranges: MV Vmax:    1.31 m/s (<=1.3m/s) MV peak P.9 mmHg (<5mmHg) MV mean PG: 3.0 mmHg (<2mmHg) MV DT:      197 msec (150-240msec) MITRAL INSUFFICIENCY:                           Normal Ranges: PISA Radius:  0.3 cm MR VTI:       178.00 cm MR Vmax:      496.00 cm/s MR Alias Ham: 35.1 cm/s MR Volume:    7.12 ml MR Flow Rt:   19.85 ml/s MR EROA:      0.04 cm2 AORTIC VALVE:                                    Normal Ranges: AoV Vmax:                1.91 m/s  (<=1.7m/s) AoV Peak P.6 mmHg (<20mmHg) AoV Mean P.0 mmHg  (1.7-11.5mmHg) LVOT Max Ham:            1.33 m/s  (<=1.1m/s) AoV VTI:                 37.70 cm  (18-25cm) LVOT VTI:                26.90 cm LVOT Diameter:           2.00 cm   (1.8-2.4cm) AoV Area, VTI:           2.24 cm2  (2.5-5.5cm2) AoV Area,Vmax:           2.19 cm2  (2.5-4.5cm2) AoV Dimensionless Index: 0.71 AORTIC INSUFFICIENCY: AI Vmax:       4.01 m/s AI Half-time:  441 msec AI Decel Rate: 267.00 cm/s2  RIGHT VENTRICLE: RV Basal 3.67 cm RV Mid   2.60 cm RV Major 8.8 cm TAPSE:   28.0 mm RV s'    0.12 m/s TRICUSPID VALVE/RVSP:                             Normal Ranges: Peak TR Velocity: 2.43 m/s RV Syst Pressure: 26.6 mmHg (< 30mmHg) IVC Diam:         1.58 cm PULMONIC VALVE:                      Normal Ranges: PV Max Ham: 1.5 m/s  (0.6-0.9m/s) PV Max P.6 mmHg  [id number] Neo Gutierrez MD Electronically signed on 2024 at 6:23:17 PM  ** Final **     Transthoracic Echo (TTE) Limited    Result Date: 2024   [facility], [address] [place] [address]                Tel [phone] and Fax [phone] TRANSTHORACIC ECHOCARDIOGRAM REPORT  Patient Name:      VERNON SALDANA VIA         Reading Physician:    [id number] Monica Chen MD Study Date:        7/16/2024            Ordering Provider:    [id number] KRISTAL SNYDER MRN/PID:           [id number]             Fellow: Accession#:        [id number]         Nurse: Date of Birth/Age: 1950 / 73 years Sonographer:          Tobi Carlos RDCS Gender:            F                    Additional Staff: Height:            162.56 cm            Admit Date:           7/16/2024 Weight:            70.31 kg             Admission Status:     Inpatient -                                                               Routine BSA / BMI:         1.76 m2 / 26.61      Encounter#:           [id number]                    kg/m2 Blood Pressure:    /                    Department Location:  [facility]                                                               Cath Lab Study Type:    TRANSTHORACIC ECHO (TTE) LIMITED Diagnosis/ICD: Unspecified atrial fibrillation-I48.91 Indication:    Atrial fibrillation; Preop cardiovascular exam CPT Code:      Echo Limited-[id number] Patient History: Pertinent History: A-fib; CAD; BARBARA; HTN; HLD; CKD; PPM. Study Detail: The following Echo studies were performed: 2D and M-Mode.               Technically challenging study due to body habitus and patient               lying in supine position.  PHYSICIAN INTERPRETATION: Left Ventricle: Left ventricular ejection fraction is low normal, by visual estimate at 50-55%. The patient is in atrial fibrillation which may influence the estimate of left ventricular function and transvalvular flows. Wall motion is abnormal. The left  ventricular cavity size was not assessed. Left ventricular diastolic filling was not assessed. Possible inferior wall motion hypokinesis. Left Atrium: The left atrium was not assessed. Right Ventricle: The right ventricle was not well visualized. There is normal right ventricular global systolic function. A device is visualized in the right ventricle. Right Atrium: The right atrium was not assessed. There is a device visualized in the right atrium. Aortic Valve: The aortic valve is trileaflet. There is mild aortic valve cusp calcification. Aortic valve regurgitation was not assessed. Mitral Valve: The mitral valve is mildly thickened. There is moderate mitral annular calcification. Mitral valve regurgitation was not assessed. Tricuspid Valve: The tricuspid valve was not well visualized. Tricuspid regurgitation was not assessed. Pulmonic Valve: The pulmonic valve is not well visualized. Pulmonic valve regurgitation was not assessed. Pericardium: There is a trivial pericardial effusion. Aorta: The aortic root is normal. Systemic Veins: The inferior vena cava appears to be of normal size. There is IVC inspiratory collapse greater than 50%. In comparison to the previous echocardiogram(s): Compared with the prior exam from 2/27/2024 there are no significant changes though today's exam is only a limited study withno assessment of valvular function.  CONCLUSIONS:  1. Left ventricular ejection fraction is low normal, by visual estimate at 50-55%.  2. Possible inferior wall motion hypokinesis.  3. There is normal right ventricular global systolic function.  4. There is moderate mitral annular calcification.  5. Compared with the prior exam from 2/27/2024 there are no significant changes though today's exam is only a limited study withno assessment of valvular function.  6. The patient is in atrial fibrillation which may influence the estimate of left ventricular function and transvalvular flows. QUANTITATIVE DATA SUMMARY:  AORTA MEASUREMENTS:                      Normal Ranges: Ao Sinus, d: 3.45 cm (2.1-3.5cm) LV SYSTOLIC FUNCTION BY 2D PLANIMETRY (MOD):                      Normal Ranges: EF-Visual:      53 % LV EF Reported: 53 % TRICUSPID VALVE/RVSP:                   Normal Ranges: IVC Diam: 2.03 cm  [id number] Monica Chen MD Electronically signed on 7/16/2024 at 3:11:21 PM  ** Final **     Transthoracic Echo (TTE) Limited    Result Date: 7/16/2024   [facility], [address] [place] [address]                Tel [phone] and Fax [phone] TRANSTHORACIC ECHOCARDIOGRAM REPORT  Patient Name:      VERNON SALDANA VIA         Reading Physician:    [id number] Calvillo MD Study Date:        7/16/2024            Ordering Provider:    [id number] KRISTAL SNYDER MRN/PID:           [id number]             Fellow:               [id number] Lucina Gill MD Accession#:        [id number]         Nurse: Date of Birth/Age: 1950 / 73 years Sonographer:          Tobi Carlos RDCS Gender:            F                    Additional Staff: Height:            162.56 cm            Admit Date:           7/16/2024 Weight:            71.22 kg             Admission Status:     Inpatient -                                                               Routine BSA / BMI:         1.76 m2 / 26.95      Encounter#:           [id number]                    kg/m2 Blood Pressure:    117/55 mmHg          Department Location:  [facility]                                                               Cath Lab Study Type:    TRANSTHORACIC ECHO (TTE) LIMITED Diagnosis/ICD: Other specified postprocedural states-Z98.890 Indication:    Post LAAO CPT Code:      Echo Limited-44045 Patient History: Pertinent  History: CAD; A-fib; BARBARA: HTN; HLD; CKD; PPM. Study Detail: The following Echo studies were performed: 2D and M-Mode.               Technically challenging study due to body habitus.  PHYSICIAN INTERPRETATION: Left Ventricle: Left ventricular ejection fraction is low normal, by visual estimate at 50-55%. There are no regional wall motion abnormalities. The left ventricular cavity size is normal. Left ventricular diastolic filling was not assessed. Left Atrium: The left atrium was not assessed. Right Ventricle: The right ventricle was not assessed. Right ventricular systolic function not assessed. A device is visualized in the right ventricle. Right Atrium: The right atrium was not assessed. There is a device visualized in the right atrium. Aortic Valve: The aortic valve is trileaflet. There is mild aortic valve cusp calcification. Aortic valve regurgitation was not assessed. Mitral Valve: The mitral valve is mildly thickened. There is moderate mitral annular calcification. Mitral valve regurgitation was not assessed. Tricuspid Valve: The tricuspid valve was not well visualized. Tricuspid regurgitation was not assessed. Right ventricular systolic pressure could not be accurately assessed in this patient. Pulmonic Valve: The pulmonic valve was not assessed. Pulmonic valve regurgitation was not assessed. Pericardium: There is a trivial pericardial effusion. Aorta: The aortic root is normal. Systemic Veins: The inferior vena cava was not well visualized. In comparison to the previous echocardiogram(s): Compared with study dated 2/27/2024, no significant change.  CONCLUSIONS:  1. Left ventricular ejection fraction is low normal, by visual estimate at 50-55%.  2. There is moderate mitral annular calcification.  3. There is a trivial pericardial effusion.  4. Compared with study dated 2/27/2024, no significant change. QUANTITATIVE DATA SUMMARY: LV SYSTOLIC FUNCTION BY 2D PLANIMETRY (MOD):                      Normal  Ranges: EF-Visual:      53 % LV EF Reported: 53 %  [id number] Neftali Calvillo MD Electronically signed on 7/16/2024 at 3:02:13 PM  ** Final **     Transthoracic Echo (TTE) Complete    Result Date: 2/27/2024   [facility], [address] [place] [address]               Tel [phone] and Fax [phone] TRANSTHORACIC ECHOCARDIOGRAM REPORT  Patient Name:      VERNON SALDANA VIA         Reading Physician:    [id number] Nohelia Soto MD Study Date:        2/27/2024            Ordering Provider:    [id number] NIKOLE LOVE MRN/PID:           [id number]             Fellow: Accession#:        [id number]         Nurse: Date of Birth/Age: 1950 / 73 years Sonographer:          Dai Diaz RDCS Gender:            F                    Additional Staff: Height:            162.56 cm            Admit Date:           2/24/2024 Weight:            72.57 kg             Admission Status:     Inpatient -                                                               Routine BSA / BMI:         1.78 m2 / 27.46      Encounter#:           [id number]                    kg/m2                                         Department Location:  [facility]                                                               Invasive Blood Pressure: 131 /59 mmHg Study Type:    TRANSTHORACIC ECHO (TTE) COMPLETE Diagnosis/ICD: Shortness of breath-R06.02 Indication:    Dyspnea CPT Code:      Echo Complete w Full Doppler-60322 Patient History: Pertinent History: A-Fib and Chest Pain. elevated troponin, elevated BNP, mod                    AS. Study Detail: The following Echo studies were performed: 2D, M-Mode, Doppler and               color flow.  PHYSICIAN INTERPRETATION: Left Ventricle: The left ventricular systolic function is normal, with an estimated  ejection fraction of 55-60%. There are no regional wall motion abnormalities. The left ventricular cavity size is normal. Spectral Doppler shows an impaired relaxation pattern of left ventricular diastolic filling. Left Atrium: The left atrium is mildly dilated. Right Ventricle: The right ventricle is normal in size. There is normal right ventricular global systolic function. Right Atrium: The right atrium is normal in size. Aortic Valve: The aortic valve is trileaflet. There is mild to moderate aortic valve cusp calcification. There is evidence of moderate aortic valve stenosis. There is trivial aortic valve regurgitation. The peak instantaneous gradient of the aortic valve is 27.5 mmHg. The mean gradient of the aortic valve is 16.0 mmHg. Mitral Valve: The mitral valve is mildly thickened. There is evidence of mild mitral valve stenosis. The doppler estimated mean and peak diastolic pressure gradients are 7.2 mmHg and 16.3 mmHg respectively. There is mild mitral annular calcification. There is mild to moderate mitral valve regurgitation. Tricuspid Valve: The tricuspid valve is structurally normal. There is mild to moderate tricuspid regurgitation. Pulmonic Valve: The pulmonic valve is not well visualized. There is trace to mild pulmonic valve regurgitation. Pericardium: There is no pericardial effusion noted. Aorta: The aortic root is normal. Pulmonary Artery: The tricuspid regurgitant velocity is 3.46 m/s, and with an estimated right atrial pressure of 3 mmHg, the estimated pulmonary artery pressure is mild to moderately elevated with the RVSP at 50.9 mmHg.  CONCLUSIONS:  1. Left ventricular systolic function is normal with a 55-60% estimated ejection fraction.  2. Spectral Doppler shows an impaired relaxation pattern of left ventricular diastolic filling.  3. Mild to moderate mitral valve regurgitation.  4. Mild to moderate tricuspid regurgitation visualized.  5. Moderate aortic valve stenosis.  6. Mild to  moderately elevated pulmonary artery pressure. QUANTITATIVE DATA SUMMARY: 2D MEASUREMENTS:                           Normal Ranges: IVSd:          0.97 cm    (0.6-1.1cm) LVPWd:         0.98 cm    (0.6-1.1cm) LVIDd:         5.70 cm    (3.9-5.9cm) LVIDs:         4.26 cm LV Mass Index: 123.2 g/m2 LV % FS        25.2 % LA VOLUME:                              Normal Ranges: LA Vol A4C:       77.4 ml    (22+/-6mL/m2) LA Vol A2C:       83.5 ml LA Vol BP:        82.1 ml LA Vol Index A4C: 43.5 ml/m2 LA Vol Index A2C: 46.9 ml/m2 LA Vol Index BP:  46.1 ml/m2 LA Volume Index:  46.1 ml/m2 LA Vol A4C:       72.2 ml LA Vol A2C:       77.0 ml RA VOLUME BY A/L METHOD:                       Normal Ranges: RA Area A4C: 21.8 cm2 M-MODE MEASUREMENTS:                  Normal Ranges: Ao Root: 3.30 cm (2.0-3.7cm) LAs:     4.72 cm (2.7-4.0cm) LV SYSTOLIC FUNCTION BY 2D PLANIMETRY (MOD):                     Normal Ranges: EF-A4C View: 55.9 % (>=55%) EF-A2C View: 56.5 % EF-Biplane:  55.9 % LV DIASTOLIC FUNCTION:                             Normal Ranges: MV Peak E:      1.91 m/s    (0.7-1.2 m/s) MV Peak A:      0.93 m/s    (0.42-0.7 m/s) E/A Ratio:      2.06        (1.0-2.2) MV e'           0.09 m/s    (>8.0) MV lateral e'   0.09 m/s MV medial e'    0.08 m/s E/e' Ratio:     21.21       (<8.0) PulmV Sys Ham:  62.35 cm/s PulmV Alvarez Ham: 121.32 cm/s PulmV S/D Ham:  0.51 MITRAL VALVE:                       Normal Ranges: MV Vmax:    2.02 m/s  (<=1.3m/s) MV peak P.3 mmHg (<5mmHg) MV mean P.2 mmHg  (<2mmHg) MV VTI:     48.44 cm  (10-13cm) MV DT:      167 msec  (150-240msec) MITRAL INSUFFICIENCY:                         Normal Ranges: MR VTI:     192.66 cm MR Vmax:    568.98 cm/s MR Volume:  33.56 ml MR Flow Rt: 99.12 ml/s MR EROA:    0.17 cm2 AORTIC VALVE:                                    Normal Ranges: AoV Vmax:                2.62 m/s  (<=1.7m/s) AoV Peak P.5 mmHg (<20mmHg) AoV Mean P.0 mmHg  (1.7-11.5mmHg) LVOT Max Ham:            0.99 m/s  (<=1.1m/s) AoV VTI:                 58.21 cm  (18-25cm) LVOT VTI:                21.96 cm LVOT Diameter:           1.95 cm   (1.8-2.4cm) AoV Area, VTI:           1.13 cm2  (2.5-5.5cm2) AoV Area,Vmax:           1.13 cm2  (2.5-4.5cm2) AoV Dimensionless Index: 0.38 AORTIC INSUFFICIENCY: AI Vmax:       3.91 m/s AI Half-time:  520 msec AI Decel Time: 1794 msec AI Decel Rate: 218.17 cm/s2  RIGHT VENTRICLE: RV Basal 4.30 cm RV Mid   2.60 cm RV Major 7.7 cm TAPSE:   27.0 mm RV s'    0.18 m/s TRICUSPID VALVE/RVSP:                             Normal Ranges: Peak TR Velocity: 3.46 m/s RV Syst Pressure: 50.9 mmHg (< 30mmHg) PULMONIC VALVE:                      Normal Ranges: PV Max Ham: 1.5 m/s  (0.6-0.9m/s) PV Max P.8 mmHg Pulmonary Veins: PulmV Alvarez Ham: 121.32 cm/s PulmV S/D Ham:  0.51 PulmV Sys Ham:  62.35 cm/s  [id number] Nohelia Soto MD Electronically signed on 2024 at 11:22:42 AM  ** Final **         Assessment/Plan   C in  showed normal coronary arteries        Acute on chronic diastolic CHF with mildly reduced EF 45-50%  -Hx of tachycardia induced CMO and hx of VT  -  -D dimer 1103  -CXR showed small right pleural effusion  -CTA negative for PE showed cardiomegaly and CHF  -I reviewed the Echocardiograms as above  -Strict I & Os  -Daily weights  -2gm na diet  -1500mL fluid restriction  -Recently had Bumex decreased due to hypotension  -Cont Bumex PO  -Give additional Bumex 1mg IV x1 this afternoon  -Suggest returning to BID dosing  -If BP low, suggest midodrine     2. Elevated troponins-Non MI elevation  -LHC as above  -CTA negative  -Troponin 35, 27  -EKG atrial paced with nonspecific T wave abnormalities  -Cont plavix, statin  -Allergy to BB     3. Asthma/COPD exac  -steroids  -bronchodilators  -Oxygen support  -CTA showed CHF and cardiomegaly    4. Paroxysmal atrial fibrillation  -Currently atrial paced  -[facility] PCM/ICD interrogated during last  admit, no episodes since prior to watchman, battery life 1 year  -Not on AC, has watchman  -Cont plavix  -Cont digoxin and diltiazem  -Digoxin level 0.77  -Monitor on tele     5. Chronic anemia  -Hgb 7.7 today  -Iron studies reviewed, iron 21  -Give Venofer 100mg IV x1 today  -Start Ferrous Sulfate daily   -Monitor     6. Hyperlipidemia  -Cont statin    Amelia Kiser, APRN-CNP

## 2024-08-09 ENCOUNTER — APPOINTMENT (OUTPATIENT)
Dept: CARDIOLOGY | Facility: HOSPITAL | Age: 74
End: 2024-08-09
Payer: MEDICARE

## 2024-08-09 LAB
ABO GROUP (TYPE) IN BLOOD: NORMAL
ALBUMIN SERPL BCP-MCNC: 3.5 G/DL (ref 3.4–5)
ANION GAP SERPL CALC-SCNC: 13 MMOL/L (ref 10–20)
ANTIBODY SCREEN: NORMAL
ATRIAL RATE: 59 BPM
ATRIAL RATE: 60 BPM
BB ANTIBODY IDENTIFICATION: NORMAL
BLOOD EXPIRATION DATE: NORMAL
BUN SERPL-MCNC: 21 MG/DL (ref 6–23)
CALCIUM SERPL-MCNC: 8.1 MG/DL (ref 8.6–10.3)
CASE #: NORMAL
CHLORIDE SERPL-SCNC: 107 MMOL/L (ref 98–107)
CO2 SERPL-SCNC: 26 MMOL/L (ref 21–32)
CREAT SERPL-MCNC: 0.93 MG/DL (ref 0.5–1.05)
DISPENSE STATUS: NORMAL
EGFRCR SERPLBLD CKD-EPI 2021: 65 ML/MIN/1.73M*2
ERYTHROCYTE [DISTWIDTH] IN BLOOD BY AUTOMATED COUNT: 18 % (ref 11.5–14.5)
ERYTHROCYTE [DISTWIDTH] IN BLOOD BY AUTOMATED COUNT: 18.3 % (ref 11.5–14.5)
GLUCOSE SERPL-MCNC: 79 MG/DL (ref 74–99)
HCT VFR BLD AUTO: 23.5 % (ref 36–46)
HCT VFR BLD AUTO: 27.2 % (ref 36–46)
HGB BLD-MCNC: 6.8 G/DL (ref 12–16)
HGB BLD-MCNC: 8.3 G/DL (ref 12–16)
MCH RBC QN AUTO: 23.1 PG (ref 26–34)
MCH RBC QN AUTO: 23.9 PG (ref 26–34)
MCHC RBC AUTO-ENTMCNC: 28.9 G/DL (ref 32–36)
MCHC RBC AUTO-ENTMCNC: 30.5 G/DL (ref 32–36)
MCV RBC AUTO: 78 FL (ref 80–100)
MCV RBC AUTO: 80 FL (ref 80–100)
NRBC BLD-RTO: 0 /100 WBCS (ref 0–0)
NRBC BLD-RTO: 0 /100 WBCS (ref 0–0)
PHOSPHATE SERPL-MCNC: 4.3 MG/DL (ref 2.5–4.9)
PLATELET # BLD AUTO: 202 X10*3/UL (ref 150–450)
PLATELET # BLD AUTO: 222 X10*3/UL (ref 150–450)
POTASSIUM SERPL-SCNC: 3.7 MMOL/L (ref 3.5–5.3)
PR INTERVAL: 224 MS
PR INTERVAL: 260 MS
PRODUCT BLOOD TYPE: 600
PRODUCT CODE: NORMAL
Q ONSET: 217 MS
Q ONSET: 222 MS
QRS COUNT: 12 BEATS
QRS COUNT: 9 BEATS
QRS DURATION: 82 MS
QRS DURATION: 94 MS
QT INTERVAL: 374 MS
QT INTERVAL: 418 MS
QTC CALCULATION(BAZETT): 418 MS
QTC CALCULATION(BAZETT): 420 MS
QTC FREDERICIA: 404 MS
QTC FREDERICIA: 418 MS
R AXIS: 38 DEGREES
R AXIS: 51 DEGREES
RBC # BLD AUTO: 2.94 X10*6/UL (ref 4–5.2)
RBC # BLD AUTO: 3.47 X10*6/UL (ref 4–5.2)
RH FACTOR (ANTIGEN D): NORMAL
SODIUM SERPL-SCNC: 142 MMOL/L (ref 136–145)
T AXIS: -53 DEGREES
T AXIS: 205 DEGREES
T OFFSET: 409 MS
T OFFSET: 426 MS
UNIT ABO: NORMAL
UNIT NUMBER: NORMAL
UNIT RH: NORMAL
UNIT VOLUME: 279
VENTRICULAR RATE: 60 BPM
VENTRICULAR RATE: 76 BPM
WBC # BLD AUTO: 7.9 X10*3/UL (ref 4.4–11.3)
WBC # BLD AUTO: 8.6 X10*3/UL (ref 4.4–11.3)
XM INTEP: NORMAL

## 2024-08-09 PROCEDURE — 2060000001 HC INTERMEDIATE ICU ROOM DAILY

## 2024-08-09 PROCEDURE — 2500000002 HC RX 250 W HCPCS SELF ADMINISTERED DRUGS (ALT 637 FOR MEDICARE OP, ALT 636 FOR OP/ED): Performed by: INTERNAL MEDICINE

## 2024-08-09 PROCEDURE — 94668 MNPJ CHEST WALL SBSQ: CPT

## 2024-08-09 PROCEDURE — 36415 COLL VENOUS BLD VENIPUNCTURE: CPT

## 2024-08-09 PROCEDURE — 94760 N-INVAS EAR/PLS OXIMETRY 1: CPT

## 2024-08-09 PROCEDURE — 36430 TRANSFUSION BLD/BLD COMPNT: CPT

## 2024-08-09 PROCEDURE — 86902 BLOOD TYPE ANTIGEN DONOR EA: CPT

## 2024-08-09 PROCEDURE — 93005 ELECTROCARDIOGRAM TRACING: CPT

## 2024-08-09 PROCEDURE — 2500000001 HC RX 250 WO HCPCS SELF ADMINISTERED DRUGS (ALT 637 FOR MEDICARE OP)

## 2024-08-09 PROCEDURE — 2500000005 HC RX 250 GENERAL PHARMACY W/O HCPCS: Performed by: STUDENT IN AN ORGANIZED HEALTH CARE EDUCATION/TRAINING PROGRAM

## 2024-08-09 PROCEDURE — P9016 RBC LEUKOCYTES REDUCED: HCPCS

## 2024-08-09 PROCEDURE — 2500000005 HC RX 250 GENERAL PHARMACY W/O HCPCS: Performed by: INTERNAL MEDICINE

## 2024-08-09 PROCEDURE — 2500000002 HC RX 250 W HCPCS SELF ADMINISTERED DRUGS (ALT 637 FOR MEDICARE OP, ALT 636 FOR OP/ED)

## 2024-08-09 PROCEDURE — 86870 RBC ANTIBODY IDENTIFICATION: CPT

## 2024-08-09 PROCEDURE — 2500000004 HC RX 250 GENERAL PHARMACY W/ HCPCS (ALT 636 FOR OP/ED)

## 2024-08-09 PROCEDURE — 86922 COMPATIBILITY TEST ANTIGLOB: CPT

## 2024-08-09 PROCEDURE — 2500000001 HC RX 250 WO HCPCS SELF ADMINISTERED DRUGS (ALT 637 FOR MEDICARE OP): Performed by: NURSE PRACTITIONER

## 2024-08-09 PROCEDURE — 85027 COMPLETE CBC AUTOMATED: CPT | Performed by: INTERNAL MEDICINE

## 2024-08-09 PROCEDURE — 80069 RENAL FUNCTION PANEL: CPT

## 2024-08-09 PROCEDURE — 94640 AIRWAY INHALATION TREATMENT: CPT

## 2024-08-09 PROCEDURE — 85027 COMPLETE CBC AUTOMATED: CPT

## 2024-08-09 PROCEDURE — 99232 SBSQ HOSP IP/OBS MODERATE 35: CPT

## 2024-08-09 PROCEDURE — 86901 BLOOD TYPING SEROLOGIC RH(D): CPT

## 2024-08-09 PROCEDURE — 86905 BLOOD TYPING RBC ANTIGENS: CPT

## 2024-08-09 RX ORDER — BUMETANIDE 1 MG/1
1 TABLET ORAL
Status: DISCONTINUED | OUTPATIENT
Start: 2024-08-09 | End: 2024-08-10 | Stop reason: HOSPADM

## 2024-08-09 RX ORDER — CALCIUM CARBONATE 200(500)MG
1000 TABLET,CHEWABLE ORAL ONCE
Status: COMPLETED | OUTPATIENT
Start: 2024-08-09 | End: 2024-08-09

## 2024-08-09 RX ORDER — OXYCODONE HYDROCHLORIDE 5 MG/1
5 TABLET ORAL ONCE
Status: COMPLETED | OUTPATIENT
Start: 2024-08-09 | End: 2024-08-09

## 2024-08-09 RX ORDER — CALCIUM CARBONATE 200(500)MG
500 TABLET,CHEWABLE ORAL 4 TIMES DAILY PRN
Status: DISCONTINUED | OUTPATIENT
Start: 2024-08-09 | End: 2024-08-10 | Stop reason: HOSPADM

## 2024-08-09 ASSESSMENT — PAIN DESCRIPTION - ORIENTATION: ORIENTATION: LEFT

## 2024-08-09 ASSESSMENT — PAIN SCALES - GENERAL
PAINLEVEL_OUTOF10: 6
PAINLEVEL_OUTOF10: 7
PAINLEVEL_OUTOF10: 7
PAINLEVEL_OUTOF10: 0 - NO PAIN
PAINLEVEL_OUTOF10: 3
PAINLEVEL_OUTOF10: 8
PAINLEVEL_OUTOF10: 5 - MODERATE PAIN
PAINLEVEL_OUTOF10: 8

## 2024-08-09 ASSESSMENT — PAIN DESCRIPTION - LOCATION
LOCATION: SHOULDER
LOCATION: BACK
LOCATION: BACK

## 2024-08-09 ASSESSMENT — PAIN - FUNCTIONAL ASSESSMENT
PAIN_FUNCTIONAL_ASSESSMENT: 0-10

## 2024-08-09 ASSESSMENT — PAIN DESCRIPTION - DESCRIPTORS
DESCRIPTORS: BURNING

## 2024-08-09 ASSESSMENT — COGNITIVE AND FUNCTIONAL STATUS - GENERAL
CLIMB 3 TO 5 STEPS WITH RAILING: A LITTLE
MOBILITY SCORE: 23
DAILY ACTIVITIY SCORE: 24

## 2024-08-09 NOTE — PROGRESS NOTES
"Trina Elias is a 73 y.o. female on day 1 of admission presenting with Shortness of breath.    Subjective   Patient reports she is feeling and wants to go home. She took her NC off at 630 AM and denies any SOB since then. She is saturating around 95% on RA. Pt reports she got up from bed to use the restroom and denies any SOB while walking. Pt reports that she is still SOB when she lays down in bed.     Objective   Constitutional:       General: She is in acute distress.   Cardiovascular:      Pulses: Normal pulses.      Heart sounds: Normal heart sounds.      Comments: No JVD present  Pulmonary:      Effort: Respiratory distress present when laying down.       Breath sounds: No stridor. No wheezing, rhonchi or rales.      Comments: Currently on RA  Chest:      Chest wall: No tenderness.   Musculoskeletal:      Right lower leg: No edema.      Left lower leg: No edema.   Neurological:      General: No focal deficit present.      Mental Status: She is alert and oriented to person, place, and time.     Last Recorded Vitals  Blood pressure 107/55, pulse 60, temperature 36.3 °C (97.4 °F), temperature source Temporal, resp. rate 22, height 1.626 m (5' 4.02\"), weight 79 kg (174 lb 2.6 oz), SpO2 95%.    Intake/Output last 3 Shifts:  I/O last 3 completed shifts:  In: 340 (4.3 mL/kg) [P.O.:340]  Out: 2000 (25.3 mL/kg) [Urine:2000 (0.7 mL/kg/hr)]  Weight: 79 kg     Significant Lab Findings:  - Hemoglobin: 6.8 (8/9)      Assessment/Plan   Trina Elias, a 74 y/o F with PMHx of hypertension, hyperlipidemia, HFmrEF/systolic (EF45-50% in July 2024) and diastolic HF, paroxysmal A-fib status post PCM/ICD and watchman procedure, central sleep apnea on BiPAP at night, COPD/asthma not on nasal cannula oxygen at home, chronic anemia/iron deficiency anemia, CKD, multiple abdominal surgeries, and RA presented to the ED with dyspnea on exertion.     Acute Medical Issues:  #Acute hypoxic respiratory failure 2/2 COPD exacerbation and volume " "overload ISO acute decompensated HF-Hx of HFmrEF/systolic (EF45-50% in 2024) and diastolic HF  #Hx Asthma/COPD   #Central sleep apnea on BIPAP at night   - S/p 2mg IV bumex in the ED and placed on BIPAP (ED reports her saturation was in the \"high 80s\" upon admission)  - S/p 1mg IV Bumex on  afternoon, per Cardio  - Cardiology recommends resuming her home Bumex 2 mg twice daily.   - Continue prednisone 40mg daily and albuterol nebs TID.   - Continue home montelukast  - Continue to monitor Is & Os  - Respiratory Therapy to get patient walking.   - Will discharge patient depending on results of walk test.     #Low Hemoglobin  - H.8  - Receiving Iron sucrose IV and Ferrous sulfate PO  - Received 1 unit of blood this morning.      #Troponemia   -Denies CP, likely demand ischemia   -Troponin trended down; 35-27  - Continue to monitor troponin levels   -ECG showed paced rhythm no acute ST elevation     Chronic Medical Issues:   #HTN: Continue home Cardizem and      #HLD: Continue home statin     #CKD: Cr on admission 1.1 (baseline 0.9-1~) Strict Is Os, Monitor kidney function and avoid nephrotoxic meds. Received contrast in the ED.     #Nausea/GERD: continue Zofran PRN, famotidine, and PPI     #Chronic pain: Continue home percocet TID PRN      #A-fib status post PCM/ICD and watchman procedure: Continue home Digoxin (ordered level), Cardizem and Plavix. Not on DOAC given watchman     #RA: continue home Hydroxychloroquine and Sulfasalazine. Voltaren gel PRN       #Constipation: continue home miralax and bisacodyl PRN         Fluids: PO  Electrolytes: replete as needed  Nutrition: Cardiac   GI Prophylaxis: PPI   DVT Prophylaxis: Lovenox subcutaneous      Dispo: Patient admitted for the acute medical condition above. ELOS<48hours. Possible discharge depending on Cardio and RT clearance.     Javier Cardenas, MS3   24 at 10:24 AM.       "

## 2024-08-09 NOTE — PROGRESS NOTES
08/09/24 1327   Discharge Planning   Living Arrangements Spouse/significant other   Support Systems Spouse/significant other   Assistance Needed Patient is A&Ox3, independent with ADL's, has a cane and walker to use for ambulation PRN, room air at baseline (currently requiring acute O2), doesn't drive ( transports). Patient denies further home going needs.   Type of Residence Private residence   Number of Stairs to Enter Residence 0   Number of Stairs Within Residence 0   Do you have animals or pets at home? No   Who is requesting discharge planning? Provider   Home or Post Acute Services None   Expected Discharge Disposition Home  (May need home O2.)   Does the patient need discharge transport arranged? No

## 2024-08-09 NOTE — CARE PLAN
The patient's goals for the shift include      The clinical goals for the shift include Pt will remain HDS throughout shift.      Problem: Safety - Adult  Goal: Free from fall injury  Outcome: Progressing     Problem: Pain - Adult  Goal: Verbalizes/displays adequate comfort level or baseline comfort level  Outcome: Progressing

## 2024-08-09 NOTE — PROGRESS NOTES
"Trina Elias is a 73 y.o. female on day 1 of admission presenting with Shortness of breath.      Subjective   She is sitting up in the bed, feels better today, still short of breath with exertion. Hgb 6.8 today. Received Venofer IV yesterday.       Review of systems:  Constitutional: negative for fever, chills, or malaise  Neuro: negative for dizziness, headache, numbness, tingling  ENT: Negative for nasal congestion or sore throat  CV: negative for chest pain, palpitations  GI: negative for abd pain, nausea, vomiting or diarrhea  : negative for dysuria, frequency, or urgency  Skin: negative for lesions, wounds, or rash  Musculoskeletal: Negative for weakness, myalgia, or arthralgia  Endocrine: Negative for polyuria or polydipsia         Objective   Constitutional: Well developed, awake/alert/oriented x3, no distress, alert and cooperative  Eyes: PERRL, EOMI, clear sclera  ENMT: mucous membranes moist, no apparent injury, no lesions seen  Head/Neck: Neck supple, no apparent injury, thyroid without mass or tenderness, No JVD, trachea midline, no bruits  Respiratory/Thorax: Patent airways, rales bilat bases with good chest expansion, thorax symmetric  Cardiovascular: Regular, rate and rhythm, no murmurs, 2+ equal pulses of the extremities, normal S 1and S 2  Gastrointestinal: Nondistended, soft, non-tender, no rebound tenderness or guarding, no masses palpable, no organomegaly, +BS, no bruits  Musculoskeletal: ROM intact, no joint swelling, normal strength  Extremities: normal extremities, no cyanosis edema, contusions or wounds, no clubbing  Neurological: alert and oriented x3, intact senses, motor, response and reflexes, normal strength  Lymphatic: No significant lymphadenopathy  Psychological: Appropriate mood and behavior  Skin: Warm and dry, no lesions, no rashes      Last Recorded Vitals  /57 (BP Location: Left arm)   Pulse 73   Temp 35.9 °C (96.7 °F) (Temporal)   Resp 19   Ht 1.626 m (5' 4.02\")   " Wt 79 kg (174 lb 2.6 oz)   SpO2 96%   BMI 29.88 kg/m²     Intake/Output last 3 Shifts:  I/O last 3 completed shifts:  In: 340 (4.3 mL/kg) [P.O.:340]  Out: 2000 (25.3 mL/kg) [Urine:2000 (0.7 mL/kg/hr)]  Weight: 79 kg   I/O this shift:  In: 240 [P.O.:240]  Out: -     Relevant Results  Scheduled medications  albuterol, 2.5 mg, nebulization, TID  bumetanide, 1 mg, oral, Daily with evening meal  bumetanide, 2 mg, oral, Daily  clopidogrel, 75 mg, oral, Daily  digoxin, 125 mcg, oral, Daily  dilTIAZem CD, 120 mg, oral, Nightly  enoxaparin, 40 mg, subcutaneous, q24h  famotidine, 40 mg, oral, Daily  ferrous sulfate (325 mg ferrous sulfate), 65 mg of iron, oral, Daily with breakfast  hydroxychloroquine, 300 mg, oral, Daily  montelukast, 10 mg, oral, Nightly  pantoprazole, 40 mg, oral, BID  polyethylene glycol, 17 g, oral, Daily  predniSONE, 40 mg, oral, Daily  rosuvastatin, 20 mg, oral, Daily  sulfaSALAzine, 500 mg, oral, BID      Continuous medications     PRN medications  PRN medications: albuterol, bisacodyl, calcium carbonate, clobetasol, diclofenac sodium, naloxone, nitroglycerin, ondansetron, oxyCODONE-acetaminophen, oxygen, oxygen, sucralfate    Results for orders placed or performed during the hospital encounter of 08/08/24 (from the past 24 hour(s))   Renal function panel   Result Value Ref Range    Glucose 79 74 - 99 mg/dL    Sodium 142 136 - 145 mmol/L    Potassium 3.7 3.5 - 5.3 mmol/L    Chloride 107 98 - 107 mmol/L    Bicarbonate 26 21 - 32 mmol/L    Anion Gap 13 10 - 20 mmol/L    Urea Nitrogen 21 6 - 23 mg/dL    Creatinine 0.93 0.50 - 1.05 mg/dL    eGFR 65 >60 mL/min/1.73m*2    Calcium 8.1 (L) 8.6 - 10.3 mg/dL    Phosphorus 4.3 2.5 - 4.9 mg/dL    Albumin 3.5 3.4 - 5.0 g/dL   CBC   Result Value Ref Range    WBC 7.9 4.4 - 11.3 x10*3/uL    nRBC 0.0 0.0 - 0.0 /100 WBCs    RBC 2.94 (L) 4.00 - 5.20 x10*6/uL    Hemoglobin 6.8 (L) 12.0 - 16.0 g/dL    Hematocrit 23.5 (L) 36.0 - 46.0 %    MCV 80 80 - 100 fL    MCH 23.1  (L) 26.0 - 34.0 pg    MCHC 28.9 (L) 32.0 - 36.0 g/dL    RDW 18.0 (H) 11.5 - 14.5 %    Platelets 202 150 - 450 x10*3/uL   ECG 12 lead   Result Value Ref Range    Ventricular Rate 60 BPM    Atrial Rate 60 BPM    NM Interval 260 ms    QRS Duration 94 ms    QT Interval 418 ms    QTC Calculation(Bazett) 418 ms    R Axis 51 degrees    T Axis 205 degrees    QRS Count 9 beats    Q Onset 217 ms    T Offset 426 ms    QTC Fredericia 418 ms   Prepare RBC: 1 Units   Result Value Ref Range    PRODUCT CODE K2935T30     Unit Number [id number]     Unit ABO A     Unit RH NEG     XM INTEP COMP     Dispense Status IS     Blood Expiration Date 8/23/2024 11:59:00 PM EDT     PRODUCT BLOOD TYPE 0600     UNIT VOLUME 279    Type and screen   Result Value Ref Range    ABO TYPE A     Rh TYPE POS     ANTIBODY SCREEN POS    BB ORDER ONLY - Antibody Identification   Result Value Ref Range    Antibody ID ANTI- JKA     CASE #         CT angio chest for pulmonary embolism   Final Result   Cardiomegaly/CHF.        No significant pulmonary effusion.        MACRO:   None.        Signed by: Campbell Pearson 8/8/2024 3:29 AM   Dictation workstation:   [id number]      XR chest 1 view   Final Result   Stable small right pleural effusion. Mild bibasilar atelectasis.        MACRO:   None        Signed by: Ivette Guillory 8/8/2024 2:20 AM   Dictation workstation:   [id number]          Transthoracic Echo (TTE) Complete    Result Date: 7/25/2024   [facility], [address] [place] [address]               Tel [phone] and Fax [phone] TRANSTHORACIC ECHOCARDIOGRAM REPORT  Patient Name:      VERNON SALDANA VIA         Reading Physician:    [id number] Neo Gutierrez MD Study Date:        7/25/2024            Ordering Provider:    [id number] STEPHON YARBROUGH MRN/PID:           [id number]             Fellow: Accession#:         [id number]         Nurse: Date of Birth/Age: 1950 / 73 years Sonographer:          Viola Lau                                                               RDCS Gender:            F                    Additional Staff: Height:            162.56 cm            Admit Date:           7/24/2024 Weight:            72.12 kg             Admission Status:     Inpatient -                                                               Routine BSA / BMI:         1.77 m2 / 27.29      Encounter#:           [id number]                    kg/m2 Blood Pressure:    108/69 mmHg          Department Location:  [facility] Non                                                               Invasive Study Type:    TRANSTHORACIC ECHO (TTE) COMPLETE Diagnosis/ICD: Chest pain, unspecified-R07.9 Indication:    CP CPT Code:      Echo Complete w Full Doppler-[id number] Patient History: Pertinent History: A-Fib, CAD, Hyperlipidemia, COPD, Chest Pain and S/P Left                    shoulder surgery,Watchman, CKD. Study Detail: The following Echo studies were performed: 2D, M-Mode, Doppler and               color flow. Technically challenging study due to patient lying in               supine position and S/P left shoulder surgery. Patient has a               defibrillator.  PHYSICIAN INTERPRETATION: Left Ventricle: The left ventricular systolic function is mildly decreased, with a visually estimated ejection fraction of 45-50%. There are no regional wall motion abnormalities. The left ventricular cavity size is normal. There is mild concentric left ventricular hypertrophy. Spectral Doppler shows a pseudonormal pattern of left ventricular diastolic filling. Left Atrium: The left atrium is normal in size. Right Ventricle: The right ventricle is normal in size. There is normal right ventricular global systolic function. A device is visualized in the right ventricle. Right Atrium: The right atrium is normal in size. Aortic Valve: The aortic  valve is trileaflet. There is mild aortic valve cusp calcification. There is evidence of mildly elevated transaortic gradients consistent with sclerosis of the aortic valve. The aortic valve dimensionless index is 0.71. There is moderate aortic valve regurgitation. The peak instantaneous gradient of the aortic valve is 14.6 mmHg. The mean gradient of the aortic valve is 8.0 mmHg. Mitral Valve: The mitral valve is normal in structure. There is moderate mitral annular calcification. There is mild mitral valve regurgitation. Tricuspid Valve: The tricuspid valve is structurally normal. There is mild tricuspid regurgitation. Pulmonic Valve: The pulmonic valve is structurally normal. There is physiologic pulmonic valve regurgitation. Pericardium: There is no pericardial effusion noted. Aorta: The aortic root is normal. Pulmonary Artery: The tricuspid regurgitant velocity is 2.43 m/s, and with an estimated right atrial pressure of 3 mmHg, the estimated pulmonary artery pressure is normal with the RVSP at 26.6 mmHg. Pulmonary Veins: The pulmonary veins appear normal and return normally to the left atrium. Systemic Veins: The inferior vena cava appears to be of normal size. There is IVC inspiratory collapse greater than 50%.  CONCLUSIONS:  1. The left ventricular systolic function is mildly decreased, with a visually estimated ejection fraction of 45-50%.  2. Spectral Doppler shows a pseudonormal pattern of left ventricular diastolic filling.  3. There is normal right ventricular global systolic function.  4. There is moderate mitral annular calcification.  5. Aortic valve sclerosis.  6. Moderate aortic valve regurgitation.  7. Normal estimated PASP and CVP. QUANTITATIVE DATA SUMMARY: 2D MEASUREMENTS:                           Normal Ranges: IVSd:          0.97 cm    (0.6-1.1cm) LVPWd:         1.16 cm    (0.6-1.1cm) LVIDd:         5.27 cm    (3.9-5.9cm) LVIDs:         4.48 cm LV Mass Index: 122.0 g/m2 LV % FS        15.0 %  LA VOLUME:                               Normal Ranges: LA Vol A4C:        46.9 ml    (22+/-6mL/m2) LA Vol A2C:        46.7 ml LA Vol BP:         46.9 ml LA Vol Index A4C:  26.5ml/m2 LA Vol Index A2C:  26.3 ml/m2 LA Vol Index BP:   26.4 ml/m2 LA Area A4C:       17.6 cm2 LA Area A2C:       17.5 cm2 LA Major Axis A4C: 5.6 cm LA Major Axis A2C: 5.6 cm LA Volume Index:   24.7 ml/m2 LA Vol A4C:        42.8 ml LA Vol A2C:        43.8 ml RA VOLUME BY A/L METHOD:                       Normal Ranges: RA Area A4C: 23.0 cm2 AORTA MEASUREMENTS:                    Normal Ranges: Asc Ao, d: 3.70 cm (2.1-3.4cm) LV SYSTOLIC FUNCTION BY 2D PLANIMETRY (MOD):                      Normal Ranges: EF-A4C View:    43 % (>=55%) EF-A2C View:    42 % EF-Biplane:     44 % EF-Visual:      48 % LV EF Reported: 48 % LV DIASTOLIC FUNCTION:                     Normal Ranges: MV Peak E: 1.46 m/s (0.7-1.2 m/s) MITRAL VALVE:                      Normal Ranges: MV Vmax:    1.31 m/s (<=1.3m/s) MV peak P.9 mmHg (<5mmHg) MV mean PG: 3.0 mmHg (<2mmHg) MV DT:      197 msec (150-240msec) MITRAL INSUFFICIENCY:                           Normal Ranges: PISA Radius:  0.3 cm MR VTI:       178.00 cm MR Vmax:      496.00 cm/s MR Alias Ham: 35.1 cm/s MR Volume:    7.12 ml MR Flow Rt:   19.85 ml/s MR EROA:      0.04 cm2 AORTIC VALVE:                                    Normal Ranges: AoV Vmax:                1.91 m/s  (<=1.7m/s) AoV Peak P.6 mmHg (<20mmHg) AoV Mean P.0 mmHg  (1.7-11.5mmHg) LVOT Max Ham:            1.33 m/s  (<=1.1m/s) AoV VTI:                 37.70 cm  (18-25cm) LVOT VTI:                26.90 cm LVOT Diameter:           2.00 cm   (1.8-2.4cm) AoV Area, VTI:           2.24 cm2  (2.5-5.5cm2) AoV Area,Vmax:           2.19 cm2  (2.5-4.5cm2) AoV Dimensionless Index: 0.71 AORTIC INSUFFICIENCY: AI Vmax:       4.01 m/s AI Half-time:  441 msec AI Decel Rate: 267.00 cm/s2  RIGHT VENTRICLE: RV Basal 3.67 cm RV Mid   2.60 cm  RV Major 8.8 cm TAPSE:   28.0 mm RV s'    0.12 m/s TRICUSPID VALVE/RVSP:                             Normal Ranges: Peak TR Velocity: 2.43 m/s RV Syst Pressure: 26.6 mmHg (< 30mmHg) IVC Diam:         1.58 cm PULMONIC VALVE:                      Normal Ranges: PV Max Ham: 1.5 m/s  (0.6-0.9m/s) PV Max P.6 mmHg  [id number] Neo Gutierrez MD Electronically signed on 2024 at 6:23:17 PM  ** Final **     Transthoracic Echo (TTE) Limited    Result Date: 2024   [facility], [address] [place] [address]                Tel [phone] and Fax [phone] TRANSTHORACIC ECHOCARDIOGRAM REPORT  Patient Name:      VERNON SALDANA VIA         Reading Physician:    [id number] Monica Chen MD Study Date:        2024            Ordering Provider:    [id number] KRISTAL SNYDER MRN/PID:           [id number]             Fellow: Accession#:        [id number]         Nurse: Date of Birth/Age: 1950 / 73 years Sonographer:          Tobi Carlos RDCS Gender:            F                    Additional Staff: Height:            162.56 cm            Admit Date:           2024 Weight:            70.31 kg             Admission Status:     Inpatient -                                                               Routine BSA / BMI:         1.76 m2 / 26.61      Encounter#:           [id number]                    kg/m2 Blood Pressure:    /                    Department Location:  [facility]                                                               Cath Lab Study Type:    TRANSTHORACIC ECHO (TTE) LIMITED Diagnosis/ICD: Unspecified atrial fibrillation-I48.91 Indication:    Atrial fibrillation; Preop cardiovascular exam CPT Code:      Echo Limited-35428 Patient History: Pertinent History: A-fib; CAD; BARBARA; HTN; HLD;  CKD; PPM. Study Detail: The following Echo studies were performed: 2D and M-Mode.               Technically challenging study due to body habitus and patient               lying in supine position.  PHYSICIAN INTERPRETATION: Left Ventricle: Left ventricular ejection fraction is low normal, by visual estimate at 50-55%. The patient is in atrial fibrillation which may influence the estimate of left ventricular function and transvalvular flows. Wall motion is abnormal. The left ventricular cavity size was not assessed. Left ventricular diastolic filling was not assessed. Possible inferior wall motion hypokinesis. Left Atrium: The left atrium was not assessed. Right Ventricle: The right ventricle was not well visualized. There is normal right ventricular global systolic function. A device is visualized in the right ventricle. Right Atrium: The right atrium was not assessed. There is a device visualized in the right atrium. Aortic Valve: The aortic valve is trileaflet. There is mild aortic valve cusp calcification. Aortic valve regurgitation was not assessed. Mitral Valve: The mitral valve is mildly thickened. There is moderate mitral annular calcification. Mitral valve regurgitation was not assessed. Tricuspid Valve: The tricuspid valve was not well visualized. Tricuspid regurgitation was not assessed. Pulmonic Valve: The pulmonic valve is not well visualized. Pulmonic valve regurgitation was not assessed. Pericardium: There is a trivial pericardial effusion. Aorta: The aortic root is normal. Systemic Veins: The inferior vena cava appears to be of normal size. There is IVC inspiratory collapse greater than 50%. In comparison to the previous echocardiogram(s): Compared with the prior exam from 2/27/2024 there are no significant changes though today's exam is only a limited study withno assessment of valvular function.  CONCLUSIONS:  1. Left ventricular ejection fraction is low normal, by visual estimate at 50-55%.  2.  Possible inferior wall motion hypokinesis.  3. There is normal right ventricular global systolic function.  4. There is moderate mitral annular calcification.  5. Compared with the prior exam from 2/27/2024 there are no significant changes though today's exam is only a limited study withno assessment of valvular function.  6. The patient is in atrial fibrillation which may influence the estimate of left ventricular function and transvalvular flows. QUANTITATIVE DATA SUMMARY: AORTA MEASUREMENTS:                      Normal Ranges: Ao Sinus, d: 3.45 cm (2.1-3.5cm) LV SYSTOLIC FUNCTION BY 2D PLANIMETRY (MOD):                      Normal Ranges: EF-Visual:      53 % LV EF Reported: 53 % TRICUSPID VALVE/RVSP:                   Normal Ranges: IVC Diam: 2.03 cm  [id number] Monica Chen MD Electronically signed on 7/16/2024 at 3:11:21 PM  ** Final **     Transthoracic Echo (TTE) Limited    Result Date: 7/16/2024   [facility], [address] [place] [address]                Tel [phone] and Fax [phone] TRANSTHORACIC ECHOCARDIOGRAM REPORT  Patient Name:      VERNON SALDANA VIA         Reading Physician:    [id number] Neftali Calvillo MD Study Date:        7/16/2024            Ordering Provider:    [id number] KRISTAL SNYDER MRN/PID:           [id number]             Fellow:               [id number] Lucina Gill MD Accession#:        [id number]         Nurse: Date of Birth/Age: 1950 / 73 years Sonographer:          Tobi Carlos RDCS Gender:            F                    Additional Staff: Height:            162.56 cm            Admit Date:           7/16/2024 Weight:            71.22 kg             Admission Status:     Inpatient -                                                                Routine BSA / BMI:         1.76 m2 / 26.95      Encounter#:           [id number]                    kg/m2 Blood Pressure:    117/55 mmHg          Department Location:  [facility]                                                               Cath Lab Study Type:    TRANSTHORACIC ECHO (TTE) LIMITED Diagnosis/ICD: Other specified postprocedural states-Z98.890 Indication:    Post LAAO CPT Code:      Echo Limited-95941 Patient History: Pertinent History: CAD; A-fib; BARBARA: HTN; HLD; CKD; PPM. Study Detail: The following Echo studies were performed: 2D and M-Mode.               Technically challenging study due to body habitus.  PHYSICIAN INTERPRETATION: Left Ventricle: Left ventricular ejection fraction is low normal, by visual estimate at 50-55%. There are no regional wall motion abnormalities. The left ventricular cavity size is normal. Left ventricular diastolic filling was not assessed. Left Atrium: The left atrium was not assessed. Right Ventricle: The right ventricle was not assessed. Right ventricular systolic function not assessed. A device is visualized in the right ventricle. Right Atrium: The right atrium was not assessed. There is a device visualized in the right atrium. Aortic Valve: The aortic valve is trileaflet. There is mild aortic valve cusp calcification. Aortic valve regurgitation was not assessed. Mitral Valve: The mitral valve is mildly thickened. There is moderate mitral annular calcification. Mitral valve regurgitation was not assessed. Tricuspid Valve: The tricuspid valve was not well visualized. Tricuspid regurgitation was not assessed. Right ventricular systolic pressure could not be accurately assessed in this patient. Pulmonic Valve: The pulmonic valve was not assessed. Pulmonic valve regurgitation was not assessed. Pericardium: There is a trivial pericardial effusion. Aorta: The aortic root is normal. Systemic Veins: The inferior vena cava was not well  visualized. In comparison to the previous echocardiogram(s): Compared with study dated 2/27/2024, no significant change.  CONCLUSIONS:  1. Left ventricular ejection fraction is low normal, by visual estimate at 50-55%.  2. There is moderate mitral annular calcification.  3. There is a trivial pericardial effusion.  4. Compared with study dated 2/27/2024, no significant change. QUANTITATIVE DATA SUMMARY: LV SYSTOLIC FUNCTION BY 2D PLANIMETRY (MOD):                      Normal Ranges: EF-Visual:      53 % LV EF Reported: 53 %  [id number] Neftali Calvillo MD Electronically signed on 7/16/2024 at 3:02:13 PM  ** Final **     Transthoracic Echo (TTE) Complete    Result Date: 2/27/2024   [facility], [address] [place] [address]               Tel [phone] and Fax [phone] TRANSTHORACIC ECHOCARDIOGRAM REPORT  Patient Name:      VERNON SALDANA VIA         Reading Physician:    [id number] Nohelia Soto MD Study Date:        2/27/2024            Ordering Provider:    [id number] NIKOLE LOVE MRN/PID:           [id number]             Fellow: Accession#:        [id number]         Nurse: Date of Birth/Age: 1950 / 73 years Sonographer:          Dai Diaz RDCS Gender:            F                    Additional Staff: Height:            162.56 cm            Admit Date:           2/24/2024 Weight:            72.57 kg             Admission Status:     Inpatient -                                                               Routine BSA / BMI:         1.78 m2 / 27.46      Encounter#:           [id number]                    kg/m2                                         Department Location:  [facility] Non                                                               Invasive Blood Pressure: 131 /59 mmHg Study Type:     TRANSTHORACIC ECHO (TTE) COMPLETE Diagnosis/ICD: Shortness of breath-R06.02 Indication:    Dyspnea CPT Code:      Echo Complete w Full Doppler-06389 Patient History: Pertinent History: A-Fib and Chest Pain. elevated troponin, elevated BNP, mod                    AS. Study Detail: The following Echo studies were performed: 2D, M-Mode, Doppler and               color flow.  PHYSICIAN INTERPRETATION: Left Ventricle: The left ventricular systolic function is normal, with an estimated ejection fraction of 55-60%. There are no regional wall motion abnormalities. The left ventricular cavity size is normal. Spectral Doppler shows an impaired relaxation pattern of left ventricular diastolic filling. Left Atrium: The left atrium is mildly dilated. Right Ventricle: The right ventricle is normal in size. There is normal right ventricular global systolic function. Right Atrium: The right atrium is normal in size. Aortic Valve: The aortic valve is trileaflet. There is mild to moderate aortic valve cusp calcification. There is evidence of moderate aortic valve stenosis. There is trivial aortic valve regurgitation. The peak instantaneous gradient of the aortic valve is 27.5 mmHg. The mean gradient of the aortic valve is 16.0 mmHg. Mitral Valve: The mitral valve is mildly thickened. There is evidence of mild mitral valve stenosis. The doppler estimated mean and peak diastolic pressure gradients are 7.2 mmHg and 16.3 mmHg respectively. There is mild mitral annular calcification. There is mild to moderate mitral valve regurgitation. Tricuspid Valve: The tricuspid valve is structurally normal. There is mild to moderate tricuspid regurgitation. Pulmonic Valve: The pulmonic valve is not well visualized. There is trace to mild pulmonic valve regurgitation. Pericardium: There is no pericardial effusion noted. Aorta: The aortic root is normal. Pulmonary Artery: The tricuspid regurgitant velocity is 3.46 m/s, and with an estimated right  atrial pressure of 3 mmHg, the estimated pulmonary artery pressure is mild to moderately elevated with the RVSP at 50.9 mmHg.  CONCLUSIONS:  1. Left ventricular systolic function is normal with a 55-60% estimated ejection fraction.  2. Spectral Doppler shows an impaired relaxation pattern of left ventricular diastolic filling.  3. Mild to moderate mitral valve regurgitation.  4. Mild to moderate tricuspid regurgitation visualized.  5. Moderate aortic valve stenosis.  6. Mild to moderately elevated pulmonary artery pressure. QUANTITATIVE DATA SUMMARY: 2D MEASUREMENTS:                           Normal Ranges: IVSd:          0.97 cm    (0.6-1.1cm) LVPWd:         0.98 cm    (0.6-1.1cm) LVIDd:         5.70 cm    (3.9-5.9cm) LVIDs:         4.26 cm LV Mass Index: 123.2 g/m2 LV % FS        25.2 % LA VOLUME:                              Normal Ranges: LA Vol A4C:       77.4 ml    (22+/-6mL/m2) LA Vol A2C:       83.5 ml LA Vol BP:        82.1 ml LA Vol Index A4C: 43.5 ml/m2 LA Vol Index A2C: 46.9 ml/m2 LA Vol Index BP:  46.1 ml/m2 LA Volume Index:  46.1 ml/m2 LA Vol A4C:       72.2 ml LA Vol A2C:       77.0 ml RA VOLUME BY A/L METHOD:                       Normal Ranges: RA Area A4C: 21.8 cm2 M-MODE MEASUREMENTS:                  Normal Ranges: Ao Root: 3.30 cm (2.0-3.7cm) LAs:     4.72 cm (2.7-4.0cm) LV SYSTOLIC FUNCTION BY 2D PLANIMETRY (MOD):                     Normal Ranges: EF-A4C View: 55.9 % (>=55%) EF-A2C View: 56.5 % EF-Biplane:  55.9 % LV DIASTOLIC FUNCTION:                             Normal Ranges: MV Peak E:      1.91 m/s    (0.7-1.2 m/s) MV Peak A:      0.93 m/s    (0.42-0.7 m/s) E/A Ratio:      2.06        (1.0-2.2) MV e'           0.09 m/s    (>8.0) MV lateral e'   0.09 m/s MV medial e'    0.08 m/s E/e' Ratio:     21.21       (<8.0) PulmV Sys Ham:  62.35 cm/s PulmV Alvarez Ham: 121.32 cm/s PulmV S/D Ham:  0.51 MITRAL VALVE:                       Normal Ranges: MV Vmax:    2.02 m/s  (<=1.3m/s) MV peak P.3  mmHg (<5mmHg) MV mean P.2 mmHg  (<2mmHg) MV VTI:     48.44 cm  (10-13cm) MV DT:      167 msec  (150-240msec) MITRAL INSUFFICIENCY:                         Normal Ranges: MR VTI:     192.66 cm MR Vmax:    568.98 cm/s MR Volume:  33.56 ml MR Flow Rt: 99.12 ml/s MR EROA:    0.17 cm2 AORTIC VALVE:                                    Normal Ranges: AoV Vmax:                2.62 m/s  (<=1.7m/s) AoV Peak P.5 mmHg (<20mmHg) AoV Mean P.0 mmHg (1.7-11.5mmHg) LVOT Max Ham:            0.99 m/s  (<=1.1m/s) AoV VTI:                 58.21 cm  (18-25cm) LVOT VTI:                21.96 cm LVOT Diameter:           1.95 cm   (1.8-2.4cm) AoV Area, VTI:           1.13 cm2  (2.5-5.5cm2) AoV Area,Vmax:           1.13 cm2  (2.5-4.5cm2) AoV Dimensionless Index: 0.38 AORTIC INSUFFICIENCY: AI Vmax:       3.91 m/s AI Half-time:  520 msec AI Decel Time: 1794 msec AI Decel Rate: 218.17 cm/s2  RIGHT VENTRICLE: RV Basal 4.30 cm RV Mid   2.60 cm RV Major 7.7 cm TAPSE:   27.0 mm RV s'    0.18 m/s TRICUSPID VALVE/RVSP:                             Normal Ranges: Peak TR Velocity: 3.46 m/s RV Syst Pressure: 50.9 mmHg (< 30mmHg) PULMONIC VALVE:                      Normal Ranges: PV Max Ham: 1.5 m/s  (0.6-0.9m/s) PV Max P.8 mmHg Pulmonary Veins: PulmV Alvarez Ham: 121.32 cm/s PulmV S/D Ham:  0.51 PulmV Sys Ham:  62.35 cm/s  [id number] Nohelia Soto MD Electronically signed on 2024 at 11:22:42 AM  ** Final **           Assessment/Plan   Principal Problem:    Shortness of breath    LHC in  showed normal coronary arteries        Acute on chronic diastolic CHF with mildly reduced EF 45-50%  -Hx of tachycardia induced CMO and hx of VT  -  -D dimer 1103  -CXR showed small right pleural effusion  -CTA negative for PE showed cardiomegaly and CHF  -I reviewed the Echocardiograms as above  -Strict I & Os  -Daily weights  -2gm na diet  -1500mL fluid restriction  -Recently had Bumex decreased due to hypotension  -Cont  Bumex PO  -Given additional Bumex 1mg IV x1 (8/8)  -Restart PM dose of Bumex 1mg in addition to 2mg in am  -If BP low, suggest midodrine      2. Elevated troponins-Non MI elevation  -[facility] as above  -CTA negative  -Troponin 35, 27  -EKG atrial paced with nonspecific T wave abnormalities  -Cont plavix, statin  -Allergy to BB     3. Asthma/COPD exac  -steroids  -bronchodilators  -Oxygen support  -CTA showed CHF and cardiomegaly     4. Paroxysmal atrial fibrillation  -Currently atrial paced  -[employer] PCM/ICD interrogated during last admit, no episodes since prior to watchman, battery life 1 year  -Not on AC, has watchman  -Cont plavix  -Cont digoxin and diltiazem  -Digoxin level 0.77  -Monitor on tele     5. Chronic anemia  -Hgb 7.7->6.8 today  -Iron studies reviewed, iron 21  -Given Venofer 100mg IV x1 (8/8)  -Started Ferrous Sulfate daily->pt reports severe constipation in the past  -Advise transfuse 1 unit PRBC  -Suggest outpt iron infusions  -Monitor     6. Hyperlipidemia  -Cont statin        Amelia Kiser, NAILA-CNP

## 2024-08-09 NOTE — HOSPITAL COURSE
"Trina Elias, a 74 y/o F with PMHx of hypertension, hyperlipidemia, HFmrEF/systolic (EF45-50% in July 2024) and diastolic HF, paroxysmal A-fib status post PCM/ICD and watchman procedure, central sleep apnea on BiPAP at night, COPD/asthma not on nasal cannula oxygen at home, chronic anemia/iron deficiency anemia, CKD, multiple abdominal surgeries, and RA presented to the ED with dyspnea on exertion.     ED Course:  Patient arrived to the ED on 8/8 in the early morning with a chief complaint of dyspnea on exertion and wheezing. On arrival, EMS reported she was hypoxic with SpO2 in the \"high 80s.\" She was placed on NC and started saturating at 100%. Chest X-ray showed stable small right pleural effusion and mild bibasilar atelectasis and CT angio chest was negative for PE but showed cardiomegaly/CHF with no significant pulmonary effusion.  She received Bumex IV 2 mg once, DuoNeb, magnesium sulfate 2 g, and Solu-Medrol 125 mg IV once. Patient was placed on BiPAP and her pressure dropped, she received 500 mL of IV fluids.     Inpatient Course:  On 8/8, the night team admitted the patient to the floor. Her home Bumex 2 mg daily was restarted and cardiology consult was requested. Pt was started on prednisone 40 mg daily and albuterol nebs TID for acute hypoxic respiratory failure/COPD exacerbation.     On the morning of 8/9, the patient reported to feel better and wanted to return home. She received 1 unit of blood given that her Hgb continued to decreased to 6.8.    - Patient discharged on Bumex 2mg qAM and 1mg qPM. Follow up with cardiology   - albuterol and short course of prednisone.    "

## 2024-08-09 NOTE — CARE PLAN
The clinical goals for the shift include Pt will remain HDS throughout shift.      Problem: Pain  Goal: Takes deep breaths with improved pain control throughout the shift  Outcome: Progressing     Problem: Pain - Adult  Goal: Verbalizes/displays adequate comfort level or baseline comfort level  Outcome: Progressing     Problem: Safety - Adult  Goal: Free from fall injury  Outcome: Progressing     Problem: Discharge Planning  Goal: Discharge to home or other facility with appropriate resources  Outcome: Progressing     Problem: Chronic Conditions and Co-morbidities  Goal: Patient's chronic conditions and co-morbidity symptoms are monitored and maintained or improved  Outcome: Progressing

## 2024-08-09 NOTE — PROGRESS NOTES
"Trina Elias is a 73 y.o. female on day 1 of admission presenting with Shortness of breath.    Subjective   Patient reports she is still having SOB but it has improved, denies cp, f,c,n,v,d.     Objective     Visit Vitals  /67 (BP Location: Right arm, Patient Position: Lying)   Pulse 60   Temp 36.8 °C (98.2 °F) (Temporal)   Resp 22       Physical Exam  Physical Exam  Constitutional:       General: She is in moderate distress     Appearance: She is ill-appearing  HENT:      Head: Normocephalic and atraumatic.   Cardiovascular:      Rate and Rhythm: RRR  Pulmonary:      Effort: no increased WOB, wheezing on RA     Breath sounds: Wheezing, bilateral bibasilar crackles  Abdominal:      Palpations: Abdomen is soft.      Tenderness: There is no abdominal tenderness.   Musculoskeletal:         General: No swelling.      Right lower leg: No edema.      Left lower leg: No edema.   Neurological:      Mental Status: She is oriented to person, place, and time.     Last Recorded Vitals  Blood pressure 111/67, pulse 60, temperature 36.8 °C (98.2 °F), temperature source Temporal, resp. rate 22, height 1.626 m (5' 4.02\"), weight 79 kg (174 lb 2.6 oz), SpO2 93%.  Intake/Output last 3 Shifts:  I/O last 3 completed shifts:  In: 340 (4.3 mL/kg) [P.O.:340]  Out: 2000 (25.3 mL/kg) [Urine:2000 (0.7 mL/kg/hr)]  Weight: 79 kg       Assessment/Plan   Principal Problem:    Shortness of breath    Assessment and Plan   Trina Elias, a 72 y/o F with PMHx of hypertension, hyperlipidemia, HFmrEF/systolic (EF45-50% in July 2024) and diastolic HF, paroxysmal A-fib status post PCM/ICD and watchman procedure, central sleep apnea on BiPAP at night, COPD/asthma not on nasal cannula oxygen at home, chronic anemia/iron deficiency anemia, CKD, multiple abdominal surgeries, and RA presented to the ED with dyspnea on exertion.     Acute Medical Issues:  #Acute hypoxic respiratory failure 2/2 COPD exacerbation and volume overload ISO acute decompensated " HF-Hx of HFmrEF/systolic (EF45-50% in July 2024) and diastolic HF  #Hx Asthma/COPD   - weight increased 72-->79kg in two weeks  - Patient admitted for dyspnea on exertion in the last week. Not on O2 at home   - CT Angio chest negative for PE, noted moderate cardiomegaly. Small effusions, right-greater-than-left. Probable underlying mild CHF/edema seen in the lungs  - Follows with Dr. Soto/Neo; recently decreased to 2mg once daily from twice daily (2mg in the am and 1mg in the pm total 3mg) iso of hypotension    plan  - failed o2 walk test  - Cardiology recommends resuming her home Bumex 2 mg twice daily.   - Continue prednisone 40mg daily and albuterol nebs TID.   - Continue home montelukast  - Continue to monitor Is & Os (-1L on 8/8)    -Echo 07/25/2024 showed The left ventricular systolic function is mildly decreased, with a visually estimated ejection fraction of 45-50%.Spectral Doppler shows a pseudonormal pattern of left ventricular diastolic filling. There is normal right ventricular global systolic function. There is moderate mitral annular calcification. Aortic valve sclerosis. Moderate aortic valve regurgitation. Normal estimated PASP and CVP.  -Wean O2 as tolerated     #Acute on Chronic anemia/GRACIE: Hgb on admission 7.7 (BL 8-9)   - Fe 21  - s/p 1U pRBCs on 8/9  - s/p iron sucrose 100mg  - recent Colonoscopy, EGD    Plan  - cannot tolerate PO iron supplement  - recommend she cont. Work up as outpt    Chronic Medical Issues:   #HTN: Continue home Cardizem   #HLD: Continue home statin   #CKD: Cr on admission 1.1 (baseline 0.9-1~) Strict Is Os, Monitor kidney function and avoid nephrotoxic meds. Received contrast in the ED.    #Nausea/GERD: continue Zofran PRN, famotidine, and PPI   #Chronic pain: Continue home percocet TID PRN    #A-fib status post PCM/ICD and watchman procedure: Continue home Digoxin (ordered level), Cardizem and Plavix. Not on DOAC given watchman   #RA: continue home Hydroxychloroquine  and Sulfasalazine. Voltaren gel PRN     #Constipation: continue home miralax and bisacodyl PRN      Fluids: PO  Electrolytes: replete as needed  Nutrition: Cardiac   GI Prophylaxis: PPI   DVT Prophylaxis: Lovenox subcutaneous     Paulino Appiah MD

## 2024-08-09 NOTE — PROCEDURES
Reason For Consult  Walking   Desaturation  Study      Relevant Results  Patient was  walked on  room air x 6 minutes   Patient lying in bed saturation on room air 95% pulse rate 76  Sitting at side of bed on room air saturation 93% pulse rate 88  Patient walked 50 feet (slowly) saturation 93% pulse 88  Patient  walking back to room on room air, she became SOB with audible wheeze saturation of 93% and pulse 88  Back to room and saturation was 93% and pulse 88 on room air  Placed back on 2 lpm nc and Albuterol tx given   Nurses notified of patients results and condition  Assessment/Plan    Notify Dr. Tyler of results        Deanna Augustin, RRT

## 2024-08-10 VITALS
BODY MASS INDEX: 29.73 KG/M2 | RESPIRATION RATE: 17 BRPM | OXYGEN SATURATION: 96 % | HEIGHT: 64 IN | DIASTOLIC BLOOD PRESSURE: 64 MMHG | WEIGHT: 174.16 LBS | TEMPERATURE: 95.5 F | HEART RATE: 65 BPM | SYSTOLIC BLOOD PRESSURE: 114 MMHG

## 2024-08-10 LAB
ALBUMIN SERPL BCP-MCNC: 3.8 G/DL (ref 3.4–5)
ANION GAP SERPL CALC-SCNC: 14 MMOL/L (ref 10–20)
BUN SERPL-MCNC: 20 MG/DL (ref 6–23)
CALCIUM SERPL-MCNC: 8.4 MG/DL (ref 8.6–10.3)
CHLORIDE SERPL-SCNC: 105 MMOL/L (ref 98–107)
CO2 SERPL-SCNC: 25 MMOL/L (ref 21–32)
CREAT SERPL-MCNC: 0.98 MG/DL (ref 0.5–1.05)
EGFRCR SERPLBLD CKD-EPI 2021: 61 ML/MIN/1.73M*2
ERYTHROCYTE [DISTWIDTH] IN BLOOD BY AUTOMATED COUNT: 18.1 % (ref 11.5–14.5)
GLUCOSE SERPL-MCNC: 75 MG/DL (ref 74–99)
HCT VFR BLD AUTO: 30 % (ref 36–46)
HGB BLD-MCNC: 8.5 G/DL (ref 12–16)
MCH RBC QN AUTO: 23.7 PG (ref 26–34)
MCHC RBC AUTO-ENTMCNC: 28.3 G/DL (ref 32–36)
MCV RBC AUTO: 84 FL (ref 80–100)
NRBC BLD-RTO: 0 /100 WBCS (ref 0–0)
PHOSPHATE SERPL-MCNC: 3.6 MG/DL (ref 2.5–4.9)
PLATELET # BLD AUTO: 186 X10*3/UL (ref 150–450)
POTASSIUM SERPL-SCNC: 3.8 MMOL/L (ref 3.5–5.3)
RBC # BLD AUTO: 3.58 X10*6/UL (ref 4–5.2)
SODIUM SERPL-SCNC: 140 MMOL/L (ref 136–145)
WBC # BLD AUTO: 7.9 X10*3/UL (ref 4.4–11.3)

## 2024-08-10 PROCEDURE — 94760 N-INVAS EAR/PLS OXIMETRY 1: CPT

## 2024-08-10 PROCEDURE — 9420000001 HC RT PATIENT EDUCATION 5 MIN

## 2024-08-10 PROCEDURE — 84100 ASSAY OF PHOSPHORUS: CPT

## 2024-08-10 PROCEDURE — 99239 HOSP IP/OBS DSCHRG MGMT >30: CPT

## 2024-08-10 PROCEDURE — 94668 MNPJ CHEST WALL SBSQ: CPT

## 2024-08-10 PROCEDURE — 85027 COMPLETE CBC AUTOMATED: CPT

## 2024-08-10 PROCEDURE — 36415 COLL VENOUS BLD VENIPUNCTURE: CPT

## 2024-08-10 PROCEDURE — 2500000001 HC RX 250 WO HCPCS SELF ADMINISTERED DRUGS (ALT 637 FOR MEDICARE OP)

## 2024-08-10 PROCEDURE — 94761 N-INVAS EAR/PLS OXIMETRY MLT: CPT

## 2024-08-10 PROCEDURE — 2500000002 HC RX 250 W HCPCS SELF ADMINISTERED DRUGS (ALT 637 FOR MEDICARE OP, ALT 636 FOR OP/ED): Performed by: INTERNAL MEDICINE

## 2024-08-10 PROCEDURE — 94640 AIRWAY INHALATION TREATMENT: CPT

## 2024-08-10 PROCEDURE — 2500000002 HC RX 250 W HCPCS SELF ADMINISTERED DRUGS (ALT 637 FOR MEDICARE OP, ALT 636 FOR OP/ED)

## 2024-08-10 PROCEDURE — 2500000004 HC RX 250 GENERAL PHARMACY W/ HCPCS (ALT 636 FOR OP/ED)

## 2024-08-10 RX ORDER — PREDNISONE 20 MG/1
40 TABLET ORAL DAILY
Qty: 6 TABLET | Refills: 0 | Status: SHIPPED | OUTPATIENT
Start: 2024-08-11 | End: 2024-08-14

## 2024-08-10 RX ORDER — ALBUTEROL SULFATE 0.83 MG/ML
2.5 SOLUTION RESPIRATORY (INHALATION)
Qty: 270 ML | Refills: 0 | Status: SHIPPED | OUTPATIENT
Start: 2024-08-10 | End: 2024-09-09

## 2024-08-10 RX ORDER — BUMETANIDE 1 MG/1
1 TABLET ORAL
Qty: 30 TABLET | Refills: 1 | Status: SHIPPED | OUTPATIENT
Start: 2024-08-10 | End: 2024-10-09

## 2024-08-10 RX ORDER — BISACODYL 10 MG/1
10 SUPPOSITORY RECTAL DAILY PRN
Qty: 5 SUPPOSITORY | Refills: 0 | Status: SHIPPED | OUTPATIENT
Start: 2024-08-10

## 2024-08-10 ASSESSMENT — COGNITIVE AND FUNCTIONAL STATUS - GENERAL
DAILY ACTIVITIY SCORE: 24
CLIMB 3 TO 5 STEPS WITH RAILING: A LITTLE
MOBILITY SCORE: 23

## 2024-08-10 ASSESSMENT — PAIN DESCRIPTION - ORIENTATION: ORIENTATION: LEFT

## 2024-08-10 ASSESSMENT — PAIN DESCRIPTION - LOCATION: LOCATION: SHOULDER

## 2024-08-10 ASSESSMENT — PAIN SCALES - GENERAL
PAINLEVEL_OUTOF10: 2
PAINLEVEL_OUTOF10: 10 - WORST POSSIBLE PAIN

## 2024-08-10 NOTE — CARE PLAN
The patient's goals for the shift include      The clinical goals for the shift include patient remain hemodynamically stable during shift      Problem: Pain  Goal: Takes deep breaths with improved pain control throughout the shift  Outcome: Progressing     Problem: Pain  Goal: Turns in bed with improved pain control throughout the shift  Outcome: Progressing     Problem: Pain  Goal: Walks with improved pain control throughout the shift  Outcome: Progressing     Problem: Pain  Goal: Performs ADL's with improved pain control throughout shift  Outcome: Progressing     Problem: Pain  Goal: Participates in PT with improved pain control throughout the shift  Outcome: Progressing

## 2024-08-10 NOTE — PROGRESS NOTES
Trina Elias is a 73 y.o. female on day 2 of admission presenting with Shortness of breath.      Subjective     Trina is laying in bed, complaining of bilateral shoulder pain. She denies having any shortness of breath and is eager to go home today. Currently on RA, RT at bedside to give breathing treatment.     Review of systems:  Constitutional: negative for fever, chills, or malaise  Neuro: negative for dizziness, headache, numbness, tingling  ENT: Negative for nasal congestion or sore throat  Resp: negative for shortness of breath, cough, or wheezing  CV: negative for chest pain, palpitations  GI: negative for abd pain, nausea, vomiting or diarrhea  : negative for dysuria, frequency, or urgency  Skin: negative for lesions, wounds, or rash  Musculoskeletal: Negative for weakness, myalgia, or arthralgia  Endocrine: Negative for polyuria or polydipsia         Objective   Constitutional: Well developed, awake/alert/oriented x3, no distress, alert and cooperative  Eyes: PERRL, EOMI, clear sclera  ENMT: mucous membranes moist, no apparent injury, no lesions seen  Head/Neck: Neck supple, no apparent injury, thyroid without mass or tenderness, No JVD, trachea midline, no bruits  Respiratory/Thorax: Patent airways, CTAB, normal breath sounds with good chest expansion, thorax symmetric  Cardiovascular: Regular, rate and rhythm, no murmurs, 2+ equal pulses of the extremities, normal S 1and S 2  Gastrointestinal: Nondistended, soft, non-tender, no rebound tenderness or guarding, no masses palpable, no organomegaly, +BS, no bruits  Musculoskeletal: ROM intact, no joint swelling, normal strength  Extremities: normal extremities, no cyanosis edema, contusions or wounds, no clubbing  Neurological: alert and oriented x3, intact senses, motor, response and reflexes, normal strength  Lymphatic: No significant lymphadenopathy  Psychological: Appropriate mood and behavior  Skin: Warm and dry, no lesions, no rashes      Last  "Recorded Vitals  /64 (BP Location: Right arm, Patient Position: Lying)   Pulse 65   Temp 35.3 °C (95.5 °F) (Temporal)   Resp 17   Ht 1.626 m (5' 4.02\")   Wt 79 kg (174 lb 2.6 oz)   SpO2 96%   BMI 29.88 kg/m²     Intake/Output last 3 Shifts:  I/O last 3 completed shifts:  In: 513.8 (6.5 mL/kg) [P.O.:240; Blood:273.8]  Out: 650 (8.2 mL/kg) [Urine:650 (0.2 mL/kg/hr)]  Weight: 79 kg   No intake/output data recorded.    Relevant Results  Scheduled medications  albuterol, 2.5 mg, nebulization, TID  bumetanide, 1 mg, oral, Daily with evening meal  bumetanide, 2 mg, oral, Daily  clopidogrel, 75 mg, oral, Daily  digoxin, 125 mcg, oral, Daily  dilTIAZem CD, 120 mg, oral, Nightly  enoxaparin, 40 mg, subcutaneous, q24h  famotidine, 40 mg, oral, Daily  hydroxychloroquine, 300 mg, oral, Daily  montelukast, 10 mg, oral, Nightly  pantoprazole, 40 mg, oral, BID  polyethylene glycol, 17 g, oral, Daily  predniSONE, 40 mg, oral, Daily  rosuvastatin, 20 mg, oral, Daily  sulfaSALAzine, 500 mg, oral, BID      Continuous medications     PRN medications  PRN medications: albuterol, bisacodyl, calcium carbonate, clobetasol, diclofenac sodium, naloxone, nitroglycerin, ondansetron, oxyCODONE-acetaminophen, oxygen, oxygen, sucralfate    Results for orders placed or performed during the hospital encounter of 08/08/24 (from the past 24 hour(s))   CBC   Result Value Ref Range    WBC 8.6 4.4 - 11.3 x10*3/uL    nRBC 0.0 0.0 - 0.0 /100 WBCs    RBC 3.47 (L) 4.00 - 5.20 x10*6/uL    Hemoglobin 8.3 (L) 12.0 - 16.0 g/dL    Hematocrit 27.2 (L) 36.0 - 46.0 %    MCV 78 (L) 80 - 100 fL    MCH 23.9 (L) 26.0 - 34.0 pg    MCHC 30.5 (L) 32.0 - 36.0 g/dL    RDW 18.3 (H) 11.5 - 14.5 %    Platelets 222 150 - 450 x10*3/uL   Renal function panel   Result Value Ref Range    Glucose 75 74 - 99 mg/dL    Sodium 140 136 - 145 mmol/L    Potassium 3.8 3.5 - 5.3 mmol/L    Chloride 105 98 - 107 mmol/L    Bicarbonate 25 21 - 32 mmol/L    Anion Gap 14 10 - 20 mmol/L "    Urea Nitrogen 20 6 - 23 mg/dL    Creatinine 0.98 0.50 - 1.05 mg/dL    eGFR 61 >60 mL/min/1.73m*2    Calcium 8.4 (L) 8.6 - 10.3 mg/dL    Phosphorus 3.6 2.5 - 4.9 mg/dL    Albumin 3.8 3.4 - 5.0 g/dL   CBC   Result Value Ref Range    WBC 7.9 4.4 - 11.3 x10*3/uL    nRBC 0.0 0.0 - 0.0 /100 WBCs    RBC 3.58 (L) 4.00 - 5.20 x10*6/uL    Hemoglobin 8.5 (L) 12.0 - 16.0 g/dL    Hematocrit 30.0 (L) 36.0 - 46.0 %    MCV 84 80 - 100 fL    MCH 23.7 (L) 26.0 - 34.0 pg    MCHC 28.3 (L) 32.0 - 36.0 g/dL    RDW 18.1 (H) 11.5 - 14.5 %    Platelets 186 150 - 450 x10*3/uL       CT angio chest for pulmonary embolism   Final Result   Cardiomegaly/CHF.        No significant pulmonary effusion.        MACRO:   None.        Signed by: Campbell Pearson 8/8/2024 3:29 AM   Dictation workstation:   [id number]      XR chest 1 view   Final Result   Stable small right pleural effusion. Mild bibasilar atelectasis.        MACRO:   None        Signed by: Ivette Guillory 8/8/2024 2:20 AM   Dictation workstation:   [id number]          Transthoracic Echo (TTE) Complete    Result Date: 7/25/2024   [facility], [address] [place] [address]               Tel [phone] and Fax [phone] TRANSTHORACIC ECHOCARDIOGRAM REPORT  Patient Name:      VERNON SALDANA VIA         Reading Physician:    [id number] Neo Gutierrez MD Study Date:        7/25/2024            Ordering Provider:    [id number] SETPHON YARBROUGH MRN/PID:           [id number]             Fellow: Accession#:        [id number]         Nurse: Date of Birth/Age: 1950 / 73 years Sonographer:          Viola Lau                                                               RDCS Gender:            F                    Additional Staff: Height:            162.56 cm            Admit Date:           7/24/2024 Weight:            72.12 kg              Admission Status:     Inpatient -                                                               Routine BSA / BMI:         1.77 m2 / 27.29      Encounter#:           [id number]                    kg/m2 Blood Pressure:    108/69 mmHg          Department Location:  [facility] Non                                                               Invasive Study Type:    TRANSTHORACIC ECHO (TTE) COMPLETE Diagnosis/ICD: Chest pain, unspecified-R07.9 Indication:    CP CPT Code:      Echo Complete w Full Doppler-42344 Patient History: Pertinent History: A-Fib, CAD, Hyperlipidemia, COPD, Chest Pain and S/P Left                    shoulder surgery,Watchman, CKD. Study Detail: The following Echo studies were performed: 2D, M-Mode, Doppler and               color flow. Technically challenging study due to patient lying in               supine position and S/P left shoulder surgery. Patient has a               defibrillator.  PHYSICIAN INTERPRETATION: Left Ventricle: The left ventricular systolic function is mildly decreased, with a visually estimated ejection fraction of 45-50%. There are no regional wall motion abnormalities. The left ventricular cavity size is normal. There is mild concentric left ventricular hypertrophy. Spectral Doppler shows a pseudonormal pattern of left ventricular diastolic filling. Left Atrium: The left atrium is normal in size. Right Ventricle: The right ventricle is normal in size. There is normal right ventricular global systolic function. A device is visualized in the right ventricle. Right Atrium: The right atrium is normal in size. Aortic Valve: The aortic valve is trileaflet. There is mild aortic valve cusp calcification. There is evidence of mildly elevated transaortic gradients consistent with sclerosis of the aortic valve. The aortic valve dimensionless index is 0.71. There is moderate aortic valve regurgitation. The peak instantaneous gradient of the aortic valve is 14.6 mmHg. The mean gradient  of the aortic valve is 8.0 mmHg. Mitral Valve: The mitral valve is normal in structure. There is moderate mitral annular calcification. There is mild mitral valve regurgitation. Tricuspid Valve: The tricuspid valve is structurally normal. There is mild tricuspid regurgitation. Pulmonic Valve: The pulmonic valve is structurally normal. There is physiologic pulmonic valve regurgitation. Pericardium: There is no pericardial effusion noted. Aorta: The aortic root is normal. Pulmonary Artery: The tricuspid regurgitant velocity is 2.43 m/s, and with an estimated right atrial pressure of 3 mmHg, the estimated pulmonary artery pressure is normal with the RVSP at 26.6 mmHg. Pulmonary Veins: The pulmonary veins appear normal and return normally to the left atrium. Systemic Veins: The inferior vena cava appears to be of normal size. There is IVC inspiratory collapse greater than 50%.  CONCLUSIONS:  1. The left ventricular systolic function is mildly decreased, with a visually estimated ejection fraction of 45-50%.  2. Spectral Doppler shows a pseudonormal pattern of left ventricular diastolic filling.  3. There is normal right ventricular global systolic function.  4. There is moderate mitral annular calcification.  5. Aortic valve sclerosis.  6. Moderate aortic valve regurgitation.  7. Normal estimated PASP and CVP. QUANTITATIVE DATA SUMMARY: 2D MEASUREMENTS:                           Normal Ranges: IVSd:          0.97 cm    (0.6-1.1cm) LVPWd:         1.16 cm    (0.6-1.1cm) LVIDd:         5.27 cm    (3.9-5.9cm) LVIDs:         4.48 cm LV Mass Index: 122.0 g/m2 LV % FS        15.0 % LA VOLUME:                               Normal Ranges: LA Vol A4C:        46.9 ml    (22+/-6mL/m2) LA Vol A2C:        46.7 ml LA Vol BP:         46.9 ml LA Vol Index A4C:  26.5ml/m2 LA Vol Index A2C:  26.3 ml/m2 LA Vol Index BP:   26.4 ml/m2 LA Area A4C:       17.6 cm2 LA Area A2C:       17.5 cm2 LA Major Axis A4C: 5.6 cm LA Major Axis A2C: 5.6 cm  LA Volume Index:   24.7 ml/m2 LA Vol A4C:        42.8 ml LA Vol A2C:        43.8 ml RA VOLUME BY A/L METHOD:                       Normal Ranges: RA Area A4C: 23.0 cm2 AORTA MEASUREMENTS:                    Normal Ranges: Asc Ao, d: 3.70 cm (2.1-3.4cm) LV SYSTOLIC FUNCTION BY 2D PLANIMETRY (MOD):                      Normal Ranges: EF-A4C View:    43 % (>=55%) EF-A2C View:    42 % EF-Biplane:     44 % EF-Visual:      48 % LV EF Reported: 48 % LV DIASTOLIC FUNCTION:                     Normal Ranges: MV Peak E: 1.46 m/s (0.7-1.2 m/s) MITRAL VALVE:                      Normal Ranges: MV Vmax:    1.31 m/s (<=1.3m/s) MV peak P.9 mmHg (<5mmHg) MV mean PG: 3.0 mmHg (<2mmHg) MV DT:      197 msec (150-240msec) MITRAL INSUFFICIENCY:                           Normal Ranges: PISA Radius:  0.3 cm MR VTI:       178.00 cm MR Vmax:      496.00 cm/s MR Alias Ham: 35.1 cm/s MR Volume:    7.12 ml MR Flow Rt:   19.85 ml/s MR EROA:      0.04 cm2 AORTIC VALVE:                                    Normal Ranges: AoV Vmax:                1.91 m/s  (<=1.7m/s) AoV Peak P.6 mmHg (<20mmHg) AoV Mean P.0 mmHg  (1.7-11.5mmHg) LVOT Max Ham:            1.33 m/s  (<=1.1m/s) AoV VTI:                 37.70 cm  (18-25cm) LVOT VTI:                26.90 cm LVOT Diameter:           2.00 cm   (1.8-2.4cm) AoV Area, VTI:           2.24 cm2  (2.5-5.5cm2) AoV Area,Vmax:           2.19 cm2  (2.5-4.5cm2) AoV Dimensionless Index: 0.71 AORTIC INSUFFICIENCY: AI Vmax:       4.01 m/s AI Half-time:  441 msec AI Decel Rate: 267.00 cm/s2  RIGHT VENTRICLE: RV Basal 3.67 cm RV Mid   2.60 cm RV Major 8.8 cm TAPSE:   28.0 mm RV s'    0.12 m/s TRICUSPID VALVE/RVSP:                             Normal Ranges: Peak TR Velocity: 2.43 m/s RV Syst Pressure: 26.6 mmHg (< 30mmHg) IVC Diam:         1.58 cm PULMONIC VALVE:                      Normal Ranges: PV Max Ham: 1.5 m/s  (0.6-0.9m/s) PV Max P.6 mmHg  [id number] Neo Gutierrez MD  Electronically signed on 7/25/2024 at 6:23:17 PM  ** Final **     Transthoracic Echo (TTE) Limited    Result Date: 7/16/2024   [facility], [address] [place] [address]                Tel [phone] and Fax [phone] TRANSTHORACIC ECHOCARDIOGRAM REPORT  Patient Name:      VERNON SALDANA VIA         Reading Physician:    [id number] Monica Chen MD Study Date:        7/16/2024            Ordering Provider:    [id number] KRISTAL SNYDER MRN/PID:           [id number]             Fellow: Accession#:        [id number]         Nurse: Date of Birth/Age: 1950 / 73 years Sonographer:          Tobi Carols RDCS Gender:            F                    Additional Staff: Height:            162.56 cm            Admit Date:           7/16/2024 Weight:            70.31 kg             Admission Status:     Inpatient -                                                               Routine BSA / BMI:         1.76 m2 / 26.61      Encounter#:           [id number]                    kg/m2 Blood Pressure:    /                    Department Location:  [facility]                                                               Cath Lab Study Type:    TRANSTHORACIC ECHO (TTE) LIMITED Diagnosis/ICD: Unspecified atrial fibrillation-I48.91 Indication:    Atrial fibrillation; Preop cardiovascular exam CPT Code:      Echo Limited-28220 Patient History: Pertinent History: A-fib; CAD; BARBARA; HTN; HLD; CKD; PPM. Study Detail: The following Echo studies were performed: 2D and M-Mode.               Technically challenging study due to body habitus and patient               lying in supine position.  PHYSICIAN INTERPRETATION: Left Ventricle: Left ventricular ejection fraction is low normal, by visual estimate at 50-55%. The patient is in atrial  fibrillation which may influence the estimate of left ventricular function and transvalvular flows. Wall motion is abnormal. The left ventricular cavity size was not assessed. Left ventricular diastolic filling was not assessed. Possible inferior wall motion hypokinesis. Left Atrium: The left atrium was not assessed. Right Ventricle: The right ventricle was not well visualized. There is normal right ventricular global systolic function. A device is visualized in the right ventricle. Right Atrium: The right atrium was not assessed. There is a device visualized in the right atrium. Aortic Valve: The aortic valve is trileaflet. There is mild aortic valve cusp calcification. Aortic valve regurgitation was not assessed. Mitral Valve: The mitral valve is mildly thickened. There is moderate mitral annular calcification. Mitral valve regurgitation was not assessed. Tricuspid Valve: The tricuspid valve was not well visualized. Tricuspid regurgitation was not assessed. Pulmonic Valve: The pulmonic valve is not well visualized. Pulmonic valve regurgitation was not assessed. Pericardium: There is a trivial pericardial effusion. Aorta: The aortic root is normal. Systemic Veins: The inferior vena cava appears to be of normal size. There is IVC inspiratory collapse greater than 50%. In comparison to the previous echocardiogram(s): Compared with the prior exam from 2/27/2024 there are no significant changes though today's exam is only a limited study withno assessment of valvular function.  CONCLUSIONS:  1. Left ventricular ejection fraction is low normal, by visual estimate at 50-55%.  2. Possible inferior wall motion hypokinesis.  3. There is normal right ventricular global systolic function.  4. There is moderate mitral annular calcification.  5. Compared with the prior exam from 2/27/2024 there are no significant changes though today's exam is only a limited study withno assessment of valvular function.  6. The patient is in  atrial fibrillation which may influence the estimate of left ventricular function and transvalvular flows. QUANTITATIVE DATA SUMMARY: AORTA MEASUREMENTS:                      Normal Ranges: Ao Sinus, d: 3.45 cm (2.1-3.5cm) LV SYSTOLIC FUNCTION BY 2D PLANIMETRY (MOD):                      Normal Ranges: EF-Visual:      53 % LV EF Reported: 53 % TRICUSPID VALVE/RVSP:                   Normal Ranges: IVC Diam: 2.03 cm  [id number] Monica Chen MD Electronically signed on 7/16/2024 at 3:11:21 PM  ** Final **     Transthoracic Echo (TTE) Limited    Result Date: 7/16/2024   [facility], [address] [place] [address]                Tel [phone] and Fax [phone] TRANSTHORACIC ECHOCARDIOGRAM REPORT  Patient Name:      VERNON SALDANA VIA         Reading Physician:    [id number] Neftali Calvillo MD Study Date:        7/16/2024            Ordering Provider:    [id number] SNYDER MRN/PID:           [id number]             Fellow:               [id number] Lucina Gill MD Accession#:        [id number]         Nurse: Date of Birth/Age: 1950 / 73 years Sonographer:          Tobi Carlos RDCS Gender:            F                    Additional Staff: Height:            162.56 cm            Admit Date:           7/16/2024 Weight:            71.22 kg             Admission Status:     Inpatient -                                                               Routine BSA / BMI:         1.76 m2 / 26.95      Encounter#:           [id number]                    kg/m2 Blood Pressure:    117/55 mmHg          Department Location:  [facility]                                                               Cath Lab Study Type:    TRANSTHORACIC ECHO (TTE) LIMITED Diagnosis/ICD:  Other specified postprocedural states-Z98.890 Indication:    Post LAAO CPT Code:      Echo Limited-31068 Patient History: Pertinent History: CAD; A-fib; BARBARA: HTN; HLD; CKD; PPM. Study Detail: The following Echo studies were performed: 2D and M-Mode.               Technically challenging study due to body habitus.  PHYSICIAN INTERPRETATION: Left Ventricle: Left ventricular ejection fraction is low normal, by visual estimate at 50-55%. There are no regional wall motion abnormalities. The left ventricular cavity size is normal. Left ventricular diastolic filling was not assessed. Left Atrium: The left atrium was not assessed. Right Ventricle: The right ventricle was not assessed. Right ventricular systolic function not assessed. A device is visualized in the right ventricle. Right Atrium: The right atrium was not assessed. There is a device visualized in the right atrium. Aortic Valve: The aortic valve is trileaflet. There is mild aortic valve cusp calcification. Aortic valve regurgitation was not assessed. Mitral Valve: The mitral valve is mildly thickened. There is moderate mitral annular calcification. Mitral valve regurgitation was not assessed. Tricuspid Valve: The tricuspid valve was not well visualized. Tricuspid regurgitation was not assessed. Right ventricular systolic pressure could not be accurately assessed in this patient. Pulmonic Valve: The pulmonic valve was not assessed. Pulmonic valve regurgitation was not assessed. Pericardium: There is a trivial pericardial effusion. Aorta: The aortic root is normal. Systemic Veins: The inferior vena cava was not well visualized. In comparison to the previous echocardiogram(s): Compared with study dated 2/27/2024, no significant change.  CONCLUSIONS:  1. Left ventricular ejection fraction is low normal, by visual estimate at 50-55%.  2. There is moderate mitral annular calcification.  3. There is a trivial pericardial effusion.  4. Compared with study dated  2/27/2024, no significant change. QUANTITATIVE DATA SUMMARY: LV SYSTOLIC FUNCTION BY 2D PLANIMETRY (MOD):                      Normal Ranges: EF-Visual:      53 % LV EF Reported: 53 %  [id number] Neftali Calvillo MD Electronically signed on 7/16/2024 at 3:02:13 PM  ** Final **     Transthoracic Echo (TTE) Complete    Result Date: 2/27/2024   [facility], [address] [place] [address]               Tel [phone] and Fax [phone] TRANSTHORACIC ECHOCARDIOGRAM REPORT  Patient Name:      VERNON SALDANA VIA         Reading Physician:    [id number] Nohelia Soto MD Study Date:        2/27/2024            Ordering Provider:    [id number] NIKOLE LOVE MRN/PID:           [id number]             Fellow: Accession#:        [id number]         Nurse: Date of Birth/Age: 1950 / 73 years Sonographer:          Dai Diaz RDCS Gender:            F                    Additional Staff: Height:            162.56 cm            Admit Date:           2/24/2024 Weight:            72.57 kg             Admission Status:     Inpatient -                                                               Routine BSA / BMI:         1.78 m2 / 27.46      Encounter#:           [id number]                    kg/m2                                         Department Location:  [facility] Non                                                               Invasive Blood Pressure: 131 /59 mmHg Study Type:    TRANSTHORACIC ECHO (TTE) COMPLETE Diagnosis/ICD: Shortness of breath-R06.02 Indication:    Dyspnea CPT Code:      Echo Complete w Full Doppler-98734 Patient History: Pertinent History: A-Fib and Chest Pain. elevated troponin, elevated BNP, mod                    AS. Study Detail: The following Echo studies were performed: 2D, M-Mode, Doppler and                color flow.  PHYSICIAN INTERPRETATION: Left Ventricle: The left ventricular systolic function is normal, with an estimated ejection fraction of 55-60%. There are no regional wall motion abnormalities. The left ventricular cavity size is normal. Spectral Doppler shows an impaired relaxation pattern of left ventricular diastolic filling. Left Atrium: The left atrium is mildly dilated. Right Ventricle: The right ventricle is normal in size. There is normal right ventricular global systolic function. Right Atrium: The right atrium is normal in size. Aortic Valve: The aortic valve is trileaflet. There is mild to moderate aortic valve cusp calcification. There is evidence of moderate aortic valve stenosis. There is trivial aortic valve regurgitation. The peak instantaneous gradient of the aortic valve is 27.5 mmHg. The mean gradient of the aortic valve is 16.0 mmHg. Mitral Valve: The mitral valve is mildly thickened. There is evidence of mild mitral valve stenosis. The doppler estimated mean and peak diastolic pressure gradients are 7.2 mmHg and 16.3 mmHg respectively. There is mild mitral annular calcification. There is mild to moderate mitral valve regurgitation. Tricuspid Valve: The tricuspid valve is structurally normal. There is mild to moderate tricuspid regurgitation. Pulmonic Valve: The pulmonic valve is not well visualized. There is trace to mild pulmonic valve regurgitation. Pericardium: There is no pericardial effusion noted. Aorta: The aortic root is normal. Pulmonary Artery: The tricuspid regurgitant velocity is 3.46 m/s, and with an estimated right atrial pressure of 3 mmHg, the estimated pulmonary artery pressure is mild to moderately elevated with the RVSP at 50.9 mmHg.  CONCLUSIONS:  1. Left ventricular systolic function is normal with a 55-60% estimated ejection fraction.  2. Spectral Doppler shows an impaired relaxation pattern of left ventricular diastolic filling.  3. Mild to moderate mitral  valve regurgitation.  4. Mild to moderate tricuspid regurgitation visualized.  5. Moderate aortic valve stenosis.  6. Mild to moderately elevated pulmonary artery pressure. QUANTITATIVE DATA SUMMARY: 2D MEASUREMENTS:                           Normal Ranges: IVSd:          0.97 cm    (0.6-1.1cm) LVPWd:         0.98 cm    (0.6-1.1cm) LVIDd:         5.70 cm    (3.9-5.9cm) LVIDs:         4.26 cm LV Mass Index: 123.2 g/m2 LV % FS        25.2 % LA VOLUME:                              Normal Ranges: LA Vol A4C:       77.4 ml    (22+/-6mL/m2) LA Vol A2C:       83.5 ml LA Vol BP:        82.1 ml LA Vol Index A4C: 43.5 ml/m2 LA Vol Index A2C: 46.9 ml/m2 LA Vol Index BP:  46.1 ml/m2 LA Volume Index:  46.1 ml/m2 LA Vol A4C:       72.2 ml LA Vol A2C:       77.0 ml RA VOLUME BY A/L METHOD:                       Normal Ranges: RA Area A4C: 21.8 cm2 M-MODE MEASUREMENTS:                  Normal Ranges: Ao Root: 3.30 cm (2.0-3.7cm) LAs:     4.72 cm (2.7-4.0cm) LV SYSTOLIC FUNCTION BY 2D PLANIMETRY (MOD):                     Normal Ranges: EF-A4C View: 55.9 % (>=55%) EF-A2C View: 56.5 % EF-Biplane:  55.9 % LV DIASTOLIC FUNCTION:                             Normal Ranges: MV Peak E:      1.91 m/s    (0.7-1.2 m/s) MV Peak A:      0.93 m/s    (0.42-0.7 m/s) E/A Ratio:      2.06        (1.0-2.2) MV e'           0.09 m/s    (>8.0) MV lateral e'   0.09 m/s MV medial e'    0.08 m/s E/e' Ratio:     21.21       (<8.0) PulmV Sys Ham:  62.35 cm/s PulmV Alvarez Ham: 121.32 cm/s PulmV S/D Ham:  0.51 MITRAL VALVE:                       Normal Ranges: MV Vmax:    2.02 m/s  (<=1.3m/s) MV peak P.3 mmHg (<5mmHg) MV mean P.2 mmHg  (<2mmHg) MV VTI:     48.44 cm  (10-13cm) MV DT:      167 msec  (150-240msec) MITRAL INSUFFICIENCY:                         Normal Ranges: MR VTI:     192.66 cm MR Vmax:    568.98 cm/s MR Volume:  33.56 ml MR Flow Rt: 99.12 ml/s MR EROA:    0.17 cm2 AORTIC VALVE:                                    Normal Ranges: AoV Vmax:                 2.62 m/s  (<=1.7m/s) AoV Peak P.5 mmHg (<20mmHg) AoV Mean P.0 mmHg (1.7-11.5mmHg) LVOT Max Ham:            0.99 m/s  (<=1.1m/s) AoV VTI:                 58.21 cm  (18-25cm) LVOT VTI:                21.96 cm LVOT Diameter:           1.95 cm   (1.8-2.4cm) AoV Area, VTI:           1.13 cm2  (2.5-5.5cm2) AoV Area,Vmax:           1.13 cm2  (2.5-4.5cm2) AoV Dimensionless Index: 0.38 AORTIC INSUFFICIENCY: AI Vmax:       3.91 m/s AI Half-time:  520 msec AI Decel Time: 1794 msec AI Decel Rate: 218.17 cm/s2  RIGHT VENTRICLE: RV Basal 4.30 cm RV Mid   2.60 cm RV Major 7.7 cm TAPSE:   27.0 mm RV s'    0.18 m/s TRICUSPID VALVE/RVSP:                             Normal Ranges: Peak TR Velocity: 3.46 m/s RV Syst Pressure: 50.9 mmHg (< 30mmHg) PULMONIC VALVE:                      Normal Ranges: PV Max Ham: 1.5 m/s  (0.6-0.9m/s) PV Max P.8 mmHg Pulmonary Veins: PulmV Alvarez Ham: 121.32 cm/s PulmV S/D Ham:  0.51 PulmV Sys Ham:  62.35 cm/s  [id number] oNhelia Soto MD Electronically signed on 2024 at 11:22:42 AM  ** Final **           Assessment/Plan   Principal Problem:    Shortness of breath      LHC in  showed normal coronary arteries      Acute on chronic diastolic CHF with mildly reduced EF 45-50%  -Hx of tachycardia induced CMO and hx of VT  -  -D dimer 1103  -CXR showed small right pleural effusion  -CTA negative for PE showed cardiomegaly and CHF  -I reviewed the Echocardiograms as above  -Strict I & Os  -Daily weights  -2gm na diet  -1500mL fluid restriction  -Recently had Bumex decreased due to hypotension  -Cont Bumex PO  -Given additional Bumex 1mg IV x1 ()  -Restart PM dose of Bumex 1mg in addition to 2mg in am  -If BP low, suggest midodrine   - Can start the midodrine outpatient if needed, she will follow up in one week in our office and call if her blood pressure is low.      2. Elevated troponins-Non MI elevation  -C as above  -CTA negative  -Troponin 35,  27  -EKG atrial paced with nonspecific T wave abnormalities  -Cont plavix, statin  -Allergy to BB     3. Asthma/COPD exac  -steroids  -bronchodilators  -Oxygen support  -CTA showed CHF and cardiomegaly     4. Paroxysmal atrial fibrillation  -Currently atrial paced  -[place] [facility] PCM/ICD interrogated during last admit, no episodes since prior to watchman, battery life 1 year  -Not on AC, has watchman  -Cont plavix  -Cont digoxin and diltiazem  -Digoxin level 0.77  -Monitor on tele     5. Chronic anemia  -Hgb 7.7->6.8 today  -Iron studies reviewed, iron 21  -Given Venofer 100mg IV x1 (8/8)  -Started Ferrous Sulfate daily->pt reports severe constipation in the past  -Advise transfuse 1 unit PRBC  -Suggest outpt iron infusions  -Monitor     6. Hyperlipidemia  -Cont statin    Lucy Kelly, APRN-CNP

## 2024-08-10 NOTE — DISCHARGE SUMMARY
"Discharge Diagnosis  Acute hypoxic respiratory failure  Acute on chronic diastolic CHF  COPD exacerbation  Iron deficiency anemia    Issues Requiring Follow-Up  Followup with cardiology after discharge within a week. Alert your cardiologist if Blood pressure is low at which time theyw ill address per consultant.     Test Results Pending At Discharge  Pending Labs       No current pending labs.            Hospital Course  Trina Elias, a 74 y/o F with PMHx of hypertension, hyperlipidemia, HFmrEF/systolic (EF45-50% in July 2024) and diastolic HF, paroxysmal A-fib status post PCM/ICD and watchman procedure, central sleep apnea on BiPAP at night, COPD/asthma not on nasal cannula oxygen at home, chronic anemia/iron deficiency anemia, CKD, multiple abdominal surgeries, and RA presented to the ED with dyspnea on exertion.     ED Course:  Patient arrived to the ED on 8/8 in the early morning with a chief complaint of dyspnea on exertion and wheezing. On arrival, EMS reported she was hypoxic with SpO2 in the \"high 80s.\" She was placed on NC and started saturating at 100%. Chest X-ray showed stable small right pleural effusion and mild bibasilar atelectasis and CT angio chest was negative for PE but showed cardiomegaly/CHF with no significant pulmonary effusion.  She received Bumex IV 2 mg once, DuoNeb, magnesium sulfate 2 g, and Solu-Medrol 125 mg IV once. Patient was placed on BiPAP and her pressure dropped, she received 500 mL of IV fluids.     Inpatient Course:  --On 8/8, the night team admitted the patient to the floor. Her home Bumex 2 mg daily was restarted and cardiology consult was requested. Pt was started on prednisone 40 mg daily and albuterol nebs TID for acute hypoxic respiratory failure/COPD exacerbation.   -- On the morning of 8/9, the patient reported to feel better and wanted to return home. She received 1 unit of blood given that her Hgb continued to decreased to 6.8.  - Patient discharged on Bumex 2mg qAM " and 1mg qPM. Follow up with cardiology. Alert them if blood pressure is low   - albuterol and short course of prednisone.    Pertinent Physical Exam At Time of Discharge  Visit Vitals  /64 (BP Location: Right arm, Patient Position: Lying)   Pulse 65   Temp 35.3 °C (95.5 °F) (Temporal)   Resp 17     Physical Exam  Constitutional:  Awake/Alert/Oriented to person place and time. No distress  Cardiovascular: Regular rate and rhythm, S1, S2. No extra heart sounds or murmurs  Respiratory: Clear to auscultation bilaterally. No wheezing, rales or rhonchi. Good chest wall expansion  Abdomen: Soft, Nontender. Bowel sounds appreciated  Extremities: Warm and dry.    Home Medications     Medication List      START taking these medications     predniSONE 20 mg tablet; Commonly known as: Deltasone; Take 2 tablets   (40 mg) by mouth once daily for 3 doses.; Start taking on: August 11, 2024     CHANGE how you take these medications     albuterol 2.5 mg /3 mL (0.083 %) nebulizer solution; Take 3 mL (2.5 mg)   by nebulization 3 times a day.; What changed: when to take this, reasons   to take this   * bumetanide 2 mg tablet; Commonly known as: Bumex; What changed:   Another medication with the same name was added. Make sure you understand   how and when to take each.   * bumetanide 1 mg tablet; Commonly known as: Bumex; Take 1 tablet (1 mg)   by mouth once daily in the evening. Take with meals.; What changed: You   were already taking a medication with the same name, and this prescription   was added. Make sure you understand how and when to take each.  * This list has 2 medication(s) that are the same as other medications   prescribed for you. Read the directions carefully, and ask your doctor or   other care provider to review them with you.     CONTINUE taking these medications     acetaminophen 325 mg tablet; Commonly known as: Tylenol   baclofen 10 mg tablet; Commonly known as: Lioresal; One tablet in the   morning and 2  tablets in the evening.   bisacodyl 10 mg suppository; Commonly known as: Dulcolax; Insert 1   suppository (10 mg) into the rectum once daily as needed for constipation.   Cardizem  mg 24 hr tablet; Generic drug: dilTIAZem LA   clobetasol 0.05 % cream; Commonly known as: Temovate; Apply topically 2   times a day.   clopidogrel 75 mg tablet; Commonly known as: Plavix; Take 1 tablet (75   mg) by mouth once daily for 365 doses.   diclofenac sodium 1 % gel; Commonly known as: Voltaren; Apply 4.5 inches   (4 g) topically 2 times a day as needed (joint pain).   digoxin 125 MCG tablet; Commonly known as: Lanoxin   famotidine 40 mg tablet; Commonly known as: Pepcid; Take 1 tablet (40   mg) by mouth once daily.   hydroxychloroquine 200 mg tablet; Commonly known as: Plaquenil; Take 1.5   tablets (300 mg) by mouth once daily.   Miralax 17 gram/dose powder; Generic drug: polyethylene glycol   montelukast 10 mg tablet; Commonly known as: Singulair; Take 1 tablet   (10 mg) by mouth once daily at bedtime.   naloxone 4 mg/0.1 mL nasal spray; Commonly known as: Narcan; Administer   1 spray (4 mg) into affected nostril(s) if needed for opioid reversal or   respiratory depression. May repeat every 2-3 minutes if needed,   alternating nostrils, until medical assistance becomes available.   nitroglycerin 0.4 mg SL tablet; Commonly known as: Nitrostat   ondansetron ODT 8 mg disintegrating tablet; Commonly known as:   Zofran-ODT; Take 1 tablet (8 mg) by mouth once daily as needed for nausea   or vomiting.   * oxyCODONE-acetaminophen 5-325 mg tablet; Commonly known as: Percocet;   Take 1 tablet by mouth 3 times a day as needed for severe pain (7 - 10)   for up to 28 days. Do not fill before August 3, 2024.   * oxyCODONE-acetaminophen 5-325 mg tablet; Commonly known as: Percocet;   Take 1 tablet by mouth 3 times a day as needed for severe pain (7 - 10)   for up to 28 days. Do not fill before August 31, 2024.; Start taking on:   August  31, 2024   * oxyCODONE-acetaminophen 5-325 mg tablet; Commonly known as: Percocet;   Take 1 tablet by mouth 3 times a day as needed for severe pain (7 - 10)   for up to 28 days. Do not fill before September 28, 2024.; Start taking   on: September 28, 2024   pantoprazole 40 mg EC tablet; Commonly known as: ProtoNix; TAKE ONE   TABLET BY MOUTH TWO TIMES A DAY   rosuvastatin 20 mg tablet; Commonly known as: Crestor; TAKE ONE TABLET   BY MOUTH EVERY DAY   saccharomyces boulardii 250 mg capsule; Commonly known as: Florastor   sennosides-docusate sodium 8.6-50 mg tablet; Commonly known as:   Lily-Colace; Take 1 tablet by mouth once daily.   sucralfate 100 mg/mL suspension; Commonly known as: Carafate   sulfaSALAzine 500 mg tablet; Commonly known as: Azulfidine; Take 1   tablet (500 mg) by mouth 2 times a day.   Vitamin D3 25 MCG (1000 UT) tablet; Generic drug: cholecalciferol  * This list has 3 medication(s) that are the same as other medications   prescribed for you. Read the directions carefully, and ask your doctor or   other care provider to review them with you.       Outpatient Follow-Up  Future Appointments   Date Time Provider Department [place]   8/12/2024 11:30 AM Fredy Elias DO [id number] [facility]   10/8/2024  8:20 AM Carlos Harley MD [id number] [facility]   10/21/2024  9:15 AM Latoya Barkley APRN-CNP [id number] [facility]   11/12/2024  8:40 AM Carlos Harley MD [id number] [facility]   11/15/2024 10:00 AM AHU CT 1 UCT U Rad       Paulino Appiah MD

## 2024-08-10 NOTE — CARE PLAN
The clinical goals for the shift include Patient will remain HDS throughout shift.        Problem: Pain  Goal: Takes deep breaths with improved pain control throughout the shift  Outcome: Progressing     Problem: Pain - Adult  Goal: Verbalizes/displays adequate comfort level or baseline comfort level  Outcome: Progressing     Problem: Chronic Conditions and Co-morbidities  Goal: Patient's chronic conditions and co-morbidity symptoms are monitored and maintained or improved  Outcome: Progressing

## 2024-08-10 NOTE — CARE PLAN
The home oxygen eval is complete. @ rest ion RA pt 97% while ambulating pt 94-95% on RA. No Oxygen needed

## 2024-08-11 ENCOUNTER — TELEPHONE (OUTPATIENT)
Dept: INTERNAL MEDICINE | Facility: HOSPITAL | Age: 74
End: 2024-08-11
Payer: MEDICARE

## 2024-08-11 NOTE — TELEPHONE ENCOUNTER
Patient discharged from hospital yesterday.    Patient called today (8/11) to check in on her following discharge from hospital.   No response to phone call going to voicemail. Left voicemail message with callback number.  Will update note if patient returns phone call.     Sy Tyler DO  Hospitalist, [facility]

## 2024-08-12 ENCOUNTER — DOCUMENTATION (OUTPATIENT)
Dept: PRIMARY CARE | Facility: CLINIC | Age: 74
End: 2024-08-12

## 2024-08-12 ENCOUNTER — APPOINTMENT (OUTPATIENT)
Dept: PRIMARY CARE | Facility: CLINIC | Age: 74
End: 2024-08-12
Payer: MEDICARE

## 2024-08-12 VITALS
HEIGHT: 64 IN | DIASTOLIC BLOOD PRESSURE: 70 MMHG | SYSTOLIC BLOOD PRESSURE: 124 MMHG | OXYGEN SATURATION: 99 % | BODY MASS INDEX: 25.78 KG/M2 | WEIGHT: 151 LBS | HEART RATE: 82 BPM

## 2024-08-12 DIAGNOSIS — T78.2XXA ANAPHYLAXIS, INITIAL ENCOUNTER: ICD-10-CM

## 2024-08-12 DIAGNOSIS — J43.2 CENTRILOBULAR EMPHYSEMA (MULTI): ICD-10-CM

## 2024-08-12 DIAGNOSIS — E78.2 MIXED HYPERLIPIDEMIA: ICD-10-CM

## 2024-08-12 DIAGNOSIS — K29.30 CHRONIC SUPERFICIAL GASTRITIS WITHOUT BLEEDING: ICD-10-CM

## 2024-08-12 DIAGNOSIS — D50.8 IRON DEFICIENCY ANEMIA SECONDARY TO INADEQUATE DIETARY IRON INTAKE: ICD-10-CM

## 2024-08-12 DIAGNOSIS — I50.42 CHRONIC COMBINED SYSTOLIC AND DIASTOLIC CONGESTIVE HEART FAILURE (MULTI): Primary | ICD-10-CM

## 2024-08-12 PROCEDURE — 1160F RVW MEDS BY RX/DR IN RCRD: CPT | Performed by: FAMILY MEDICINE

## 2024-08-12 PROCEDURE — 3008F BODY MASS INDEX DOCD: CPT | Performed by: FAMILY MEDICINE

## 2024-08-12 PROCEDURE — 1111F DSCHRG MED/CURRENT MED MERGE: CPT | Performed by: FAMILY MEDICINE

## 2024-08-12 PROCEDURE — 99496 TRANSJ CARE MGMT HIGH F2F 7D: CPT | Performed by: FAMILY MEDICINE

## 2024-08-12 PROCEDURE — 1123F ACP DISCUSS/DSCN MKR DOCD: CPT | Performed by: FAMILY MEDICINE

## 2024-08-12 PROCEDURE — 1036F TOBACCO NON-USER: CPT | Performed by: FAMILY MEDICINE

## 2024-08-12 PROCEDURE — 1159F MED LIST DOCD IN RCRD: CPT | Performed by: FAMILY MEDICINE

## 2024-08-12 RX ORDER — SUCRALFATE 1 G/10ML
1 SUSPENSION ORAL
Qty: 3600 ML | Refills: 3 | Status: SHIPPED | OUTPATIENT
Start: 2024-08-12 | End: 2025-08-12

## 2024-08-12 RX ORDER — ROSUVASTATIN CALCIUM 20 MG/1
20 TABLET, COATED ORAL DAILY
Qty: 90 TABLET | Refills: 3 | Status: SHIPPED | OUTPATIENT
Start: 2024-08-12

## 2024-08-12 RX ORDER — EPINEPHRINE 0.3 MG/.3ML
0.3 INJECTION SUBCUTANEOUS ONCE AS NEEDED
Qty: 2 EACH | Refills: 1 | Status: SHIPPED | OUTPATIENT
Start: 2024-08-12

## 2024-08-12 NOTE — PROGRESS NOTES
"Patient: Trina Elias  : 1950  PCP: Fredy Elias DO  MRN: [id number]  Program: Transitional Care Management  Status: Enrolled  Effective Dates: 2024 - present  Responsible Staff: Donovan Aldana RN  Social Determinants to be Addressed: No information to display      Trina Elias is a 73 y.o. female presenting today for follow-up after being discharged from the hospital 2 days ago. The main problem requiring admission was CHF, Acute Respiratory Failure, COPD, Anemia. The discharge summary and/or Transitional Care Management documentation was reviewed. Medication reconciliation was performed as indicated via the \"[place] as Reviewed\" timestamp.     Trina Elias was contacted by Transitional Care Management services two days after her discharge. This encounter and supporting documentation was reviewed.    Feels better today then has in a month- most energy  Breathing is doing well with the prednisone  No coughing, wheezing  No CP, dizziness  No fever, chills  Swelling in legs  Stomach not doing well with the iron and prednisone- carafate helps    Has been having some HA- dull ache- between eyes and back of head- comes and goes  Better- nothing  Worse- bright lights  Use to get Migraine's when younger    No runny/stuffy nose  Will get some blurry vision- squints and then goes away- saw eye doctor  No numbness, weakness    Review of Systems    /70   Pulse 82   Ht 1.626 m (5' 4\")   Wt 68.5 kg (151 lb)   SpO2 99%   BMI 25.92 kg/m²     Physical Exam  Vitals and nursing note reviewed.   Constitutional:       General: She is not in acute distress.     Appearance: Normal appearance.   HENT:      Head: Normocephalic and atraumatic.   Eyes:      Extraocular Movements: Extraocular movements intact.      Conjunctiva/sclera: Conjunctivae normal.      Pupils: Pupils are equal, round, and reactive to light.   Neck:      Vascular: No carotid bruit.   Cardiovascular:      Rate and Rhythm: Normal rate and regular " rhythm.      Pulses: Normal pulses.      Heart sounds: Murmur heard.      Systolic murmur is present with a grade of 2/6.   Pulmonary:      Effort: Pulmonary effort is normal.      Breath sounds: Normal breath sounds.   Abdominal:      General: Abdomen is flat. Bowel sounds are normal.      Palpations: Abdomen is soft. There is no mass.   Musculoskeletal:      Cervical back: Normal range of motion and neck supple.   Lymphadenopathy:      Cervical: No cervical adenopathy.   Skin:     Capillary Refill: Capillary refill takes less than 2 seconds.   Neurological:      General: No focal deficit present.      Mental Status: She is alert and oriented to person, place, and time.   Psychiatric:         Mood and Affect: Mood normal.         Behavior: Behavior normal.       Reviewed labs, CXR, CT, hospital discharge summary    The complexity of medical decision making for this patient's transitional care is high.    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 I50.40    Relevant Medications    EPINEPHrine 0.3 mg/0.3 mL injection syringe    Centrilobular emphysema (Multi) J43.2     Other Visit Diagnoses         Codes    Anaphylaxis, initial encounter     T78.2XXA    Relevant Medications    EPINEPHrine 0.3 mg/0.3 mL injection syringe    Chronic superficial gastritis without bleeding     K29.30    Relevant Medications    sucralfate (Carafate) 100 mg/mL suspension        CHF- bumetanide 2 mg qam and 1 mg qpm, digoxin    COPD- finish steroids, Albuterol HFA prn    Iron Def Anemia- continue iron supplement, check labs in 3 weeks    Hyperlipidemia- crestor, TLC    Anaphylaxis- Epi-pen, avoid triggers    Gastritis- refilled  carafate, avoid triggers

## 2024-08-12 NOTE — PROGRESS NOTES
Discharge Facility: [facility]   Discharge Diagnosis: Exacerbation of asthma, unspecified asthma severity, unspecified whether persistent; Acute on chronic heart failure, unspecified heart failure type; Acute respiratory failure with hypoxia; Constipation, unspecified constipation type; Hypertensive heart disease with heart failure; Chest pain, unspecified type   Admission Date: 8/8/2024    Discharge Date: 8/10/2024     PCP Appointment Date: 8/12/2024  Specialist Appointment Date:   Follow up with Neo Gutierrez MD (Cardiology); Call the office on Monday and make an follow up appointment for one week.  Hospital Encounter and Summary Linked: Yes      This patient has a follow up scheduled with PCP within 2 business days of DC on (8/12/2024).   This visit qualifies towards TCM outreach.

## 2024-08-13 NOTE — SIGNIFICANT EVENT
Follow Up Phone Call    Outgoing phone call    Spoke to: Trina SALDANA Via Relationship:self   Phone number: [phone]      Outcome: contacted patient/ family   Chief Complaint   Patient presents with    Shortness of Breath          Diagnosis:Not applicable    States she is feeling better. Reports getting bit by a suspected spider last night. Hand is very swollen. Encouraged to try home remedy and keep an eye out for signs of infection. Voiced understanding. No further questions or concerns.

## 2024-08-15 ENCOUNTER — PATIENT OUTREACH (OUTPATIENT)
Dept: PRIMARY CARE | Facility: CLINIC | Age: 74
End: 2024-08-15
Payer: MEDICARE

## 2024-08-15 ENCOUNTER — HOSPITAL ENCOUNTER (OUTPATIENT)
Dept: CARDIOLOGY | Facility: CLINIC | Age: 74
Discharge: HOME | End: 2024-08-15
Payer: MEDICARE

## 2024-08-15 DIAGNOSIS — I42.9 CARDIOMYOPATHY, UNSPECIFIED TYPE (MULTI): ICD-10-CM

## 2024-08-15 DIAGNOSIS — Z95.810 PRESENCE OF AUTOMATIC CARDIOVERTER/DEFIBRILLATOR (AICD): ICD-10-CM

## 2024-08-15 NOTE — PROGRESS NOTES
"Call regarding appt. with PCP on (8/12/2024) after hospitalization.  At time of outreach call the patient feels as if their condition has (improved) since last visit. Denies CP/SOB during outreach call. States she did get bit by a spider and knows Dr. Elias is \"out of town\" and that the spider bite has made her nauseated and her \"hand to get all swollen\". I advised patient to go to an urgent care to see if the spider bite caused any cellulitis to be treated. Patient verbalized understanding of this.  Reviewed the PCP appointment with the pt and addressed any questions or concerns.   "

## 2024-08-26 ENCOUNTER — TELEPHONE (OUTPATIENT)
Dept: PRIMARY CARE | Facility: CLINIC | Age: 74
End: 2024-08-26
Payer: MEDICARE

## 2024-08-26 DIAGNOSIS — E53.8 VITAMIN B12 DEFICIENCY: ICD-10-CM

## 2024-08-26 DIAGNOSIS — E53.8 VITAMIN B12 DEFICIENCY: Primary | ICD-10-CM

## 2024-08-26 RX ORDER — CYANOCOBALAMIN 1000 UG/ML
1000 INJECTION, SOLUTION INTRAMUSCULAR; SUBCUTANEOUS
Qty: 10 ML | Refills: 1 | Status: SHIPPED | OUTPATIENT
Start: 2024-08-26

## 2024-08-26 RX ORDER — SYRINGE-NEEDLE,INSULIN,0.5 ML 27GX1/2"
SYRINGE, EMPTY DISPOSABLE MISCELLANEOUS
Qty: 10 EACH | Refills: 1 | Status: SHIPPED | OUTPATIENT
Start: 2024-08-26

## 2024-08-26 RX ORDER — CYANOCOBALAMIN 1000 UG/ML
1000 INJECTION, SOLUTION INTRAMUSCULAR; SUBCUTANEOUS ONCE
Qty: 10 ML | Refills: 1 | Status: SHIPPED | OUTPATIENT
Start: 2024-08-26 | End: 2024-08-26 | Stop reason: SDUPTHER

## 2024-08-30 ENCOUNTER — TELEPHONE (OUTPATIENT)
Dept: PRIMARY CARE | Facility: CLINIC | Age: 74
End: 2024-08-30
Payer: MEDICARE

## 2024-08-30 NOTE — TELEPHONE ENCOUNTER
Pt called and wants you to put another order in for her pulmade machine? She said that it broke and she would like to  the script today she needs it. I told her I would let you know and call her when its ready.

## 2024-09-04 ENCOUNTER — LAB (OUTPATIENT)
Dept: LAB | Facility: LAB | Age: 74
End: 2024-09-04
Payer: MEDICARE

## 2024-09-04 DIAGNOSIS — I48.91 ATRIAL FIBRILLATION, UNSPECIFIED TYPE (MULTI): ICD-10-CM

## 2024-09-04 DIAGNOSIS — D50.8 IRON DEFICIENCY ANEMIA SECONDARY TO INADEQUATE DIETARY IRON INTAKE: ICD-10-CM

## 2024-09-04 LAB
ALBUMIN SERPL BCP-MCNC: 3.9 G/DL (ref 3.4–5)
ANION GAP SERPL CALC-SCNC: 15 MMOL/L (ref 10–20)
BASOPHILS # BLD AUTO: 0.04 X10*3/UL (ref 0–0.1)
BASOPHILS NFR BLD AUTO: 0.6 %
BUN SERPL-MCNC: 25 MG/DL (ref 6–23)
CALCIUM SERPL-MCNC: 8.4 MG/DL (ref 8.6–10.3)
CHLORIDE SERPL-SCNC: 104 MMOL/L (ref 98–107)
CO2 SERPL-SCNC: 26 MMOL/L (ref 21–32)
CREAT SERPL-MCNC: 1.05 MG/DL (ref 0.5–1.05)
EGFRCR SERPLBLD CKD-EPI 2021: 57 ML/MIN/1.73M*2
EOSINOPHIL # BLD AUTO: 0.12 X10*3/UL (ref 0–0.4)
EOSINOPHIL NFR BLD AUTO: 1.9 %
ERYTHROCYTE [DISTWIDTH] IN BLOOD BY AUTOMATED COUNT: 18.3 % (ref 11.5–14.5)
FERRITIN SERPL-MCNC: 18 NG/ML (ref 8–150)
GLUCOSE SERPL-MCNC: 111 MG/DL (ref 74–99)
HCT VFR BLD AUTO: 28.2 % (ref 36–46)
HGB BLD-MCNC: 8.4 G/DL (ref 12–16)
IMM GRANULOCYTES # BLD AUTO: 0.03 X10*3/UL (ref 0–0.5)
IMM GRANULOCYTES NFR BLD AUTO: 0.5 % (ref 0–0.9)
IRON SATN MFR SERPL: 5 % (ref 25–45)
IRON SERPL-MCNC: 20 UG/DL (ref 35–150)
LYMPHOCYTES # BLD AUTO: 1.3 X10*3/UL (ref 0.8–3)
LYMPHOCYTES NFR BLD AUTO: 21 %
MCH RBC QN AUTO: 24.1 PG (ref 26–34)
MCHC RBC AUTO-ENTMCNC: 29.8 G/DL (ref 32–36)
MCV RBC AUTO: 81 FL (ref 80–100)
MONOCYTES # BLD AUTO: 0.72 X10*3/UL (ref 0.05–0.8)
MONOCYTES NFR BLD AUTO: 11.7 %
NEUTROPHILS # BLD AUTO: 3.97 X10*3/UL (ref 1.6–5.5)
NEUTROPHILS NFR BLD AUTO: 64.3 %
NRBC BLD-RTO: 0 /100 WBCS (ref 0–0)
PHOSPHATE SERPL-MCNC: 4 MG/DL (ref 2.5–4.9)
PLATELET # BLD AUTO: 230 X10*3/UL (ref 150–450)
POTASSIUM SERPL-SCNC: 3.5 MMOL/L (ref 3.5–5.3)
RBC # BLD AUTO: 3.48 X10*6/UL (ref 4–5.2)
SODIUM SERPL-SCNC: 141 MMOL/L (ref 136–145)
TIBC SERPL-MCNC: 394 UG/DL (ref 240–445)
UIBC SERPL-MCNC: 374 UG/DL (ref 110–370)
WBC # BLD AUTO: 6.2 X10*3/UL (ref 4.4–11.3)

## 2024-09-04 PROCEDURE — 83550 IRON BINDING TEST: CPT

## 2024-09-04 PROCEDURE — 85025 COMPLETE CBC W/AUTO DIFF WBC: CPT

## 2024-09-04 PROCEDURE — 80069 RENAL FUNCTION PANEL: CPT

## 2024-09-04 PROCEDURE — 83540 ASSAY OF IRON: CPT

## 2024-09-04 PROCEDURE — 82728 ASSAY OF FERRITIN: CPT

## 2024-09-06 RX ORDER — EPINEPHRINE 0.3 MG/.3ML
0.3 INJECTION SUBCUTANEOUS EVERY 5 MIN PRN
Status: CANCELLED | OUTPATIENT
Start: 2024-09-24

## 2024-09-06 RX ORDER — ALBUTEROL SULFATE 0.83 MG/ML
3 SOLUTION RESPIRATORY (INHALATION) AS NEEDED
Status: CANCELLED | OUTPATIENT
Start: 2024-09-24

## 2024-09-06 RX ORDER — DIPHENHYDRAMINE HYDROCHLORIDE 50 MG/ML
50 INJECTION INTRAMUSCULAR; INTRAVENOUS AS NEEDED
Status: CANCELLED | OUTPATIENT
Start: 2024-09-24

## 2024-09-06 RX ORDER — FAMOTIDINE 10 MG/ML
20 INJECTION, SOLUTION INTRAVENOUS ONCE AS NEEDED
Status: CANCELLED | OUTPATIENT
Start: 2024-09-24

## 2024-09-16 ENCOUNTER — HOSPITAL ENCOUNTER (OUTPATIENT)
Dept: CARDIOLOGY | Facility: CLINIC | Age: 74
Discharge: HOME | End: 2024-09-16
Payer: MEDICARE

## 2024-09-16 DIAGNOSIS — Z95.810 PRESENCE OF AUTOMATIC CARDIOVERTER/DEFIBRILLATOR (AICD): ICD-10-CM

## 2024-09-16 DIAGNOSIS — I47.20 VT (VENTRICULAR TACHYCARDIA) (MULTI): ICD-10-CM

## 2024-09-16 PROCEDURE — 93295 DEV INTERROG REMOTE 1/2/MLT: CPT | Performed by: INTERNAL MEDICINE

## 2024-09-16 PROCEDURE — 93296 REM INTERROG EVL PM/IDS: CPT

## 2024-09-17 ENCOUNTER — APPOINTMENT (OUTPATIENT)
Dept: RADIOLOGY | Facility: HOSPITAL | Age: 74
End: 2024-09-17
Payer: MEDICARE

## 2024-09-17 ENCOUNTER — HOSPITAL ENCOUNTER (INPATIENT)
Facility: HOSPITAL | Age: 74
LOS: 2 days | Discharge: HOME | End: 2024-09-21
Attending: EMERGENCY MEDICINE | Admitting: INTERNAL MEDICINE
Payer: MEDICARE

## 2024-09-17 DIAGNOSIS — K29.00 ACUTE SUPERFICIAL GASTRITIS WITHOUT HEMORRHAGE: ICD-10-CM

## 2024-09-17 DIAGNOSIS — D50.8 IRON DEFICIENCY ANEMIA SECONDARY TO INADEQUATE DIETARY IRON INTAKE: ICD-10-CM

## 2024-09-17 DIAGNOSIS — R79.89 ELEVATED D-DIMER: ICD-10-CM

## 2024-09-17 DIAGNOSIS — R07.9 CHEST PAIN, UNSPECIFIED TYPE: Primary | ICD-10-CM

## 2024-09-17 DIAGNOSIS — I50.9 ACUTE ON CHRONIC CONGESTIVE HEART FAILURE, UNSPECIFIED HEART FAILURE TYPE: ICD-10-CM

## 2024-09-17 DIAGNOSIS — J45.909 ASTHMA, UNSPECIFIED ASTHMA SEVERITY, UNSPECIFIED WHETHER COMPLICATED, UNSPECIFIED WHETHER PERSISTENT (HHS-HCC): ICD-10-CM

## 2024-09-17 DIAGNOSIS — K27.9 PUD (PEPTIC ULCER DISEASE): ICD-10-CM

## 2024-09-17 DIAGNOSIS — R06.00 DYSPNEA, UNSPECIFIED TYPE: ICD-10-CM

## 2024-09-17 DIAGNOSIS — E87.70 HYPERVOLEMIA, UNSPECIFIED HYPERVOLEMIA TYPE: ICD-10-CM

## 2024-09-17 LAB
ALBUMIN SERPL BCP-MCNC: 4.2 G/DL (ref 3.4–5)
ALP SERPL-CCNC: 72 U/L (ref 33–136)
ALT SERPL W P-5'-P-CCNC: 28 U/L (ref 7–45)
ANION GAP BLDV CALCULATED.4IONS-SCNC: 9 MMOL/L (ref 10–25)
ANION GAP SERPL CALC-SCNC: 13 MMOL/L (ref 10–20)
APTT PPP: 31 SECONDS (ref 27–38)
AST SERPL W P-5'-P-CCNC: 22 U/L (ref 9–39)
BASE EXCESS BLDV CALC-SCNC: 2.8 MMOL/L (ref -2–3)
BASOPHILS # BLD AUTO: 0.04 X10*3/UL (ref 0–0.1)
BASOPHILS NFR BLD AUTO: 0.5 %
BILIRUB SERPL-MCNC: 0.5 MG/DL (ref 0–1.2)
BNP SERPL-MCNC: 773 PG/ML (ref 0–99)
BODY TEMPERATURE: ABNORMAL
BUN SERPL-MCNC: 22 MG/DL (ref 6–23)
CA-I BLDV-SCNC: 1.15 MMOL/L (ref 1.1–1.33)
CALCIUM SERPL-MCNC: 8.7 MG/DL (ref 8.6–10.3)
CHLORIDE BLDV-SCNC: 106 MMOL/L (ref 98–107)
CHLORIDE SERPL-SCNC: 104 MMOL/L (ref 98–107)
CO2 SERPL-SCNC: 27 MMOL/L (ref 21–32)
CREAT SERPL-MCNC: 1.09 MG/DL (ref 0.5–1.05)
D DIMER PPP FEU-MCNC: 735 NG/ML FEU
DIGOXIN SERPL-MCNC: 0.9 NG/ML (ref 0.8–?)
EGFRCR SERPLBLD CKD-EPI 2021: 54 ML/MIN/1.73M*2
EOSINOPHIL # BLD AUTO: 0.11 X10*3/UL (ref 0–0.4)
EOSINOPHIL NFR BLD AUTO: 1.3 %
ERYTHROCYTE [DISTWIDTH] IN BLOOD BY AUTOMATED COUNT: 17.1 % (ref 11.5–14.5)
GLUCOSE BLDV-MCNC: 97 MG/DL (ref 74–99)
GLUCOSE SERPL-MCNC: 95 MG/DL (ref 74–99)
HCO3 BLDV-SCNC: 27.2 MMOL/L (ref 22–26)
HCT VFR BLD AUTO: 27.9 % (ref 36–46)
HCT VFR BLD EST: 27 % (ref 36–46)
HGB BLD-MCNC: 8.5 G/DL (ref 12–16)
HGB BLDV-MCNC: 9 G/DL (ref 12–16)
IMM GRANULOCYTES # BLD AUTO: 0.03 X10*3/UL (ref 0–0.5)
IMM GRANULOCYTES NFR BLD AUTO: 0.4 % (ref 0–0.9)
INHALED O2 CONCENTRATION: 21 %
INR PPP: 1.1 (ref 0.9–1.1)
LACTATE BLDV-SCNC: 1.1 MMOL/L (ref 0.4–2)
LACTATE SERPL-SCNC: 1.1 MMOL/L (ref 0.4–2)
LYMPHOCYTES # BLD AUTO: 1.46 X10*3/UL (ref 0.8–3)
LYMPHOCYTES NFR BLD AUTO: 17.8 %
MAGNESIUM SERPL-MCNC: 2.11 MG/DL (ref 1.6–2.4)
MCH RBC QN AUTO: 23 PG (ref 26–34)
MCHC RBC AUTO-ENTMCNC: 30.5 G/DL (ref 32–36)
MCV RBC AUTO: 76 FL (ref 80–100)
MONOCYTES # BLD AUTO: 0.85 X10*3/UL (ref 0.05–0.8)
MONOCYTES NFR BLD AUTO: 10.4 %
NEUTROPHILS # BLD AUTO: 5.71 X10*3/UL (ref 1.6–5.5)
NEUTROPHILS NFR BLD AUTO: 69.6 %
NRBC BLD-RTO: 0 /100 WBCS (ref 0–0)
OXYHGB MFR BLDV: 57.4 % (ref 45–75)
PCO2 BLDV: 40 MM HG (ref 41–51)
PH BLDV: 7.44 PH (ref 7.33–7.43)
PLATELET # BLD AUTO: 259 X10*3/UL (ref 150–450)
PO2 BLDV: 35 MM HG (ref 35–45)
POTASSIUM BLDV-SCNC: 3.8 MMOL/L (ref 3.5–5.3)
POTASSIUM SERPL-SCNC: 3.7 MMOL/L (ref 3.5–5.3)
PROT SERPL-MCNC: 6.7 G/DL (ref 6.4–8.2)
PROTHROMBIN TIME: 12.4 SECONDS (ref 9.8–12.8)
RBC # BLD AUTO: 3.69 X10*6/UL (ref 4–5.2)
SAO2 % BLDV: 59 % (ref 45–75)
SODIUM BLDV-SCNC: 138 MMOL/L (ref 136–145)
SODIUM SERPL-SCNC: 140 MMOL/L (ref 136–145)
WBC # BLD AUTO: 8.2 X10*3/UL (ref 4.4–11.3)

## 2024-09-17 PROCEDURE — 36415 COLL VENOUS BLD VENIPUNCTURE: CPT | Performed by: NURSE PRACTITIONER

## 2024-09-17 PROCEDURE — 71045 X-RAY EXAM CHEST 1 VIEW: CPT

## 2024-09-17 PROCEDURE — 84132 ASSAY OF SERUM POTASSIUM: CPT | Performed by: NURSE PRACTITIONER

## 2024-09-17 PROCEDURE — 84484 ASSAY OF TROPONIN QUANT: CPT | Performed by: NURSE PRACTITIONER

## 2024-09-17 PROCEDURE — 83735 ASSAY OF MAGNESIUM: CPT | Performed by: NURSE PRACTITIONER

## 2024-09-17 PROCEDURE — 99285 EMERGENCY DEPT VISIT HI MDM: CPT

## 2024-09-17 PROCEDURE — 96374 THER/PROPH/DIAG INJ IV PUSH: CPT

## 2024-09-17 PROCEDURE — 71045 X-RAY EXAM CHEST 1 VIEW: CPT | Performed by: RADIOLOGY

## 2024-09-17 PROCEDURE — 83880 ASSAY OF NATRIURETIC PEPTIDE: CPT | Performed by: NURSE PRACTITIONER

## 2024-09-17 PROCEDURE — 83605 ASSAY OF LACTIC ACID: CPT | Performed by: NURSE PRACTITIONER

## 2024-09-17 PROCEDURE — 85730 THROMBOPLASTIN TIME PARTIAL: CPT | Performed by: NURSE PRACTITIONER

## 2024-09-17 PROCEDURE — 85610 PROTHROMBIN TIME: CPT | Performed by: NURSE PRACTITIONER

## 2024-09-17 PROCEDURE — 85025 COMPLETE CBC W/AUTO DIFF WBC: CPT | Performed by: NURSE PRACTITIONER

## 2024-09-17 PROCEDURE — 85379 FIBRIN DEGRADATION QUANT: CPT | Performed by: NURSE PRACTITIONER

## 2024-09-17 PROCEDURE — 2500000004 HC RX 250 GENERAL PHARMACY W/ HCPCS (ALT 636 FOR OP/ED): Performed by: NURSE PRACTITIONER

## 2024-09-17 PROCEDURE — 80162 ASSAY OF DIGOXIN TOTAL: CPT | Performed by: NURSE PRACTITIONER

## 2024-09-17 RX ORDER — BUMETANIDE 0.25 MG/ML
0.5 INJECTION, SOLUTION INTRAMUSCULAR; INTRAVENOUS ONCE
Status: COMPLETED | OUTPATIENT
Start: 2024-09-17 | End: 2024-09-18

## 2024-09-17 RX ORDER — PANTOPRAZOLE SODIUM 40 MG/1
40 TABLET, DELAYED RELEASE ORAL 2 TIMES DAILY
Qty: 180 TABLET | Refills: 0 | Status: SHIPPED | OUTPATIENT
Start: 2024-09-17

## 2024-09-17 ASSESSMENT — HEART SCORE
RISK FACTORS: 1-2 RISK FACTORS
HEART SCORE: 4
TROPONIN: LESS THAN OR EQUAL TO NORMAL LIMIT
HISTORY: MODERATELY SUSPICIOUS
AGE: 65+
ECG: NORMAL

## 2024-09-17 ASSESSMENT — COLUMBIA-SUICIDE SEVERITY RATING SCALE - C-SSRS
1. IN THE PAST MONTH, HAVE YOU WISHED YOU WERE DEAD OR WISHED YOU COULD GO TO SLEEP AND NOT WAKE UP?: NO
2. HAVE YOU ACTUALLY HAD ANY THOUGHTS OF KILLING YOURSELF?: NO
6. HAVE YOU EVER DONE ANYTHING, STARTED TO DO ANYTHING, OR PREPARED TO DO ANYTHING TO END YOUR LIFE?: NO

## 2024-09-17 ASSESSMENT — LIFESTYLE VARIABLES
HAVE YOU EVER FELT YOU SHOULD CUT DOWN ON YOUR DRINKING: NO
HAVE PEOPLE ANNOYED YOU BY CRITICIZING YOUR DRINKING: NO
TOTAL SCORE: 0
EVER HAD A DRINK FIRST THING IN THE MORNING TO STEADY YOUR NERVES TO GET RID OF A HANGOVER: NO
EVER FELT BAD OR GUILTY ABOUT YOUR DRINKING: NO

## 2024-09-17 ASSESSMENT — PAIN - FUNCTIONAL ASSESSMENT: PAIN_FUNCTIONAL_ASSESSMENT: 0-10

## 2024-09-17 ASSESSMENT — PAIN SCALES - GENERAL: PAINLEVEL_OUTOF10: 0 - NO PAIN

## 2024-09-18 ENCOUNTER — APPOINTMENT (OUTPATIENT)
Dept: CARDIOLOGY | Facility: HOSPITAL | Age: 74
End: 2024-09-18
Payer: MEDICARE

## 2024-09-18 ENCOUNTER — APPOINTMENT (OUTPATIENT)
Dept: RADIOLOGY | Facility: HOSPITAL | Age: 74
End: 2024-09-18
Payer: MEDICARE

## 2024-09-18 PROBLEM — J96.01 ACUTE HYPOXIC ON CHRONIC HYPERCAPNIC RESPIRATORY FAILURE (MULTI): Status: ACTIVE | Noted: 2024-09-18

## 2024-09-18 PROBLEM — J96.12 ACUTE HYPOXIC ON CHRONIC HYPERCAPNIC RESPIRATORY FAILURE (MULTI): Status: ACTIVE | Noted: 2024-09-18

## 2024-09-18 LAB
ALBUMIN SERPL BCP-MCNC: 3.9 G/DL (ref 3.4–5)
ANION GAP SERPL CALC-SCNC: 12 MMOL/L (ref 10–20)
ATRIAL RATE: 60 BPM
ATRIAL RATE: 61 BPM
BASE EXCESS BLDV CALC-SCNC: 1.3 MMOL/L (ref -2–3)
BODY TEMPERATURE: ABNORMAL
BUN SERPL-MCNC: 20 MG/DL (ref 6–23)
CALCIUM SERPL-MCNC: 8.6 MG/DL (ref 8.6–10.3)
CARDIAC TROPONIN I PNL SERPL HS: 34 NG/L (ref 0–13)
CARDIAC TROPONIN I PNL SERPL HS: 34 NG/L (ref 0–13)
CARDIAC TROPONIN I PNL SERPL HS: 35 NG/L (ref 0–13)
CHLORIDE SERPL-SCNC: 105 MMOL/L (ref 98–107)
CO2 SERPL-SCNC: 27 MMOL/L (ref 21–32)
CREAT SERPL-MCNC: 0.96 MG/DL (ref 0.5–1.05)
EGFRCR SERPLBLD CKD-EPI 2021: 63 ML/MIN/1.73M*2
ERYTHROCYTE [DISTWIDTH] IN BLOOD BY AUTOMATED COUNT: 17.3 % (ref 11.5–14.5)
FLUAV RNA RESP QL NAA+PROBE: NOT DETECTED
FLUBV RNA RESP QL NAA+PROBE: NOT DETECTED
GLUCOSE SERPL-MCNC: 144 MG/DL (ref 74–99)
HCO3 BLDV-SCNC: 25.9 MMOL/L (ref 22–26)
HCT VFR BLD AUTO: 29.2 % (ref 36–46)
HGB BLD-MCNC: 8.6 G/DL (ref 12–16)
INHALED O2 CONCENTRATION: 99 %
MAGNESIUM SERPL-MCNC: 2.36 MG/DL (ref 1.6–2.4)
MCH RBC QN AUTO: 22.8 PG (ref 26–34)
MCHC RBC AUTO-ENTMCNC: 29.5 G/DL (ref 32–36)
MCV RBC AUTO: 77 FL (ref 80–100)
NRBC BLD-RTO: 0 /100 WBCS (ref 0–0)
OXYHGB MFR BLDV: 87.2 % (ref 45–75)
PCO2 BLDV: 40 MM HG (ref 41–51)
PH BLDV: 7.42 PH (ref 7.33–7.43)
PHOSPHATE SERPL-MCNC: 3.6 MG/DL (ref 2.5–4.9)
PLATELET # BLD AUTO: 226 X10*3/UL (ref 150–450)
PO2 BLDV: 58 MM HG (ref 35–45)
POTASSIUM SERPL-SCNC: 3.8 MMOL/L (ref 3.5–5.3)
PR INTERVAL: 260 MS
PR INTERVAL: 266 MS
Q ONSET: 218 MS
Q ONSET: 219 MS
Q ONSET: 222 MS
QRS COUNT: 10 BEATS
QRS COUNT: 10 BEATS
QRS COUNT: 11 BEATS
QRS DURATION: 80 MS
QRS DURATION: 90 MS
QRS DURATION: 90 MS
QT INTERVAL: 392 MS
QT INTERVAL: 434 MS
QT INTERVAL: 436 MS
QTC CALCULATION(BAZETT): 410 MS
QTC CALCULATION(BAZETT): 434 MS
QTC CALCULATION(BAZETT): 438 MS
QTC FREDERICIA: 405 MS
QTC FREDERICIA: 434 MS
QTC FREDERICIA: 438 MS
R AXIS: 20 DEGREES
R AXIS: 42 DEGREES
R AXIS: 50 DEGREES
RBC # BLD AUTO: 3.78 X10*6/UL (ref 4–5.2)
SAO2 % BLDV: 89 % (ref 45–75)
SARS-COV-2 RNA RESP QL NAA+PROBE: NOT DETECTED
SODIUM SERPL-SCNC: 140 MMOL/L (ref 136–145)
T AXIS: 174 DEGREES
T AXIS: 47 DEGREES
T AXIS: 65 DEGREES
T OFFSET: 418 MS
T OFFSET: 435 MS
T OFFSET: 437 MS
VENTRICULAR RATE: 60 BPM
VENTRICULAR RATE: 61 BPM
VENTRICULAR RATE: 66 BPM
WBC # BLD AUTO: 6.8 X10*3/UL (ref 4.4–11.3)

## 2024-09-18 PROCEDURE — 2500000002 HC RX 250 W HCPCS SELF ADMINISTERED DRUGS (ALT 637 FOR MEDICARE OP, ALT 636 FOR OP/ED)

## 2024-09-18 PROCEDURE — 94667 MNPJ CHEST WALL 1ST: CPT

## 2024-09-18 PROCEDURE — 82805 BLOOD GASES W/O2 SATURATION: CPT

## 2024-09-18 PROCEDURE — 2550000001 HC RX 255 CONTRASTS: Performed by: NURSE PRACTITIONER

## 2024-09-18 PROCEDURE — 94640 AIRWAY INHALATION TREATMENT: CPT

## 2024-09-18 PROCEDURE — 87636 SARSCOV2 & INF A&B AMP PRB: CPT | Performed by: NURSE PRACTITIONER

## 2024-09-18 PROCEDURE — 94668 MNPJ CHEST WALL SBSQ: CPT

## 2024-09-18 PROCEDURE — 2500000005 HC RX 250 GENERAL PHARMACY W/O HCPCS

## 2024-09-18 PROCEDURE — 96375 TX/PRO/DX INJ NEW DRUG ADDON: CPT

## 2024-09-18 PROCEDURE — 83735 ASSAY OF MAGNESIUM: CPT

## 2024-09-18 PROCEDURE — 94669 MECHANICAL CHEST WALL OSCILL: CPT

## 2024-09-18 PROCEDURE — 9420000001 HC RT PATIENT EDUCATION 5 MIN

## 2024-09-18 PROCEDURE — 2500000002 HC RX 250 W HCPCS SELF ADMINISTERED DRUGS (ALT 637 FOR MEDICARE OP, ALT 636 FOR OP/ED): Performed by: INTERNAL MEDICINE

## 2024-09-18 PROCEDURE — 2500000001 HC RX 250 WO HCPCS SELF ADMINISTERED DRUGS (ALT 637 FOR MEDICARE OP)

## 2024-09-18 PROCEDURE — 84484 ASSAY OF TROPONIN QUANT: CPT | Performed by: EMERGENCY MEDICINE

## 2024-09-18 PROCEDURE — 2500000005 HC RX 250 GENERAL PHARMACY W/O HCPCS: Performed by: EMERGENCY MEDICINE

## 2024-09-18 PROCEDURE — 2500000004 HC RX 250 GENERAL PHARMACY W/ HCPCS (ALT 636 FOR OP/ED): Performed by: EMERGENCY MEDICINE

## 2024-09-18 PROCEDURE — 93005 ELECTROCARDIOGRAM TRACING: CPT

## 2024-09-18 PROCEDURE — 96372 THER/PROPH/DIAG INJ SC/IM: CPT

## 2024-09-18 PROCEDURE — 84484 ASSAY OF TROPONIN QUANT: CPT | Performed by: NURSE PRACTITIONER

## 2024-09-18 PROCEDURE — 2500000004 HC RX 250 GENERAL PHARMACY W/ HCPCS (ALT 636 FOR OP/ED)

## 2024-09-18 PROCEDURE — 71275 CT ANGIOGRAPHY CHEST: CPT | Performed by: RADIOLOGY

## 2024-09-18 PROCEDURE — 94664 DEMO&/EVAL PT USE INHALER: CPT

## 2024-09-18 PROCEDURE — 36415 COLL VENOUS BLD VENIPUNCTURE: CPT | Performed by: EMERGENCY MEDICINE

## 2024-09-18 PROCEDURE — G0378 HOSPITAL OBSERVATION PER HR: HCPCS

## 2024-09-18 PROCEDURE — 99223 1ST HOSP IP/OBS HIGH 75: CPT

## 2024-09-18 PROCEDURE — 94760 N-INVAS EAR/PLS OXIMETRY 1: CPT

## 2024-09-18 PROCEDURE — 36415 COLL VENOUS BLD VENIPUNCTURE: CPT | Performed by: NURSE PRACTITIONER

## 2024-09-18 PROCEDURE — 99223 1ST HOSP IP/OBS HIGH 75: CPT | Performed by: INTERNAL MEDICINE

## 2024-09-18 PROCEDURE — 80069 RENAL FUNCTION PANEL: CPT

## 2024-09-18 PROCEDURE — 71275 CT ANGIOGRAPHY CHEST: CPT

## 2024-09-18 PROCEDURE — 85027 COMPLETE CBC AUTOMATED: CPT

## 2024-09-18 PROCEDURE — 99285 EMERGENCY DEPT VISIT HI MDM: CPT

## 2024-09-18 PROCEDURE — 2500000004 HC RX 250 GENERAL PHARMACY W/ HCPCS (ALT 636 FOR OP/ED): Performed by: NURSE PRACTITIONER

## 2024-09-18 RX ORDER — OXYCODONE HYDROCHLORIDE 5 MG/1
5 TABLET ORAL EVERY 6 HOURS PRN
Status: DISCONTINUED | OUTPATIENT
Start: 2024-09-18 | End: 2024-09-18

## 2024-09-18 RX ORDER — SUCRALFATE 1 G/10ML
1 SUSPENSION ORAL
Status: DISCONTINUED | OUTPATIENT
Start: 2024-09-18 | End: 2024-09-18

## 2024-09-18 RX ORDER — PANTOPRAZOLE SODIUM 40 MG/1
40 TABLET, DELAYED RELEASE ORAL 2 TIMES DAILY
Status: DISCONTINUED | OUTPATIENT
Start: 2024-09-18 | End: 2024-09-21 | Stop reason: HOSPADM

## 2024-09-18 RX ORDER — ROSUVASTATIN CALCIUM 20 MG/1
20 TABLET, COATED ORAL NIGHTLY
Status: DISCONTINUED | OUTPATIENT
Start: 2024-09-18 | End: 2024-09-21 | Stop reason: HOSPADM

## 2024-09-18 RX ORDER — ONDANSETRON 4 MG/1
8 TABLET, ORALLY DISINTEGRATING ORAL EVERY 12 HOURS PRN
Status: DISCONTINUED | OUTPATIENT
Start: 2024-09-18 | End: 2024-09-18

## 2024-09-18 RX ORDER — BUMETANIDE 0.25 MG/ML
1 INJECTION, SOLUTION INTRAMUSCULAR; INTRAVENOUS ONCE
Status: COMPLETED | OUTPATIENT
Start: 2024-09-18 | End: 2024-09-18

## 2024-09-18 RX ORDER — FAMOTIDINE 20 MG/1
40 TABLET, FILM COATED ORAL DAILY
Status: DISCONTINUED | OUTPATIENT
Start: 2024-09-18 | End: 2024-09-19

## 2024-09-18 RX ORDER — IPRATROPIUM BROMIDE AND ALBUTEROL SULFATE 2.5; .5 MG/3ML; MG/3ML
3 SOLUTION RESPIRATORY (INHALATION)
Status: DISCONTINUED | OUTPATIENT
Start: 2024-09-18 | End: 2024-09-19

## 2024-09-18 RX ORDER — ACETAMINOPHEN 325 MG/1
650 TABLET ORAL EVERY 6 HOURS PRN
Status: DISCONTINUED | OUTPATIENT
Start: 2024-09-18 | End: 2024-09-21 | Stop reason: HOSPADM

## 2024-09-18 RX ORDER — BUMETANIDE 1 MG/1
2 TABLET ORAL DAILY
Status: DISCONTINUED | OUTPATIENT
Start: 2024-09-18 | End: 2024-09-21 | Stop reason: HOSPADM

## 2024-09-18 RX ORDER — BUMETANIDE 1 MG/1
1 TABLET ORAL
Status: DISCONTINUED | OUTPATIENT
Start: 2024-09-18 | End: 2024-09-21 | Stop reason: HOSPADM

## 2024-09-18 RX ORDER — IPRATROPIUM BROMIDE AND ALBUTEROL SULFATE 2.5; .5 MG/3ML; MG/3ML
3 SOLUTION RESPIRATORY (INHALATION)
Status: DISCONTINUED | OUTPATIENT
Start: 2024-09-18 | End: 2024-09-18

## 2024-09-18 RX ORDER — CLOPIDOGREL BISULFATE 75 MG/1
75 TABLET ORAL DAILY
Status: DISCONTINUED | OUTPATIENT
Start: 2024-09-18 | End: 2024-09-21 | Stop reason: HOSPADM

## 2024-09-18 RX ORDER — ONDANSETRON HYDROCHLORIDE 2 MG/ML
4 INJECTION, SOLUTION INTRAVENOUS EVERY 6 HOURS PRN
Status: DISCONTINUED | OUTPATIENT
Start: 2024-09-18 | End: 2024-09-20

## 2024-09-18 RX ORDER — MONTELUKAST SODIUM 10 MG/1
10 TABLET ORAL NIGHTLY
Status: DISCONTINUED | OUTPATIENT
Start: 2024-09-18 | End: 2024-09-21 | Stop reason: HOSPADM

## 2024-09-18 RX ORDER — OXYCODONE AND ACETAMINOPHEN 5; 325 MG/1; MG/1
1 TABLET ORAL 3 TIMES DAILY PRN
Status: DISCONTINUED | OUTPATIENT
Start: 2024-09-18 | End: 2024-09-21 | Stop reason: HOSPADM

## 2024-09-18 RX ORDER — NAPROXEN SODIUM 220 MG/1
81 TABLET, FILM COATED ORAL DAILY
Status: DISCONTINUED | OUTPATIENT
Start: 2024-09-18 | End: 2024-09-18

## 2024-09-18 RX ORDER — FLUTICASONE FUROATE AND VILANTEROL 100; 25 UG/1; UG/1
1 POWDER RESPIRATORY (INHALATION) DAILY
Status: DISCONTINUED | OUTPATIENT
Start: 2024-09-18 | End: 2024-09-21 | Stop reason: HOSPADM

## 2024-09-18 RX ORDER — BACLOFEN 10 MG/1
10 TABLET ORAL EVERY MORNING
Status: DISCONTINUED | OUTPATIENT
Start: 2024-09-18 | End: 2024-09-21 | Stop reason: HOSPADM

## 2024-09-18 RX ORDER — ACETAMINOPHEN 325 MG/1
325 TABLET ORAL EVERY 6 HOURS PRN
Status: DISCONTINUED | OUTPATIENT
Start: 2024-09-18 | End: 2024-09-18

## 2024-09-18 RX ORDER — PREDNISONE 20 MG/1
40 TABLET ORAL DAILY
Status: DISCONTINUED | OUTPATIENT
Start: 2024-09-18 | End: 2024-09-18 | Stop reason: DRUGHIGH

## 2024-09-18 RX ORDER — ALBUTEROL SULFATE 0.83 MG/ML
2.5 SOLUTION RESPIRATORY (INHALATION)
Status: DISCONTINUED | OUTPATIENT
Start: 2024-09-18 | End: 2024-09-18

## 2024-09-18 RX ORDER — ENOXAPARIN SODIUM 100 MG/ML
40 INJECTION SUBCUTANEOUS EVERY 24 HOURS
Status: DISCONTINUED | OUTPATIENT
Start: 2024-09-18 | End: 2024-09-21 | Stop reason: HOSPADM

## 2024-09-18 RX ORDER — DIGOXIN 125 MCG
125 TABLET ORAL DAILY
Status: DISCONTINUED | OUTPATIENT
Start: 2024-09-18 | End: 2024-09-21 | Stop reason: HOSPADM

## 2024-09-18 RX ORDER — DILTIAZEM HYDROCHLORIDE 120 MG/1
120 CAPSULE, COATED, EXTENDED RELEASE ORAL DAILY
Status: DISCONTINUED | OUTPATIENT
Start: 2024-09-18 | End: 2024-09-18

## 2024-09-18 RX ORDER — OXYCODONE HYDROCHLORIDE 5 MG/1
5 TABLET ORAL EVERY 8 HOURS PRN
Status: DISCONTINUED | OUTPATIENT
Start: 2024-09-18 | End: 2024-09-21 | Stop reason: HOSPADM

## 2024-09-18 RX ORDER — SULFASALAZINE 500 MG/1
500 TABLET ORAL 2 TIMES DAILY
Status: DISCONTINUED | OUTPATIENT
Start: 2024-09-18 | End: 2024-09-21 | Stop reason: HOSPADM

## 2024-09-18 RX ORDER — BACLOFEN 10 MG/1
20 TABLET ORAL NIGHTLY
Status: DISCONTINUED | OUTPATIENT
Start: 2024-09-18 | End: 2024-09-21 | Stop reason: HOSPADM

## 2024-09-18 RX ORDER — HYDROXYCHLOROQUINE SULFATE 200 MG/1
300 TABLET, FILM COATED ORAL DAILY
Status: DISCONTINUED | OUTPATIENT
Start: 2024-09-18 | End: 2024-09-21 | Stop reason: HOSPADM

## 2024-09-18 RX ORDER — DILTIAZEM HYDROCHLORIDE 120 MG/1
120 CAPSULE, COATED, EXTENDED RELEASE ORAL NIGHTLY
Status: DISCONTINUED | OUTPATIENT
Start: 2024-09-18 | End: 2024-09-21 | Stop reason: HOSPADM

## 2024-09-18 RX ORDER — POLYETHYLENE GLYCOL 3350 17 G/17G
17 POWDER, FOR SOLUTION ORAL DAILY
Status: DISCONTINUED | OUTPATIENT
Start: 2024-09-18 | End: 2024-09-21 | Stop reason: HOSPADM

## 2024-09-18 RX ORDER — ROSUVASTATIN CALCIUM 20 MG/1
20 TABLET, COATED ORAL DAILY
Status: DISCONTINUED | OUTPATIENT
Start: 2024-09-18 | End: 2024-09-18

## 2024-09-18 RX ORDER — IPRATROPIUM BROMIDE AND ALBUTEROL SULFATE 2.5; .5 MG/3ML; MG/3ML
3 SOLUTION RESPIRATORY (INHALATION) EVERY 2 HOUR PRN
Status: DISCONTINUED | OUTPATIENT
Start: 2024-09-18 | End: 2024-09-21 | Stop reason: HOSPADM

## 2024-09-18 RX ORDER — IPRATROPIUM BROMIDE AND ALBUTEROL SULFATE 2.5; .5 MG/3ML; MG/3ML
SOLUTION RESPIRATORY (INHALATION)
Status: COMPLETED
Start: 2024-09-18 | End: 2024-09-18

## 2024-09-18 SDOH — SOCIAL STABILITY: SOCIAL INSECURITY: DOES ANYONE TRY TO KEEP YOU FROM HAVING/CONTACTING OTHER FRIENDS OR DOING THINGS OUTSIDE YOUR HOME?: NO

## 2024-09-18 SDOH — SOCIAL STABILITY: SOCIAL INSECURITY: DO YOU FEEL ANYONE HAS EXPLOITED OR TAKEN ADVANTAGE OF YOU FINANCIALLY OR OF YOUR PERSONAL PROPERTY?: NO

## 2024-09-18 SDOH — SOCIAL STABILITY: SOCIAL INSECURITY: ARE THERE ANY APPARENT SIGNS OF INJURIES/BEHAVIORS THAT COULD BE RELATED TO ABUSE/NEGLECT?: NO

## 2024-09-18 SDOH — SOCIAL STABILITY: SOCIAL INSECURITY: ABUSE: ADULT

## 2024-09-18 SDOH — SOCIAL STABILITY: SOCIAL INSECURITY: HAVE YOU HAD ANY THOUGHTS OF HARMING ANYONE ELSE?: NO

## 2024-09-18 SDOH — SOCIAL STABILITY: SOCIAL INSECURITY: WERE YOU ABLE TO COMPLETE ALL THE BEHAVIORAL HEALTH SCREENINGS?: YES

## 2024-09-18 SDOH — SOCIAL STABILITY: SOCIAL INSECURITY: DO YOU FEEL UNSAFE GOING BACK TO THE PLACE WHERE YOU ARE LIVING?: NO

## 2024-09-18 SDOH — SOCIAL STABILITY: SOCIAL INSECURITY: ARE YOU OR HAVE YOU BEEN THREATENED OR ABUSED PHYSICALLY, EMOTIONALLY, OR SEXUALLY BY ANYONE?: NO

## 2024-09-18 SDOH — SOCIAL STABILITY: SOCIAL INSECURITY: HAS ANYONE EVER THREATENED TO HURT YOUR FAMILY OR YOUR PETS?: NO

## 2024-09-18 SDOH — SOCIAL STABILITY: SOCIAL INSECURITY: HAVE YOU HAD THOUGHTS OF HARMING ANYONE ELSE?: NO

## 2024-09-18 ASSESSMENT — COGNITIVE AND FUNCTIONAL STATUS - GENERAL
DAILY ACTIVITIY SCORE: 20
STANDING UP FROM CHAIR USING ARMS: A LITTLE
MOBILITY SCORE: 20
HELP NEEDED FOR BATHING: A LITTLE
WALKING IN HOSPITAL ROOM: A LITTLE
DRESSING REGULAR LOWER BODY CLOTHING: A LITTLE
HELP NEEDED FOR BATHING: A LITTLE
MOBILITY SCORE: 20
DRESSING REGULAR UPPER BODY CLOTHING: A LITTLE
MOVING TO AND FROM BED TO CHAIR: A LITTLE
STANDING UP FROM CHAIR USING ARMS: A LITTLE
CLIMB 3 TO 5 STEPS WITH RAILING: A LITTLE
DRESSING REGULAR UPPER BODY CLOTHING: A LITTLE
TOILETING: A LITTLE
DAILY ACTIVITIY SCORE: 20
TOILETING: A LITTLE
DAILY ACTIVITIY SCORE: 20
HELP NEEDED FOR BATHING: A LITTLE
DRESSING REGULAR LOWER BODY CLOTHING: A LITTLE
WALKING IN HOSPITAL ROOM: A LITTLE
TOILETING: A LITTLE
DRESSING REGULAR UPPER BODY CLOTHING: A LITTLE
WALKING IN HOSPITAL ROOM: A LITTLE
MOVING TO AND FROM BED TO CHAIR: A LITTLE
STANDING UP FROM CHAIR USING ARMS: A LITTLE
MOBILITY SCORE: 20
DRESSING REGULAR LOWER BODY CLOTHING: A LITTLE
MOVING TO AND FROM BED TO CHAIR: A LITTLE
PATIENT BASELINE BEDBOUND: NO

## 2024-09-18 ASSESSMENT — ACTIVITIES OF DAILY LIVING (ADL)
HEARING - RIGHT EAR: FUNCTIONAL
DRESSING YOURSELF: NEEDS ASSISTANCE
LACK_OF_TRANSPORTATION: NO
LACK_OF_TRANSPORTATION: NO
HEARING - LEFT EAR: FUNCTIONAL
JUDGMENT_ADEQUATE_SAFELY_COMPLETE_DAILY_ACTIVITIES: YES
ADEQUATE_TO_COMPLETE_ADL: YES
GROOMING: NEEDS ASSISTANCE
WALKS IN HOME: NEEDS ASSISTANCE
BATHING: NEEDS ASSISTANCE
FEEDING YOURSELF: INDEPENDENT
TOILETING: NEEDS ASSISTANCE
PATIENT'S MEMORY ADEQUATE TO SAFELY COMPLETE DAILY ACTIVITIES?: YES

## 2024-09-18 ASSESSMENT — ENCOUNTER SYMPTOMS
BACK PAIN: 1
ABDOMINAL DISTENTION: 0
SHORTNESS OF BREATH: 1
WEAKNESS: 1
DYSURIA: 0
DIFFICULTY URINATING: 0
CONFUSION: 0
ABDOMINAL PAIN: 0
ABDOMINAL PAIN: 1
VOMITING: 0
AGITATION: 0
CHILLS: 0
FEVER: 0
ABDOMINAL DISTENTION: 1
WHEEZING: 0
WOUND: 0
DIZZINESS: 0
CHEST TIGHTNESS: 1
DIAPHORESIS: 0
STRIDOR: 0
COUGH: 1
PALPITATIONS: 0
NAUSEA: 1

## 2024-09-18 ASSESSMENT — PAIN - FUNCTIONAL ASSESSMENT
PAIN_FUNCTIONAL_ASSESSMENT: 0-10

## 2024-09-18 ASSESSMENT — PAIN SCALES - GENERAL
PAINLEVEL_OUTOF10: 7
PAINLEVEL_OUTOF10: 3
PAINLEVEL_OUTOF10: 0 - NO PAIN
PAINLEVEL_OUTOF10: 3
PAINLEVEL_OUTOF10: 7
PAINLEVEL_OUTOF10: 3
PAINLEVEL_OUTOF10: 0 - NO PAIN

## 2024-09-18 ASSESSMENT — PATIENT HEALTH QUESTIONNAIRE - PHQ9
1. LITTLE INTEREST OR PLEASURE IN DOING THINGS: NOT AT ALL
2. FEELING DOWN, DEPRESSED OR HOPELESS: NOT AT ALL
SUM OF ALL RESPONSES TO PHQ9 QUESTIONS 1 & 2: 0

## 2024-09-18 ASSESSMENT — PAIN SCALES - PAIN ASSESSMENT IN ADVANCED DEMENTIA (PAINAD): TOTALSCORE: MEDICATION (SEE MAR);REPOSITIONED

## 2024-09-18 ASSESSMENT — LIFESTYLE VARIABLES
HOW OFTEN DO YOU HAVE 6 OR MORE DRINKS ON ONE OCCASION: NEVER
AUDIT-C TOTAL SCORE: 0
AUDIT-C TOTAL SCORE: 0
HOW OFTEN DO YOU HAVE A DRINK CONTAINING ALCOHOL: NEVER
SKIP TO QUESTIONS 9-10: 1
HOW MANY STANDARD DRINKS CONTAINING ALCOHOL DO YOU HAVE ON A TYPICAL DAY: PATIENT DOES NOT DRINK

## 2024-09-18 ASSESSMENT — PAIN DESCRIPTION - LOCATION
LOCATION: BACK
LOCATION: SHOULDER

## 2024-09-18 ASSESSMENT — PAIN DESCRIPTION - DESCRIPTORS: DESCRIPTORS: ACHING

## 2024-09-18 NOTE — HOSPITAL COURSE
Trina Elias, a 73 y/o F with PMHx of hypertension, hyperlipidemia, HFmrEF (EF50-55% in July 2024), paroxysmal A-fib status post PCM/ICD, central sleep apnea on BiPAP at night, COPD/asthma not on nasal cannula oxygen at home, chronic anemia/iron deficiency anemia, CKD, multiple abdominal surgeries, and RA presented to the ED with SOB. She follows with Dr. Soto for her Cardiac Hx but has not seen them since last discharge on August 10.  Patient discharged on Bumex 2mg qAM and 1mg qPM which reportedly was decrease in amount that was normal for her.  Patient also is in the process of trying to find a new pulmonologist, she previously saw Dr. Marin but has not followed up since 2021.      She denies any fever, chills, sweats, chest pain, palpitations, vomiting, diarrhea, melena, hematochezia, urinary symptoms including frequency urgency dysuria or worsening leg swelling.  She does mention that she have nausea which is chronic for her which she managed with Zofran as needed.  She denies any recent illness or upper respiratory symptoms.  She does not smoke, drink, or use drugs. Denies sick contacts.      12 point Review of Systems negative unless stated above.      ED course:  -  HDS on 2 L NC, , creatinine 1.09, lactate 1.1, WBC 8.2, magnesium 2.11, , Trop 34, flu COVID-negative, digoxin level 0.9, D-dimer 735, VBG: pH 7.44, pCO2 40  - CXR: Mild congestion, CT angio no sign of PE  - Interventions: Bumex 0.5, [facility]    Hospital Course  For pt's acute hypoxic respiratory failure, pt was placed on supplemental oxygenation.     For acute decompendated heart failure, cardiology was placed on consult and pt was treated with diuretics. Daily weights, Strict I&Os, and fluid restriction were followed. Pt was placed on a 2 gm sodium diet.     For Pt's R pleural effusion, pulmonology was placed on consult. Pt was treated with diuresis.  Pleural effusion showed improvement with diuresis.    For pt's acute COPD  exacerbation, pulmonology recommended treating pt with solu-medrol 60 mg BID, added Breo Ellipta, Duonebs q 2-4 hrs. Pt was started with BiPAP at night and as needed during day. Oxygen was weaned as tolerated.   Patient had walking pulse ox with no desaturation, lowest %sat 93.   Patient remained hemodynamically stable and will be discharged on following medications and recommendations:  - Follow up with PCP for post hospital discharge  - Follow up with Pulmonary for Asthma management  - Follow up with Cardiology for CHF management  - START take Steroid-tapered course and Maintenance Inhaler (Symbicort) for Asthma:  Prednisone 40mg (4 pills of 10mg) x 3 days starting 9/22/24, followed by  Prednisone 30mg (3pills of 10mg) x 3 days, followed by  Prednisone 20mg (2 pills of 10mg) x 3 days, followed by  Prednisone 10mg (1 pill of 10mg) x 3 days, followed by  Prednisone 5mg (1/2 pill of 10mg) x 3days, then  STOP.  - CONTINUE taking oral Bumex 2mg in the am and 1mg in pm.  If you gain more than 2 lbs then take 2 mg in the evening. Call Cardiology office for a weight increase of more than 5 lbs.

## 2024-09-18 NOTE — CARE PLAN
The patient's goals for the shift include      The clinical goals for the shift include patient have better oxygen demand      Problem: HP General Problem  Goal: HP General Goal  Outcome: Progressing

## 2024-09-18 NOTE — ED PROVIDER NOTES
[facility]  ED  Encounter Note  Admit Date/RoomTime: 2024 11:08 PM  ED Room: 250/250-B  NAME: Trina Elias  : 1950  MRN: [id number]     Chief Complaint:  Shortness of Breath (SOB for a week.  Worse today.  Put on 8 lbs today.  Hx of chf)    HISTORY OF PRESENT ILLNESS        Trina Elias is a 74 y.o. female who presents to the ED for evaluation of HEART FAILURE exacerbation. Patient reports sob worse today. Is on singular but denied home 02 use.    Patient stated that she put weight on today. She feels fluid overloaded and put weight on. Sees Dr Soto and Neo. Saw Dr Soto about 3-4 months ago. No longer on blood thinners had watchman. Is on Digoxin. Bumex.     Denied known fever or chills, + cough. Feels like having bilaterally leg edema. +2 or 3. No home 02 use.    ROS   Pertinent positives and negatives are stated within HPI, all other systems reviewed and are negative.    Past Medical History:  has a past medical history of Asthma ([facility]), Atrial fibrillation (Multi), Central sleep apnea, Cervical myofascial pain syndrome, Chronic anemia, CKD stage 3a, GFR 45-59 ml/min (Multi), COPD (chronic obstructive pulmonary disease) (Multi), DVT of axillary vein, acute right (Multi) (2018), H/O being hospitalized (2024), History of Helicobacter pylori infection, syncope, ICD (implantable cardioverter-defibrillator) in place, Mitral valve regurgitation, Moderate aortic stenosis by prior echocardiogram, Osteopenia (2023), Personal history of anaphylaxis, Psoriasis, PUD (peptic ulcer disease), Pulmonary hypertension (Multi), Radiculopathy, lumbar region (2018), Restless legs syndrome (2015), Rheumatoid arthritis (Multi), Seasonal allergic rhinitis due to pollen, Small bowel obstruction (Multi) (2023), and Wide-complex tachycardia.    She has no past medical history of Chronic kidney disease.    Surgical History:  has a past surgical history that  includes Cholecystectomy; Appendectomy; Cardiac catheterization (2022);  section, classic; Rotator cuff repair; Abdominal adhesion surgery (10/22/2015); Abdominal adhesion surgery (10/18/2020); Ventral hernia repair (06/15/2015); Nerve Surgery (Right, 10/13/2010); Nissen fundoplication (); Finger surgery (Left, 2008); Leg Surgery (Right, 2006); Breast biopsy (Left, 2022); Ablation of dysrhythmic focus (2022); Wound debridement (2020); Cardioversion (2018); Cardiac assist device insertion (2012); [place] tooth extraction; IR injection epidural steroid (2011); IR injection epidural steroid (); Reverse total shoulder arthroplasty (Bilateral, 2024); Cardiac defibrillator placement (2014); Carpal tunnel release (Bilateral); Foot surgery (Bilateral); Ulnar nerve transposition (Left); Achilles tendon surgery (Right); Anterior cervical discectomy w/ fusion (2016); Colonoscopy (2024); picc line insertion wo imaging (10/24/2015); and Cardiac catheterization (N/A, 2024).    Social History:  reports that she has never smoked. She has never used smokeless tobacco. She reports that she does not drink alcohol and does not use drugs.    Family History: family history includes Asthma in her daughter and another family member; Colon cancer in an other family member; Diabetes in an other family member; stroke syndrome in an other family member.     Allergies: Abatacept, Hydrocodone-acetaminophen, Hydromorphone, Metoprolol, Penicillins, Pregabalin, Prochlorperazine, Methotrexate, Aripiprazole, Beta-blockers (beta-adrenergic blocking agts), Bupropion, Cefepime, Ciprofloxacin, Furosemide, Levofloxacin, and Pineapple    PHYSICAL EXAM   Oxygen Saturation Interpretation: Normal.          Physical Exam  Constitutional/General: Alert and oriented x3, well appearing, non toxic  HEENT:  NC/NT. PERRLA.  Airway patent.  Neck: Supple, full ROM. No  midline vertebral tenderness or crepitus.   Respiratory: Lung sounds decreased  to auscultation bilaterally. ++ bilateral wheezes, rhonchi or stridor. CV:  Regular rate. Regular rhythm. No murmurs or rubs. 2+ distal pulses.  GI:  Abdomen soft, non-tender, non-distended. +BS. No rebound, guarding, or rigidity. No pulsatile masses.  Musculoskeletal: Moves all extremities x 4. Warm and well perfused. Capillary refill <3 seconds +2 to 3 leg edema   Integument: Skin warm and dry. No rashes.   Neurologic: Alert and oriented with no focal deficits, symmetric strength 5/5 in the upper and lower extremities bilaterally.  Psychiatric: Normal affect.    Lab / Imaging Results   (All laboratory and radiology results have been personally reviewed by myself)  Labs:  Results for orders placed or performed during the hospital encounter of 09/17/24   CBC and Auto Differential   Result Value Ref Range    WBC 8.2 4.4 - 11.3 x10*3/uL    nRBC 0.0 0.0 - 0.0 /100 WBCs    RBC 3.69 (L) 4.00 - 5.20 x10*6/uL    Hemoglobin 8.5 (L) 12.0 - 16.0 g/dL    Hematocrit 27.9 (L) 36.0 - 46.0 %    MCV 76 (L) 80 - 100 fL    MCH 23.0 (L) 26.0 - 34.0 pg    MCHC 30.5 (L) 32.0 - 36.0 g/dL    RDW 17.1 (H) 11.5 - 14.5 %    Platelets 259 150 - 450 x10*3/uL    Neutrophils % 69.6 40.0 - 80.0 %    Immature Granulocytes %, Automated 0.4 0.0 - 0.9 %    Lymphocytes % 17.8 13.0 - 44.0 %    Monocytes % 10.4 2.0 - 10.0 %    Eosinophils % 1.3 0.0 - 6.0 %    Basophils % 0.5 0.0 - 2.0 %    Neutrophils Absolute 5.71 (H) 1.60 - 5.50 x10*3/uL    Immature Granulocytes Absolute, Automated 0.03 0.00 - 0.50 x10*3/uL    Lymphocytes Absolute 1.46 0.80 - 3.00 x10*3/uL    Monocytes Absolute 0.85 (H) 0.05 - 0.80 x10*3/uL    Eosinophils Absolute 0.11 0.00 - 0.40 x10*3/uL    Basophils Absolute 0.04 0.00 - 0.10 x10*3/uL   Magnesium   Result Value Ref Range    Magnesium 2.11 1.60 - 2.40 mg/dL   Comprehensive metabolic panel   Result Value Ref Range    Glucose 95 74 - 99 mg/dL    Sodium 140  136 - 145 mmol/L    Potassium 3.7 3.5 - 5.3 mmol/L    Chloride 104 98 - 107 mmol/L    Bicarbonate 27 21 - 32 mmol/L    Anion Gap 13 10 - 20 mmol/L    Urea Nitrogen 22 6 - 23 mg/dL    Creatinine 1.09 (H) 0.50 - 1.05 mg/dL    eGFR 54 (L) >60 mL/min/1.73m*2    Calcium 8.7 8.6 - 10.3 mg/dL    Albumin 4.2 3.4 - 5.0 g/dL    Alkaline Phosphatase 72 33 - 136 U/L    Total Protein 6.7 6.4 - 8.2 g/dL    AST 22 9 - 39 U/L    Bilirubin, Total 0.5 0.0 - 1.2 mg/dL    ALT 28 7 - 45 U/L   Lactate   Result Value Ref Range    Lactate 1.1 0.4 - 2.0 mmol/L   Protime-INR   Result Value Ref Range    Protime 12.4 9.8 - 12.8 seconds    INR 1.1 0.9 - 1.1   aPTT   Result Value Ref Range    aPTT 31 27 - 38 seconds   B-Type Natriuretic Peptide   Result Value Ref Range     (H) 0 - 99 pg/mL   D-Dimer, VTE Exclusion   Result Value Ref Range    D-Dimer, Quantitative VTE Exclusion 735 (H) <=500 ng/mL FEU   Influenza A, and B PCR   Result Value Ref Range    Flu A Result Not Detected Not Detected    Flu B Result Not Detected Not Detected   Sars-CoV-2 PCR   Result Value Ref Range    Coronavirus 2019, PCR Not Detected Not Detected   Blood Gas Venous Full Panel   Result Value Ref Range    POCT pH, Venous 7.44 (H) 7.33 - 7.43 pH    POCT pCO2, Venous 40 (L) 41 - 51 mm Hg    POCT pO2, Venous 35 35 - 45 mm Hg    POCT SO2, Venous 59 45 - 75 %    POCT Oxy Hemoglobin, Venous 57.4 45.0 - 75.0 %    POCT Hematocrit Calculated, Venous 27.0 (L) 36.0 - 46.0 %    POCT Sodium, Venous 138 136 - 145 mmol/L    POCT Potassium, Venous 3.8 3.5 - 5.3 mmol/L    POCT Chloride, Venous 106 98 - 107 mmol/L    POCT Ionized [place], Venous 1.15 1.10 - 1.33 mmol/L    POCT Glucose, Venous 97 74 - 99 mg/dL    POCT Lactate, Venous 1.1 0.4 - 2.0 mmol/L    POCT Base Excess, Venous 2.8 -2.0 - 3.0 mmol/L    POCT HCO3 Calculated, Venous 27.2 (H) 22.0 - 26.0 mmol/L    POCT Hemoglobin, Venous 9.0 (L) 12.0 - 16.0 g/dL    POCT Anion Gap, Venous 9.0 (L) 10.0 - 25.0 mmol/L    Patient  Temperature      FiO2 21 %   Troponin I, High Sensitivity, Initial   Result Value Ref Range    Troponin I, High Sensitivity 35 (H) 0 - 13 ng/L   Troponin, High Sensitivity, 1 Hour   Result Value Ref Range    Troponin I, High Sensitivity 34 (H) 0 - 13 ng/L   Digoxin level   Result Value Ref Range    Digoxin  0.90 0.80 - <2.00 ng/mL   Troponin I, High Sensitivity   Result Value Ref Range    Troponin I, High Sensitivity 34 (H) 0 - 13 ng/L   Magnesium   Result Value Ref Range    Magnesium 2.36 1.60 - 2.40 mg/dL   Renal Function Panel   Result Value Ref Range    Glucose 144 (H) 74 - 99 mg/dL    Sodium 140 136 - 145 mmol/L    Potassium 3.8 3.5 - 5.3 mmol/L    Chloride 105 98 - 107 mmol/L    Bicarbonate 27 21 - 32 mmol/L    Anion Gap 12 10 - 20 mmol/L    Urea Nitrogen 20 6 - 23 mg/dL    Creatinine 0.96 0.50 - 1.05 mg/dL    eGFR 63 >60 mL/min/1.73m*2    Calcium 8.6 8.6 - 10.3 mg/dL    Phosphorus 3.6 2.5 - 4.9 mg/dL    Albumin 3.9 3.4 - 5.0 g/dL   CBC   Result Value Ref Range    WBC 6.8 4.4 - 11.3 x10*3/uL    nRBC 0.0 0.0 - 0.0 /100 WBCs    RBC 3.78 (L) 4.00 - 5.20 x10*6/uL    Hemoglobin 8.6 (L) 12.0 - 16.0 g/dL    Hematocrit 29.2 (L) 36.0 - 46.0 %    MCV 77 (L) 80 - 100 fL    MCH 22.8 (L) 26.0 - 34.0 pg    MCHC 29.5 (L) 32.0 - 36.0 g/dL    RDW 17.3 (H) 11.5 - 14.5 %    Platelets 226 150 - 450 x10*3/uL   Blood Gas Venous   Result Value Ref Range    POCT pH, Venous 7.42 7.33 - 7.43 pH    POCT pCO2, Venous 40 (L) 41 - 51 mm Hg    POCT pO2, Venous 58 (H) 35 - 45 mm Hg    POCT SO2, Venous 89 (H) 45 - 75 %    POCT Oxy Hemoglobin, Venous 87.2 (H) 45.0 - 75.0 %    POCT Base Excess, Venous 1.3 -2.0 - 3.0 mmol/L    POCT HCO3 Calculated, Venous 25.9 22.0 - 26.0 mmol/L    Patient Temperature      FiO2 99 %   Magnesium   Result Value Ref Range    Magnesium 2.52 (H) 1.60 - 2.40 mg/dL   Renal Function Panel   Result Value Ref Range    Glucose 118 (H) 74 - 99 mg/dL    Sodium 140 136 - 145 mmol/L    Potassium 3.8 3.5 - 5.3 mmol/L     Chloride 104 98 - 107 mmol/L    Bicarbonate 25 21 - 32 mmol/L    Anion Gap 15 10 - 20 mmol/L    Urea Nitrogen 24 (H) 6 - 23 mg/dL    Creatinine 1.04 0.50 - 1.05 mg/dL    eGFR 57 (L) >60 mL/min/1.73m*2    Calcium 8.5 (L) 8.6 - 10.3 mg/dL    Phosphorus 6.0 (H) 2.5 - 4.9 mg/dL    Albumin 3.7 3.4 - 5.0 g/dL   CBC   Result Value Ref Range    WBC 12.8 (H) 4.4 - 11.3 x10*3/uL    nRBC 0.0 0.0 - 0.0 /100 WBCs    RBC 3.47 (L) 4.00 - 5.20 x10*6/uL    Hemoglobin 8.1 (L) 12.0 - 16.0 g/dL    Hematocrit 27.3 (L) 36.0 - 46.0 %    MCV 79 (L) 80 - 100 fL    MCH 23.3 (L) 26.0 - 34.0 pg    MCHC 29.7 (L) 32.0 - 36.0 g/dL    RDW 16.9 (H) 11.5 - 14.5 %    Platelets 245 150 - 450 x10*3/uL   Magnesium   Result Value Ref Range    Magnesium 2.56 (H) 1.60 - 2.40 mg/dL   Renal Function Panel   Result Value Ref Range    Glucose 137 (H) 74 - 99 mg/dL    Sodium 141 136 - 145 mmol/L    Potassium 4.0 3.5 - 5.3 mmol/L    Chloride 104 98 - 107 mmol/L    Bicarbonate 29 21 - 32 mmol/L    Anion Gap 12 10 - 20 mmol/L    Urea Nitrogen 29 (H) 6 - 23 mg/dL    Creatinine 1.04 0.50 - 1.05 mg/dL    eGFR 57 (L) >60 mL/min/1.73m*2    Calcium 8.7 8.6 - 10.3 mg/dL    Phosphorus 4.7 2.5 - 4.9 mg/dL    Albumin 3.8 3.4 - 5.0 g/dL   CBC   Result Value Ref Range    WBC 10.4 4.4 - 11.3 x10*3/uL    nRBC 0.0 0.0 - 0.0 /100 WBCs    RBC 3.54 (L) 4.00 - 5.20 x10*6/uL    Hemoglobin 8.2 (L) 12.0 - 16.0 g/dL    Hematocrit 27.3 (L) 36.0 - 46.0 %    MCV 77 (L) 80 - 100 fL    MCH 23.2 (L) 26.0 - 34.0 pg    MCHC 30.0 (L) 32.0 - 36.0 g/dL    RDW 17.1 (H) 11.5 - 14.5 %    Platelets 239 150 - 450 x10*3/uL   Iron and TIBC   Result Value Ref Range    Iron 17 (L) 35 - 150 ug/dL    UIBC 389 (H) 110 - 370 ug/dL    TIBC 406 240 - 445 ug/dL    % Saturation 4 (L) 25 - 45 %   Ferritin   Result Value Ref Range    Ferritin 15 8 - 150 ng/mL   ECG 12 lead   Result Value Ref Range    Ventricular Rate 60 BPM    Atrial Rate 60 BPM    NJ Interval 260 ms    QRS Duration 90 ms    QT Interval 434 ms     QTC Calculation(Bazett) 434 ms    R Axis 20 degrees    T Axis 47 degrees    QRS Count 10 beats    Q Onset 218 ms    T Offset 435 ms    QTC Fredericia 434 ms   ECG 12 lead   Result Value Ref Range    Ventricular Rate 61 BPM    Atrial Rate 61 BPM    MN Interval 266 ms    QRS Duration 90 ms    QT Interval 436 ms    QTC Calculation(Bazett) 438 ms    R Axis 50 degrees    T Axis 174 degrees    QRS Count 10 beats    Q Onset 219 ms    T Offset 437 ms    QTC Fredericia 438 ms     Imaging:  All Radiology results interpreted by Radiologist unless otherwise noted.  CT angio chest for pulmonary embolism   Final Result   No significant pulmonary embolism.        Moderate cardiomegaly/suspected CHF with moderate right effusion.        MACRO:   None.        Signed by: Campbell Pearson 9/18/2024 12:58 AM   Dictation workstation:   [employer]      XR chest 1 view   Final Result   Mild vascular congestion.        MACRO:   None        Signed by: Campbell Pearson 9/18/2024 12:11 AM   Dictation workstation:   [employer]          ED Course / Medical Decision Making     Medications   enoxaparin (Lovenox) syringe 40 mg (40 mg subcutaneous Given 9/20/24 0951)   baclofen (Lioresal) tablet 10 mg (10 mg oral Given 9/20/24 0953)   bumetanide (Bumex) tablet 1 mg (1 mg oral Given 9/19/24 1729)   bumetanide (Bumex) tablet 2 mg (2 mg oral Given 9/20/24 0953)   clopidogrel (Plavix) tablet 75 mg (75 mg oral Given 9/20/24 0953)   digoxin (Lanoxin) tablet 125 mcg (125 mcg oral Given 9/20/24 0954)   hydroxychloroquine (Plaquenil) tablet 300 mg (300 mg oral Given 9/20/24 0952)   montelukast (Singulair) tablet 10 mg (10 mg oral Given 9/19/24 2116)   pantoprazole (ProtoNix) EC tablet 40 mg (40 mg oral Given 9/20/24 0954)   polyethylene glycol (Glycolax, Miralax) packet 17 g (17 g oral Given 9/20/24 0951)   sulfaSALAzine (Azulfidine) tablet 500 mg (500 mg oral Given 9/20/24 0953)   oxyCODONE-acetaminophen (Percocet) 5-325 mg per tablet 1 tablet ( oral Held by  provider 9/18/24 5303)   acetaminophen (Tylenol) tablet 650 mg (650 mg oral Given 9/19/24 2119)   oxyCODONE (Roxicodone) immediate release tablet 5 mg (5 mg oral Given 9/20/24 0620)   ipratropium-albuteroL (Duo-Neb) 0.5-2.5 mg/3 mL nebulizer solution 3 mL (has no administration in time range)   baclofen (Lioresal) tablet 20 mg (20 mg oral Given 9/19/24 2116)   dilTIAZem CD (Cardizem CD) 24 hr capsule 120 mg (120 mg oral Given 9/19/24 2116)   rosuvastatin (Crestor) tablet 20 mg (20 mg oral Given 9/19/24 2116)   methylPREDNISolone sod succinate (SOLU-Medrol) injection 60 mg (60 mg intravenous Given 9/20/24 0951)   fluticasone furoate-vilanteroL (Breo Ellipta) 100-25 mcg/dose inhaler 1 puff (1 puff inhalation Given 9/20/24 1156)   oxygen (O2) therapy (has no administration in time range)   oxygen (O2) therapy (2 L/min inhalation Rate Verify Medical Gas 9/20/24 0905)   ipratropium-albuteroL (Duo-Neb) 0.5-2.5 mg/3 mL nebulizer solution 3 mL (3 mL nebulization Given 9/20/24 0905)   sucralfate (Carafate) suspension 1 g (1 g oral Given 9/20/24 1157)   ondansetron (Zofran) tablet 8 mg (8 mg oral Given 9/20/24 1157)   bumetanide (Bumex) injection 0.5 mg (0.5 mg intravenous Given 9/17/24 2358)   iohexol (OMNIPaque) 350 mg iodine/mL solution 65 mL (65 mL intravenous Given 9/18/24 0032)   methylPREDNISolone sod succinate (SOLU-Medrol) injection 125 mg (125 mg intravenous Given 9/18/24 0204)   bumetanide (Bumex) injection 1 mg (1 mg intravenous Given 9/18/24 1325)   bumetanide (Bumex) injection 1 mg (1 mg intravenous Given 9/19/24 1506)   iron sucrose (Venofer) injection 200 mg (200 mg intravenous Given 9/20/24 1200)     ED Course as of 09/20/24 1230   Tue Sep 17, 2024   2317 EKG rate 66 bpm pr interval + ms qrs duration 80 ms qt qtc 392/410 ms prt axes + 42 65 accelerated junctional rhythm, personally reviewed by me. [PK]   Wed Sep 18, 2024   0143 Unsure if this is more of a COPD versus CHF exacerbation patient will be  hospitalized.  She is still dyspneic at rest.  Workup in the ED has not shown etiology.  She was treated for both and anticipate hospitalization spoke to patient and family at bedside. [RZ]   0210 EKG done at 0 153 interpreted by me shows paced rhythm at 61 bpm with no ischemia similar to previous EKG August 9, 2024 no STEMI [RZ]      ED Course User Index  [PK] Odalis RODRIGUEZ Mariano, APRN-CNP  [RZ] Suhail Mack MD         Diagnoses as of 09/20/24 1230   Chest pain, unspecified type   Dyspnea, unspecified type   Acute on chronic congestive heart failure, unspecified heart failure type (Multi)   Elevated d-dimer   Hypervolemia, unspecified hypervolemia type     Re-examination:    Patient’s condition stable.    Consult(s):   IP CONSULT TO CARDIOLOGY  IP CONSULT TO HEART FAILURE NAVIGATOR  IP CONSULT TO RESPIRATORY CARE  INPATIENT CONSULT TO COPD NAVIGATOR  IP CONSULT TO PULMONOLOGY  IP CONSULT TO NUTRITION SERVICES        MDM:       Trina Elias is a 74 y.o. female who presents to the ED for evaluation of HEART FAILURE exacerbation. Patient reports sob worse today. Is on singular but denied home 02 use.    Patient stated that she put weight on today. She feels fluid overloaded and put weight on. Sees Dr Soto and Neo. Saw Dr Soto about 3-4 months ago. No longer on blood thinners had watchman. Is on Digoxin. Bumex.     Denied known fever or chills, + cough. Feels like having bilaterally leg edema. +2 or 3. No home 02 use.      ED course stable  Elevated d dimer 735  Tnl 35    Creatinine 1.09  Gfr 54   HNH 8.5/27.9 (hx of same)  Flu a b covid pending  CXR showed  FINDINGS:  Moderate cardiomegaly and left-sided pacemaker noted. Mild vascular  congestion. No consolidation or pneumothorax.   IMPRESSION:  Mild vascular congestion.  Received Bumex 0.5 mg IVP xone  Pending admission to hospitalist cta pe study  Ddx: chf exacerbation  Care signed out to attending    Plan of Care/Counseling:  I reviewed today's  visit with the patient and    in addition to providing specific details for the plan of care and counseling regarding the diagnosis and prognosis.  Questions are answered at this time and are agreeable with the plan.    ASSESSMENT     1. Chest pain, unspecified type    2. Dyspnea, unspecified type    3. Acute on chronic congestive heart failure, unspecified heart failure type (Multi)    4. Elevated d-dimer    5. Hypervolemia, unspecified hypervolemia type    6. Iron deficiency anemia secondary to inadequate dietary iron intake      Admit to hospitalist     New Medications     New Medications Ordered This Visit   Medications    bumetanide (Bumex) injection 0.5 mg    iohexol (OMNIPaque) 350 mg iodine/mL solution 65 mL    methylPREDNISolone sod succinate (SOLU-Medrol) injection 125 mg    methylPREDNISolone sod succinate (SOLU-Medrol) injection  - Omnicell Override Pull     Created by cabinet override    enoxaparin (Lovenox) syringe 40 mg    ipratropium-albuteroL (Duo-Neb) nebulizer solution  - Omnicell Override Pull     Created by cabinet override    baclofen (Lioresal) tablet 10 mg    bumetanide (Bumex) tablet 1 mg    bumetanide (Bumex) tablet 2 mg    clopidogrel (Plavix) tablet 75 mg    digoxin (Lanoxin) tablet 125 mcg    hydroxychloroquine (Plaquenil) tablet 300 mg     Order Specific Question:   Indication for hydroxychloroquine:     Answer:   Rheumatologic Disease    montelukast (Singulair) tablet 10 mg    pantoprazole (ProtoNix) EC tablet 40 mg    polyethylene glycol (Glycolax, Miralax) packet 17 g    sulfaSALAzine (Azulfidine) tablet 500 mg    oxyCODONE-acetaminophen (Percocet) 5-325 mg per tablet 1 tablet     Order Specific Question:   If ordered PRN for pain, nurse is permitted to administer this medication for higher pain scores based on patient preference?     Answer:   Yes    acetaminophen (Tylenol) tablet 650 mg     Order Specific Question:   If ordered PRN for pain, nurse is permitted to administer  this medication for higher pain scores based on patient preference?     Answer:   Yes    oxyCODONE (Roxicodone) immediate release tablet 5 mg     Order Specific Question:   If ordered PRN for pain, nurse is permitted to administer this medication for higher pain scores based on patient preference?     Answer:   Yes    ipratropium-albuteroL (Duo-Neb) 0.5-2.5 mg/3 mL nebulizer solution 3 mL    baclofen (Lioresal) tablet 20 mg    dilTIAZem CD (Cardizem CD) 24 hr capsule 120 mg    rosuvastatin (Crestor) tablet 20 mg    bumetanide (Bumex) injection 1 mg    methylPREDNISolone sod succinate (SOLU-Medrol) injection 60 mg    fluticasone furoate-vilanteroL (Breo Ellipta) 100-25 mcg/dose inhaler 1 puff    oxygen (O2) therapy     Order Specific Question:   Device     Answer:   Non-Invasive Ventilation     Order Specific Question:   Rate in liters per minute:     Answer:   2 LPM     Order Specific Question:   FIO2     Answer:   32    oxygen (O2) therapy     Order Specific Question:   Device     Answer:   Nasal Cannula     Order Specific Question:   Rate in liters per minute:     Answer:   2 LPM    bumetanide (Bumex) injection 1 mg    ipratropium-albuteroL (Duo-Neb) 0.5-2.5 mg/3 mL nebulizer solution 3 mL    sucralfate (Carafate) suspension 1 g    iron sucrose (Venofer) injection 200 mg    ondansetron (Zofran) tablet 8 mg     Electronically signed by DARSHANA Can     **This report was transcribed using voice recognition software. Every effort was made to ensure accuracy; however, inadvertent computerized transcription errors may be present.  END OF ED PROVIDER NOTE     DARSHANA Can  09/18/24 0021       DARSHANA Can  09/20/24 1231

## 2024-09-18 NOTE — PROGRESS NOTES
09/18/24 1131   Discharge Planning   Living Arrangements Spouse/significant other   Support Systems Spouse/significant other   Assistance Needed Alert and oriented x 3, Independent with ADL's, Rollator, Room air at baseline, currently requiring 2.5 liters O2, Drives   Type of Residence Private residence   Number of Stairs to Enter Residence 0  (Ramp)   Number of Stairs Within Residence 0   Do you have animals or pets at home? No   Who is requesting discharge planning? Provider   Home or Post Acute Services None   Expected Discharge Disposition Home   Does the patient need discharge transport arranged? No   Financial Resource Strain   How hard is it for you to pay for the very basics like food, housing, medical care, and heating? Not hard   Housing Stability   In the last 12 months, was there a time when you were not able to pay the mortgage or rent on time? N   In the past 12 months, how many times have you moved where you were living? 0   At any time in the past 12 months, were you homeless or living in a shelter (including now)? N   Transportation Needs   In the past 12 months, has lack of transportation kept you from medical appointments or from getting medications? no   In the past 12 months, has lack of transportation kept you from meetings, work, or from getting things needed for daily living? No   Patient Choice   Provider Choice list and CMS website (https://medicare.gov/care-compare#search) for post-acute Quality and Resource Measure Data were provided and reviewed with: Patient   Patient / Family choosing to utilize agency / facility established prior to hospitalization No

## 2024-09-18 NOTE — CARE PLAN
The patient's goals for the shift include      The clinical goals for the shift include Shortness of breath      Problem: HP General Problem  Goal: HP General Goal  Outcome: Progressing

## 2024-09-18 NOTE — H&P
Patient: Trina Elias   Age: 74 y.o.   Gender: female   Room/Bed: AC/[facility]     Attending: Sy Tyler DO  Code Status:  Full Code    Chief Complaint:  Patient: Trina Elias is a 74 y.o. female who presented to the hospital for SOB    History of Presenting Illness  Trina Elias, a 75 y/o F with PMHx of hypertension, hyperlipidemia, HFmrEF (EF50-55% in July 2024), paroxysmal A-fib status post PCM/ICD, central sleep apnea on BiPAP at night, COPD/asthma not on nasal cannula oxygen at home, chronic anemia/iron deficiency anemia, CKD, multiple abdominal surgeries, and RA presented to the ED with SOB. She follows with Dr. Soto for her Cardiac Hx but has not seen them since last discharge on August 10.  Patient discharged on Bumex 2mg qAM and 1mg qPM which reportedly was decrease in amount that was normal for her.  Patient also is in the process of trying to find a new pulmonologist, she previously saw Dr. Marin but has not followed up since 2021.     She denies any fever, chills, sweats, chest pain, palpitations, vomiting, diarrhea, melena, hematochezia, urinary symptoms including frequency urgency dysuria or worsening leg swelling.  She does mention that she have nausea which is chronic for her which she managed with Zofran as needed.  She denies any recent illness or upper respiratory symptoms.  She does not smoke, drink, or use drugs. Denies sick contacts.     12 point Review of Systems negative unless stated above.     ED course:  -  HDS on 2 L NC, , creatinine 1.09, lactate 1.1, WBC 8.2, magnesium 2.11, , Trop 34, flu COVID-negative, digoxin level 0.9, D-dimer 735, VBG: pH 7.44, pCO2 40  - CXR: Mild congestion, CT angio no sign of PE  - Interventions: Bumex 0.5, Solu-Medrol    Past Medical History   Past Medical History:   Diagnosis Date    Asthma (HHS-HCC)     Atrial fibrillation (Multi)     Resistent to treatment: s/p DCCVs and ablations    Central sleep apnea     Per PSG 11/20/18; severe central  sleep apnea, .6, mild obstructive component    Cervical myofascial pain syndrome     Baclofen    Chronic anemia     CKD stage 3a, GFR 45-59 ml/min (Multi)     COPD (chronic obstructive pulmonary disease) (Multi)     DVT of axillary vein, acute right (Multi) 01/20/2018    When PICC line was removed. U/S + Partial nonocclusive DVT in the axillary and proximal basilic vein in 2018    H/O being hospitalized 02/2024 Feb 24-Mar 5, 2024:  (2/24) admitted for dyspnea and chest pain work-up and found to have pleural effusions.  Found to have acute blood loss anemia s/p 2u PRBC. EGD neg for active bleed.  Cardiology consulted - plan for Watchman device WING closure as out pt.    History of Helicobacter pylori infection     3/2013, 1/2021    Hx of syncope     Recurrent Syncope d/t VT- s/p ILR (12/2012) implanted.  (July 2013) Episode of syncope that appears to be correlated with an 11 second round of ventricular tachycardia recorded by the loop recorder.  S/p EPS (June 2014) neg for inducible arrhythmias. (ILR removed when ICD implanted)    ICD (implantable cardioverter-defibrillator) in place     Placed 06/12/2014    Mitral valve regurgitation     Mild-Moderate per Echo 02/25/2024    Moderate aortic stenosis by prior echocardiogram     Echo 2/25/2024    Osteopenia 06/26/2023    Personal history of anaphylaxis     See allergy list    Psoriasis     PUD (peptic ulcer disease)     H/O nonbleeding gastric ulcers with small hiatal hernia on 1/6/2021 EGD    Pulmonary hypertension (Multi)     Mild - Moderate per Echo 2/25/2024    Radiculopathy, lumbar region 08/13/2018    Acute lumbar radiculopathy    Restless legs syndrome 11/17/2015    Restless legs syndrome    Rheumatoid arthritis (Multi)     Seasonal allergic rhinitis due to pollen     Small bowel obstruction (Multi) 06/26/2023    H/O due to Severe Adhesions s/p Adhesiolysis x2 in 2015 and 2020    Wide-complex tachycardia     Per cardiology 3/7/2024: VT is on a basis of  triggered activity certainly made worse with anemia and prednisone. Since she is asymptomatic we will continue to observe.      Past Surgical History:   Past Surgical History:   Procedure Laterality Date    ABDOMINAL ADHESION SURGERY  10/22/2015    Exploratory laparotomy. Extensive lysis of adhesions. Small-bowel repair.    ABDOMINAL ADHESION SURGERY  10/18/2020    Exploratory laparotomy, massive lysis of adhesions, small-bowel resection, decompression of small bowel and removal of mesh.    ABLATION OF DYSRHYTHMIC FOCUS  2022, 2022 for A FIB    ACHILLES TENDON SURGERY Right     ANTERIOR CERVICAL DISCECTOMY W/ FUSION  2016    Anterior C5 and C6 discectomies and interbody fusion of C5-C6, C6-C7    APPENDECTOMY      Open surgery    BREAST BIOPSY Left 2022    CARDIAC ASSIST DEVICE INSERTION  2012    Loop recorder placement , Removed in  w/ ICD placed    CARDIAC CATHETERIZATION  2022    Normal coronary arteries. Tachycardia induced Cardiomyopathy.    CARDIAC CATHETERIZATION N/A 2024    Procedure: LAAO (Left Atrial Appendage Occlusion);  Surgeon: Reid Dickinson MD;  Location: [place] [address] Cardiac Cath Lab;  Service: Cardiovascular;  Laterality: N/A;  Same day CT at 0815    CARDIAC DEFIBRILLATOR PLACEMENT  2014    Dual chamber ICD    CARDIOVERSION  2018, 2018    CARPAL TUNNEL RELEASE Bilateral      SECTION, CLASSIC      x2    CHOLECYSTECTOMY      Open surgery    COLONOSCOPY  2024    Extensive diverticulosis of moderate severity and causing mild luminal narrowing in the sigmoid colon    FINGER SURGERY Left 2008    Left index finger metacarpophalangeal fusion    FOOT SURGERY Bilateral     B/L Tarsal Tunnel Release    IR INJECTION EPIDURAL STEROID  2011    Lumbar spine; , ,     IR INJECTION EPIDURAL STEROID  2007    Cervical spine 2007    LEG SURGERY Right 2006    Right distal tibia bone  tumor excision and biopsy w/ pellet bone graft.  Plantar wart removal R foot.    NERVE SURGERY Right 10/13/2010    Release of Right tarsal tunnel syndrome w/ severe adhesion scar tissue    NISSEN FUNDOPLICATION  1991    Open surgery    PICC LINE INSERTION WO IMAGING  10/24/2015    Placed for stable IV access    REVERSE TOTAL SHOULDER ARTHROPLASTY Bilateral 05/02/2024    Right 1/12/2023, Left 5/2/2024    ROTATOR CUFF REPAIR      ULNAR NERVE TRANSPOSITION Left     VENTRAL HERNIA REPAIR  06/15/2015    Laparoscopic double ventral hernia repair.    WISDOM TOOTH EXTRACTION      WOUND DEBRIDEMENT  11/27/2020    Excisional debridement of abdominal wound for wound dehiscence     Family History:   Family History   Problem Relation Name Age of Onset    Asthma Daughter      Asthma Other      Colon cancer Other      Diabetes Other      Other (stroke syndrome) Other       Social History:   Tobacco Use: Low Risk  (9/17/2024)    Patient History     Smoking Tobacco Use: Never     Smokeless Tobacco Use: Never     Passive Exposure: Not on file      Social History     Substance and Sexual Activity   Alcohol Use Never       Outpatient Medications:  Current Outpatient Medications   Medication Instructions    acetaminophen (TYLENOL) 325 mg, oral, Every 6 hours PRN    albuterol 2.5 mg, nebulization, 3 times daily RT    baclofen (Lioresal) 10 mg tablet One tablet in the morning and 2 tablets in the evening.    bumetanide (BUMEX) 2 mg, oral, Daily    bumetanide (BUMEX) 1 mg, oral, Daily with evening meal    cholecalciferol (VITAMIN D3) 1,000 Units, oral, Daily    clobetasol (Temovate) 0.05 % cream Topical, 2 times daily    clopidogrel (PLAVIX) 75 mg, oral, Daily    cyanocobalamin (VITAMIN B-12) 1,000 mcg, intramuscular, Every 30 days    diclofenac sodium (VOLTAREN) 4 g, Topical, 2 times daily PRN    digoxin (LANOXIN) 125 mcg, oral, Daily    dilTIAZem LA (CARDIZEM LA) 120 mg, oral, Nightly    EPINEPHrine (EPIPEN) 0.3 mg, intramuscular, Once  "as needed    famotidine (PEPCID) 40 mg, oral, Daily    hydroxychloroquine (PLAQUENIL) 300 mg, oral, Daily    insulin syringe-needle U-100 (BD Insulin Syringe Ultra-Fine) 1 mL 30 gauge x 1/2\" syringe Use one a month    montelukast (SINGULAIR) 10 mg, oral, Nightly    naloxone (NARCAN) 4 mg, nasal, As needed, May repeat every 2-3 minutes if needed, alternating nostrils, until medical assistance becomes available.    nitroglycerin (NITROSTAT) 0.4 mg, sublingual, Every 5 min PRN    ondansetron ODT (ZOFRAN-ODT) 8 mg, oral, Daily PRN    oxyCODONE-acetaminophen (Percocet) 5-325 mg tablet 1 tablet, oral, 3 times daily PRN    oxyCODONE-acetaminophen (Percocet) 5-325 mg tablet 1 tablet, oral, 3 times daily PRN    [START ON 9/28/2024] oxyCODONE-acetaminophen (Percocet) 5-325 mg tablet 1 tablet, oral, 3 times daily PRN    pantoprazole (PROTONIX) 40 mg, oral, 2 times daily    polyethylene glycol (MIRALAX) 17 g, oral, Daily    rosuvastatin (CRESTOR) 20 mg, oral, Daily    saccharomyces boulardii (FLORASTOR) 250 mg, oral, 2 times daily    sucralfate (CARAFATE) 1 g, oral, 4 times daily with meals and nightly    sulfaSALAzine (AZULFIDINE) 500 mg, oral, 2 times daily        ED Course   Vitals - /89   Pulse 59   Temp 36.4 °C (97.5 °F) (Skin)   Resp 20   Wt 72.6 kg (160 lb)   SpO2 98%     Labs:   CBC:  Recent Labs     09/17/24 2328 09/04/24  1429 08/10/24  0643   WBC 8.2 6.2 7.9   HGB 8.5* 8.4* 8.5*   HCT 27.9* 28.2* 30.0*    230 186   MCV 76* 81 84     CMP:  Recent Labs     09/17/24  2328 09/04/24  1429 08/10/24  0643    141 140   K 3.7 3.5 3.8    104 105   CO2 27 26 25   ANIONGAP 13 15 14   BUN 22 25* 20   CREATININE 1.09* 1.05 0.98   EGFR 54* 57* 61   GLUCOSE 95 111* 75     Recent Labs     09/17/24  2328 09/04/24  1429 08/10/24  0643 07/25/24  0330 07/24/24  1254 07/24/24  1147 04/15/24  1128 02/27/24  0630 02/25/24  0117 09/03/23  0644 09/01/23  2347   ALBUMIN 4.2 3.9 3.8   < >  --  3.9 4.4   < >  --    " "< > 4.3   ALKPHOS 72  --   --   --   --  78 76   < >  --    < > 88   ALT 28  --   --   --   --  14 15   < >  --    < > 13   AST 22  --   --   --   --  16 22   < >  --    < > 15   BILITOT 0.5  --   --   --   --  0.5 0.6   < >  --    < > 0.5   LIPASE  --   --   --   --  13  --   --   --  18  --  11    < > = values in this interval not displayed.     Calcium/Phos:   Lab Results   Component Value Date    CALCIUM 8.7 09/17/2024    PHOS 4.0 09/04/2024      COAG:   Recent Labs     09/17/24  2328 07/24/24  1147 04/15/24  1128   INR 1.1 1.1 1.0   DDIMERVTE 735*  --   --      CRP:   Lab Results   Component Value Date    CRP 0.86 07/24/2024      [unfilled]   ENDO:  Recent Labs     04/15/24  1128 08/28/23  0933 12/19/22  1721 09/23/18  1830   TSH 0.92 1.71 1.34 0.75      CARDIAC:   Recent Labs     09/18/24  0046 09/17/24  2328 08/08/24  0251 08/08/24  0153 07/24/24  1254 07/24/24  1147   TROPHS 34* 35* 27* 35*   < > 134*   BNP  --  773*  --  672*  --  324*    < > = values in this interval not displayed.     Recent Labs     04/15/24  1128 08/28/23  0933 02/28/23  0950 12/19/22  1721   CHOL 146 133 161 135   LDLF  --  55 50 58   LDLCALC 68  --   --   --    HDL 56.5 53.3 80.5 49.9   TRIG 107 122 152* 135     No data recorded    Micro/ID:   No results found for the last 90 days.                   No lab exists for component: \"AGALPCRNB\"   .ID  Lab Results   Component Value Date    BLOODCULT  09/02/2023     No Growth at 1 days~No Growth at 2 days~No Growth at 3 days~NO GROWTH at 4 days - FINAL REPORT       Imaging:   No orders to display     Encounter Date: 08/08/24   ECG 12 lead   Result Value    Ventricular Rate 60    Atrial Rate 60    DC Interval 260    QRS Duration 94    QT Interval 418    QTC Calculation(Bazett) 418    R Axis 51    T Axis 205    QRS Count 9    Q Onset 217    T Offset 426    QTC Fredericia 418    Narrative    Atrial-paced rhythm with prolonged AV conduction  Cannot rule out Anterior infarct , age " undetermined  ST & T wave abnormality, consider lateral ischemia  Abnormal ECG  When compared with ECG of 08-AUG-2024 01:42, (unconfirmed)  Premature ventricular complexes are no longer Present  Premature supraventricular complexes are no longer Present     Transthoracic Echo (TTE) Complete    Result Date: 7/25/2024   [facility], [address] [place] [address]               Tel [phone] and Fax [phone] TRANSTHORACIC ECHOCARDIOGRAM REPORT  Patient Name:      VERNON SALDANA VIA         Reading Physician:    [id number] Neo Gutierrez MD Study Date:        7/25/2024            Ordering Provider:    [id number] STEPHON YARBROUGH MRN/PID:           [id number]             Fellow: Accession#:        [id number]         Nurse: Date of Birth/Age: 1950 / 73 years Sonographer:          Viola Lau RDCS Gender:            F                    Additional Staff: Height:            162.56 cm            Admit Date:           7/24/2024 Weight:            72.12 kg             Admission Status:     Inpatient -                                                               Routine BSA / BMI:         1.77 m2 / 27.29      Encounter#:           [id number]                    kg/m2 Blood Pressure:    108/69 mmHg          Department Location:  [facility]                                                               Invasive Study Type:    TRANSTHORACIC ECHO (TTE) COMPLETE Diagnosis/ICD: Chest pain, unspecified-R07.9 Indication:    CP CPT Code:      Echo Complete w Full Doppler-[id number] Patient History: Pertinent History: A-Fib, CAD, Hyperlipidemia, COPD, Chest Pain and S/P Left                    shoulder surgery,Watchman, CKD. Study Detail: The following Echo studies were performed: 2D, M-Mode, Doppler and               color  flow. Technically challenging study due to patient lying in               supine position and S/P left shoulder surgery. Patient has a               defibrillator.  PHYSICIAN INTERPRETATION: Left Ventricle: The left ventricular systolic function is mildly decreased, with a visually estimated ejection fraction of 45-50%. There are no regional wall motion abnormalities. The left ventricular cavity size is normal. There is mild concentric left ventricular hypertrophy. Spectral Doppler shows a pseudonormal pattern of left ventricular diastolic filling. Left Atrium: The left atrium is normal in size. Right Ventricle: The right ventricle is normal in size. There is normal right ventricular global systolic function. A device is visualized in the right ventricle. Right Atrium: The right atrium is normal in size. Aortic Valve: The aortic valve is trileaflet. There is mild aortic valve cusp calcification. There is evidence of mildly elevated transaortic gradients consistent with sclerosis of the aortic valve. The aortic valve dimensionless index is 0.71. There is moderate aortic valve regurgitation. The peak instantaneous gradient of the aortic valve is 14.6 mmHg. The mean gradient of the aortic valve is 8.0 mmHg. Mitral Valve: The mitral valve is normal in structure. There is moderate mitral annular calcification. There is mild mitral valve regurgitation. Tricuspid Valve: The tricuspid valve is structurally normal. There is mild tricuspid regurgitation. Pulmonic Valve: The pulmonic valve is structurally normal. There is physiologic pulmonic valve regurgitation. Pericardium: There is no pericardial effusion noted. Aorta: The aortic root is normal. Pulmonary Artery: The tricuspid regurgitant velocity is 2.43 m/s, and with an estimated right atrial pressure of 3 mmHg, the estimated pulmonary artery pressure is normal with the RVSP at 26.6 mmHg. Pulmonary Veins: The pulmonary veins appear normal and return normally to the left  atrium. Systemic Veins: The inferior vena cava appears to be of normal size. There is IVC inspiratory collapse greater than 50%.  CONCLUSIONS:  1. The left ventricular systolic function is mildly decreased, with a visually estimated ejection fraction of 45-50%.  2. Spectral Doppler shows a pseudonormal pattern of left ventricular diastolic filling.  3. There is normal right ventricular global systolic function.  4. There is moderate mitral annular calcification.  5. Aortic valve sclerosis.  6. Moderate aortic valve regurgitation.  7. Normal estimated PASP and CVP. QUANTITATIVE DATA SUMMARY: 2D MEASUREMENTS:                           Normal Ranges: IVSd:          0.97 cm    (0.6-1.1cm) LVPWd:         1.16 cm    (0.6-1.1cm) LVIDd:         5.27 cm    (3.9-5.9cm) LVIDs:         4.48 cm LV Mass Index: 122.0 g/m2 LV % FS        15.0 % LA VOLUME:                               Normal Ranges: LA Vol A4C:        46.9 ml    (22+/-6mL/m2) LA Vol A2C:        46.7 ml LA Vol BP:         46.9 ml LA Vol Index A4C:  26.5ml/m2 LA Vol Index A2C:  26.3 ml/m2 LA Vol Index BP:   26.4 ml/m2 LA Area A4C:       17.6 cm2 LA Area A2C:       17.5 cm2 LA Major Axis A4C: 5.6 cm LA Major Axis A2C: 5.6 cm LA Volume Index:   24.7 ml/m2 LA Vol A4C:        42.8 ml LA Vol A2C:        43.8 ml RA VOLUME BY A/L METHOD:                       Normal Ranges: RA Area A4C: 23.0 cm2 AORTA MEASUREMENTS:                    Normal Ranges: Asc Ao, d: 3.70 cm (2.1-3.4cm) LV SYSTOLIC FUNCTION BY 2D PLANIMETRY (MOD):                      Normal Ranges: EF-A4C View:    43 % (>=55%) EF-A2C View:    42 % EF-Biplane:     44 % EF-Visual:      48 % LV EF Reported: 48 % LV DIASTOLIC FUNCTION:                     Normal Ranges: MV Peak E: 1.46 m/s (0.7-1.2 m/s) MITRAL VALVE:                      Normal Ranges: MV Vmax:    1.31 m/s (<=1.3m/s) MV peak P.9 mmHg (<5mmHg) MV mean PG: 3.0 mmHg (<2mmHg) MV DT:      197 msec (150-240msec) MITRAL INSUFFICIENCY:                            Normal Ranges: PISA Radius:  0.3 cm MR VTI:       178.00 cm MR Vmax:      496.00 cm/s MR Alias Ham: 35.1 cm/s MR Volume:    7.12 ml MR Flow Rt:   19.85 ml/s MR EROA:      0.04 cm2 AORTIC VALVE:                                    Normal Ranges: AoV Vmax:                1.91 m/s  (<=1.7m/s) AoV Peak P.6 mmHg (<20mmHg) AoV Mean P.0 mmHg  (1.7-11.5mmHg) LVOT Max Ham:            1.33 m/s  (<=1.1m/s) AoV VTI:                 37.70 cm  (18-25cm) LVOT VTI:                26.90 cm LVOT Diameter:           2.00 cm   (1.8-2.4cm) AoV Area, VTI:           2.24 cm2  (2.5-5.5cm2) AoV Area,Vmax:           2.19 cm2  (2.5-4.5cm2) AoV Dimensionless Index: 0.71 AORTIC INSUFFICIENCY: AI Vmax:       4.01 m/s AI Half-time:  441 msec AI Decel Rate: 267.00 cm/s2  RIGHT VENTRICLE: RV Basal 3.67 cm RV Mid   2.60 cm RV Major 8.8 cm TAPSE:   28.0 mm RV s'    0.12 m/s TRICUSPID VALVE/RVSP:                             Normal Ranges: Peak TR Velocity: 2.43 m/s RV Syst Pressure: 26.6 mmHg (< 30mmHg) IVC Diam:         1.58 cm PULMONIC VALVE:                      Normal Ranges: PV Max Ham: 1.5 m/s  (0.6-0.9m/s) PV Max P.6 mmHg  [id number] Neo Gutierrez MD Electronically signed on 2024 at 6:23:17 PM  ** Final **     Transthoracic Echo (TTE) Limited    Result Date: 2024   [facility], [address] [place] [address]                Tel [phone] and Fax [phone] TRANSTHORACIC ECHOCARDIOGRAM REPORT  Patient Name:      VERNON SALDANA MITZI         Reading Physician:    [id number] Monica Chen MD Study Date:        2024            Ordering Provider:    [id number] KRISTAL SNYDER MRN/PID:           [id number]             Fellow: Accession#:        [id number]         Nurse: Date of Birth/Age: 1950 / 73 years Sonographer:          Tobi Carlos                                                                [facility] Gender:            F                    Additional Staff: Height:            162.56 cm            Admit Date:           7/16/2024 Weight:            70.31 kg             Admission Status:     Inpatient -                                                               Routine BSA / BMI:         1.76 m2 / 26.61      Encounter#:           [id number]                    kg/m2 Blood Pressure:    /                    Department Location:  [facility]                                                               Cath Lab Study Type:    TRANSTHORACIC ECHO (TTE) LIMITED Diagnosis/ICD: Unspecified atrial fibrillation-I48.91 Indication:    Atrial fibrillation; Preop cardiovascular exam CPT Code:      Echo Limited-59178 Patient History: Pertinent History: A-fib; CAD; BARBARA; HTN; HLD; CKD; PPM. Study Detail: The following Echo studies were performed: 2D and M-Mode.               Technically challenging study due to body habitus and patient               lying in supine position.  PHYSICIAN INTERPRETATION: Left Ventricle: Left ventricular ejection fraction is low normal, by visual estimate at 50-55%. The patient is in atrial fibrillation which may influence the estimate of left ventricular function and transvalvular flows. Wall motion is abnormal. The left ventricular cavity size was not assessed. Left ventricular diastolic filling was not assessed. Possible inferior wall motion hypokinesis. Left Atrium: The left atrium was not assessed. Right Ventricle: The right ventricle was not well visualized. There is normal right ventricular global systolic function. A device is visualized in the right ventricle. Right Atrium: The right atrium was not assessed. There is a device visualized in the right atrium. Aortic Valve: The aortic valve is trileaflet. There is mild aortic valve cusp calcification. Aortic valve regurgitation was not assessed. Mitral Valve: The mitral valve is mildly  thickened. There is moderate mitral annular calcification. Mitral valve regurgitation was not assessed. Tricuspid Valve: The tricuspid valve was not well visualized. Tricuspid regurgitation was not assessed. Pulmonic Valve: The pulmonic valve is not well visualized. Pulmonic valve regurgitation was not assessed. Pericardium: There is a trivial pericardial effusion. Aorta: The aortic root is normal. Systemic Veins: The inferior vena cava appears to be of normal size. There is IVC inspiratory collapse greater than 50%. In comparison to the previous echocardiogram(s): Compared with the prior exam from 2/27/2024 there are no significant changes though today's exam is only a limited study withno assessment of valvular function.  CONCLUSIONS:  1. Left ventricular ejection fraction is low normal, by visual estimate at 50-55%.  2. Possible inferior wall motion hypokinesis.  3. There is normal right ventricular global systolic function.  4. There is moderate mitral annular calcification.  5. Compared with the prior exam from 2/27/2024 there are no significant changes though today's exam is only a limited study withno assessment of valvular function.  6. The patient is in atrial fibrillation which may influence the estimate of left ventricular function and transvalvular flows. QUANTITATIVE DATA SUMMARY: AORTA MEASUREMENTS:                      Normal Ranges: Ao Sinus, d: 3.45 cm (2.1-3.5cm) LV SYSTOLIC FUNCTION BY 2D PLANIMETRY (MOD):                      Normal Ranges: EF-Visual:      53 % LV EF Reported: 53 % TRICUSPID VALVE/RVSP:                   Normal Ranges: IVC Diam: 2.03 cm  [id number] Monica Chen MD Electronically signed on 7/16/2024 at 3:11:21 PM  ** Final **     Transthoracic Echo (TTE) Limited    Result Date: 7/16/2024   [facility], [address] [place] [address]                Tel [phone] and Fax [phone] TRANSTHORACIC ECHOCARDIOGRAM REPORT  Patient Name:      VERNON CARTAGENA          Reading Physician:    [id number] Neftali Calvillo MD Study Date:        7/16/2024            Ordering Provider:    [id number] KRISTAL FRIAS                                                               SNYDER MRN/PID:           [id number]             Fellow:               [id number] Lucina Gill MD Accession#:        [id number]         Nurse: Date of Birth/Age: 1950 / 73 years Sonographer:          Tobi Carlos RDCS Gender:            F                    Additional Staff: Height:            162.56 cm            Admit Date:           7/16/2024 Weight:            71.22 kg             Admission Status:     Inpatient -                                                               Routine BSA / BMI:         1.76 m2 / 26.95      Encounter#:           [id number]                    kg/m2 Blood Pressure:    117/55 mmHg          Department Location:  [facility]                                                               Cath Lab Study Type:    TRANSTHORACIC ECHO (TTE) LIMITED Diagnosis/ICD: Other specified postprocedural states-Z98.890 Indication:    [facility] CPT Code:      Echo Limited-04078 Patient History: Pertinent History: CAD; A-fib; BARBARA: HTN; HLD; CKD; PPM. Study Detail: The following Echo studies were performed: 2D and M-Mode.               Technically challenging study due to body habitus.  PHYSICIAN INTERPRETATION: Left Ventricle: Left ventricular ejection fraction is low normal, by visual estimate at 50-55%. There are no regional wall motion abnormalities. The left ventricular cavity size is normal. Left ventricular diastolic filling was not assessed. Left Atrium: The left atrium was not assessed. Right Ventricle: The right ventricle was not assessed. Right ventricular systolic function not assessed. A device is visualized in the  right ventricle. Right Atrium: The right atrium was not assessed. There is a device visualized in the right atrium. Aortic Valve: The aortic valve is trileaflet. There is mild aortic valve cusp calcification. Aortic valve regurgitation was not assessed. Mitral Valve: The mitral valve is mildly thickened. There is moderate mitral annular calcification. Mitral valve regurgitation was not assessed. Tricuspid Valve: The tricuspid valve was not well visualized. Tricuspid regurgitation was not assessed. Right ventricular systolic pressure could not be accurately assessed in this patient. Pulmonic Valve: The pulmonic valve was not assessed. Pulmonic valve regurgitation was not assessed. Pericardium: There is a trivial pericardial effusion. Aorta: The aortic root is normal. Systemic Veins: The inferior vena cava was not well visualized. In comparison to the previous echocardiogram(s): Compared with study dated 2/27/2024, no significant change.  CONCLUSIONS:  1. Left ventricular ejection fraction is low normal, by visual estimate at 50-55%.  2. There is moderate mitral annular calcification.  3. There is a trivial pericardial effusion.  4. Compared with study dated 2/27/2024, no significant change. QUANTITATIVE DATA SUMMARY: LV SYSTOLIC FUNCTION BY 2D PLANIMETRY (MOD):                      Normal Ranges: EF-Visual:      53 % LV EF Reported: 53 %  [id number] Neftali Calvillo MD Electronically signed on 7/16/2024 at 3:02:13 PM  ** Final **     Transthoracic Echo (TTE) Complete    Result Date: 2/27/2024   [facility], [address] [place] [address]               Tel [phone] and Fax [phone] TRANSTHORACIC ECHOCARDIOGRAM REPORT  Patient Name:      VERNON SALDANA VIA         Reading Physician:    [id number] Nohelia Soto MD Study Date:        2/27/2024            Ordering Provider:    [id number] NIKOLE DEAL                                                                CHAGO HERNANDEZ MRN/PID:           [id number]             Fellow: Accession#:        [id number]         Nurse: Date of Birth/Age: 1950 / 73 years Sonographer:          Dai Diaz RDCS Gender:            F                    Additional Staff: Height:            162.56 cm            Admit Date:           2/24/2024 Weight:            72.57 kg             Admission Status:     Inpatient -                                                               Routine BSA / BMI:         1.78 m2 / 27.46      Encounter#:           [id number]                    kg/m2                                         Department Location:  [facility] Non                                                               Invasive Blood Pressure: 131 /59 mmHg Study Type:    TRANSTHORACIC ECHO (TTE) COMPLETE Diagnosis/ICD: Shortness of breath-R06.02 Indication:    Dyspnea CPT Code:      Echo Complete w Full Doppler-71021 Patient History: Pertinent History: A-Fib and Chest Pain. elevated troponin, elevated BNP, mod                    AS. Study Detail: The following Echo studies were performed: 2D, M-Mode, Doppler and               color flow.  PHYSICIAN INTERPRETATION: Left Ventricle: The left ventricular systolic function is normal, with an estimated ejection fraction of 55-60%. There are no regional wall motion abnormalities. The left ventricular cavity size is normal. Spectral Doppler shows an impaired relaxation pattern of left ventricular diastolic filling. Left Atrium: The left atrium is mildly dilated. Right Ventricle: The right ventricle is normal in size. There is normal right ventricular global systolic function. Right Atrium: The right atrium is normal in size. Aortic Valve: The aortic valve is trileaflet. There is mild to moderate aortic valve cusp calcification. There is evidence of moderate aortic valve stenosis. There is trivial aortic valve regurgitation. The peak  instantaneous gradient of the aortic valve is 27.5 mmHg. The mean gradient of the aortic valve is 16.0 mmHg. Mitral Valve: The mitral valve is mildly thickened. There is evidence of mild mitral valve stenosis. The doppler estimated mean and peak diastolic pressure gradients are 7.2 mmHg and 16.3 mmHg respectively. There is mild mitral annular calcification. There is mild to moderate mitral valve regurgitation. Tricuspid Valve: The tricuspid valve is structurally normal. There is mild to moderate tricuspid regurgitation. Pulmonic Valve: The pulmonic valve is not well visualized. There is trace to mild pulmonic valve regurgitation. Pericardium: There is no pericardial effusion noted. Aorta: The aortic root is normal. Pulmonary Artery: The tricuspid regurgitant velocity is 3.46 m/s, and with an estimated right atrial pressure of 3 mmHg, the estimated pulmonary artery pressure is mild to moderately elevated with the RVSP at 50.9 mmHg.  CONCLUSIONS:  1. Left ventricular systolic function is normal with a 55-60% estimated ejection fraction.  2. Spectral Doppler shows an impaired relaxation pattern of left ventricular diastolic filling.  3. Mild to moderate mitral valve regurgitation.  4. Mild to moderate tricuspid regurgitation visualized.  5. Moderate aortic valve stenosis.  6. Mild to moderately elevated pulmonary artery pressure. QUANTITATIVE DATA SUMMARY: 2D MEASUREMENTS:                           Normal Ranges: IVSd:          0.97 cm    (0.6-1.1cm) LVPWd:         0.98 cm    (0.6-1.1cm) LVIDd:         5.70 cm    (3.9-5.9cm) LVIDs:         4.26 cm LV Mass Index: 123.2 g/m2 LV % FS        25.2 % LA VOLUME:                              Normal Ranges: LA Vol A4C:       77.4 ml    (22+/-6mL/m2) LA Vol A2C:       83.5 ml LA Vol BP:        82.1 ml LA Vol Index A4C: 43.5 ml/m2 LA Vol Index A2C: 46.9 ml/m2 LA Vol Index BP:  46.1 ml/m2 LA Volume Index:  46.1 ml/m2 LA Vol A4C:       72.2 ml LA Vol A2C:       77.0 ml RA VOLUME BY  A/L METHOD:                       Normal Ranges: RA Area A4C: 21.8 cm2 M-MODE MEASUREMENTS:                  Normal Ranges: Ao Root: 3.30 cm (2.0-3.7cm) LAs:     4.72 cm (2.7-4.0cm) LV SYSTOLIC FUNCTION BY 2D PLANIMETRY (MOD):                     Normal Ranges: EF-A4C View: 55.9 % (>=55%) EF-A2C View: 56.5 % EF-Biplane:  55.9 % LV DIASTOLIC FUNCTION:                             Normal Ranges: MV Peak E:      1.91 m/s    (0.7-1.2 m/s) MV Peak A:      0.93 m/s    (0.42-0.7 m/s) E/A Ratio:      2.06        (1.0-2.2) MV e'           0.09 m/s    (>8.0) MV lateral e'   0.09 m/s MV medial e'    0.08 m/s E/e' Ratio:     21.21       (<8.0) PulmV Sys Ham:  62.35 cm/s PulmV Alvarez Ham: 121.32 cm/s PulmV S/D Ham:  0.51 MITRAL VALVE:                       Normal Ranges: MV Vmax:    2.02 m/s  (<=1.3m/s) MV peak P.3 mmHg (<5mmHg) MV mean P.2 mmHg  (<2mmHg) MV VTI:     48.44 cm  (10-13cm) MV DT:      167 msec  (150-240msec) MITRAL INSUFFICIENCY:                         Normal Ranges: MR VTI:     192.66 cm MR Vmax:    568.98 cm/s MR Volume:  33.56 ml MR Flow Rt: 99.12 ml/s MR EROA:    0.17 cm2 AORTIC VALVE:                                    Normal Ranges: AoV Vmax:                2.62 m/s  (<=1.7m/s) AoV Peak P.5 mmHg (<20mmHg) AoV Mean P.0 mmHg (1.7-11.5mmHg) LVOT Max Ham:            0.99 m/s  (<=1.1m/s) AoV VTI:                 58.21 cm  (18-25cm) LVOT VTI:                21.96 cm LVOT Diameter:           1.95 cm   (1.8-2.4cm) AoV Area, VTI:           1.13 cm2  (2.5-5.5cm2) AoV Area,Vmax:           1.13 cm2  (2.5-4.5cm2) AoV Dimensionless Index: 0.38 AORTIC INSUFFICIENCY: AI Vmax:       3.91 m/s AI Half-time:  520 msec AI Decel Time: 1794 msec AI Decel Rate: 218.17 cm/s2  RIGHT VENTRICLE: RV Basal 4.30 cm RV Mid   2.60 cm RV Major 7.7 cm TAPSE:   27.0 mm RV s'    0.18 m/s TRICUSPID VALVE/RVSP:                             Normal Ranges: Peak TR Velocity: 3.46 m/s RV Syst Pressure: 50.9 mmHg  (< 30mmHg) PULMONIC VALVE:                      Normal Ranges: PV Max Ham: 1.5 m/s  (0.6-0.9m/s) PV Max P.8 mmHg Pulmonary Veins: PulmV Alvarez Ham: 121.32 cm/s PulmV S/D Ham:  0.51 PulmV Sys Ham:  62.35 cm/s  [id number] Nohelia Soto MD Electronically signed on 2024 at 11:22:42 AM  ** Final **    CT angio chest for pulmonary embolism    Result Date: 2024  Interpreted By:  Campbell Pearson, STUDY: CT ANGIO CHEST FOR PULMONARY EMBOLISM;  2024 12:31 am   INDICATION: Signs/Symptoms:SOB CHEST PAIN.   COMPARISON: 2024   ACCESSION NUMBER(S): [id number]   ORDERING CLINICIAN: JAMMIE SPENCE   TECHNIQUE: Contiguous axial images of the chest were obtained after the intravenous administration of iodinated contrast using angiographic PE protocol. Coronal and sagittal reformatted images were reconstructed from the axial data. MIP images were created on an independent workstation and reviewed.   FINDINGS: There is streak artifact from the arms which were not raised.   No significant pulmonary artery embolism.   Moderate cardiomegaly is seen. Left-sided AICD present.   Ectasia ascending aorta measuring 4.2 cm in diameter.   Moderate right pleural effusion.   There is some areas of ground-glass opacity both lungs which may reflect atelectasis/edema. Correlate with CHF.   Multilevel moderate spondylotic degeneration seen thoracic spine.       No significant pulmonary embolism.   Moderate cardiomegaly/suspected CHF with moderate right effusion.   MACRO: None.   Signed by: Cmapbell Pearson 2024 12:58 AM Dictation workstation:   [id number]    XR chest 1 view    Result Date: 2024  Interpreted By:  Campbell Pearson, STUDY: XR CHEST 1 VIEW;  2024 11:37 pm   INDICATION: Signs/Symptoms:fluid overload.   COMPARISON: 2024   ACCESSION NUMBER(S): [id number]   ORDERING CLINICIAN: JAMMIE SPENCE   FINDINGS: Moderate cardiomegaly and left-sided pacemaker noted. Mild vascular congestion. No consolidation or  pneumothorax.       Mild vascular congestion.   MACRO: None   Signed by: Campbell Pearson 9/18/2024 12:11 AM Dictation workstation:   [id number]     Interventions:  Medications   oxygen (O2) therapy (2 L/min inhalation Start 9/18/24 0149)   bumetanide (Bumex) injection 0.5 mg (0.5 mg intravenous Given 9/17/24 7643)   iohexol (OMNIPaque) 350 mg iodine/mL solution 65 mL (65 mL intravenous Given 9/18/24 0032)   methylPREDNISolone sod succinate (SOLU-Medrol) injection 125 mg (125 mg intravenous Given 9/18/24 0204)       Objective Data  Physical Exam   Constitutional:       General: She is in distress.      Appearance: She is ill-appearing.   HENT:      Head: Normocephalic and atraumatic.   Cardiovascular:      Rate and Rhythm: Tachycardia present.   Pulmonary:      Effort: Increased on 2L NC present.      Breath sounds: Wheezing present.   Abdominal:      Palpations: Abdomen is soft.      Tenderness: There is no abdominal tenderness.   Musculoskeletal:         General: No swelling.      Right lower leg: No edema.      Left lower leg: No edema.   Neurological:      Mental Status: She is oriented to person, place, and time.     Assessment and Plan   Trina Elias, a 72 y/o F with PMHx of hypertension, hyperlipidemia, HFmrEF/systolic (EF45-50% in July 2024) and diastolic HF, paroxysmal A-fib status post PCM/ICD and watchman procedure, central sleep apnea on BiPAP at night, COPD/asthma not on nasal cannula oxygen at home, chronic anemia/iron deficiency anemia, CKD, multiple abdominal surgeries, and RA presented to the ED with SOB    Acute Medical Issues:  #Acute hypoxic respiratory failure 2/2 COPD exacerbation and volume overload ISO acute decompensated HF-Hx of HFmrEF/systolic (EF45-50% in July 2024) and diastolic HF  #Hx Asthma/COPD  #Central sleep apnea on BIPAP at night   - symptoms worsened over the past week, not on baseline o2, now on 2L  - , VBG pH 7.44 pCO2 40  - Follows with Dr. Soto discharged 8/10 on Bumex  2mg qAM and 1mg qPM  - Patient also is trying to find a new pulmonologist, she previously saw Dr. Marin but has not followed up since 2021.  - CT PE: No significant pulmonary embolism. Moderate cardiomegaly/suspected CHF with moderate right effusion   - Echo 07/25/2024: left ventricular systolic function is mildly decreased, with a visually estimated ejection fraction of 45-50%.Spectral Doppler shows a pseudonormal pattern of left ventricular diastolic filling. There is normal right ventricular global systolic function. There is moderate mitral annular calcification. Aortic valve sclerosis. Moderate aortic valve regurgitation. Normal estimated PASP and CVP.   - s/p bumex 0.5 and solumedrol in the ED  - Started DuoNebs, prednisone 40, continue home Singulair  - Consider starting azithromycin & triple therapy inhaler  - Wean O2 as tolerated  - Strict I's and O  - Pulmonology and cardiology consulted    #Elevated troponin  -Denies CP, likely demand ischemia   -Troponin trended down    Chronic Medical Issues:   #HTN: Continue home Cardizem   #HLD: Continue home statin   #CKD: Cr on admission 1.1 (baseline 0.9-1~) Monitor kidney function and avoid nephrotoxic meds. Received contrast in the ED.    #Chronic anemia/GRACIE: Hgb on admission 8.5 (baseline 8-9) cannot tolerate PO iron supplement; Consider inpatient IV Iron vs outpatient. No concern for acute bleed    #Nausea/GERD: continue Zofran PRN, famotidine, and PPI   #Chronic pain: Continue home oxycodone 5 TID PRN    #A-fib status post PCM/ICD and watchman procedure: Continue home Digoxin (ordered level), Cardizem,aspirin, and Plavix. Not on DOAC given watchman   #RA: continue home Hydroxychloroquine and Sulfasalazine. Voltaren gel PRN     #Constipation: continue home miralax and bisacodyl PRN     Fluids: PO  Electrolytes: replete as needed  Nutrition: Cardiac   GI Prophylaxis: PPI   DVT Prophylaxis: Lovenox subcutaneous     Disposition: Acute hypoxic respiratory failure,  ELOS less than 48     Paulino Appiah MD

## 2024-09-18 NOTE — PROGRESS NOTES
09/18/24 1131   Discharge Planning   Living Arrangements Spouse/significant other   Support Systems Spouse/significant other   Assistance Needed Alert and oriented x 3, Independent with ADL's, Rollator, Room air at baseline, currently requiring 2.5 liters O2, Drives   Type of Residence Private residence   Number of Stairs to Enter Residence 0  (Ramp)   Number of Stairs Within Residence 0   Do you have animals or pets at home? No   Who is requesting discharge planning? Provider   Home or Post Acute Services None   Expected Discharge Disposition Home   Does the patient need discharge transport arranged? No   Financial Resource Strain   How hard is it for you to pay for the very basics like food, housing, medical care, and heating? Not hard   Housing Stability   In the last 12 months, was there a time when you were not able to pay the mortgage or rent on time? N   In the past 12 months, how many times have you moved where you were living? 0   At any time in the past 12 months, were you homeless or living in a shelter (including now)? N   Transportation Needs   In the past 12 months, has lack of transportation kept you from medical appointments or from getting medications? no   In the past 12 months, has lack of transportation kept you from meetings, work, or from getting things needed for daily living? No

## 2024-09-18 NOTE — CONSULTS
Do you have any home inhalers?    yes  Do you get relief when using it?     sometimes  Spacer education and have them teach back. yes  If using home inhaler do you rinse your mouth? yes  Any previous PFTs? yes  If so, when? In [place]  explain the importance of a PFT. yes  Do you have a pulmonary Dr.? Yes [facility]   Pulmonary cards given. yes  Do you currently smoke or vape or have you ever?   never  Do you have a Primary Dr.? yes  Name: Fredy Elias, DO  Do you have any home O2 or CPAP/BiPAP?  Cpap sometimes    This RT to see patient for COPD consult. The patient was given a COPD booklet with educational materials regarding pulmonary issues. [employer] support group discussed. Flyer given for next month's meeting. I educated patient about the disease process and how it affected the lungs making it difficult to breathe. We discussed current medications and if their current medications give them relief. Patient given  pulmonary office phone number to make an appt. Patient was very receptive to all information given.    Spacer- The patient was given an aerochamber (spacer) to be administered with a meter dose inhaler at home. I instructed the patient on how to use the spacer with the proper technique to get the best results. In return, the patient demonstrated spacer training appropriately with a good understanding of how it is utilized. I also discussed the names, reasons, and side effects of respiratory medications that are prescribed at home, including does and frequency.

## 2024-09-18 NOTE — PROGRESS NOTES
"  Subjective    There were no acute events to report overnight. Pt was seen and examined at bedside this morning and was saturating on 4L this AM. Pt reported doing very poorly with her breathing explaining it has worsened since yesterday. Pt explains her edema in b/l LE has improved since yesterday and admits to back pain explaining it is chronic in nature. Pt also reports periumbilial pain explaining it is 2/2 abdominal surgeries. Pt denies fever/chills, HA, n/v, chest pain, SOB, palpitations, abdominal pain.    Objective    Vitals  Visit Vitals  /70   Pulse 67   Temp 36.4 °C (97.5 °F) (Temporal)   Resp 22   Ht 1.626 m (5' 4\")   Wt 76.2 kg (168 lb)   SpO2 100%   BMI 28.84 kg/m²   Smoking Status Never   BSA 1.86 m²       Physical Exam   Constitutional: ill appearing  HEENT: EOMI, clear sclera  CV: RRR, No M/R/G  PULM: Tachypnea, with shallow breaths. On supplemental O2   ABDOMEN: Soft, ND. Chronic TTP around umbilicus, 2/2 to prior surgeries  EXTREMITIES: Non-Tender, Full ROM. B/L LE edema 1+  NEURO: A&O x 4, nonfocal neurological exam.  PSYCH: Normal Mood & Behavior      IOs    Intake/Output Summary (Last 24 hours) at 9/18/2024 1608  Last data filed at 9/18/2024 1414  Gross per 24 hour   Intake 320 ml   Output --   Net 320 ml       Labs:   Results from last 72 hours   Lab Units 09/18/24  0635 09/17/24  2328   SODIUM mmol/L 140 140   POTASSIUM mmol/L 3.8 3.7   CHLORIDE mmol/L 105 104   CO2 mmol/L 27 27   BUN mg/dL 20 22   CREATININE mg/dL 0.96 1.09*   GLUCOSE mg/dL 144* 95   CALCIUM mg/dL 8.6 8.7   ANION GAP mmol/L 12 13   EGFR mL/min/1.73m*2 63 54*   PHOSPHORUS mg/dL 3.6  --       Results from last 72 hours   Lab Units 09/18/24  0635 09/17/24  2328   WBC AUTO x10*3/uL 6.8 8.2   HEMOGLOBIN g/dL 8.6* 8.5*   HEMATOCRIT % 29.2* 27.9*   PLATELETS AUTO x10*3/uL 226 259   NEUTROS PCT AUTO %  --  69.6   LYMPHS PCT AUTO %  --  17.8   MONOS PCT AUTO %  --  10.4   EOS PCT AUTO %  --  1.3      Lab Results   Component " "Value Date    CALCIUM 8.6 09/18/2024    PHOS 3.6 09/18/2024      Lab Results   Component Value Date    CRP 0.86 07/24/2024      [unfilled]     Micro/ID:   No results found for the last 90 days.                   No lab exists for component: \"AGALPCRNB\"   .ID  Lab Results   Component Value Date    BLOODCULT  09/02/2023     No Growth at 1 days~No Growth at 2 days~No Growth at 3 days~NO GROWTH at 4 days - FINAL REPORT       Images  CT angio chest for pulmonary embolism    Result Date: 9/18/2024  No significant pulmonary embolism.   Moderate cardiomegaly/suspected CHF with moderate right effusion.   MACRO: None.   Signed by: Campbell Pearson 9/18/2024 12:58 AM Dictation workstation:   [id number]    XR chest 1 view    Result Date: 9/18/2024  Mild vascular congestion.   MACRO: None   Signed by: Campbell Pearson 9/18/2024 12:11 AM Dictation workstation:   [id number]      Meds  Scheduled medications  albuterol, 2.5 mg, nebulization, TID  baclofen, 10 mg, oral, q AM  baclofen, 20 mg, oral, Nightly  bumetanide, 1 mg, oral, Daily with evening meal  bumetanide, 2 mg, oral, Daily  clopidogrel, 75 mg, oral, Daily  digoxin, 125 mcg, oral, Daily  dilTIAZem CD, 120 mg, oral, Nightly  enoxaparin, 40 mg, subcutaneous, q24h  famotidine, 40 mg, oral, Daily  hydroxychloroquine, 300 mg, oral, Daily  ipratropium-albuteroL, 3 mL, nebulization, q2h  methylPREDNISolone sodium succinate (PF), 40 mg, intravenous, q8h  montelukast, 10 mg, oral, Nightly  oxygen, , inhalation, Continuous - Inhalation  pantoprazole, 40 mg, oral, BID  polyethylene glycol, 17 g, oral, Daily  rosuvastatin, 20 mg, oral, Nightly  sulfaSALAzine, 500 mg, oral, BID      Continuous medications     PRN medications  PRN medications: acetaminophen, ipratropium-albuteroL, ondansetron, oxyCODONE, [Held by provider] oxyCODONE-acetaminophen, oxygen     Problem List    Problem list:   Patient Active Problem List   Diagnosis    Coronary artery disease involving native coronary " artery    Asthma ([facility])    Atrial fibrillation (Multi)    Myofascial pain    Cardiac aneurysm    Cardiac defibrillator in place    Obstructive sleep apnea    Central sleep apnea in conditions classified elsewhere    Cervical post-laminectomy syndrome    Cervical radiculopathy    Chronic bilateral low back pain without sciatica    Chronic right sacroiliac pain    Colon polyp    Cubital tunnel syndrome on right    Displacement of lumbar disc with radiculopathy    Gastroesophageal reflux disease without esophagitis    Mixed hyperlipidemia    Iron deficiency anemia    Irregular cardiac rhythm    Osteoporosis    Primary osteoarthritis of right shoulder    Psoriasis    Rheumatoid arthritis (Multi)    Umbilical hernia    Wide-complex tachycardia    Centrilobular emphysema (Multi)    Coagulation defect, unspecified (Multi)    Hypertensive heart disease with heart failure (Multi)    Stage 3a chronic kidney disease (Multi)    Personal history of other venous thrombosis and embolism    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)    Pacemaker    Anticoagulant long-term use    Chest pain, unspecified type    Arthritis of left shoulder region    Atrial fibrillation, unspecified type (Multi)    Acute chest pain    Constipation    Shortness of breath    Acute hypoxic on chronic hypercapnic respiratory failure (Multi)        Assessment and Plan    Trina Elias, a 74 y/o F with PMHx of hypertension, hyperlipidemia, HFmrEF/systolic (EF45-50% in July 2024) and diastolic HF, paroxysmal A-fib status post PCM/ICD and watchman procedure, central sleep apnea on BiPAP at night, COPD/asthma (not on home 02) chronic anemia/iron deficiency anemia, CKD, multiple abdominal surgeries, and RA presented to the ED with worsening SOB and fluid overload.      Acute Medical Issues:  #Acute hypoxic respiratory failure   Plan:  - On supplemental O2, 4L  - Wean as tolerated      #Acute decompensated heart failure   - Echo 07/25/2024:  left ventricular systolic function is mildly decreased, with a visually estimated ejection fraction of 45-50%. There is moderate mitral annular calcification. Aortic valve sclerosis. Moderate aortic valve regurgitation. Normal estimated PASP and CVP.   - Follows care w Dr. Soto, recently decreased from 2mg BID to 1mg BID due to hypotension  - CT PE: no significant PE but moderate cardiomegaly  Plan:  - Continue Bumex PO 1mg in AM, 2 mg in evening   - Per cardiology, s/p Bumex 1mg IV x1 this afternoon   - Strict I & Os  - Daily weights->reports an 8lb weight increase in 2 days  - 2 gm na diet  - 1500 mL fluid restriction  - Cardiology on consult, appreciate recs     #Moderate R pleural effusion  - CT PE: moderate right pleural effusion   Plan  - Per pulmonology, continue with diuresis and if no response will evaluate for thoracentesis tomorrow AM  - Pulmonology on consult, appreciate recs     #Acute COPD exacerbation   - Sx onset 1 week ago, worsening within the last 2 days.   Plan  - Per pulmonology, changed prednisone to solu-medrol 60 mg BID, will taper after improvement  - Per pulmonology, Add Breo Ellipta   - Start scheduled Duonebs q 2-4 hrs   - Start BiPAP at night and as needed during day   - Wean oxygen as tolerated   - Incentive spirometry     #Elevated troponin  - No chest pain, likely demand ischemia  - 35--> 34: trended down    Chronic Medical Issues:   #HTN: Continue home Cardizem   #HLD: Continue home statin  #CKD: Cr on admission 1.1 (baseline 0.9-1~)  Received contrast in the ED.   #Chronic anemia/GRACIE: Hgb on admission 8.5 (baseline 8-9) cannot tolerate PO iron supplement; Consider inpatient IV Iron vs outpatient. No concern for acute bleed   #Nausea/GERD: continue Zofran PRN, famotidine, and PPI  #Chronic pain: Continue home oxycodone 5 TID PRN   #A-fib status post PCM/ICD and watchman procedure: Continue home Digoxin (ordered level), Cardizem,aspirin, and Plavix. Not on DOAC given watchman  #RA:  continue home Hydroxychloroquine and Sulfasalazine. Voltaren gel PRN    #Constipation: continue home miralax and bisacodyl PRN      Fluids: PO  Electrolytes: replete as needed  Nutrition: Cardiac diet  GI Prophylaxis: PPI   DVT Prophylaxis: Lovenox subcutaneous   O2: 4L        Disposition: Acute hypoxic respiratory failure, ELOS less than 48     Amanda Coe DO  Internal Medicine PGY-1

## 2024-09-18 NOTE — CONSULTS
Patient has Malnutrition Diagnosis: No  Nutrition Assessment    Reason for Assessment: Admission nursing screening (MST= 4, weight loss due to lack of eating)    Patient is a 74 y.o. female presenting with: Acute hypoxic on chronic hypercapnic respiratory failure    Past Medical History:   Diagnosis Date    Asthma ([facility])     Atrial fibrillation (Multi)     Resistent to treatment: s/p DCCVs and ablations    Central sleep apnea     Per PSG 11/20/18; severe central sleep apnea, .6, mild obstructive component    Cervical myofascial pain syndrome     Baclofen    Chronic anemia     CKD stage 3a, GFR 45-59 ml/min (Multi)     COPD (chronic obstructive pulmonary disease) (Multi)     DVT of axillary vein, acute right (Multi) 01/20/2018    When PICC line was removed. U/S + Partial nonocclusive DVT in the axillary and proximal basilic vein in 2018    H/O being hospitalized 02/2024 Feb 24-Mar 5, 2024:  (2/24) admitted for dyspnea and chest pain work-up and found to have pleural effusions.  Found to have acute blood loss anemia s/p 2u PRBC. EGD neg for active bleed.  Cardiology consulted - plan for Watchman device WING closure as out pt.    History of Helicobacter pylori infection     3/2013, 1/2021    Hx of syncope     Recurrent Syncope d/t VT- s/p ILR (12/2012) implanted.  (July 2013) Episode of syncope that appears to be correlated with an 11 second round of ventricular tachycardia recorded by the loop recorder.  S/p EPS (June 2014) neg for inducible arrhythmias. (ILR removed when ICD implanted)    ICD (implantable cardioverter-defibrillator) in place     Placed 06/12/2014    Mitral valve regurgitation     Mild-Moderate per Echo 02/25/2024    Moderate aortic stenosis by prior echocardiogram     Echo 2/25/2024    Osteopenia 06/26/2023    Personal history of anaphylaxis     See allergy list    Psoriasis     PUD (peptic ulcer disease)     H/O nonbleeding gastric ulcers with small hiatal hernia on 1/6/2021 EGD     Pulmonary hypertension (Multi)     Mild - Moderate per Echo 2024    Radiculopathy, lumbar region 2018    Acute lumbar radiculopathy    Restless legs syndrome 2015    Restless legs syndrome    Rheumatoid arthritis (Multi)     Seasonal allergic rhinitis due to pollen     Small bowel obstruction (Multi) 2023    H/O due to Severe Adhesions s/p Adhesiolysis x2 in  and     Wide-complex tachycardia     Per cardiology 3/7/2024: VT is on a basis of triggered activity certainly made worse with anemia and prednisone. Since she is asymptomatic we will continue to observe.      Past Surgical History:   Procedure Laterality Date    ABDOMINAL ADHESION SURGERY  10/22/2015    Exploratory laparotomy. Extensive lysis of adhesions. Small-bowel repair.    ABDOMINAL ADHESION SURGERY  10/18/2020    Exploratory laparotomy, massive lysis of adhesions, small-bowel resection, decompression of small bowel and removal of mesh.    ABLATION OF DYSRHYTHMIC FOCUS  2022, 2022 for A FIB    ACHILLES TENDON SURGERY Right     ANTERIOR CERVICAL DISCECTOMY W/ FUSION  2016    Anterior C5 and C6 discectomies and interbody fusion of C5-C6, C6-C7    APPENDECTOMY      Open surgery    BREAST BIOPSY Left 2022    CARDIAC ASSIST DEVICE INSERTION  2012    Loop recorder placement , Removed in  w/ ICD placed    CARDIAC CATHETERIZATION  2022    Normal coronary arteries. Tachycardia induced Cardiomyopathy.    CARDIAC CATHETERIZATION N/A 2024    Procedure: LAAO (Left Atrial Appendage Occlusion);  Surgeon: Reid Dicknison MD;  Location: [place] [id number] Cardiac Cath Lab;  Service: Cardiovascular;  Laterality: N/A;  Same day CT at 0815    CARDIAC DEFIBRILLATOR PLACEMENT  2014    Dual chamber ICD    CARDIOVERSION  2018, 2018    CARPAL TUNNEL RELEASE Bilateral      SECTION, CLASSIC      x2    CHOLECYSTECTOMY      Open surgery    COLONOSCOPY   02/27/2024    Extensive diverticulosis of moderate severity and causing mild luminal narrowing in the sigmoid colon    FINGER SURGERY Left 12/17/2008    Left index finger metacarpophalangeal fusion    FOOT SURGERY Bilateral     B/L Tarsal Tunnel Release    IR INJECTION EPIDURAL STEROID  01/28/2011    Lumbar spine; 2008, 2009, 2011    IR INJECTION EPIDURAL STEROID  2007    Cervical spine 2007    LEG SURGERY Right 09/13/2006    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  Plantar wart removal R foot.    NERVE SURGERY Right 10/13/2010    Release of Right tarsal tunnel syndrome w/ severe adhesion scar tissue    NISSEN FUNDOPLICATION  1991    Open surgery    PICC LINE INSERTION WO IMAGING  10/24/2015    Placed for stable IV access    REVERSE TOTAL SHOULDER ARTHROPLASTY Bilateral 05/02/2024    Right 1/12/2023, Left 5/2/2024    ROTATOR CUFF REPAIR      ULNAR NERVE TRANSPOSITION Left     VENTRAL HERNIA REPAIR  06/15/2015    Laparoscopic double ventral hernia repair.    WISDOM TOOTH EXTRACTION      WOUND DEBRIDEMENT  11/27/2020    Excisional debridement of abdominal wound for wound dehiscence      Nutrition History:  Food and Nutrient History: Pt reports poor appetite for at least 1 week, feels foods such as meat/bread get stuck- reports history of Nissen 30  years ago, unable to vomit  because of this. Pt only drank coffee this AM for breakfast.  Pt with nausea, takes miralax daily for constipation; last BM yesterday.     Allergies   Allergen Reactions    Abatacept Shortness of breath     pulmonary edema, diarrhea, nausea    Hydrocodone-Acetaminophen Anaphylaxis    Hydromorphone Anaphylaxis    Metoprolol Anaphylaxis    Penicillins Hives    Pregabalin Shortness of breath     caused pulmonary edema    Prochlorperazine Itching     paralysis of the mouth with drooping    Methotrexate Hives and Itching     chest pain    Aripiprazole Rash    Beta-Blockers (Beta-Adrenergic Blocking Agts) Other,  "Palpitations and Wheezing    Bupropion Nausea/vomiting    Cefepime Itching    Ciprofloxacin Hives    Furosemide Itching    Levofloxacin Itching    Pineapple Rash      GI Symptoms: None     Oral Problems: Swallowing difficulty          Anthropometrics:  Height: 162.6 cm (5' 4\")   Weight: 76.2 kg (168 lb)   BMI (Calculated): 28.82  IBW/kg (Dietitian Calculated): 54.5 kg  Percent of IBW: 140 %       Weight History:     Daily Weight  09/18/24 : 76.2 kg (168 lb)  08/12/24 : 68.5 kg (151 lb)  08/08/24 : 79 kg (174 lb 2.6 oz)  07/24/24 : 72.2 kg (159 lb 1.6 oz)  07/16/24 : 70.3 kg (155 lb)  06/05/24 : 71.2 kg (157 lb)  05/13/24 : 72.6 kg (160 lb)  05/02/24 : 72.6 kg (160 lb 0.9 oz)  04/22/24 : 70.8 kg (156 lb)  04/15/24 : 69.9 kg (154 lb)    Weight         9/17/2024  2314 9/18/2024  0546          Weight: 72.6 kg (160 lb) 76.2 kg (168 lb)              Weight Change %:  Weight History / % Weight Change: 76.2 kg (9/18/24); 68.5 kg (8/12/24); 70.3 kg (7/16/24); 71.2 kg (6/5/24); 73.5 kg (9/15/23)  Significant Weight Loss: No          Nutrition Focused Physical Exam Findings:    Subcutaneous Fat Loss  Orbital Fat Pads: Mild-Moderate (slight dark circles and slight hollowing)  Buccal Fat Pads: Well nourished (full, rounded cheeks)  Triceps: Mild-Moderate (less than ample fat tissue)  Muscle Wasting  Temporalis: Well nourished (well-defined muscle)  Edema  Edema: none  Physical Findings (Nutrition Deficiency/Toxicity)  Skin: Positive (R anterior arm)    Nutrition Significant Labs:    Results from last 7 days   Lab Units 09/18/24  0635 09/17/24  2328   GLUCOSE mg/dL 144* 95   SODIUM mmol/L 140 140   POTASSIUM mmol/L 3.8 3.7   CHLORIDE mmol/L 105 104   CO2 mmol/L 27 27   BUN mg/dL 20 22   CREATININE mg/dL 0.96 1.09*   EGFR mL/min/1.73m*2 63 54*   CALCIUM mg/dL 8.6 8.7   PHOSPHORUS mg/dL 3.6  --    MAGNESIUM mg/dL 2.36 2.11     No results found for: \"HGBA1C\"      Lab Results   Component Value Date    ALBUMIN 3.9 09/18/2024      Lab " Results   Component Value Date    CRP 0.86 07/24/2024       Nutrition Specific Medications:   Scheduled medications:  albuterol, 2.5 mg, nebulization, TID  baclofen, 10 mg, oral, q AM  baclofen, 20 mg, oral, Nightly  bumetanide, 1 mg, oral, Daily with evening meal  bumetanide, 2 mg, oral, Daily  clopidogrel, 75 mg, oral, Daily  digoxin, 125 mcg, oral, Daily  dilTIAZem CD, 120 mg, oral, Nightly  enoxaparin, 40 mg, subcutaneous, q24h  famotidine, 40 mg, oral, Daily  hydroxychloroquine, 300 mg, oral, Daily  montelukast, 10 mg, oral, Nightly  pantoprazole, 40 mg, oral, BID  polyethylene glycol, 17 g, oral, Daily  predniSONE, 40 mg, oral, Daily  rosuvastatin, 20 mg, oral, Nightly  sulfaSALAzine, 500 mg, oral, BID      Continuous medications:     PRN medications:  PRN medications: acetaminophen, ipratropium-albuteroL, ondansetron ODT, oxyCODONE, [Held by provider] oxyCODONE-acetaminophen, oxygen     Nursing Data Per flowsheet:      Gastrointestinal  Gastrointestinal (WDL): Within Defined Limits  Feeding assistance level:      Intake/Output Summary (Last 24 hours) at 9/18/2024 1334  Last data filed at 9/18/2024 1230  Gross per 24 hour   Intake 300 ml   Output --   Net 300 ml      0-10 (Numeric) Pain Score: 0 - No pain   Dietary Orders (From admission, onward)       Start     Ordered    09/18/24 1204  Oral nutritional supplements  Until discontinued        Question Answer Comment   Deliver with Lunch vanilla   Deliver with Dinner    Select supplement: Magic Cup        09/18/24 1204    09/18/24 0244  Adult diet Cardiac; 70 gm fat; 2 - 3 grams Sodium; 1500 mL fluid  Diet effective now        Question Answer Comment   Diet type Cardiac    Fat restriction: 70 gm fat    Sodium restriction: 2 - 3 grams Sodium    Dietary fluid restriction / 24h: 1500 mL fluid        09/18/24 0248                     Estimated Needs:   Total Energy Estimated Needs (kCal):  (1900 kcal (25 kcal/kg))     Total Protein Estimated Needs (g):  ( g  protein (1.2-1.5 g/kg))     Total Fluid Estimated Needs (mL):  (1 ml/kcal)          Nutrition Diagnosis   Malnutrition Diagnosis  Patient has Malnutrition Diagnosis: No    Nutrition Diagnosis  Patient has Nutrition Diagnosis: Yes  Diagnosis Status (1): New  Nutrition Diagnosis 1: Inadequate protein energy intake  Related to (1): nausea, feeling food getting stuck at times  As Evidenced by (1): reports decreased appetite and intakes for about 1 week  Additional Assessment Information (1): ONS added for additional kcal and protein       Nutrition Interventions/Recommendations   Nutrition Prescription:  diet, fluids    Nutrition Interventions:   Interventions: Medical food supplement  Goal: Magic cup daily= 290 kcal, 9 g protein (each)    Coordination of Care: Patricia RN; Dr. Hayes, resident  Nutrition Education:   N/A  Recommendations:  Encourage oral intakes  Consider GI consult for swallowing difficulty  Weights: daily or every other day  RFP, Mg daily; replete lytes prn         Nutrition Monitoring and Evaluation   Food/Nutrient Related History Monitoring  Monitoring and Evaluation Plan: Amount of food  Criteria: Pt will consume 50% of meals and ONS provided    Body Composition/Growth/Weight History  Monitoring and Evaluation Plan: Weight  Weight: Measured weight  Criteria: Monitor    Biochemical Data, Medical Tests and Procedures  Monitoring and Evaluation Plan: Electrolyte/renal panel, Glucose/endocrine profile  Criteria: Monitor        Time Spent (min): 60 minutes

## 2024-09-18 NOTE — CONSULTS
"Inpatient consult to Pulmonology  Consult performed by: Amie Pimentel MD  Consult ordered by: Sy Tyler DO        Department of Medicine  Division of Pulmonary, Critical Care, and Sleep Medicine      History Of Present Illness  Trina Elias \"Marcela\" is a 74 y.o. female with history of hypertension, HFrEF, A-fib, CKD, RA, asthma admitted with acute hypoxic respiratory failure.  Patient presented to the ED with worsening dyspnea over the past week, chest tightness and wheezing, worsening lower extremity edema.  Denied any fever or chills, no productive cough, no chest pain or hemoptysis.  Was hypoxemic in the ED, CT chest showed vascular congestion with right pleural effusion.  All other systems reviewed and negative otherwise.    Never smoked.       Past Medical History:   Diagnosis Date    Asthma ([facility])     Atrial fibrillation (Multi)     Resistent to treatment: s/p DCCVs and ablations    Central sleep apnea     Per PSG 11/20/18; severe central sleep apnea, .6, mild obstructive component    Cervical myofascial pain syndrome     Baclofen    Chronic anemia     CKD stage 3a, GFR 45-59 ml/min (Multi)     COPD (chronic obstructive pulmonary disease) (Multi)     DVT of axillary vein, acute right (Multi) 01/20/2018    When PICC line was removed. U/S + Partial nonocclusive DVT in the axillary and proximal basilic vein in 2018    H/O being hospitalized 02/2024 Feb 24-Mar 5, 2024:  (2/24) admitted for dyspnea and chest pain work-up and found to have pleural effusions.  Found to have acute blood loss anemia s/p 2u PRBC. EGD neg for active bleed.  Cardiology consulted - plan for Watchman device WING closure as out pt.    History of Helicobacter pylori infection     3/2013, 1/2021    Hx of syncope     Recurrent Syncope d/t VT- s/p ILR (12/2012) implanted.  (July 2013) Episode of syncope that appears to be correlated with an 11 second round of ventricular tachycardia recorded by the loop recorder.  S/p EPS " (June 2014) neg for inducible arrhythmias. (ILR removed when ICD implanted)    ICD (implantable cardioverter-defibrillator) in place     Placed 06/12/2014    Mitral valve regurgitation     Mild-Moderate per Echo 02/25/2024    Moderate aortic stenosis by prior echocardiogram     Echo 2/25/2024    Osteopenia 06/26/2023    Personal history of anaphylaxis     See allergy list    Psoriasis     PUD (peptic ulcer disease)     H/O nonbleeding gastric ulcers with small hiatal hernia on 1/6/2021 EGD    Pulmonary hypertension (Multi)     Mild - Moderate per Echo 2/25/2024    Radiculopathy, lumbar region 08/13/2018    Acute lumbar radiculopathy    Restless legs syndrome 11/17/2015    Restless legs syndrome    Rheumatoid arthritis (Multi)     Seasonal allergic rhinitis due to pollen     Small bowel obstruction (Multi) 06/26/2023    H/O due to Severe Adhesions s/p Adhesiolysis x2 in 2015 and 2020    Wide-complex tachycardia     Per cardiology 3/7/2024: VT is on a basis of triggered activity certainly made worse with anemia and prednisone. Since she is asymptomatic we will continue to observe.       Past Surgical History:   Procedure Laterality Date    ABDOMINAL ADHESION SURGERY  10/22/2015    Exploratory laparotomy. Extensive lysis of adhesions. Small-bowel repair.    ABDOMINAL ADHESION SURGERY  10/18/2020    Exploratory laparotomy, massive lysis of adhesions, small-bowel resection, decompression of small bowel and removal of mesh.    ABLATION OF DYSRHYTHMIC FOCUS  05/02/2022 09/17/2018, 05/02/2022 for A FIB    ACHILLES TENDON SURGERY Right     ANTERIOR CERVICAL DISCECTOMY W/ FUSION  03/21/2016    Anterior C5 and C6 discectomies and interbody fusion of C5-C6, C6-C7    APPENDECTOMY      Open surgery    BREAST BIOPSY Left 09/21/2022    CARDIAC ASSIST DEVICE INSERTION  12/05/2012    Loop recorder placement 2012, Removed in 2014 w/ ICD placed    CARDIAC CATHETERIZATION  02/23/2022    Normal coronary arteries. Tachycardia induced  Cardiomyopathy.    CARDIAC CATHETERIZATION N/A 2024    Procedure: LAAO (Left Atrial Appendage Occlusion);  Surgeon: Reid Dickinson MD;  Location: [place] [address] Cardiac Cath Lab;  Service: Cardiovascular;  Laterality: N/A;  Same day CT at 0815    CARDIAC DEFIBRILLATOR PLACEMENT  2014    Dual chamber ICD    CARDIOVERSION  2018, 2018    CARPAL TUNNEL RELEASE Bilateral      SECTION, CLASSIC      x2    CHOLECYSTECTOMY      Open surgery    COLONOSCOPY  2024    Extensive diverticulosis of moderate severity and causing mild luminal narrowing in the sigmoid colon    FINGER SURGERY Left 2008    Left index finger metacarpophalangeal fusion    FOOT SURGERY Bilateral     B/L Tarsal Tunnel Release    IR INJECTION EPIDURAL STEROID  2011    Lumbar spine; , ,     IR INJECTION EPIDURAL STEROID      Cervical spine 2007    LEG SURGERY Right 2006    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  Plantar wart removal R foot.    NERVE SURGERY Right 10/13/2010    Release of Right tarsal tunnel syndrome w/ severe adhesion scar tissue    NISSEN FUNDOPLICATION  1991    Open surgery    PICC LINE INSERTION WO IMAGING  10/24/2015    Placed for stable IV access    REVERSE TOTAL SHOULDER ARTHROPLASTY Bilateral 2024    Right 2023, Left 2024    ROTATOR CUFF REPAIR      ULNAR NERVE TRANSPOSITION Left     VENTRAL HERNIA REPAIR  06/15/2015    Laparoscopic double ventral hernia repair.    WISDOM TOOTH EXTRACTION      WOUND DEBRIDEMENT  2020    Excisional debridement of abdominal wound for wound dehiscence        Social History     Tobacco Use    Smoking status: Never    Smokeless tobacco: Never   Vaping Use    Vaping status: Never Used   Substance Use Topics    Alcohol use: Never    Drug use: Never       Family History   Problem Relation Name Age of Onset    Asthma Daughter      Asthma Other      Colon cancer Other       "Diabetes Other      Other (stroke syndrome) Other             Allergies  Abatacept, Hydrocodone-acetaminophen, Hydromorphone, Metoprolol, Penicillins, Pregabalin, Prochlorperazine, Methotrexate, Aripiprazole, Beta-blockers (beta-adrenergic blocking agts), Bupropion, Cefepime, Ciprofloxacin, Furosemide, Levofloxacin, and Pineapple    Review of Systems       Physical exam  Constitutional: Acutely ill looking, alert and oriented.  HEENT: Normocephalic and atraumatic.  Cardiovascular: Normal rate and regular rhythm.  Pulmonary: Diminished right basilar sounds, poor air entry bilaterally, diffuse expiratory wheeze.  Musculoskeletal: No edema, no cyanosis.  Neurological: Awake, alert and oriented x3.  Psychiatric: Normal behavior, mood and affect.     Vital Signs  Visit Vitals  /70   Pulse 67   Temp 36.4 °C (97.5 °F) (Temporal)   Resp 22   Ht 1.626 m (5' 4\")   Wt 76.2 kg (168 lb)   SpO2 100%   BMI 28.84 kg/m²   Smoking Status Never   BSA 1.86 m²      Lab Results   Component Value Date    WBC 6.8 09/18/2024    HGB 8.6 (L) 09/18/2024    HCT 29.2 (L) 09/18/2024    MCV 77 (L) 09/18/2024     09/18/2024      Lab Results   Component Value Date    GLUCOSE 144 (H) 09/18/2024    CALCIUM 8.6 09/18/2024     09/18/2024    K 3.8 09/18/2024    CO2 27 09/18/2024     09/18/2024    BUN 20 09/18/2024    CREATININE 0.96 09/18/2024      Lab Results   Component Value Date    ALT 28 09/17/2024    AST 22 09/17/2024    ALKPHOS 72 09/17/2024    BILITOT 0.5 09/17/2024        Oxygen Therapy  SpO2: 100 %  Medical Gas Therapy: Supplemental oxygen  Medical Gas Delivery Method: Nasal cannula       Medications   Scheduled medications  albuterol, 2.5 mg, nebulization, TID  baclofen, 10 mg, oral, q AM  baclofen, 20 mg, oral, Nightly  bumetanide, 1 mg, oral, Daily with evening meal  bumetanide, 2 mg, oral, Daily  clopidogrel, 75 mg, oral, Daily  digoxin, 125 mcg, oral, Daily  dilTIAZem CD, 120 mg, oral, Nightly  enoxaparin, 40 mg, " subcutaneous, q24h  famotidine, 40 mg, oral, Daily  hydroxychloroquine, 300 mg, oral, Daily  montelukast, 10 mg, oral, Nightly  pantoprazole, 40 mg, oral, BID  polyethylene glycol, 17 g, oral, Daily  predniSONE, 40 mg, oral, Daily  rosuvastatin, 20 mg, oral, Nightly  sulfaSALAzine, 500 mg, oral, BID      Continuous medications     PRN medications  PRN medications: acetaminophen, ipratropium-albuteroL, ondansetron ODT, oxyCODONE, [Held by provider] oxyCODONE-acetaminophen, oxygen     Chest Radiograph   CT angio chest for pulmonary embolism 09/18/2024    Narrative  Interpreted By:  Campbell Pearson,  STUDY:  CT ANGIO CHEST FOR PULMONARY EMBOLISM;  9/18/2024 12:31 am    INDICATION:  Signs/Symptoms:SOB CHEST PAIN.    COMPARISON:  08/08/2024    ACCESSION NUMBER(S):  [id number]    ORDERING CLINICIAN:  JAMMIE SPENCE    TECHNIQUE:  Contiguous axial images of the chest were obtained after the  intravenous administration of iodinated contrast using angiographic  PE protocol. Coronal and sagittal reformatted images were  reconstructed from the axial data. MIP images were created on an  independent workstation and reviewed.    FINDINGS:  There is streak artifact from the arms which were not raised.    No significant pulmonary artery embolism.    Moderate cardiomegaly is seen. Left-sided AICD present.    Ectasia ascending aorta measuring 4.2 cm in diameter.    Moderate right pleural effusion.    There is some areas of ground-glass opacity both lungs which may  reflect atelectasis/edema. Correlate with CHF.    Multilevel moderate spondylotic degeneration seen thoracic spine.    Impression  No significant pulmonary embolism.    Moderate cardiomegaly/suspected CHF with moderate right effusion.    MACRO:  None.    Signed by: Campbell Pearson 9/18/2024 12:58 AM  Dictation workstation:   [id number]      XR chest 1 view 09/17/2024    Narrative  Interpreted By:  Campbell Pearson,  STUDY:  XR CHEST 1 VIEW;  9/17/2024 11:37  "pm    INDICATION:  Signs/Symptoms:fluid overload.    COMPARISON:  08/08/2024    ACCESSION NUMBER(S):  [id number]    ORDERING CLINICIAN:  JAMMIE SPENCE    FINDINGS:  Moderate cardiomegaly and left-sided pacemaker noted. Mild vascular  congestion. No consolidation or pneumothorax.    Impression  Mild vascular congestion.    MACRO:  None    Signed by: Campbell Pearson 9/18/2024 12:11 AM  Dictation workstation:   [id number]         Pulmonary Function Tests   Pulmonary Functions Testing Results:  No results found for: \"FEV1\", \"FVC\", \"EFH7CVP\", \"TLC\", \"DLCO\"         Assessment and Plan / Recommendations   Assessment/Plan   74 y.o. female with history of hypertension, HFrEF, A-fib, CKD, RA, asthma admitted with acute hypoxic respiratory failure    1.  Acute hypoxic respiratory failure due to CHF and asthma exacerbation  2.  Asthma exacerbation  3.  Decompensated heart failure with right pleural effusion  4.  BARBARA on BiPAP    -Continue diuresis, if no response will evaluate for thoracentesis tomorrow  -Change prednisone to Solu-Medrol 60 twice daily, will taper after improvement  -Add Breo, scheduled DuoNebs every 2-4 hours  -BiPAP (home settings or 15/8 if unknown) at night and as needed during the day for work of breathing       Amie Pimentel MD    "

## 2024-09-18 NOTE — CONSULTS
"Inpatient consult to Cardiology  Consult performed by: NAILA Guerra-CNP  Consult ordered by: NAILA Can-CNP  Reason for consult: CHF            History Of Present Illness  Trina Elias \"Marcela\" is a 74 y.o. female presenting with complaints of shortness of breath, BLE edema, and an 8lb weight gain in 2 days. She reports compliance to her sodium restriction but does not monitor her fluids and states she drinks a lot. She feels like she can't get a deep breath in without coughing. Cough is nonproductive.     Lab work showed glucose 95, potassium 3.7, BUN/Cr 22/1.09, magnesium 2.11, , troponin 35, INR 1.1, d dimer 735, WBC 8.2, H&H 8.5/27.9, CXR showed mild vascular congestion, CTA of the chest showed moderate cardiomegaly/suspected CHF with moderate right pleural effusion.     EKG showed atrial paced.     She was given IV bumex, started on steroids, and admitted for further care.       Past Medical History  Past Medical History:   Diagnosis Date    Asthma ([facility])     Atrial fibrillation (Multi)     Resistent to treatment: s/p DCCVs and ablations    Central sleep apnea     Per PSG 11/20/18; severe central sleep apnea, .6, mild obstructive component    Cervical myofascial pain syndrome     Baclofen    Chronic anemia     CKD stage 3a, GFR 45-59 ml/min (Multi)     COPD (chronic obstructive pulmonary disease) (Multi)     DVT of axillary vein, acute right (Multi) 01/20/2018    When PICC line was removed. U/S + Partial nonocclusive DVT in the axillary and proximal basilic vein in 2018    H/O being hospitalized 02/2024 Feb 24-Mar 5, 2024:  (2/24) admitted for dyspnea and chest pain work-up and found to have pleural effusions.  Found to have acute blood loss anemia s/p 2u PRBC. EGD neg for active bleed.  Cardiology consulted - plan for Watchman device WING closure as out pt.    History of Helicobacter pylori infection     3/2013, 1/2021    Hx of syncope     Recurrent Syncope d/t VT- s/p " ILR (12/2012) implanted.  (July 2013) Episode of syncope that appears to be correlated with an 11 second round of ventricular tachycardia recorded by the loop recorder.  S/p EPS (June 2014) neg for inducible arrhythmias. (ILR removed when ICD implanted)    ICD (implantable cardioverter-defibrillator) in place     Placed 06/12/2014    Mitral valve regurgitation     Mild-Moderate per Echo 02/25/2024    Moderate aortic stenosis by prior echocardiogram     Echo 2/25/2024    Osteopenia 06/26/2023    Personal history of anaphylaxis     See allergy list    Psoriasis     PUD (peptic ulcer disease)     H/O nonbleeding gastric ulcers with small hiatal hernia on 1/6/2021 EGD    Pulmonary hypertension (Multi)     Mild - Moderate per Echo 2/25/2024    Radiculopathy, lumbar region 08/13/2018    Acute lumbar radiculopathy    Restless legs syndrome 11/17/2015    Restless legs syndrome    Rheumatoid arthritis (Multi)     Seasonal allergic rhinitis due to pollen     Small bowel obstruction (Multi) 06/26/2023    H/O due to Severe Adhesions s/p Adhesiolysis x2 in 2015 and 2020    Wide-complex tachycardia     Per cardiology 3/7/2024: VT is on a basis of triggered activity certainly made worse with anemia and prednisone. Since she is asymptomatic we will continue to observe.       Surgical History  Past Surgical History:   Procedure Laterality Date    ABDOMINAL ADHESION SURGERY  10/22/2015    Exploratory laparotomy. Extensive lysis of adhesions. Small-bowel repair.    ABDOMINAL ADHESION SURGERY  10/18/2020    Exploratory laparotomy, massive lysis of adhesions, small-bowel resection, decompression of small bowel and removal of mesh.    ABLATION OF DYSRHYTHMIC FOCUS  05/02/2022 09/17/2018, 05/02/2022 for A FIB    ACHILLES TENDON SURGERY Right     ANTERIOR CERVICAL DISCECTOMY W/ FUSION  03/21/2016    Anterior C5 and C6 discectomies and interbody fusion of C5-C6, C6-C7    APPENDECTOMY      Open surgery    BREAST BIOPSY Left 09/21/2022     CARDIAC ASSIST DEVICE INSERTION  2012    Loop recorder placement , Removed in  w/ ICD placed    CARDIAC CATHETERIZATION  2022    Normal coronary arteries. Tachycardia induced Cardiomyopathy.    CARDIAC CATHETERIZATION N/A 2024    Procedure: LAAO (Left Atrial Appendage Occlusion);  Surgeon: Reid Dickinson MD;  Location: [place] [address] Cardiac Cath Lab;  Service: Cardiovascular;  Laterality: N/A;  Same day CT at 0815    CARDIAC DEFIBRILLATOR PLACEMENT  2014    Dual chamber ICD    CARDIOVERSION  2018, 2018    CARPAL TUNNEL RELEASE Bilateral      SECTION, CLASSIC      x2    CHOLECYSTECTOMY      Open surgery    COLONOSCOPY  2024    Extensive diverticulosis of moderate severity and causing mild luminal narrowing in the sigmoid colon    FINGER SURGERY Left 2008    Left index finger metacarpophalangeal fusion    FOOT SURGERY Bilateral     B/L Tarsal Tunnel Release    IR INJECTION EPIDURAL STEROID  2011    Lumbar spine; , ,     IR INJECTION EPIDURAL STEROID      Cervical spine 2007    LEG SURGERY Right 2006    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  Plantar wart removal R foot.    NERVE SURGERY Right 10/13/2010    Release of Right tarsal tunnel syndrome w/ severe adhesion scar tissue    NISSEN FUNDOPLICATION      Open surgery    PICC LINE INSERTION WO IMAGING  10/24/2015    Placed for stable IV access    REVERSE TOTAL SHOULDER ARTHROPLASTY Bilateral 2024    Right 2023, Left 2024    ROTATOR CUFF REPAIR      ULNAR NERVE TRANSPOSITION Left     VENTRAL HERNIA REPAIR  06/15/2015    Laparoscopic double ventral hernia repair.    WISDOM TOOTH EXTRACTION      WOUND DEBRIDEMENT  2020    Excisional debridement of abdominal wound for wound dehiscence        Social History  She reports that she has never smoked. She has never used smokeless tobacco. She reports that she does not  drink alcohol and does not use drugs.    Family History  Family History   Problem Relation Name Age of Onset    Asthma Daughter      Asthma Other      Colon cancer Other      Diabetes Other      Other (stroke syndrome) Other          Allergies  Abatacept, Hydrocodone-acetaminophen, Hydromorphone, Metoprolol, Penicillins, Pregabalin, Prochlorperazine, Methotrexate, Aripiprazole, Beta-blockers (beta-adrenergic blocking agts), Bupropion, Cefepime, Ciprofloxacin, Furosemide, Levofloxacin, and Pineapple    Review of Systems  Review of Systems   Constitutional:  Negative for chills and fever.   Respiratory:  Positive for cough and shortness of breath.    Cardiovascular:  Positive for leg swelling. Negative for chest pain and palpitations.   Gastrointestinal:  Positive for abdominal distention. Negative for abdominal pain.   Neurological:  Positive for weakness. Negative for dizziness.          Physical Exam  Constitutional: Well developed, awake/alert/oriented x3, no distress, alert and cooperative  Eyes: PERRL, EOMI, clear sclera  ENMT: mucous membranes moist, no apparent injury, no lesions seen  Head/Neck: Neck supple, no apparent injury, thyroid without mass or tenderness, No JVD, trachea midline, no bruits  Respiratory/Thorax: Patent airways, diminished RLL, rhonchi throughout with good chest expansion, thorax symmetric  Cardiovascular: Regular, rate and rhythm, no murmurs, 2+ equal pulses of the extremities, normal S 1and S 2  Gastrointestinal: Nondistended, soft, non-tender, no rebound tenderness or guarding, no masses palpable, no organomegaly, +BS, no bruits  Musculoskeletal: ROM intact, no joint swelling, normal strength  Extremities: normal extremities, no cyanosis edema, contusions or wounds, no clubbing  Neurological: alert and oriented x3, intact senses, motor, response and reflexes, normal strength  Lymphatic: No significant lymphadenopathy  Psychological: Appropriate mood and behavior  Skin: Warm and dry,  "no lesions, no rashes       Last Recorded Vitals  Blood pressure 122/68, pulse 65, temperature 36.1 °C (97 °F), resp. rate 20, height 1.626 m (5' 4\"), weight 76.2 kg (168 lb), SpO2 98%.    Relevant Results    Scheduled medications  albuterol, 2.5 mg, nebulization, TID  aspirin, 81 mg, oral, Daily  baclofen, 10 mg, oral, q AM  baclofen, 20 mg, oral, Nightly  bumetanide, 1 mg, oral, Daily with evening meal  bumetanide, 2 mg, oral, Daily  clopidogrel, 75 mg, oral, Daily  digoxin, 125 mcg, oral, Daily  dilTIAZem CD, 120 mg, oral, Daily  enoxaparin, 40 mg, subcutaneous, q24h  famotidine, 40 mg, oral, Daily  hydroxychloroquine, 300 mg, oral, Daily  montelukast, 10 mg, oral, Nightly  pantoprazole, 40 mg, oral, BID  polyethylene glycol, 17 g, oral, Daily  predniSONE, 40 mg, oral, Daily  rosuvastatin, 20 mg, oral, Daily  sulfaSALAzine, 500 mg, oral, BID      Continuous medications     PRN medications  PRN medications: acetaminophen, ipratropium-albuteroL, ondansetron ODT, oxyCODONE, [Held by provider] oxyCODONE-acetaminophen, oxygen    Results for orders placed or performed during the hospital encounter of 09/17/24 (from the past 24 hour(s))   CBC and Auto Differential   Result Value Ref Range    WBC 8.2 4.4 - 11.3 x10*3/uL    nRBC 0.0 0.0 - 0.0 /100 WBCs    RBC 3.69 (L) 4.00 - 5.20 x10*6/uL    Hemoglobin 8.5 (L) 12.0 - 16.0 g/dL    Hematocrit 27.9 (L) 36.0 - 46.0 %    MCV 76 (L) 80 - 100 fL    MCH 23.0 (L) 26.0 - 34.0 pg    MCHC 30.5 (L) 32.0 - 36.0 g/dL    RDW 17.1 (H) 11.5 - 14.5 %    Platelets 259 150 - 450 x10*3/uL    Neutrophils % 69.6 40.0 - 80.0 %    Immature Granulocytes %, Automated 0.4 0.0 - 0.9 %    Lymphocytes % 17.8 13.0 - 44.0 %    Monocytes % 10.4 2.0 - 10.0 %    Eosinophils % 1.3 0.0 - 6.0 %    Basophils % 0.5 0.0 - 2.0 %    Neutrophils Absolute 5.71 (H) 1.60 - 5.50 x10*3/uL    Immature Granulocytes Absolute, Automated 0.03 0.00 - 0.50 x10*3/uL    Lymphocytes Absolute 1.46 0.80 - 3.00 x10*3/uL    Monocytes " Absolute 0.85 (H) 0.05 - 0.80 x10*3/uL    Eosinophils Absolute 0.11 0.00 - 0.40 x10*3/uL    Basophils Absolute 0.04 0.00 - 0.10 x10*3/uL   Magnesium   Result Value Ref Range    Magnesium 2.11 1.60 - 2.40 mg/dL   Comprehensive metabolic panel   Result Value Ref Range    Glucose 95 74 - 99 mg/dL    Sodium 140 136 - 145 mmol/L    Potassium 3.7 3.5 - 5.3 mmol/L    Chloride 104 98 - 107 mmol/L    Bicarbonate 27 21 - 32 mmol/L    Anion Gap 13 10 - 20 mmol/L    Urea Nitrogen 22 6 - 23 mg/dL    Creatinine 1.09 (H) 0.50 - 1.05 mg/dL    eGFR 54 (L) >60 mL/min/1.73m*2    Calcium 8.7 8.6 - 10.3 mg/dL    Albumin 4.2 3.4 - 5.0 g/dL    Alkaline Phosphatase 72 33 - 136 U/L    Total Protein 6.7 6.4 - 8.2 g/dL    AST 22 9 - 39 U/L    Bilirubin, Total 0.5 0.0 - 1.2 mg/dL    ALT 28 7 - 45 U/L   Lactate   Result Value Ref Range    Lactate 1.1 0.4 - 2.0 mmol/L   Protime-INR   Result Value Ref Range    Protime 12.4 9.8 - 12.8 seconds    INR 1.1 0.9 - 1.1   aPTT   Result Value Ref Range    aPTT 31 27 - 38 seconds   B-Type Natriuretic Peptide   Result Value Ref Range     (H) 0 - 99 pg/mL   D-Dimer, VTE Exclusion   Result Value Ref Range    D-Dimer, Quantitative VTE Exclusion 735 (H) <=500 ng/mL FEU   Blood Gas Venous Full Panel   Result Value Ref Range    POCT pH, Venous 7.44 (H) 7.33 - 7.43 pH    POCT pCO2, Venous 40 (L) 41 - 51 mm Hg    POCT pO2, Venous 35 35 - 45 mm Hg    POCT SO2, Venous 59 45 - 75 %    POCT Oxy Hemoglobin, Venous 57.4 45.0 - 75.0 %    POCT Hematocrit Calculated, Venous 27.0 (L) 36.0 - 46.0 %    POCT Sodium, Venous 138 136 - 145 mmol/L    POCT Potassium, Venous 3.8 3.5 - 5.3 mmol/L    POCT Chloride, Venous 106 98 - 107 mmol/L    POCT Ionized Calicum, Venous 1.15 1.10 - 1.33 mmol/L    POCT Glucose, Venous 97 74 - 99 mg/dL    POCT Lactate, Venous 1.1 0.4 - 2.0 mmol/L    POCT Base Excess, Venous 2.8 -2.0 - 3.0 mmol/L    POCT HCO3 Calculated, Venous 27.2 (H) 22.0 - 26.0 mmol/L    POCT Hemoglobin, Venous 9.0 (L) 12.0  - 16.0 g/dL    POCT Anion Gap, Venous 9.0 (L) 10.0 - 25.0 mmol/L    Patient Temperature      FiO2 21 %   Troponin I, High Sensitivity, Initial   Result Value Ref Range    Troponin I, High Sensitivity 35 (H) 0 - 13 ng/L   Digoxin level   Result Value Ref Range    Digoxin  0.90 0.80 - <2.00 ng/mL   Troponin, High Sensitivity, 1 Hour   Result Value Ref Range    Troponin I, High Sensitivity 34 (H) 0 - 13 ng/L   Influenza A, and B PCR   Result Value Ref Range    Flu A Result Not Detected Not Detected    Flu B Result Not Detected Not Detected   Sars-CoV-2 PCR   Result Value Ref Range    Coronavirus 2019, PCR Not Detected Not Detected   ECG 12 lead   Result Value Ref Range    Ventricular Rate 60 BPM    Atrial Rate 60 BPM    DC Interval 260 ms    QRS Duration 90 ms    QT Interval 434 ms    QTC Calculation(Bazett) 434 ms    R Axis 20 degrees    T Axis 47 degrees    QRS Count 10 beats    Q Onset 218 ms    T Offset 435 ms    QTC Fredericia 434 ms   Troponin I, High Sensitivity   Result Value Ref Range    Troponin I, High Sensitivity 34 (H) 0 - 13 ng/L   ECG 12 lead   Result Value Ref Range    Ventricular Rate 61 BPM    Atrial Rate 61 BPM    DC Interval 266 ms    QRS Duration 90 ms    QT Interval 436 ms    QTC Calculation(Bazett) 438 ms    R Axis 50 degrees    T Axis 174 degrees    QRS Count 10 beats    Q Onset 219 ms    T Offset 437 ms    QTC Fredericia 438 ms   Blood Gas Venous   Result Value Ref Range    POCT pH, Venous 7.42 7.33 - 7.43 pH    POCT pCO2, Venous 40 (L) 41 - 51 mm Hg    POCT pO2, Venous 58 (H) 35 - 45 mm Hg    POCT SO2, Venous 89 (H) 45 - 75 %    POCT Oxy Hemoglobin, Venous 87.2 (H) 45.0 - 75.0 %    POCT Base Excess, Venous 1.3 -2.0 - 3.0 mmol/L    POCT HCO3 Calculated, Venous 25.9 22.0 - 26.0 mmol/L    Patient Temperature      FiO2 99 %   Magnesium   Result Value Ref Range    Magnesium 2.36 1.60 - 2.40 mg/dL   Renal Function Panel   Result Value Ref Range    Glucose 144 (H) 74 - 99 mg/dL    Sodium 140 136 -  145 mmol/L    Potassium 3.8 3.5 - 5.3 mmol/L    Chloride 105 98 - 107 mmol/L    Bicarbonate 27 21 - 32 mmol/L    Anion Gap 12 10 - 20 mmol/L    Urea Nitrogen 20 6 - 23 mg/dL    Creatinine 0.96 0.50 - 1.05 mg/dL    eGFR 63 >60 mL/min/1.73m*2    Calcium 8.6 8.6 - 10.3 mg/dL    Phosphorus 3.6 2.5 - 4.9 mg/dL    Albumin 3.9 3.4 - 5.0 g/dL   CBC   Result Value Ref Range    WBC 6.8 4.4 - 11.3 x10*3/uL    nRBC 0.0 0.0 - 0.0 /100 WBCs    RBC 3.78 (L) 4.00 - 5.20 x10*6/uL    Hemoglobin 8.6 (L) 12.0 - 16.0 g/dL    Hematocrit 29.2 (L) 36.0 - 46.0 %    MCV 77 (L) 80 - 100 fL    MCH 22.8 (L) 26.0 - 34.0 pg    MCHC 29.5 (L) 32.0 - 36.0 g/dL    RDW 17.3 (H) 11.5 - 14.5 %    Platelets 226 150 - 450 x10*3/uL       CT angio chest for pulmonary embolism   Final Result   No significant pulmonary embolism.        Moderate cardiomegaly/suspected CHF with moderate right effusion.        MACRO:   None.        Signed by: Campbell Pearson 9/18/2024 12:58 AM   Dictation workstation:   [id number]      XR chest 1 view   Final Result   Mild vascular congestion.        MACRO:   None        Signed by: Campbell Pearson 9/18/2024 12:11 AM   Dictation workstation:   [id number]          Transthoracic Echo (TTE) Complete    Result Date: 7/25/2024   [facility], [address] [place] [address]               Tel [phone] and Fax [phone] TRANSTHORACIC ECHOCARDIOGRAM REPORT  Patient Name:      VERNON SALDANA MITZI         Reading Physician:    [id number] Neo Gutierrez MD Study Date:        7/25/2024            Ordering Provider:    [id number] STEPHON YARBROUGH MRN/PID:           [id number]             Fellow: Accession#:        [id number]         Nurse: Date of Birth/Age: 1950 / 73 years Sonographer:          Viola Lau                                                               CALLI Gender:            F                     Additional Staff: Height:            162.56 cm            Admit Date:           7/24/2024 Weight:            72.12 kg             Admission Status:     Inpatient -                                                               Routine BSA / BMI:         1.77 m2 / 27.29      Encounter#:           [id number]                    kg/m2 Blood Pressure:    108/69 mmHg          Department Location:  [facility] Non                                                               Invasive Study Type:    TRANSTHORACIC ECHO (TTE) COMPLETE Diagnosis/ICD: Chest pain, unspecified-R07.9 Indication:    CP CPT Code:      Echo Complete w Full Doppler-33131 Patient History: Pertinent History: A-Fib, CAD, Hyperlipidemia, COPD, Chest Pain and S/P Left                    shoulder surgery,Watchman, CKD. Study Detail: The following Echo studies were performed: 2D, M-Mode, Doppler and               color flow. Technically challenging study due to patient lying in               supine position and S/P left shoulder surgery. Patient has a               defibrillator.  PHYSICIAN INTERPRETATION: Left Ventricle: The left ventricular systolic function is mildly decreased, with a visually estimated ejection fraction of 45-50%. There are no regional wall motion abnormalities. The left ventricular cavity size is normal. There is mild concentric left ventricular hypertrophy. Spectral Doppler shows a pseudonormal pattern of left ventricular diastolic filling. Left Atrium: The left atrium is normal in size. Right Ventricle: The right ventricle is normal in size. There is normal right ventricular global systolic function. A device is visualized in the right ventricle. Right Atrium: The right atrium is normal in size. Aortic Valve: The aortic valve is trileaflet. There is mild aortic valve cusp calcification. There is evidence of mildly elevated transaortic gradients consistent with sclerosis of the aortic valve. The aortic valve  dimensionless index is 0.71. There is moderate aortic valve regurgitation. The peak instantaneous gradient of the aortic valve is 14.6 mmHg. The mean gradient of the aortic valve is 8.0 mmHg. Mitral Valve: The mitral valve is normal in structure. There is moderate mitral annular calcification. There is mild mitral valve regurgitation. Tricuspid Valve: The tricuspid valve is structurally normal. There is mild tricuspid regurgitation. Pulmonic Valve: The pulmonic valve is structurally normal. There is physiologic pulmonic valve regurgitation. Pericardium: There is no pericardial effusion noted. Aorta: The aortic root is normal. Pulmonary Artery: The tricuspid regurgitant velocity is 2.43 m/s, and with an estimated right atrial pressure of 3 mmHg, the estimated pulmonary artery pressure is normal with the RVSP at 26.6 mmHg. Pulmonary Veins: The pulmonary veins appear normal and return normally to the left atrium. Systemic Veins: The inferior vena cava appears to be of normal size. There is IVC inspiratory collapse greater than 50%.  CONCLUSIONS:  1. The left ventricular systolic function is mildly decreased, with a visually estimated ejection fraction of 45-50%.  2. Spectral Doppler shows a pseudonormal pattern of left ventricular diastolic filling.  3. There is normal right ventricular global systolic function.  4. There is moderate mitral annular calcification.  5. Aortic valve sclerosis.  6. Moderate aortic valve regurgitation.  7. Normal estimated PASP and CVP. QUANTITATIVE DATA SUMMARY: 2D MEASUREMENTS:                           Normal Ranges: IVSd:          0.97 cm    (0.6-1.1cm) LVPWd:         1.16 cm    (0.6-1.1cm) LVIDd:         5.27 cm    (3.9-5.9cm) LVIDs:         4.48 cm LV Mass Index: 122.0 g/m2 LV % FS        15.0 % LA VOLUME:                               Normal Ranges: LA Vol A4C:        46.9 ml    (22+/-6mL/m2) LA Vol A2C:        46.7 ml LA Vol BP:         46.9 ml LA Vol Index A4C:  26.5ml/m2 LA Vol  Index A2C:  26.3 ml/m2 LA Vol Index BP:   26.4 ml/m2 LA Area A4C:       17.6 cm2 LA Area A2C:       17.5 cm2 LA Major Axis A4C: 5.6 cm LA Major Axis A2C: 5.6 cm LA Volume Index:   24.7 ml/m2 LA Vol A4C:        42.8 ml LA Vol A2C:        43.8 ml RA VOLUME BY A/L METHOD:                       Normal Ranges: RA Area A4C: 23.0 cm2 AORTA MEASUREMENTS:                    Normal Ranges: Asc Ao, d: 3.70 cm (2.1-3.4cm) LV SYSTOLIC FUNCTION BY 2D PLANIMETRY (MOD):                      Normal Ranges: EF-A4C View:    43 % (>=55%) EF-A2C View:    42 % EF-Biplane:     44 % EF-Visual:      48 % LV EF Reported: 48 % LV DIASTOLIC FUNCTION:                     Normal Ranges: MV Peak E: 1.46 m/s (0.7-1.2 m/s) MITRAL VALVE:                      Normal Ranges: MV Vmax:    1.31 m/s (<=1.3m/s) MV peak P.9 mmHg (<5mmHg) MV mean PG: 3.0 mmHg (<2mmHg) MV DT:      197 msec (150-240msec) MITRAL INSUFFICIENCY:                           Normal Ranges: PISA Radius:  0.3 cm MR VTI:       178.00 cm MR Vmax:      496.00 cm/s MR Alias Ham: 35.1 cm/s MR Volume:    7.12 ml MR Flow Rt:   19.85 ml/s MR EROA:      0.04 cm2 AORTIC VALVE:                                    Normal Ranges: AoV Vmax:                1.91 m/s  (<=1.7m/s) AoV Peak P.6 mmHg (<20mmHg) AoV Mean P.0 mmHg  (1.7-11.5mmHg) LVOT Max Ham:            1.33 m/s  (<=1.1m/s) AoV VTI:                 37.70 cm  (18-25cm) LVOT VTI:                26.90 cm LVOT Diameter:           2.00 cm   (1.8-2.4cm) AoV Area, VTI:           2.24 cm2  (2.5-5.5cm2) AoV Area,Vmax:           2.19 cm2  (2.5-4.5cm2) AoV Dimensionless Index: 0.71 AORTIC INSUFFICIENCY: AI Vmax:       4.01 m/s AI Half-time:  441 msec AI Decel Rate: 267.00 cm/s2  RIGHT VENTRICLE: RV Basal 3.67 cm RV Mid   2.60 cm RV Major 8.8 cm TAPSE:   28.0 mm RV s'    0.12 m/s TRICUSPID VALVE/RVSP:                             Normal Ranges: Peak TR Velocity: 2.43 m/s RV Syst Pressure: 26.6 mmHg (< 30mmHg) IVC Diam:          1.58 cm PULMONIC VALVE:                      Normal Ranges: PV Max Ham: 1.5 m/s  (0.6-0.9m/s) PV Max P.6 mmHg  [id number] Neo Gutierrez MD Electronically signed on 2024 at 6:23:17 PM  ** Final **     Transthoracic Echo (TTE) Limited    Result Date: 2024   [facility] ([facility] [address] [place] [address]                Tel [phone] and Fax [phone] TRANSTHORACIC ECHOCARDIOGRAM REPORT  Patient Name:      VERNON SALDANA VIA         Reading Physician:    [id number] Monica Chen MD Study Date:        2024            Ordering Provider:    [id number] KRISTAL SNYDER MRN/PID:           [id number]             Fellow: Accession#:        [id number]         Nurse: Date of Birth/Age: 1950 / 73 years Sonographer:          Tobi Carlos RDCS Gender:            F                    Additional Staff: Height:            162.56 cm            Admit Date:           2024 Weight:            70.31 kg             Admission Status:     Inpatient -                                                               Routine BSA / BMI:         1.76 m2 / 26.61      Encounter#:           [id number]                    kg/m2 Blood Pressure:    /                    Department Location:  [facility]                                                               Cath Lab Study Type:    TRANSTHORACIC ECHO (TTE) LIMITED Diagnosis/ICD: Unspecified atrial fibrillation-I48.91 Indication:    Atrial fibrillation; Preop cardiovascular exam CPT Code:      Echo Limited-47750 Patient History: Pertinent History: A-fib; CAD; BARBARA; HTN; HLD; CKD; PPM. Study Detail: The following Echo studies were performed: 2D and M-Mode.               Technically challenging study due to body habitus and patient               lying in supine  position.  PHYSICIAN INTERPRETATION: Left Ventricle: Left ventricular ejection fraction is low normal, by visual estimate at 50-55%. The patient is in atrial fibrillation which may influence the estimate of left ventricular function and transvalvular flows. Wall motion is abnormal. The left ventricular cavity size was not assessed. Left ventricular diastolic filling was not assessed. Possible inferior wall motion hypokinesis. Left Atrium: The left atrium was not assessed. Right Ventricle: The right ventricle was not well visualized. There is normal right ventricular global systolic function. A device is visualized in the right ventricle. Right Atrium: The right atrium was not assessed. There is a device visualized in the right atrium. Aortic Valve: The aortic valve is trileaflet. There is mild aortic valve cusp calcification. Aortic valve regurgitation was not assessed. Mitral Valve: The mitral valve is mildly thickened. There is moderate mitral annular calcification. Mitral valve regurgitation was not assessed. Tricuspid Valve: The tricuspid valve was not well visualized. Tricuspid regurgitation was not assessed. Pulmonic Valve: The pulmonic valve is not well visualized. Pulmonic valve regurgitation was not assessed. Pericardium: There is a trivial pericardial effusion. Aorta: The aortic root is normal. Systemic Veins: The inferior vena cava appears to be of normal size. There is IVC inspiratory collapse greater than 50%. In comparison to the previous echocardiogram(s): Compared with the prior exam from 2/27/2024 there are no significant changes though today's exam is only a limited study withno assessment of valvular function.  CONCLUSIONS:  1. Left ventricular ejection fraction is low normal, by visual estimate at 50-55%.  2. Possible inferior wall motion hypokinesis.  3. There is normal right ventricular global systolic function.  4. There is moderate mitral annular calcification.  5. Compared with the prior  exam from 2/27/2024 there are no significant changes though today's exam is only a limited study withno assessment of valvular function.  6. The patient is in atrial fibrillation which may influence the estimate of left ventricular function and transvalvular flows. QUANTITATIVE DATA SUMMARY: AORTA MEASUREMENTS:                      Normal Ranges: Ao Sinus, d: 3.45 cm (2.1-3.5cm) LV SYSTOLIC FUNCTION BY 2D PLANIMETRY (MOD):                      Normal Ranges: EF-Visual:      53 % LV EF Reported: 53 % TRICUSPID VALVE/RVSP:                   Normal Ranges: IVC Diam: 2.03 cm  [id number] Monica Chen MD Electronically signed on 7/16/2024 at 3:11:21 PM  ** Final **     Transthoracic Echo (TTE) Limited    Result Date: 7/16/2024   [facility], [address] [place] [address]                Tel [phone] and Fax [phone] TRANSTHORACIC ECHOCARDIOGRAM REPORT  Patient Name:      VERNON SALDANA VIA         Reading Physician:    [id number] Neftali Calvillo MD Study Date:        7/16/2024            Ordering Provider:    [id number] KRISTAL SNYDER MRN/PID:           [id number]             Fellow:               [id number] Lucina Gill MD Accession#:        [id number]         Nurse: Date of Birth/Age: 1950 / 73 years Sonographer:          Tobi Carlos RDCS Gender:            F                    Additional Staff: Height:            162.56 cm            Admit Date:           7/16/2024 Weight:            71.22 kg             Admission Status:     Inpatient -                                                               Routine BSA / BMI:         1.76 m2 / 26.95      Encounter#:           [id number]                    kg/m2 Blood Pressure:    117/55 mmHg          Department  Location:  [facility]                                                               Cath Lab Study Type:    TRANSTHORACIC ECHO (TTE) LIMITED Diagnosis/ICD: Other specified postprocedural states-Z98.890 Indication:    Post LAAO CPT Code:      Echo Limited-89908 Patient History: Pertinent History: CAD; A-fib; BARBARA: HTN; HLD; CKD; PPM. Study Detail: The following Echo studies were performed: 2D and M-Mode.               Technically challenging study due to body habitus.  PHYSICIAN INTERPRETATION: Left Ventricle: Left ventricular ejection fraction is low normal, by visual estimate at 50-55%. There are no regional wall motion abnormalities. The left ventricular cavity size is normal. Left ventricular diastolic filling was not assessed. Left Atrium: The left atrium was not assessed. Right Ventricle: The right ventricle was not assessed. Right ventricular systolic function not assessed. A device is visualized in the right ventricle. Right Atrium: The right atrium was not assessed. There is a device visualized in the right atrium. Aortic Valve: The aortic valve is trileaflet. There is mild aortic valve cusp calcification. Aortic valve regurgitation was not assessed. Mitral Valve: The mitral valve is mildly thickened. There is moderate mitral annular calcification. Mitral valve regurgitation was not assessed. Tricuspid Valve: The tricuspid valve was not well visualized. Tricuspid regurgitation was not assessed. Right ventricular systolic pressure could not be accurately assessed in this patient. Pulmonic Valve: The pulmonic valve was not assessed. Pulmonic valve regurgitation was not assessed. Pericardium: There is a trivial pericardial effusion. Aorta: The aortic root is normal. Systemic Veins: The inferior vena cava was not well visualized. In comparison to the previous echocardiogram(s): Compared with study dated 2/27/2024, no significant change.  CONCLUSIONS:  1. Left ventricular ejection fraction is low normal, by  visual estimate at 50-55%.  2. There is moderate mitral annular calcification.  3. There is a trivial pericardial effusion.  4. Compared with study dated 2/27/2024, no significant change. QUANTITATIVE DATA SUMMARY: LV SYSTOLIC FUNCTION BY 2D PLANIMETRY (MOD):                      Normal Ranges: EF-Visual:      53 % LV EF Reported: 53 %  [id number] Neftali Calvillo MD Electronically signed on 7/16/2024 at 3:02:13 PM  ** Final **     Transthoracic Echo (TTE) Complete    Result Date: 2/27/2024   [facility], [address] [place] [address]               Tel [phone] and Fax [phone] TRANSTHORACIC ECHOCARDIOGRAM REPORT  Patient Name:      VERNON SALDANA VIA         Reading Physician:    [id number] Nohelia Soto MD Study Date:        2/27/2024            Ordering Provider:    [id number] NIKOLE LOVE MRN/PID:           [id number]             Fellow: Accession#:        [id number]         Nurse: Date of Birth/Age: 1950 / 73 years Sonographer:          Dai Diaz                                                               [facility] Gender:            F                    Additional Staff: Height:            162.56 cm            Admit Date:           2/24/2024 Weight:            72.57 kg             Admission Status:     Inpatient -                                                               Routine BSA / BMI:         1.78 m2 / 27.46      Encounter#:           [id number]                    kg/m2                                         Department Location:  [facility] Non                                                               Invasive Blood Pressure: 131 /59 mmHg Study Type:    TRANSTHORACIC ECHO (TTE) COMPLETE Diagnosis/ICD: Shortness of breath-R06.02 Indication:    Dyspnea CPT Code:      Echo Complete w Full Doppler-[id number] Patient History: Pertinent History: A-Fib and  Chest Pain. elevated troponin, elevated BNP, mod                    AS. Study Detail: The following Echo studies were performed: 2D, M-Mode, Doppler and               color flow.  PHYSICIAN INTERPRETATION: Left Ventricle: The left ventricular systolic function is normal, with an estimated ejection fraction of 55-60%. There are no regional wall motion abnormalities. The left ventricular cavity size is normal. Spectral Doppler shows an impaired relaxation pattern of left ventricular diastolic filling. Left Atrium: The left atrium is mildly dilated. Right Ventricle: The right ventricle is normal in size. There is normal right ventricular global systolic function. Right Atrium: The right atrium is normal in size. Aortic Valve: The aortic valve is trileaflet. There is mild to moderate aortic valve cusp calcification. There is evidence of moderate aortic valve stenosis. There is trivial aortic valve regurgitation. The peak instantaneous gradient of the aortic valve is 27.5 mmHg. The mean gradient of the aortic valve is 16.0 mmHg. Mitral Valve: The mitral valve is mildly thickened. There is evidence of mild mitral valve stenosis. The doppler estimated mean and peak diastolic pressure gradients are 7.2 mmHg and 16.3 mmHg respectively. There is mild mitral annular calcification. There is mild to moderate mitral valve regurgitation. Tricuspid Valve: The tricuspid valve is structurally normal. There is mild to moderate tricuspid regurgitation. Pulmonic Valve: The pulmonic valve is not well visualized. There is trace to mild pulmonic valve regurgitation. Pericardium: There is no pericardial effusion noted. Aorta: The aortic root is normal. Pulmonary Artery: The tricuspid regurgitant velocity is 3.46 m/s, and with an estimated right atrial pressure of 3 mmHg, the estimated pulmonary artery pressure is mild to moderately elevated with the RVSP at 50.9 mmHg.  CONCLUSIONS:  1. Left ventricular systolic function is normal with a  55-60% estimated ejection fraction.  2. Spectral Doppler shows an impaired relaxation pattern of left ventricular diastolic filling.  3. Mild to moderate mitral valve regurgitation.  4. Mild to moderate tricuspid regurgitation visualized.  5. Moderate aortic valve stenosis.  6. Mild to moderately elevated pulmonary artery pressure. QUANTITATIVE DATA SUMMARY: 2D MEASUREMENTS:                           Normal Ranges: IVSd:          0.97 cm    (0.6-1.1cm) LVPWd:         0.98 cm    (0.6-1.1cm) LVIDd:         5.70 cm    (3.9-5.9cm) LVIDs:         4.26 cm LV Mass Index: 123.2 g/m2 LV % FS        25.2 % LA VOLUME:                              Normal Ranges: LA Vol A4C:       77.4 ml    (22+/-6mL/m2) LA Vol A2C:       83.5 ml LA Vol BP:        82.1 ml LA Vol Index A4C: 43.5 ml/m2 LA Vol Index A2C: 46.9 ml/m2 LA Vol Index BP:  46.1 ml/m2 LA Volume Index:  46.1 ml/m2 LA Vol A4C:       72.2 ml LA Vol A2C:       77.0 ml RA VOLUME BY A/L METHOD:                       Normal Ranges: RA Area A4C: 21.8 cm2 M-MODE MEASUREMENTS:                  Normal Ranges: Ao Root: 3.30 cm (2.0-3.7cm) LAs:     4.72 cm (2.7-4.0cm) LV SYSTOLIC FUNCTION BY 2D PLANIMETRY (MOD):                     Normal Ranges: EF-A4C View: 55.9 % (>=55%) EF-A2C View: 56.5 % EF-Biplane:  55.9 % LV DIASTOLIC FUNCTION:                             Normal Ranges: MV Peak E:      1.91 m/s    (0.7-1.2 m/s) MV Peak A:      0.93 m/s    (0.42-0.7 m/s) E/A Ratio:      2.06        (1.0-2.2) MV e'           0.09 m/s    (>8.0) MV lateral e'   0.09 m/s MV medial e'    0.08 m/s E/e' Ratio:     21.21       (<8.0) PulmV Sys Ham:  62.35 cm/s PulmV Alvarez Ham: 121.32 cm/s PulmV S/D Ham:  0.51 MITRAL VALVE:                       Normal Ranges: MV Vmax:    2.02 m/s  (<=1.3m/s) MV peak P.3 mmHg (<5mmHg) MV mean P.2 mmHg  (<2mmHg) MV VTI:     48.44 cm  (10-13cm) MV DT:      167 msec  (150-240msec) MITRAL INSUFFICIENCY:                         Normal Ranges: MR VTI:     192.66 cm MR  Vmax:    568.98 cm/s MR Volume:  33.56 ml MR Flow Rt: 99.12 ml/s MR EROA:    0.17 cm2 AORTIC VALVE:                                    Normal Ranges: AoV Vmax:                2.62 m/s  (<=1.7m/s) AoV Peak P.5 mmHg (<20mmHg) AoV Mean P.0 mmHg (1.7-11.5mmHg) LVOT Max Ham:            0.99 m/s  (<=1.1m/s) AoV VTI:                 58.21 cm  (18-25cm) LVOT VTI:                21.96 cm LVOT Diameter:           1.95 cm   (1.8-2.4cm) AoV Area, VTI:           1.13 cm2  (2.5-5.5cm2) AoV Area,Vmax:           1.13 cm2  (2.5-4.5cm2) AoV Dimensionless Index: 0.38 AORTIC INSUFFICIENCY: AI Vmax:       3.91 m/s AI Half-time:  520 msec AI Decel Time: 1794 msec AI Decel Rate: 218.17 cm/s2  RIGHT VENTRICLE: RV Basal 4.30 cm RV Mid   2.60 cm RV Major 7.7 cm TAPSE:   27.0 mm RV s'    0.18 m/s TRICUSPID VALVE/RVSP:                             Normal Ranges: Peak TR Velocity: 3.46 m/s RV Syst Pressure: 50.9 mmHg (< 30mmHg) PULMONIC VALVE:                      Normal Ranges: PV Max Ham: 1.5 m/s  (0.6-0.9m/s) PV Max P.8 mmHg Pulmonary Veins: PulmV Alvarez Ham: 121.32 cm/s PulmV S/D Ham:  0.51 PulmV Sys Ham:  62.35 cm/s  [id number] Nohelia Soto MD Electronically signed on 2024 at 11:22:42 AM  ** Final **         Assessment/Plan   C in  showed normal coronary arteries        Acute on chronic diastolic CHF with mildly reduced EF 45-50%  -Hx of tachycardia induced CMO and hx of VT  -->highest it has been  -CXR showed vascular congestion  -CTA showed moderate right pleural effusion  -I reviewed the Echocardiograms as above  -Strict I & Os  -Daily weights->reports an 8lb weight increase in 2 days  -2gm na diet  -1500mL fluid restriction  -Bumex previously decreased from 2mg BID to 1mg BID due to hypotension->pt then presented with CHF and dose changed to 2mg in the am and 1mg in pm  -Cont Bumex PO  -Give additional Bumex 1mg IV x1 this afternoon  -Can do additional Bumex 1mg in afternoon at home for  weight gain or edema  -May benefit from Cardiomems implant as outpt     2. Elevated troponins-Non MI elevation  -C as above  -CTA showed pleural effusions  -Troponin 35, 34  -EKG atrial paced  -Cont plavix, statin  -Allergy to BB     3. Asthma/COPD exac  -steroids  -bronchodilators  -Oxygen support  -CTA showed CHF and pleural effusion     4. Paroxysmal atrial fibrillation  -Currently atrial paced  -[place] PCM/ICD  -Not on AC, has watchman  -Cont plavix  -Cont digoxin and diltiazem  -Digoxin level 0.9  -Monitor on tele     5. Chronic anemia  -Hgb 8.5  -Prior iron studies reviewed  -pt reports severe constipation with PO iron  -Stop aspirin  -Cont plavix only  -Monitor     6. Hyperlipidemia  -Cont statin      NAILA Guerra-CNP

## 2024-09-18 NOTE — PROGRESS NOTES
"Subjective   Subjective:   History Of Present Illness:  Marcela Elias is a 74 y.o. female was seen and evaluated at bedside today. Overnight, there were no reported acute events.     She is still SOB this morning, and says that it has worsened since yesterday. Oxygen was increased from 2 --> 4L in the room.   She also describes a feeling of  chest heaviness/tightness that began a few days ago, but denies chest pain or palpitations. She describes being a bit queasy this morning, but says she has chronic nausea. She is also suffering from chronic back pain and reports pain this morning.    She does report a significant decrease in her leg edema and abdominal swelling.     Review Of Systems:  11-point ROS was performed and is negative except as noted below and in the HPI.     Review of Systems   Constitutional:  Negative for chills, diaphoresis and fever.   Respiratory:  Positive for chest tightness and shortness of breath. Negative for wheezing and stridor.    Cardiovascular:  Positive for leg swelling. Negative for chest pain and palpitations.   Gastrointestinal:  Positive for abdominal pain and nausea. Negative for abdominal distention and vomiting.        Around umbilicus, chronic pain from prior surgeries   Genitourinary:  Negative for difficulty urinating, dysuria and urgency.   Musculoskeletal:  Positive for back pain.        Chronic   Skin:  Negative for rash and wound.   Psychiatric/Behavioral:  Negative for agitation, behavioral problems and confusion.         Objective   Objective:     /68   Pulse 65   Temp 36.1 °C (97 °F)   Resp 20   Ht 1.626 m (5' 4\")   Wt 76.2 kg (168 lb)   SpO2 98%   BMI 28.84 kg/m²     Physical Exam  Constitutional:       Appearance: She is normal weight. She is ill-appearing.   Cardiovascular:      Rate and Rhythm: Normal rate and regular rhythm.   Pulmonary:      Effort: Tachypnea and respiratory distress present.      Breath sounds: No stridor. No wheezing or rales.      " Comments: Shallow breaths with bad air movement  Abdominal:      General: Abdomen is flat. Bowel sounds are normal. There is no distension.      Palpations: Abdomen is soft.      Tenderness: There is abdominal tenderness. There is no guarding.      Comments: Around umbilicus, chronic pain from prior surgeries     Musculoskeletal:      Right lower leg: Edema present.      Left lower leg: Edema present.      Comments: Trace edema, resolving with diuretics   Neurological:      Mental Status: She is alert and oriented to person, place, and time.   Psychiatric:         Mood and Affect: Mood normal.         Behavior: Behavior normal.       Lab Work:     Lab Results   Component Value Date    WBC 6.8 09/18/2024    HGB 8.6 (L) 09/18/2024    HCT 29.2 (L) 09/18/2024    MCV 77 (L) 09/18/2024     09/18/2024     Lab Results   Component Value Date    GLUCOSE 144 (H) 09/18/2024    CALCIUM 8.6 09/18/2024     09/18/2024    K 3.8 09/18/2024    CO2 27 09/18/2024     09/18/2024    BUN 20 09/18/2024    CREATININE 0.96 09/18/2024     Thyroid Stimulating Hormone   Date Value Ref Range Status   04/15/2024 0.92 0.44 - 3.98 mIU/L Final     TSH   Date Value Ref Range Status   08/28/2023 1.71 0.44 - 3.98 mIU/L Final     Comment:      TSH testing is performed using different testing    methodology at [facility] than at other    [facility]. Direct result comparisons should    only be made within the same method.   12/19/2022 1.34 0.44 - 3.98 mIU/L Final     Comment:      TSH testing is performed using different testing    methodology at [facility] than at other    [facility]. Direct result comparisons should    only be made within the same method.       Vitamin D, 25-Hydroxy   Date Value Ref Range Status   10/18/2021 28 (A) ng/mL Final     Comment:     .  DEFICIENCY:         < 20   NG/ML  INSUFFICIENCY:      20-29  NG/ML  SUFFICIENCY:         NG/ML    THIS ASSAY ACCURATELY  QUANTIFIES THE SUM OF  VITAMIN D3, 25-HYDROXY AND VIT D2,25-HYDROXY.       Cultures:   No results found for the last 90 days.    Images:     CT angio chest for pulmonary embolism   Final Result   No significant pulmonary embolism.        Moderate cardiomegaly/suspected CHF with moderate right effusion.        MACRO:   None.        Signed by: Campbell Pearson 9/18/2024 12:58 AM   Dictation workstation:   [id number]      XR chest 1 view   Final Result   Mild vascular congestion.        MACRO:   None        Signed by: Campbell Pearson 9/18/2024 12:11 AM   Dictation workstation:   [id number]         Medications:     Scheduled:  albuterol, 2.5 mg, nebulization, TID  aspirin, 81 mg, oral, Daily  baclofen, 10 mg, oral, q AM  baclofen, 20 mg, oral, Nightly  bumetanide, 1 mg, oral, Daily with evening meal  bumetanide, 2 mg, oral, Daily  clopidogrel, 75 mg, oral, Daily  digoxin, 125 mcg, oral, Daily  dilTIAZem CD, 120 mg, oral, Daily  enoxaparin, 40 mg, subcutaneous, q24h  famotidine, 40 mg, oral, Daily  hydroxychloroquine, 300 mg, oral, Daily  montelukast, 10 mg, oral, Nightly  pantoprazole, 40 mg, oral, BID  polyethylene glycol, 17 g, oral, Daily  predniSONE, 40 mg, oral, Daily  rosuvastatin, 20 mg, oral, Daily  sulfaSALAzine, 500 mg, oral, BID    Continuous:     PRN:  PRN medications: acetaminophen, ipratropium-albuteroL, ondansetron ODT, oxyCODONE, [Held by provider] oxyCODONE-acetaminophen, oxygen     Assessment & Plan:     Marcela Elias is a 74 y.o. female with a PMHx of HTN, HLD, HFmrEF/systolic (EF 45-50% in July 2024) and diastolic HF, paroxysmal A-fib status post PCM/ICD and watchman procedure, central sleep apnea on BiPAP at night, COPD/asthma (not on nasal cannula oxygen at home), chronic iron deficiency anemia, CKD, multiple abdominal surgeries, and RA who presented to the ED with worsening SOB and fluid overload.    ACUTE ISSUES:  #Acute hypoxic respiratory failure   #Acute decompensated heart failure   #COPD  exacerbation  - Symptoms began 1 week ago, worsening within the last 2 days.   - Follows with Dr. Soto, recently decreased night Bumex dose to 1 mg- now on 2 mg AM and 1 mg PM  - Needs new pulmonologist, Dr. Pimentel to see today.  CT PE: no significant PE but moderate cardiomegaly and moderate right pleural effusion  CXR: moderate cardiomegaly, left sided pacemaker, mild vascular congestion. No consolidation or pneumothorax  Echo 7/25/24: left ventricular systolic function is mildly decreased, with a visually estimated ejection fraction of 45-50%. Spectral Doppler shows a pseudonormal pattern of left ventricular diastolic filling. There is normal right ventricular global systolic function. There is moderate mitral annular calcification. Aortic valve sclerosis. Moderate aortic valve regurgitation. Normal estimated PASP and CVP.   PLAN:  -  Fluid load is decreasing (legs & abdomen) but still congested in lungs w/ SOB, continue diuresing--> Administer IV Bumex 1 mg per cardiology   - Cardiology following  - Pulmonology to see, follow recs   - Continue duonebs, prednisone, home singulair, incentive spirometry, BIPAP at night    #Elevated troponin  - No chest pain, likely demand ischemia  - 35--> 34: trended down      CHRONIC ISSUES:  #HTN- continue home cardizem  #HLD- continue home statin  #CKD- Cr. 0.96 today, 1.1 on admission . Baseline 0.9-1, monitor, received contrast in ED  #Chronic anemia- Hb today 8.6 baseline (8-9). No current concern for acute bleed  #Nausea/GERD- continue home Zofran prn, famotidine & PPI  #Chronic pain- holding home oxycodone 5 TID prn  #Afib post PCM/ICD & watchman- continue home Digoxin, cardizem, aspirin & plavic. Not on DOAC given watchman  #RA- continue home HCTZ and sulfasalazine, Voltaren gel PRN  #Constipation- continue home miralax and bisacodyl PRN    Fluid: PO  Electrolytes: Replete PRN  Nutrition: Cardiac  GI PPx:PPI  DVT/PE PPx: Lovenox Subq  Abx: None  IV Lines: 20 G PIV right  antecubital  O2: 4 L    Disposition: Acute hypoxic respiratory failure secondary to heart failure exacerbation/COPD, ELOS <48 hours    Abigail Vaughn, MS3 Medical Student    Disclaimer: Documentation completed with the information available at the time of input. The times in the chart may not be reflective of actual patient care times, interventions, or procedures. Documentation occurs after the physical care of the patient.

## 2024-09-18 NOTE — ED PROVIDER NOTES
The patient was seen by the midlevel/resident.  I have personally saw the patient and made/approved the management plan and take responsibility for the patient management.  I reviewed the EKG's (when done) and agree with the interpretation.  I have seen and examined the patient; agree with the workup, evaluation, MDM, and diagnosis.  The care plan has been discussed with the midlevel/resident; I have reviewed the note and agree with the documented findings.       Presents with complaint of shortness of breath history of COPD and CHF.  Unsure exact cause of her symptoms she was worked up.  She does have some increased fluid in her lungs.  Treated with Bumex and Solu-Medrol.  Placed on some oxygen as well.  Patient be hospitalized for her symptoms.  Instructed patient and family in the room.  ED Course as of 09/18/24 0212   Tue Sep 17, 2024   2317 EKG rate 66 bpm pr interval + ms qrs duration 80 ms qt qtc 392/410 ms prt axes + 42 65 accelerated junctional rhythm, personally reviewed by me. [PK]   Wed Sep 18, 2024   0143 Unsure if this is more of a COPD versus CHF exacerbation patient will be hospitalized.  She is still dyspneic at rest.  Workup in the ED has not shown etiology.  She was treated for both and anticipate hospitalization spoke to patient and family at bedside. [RZ]   0210 EKG done at 0 153 interpreted by me shows paced rhythm at 61 bpm with no ischemia similar to previous EKG August 9, 2024 no STEMI [RZ]      ED Course User Index  [PK] Odalis Mariano, APRN-CNP  [RZ] Suhail Mack MD         Diagnoses as of 09/18/24 0212   Chest pain, unspecified type   Dyspnea, unspecified type   Acute on chronic congestive heart failure, unspecified heart failure type (Multi)   Elevated d-dimer   Hypervolemia, unspecified hypervolemia type     MD Suhail Holt MD  09/18/24 0212

## 2024-09-19 LAB
ALBUMIN SERPL BCP-MCNC: 3.7 G/DL (ref 3.4–5)
ANION GAP SERPL CALC-SCNC: 15 MMOL/L (ref 10–20)
BUN SERPL-MCNC: 24 MG/DL (ref 6–23)
CALCIUM SERPL-MCNC: 8.5 MG/DL (ref 8.6–10.3)
CHLORIDE SERPL-SCNC: 104 MMOL/L (ref 98–107)
CO2 SERPL-SCNC: 25 MMOL/L (ref 21–32)
CREAT SERPL-MCNC: 1.04 MG/DL (ref 0.5–1.05)
EGFRCR SERPLBLD CKD-EPI 2021: 57 ML/MIN/1.73M*2
ERYTHROCYTE [DISTWIDTH] IN BLOOD BY AUTOMATED COUNT: 16.9 % (ref 11.5–14.5)
GLUCOSE SERPL-MCNC: 118 MG/DL (ref 74–99)
HCT VFR BLD AUTO: 27.3 % (ref 36–46)
HGB BLD-MCNC: 8.1 G/DL (ref 12–16)
MAGNESIUM SERPL-MCNC: 2.52 MG/DL (ref 1.6–2.4)
MCH RBC QN AUTO: 23.3 PG (ref 26–34)
MCHC RBC AUTO-ENTMCNC: 29.7 G/DL (ref 32–36)
MCV RBC AUTO: 79 FL (ref 80–100)
NRBC BLD-RTO: 0 /100 WBCS (ref 0–0)
PHOSPHATE SERPL-MCNC: 6 MG/DL (ref 2.5–4.9)
PLATELET # BLD AUTO: 245 X10*3/UL (ref 150–450)
POTASSIUM SERPL-SCNC: 3.8 MMOL/L (ref 3.5–5.3)
RBC # BLD AUTO: 3.47 X10*6/UL (ref 4–5.2)
SODIUM SERPL-SCNC: 140 MMOL/L (ref 136–145)
WBC # BLD AUTO: 12.8 X10*3/UL (ref 4.4–11.3)

## 2024-09-19 PROCEDURE — 2500000001 HC RX 250 WO HCPCS SELF ADMINISTERED DRUGS (ALT 637 FOR MEDICARE OP)

## 2024-09-19 PROCEDURE — 2500000002 HC RX 250 W HCPCS SELF ADMINISTERED DRUGS (ALT 637 FOR MEDICARE OP, ALT 636 FOR OP/ED)

## 2024-09-19 PROCEDURE — 94640 AIRWAY INHALATION TREATMENT: CPT

## 2024-09-19 PROCEDURE — 99233 SBSQ HOSP IP/OBS HIGH 50: CPT | Performed by: INTERNAL MEDICINE

## 2024-09-19 PROCEDURE — 2500000004 HC RX 250 GENERAL PHARMACY W/ HCPCS (ALT 636 FOR OP/ED)

## 2024-09-19 PROCEDURE — 83735 ASSAY OF MAGNESIUM: CPT

## 2024-09-19 PROCEDURE — 80069 RENAL FUNCTION PANEL: CPT

## 2024-09-19 PROCEDURE — 1200000002 HC GENERAL ROOM WITH TELEMETRY DAILY

## 2024-09-19 PROCEDURE — 99232 SBSQ HOSP IP/OBS MODERATE 35: CPT

## 2024-09-19 PROCEDURE — 36415 COLL VENOUS BLD VENIPUNCTURE: CPT

## 2024-09-19 PROCEDURE — 94668 MNPJ CHEST WALL SBSQ: CPT

## 2024-09-19 PROCEDURE — 2500000002 HC RX 250 W HCPCS SELF ADMINISTERED DRUGS (ALT 637 FOR MEDICARE OP, ALT 636 FOR OP/ED): Performed by: INTERNAL MEDICINE

## 2024-09-19 PROCEDURE — 2500000004 HC RX 250 GENERAL PHARMACY W/ HCPCS (ALT 636 FOR OP/ED): Performed by: NURSE PRACTITIONER

## 2024-09-19 PROCEDURE — 2500000005 HC RX 250 GENERAL PHARMACY W/O HCPCS

## 2024-09-19 PROCEDURE — 96372 THER/PROPH/DIAG INJ SC/IM: CPT

## 2024-09-19 PROCEDURE — 2500000005 HC RX 250 GENERAL PHARMACY W/O HCPCS: Performed by: INTERNAL MEDICINE

## 2024-09-19 PROCEDURE — 5A09357 ASSISTANCE WITH RESPIRATORY VENTILATION, LESS THAN 24 CONSECUTIVE HOURS, CONTINUOUS POSITIVE AIRWAY PRESSURE: ICD-10-PCS | Performed by: INTERNAL MEDICINE

## 2024-09-19 PROCEDURE — 94660 CPAP INITIATION&MGMT: CPT

## 2024-09-19 PROCEDURE — 94760 N-INVAS EAR/PLS OXIMETRY 1: CPT

## 2024-09-19 PROCEDURE — 85027 COMPLETE CBC AUTOMATED: CPT

## 2024-09-19 RX ORDER — BUMETANIDE 0.25 MG/ML
1 INJECTION, SOLUTION INTRAMUSCULAR; INTRAVENOUS ONCE
Status: COMPLETED | OUTPATIENT
Start: 2024-09-19 | End: 2024-09-19

## 2024-09-19 RX ORDER — IPRATROPIUM BROMIDE AND ALBUTEROL SULFATE 2.5; .5 MG/3ML; MG/3ML
3 SOLUTION RESPIRATORY (INHALATION)
Status: DISCONTINUED | OUTPATIENT
Start: 2024-09-20 | End: 2024-09-21 | Stop reason: HOSPADM

## 2024-09-19 ASSESSMENT — COGNITIVE AND FUNCTIONAL STATUS - GENERAL
MOBILITY SCORE: 20
MOVING TO AND FROM BED TO CHAIR: A LITTLE
TOILETING: A LITTLE
MOVING TO AND FROM BED TO CHAIR: A LITTLE
MOBILITY SCORE: 20
HELP NEEDED FOR BATHING: A LITTLE
DAILY ACTIVITIY SCORE: 21
WALKING IN HOSPITAL ROOM: A LITTLE
TOILETING: A LITTLE
CLIMB 3 TO 5 STEPS WITH RAILING: A LITTLE
HELP NEEDED FOR BATHING: A LITTLE
DRESSING REGULAR LOWER BODY CLOTHING: A LITTLE
STANDING UP FROM CHAIR USING ARMS: A LITTLE
CLIMB 3 TO 5 STEPS WITH RAILING: A LITTLE
WALKING IN HOSPITAL ROOM: A LITTLE
STANDING UP FROM CHAIR USING ARMS: A LITTLE
DRESSING REGULAR UPPER BODY CLOTHING: A LITTLE
DRESSING REGULAR LOWER BODY CLOTHING: A LITTLE
DAILY ACTIVITIY SCORE: 20

## 2024-09-19 ASSESSMENT — PAIN SCALES - GENERAL
PAINLEVEL_OUTOF10: 4
PAINLEVEL_OUTOF10: 6
PAINLEVEL_OUTOF10: 0 - NO PAIN
PAINLEVEL_OUTOF10: 3
PAINLEVEL_OUTOF10: 6
PAINLEVEL_OUTOF10: 6
PAINLEVEL_OUTOF10: 3

## 2024-09-19 ASSESSMENT — ENCOUNTER SYMPTOMS
AGITATION: 0
CHOKING: 0
NAUSEA: 0
ABDOMINAL PAIN: 1
FEVER: 0
ABDOMINAL DISTENTION: 0
SHORTNESS OF BREATH: 1
PALPITATIONS: 0
DIFFICULTY URINATING: 0
CONFUSION: 0
BACK PAIN: 1
CHEST TIGHTNESS: 1
VOMITING: 0
STRIDOR: 0
WOUND: 0
DYSURIA: 0
COUGH: 1
CHILLS: 0

## 2024-09-19 ASSESSMENT — PAIN - FUNCTIONAL ASSESSMENT
PAIN_FUNCTIONAL_ASSESSMENT: 0-10

## 2024-09-19 ASSESSMENT — PAIN DESCRIPTION - DESCRIPTORS: DESCRIPTORS: ACHING;SORE

## 2024-09-19 ASSESSMENT — PAIN DESCRIPTION - LOCATION: LOCATION: BACK

## 2024-09-19 NOTE — PROGRESS NOTES
"  Subjective    No acute events overnight. Pt was seen and examined at bedside this morning and reported breathing has improved since yesterday. Pt explains her chest and lungs feel sore and still has some SOB. Pt reports increased non productive cough. Pt explains improved LE edema.   Pt denies associated fever/chills, HA, n/v, chest pain, palpitations, abdominal pain.    Objective    Vitals  Visit Vitals  /61 (BP Location: Right arm, Patient Position: Lying)   Pulse 61   Temp 35.8 °C (96.4 °F) (Temporal)   Resp 22   Ht 1.626 m (5' 4\")   Wt 75 kg (165 lb 6.4 oz)   SpO2 95%   BMI 28.39 kg/m²   Smoking Status Never   BSA 1.84 m²       Physical Exam   Constitutional: ill appearing  HEENT: EOMI, clear sclera  CV: RRR, No M/R/G  PULM: Tachypnea, with shallow breaths. On supplemental O2   ABDOMEN: Soft, ND. Chronic TTP around umbilicus, 2/2 to prior surgeries  EXTREMITIES: Non-Tender, Full ROM. Improved B/L LE edema   NEURO: A&O x 4, nonfocal neurological exam.  PSYCH: Normal Mood & Behavior      IOs  No intake or output data in the 24 hours ending 09/19/24 1535    Labs:   Results from last 72 hours   Lab Units 09/19/24  0646 09/18/24  0635 09/17/24  2328   SODIUM mmol/L 140 140 140   POTASSIUM mmol/L 3.8 3.8 3.7   CHLORIDE mmol/L 104 105 104   CO2 mmol/L 25 27 27   BUN mg/dL 24* 20 22   CREATININE mg/dL 1.04 0.96 1.09*   GLUCOSE mg/dL 118* 144* 95   CALCIUM mg/dL 8.5* 8.6 8.7   ANION GAP mmol/L 15 12 13   EGFR mL/min/1.73m*2 57* 63 54*   PHOSPHORUS mg/dL 6.0* 3.6  --       Results from last 72 hours   Lab Units 09/19/24  0646 09/18/24  0635 09/17/24  2328   WBC AUTO x10*3/uL 12.8* 6.8 8.2   HEMOGLOBIN g/dL 8.1* 8.6* 8.5*   HEMATOCRIT % 27.3* 29.2* 27.9*   PLATELETS AUTO x10*3/uL 245 226 259   NEUTROS PCT AUTO %  --   --  69.6   LYMPHS PCT AUTO %  --   --  17.8   MONOS PCT AUTO %  --   --  10.4   EOS PCT AUTO %  --   --  1.3      Lab Results   Component Value Date    CALCIUM 8.5 (L) 09/19/2024    PHOS 6.0 (H) " "09/19/2024      Lab Results   Component Value Date    CRP 0.86 07/24/2024      [unfilled]     Micro/ID:   No results found for the last 90 days.                   No lab exists for component: \"AGALPCRNB\"   .ID  Lab Results   Component Value Date    BLOODCULT  09/02/2023     No Growth at 1 days~No Growth at 2 days~No Growth at 3 days~NO GROWTH at 4 days - FINAL REPORT       Images  CT angio chest for pulmonary embolism    Result Date: 9/18/2024  No significant pulmonary embolism.   Moderate cardiomegaly/suspected CHF with moderate right effusion.   MACRO: None.   Signed by: Campbell Pearson 9/18/2024 12:58 AM Dictation workstation:   [email or web address]    XR chest 1 view    Result Date: 9/18/2024  Mild vascular congestion.   MACRO: None   Signed by: Campbell Pearson 9/18/2024 12:11 AM Dictation workstation:   [id number]      Meds  Scheduled medications  baclofen, 10 mg, oral, q AM  baclofen, 20 mg, oral, Nightly  bumetanide, 1 mg, oral, Daily with evening meal  bumetanide, 2 mg, oral, Daily  clopidogrel, 75 mg, oral, Daily  digoxin, 125 mcg, oral, Daily  dilTIAZem CD, 120 mg, oral, Nightly  enoxaparin, 40 mg, subcutaneous, q24h  fluticasone furoate-vilanteroL, 1 puff, inhalation, Daily  hydroxychloroquine, 300 mg, oral, Daily  ipratropium-albuteroL, 3 mL, nebulization, q4h  methylPREDNISolone sodium succinate (PF), 60 mg, intravenous, BID  montelukast, 10 mg, oral, Nightly  pantoprazole, 40 mg, oral, BID  polyethylene glycol, 17 g, oral, Daily  rosuvastatin, 20 mg, oral, Nightly  sulfaSALAzine, 500 mg, oral, BID      Continuous medications     PRN medications  PRN medications: acetaminophen, ipratropium-albuteroL, ondansetron, oxyCODONE, [Held by provider] oxyCODONE-acetaminophen, oxygen, oxygen     Problem List    Problem list:   Patient Active Problem List   Diagnosis    Coronary artery disease involving native coronary artery    Asthma (HHS-HCC)    Atrial fibrillation (Multi)    Myofascial pain    Cardiac aneurysm    " Cardiac defibrillator in place    Obstructive sleep apnea    Central sleep apnea in conditions classified elsewhere    Cervical post-laminectomy syndrome    Cervical radiculopathy    Chronic bilateral low back pain without sciatica    Chronic right sacroiliac pain    Colon polyp    Cubital tunnel syndrome on right    Displacement of lumbar disc with radiculopathy    Gastroesophageal reflux disease without esophagitis    Mixed hyperlipidemia    Iron deficiency anemia    Irregular cardiac rhythm    Osteoporosis    Primary osteoarthritis of right shoulder    Psoriasis    Rheumatoid arthritis (Multi)    Umbilical hernia    Wide-complex tachycardia    Centrilobular emphysema (Multi)    Coagulation defect, unspecified (Multi)    Hypertensive heart disease with heart failure (Multi)    Stage 3a chronic kidney disease (Multi)    Personal history of other venous thrombosis and embolism    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)    Pacemaker    Anticoagulant long-term use    Chest pain, unspecified type    Arthritis of left shoulder region    Atrial fibrillation, unspecified type (Multi)    Acute chest pain    Constipation    Shortness of breath    Acute hypoxic on chronic hypercapnic respiratory failure (Multi)        Assessment and Plan    Trina Elias, a 74 y/o F with PMHx of hypertension, hyperlipidemia, HFmrEF/systolic (EF45-50% in July 2024) and diastolic HF, paroxysmal A-fib status post PCM/ICD and watchman procedure, central sleep apnea on BiPAP at night, COPD/asthma (not on home 02) chronic anemia/iron deficiency anemia, CKD, multiple abdominal surgeries, and RA presented to the ED with worsening SOB and fluid overload.      Acute Medical Issues:  #Acute hypoxic respiratory failure, improving   Plan:  - On supplemental O2, 2L  - Wean as tolerated      #Acute on chronic decompensated diastolic congestive heart failure   - Echo 07/25/2024: left ventricular systolic function is mildly decreased,  with a visually estimated ejection fraction of 45-50%. There is moderate mitral annular calcification. Aortic valve sclerosis. Moderate aortic valve regurgitation. Normal estimated PASP and CVP.   - Follows care w Dr. Soto, recently decreased from 2mg BID to 1mg BID due to hypotension  - CT PE: no significant PE but moderate cardiomegaly  Plan:  - Continue Bumex PO 1mg in AM, 2 mg in evening  - Given additional Bumex 1mg IV x1   - Strict I & Os  - Daily weights->reports an 8lb weight increase in 2 days  - 2 gm na diet  - 1500 mL fluid restriction  - Cardiology on consult, appreciate recs      #Acute COPD exacerbation   - Sx onset 1 week ago, worsening within the last 2 days.   Plan  - continue solu-medrol 60 mg BID, will taper after improvement  - continue Breo Ellipta   - continue scheduled Duonebs q 2-4 hrs   - continue BiPAP at night and as needed during day   - Wean oxygen as tolerated   - Incentive spirometry     #Moderate R pleural effusion  - CT PE: moderate right pleural effusion   Plan  - Per pulmonology, continue with diuresis, no plan for thoracentesis, will reevaluate for thoracentesis  - Pulmonology on consult, appreciate recs     Resolved/Addressed issues during hospitalization   #Elevated troponin, resolved   - No chest pain, likely demand ischemia  - 35--> 34: trended down     Chronic Medical Issues:   #HTN: Continue home Cardizem   #HLD: Continue home statin  #CKD: Cr on admission 1.1 (baseline 0.9-1~)  Received contrast in the ED.   #Chronic anemia/GRACIE: Hgb on admission 8.5 (baseline 8-9) cannot tolerate PO iron supplement; Consider inpatient IV Iron vs outpatient. No concern for acute bleed   #Nausea/GERD: continue Zofran PRN, famotidine, and PPI  #Chronic pain: Continue home oxycodone 5 TID PRN   #A-fib status post PCM/ICD and watchman procedure: Continue home Digoxin (ordered level), Cardizem,aspirin, and Plavix. Not on DOAC given watchman  #RA: continue home Hydroxychloroquine and  Sulfasalazine. Voltaren gel PRN    #Constipation: continue home miralax and bisacodyl PRN      Fluids: PO  Electrolytes: replete as needed  Nutrition: Cardiac diet  GI Prophylaxis: PPI   DVT Prophylaxis: Lovenox subcutaneous   O2: 2L      Disposition: Acute hypoxic respiratory failure, ELOS <48     Amanda Coe DO  Internal Medicine PGY-1

## 2024-09-19 NOTE — PROGRESS NOTES
"Subjective   Subjective:   History Of Present Illness:  Marcela Elias is a 74 y.o. female was seen and evaluated at bedside today. Overnight, there were no reported acute events.   She reports feeling a bit better than yesterday, though describes that her lungs and chest feel very sore and she is still SOB.     She is using the incentive spirometry and says she has been coughing more, though the cough has not been productive yet.     She isn't feeling nauseous this morning. She describes urinating twice since starting diuretics yesterday and says her swelling is way down, her legs are almost at baseline.    She denies any chest pain or palpitations.     Review Of Systems:  11-point ROS was performed and is negative except as noted below and in the HPI.     Review of Systems   Constitutional:  Negative for chills and fever.   Respiratory:  Positive for cough, chest tightness and shortness of breath. Negative for choking and stridor.    Cardiovascular:  Positive for leg swelling. Negative for chest pain and palpitations.        Trace edema, almost resolved   Gastrointestinal:  Positive for abdominal pain. Negative for abdominal distention, nausea and vomiting.        Chronic pain around umbilicus from prior abdominal surgeries   Genitourinary:  Negative for difficulty urinating and dysuria.   Musculoskeletal:  Positive for back pain.        Chronic back pain   Skin:  Negative for rash and wound.   Psychiatric/Behavioral:  Negative for agitation, behavioral problems and confusion.         Objective   Objective:     /61 (BP Location: Right arm, Patient Position: Lying)   Pulse 61   Temp 35.8 °C (96.4 °F) (Temporal)   Resp 22   Ht 1.626 m (5' 4\")   Wt 75 kg (165 lb 6.4 oz)   SpO2 97%   BMI 28.39 kg/m²     Physical Exam  Constitutional:       Appearance: She is ill-appearing.   Cardiovascular:      Rate and Rhythm: Normal rate and regular rhythm.   Pulmonary:      Effort: Tachypnea present.      Breath sounds: " Decreased air movement present. No stridor. Wheezing present. No rales.      Comments: Wheezing heard on anterior chest  Abdominal:      General: Abdomen is flat. There is no distension.      Palpations: Abdomen is soft.      Tenderness: There is abdominal tenderness. There is no guarding or rebound.      Comments: Chronic pain around umbilicus, prior abdominal surgeries   Musculoskeletal:      Right lower leg: Edema present.      Left lower leg: Edema present.      Comments: Trace, almost resolved   Skin:     General: Skin is warm and dry.      Coloration: Skin is not jaundiced.   Neurological:      Mental Status: She is alert and oriented to person, place, and time.   Psychiatric:         Mood and Affect: Mood normal.         Behavior: Behavior normal.     Lab Work:     Lab Results   Component Value Date    WBC 12.8 (H) 09/19/2024    HGB 8.1 (L) 09/19/2024    HCT 27.3 (L) 09/19/2024    MCV 79 (L) 09/19/2024     09/19/2024     Lab Results   Component Value Date    GLUCOSE 118 (H) 09/19/2024    CALCIUM 8.5 (L) 09/19/2024     09/19/2024    K 3.8 09/19/2024    CO2 25 09/19/2024     09/19/2024    BUN 24 (H) 09/19/2024    CREATININE 1.04 09/19/2024     Thyroid Stimulating Hormone   Date Value Ref Range Status   04/15/2024 0.92 0.44 - 3.98 mIU/L Final     TSH   Date Value Ref Range Status   08/28/2023 1.71 0.44 - 3.98 mIU/L Final     Comment:      TSH testing is performed using different testing    methodology at [facility] [place] than at other    system [facility]. Direct result comparisons should    only be made within the same method.   12/19/2022 1.34 0.44 - 3.98 mIU/L Final     Comment:      TSH testing is performed using different testing    methodology at [facility] [place] than at other    system hospitals. Direct result comparisons should    only be made within the same method.       Vitamin D, 25-Hydroxy   Date Value Ref Range Status   10/18/2021 28 (A) ng/mL Final     Comment:      .  DEFICIENCY:         < 20   NG/ML  INSUFFICIENCY:      20-29  NG/ML  SUFFICIENCY:         NG/ML    THIS ASSAY ACCURATELY QUANTIFIES THE SUM OF  VITAMIN D3, 25-HYDROXY AND VIT D2,25-HYDROXY.       Cultures:   No results found for the last 90 days.    Images:     CT angio chest for pulmonary embolism   Final Result   No significant pulmonary embolism.        Moderate cardiomegaly/suspected CHF with moderate right effusion.        MACRO:   None.        Signed by: Campbell Pearson 9/18/2024 12:58 AM   Dictation workstation:   [id number]      XR chest 1 view   Final Result   Mild vascular congestion.        MACRO:   None        Signed by: Campbell Pearson 9/18/2024 12:11 AM   Dictation workstation:   [id number]         Medications:     Scheduled:  baclofen, 10 mg, oral, q AM  baclofen, 20 mg, oral, Nightly  bumetanide, 1 mg, oral, Daily with evening meal  bumetanide, 2 mg, oral, Daily  clopidogrel, 75 mg, oral, Daily  digoxin, 125 mcg, oral, Daily  dilTIAZem CD, 120 mg, oral, Nightly  enoxaparin, 40 mg, subcutaneous, q24h  famotidine, 40 mg, oral, Daily  fluticasone furoate-vilanteroL, 1 puff, inhalation, Daily  hydroxychloroquine, 300 mg, oral, Daily  ipratropium-albuteroL, 3 mL, nebulization, q4h  methylPREDNISolone sodium succinate (PF), 60 mg, intravenous, BID  montelukast, 10 mg, oral, Nightly  pantoprazole, 40 mg, oral, BID  polyethylene glycol, 17 g, oral, Daily  rosuvastatin, 20 mg, oral, Nightly  sulfaSALAzine, 500 mg, oral, BID    Continuous:     PRN:  PRN medications: acetaminophen, ipratropium-albuteroL, ondansetron, oxyCODONE, [Held by provider] oxyCODONE-acetaminophen, oxygen, oxygen     Assessment & Plan:   Trina Elias, a 72 y/o F with PMHx of hypertension, hyperlipidemia, HFmrEF/systolic (EF45-50% in July 2024) and diastolic HF, paroxysmal A-fib status post PCM/ICD and watchman procedure, central sleep apnea on BiPAP at night, COPD/asthma (not on home 02) chronic anemia/iron deficiency anemia, CKD,  multiple abdominal surgeries, and RA presented to the ED with worsening SOB and fluid overload.     ACUTE ISSUES:  Acute Medical Issues:  #Acute hypoxic respiratory failure   Plan:  - On supplemental O2, 2L today   - Wean as tolerated   #Acute COPD exacerbation   - Sx onset 1 week ago, worsening within the last 2 days (upon admission)  - Follows with Dr. Soto, recently decreased night Bumex dose to 1 mg- now on 2 mg AM and 1 mg PM  CXR: moderate cardiomegaly, left sided pacemaker, mild vascular congestion. No consolidation or pneumothorax  PLAN  - Per pulmonology, changed prednisone to solu-medrol 60 mg BID, will taper after improvement  - Per pulmonology, add Breo Ellipta   - Start scheduled Duonebs q 2-4 hrs   - Start BiPAP at night and as needed during day   - Wean oxygen as tolerated   - Continue incentive spirometry     #Acute on chronic decompensated systolic & diastolic heart failure   - Echo 07/25/2024: left ventricular systolic function is mildly decreased, with a visually estimated ejection fraction of 45-50%. There is moderate mitral annular calcification. Aortic valve sclerosis. Moderate aortic valve regurgitation. Normal estimated PASP and CVP.   - Follows care w Dr. Soto, recently decreased from 2mg BID to 1mg BID due to hypotension  - CT PE: no significant PE but moderate cardiomegaly  Plan:  - Continue Bumex PO 1mg in AM, 2 mg in evening   - Per cardiology, s/p Bumex 1mg IV x1 this afternoon   - Strict I & Os  - Daily weights->lost 3 lbs since yesterday   - 2 gm na diet  - 1500 mL fluid restriction  - Cardiology on consult, appreciate recs     #Moderate R pleural effusion  - CT PE: moderate right pleural effusion   Plan  - Per pulmonology, continue with diuresis - will evaluate for thoracentesis today   - Pulmonology on consult, appreciate recs     #Elevated troponin  - No chest pain, likely demand ischemia  - 35--> 34: trended down    CHRONIC ISSUES:  #HTN: Continue home Cardizem   #HLD: Continue  home statin  #CKD: Cr on admission 1.1 (baseline 0.9-1~)  Received contrast in the ED. 1.04 today (9/19/24)  #Chronic anemia/GRACIE: Hgb on admission 8.5 (baseline 8-9) cannot tolerate PO iron supplement; Consider inpatient IV Iron vs outpatient. No concern for acute bleed   #Nausea/GERD: continue Zofran PRN, famotidine, and PPI  #Chronic pain: Continue home oxycodone 5 TID PRN   #A-fib status post PCM/ICD and watchman procedure: Continue home Digoxin (ordered level), Cardizem, aspirin, and Plavix. Not on DOAC given watchman  #RA: continue home Hydroxychloroquine and Sulfasalazine. Voltaren gel PRN    #Constipation: continue home miralax and bisacodyl PRN     Fluid: Replete PO  Electrolytes: Replete PRN  Nutrition: Cardiac diet  GI PPx: PPI  DVT/PE PPx:: Lovenox subcutaneous  Abx: None  IV Lines: PIV  O2: 2 L    Disposition: Acute hypoxic respiratory failure secondary to heart failure exacerbation/COPD, ELOS <48 hours     Abigail Vaughn, MS3 Medical Student    Disclaimer: Documentation completed with the information available at the time of input. The times in the chart may not be reflective of actual patient care times, interventions, or procedures. Documentation occurs after the physical care of the patient.

## 2024-09-19 NOTE — PROGRESS NOTES
"Department of Medicine  Division of Pulmonary, Critical Care, and Sleep Medicine    Trina Elias \"Marcela\" is a 74 y.o. female on day 0 of admission presenting with Acute hypoxic on chronic hypercapnic respiratory failure (Multi).    Subjective   Feels slightly better but still tachypneic and wheezing       Objective     Vital Signs      9/18/2024    12:45 AM 9/18/2024     2:59 AM 9/18/2024     3:20 AM 9/18/2024     5:46 AM 9/18/2024     1:28 PM 9/18/2024     8:21 PM 9/19/2024     4:14 AM   Vitals   Systolic 137 141 118 122 119 109 101   Diastolic 89 65 47 68 70 55 61   Heart Rate 59 59 81 65 67 63 61   Temp   36.7 °C (98.1 °F) 36.1 °C (97 °F) 36.4 °C (97.5 °F) 36.8 °C (98.2 °F) 35.8 °C (96.4 °F)   Resp 20 22 23 20 22 22 22   Weight (lb)    168   165.4   BMI    28.84 kg/m2   28.39 kg/m2   BSA (m2)    1.85 m2   1.84 m2        Physical exam  Constitutional: Normal appearance.  HEENT: Normocephalic and atraumatic.  Cardiovascular: Normal rate and regular rhythm.  Pulmonary: Loud expiratory wheeze, tachypneic, coarse breath sounds bilaterally..  Musculoskeletal: No edema, no cyanosis.  Neurological: Awake, alert and oriented x3.  Psychiatric: Normal behavior, mood and affect.    Labs:  Lab Results   Component Value Date    WBC 12.8 (H) 09/19/2024    HGB 8.1 (L) 09/19/2024    HCT 27.3 (L) 09/19/2024    MCV 79 (L) 09/19/2024     09/19/2024      Lab Results   Component Value Date    GLUCOSE 118 (H) 09/19/2024    CALCIUM 8.5 (L) 09/19/2024     09/19/2024    K 3.8 09/19/2024    CO2 25 09/19/2024     09/19/2024    BUN 24 (H) 09/19/2024    CREATININE 1.04 09/19/2024      Lab Results   Component Value Date    ALT 28 09/17/2024    AST 22 09/17/2024    ALKPHOS 72 09/17/2024    BILITOT 0.5 09/17/2024        Oxygen Therapy  SpO2: 95 %  Medical Gas Therapy: Supplemental oxygen  Medical Gas Delivery Method: Nasal cannula  FiO2 (%):  [24 %-32 %] 24 %    Intake/Output last 3 Shifts:  I/O last 3 completed shifts:  In: " 320 (4.3 mL/kg) [P.O.:320]  Out: - (0 mL/kg)   Weight: 75 kg       Medications   Scheduled medications  baclofen, 10 mg, oral, q AM  baclofen, 20 mg, oral, Nightly  bumetanide, 1 mg, oral, Daily with evening meal  bumetanide, 2 mg, oral, Daily  clopidogrel, 75 mg, oral, Daily  digoxin, 125 mcg, oral, Daily  dilTIAZem CD, 120 mg, oral, Nightly  enoxaparin, 40 mg, subcutaneous, q24h  famotidine, 40 mg, oral, Daily  fluticasone furoate-vilanteroL, 1 puff, inhalation, Daily  hydroxychloroquine, 300 mg, oral, Daily  ipratropium-albuteroL, 3 mL, nebulization, q4h  methylPREDNISolone sodium succinate (PF), 60 mg, intravenous, BID  montelukast, 10 mg, oral, Nightly  pantoprazole, 40 mg, oral, BID  polyethylene glycol, 17 g, oral, Daily  rosuvastatin, 20 mg, oral, Nightly  sulfaSALAzine, 500 mg, oral, BID      Continuous medications     PRN medications  PRN medications: acetaminophen, ipratropium-albuteroL, ondansetron, oxyCODONE, [Held by provider] oxyCODONE-acetaminophen, oxygen, oxygen     Allergies  Abatacept, Hydrocodone-acetaminophen, Hydromorphone, Metoprolol, Penicillins, Pregabalin, Prochlorperazine, Methotrexate, Aripiprazole, Beta-blockers (beta-adrenergic blocking agts), Bupropion, Cefepime, Ciprofloxacin, Furosemide, Levofloxacin, and Pineapple    Chest Radiograph   CT angio chest for pulmonary embolism 09/18/2024    Narrative  Interpreted By:  Campbell Pearson,  STUDY:  CT ANGIO CHEST FOR PULMONARY EMBOLISM;  9/18/2024 12:31 am    INDICATION:  Signs/Symptoms:SOB CHEST PAIN.    COMPARISON:  08/08/2024    ACCESSION NUMBER(S):  [id number]    ORDERING CLINICIAN:  JAMMIE SPENCE    TECHNIQUE:  Contiguous axial images of the chest were obtained after the  intravenous administration of iodinated contrast using angiographic  PE protocol. Coronal and sagittal reformatted images were  reconstructed from the axial data. MIP images were created on an  independent workstation and reviewed.    FINDINGS:  There is streak  artifact from the arms which were not raised.    No significant pulmonary artery embolism.    Moderate cardiomegaly is seen. Left-sided AICD present.    Ectasia ascending aorta measuring 4.2 cm in diameter.    Moderate right pleural effusion.    There is some areas of ground-glass opacity both lungs which may  reflect atelectasis/edema. Correlate with CHF.    Multilevel moderate spondylotic degeneration seen thoracic spine.    Impression  No significant pulmonary embolism.    Moderate cardiomegaly/suspected CHF with moderate right effusion.    MACRO:  None.    Signed by: Campbell Pearson 9/18/2024 12:58 AM  Dictation workstation:   [id number]       XR chest 1 view 09/17/2024    Narrative  Interpreted By:  Campbell Pearson,  STUDY:  XR CHEST 1 VIEW;  9/17/2024 11:37 pm    INDICATION:  Signs/Symptoms:fluid overload.    COMPARISON:  08/08/2024    ACCESSION NUMBER(S):  [id number]    ORDERING CLINICIAN:  JAMMIE SPENCE    FINDINGS:  Moderate cardiomegaly and left-sided pacemaker noted. Mild vascular  congestion. No consolidation or pneumothorax.    Impression  Mild vascular congestion.    MACRO:  None    Signed by: Campbell Pearson 9/18/2024 12:11 AM  Dictation workstation:   [id number]         Assessment and Plan / Recommendations   Assessment/Plan   74 y.o. female with history of hypertension, HFrEF, A-fib, CKD, RA, asthma admitted with acute hypoxic respiratory failure     1.  Acute hypoxic respiratory failure due to CHF and asthma exacerbation  2.  Asthma exacerbation  3.  Decompensated heart failure with right pleural effusion  4.  BARBARA on BiPAP     Feels slightly better but still wheezy and in respiratory distress.  Bedside ultrasound today showed small effusion.    -Continue diuresis, no plan for thoracentesis, will reevaluate in couple of days  -Continue Solu-Medrol 60 twice daily, will taper after improvement  -Continue Breo, continue DuoNebs every 2-4 hours  -BiPAP at night and as needed during the day    [place]  MD Margarito

## 2024-09-19 NOTE — PROGRESS NOTES
"Trina Elias \"Marcela\" is a 74 y.o. female on day 0 of admission presenting with Acute hypoxic on chronic hypercapnic respiratory failure (Multi).      Subjective   She is sitting up in the bed, states she feels a little better today but had a rough night due to shortness of breath. Minimal cough      Review of systems:  Constitutional: negative for fever, chills, or malaise  Neuro: negative for dizziness, headache, numbness, tingling  ENT: Negative for nasal congestion or sore throat  CV: negative for chest pain, palpitations  GI: negative for abd pain, nausea, vomiting or diarrhea  : negative for dysuria, frequency, or urgency  Skin: negative for lesions, wounds, or rash  Musculoskeletal: Negative for weakness, myalgia, or arthralgia  Endocrine: Negative for polyuria or polydipsia         Objective   Constitutional: Well developed, awake/alert/oriented x3, no distress, alert and cooperative  Eyes: PERRL, EOMI, clear sclera  ENMT: mucous membranes moist, no apparent injury, no lesions seen  Head/Neck: Neck supple, no apparent injury, thyroid without mass or tenderness, No JVD, trachea midline, no bruits  Respiratory/Thorax: Patent airways, diminished bilat bases with good chest expansion, thorax symmetric  Cardiovascular: Regular, rate and rhythm, no murmurs, 2+ equal pulses of the extremities, normal S 1and S 2  Gastrointestinal: Nondistended, soft, non-tender, no rebound tenderness or guarding, no masses palpable, no organomegaly, +BS, no bruits  Musculoskeletal: ROM intact, no joint swelling, normal strength  Extremities: normal extremities, no cyanosis edema, contusions or wounds, no clubbing  Neurological: alert and oriented x3, intact senses, motor, response and reflexes, normal strength  Lymphatic: No significant lymphadenopathy  Psychological: Appropriate mood and behavior  Skin: Warm and dry, no lesions, no rashes      Last Recorded Vitals  /61 (BP Location: Right arm, Patient Position: Lying)   " "Pulse 61   Temp 35.8 °C (96.4 °F) (Temporal)   Resp 22   Ht 1.626 m (5' 4\")   Wt 75 kg (165 lb 6.4 oz)   SpO2 95%   BMI 28.39 kg/m²     Intake/Output last 3 Shifts:  I/O last 3 completed shifts:  In: 320 (4.3 mL/kg) [P.O.:320]  Out: - (0 mL/kg)   Weight: 75 kg   No intake/output data recorded.    Relevant Results  Scheduled medications  baclofen, 10 mg, oral, q AM  baclofen, 20 mg, oral, Nightly  bumetanide, 1 mg, intravenous, Once  bumetanide, 1 mg, oral, Daily with evening meal  bumetanide, 2 mg, oral, Daily  clopidogrel, 75 mg, oral, Daily  digoxin, 125 mcg, oral, Daily  dilTIAZem CD, 120 mg, oral, Nightly  enoxaparin, 40 mg, subcutaneous, q24h  famotidine, 40 mg, oral, Daily  fluticasone furoate-vilanteroL, 1 puff, inhalation, Daily  hydroxychloroquine, 300 mg, oral, Daily  ipratropium-albuteroL, 3 mL, nebulization, q4h  methylPREDNISolone sodium succinate (PF), 60 mg, intravenous, BID  montelukast, 10 mg, oral, Nightly  pantoprazole, 40 mg, oral, BID  polyethylene glycol, 17 g, oral, Daily  rosuvastatin, 20 mg, oral, Nightly  sulfaSALAzine, 500 mg, oral, BID      Continuous medications     PRN medications  PRN medications: acetaminophen, ipratropium-albuteroL, ondansetron, oxyCODONE, [Held by provider] oxyCODONE-acetaminophen, oxygen, oxygen    Results for orders placed or performed during the hospital encounter of 09/17/24 (from the past 24 hour(s))   Magnesium   Result Value Ref Range    Magnesium 2.52 (H) 1.60 - 2.40 mg/dL   Renal Function Panel   Result Value Ref Range    Glucose 118 (H) 74 - 99 mg/dL    Sodium 140 136 - 145 mmol/L    Potassium 3.8 3.5 - 5.3 mmol/L    Chloride 104 98 - 107 mmol/L    Bicarbonate 25 21 - 32 mmol/L    Anion Gap 15 10 - 20 mmol/L    Urea Nitrogen 24 (H) 6 - 23 mg/dL    Creatinine 1.04 0.50 - 1.05 mg/dL    eGFR 57 (L) >60 mL/min/1.73m*2    Calcium 8.5 (L) 8.6 - 10.3 mg/dL    Phosphorus 6.0 (H) 2.5 - 4.9 mg/dL    Albumin 3.7 3.4 - 5.0 g/dL   CBC   Result Value Ref Range    " WBC 12.8 (H) 4.4 - 11.3 x10*3/uL    nRBC 0.0 0.0 - 0.0 /100 WBCs    RBC 3.47 (L) 4.00 - 5.20 x10*6/uL    Hemoglobin 8.1 (L) 12.0 - 16.0 g/dL    Hematocrit 27.3 (L) 36.0 - 46.0 %    MCV 79 (L) 80 - 100 fL    MCH 23.3 (L) 26.0 - 34.0 pg    MCHC 29.7 (L) 32.0 - 36.0 g/dL    RDW 16.9 (H) 11.5 - 14.5 %    Platelets 245 150 - 450 x10*3/uL       CT angio chest for pulmonary embolism   Final Result   No significant pulmonary embolism.        Moderate cardiomegaly/suspected CHF with moderate right effusion.        MACRO:   None.        Signed by: Campbell Pearson 9/18/2024 12:58 AM   Dictation workstation:   [id number]      XR chest 1 view   Final Result   Mild vascular congestion.        MACRO:   None        Signed by: Campbell Pearson 9/18/2024 12:11 AM   Dictation workstation:   [id number]          Transthoracic Echo (TTE) Complete    Result Date: 7/25/2024   [facility], [address] [place] [address]               Tel [phone] and Fax [phone] TRANSTHORACIC ECHOCARDIOGRAM REPORT  Patient Name:      VERNON SALDANA VIA         Reading Physician:    [id number] Neo Gutierrez MD Study Date:        7/25/2024            Ordering Provider:    [id number] STEPHON YARBROUGH MRN/PID:           [id number]             Fellow: Accession#:        [id number]         Nurse: Date of Birth/Age: 1950 / 73 years Sonographer:          Viola Lau RDCS Gender:            F                    Additional Staff: Height:            162.56 cm            Admit Date:           7/24/2024 Weight:            72.12 kg             Admission Status:     Inpatient -                                                               Routine BSA / BMI:         1.77 m2 / 27.29      Encounter#:           [id number]                    kg/m2 Blood Pressure:     108/69 mmHg          Department Location:  [facility] Non                                                               Invasive Study Type:    TRANSTHORACIC ECHO (TTE) COMPLETE Diagnosis/ICD: Chest pain, unspecified-R07.9 Indication:    CP CPT Code:      Echo Complete w Full Doppler-22118 Patient History: Pertinent History: A-Fib, CAD, Hyperlipidemia, COPD, Chest Pain and S/P Left                    shoulder surgery,Watchman, CKD. Study Detail: The following Echo studies were performed: 2D, M-Mode, Doppler and               color flow. Technically challenging study due to patient lying in               supine position and S/P left shoulder surgery. Patient has a               defibrillator.  PHYSICIAN INTERPRETATION: Left Ventricle: The left ventricular systolic function is mildly decreased, with a visually estimated ejection fraction of 45-50%. There are no regional wall motion abnormalities. The left ventricular cavity size is normal. There is mild concentric left ventricular hypertrophy. Spectral Doppler shows a pseudonormal pattern of left ventricular diastolic filling. Left Atrium: The left atrium is normal in size. Right Ventricle: The right ventricle is normal in size. There is normal right ventricular global systolic function. A device is visualized in the right ventricle. Right Atrium: The right atrium is normal in size. Aortic Valve: The aortic valve is trileaflet. There is mild aortic valve cusp calcification. There is evidence of mildly elevated transaortic gradients consistent with sclerosis of the aortic valve. The aortic valve dimensionless index is 0.71. There is moderate aortic valve regurgitation. The peak instantaneous gradient of the aortic valve is 14.6 mmHg. The mean gradient of the aortic valve is 8.0 mmHg. Mitral Valve: The mitral valve is normal in structure. There is moderate mitral annular calcification. There is mild mitral valve regurgitation. Tricuspid Valve: The tricuspid valve is  structurally normal. There is mild tricuspid regurgitation. Pulmonic Valve: The pulmonic valve is structurally normal. There is physiologic pulmonic valve regurgitation. Pericardium: There is no pericardial effusion noted. Aorta: The aortic root is normal. Pulmonary Artery: The tricuspid regurgitant velocity is 2.43 m/s, and with an estimated right atrial pressure of 3 mmHg, the estimated pulmonary artery pressure is normal with the RVSP at 26.6 mmHg. Pulmonary Veins: The pulmonary veins appear normal and return normally to the left atrium. Systemic Veins: The inferior vena cava appears to be of normal size. There is IVC inspiratory collapse greater than 50%.  CONCLUSIONS:  1. The left ventricular systolic function is mildly decreased, with a visually estimated ejection fraction of 45-50%.  2. Spectral Doppler shows a pseudonormal pattern of left ventricular diastolic filling.  3. There is normal right ventricular global systolic function.  4. There is moderate mitral annular calcification.  5. Aortic valve sclerosis.  6. Moderate aortic valve regurgitation.  7. Normal estimated PASP and CVP. QUANTITATIVE DATA SUMMARY: 2D MEASUREMENTS:                           Normal Ranges: IVSd:          0.97 cm    (0.6-1.1cm) LVPWd:         1.16 cm    (0.6-1.1cm) LVIDd:         5.27 cm    (3.9-5.9cm) LVIDs:         4.48 cm LV Mass Index: 122.0 g/m2 LV % FS        15.0 % LA VOLUME:                               Normal Ranges: LA Vol A4C:        46.9 ml    (22+/-6mL/m2) LA Vol A2C:        46.7 ml LA Vol BP:         46.9 ml LA Vol Index A4C:  26.5ml/m2 LA Vol Index A2C:  26.3 ml/m2 LA Vol Index BP:   26.4 ml/m2 LA Area A4C:       17.6 cm2 LA Area A2C:       17.5 cm2 LA Major Axis A4C: 5.6 cm LA Major Axis A2C: 5.6 cm LA Volume Index:   24.7 ml/m2 LA Vol A4C:        42.8 ml LA Vol A2C:        43.8 ml RA VOLUME BY A/L METHOD:                       Normal Ranges: RA Area A4C: 23.0 cm2 AORTA MEASUREMENTS:                    Normal  Ranges: Asc Ao, d: 3.70 cm (2.1-3.4cm) LV SYSTOLIC FUNCTION BY 2D PLANIMETRY (MOD):                      Normal Ranges: EF-A4C View:    43 % (>=55%) EF-A2C View:    42 % EF-Biplane:     44 % EF-Visual:      48 % LV EF Reported: 48 % LV DIASTOLIC FUNCTION:                     Normal Ranges: MV Peak E: 1.46 m/s (0.7-1.2 m/s) MITRAL VALVE:                      Normal Ranges: MV Vmax:    1.31 m/s (<=1.3m/s) MV peak P.9 mmHg (<5mmHg) MV mean PG: 3.0 mmHg (<2mmHg) MV DT:      197 msec (150-240msec) MITRAL INSUFFICIENCY:                           Normal Ranges: PISA Radius:  0.3 cm MR VTI:       178.00 cm MR Vmax:      496.00 cm/s MR Alias Ham: 35.1 cm/s MR Volume:    7.12 ml MR Flow Rt:   19.85 ml/s MR EROA:      0.04 cm2 AORTIC VALVE:                                    Normal Ranges: AoV Vmax:                1.91 m/s  (<=1.7m/s) AoV Peak P.6 mmHg (<20mmHg) AoV Mean P.0 mmHg  (1.7-11.5mmHg) LVOT Max Ham:            1.33 m/s  (<=1.1m/s) AoV VTI:                 37.70 cm  (18-25cm) LVOT VTI:                26.90 cm LVOT Diameter:           2.00 cm   (1.8-2.4cm) AoV Area, VTI:           2.24 cm2  (2.5-5.5cm2) AoV Area,Vmax:           2.19 cm2  (2.5-4.5cm2) AoV Dimensionless Index: 0.71 AORTIC INSUFFICIENCY: AI Vmax:       4.01 m/s AI Half-time:  441 msec AI Decel Rate: 267.00 cm/s2  RIGHT VENTRICLE: RV Basal 3.67 cm RV Mid   2.60 cm RV Major 8.8 cm TAPSE:   28.0 mm RV s'    0.12 m/s TRICUSPID VALVE/RVSP:                             Normal Ranges: Peak TR Velocity: 2.43 m/s RV Syst Pressure: 26.6 mmHg (< 30mmHg) IVC Diam:         1.58 cm PULMONIC VALVE:                      Normal Ranges: PV Max Ham: 1.5 m/s  (0.6-0.9m/s) PV Max P.6 mmHg  [id number] Neo Gutierrez MD Electronically signed on 2024 at 6:23:17 PM  ** Final **     Transthoracic Echo (TTE) Limited    Result Date: 2024   [facility], [address] [place] [address]                Tel  [phone] and Fax [phone] TRANSTHORACIC ECHOCARDIOGRAM REPORT  Patient Name:      VERNON SALDANA VIA         Reading Physician:    [id number] Monica Chen MD Study Date:        7/16/2024            Ordering Provider:    [id number] KRISTAL FRIAS                                                               SNYDER MRN/PID:           [id number]             Fellow: Accession#:        [id number]         Nurse: Date of Birth/Age: 1950 / 73 years Sonographer:          Tobi Carlos RDCS Gender:            F                    Additional Staff: Height:            162.56 cm            Admit Date:           7/16/2024 Weight:            70.31 kg             Admission Status:     Inpatient -                                                               Routine BSA / BMI:         1.76 m2 / 26.61      Encounter#:           [id number]                    kg/m2 Blood Pressure:    /                    Department Location:  [place]                                                               Cath Lab Study Type:    TRANSTHORACIC ECHO (TTE) LIMITED Diagnosis/ICD: Unspecified atrial fibrillation-I48.91 Indication:    Atrial fibrillation; Preop cardiovascular exam CPT Code:      Echo Limited-91914 Patient History: Pertinent History: A-fib; CAD; BARBARA; HTN; HLD; CKD; PPM. Study Detail: The following Echo studies were performed: 2D and M-Mode.               Technically challenging study due to body habitus and patient               lying in supine position.  PHYSICIAN INTERPRETATION: Left Ventricle: Left ventricular ejection fraction is low normal, by visual estimate at 50-55%. The patient is in atrial fibrillation which may influence the estimate of left ventricular function and transvalvular flows. Wall motion is abnormal. The left ventricular cavity size was not assessed. Left ventricular diastolic filling was  not assessed. Possible inferior wall motion hypokinesis. Left Atrium: The left atrium was not assessed. Right Ventricle: The right ventricle was not well visualized. There is normal right ventricular global systolic function. A device is visualized in the right ventricle. Right Atrium: The right atrium was not assessed. There is a device visualized in the right atrium. Aortic Valve: The aortic valve is trileaflet. There is mild aortic valve cusp calcification. Aortic valve regurgitation was not assessed. Mitral Valve: The mitral valve is mildly thickened. There is moderate mitral annular calcification. Mitral valve regurgitation was not assessed. Tricuspid Valve: The tricuspid valve was not well visualized. Tricuspid regurgitation was not assessed. Pulmonic Valve: The pulmonic valve is not well visualized. Pulmonic valve regurgitation was not assessed. Pericardium: There is a trivial pericardial effusion. Aorta: The aortic root is normal. Systemic Veins: The inferior vena cava appears to be of normal size. There is IVC inspiratory collapse greater than 50%. In comparison to the previous echocardiogram(s): Compared with the prior exam from 2/27/2024 there are no significant changes though today's exam is only a limited study withno assessment of valvular function.  CONCLUSIONS:  1. Left ventricular ejection fraction is low normal, by visual estimate at 50-55%.  2. Possible inferior wall motion hypokinesis.  3. There is normal right ventricular global systolic function.  4. There is moderate mitral annular calcification.  5. Compared with the prior exam from 2/27/2024 there are no significant changes though today's exam is only a limited study withno assessment of valvular function.  6. The patient is in atrial fibrillation which may influence the estimate of left ventricular function and transvalvular flows. QUANTITATIVE DATA SUMMARY: AORTA MEASUREMENTS:                      Normal Ranges: Ao Sinus, d: 3.45 cm  (2.1-3.5cm) LV SYSTOLIC FUNCTION BY 2D PLANIMETRY (MOD):                      Normal Ranges: EF-Visual:      53 % LV EF Reported: 53 % TRICUSPID VALVE/RVSP:                   Normal Ranges: IVC Diam: 2.03 cm  [id number] Monica Chen MD Electronically signed on 7/16/2024 at 3:11:21 PM  ** Final **     Transthoracic Echo (TTE) Limited    Result Date: 7/16/2024   [facility], [place], [address] [place] [address]                Tel [phone] and Fax [phone] TRANSTHORACIC ECHOCARDIOGRAM REPORT  Patient Name:      VERNON SALDANA VIA         Reading Physician:    [id number] Neftali Calvillo MD Study Date:        7/16/2024            Ordering Provider:    [id number] SNYDER MRN/PID:           [id number]             Fellow:               [id number] Lucina Gill MD Accession#:        [id number]         Nurse: Date of Birth/Age: 1950 / 73 years Sonographer:          Tobi Carlos RDCS Gender:            F                    Additional Staff: Height:            162.56 cm            Admit Date:           7/16/2024 Weight:            71.22 kg             Admission Status:     Inpatient -                                                               Routine BSA / BMI:         1.76 m2 / 26.95      Encounter#:           [id number]                    kg/m2 Blood Pressure:    117/55 mmHg          Department Location:  [facility]                                                               Cath Lab Study Type:    TRANSTHORACIC ECHO (TTE) LIMITED Diagnosis/ICD: Other specified postprocedural states-Z98.890 Indication:    Post LAAO CPT Code:      Echo Limited-79897 Patient History: Pertinent History: CAD; A-fib; BARBARA: HTN; HLD; CKD; PPM. Study Detail: The following Echo  studies were performed: 2D and M-Mode.               Technically challenging study due to body habitus.  PHYSICIAN INTERPRETATION: Left Ventricle: Left ventricular ejection fraction is low normal, by visual estimate at 50-55%. There are no regional wall motion abnormalities. The left ventricular cavity size is normal. Left ventricular diastolic filling was not assessed. Left Atrium: The left atrium was not assessed. Right Ventricle: The right ventricle was not assessed. Right ventricular systolic function not assessed. A device is visualized in the right ventricle. Right Atrium: The right atrium was not assessed. There is a device visualized in the right atrium. Aortic Valve: The aortic valve is trileaflet. There is mild aortic valve cusp calcification. Aortic valve regurgitation was not assessed. Mitral Valve: The mitral valve is mildly thickened. There is moderate mitral annular calcification. Mitral valve regurgitation was not assessed. Tricuspid Valve: The tricuspid valve was not well visualized. Tricuspid regurgitation was not assessed. Right ventricular systolic pressure could not be accurately assessed in this patient. Pulmonic Valve: The pulmonic valve was not assessed. Pulmonic valve regurgitation was not assessed. Pericardium: There is a trivial pericardial effusion. Aorta: The aortic root is normal. Systemic Veins: The inferior vena cava was not well visualized. In comparison to the previous echocardiogram(s): Compared with study dated 2/27/2024, no significant change.  CONCLUSIONS:  1. Left ventricular ejection fraction is low normal, by visual estimate at 50-55%.  2. There is moderate mitral annular calcification.  3. There is a trivial pericardial effusion.  4. Compared with study dated 2/27/2024, no significant change. QUANTITATIVE DATA SUMMARY: LV SYSTOLIC FUNCTION BY 2D PLANIMETRY (MOD):                      Normal Ranges: EF-Visual:      53 % LV EF Reported: 53 %  [id number] Neftali Calvillo MD  Electronically signed on 7/16/2024 at 3:02:13 PM  ** Final **     Transthoracic Echo (TTE) Complete    Result Date: 2/27/2024   [facility], [address] [place] [address]               Tel [phone] and Fax [phone] TRANSTHORACIC ECHOCARDIOGRAM REPORT  Patient Name:      VERNON SALDANA VIA         Reading Physician:    [id number] Nohelia Soto MD Study Date:        2/27/2024            Ordering Provider:    [id number] NIKOLE LOVE MRN/PID:           [id number]             Fellow: Accession#:        [id number]         Nurse: Date of Birth/Age: 1950 / 73 years Sonographer:          Dai Diaz                                                               RDCALLI Gender:            F                    Additional Staff: Height:            162.56 cm            Admit Date:           2/24/2024 Weight:            72.57 kg             Admission Status:     Inpatient -                                                               Routine BSA / BMI:         1.78 m2 / 27.46      Encounter#:           [id number]                    kg/m2                                         Department Location:  [facility] Non                                                               Invasive Blood Pressure: 131 /59 mmHg Study Type:    TRANSTHORACIC ECHO (TTE) COMPLETE Diagnosis/ICD: Shortness of breath-R06.02 Indication:    Dyspnea CPT Code:      Echo Complete w Full Doppler-34165 Patient History: Pertinent History: A-Fib and Chest Pain. elevated troponin, elevated BNP, mod                    AS. Study Detail: The following Echo studies were performed: 2D, M-Mode, Doppler and               color flow.  PHYSICIAN INTERPRETATION: Left Ventricle: The left ventricular systolic function is normal, with an estimated ejection fraction of 55-60%. There are no regional wall motion  abnormalities. The left ventricular cavity size is normal. Spectral Doppler shows an impaired relaxation pattern of left ventricular diastolic filling. Left Atrium: The left atrium is mildly dilated. Right Ventricle: The right ventricle is normal in size. There is normal right ventricular global systolic function. Right Atrium: The right atrium is normal in size. Aortic Valve: The aortic valve is trileaflet. There is mild to moderate aortic valve cusp calcification. There is evidence of moderate aortic valve stenosis. There is trivial aortic valve regurgitation. The peak instantaneous gradient of the aortic valve is 27.5 mmHg. The mean gradient of the aortic valve is 16.0 mmHg. Mitral Valve: The mitral valve is mildly thickened. There is evidence of mild mitral valve stenosis. The doppler estimated mean and peak diastolic pressure gradients are 7.2 mmHg and 16.3 mmHg respectively. There is mild mitral annular calcification. There is mild to moderate mitral valve regurgitation. Tricuspid Valve: The tricuspid valve is structurally normal. There is mild to moderate tricuspid regurgitation. Pulmonic Valve: The pulmonic valve is not well visualized. There is trace to mild pulmonic valve regurgitation. Pericardium: There is no pericardial effusion noted. Aorta: The aortic root is normal. Pulmonary Artery: The tricuspid regurgitant velocity is 3.46 m/s, and with an estimated right atrial pressure of 3 mmHg, the estimated pulmonary artery pressure is mild to moderately elevated with the RVSP at 50.9 mmHg.  CONCLUSIONS:  1. Left ventricular systolic function is normal with a 55-60% estimated ejection fraction.  2. Spectral Doppler shows an impaired relaxation pattern of left ventricular diastolic filling.  3. Mild to moderate mitral valve regurgitation.  4. Mild to moderate tricuspid regurgitation visualized.  5. Moderate aortic valve stenosis.  6. Mild to moderately elevated pulmonary artery pressure. QUANTITATIVE DATA  SUMMARY: 2D MEASUREMENTS:                           Normal Ranges: IVSd:          0.97 cm    (0.6-1.1cm) LVPWd:         0.98 cm    (0.6-1.1cm) LVIDd:         5.70 cm    (3.9-5.9cm) LVIDs:         4.26 cm LV Mass Index: 123.2 g/m2 LV % FS        25.2 % LA VOLUME:                              Normal Ranges: LA Vol A4C:       77.4 ml    (22+/-6mL/m2) LA Vol A2C:       83.5 ml LA Vol BP:        82.1 ml LA Vol Index A4C: 43.5 ml/m2 LA Vol Index A2C: 46.9 ml/m2 LA Vol Index BP:  46.1 ml/m2 LA Volume Index:  46.1 ml/m2 LA Vol A4C:       72.2 ml LA Vol A2C:       77.0 ml RA VOLUME BY A/L METHOD:                       Normal Ranges: RA Area A4C: 21.8 cm2 M-MODE MEASUREMENTS:                  Normal Ranges: Ao Root: 3.30 cm (2.0-3.7cm) LAs:     4.72 cm (2.7-4.0cm) LV SYSTOLIC FUNCTION BY 2D PLANIMETRY (MOD):                     Normal Ranges: EF-A4C View: 55.9 % (>=55%) EF-A2C View: 56.5 % EF-Biplane:  55.9 % LV DIASTOLIC FUNCTION:                             Normal Ranges: MV Peak E:      1.91 m/s    (0.7-1.2 m/s) MV Peak A:      0.93 m/s    (0.42-0.7 m/s) E/A Ratio:      2.06        (1.0-2.2) MV e'           0.09 m/s    (>8.0) MV lateral e'   0.09 m/s MV medial e'    0.08 m/s E/e' Ratio:     21.21       (<8.0) PulmV Sys Ham:  62.35 cm/s PulmV Alvarez Ham: 121.32 cm/s PulmV S/D Ham:  0.51 MITRAL VALVE:                       Normal Ranges: MV Vmax:    2.02 m/s  (<=1.3m/s) MV peak P.3 mmHg (<5mmHg) MV mean P.2 mmHg  (<2mmHg) MV VTI:     48.44 cm  (10-13cm) MV DT:      167 msec  (150-240msec) MITRAL INSUFFICIENCY:                         Normal Ranges: MR VTI:     192.66 cm MR Vmax:    568.98 cm/s MR Volume:  33.56 ml MR Flow Rt: 99.12 ml/s MR EROA:    0.17 cm2 AORTIC VALVE:                                    Normal Ranges: AoV Vmax:                2.62 m/s  (<=1.7m/s) AoV Peak P.5 mmHg (<20mmHg) AoV Mean P.0 mmHg (1.7-11.5mmHg) LVOT Max Ham:            0.99 m/s  (<=1.1m/s) AoV VTI:                  58.21 cm  (18-25cm) LVOT VTI:                21.96 cm LVOT Diameter:           1.95 cm   (1.8-2.4cm) AoV Area, VTI:           1.13 cm2  (2.5-5.5cm2) AoV Area,Vmax:           1.13 cm2  (2.5-4.5cm2) AoV Dimensionless Index: 0.38 AORTIC INSUFFICIENCY: AI Vmax:       3.91 m/s AI Half-time:  520 msec AI Decel Time: 1794 msec AI Decel Rate: 218.17 cm/s2  RIGHT VENTRICLE: RV Basal 4.30 cm RV Mid   2.60 cm RV Major 7.7 cm TAPSE:   27.0 mm RV s'    0.18 m/s TRICUSPID VALVE/RVSP:                             Normal Ranges: Peak TR Velocity: 3.46 m/s RV Syst Pressure: 50.9 mmHg (< 30mmHg) PULMONIC VALVE:                      Normal Ranges: PV Max Ham: 1.5 m/s  (0.6-0.9m/s) PV Max P.8 mmHg Pulmonary Veins: PulmV Alvarez Ham: 121.32 cm/s PulmV S/D Ham:  0.51 PulmV Sys Ham:  62.35 cm/s  [id number] Nohelia Soto MD Electronically signed on 2024 at 11:22:42 AM  ** Final **           Assessment/Plan   Principal Problem:    Acute hypoxic on chronic hypercapnic respiratory failure (Multi)  Active Problems:    Chest pain, unspecified type    LHC in  showed normal coronary arteries        Acute on chronic diastolic CHF with mildly reduced EF 45-50%  -Hx of tachycardia induced CMO and hx of VT  -->highest it has been  -CXR showed vascular congestion  -CTA showed moderate right pleural effusion  -I reviewed the Echocardiograms as above  -Strict I & Os  -Daily weights->reports an 8lb weight increase in 2 days  -2gm na diet  -1500mL fluid restriction  -Bumex previously decreased from 2mg BID to 1mg BID due to hypotension->pt then presented with CHF and dose changed to 2mg in the am and 1mg in pm  -Cont Bumex PO  -Pulmonary consulted for possible thoracentesis->does not need at this time  -Given additional Bumex 1mg IV x1 ()->repeat today  -Can do additional Bumex 1mg in afternoon at home for weight gain or edema  -May benefit from Cardiomems implant as outpt     2. Elevated troponins-Non MI elevation  -[facility] as  above  -CTA showed pleural effusions  -Troponin 35, 34  -EKG atrial paced  -Cont plavix, statin  -Allergy to BB     3. Asthma/COPD exac  -steroids  -bronchodilators  -Oxygen support  -CTA showed CHF and pleural effusion     4. Paroxysmal atrial fibrillation  -Currently atrial paced  -[facility] PCM/ICD  -Not on AC, has watchman  -Cont plavix  -Cont digoxin and diltiazem  -Digoxin level 0.9  -Monitor on tele     5. Chronic anemia  -Hgb 8.5->8.6->8.1  -Prior iron studies reviewed  -pt reports severe constipation with PO iron  -Stop aspirin  -Cont plavix only  -Monitor     6. Hyperlipidemia  -Cont statin        Amelia Kiser, NAILA-CNP

## 2024-09-19 NOTE — CARE PLAN
The patient's goals for the shift include      The clinical goals for the shift include Decreased work of breathing    Problem: HP General Problem  Goal: HP General Goal  Outcome: Progressing     Problem: Pain - Adult  Goal: Verbalizes/displays adequate comfort level or baseline comfort level  Outcome: Progressing     Problem: Safety - Adult  Goal: Free from fall injury  Outcome: Progressing     Problem: Discharge Planning  Goal: Discharge to home or other facility with appropriate resources  Outcome: Progressing     Problem: Chronic Conditions and Co-morbidities  Goal: Patient's chronic conditions and co-morbidity symptoms are monitored and maintained or improved  Outcome: Progressing     Problem: Pain  Goal: Takes deep breaths with improved pain control throughout the shift  Outcome: Progressing  Goal: Turns in bed with improved pain control throughout the shift  Outcome: Progressing  Goal: Walks with improved pain control throughout the shift  Outcome: Progressing  Goal: Performs ADL's with improved pain control throughout shift  Outcome: Progressing  Goal: Participates in PT with improved pain control throughout the shift  Outcome: Progressing  Goal: Free from opioid side effects throughout the shift  Outcome: Progressing  Goal: Free from acute confusion related to pain meds throughout the shift  Outcome: Progressing

## 2024-09-20 ENCOUNTER — APPOINTMENT (OUTPATIENT)
Dept: HEMATOLOGY/ONCOLOGY | Facility: CLINIC | Age: 74
End: 2024-09-20
Payer: MEDICARE

## 2024-09-20 DIAGNOSIS — D50.8 IRON DEFICIENCY ANEMIA SECONDARY TO INADEQUATE DIETARY IRON INTAKE: Primary | ICD-10-CM

## 2024-09-20 LAB
ALBUMIN SERPL BCP-MCNC: 3.8 G/DL (ref 3.4–5)
ANION GAP SERPL CALC-SCNC: 12 MMOL/L (ref 10–20)
BUN SERPL-MCNC: 29 MG/DL (ref 6–23)
CALCIUM SERPL-MCNC: 8.7 MG/DL (ref 8.6–10.3)
CHLORIDE SERPL-SCNC: 104 MMOL/L (ref 98–107)
CO2 SERPL-SCNC: 29 MMOL/L (ref 21–32)
CREAT SERPL-MCNC: 1.04 MG/DL (ref 0.5–1.05)
EGFRCR SERPLBLD CKD-EPI 2021: 57 ML/MIN/1.73M*2
ERYTHROCYTE [DISTWIDTH] IN BLOOD BY AUTOMATED COUNT: 17.1 % (ref 11.5–14.5)
FERRITIN SERPL-MCNC: 15 NG/ML (ref 8–150)
GLUCOSE SERPL-MCNC: 137 MG/DL (ref 74–99)
HCT VFR BLD AUTO: 27.3 % (ref 36–46)
HGB BLD-MCNC: 8.2 G/DL (ref 12–16)
IRON SATN MFR SERPL: 4 % (ref 25–45)
IRON SERPL-MCNC: 17 UG/DL (ref 35–150)
MAGNESIUM SERPL-MCNC: 2.56 MG/DL (ref 1.6–2.4)
MCH RBC QN AUTO: 23.2 PG (ref 26–34)
MCHC RBC AUTO-ENTMCNC: 30 G/DL (ref 32–36)
MCV RBC AUTO: 77 FL (ref 80–100)
NRBC BLD-RTO: 0 /100 WBCS (ref 0–0)
PHOSPHATE SERPL-MCNC: 4.7 MG/DL (ref 2.5–4.9)
PLATELET # BLD AUTO: 239 X10*3/UL (ref 150–450)
POTASSIUM SERPL-SCNC: 4 MMOL/L (ref 3.5–5.3)
RBC # BLD AUTO: 3.54 X10*6/UL (ref 4–5.2)
SODIUM SERPL-SCNC: 141 MMOL/L (ref 136–145)
TIBC SERPL-MCNC: 406 UG/DL (ref 240–445)
UIBC SERPL-MCNC: 389 UG/DL (ref 110–370)
WBC # BLD AUTO: 10.4 X10*3/UL (ref 4.4–11.3)

## 2024-09-20 PROCEDURE — 94640 AIRWAY INHALATION TREATMENT: CPT

## 2024-09-20 PROCEDURE — 80069 RENAL FUNCTION PANEL: CPT

## 2024-09-20 PROCEDURE — 2500000005 HC RX 250 GENERAL PHARMACY W/O HCPCS: Performed by: INTERNAL MEDICINE

## 2024-09-20 PROCEDURE — 2500000002 HC RX 250 W HCPCS SELF ADMINISTERED DRUGS (ALT 637 FOR MEDICARE OP, ALT 636 FOR OP/ED)

## 2024-09-20 PROCEDURE — 94664 DEMO&/EVAL PT USE INHALER: CPT

## 2024-09-20 PROCEDURE — 2500000001 HC RX 250 WO HCPCS SELF ADMINISTERED DRUGS (ALT 637 FOR MEDICARE OP)

## 2024-09-20 PROCEDURE — 94669 MECHANICAL CHEST WALL OSCILL: CPT

## 2024-09-20 PROCEDURE — 99233 SBSQ HOSP IP/OBS HIGH 50: CPT | Performed by: INTERNAL MEDICINE

## 2024-09-20 PROCEDURE — 36415 COLL VENOUS BLD VENIPUNCTURE: CPT

## 2024-09-20 PROCEDURE — 99233 SBSQ HOSP IP/OBS HIGH 50: CPT

## 2024-09-20 PROCEDURE — 83540 ASSAY OF IRON: CPT

## 2024-09-20 PROCEDURE — 2500000004 HC RX 250 GENERAL PHARMACY W/ HCPCS (ALT 636 FOR OP/ED)

## 2024-09-20 PROCEDURE — 94660 CPAP INITIATION&MGMT: CPT

## 2024-09-20 PROCEDURE — 1200000002 HC GENERAL ROOM WITH TELEMETRY DAILY

## 2024-09-20 PROCEDURE — 85027 COMPLETE CBC AUTOMATED: CPT

## 2024-09-20 PROCEDURE — 94668 MNPJ CHEST WALL SBSQ: CPT

## 2024-09-20 PROCEDURE — 9420000001 HC RT PATIENT EDUCATION 5 MIN

## 2024-09-20 PROCEDURE — 2500000005 HC RX 250 GENERAL PHARMACY W/O HCPCS

## 2024-09-20 PROCEDURE — 94667 MNPJ CHEST WALL 1ST: CPT

## 2024-09-20 PROCEDURE — 83735 ASSAY OF MAGNESIUM: CPT

## 2024-09-20 PROCEDURE — 2500000002 HC RX 250 W HCPCS SELF ADMINISTERED DRUGS (ALT 637 FOR MEDICARE OP, ALT 636 FOR OP/ED): Performed by: INTERNAL MEDICINE

## 2024-09-20 PROCEDURE — 82728 ASSAY OF FERRITIN: CPT

## 2024-09-20 PROCEDURE — 94760 N-INVAS EAR/PLS OXIMETRY 1: CPT

## 2024-09-20 RX ORDER — ONDANSETRON 4 MG/1
8 TABLET, FILM COATED ORAL EVERY 8 HOURS PRN
Status: DISCONTINUED | OUTPATIENT
Start: 2024-09-20 | End: 2024-09-21 | Stop reason: HOSPADM

## 2024-09-20 RX ORDER — SUCRALFATE 1 G/10ML
1 SUSPENSION ORAL EVERY 6 HOURS SCHEDULED
Status: DISCONTINUED | OUTPATIENT
Start: 2024-09-20 | End: 2024-09-21 | Stop reason: HOSPADM

## 2024-09-20 ASSESSMENT — PAIN SCALES - GENERAL
PAINLEVEL_OUTOF10: 0 - NO PAIN
PAINLEVEL_OUTOF10: 0 - NO PAIN
PAINLEVEL_OUTOF10: 10 - WORST POSSIBLE PAIN
PAINLEVEL_OUTOF10: 7

## 2024-09-20 ASSESSMENT — PAIN DESCRIPTION - DESCRIPTORS
DESCRIPTORS: ACHING;DISCOMFORT
DESCRIPTORS: ACHING;DISCOMFORT

## 2024-09-20 ASSESSMENT — COGNITIVE AND FUNCTIONAL STATUS - GENERAL
DRESSING REGULAR UPPER BODY CLOTHING: A LITTLE
WALKING IN HOSPITAL ROOM: A LITTLE
DAILY ACTIVITIY SCORE: 19
MOBILITY SCORE: 20
HELP NEEDED FOR BATHING: A LITTLE
CLIMB 3 TO 5 STEPS WITH RAILING: A LITTLE
MOBILITY SCORE: 20
DRESSING REGULAR LOWER BODY CLOTHING: A LITTLE
DRESSING REGULAR UPPER BODY CLOTHING: A LITTLE
WALKING IN HOSPITAL ROOM: A LITTLE
STANDING UP FROM CHAIR USING ARMS: A LITTLE
TOILETING: A LITTLE
PERSONAL GROOMING: A LITTLE
MOVING TO AND FROM BED TO CHAIR: A LITTLE
DAILY ACTIVITIY SCORE: 19
PERSONAL GROOMING: A LITTLE
STANDING UP FROM CHAIR USING ARMS: A LITTLE
CLIMB 3 TO 5 STEPS WITH RAILING: A LITTLE
MOVING TO AND FROM BED TO CHAIR: A LITTLE
TOILETING: A LITTLE
HELP NEEDED FOR BATHING: A LITTLE
DRESSING REGULAR LOWER BODY CLOTHING: A LITTLE

## 2024-09-20 ASSESSMENT — PAIN - FUNCTIONAL ASSESSMENT
PAIN_FUNCTIONAL_ASSESSMENT: 0-10

## 2024-09-20 ASSESSMENT — PAIN DESCRIPTION - LOCATION: LOCATION: BACK

## 2024-09-20 NOTE — CARE PLAN
Problem: HP General Problem  Goal: HP General Goal  Outcome: Progressing     Problem: Pain - Adult  Goal: Verbalizes/displays adequate comfort level or baseline comfort level  Outcome: Progressing     Problem: Safety - Adult  Goal: Free from fall injury  Outcome: Progressing   The patient's goals for the shift include

## 2024-09-20 NOTE — PROGRESS NOTES
Nutrition Progress Note  Nutrition Follow Up Note  Patient has Malnutrition Diagnosis: Yes  Diagnosis Status: New  Malnutrition Diagnosis: Moderate malnutrition related to acute disease or injury  As Evidenced by: consuming less than 75% of estimated energy requirements >/= 7 days, loss of muscle and adipose stores  Additional Assessment Information: Pt has been able to tolerate some of the magic cup ONS  Nutrition Assessment     Pt adm with acute hypoxic respiratory failure 2/2 CHF and asthma exac; has CHF with R PE, chronic anemia. Plan for steroids, diuresis. Remains on 2 gram Na diet with 1500 ml Fluid restriction.     Nutrition History:  Food and Nutrient History:     (9/20/24) Pt seen for follow up visit; oral intakes poor. Pt reports nausea, now has zofran ordered and plans to take 30 min prior to meals. Per nursing documenation: 9/20/24- 0 of breakfast today;  no meals documented 9/19/24. 9/18/24: 0/25, only 2 meals documented.     (9/18/24) Pt reports poor appetite for at least 1 week, feels foods such as meat/bread get stuck- reports history of Nissen 30  years ago, unable to vomit  because of this. Pt only drank coffee this AM for breakfast.  Pt with nausea, takes miralax daily for constipation; last BM yesterday.    Energy Intake: Poor < 50 %      Allergies   Allergen Reactions    Abatacept Shortness of breath     pulmonary edema, diarrhea, nausea    Hydrocodone-Acetaminophen Anaphylaxis    Hydromorphone Anaphylaxis    Metoprolol Anaphylaxis    Penicillins Hives    Pregabalin Shortness of breath     caused pulmonary edema    Prochlorperazine Itching     paralysis of the mouth with drooping    Methotrexate Hives and Itching     chest pain    Aripiprazole Rash    Beta-Blockers (Beta-Adrenergic Blocking Agts) Other, Palpitations and Wheezing    Bupropion Nausea/vomiting    Cefepime Itching    Ciprofloxacin Hives    Furosemide Itching    Levofloxacin Itching    Pineapple Rash      GI Symptoms: Nausea    "  Oral Problems:  at times, feels food gets stuck          Anthropometrics:  Height: 162.6 cm (5' 4\")   Weight: 70 kg (154 lb 5.2 oz)   BMI (Calculated): 26.48  IBW/kg (Dietitian Calculated): 54.5 kg  Percent of IBW: 140 %       Weight History:     Weight         9/17/2024  2314 9/18/2024  0546 9/19/2024  0414 9/20/2024  0840    Weight: 72.6 kg (160 lb) 76.2 kg (168 lb) 75 kg (165 lb 6.4 oz) 70 kg (154 lb 5.2 oz)            Nutrition Focused Physical Exam Findings:     Edema  Edema: none  Physical Findings (Nutrition Deficiency/Toxicity)  Skin: Positive (R forearm burn)    Nutrition Significant Labs:    Results from last 7 days   Lab Units 09/20/24  0608 09/19/24  0646 09/18/24  0635   GLUCOSE mg/dL 137* 118* 144*   SODIUM mmol/L 141 140 140   POTASSIUM mmol/L 4.0 3.8 3.8   CHLORIDE mmol/L 104 104 105   CO2 mmol/L 29 25 27   BUN mg/dL 29* 24* 20   CREATININE mg/dL 1.04 1.04 0.96   EGFR mL/min/1.73m*2 57* 57* 63   CALCIUM mg/dL 8.7 8.5* 8.6   PHOSPHORUS mg/dL 4.7 6.0* 3.6   MAGNESIUM mg/dL 2.56* 2.52* 2.36     No results found for: \"HGBA1C\"      Lab Results   Component Value Date    ALBUMIN 3.8 09/20/2024      Lab Results   Component Value Date    CRP 0.86 07/24/2024           Nutrition Specific Medications:   Scheduled medications:  baclofen, 10 mg, oral, q AM  baclofen, 20 mg, oral, Nightly  bumetanide, 1 mg, oral, Daily with evening meal  bumetanide, 2 mg, oral, Daily  clopidogrel, 75 mg, oral, Daily  digoxin, 125 mcg, oral, Daily  dilTIAZem CD, 120 mg, oral, Nightly  enoxaparin, 40 mg, subcutaneous, q24h  fluticasone furoate-vilanteroL, 1 puff, inhalation, Daily  hydroxychloroquine, 300 mg, oral, Daily  ipratropium-albuteroL, 3 mL, nebulization, TID  methylPREDNISolone sodium succinate (PF), 60 mg, intravenous, BID  montelukast, 10 mg, oral, Nightly  pantoprazole, 40 mg, oral, BID  polyethylene glycol, 17 g, oral, Daily  rosuvastatin, 20 mg, oral, Nightly  sucralfate, 1 g, oral, q6h DWIGHT  sulfaSALAzine, 500 mg, " oral, BID      Continuous medications:     PRN medications:  PRN medications: acetaminophen, ipratropium-albuteroL, ondansetron, oxyCODONE, [Held by provider] oxyCODONE-acetaminophen, oxygen, oxygen     Nursing Data Per flowsheet:      Gastrointestinal  Gastrointestinal (WDL): Exceptions to WDL  Abdomen Inspection: Soft  Abdominal Tenderness: Soft  Bowel Sounds: All quadrants  Bowel Sounds (All Quadrants): Active  Passing Flatus: Yes  Last BM Date: 09/17/24  Bowel Incontinence: No  Gastrointestinal Symptoms: Nausea (With food)  Nausea Precipitating Factors: Other (Comment) (food)  Feeding assistance level:      Intake/Output Summary (Last 24 hours) at 9/20/2024 1239  Last data filed at 9/20/2024 1002  Gross per 24 hour   Intake 700 ml   Output 1500 ml   Net -800 ml      0-10 (Numeric) Pain Score: 0 - No pain   Dietary Orders (From admission, onward)       Start     Ordered    09/18/24 1204  Oral nutritional supplements  Until discontinued        Question Answer Comment   Deliver with Lunch vanilla   Deliver with Dinner    Select supplement: Magic Cup        09/18/24 1204    09/18/24 0244  Adult diet Cardiac; 70 gm fat; 2 - 3 grams Sodium; 1500 mL fluid  Diet effective now        Question Answer Comment   Diet type Cardiac    Fat restriction: 70 gm fat    Sodium restriction: 2 - 3 grams Sodium    Dietary fluid restriction / 24h: 1500 mL fluid        09/18/24 0248                     Estimated Needs:   Total Energy Estimated Needs (kCal):  (1900 kcal (25 kcal/kg))     Total Protein Estimated Needs (g):  ( g protein (1.2-1.5 g/kg))     Total Fluid Estimated Needs (mL):  (1 ml/kcal)          Nutrition Diagnosis   Malnutrition Diagnosis  Patient has Malnutrition Diagnosis: Yes  Diagnosis Status: New  Malnutrition Diagnosis: Moderate malnutrition related to acute disease or injury  As Evidenced by: consuming less than 75% of estimated energy requirements >/= 7 days, loss of muscle and adipose stores  Additional  Assessment Information: Pt has been able to tolerate some of the magic cup ONS    Nutrition Diagnosis  Patient has Nutrition Diagnosis: Yes  Diagnosis Status (1): Ongoing  Nutrition Diagnosis 1: Inadequate protein energy intake  Related to (1): nausea, feeling food getting stuck at times  As Evidenced by (1): reports decreased appetite and intakes for about 1 week  Additional Assessment Information (1): ONS added for additional kcal and protein       Nutrition Interventions/Recommendations   Nutrition Prescription:  diet, fluids    Nutrition Interventions:   Interventions: Medical food supplement  Goal: Magic cup daily= 290 kcal, 9 g protein (each)    Coordination of Care: n/a  Nutrition Education:   N/A    Recommendations:  Consider additional stool softeners/laxatives- no BM yet this admission- pt having nausea  Weight: daily  Maximize anti-emetics  RFP, Mg daily; replete lytes prn          Nutrition Monitoring and Evaluation   Food/Nutrient Related History Monitoring  Monitoring and Evaluation Plan: Amount of food  Criteria: Pt will consume 50% of meals and ONS provided    Body Composition/Growth/Weight History  Monitoring and Evaluation Plan: Weight  Weight: Measured weight  Criteria: Monitor    Biochemical Data, Medical Tests and Procedures  Monitoring and Evaluation Plan: Electrolyte/renal panel, Glucose/endocrine profile  Criteria: Monitor      Time Spent (min): 45 minutes

## 2024-09-20 NOTE — PROGRESS NOTES
"Department of Medicine  Division of Pulmonary, Critical Care, and Sleep Medicine    Trina Elias \"Marcela\" is a 74 y.o. female on day 1 of admission presenting with Acute hypoxic on chronic hypercapnic respiratory failure (Multi).    Subjective   Feels slightly better, no events over night.        Objective     Vital Signs      9/18/2024     8:21 PM 9/19/2024     4:14 AM 9/19/2024     7:00 PM 9/20/2024     5:02 AM 9/20/2024     8:40 AM 9/20/2024     9:54 AM 9/20/2024    10:02 AM   Vitals   Systolic 109 101 110 125  124    Diastolic 55 61 53 66  55    Heart Rate 63 61 67 65  66    Temp 36.8 °C (98.2 °F) 35.8 °C (96.4 °F) 36.7 °C (98.1 °F) 36.4 °C (97.5 °F)   35.8 °C (96.5 °F)   Resp 22 22 20 20      Weight (lb)  165.4   154.32     BMI  28.39 kg/m2   26.49 kg/m2     BSA (m2)  1.84 m2   1.78 m2          Physical exam  Constitutional: Normal appearance.  HEENT: Normocephalic and atraumatic.  Cardiovascular: Normal rate and regular rhythm.  Pulmonary: Loud expiratory wheeze, tachypneic, coarse breath sounds bilaterally..  Musculoskeletal: No edema, no cyanosis.  Neurological: Awake, alert and oriented x3.  Psychiatric: Normal behavior, mood and affect.    Labs:  Lab Results   Component Value Date    WBC 10.4 09/20/2024    HGB 8.2 (L) 09/20/2024    HCT 27.3 (L) 09/20/2024    MCV 77 (L) 09/20/2024     09/20/2024      Lab Results   Component Value Date    GLUCOSE 137 (H) 09/20/2024    CALCIUM 8.7 09/20/2024     09/20/2024    K 4.0 09/20/2024    CO2 29 09/20/2024     09/20/2024    BUN 29 (H) 09/20/2024    CREATININE 1.04 09/20/2024      Lab Results   Component Value Date    ALT 28 09/17/2024    AST 22 09/17/2024    ALKPHOS 72 09/17/2024    BILITOT 0.5 09/17/2024        Oxygen Therapy  SpO2: 100 %  Medical Gas Therapy: Supplemental oxygen  Medical Gas Delivery Method: Nasal cannula  FiO2 (%):  [28 %] 28 %    Intake/Output last 3 Shifts:  I/O last 3 completed shifts:  In: 220 (2.9 mL/kg) [P.O.:220]  Out: 1200 " (16 mL/kg) [Urine:1200 (0.4 mL/kg/hr)]  Weight: 75 kg       Medications   Scheduled medications  baclofen, 10 mg, oral, q AM  baclofen, 20 mg, oral, Nightly  bumetanide, 1 mg, oral, Daily with evening meal  bumetanide, 2 mg, oral, Daily  clopidogrel, 75 mg, oral, Daily  digoxin, 125 mcg, oral, Daily  dilTIAZem CD, 120 mg, oral, Nightly  enoxaparin, 40 mg, subcutaneous, q24h  fluticasone furoate-vilanteroL, 1 puff, inhalation, Daily  hydroxychloroquine, 300 mg, oral, Daily  ipratropium-albuteroL, 3 mL, nebulization, TID  iron sucrose, 200 mg, intravenous, Once  methylPREDNISolone sodium succinate (PF), 60 mg, intravenous, BID  montelukast, 10 mg, oral, Nightly  pantoprazole, 40 mg, oral, BID  polyethylene glycol, 17 g, oral, Daily  rosuvastatin, 20 mg, oral, Nightly  sucralfate, 1 g, oral, q6h DWIGHT  sulfaSALAzine, 500 mg, oral, BID      Continuous medications     PRN medications  PRN medications: acetaminophen, ipratropium-albuteroL, ondansetron, oxyCODONE, [Held by provider] oxyCODONE-acetaminophen, oxygen, oxygen     Allergies  Abatacept, Hydrocodone-acetaminophen, Hydromorphone, Metoprolol, Penicillins, Pregabalin, Prochlorperazine, Methotrexate, Aripiprazole, Beta-blockers (beta-adrenergic blocking agts), Bupropion, Cefepime, Ciprofloxacin, Furosemide, Levofloxacin, and Pineapple    Chest Radiograph   CT angio chest for pulmonary embolism 09/18/2024    Narrative  Interpreted By:  Campbell Pearson,  STUDY:  CT ANGIO CHEST FOR PULMONARY EMBOLISM;  9/18/2024 12:31 am    INDICATION:  Signs/Symptoms:SOB CHEST PAIN.    COMPARISON:  08/08/2024    ACCESSION NUMBER(S):  [id number]    ORDERING CLINICIAN:  JAMMIE SPENCE    TECHNIQUE:  Contiguous axial images of the chest were obtained after the  intravenous administration of iodinated contrast using angiographic  PE protocol. Coronal and sagittal reformatted images were  reconstructed from the axial data. MIP images were created on an  independent workstation and  reviewed.    FINDINGS:  There is streak artifact from the arms which were not raised.    No significant pulmonary artery embolism.    Moderate cardiomegaly is seen. Left-sided AICD present.    Ectasia ascending aorta measuring 4.2 cm in diameter.    Moderate right pleural effusion.    There is some areas of ground-glass opacity both lungs which may  reflect atelectasis/edema. Correlate with CHF.    Multilevel moderate spondylotic degeneration seen thoracic spine.    Impression  No significant pulmonary embolism.    Moderate cardiomegaly/suspected CHF with moderate right effusion.    MACRO:  None.    Signed by: Campbell Pearson 9/18/2024 12:58 AM  Dictation workstation:   [id number]       XR chest 1 view 09/17/2024    Narrative  Interpreted By:  Campbell Pearson,  STUDY:  XR CHEST 1 VIEW;  9/17/2024 11:37 pm    INDICATION:  Signs/Symptoms:fluid overload.    COMPARISON:  08/08/2024    ACCESSION NUMBER(S):  [id number]    ORDERING CLINICIAN:  JAMMIE SPENCE    FINDINGS:  Moderate cardiomegaly and left-sided pacemaker noted. Mild vascular  congestion. No consolidation or pneumothorax.    Impression  Mild vascular congestion.    MACRO:  None    Signed by: Campbell Pearson 9/18/2024 12:11 AM  Dictation workstation:   [id number]         Assessment and Plan / Recommendations   Assessment/Plan   74 y.o. female with history of hypertension, HFrEF, A-fib, CKD, RA, asthma admitted with acute hypoxic respiratory failure     1.  Acute hypoxic respiratory failure due to CHF and asthma exacerbation  2.  Asthma exacerbation  3.  Decompensated heart failure with right pleural effusion  4.  BARBARA on BiPAP     Slow improvement.   Pleural effusion is small on bedside ultrasound.    -Continue diuresis, no plan for thoracentesis at this time  -Continue Solu-Medrol 60 twice daily, plan to transition to p.o. prednisone tomorrow  -Continue Breo, continue DuoNebs every 4 hours  -BiPAP at night and as needed during the day    Amie Pimentel MD

## 2024-09-20 NOTE — PROGRESS NOTES
"Trina Elias \"Marcela\" is a 74 y.o. female on day 1 of admission presenting with Acute hypoxic on chronic hypercapnic respiratory failure (Multi).      Subjective   She is sitting up in the bed, states she had a rough night because of her shortness of breath.  Has a moist nonproductive cough.  States she feels very congested and cannot cough up the sputum.    Review of systems:  Constitutional: negative for fever, chills, or malaise  Neuro: negative for dizziness, headache, numbness, tingling  ENT: Negative for nasal congestion or sore throat  CV: negative for chest pain, palpitations  GI: negative for abd pain, nausea, vomiting or diarrhea  : negative for dysuria, frequency, or urgency  Skin: negative for lesions, wounds, or rash  Musculoskeletal: Negative for weakness, myalgia, or arthralgia  Endocrine: Negative for polyuria or polydipsia         Objective   Constitutional: Well developed, awake/alert/oriented x3, no distress, alert and cooperative  Eyes: PERRL, EOMI, clear sclera  ENMT: mucous membranes moist, no apparent injury, no lesions seen  Head/Neck: Neck supple, no apparent injury, thyroid without mass or tenderness, No JVD, trachea midline, no bruits  Respiratory/Thorax: Patent airways, diminished, rales and expiratory wheezes with good chest expansion, thorax symmetric  Cardiovascular: Regular, rate and rhythm, no murmurs, 2+ equal pulses of the extremities, normal S 1and S 2  Gastrointestinal: Nondistended, soft, non-tender, no rebound tenderness or guarding, no masses palpable, no organomegaly, +BS, no bruits  Musculoskeletal: ROM intact, no joint swelling, normal strength  Extremities: normal extremities, no cyanosis edema, contusions or wounds, no clubbing  Neurological: alert and oriented x3, intact senses, motor, response and reflexes, normal strength  Lymphatic: No significant lymphadenopathy  Psychological: Appropriate mood and behavior  Skin: Warm and dry, no lesions, no rashes      Last " "Recorded Vitals  /66 (BP Location: Right arm, Patient Position: Sitting)   Pulse 71   Temp 35.8 °C (96.5 °F) (Temporal)   Resp 20   Ht 1.626 m (5' 4\")   Wt 70 kg (154 lb 5.2 oz)   SpO2 94%   BMI 26.49 kg/m²     Intake/Output last 3 Shifts:  I/O last 3 completed shifts:  In: 220 (2.9 mL/kg) [P.O.:220]  Out: 1200 (16 mL/kg) [Urine:1200 (0.4 mL/kg/hr)]  Weight: 75 kg   I/O this shift:  In: 480 [P.O.:480]  Out: 300 [Urine:300]    Relevant Results  Scheduled medications  baclofen, 10 mg, oral, q AM  baclofen, 20 mg, oral, Nightly  bumetanide, 1 mg, oral, Daily with evening meal  bumetanide, 2 mg, oral, Daily  clopidogrel, 75 mg, oral, Daily  digoxin, 125 mcg, oral, Daily  dilTIAZem CD, 120 mg, oral, Nightly  enoxaparin, 40 mg, subcutaneous, q24h  fluticasone furoate-vilanteroL, 1 puff, inhalation, Daily  hydroxychloroquine, 300 mg, oral, Daily  ipratropium-albuteroL, 3 mL, nebulization, TID  methylPREDNISolone sodium succinate (PF), 60 mg, intravenous, BID  montelukast, 10 mg, oral, Nightly  pantoprazole, 40 mg, oral, BID  polyethylene glycol, 17 g, oral, Daily  rosuvastatin, 20 mg, oral, Nightly  sucralfate, 1 g, oral, q6h DWIGHT  sulfaSALAzine, 500 mg, oral, BID      Continuous medications     PRN medications  PRN medications: acetaminophen, ipratropium-albuteroL, ondansetron, oxyCODONE, [Held by provider] oxyCODONE-acetaminophen, oxygen, oxygen    Results for orders placed or performed during the hospital encounter of 09/17/24 (from the past 24 hour(s))   Magnesium   Result Value Ref Range    Magnesium 2.56 (H) 1.60 - 2.40 mg/dL   Renal Function Panel   Result Value Ref Range    Glucose 137 (H) 74 - 99 mg/dL    Sodium 141 136 - 145 mmol/L    Potassium 4.0 3.5 - 5.3 mmol/L    Chloride 104 98 - 107 mmol/L    Bicarbonate 29 21 - 32 mmol/L    Anion Gap 12 10 - 20 mmol/L    Urea Nitrogen 29 (H) 6 - 23 mg/dL    Creatinine 1.04 0.50 - 1.05 mg/dL    eGFR 57 (L) >60 mL/min/1.73m*2    Calcium 8.7 8.6 - 10.3 mg/dL    " Phosphorus 4.7 2.5 - 4.9 mg/dL    Albumin 3.8 3.4 - 5.0 g/dL   CBC   Result Value Ref Range    WBC 10.4 4.4 - 11.3 x10*3/uL    nRBC 0.0 0.0 - 0.0 /100 WBCs    RBC 3.54 (L) 4.00 - 5.20 x10*6/uL    Hemoglobin 8.2 (L) 12.0 - 16.0 g/dL    Hematocrit 27.3 (L) 36.0 - 46.0 %    MCV 77 (L) 80 - 100 fL    MCH 23.2 (L) 26.0 - 34.0 pg    MCHC 30.0 (L) 32.0 - 36.0 g/dL    RDW 17.1 (H) 11.5 - 14.5 %    Platelets 239 150 - 450 x10*3/uL   Iron and TIBC   Result Value Ref Range    Iron 17 (L) 35 - 150 ug/dL    UIBC 389 (H) 110 - 370 ug/dL    TIBC 406 240 - 445 ug/dL    % Saturation 4 (L) 25 - 45 %   Ferritin   Result Value Ref Range    Ferritin 15 8 - 150 ng/mL       CT angio chest for pulmonary embolism   Final Result   No significant pulmonary embolism.        Moderate cardiomegaly/suspected CHF with moderate right effusion.        MACRO:   None.        Signed by: Campbell Pearson 9/18/2024 12:58 AM   Dictation workstation:   [id number]      XR chest 1 view   Final Result   Mild vascular congestion.        MACRO:   None        Signed by: Campbell Pearson 9/18/2024 12:11 AM   Dictation workstation:   [id number]          Transthoracic Echo (TTE) Complete    Result Date: 7/25/2024   [facility], [address] [place] [address]               Tel [phone] and Fax [phone] TRANSTHORACIC ECHOCARDIOGRAM REPORT  Patient Name:      VERNON SALDANA VIA         Reading Physician:    [id number] Neo Gutierrez MD Study Date:        7/25/2024            Ordering Provider:    [id number] STEPHON YARBROUGH MRN/PID:           [id number]             Fellow: Accession#:        [id number]         Nurse: Date of Birth/Age: 1950 / 73 years Sonographer:          Viola Lau                                                               RDCS Gender:            F                    Additional Staff: Height:             162.56 cm            Admit Date:           7/24/2024 Weight:            72.12 kg             Admission Status:     Inpatient -                                                               Routine BSA / BMI:         1.77 m2 / 27.29      Encounter#:           [id number]                    kg/m2 Blood Pressure:    108/69 mmHg          Department Location:  [facility] Non                                                               Invasive Study Type:    TRANSTHORACIC ECHO (TTE) COMPLETE Diagnosis/ICD: Chest pain, unspecified-R07.9 Indication:    CP CPT Code:      Echo Complete w Full Doppler-49076 Patient History: Pertinent History: A-Fib, CAD, Hyperlipidemia, COPD, Chest Pain and S/P Left                    shoulder surgery,[place]man, CKD. Study Detail: The following Echo studies were performed: 2D, M-Mode, Doppler and               color flow. Technically challenging study due to patient lying in               supine position and S/P left shoulder surgery. Patient has a               defibrillator.  PHYSICIAN INTERPRETATION: Left Ventricle: The left ventricular systolic function is mildly decreased, with a visually estimated ejection fraction of 45-50%. There are no regional wall motion abnormalities. The left ventricular cavity size is normal. There is mild concentric left ventricular hypertrophy. Spectral Doppler shows a pseudonormal pattern of left ventricular diastolic filling. Left Atrium: The left atrium is normal in size. Right Ventricle: The right ventricle is normal in size. There is normal right ventricular global systolic function. A device is visualized in the right ventricle. Right Atrium: The right atrium is normal in size. Aortic Valve: The aortic valve is trileaflet. There is mild aortic valve cusp calcification. There is evidence of mildly elevated transaortic gradients consistent with sclerosis of the aortic valve. The aortic valve dimensionless index is 0.71. There is moderate aortic  valve regurgitation. The peak instantaneous gradient of the aortic valve is 14.6 mmHg. The mean gradient of the aortic valve is 8.0 mmHg. Mitral Valve: The mitral valve is normal in structure. There is moderate mitral annular calcification. There is mild mitral valve regurgitation. Tricuspid Valve: The tricuspid valve is structurally normal. There is mild tricuspid regurgitation. Pulmonic Valve: The pulmonic valve is structurally normal. There is physiologic pulmonic valve regurgitation. Pericardium: There is no pericardial effusion noted. Aorta: The aortic root is normal. Pulmonary Artery: The tricuspid regurgitant velocity is 2.43 m/s, and with an estimated right atrial pressure of 3 mmHg, the estimated pulmonary artery pressure is normal with the RVSP at 26.6 mmHg. Pulmonary Veins: The pulmonary veins appear normal and return normally to the left atrium. Systemic Veins: The inferior vena cava appears to be of normal size. There is IVC inspiratory collapse greater than 50%.  CONCLUSIONS:  1. The left ventricular systolic function is mildly decreased, with a visually estimated ejection fraction of 45-50%.  2. Spectral Doppler shows a pseudonormal pattern of left ventricular diastolic filling.  3. There is normal right ventricular global systolic function.  4. There is moderate mitral annular calcification.  5. Aortic valve sclerosis.  6. Moderate aortic valve regurgitation.  7. Normal estimated PASP and CVP. QUANTITATIVE DATA SUMMARY: 2D MEASUREMENTS:                           Normal Ranges: IVSd:          0.97 cm    (0.6-1.1cm) LVPWd:         1.16 cm    (0.6-1.1cm) LVIDd:         5.27 cm    (3.9-5.9cm) LVIDs:         4.48 cm LV Mass Index: 122.0 g/m2 LV % FS        15.0 % LA VOLUME:                               Normal Ranges: LA Vol A4C:        46.9 ml    (22+/-6mL/m2) LA Vol A2C:        46.7 ml LA Vol BP:         46.9 ml LA Vol Index A4C:  26.5ml/m2 LA Vol Index A2C:  26.3 ml/m2 LA Vol Index BP:   26.4 ml/m2 LA  Area A4C:       17.6 cm2 LA Area A2C:       17.5 cm2 LA Major Axis A4C: 5.6 cm LA Major Axis A2C: 5.6 cm LA Volume Index:   24.7 ml/m2 LA Vol A4C:        42.8 ml LA Vol A2C:        43.8 ml RA VOLUME BY A/L METHOD:                       Normal Ranges: RA Area A4C: 23.0 cm2 AORTA MEASUREMENTS:                    Normal Ranges: Asc Ao, d: 3.70 cm (2.1-3.4cm) LV SYSTOLIC FUNCTION BY 2D PLANIMETRY (MOD):                      Normal Ranges: EF-A4C View:    43 % (>=55%) EF-A2C View:    42 % EF-Biplane:     44 % EF-Visual:      48 % LV EF Reported: 48 % LV DIASTOLIC FUNCTION:                     Normal Ranges: MV Peak E: 1.46 m/s (0.7-1.2 m/s) MITRAL VALVE:                      Normal Ranges: MV Vmax:    1.31 m/s (<=1.3m/s) MV peak P.9 mmHg (<5mmHg) MV mean PG: 3.0 mmHg (<2mmHg) MV DT:      197 msec (150-240msec) MITRAL INSUFFICIENCY:                           Normal Ranges: PISA Radius:  0.3 cm MR VTI:       178.00 cm MR Vmax:      496.00 cm/s MR Alias Ham: 35.1 cm/s MR Volume:    7.12 ml MR Flow Rt:   19.85 ml/s MR EROA:      0.04 cm2 AORTIC VALVE:                                    Normal Ranges: AoV Vmax:                1.91 m/s  (<=1.7m/s) AoV Peak P.6 mmHg (<20mmHg) AoV Mean P.0 mmHg  (1.7-11.5mmHg) LVOT Max Ham:            1.33 m/s  (<=1.1m/s) AoV VTI:                 37.70 cm  (18-25cm) LVOT VTI:                26.90 cm LVOT Diameter:           2.00 cm   (1.8-2.4cm) AoV Area, VTI:           2.24 cm2  (2.5-5.5cm2) AoV Area,Vmax:           2.19 cm2  (2.5-4.5cm2) AoV Dimensionless Index: 0.71 AORTIC INSUFFICIENCY: AI Vmax:       4.01 m/s AI Half-time:  441 msec AI Decel Rate: 267.00 cm/s2  RIGHT VENTRICLE: RV Basal 3.67 cm RV Mid   2.60 cm RV Major 8.8 cm TAPSE:   28.0 mm RV s'    0.12 m/s TRICUSPID VALVE/RVSP:                             Normal Ranges: Peak TR Velocity: 2.43 m/s RV Syst Pressure: 26.6 mmHg (< 30mmHg) IVC Diam:         1.58 cm PULMONIC VALVE:                       Normal Ranges: PV Max Ham: 1.5 m/s  (0.6-0.9m/s) PV Max P.6 mmHg  [id number] Neo Gutierrez MD Electronically signed on 2024 at 6:23:17 PM  ** Final **     Transthoracic Echo (TTE) Limited    Result Date: 2024   [facility] ([facility] [address] [place] [address]                Tel [phone] and Fax [phone] TRANSTHORACIC ECHOCARDIOGRAM REPORT  Patient Name:      VERNON SALDANA VIA         Reading Physician:    [id number] Monica Chen MD Study Date:        2024            Ordering Provider:    [id number] KRISTAL SNYDER MRN/PID:           [id number]             Fellow: Accession#:        [id number]         Nurse: Date of Birth/Age: 1950 / 73 years Sonographer:          Tobi Carlos RDCS Gender:            F                    Additional Staff: Height:            162.56 cm            Admit Date:           2024 Weight:            70.31 kg             Admission Status:     Inpatient -                                                               Routine BSA / BMI:         1.76 m2 / 26.61      Encounter#:           [id number]                    kg/m2 Blood Pressure:    /                    Department Location:  [facility]                                                               Cath Lab Study Type:    TRANSTHORACIC ECHO (TTE) LIMITED Diagnosis/ICD: Unspecified atrial fibrillation-I48.91 Indication:    Atrial fibrillation; Preop cardiovascular exam CPT Code:      Echo Limited-65468 Patient History: Pertinent History: A-fib; CAD; BARBARA; HTN; HLD; CKD; PPM. Study Detail: The following Echo studies were performed: 2D and M-Mode.               Technically challenging study due to body habitus and patient               lying in supine position.  PHYSICIAN INTERPRETATION: Left Ventricle: Left  ventricular ejection fraction is low normal, by visual estimate at 50-55%. The patient is in atrial fibrillation which may influence the estimate of left ventricular function and transvalvular flows. Wall motion is abnormal. The left ventricular cavity size was not assessed. Left ventricular diastolic filling was not assessed. Possible inferior wall motion hypokinesis. Left Atrium: The left atrium was not assessed. Right Ventricle: The right ventricle was not well visualized. There is normal right ventricular global systolic function. A device is visualized in the right ventricle. Right Atrium: The right atrium was not assessed. There is a device visualized in the right atrium. Aortic Valve: The aortic valve is trileaflet. There is mild aortic valve cusp calcification. Aortic valve regurgitation was not assessed. Mitral Valve: The mitral valve is mildly thickened. There is moderate mitral annular calcification. Mitral valve regurgitation was not assessed. Tricuspid Valve: The tricuspid valve was not well visualized. Tricuspid regurgitation was not assessed. Pulmonic Valve: The pulmonic valve is not well visualized. Pulmonic valve regurgitation was not assessed. Pericardium: There is a trivial pericardial effusion. Aorta: The aortic root is normal. Systemic Veins: The inferior vena cava appears to be of normal size. There is IVC inspiratory collapse greater than 50%. In comparison to the previous echocardiogram(s): Compared with the prior exam from 2/27/2024 there are no significant changes though today's exam is only a limited study withno assessment of valvular function.  CONCLUSIONS:  1. Left ventricular ejection fraction is low normal, by visual estimate at 50-55%.  2. Possible inferior wall motion hypokinesis.  3. There is normal right ventricular global systolic function.  4. There is moderate mitral annular calcification.  5. Compared with the prior exam from 2/27/2024 there are no significant changes though  today's exam is only a limited study withno assessment of valvular function.  6. The patient is in atrial fibrillation which may influence the estimate of left ventricular function and transvalvular flows. QUANTITATIVE DATA SUMMARY: AORTA MEASUREMENTS:                      Normal Ranges: Ao Sinus, d: 3.45 cm (2.1-3.5cm) LV SYSTOLIC FUNCTION BY 2D PLANIMETRY (MOD):                      Normal Ranges: EF-Visual:      53 % LV EF Reported: 53 % TRICUSPID VALVE/RVSP:                   Normal Ranges: IVC Diam: 2.03 cm  [id number] Monica Chen MD Electronically signed on 7/16/2024 at 3:11:21 PM  ** Final **     Transthoracic Echo (TTE) Limited    Result Date: 7/16/2024   [facility], [address] [place] [address]                Tel [phone] and Fax [phone] TRANSTHORACIC ECHOCARDIOGRAM REPORT  Patient Name:      VERNON SALDANA VIA         Reading Physician:    [id number] Calvillo MD Study Date:        7/16/2024            Ordering Provider:    [id number] KRISTAL SNYDER MRN/PID:           [id number]             Fellow:               [id number] Lucina Gill MD Accession#:        [id number]         Nurse: Date of Birth/Age: 1950 / 73 years Sonographer:          Tobi Carlos RDCS Gender:            F                    Additional Staff: Height:            162.56 cm            Admit Date:           7/16/2024 Weight:            71.22 kg             Admission Status:     Inpatient -                                                               Routine BSA / BMI:         1.76 m2 / 26.95      Encounter#:           [id number]                    kg/m2 Blood Pressure:    117/55 mmHg          Department Location:  [facility]                                                                Cath Lab Study Type:    TRANSTHORACIC ECHO (TTE) LIMITED Diagnosis/ICD: Other specified postprocedural states-Z98.890 Indication:    Post LAAO CPT Code:      Echo Limited-52222 Patient History: Pertinent History: CAD; A-fib; BARBARA: HTN; HLD; CKD; PPM. Study Detail: The following Echo studies were performed: 2D and M-Mode.               Technically challenging study due to body habitus.  PHYSICIAN INTERPRETATION: Left Ventricle: Left ventricular ejection fraction is low normal, by visual estimate at 50-55%. There are no regional wall motion abnormalities. The left ventricular cavity size is normal. Left ventricular diastolic filling was not assessed. Left Atrium: The left atrium was not assessed. Right Ventricle: The right ventricle was not assessed. Right ventricular systolic function not assessed. A device is visualized in the right ventricle. Right Atrium: The right atrium was not assessed. There is a device visualized in the right atrium. Aortic Valve: The aortic valve is trileaflet. There is mild aortic valve cusp calcification. Aortic valve regurgitation was not assessed. Mitral Valve: The mitral valve is mildly thickened. There is moderate mitral annular calcification. Mitral valve regurgitation was not assessed. Tricuspid Valve: The tricuspid valve was not well visualized. Tricuspid regurgitation was not assessed. Right ventricular systolic pressure could not be accurately assessed in this patient. Pulmonic Valve: The pulmonic valve was not assessed. Pulmonic valve regurgitation was not assessed. Pericardium: There is a trivial pericardial effusion. Aorta: The aortic root is normal. Systemic Veins: The inferior vena cava was not well visualized. In comparison to the previous echocardiogram(s): Compared with study dated 2/27/2024, no significant change.  CONCLUSIONS:  1. Left ventricular ejection fraction is low normal, by visual estimate at 50-55%.  2. There is moderate mitral annular  calcification.  3. There is a trivial pericardial effusion.  4. Compared with study dated 2/27/2024, no significant change. QUANTITATIVE DATA SUMMARY: LV SYSTOLIC FUNCTION BY 2D PLANIMETRY (MOD):                      Normal Ranges: EF-Visual:      53 % LV EF Reported: 53 %  [id number] Neftali Calvillo MD Electronically signed on 7/16/2024 at 3:02:13 PM  ** Final **     Transthoracic Echo (TTE) Complete    Result Date: 2/27/2024   [facility], [address] [place] [address]               Tel [phone] and Fax [phone] TRANSTHORACIC ECHOCARDIOGRAM REPORT  Patient Name:      VERNON SALDANA VIA         Reading Physician:    [id number] Nohelia Soto MD Study Date:        2/27/2024            Ordering Provider:    [id number] NIKOLE LOVE MRN/PID:           [id number]             Fellow: Accession#:        [id number]         Nurse: Date of Birth/Age: 1950 / 73 years Sonographer:          Dai Diaz                                                               [facility] Gender:            F                    Additional Staff: Height:            162.56 cm            Admit Date:           2/24/2024 Weight:            72.57 kg             Admission Status:     Inpatient -                                                               Routine BSA / BMI:         1.78 m2 / 27.46      Encounter#:           [id number]                    kg/m2                                         Department Location:  [facility] Non                                                               Invasive Blood Pressure: 131 /59 mmHg Study Type:    TRANSTHORACIC ECHO (TTE) COMPLETE Diagnosis/ICD: Shortness of breath-R06.02 Indication:    Dyspnea CPT Code:      Echo Complete w Full Doppler-16530 Patient History: Pertinent History: A-Fib and Chest Pain. elevated troponin, elevated BNP, mod                     AS. Study Detail: The following Echo studies were performed: 2D, M-Mode, Doppler and               color flow.  PHYSICIAN INTERPRETATION: Left Ventricle: The left ventricular systolic function is normal, with an estimated ejection fraction of 55-60%. There are no regional wall motion abnormalities. The left ventricular cavity size is normal. Spectral Doppler shows an impaired relaxation pattern of left ventricular diastolic filling. Left Atrium: The left atrium is mildly dilated. Right Ventricle: The right ventricle is normal in size. There is normal right ventricular global systolic function. Right Atrium: The right atrium is normal in size. Aortic Valve: The aortic valve is trileaflet. There is mild to moderate aortic valve cusp calcification. There is evidence of moderate aortic valve stenosis. There is trivial aortic valve regurgitation. The peak instantaneous gradient of the aortic valve is 27.5 mmHg. The mean gradient of the aortic valve is 16.0 mmHg. Mitral Valve: The mitral valve is mildly thickened. There is evidence of mild mitral valve stenosis. The doppler estimated mean and peak diastolic pressure gradients are 7.2 mmHg and 16.3 mmHg respectively. There is mild mitral annular calcification. There is mild to moderate mitral valve regurgitation. Tricuspid Valve: The tricuspid valve is structurally normal. There is mild to moderate tricuspid regurgitation. Pulmonic Valve: The pulmonic valve is not well visualized. There is trace to mild pulmonic valve regurgitation. Pericardium: There is no pericardial effusion noted. Aorta: The aortic root is normal. Pulmonary Artery: The tricuspid regurgitant velocity is 3.46 m/s, and with an estimated right atrial pressure of 3 mmHg, the estimated pulmonary artery pressure is mild to moderately elevated with the RVSP at 50.9 mmHg.  CONCLUSIONS:  1. Left ventricular systolic function is normal with a 55-60% estimated ejection fraction.  2. Spectral Doppler shows an  impaired relaxation pattern of left ventricular diastolic filling.  3. Mild to moderate mitral valve regurgitation.  4. Mild to moderate tricuspid regurgitation visualized.  5. Moderate aortic valve stenosis.  6. Mild to moderately elevated pulmonary artery pressure. QUANTITATIVE DATA SUMMARY: 2D MEASUREMENTS:                           Normal Ranges: IVSd:          0.97 cm    (0.6-1.1cm) LVPWd:         0.98 cm    (0.6-1.1cm) LVIDd:         5.70 cm    (3.9-5.9cm) LVIDs:         4.26 cm LV Mass Index: 123.2 g/m2 LV % FS        25.2 % LA VOLUME:                              Normal Ranges: LA Vol A4C:       77.4 ml    (22+/-6mL/m2) LA Vol A2C:       83.5 ml LA Vol BP:        82.1 ml LA Vol Index A4C: 43.5 ml/m2 LA Vol Index A2C: 46.9 ml/m2 LA Vol Index BP:  46.1 ml/m2 LA Volume Index:  46.1 ml/m2 LA Vol A4C:       72.2 ml LA Vol A2C:       77.0 ml RA VOLUME BY A/L METHOD:                       Normal Ranges: RA Area A4C: 21.8 cm2 M-MODE MEASUREMENTS:                  Normal Ranges: Ao Root: 3.30 cm (2.0-3.7cm) LAs:     4.72 cm (2.7-4.0cm) LV SYSTOLIC FUNCTION BY 2D PLANIMETRY (MOD):                     Normal Ranges: EF-A4C View: 55.9 % (>=55%) EF-A2C View: 56.5 % EF-Biplane:  55.9 % LV DIASTOLIC FUNCTION:                             Normal Ranges: MV Peak E:      1.91 m/s    (0.7-1.2 m/s) MV Peak A:      0.93 m/s    (0.42-0.7 m/s) E/A Ratio:      2.06        (1.0-2.2) MV e'           0.09 m/s    (>8.0) MV lateral e'   0.09 m/s MV medial e'    0.08 m/s E/e' Ratio:     21.21       (<8.0) PulmV Sys Ham:  62.35 cm/s PulmV Alvarez Ham: 121.32 cm/s PulmV S/D Ham:  0.51 MITRAL VALVE:                       Normal Ranges: MV Vmax:    2.02 m/s  (<=1.3m/s) MV peak P.3 mmHg (<5mmHg) MV mean P.2 mmHg  (<2mmHg) MV VTI:     48.44 cm  (10-13cm) MV DT:      167 msec  (150-240msec) MITRAL INSUFFICIENCY:                         Normal Ranges: MR VTI:     192.66 cm MR Vmax:    568.98 cm/s MR Volume:  33.56 ml MR Flow Rt: 99.12 ml/s  MR EROA:    0.17 cm2 AORTIC VALVE:                                    Normal Ranges: AoV Vmax:                2.62 m/s  (<=1.7m/s) AoV Peak P.5 mmHg (<20mmHg) AoV Mean P.0 mmHg (1.7-11.5mmHg) LVOT Max Ham:            0.99 m/s  (<=1.1m/s) AoV VTI:                 58.21 cm  (18-25cm) LVOT VTI:                21.96 cm LVOT Diameter:           1.95 cm   (1.8-2.4cm) AoV Area, VTI:           1.13 cm2  (2.5-5.5cm2) AoV Area,Vmax:           1.13 cm2  (2.5-4.5cm2) AoV Dimensionless Index: 0.38 AORTIC INSUFFICIENCY: AI Vmax:       3.91 m/s AI Half-time:  520 msec AI Decel Time: 1794 msec AI Decel Rate: 218.17 cm/s2  RIGHT VENTRICLE: RV Basal 4.30 cm RV Mid   2.60 cm RV Major 7.7 cm TAPSE:   27.0 mm RV s'    0.18 m/s TRICUSPID VALVE/RVSP:                             Normal Ranges: Peak TR Velocity: 3.46 m/s RV Syst Pressure: 50.9 mmHg (< 30mmHg) PULMONIC VALVE:                      Normal Ranges: PV Max Ham: 1.5 m/s  (0.6-0.9m/s) PV Max P.8 mmHg Pulmonary Veins: PulmV Alvarez Ham: 121.32 cm/s PulmV S/D Ham:  0.51 PulmV Sys Ham:  62.35 cm/s  [id number] Nohelia Soto MD Electronically signed on 2024 at 11:22:42 AM  ** Final **           Assessment/Plan   Principal Problem:    Acute hypoxic on chronic hypercapnic respiratory failure (Multi)  Active Problems:    Chest pain, unspecified type    LHC in  showed normal coronary arteries        Acute on chronic diastolic CHF with mildly reduced EF 45-50%  -Hx of tachycardia induced CMO and hx of VT  -->highest it has been  -CXR showed vascular congestion  -CTA showed moderate right pleural effusion  -I reviewed the Echocardiograms as above  -Strict I & Os  -Daily weights->reports an 8lb weight increase in 2 days      --Today's weight 154 pounds, now below her baseline  -2gm na diet  -1500mL fluid restriction  -Bumex previously decreased from 2mg BID to 1mg BID due to hypotension->pt then presented with CHF and dose changed to 2mg in the am  and 1mg in pm  -Cont Bumex PO  -Pulmonary consulted for possible thoracentesis->does not need at this time  -Given additional Bumex 1mg IV x1 (9/18) and (9/19)  -Can do additional Bumex 1mg in afternoon at home for weight gain or edema  -May benefit from Cardiomems implant as outpt     2. Elevated troponins-Non MI elevation  -C as above  -CTA showed pleural effusions  -Troponin 35, 34  -EKG atrial paced  -Cont plavix, statin  -Allergy to BB     3. Asthma/COPD exac  -steroids  -bronchodilators  -Oxygen support  -CTA showed CHF and pleural effusion  -Pulmonary following  -Bronchial hygiene     4. Paroxysmal atrial fibrillation  -Currently atrial paced  -[employer] PCM/ICD  -Not on AC, has watchman  -Cont plavix  -Cont digoxin and diltiazem  -Digoxin level 0.9  -Monitor on tele     5. Chronic anemia  -Hgb 8.5->8.6->8.1  -Prior iron studies reviewed  -pt reports severe constipation with PO iron  -Stop aspirin  -Cont plavix only  -Monitor     6. Hyperlipidemia  -Cont statin        Amelia Kiser, APRN-CNP

## 2024-09-20 NOTE — PROGRESS NOTES
09/20/24 1104   Discharge Planning   Living Arrangements Spouse/significant other   Support Systems Spouse/significant other   Assistance Needed Alert and oriented x 3, Independent with ADL's, Rollator, Room air at baseline, currently requiring 2.5 liters O2, Drives   Type of Residence Private residence   Number of Stairs to Enter Residence 0   Number of Stairs Within Residence 0   Do you have animals or pets at home? No   Who is requesting discharge planning? Provider   Home or Post Acute Services None   Expected Discharge Disposition Home  (Home, no needs)   Does the patient need discharge transport arranged? No

## 2024-09-20 NOTE — CARE PLAN
The patient's goals for the shift include      The clinical goals for the shift include patient will deny shortness of breath this shift    Patient is alert and oriented. No complaints at this time

## 2024-09-20 NOTE — PROGRESS NOTES
Internal Medicine - Daily Progress Note   Hospital Day: 4       Name:Trina Elias, AGE: 74 y.o., GENDER: female, MRN: [id number], ROOM: 250/250-B   CODE STATUS: Full Code  Attending Physician: Sy Tyler DO  Resident: Bridget Cid MD        Chief Complaint     Chief Complaint   Patient presents with    Shortness of Breath     SOB for a week.  Worse today.  Put on 8 lbs today.  Hx of chf        Subjective    Trina Elias is a 74 y.o. year old female patient on Hospital Day: 4    Overnight events: No acute events overnight.    Patient seen and examined at bedside this morning.  States she is feeling slightly better than yesterday.  Does have a lot of thick mucus that she is struggling to bring up.  She inquires about a respiratory therapy vest to help break up mucus.  Also states she has had persistent upper abdominal pain which is chronic for her, at home she takes Carafate 3-4 times daily for this as well as Zofran.  Has not had Carafate here and would like it ordered.  Additionally, patient is due for Venofer infusion today -will order infusion to be done while hospitalized.  Reports that breathing still feels tight. Denies chest pain.  Notes that lower extremity edema has now resolved.      Meds    Scheduled medications  baclofen, 10 mg, oral, q AM  baclofen, 20 mg, oral, Nightly  bumetanide, 1 mg, oral, Daily with evening meal  bumetanide, 2 mg, oral, Daily  clopidogrel, 75 mg, oral, Daily  digoxin, 125 mcg, oral, Daily  dilTIAZem CD, 120 mg, oral, Nightly  enoxaparin, 40 mg, subcutaneous, q24h  fluticasone furoate-vilanteroL, 1 puff, inhalation, Daily  hydroxychloroquine, 300 mg, oral, Daily  ipratropium-albuteroL, 3 mL, nebulization, TID  iron sucrose, 200 mg, intravenous, Once  methylPREDNISolone sodium succinate (PF), 60 mg, intravenous, BID  montelukast, 10 mg, oral, Nightly  pantoprazole, 40 mg, oral, BID  polyethylene glycol, 17 g, oral, Daily  rosuvastatin, 20 mg, oral, Nightly  sucralfate, 1 g,  "oral, q6h [facility]  sulfaSALAzine, 500 mg, oral, BID      Continuous medications     PRN medications  PRN medications: acetaminophen, ipratropium-albuteroL, ondansetron, oxyCODONE, [Held by provider] oxyCODONE-acetaminophen, oxygen, oxygen     Objective    Physical Exam  Constitutional:       General: She is not in acute distress.     Appearance: She is ill-appearing.   HENT:      Head: Normocephalic and atraumatic.   Cardiovascular:      Rate and Rhythm: Normal rate and regular rhythm.   Pulmonary:      Effort: No respiratory distress.      Breath sounds: Wheezing and rhonchi present.      Comments: Coarse breath sounds bilaterally with expiratory wheezing  Coughing frequently with minimal sputum production  Abdominal:      General: Bowel sounds are normal. There is no distension.      Palpations: Abdomen is soft.      Tenderness: There is abdominal tenderness (Epigastric tenderness). There is no guarding.   Musculoskeletal:      Right lower leg: No edema.      Left lower leg: No edema.   Skin:     General: Skin is warm and dry.   Neurological:      General: No focal deficit present.      Mental Status: She is alert and oriented to person, place, and time.   Psychiatric:         Mood and Affect: Mood normal.         Behavior: Behavior normal.        Visit Vitals  /55   Pulse 66   Temp 36.4 °C (97.5 °F) (Temporal)   Resp 20   Ht 1.626 m (5' 4\")   Wt 70 kg (154 lb 5.2 oz)   SpO2 100%   BMI 26.49 kg/m²   Smoking Status Never   BSA 1.78 m²        Intake/Output Summary (Last 24 hours) at 9/20/2024 1009  Last data filed at 9/20/2024 1002  Gross per 24 hour   Intake 700 ml   Output 1200 ml   Net -500 ml       Labs:   Results from last 72 hours   Lab Units 09/20/24  0608 09/19/24  0646 09/18/24  0635   SODIUM mmol/L 141 140 140   POTASSIUM mmol/L 4.0 3.8 3.8   CHLORIDE mmol/L 104 104 105   CO2 mmol/L 29 25 27   BUN mg/dL 29* 24* 20   CREATININE mg/dL 1.04 1.04 0.96   GLUCOSE mg/dL 137* 118* 144*   CALCIUM mg/dL 8.7 8.5* " "8.6   ANION GAP mmol/L 12 15 12   EGFR mL/min/1.73m*2 57* 57* 63   PHOSPHORUS mg/dL 4.7 6.0* 3.6      Results from last 72 hours   Lab Units 09/20/24  0608 09/19/24  0646 09/18/24  0635 09/17/24  2328   WBC AUTO x10*3/uL 10.4 12.8* 6.8 8.2   HEMOGLOBIN g/dL 8.2* 8.1* 8.6* 8.5*   HEMATOCRIT % 27.3* 27.3* 29.2* 27.9*   PLATELETS AUTO x10*3/uL 239 245 226 259   NEUTROS PCT AUTO %  --   --   --  69.6   LYMPHS PCT AUTO %  --   --   --  17.8   MONOS PCT AUTO %  --   --   --  10.4   EOS PCT AUTO %  --   --   --  1.3      Lab Results   Component Value Date    CALCIUM 8.7 09/20/2024    PHOS 4.7 09/20/2024      Lab Results   Component Value Date    CRP 0.86 07/24/2024       [unfilled]     Micro/ID:   No results found for the last 90 days.                   No lab exists for component: \"AGALPCRNB\"     Lab Results   Component Value Date    BLOODCULT  09/02/2023     No Growth at 1 days~No Growth at 2 days~No Growth at 3 days~NO GROWTH at 4 days - FINAL REPORT       Images    ECG 12 lead    Result Date: 9/18/2024  Accelerated Junctional rhythm Possible Anterior infarct (cited on or before 09-AUG-2024) Abnormal ECG When compared with ECG of 09-AUG-2024 06:17, (unconfirmed) Junctional rhythm has replaced Electronic atrial pacemaker Nonspecific T wave abnormality now evident in Anterior leads    ECG 12 lead    Result Date: 9/18/2024  Atrial-paced rhythm with prolonged AV conduction Poor R-wave progression ; consider anterior infarct, lead placement, or normal variant ST & T wave abnormality, consider lateral ischemia Abnormal ECG When compared with ECG of 17-SEP-2024 23:17, (unconfirmed) Electronic atrial pacemaker has replaced Junctional rhythm    ECG 12 lead    Result Date: 9/18/2024  AV dual-paced rhythm with prolonged AV conduction Abnormal ECG When compared with ECG of 18-SEP-2024 01:50, (unconfirmed) Electronic ventricular pacemaker has replaced Electronic atrial pacemaker    CT angio chest for pulmonary " embolism    Result Date: 9/18/2024  Interpreted By:  Campbell Pearson, STUDY: CT ANGIO CHEST FOR PULMONARY EMBOLISM;  9/18/2024 12:31 am   INDICATION: Signs/Symptoms:SOB CHEST PAIN.   COMPARISON: 08/08/2024   ACCESSION NUMBER(S): [id number]   ORDERING CLINICIAN: JAMMIE SPENCE   TECHNIQUE: Contiguous axial images of the chest were obtained after the intravenous administration of iodinated contrast using angiographic PE protocol. Coronal and sagittal reformatted images were reconstructed from the axial data. MIP images were created on an independent workstation and reviewed.   FINDINGS: There is streak artifact from the arms which were not raised.   No significant pulmonary artery embolism.   Moderate cardiomegaly is seen. Left-sided AICD present.   Ectasia ascending aorta measuring 4.2 cm in diameter.   Moderate right pleural effusion.   There is some areas of ground-glass opacity both lungs which may reflect atelectasis/edema. Correlate with CHF.   Multilevel moderate spondylotic degeneration seen thoracic spine.       No significant pulmonary embolism.   Moderate cardiomegaly/suspected CHF with moderate right effusion.   MACRO: None.   Signed by: Campbell Pearson 9/18/2024 12:58 AM Dictation workstation:   [id number]    XR chest 1 view    Result Date: 9/18/2024  Interpreted By:  Campbell Pearson, STUDY: XR CHEST 1 VIEW;  9/17/2024 11:37 pm   INDICATION: Signs/Symptoms:fluid overload.   COMPARISON: 08/08/2024   ACCESSION NUMBER(S): [id number]   ORDERING CLINICIAN: JAMMIE SPENCE   FINDINGS: Moderate cardiomegaly and left-sided pacemaker noted. Mild vascular congestion. No consolidation or pneumothorax.       Mild vascular congestion.   MACRO: None   Signed by: Campbell Pearson 9/18/2024 12:11 AM Dictation workstation:   [id number]      Assessment and Plan    Trina Elias is a 74 y.o. female with PMHx with PMHx of hypertension, hyperlipidemia, HFmrEF/systolic (EF45-50% in July 2024) and diastolic HF, paroxysmal A-fib  status post PCM/ICD and watchman procedure, central sleep apnea on BiPAP at night, COPD/asthma (not on home 02) chronic anemia/iron deficiency anemia, CKD, multiple abdominal surgeries, and RA admitted on 9/17/2024 with complaint of worsening SOB and fluid overload. Being treated for acute hypoxic respiratory failure 2/2 CHF and COPD exacerbations. Given an additional dose of Bumex and home dose was increased, resulting in appropriate response and patient is now euvolemic.     ACUTE MEDICAL ISSUES:  #Acute hypoxic respiratory failure 2/2 CHF and COPD exacerbations, improving   - On 2L NC - wean as tolerated     #Acute on chronic decompensated diastolic congestive heart failure (EF 45-50%)  - Echo 07/25/2024: ejection fraction of 45-50%, moderate mitral annular calcification. Aortic valve sclerosis. Moderate aortic valve regurgitation. Normal estimated PASP and CVP.   - Follows care w Dr. Soto, recently decreased from 2mg BID to 1mg BID due to hypotension- however, became hypovolemic  - Continue Bumex PO 1mg in AM, 2 mg in evening - per cardiology recs  - Strict I & Os  - Daily weights  - 2 gm na diet  - 1500 mL fluid restriction  - Cardiology on consult, appreciate recs      #Acute COPD exacerbation   - continue solu-medrol 60 mg BID, will taper after improvement  - continue Breo Ellipta   - continue scheduled Duonebs q 2-4 hrs   - continue BiPAP at night and as needed during day   - Wean oxygen as tolerated   - Incentive spirometry   - Mechanical vest therapy ordered  - Pulmonology consulted, appreciate recs     #Moderate R pleural effusion  - CT PE: moderate right pleural effusion, improved with diuresis - small on bedside ultrasound per plum  - Per pulmonology, continue with diuresis, no plan for thoracentesis, will reevaluate for thoracentesis      ADDRESSED/ RESOLVED MEDICAL ISSUES:  #Elevated troponin, resolved   - No chest pain, likely demand ischemia  - 35--> 34: trended down    #Chronic anemia/GRACIE:   - Hgb  on admission 8.5 (baseline 8-9)   - Ordered 200 IV Venofer 9/20 as patient is missing outpatient infusion   - No concern for acute bleed      CHRONIC MEDICAL ISSUES:  #HTN: Continue home Cardizem   #HLD: Continue home statin  #CKD: Cr on admission 1.1 (baseline 0.9-1~)  Received contrast in the ED.   #Nausea/GERD: continue Zofran PRN, famotidine, and PPI - added Carafate as patient takes this at home TID  #Chronic pain: Continue home oxycodone 5 TID PRN   #A-fib status post PCM/ICD and watchman procedure: Continue home Digoxin (ordered level), Cardizem and Plavix. Not on DOAC given watchman  #RA: continue home Hydroxychloroquine and Sulfasalazine. Voltaren gel PRN    #Constipation: continue home miralax and bisacodyl PRN       Fluids: Oral - 1500cc restriction  Electrolytes: Replete PRN  Nutrition: Cardiac diet, sodium restricted  Antimicrobials: None  DVT ppx: Lovenox  GI ppx: PPI  Catheter: None  Lines: PIV  Supplemental Oxygen: 2L NC  Emergency Contact: Extended Emergency Contact Information  Primary Emergency Contact: HAYDEE BRUNSON  Address: [address] [place]  Home Phone: [phone]  Mobile Phone: [phone]  Relation: Spouse   Code: Full Code     Disposition: Pending further improvement of respiratory status, patient does not use O2 at home.       Bridget Cid MD  Internal Medicine, PGY- 1  09/20/24 at 10:09 AM

## 2024-09-21 VITALS
DIASTOLIC BLOOD PRESSURE: 54 MMHG | BODY MASS INDEX: 24.69 KG/M2 | SYSTOLIC BLOOD PRESSURE: 133 MMHG | OXYGEN SATURATION: 97 % | WEIGHT: 144.62 LBS | HEIGHT: 64 IN | HEART RATE: 70 BPM | RESPIRATION RATE: 16 BRPM | TEMPERATURE: 98.4 F

## 2024-09-21 LAB
ALBUMIN SERPL BCP-MCNC: 3.8 G/DL (ref 3.4–5)
ANION GAP SERPL CALC-SCNC: 15 MMOL/L (ref 10–20)
BUN SERPL-MCNC: 30 MG/DL (ref 6–23)
CALCIUM SERPL-MCNC: 8.6 MG/DL (ref 8.6–10.3)
CHLORIDE SERPL-SCNC: 103 MMOL/L (ref 98–107)
CO2 SERPL-SCNC: 27 MMOL/L (ref 21–32)
CREAT SERPL-MCNC: 1.09 MG/DL (ref 0.5–1.05)
EGFRCR SERPLBLD CKD-EPI 2021: 54 ML/MIN/1.73M*2
ERYTHROCYTE [DISTWIDTH] IN BLOOD BY AUTOMATED COUNT: 17.2 % (ref 11.5–14.5)
GLUCOSE SERPL-MCNC: 138 MG/DL (ref 74–99)
HCT VFR BLD AUTO: 28.5 % (ref 36–46)
HGB BLD-MCNC: 8.3 G/DL (ref 12–16)
MAGNESIUM SERPL-MCNC: 2.63 MG/DL (ref 1.6–2.4)
MCH RBC QN AUTO: 22.7 PG (ref 26–34)
MCHC RBC AUTO-ENTMCNC: 29.1 G/DL (ref 32–36)
MCV RBC AUTO: 78 FL (ref 80–100)
NRBC BLD-RTO: 0 /100 WBCS (ref 0–0)
PHOSPHATE SERPL-MCNC: 4.6 MG/DL (ref 2.5–4.9)
PLATELET # BLD AUTO: 235 X10*3/UL (ref 150–450)
POTASSIUM SERPL-SCNC: 3.7 MMOL/L (ref 3.5–5.3)
RBC # BLD AUTO: 3.66 X10*6/UL (ref 4–5.2)
SODIUM SERPL-SCNC: 141 MMOL/L (ref 136–145)
WBC # BLD AUTO: 9.9 X10*3/UL (ref 4.4–11.3)

## 2024-09-21 PROCEDURE — 2500000005 HC RX 250 GENERAL PHARMACY W/O HCPCS: Performed by: INTERNAL MEDICINE

## 2024-09-21 PROCEDURE — 2500000002 HC RX 250 W HCPCS SELF ADMINISTERED DRUGS (ALT 637 FOR MEDICARE OP, ALT 636 FOR OP/ED)

## 2024-09-21 PROCEDURE — 80069 RENAL FUNCTION PANEL: CPT

## 2024-09-21 PROCEDURE — 99232 SBSQ HOSP IP/OBS MODERATE 35: CPT | Performed by: INTERNAL MEDICINE

## 2024-09-21 PROCEDURE — 94668 MNPJ CHEST WALL SBSQ: CPT

## 2024-09-21 PROCEDURE — 83735 ASSAY OF MAGNESIUM: CPT

## 2024-09-21 PROCEDURE — 2500000004 HC RX 250 GENERAL PHARMACY W/ HCPCS (ALT 636 FOR OP/ED)

## 2024-09-21 PROCEDURE — 85027 COMPLETE CBC AUTOMATED: CPT

## 2024-09-21 PROCEDURE — 94761 N-INVAS EAR/PLS OXIMETRY MLT: CPT

## 2024-09-21 PROCEDURE — 94640 AIRWAY INHALATION TREATMENT: CPT

## 2024-09-21 PROCEDURE — 94760 N-INVAS EAR/PLS OXIMETRY 1: CPT

## 2024-09-21 PROCEDURE — 99239 HOSP IP/OBS DSCHRG MGMT >30: CPT

## 2024-09-21 PROCEDURE — 2500000002 HC RX 250 W HCPCS SELF ADMINISTERED DRUGS (ALT 637 FOR MEDICARE OP, ALT 636 FOR OP/ED): Performed by: INTERNAL MEDICINE

## 2024-09-21 PROCEDURE — 2500000001 HC RX 250 WO HCPCS SELF ADMINISTERED DRUGS (ALT 637 FOR MEDICARE OP)

## 2024-09-21 PROCEDURE — 36415 COLL VENOUS BLD VENIPUNCTURE: CPT

## 2024-09-21 RX ORDER — BUDESONIDE AND FORMOTEROL FUMARATE DIHYDRATE 80; 4.5 UG/1; UG/1
2 AEROSOL RESPIRATORY (INHALATION)
Start: 2024-09-21 | End: 2024-09-21

## 2024-09-21 RX ORDER — BUDESONIDE AND FORMOTEROL FUMARATE DIHYDRATE 80; 4.5 UG/1; UG/1
2 AEROSOL RESPIRATORY (INHALATION)
Qty: 10.2 G | Refills: 1 | Status: SHIPPED | OUTPATIENT
Start: 2024-09-21 | End: 2024-09-21

## 2024-09-21 RX ORDER — PREDNISONE 10 MG/1
10 TABLET ORAL DAILY
Qty: 32 TABLET | Refills: 0
Start: 2024-09-21 | End: 2024-09-21

## 2024-09-21 RX ORDER — PREDNISONE 10 MG/1
10 TABLET ORAL DAILY
Start: 2024-09-22 | End: 2024-10-25

## 2024-09-21 RX ORDER — PREDNISONE 10 MG/1
10 TABLET ORAL DAILY
Start: 2024-09-22 | End: 2024-09-21

## 2024-09-21 RX ORDER — PREDNISONE 10 MG/1
10 TABLET ORAL DAILY
Qty: 33 TABLET | Refills: 0 | Status: SHIPPED | OUTPATIENT
Start: 2024-09-22 | End: 2024-09-21

## 2024-09-21 RX ORDER — FLUTICASONE PROPIONATE AND SALMETEROL XINAFOATE 45; 21 UG/1; UG/1
2 AEROSOL, METERED RESPIRATORY (INHALATION)
Qty: 36 G | Refills: 1 | Status: SHIPPED | OUTPATIENT
Start: 2024-09-21

## 2024-09-21 RX ORDER — PREDNISONE 10 MG/1
10 TABLET ORAL DAILY
Qty: 32 TABLET | Refills: 0 | Status: SHIPPED | OUTPATIENT
Start: 2024-09-21 | End: 2024-09-21

## 2024-09-21 ASSESSMENT — COGNITIVE AND FUNCTIONAL STATUS - GENERAL
DRESSING REGULAR LOWER BODY CLOTHING: A LITTLE
CLIMB 3 TO 5 STEPS WITH RAILING: A LITTLE
MOVING TO AND FROM BED TO CHAIR: A LITTLE
STANDING UP FROM CHAIR USING ARMS: A LITTLE
MOBILITY SCORE: 20
WALKING IN HOSPITAL ROOM: A LITTLE
DAILY ACTIVITIY SCORE: 23

## 2024-09-21 ASSESSMENT — PAIN - FUNCTIONAL ASSESSMENT: PAIN_FUNCTIONAL_ASSESSMENT: 0-10

## 2024-09-21 ASSESSMENT — PAIN SCALES - GENERAL
PAINLEVEL_OUTOF10: 0 - NO PAIN
PAINLEVEL_OUTOF10: 0 - NO PAIN

## 2024-09-21 NOTE — NURSING NOTE
RT called to bedside by RN. Pt felt she did not receive a full treatment as it was given with vest treatment actively running. Pt also explained situation with Rt previously. After aerosol given, vest settings adjusted in preparation for next treatment tomorrow morning. Settings turned down to 8/6. Pt comforted, and satisfied with additional therapies provided at this time.

## 2024-09-21 NOTE — CARE PLAN
The patient's goals for the shift include      The clinical goals for the shift include Pt will remain HDS throughout shift    Over the shift, the patient did not make progress toward the following goals. Barriers to progression include Recommendations to address these barriers include

## 2024-09-21 NOTE — PROGRESS NOTES
"Department of Medicine  Division of Pulmonary, Critical Care, and Sleep Medicine    Trina Elias \"Marcela\" is a 74 y.o. female on day 2 of admission presenting with Acute hypoxic on chronic hypercapnic respiratory failure (Multi).    Subjective   Feels much better, on room air today.       Objective     Vital Signs      9/20/2024    11:17 AM 9/20/2024     3:11 PM 9/20/2024     8:06 PM 9/21/2024     4:48 AM 9/21/2024     8:25 AM 9/21/2024     8:51 AM 9/21/2024    10:34 AM   Vitals   Systolic 121 128 126 125  126 133   Diastolic 66 62 73 61  63 54   Heart Rate 71 62 71 67  76 70   Temp  35.8 °C (96.4 °F) 36.8 °C (98.2 °F) 37.1 °C (98.8 °F)   36.9 °C (98.4 °F)   Resp 20 21 20 16   Weight (lb)     144.62     BMI     24.82 kg/m2     BSA (m2)     1.72 m2          Physical exam  Constitutional: Normal appearance.  HEENT: Normocephalic and atraumatic.  Cardiovascular: Normal rate and regular rhythm.  Pulmonary: Good air movements bilaterally mild expiratory wheeze.  Musculoskeletal: No edema, no cyanosis.  Neurological: Awake, alert and oriented x3.  Psychiatric: Normal behavior, mood and affect.    Labs:  Lab Results   Component Value Date    WBC 9.9 09/21/2024    HGB 8.3 (L) 09/21/2024    HCT 28.5 (L) 09/21/2024    MCV 78 (L) 09/21/2024     09/21/2024      Lab Results   Component Value Date    GLUCOSE 138 (H) 09/21/2024    CALCIUM 8.6 09/21/2024     09/21/2024    K 3.7 09/21/2024    CO2 27 09/21/2024     09/21/2024    BUN 30 (H) 09/21/2024    CREATININE 1.09 (H) 09/21/2024      Lab Results   Component Value Date    ALT 28 09/17/2024    AST 22 09/17/2024    ALKPHOS 72 09/17/2024    BILITOT 0.5 09/17/2024        Oxygen Therapy  SpO2: 100 %  Medical Gas Therapy: None (Room air) (breathing treatment)  Medical Gas Delivery Method: Nasal cannula  FiO2 (%):  [21 %-28 %] 21 %    Intake/Output last 3 Shifts:  I/O last 3 completed shifts:  In: 700 (10 mL/kg) [P.O.:700]  Out: 3400 (48.6 mL/kg) [Urine:3400 (1.3 " mL/kg/hr)]  Weight: 70 kg       Medications   Scheduled medications  baclofen, 10 mg, oral, q AM  baclofen, 20 mg, oral, Nightly  bumetanide, 1 mg, oral, Daily with evening meal  bumetanide, 2 mg, oral, Daily  clopidogrel, 75 mg, oral, Daily  digoxin, 125 mcg, oral, Daily  dilTIAZem CD, 120 mg, oral, Nightly  enoxaparin, 40 mg, subcutaneous, q24h  fluticasone furoate-vilanteroL, 1 puff, inhalation, Daily  hydroxychloroquine, 300 mg, oral, Daily  ipratropium-albuteroL, 3 mL, nebulization, TID  methylPREDNISolone sodium succinate (PF), 60 mg, intravenous, BID  montelukast, 10 mg, oral, Nightly  pantoprazole, 40 mg, oral, BID  polyethylene glycol, 17 g, oral, Daily  rosuvastatin, 20 mg, oral, Nightly  sucralfate, 1 g, oral, q6h [facility]  sulfaSALAzine, 500 mg, oral, BID      Continuous medications     PRN medications  PRN medications: acetaminophen, ipratropium-albuteroL, ondansetron, oxyCODONE, [Held by provider] oxyCODONE-acetaminophen, oxygen, oxygen     Allergies  Abatacept, Hydrocodone-acetaminophen, Hydromorphone, Metoprolol, Penicillins, Pregabalin, Prochlorperazine, Methotrexate, Aripiprazole, Beta-blockers (beta-adrenergic blocking agts), Bupropion, Cefepime, Ciprofloxacin, Furosemide, Levofloxacin, and Pineapple    Chest Radiograph   CT angio chest for pulmonary embolism 09/18/2024    Narrative  Interpreted By:  Campbell Pearson,  STUDY:  CT ANGIO CHEST FOR PULMONARY EMBOLISM;  9/18/2024 12:31 am    INDICATION:  Signs/Symptoms:SOB CHEST PAIN.    COMPARISON:  08/08/2024    ACCESSION NUMBER(S):  [id number]    ORDERING CLINICIAN:  JAMMIE SPENCE    TECHNIQUE:  Contiguous axial images of the chest were obtained after the  intravenous administration of iodinated contrast using angiographic  PE protocol. Coronal and sagittal reformatted images were  reconstructed from the axial data. MIP images were created on an  independent workstation and reviewed.    FINDINGS:  There is streak artifact from the arms which were not  raised.    No significant pulmonary artery embolism.    Moderate cardiomegaly is seen. Left-sided AICD present.    Ectasia ascending aorta measuring 4.2 cm in diameter.    Moderate right pleural effusion.    There is some areas of ground-glass opacity both lungs which may  reflect atelectasis/edema. Correlate with CHF.    Multilevel moderate spondylotic degeneration seen thoracic spine.    Impression  No significant pulmonary embolism.    Moderate cardiomegaly/suspected CHF with moderate right effusion.    MACRO:  None.    Signed by: Campbell Pearson 9/18/2024 12:58 AM  Dictation workstation:   [id number]       XR chest 1 view 09/17/2024    Narrative  Interpreted By:  Campbell Pearson,  STUDY:  XR CHEST 1 VIEW;  9/17/2024 11:37 pm    INDICATION:  Signs/Symptoms:fluid overload.    COMPARISON:  08/08/2024    ACCESSION NUMBER(S):  [id number]    ORDERING CLINICIAN:  JAMMIE SPENCE    FINDINGS:  Moderate cardiomegaly and left-sided pacemaker noted. Mild vascular  congestion. No consolidation or pneumothorax.    Impression  Mild vascular congestion.    MACRO:  None    Signed by: Campbell Pearson 9/18/2024 12:11 AM  Dictation workstation:   [id number]         Assessment and Plan / Recommendations   Assessment/Plan   74 y.o. female with history of hypertension, HFrEF, A-fib, CKD, RA, asthma admitted with acute hypoxic respiratory failure     1.  Acute hypoxic respiratory failure due to CHF and asthma exacerbation  2.  Asthma exacerbation  3.  Decompensated heart failure with right pleural effusion  4.  BARBARA on BiPAP     Feeling much better today, on room air    -Continue diuresis   -Discharge on prednisone 40 taper 10 mg every 3 days,   -Discharge on Breo, albuterol as needed  -Home BiPAP  -Follow-up with pulmonary in 1 to 2 months    Amie Pimentel MD

## 2024-09-21 NOTE — PROGRESS NOTES
"Trina SALDANA Via \"Marcela\" is a 74 y.o. female on day 2 of admission presenting with Acute hypoxic on chronic hypercapnic respiratory failure (Multi).      Subjective     Trina was sitting up in her chair, reports that she is feeling much better, she is below her baseline weight and she is eager to go home. She reports she feels back to baseline.     Review of systems:  Constitutional: negative for fever, chills, or malaise  Neuro: negative for dizziness, headache, numbness, tingling  ENT: Negative for nasal congestion or sore throat  Resp: positive for shortness of breath, cough, or wheezing  CV: negative for chest pain, palpitations  GI: negative for abd pain, nausea, vomiting or diarrhea  : negative for dysuria, frequency, or urgency  Skin: negative for lesions, wounds, or rash  Musculoskeletal: Negative for weakness, myalgia, or arthralgia  Endocrine: Negative for polyuria or polydipsia      Objective   Constitutional: Well developed, awake/alert/oriented x3, no distress, alert and cooperative  Eyes: PERRL, EOMI, clear sclera  ENMT: mucous membranes moist, no apparent injury, no lesions seen  Head/Neck: Neck supple, no apparent injury, thyroid without mass or tenderness, No JVD, trachea midline, no bruits  Respiratory/Thorax: Patent airways, lungs diminished, crackles in bases, normal breath sounds with good chest expansion, thorax symmetric  Cardiovascular: Regular, rate and rhythm, no murmurs, 2+ equal pulses of the extremities, normal S 1and S 2  Gastrointestinal: Nondistended, soft, non-tender, no rebound tenderness or guarding, no masses palpable, no organomegaly, +BS, no bruits  Musculoskeletal: ROM intact, no joint swelling, normal strength  Extremities: normal extremities, no cyanosis edema, contusions or wounds, no clubbing  Neurological: alert and oriented x3, intact senses, motor, response and reflexes, normal strength  Lymphatic: No significant lymphadenopathy  Psychological: Appropriate mood and " "behavior  Skin: Warm and dry, no lesions, no rashes      Last Recorded Vitals  /63   Pulse 76   Temp 37.1 °C (98.8 °F) (Temporal)   Resp 20   Ht 1.626 m (5' 4\")   Wt 65.6 kg (144 lb 10 oz)   SpO2 92%   BMI 24.82 kg/m²     Intake/Output last 3 Shifts:  I/O last 3 completed shifts:  In: 700 (10 mL/kg) [P.O.:700]  Out: 3400 (48.6 mL/kg) [Urine:3400 (1.3 mL/kg/hr)]  Weight: 70 kg   No intake/output data recorded.    Relevant Results  Scheduled medications  baclofen, 10 mg, oral, q AM  baclofen, 20 mg, oral, Nightly  bumetanide, 1 mg, oral, Daily with evening meal  bumetanide, 2 mg, oral, Daily  clopidogrel, 75 mg, oral, Daily  digoxin, 125 mcg, oral, Daily  dilTIAZem CD, 120 mg, oral, Nightly  enoxaparin, 40 mg, subcutaneous, q24h  fluticasone furoate-vilanteroL, 1 puff, inhalation, Daily  hydroxychloroquine, 300 mg, oral, Daily  ipratropium-albuteroL, 3 mL, nebulization, TID  methylPREDNISolone sodium succinate (PF), 60 mg, intravenous, BID  montelukast, 10 mg, oral, Nightly  pantoprazole, 40 mg, oral, BID  polyethylene glycol, 17 g, oral, Daily  rosuvastatin, 20 mg, oral, Nightly  sucralfate, 1 g, oral, q6h DWIGHT  sulfaSALAzine, 500 mg, oral, BID      Continuous medications     PRN medications  PRN medications: acetaminophen, ipratropium-albuteroL, ondansetron, oxyCODONE, [Held by provider] oxyCODONE-acetaminophen, oxygen, oxygen    Results for orders placed or performed during the hospital encounter of 09/17/24 (from the past 24 hour(s))   Magnesium   Result Value Ref Range    Magnesium 2.63 (H) 1.60 - 2.40 mg/dL   Renal Function Panel   Result Value Ref Range    Glucose 138 (H) 74 - 99 mg/dL    Sodium 141 136 - 145 mmol/L    Potassium 3.7 3.5 - 5.3 mmol/L    Chloride 103 98 - 107 mmol/L    Bicarbonate 27 21 - 32 mmol/L    Anion Gap 15 10 - 20 mmol/L    Urea Nitrogen 30 (H) 6 - 23 mg/dL    Creatinine 1.09 (H) 0.50 - 1.05 mg/dL    eGFR 54 (L) >60 mL/min/1.73m*2    Calcium 8.6 8.6 - 10.3 mg/dL    Phosphorus " 4.6 2.5 - 4.9 mg/dL    Albumin 3.8 3.4 - 5.0 g/dL   CBC   Result Value Ref Range    WBC 9.9 4.4 - 11.3 x10*3/uL    nRBC 0.0 0.0 - 0.0 /100 WBCs    RBC 3.66 (L) 4.00 - 5.20 x10*6/uL    Hemoglobin 8.3 (L) 12.0 - 16.0 g/dL    Hematocrit 28.5 (L) 36.0 - 46.0 %    MCV 78 (L) 80 - 100 fL    MCH 22.7 (L) 26.0 - 34.0 pg    MCHC 29.1 (L) 32.0 - 36.0 g/dL    RDW 17.2 (H) 11.5 - 14.5 %    Platelets 235 150 - 450 x10*3/uL       CT angio chest for pulmonary embolism   Final Result   No significant pulmonary embolism.        Moderate cardiomegaly/suspected CHF with moderate right effusion.        MACRO:   None.        Signed by: Campbell Pearson 9/18/2024 12:58 AM   Dictation workstation:   [id number]      XR chest 1 view   Final Result   Mild vascular congestion.        MACRO:   None        Signed by: Campbell Pearson 9/18/2024 12:11 AM   Dictation workstation:   [id number]          Transthoracic Echo (TTE) Complete    Result Date: 7/25/2024   [facility], [address] [place] [address]               Tel [phone] and Fax [phone] TRANSTHORACIC ECHOCARDIOGRAM REPORT  Patient Name:      VERNON SALDANA MITZI         Reading Physician:    [id number] Neo Gutierrez MD Study Date:        7/25/2024            Ordering Provider:    [id number] STEPHON YARBROUGH MRN/PID:           [id number]             Fellow: Accession#:        [id number]         Nurse: Date of Birth/Age: 1950 / 73 years Sonographer:          Viola Lau RDCS Gender:            F                    Additional Staff: Height:            162.56 cm            Admit Date:           7/24/2024 Weight:            72.12 kg             Admission Status:     Inpatient -                                                               Routine BSA / BMI:         1.77 m2 / 27.29       Encounter#:           [id number]                    kg/m2 Blood Pressure:    108/69 mmHg          Department Location:  [facility] Non                                                               Invasive Study Type:    TRANSTHORACIC ECHO (TTE) COMPLETE Diagnosis/ICD: Chest pain, unspecified-R07.9 Indication:    CP CPT Code:      Echo Complete w Full Doppler-27118 Patient History: Pertinent History: A-Fib, CAD, Hyperlipidemia, COPD, Chest Pain and S/P Left                    shoulder surgery,Watchman, CKD. Study Detail: The following Echo studies were performed: 2D, M-Mode, Doppler and               color flow. Technically challenging study due to patient lying in               supine position and S/P left shoulder surgery. Patient has a               defibrillator.  PHYSICIAN INTERPRETATION: Left Ventricle: The left ventricular systolic function is mildly decreased, with a visually estimated ejection fraction of 45-50%. There are no regional wall motion abnormalities. The left ventricular cavity size is normal. There is mild concentric left ventricular hypertrophy. Spectral Doppler shows a pseudonormal pattern of left ventricular diastolic filling. Left Atrium: The left atrium is normal in size. Right Ventricle: The right ventricle is normal in size. There is normal right ventricular global systolic function. A device is visualized in the right ventricle. Right Atrium: The right atrium is normal in size. Aortic Valve: The aortic valve is trileaflet. There is mild aortic valve cusp calcification. There is evidence of mildly elevated transaortic gradients consistent with sclerosis of the aortic valve. The aortic valve dimensionless index is 0.71. There is moderate aortic valve regurgitation. The peak instantaneous gradient of the aortic valve is 14.6 mmHg. The mean gradient of the aortic valve is 8.0 mmHg. Mitral Valve: The mitral valve is normal in structure. There is moderate mitral annular calcification.  There is mild mitral valve regurgitation. Tricuspid Valve: The tricuspid valve is structurally normal. There is mild tricuspid regurgitation. Pulmonic Valve: The pulmonic valve is structurally normal. There is physiologic pulmonic valve regurgitation. Pericardium: There is no pericardial effusion noted. Aorta: The aortic root is normal. Pulmonary Artery: The tricuspid regurgitant velocity is 2.43 m/s, and with an estimated right atrial pressure of 3 mmHg, the estimated pulmonary artery pressure is normal with the RVSP at 26.6 mmHg. Pulmonary Veins: The pulmonary veins appear normal and return normally to the left atrium. Systemic Veins: The inferior vena cava appears to be of normal size. There is IVC inspiratory collapse greater than 50%.  CONCLUSIONS:  1. The left ventricular systolic function is mildly decreased, with a visually estimated ejection fraction of 45-50%.  2. Spectral Doppler shows a pseudonormal pattern of left ventricular diastolic filling.  3. There is normal right ventricular global systolic function.  4. There is moderate mitral annular calcification.  5. Aortic valve sclerosis.  6. Moderate aortic valve regurgitation.  7. Normal estimated PASP and CVP. QUANTITATIVE DATA SUMMARY: 2D MEASUREMENTS:                           Normal Ranges: IVSd:          0.97 cm    (0.6-1.1cm) LVPWd:         1.16 cm    (0.6-1.1cm) LVIDd:         5.27 cm    (3.9-5.9cm) LVIDs:         4.48 cm LV Mass Index: 122.0 g/m2 LV % FS        15.0 % LA VOLUME:                               Normal Ranges: LA Vol A4C:        46.9 ml    (22+/-6mL/m2) LA Vol A2C:        46.7 ml LA Vol BP:         46.9 ml LA Vol Index A4C:  26.5ml/m2 LA Vol Index A2C:  26.3 ml/m2 LA Vol Index BP:   26.4 ml/m2 LA Area A4C:       17.6 cm2 LA Area A2C:       17.5 cm2 LA Major Axis A4C: 5.6 cm LA Major Axis A2C: 5.6 cm LA Volume Index:   24.7 ml/m2 LA Vol A4C:        42.8 ml LA Vol A2C:        43.8 ml RA VOLUME BY A/L METHOD:                        Normal Ranges: RA Area A4C: 23.0 cm2 AORTA MEASUREMENTS:                    Normal Ranges: Asc Ao, d: 3.70 cm (2.1-3.4cm) LV SYSTOLIC FUNCTION BY 2D PLANIMETRY (MOD):                      Normal Ranges: EF-A4C View:    43 % (>=55%) EF-A2C View:    42 % EF-Biplane:     44 % EF-Visual:      48 % LV EF Reported: 48 % LV DIASTOLIC FUNCTION:                     Normal Ranges: MV Peak E: 1.46 m/s (0.7-1.2 m/s) MITRAL VALVE:                      Normal Ranges: MV Vmax:    1.31 m/s (<=1.3m/s) MV peak P.9 mmHg (<5mmHg) MV mean PG: 3.0 mmHg (<2mmHg) MV DT:      197 msec (150-240msec) MITRAL INSUFFICIENCY:                           Normal Ranges: PISA Radius:  0.3 cm MR VTI:       178.00 cm MR Vmax:      496.00 cm/s MR Alias Ham: 35.1 cm/s MR Volume:    7.12 ml MR Flow Rt:   19.85 ml/s MR EROA:      0.04 cm2 AORTIC VALVE:                                    Normal Ranges: AoV Vmax:                1.91 m/s  (<=1.7m/s) AoV Peak P.6 mmHg (<20mmHg) AoV Mean P.0 mmHg  (1.7-11.5mmHg) LVOT Max Ham:            1.33 m/s  (<=1.1m/s) AoV VTI:                 37.70 cm  (18-25cm) LVOT VTI:                26.90 cm LVOT Diameter:           2.00 cm   (1.8-2.4cm) AoV Area, VTI:           2.24 cm2  (2.5-5.5cm2) AoV Area,Vmax:           2.19 cm2  (2.5-4.5cm2) AoV Dimensionless Index: 0.71 AORTIC INSUFFICIENCY: AI Vmax:       4.01 m/s AI Half-time:  441 msec AI Decel Rate: 267.00 cm/s2  RIGHT VENTRICLE: RV Basal 3.67 cm RV Mid   2.60 cm RV Major 8.8 cm TAPSE:   28.0 mm RV s'    0.12 m/s TRICUSPID VALVE/RVSP:                             Normal Ranges: Peak TR Velocity: 2.43 m/s RV Syst Pressure: 26.6 mmHg (< 30mmHg) IVC Diam:         1.58 cm PULMONIC VALVE:                      Normal Ranges: PV Max Ham: 1.5 m/s  (0.6-0.9m/s) PV Max P.6 mmHg  [id number] Neo Gutierrez MD Electronically signed on 2024 at 6:23:17 PM  ** Final **     Transthoracic Echo (TTE) Limited    Result Date: 2024   [facility], [address] [place] [address]                Tel [phone] and Fax [phone] TRANSTHORACIC ECHOCARDIOGRAM REPORT  Patient Name:      VERNON SALDANA VIA         Reading Physician:    [id number] Monica Chen MD Study Date:        7/16/2024            Ordering Provider:    [id number] KRISTAL SNYDER MRN/PID:           [id number]             Fellow: Accession#:        [id number]         Nurse: Date of Birth/Age: 1950 / 73 years Sonographer:          Tobi Carlos RDCS Gender:            F                    Additional Staff: Height:            162.56 cm            Admit Date:           7/16/2024 Weight:            70.31 kg             Admission Status:     Inpatient -                                                               Routine BSA / BMI:         1.76 m2 / 26.61      Encounter#:           [id number]                    kg/m2 Blood Pressure:    /                    Department Location:  [facility]                                                               Cath Lab Study Type:    TRANSTHORACIC ECHO (TTE) LIMITED Diagnosis/ICD: Unspecified atrial fibrillation-I48.91 Indication:    Atrial fibrillation; Preop cardiovascular exam CPT Code:      Echo Limited-74626 Patient History: Pertinent History: A-fib; CAD; BARBARA; HTN; HLD; CKD; PPM. Study Detail: The following Echo studies were performed: 2D and M-Mode.               Technically challenging study due to body habitus and patient               lying in supine position.  PHYSICIAN INTERPRETATION: Left Ventricle: Left ventricular ejection fraction is low normal, by visual estimate at 50-55%. The patient is in atrial fibrillation which may influence the estimate of left ventricular function and transvalvular flows. Wall motion is abnormal. The left  ventricular cavity size was not assessed. Left ventricular diastolic filling was not assessed. Possible inferior wall motion hypokinesis. Left Atrium: The left atrium was not assessed. Right Ventricle: The right ventricle was not well visualized. There is normal right ventricular global systolic function. A device is visualized in the right ventricle. Right Atrium: The right atrium was not assessed. There is a device visualized in the right atrium. Aortic Valve: The aortic valve is trileaflet. There is mild aortic valve cusp calcification. Aortic valve regurgitation was not assessed. Mitral Valve: The mitral valve is mildly thickened. There is moderate mitral annular calcification. Mitral valve regurgitation was not assessed. Tricuspid Valve: The tricuspid valve was not well visualized. Tricuspid regurgitation was not assessed. Pulmonic Valve: The pulmonic valve is not well visualized. Pulmonic valve regurgitation was not assessed. Pericardium: There is a trivial pericardial effusion. Aorta: The aortic root is normal. Systemic Veins: The inferior vena cava appears to be of normal size. There is IVC inspiratory collapse greater than 50%. In comparison to the previous echocardiogram(s): Compared with the prior exam from 2/27/2024 there are no significant changes though today's exam is only a limited study withno assessment of valvular function.  CONCLUSIONS:  1. Left ventricular ejection fraction is low normal, by visual estimate at 50-55%.  2. Possible inferior wall motion hypokinesis.  3. There is normal right ventricular global systolic function.  4. There is moderate mitral annular calcification.  5. Compared with the prior exam from 2/27/2024 there are no significant changes though today's exam is only a limited study withno assessment of valvular function.  6. The patient is in atrial fibrillation which may influence the estimate of left ventricular function and transvalvular flows. QUANTITATIVE DATA SUMMARY:  AORTA MEASUREMENTS:                      Normal Ranges: Ao Sinus, d: 3.45 cm (2.1-3.5cm) LV SYSTOLIC FUNCTION BY 2D PLANIMETRY (MOD):                      Normal Ranges: EF-Visual:      53 % LV EF Reported: 53 % TRICUSPID VALVE/RVSP:                   Normal Ranges: IVC Diam: 2.03 cm  [id number] Monica Chen MD Electronically signed on 7/16/2024 at 3:11:21 PM  ** Final **     Transthoracic Echo (TTE) Limited    Result Date: 7/16/2024   [facility], [address] [place] [address]                Tel [phone] and Fax [phone] TRANSTHORACIC ECHOCARDIOGRAM REPORT  Patient Name:      VERNON SALDANA VIA         Reading Physician:    [id number] Calvillo MD Study Date:        7/16/2024            Ordering Provider:    [id number] KRISTAL SNYDER MRN/PID:           [id number]             Fellow:               [id number] Lucina Gill MD Accession#:        [id number]         Nurse: Date of Birth/Age: 1950 / 73 years Sonographer:          Tobi Carlos RDCS Gender:            F                    Additional Staff: Height:            162.56 cm            Admit Date:           7/16/2024 Weight:            71.22 kg             Admission Status:     Inpatient -                                                               Routine BSA / BMI:         1.76 m2 / 26.95      Encounter#:           [id number]                    kg/m2 Blood Pressure:    117/55 mmHg          Department Location:  [facility]                                                               Cath Lab Study Type:    TRANSTHORACIC ECHO (TTE) LIMITED Diagnosis/ICD: Other specified postprocedural states-Z98.890 Indication:    Post LAAO CPT Code:      Echo Limited-03744 Patient History: Pertinent  History: CAD; A-fib; BARBARA: HTN; HLD; CKD; PPM. Study Detail: The following Echo studies were performed: 2D and M-Mode.               Technically challenging study due to body habitus.  PHYSICIAN INTERPRETATION: Left Ventricle: Left ventricular ejection fraction is low normal, by visual estimate at 50-55%. There are no regional wall motion abnormalities. The left ventricular cavity size is normal. Left ventricular diastolic filling was not assessed. Left Atrium: The left atrium was not assessed. Right Ventricle: The right ventricle was not assessed. Right ventricular systolic function not assessed. A device is visualized in the right ventricle. Right Atrium: The right atrium was not assessed. There is a device visualized in the right atrium. Aortic Valve: The aortic valve is trileaflet. There is mild aortic valve cusp calcification. Aortic valve regurgitation was not assessed. Mitral Valve: The mitral valve is mildly thickened. There is moderate mitral annular calcification. Mitral valve regurgitation was not assessed. Tricuspid Valve: The tricuspid valve was not well visualized. Tricuspid regurgitation was not assessed. Right ventricular systolic pressure could not be accurately assessed in this patient. Pulmonic Valve: The pulmonic valve was not assessed. Pulmonic valve regurgitation was not assessed. Pericardium: There is a trivial pericardial effusion. Aorta: The aortic root is normal. Systemic Veins: The inferior vena cava was not well visualized. In comparison to the previous echocardiogram(s): Compared with study dated 2/27/2024, no significant change.  CONCLUSIONS:  1. Left ventricular ejection fraction is low normal, by visual estimate at 50-55%.  2. There is moderate mitral annular calcification.  3. There is a trivial pericardial effusion.  4. Compared with study dated 2/27/2024, no significant change. QUANTITATIVE DATA SUMMARY: LV SYSTOLIC FUNCTION BY 2D PLANIMETRY (MOD):                      Normal  Ranges: EF-Visual:      53 % LV EF Reported: 53 %  [id number] Neftali Calvillo MD Electronically signed on 7/16/2024 at 3:02:13 PM  ** Final **     Transthoracic Echo (TTE) Complete    Result Date: 2/27/2024   [facility], [address] [place] [address]               Tel [phone] and Fax [phone] TRANSTHORACIC ECHOCARDIOGRAM REPORT  Patient Name:      VERNON SALDANA VIA         Reading Physician:    [id number] Nohelia Soto MD Study Date:        2/27/2024            Ordering Provider:    [id number] NIKOLE LOVE MRN/PID:           [id number]             Fellow: Accession#:        [id number]         Nurse: Date of Birth/Age: 1950 / 73 years Sonographer:          Dai Diaz RDCS Gender:            F                    Additional Staff: Height:            162.56 cm            Admit Date:           2/24/2024 Weight:            72.57 kg             Admission Status:     Inpatient -                                                               Routine BSA / BMI:         1.78 m2 / 27.46      Encounter#:           [id number]                    kg/m2                                         Department Location:  [facility] Non                                                               Invasive Blood Pressure: 131 /59 mmHg Study Type:    TRANSTHORACIC ECHO (TTE) COMPLETE Diagnosis/ICD: Shortness of breath-R06.02 Indication:    Dyspnea CPT Code:      Echo Complete w Full Doppler-00027 Patient History: Pertinent History: A-Fib and Chest Pain. elevated troponin, elevated BNP, mod                    AS. Study Detail: The following Echo studies were performed: 2D, M-Mode, Doppler and               color flow.  PHYSICIAN INTERPRETATION: Left Ventricle: The left ventricular systolic function is normal, with an estimated  ejection fraction of 55-60%. There are no regional wall motion abnormalities. The left ventricular cavity size is normal. Spectral Doppler shows an impaired relaxation pattern of left ventricular diastolic filling. Left Atrium: The left atrium is mildly dilated. Right Ventricle: The right ventricle is normal in size. There is normal right ventricular global systolic function. Right Atrium: The right atrium is normal in size. Aortic Valve: The aortic valve is trileaflet. There is mild to moderate aortic valve cusp calcification. There is evidence of moderate aortic valve stenosis. There is trivial aortic valve regurgitation. The peak instantaneous gradient of the aortic valve is 27.5 mmHg. The mean gradient of the aortic valve is 16.0 mmHg. Mitral Valve: The mitral valve is mildly thickened. There is evidence of mild mitral valve stenosis. The doppler estimated mean and peak diastolic pressure gradients are 7.2 mmHg and 16.3 mmHg respectively. There is mild mitral annular calcification. There is mild to moderate mitral valve regurgitation. Tricuspid Valve: The tricuspid valve is structurally normal. There is mild to moderate tricuspid regurgitation. Pulmonic Valve: The pulmonic valve is not well visualized. There is trace to mild pulmonic valve regurgitation. Pericardium: There is no pericardial effusion noted. Aorta: The aortic root is normal. Pulmonary Artery: The tricuspid regurgitant velocity is 3.46 m/s, and with an estimated right atrial pressure of 3 mmHg, the estimated pulmonary artery pressure is mild to moderately elevated with the RVSP at 50.9 mmHg.  CONCLUSIONS:  1. Left ventricular systolic function is normal with a 55-60% estimated ejection fraction.  2. Spectral Doppler shows an impaired relaxation pattern of left ventricular diastolic filling.  3. Mild to moderate mitral valve regurgitation.  4. Mild to moderate tricuspid regurgitation visualized.  5. Moderate aortic valve stenosis.  6. Mild to  moderately elevated pulmonary artery pressure. QUANTITATIVE DATA SUMMARY: 2D MEASUREMENTS:                           Normal Ranges: IVSd:          0.97 cm    (0.6-1.1cm) LVPWd:         0.98 cm    (0.6-1.1cm) LVIDd:         5.70 cm    (3.9-5.9cm) LVIDs:         4.26 cm LV Mass Index: 123.2 g/m2 LV % FS        25.2 % LA VOLUME:                              Normal Ranges: LA Vol A4C:       77.4 ml    (22+/-6mL/m2) LA Vol A2C:       83.5 ml LA Vol BP:        82.1 ml LA Vol Index A4C: 43.5 ml/m2 LA Vol Index A2C: 46.9 ml/m2 LA Vol Index BP:  46.1 ml/m2 LA Volume Index:  46.1 ml/m2 LA Vol A4C:       72.2 ml LA Vol A2C:       77.0 ml RA VOLUME BY A/L METHOD:                       Normal Ranges: RA Area A4C: 21.8 cm2 M-MODE MEASUREMENTS:                  Normal Ranges: Ao Root: 3.30 cm (2.0-3.7cm) LAs:     4.72 cm (2.7-4.0cm) LV SYSTOLIC FUNCTION BY 2D PLANIMETRY (MOD):                     Normal Ranges: EF-A4C View: 55.9 % (>=55%) EF-A2C View: 56.5 % EF-Biplane:  55.9 % LV DIASTOLIC FUNCTION:                             Normal Ranges: MV Peak E:      1.91 m/s    (0.7-1.2 m/s) MV Peak A:      0.93 m/s    (0.42-0.7 m/s) E/A Ratio:      2.06        (1.0-2.2) MV e'           0.09 m/s    (>8.0) MV lateral e'   0.09 m/s MV medial e'    0.08 m/s E/e' Ratio:     21.21       (<8.0) PulmV Sys Ham:  62.35 cm/s PulmV Alvarez Ham: 121.32 cm/s PulmV S/D Ham:  0.51 MITRAL VALVE:                       Normal Ranges: MV Vmax:    2.02 m/s  (<=1.3m/s) MV peak P.3 mmHg (<5mmHg) MV mean P.2 mmHg  (<2mmHg) MV VTI:     48.44 cm  (10-13cm) MV DT:      167 msec  (150-240msec) MITRAL INSUFFICIENCY:                         Normal Ranges: MR VTI:     192.66 cm MR Vmax:    568.98 cm/s MR Volume:  33.56 ml MR Flow Rt: 99.12 ml/s MR EROA:    0.17 cm2 AORTIC VALVE:                                    Normal Ranges: AoV Vmax:                2.62 m/s  (<=1.7m/s) AoV Peak P.5 mmHg (<20mmHg) AoV Mean P.0 mmHg  (1.7-11.5mmHg) LVOT Max Ham:            0.99 m/s  (<=1.1m/s) AoV VTI:                 58.21 cm  (18-25cm) LVOT VTI:                21.96 cm LVOT Diameter:           1.95 cm   (1.8-2.4cm) AoV Area, VTI:           1.13 cm2  (2.5-5.5cm2) AoV Area,Vmax:           1.13 cm2  (2.5-4.5cm2) AoV Dimensionless Index: 0.38 AORTIC INSUFFICIENCY: AI Vmax:       3.91 m/s AI Half-time:  520 msec AI Decel Time: 1794 msec AI Decel Rate: 218.17 cm/s2  RIGHT VENTRICLE: RV Basal 4.30 cm RV Mid   2.60 cm RV Major 7.7 cm TAPSE:   27.0 mm RV s'    0.18 m/s TRICUSPID VALVE/RVSP:                             Normal Ranges: Peak TR Velocity: 3.46 m/s RV Syst Pressure: 50.9 mmHg (< 30mmHg) PULMONIC VALVE:                      Normal Ranges: PV Max Ham: 1.5 m/s  (0.6-0.9m/s) PV Max P.8 mmHg Pulmonary Veins: PulmV Alvarez Ham: 121.32 cm/s PulmV S/D Ham:  0.51 PulmV Sys Ham:  62.35 cm/s  [id number] Nohelia Soto MD Electronically signed on 2024 at 11:22:42 AM  ** Final **           Assessment/Plan   Principal Problem:    Acute hypoxic on chronic hypercapnic respiratory failure (Multi)  Active Problems:    Chest pain, unspecified type    LHC in  showed normal coronary arteries        Acute on chronic diastolic CHF with mildly reduced EF 45-50%  -Hx of tachycardia induced CMO and hx of VT  -->highest it has been  -CXR showed vascular congestion  -CTA showed moderate right pleural effusion  -I reviewed the Echocardiograms as above  -Strict I & Os  -Daily weights->reports an 8lb weight increase in 2 days      --Today's weight 154 pounds, now below her baseline  - Weight today was 151 lbs  -2gm na diet  -1500mL fluid restriction  -Bumex previously decreased from 2mg BID to 1mg BID due to hypotension->pt then presented with CHF and dose changed to 2mg in the am and 1mg in pm  -Cont Bumex PO  -Pulmonary consulted for possible thoracentesis->does not need at this time  -Given additional Bumex 1mg IV x1 () and ()  -Can do additional  Bumex 1mg in afternoon at home for weight gain or edema  -May benefit from Cardiomems implant as outpt  - Plan to discharge home with Bumex 2 mg in the am and 1 mg in the evening. If she gains more than 2 lbs then she will take 2 mg in the evening. She will call our office for a weight increase of more than 5 lbs.      2. Elevated troponins-Non MI elevation  -[facility] as above  -CTA showed pleural effusions  -Troponin 35, 34  -EKG atrial paced  -Cont plavix, statin  -Allergy to BB     3. Asthma/COPD exac  -steroids  -bronchodilators  -Oxygen support  -CTA showed CHF and pleural effusion  -Pulmonary following  -Bronchial hygiene     4. Paroxysmal atrial fibrillation  -Currently atrial paced  -[place] [facility] PCM/ICD  -Not on AC, has watchman  -Cont plavix  -Cont digoxin and diltiazem  -Digoxin level 0.9  -Monitor on tele     5. Chronic anemia  -Hgb 8.5->8.6->8.1  -Prior iron studies reviewed  -pt reports severe constipation with PO iron  -Stop aspirin  -Cont plavix only  -Monitor     6. Hyperlipidemia  -Cont statin         Lucy Kelly, APRN-CNP

## 2024-09-21 NOTE — SIGNIFICANT EVENT
When RT entered room to take off vest and aerosol tx, RN had already removed vest because patient was complaining she was having difficulty breathing. Patient's saturation was 97% on 2L nasal cannula she was in high fowlers but at that time RT assisted patient to sit up at the side of the bed so she could use her bedside table to tripod. RT got a fan and placed on table which helped with extra air flow and continued to have her breath in her nose and out her mouth. Patient seemed to be gagging on some mucus in the back of her throat but was able to cough it up. Saturations never dropped and patient was moving good air, did have some expiratory wheezing which she had prior to her breathing treatment. Also asked patient if she would like to wear her BiPap to help with her breathing but she declined, stating she could not wear it at this time. Patient is on continuous pulse oximetry and LIVIA Zambrano aware.

## 2024-09-21 NOTE — DISCHARGE INSTRUCTIONS
Following recommendations were made on your discharge:    - Follow up with PCP for post hospital discharge  - Follow up with Pulmonary for Asthma management  - Follow up with Cardiology for CHF management  - START take Steroid-tapered course and Maintenance Inhaler (Symbicort) for Asthma:  Prednisone 40mg (4 pills of 10mg) x 3 days starting 9/22/24, followed by  Prednisone 30mg (3pills of 10mg) x 3 days, followed by  Prednisone 20mg (2 pills of 10mg) x 3 days, followed by  Prednisone 10mg (1 pill of 10mg) x 3 days, followed by  Prednisone 5mg (1/2 pill of 10mg) x 3days, then  STOP.  - CONTINUE taking oral Bumex 2mg in the am and 1mg in pm.  If you gain more than 2 lbs then take 2 mg in the evening. Call Cardiology office for a weight increase of more than 5 lbs.     /appointment services has been requested to make an appointment for you, however if you do not hear back from them within 1 to 2 days, please call your primary physician's office to schedule an appointment. Bring your photo ID and insurance card to your appointment.   [facility]  services can be reached at [phone] and appointment services can be reached at [phone].    - Please, call   or appointment services and schedule a follow-up with your PCP within 1-2 weeks after you leave the hospital.    3) If you experience any worsening symptoms or have any concerns, please contact your primary care provider to schedule an appointment. If you cannot get in touch with your primary care physician, please return to the nearest emergency room or urgent care clinic for an evaluation and treatment.    Thank you for choosing [facility] and allowing us to partake in your medical care!    - Your primary care inpatient team.

## 2024-09-23 ENCOUNTER — TELEPHONE (OUTPATIENT)
Dept: INTERNAL MEDICINE | Facility: HOSPITAL | Age: 74
End: 2024-09-23
Payer: MEDICARE

## 2024-09-23 LAB
ATRIAL RATE: 60 BPM
ATRIAL RATE: 61 BPM
PR INTERVAL: 260 MS
PR INTERVAL: 266 MS
Q ONSET: 218 MS
Q ONSET: 219 MS
QRS COUNT: 10 BEATS
QRS COUNT: 10 BEATS
QRS DURATION: 90 MS
QRS DURATION: 90 MS
QT INTERVAL: 434 MS
QT INTERVAL: 436 MS
QTC CALCULATION(BAZETT): 434 MS
QTC CALCULATION(BAZETT): 438 MS
QTC FREDERICIA: 434 MS
QTC FREDERICIA: 438 MS
R AXIS: 20 DEGREES
R AXIS: 50 DEGREES
T AXIS: 174 DEGREES
T AXIS: 47 DEGREES
T OFFSET: 435 MS
T OFFSET: 437 MS
VENTRICULAR RATE: 60 BPM
VENTRICULAR RATE: 61 BPM

## 2024-09-23 NOTE — TELEPHONE ENCOUNTER
Patient called today to checkup on patient to see how she is doing post discharge.   No response to call going to voicemail. Left voicemail message with callback number.  Will update note if patient calls back.     Sy Tyler DO  Hospitalist, [facility]

## 2024-09-24 ENCOUNTER — INFUSION (OUTPATIENT)
Dept: HEMATOLOGY/ONCOLOGY | Facility: CLINIC | Age: 74
End: 2024-09-24
Payer: MEDICARE

## 2024-09-24 ENCOUNTER — PATIENT OUTREACH (OUTPATIENT)
Dept: PRIMARY CARE | Facility: CLINIC | Age: 74
End: 2024-09-24

## 2024-09-24 VITALS
DIASTOLIC BLOOD PRESSURE: 75 MMHG | HEIGHT: 62 IN | WEIGHT: 149.03 LBS | RESPIRATION RATE: 16 BRPM | SYSTOLIC BLOOD PRESSURE: 136 MMHG | TEMPERATURE: 96.8 F | HEART RATE: 62 BPM | BODY MASS INDEX: 27.43 KG/M2 | OXYGEN SATURATION: 98 %

## 2024-09-24 DIAGNOSIS — D50.8 IRON DEFICIENCY ANEMIA SECONDARY TO INADEQUATE DIETARY IRON INTAKE: ICD-10-CM

## 2024-09-24 PROCEDURE — 96374 THER/PROPH/DIAG INJ IV PUSH: CPT | Mod: INF

## 2024-09-24 PROCEDURE — 2500000004 HC RX 250 GENERAL PHARMACY W/ HCPCS (ALT 636 FOR OP/ED): Performed by: FAMILY MEDICINE

## 2024-09-24 RX ORDER — ALBUTEROL SULFATE 0.83 MG/ML
3 SOLUTION RESPIRATORY (INHALATION) AS NEEDED
Status: CANCELLED | OUTPATIENT
Start: 2024-09-27

## 2024-09-24 RX ORDER — EPINEPHRINE 0.3 MG/.3ML
0.3 INJECTION SUBCUTANEOUS EVERY 5 MIN PRN
Status: CANCELLED | OUTPATIENT
Start: 2024-09-27

## 2024-09-24 RX ORDER — DIPHENHYDRAMINE HYDROCHLORIDE 50 MG/ML
50 INJECTION INTRAMUSCULAR; INTRAVENOUS AS NEEDED
Status: CANCELLED | OUTPATIENT
Start: 2024-09-27

## 2024-09-24 RX ORDER — FAMOTIDINE 10 MG/ML
20 INJECTION INTRAVENOUS ONCE AS NEEDED
Status: CANCELLED | OUTPATIENT
Start: 2024-09-27

## 2024-09-24 ASSESSMENT — PAIN SCALES - GENERAL: PAINLEVEL: 0-NO PAIN

## 2024-09-24 NOTE — PROGRESS NOTES
Patient tolerated Venofer well, but during the IVP the patient felt burning along the vein even though good blood return was present. After the RN decreased the IVP speed, the burning lessened and patient was able to tolerated the infusion much better. RN had to infuse the Venofer over 20 min.  Dr Elias made aware, patient Venofer was change to IVPB over 20 min for further treatments. Patient left ambulatory in stable condition.

## 2024-09-24 NOTE — PROGRESS NOTES
Discharge Facility: [facility]  Discharge Diagnosis:  acute hypoxic respiratory failure, asthma exacerbation, CHF exacerbation, pleural effusion  Admission Date: 9/17/24  Discharge Date:  9/22/24    PCP Appointment Date: Message sent to office requesting assistance with hospital follow up appt as there were none available at time of outreach.   Specialist Appointment Date:  TBD. Patient will call to schedule with cardio and pulm  Hospital Encounter and Summary Linked: Yes  See discharge assessment below for further details     Engagement  Call Start Time: 1512 (9/24/2024  3:21 PM)    Medications  Medications reviewed with patient/caregiver?: Yes (9/24/2024  3:21 PM)  Is the patient having any side effects they believe may be caused by any medication additions or changes?: No (9/24/2024  3:21 PM)  Does the patient have all medications ordered at discharge?: Yes (9/24/2024  3:21 PM)  Care Management Interventions: No intervention needed (9/24/2024  3:21 PM)  Is the patient taking all medications as directed (includes completed medication regime)?: Yes (9/24/2024  3:21 PM)  Medication Comments: See Medication List. Reviewed with Patient. START taking these medications    o budesonide-formoteroL 80-4.5 mcg/actuation inhaler; Commonly known as:   Symbicort; Inhale 2 puffs 2 times a day. Rinse mouth with water after use   to reduce aftertaste and incidence of candidiasis. Do not swallow.  o predniSONE 10 mg tablet; Commonly known as: Deltasone; Take 1 tablet (10   mg) by mouth once daily.     CHANGE how you take these medications    o clobetasol 0.05 % cream; Commonly known as: Temovate; Apply topically 2   times a day.; What changed: how much to take, when to take this, reasons   to take this (9/24/2024  3:21 PM)    Appointments  Does the patient have a primary care provider?: Yes (9/24/2024  3:21 PM)  Care Management Interventions: -- (Attempted to schedule PCP appt for hospital follow up but no appts  available in the next 14 days. message sent to PCP office for assistance.) (9/24/2024  3:21 PM)  Has the patient kept scheduled appointments due by today?: Not applicable (9/24/2024  3:21 PM)    Self Management  What is the home health agency?: n/a (9/24/2024  3:21 PM)  Has home health visited the patient within 72 hours of discharge?: Not applicable (9/24/2024  3:21 PM)  What Durable Medical Equipment (DME) was ordered?: n/a (9/24/2024  3:21 PM)    Patient Teaching  Does the patient have access to their discharge instructions?: Yes (9/24/2024  3:21 PM)  Care Management Interventions: Reviewed instructions with patient (9/24/2024  3:21 PM)  What is the patient's perception of their health status since discharge?: Improving (9/24/2024  3:21 PM)  Is the patient/caregiver able to teach back the hierarchy of who to call/visit for symptoms/problems? 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 She understands self-management of these conditions and symptoms to watch for. She has a pulse oximeter at home and reports SPO2 is 96%.  She is doing daily weights to monitor fluid status and reports this has been good. She has no edema or other sx of CHF at this time. Reports having instructions to take an extra Bumex if her weight goes up by 3 lbs and if this continues, she is to call her doctor's office. (9/24/2024  3:21 PM)    Wrap Up  Wrap Up Additional Comments: Successful transition of care outreach with patient. Patient reports doing well at home since discharge. She had an iron infusion today.  Patient denies further discharge questions/concerns/needs at time of outreach call. Emphasized that follow up appts are needed after discharge with PCP and reviewed needed follow ups with any specialties to assess response to treatment from  hospitalization. She needs to call to schedule appointments with cardiology and pulmonology and is agreeable to doing this. Attempted to schedule hospital follow up with PCP, however there were no available appts over the next 14 days. Message will be sent to the office to request assistance with schedule, patient is aware of this. (9/24/2024  3:21 PM)

## 2024-09-25 ENCOUNTER — TELEPHONE (OUTPATIENT)
Dept: PRIMARY CARE | Facility: CLINIC | Age: 74
End: 2024-09-25
Payer: MEDICARE

## 2024-09-25 DIAGNOSIS — J45.40 MODERATE PERSISTENT ASTHMA WITHOUT COMPLICATION (HHS-HCC): Primary | ICD-10-CM

## 2024-09-25 DIAGNOSIS — J43.2 CENTRILOBULAR EMPHYSEMA (MULTI): ICD-10-CM

## 2024-09-25 RX ORDER — IPRATROPIUM BROMIDE AND ALBUTEROL SULFATE 2.5; .5 MG/3ML; MG/3ML
3 SOLUTION RESPIRATORY (INHALATION) 4 TIMES DAILY PRN
Qty: 360 ML | Refills: 11 | Status: SHIPPED | OUTPATIENT
Start: 2024-09-25 | End: 2025-09-25

## 2024-09-26 NOTE — SIGNIFICANT EVENT
Follow Up Phone Call    Outgoing phone call    Spoke to: Trina LES Via Relationship:self   Phone number: [phone]      Outcome: contacted patient/ family   Chief Complaint   Patient presents with    Shortness of Breath     SOB for a week.  Worse today.  Put on 8 lbs today.  Hx of chf          Diagnosis:Not applicable  States she is feeling better. No further questions or concerns.

## 2024-09-27 ENCOUNTER — INFUSION (OUTPATIENT)
Dept: HEMATOLOGY/ONCOLOGY | Facility: CLINIC | Age: 74
End: 2024-09-27
Payer: MEDICARE

## 2024-09-27 VITALS
RESPIRATION RATE: 17 BRPM | HEART RATE: 64 BPM | WEIGHT: 150.68 LBS | TEMPERATURE: 97.7 F | OXYGEN SATURATION: 99 % | BODY MASS INDEX: 27.91 KG/M2 | DIASTOLIC BLOOD PRESSURE: 69 MMHG | SYSTOLIC BLOOD PRESSURE: 159 MMHG

## 2024-09-27 DIAGNOSIS — D50.8 IRON DEFICIENCY ANEMIA SECONDARY TO INADEQUATE DIETARY IRON INTAKE: ICD-10-CM

## 2024-09-27 PROCEDURE — 2500000004 HC RX 250 GENERAL PHARMACY W/ HCPCS (ALT 636 FOR OP/ED): Performed by: FAMILY MEDICINE

## 2024-09-27 PROCEDURE — 96365 THER/PROPH/DIAG IV INF INIT: CPT | Mod: INF

## 2024-09-27 RX ORDER — FAMOTIDINE 10 MG/ML
20 INJECTION INTRAVENOUS ONCE AS NEEDED
OUTPATIENT
Start: 2024-09-30

## 2024-09-27 RX ORDER — ALBUTEROL SULFATE 0.83 MG/ML
3 SOLUTION RESPIRATORY (INHALATION) AS NEEDED
OUTPATIENT
Start: 2024-09-30

## 2024-09-27 RX ORDER — DIPHENHYDRAMINE HYDROCHLORIDE 50 MG/ML
50 INJECTION INTRAMUSCULAR; INTRAVENOUS AS NEEDED
OUTPATIENT
Start: 2024-09-30

## 2024-09-27 RX ORDER — EPINEPHRINE 0.3 MG/.3ML
0.3 INJECTION SUBCUTANEOUS EVERY 5 MIN PRN
OUTPATIENT
Start: 2024-09-30

## 2024-09-27 ASSESSMENT — PAIN SCALES - GENERAL: PAINLEVEL: 0-NO PAIN

## 2024-09-30 ENCOUNTER — INFUSION (OUTPATIENT)
Dept: HEMATOLOGY/ONCOLOGY | Facility: CLINIC | Age: 74
End: 2024-09-30
Payer: MEDICARE

## 2024-09-30 VITALS
BODY MASS INDEX: 27.7 KG/M2 | DIASTOLIC BLOOD PRESSURE: 71 MMHG | OXYGEN SATURATION: 95 % | HEART RATE: 56 BPM | RESPIRATION RATE: 16 BRPM | TEMPERATURE: 97.2 F | WEIGHT: 149.58 LBS | SYSTOLIC BLOOD PRESSURE: 133 MMHG

## 2024-09-30 DIAGNOSIS — D50.8 IRON DEFICIENCY ANEMIA SECONDARY TO INADEQUATE DIETARY IRON INTAKE: ICD-10-CM

## 2024-09-30 PROCEDURE — 96365 THER/PROPH/DIAG IV INF INIT: CPT | Mod: INF

## 2024-09-30 PROCEDURE — 2500000004 HC RX 250 GENERAL PHARMACY W/ HCPCS (ALT 636 FOR OP/ED): Performed by: FAMILY MEDICINE

## 2024-09-30 RX ORDER — EPINEPHRINE 0.3 MG/.3ML
0.3 INJECTION SUBCUTANEOUS EVERY 5 MIN PRN
Status: DISCONTINUED | OUTPATIENT
Start: 2024-09-30 | End: 2024-09-30 | Stop reason: HOSPADM

## 2024-09-30 RX ORDER — DIPHENHYDRAMINE HYDROCHLORIDE 50 MG/ML
50 INJECTION INTRAMUSCULAR; INTRAVENOUS AS NEEDED
Status: DISCONTINUED | OUTPATIENT
Start: 2024-09-30 | End: 2024-09-30 | Stop reason: HOSPADM

## 2024-09-30 RX ORDER — ALBUTEROL SULFATE 0.83 MG/ML
3 SOLUTION RESPIRATORY (INHALATION) AS NEEDED
Status: CANCELLED | OUTPATIENT
Start: 2024-10-03

## 2024-09-30 RX ORDER — ALBUTEROL SULFATE 0.83 MG/ML
3 SOLUTION RESPIRATORY (INHALATION) AS NEEDED
Status: DISCONTINUED | OUTPATIENT
Start: 2024-09-30 | End: 2024-09-30 | Stop reason: HOSPADM

## 2024-09-30 RX ORDER — FAMOTIDINE 10 MG/ML
20 INJECTION INTRAVENOUS ONCE AS NEEDED
Status: CANCELLED | OUTPATIENT
Start: 2024-10-03

## 2024-09-30 RX ORDER — DIPHENHYDRAMINE HYDROCHLORIDE 50 MG/ML
50 INJECTION INTRAMUSCULAR; INTRAVENOUS AS NEEDED
Status: CANCELLED | OUTPATIENT
Start: 2024-10-03

## 2024-09-30 RX ORDER — FAMOTIDINE 10 MG/ML
20 INJECTION INTRAVENOUS ONCE AS NEEDED
Status: DISCONTINUED | OUTPATIENT
Start: 2024-09-30 | End: 2024-09-30 | Stop reason: HOSPADM

## 2024-09-30 RX ORDER — EPINEPHRINE 0.3 MG/.3ML
0.3 INJECTION SUBCUTANEOUS EVERY 5 MIN PRN
Status: CANCELLED | OUTPATIENT
Start: 2024-10-03

## 2024-09-30 ASSESSMENT — PAIN SCALES - GENERAL: PAINLEVEL: 0-NO PAIN

## 2024-10-03 ENCOUNTER — INFUSION (OUTPATIENT)
Dept: HEMATOLOGY/ONCOLOGY | Facility: CLINIC | Age: 74
End: 2024-10-03
Payer: MEDICARE

## 2024-10-03 VITALS
WEIGHT: 148.15 LBS | HEART RATE: 68 BPM | SYSTOLIC BLOOD PRESSURE: 123 MMHG | BODY MASS INDEX: 27.44 KG/M2 | TEMPERATURE: 96.1 F | OXYGEN SATURATION: 98 % | RESPIRATION RATE: 17 BRPM | DIASTOLIC BLOOD PRESSURE: 69 MMHG

## 2024-10-03 DIAGNOSIS — D50.8 IRON DEFICIENCY ANEMIA SECONDARY TO INADEQUATE DIETARY IRON INTAKE: ICD-10-CM

## 2024-10-03 PROCEDURE — 2500000004 HC RX 250 GENERAL PHARMACY W/ HCPCS (ALT 636 FOR OP/ED): Performed by: FAMILY MEDICINE

## 2024-10-03 PROCEDURE — 96365 THER/PROPH/DIAG IV INF INIT: CPT | Mod: INF

## 2024-10-03 RX ORDER — FAMOTIDINE 10 MG/ML
20 INJECTION INTRAVENOUS ONCE AS NEEDED
OUTPATIENT
Start: 2024-10-06

## 2024-10-03 RX ORDER — DIPHENHYDRAMINE HYDROCHLORIDE 50 MG/ML
50 INJECTION INTRAMUSCULAR; INTRAVENOUS AS NEEDED
OUTPATIENT
Start: 2024-10-06

## 2024-10-03 RX ORDER — ALBUTEROL SULFATE 0.83 MG/ML
3 SOLUTION RESPIRATORY (INHALATION) AS NEEDED
OUTPATIENT
Start: 2024-10-06

## 2024-10-03 RX ORDER — EPINEPHRINE 0.3 MG/.3ML
0.3 INJECTION SUBCUTANEOUS EVERY 5 MIN PRN
OUTPATIENT
Start: 2024-10-06

## 2024-10-03 ASSESSMENT — PAIN SCALES - GENERAL: PAINLEVEL: 0-NO PAIN

## 2024-10-03 NOTE — PROGRESS NOTES
Pt tolerated treatment without incident. Denies questions, concerns, or comments at this time. Discharged home with steady gait.     Today was 5th dose of 200mg Venofer. Per Dr Elias, pt does not need additional infusions at this time. FUV 10/4 to discuss future treatments

## 2024-10-04 ENCOUNTER — OFFICE VISIT (OUTPATIENT)
Dept: PRIMARY CARE | Facility: CLINIC | Age: 74
End: 2024-10-04
Payer: MEDICARE

## 2024-10-04 VITALS
BODY MASS INDEX: 28.13 KG/M2 | HEIGHT: 61 IN | OXYGEN SATURATION: 98 % | DIASTOLIC BLOOD PRESSURE: 72 MMHG | WEIGHT: 149 LBS | HEART RATE: 70 BPM | SYSTOLIC BLOOD PRESSURE: 128 MMHG

## 2024-10-04 DIAGNOSIS — D50.8 IRON DEFICIENCY ANEMIA SECONDARY TO INADEQUATE DIETARY IRON INTAKE: Primary | ICD-10-CM

## 2024-10-04 DIAGNOSIS — I50.42 CHRONIC COMBINED SYSTOLIC AND DIASTOLIC CONGESTIVE HEART FAILURE: ICD-10-CM

## 2024-10-04 DIAGNOSIS — M05.79 RHEUMATOID ARTHRITIS INVOLVING MULTIPLE SITES WITH POSITIVE RHEUMATOID FACTOR (MULTI): ICD-10-CM

## 2024-10-04 DIAGNOSIS — R11.0 NAUSEA: ICD-10-CM

## 2024-10-04 LAB
ALBUMIN SERPL BCP-MCNC: 4.1 G/DL (ref 3.4–5)
ANION GAP SERPL CALC-SCNC: 17 MMOL/L (ref 10–20)
BUN SERPL-MCNC: 25 MG/DL (ref 6–23)
CALCIUM SERPL-MCNC: 8.9 MG/DL (ref 8.6–10.3)
CHLORIDE SERPL-SCNC: 103 MMOL/L (ref 98–107)
CO2 SERPL-SCNC: 28 MMOL/L (ref 21–32)
CREAT SERPL-MCNC: 1.3 MG/DL (ref 0.5–1.05)
EGFRCR SERPLBLD CKD-EPI 2021: 44 ML/MIN/1.73M*2
ERYTHROCYTE [DISTWIDTH] IN BLOOD BY AUTOMATED COUNT: 22.3 % (ref 11.5–14.5)
FERRITIN SERPL-MCNC: 453 NG/ML (ref 8–150)
GLUCOSE SERPL-MCNC: 114 MG/DL (ref 74–99)
HCT VFR BLD AUTO: 35.2 % (ref 36–46)
HGB BLD-MCNC: 10.8 G/DL (ref 12–16)
IRON SATN MFR SERPL: 14 % (ref 25–45)
IRON SERPL-MCNC: 49 UG/DL (ref 35–150)
MCH RBC QN AUTO: 24.4 PG (ref 26–34)
MCHC RBC AUTO-ENTMCNC: 30.7 G/DL (ref 32–36)
MCV RBC AUTO: 80 FL (ref 80–100)
NRBC BLD-RTO: 0 /100 WBCS (ref 0–0)
PHOSPHATE SERPL-MCNC: 4.4 MG/DL (ref 2.5–4.9)
PLATELET # BLD AUTO: 203 X10*3/UL (ref 150–450)
POTASSIUM SERPL-SCNC: 5 MMOL/L (ref 3.5–5.3)
RBC # BLD AUTO: 4.43 X10*6/UL (ref 4–5.2)
SODIUM SERPL-SCNC: 143 MMOL/L (ref 136–145)
TIBC SERPL-MCNC: 354 UG/DL (ref 240–445)
UIBC SERPL-MCNC: 305 UG/DL (ref 110–370)
WBC # BLD AUTO: 7.7 X10*3/UL (ref 4.4–11.3)

## 2024-10-04 PROCEDURE — 1160F RVW MEDS BY RX/DR IN RCRD: CPT | Performed by: FAMILY MEDICINE

## 2024-10-04 PROCEDURE — 86140 C-REACTIVE PROTEIN: CPT

## 2024-10-04 PROCEDURE — 1123F ACP DISCUSS/DSCN MKR DOCD: CPT | Performed by: FAMILY MEDICINE

## 2024-10-04 PROCEDURE — 83540 ASSAY OF IRON: CPT

## 2024-10-04 PROCEDURE — 82728 ASSAY OF FERRITIN: CPT

## 2024-10-04 PROCEDURE — G0008 ADMIN INFLUENZA VIRUS VAC: HCPCS | Performed by: FAMILY MEDICINE

## 2024-10-04 PROCEDURE — 3008F BODY MASS INDEX DOCD: CPT | Performed by: FAMILY MEDICINE

## 2024-10-04 PROCEDURE — 1159F MED LIST DOCD IN RCRD: CPT | Performed by: FAMILY MEDICINE

## 2024-10-04 PROCEDURE — 83550 IRON BINDING TEST: CPT

## 2024-10-04 PROCEDURE — 1111F DSCHRG MED/CURRENT MED MERGE: CPT | Performed by: FAMILY MEDICINE

## 2024-10-04 PROCEDURE — 99495 TRANSJ CARE MGMT MOD F2F 14D: CPT | Performed by: FAMILY MEDICINE

## 2024-10-04 PROCEDURE — 80069 RENAL FUNCTION PANEL: CPT

## 2024-10-04 PROCEDURE — 90662 IIV NO PRSV INCREASED AG IM: CPT | Performed by: FAMILY MEDICINE

## 2024-10-04 PROCEDURE — 85027 COMPLETE CBC AUTOMATED: CPT

## 2024-10-04 RX ORDER — ONDANSETRON 8 MG/1
8 TABLET, ORALLY DISINTEGRATING ORAL DAILY PRN
Qty: 30 TABLET | Refills: 2 | Status: SHIPPED | OUTPATIENT
Start: 2024-10-04 | End: 2025-01-02

## 2024-10-04 RX ORDER — SULFASALAZINE 500 MG/1
500 TABLET ORAL 2 TIMES DAILY
Qty: 60 TABLET | Refills: 0 | Status: SHIPPED | OUTPATIENT
Start: 2024-10-04

## 2024-10-04 ASSESSMENT — ENCOUNTER SYMPTOMS
COUGH: 0
NAUSEA: 0
POLYDIPSIA: 0
PALPITATIONS: 0
ADENOPATHY: 0
HEMATURIA: 0
DYSPHORIC MOOD: 0
ARTHRALGIAS: 0
FEVER: 0
HEADACHES: 0
BLOOD IN STOOL: 0
FATIGUE: 0
NERVOUS/ANXIOUS: 0
SORE THROAT: 0
CHEST TIGHTNESS: 1
ABDOMINAL PAIN: 0
BRUISES/BLEEDS EASILY: 0
DIZZINESS: 0
NUMBNESS: 0
POLYPHAGIA: 0
COLOR CHANGE: 0
BACK PAIN: 0
WHEEZING: 1
DYSURIA: 0
SHORTNESS OF BREATH: 0
CONSTIPATION: 0
TROUBLE SWALLOWING: 0
DIARRHEA: 0
TREMORS: 0
VOMITING: 0
EYE REDNESS: 0
WEAKNESS: 0
EYE PAIN: 0
CHILLS: 0
FREQUENCY: 0

## 2024-10-04 NOTE — PROGRESS NOTES
"Patient: Trina SALDANA Via \"Marcela\"  : 1950  PCP: Fredy Elias DO  MRN: [id number]  Program: Transitional Care Management  Status: Enrolled  Effective Dates: 2024 - present  Responsible Staff: Carla Jenkins RN  Social Determinants to be Addressed: Physical Activity, Social Connections, Stress         Trina SALDANA Via \"Marcela\" is a 74 y.o. female presenting today for follow-up after being discharged from the hospital 13 days ago. The main problem requiring admission was Acute Hypoxix Respiratory Failure, CHF, Pleural Effusion. The discharge summary and/or Transitional Care Management documentation was reviewed. Medication reconciliation was performed as indicated via the \"[place] as Reviewed\" timestamp.     Trina SALDANA Via \"Marcela\" was contacted by Transitional Care Management services two days after her discharge. This encounter and supporting documentation was reviewed.    Has gotten 5 iron infusions  Has a lot of energy now  No CP, SOB  Occasionally lungs will hurt- doing inhaled steroids and nebs  No coughing  Occasional wheezing  No leg swelling  No fever, chills, dizziness    On fluid restriction (1.5-2 Liters a day)  On sodium restriction (2 grams)    Review of Systems   Constitutional:  Negative for chills, fatigue and fever.   HENT:  Negative for congestion, ear discharge, ear pain, hearing loss, nosebleeds, sore throat, tinnitus and trouble swallowing.    Eyes:  Negative for pain, redness and visual disturbance.   Respiratory:  Positive for chest tightness and wheezing. Negative for cough and shortness of breath.    Cardiovascular:  Negative for chest pain, palpitations and leg swelling.   Gastrointestinal:  Negative for abdominal pain, blood in stool, constipation, diarrhea, nausea and vomiting.   Endocrine: Negative for cold intolerance, heat intolerance, polydipsia, polyphagia and polyuria.   Genitourinary:  Negative for dysuria, frequency, hematuria and urgency.   Musculoskeletal:  Negative for " "arthralgias, back pain and gait problem.   Skin:  Negative for color change and rash.   Neurological:  Negative for dizziness, tremors, syncope, weakness, numbness and headaches.   Hematological:  Negative for adenopathy. Does not bruise/bleed easily.   Psychiatric/Behavioral:  Negative for dysphoric mood. The patient is not nervous/anxious.        /72   Pulse 70   Ht 1.549 m (5' 1\")   Wt 67.6 kg (149 lb)   SpO2 98%   BMI 28.15 kg/m²     Physical Exam  Vitals and nursing note reviewed.   Constitutional:       General: She is not in acute distress.     Appearance: Normal appearance.   HENT:      Head: Normocephalic and atraumatic.   Eyes:      Extraocular Movements: Extraocular movements intact.      Conjunctiva/sclera: Conjunctivae normal.      Pupils: Pupils are equal, round, and reactive to light.   Neck:      Vascular: No carotid bruit.   Cardiovascular:      Rate and Rhythm: Normal rate and regular rhythm.      Pulses: Normal pulses.      Heart sounds: Murmur heard.      Systolic murmur is present with a grade of 2/6.   Pulmonary:      Effort: Pulmonary effort is normal.      Breath sounds: Normal breath sounds.   Abdominal:      General: Abdomen is flat. Bowel sounds are normal.      Palpations: Abdomen is soft. There is no mass.   Musculoskeletal:      Cervical back: Normal range of motion and neck supple.   Lymphadenopathy:      Cervical: No cervical adenopathy.   Skin:     Capillary Refill: Capillary refill takes less than 2 seconds.   Neurological:      General: No focal deficit present.      Mental Status: She is alert and oriented to person, place, and time.   Psychiatric:         Mood and Affect: Mood normal.         Behavior: Behavior normal.         The complexity of medical decision making for this patient's transitional care is high.    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

## 2024-10-07 ENCOUNTER — APPOINTMENT (OUTPATIENT)
Dept: HEMATOLOGY/ONCOLOGY | Facility: CLINIC | Age: 74
End: 2024-10-07
Payer: MEDICARE

## 2024-10-08 ENCOUNTER — APPOINTMENT (OUTPATIENT)
Dept: RHEUMATOLOGY | Facility: CLINIC | Age: 74
End: 2024-10-08
Payer: MEDICARE

## 2024-10-08 VITALS — RESPIRATION RATE: 20 BRPM | SYSTOLIC BLOOD PRESSURE: 113 MMHG | HEART RATE: 80 BPM | DIASTOLIC BLOOD PRESSURE: 62 MMHG

## 2024-10-08 DIAGNOSIS — M65.4 TENOSYNOVITIS, DE QUERVAIN: ICD-10-CM

## 2024-10-08 DIAGNOSIS — L40.9 PSORIASIS: ICD-10-CM

## 2024-10-08 DIAGNOSIS — M05.79 RHEUMATOID ARTHRITIS INVOLVING MULTIPLE SITES WITH POSITIVE RHEUMATOID FACTOR (MULTI): Primary | ICD-10-CM

## 2024-10-08 LAB — CRP SERPL-MCNC: 0.52 MG/DL

## 2024-10-08 PROCEDURE — 1159F MED LIST DOCD IN RCRD: CPT | Performed by: INTERNAL MEDICINE

## 2024-10-08 PROCEDURE — 99213 OFFICE O/P EST LOW 20 MIN: CPT | Performed by: INTERNAL MEDICINE

## 2024-10-08 PROCEDURE — 1111F DSCHRG MED/CURRENT MED MERGE: CPT | Performed by: INTERNAL MEDICINE

## 2024-10-08 PROCEDURE — 20550 NJX 1 TENDON SHEATH/LIGAMENT: CPT | Performed by: INTERNAL MEDICINE

## 2024-10-08 PROCEDURE — 1123F ACP DISCUSS/DSCN MKR DOCD: CPT | Performed by: INTERNAL MEDICINE

## 2024-10-08 RX ORDER — TRIAMCINOLONE ACETONIDE 40 MG/ML
20 INJECTION, SUSPENSION INTRA-ARTICULAR; INTRAMUSCULAR
Status: COMPLETED | OUTPATIENT
Start: 2024-10-08 | End: 2024-10-08

## 2024-10-08 RX ORDER — LIDOCAINE HYDROCHLORIDE 10 MG/ML
0.5 INJECTION, SOLUTION INFILTRATION; PERINEURAL
Status: COMPLETED | OUTPATIENT
Start: 2024-10-08 | End: 2024-10-08

## 2024-10-08 RX ORDER — HYDROXYCHLOROQUINE SULFATE 200 MG/1
300 TABLET, FILM COATED ORAL DAILY
Qty: 135 TABLET | Refills: 0 | Status: SHIPPED | OUTPATIENT
Start: 2024-10-08 | End: 2025-01-06

## 2024-10-08 RX ORDER — LIDOCAINE HYDROCHLORIDE 10 MG/ML
0.5 INJECTION, SOLUTION INFILTRATION; PERINEURAL ONCE
Status: SHIPPED | OUTPATIENT
Start: 2024-10-08

## 2024-10-08 RX ORDER — SULFASALAZINE 500 MG/1
500 TABLET ORAL 2 TIMES DAILY
Qty: 180 TABLET | Refills: 0 | Status: SHIPPED | OUTPATIENT
Start: 2024-10-08 | End: 2025-01-06

## 2024-10-08 RX ORDER — TRIAMCINOLONE ACETONIDE 40 MG/ML
20 INJECTION, SUSPENSION INTRA-ARTICULAR; INTRAMUSCULAR ONCE
Status: SHIPPED | OUTPATIENT
Start: 2024-10-08

## 2024-10-08 NOTE — PATIENT INSTRUCTIONS
Continue hydroxychloroquine and sulfasalazine as prescribed.  Call me if any question.  Follow-up in 3 months.

## 2024-10-08 NOTE — PROGRESS NOTES
"Subjective . Trina Elias \"Marcela\" is a 74 y.o. female who presents for Follow-up (Patient states she is better then she was 3 months ago).    HPI. 74-year-old female with history of seropositive RA (+ve RF, CCP), positive HLA-B27, chronic low back pain secondary to DDD/spondylosis, chronic pain, s/p C-spine fusion surgery, VT/V-fib s/p pacemaker and AICD,HFpEF, recurrent SBO, HTN, GERD s/p Nissen fundoplication, eczema s/p bilateral shoulder  arthroplasty, bilateral tarsal tunnel release surgery, s/p right carpal tunnel surgery and s/p left shoulder arthroplasty presented for follow-up.       She reports pain along the radial side of right wrist that gets worse with certain movements.  She has not noticed swelling of the wrist.    Immunosuppression: Hydroxychloroquine and sulfasalazine.(7/2024).    Review of Systems   All other systems reviewed and are negative.    Objective     Blood pressure 113/62, pulse 80, resp. rate 20.    Physical Exam.  Gen. AAO x3, NAD.  HEENT: No pallor or icterus, PERRLA, EOMI. No cervical lymphadenopathy .  Skin: No rashes.  Heart: S1, S2/ RRR.   Lungs: CTA B.  Abdomen: Soft, NT/ND  MSK: No.swelling or tenderness of the MCP, PIP, wrist, elbow, knees, ankles and MTP joints.  Tenderness on along the right extensor pollicis brevis and abductor pollicis longus at styloid process.  Finkelstein positive.  A small cyst at the medial side of the right styloid process.   Neuro: Sensation to touch intact.Strength 5/5 throughout.   Psych:Appropriate mood and behavior  EXT: No edema    Assessment/Plan . 74-year-old female with history of seropositive RA (+ve RF, CCP), positive HLA-B27, chronic low back pain secondary to DDD/spondylosis, chronic pain, s/p C-spine fusion surgery, VT/V-fib s/p pacemaker and AICD,HFpEF, recurrent SBO, HTN, GERD s/p Nissen fundoplication, eczema s/p bilateral shoulder  arthroplasty, bilateral tarsal tunnel release surgery, s/p right carpal tunnel surgery and s/p left " shoulder arthroplasty presented for follow-up.     #1: Seropositive RA.  She is doing well.  -Continue hydroxychloroquine 300 mg daily eye exam up to date.  -Continue sulfasalazine twice a day.  -Labs.    #2: Right de Quervain's tenosynovitis.  -Kenalog 20 mg mixed with 0.5 mL of 1% lidocaine injected along the APL/EPB tendon at styloid process.    Follow-up in 3 months.     This note was partially generated using the Dragon Voice recognition system. There may be some incorrect wording, spelling and/or spelling errors or punctuation errors that were not corrected prior to committing the note to the medical record.          Problem List Items Addressed This Visit       Psoriasis    Relevant Medications    hydroxychloroquine (Plaquenil) 200 mg tablet    Rheumatoid arthritis - Primary    Relevant Medications    sulfaSALAzine (Azulfidine) 500 mg tablet    Other Relevant Orders    C-Reactive Protein (Completed)            Active Ambulatory Problems     Diagnosis Date Noted    Coronary artery disease involving native coronary artery 06/26/2023    Asthma 06/26/2023    Atrial fibrillation (Multi) 06/26/2023    Myofascial pain 06/26/2023    Cardiac aneurysm 06/26/2023    Cardiac defibrillator in place 06/26/2023    Obstructive sleep apnea 06/26/2023    Central sleep apnea in conditions classified elsewhere 06/26/2023    Cervical post-laminectomy syndrome 06/26/2023    Cervical radiculopathy 06/26/2023    Chronic bilateral low back pain without sciatica 06/26/2023    Chronic right sacroiliac pain 06/26/2023    Colon polyp 06/26/2023    Cubital tunnel syndrome on right 06/26/2023    Displacement of lumbar disc with radiculopathy 06/26/2023    Gastroesophageal reflux disease without esophagitis 06/26/2023    Mixed hyperlipidemia 06/26/2023    Iron deficiency anemia 06/26/2023    Irregular cardiac rhythm 06/26/2023    Osteoporosis 06/26/2023    Primary osteoarthritis of right shoulder 06/26/2023    Psoriasis 06/26/2023     Rheumatoid arthritis 06/26/2023    Umbilical hernia 06/26/2023    Wide-complex tachycardia 06/26/2023    Centrilobular emphysema (Multi) 08/28/2023    Coagulation defect, unspecified 08/28/2023    Hypertensive heart disease with heart failure 06/26/2023    Stage 3a chronic kidney disease (Multi) 08/29/2023    Personal history of other venous thrombosis and embolism 11/02/2020    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/02/2020    Pacemaker 12/06/2023    Anticoagulant long-term use 12/06/2023    Chest pain, unspecified type 02/24/2024    Arthritis of left shoulder region 05/02/2024    Atrial fibrillation, unspecified type (Multi) 07/15/2024    Acute chest pain 07/24/2024    Constipation 07/28/2024    Shortness of breath 08/08/2024    Acute hypoxic on chronic hypercapnic respiratory failure (Multi) 09/18/2024     Resolved Ambulatory Problems     Diagnosis Date Noted    Abnormal mammogram 06/26/2023    Bone pain 06/26/2023    BSOM (bilateral serous otitis media) 06/26/2023    Right serous otitis media 06/26/2023    Cervical neuropathy 06/26/2023    Chest pain on exertion 06/26/2023    Dehydration 06/26/2023    Dry cough 06/26/2023    Dry heaves 06/26/2023    Dysphagia 06/26/2023    Dyspnea 06/26/2023    Edema 06/26/2023    Elevated serum creatinine 06/26/2023    Fatigue 06/26/2023    Fever, unspecified 06/26/2023    Flu-like symptoms 06/26/2023    Headache 06/26/2023    History of abdominal surgery 06/26/2023    History of colon polyps 06/26/2023    Lumbar radiculitis 06/26/2023    Lumbar spondylosis 06/26/2023    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    Ulnar neuropathy 06/26/2023    Medication side effect 06/26/2023    Nonhealing surgical wound 06/26/2023    Numbness 06/26/2023    Osteopenia 06/26/2023    Pain of right upper extremity 06/26/2023    Pertussis 06/26/2023    Pleural effusion, bilateral 06/26/2023    Postural dizziness with presyncope 06/26/2023    Pulmonary edema 06/26/2023    Severe  pain 06/26/2023    Small bowel obstruction (Multi) 06/26/2023    Spondylosis of cervical region without myelopathy or radiculopathy 06/26/2023    Swelling of left hand 06/26/2023    Toxic effect of venom 06/26/2023    Ulcer of the stomach caused by bacteria (h. pylori), acute 06/26/2023    Urticaria 06/26/2023    CKD (chronic kidney disease) 10/10/2023     Past Medical History:   Diagnosis Date    Central sleep apnea     Cervical myofascial pain syndrome     Chronic anemia     CKD stage 3a, GFR 45-59 ml/min (Multi)     COPD (chronic obstructive pulmonary disease) (Multi)     DVT of axillary vein, acute right (Multi) 01/20/2018    H/O being hospitalized 02/2024    History of Helicobacter pylori infection     Hx of syncope     ICD (implantable cardioverter-defibrillator) in place     Mitral valve regurgitation     Moderate aortic stenosis by prior echocardiogram     Personal history of anaphylaxis     PUD (peptic ulcer disease)     Pulmonary hypertension (Multi)     Radiculopathy, lumbar region 08/13/2018    Restless legs syndrome 11/17/2015    Seasonal allergic rhinitis due to pollen        Family History   Problem Relation Name Age of Onset    Asthma Daughter      Asthma Other      Colon cancer Other      Diabetes Other      Other (stroke syndrome) Other         Past Surgical History:   Procedure Laterality Date    ABDOMINAL ADHESION SURGERY  10/22/2015    Exploratory laparotomy. Extensive lysis of adhesions. Small-bowel repair.    ABDOMINAL ADHESION SURGERY  10/18/2020    Exploratory laparotomy, massive lysis of adhesions, small-bowel resection, decompression of small bowel and removal of mesh.    ABLATION OF DYSRHYTHMIC FOCUS  05/02/2022 09/17/2018, 05/02/2022 for A FIB    ACHILLES TENDON SURGERY Right     ANTERIOR CERVICAL DISCECTOMY W/ FUSION  03/21/2016    Anterior C5 and C6 discectomies and interbody fusion of C5-C6, C6-C7    APPENDECTOMY      Open surgery    BREAST BIOPSY Left 09/21/2022    CARDIAC ASSIST  DEVICE INSERTION  2012    Loop recorder placement , Removed in  w/ ICD placed    CARDIAC CATHETERIZATION  2022    Normal coronary arteries. Tachycardia induced Cardiomyopathy.    CARDIAC CATHETERIZATION N/A 2024    Procedure: LAAO (Left Atrial Appendage Occlusion);  Surgeon: Reid Dickinson MD;  Location: [place] [address] [facility];  Service: Cardiovascular;  Laterality: N/A;  Same day CT at 0815    CARDIAC DEFIBRILLATOR PLACEMENT  2014    Dual chamber ICD    CARDIOVERSION  2018, 2018    CARPAL TUNNEL RELEASE Bilateral      SECTION, CLASSIC      x2    CHOLECYSTECTOMY      Open surgery    COLONOSCOPY  2024    Extensive diverticulosis of moderate severity and causing mild luminal narrowing in the sigmoid colon    FINGER SURGERY Left 2008    Left index finger metacarpophalangeal fusion    FOOT SURGERY Bilateral     B/L Tarsal Tunnel Release    IR INJECTION EPIDURAL STEROID  2011    Lumbar spine; , ,     IR INJECTION EPIDURAL STEROID      Cervical spine 2007    LEG SURGERY Right 2006    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  Plantar wart removal R foot.    NERVE SURGERY Right 10/13/2010    Release of Right tarsal tunnel syndrome w/ severe adhesion scar tissue    NISSEN FUNDOPLICATION      Open surgery    PICC LINE INSERTION WO IMAGING  10/24/2015    Placed for stable IV access    REVERSE TOTAL SHOULDER ARTHROPLASTY Bilateral 2024    Right 2023, Left 2024    ROTATOR CUFF REPAIR      ULNAR NERVE TRANSPOSITION Left     VENTRAL HERNIA REPAIR  06/15/2015    Laparoscopic double ventral hernia repair.    WISDOM TOOTH EXTRACTION      WOUND DEBRIDEMENT  2020    Excisional debridement of abdominal wound for wound dehiscence       Social History     Tobacco Use   Smoking Status Never   Smokeless Tobacco Never       Allergies  Abatacept, Hydrocodone-acetaminophen,  "Hydromorphone, Metoprolol, Penicillins, Pregabalin, Prochlorperazine, Methotrexate, Aripiprazole, Beta-blockers (beta-adrenergic blocking agts), Bupropion, Cefepime, Ciprofloxacin, Furosemide, Levofloxacin, and Pineapple    Current Meds  Current Outpatient Medications   Medication Instructions    acetaminophen (TYLENOL) 325 mg, oral, Every 6 hours PRN    baclofen (Lioresal) 10 mg tablet One tablet in the morning and 2 tablets in the evening.    bumetanide (BUMEX) 2 mg, oral, Daily    bumetanide (BUMEX) 1 mg, oral, Daily with evening meal    cholecalciferol (VITAMIN D3) 1,000 Units, oral, Daily    clobetasol (Temovate) 0.05 % cream Topical, 2 times daily    clopidogrel (PLAVIX) 75 mg, oral, Daily    cyanocobalamin (VITAMIN B-12) 1,000 mcg, intramuscular, Every 30 days    diclofenac sodium (VOLTAREN) 4 g, Topical, 2 times daily PRN    digoxin (LANOXIN) 125 mcg, oral, Daily    dilTIAZem LA (CARDIZEM LA) 120 mg, oral, Nightly    EPINEPHrine (EPIPEN) 0.3 mg, intramuscular, Once as needed    famotidine (PEPCID) 40 mg, oral, Daily    fluticasone propion-salmeteroL (Advair HFA) 45-21 mcg/actuation inhaler 2 puffs, inhalation, 2 times daily RT, Rinse mouth with water after use to reduce aftertaste and incidence of candidiasis. Do not swallow.    hydroxychloroquine (PLAQUENIL) 300 mg, oral, Daily    insulin syringe-needle U-100 (BD Insulin Syringe Ultra-Fine) 1 mL 30 gauge x 1/2\" syringe Use one a month    ipratropium-albuteroL (Duo-Neb) 0.5-2.5 mg/3 mL nebulizer solution 3 mL, nebulization, 4 times daily PRN    montelukast (SINGULAIR) 10 mg, oral, Nightly    naloxone (NARCAN) 4 mg, nasal, As needed, May repeat every 2-3 minutes if needed, alternating nostrils, until medical assistance becomes available.    nitroglycerin (NITROSTAT) 0.4 mg, sublingual, Every 5 min PRN    ondansetron ODT (ZOFRAN-ODT) 8 mg, oral, Daily PRN    oxyCODONE-acetaminophen (Percocet) 5-325 mg tablet 1 tablet, oral, 3 times daily PRN    " oxyCODONE-acetaminophen (Percocet) 5-325 mg tablet 1 tablet, oral, 3 times daily PRN    oxyCODONE-acetaminophen (Percocet) 5-325 mg tablet 1 tablet, oral, 3 times daily PRN    pantoprazole (PROTONIX) 40 mg, oral, 2 times daily    polyethylene glycol (MIRALAX) 17 g, oral, Daily    rosuvastatin (CRESTOR) 20 mg, oral, Daily    saccharomyces boulardii (FLORASTOR) 250 mg, oral, 2 times daily    sucralfate (CARAFATE) 1 g, oral, 4 times daily with meals and nightly    sulfaSALAzine (AZULFIDINE) 500 mg, oral, 2 times daily        Lab Results   Component Value Date     (H) 08/13/2018    SEDRATE 30 07/24/2024    CRP 0.52 10/04/2024    URICACID 7.5 (H) 05/13/2024        Injection tendon or ligament: R extensor compartment 1 for de Quervain's tenosynovitis on 10/8/2024 9:50 AM  Indications: pain and tendon swelling  Details: 25 G needle, lateral approach  Medications: 20 mg triamcinolone acetonide 40 mg/mL; 0.5 mL lidocaine 10 mg/mL (1 %)  Outcome: tolerated well, no immediate complications  Procedure, treatment alternatives, risks and benefits explained, specific risks discussed. Consent was given by the patient. Immediately prior to procedure a time out was called to verify the correct patient, procedure, equipment, support staff and site/side marked as required. Patient was prepped and draped in the usual sterile fashion.              Carlos Harley MD

## 2024-10-14 ENCOUNTER — APPOINTMENT (OUTPATIENT)
Dept: HEMATOLOGY/ONCOLOGY | Facility: CLINIC | Age: 74
End: 2024-10-14
Payer: MEDICARE

## 2024-10-14 ENCOUNTER — HOSPITAL ENCOUNTER (OUTPATIENT)
Dept: CARDIOLOGY | Facility: CLINIC | Age: 74
Discharge: HOME | End: 2024-10-14
Payer: MEDICARE

## 2024-10-14 DIAGNOSIS — I47.20 VT (VENTRICULAR TACHYCARDIA) (MULTI): ICD-10-CM

## 2024-10-14 DIAGNOSIS — Z95.810 PRESENCE OF AUTOMATIC CARDIOVERTER/DEFIBRILLATOR (AICD): ICD-10-CM

## 2024-10-21 ENCOUNTER — APPOINTMENT (OUTPATIENT)
Dept: PAIN MEDICINE | Facility: CLINIC | Age: 74
End: 2024-10-21
Payer: MEDICARE

## 2024-10-21 VITALS
DIASTOLIC BLOOD PRESSURE: 72 MMHG | RESPIRATION RATE: 16 BRPM | SYSTOLIC BLOOD PRESSURE: 132 MMHG | OXYGEN SATURATION: 98 % | HEART RATE: 62 BPM

## 2024-10-21 DIAGNOSIS — M79.18 CERVICAL MYOFASCIAL PAIN SYNDROME: ICD-10-CM

## 2024-10-21 DIAGNOSIS — M96.1 CERVICAL POST-LAMINECTOMY SYNDROME: Primary | ICD-10-CM

## 2024-10-21 DIAGNOSIS — M51.16 DISPLACEMENT OF LUMBAR DISC WITH RADICULOPATHY: ICD-10-CM

## 2024-10-21 PROCEDURE — 1036F TOBACCO NON-USER: CPT | Performed by: NURSE PRACTITIONER

## 2024-10-21 PROCEDURE — 1160F RVW MEDS BY RX/DR IN RCRD: CPT | Performed by: NURSE PRACTITIONER

## 2024-10-21 PROCEDURE — 1159F MED LIST DOCD IN RCRD: CPT | Performed by: NURSE PRACTITIONER

## 2024-10-21 PROCEDURE — 99213 OFFICE O/P EST LOW 20 MIN: CPT | Performed by: NURSE PRACTITIONER

## 2024-10-21 PROCEDURE — 1111F DSCHRG MED/CURRENT MED MERGE: CPT | Performed by: NURSE PRACTITIONER

## 2024-10-21 PROCEDURE — 1125F AMNT PAIN NOTED PAIN PRSNT: CPT | Performed by: NURSE PRACTITIONER

## 2024-10-21 PROCEDURE — 1123F ACP DISCUSS/DSCN MKR DOCD: CPT | Performed by: NURSE PRACTITIONER

## 2024-10-21 RX ORDER — OXYCODONE AND ACETAMINOPHEN 5; 325 MG/1; MG/1
1 TABLET ORAL 3 TIMES DAILY PRN
Qty: 84 TABLET | Refills: 0 | Status: SHIPPED | OUTPATIENT
Start: 2024-12-24 | End: 2025-01-21

## 2024-10-21 RX ORDER — OXYCODONE AND ACETAMINOPHEN 5; 325 MG/1; MG/1
1 TABLET ORAL 3 TIMES DAILY PRN
Qty: 84 TABLET | Refills: 0 | Status: SHIPPED | OUTPATIENT
Start: 2024-10-29 | End: 2024-11-26

## 2024-10-21 RX ORDER — BACLOFEN 10 MG/1
TABLET ORAL
Qty: 270 TABLET | Refills: 1 | Status: SHIPPED | OUTPATIENT
Start: 2024-10-21

## 2024-10-21 RX ORDER — OXYCODONE AND ACETAMINOPHEN 5; 325 MG/1; MG/1
1 TABLET ORAL 3 TIMES DAILY PRN
Qty: 84 TABLET | Refills: 0 | Status: SHIPPED | OUTPATIENT
Start: 2024-11-26 | End: 2024-12-24

## 2024-10-21 ASSESSMENT — ENCOUNTER SYMPTOMS
CONSTITUTIONAL NEGATIVE: 1
ARTHRALGIAS: 1
EYES NEGATIVE: 1
JOINT SWELLING: 1
LIGHT-HEADEDNESS: 0
NECK PAIN: 1
SEIZURES: 0
FACIAL ASYMMETRY: 0
HEADACHES: 0
ALLERGIC/IMMUNOLOGIC NEGATIVE: 1
HEMATOLOGIC/LYMPHATIC NEGATIVE: 1
TREMORS: 0
SPEECH DIFFICULTY: 0
NUMBNESS: 1
BACK PAIN: 1
WEAKNESS: 1
DIZZINESS: 0
GASTROINTESTINAL NEGATIVE: 1
CARDIOVASCULAR NEGATIVE: 1
RESPIRATORY NEGATIVE: 1
PSYCHIATRIC NEGATIVE: 1
MYALGIAS: 1
ENDOCRINE NEGATIVE: 1
NECK STIFFNESS: 1

## 2024-10-21 ASSESSMENT — PAIN SCALES - GENERAL: PAINLEVEL_OUTOF10: 4

## 2024-10-22 DIAGNOSIS — I50.42 CHRONIC COMBINED SYSTOLIC AND DIASTOLIC CONGESTIVE HEART FAILURE: Primary | ICD-10-CM

## 2024-10-29 RX ORDER — DILTIAZEM HYDROCHLORIDE 120 MG/1
1 CAPSULE, EXTENDED RELEASE ORAL
COMMUNITY
Start: 2024-10-10

## 2024-10-30 ENCOUNTER — PATIENT OUTREACH (OUTPATIENT)
Dept: PRIMARY CARE | Facility: CLINIC | Age: 74
End: 2024-10-30
Payer: MEDICARE

## 2024-11-01 DIAGNOSIS — M05.79 RHEUMATOID ARTHRITIS INVOLVING MULTIPLE SITES WITH POSITIVE RHEUMATOID FACTOR (MULTI): ICD-10-CM

## 2024-11-04 RX ORDER — SULFASALAZINE 500 MG/1
500 TABLET ORAL 2 TIMES DAILY
Qty: 180 TABLET | Refills: 0 | Status: SHIPPED | OUTPATIENT
Start: 2024-11-04

## 2024-11-06 ENCOUNTER — TELEPHONE (OUTPATIENT)
Dept: PRIMARY CARE | Facility: CLINIC | Age: 74
End: 2024-11-06
Payer: MEDICARE

## 2024-11-06 DIAGNOSIS — N18.31 STAGE 3A CHRONIC KIDNEY DISEASE (MULTI): Primary | ICD-10-CM

## 2024-11-06 NOTE — TELEPHONE ENCOUNTER
Can you please put order in for creatin level to get checked, will go to out patient lab at [facility]

## 2024-11-11 ENCOUNTER — LAB (OUTPATIENT)
Dept: LAB | Facility: LAB | Age: 74
End: 2024-11-11
Payer: MEDICARE

## 2024-11-11 ENCOUNTER — OFFICE VISIT (OUTPATIENT)
Dept: CARDIOLOGY | Facility: HOSPITAL | Age: 74
End: 2024-11-11
Payer: MEDICARE

## 2024-11-11 VITALS
WEIGHT: 154 LBS | DIASTOLIC BLOOD PRESSURE: 70 MMHG | HEART RATE: 71 BPM | BODY MASS INDEX: 26.29 KG/M2 | HEIGHT: 64 IN | SYSTOLIC BLOOD PRESSURE: 120 MMHG

## 2024-11-11 DIAGNOSIS — N18.31 STAGE 3A CHRONIC KIDNEY DISEASE (MULTI): ICD-10-CM

## 2024-11-11 DIAGNOSIS — D50.8 IRON DEFICIENCY ANEMIA SECONDARY TO INADEQUATE DIETARY IRON INTAKE: ICD-10-CM

## 2024-11-11 LAB
ALBUMIN SERPL BCP-MCNC: 4.4 G/DL (ref 3.4–5)
ANION GAP SERPL CALC-SCNC: 13 MMOL/L (ref 10–20)
BUN SERPL-MCNC: 23 MG/DL (ref 6–23)
CALCIUM SERPL-MCNC: 9 MG/DL (ref 8.6–10.3)
CHLORIDE SERPL-SCNC: 103 MMOL/L (ref 98–107)
CO2 SERPL-SCNC: 30 MMOL/L (ref 21–32)
CREAT SERPL-MCNC: 0.97 MG/DL (ref 0.5–1.05)
EGFRCR SERPLBLD CKD-EPI 2021: 62 ML/MIN/1.73M*2
ERYTHROCYTE [DISTWIDTH] IN BLOOD BY AUTOMATED COUNT: 21.1 % (ref 11.5–14.5)
FERRITIN SERPL-MCNC: 62 NG/ML (ref 8–150)
GLUCOSE SERPL-MCNC: 69 MG/DL (ref 74–99)
HCT VFR BLD AUTO: 36.7 % (ref 36–46)
HGB BLD-MCNC: 11.5 G/DL (ref 12–16)
IRON SATN MFR SERPL: 11 % (ref 25–45)
IRON SERPL-MCNC: 39 UG/DL (ref 35–150)
MCH RBC QN AUTO: 25.8 PG (ref 26–34)
MCHC RBC AUTO-ENTMCNC: 31.3 G/DL (ref 32–36)
MCV RBC AUTO: 82 FL (ref 80–100)
NRBC BLD-RTO: 0 /100 WBCS (ref 0–0)
PHOSPHATE SERPL-MCNC: 4.2 MG/DL (ref 2.5–4.9)
PLATELET # BLD AUTO: 230 X10*3/UL (ref 150–450)
POTASSIUM SERPL-SCNC: 3.5 MMOL/L (ref 3.5–5.3)
RBC # BLD AUTO: 4.46 X10*6/UL (ref 4–5.2)
SODIUM SERPL-SCNC: 142 MMOL/L (ref 136–145)
TIBC SERPL-MCNC: 370 UG/DL (ref 240–445)
UIBC SERPL-MCNC: 331 UG/DL (ref 110–370)
WBC # BLD AUTO: 5.9 X10*3/UL (ref 4.4–11.3)

## 2024-11-11 PROCEDURE — 83540 ASSAY OF IRON: CPT

## 2024-11-11 PROCEDURE — 85027 COMPLETE CBC AUTOMATED: CPT

## 2024-11-11 PROCEDURE — 83550 IRON BINDING TEST: CPT

## 2024-11-11 PROCEDURE — 80069 RENAL FUNCTION PANEL: CPT

## 2024-11-11 PROCEDURE — 82728 ASSAY OF FERRITIN: CPT

## 2024-11-11 PROCEDURE — 36415 COLL VENOUS BLD VENIPUNCTURE: CPT

## 2024-11-11 ASSESSMENT — ENCOUNTER SYMPTOMS
OCCASIONAL FEELINGS OF UNSTEADINESS: 1
LOSS OF SENSATION IN FEET: 1
DEPRESSION: 0

## 2024-11-12 ENCOUNTER — APPOINTMENT (OUTPATIENT)
Dept: RHEUMATOLOGY | Facility: CLINIC | Age: 74
End: 2024-11-12
Payer: MEDICARE

## 2024-11-12 NOTE — PROGRESS NOTES
Counseling:  The patient was counseled regarding diagnostic results, instructions for management, risk factor reductions, prognosis, patient and family education, impressions, risks and benefits of treatment options and importance of compliance with treatment.      Chief Complaint:  The patient presents today for cardiovascular evaluation of CMP and heart failure.      History Of Present Illness:    Marcela Elias is a 74 y.o. female patient whose PMH is significant for VT s/p ICD, TAA, mild aortic stenosis, paroxysmal atrial fibrillation s/p PVI x2 and s/p WING Closure 2024, CMP/heart failure, hyperlipidemia, HTN, GERD, CKD stage 3, iron deficiency anemia, rheumatoid arthritis, asthma, centrilobular emphysema, and BARBARA/central sleep apnea. She presents today to establish cardiovascular care as a referral from [facility] for the evaluation and management of CMP and heart failure. The patient reports multiple prior hospital admissions with heart failure and presents today for discussion about CardioMEMS. She reports a persistent cough with associated pleuritic chest discomfort.     Past Surgical History:  She has a past surgical history that includes Cholecystectomy; Appendectomy; Cardiac catheterization (2022);  section, classic; Rotator cuff repair; Abdominal adhesion surgery (10/22/2015); Abdominal adhesion surgery (10/18/2020); Ventral hernia repair (06/15/2015); Nerve Surgery (Right, 10/13/2010); Nissen fundoplication (); Finger surgery (Left, 2008); Leg Surgery (Right, 2006); Breast biopsy (Left, 2022); Ablation of dysrhythmic focus (2022); Wound debridement (2020); Cardioversion (2018); Cardiac assist device insertion (2012); [place] tooth extraction; IR injection epidural steroid (2011); IR injection epidural steroid (); Reverse total shoulder arthroplasty (Bilateral, 2024); Cardiac defibrillator placement (2014); Carpal tunnel  release (Bilateral); Foot surgery (Bilateral); Ulnar nerve transposition (Left); Achilles tendon surgery (Right); Anterior cervical discectomy w/ fusion (03/21/2016); Colonoscopy (02/27/2024); picc line insertion wo imaging (10/24/2015); and Cardiac catheterization (N/A, 7/16/2024).      Social History:  She reports that she has never smoked. She has never used smokeless tobacco. She reports that she does not drink alcohol and does not use drugs.    Family History:  Family History   Problem Relation Name Age of Onset    Asthma Daughter      Asthma Other      Colon cancer Other      Diabetes Other      Other (stroke syndrome) Other          Allergies:  Abatacept, Hydrocodone-acetaminophen, Hydromorphone, Metoprolol, Penicillins, Pregabalin, Prochlorperazine, Methotrexate, Aripiprazole, Beta-blockers (beta-adrenergic blocking agts), Bupropion, Cefepime, Ciprofloxacin, Furosemide, Levofloxacin, and Pineapple    Outpatient Medications:  Current Outpatient Medications   Medication Instructions    acetaminophen (TYLENOL) 325 mg, Every 6 hours PRN    baclofen (Lioresal) 10 mg tablet One tablet in the morning and 2 tablets in the evening.    bumetanide (BUMEX) 2 mg, Daily    bumetanide (BUMEX) 1 mg, oral, Daily with evening meal    cholecalciferol (VITAMIN D3) 1,000 Units, Daily    clobetasol (Temovate) 0.05 % cream Topical, 2 times daily    clopidogrel (PLAVIX) 75 mg, oral, Daily    cyanocobalamin (VITAMIN B-12) 1,000 mcg, intramuscular, Every 30 days    diclofenac sodium (VOLTAREN) 4 g, Topical, 2 times daily PRN    digoxin (LANOXIN) 125 mcg, Daily    empagliflozin (JARDIANCE) 10 mg, oral, Daily    EPINEPHrine (EPIPEN) 0.3 mg, intramuscular, Once as needed    famotidine (PEPCID) 40 mg, oral, Daily    fluticasone propion-salmeteroL (Advair HFA) 45-21 mcg/actuation inhaler 2 puffs, inhalation, 2 times daily RT, Rinse mouth with water after use to reduce aftertaste and incidence of candidiasis. Do not swallow.     "hydroxychloroquine (PLAQUENIL) 300 mg, oral, Daily    insulin syringe-needle U-100 (BD Insulin Syringe Ultra-Fine) 1 mL 30 gauge x 1/2\" syringe Use one a month    ipratropium-albuteroL (Duo-Neb) 0.5-2.5 mg/3 mL nebulizer solution 3 mL, nebulization, 4 times daily PRN    montelukast (SINGULAIR) 10 mg, oral, Nightly    naloxone (NARCAN) 4 mg, nasal, As needed, May repeat every 2-3 minutes if needed, alternating nostrils, until medical assistance becomes available.    nitroglycerin (NITROSTAT) 0.4 mg, Every 5 min PRN    ondansetron ODT (ZOFRAN-ODT) 8 mg, oral, Daily PRN    oxyCODONE-acetaminophen (Percocet) 5-325 mg tablet 1 tablet, oral, 3 times daily PRN    [START ON 11/26/2024] oxyCODONE-acetaminophen (Percocet) 5-325 mg tablet 1 tablet, oral, 3 times daily PRN    [START ON 12/24/2024] oxyCODONE-acetaminophen (Percocet) 5-325 mg tablet 1 tablet, oral, 3 times daily PRN    pantoprazole (PROTONIX) 40 mg, oral, 2 times daily    polyethylene glycol (MIRALAX) 17 g, Daily    rosuvastatin (CRESTOR) 20 mg, oral, Daily    saccharomyces boulardii (FLORASTOR) 250 mg, 2 times daily    sucralfate (CARAFATE) 1 g, oral, 4 times daily with meals and nightly    sulfaSALAzine (AZULFIDINE) 500 mg, oral, 2 times daily    Tiadylt  mg 24 hr capsule 1 capsule, Daily (0630)        Last Recorded Vitals:  Vitals:    11/25/24 1243   BP: 124/78   BP Location: Left arm   Pulse: 55   SpO2: 93%   Weight: 68.5 kg (151 lb)   Height: 1.626 m (5' 4\")       Review of Systems   Respiratory:  Positive for cough (with associated pleuritic chest discomfort).    All other systems reviewed and are negative.     Physical Exam:  Constitutional:       Appearance: Healthy appearance. Not in distress.   Neck:      Vascular: No JVR. JVD normal.   Pulmonary:      Effort: Pulmonary effort is normal.      Breath sounds: Wheezing present. No rhonchi. No rales.   Chest:      Chest wall: Not tender to palpatation.   Cardiovascular:      PMI at left midclavicular " line. Normal rate. Regular rhythm. Normal S1. Normal S2.       Murmurs: There is no murmur.      No gallop.  No click. No rub.   Pulses:     Intact distal pulses.   Edema:     Peripheral edema absent.   Abdominal:      General: Bowel sounds are normal.      Palpations: Abdomen is soft.      Tenderness: There is no abdominal tenderness.   Musculoskeletal: Normal range of motion.         General: No tenderness. Skin:     General: Skin is warm and dry.   Neurological:      General: No focal deficit present.      Mental Status: Alert and oriented to person, place and time.            Last Labs:  CBC -  Lab Results   Component Value Date    WBC 5.9 11/11/2024    HGB 11.5 (L) 11/11/2024    HCT 36.7 11/11/2024    MCV 82 11/11/2024     11/11/2024       CMP -  Lab Results   Component Value Date    CALCIUM 9.0 11/11/2024    PHOS 4.2 11/11/2024    PROT 6.7 09/17/2024    ALBUMIN 4.4 11/11/2024    AST 22 09/17/2024    ALT 28 09/17/2024    ALKPHOS 72 09/17/2024    BILITOT 0.5 09/17/2024       LIPID PANEL -   Lab Results   Component Value Date    CHOL 146 04/15/2024    TRIG 107 04/15/2024    HDL 56.5 04/15/2024    CHHDL 2.6 04/15/2024    LDLF 55 08/28/2023    VLDL 21 04/15/2024    NHDL 90 04/15/2024       RENAL FUNCTION PANEL -   Lab Results   Component Value Date    GLUCOSE 69 (L) 11/11/2024     11/11/2024    K 3.5 11/11/2024     11/11/2024    CO2 30 11/11/2024    ANIONGAP 13 11/11/2024    BUN 23 11/11/2024    CREATININE 0.97 11/11/2024    GFRMALE CANCELED 09/07/2023    CALCIUM 9.0 11/11/2024    PHOS 4.2 11/11/2024    ALBUMIN 4.4 11/11/2024        Lab Results   Component Value Date     (H) 09/17/2024       Last Cardiology Tests:  09/18/2024 - CTA Chest for PE  1. No significant pulmonary embolism.  2. Moderate cardiomegaly/suspected CHF with moderate right effusion.    08/08/2024 - CTA Chest for PE  1. Cardiomegaly/CHF.  2. No significant pulmonary effusion.    07/25/2024 - TTE  1. The left ventricular  systolic function is mildly decreased, with a visually estimated ejection fraction of 45-50%.  2. Spectral Doppler shows a pseudonormal pattern of left ventricular diastolic filling.  3. There is normal right ventricular global systolic function.  4. There is moderate mitral annular calcification.  5. Aortic valve sclerosis.  6. Moderate aortic valve regurgitation.  7. Normal estimated PASP and CVP.    07/24/2024 - CTA Chest for PE  1. Allowing for presumed artifact no evidence of acute pulmonary embolism.  2. Increased lung heterogeneity/mosaic attenuation, may be secondary to air trapping, atelectasis, obstructive small airways disease or pulmonary arterial hypertension.    07/16/2024 - TTE  1. Left ventricular ejection fraction is low normal, by visual estimate at 50-55%.  2. There is moderate mitral annular calcification.  3. There is a trivial pericardial effusion.  4. Compared with study dated 2/27/2024, no significant change.    07/16/2024 - Cardiac Catheterization  Successful LAAC using a WATCHMAN 25 mm FLX PRO.     07/16/2024 - TTE  1. Left ventricular ejection fraction is low normal, by visual estimate at 50-55%.  2. Possible inferior wall motion hypokinesis.  3. There is normal right ventricular global systolic function.  4. There is moderate mitral annular calcification.  5. Compared with the prior exam from 2/27/2024 there are no significant changes though today's exam is only a limited study withno assessment of valvular function.  6. The patient is in atrial fibrillation which may influence the estimate of left ventricular function and transvalvular flows.    07/16/2024 - CT Watchman  1. No evidence of left atrial thrombus.  2. Mild coronary artery calcifications are seen. Please note,the study is not optimized for evaluation of coronary arteries.  3. Main pulmonary artery dilation at 3.7 cm, correlate with pulmonary hypertension.      02/27/2024 - TTE  1. Left ventricular systolic function is normal  with a 55-60% estimated ejection fraction.  2. Spectral Doppler shows an impaired relaxation pattern of left ventricular diastolic filling.  3. Mild to moderate mitral valve regurgitation.  4. Mild to moderate tricuspid regurgitation visualized.  5. Moderate aortic valve stenosis.  6. Mild to moderately elevated pulmonary artery pressure.    02/23/2022 - Cardiac Catheterization (LH)  1. Normal coronary arteries.   2. Tachycardia induced Cardiomyopathy.     08/01/2018 - TTE  1. The left ventricular systolic function is low normal with a 50% estimated ejection fraction.  2. Spectral Doppler shows a pseudonormal pattern of left ventricular diastolic filling.  3. There is mild aortic valve regurgitation.    Lab review: I have personally reviewed the laboratory result(s).    Assessment/Plan   1) CMP - Heart Failure s/p ICD  On Bumex 1 mg in the a.m. and 0.5 mg in the p.m., Plavix 75 mg daily, digoxin 125 mcg daily, Jardiance 10 mg daily, rosuvastatin 20 mg daily.  TTE 07/25/2024 with LVEF 45-50%, normal RV systolic function, moderate mitral annular calcification, aortic valve sclerosis, moderate aortic regurgitation, normal estimated PASP and CVP.  LHC 02/23/2022 with normal coronary arteries, tachycardia induced cardiomyopathy.  CTA chest 09/18/2024 with no significant PE, moderate cardiomegaly/suspected CHF with moderate right effusion.   Multiple prior hospital admissions with heart failure   Presenting today for CardioMEMS  Reports a persistent cough with associated pleuritic chest discomfort  Wheezing heard on exam today    CTA chest 09/18/2024 with moderate right pleural effusion  Increase Bumex to 1 tablet in the morning and 1 tablet in the evening x1 week, then resume regular dosing  Orders placed for CardioMEMS  Followup with Dr. Soto as scheduled following CardioMEMS procedure     2) Paroxysmal Atrial Fibrillation s/p PVI x2 and s/p WING Closure 07/16/2024 - Ventricular Tachycardia s/p ICD  On digoxin 125 mcg  daily  Hospital admission 03/2024 with chest pain - referral made for Watchman evaluation  Now s/p WING Closure 07/16/2024  TTE 07/16/2024 with LVEF 50-55%, possible inferior wall motion hypokinesis, normal RV systolic function    3) Aortic Stenosis - Aortic Valve Sclerosis   TTE 02/27/202 with moderate aortic stenosis  TTE 07/25/2024 with aortic valve sclerosis, moderate aortic regurgitation      Scribe Attestation  By signing my name below, I, Rolando Ritchie   attest that this documentation has been prepared under the direction and in the presence of Kalpesh Seaman MD.

## 2024-11-15 ENCOUNTER — HOSPITAL ENCOUNTER (OUTPATIENT)
Dept: RADIOLOGY | Facility: HOSPITAL | Age: 74
Discharge: HOME | End: 2024-11-15
Payer: MEDICARE

## 2024-11-15 DIAGNOSIS — I48.91 ATRIAL FIBRILLATION, UNSPECIFIED TYPE (MULTI): ICD-10-CM

## 2024-11-15 PROCEDURE — 2550000001 HC RX 255 CONTRASTS: Performed by: INTERNAL MEDICINE

## 2024-11-15 PROCEDURE — 76937 US GUIDE VASCULAR ACCESS: CPT

## 2024-11-15 PROCEDURE — 75572 CT HRT W/3D IMAGE: CPT

## 2024-11-18 ENCOUNTER — HOSPITAL ENCOUNTER (OUTPATIENT)
Dept: CARDIOLOGY | Facility: CLINIC | Age: 74
Discharge: HOME | End: 2024-11-18
Payer: MEDICARE

## 2024-11-18 DIAGNOSIS — Z95.810 PRESENCE OF AUTOMATIC CARDIOVERTER/DEFIBRILLATOR (AICD): ICD-10-CM

## 2024-11-18 DIAGNOSIS — I47.20 VT (VENTRICULAR TACHYCARDIA) (MULTI): ICD-10-CM

## 2024-11-25 ENCOUNTER — OFFICE VISIT (OUTPATIENT)
Dept: CARDIOLOGY | Facility: HOSPITAL | Age: 74
End: 2024-11-25
Payer: MEDICARE

## 2024-11-25 VITALS
HEIGHT: 64 IN | DIASTOLIC BLOOD PRESSURE: 78 MMHG | OXYGEN SATURATION: 93 % | BODY MASS INDEX: 25.78 KG/M2 | WEIGHT: 151 LBS | HEART RATE: 55 BPM | SYSTOLIC BLOOD PRESSURE: 124 MMHG

## 2024-11-25 DIAGNOSIS — I50.22 CHRONIC SYSTOLIC HEART FAILURE: Primary | ICD-10-CM

## 2024-11-25 PROCEDURE — 99203 OFFICE O/P NEW LOW 30 MIN: CPT | Performed by: INTERNAL MEDICINE

## 2024-11-25 PROCEDURE — 93010 ELECTROCARDIOGRAM REPORT: CPT | Performed by: INTERNAL MEDICINE

## 2024-11-25 PROCEDURE — 99213 OFFICE O/P EST LOW 20 MIN: CPT | Performed by: INTERNAL MEDICINE

## 2024-11-25 PROCEDURE — 93005 ELECTROCARDIOGRAM TRACING: CPT | Performed by: INTERNAL MEDICINE

## 2024-11-25 PROCEDURE — 3008F BODY MASS INDEX DOCD: CPT | Performed by: INTERNAL MEDICINE

## 2024-11-25 PROCEDURE — 1123F ACP DISCUSS/DSCN MKR DOCD: CPT | Performed by: INTERNAL MEDICINE

## 2024-11-25 PROCEDURE — 1159F MED LIST DOCD IN RCRD: CPT | Performed by: INTERNAL MEDICINE

## 2024-11-25 RX ORDER — WATER
250 LIQUID (ML) MISCELLANEOUS EVERY 8 HOURS SCHEDULED
OUTPATIENT
Start: 2024-11-25

## 2024-11-25 ASSESSMENT — ENCOUNTER SYMPTOMS: COUGH: 1

## 2024-11-25 NOTE — LETTER
2024     Fredy Elias DO  [id number] [facility] [address]    Patient: Marcela Elias   YOB: 1950   Date of Visit: 2024       Dear Dr. Fredy Elias DO:    Thank you for referring Marcela Elias to me for evaluation. Below are my notes for this consultation.  If you have questions, please do not hesitate to call me. I look forward to following your patient along with you.       Sincerely,     Kalpesh Seaman MD      CC: No Recipients  ______________________________________________________________________________________    Counseling:  The patient was counseled regarding diagnostic results, instructions for management, risk factor reductions, prognosis, patient and family education, impressions, risks and benefits of treatment options and importance of compliance with treatment.      Chief Complaint:  The patient presents today for cardiovascular evaluation of CMP and heart failure.      History Of Present Illness:    Marcela Elias is a 74 y.o. female patient whose PMH is significant for VT s/p ICD, TAA, mild aortic stenosis, paroxysmal atrial fibrillation s/p PVI x2 and s/p WIGN Closure 2024, CMP/heart failure, hyperlipidemia, HTN, GERD, CKD stage 3, iron deficiency anemia, rheumatoid arthritis, asthma, centrilobular emphysema, and BARBARA/central sleep apnea. She presents today to establish cardiovascular care as a referral from [facility] for the evaluation and management of CMP and heart failure. The patient reports multiple prior hospital admissions with heart failure and presents today for discussion about CardioMEMS. She reports a persistent cough with associated pleuritic chest discomfort.     Past Surgical History:  She has a past surgical history that includes Cholecystectomy; Appendectomy; Cardiac catheterization (2022);  section, classic; Rotator cuff repair; Abdominal adhesion surgery (10/22/2015); Abdominal adhesion surgery (10/18/2020); Ventral hernia  repair (06/15/2015); Nerve Surgery (Right, 10/13/2010); Nissen fundoplication (1991); Finger surgery (Left, 12/17/2008); Leg Surgery (Right, 09/13/2006); Breast biopsy (Left, 09/21/2022); Ablation of dysrhythmic focus (05/02/2022); Wound debridement (11/27/2020); Cardioversion (11/16/2018); Cardiac assist device insertion (12/05/2012); [place] tooth extraction; IR injection epidural steroid (01/28/2011); IR injection epidural steroid (2007); Reverse total shoulder arthroplasty (Bilateral, 05/02/2024); Cardiac defibrillator placement (06/12/2014); Carpal tunnel release (Bilateral); Foot surgery (Bilateral); Ulnar nerve transposition (Left); Achilles tendon surgery (Right); Anterior cervical discectomy w/ fusion (03/21/2016); Colonoscopy (02/27/2024); picc line insertion wo imaging (10/24/2015); and Cardiac catheterization (N/A, 7/16/2024).      Social History:  She reports that she has never smoked. She has never used smokeless tobacco. She reports that she does not drink alcohol and does not use drugs.    Family History:  Family History   Problem Relation Name Age of Onset   • Asthma Daughter     • Asthma Other     • Colon cancer Other     • Diabetes Other     • Other (stroke syndrome) Other          Allergies:  Abatacept, Hydrocodone-acetaminophen, Hydromorphone, Metoprolol, Penicillins, Pregabalin, Prochlorperazine, Methotrexate, Aripiprazole, Beta-blockers (beta-adrenergic blocking agts), Bupropion, Cefepime, Ciprofloxacin, Furosemide, Levofloxacin, and Pineapple    Outpatient Medications:  Current Outpatient Medications   Medication Instructions   • acetaminophen (TYLENOL) 325 mg, Every 6 hours PRN   • baclofen (Lioresal) 10 mg tablet One tablet in the morning and 2 tablets in the evening.   • bumetanide (BUMEX) 2 mg, Daily   • bumetanide (BUMEX) 1 mg, oral, Daily with evening meal   • cholecalciferol (VITAMIN D3) 1,000 Units, Daily   • clobetasol (Temovate) 0.05 % cream Topical, 2 times daily   • clopidogrel  "(PLAVIX) 75 mg, oral, Daily   • cyanocobalamin (VITAMIN B-12) 1,000 mcg, intramuscular, Every 30 days   • diclofenac sodium (VOLTAREN) 4 g, Topical, 2 times daily PRN   • digoxin (LANOXIN) 125 mcg, Daily   • empagliflozin (JARDIANCE) 10 mg, oral, Daily   • EPINEPHrine (EPIPEN) 0.3 mg, intramuscular, Once as needed   • famotidine (PEPCID) 40 mg, oral, Daily   • fluticasone propion-salmeteroL (Advair HFA) 45-21 mcg/actuation inhaler 2 puffs, inhalation, 2 times daily RT, Rinse mouth with water after use to reduce aftertaste and incidence of candidiasis. Do not swallow.   • hydroxychloroquine (PLAQUENIL) 300 mg, oral, Daily   • insulin syringe-needle U-100 (BD Insulin Syringe Ultra-Fine) 1 mL 30 gauge x 1/2\" syringe Use one a month   • ipratropium-albuteroL (Duo-Neb) 0.5-2.5 mg/3 mL nebulizer solution 3 mL, nebulization, 4 times daily PRN   • montelukast (SINGULAIR) 10 mg, oral, Nightly   • naloxone (NARCAN) 4 mg, nasal, As needed, May repeat every 2-3 minutes if needed, alternating nostrils, until medical assistance becomes available.   • nitroglycerin (NITROSTAT) 0.4 mg, Every 5 min PRN   • ondansetron ODT (ZOFRAN-ODT) 8 mg, oral, Daily PRN   • oxyCODONE-acetaminophen (Percocet) 5-325 mg tablet 1 tablet, oral, 3 times daily PRN   • [START ON 11/26/2024] oxyCODONE-acetaminophen (Percocet) 5-325 mg tablet 1 tablet, oral, 3 times daily PRN   • [START ON 12/24/2024] oxyCODONE-acetaminophen (Percocet) 5-325 mg tablet 1 tablet, oral, 3 times daily PRN   • pantoprazole (PROTONIX) 40 mg, oral, 2 times daily   • polyethylene glycol (MIRALAX) 17 g, Daily   • rosuvastatin (CRESTOR) 20 mg, oral, Daily   • saccharomyces boulardii (FLORASTOR) 250 mg, 2 times daily   • sucralfate (CARAFATE) 1 g, oral, 4 times daily with meals and nightly   • sulfaSALAzine (AZULFIDINE) 500 mg, oral, 2 times daily   • Tiadylt  mg 24 hr capsule 1 capsule, Daily (0630)        Last Recorded Vitals:  Vitals:    11/25/24 1243   BP: 124/78   BP " "Location: Left arm   Pulse: 55   SpO2: 93%   Weight: 68.5 kg (151 lb)   Height: 1.626 m (5' 4\")       Review of Systems   Respiratory:  Positive for cough (with associated pleuritic chest discomfort).    All other systems reviewed and are negative.     Physical Exam:  Constitutional:       Appearance: Healthy appearance. Not in distress.   Neck:      Vascular: No JVR. JVD normal.   Pulmonary:      Effort: Pulmonary effort is normal.      Breath sounds: Wheezing present. No rhonchi. No rales.   Chest:      Chest wall: Not tender to palpatation.   Cardiovascular:      PMI at left midclavicular line. Normal rate. Regular rhythm. Normal S1. Normal S2.       Murmurs: There is no murmur.      No gallop.  No click. No rub.   Pulses:     Intact distal pulses.   Edema:     Peripheral edema absent.   Abdominal:      General: Bowel sounds are normal.      Palpations: Abdomen is soft.      Tenderness: There is no abdominal tenderness.   Musculoskeletal: Normal range of motion.         General: No tenderness. Skin:     General: Skin is warm and dry.   Neurological:      General: No focal deficit present.      Mental Status: Alert and oriented to person, place and time.            Last Labs:  CBC -  Lab Results   Component Value Date    WBC 5.9 11/11/2024    HGB 11.5 (L) 11/11/2024    HCT 36.7 11/11/2024    MCV 82 11/11/2024     11/11/2024       CMP -  Lab Results   Component Value Date    CALCIUM 9.0 11/11/2024    PHOS 4.2 11/11/2024    PROT 6.7 09/17/2024    ALBUMIN 4.4 11/11/2024    AST 22 09/17/2024    ALT 28 09/17/2024    ALKPHOS 72 09/17/2024    BILITOT 0.5 09/17/2024       LIPID PANEL -   Lab Results   Component Value Date    CHOL 146 04/15/2024    TRIG 107 04/15/2024    HDL 56.5 04/15/2024    CHHDL 2.6 04/15/2024    LDLF 55 08/28/2023    VLDL 21 04/15/2024    NHDL 90 04/15/2024       RENAL FUNCTION PANEL -   Lab Results   Component Value Date    GLUCOSE 69 (L) 11/11/2024     11/11/2024    K 3.5 11/11/2024    "  11/11/2024    CO2 30 11/11/2024    ANIONGAP 13 11/11/2024    BUN 23 11/11/2024    CREATININE 0.97 11/11/2024    GFRMALE CANCELED 09/07/2023    CALCIUM 9.0 11/11/2024    PHOS 4.2 11/11/2024    ALBUMIN 4.4 11/11/2024        Lab Results   Component Value Date     (H) 09/17/2024       Last Cardiology Tests:  09/18/2024 - CTA Chest for PE  1. No significant pulmonary embolism.  2. Moderate cardiomegaly/suspected CHF with moderate right effusion.    08/08/2024 - CTA Chest for PE  1. Cardiomegaly/CHF.  2. No significant pulmonary effusion.    07/25/2024 - TTE  1. The left ventricular systolic function is mildly decreased, with a visually estimated ejection fraction of 45-50%.  2. Spectral Doppler shows a pseudonormal pattern of left ventricular diastolic filling.  3. There is normal right ventricular global systolic function.  4. There is moderate mitral annular calcification.  5. Aortic valve sclerosis.  6. Moderate aortic valve regurgitation.  7. Normal estimated PASP and CVP.    07/24/2024 - CTA Chest for PE  1. Allowing for presumed artifact no evidence of acute pulmonary embolism.  2. Increased lung heterogeneity/mosaic attenuation, may be secondary to air trapping, atelectasis, obstructive small airways disease or pulmonary arterial hypertension.    07/16/2024 - TTE  1. Left ventricular ejection fraction is low normal, by visual estimate at 50-55%.  2. There is moderate mitral annular calcification.  3. There is a trivial pericardial effusion.  4. Compared with study dated 2/27/2024, no significant change.    07/16/2024 - Cardiac Catheterization  Successful LAAC using a Comr.seMAN 25 mm FLX PRO.     07/16/2024 - TTE  1. Left ventricular ejection fraction is low normal, by visual estimate at 50-55%.  2. Possible inferior wall motion hypokinesis.  3. There is normal right ventricular global systolic function.  4. There is moderate mitral annular calcification.  5. Compared with the prior exam from 2/27/2024  there are no significant changes though today's exam is only a limited study withno assessment of valvular function.  6. The patient is in atrial fibrillation which may influence the estimate of left ventricular function and transvalvular flows.    07/16/2024 - CT Watchman  1. No evidence of left atrial thrombus.  2. Mild coronary artery calcifications are seen. Please note,the study is not optimized for evaluation of coronary arteries.  3. Main pulmonary artery dilation at 3.7 cm, correlate with pulmonary hypertension.      02/27/2024 - TTE  1. Left ventricular systolic function is normal with a 55-60% estimated ejection fraction.  2. Spectral Doppler shows an impaired relaxation pattern of left ventricular diastolic filling.  3. Mild to moderate mitral valve regurgitation.  4. Mild to moderate tricuspid regurgitation visualized.  5. Moderate aortic valve stenosis.  6. Mild to moderately elevated pulmonary artery pressure.    02/23/2022 - Cardiac Catheterization (LH)  1. Normal coronary arteries.   2. Tachycardia induced Cardiomyopathy.     08/01/2018 - TTE  1. The left ventricular systolic function is low normal with a 50% estimated ejection fraction.  2. Spectral Doppler shows a pseudonormal pattern of left ventricular diastolic filling.  3. There is mild aortic valve regurgitation.    Lab review: I have personally reviewed the laboratory result(s).    Assessment/Plan  1) CMP - Heart Failure s/p ICD  On Bumex 1 mg in the a.m. and 0.5 mg in the p.m., Plavix 75 mg daily, digoxin 125 mcg daily, Jardiance 10 mg daily, rosuvastatin 20 mg daily.  TTE 07/25/2024 with LVEF 45-50%, normal RV systolic function, moderate mitral annular calcification, aortic valve sclerosis, moderate aortic regurgitation, normal estimated PASP and CVP.  LHC 02/23/2022 with normal coronary arteries, tachycardia induced cardiomyopathy.  CTA chest 09/18/2024 with no significant PE, moderate cardiomegaly/suspected CHF with moderate right  effusion.   Multiple prior hospital admissions with heart failure   Presenting today for CardioMEMS  Reports a persistent cough with associated pleuritic chest discomfort  Wheezing heard on exam today    CTA chest 09/18/2024 with moderate right pleural effusion  Increase Bumex to 1 tablet in the morning and 1 tablet in the evening x1 week, then resume regular dosing  Orders placed for CardioMEMS  Followup with Dr. Soto as scheduled following CardioMEMS procedure     2) Paroxysmal Atrial Fibrillation s/p PVI x2 and s/p WING Closure 07/16/2024 - Ventricular Tachycardia s/p ICD  On digoxin 125 mcg daily  Hospital admission 03/2024 with chest pain - referral made for Watchman evaluation  Now s/p WING Closure 07/16/2024  TTE 07/16/2024 with LVEF 50-55%, possible inferior wall motion hypokinesis, normal RV systolic function    3) Aortic Stenosis - Aortic Valve Sclerosis   TTE 02/27/202 with moderate aortic stenosis  TTE 07/25/2024 with aortic valve sclerosis, moderate aortic regurgitation      Scribe Attestation  By signing my name below, I, Rolando Ritchie   attest that this documentation has been prepared under the direction and in the presence of Kalpesh Seaman MD.

## 2024-11-25 NOTE — PATIENT INSTRUCTIONS
Increase your Bumex to 1 mg in the morning and 1 mg in the evening for 1 week, and then continue your regular dosing.   Continue all other medications as prescribed.  Dr. Seaman has placed orders for a CardioMEMS procedure.   Followup with Dr. Soto as scheduled.     If you have any questions or cardiac concerns, please call our office at [phone].

## 2024-12-04 ENCOUNTER — TELEPHONE (OUTPATIENT)
Dept: CARDIOLOGY | Facility: HOSPITAL | Age: 74
End: 2024-12-04
Payer: MEDICARE

## 2024-12-04 NOTE — TELEPHONE ENCOUNTER
Pt is scheduled for 1/2/25     Arrive @730am  Start @630am    Called cath instructions to patient including review of date and arrival time.  Verified no need for dye prep.  Labs ordered.  Nothing to eat or drink after midnight except Plavix with a sip of water the morning of the cath.  You will need a .  Bring your ID, insurance cards and either a perfect list of the medications you are swallowing or the medications in original bottles.  Wear comfortable clothing.  There is a possibility of staying over night for observation so pack a bag with necessities for that.  Patient correctly repeated instructions, verbalized understanding and is agreeable to the plan.

## 2024-12-04 NOTE — TELEPHONE ENCOUNTER
----- Message from Heydi BISHOP sent at 11/25/2024  2:00 PM EST -----  Regarding: Cardiomems instructions  Pt is scheduled for 1/2/25    Arrive @730am  Start @630am    Thank you!

## 2024-12-09 ENCOUNTER — LAB (OUTPATIENT)
Dept: LAB | Facility: LAB | Age: 74
End: 2024-12-09
Payer: MEDICARE

## 2024-12-09 DIAGNOSIS — I50.22 CHRONIC SYSTOLIC HEART FAILURE: ICD-10-CM

## 2024-12-09 LAB
ANION GAP SERPL CALC-SCNC: 15 MMOL/L (ref 10–20)
BUN SERPL-MCNC: 20 MG/DL (ref 6–23)
CALCIUM SERPL-MCNC: 9.1 MG/DL (ref 8.6–10.3)
CHLORIDE SERPL-SCNC: 103 MMOL/L (ref 98–107)
CO2 SERPL-SCNC: 28 MMOL/L (ref 21–32)
CREAT SERPL-MCNC: 1.07 MG/DL (ref 0.5–1.05)
EGFRCR SERPLBLD CKD-EPI 2021: 55 ML/MIN/1.73M*2
ERYTHROCYTE [DISTWIDTH] IN BLOOD BY AUTOMATED COUNT: 18.4 % (ref 11.5–14.5)
GLUCOSE SERPL-MCNC: 110 MG/DL (ref 74–99)
HCT VFR BLD AUTO: 35.3 % (ref 36–46)
HGB BLD-MCNC: 10.5 G/DL (ref 12–16)
MCH RBC QN AUTO: 25.4 PG (ref 26–34)
MCHC RBC AUTO-ENTMCNC: 29.7 G/DL (ref 32–36)
MCV RBC AUTO: 86 FL (ref 80–100)
NRBC BLD-RTO: 0 /100 WBCS (ref 0–0)
PLATELET # BLD AUTO: 181 X10*3/UL (ref 150–450)
POTASSIUM SERPL-SCNC: 4 MMOL/L (ref 3.5–5.3)
RBC # BLD AUTO: 4.13 X10*6/UL (ref 4–5.2)
SODIUM SERPL-SCNC: 142 MMOL/L (ref 136–145)
WBC # BLD AUTO: 5.6 X10*3/UL (ref 4.4–11.3)

## 2024-12-09 PROCEDURE — 85027 COMPLETE CBC AUTOMATED: CPT

## 2024-12-09 PROCEDURE — 80048 BASIC METABOLIC PNL TOTAL CA: CPT

## 2024-12-09 PROCEDURE — 36415 COLL VENOUS BLD VENIPUNCTURE: CPT

## 2024-12-14 ENCOUNTER — PATIENT OUTREACH (OUTPATIENT)
Dept: PRIMARY CARE | Facility: CLINIC | Age: 74
End: 2024-12-14
Payer: MEDICARE

## 2024-12-23 ENCOUNTER — HOSPITAL ENCOUNTER (OUTPATIENT)
Dept: CARDIOLOGY | Facility: CLINIC | Age: 74
Discharge: HOME | End: 2024-12-23
Payer: MEDICARE

## 2024-12-23 DIAGNOSIS — Z95.810 PRESENCE OF AUTOMATIC (IMPLANTABLE) CARDIAC DEFIBRILLATOR: ICD-10-CM

## 2024-12-23 DIAGNOSIS — I47.29 OTHER VENTRICULAR TACHYCARDIA: ICD-10-CM

## 2024-12-23 PROCEDURE — 93296 REM INTERROG EVL PM/IDS: CPT

## 2024-12-27 DIAGNOSIS — K27.9 PUD (PEPTIC ULCER DISEASE): ICD-10-CM

## 2024-12-27 DIAGNOSIS — K29.00 ACUTE SUPERFICIAL GASTRITIS WITHOUT HEMORRHAGE: ICD-10-CM

## 2024-12-27 RX ORDER — PANTOPRAZOLE SODIUM 40 MG/1
40 TABLET, DELAYED RELEASE ORAL 2 TIMES DAILY
Qty: 180 TABLET | Refills: 0 | Status: SHIPPED | OUTPATIENT
Start: 2024-12-27

## 2024-12-28 ENCOUNTER — HOSPITAL ENCOUNTER (EMERGENCY)
Facility: HOSPITAL | Age: 74
Discharge: HOME | End: 2024-12-28
Attending: EMERGENCY MEDICINE
Payer: MEDICARE

## 2024-12-28 VITALS
SYSTOLIC BLOOD PRESSURE: 134 MMHG | OXYGEN SATURATION: 97 % | DIASTOLIC BLOOD PRESSURE: 72 MMHG | WEIGHT: 155 LBS | BODY MASS INDEX: 26.46 KG/M2 | RESPIRATION RATE: 18 BRPM | HEART RATE: 79 BPM | TEMPERATURE: 99 F | HEIGHT: 64 IN

## 2024-12-28 DIAGNOSIS — L02.91 ABSCESS: Primary | ICD-10-CM

## 2024-12-28 PROCEDURE — 99284 EMERGENCY DEPT VISIT MOD MDM: CPT | Mod: 25 | Performed by: EMERGENCY MEDICINE

## 2024-12-28 PROCEDURE — 2500000001 HC RX 250 WO HCPCS SELF ADMINISTERED DRUGS (ALT 637 FOR MEDICARE OP): Performed by: PHYSICIAN ASSISTANT

## 2024-12-28 PROCEDURE — 2500000004 HC RX 250 GENERAL PHARMACY W/ HCPCS (ALT 636 FOR OP/ED): Performed by: PHYSICIAN ASSISTANT

## 2024-12-28 PROCEDURE — 10061 I&D ABSCESS COMP/MULTIPLE: CPT | Performed by: PHYSICIAN ASSISTANT

## 2024-12-28 RX ORDER — DOXYCYCLINE HYCLATE 100 MG
100 TABLET ORAL ONCE
Status: COMPLETED | OUTPATIENT
Start: 2024-12-28 | End: 2024-12-28

## 2024-12-28 RX ORDER — DOXYCYCLINE 100 MG/1
100 TABLET ORAL 2 TIMES DAILY
Qty: 14 TABLET | Refills: 0 | Status: ON HOLD | OUTPATIENT
Start: 2024-12-28 | End: 2025-01-04

## 2024-12-28 RX ORDER — LIDOCAINE HYDROCHLORIDE 10 MG/ML
10 INJECTION, SOLUTION INFILTRATION; PERINEURAL ONCE
Status: COMPLETED | OUTPATIENT
Start: 2024-12-28 | End: 2024-12-28

## 2024-12-28 RX ADMIN — DOXYCYCLINE HYCLATE 100 MG: 100 TABLET, COATED ORAL at 20:12

## 2024-12-28 RX ADMIN — LIDOCAINE HYDROCHLORIDE 10 ML: 10 INJECTION, SOLUTION INFILTRATION; PERINEURAL at 20:05

## 2024-12-28 RX ADMIN — Medication 1 APPLICATION: at 18:56

## 2024-12-28 ASSESSMENT — LIFESTYLE VARIABLES
EVER HAD A DRINK FIRST THING IN THE MORNING TO STEADY YOUR NERVES TO GET RID OF A HANGOVER: NO
TOTAL SCORE: 0
EVER FELT BAD OR GUILTY ABOUT YOUR DRINKING: NO
HAVE PEOPLE ANNOYED YOU BY CRITICIZING YOUR DRINKING: NO
HAVE YOU EVER FELT YOU SHOULD CUT DOWN ON YOUR DRINKING: NO

## 2024-12-28 ASSESSMENT — COLUMBIA-SUICIDE SEVERITY RATING SCALE - C-SSRS
6. HAVE YOU EVER DONE ANYTHING, STARTED TO DO ANYTHING, OR PREPARED TO DO ANYTHING TO END YOUR LIFE?: NO
2. HAVE YOU ACTUALLY HAD ANY THOUGHTS OF KILLING YOURSELF?: NO
1. IN THE PAST MONTH, HAVE YOU WISHED YOU WERE DEAD OR WISHED YOU COULD GO TO SLEEP AND NOT WAKE UP?: NO

## 2024-12-28 ASSESSMENT — PAIN SCALES - GENERAL
PAINLEVEL_OUTOF10: 6
PAINLEVEL_OUTOF10: 3

## 2024-12-28 ASSESSMENT — PAIN DESCRIPTION - ORIENTATION: ORIENTATION: RIGHT

## 2024-12-28 ASSESSMENT — PAIN DESCRIPTION - LOCATION: LOCATION: GROIN

## 2024-12-28 ASSESSMENT — PAIN DESCRIPTION - PAIN TYPE: TYPE: ACUTE PAIN

## 2024-12-28 ASSESSMENT — PAIN - FUNCTIONAL ASSESSMENT
PAIN_FUNCTIONAL_ASSESSMENT: 0-10
PAIN_FUNCTIONAL_ASSESSMENT: 0-10

## 2024-12-28 NOTE — ED TRIAGE NOTES
Pt presented to the ED with c/o groin pain. Pt states she was walking down the stairs yesterday when she felt a pop in her right groin. Pt is in 6/10 pain at this time. No radiation of pain.

## 2025-01-01 NOTE — ED PROVIDER NOTES
HPI   Chief Complaint   Patient presents with    Groin Pain       History of present illness:  74-year-old female presents emergency room for complaints of abscess in her groin.  The patient states beginning about 2 days ago she began having a painful lump in her groin in the hip flexor near her vagina.  She states that has been very tender to the touch and warm as well.  She states when she was going upstairs she states she felt something pop in the area and she states the pain is gotten worse.  She states that she is concerned about this area and states she has no history of fever or chills or nausea or vomiting or other symptoms time.  She has a past medical history of COPD chronic kidney disease A-fib central sleep apnea and acid reflux as well.    Social history: Negative for alcohol and drug use.    Review of systems:   Gen.: No weight loss, fatigue, anorexia, insomnia, fever.   Eyes: No vision loss, double vision  ENT: No pharyngitis, neck pain  Cardiac: No chest pain, palpitations, syncope, near syncope.   Pulmonary: No shortness of breath, cough, hemoptysis.   Heme/lymph: No swollen glands, fever, bleeding.   GI: No abdominal pain, change in bowel habits, melena, hematemesis, hematochezia, nausea, vomiting, diarrhea.   : No discharge, dysuria, frequency, urgency, hematuria.   Musculoskeletal: No limb pain, joint pain, joint swelling.   Skin: No rashes.   Review of systems is otherwise negative unless stated above or in history of present illness.      Physical exam:  General: Vitals noted, no distress. Afebrile.   EENT: No lymphadenopathy appreciated  Cardiac: Regular, rate, rhythm, no murmur.   Pulmonary: Lungs clear bilaterally with good aeration. No adventitious breath sounds.   Abdomen: Soft, nonsurgical. Nontender. No peritoneal signs. Normoactive bowel sounds.   Groin: In the right groin on the right lower labia on the outside there appears to be an abscess present but is not actively draining at  this time  Extremities: No peripheral edema.   Skin: No rash.   Neuro: No focal neurologic deficits          Medical decision making:   Testing: Clinical exam  Treatment: The area was cleaned and numbed and a small incision was made and a moderate amount of purulent fluid was easily expressed from the area  Reevaluation:   Plan: Home-going.  Discussed differential. Will follow-up with OB/GYN in the next 2-3 days. Return if worse. They understand return precautions and discharge instructions. Patient and family/friend/caregiver are in agreement with this plan. 74-year-old female presents emergency room for complaints of abscess in her groin.  The patient states beginning about 2 days ago she began having a painful lump in her groin in the hip flexor near her vagina.  She states that has been very tender to the touch and warm as well.  She states when she was going upstairs she states she felt something pop in the area and she states the pain is gotten worse.  She states that she is concerned about this area and states she has no history of fever or chills or nausea or vomiting or other symptoms time.  She has a past medical history of COPD chronic kidney disease A-fib central sleep apnea and acid reflux as well. Abdomen: Soft, nonsurgical. Nontender. No peritoneal signs. Normoactive bowel sounds.   Groin: In the right groin on the right lower labia on the outside there appears to be an abscess present but is not actively draining at this time.  Explained to patient appropriate home care that would be sending her home on some doxycycline at this time.  I encouraged her return to that she had any worsening symptoms.  Impression:   1.  Abscess            History provided by:  Patient   used: No            Patient History   Past Medical History:   Diagnosis Date    Asthma     Atrial fibrillation (Multi)     Resistent to treatment: s/p DCCVs and ablations    Central sleep apnea     Per PSG 11/20/18;  severe central sleep apnea, .6, mild obstructive component    Cervical myofascial pain syndrome     Baclofen    Chronic anemia     CKD stage 3a, GFR 45-59 ml/min (Multi)     COPD (chronic obstructive pulmonary disease) (Multi)     DVT of axillary vein, acute right (Multi) 01/20/2018    When PICC line was removed. U/S + Partial nonocclusive DVT in the axillary and proximal basilic vein in 2018    H/O being hospitalized 02/2024 Feb 24-Mar 5, 2024:  (2/24) admitted for dyspnea and chest pain work-up and found to have pleural effusions.  Found to have acute blood loss anemia s/p 2u PRBC. EGD neg for active bleed.  Cardiology consulted - plan for Watchman device WING closure as out pt.    History of Helicobacter pylori infection     3/2013, 1/2021    Hx of syncope     Recurrent Syncope d/t VT- s/p ILR (12/2012) implanted.  (July 2013) Episode of syncope that appears to be correlated with an 11 second round of ventricular tachycardia recorded by the loop recorder.  S/p EPS (June 2014) neg for inducible arrhythmias. (ILR removed when ICD implanted)    ICD (implantable cardioverter-defibrillator) in place     Placed 06/12/2014    Mitral valve regurgitation     Mild-Moderate per Echo 02/25/2024    Moderate aortic stenosis by prior echocardiogram     Echo 2/25/2024    Osteopenia 06/26/2023    Personal history of anaphylaxis     See allergy list    Psoriasis     PUD (peptic ulcer disease)     H/O nonbleeding gastric ulcers with small hiatal hernia on 1/6/2021 EGD    Pulmonary hypertension (Multi)     Mild - Moderate per Echo 2/25/2024    Radiculopathy, lumbar region 08/13/2018    Acute lumbar radiculopathy    Restless legs syndrome 11/17/2015    Restless legs syndrome    Rheumatoid arthritis     Seasonal allergic rhinitis due to pollen     Small bowel obstruction (Multi) 06/26/2023    H/O due to Severe Adhesions s/p Adhesiolysis x2 in 2015 and 2020    Wide-complex tachycardia     Per cardiology 3/7/2024: VT is on a  basis of triggered activity certainly made worse with anemia and prednisone. Since she is asymptomatic we will continue to observe.     Past Surgical History:   Procedure Laterality Date    ABDOMINAL ADHESION SURGERY  10/22/2015    Exploratory laparotomy. Extensive lysis of adhesions. Small-bowel repair.    ABDOMINAL ADHESION SURGERY  10/18/2020    Exploratory laparotomy, massive lysis of adhesions, small-bowel resection, decompression of small bowel and removal of mesh.    ABLATION OF DYSRHYTHMIC FOCUS  2022, 2022 for A FIB    ACHILLES TENDON SURGERY Right     ANTERIOR CERVICAL DISCECTOMY W/ FUSION  2016    Anterior C5 and C6 discectomies and interbody fusion of C5-C6, C6-C7    APPENDECTOMY      Open surgery    BREAST BIOPSY Left 2022    CARDIAC ASSIST DEVICE INSERTION  2012    Loop recorder placement , Removed in  w/ ICD placed    CARDIAC CATHETERIZATION  2022    Normal coronary arteries. Tachycardia induced Cardiomyopathy.    CARDIAC CATHETERIZATION N/A 2024    Procedure: LAAO (Left Atrial Appendage Occlusion);  Surgeon: Reid Dickinson MD;  Location: [place] [address] Cardiac Cath Lab;  Service: Cardiovascular;  Laterality: N/A;  Same day CT at 0815    CARDIAC DEFIBRILLATOR PLACEMENT  2014    Dual chamber ICD    CARDIOVERSION  2018, 2018    CARPAL TUNNEL RELEASE Bilateral      SECTION, CLASSIC      x2    CHOLECYSTECTOMY      Open surgery    COLONOSCOPY  2024    Extensive diverticulosis of moderate severity and causing mild luminal narrowing in the sigmoid colon    FINGER SURGERY Left 2008    Left index finger metacarpophalangeal fusion    FOOT SURGERY Bilateral     B/L Tarsal Tunnel Release    IR INJECTION EPIDURAL STEROID  2011    Lumbar spine; , ,     IR INJECTION EPIDURAL STEROID  2007    Cervical spine 2007    LEG SURGERY Right 2006    Right distal tibia bone tumor excision and  biopsy w/ pellet bone graft.  Plantar wart removal R foot.    NERVE SURGERY Right 10/13/2010    Release of Right tarsal tunnel syndrome w/ severe adhesion scar tissue    NISSEN FUNDOPLICATION  1991    Open surgery    PICC LINE INSERTION WO IMAGING  10/24/2015    Placed for stable IV access    REVERSE TOTAL SHOULDER ARTHROPLASTY Bilateral 05/02/2024    Right 1/12/2023, Left 5/2/2024    ROTATOR CUFF REPAIR      ULNAR NERVE TRANSPOSITION Left     VENTRAL HERNIA REPAIR  06/15/2015    Laparoscopic double ventral hernia repair.    WISDOM TOOTH EXTRACTION      WOUND DEBRIDEMENT  11/27/2020    Excisional debridement of abdominal wound for wound dehiscence     Family History   Problem Relation Name Age of Onset    Asthma Daughter      Asthma Other      Colon cancer Other      Diabetes Other      Other (stroke syndrome) Other       Social History     Tobacco Use    Smoking status: Never    Smokeless tobacco: Never   Vaping Use    Vaping status: Never Used   Substance Use Topics    Alcohol use: Never    Drug use: Never       Physical Exam   ED Triage Vitals   Temperature Heart Rate Respirations BP   12/28/24 1738 12/28/24 1738 12/28/24 1738 12/28/24 1738   37.5 °C (99.5 °F) 81 20 135/74      Pulse Ox Temp Source Heart Rate Source Patient Position   12/28/24 1738 12/28/24 1738 12/28/24 1738 12/28/24 1738   98 % Temporal Monitor Sitting      BP Location FiO2 (%)     12/28/24 2048 --     Right arm        Physical Exam      ED Course & MDM   Diagnoses as of 12/31/24 2056   Abscess                 No data recorded     Epi Coma Scale Score: 15 (12/28/24 1739 : Gloria Nogueira RN)                           Medical Decision Making      Procedure  Procedures     Diomedes Durbin PA-C  01/01/25 1744

## 2025-01-01 NOTE — ED PROCEDURE NOTE
Procedure  Incision and Drainage    Performed by: Diomedes Durbin PA-C  Authorized by: Latoya Augustin DO    Consent:     Consent obtained:  Verbal    Consent given by:  Patient    Alternatives discussed:  No treatment  Universal protocol:     Patient identity confirmed:  Verbally with patient, arm band and provided demographic data  Location:     Type:  Abscess    Size:  2 x 3 cm    Location:  Anogenital    Anogenital location: right lower labia.  Pre-procedure details:     Skin preparation:  Chlorhexidine  Sedation:     Sedation type:  None  Anesthesia:     Anesthesia method:  Local infiltration    Local anesthetic:  Lidocaine 1% w/o epi  Procedure type:     Complexity:  Complex  Procedure details:     Incision types:  Stab incision    Wound management:  Probed and deloculated    Drainage:  Purulent and bloody    Drainage amount:  Moderate    Wound treatment:  Wound left open    Packing materials:  None  Post-procedure details:     Procedure completion:  Tolerated               Diomedes Durbin PA-C  01/01/25 1520    I was present for key portions of the procedure.  The patient tolerated the procedure very well.  The area was cleansed and numbed.  A moderate amount of purulent discharge was expressed.  The wound was left open.       Latoya Augustin DO  01/09/25 [id number]

## 2025-01-02 ENCOUNTER — HOSPITAL ENCOUNTER (OUTPATIENT)
Facility: HOSPITAL | Age: 75
Setting detail: OUTPATIENT SURGERY
Discharge: HOME | End: 2025-01-02
Attending: INTERNAL MEDICINE | Admitting: INTERNAL MEDICINE
Payer: MEDICARE

## 2025-01-02 VITALS
SYSTOLIC BLOOD PRESSURE: 145 MMHG | HEIGHT: 64 IN | RESPIRATION RATE: 14 BRPM | WEIGHT: 155 LBS | OXYGEN SATURATION: 99 % | DIASTOLIC BLOOD PRESSURE: 71 MMHG | BODY MASS INDEX: 26.46 KG/M2 | TEMPERATURE: 97.6 F | HEART RATE: 96 BPM

## 2025-01-02 DIAGNOSIS — I50.9 HEART FAILURE: ICD-10-CM

## 2025-01-02 DIAGNOSIS — I50.33 ACUTE ON CHRONIC DIASTOLIC (CONGESTIVE) HEART FAILURE: ICD-10-CM

## 2025-01-02 DIAGNOSIS — I50.22 CHRONIC SYSTOLIC HEART FAILURE: Primary | ICD-10-CM

## 2025-01-02 PROCEDURE — C1751 CATH, INF, PER/CENT/MIDLINE: HCPCS | Performed by: INTERNAL MEDICINE

## 2025-01-02 PROCEDURE — 99152 MOD SED SAME PHYS/QHP 5/>YRS: CPT | Performed by: INTERNAL MEDICINE

## 2025-01-02 PROCEDURE — 2720000007 HC OR 272 NO HCPCS: Performed by: INTERNAL MEDICINE

## 2025-01-02 PROCEDURE — 2500000004 HC RX 250 GENERAL PHARMACY W/ HCPCS (ALT 636 FOR OP/ED): Performed by: INTERNAL MEDICINE

## 2025-01-02 PROCEDURE — 2780000003 HC OR 278 NO HCPCS: Performed by: INTERNAL MEDICINE

## 2025-01-02 PROCEDURE — 7100000010 HC PHASE TWO TIME - EACH INCREMENTAL 1 MINUTE: Performed by: INTERNAL MEDICINE

## 2025-01-02 PROCEDURE — C1769 GUIDE WIRE: HCPCS | Performed by: INTERNAL MEDICINE

## 2025-01-02 PROCEDURE — 99153 MOD SED SAME PHYS/QHP EA: CPT | Performed by: INTERNAL MEDICINE

## 2025-01-02 PROCEDURE — 7100000009 HC PHASE TWO TIME - INITIAL BASE CHARGE: Performed by: INTERNAL MEDICINE

## 2025-01-02 PROCEDURE — 2550000001 HC RX 255 CONTRASTS: Performed by: INTERNAL MEDICINE

## 2025-01-02 PROCEDURE — C2624 WIRELESS PRESSURE SENSOR: HCPCS | Performed by: INTERNAL MEDICINE

## 2025-01-02 PROCEDURE — C1894 INTRO/SHEATH, NON-LASER: HCPCS | Performed by: INTERNAL MEDICINE

## 2025-01-02 PROCEDURE — 33289 TCAT IMPL WRLS P-ART PRS SNR: CPT | Performed by: INTERNAL MEDICINE

## 2025-01-02 DEVICE — PA SENSOR AND DELIVERY SYSTEM
Type: IMPLANTABLE DEVICE | Site: ARTERIAL | Status: FUNCTIONAL
Brand: CARDIOMEMS™

## 2025-01-02 RX ORDER — WATER
250 LIQUID (ML) MISCELLANEOUS EVERY 8 HOURS SCHEDULED
Status: DISCONTINUED | OUTPATIENT
Start: 2025-01-02 | End: 2025-01-06 | Stop reason: HOSPADM

## 2025-01-02 RX ORDER — LIDOCAINE HYDROCHLORIDE 20 MG/ML
INJECTION, SOLUTION INFILTRATION; PERINEURAL AS NEEDED
Status: DISCONTINUED | OUTPATIENT
Start: 2025-01-02 | End: 2025-01-02 | Stop reason: HOSPADM

## 2025-01-02 RX ORDER — WATER
240 LIQUID (ML) MISCELLANEOUS
Status: CANCELLED | OUTPATIENT
Start: 2025-01-02 | End: 2025-01-02

## 2025-01-02 RX ORDER — ASPIRIN 81 MG/1
81 TABLET ORAL DAILY
COMMUNITY
Start: 2025-01-02 | End: 2025-02-13

## 2025-01-02 RX ORDER — FENTANYL CITRATE 50 UG/ML
INJECTION, SOLUTION INTRAMUSCULAR; INTRAVENOUS AS NEEDED
Status: DISCONTINUED | OUTPATIENT
Start: 2025-01-02 | End: 2025-01-02 | Stop reason: HOSPADM

## 2025-01-02 RX ORDER — MIDAZOLAM HYDROCHLORIDE 1 MG/ML
INJECTION, SOLUTION INTRAMUSCULAR; INTRAVENOUS AS NEEDED
Status: DISCONTINUED | OUTPATIENT
Start: 2025-01-02 | End: 2025-01-02 | Stop reason: HOSPADM

## 2025-01-02 ASSESSMENT — ENCOUNTER SYMPTOMS
RESPIRATORY NEGATIVE: 1
CONSTITUTIONAL NEGATIVE: 1
PSYCHIATRIC NEGATIVE: 1
NEUROLOGICAL NEGATIVE: 1
CARDIOVASCULAR NEGATIVE: 1

## 2025-01-02 ASSESSMENT — PAIN SCALES - GENERAL
PAINLEVEL_OUTOF10: 0 - NO PAIN

## 2025-01-02 ASSESSMENT — PAIN - FUNCTIONAL ASSESSMENT
PAIN_FUNCTIONAL_ASSESSMENT: 0-10

## 2025-01-02 NOTE — DISCHARGE INSTRUCTIONS
You are scheduled to follow up with Dr. Soto on Monday, Jan. 27th at 9:20 PM    If you have any questions, please call the Cath Lab Nurse Practitioner Gina Knapp at [phone] and leave a message. She will return your call the same day if calling before 3 PM, M-F. If you call on the weekend you can expect a call back on Monday morning. You may also call the Cath Lab at [phone] M-F, 7-3:30 with any questions. Weekends and after hours please call your cardiologist office number to reach a physician on call. The heart group office number is [phone]           CARDIAC CATHETERIZATION DISCHARGE INSTRUCTIONS     FOR SUDDEN AND SEVERE CHEST PAIN, SHORTNESS OF BREATH, EXCESSIVE BLEEDING, SIGNS OF STROKE, OR CHANGES IN MENTAL STATUS YOU SHOULD CALL 911 IMMEDIATELY.     If your provider has prescribed aspirin and/or clopidogrel (Plavix), or prasugrel (Effient), or ticagrelor (Brilinta), DO NOT STOP THESE MEDICATIONS for any reason without talking to your cardiologist first. If any of these were prescribed, you must take them every day without missing a single dose. If you are getting low on these medications, contact your provider immediately for a refill.     FOR NEXT 24 HOURS  - Upon discharge, you should return home and rest for the remainder of the day and evening. You do not have to stay on bed rest but should not be very active.  It is recommended a responsible adult be with you for the first 24 hours after the procedure.    - No driving for 24 hours after procedure. Please arrange for someone to drive you home from the hospital today.     - Do not drive, operate machinery, or use power tools for 24 hours after your procedure.     - Do not make any legal decisions for 24 hours after your procedure.     - Do not drink alcoholic beverages for 24 hours after your procedure.    WOUND CARE   *FOR FEMORAL (LEG) ACCESS*  ·      Avoid heavy lifting (over 10 pounds) for 7 days, squatting or excessive bending for 2  days, and strenuous exercise for 7 days.  ·      No submerged bathing, swimming, or hot tubs for the next 7 days, or until fully healed.  ·      Avoid sexual activity for 3-4 days until any groin discomfort has ceased.     *FOR RADIAL (WRIST) ACCESS*  ·      No lifting more than 5 pounds or excessive use of the wrist for 24 hours - for example, treat your wrist as if it is sprained.  ·      Do not engage in vigorous activities (tennis, golf, bowling, weights) for at least 48 hours after the procedure.  ·      Do not submerge the wrist for 7 days after the procedure.  ·      You should expect mild tingling in your hand and tenderness at the puncture site for up to 3 days.    - The transparent dressing should be removed from the site 24 hours after the procedure.  Wash the site gently with soap and water. Rinse well and pat dry. Keep the area clean and dry. You may apply a Band-Aid to the site. Avoid lotions, ointments, or powders until fully healed.     - You may shower the day after your procedure.      - It is normal to notice a small bruise around the puncture site and/or a small grape sized or smaller lump. Any large bruising or large lump warrants a call to the office.     - If bleeding should occur, lay down and apply pressure to the affected area for 10 minutes.  If the bleeding stops notify your physician.  If there is a large amount of bleeding or spurting of blood CALL 911 immediately.  DO NOT drive yourself to the hospital.    - You may experience some tenderness, bruising or minimal inflammation.  If you have any concerns, you may contact the Cath Lab or if any of these symptoms become excessive, contact your cardiologist or go to the emergency room.     OTHER INSTRUCTIONS  - You may take acetaminophen (Tylenol) as directed for discomfort.  If pain is not relieved with acetaminophen (Tylenol), contact your doctor.    - If you notice or experience any of the following, you should notify your doctor or seek  medical attention  Chest pain or discomfort  Change in mental status or weakness in extremities.  Dizziness, light headedness, or feeling faint.  Change in the site where the procedure was performed, such as bleeding or an increased area of bruising or swelling.  Tingling, numbness, pain, or coolness in the leg/arm beyond the site where the procedure was performed.  Signs of infection (i.e. shaking chills, temperature > 100 degrees Fahrenheit, warmth, redness) in the leg/arm area where the procedure was performed.  Changes in urination   Bloody or black stools  Vomiting blood  Severe nose bleeds  Any excessive bleeding    - If you DO NOT have an appointment with your cardiologist within 2-4 weeks following your procedure, please contact their office.      Important ways to reduce your risk of coronary artery disease by healthy diet, maintaining a healthy weight, exercising regularly and stop using tobacco products.     Mediterranean Diet    About this topic  This is a heart healthy diet. It is based on widely used foods and cooking styles from many countries around the [place]. The main pattern for the diet is more plant foods and monounsaturated fats, or good fats, like olive oil. Protein in this diet comes from seafood, legumes, nuts, seeds, and poultry and eggs in lowered amounts. You will also eat more whole grains, vegetables, and fruits and moderate amounts of alcohol are also included. This diet has less red meats, dairy products, and processed foods.    What will the results be?  Your diet will have less saturated fat, cholesterol, calories, sodium, and added sugars. Your diet will be higher in fiber. This will help to keep your blood sugar steady. This diet lowers the chance of heart disease and other health problems.  What lifestyle changes are needed?  If you do not often eat this way, you will need to change your eating habits. Be sure to get regular exercise. It is believed to help the health  "benefits of this diet.  What changes to diet are needed?  You may need to limit the amount of red meat and processed foods in your diet. Ask your dietitian for help planning meals that are right for you.  What foods are good to eat?  Plenty of fish and other seafood  Fresh, frozen, or canned fruits and vegetables  Nuts and nut butters and dried beans, lentils, or peas  Foods high in fiber like whole grains and whole grain products  Olive oil (good fat), peanut or canola oil, margarine, or spreads that list vegetable oil as the first ingredient and do not contain trans fat or partially hydrogenated oil  Small amounts of poultry and eggs  What foods should be limited or avoided?  Red meats  Sweets, desserts, and processed foods  Butter, oils, and fats that contain trans fats or are hydrogenated or partially hydrogenated  Gravies and sauces  What problems could happen?  Your weight may rise because your diet will be higher in fat from olive oil and nuts.  You may have lower iron levels. Be sure to eat foods rich in iron. Also, eat foods rich in vitamin C. This will help your body take in iron.  You may have lower calcium levels because you are eating less dairy products. Ask your doctor if you need to take a calcium supplement.  Wine is often thought of as part of a Mediterranean diet. It is not needed and you may choose not to include it. Avoid wine if you are prone to alcohol abuse or are pregnant. Also, avoid it if you are at risk for breast cancer, have liver problems, or have other illnesses that make it important for you to not have alcohol.  When do I need to call the doctor?  If you have any concerns about your diet     Health risks of obesity    The Basics  Written by the doctors and editors at [facility]  What does it mean to have obesity? -- Doctors use a calculation called \"body mass index,\" or \"BMI,\" to decide whether a person is underweight, at a healthy weight, overweight, or has obesity.  Your BMI will " "tell you which category you are in based on your weight and height (figure 1):  ?If your BMI is between 25 and 29.9, you are overweight.  ?If your BMI is 30 or greater, you have obesity.  Obesity is a problem, because it increases the risks of many different health problems. It can also make it hard for you to move, breathe, and do other things that people who are at a healthy weight can do easily.  What are the health risks of obesity? -- Having obesity increases a person's risk of developing many health problems. Here are just a few examples:  ?Diabetes  ?High blood pressure  ?High cholesterol  ?Heart disease (including heart attacks)  ?Stroke  ?Sleep apnea (a disorder in which you stop breathing for short periods while asleep)  ?Asthma  ?Cancer  Does having obesity shorten a person's life? -- Yes. Studies show that people with obesity die younger than people who are a healthy weight. They also show that the risk of death goes up the heavier a person is. The degree of increased risk depends on how long the person has had obesity, and on what other medical problems they have.  People with \"central obesity\" might also be at risk of dying younger. Central obesity means carrying extra weight in the belly area, even if the BMI is normal.  Should I see an expert? -- Yes. If you are overweight or have obesity, you can talk to your doctor or nurse. They might have suggestions for healthy ways to lose weight. It can also help to work with a dietitian (food and nutrition expert). A dietitian can help you choose healthy foods and plan meals.  Are there medical treatments that can help me lose weight? -- Yes. There are medicines and surgery to help with weight loss. But those treatments are only for people who have not been able to lose weight through lifestyle changes such as diet and exercise. Also, weight loss treatments do not take the place of diet and exercise. People who have those treatments must also change how they " eat and how active they are.  What can I do to prevent the problems caused by obesity? -- The best thing that you can do is lose weight. But even if weight loss is not possible, you can improve your health and lower your risk if you:  ?Become more active - Many types of physical activity can help, including walking. You can start with a few minutes a day and add more as you get stronger and build up your endurance. Anything that gets your body moving is good for you.  ?Improve your diet - It is healthy to have regular meal times, eat smaller portions, and not skip meals. Limit sweets, and avoid processed foods. Try to eat more vegetables and fruits instead.  ?Quit smoking (if you smoke) - Some people start eating more after they stop smoking, so try to make healthy food choices. Even if it increases your appetite, quitting smoking is still one of the best things that you can do to improve your health.  ?Limit alcohol - For females, drink no more than 1 drink a day. For males, drink no more than 2 drinks a day.  What causes obesity? -- The thing that increases a person's risk the most is having an unhealthy lifestyle. Most people develop obesity because they eat too much, eat unhealthy foods, and move too little. That's especially true of people who watch too much TV. But there are also other things that seem to increase the risk of obesity that many people do not know about. Here are some things that might affect a person's chance of developing obesity:  ?Mother's habits during and after pregnancy - People who eat a lot of calories, have diabetes, or smoke during pregnancy have a higher chance of having babies who have obesity as adults. Also, babies who drink formula might be more likely than  babies to develop obesity later in life.  ?Habits and weight gain during childhood - Children or teens who are overweight or have obesity are more likely to become have obesity as an adult.  ?Sleeping too little -  "People who do not get enough sleep are more likely to develop obesity than people who sleep enough.  ?Taking certain medicines - Long-term use of certain medicines, such as some medicines to treat depression, can cause weight gain. If you are concerned that 1 of your medicines might be making you gain weight, talk to your doctor or nurse. They might be able to switch you to a different medicine instead.  ?Certain hormonal conditions - Some hormonal problems can increase the risk of developing obesity. For example, a condition called \"polycystic ovary syndrome\" can cause weight gain, along with other symptoms like irregular periods.  What if I want to get pregnant? -- If you are overweight or have obesity, it might be harder to get pregnant. For males, obesity can also cause sex problems, like having trouble getting or keeping an erection. This is more likely if you also have high blood pressure or diabetes.  What if my child has obesity? -- In children, obesity has many of the same risks as it does in adults. For example, it can increase the risk of diabetes, high blood pressure, asthma, and sleep apnea. It can also cause added problems related to childhood. For example, obesity can make children grow faster than normal and cause girls to go through puberty earlier than usual.  All topics are updated as new evidence becomes available and our peer review process is complete.  This topic retrieved from [employer] on: Jan 11, 2024.  Topic 92358 Version 17.0  Release: 31.6.4 - C32.10  © 2024 [employer], [employer]. and/or its affiliates. All rights reserved.  figure 1: Your body mass index (BMI)        If you use tobacco products please review   Quitting smoking    The Basics  Written by the doctors and editors at [employer]  What are the benefits of quitting smoking? -- Quitting smoking can dramatically improve your health and help you live longer. It lowers your risk of heart disease, lung disease, kidney failure, cancer, " "infection, stomach problems, and diabetes.  Quitting smoking can also lower your chances of getting osteoporosis, a condition that makes your bones weak.  Quitting is not easy for most people, and it might take several tries to completely quit. But help and support are available. Quitting smoking will improve your health no matter how old you are, even if you have smoked for a long time.  What should I do if I want to quit smoking? -- It's a good idea to start by talking with your doctor or nurse. It is possible to quit on your own, without help. But getting help greatly increases your chances of quitting successfully.  When you are ready to quit, you will make a plan to:  ?Set a quit date.  ?Tell your family and friends that you plan to quit.  ?Plan ahead for the challenges you will face, such as cigarette cravings.  ?Remove cigarettes from your home, car, and work.  How can my doctor or nurse help? -- Your doctor or nurse can give you advice on the best way to quit. They can also give you medicines to:  ?Reduce your craving for cigarettes  ?Reduce your \"withdrawal\" symptoms (symptoms that happen when you stop smoking)  Your doctor or nurse can also help you find a counselor to talk to. For most people who are trying to quit smoking, it works best to use both medicines and counseling.  You can also get help from a free phone line (8-808-QUITNOW, or 1-180.783.6280) or go online to www.smokefree.gov.  What are the symptoms of withdrawal? -- When you stop smoking, you might have symptoms such as:  ?Trouble sleeping  ?Feeling irritable, anxious, or restless  ?Getting frustrated or angry  ?Having trouble thinking clearly  These symptoms can be hard to deal with, which is why it can be so hard to quit. But medicines can help.  Some people who stop smoking become temporarily depressed. Some people need treatment for depression, such as counseling or medicines or both. People with depression might:  ?No longer enjoy or " care about doing the things they used to like to do  ?Feel sad, down, hopeless, nervous, or cranky most of the day, almost every day  ?Lose or gain weight  ?Sleep too much or too little  ?Feel tired or like they have no energy  ?Feel guilty or like they are worth nothing  ?Forget things or feel confused  ?Move and speak more slowly than usual  ?Act restless or have trouble staying still  ?Think about death or suicide  If you think you might be depressed, tell your doctor or nurse right away. They can talk to you about your symptoms and recommend treatment if needed.  Get help right away if you are thinking of hurting or killing yourself! -- Sometimes, people with depression think of hurting or killing themselves. If you ever feel like you might hurt yourself, help is available:  ?In the , contact the [facility] Crisis Lifeline:  To speak to someone, call or text [facility].  To talk to someone online, go to www.[facility].org/chat.  ?Call your doctor or nurse and tell them that it is an emergency.  ?Call for an ambulance (in the [place] and [place], call 9-1-1).  ?Go to the emergency department at your local hospital.  If you think your partner might have depression, or if you are worried that they might hurt themselves, get them help right away.  How does counseling work? -- A counselor can help you figure out:  ?What triggers you to want to smoke, and how to handle these situations  ?How to resist cravings  ?What you can do differently if you have tried to quit before  You can meet with a counselor in 1-on-1 sessions or as part of a group. There are other ways to get counseling, too, such as over the phone, through text messaging, or online.  How do medicines help you stop smoking? -- Different medicines work in different ways:  ?Nicotine replacement therapy - Nicotine is the main drug in cigarettes, and the reason they are addictive. These medicines reduce your body's craving for nicotine. They also help with withdrawal  "symptoms.  There are different forms of nicotine replacement, including skin patches, lozenges, gum, nasal sprays, and inhalers. Most can be bought without a prescription. Also, health insurance might cover some or all of the cost.  It often helps to use 2 forms of nicotine replacement. For example, you might wear a patch all the time, plus use gum or lozenges when you get a craving to smoke.  ?Varenicline - Varenicline (brand names: Chantix, Champix) is a prescription medicine that reduces withdrawal symptoms and cigarette cravings. Varenicline can increase the effects of alcohol in some people. It's a good idea to limit drinking while you're taking it, at least until you know how it affects you.  Even if you are not yet ready to commit to a quit date, varenicline can help reduce cravings. This can make it easier to quit when you are ready.  ?Bupropion - Bupropion (sample brand names: Wellbutrin, Zyban) is a prescription medicine that reduces your desire to smoke. It is also available in a generic version, which is cheaper than the brand name medicines. Doctors do not usually prescribe bupropion for people with seizures or who have had seizures in the past.  It might also be helpful to combine nicotine replacement with bupropion or varenicline. In some cases, a person might even take both bupropion and varenicline. Your doctor or nurse can help you figure out the best combination for you.  What about e-cigarettes? -- Sometimes people wonder if using electronic cigarettes, or \"e-cigarettes,\" can help them quit smoking. Using e-cigarettes is also called \"vaping.\" Doctors do not recommend e-cigarettes in place of using of medicines and counseling. That's because e-cigarettes still contain nicotine as well as other substances that might be harmful. It's also not clear how they can affect a person's health in the long term.  What if I am pregnant and I smoke? -- If you are pregnant, it's really important for the health " of your baby that you quit. Ask your doctor what options you have, and what is safest for your baby.  What if I have already smoked for a long time? -- The longer you have smoked, the higher your chances are of having health problems. But it is never too late to quit smoking. It helps your health even if you are older or have smoked for many years. The best thing you can do to lower your chance of having a health problem caused by smoking is to quit.  Will I gain weight if I quit? -- You might gain a few pounds. This can be frustrating for some people. But it's important to remember that you are improving your health by quitting smoking. You can help prevent gaining a lot of weight by staying active and eating a healthy diet.  What if I am not able to quit? -- If you don't quit on your first try, or if you quit but then start smoking again, don't give up hope. Lots of people have to try more than once before they are able to completely quit.  It might help to try to understand why quitting did not work. There might be something you can do differently when you try again. It can help to figure out which situations make you want to smoke, so you can avoid them.  What else can I do to improve my chances of quitting? -- You can:  ?Get regular exercise. Any type of physical activity, even gentle forms of movement, is good for your health. Physical activity can also help reduce stress.  ?Stay away from people who smoke and places that make you want to smoke. If people close to you smoke, ask them to quit with you or avoid smoking around you.  ?Carry gum, hard candy, or something to put in your mouth. If you get a craving for a cigarette, try 1 of these instead.  ?Don't give up, even if you start smoking again. It takes most people a few tries before they succeed.  All topics are updated as new evidence becomes available and our peer review process is complete.    CardioMEMS Discharge Instructions    Why is this procedure  done?  The CardioMEMS is a tool your doctor uses to help monitor your heart failure. Heart failure happens when the heart cannot pump the right amount of blood through the body. When the pump does not work well, blood can back up into the lungs. Then your legs or other body parts may swell.  Heart failure is a long-term problem which may get worse over time. The CardioMEMS lets you take readings each day to see how well your heart is working. This can help your doctor adjust your treatment before you start to get sick. The CardioMEMS is a device that is implanted in your pulmonary artery or PA by a heart specialist.  You will be able to take readings of your heart rate and the pulmonary artery or PA pressure. Your PA is a blood vessel that carries blood from your heart to your lungs where it picks up oxygen.  To take a reading with your CardioMEMS, you lie on the special pillow that your doctors give you. The pillow is connected to an electronics unit that measures your heart rate and PA pressure.  The unit sends these readings from your home to your doctor.  After looking at the information, your doctor may have you make changes to the drugs you take to help treat your heart failure.  Taking a measurement only takes a few minutes and does not require any leads or electrodes.    What care is needed at home?  Ask your doctor what you need to do when you go home. Make sure you ask questions if you do not understand what the doctor says.  Take your drugs as ordered by your doctor.  Talk to your doctor about how to care for your cut site. Ask your doctor about:  When you should change your bandages  When you may take a bath or shower  If you need to be careful with lifting things over 10 pounds (4.5 kg)  When you may go back to your normal activities like work, driving, or sex  If your cut site starts to bleed, lie down. Put pressure on it until the bleeding stops.  Be sure to wash your hands before touching your wound  or dressing.  What follow-up care is needed?  Your doctor will ask you to make visits to the office to check on your progress. Be sure to keep these visits.  Your doctor may send you to a cardiac rehab center for more care.  If you have stitches or staples, you will need to have them taken out. Your doctor will often want to do this in 1 to 2 weeks.  What drugs may be needed?  The doctor may order drugs to:  Help with pain  Prevent infection  Prevent blood clots  Will physical activity be limited?  You may need to limit your physical activity until your wound fully heals. Ask your doctor about the right amount of activity for you.  What problems could happen?  Irregular heart beat  Infection  Bleeding or blood clots  Heart attack  Stroke  Device does not stay in the right place  Device does not work the right way  When do I need to call the doctor?  Activate the emergency medical system right away if you have signs of a heart attack or stroke. Call 911 in the [place] or [place]. The sooner treatment begins, the better your chances for recovery. Call for emergency help right away if you have:  Signs of heart attack:  Chest pain  Trouble breathing  Fast heartbeat  Feeling dizzy  Nausea  Pain in back, neck, shoulder, jaw, arm  Significant fatigue  Signs of stroke:  Sudden numbness or weakness of the face, arm, or leg, especially on one side of the body  Sudden confusion, trouble speaking, or understanding  Sudden trouble seeing in one or both eyes  Sudden trouble walking, dizziness, loss of balance or coordination  Sudden severe headache with no known cause  Call your doctor if you have:  Signs of heart failure. These include trouble breathing, fast heartbeats, coughing, swelling of the belly and feet.  Signs of infection. These include a fever of 100.4°F (38°C) or higher, chills, wound that will not heal.  Signs of blood clot. These include swelling of one or both legs or trouble with breathing.  Signs of wound  infection. These include swelling, redness, warmth around the wound; too much pain when touched; yellowish, greenish, or bloody discharge; foul smell coming from the cut site; cut site opens up.  Problems with taking your drugs  Arm or leg where the catheter was put in changes color, is cool to touch, or is numb  Bleeding at the catheter site that does not stop, even with pressure  Helpful tips  Join a support group. Learn how others have coped with this health problem.  Talk with your doctor about your worries and fears.  Carry the information card from the medical device with you at all times. Bring the card to your doctor visits and when you go to the hospital.  Teach Back: Helping You Understand  The Teach Back Method helps you understand the information we are giving you. After you talk with the staff, tell them in your own words what you learned. This helps to make sure the staff has described each thing clearly. It also helps to explain things that may have been confusing. Before going home, make sure you can do these:  I can tell you about my procedure.  I can tell you how to care for my cut site.  I can tell you what I will do if I have signs of a heart attack, stroke, or heart failure.  Last Reviewed Date  2024-02-21

## 2025-01-02 NOTE — Clinical Note
Sheath was exchanged in the right femoral vein with SHEATH, PINNACLE, 10 CM,  7FR INTRODUCER, 7FR KIM, 2.5 CM DIALATOR.

## 2025-01-02 NOTE — H&P
"History Of Present Illness  Trina Elias \"Marcela\" is a 74 y.o. female presenting with cardiomyopathy and heart failure. Patient has a significant past medical history of VT s/p ICD, TAA, mild aortic stenosis, paroxysmal atrial fibrillation s/p PVI x2 and s/p WING Closure 07/16/2024, CMP/heart failure, hyperlipidemia, HTN, GERD, CKD stage 3, iron deficiency anemia, rheumatoid arthritis, asthma, centrilobular emphysema, and BARBARA/central sleep apnea. She explains that she has been out of the hospital since November (last visit with Dr. Seaman). She was in the ER a few weeks ago because she had a boil in the right groin and she received antibiotics and has fully healed. She says that the last time she had a flair up of CHF was about 3 weeks ago. She was given a regiment of extra diuretics and recovered. She did not need hospitalized. Today she feels she is not volume overloaded. Typically has fluid build up in the abdomen and occasionally in legs. She is currently on ASA+ Plavix due to post watchman device.          Past Medical History  Past Medical History:   Diagnosis Date    Arrhythmia     Asthma     Atrial fibrillation (Multi)     Resistent to treatment: s/p DCCVs and ablations    Central sleep apnea     Per PSG 11/20/18; severe central sleep apnea, .6, mild obstructive component    Cervical myofascial pain syndrome     Baclofen    Chronic anemia     CKD stage 3a, GFR 45-59 ml/min (Multi)     COPD (chronic obstructive pulmonary disease) (Multi)     DVT of axillary vein, acute right (Multi) 01/20/2018    When PICC line was removed. U/S + Partial nonocclusive DVT in the axillary and proximal basilic vein in 2018    H/O being hospitalized 02/2024 Feb 24-Mar 5, 2024:  (2/24) admitted for dyspnea and chest pain work-up and found to have pleural effusions.  Found to have acute blood loss anemia s/p 2u PRBC. EGD neg for active bleed.  Cardiology consulted - plan for Watchman device WING closure as out pt.    History " of Helicobacter pylori infection     3/2013, 1/2021    Hx of syncope     Recurrent Syncope d/t VT- s/p ILR (12/2012) implanted.  (July 2013) Episode of syncope that appears to be correlated with an 11 second round of ventricular tachycardia recorded by the loop recorder.  S/p EPS (June 2014) neg for inducible arrhythmias. (ILR removed when ICD implanted)    Hyperlipidemia     Hypertension     ICD (implantable cardioverter-defibrillator) in place     Placed 06/12/2014    Mitral valve regurgitation     Mild-Moderate per Echo 02/25/2024    Moderate aortic stenosis by prior echocardiogram     Echo 2/25/2024    Osteopenia 06/26/2023    Personal history of anaphylaxis     See allergy list    Psoriasis     PUD (peptic ulcer disease)     H/O nonbleeding gastric ulcers with small hiatal hernia on 1/6/2021 EGD    Pulmonary hypertension (Multi)     Mild - Moderate per Echo 2/25/2024    Radiculopathy, lumbar region 08/13/2018    Acute lumbar radiculopathy    Restless legs syndrome 11/17/2015    Restless legs syndrome    Rheumatoid arthritis     Seasonal allergic rhinitis due to pollen     Small bowel obstruction (Multi) 06/26/2023    H/O due to Severe Adhesions s/p Adhesiolysis x2 in 2015 and 2020    Wide-complex tachycardia     Per cardiology 3/7/2024: VT is on a basis of triggered activity certainly made worse with anemia and prednisone. Since she is asymptomatic we will continue to observe.       Surgical History  Past Surgical History:   Procedure Laterality Date    ABDOMINAL ADHESION SURGERY  10/22/2015    Exploratory laparotomy. Extensive lysis of adhesions. Small-bowel repair.    ABDOMINAL ADHESION SURGERY  10/18/2020    Exploratory laparotomy, massive lysis of adhesions, small-bowel resection, decompression of small bowel and removal of mesh.    ABLATION OF DYSRHYTHMIC FOCUS  05/02/2022 09/17/2018, 05/02/2022 for A FIB    ACHILLES TENDON SURGERY Right     ANTERIOR CERVICAL DISCECTOMY W/ FUSION  03/21/2016    Anterior  C5 and C6 discectomies and interbody fusion of C5-C6, C6-C7    APPENDECTOMY      Open surgery    BREAST BIOPSY Left 2022    CARDIAC ASSIST DEVICE INSERTION  2012    Loop recorder placement , Removed in  w/ ICD placed    CARDIAC CATHETERIZATION  2022    Normal coronary arteries. Tachycardia induced Cardiomyopathy.    CARDIAC CATHETERIZATION N/A 2024    Procedure: LAAO (Left Atrial Appendage Occlusion);  Surgeon: Reid Dickinson MD;  Location: [place] [address] [facility];  Service: Cardiovascular;  Laterality: N/A;  Same day CT at 0815    CARDIAC DEFIBRILLATOR PLACEMENT  2014    Dual chamber ICD    CARDIOVERSION  2018, 2018    CARPAL TUNNEL RELEASE Bilateral      SECTION, CLASSIC      x2    CHOLECYSTECTOMY      Open surgery    COLONOSCOPY  2024    Extensive diverticulosis of moderate severity and causing mild luminal narrowing in the sigmoid colon    FINGER SURGERY Left 2008    Left index finger metacarpophalangeal fusion    FOOT SURGERY Bilateral     B/L Tarsal Tunnel Release    IR INJECTION EPIDURAL STEROID  2011    Lumbar spine; , ,     IR INJECTION EPIDURAL STEROID      Cervical spine 2007    LEG SURGERY Right 2006    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  Plantar wart removal R foot.    NERVE SURGERY Right 10/13/2010    Release of Right tarsal tunnel syndrome w/ severe adhesion scar tissue    NISSEN FUNDOPLICATION      Open surgery    PICC LINE INSERTION WO IMAGING  10/24/2015    Placed for stable IV access    REVERSE TOTAL SHOULDER ARTHROPLASTY Bilateral 2024    Right 2023, Left 2024    ROTATOR CUFF REPAIR      ULNAR NERVE TRANSPOSITION Left     VENTRAL HERNIA REPAIR  06/15/2015    Laparoscopic double ventral hernia repair.    WISDOM TOOTH EXTRACTION      WOUND DEBRIDEMENT  2020    Excisional debridement of abdominal wound for wound dehiscence         Social History  She reports that she has never smoked. She has never used smokeless tobacco. She reports that she does not drink alcohol and does not use drugs.    Family History  Family History   Problem Relation Name Age of Onset    Asthma Daughter      Asthma Other      Colon cancer Other      Diabetes Other      Other (stroke syndrome) Other          Allergies  Abatacept, Hydrocodone-acetaminophen, Hydromorphone, Metoprolol, Penicillins, Pregabalin, Prochlorperazine, Methotrexate, Aripiprazole, Beta-blockers (beta-adrenergic blocking agts), Bupropion, Cefepime, Ciprofloxacin, Furosemide, Levofloxacin, and Pineapple    Review of Systems   Constitutional: Negative.    Respiratory: Negative.     Cardiovascular: Negative.    Genitourinary: Negative.    Skin: Negative.    Neurological: Negative.    Psychiatric/Behavioral: Negative.          Physical Exam  Vitals reviewed.   Constitutional:       Appearance: Normal appearance.   HENT:      Nose: Nose normal.      Mouth/Throat:      Mouth: Mucous membranes are moist.   Cardiovascular:      Rate and Rhythm: Normal rate and regular rhythm.      Pulses: Normal pulses.      Heart sounds: Normal heart sounds. No murmur heard.  Pulmonary:      Effort: Pulmonary effort is normal. No respiratory distress.      Breath sounds: Normal breath sounds. No wheezing or rales.   Abdominal:      Palpations: Abdomen is soft.   Skin:     General: Skin is warm and dry.      Capillary Refill: Capillary refill takes less than 2 seconds.   Neurological:      General: No focal deficit present.      Mental Status: She is alert and oriented to person, place, and time.   Psychiatric:         Mood and Affect: Mood normal.         Behavior: Behavior normal.         Thought Content: Thought content normal.         Judgment: Judgment normal.          Last Recorded Vitals  Blood pressure 145/71, pulse 96, temperature 36.4 °C (97.6 °F), temperature source Temporal, resp. rate 14, height 1.626 m  "(5' 4\"), weight 70.3 kg (155 lb), SpO2 99%.    Relevant Results  No results found.       Assessment/Plan   Assessment & Plan  Chronic systolic heart failure    Plan: CardioMEMS device implant         I spent 30 minutes in the professional and overall care of this patient.      Kenya Knapp, APRN-CNP    "

## 2025-01-02 NOTE — Clinical Note
Sheath was exchanged in the right femoral vein with ACCESS KIT, S-ELSY MINI, 4FR 10CM 0.018IN 40CM, NT/PT, ECHO ENHANCE NEEDLE. With amplatz wire

## 2025-01-02 NOTE — PRE-SEDATION DOCUMENTATION
"Cardiology Preprocedure Note    Trina Elias   Indication for procedure: The encounter diagnosis was Chronic systolic heart failure. Patient here for CardioMEMS implant.        /71   Pulse 96   Temp 36.4 °C (97.6 °F) (Temporal)   Resp 14   Ht 1.626 m (5' 4\")   Wt 70.3 kg (155 lb)   SpO2 99%   BMI 26.61 kg/m²    Relevant Labs:   Lab Results   Component Value Date    CREATININE 1.07 (H) 12/09/2024    EGFR 55 (L) 12/09/2024    INR 1.1 09/17/2024    PROTIME 12.4 09/17/2024       Planned Sedation/Anesthesia: Moderate    Airway assessment: normal    Directed physical examination: General: Alert and Oriented, No distress, cooperative. Lungs: Clear to auscultation bilaterally, no wheezes, rhonci, or rales. respirations unlabored Heart: regular rate and rhythm, S1 and S2, no murmur.    Mallampati: II (hard and soft palate, upper portion of tonsils and uvula visible)    ASA Score: ASA 3 - Patient with moderate systemic disease with functional limitations    Benefits, risks and alternatives of procedure and planned sedation have been discussed with the patient and/or their representative. All questions answered and they agree to proceed.     Kenya Knapp, APRN-CNP   "

## 2025-01-02 NOTE — POST-PROCEDURE NOTE
Physician Transition of Care Summary  Invasive Cardiovascular Lab    Procedure Date: 1/2/2025  Attending:    Khao Seaman - Primary  Resident/Fellow/Other Assistant: Surgeons and Role:  * No surgeons found with a matching role *    Indications:   Pre-op Diagnosis      * Chronic systolic heart failure [I50.22]    Post-procedure diagnosis:   Post-op Diagnosis     * Chronic systolic heart failure [I50.22]    Procedure(s):   Cardiomems  27051 - [place] INSERTION FLOW DIRECTED CATHETER FOR MONITORING        Procedure Findings:   Right Heart Catheterization    RHC:  RA: 11  RV: 76/8, (16)  PA: 63/27 (41)  PCWP: 21  PA Sat %: 68  RA Sat %:  65  SVR: 969  CO & CI: 5.45/3.1      Description of the Procedure:   RHC and successful insert of the CardioMEMS into the left pulmonary artery   RCFV access - manual hold     Complications:   none    Stents/Implants:   Implants       Other Cardiac Implant    Delivery System, Cardiomems Pa Sensor, Includes [id number], [id number] - [id number] - [id number] - Implanted        Inventory item: DELIVERY SYSTEM, CARDIOMEMS PA SENSOR, INCLUDES [id number], [id number] Model/Cat number: CM PATIENT SYSTEM    Serial number: [id number] : [employer]    Lot number: [id number] Device identifier: [id number]      GUDID Information       Request status Successful        Brand name: CardioMEMS™ Version/Model: [id number]    Company name: [id number] MRI safety info as of 1/2/25: Labeling does not contain MRI Safety Information    Contains dry or latex rubber: No      GMDN P.T. name: Implantable pulmonary artery pressure monitoring system                As of 1/2/2025       Status: Implanted                              Anticoagulation/Antiplatelet Plan:   Continue ASA and Plavix for 6 weeks post implant    Estimated Blood Loss:   5 mL    Anesthesia: Moderate Sedation Anesthesia Staff: No anesthesia staff entered.    Any Specimen(s) Removed:   Order Name Source Comment Collection Info Order Time   BASIC METABOLIC  PANEL Blood, Venous   1/2/2025 10:07 AM     Release result to MyChart   Immediate        CBC Blood, Venous   1/2/2025 10:07 AM     Release result to MyChart   Immediate            Disposition:   None       Electronically signed by: NAILA Hackett-CNP, 1/2/2025 10:14 AM

## 2025-01-07 RX ORDER — BUMETANIDE 2 MG/1
2 TABLET ORAL
Qty: 60 TABLET | Refills: 1 | Status: SHIPPED | OUTPATIENT
Start: 2025-01-07

## 2025-01-08 ENCOUNTER — PATIENT OUTREACH (OUTPATIENT)
Dept: PRIMARY CARE | Facility: CLINIC | Age: 75
End: 2025-01-08
Payer: MEDICARE

## 2025-01-10 DIAGNOSIS — I50.42 CHRONIC COMBINED SYSTOLIC AND DIASTOLIC CONGESTIVE HEART FAILURE: Primary | ICD-10-CM

## 2025-01-13 ENCOUNTER — APPOINTMENT (OUTPATIENT)
Dept: PAIN MEDICINE | Facility: CLINIC | Age: 75
End: 2025-01-13
Payer: MEDICARE

## 2025-01-17 RX ORDER — SPIRONOLACTONE 25 MG/1
12.5 TABLET ORAL DAILY
Qty: 15 TABLET | Refills: 1 | Status: SHIPPED | OUTPATIENT
Start: 2025-01-17 | End: 2026-01-17

## 2025-01-27 ENCOUNTER — HOSPITAL ENCOUNTER (OUTPATIENT)
Dept: CARDIOLOGY | Facility: CLINIC | Age: 75
Discharge: HOME | End: 2025-01-27
Payer: MEDICARE

## 2025-01-27 ENCOUNTER — APPOINTMENT (OUTPATIENT)
Dept: RHEUMATOLOGY | Facility: CLINIC | Age: 75
End: 2025-01-27
Payer: MEDICARE

## 2025-01-27 DIAGNOSIS — Z95.810 PRESENCE OF AUTOMATIC (IMPLANTABLE) CARDIAC DEFIBRILLATOR: ICD-10-CM

## 2025-01-27 DIAGNOSIS — I47.29 OTHER VENTRICULAR TACHYCARDIA: ICD-10-CM

## 2025-01-31 DIAGNOSIS — M05.79 RHEUMATOID ARTHRITIS INVOLVING MULTIPLE SITES WITH POSITIVE RHEUMATOID FACTOR (MULTI): ICD-10-CM

## 2025-01-31 RX ORDER — SULFASALAZINE 500 MG/1
500 TABLET ORAL 2 TIMES DAILY
Qty: 180 TABLET | Refills: 0 | Status: SHIPPED | OUTPATIENT
Start: 2025-01-31

## 2025-02-03 ENCOUNTER — APPOINTMENT (OUTPATIENT)
Dept: RHEUMATOLOGY | Facility: CLINIC | Age: 75
End: 2025-02-03
Payer: MEDICARE

## 2025-02-03 VITALS
WEIGHT: 156 LBS | SYSTOLIC BLOOD PRESSURE: 118 MMHG | BODY MASS INDEX: 26.63 KG/M2 | DIASTOLIC BLOOD PRESSURE: 60 MMHG | HEART RATE: 62 BPM | HEIGHT: 64 IN

## 2025-02-03 DIAGNOSIS — M05.79 RHEUMATOID ARTHRITIS INVOLVING MULTIPLE SITES WITH POSITIVE RHEUMATOID FACTOR (MULTI): Primary | ICD-10-CM

## 2025-02-03 DIAGNOSIS — L40.9 PSORIASIS: ICD-10-CM

## 2025-02-03 PROCEDURE — 1123F ACP DISCUSS/DSCN MKR DOCD: CPT | Performed by: INTERNAL MEDICINE

## 2025-02-03 PROCEDURE — 99213 OFFICE O/P EST LOW 20 MIN: CPT | Performed by: INTERNAL MEDICINE

## 2025-02-03 PROCEDURE — 1036F TOBACCO NON-USER: CPT | Performed by: INTERNAL MEDICINE

## 2025-02-03 PROCEDURE — 3008F BODY MASS INDEX DOCD: CPT | Performed by: INTERNAL MEDICINE

## 2025-02-03 PROCEDURE — 1159F MED LIST DOCD IN RCRD: CPT | Performed by: INTERNAL MEDICINE

## 2025-02-03 PROCEDURE — 1125F AMNT PAIN NOTED PAIN PRSNT: CPT | Performed by: INTERNAL MEDICINE

## 2025-02-03 RX ORDER — HYDROXYCHLOROQUINE SULFATE 200 MG/1
300 TABLET, FILM COATED ORAL DAILY
Qty: 135 TABLET | Refills: 0 | Status: SHIPPED | OUTPATIENT
Start: 2025-02-03 | End: 2025-05-04

## 2025-02-03 RX ORDER — PREDNISONE 20 MG/1
TABLET ORAL
Qty: 18 TABLET | Refills: 0 | Status: SHIPPED | OUTPATIENT
Start: 2025-02-03 | End: 2025-02-12

## 2025-02-03 RX ORDER — SULFASALAZINE 500 MG/1
1000 TABLET ORAL 2 TIMES DAILY
Qty: 360 TABLET | Refills: 0 | Status: SHIPPED | OUTPATIENT
Start: 2025-02-03 | End: 2025-05-04

## 2025-02-03 ASSESSMENT — PAIN SCALES - GENERAL: PAINLEVEL_OUTOF10: 10-WORST PAIN EVER

## 2025-02-03 NOTE — PROGRESS NOTES
"Subjective . Trina Elias \"Marcela\" is a 74 y.o. female who presents for Follow-up (Pain in the left and and the right knee ).    HPI. 74-year-old female with history of seropositive RA (+ve RF, CCP), positive HLA-B27, chronic low back pain secondary to DDD/spondylosis, chronic pain, s/p C-spine fusion surgery, VT/V-fib s/p pacemaker and AICD,HFpEF, recurrent SBO, HTN, GERD s/p Nissen fundoplication, eczema s/p bilateral shoulder  arthroplasty, bilateral tarsal tunnel release surgery, s/p right carpal tunnel surgery and s/p left shoulder arthroplasty presented for follow-up.     She reports persistent pain in her right hand and left knee, started 4 days ago.  She has difficulty to make full fist.  She denies trauma to the hand or the knee.    Immunosuppression: Hydroxychloroquine and sulfasalazine.(7/2024).     Review of Systems   All other systems reviewed and are negative.    Objective     Blood pressure 118/60, pulse 62, height 1.626 m (5' 4\"), weight 70.8 kg (156 lb).    Physical Exam.  Gen. AAO x3, NAD.  HEENT: No pallor or icterus, PERRLA, EOMI. No cervical lymphadenopathy .  Skin: No rashes.  Heart: S1, S2/ RRR.   Lungs: CTA B.  Abdomen: Soft, NT/ND, BS regular.  MSK: Bilateral Heberden's and Dung nodes.  Right MCP and PIP joint tenderness.  Right wrist with tenderness and decreased range of motion.  Right hand  weak.  Bilateral wrists, ankles and MTP joint without swelling and tenderness.  Left knee with diffuse tenderness.  No knee warmth, erythema or effusion.    Neuro: Sensation to touch intact.Strength 5/5 throughout.   Psych:Appropriate mood and behavior  EXT: No edema    Assessment/Plan .    #1: Seropositive RA.  -Begin prednisone taper.  -Increase sulfasalazine.  -Continue hydroxychloroquine 1 and half pill daily.  -We may consider Biologics.    #2: Right de Quervain's tenosynovitis.  Resolved after corticosteroid injection.    Follow-up in 3 months.     This note was partially generated using " the Dragon Voice recognition system. There may be some incorrect wording, spelling and/or spelling errors or punctuation errors that were not corrected prior to committing the note to the medical record.    Problem List Items Addressed This Visit       Psoriasis    Relevant Medications    hydroxychloroquine (Plaquenil) 200 mg tablet    Rheumatoid arthritis - Primary    Relevant Medications    sulfaSALAzine (Azulfidine) 500 mg tablet    predniSONE (Deltasone) 20 mg tablet    Other Relevant Orders    CBC    C-Reactive Protein    Sedimentation Rate    Hepatic Function Panel            Active Ambulatory Problems     Diagnosis Date Noted    Coronary artery disease involving native coronary artery 06/26/2023    Asthma 06/26/2023    Atrial fibrillation (Multi) 06/26/2023    Myofascial pain 06/26/2023    Cardiac aneurysm 06/26/2023    Cardiac defibrillator in place 06/26/2023    Obstructive sleep apnea 06/26/2023    Central sleep apnea in conditions classified elsewhere 06/26/2023    Cervical post-laminectomy syndrome 06/26/2023    Cervical radiculopathy 06/26/2023    Chronic bilateral low back pain without sciatica 06/26/2023    Chronic right sacroiliac pain 06/26/2023    Colon polyp 06/26/2023    Cubital tunnel syndrome on right 06/26/2023    Displacement of lumbar disc with radiculopathy 06/26/2023    Gastroesophageal reflux disease without esophagitis 06/26/2023    Mixed hyperlipidemia 06/26/2023    Iron deficiency anemia 06/26/2023    Irregular cardiac rhythm 06/26/2023    Osteoporosis 06/26/2023    Primary osteoarthritis of right shoulder 06/26/2023    Psoriasis 06/26/2023    Rheumatoid arthritis 06/26/2023    Umbilical hernia 06/26/2023    Wide-complex tachycardia 06/26/2023    Centrilobular emphysema (Multi) 08/28/2023    Coagulation defect, unspecified 08/28/2023    Hypertensive heart disease with heart failure 06/26/2023    Stage 3a chronic kidney disease (Multi) 08/29/2023    Personal history of other venous  thrombosis and embolism 11/02/2020    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/02/2020    Pacemaker 12/06/2023    Anticoagulant long-term use 12/06/2023    Chest pain, unspecified type 02/24/2024    Arthritis of left shoulder region 05/02/2024    Atrial fibrillation, unspecified type (Multi) 07/15/2024    Acute chest pain 07/24/2024    Constipation 07/28/2024    Shortness of breath 08/08/2024    Acute hypoxic on chronic hypercapnic respiratory failure (Multi) 09/18/2024    Chronic systolic heart failure 11/25/2024     Resolved Ambulatory Problems     Diagnosis Date Noted    Abnormal mammogram 06/26/2023    Bone pain 06/26/2023    BSOM (bilateral serous otitis media) 06/26/2023    Right serous otitis media 06/26/2023    Cervical neuropathy 06/26/2023    Chest pain on exertion 06/26/2023    Dehydration 06/26/2023    Dry cough 06/26/2023    Dry heaves 06/26/2023    Dysphagia 06/26/2023    Dyspnea 06/26/2023    Edema 06/26/2023    Elevated serum creatinine 06/26/2023    Fatigue 06/26/2023    Fever, unspecified 06/26/2023    Flu-like symptoms 06/26/2023    Headache 06/26/2023    History of abdominal surgery 06/26/2023    History of colon polyps 06/26/2023    Lumbar radiculitis 06/26/2023    Lumbar spondylosis 06/26/2023    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    Ulnar neuropathy 06/26/2023    Medication side effect 06/26/2023    Nonhealing surgical wound 06/26/2023    Numbness 06/26/2023    Osteopenia 06/26/2023    Pain of right upper extremity 06/26/2023    Pertussis 06/26/2023    Pleural effusion, bilateral 06/26/2023    Postural dizziness with presyncope 06/26/2023    Pulmonary edema 06/26/2023    Severe pain 06/26/2023    Small bowel obstruction (Multi) 06/26/2023    Spondylosis of cervical region without myelopathy or radiculopathy 06/26/2023    Swelling of left hand 06/26/2023    Toxic effect of venom 06/26/2023    Ulcer of the stomach caused by bacteria (h. pylori), acute 06/26/2023    Urticaria  06/26/2023    CKD (chronic kidney disease) 10/10/2023     Past Medical History:   Diagnosis Date    Arrhythmia     Central sleep apnea     Cervical myofascial pain syndrome     Chronic anemia     CKD stage 3a, GFR 45-59 ml/min (Multi)     COPD (chronic obstructive pulmonary disease) (Multi)     DVT of axillary vein, acute right (Multi) 01/20/2018    H/O being hospitalized 02/2024    History of Helicobacter pylori infection     Hx of syncope     Hyperlipidemia     Hypertension     ICD (implantable cardioverter-defibrillator) in place     Mitral valve regurgitation     Moderate aortic stenosis by prior echocardiogram     Personal history of anaphylaxis     PUD (peptic ulcer disease)     Pulmonary hypertension (Multi)     Radiculopathy, lumbar region 08/13/2018    Restless legs syndrome 11/17/2015    Seasonal allergic rhinitis due to pollen        Family History   Problem Relation Name Age of Onset    Asthma Daughter      Asthma Other      Colon cancer Other      Diabetes Other      Other (stroke syndrome) Other         Past Surgical History:   Procedure Laterality Date    ABDOMINAL ADHESION SURGERY  10/22/2015    Exploratory laparotomy. Extensive lysis of adhesions. Small-bowel repair.    ABDOMINAL ADHESION SURGERY  10/18/2020    Exploratory laparotomy, massive lysis of adhesions, small-bowel resection, decompression of small bowel and removal of mesh.    ABLATION OF DYSRHYTHMIC FOCUS  05/02/2022 09/17/2018, 05/02/2022 for A FIB    ACHILLES TENDON SURGERY Right     ANTERIOR CERVICAL DISCECTOMY W/ FUSION  03/21/2016    Anterior C5 and C6 discectomies and interbody fusion of C5-C6, C6-C7    APPENDECTOMY      Open surgery    BREAST BIOPSY Left 09/21/2022    CARDIAC ASSIST DEVICE INSERTION  12/05/2012    Loop recorder placement 2012, Removed in 2014 w/ ICD placed    CARDIAC CATHETERIZATION  02/23/2022    Normal coronary arteries. Tachycardia induced Cardiomyopathy.    CARDIAC CATHETERIZATION N/A 7/16/2024    Procedure:  LAAO (Left Atrial Appendage Occlusion);  Surgeon: Reid Dickinson MD;  Location: [facility] Cardiac Cath Lab;  Service: Cardiovascular;  Laterality: N/A;  Same day CT at 0815    CARDIAC CATHETERIZATION N/A 2025    Procedure: Cardiomems;  Surgeon: Kalpesh Seaman MD;  Location: [facility] Cardiac Cath Lab;  Service: Cardiovascular;  Laterality: N/A;  notified rep 24    CARDIAC DEFIBRILLATOR PLACEMENT  2014    Dual chamber ICD    CARDIOVERSION  2018, 2018    CARPAL TUNNEL RELEASE Bilateral      SECTION, CLASSIC      x2    CHOLECYSTECTOMY      Open surgery    COLONOSCOPY  2024    Extensive diverticulosis of moderate severity and causing mild luminal narrowing in the sigmoid colon    FINGER SURGERY Left 2008    Left index finger metacarpophalangeal fusion    FOOT SURGERY Bilateral     B/L Tarsal Tunnel Release    IR INJECTION EPIDURAL STEROID  2011    Lumbar spine; , ,     IR INJECTION EPIDURAL STEROID      Cervical spine 2007    LEG SURGERY Right 2006    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  Plantar wart removal R foot.    NERVE SURGERY Right 10/13/2010    Release of Right tarsal tunnel syndrome w/ severe adhesion scar tissue    NISSEN FUNDOPLICATION      Open surgery    PICC LINE INSERTION WO IMAGING  10/24/2015    Placed for stable IV access    REVERSE TOTAL SHOULDER ARTHROPLASTY Bilateral 2024    Right 2023, Left 2024    ROTATOR CUFF REPAIR      ULNAR NERVE TRANSPOSITION Left     VENTRAL HERNIA REPAIR  06/15/2015    Laparoscopic double ventral hernia repair.    WISDOM TOOTH EXTRACTION      WOUND DEBRIDEMENT  2020    Excisional debridement of abdominal wound for wound dehiscence       Social History     Tobacco Use   Smoking Status Never   Smokeless Tobacco Never       Allergies  Abatacept, Hydrocodone-acetaminophen, Hydromorphone, Metoprolol, Penicillins, Pregabalin,  "Prochlorperazine, Methotrexate, Aripiprazole, Beta-blockers (beta-adrenergic blocking agts), Bupropion, Cefepime, Ciprofloxacin, Furosemide, Levofloxacin, and Pineapple    Current Meds  Current Outpatient Medications   Medication Instructions    acetaminophen (TYLENOL) 325 mg, Every 6 hours PRN    aspirin 81 mg, oral, Daily    baclofen (Lioresal) 10 mg tablet One tablet in the morning and 2 tablets in the evening.    bumetanide (BUMEX) 2 mg, oral, 2 times daily (morning and late afternoon)    cholecalciferol (VITAMIN D3) 1,000 Units, Daily    clobetasol (Temovate) 0.05 % cream Topical, 2 times daily    clopidogrel (PLAVIX) 75 mg, oral, Daily    cyanocobalamin (VITAMIN B-12) 1,000 mcg, intramuscular, Every 30 days    diclofenac sodium (VOLTAREN) 4 g, Topical, 2 times daily PRN    digoxin (LANOXIN) 125 mcg, Daily    EPINEPHrine (EPIPEN) 0.3 mg, intramuscular, Once as needed    famotidine (PEPCID) 40 mg, oral, Daily    fluticasone propion-salmeteroL (Advair HFA) 45-21 mcg/actuation inhaler 2 puffs, inhalation, 2 times daily RT, Rinse mouth with water after use to reduce aftertaste and incidence of candidiasis. Do not swallow.    hydroxychloroquine (PLAQUENIL) 300 mg, oral, Daily    insulin syringe-needle U-100 (BD Insulin Syringe Ultra-Fine) 1 mL 30 gauge x 1/2\" syringe Use one a month    ipratropium-albuteroL (Duo-Neb) 0.5-2.5 mg/3 mL nebulizer solution 3 mL, nebulization, 4 times daily PRN    montelukast (SINGULAIR) 10 mg, oral, Nightly    naloxone (NARCAN) 4 mg, nasal, As needed, May repeat every 2-3 minutes if needed, alternating nostrils, until medical assistance becomes available.    nitroglycerin (NITROSTAT) 0.4 mg, Every 5 min PRN    oxyCODONE-acetaminophen (Percocet) 5-325 mg tablet 1 tablet, oral, 3 times daily PRN    oxyCODONE-acetaminophen (Percocet) 5-325 mg tablet 1 tablet, oral, 3 times daily PRN    oxyCODONE-acetaminophen (Percocet) 5-325 mg tablet 1 tablet, oral, 3 times daily PRN    pantoprazole " (PROTONIX) 40 mg, oral, 2 times daily    polyethylene glycol (MIRALAX) 17 g, Daily    predniSONE (Deltasone) 20 mg tablet Take 1 tablet (20 mg) by mouth 3 times a day for 3 days, THEN 1 tablet (20 mg) 2 times a day for 3 days, THEN 1 tablet (20 mg) once daily for 3 days.    rosuvastatin (CRESTOR) 20 mg, oral, Daily    saccharomyces boulardii (FLORASTOR) 250 mg, 2 times daily    spironolactone (ALDACTONE) 12.5 mg, oral, Daily    sucralfate (CARAFATE) 1 g, oral, 4 times daily with meals and nightly    sulfaSALAzine (AZULFIDINE) 1,000 mg, oral, 2 times daily    Tiadylt  mg 24 hr capsule 1 capsule, Daily (0630)        Lab Results   Component Value Date     (H) 08/13/2018    SEDRATE 30 07/24/2024    CRP 0.52 10/04/2024    URICACID 7.5 (H) 05/13/2024             Carlos Harley MD

## 2025-02-05 ENCOUNTER — TELEPHONE (OUTPATIENT)
Dept: CARDIOLOGY | Facility: CLINIC | Age: 75
End: 2025-02-05
Payer: MEDICARE

## 2025-02-05 LAB
ALBUMIN SERPL-MCNC: 4.1 G/DL (ref 3.6–5.1)
ALBUMIN/GLOB SERPL: 1.8 (CALC) (ref 1–2.5)
ALP SERPL-CCNC: 67 U/L (ref 37–153)
ALT SERPL-CCNC: 16 U/L (ref 6–29)
AST SERPL-CCNC: 19 U/L (ref 10–35)
BILIRUB DIRECT SERPL-MCNC: 0.1 MG/DL
BILIRUB INDIRECT SERPL-MCNC: 0.3 MG/DL (CALC) (ref 0.2–1.2)
BILIRUB SERPL-MCNC: 0.4 MG/DL (ref 0.2–1.2)
CRP SERPL-MCNC: 8.5 MG/L
ERYTHROCYTE [DISTWIDTH] IN BLOOD BY AUTOMATED COUNT: 14.1 % (ref 11–15)
ERYTHROCYTE [SEDIMENTATION RATE] IN BLOOD BY WESTERGREN METHOD: 19 MM/H
GLOBULIN SER CALC-MCNC: 2.3 G/DL (CALC) (ref 1.9–3.7)
HCT VFR BLD AUTO: 27.4 % (ref 35–45)
HGB BLD-MCNC: 8.3 G/DL (ref 11.7–15.5)
MCH RBC QN AUTO: 25.2 PG (ref 27–33)
MCHC RBC AUTO-ENTMCNC: 30.3 G/DL (ref 32–36)
MCV RBC AUTO: 83 FL (ref 80–100)
PLATELET # BLD AUTO: 226 THOUSAND/UL (ref 140–400)
PMV BLD REES-ECKER: 12.4 FL (ref 7.5–12.5)
PROT SERPL-MCNC: 6.4 G/DL (ref 6.1–8.1)
RBC # BLD AUTO: 3.3 MILLION/UL (ref 3.8–5.1)
WBC # BLD AUTO: 6.4 THOUSAND/UL (ref 3.8–10.8)

## 2025-02-05 NOTE — TELEPHONE ENCOUNTER
Copied from CRM #[id number]. Topic: Information Request - Trying to reach PCP  >> Jan 23, 2025  1:44 PM Elmer FAJARDO wrote:  Hello, patient would like to speak to Dr. Dickinson or nurse about her watchman. Thank you

## 2025-02-07 NOTE — TELEPHONE ENCOUNTER
Contacted patient via phone, identified patient by name and date of birth. RN reviewed patients medication history. Patient denies cardiac procedures, surgeries, stroke, TIA or any bleeding events. Per 4 month CT results, no peridevice leak or external thrombus noted. Patient instructed to stop Plavix and continue 81mg aspirin indefinitely. Patient verbalized understanding. Information will be entered into the [facility] registry.

## 2025-02-10 ENCOUNTER — APPOINTMENT (OUTPATIENT)
Dept: RADIOLOGY | Facility: HOSPITAL | Age: 75
DRG: 190 | End: 2025-02-10
Payer: MEDICARE

## 2025-02-10 ENCOUNTER — OFFICE VISIT (OUTPATIENT)
Dept: PRIMARY CARE | Facility: CLINIC | Age: 75
End: 2025-02-10
Payer: MEDICARE

## 2025-02-10 ENCOUNTER — HOSPITAL ENCOUNTER (INPATIENT)
Facility: HOSPITAL | Age: 75
LOS: 2 days | Discharge: HOME | DRG: 190 | End: 2025-02-13
Attending: EMERGENCY MEDICINE | Admitting: INTERNAL MEDICINE
Payer: MEDICARE

## 2025-02-10 ENCOUNTER — APPOINTMENT (OUTPATIENT)
Dept: CARDIOLOGY | Facility: HOSPITAL | Age: 75
DRG: 190 | End: 2025-02-10
Payer: MEDICARE

## 2025-02-10 VITALS
BODY MASS INDEX: 27.4 KG/M2 | HEART RATE: 71 BPM | OXYGEN SATURATION: 89 % | SYSTOLIC BLOOD PRESSURE: 130 MMHG | WEIGHT: 159.6 LBS | DIASTOLIC BLOOD PRESSURE: 82 MMHG | TEMPERATURE: 97.8 F

## 2025-02-10 DIAGNOSIS — J96.12 ACUTE HYPOXIC ON CHRONIC HYPERCAPNIC RESPIRATORY FAILURE (MULTI): ICD-10-CM

## 2025-02-10 DIAGNOSIS — J44.1 COPD EXACERBATION (MULTI): Primary | ICD-10-CM

## 2025-02-10 DIAGNOSIS — J96.01 ACUTE HYPOXIC ON CHRONIC HYPERCAPNIC RESPIRATORY FAILURE (MULTI): ICD-10-CM

## 2025-02-10 DIAGNOSIS — I34.0 NONRHEUMATIC MITRAL VALVE REGURGITATION: ICD-10-CM

## 2025-02-10 DIAGNOSIS — I48.91 ATRIAL FIBRILLATION, UNSPECIFIED TYPE (MULTI): ICD-10-CM

## 2025-02-10 DIAGNOSIS — R06.82 TACHYPNEA: Primary | ICD-10-CM

## 2025-02-10 DIAGNOSIS — I50.22 CHRONIC SYSTOLIC HEART FAILURE: ICD-10-CM

## 2025-02-10 DIAGNOSIS — I50.43 ACUTE ON CHRONIC COMBINED SYSTOLIC AND DIASTOLIC CONGESTIVE HEART FAILURE: ICD-10-CM

## 2025-02-10 LAB
ABO GROUP (TYPE) IN BLOOD: NORMAL
ALBUMIN SERPL BCP-MCNC: 4.3 G/DL (ref 3.4–5)
ALP SERPL-CCNC: 68 U/L (ref 33–136)
ALT SERPL W P-5'-P-CCNC: 17 U/L (ref 7–45)
ANION GAP BLDV CALCULATED.4IONS-SCNC: 18 MMOL/L (ref 10–25)
ANION GAP SERPL CALC-SCNC: 16 MMOL/L (ref 10–20)
ANTIBODY SCREEN: NORMAL
APTT PPP: 26 SECONDS (ref 27–38)
AST SERPL W P-5'-P-CCNC: 14 U/L (ref 9–39)
BASE EXCESS BLDV CALC-SCNC: -2.3 MMOL/L (ref -2–3)
BASOPHILS # BLD AUTO: 0.04 X10*3/UL (ref 0–0.1)
BASOPHILS NFR BLD AUTO: 0.3 %
BILIRUB SERPL-MCNC: 0.5 MG/DL (ref 0–1.2)
BNP SERPL-MCNC: 625 PG/ML (ref 0–99)
BODY TEMPERATURE: ABNORMAL
BUN SERPL-MCNC: 18 MG/DL (ref 6–23)
CA-I BLDV-SCNC: 1.16 MMOL/L (ref 1.1–1.33)
CALCIUM SERPL-MCNC: 8.6 MG/DL (ref 8.6–10.3)
CARDIAC TROPONIN I PNL SERPL HS: 20 NG/L (ref 0–13)
CARDIAC TROPONIN I PNL SERPL HS: 22 NG/L (ref 0–13)
CHLORIDE BLDV-SCNC: 102 MMOL/L (ref 98–107)
CHLORIDE SERPL-SCNC: 103 MMOL/L (ref 98–107)
CO2 SERPL-SCNC: 26 MMOL/L (ref 21–32)
CREAT SERPL-MCNC: 0.98 MG/DL (ref 0.5–1.05)
EGFRCR SERPLBLD CKD-EPI 2021: 61 ML/MIN/1.73M*2
EOSINOPHIL # BLD AUTO: 0.01 X10*3/UL (ref 0–0.4)
EOSINOPHIL NFR BLD AUTO: 0.1 %
ERYTHROCYTE [DISTWIDTH] IN BLOOD BY AUTOMATED COUNT: 15 % (ref 11.5–14.5)
FLUAV RNA RESP QL NAA+PROBE: NOT DETECTED
FLUBV RNA RESP QL NAA+PROBE: NOT DETECTED
GLUCOSE BLDV-MCNC: 168 MG/DL (ref 74–99)
GLUCOSE SERPL-MCNC: 156 MG/DL (ref 74–99)
HCO3 BLDV-SCNC: 23.2 MMOL/L (ref 22–26)
HCT VFR BLD AUTO: 30.5 % (ref 36–46)
HCT VFR BLD EST: 29 % (ref 36–46)
HGB BLD-MCNC: 9.4 G/DL (ref 12–16)
HGB BLDV-MCNC: 9.5 G/DL (ref 12–16)
IMM GRANULOCYTES # BLD AUTO: 0.13 X10*3/UL (ref 0–0.5)
IMM GRANULOCYTES NFR BLD AUTO: 1.1 % (ref 0–0.9)
INHALED O2 CONCENTRATION: 0 %
INR PPP: 1.1 (ref 0.9–1.1)
LACTATE BLDV-SCNC: 6.8 MMOL/L (ref 0.4–2)
LYMPHOCYTES # BLD AUTO: 0.69 X10*3/UL (ref 0.8–3)
LYMPHOCYTES NFR BLD AUTO: 5.7 %
MCH RBC QN AUTO: 25.2 PG (ref 26–34)
MCHC RBC AUTO-ENTMCNC: 30.8 G/DL (ref 32–36)
MCV RBC AUTO: 82 FL (ref 80–100)
MONOCYTES # BLD AUTO: 0.4 X10*3/UL (ref 0.05–0.8)
MONOCYTES NFR BLD AUTO: 3.3 %
NEUTROPHILS # BLD AUTO: 10.86 X10*3/UL (ref 1.6–5.5)
NEUTROPHILS NFR BLD AUTO: 89.5 %
NRBC BLD-RTO: 0 /100 WBCS (ref 0–0)
OXYHGB MFR BLDV: 73.2 % (ref 45–75)
PCO2 BLDV: 42 MM HG (ref 41–51)
PH BLDV: 7.35 PH (ref 7.33–7.43)
PLATELET # BLD AUTO: 278 X10*3/UL (ref 150–450)
PO2 BLDV: 48 MM HG (ref 35–45)
POTASSIUM BLDV-SCNC: 3.7 MMOL/L (ref 3.5–5.3)
POTASSIUM SERPL-SCNC: 3.7 MMOL/L (ref 3.5–5.3)
PROT SERPL-MCNC: 7.2 G/DL (ref 6.4–8.2)
PROTHROMBIN TIME: 12 SECONDS (ref 9.8–12.8)
RBC # BLD AUTO: 3.73 X10*6/UL (ref 4–5.2)
RH FACTOR (ANTIGEN D): NORMAL
RSV RNA RESP QL NAA+PROBE: NOT DETECTED
SAO2 % BLDV: 75 % (ref 45–75)
SARS-COV-2 RNA RESP QL NAA+PROBE: NOT DETECTED
SODIUM BLDV-SCNC: 139 MMOL/L (ref 136–145)
SODIUM SERPL-SCNC: 141 MMOL/L (ref 136–145)
WBC # BLD AUTO: 12.1 X10*3/UL (ref 4.4–11.3)

## 2025-02-10 PROCEDURE — 71046 X-RAY EXAM CHEST 2 VIEWS: CPT

## 2025-02-10 PROCEDURE — 87637 SARSCOV2&INF A&B&RSV AMP PRB: CPT

## 2025-02-10 PROCEDURE — 94640 AIRWAY INHALATION TREATMENT: CPT

## 2025-02-10 PROCEDURE — 36415 COLL VENOUS BLD VENIPUNCTURE: CPT

## 2025-02-10 PROCEDURE — 84075 ASSAY ALKALINE PHOSPHATASE: CPT

## 2025-02-10 PROCEDURE — 2500000004 HC RX 250 GENERAL PHARMACY W/ HCPCS (ALT 636 FOR OP/ED)

## 2025-02-10 PROCEDURE — 2550000001 HC RX 255 CONTRASTS: Performed by: EMERGENCY MEDICINE

## 2025-02-10 PROCEDURE — 85025 COMPLETE CBC W/AUTO DIFF WBC: CPT

## 2025-02-10 PROCEDURE — 99285 EMERGENCY DEPT VISIT HI MDM: CPT | Mod: 25 | Performed by: EMERGENCY MEDICINE

## 2025-02-10 PROCEDURE — 2500000001 HC RX 250 WO HCPCS SELF ADMINISTERED DRUGS (ALT 637 FOR MEDICARE OP)

## 2025-02-10 PROCEDURE — G2211 COMPLEX E/M VISIT ADD ON: HCPCS

## 2025-02-10 PROCEDURE — 1160F RVW MEDS BY RX/DR IN RCRD: CPT

## 2025-02-10 PROCEDURE — 84484 ASSAY OF TROPONIN QUANT: CPT

## 2025-02-10 PROCEDURE — 96365 THER/PROPH/DIAG IV INF INIT: CPT | Mod: 59

## 2025-02-10 PROCEDURE — 85610 PROTHROMBIN TIME: CPT

## 2025-02-10 PROCEDURE — 96372 THER/PROPH/DIAG INJ SC/IM: CPT

## 2025-02-10 PROCEDURE — 93005 ELECTROCARDIOGRAM TRACING: CPT

## 2025-02-10 PROCEDURE — 1123F ACP DISCUSS/DSCN MKR DOCD: CPT

## 2025-02-10 PROCEDURE — 2500000002 HC RX 250 W HCPCS SELF ADMINISTERED DRUGS (ALT 637 FOR MEDICARE OP, ALT 636 FOR OP/ED)

## 2025-02-10 PROCEDURE — 84132 ASSAY OF SERUM POTASSIUM: CPT | Performed by: STUDENT IN AN ORGANIZED HEALTH CARE EDUCATION/TRAINING PROGRAM

## 2025-02-10 PROCEDURE — 99215 OFFICE O/P EST HI 40 MIN: CPT

## 2025-02-10 PROCEDURE — 96375 TX/PRO/DX INJ NEW DRUG ADDON: CPT

## 2025-02-10 PROCEDURE — 74177 CT ABD & PELVIS W/CONTRAST: CPT

## 2025-02-10 PROCEDURE — 83880 ASSAY OF NATRIURETIC PEPTIDE: CPT

## 2025-02-10 PROCEDURE — 1159F MED LIST DOCD IN RCRD: CPT

## 2025-02-10 PROCEDURE — 86901 BLOOD TYPING SEROLOGIC RH(D): CPT

## 2025-02-10 PROCEDURE — 74177 CT ABD & PELVIS W/CONTRAST: CPT | Performed by: INTERNAL MEDICINE

## 2025-02-10 RX ORDER — DILTIAZEM HYDROCHLORIDE 120 MG/1
120 CAPSULE, COATED, EXTENDED RELEASE ORAL NIGHTLY
Status: DISCONTINUED | OUTPATIENT
Start: 2025-02-10 | End: 2025-02-13 | Stop reason: HOSPADM

## 2025-02-10 RX ORDER — CLOBETASOL PROPIONATE 0.5 MG/G
CREAM TOPICAL 2 TIMES DAILY
Status: DISCONTINUED | OUTPATIENT
Start: 2025-02-10 | End: 2025-02-13 | Stop reason: HOSPADM

## 2025-02-10 RX ORDER — PREDNISONE 20 MG/1
40 TABLET ORAL ONCE
Status: COMPLETED | OUTPATIENT
Start: 2025-02-10 | End: 2025-02-10

## 2025-02-10 RX ORDER — DILTIAZEM HYDROCHLORIDE 120 MG/1
120 CAPSULE, COATED, EXTENDED RELEASE ORAL DAILY
COMMUNITY

## 2025-02-10 RX ORDER — PANTOPRAZOLE SODIUM 40 MG/1
40 TABLET, DELAYED RELEASE ORAL 2 TIMES DAILY
Status: DISCONTINUED | OUTPATIENT
Start: 2025-02-10 | End: 2025-02-13 | Stop reason: HOSPADM

## 2025-02-10 RX ORDER — HYDROXYCHLOROQUINE SULFATE 200 MG/1
300 TABLET, FILM COATED ORAL DAILY
Status: DISCONTINUED | OUTPATIENT
Start: 2025-02-11 | End: 2025-02-13 | Stop reason: HOSPADM

## 2025-02-10 RX ORDER — SULFASALAZINE 500 MG/1
1000 TABLET ORAL 2 TIMES DAILY
Status: DISCONTINUED | OUTPATIENT
Start: 2025-02-10 | End: 2025-02-10

## 2025-02-10 RX ORDER — BISMUTH SUBSALICYLATE 262 MG
1 TABLET,CHEWABLE ORAL DAILY
COMMUNITY

## 2025-02-10 RX ORDER — BACLOFEN 10 MG/1
20 TABLET ORAL EVERY EVENING
Status: DISCONTINUED | OUTPATIENT
Start: 2025-02-10 | End: 2025-02-13 | Stop reason: HOSPADM

## 2025-02-10 RX ORDER — BUDESONIDE 0.25 MG/2ML
0.25 INHALANT ORAL 2 TIMES DAILY
Status: DISCONTINUED | OUTPATIENT
Start: 2025-02-11 | End: 2025-02-13 | Stop reason: HOSPADM

## 2025-02-10 RX ORDER — DILTIAZEM HYDROCHLORIDE 120 MG/1
120 CAPSULE, COATED, EXTENDED RELEASE ORAL
Status: CANCELLED | OUTPATIENT
Start: 2025-02-10

## 2025-02-10 RX ORDER — BUMETANIDE 0.25 MG/ML
2 INJECTION, SOLUTION INTRAMUSCULAR; INTRAVENOUS ONCE
Status: COMPLETED | OUTPATIENT
Start: 2025-02-10 | End: 2025-02-10

## 2025-02-10 RX ORDER — SULFASALAZINE 500 MG/1
500 TABLET ORAL 2 TIMES DAILY
Status: DISCONTINUED | OUTPATIENT
Start: 2025-02-10 | End: 2025-02-13 | Stop reason: HOSPADM

## 2025-02-10 RX ORDER — POLYETHYLENE GLYCOL 3350 17 G/17G
17 POWDER, FOR SOLUTION ORAL DAILY
Status: DISCONTINUED | OUTPATIENT
Start: 2025-02-11 | End: 2025-02-13 | Stop reason: HOSPADM

## 2025-02-10 RX ORDER — MAGNESIUM SULFATE HEPTAHYDRATE 40 MG/ML
2 INJECTION, SOLUTION INTRAVENOUS ONCE
Status: COMPLETED | OUTPATIENT
Start: 2025-02-10 | End: 2025-02-10

## 2025-02-10 RX ORDER — CHOLECALCIFEROL (VITAMIN D3) 25 MCG
1000 TABLET ORAL DAILY
Status: DISCONTINUED | OUTPATIENT
Start: 2025-02-11 | End: 2025-02-13 | Stop reason: HOSPADM

## 2025-02-10 RX ORDER — DIGOXIN 125 MCG
125 TABLET ORAL DAILY
Status: DISCONTINUED | OUTPATIENT
Start: 2025-02-11 | End: 2025-02-13 | Stop reason: HOSPADM

## 2025-02-10 RX ORDER — IPRATROPIUM BROMIDE AND ALBUTEROL SULFATE 2.5; .5 MG/3ML; MG/3ML
3 SOLUTION RESPIRATORY (INHALATION) EVERY 20 MIN
Status: COMPLETED | OUTPATIENT
Start: 2025-02-10 | End: 2025-02-10

## 2025-02-10 RX ORDER — ACETAMINOPHEN 325 MG/1
975 TABLET ORAL ONCE
Status: COMPLETED | OUTPATIENT
Start: 2025-02-10 | End: 2025-02-10

## 2025-02-10 RX ORDER — MONTELUKAST SODIUM 10 MG/1
10 TABLET ORAL NIGHTLY
Status: DISCONTINUED | OUTPATIENT
Start: 2025-02-10 | End: 2025-02-13 | Stop reason: HOSPADM

## 2025-02-10 RX ORDER — ENOXAPARIN SODIUM 100 MG/ML
40 INJECTION SUBCUTANEOUS EVERY 24 HOURS
Status: DISCONTINUED | OUTPATIENT
Start: 2025-02-10 | End: 2025-02-13 | Stop reason: HOSPADM

## 2025-02-10 RX ORDER — CLOPIDOGREL BISULFATE 75 MG/1
75 TABLET ORAL DAILY
Status: DISCONTINUED | OUTPATIENT
Start: 2025-02-11 | End: 2025-02-11

## 2025-02-10 RX ORDER — BUMETANIDE 1 MG/1
2 TABLET ORAL ONCE
Status: DISCONTINUED | OUTPATIENT
Start: 2025-02-10 | End: 2025-02-11

## 2025-02-10 RX ORDER — EPINEPHRINE 0.3 MG/.3ML
0.3 INJECTION SUBCUTANEOUS ONCE AS NEEDED
Status: DISCONTINUED | OUTPATIENT
Start: 2025-02-10 | End: 2025-02-10 | Stop reason: CLARIF

## 2025-02-10 RX ORDER — ONDANSETRON HYDROCHLORIDE 8 MG/1
8 TABLET, FILM COATED ORAL EVERY 8 HOURS PRN
COMMUNITY

## 2025-02-10 RX ORDER — IPRATROPIUM BROMIDE AND ALBUTEROL SULFATE 2.5; .5 MG/3ML; MG/3ML
3 SOLUTION RESPIRATORY (INHALATION) 4 TIMES DAILY PRN
Status: DISCONTINUED | OUTPATIENT
Start: 2025-02-10 | End: 2025-02-11

## 2025-02-10 RX ORDER — ASPIRIN 81 MG/1
81 TABLET ORAL DAILY
Status: DISCONTINUED | OUTPATIENT
Start: 2025-02-11 | End: 2025-02-12

## 2025-02-10 RX ORDER — PREDNISONE 20 MG/1
20 TABLET ORAL DAILY
Status: DISCONTINUED | OUTPATIENT
Start: 2025-02-11 | End: 2025-02-12

## 2025-02-10 RX ORDER — ROSUVASTATIN CALCIUM 20 MG/1
20 TABLET, COATED ORAL DAILY
Status: DISCONTINUED | OUTPATIENT
Start: 2025-02-11 | End: 2025-02-13 | Stop reason: HOSPADM

## 2025-02-10 RX ORDER — BACLOFEN 10 MG/1
10 TABLET ORAL EVERY MORNING
Status: DISCONTINUED | OUTPATIENT
Start: 2025-02-11 | End: 2025-02-13 | Stop reason: HOSPADM

## 2025-02-10 RX ADMIN — DILTIAZEM HYDROCHLORIDE 120 MG: 120 CAPSULE, COATED, EXTENDED RELEASE ORAL at 21:35

## 2025-02-10 RX ADMIN — IOHEXOL 75 ML: 350 INJECTION, SOLUTION INTRAVENOUS at 14:23

## 2025-02-10 RX ADMIN — PREDNISONE 40 MG: 20 TABLET ORAL at 13:31

## 2025-02-10 RX ADMIN — PANTOPRAZOLE SODIUM 40 MG: 40 TABLET, DELAYED RELEASE ORAL at 21:34

## 2025-02-10 RX ADMIN — BUMETANIDE 2 MG: 0.25 INJECTION INTRAMUSCULAR; INTRAVENOUS at 16:20

## 2025-02-10 RX ADMIN — IPRATROPIUM BROMIDE AND ALBUTEROL SULFATE 3 ML: 2.5; .5 SOLUTION RESPIRATORY (INHALATION) at 14:20

## 2025-02-10 RX ADMIN — BACLOFEN 20 MG: 10 TABLET ORAL at 21:34

## 2025-02-10 RX ADMIN — SULFASALAZINE 500 MG: 500 TABLET ORAL at 21:35

## 2025-02-10 RX ADMIN — ACETAMINOPHEN 975 MG: 325 TABLET, FILM COATED ORAL at 16:33

## 2025-02-10 RX ADMIN — IPRATROPIUM BROMIDE AND ALBUTEROL SULFATE 3 ML: 2.5; .5 SOLUTION RESPIRATORY (INHALATION) at 14:40

## 2025-02-10 RX ADMIN — MONTELUKAST 10 MG: 10 TABLET, FILM COATED ORAL at 21:34

## 2025-02-10 RX ADMIN — IPRATROPIUM BROMIDE AND ALBUTEROL SULFATE 3 ML: 2.5; .5 SOLUTION RESPIRATORY (INHALATION) at 13:31

## 2025-02-10 RX ADMIN — MAGNESIUM SULFATE HEPTAHYDRATE 2 G: 2 INJECTION, SOLUTION INTRAVENOUS at 13:31

## 2025-02-10 RX ADMIN — ENOXAPARIN SODIUM 40 MG: 40 INJECTION SUBCUTANEOUS at 21:35

## 2025-02-10 ASSESSMENT — LIFESTYLE VARIABLES
HAVE PEOPLE ANNOYED YOU BY CRITICIZING YOUR DRINKING: NO
EVER FELT BAD OR GUILTY ABOUT YOUR DRINKING: NO
TOTAL SCORE: 0
EVER HAD A DRINK FIRST THING IN THE MORNING TO STEADY YOUR NERVES TO GET RID OF A HANGOVER: NO
HAVE YOU EVER FELT YOU SHOULD CUT DOWN ON YOUR DRINKING: NO

## 2025-02-10 ASSESSMENT — ENCOUNTER SYMPTOMS
CHILLS: 0
NAUSEA: 1
VOMITING: 0
CHEST TIGHTNESS: 1
FEVER: 0
DYSURIA: 0
DIARRHEA: 0
COUGH: 1
ANAL BLEEDING: 0
DIFFICULTY URINATING: 0
APNEA: 1
BLOOD IN STOOL: 0
SHORTNESS OF BREATH: 1
ABDOMINAL PAIN: 1

## 2025-02-10 ASSESSMENT — PAIN SCALES - GENERAL
PAINLEVEL_OUTOF10: 6
PAINLEVEL_OUTOF10: 5 - MODERATE PAIN

## 2025-02-10 ASSESSMENT — PAIN - FUNCTIONAL ASSESSMENT: PAIN_FUNCTIONAL_ASSESSMENT: 0-10

## 2025-02-10 ASSESSMENT — PAIN DESCRIPTION - PAIN TYPE: TYPE: ACUTE PAIN

## 2025-02-10 ASSESSMENT — PAIN DESCRIPTION - ORIENTATION: ORIENTATION: MID

## 2025-02-10 ASSESSMENT — PAIN DESCRIPTION - LOCATION
LOCATION: ABDOMEN
LOCATION: HEAD

## 2025-02-10 NOTE — PROGRESS NOTES
"Subjective   Patient ID: Trina SALDANA Via \"Marcela\" is a 74 y.o. female who presents for Cough and Neutropenia (Hemo & iron are low ).  HPI  - last wednesday/thursday started having worsening shortness of breath and cough    - legs had gotten swollen , doubled her bumex  - despite that, now up 4 lbs water weight  - mid prednisone burst  - hgb down to 8.3 per labs from last wednesday   - O2 89% at home on room air   - some cp   - just \"feels really bad\"    - had similar episode 3 months ago and a month before that    - other specialists have not been able to determine why she keeps becoming so anemic. Last time required several pints of blood     No current facility-administered medications for this visit.  No current outpatient medications on file.    Facility-Administered Medications Ordered in Other Visits:     acetaminophen (Tylenol) tablet 650 mg, 650 mg, oral, q4h PRN, 650 mg at 02/11/25 0642 **OR** acetaminophen (Tylenol) oral liquid 650 mg, 650 mg, nasogastric tube, q4h PRN **OR** acetaminophen (Tylenol) suppository 650 mg, 650 mg, rectal, q4h PRN, Emanuel Garay MD    aspirin EC tablet 81 mg, 81 mg, oral, Daily, Allen Fox DO, 81 mg at 02/11/25 0923    baclofen (Lioresal) tablet 10 mg, 10 mg, oral, q AM, Allen Fox DO, 10 mg at 02/11/25 0923    baclofen (Lioresal) tablet 20 mg, 20 mg, oral, q PM, Allen Fox DO, 20 mg at 02/10/25 2134    budesonide (Pulmicort) 0.25 mg/2 mL nebulizer solution 0.25 mg, 0.25 mg, nebulization, BID, Allen Fox DO, 0.25 mg at 02/11/25 0901    bumetanide (Bumex) tablet 2 mg, 2 mg, oral, BID, Joseph Chan MD    cholecalciferol (Vitamin D-3) tablet 1,000 Units, 1,000 Units, oral, Daily, Allen Fox DO, 1,000 Units at 02/11/25 0923    clobetasol (Temovate) 0.05 % cream, , Topical, BID, Allen Fox DO    digoxin (Lanoxin) tablet 125 mcg, 125 mcg, oral, Daily, Allen Fox DO, 125 mcg at 02/11/25 0922    dilTIAZem CD (Cardizem CD) 24 " hr capsule 120 mg, 120 mg, oral, Nightly, Allen Fox DO, 120 mg at 02/10/25 2135    enoxaparin (Lovenox) syringe 40 mg, 40 mg, subcutaneous, q24h, Allen Fox DO, 40 mg at 02/10/25 2135    formoterol (Perforomist) 20 mcg/2 mL nebulizer solution 20 mcg, 20 mcg, nebulization, BID, Allen Fox DO, 20 mcg at 02/11/25 0901    hydroxychloroquine (Plaquenil) tablet 300 mg, 300 mg, oral, Daily, Allen Fox DO, 300 mg at 02/11/25 0922    ipratropium-albuteroL (Duo-Neb) 0.5-2.5 mg/3 mL nebulizer solution 3 mL, 3 mL, nebulization, q2h PRN, Ravin Anderson MD    ipratropium-albuteroL (Duo-Neb) 0.5-2.5 mg/3 mL nebulizer solution 3 mL, 3 mL, nebulization, q6h while awake, Ravin Anderson MD, 3 mL at 02/11/25 1345    montelukast (Singulair) tablet 10 mg, 10 mg, oral, Nightly, Allen Fox DO, 10 mg at 02/10/25 2134    oxygen (O2) therapy, , inhalation, Continuous PRN - O2/gases, Ravin Anderson MD, Last Rate: 120,000 mL/hr at 02/11/25 1347, 2 L/min at 02/11/25 1347    pantoprazole (ProtoNix) EC tablet 40 mg, 40 mg, oral, BID, Allen Fox DO, 40 mg at 02/11/25 0922    perflutren lipid microspheres (Definity) injection 0.5-10 mL of dilution, 0.5-10 mL of dilution, intravenous, Once in imaging, Allen Fox DO    perflutren protein A microsphere (Optison) injection 0.5 mL, 0.5 mL, intravenous, Once in imaging, Allen Fox DO    polyethylene glycol (Glycolax, Miralax) packet 17 g, 17 g, oral, Daily, Allen Fox DO    predniSONE (Deltasone) tablet 20 mg, 20 mg, oral, Daily, Allen Fox DO, 20 mg at 02/11/25 0922    rosuvastatin (Crestor) tablet 20 mg, 20 mg, oral, Daily, Allen Fox DO    sulfaSALAzine (Azulfidine) tablet 500 mg, 500 mg, oral, BID, Allen Fox DO, 500 mg at 02/11/25 0922    sulfur hexafluoride microsphr (Lumason) injection 24.28 mg, 2 mL, intravenous, Once in imaging, Allen Fox DO   Past Surgical History:   Procedure  Laterality Date    ABDOMINAL ADHESION SURGERY  10/22/2015    Exploratory laparotomy. Extensive lysis of adhesions. Small-bowel repair.    ABDOMINAL ADHESION SURGERY  10/18/2020    Exploratory laparotomy, massive lysis of adhesions, small-bowel resection, decompression of small bowel and removal of mesh.    ABLATION OF DYSRHYTHMIC FOCUS  2022, 2022 for A FIB    ACHILLES TENDON SURGERY Right     ANTERIOR CERVICAL DISCECTOMY W/ FUSION  2016    Anterior C5 and C6 discectomies and interbody fusion of C5-C6, C6-C7    APPENDECTOMY      Open surgery    BREAST BIOPSY Left 2022    CARDIAC ASSIST DEVICE INSERTION  2012    Loop recorder placement , Removed in  w/ ICD placed    CARDIAC CATHETERIZATION  2022    Normal coronary arteries. Tachycardia induced Cardiomyopathy.    CARDIAC CATHETERIZATION N/A 2024    Procedure: LAAO (Left Atrial Appendage Occlusion);  Surgeon: Reid Dickinson MD;  Location: [place] [address] Cardiac Cath Lab;  Service: Cardiovascular;  Laterality: N/A;  Same day CT at 0815    CARDIAC CATHETERIZATION N/A 2025    Procedure: Cardiomems;  Surgeon: Kalpesh Seaman MD;  Location: [facility] Cardiac Cath Lab;  Service: Cardiovascular;  Laterality: N/A;  notified rep 24    CARDIAC DEFIBRILLATOR PLACEMENT  2014    Dual chamber ICD    CARDIOVERSION  2018, 2018    CARPAL TUNNEL RELEASE Bilateral      SECTION, CLASSIC      x2    CHOLECYSTECTOMY      Open surgery    COLONOSCOPY  2024    Extensive diverticulosis of moderate severity and causing mild luminal narrowing in the sigmoid colon    FINGER SURGERY Left 2008    Left index finger metacarpophalangeal fusion    FOOT SURGERY Bilateral     B/L Tarsal Tunnel Release    IR INJECTION EPIDURAL STEROID  2011    Lumbar spine; , ,     IR INJECTION EPIDURAL STEROID  2007    Cervical spine 2007    LEG SURGERY Right 2006    Right distal tibia  bone tumor excision and biopsy w/ pellet bone graft.  Plantar wart removal R foot.    NERVE SURGERY Right 10/13/2010    Release of Right tarsal tunnel syndrome w/ severe adhesion scar tissue    NISSEN FUNDOPLICATION  1991    Open surgery    PICC LINE INSERTION WO IMAGING  10/24/2015    Placed for stable IV access    REVERSE TOTAL SHOULDER ARTHROPLASTY Bilateral 05/02/2024    Right 1/12/2023, Left 5/2/2024    ROTATOR CUFF REPAIR      ULNAR NERVE TRANSPOSITION Left     VENTRAL HERNIA REPAIR  06/15/2015    Laparoscopic double ventral hernia repair.    WISDOM TOOTH EXTRACTION      WOUND DEBRIDEMENT  11/27/2020    Excisional debridement of abdominal wound for wound dehiscence      Past Medical History:   Diagnosis Date    Arrhythmia     Asthma     Atrial fibrillation (Multi)     Resistent to treatment: s/p DCCVs and ablations    Central sleep apnea     Per PSG 11/20/18; severe central sleep apnea, .6, mild obstructive component    Cervical myofascial pain syndrome     Baclofen    Chronic anemia     CKD stage 3a, GFR 45-59 ml/min (Multi)     COPD (chronic obstructive pulmonary disease) (Multi)     DVT of axillary vein, acute right (Multi) 01/20/2018    When PICC line was removed. U/S + Partial nonocclusive DVT in the axillary and proximal basilic vein in 2018    H/O being hospitalized 02/2024 Feb 24-Mar 5, 2024:  (2/24) admitted for dyspnea and chest pain work-up and found to have pleural effusions.  Found to have acute blood loss anemia s/p 2u PRBC. EGD neg for active bleed.  Cardiology consulted - plan for Watchman device WING closure as out pt.    History of Helicobacter pylori infection     3/2013, 1/2021    Hx of syncope     Recurrent Syncope d/t VT- s/p ILR (12/2012) implanted.  (July 2013) Episode of syncope that appears to be correlated with an 11 second round of ventricular tachycardia recorded by the loop recorder.  S/p EPS (June 2014) neg for inducible arrhythmias. (ILR removed when ICD implanted)     Hyperlipidemia     Hypertension     ICD (implantable cardioverter-defibrillator) in place     Placed 06/12/2014    Mitral valve regurgitation     Mild-Moderate per Echo 02/25/2024    Moderate aortic stenosis by prior echocardiogram     Echo 2/25/2024    Osteopenia 06/26/2023    Personal history of anaphylaxis     See allergy list    Psoriasis     PUD (peptic ulcer disease)     H/O nonbleeding gastric ulcers with small hiatal hernia on 1/6/2021 EGD    Pulmonary hypertension (Multi)     Mild - Moderate per Echo 2/25/2024    Radiculopathy, lumbar region 08/13/2018    Acute lumbar radiculopathy    Restless legs syndrome 11/17/2015    Restless legs syndrome    Rheumatoid arthritis     Seasonal allergic rhinitis due to pollen     Small bowel obstruction (Multi) 06/26/2023    H/O due to Severe Adhesions s/p Adhesiolysis x2 in 2015 and 2020    Wide-complex tachycardia     Per cardiology 3/7/2024: VT is on a basis of triggered activity certainly made worse with anemia and prednisone. Since she is asymptomatic we will continue to observe.     Social History     Tobacco Use    Smoking status: Never    Smokeless tobacco: Never   Vaping Use    Vaping status: Never Used   Substance Use Topics    Alcohol use: Never    Drug use: Never      Family History   Problem Relation Name Age of Onset    Asthma Daughter      Asthma Other      Colon cancer Other      Diabetes Other      Other (stroke syndrome) Other        Review of Systems  10 point ROS negative except as otherwise noted in the HPI.      Objective   /82   Pulse 71   Temp 36.6 °C (97.8 °F)   Wt 72.4 kg (159 lb 9.6 oz)   SpO2 (!) 89%   BMI 27.40 kg/m²    Physical Exam  Vitals reviewed.   Constitutional:       General: She is in acute distress.   HENT:      Head: Normocephalic and atraumatic.   Cardiovascular:      Rate and Rhythm: Normal rate and regular rhythm.      Pulses: Normal pulses.      Heart sounds: Normal heart sounds.   Pulmonary:      Effort: Respiratory  distress present.      Breath sounds: Wheezing and rales present.   Abdominal:      General: Abdomen is flat.      Palpations: Abdomen is soft.   Musculoskeletal:         General: Normal range of motion.   Skin:     General: Skin is warm and dry.      Capillary Refill: Capillary refill takes 2 to 3 seconds.      Findings: No rash.   Neurological:      General: No focal deficit present.      Mental Status: She is alert and oriented to person, place, and time.   Psychiatric:         Mood and Affect: Mood normal.         Behavior: Behavior normal.           Assessment/Plan   Problem List Items Addressed This Visit    None  Visit Diagnoses       Tachypnea    -  Primary  - Pt with significant dyspnea and tachypnea. SPO2 89% on RA. Gasping, wheezing. Has been doing duonebs at home as well as doubled bumex without improvement in sx.   - CBC from last week with significant anemia.   - Pt has had multiple hospitalizations for this presentation, most recently about 3 months ago and a month before that.   - started O2 in office which did bring up her SPO2 97-99 but did not help her tachypnea, dyspnea, overall feeling very ill.   - D/w Dr Elias who also evaluated her. Decision to send to ED given respiratory distress, c/f CHF exacerbation vs. COPD exacerbation vs significant anemia. I called EMS and then called ED to let them know she as on her way.             Discussed at visit any disease processes that were of concern as well as the risks, benefits and instructions on any new medication provided. Patient (and/or caretaker of patient if present) stated all questions were answered, and they voiced understanding of instructions.     Alecia Fagan PA-C Patient was identified as a fall risk. Risk prevention instructions provided.

## 2025-02-10 NOTE — ED NOTES
"Pharmacy Medication History Review     rTina SALDANA Via \"Marcela\" is a 74 y.o. female admitted for COPD exacerbation (Multi). Pharmacy reviewed the patient's [id number] medications and allergies for accuracy.     The list below reflectives the updated PTA list. Please review each medication in order reconciliation for additional clarification and justification.  Prior to Admission Medications   Prescriptions Last Dose Informant Patient Reported? Taking?   EPINEPHrine 0.3 mg/0.3 mL injection syringe Unknown Self Yes PRN   Sig: Inject 0.3 mL (0.3 mg) into the muscle 1 time if needed for anaphylaxis.   Tiadylt  mg 24 hr capsule Not Taking Self Yes No   Sig: Take 1 capsule (120 mg) by mouth early in the morning..   Patient not taking: Reported on 2/10/2025   acetaminophen (Tylenol) 325 mg tablet Unknown Self Yes PRN   Sig: Take 1 tablet (325 mg) by mouth every 6 hours if needed for mild pain (1 - 3).   aspirin 81 mg EC tablet 2/10/2025 Morning Self Yes Yes   Sig: Take 1 tablet (81 mg) by mouth once daily.   baclofen (Lioresal) 10 mg tablet 2/10/2025 Morning Self Yes Yes   Sig: One tablet in the morning and 2 tablets in the evening.   bumetanide (Bumex) 2 mg tablet   Self Yes Yes   Sig: Take 1 tablet (2 mg) by mouth 2 times daily (morning and late afternoon).   Patient taking differently: Take 1 tablet (2 mg) by mouth 2 times daily (morning and late afternoon). TAKE 1 TABLET BY MOUTH IN THE MORNING AND 1/2 TABLET IN THE AFTERNOON.   cholecalciferol (Vitamin D3) 25 MCG (1000 UT) tablet 2/10/2025 Morning Self Yes Yes   Sig: Take 1 tablet (1,000 Units) by mouth once daily.   clobetasol (Temovate) 0.05 % cream Unknown Self Yes PRN   Sig: Apply topically 2 times a day.   clopidogrel (Plavix) 75 mg tablet Not Taking Self Yes No   Sig: Take 1 tablet (75 mg) by mouth once daily for 365 doses.   Patient not taking: Reported on 2/10/2025   cyanocobalamin (Vitamin B-12) 1,000 mcg/mL injection Past Month Self Yes Yes   Sig: " "Inject 1 mL (1,000 mcg) into the muscle every 30 (thirty) days.   diclofenac sodium (Voltaren) 1 % gel Unknown Self Yes PRN   Sig: Apply 4.5 inches (4 g) topically 2 times a day as needed (joint pain).   digoxin (Lanoxin) 125 MCG tablet 2/10/2025 Morning Self Yes Yes   Sig: Take 1 tablet (125 mcg) by mouth once daily.   dilTIAZem CD (Cardizem CD) 120 mg 24 hr capsule 2/9/2025 Evening Self Yes Yes   Sig: Take 1 capsule (120 mg) by mouth once daily.   famotidine (Pepcid) 40 mg tablet Not Taking Self Yes No   Sig: Take 1 tablet (40 mg) by mouth once daily.   Patient not taking: Reported on 2/10/2025   fluticasone propion-salmeteroL (Advair HFA) 45-21 mcg/actuation inhaler 2/10/2025 Self Yes Yes   Sig: Inhale 2 puffs 2 times a day. Rinse mouth with water after use to reduce aftertaste and incidence of candidiasis. Do not swallow.   hydroxychloroquine (Plaquenil) 200 mg tablet 2/10/2025 Morning Self Yes Yes   Sig: Take 1.5 tablets (300 mg) by mouth once daily.   insulin syringe-needle U-100 (BD Insulin Syringe Ultra-Fine) 1 mL 30 gauge x 1/2\" syringe Unknown Self Yes Yes   Sig: Use one a month   ipratropium-albuteroL (Duo-Neb) 0.5-2.5 mg/3 mL nebulizer solution   Self Yes Yes   Sig: Take 3 mL by nebulization 4 times a day as needed for wheezing or shortness of breath.   montelukast (Singulair) 10 mg tablet 2/9/2025 Evening Self Yes Yes   Sig: Take 1 tablet (10 mg) by mouth once daily at bedtime.   multivitamin tablet 2/10/2025 Morning Self Yes Yes   Sig: Take 1 tablet by mouth once daily.   naloxone (Narcan) 4 mg/0.1 mL nasal spray Unknown Self Yes PRN   Sig: Administer 1 spray (4 mg) into affected nostril(s) if needed for opioid reversal or respiratory depression. May repeat every 2-3 minutes if needed, alternating nostrils, until medical assistance becomes available.   nitroglycerin (Nitrostat) 0.4 mg SL tablet Unknown Self Yes PRN   Sig: Place 1 tablet (0.4 mg) under the tongue every 5 minutes if needed.   ondansetron " (Zofran) 8 mg tablet Unknown Self Yes PRN   Sig: Take 1 tablet (8 mg) by mouth every 8 hours if needed for nausea or vomiting.   oxyCODONE-acetaminophen (Percocet) 5-325 mg tablet Not Taking Self Yes No   Sig: Take 1 tablet by mouth 3 times a day as needed for severe pain (7 - 10) for up to 28 days. Do not fill before October 29, 2024.   Patient not taking: Reported on 2/10/2025   oxyCODONE-acetaminophen (Percocet) 5-325 mg tablet Not Taking Self Yes No   Sig: Take 1 tablet by mouth 3 times a day as needed for severe pain (7 - 10) for up to 28 days. Do not fill before November 26, 2024.   Patient not taking: Reported on 2/10/2025   oxyCODONE-acetaminophen (Percocet) 5-325 mg tablet Not Taking Self Yes No   Sig: Take 1 tablet by mouth 3 times a day as needed for severe pain (7 - 10) for up to 28 days. Do not fill before December 24, 2024.   Patient not taking: Reported on 2/10/2025   pantoprazole (ProtoNix) 40 mg EC tablet 2/10/2025 Morning Self Yes Yes   Sig: TAKE ONE TABLET BY MOUTH TWO TIMES A DAY   polyethylene glycol (Miralax) 17 gram/dose powder Not Taking Self Yes No   Sig: Mix 17 g of powder and drink once daily.   Patient not taking: Mix of powder and drink. Reported on 2/10/2025   predniSONE (Deltasone) 20 mg tablet 2/9/2025 Self Yes Yes   Sig: Take 1 tablet (20 mg) by mouth 3 times a day for 3 days, THEN 1 tablet (20 mg) 2 times a day for 3 days, THEN 1 tablet (20 mg) once daily for 3 days.   rosuvastatin (Crestor) 20 mg tablet 2/10/2025 Morning Self Yes Yes   Sig: Take 1 tablet (20 mg) by mouth once daily.   saccharomyces boulardii (Florastor) 250 mg capsule Not Taking Self Yes No   Sig: Take 1 capsule (250 mg) by mouth 2 times a day.   Patient not taking: Reported on 2/10/2025   sucralfate (Carafate) 100 mg/mL suspension 2/10/2025 Morning Self Yes Yes   Sig: Take 10 mL (1 g) by mouth 4 times a day with meals.   sulfaSALAzine (Azulfidine) 500 mg tablet   Self Yes No   Sig: Take 2 tablets (1,000 mg) by  mouth 2 times a day.   Patient taking differently: Take 1 tablet (500 mg) by mouth 2 times a day.      Facility-Administered Medications Last Administration Doses Remaining   lidocaine (Xylocaine) 10 mg/mL (1 %) injection 0.5 mL None recorded 1   triamcinolone acetonide (Kenalog-40) injection 20 mg None recorded 1               The list below reflectives the updated allergy list. Please review each documented allergy for additional clarification and justification.  Allergies  Reviewed by Maria Victoria Boyd RN on 2/10/2025          Severity Reactions Comments     Abatacept High Shortness of breath pulmonary edema, diarrhea, nausea     Hydrocodone-acetaminophen High Anaphylaxis       Hydromorphone High Anaphylaxis       Metoprolol High Anaphylaxis       Penicillins High Hives       Pregabalin High Shortness of breath caused pulmonary edema     Prochlorperazine High Itching paralysis of the mouth with drooping     Methotrexate Medium Hives, Itching chest pain     Aripiprazole Low Rash       Beta-blockers (beta-adrenergic Blocking Agts) Low Other, Palpitations, Wheezing       Bupropion Low Nausea/vomiting       Cefepime Low Itching       Ciprofloxacin Low Hives       Furosemide Low Itching       Levofloxacin Low Itching       Pineapple Low Rash                  Below are additional concerns with the patient's PTA list.     Isabel Salgado

## 2025-02-10 NOTE — ED PROVIDER NOTES
History of Present Illness     History provided by: Patient and EMS  Limitations to History: None  External Records Reviewed with Brief Summary:  Previous admission 1/2/2025 for CHF exacerbation    HPI:  Trina Elias is a 74 y.o. female past medical history of asthma A-fib status post watchman and [employer] hypertension hyperlipidemia CHF who presents today for shortness of breath.  Patient was seen at her primary care provider's office, was found to have a hemoglobin of 8.2 from a hemoglobin of 11.  Patient recently doubled her Lasix, however, has not had any significant improvement in her symptoms.  She endorses cough headache generalized weakness and fatigue.  She also endorses worsening of her shortness of breath with exertion.  She states that she previously had a low hemoglobin of unknown etiology, has had multiple endoscopies, is unsure if she got scoped last time this happened.  She denies any rectal bleeding, hematochezia, denies any hematemesis.  She denies any significant changes in her diet, no vaginal bleeding, no dysuria or hematuria.  She does endorse a productive cough with the shortness of breath.    Physical Exam   Triage vitals:  T 36.5 °C (97.7 °F)  HR 81  /73  RR (!) 23  O2 97 % None (Room air)    Physical Exam  Vitals and nursing note reviewed.   Constitutional:       General: She is not in acute distress.     Appearance: Normal appearance. She is ill-appearing. She is not diaphoretic.   HENT:      Head: Normocephalic and atraumatic.      Mouth/Throat:      Mouth: Mucous membranes are moist.      Pharynx: No oropharyngeal exudate or posterior oropharyngeal erythema.   Eyes:      General: No scleral icterus.     Extraocular Movements: Extraocular movements intact.      Pupils: Pupils are equal, round, and reactive to light.   Neck:      Thyroid: No thyromegaly.   Cardiovascular:      Rate and Rhythm: Normal rate and regular rhythm.      Pulses: Normal pulses.      Heart sounds: Normal  heart sounds. No murmur heard.     No gallop.      Comments: Palpable PT DP and radial pulses bilaterally  Pulmonary:      Effort: Pulmonary effort is normal. No respiratory distress.      Breath sounds: No stridor. Examination of the right-upper field reveals rales. Examination of the left-upper field reveals rales. Examination of the right-lower field reveals rales. Examination of the left-lower field reveals rales. Rales present. No wheezing or rhonchi.   Chest:      Chest wall: No mass, tenderness or edema.   Abdominal:      General: Bowel sounds are normal. There is no distension.      Palpations: Abdomen is soft. There is no mass.      Tenderness: There is no abdominal tenderness.      Hernia: No hernia is present.   Musculoskeletal:         General: No swelling, deformity or signs of injury. Normal range of motion.      Cervical back: Normal range of motion and neck supple. No tenderness.      Right lower leg: No tenderness. No edema.      Left lower leg: No tenderness. No edema.   Skin:     General: Skin is warm.      Capillary Refill: Capillary refill takes less than 2 seconds.      Findings: No erythema, lesion or rash.   Neurological:      General: No focal deficit present.      Mental Status: She is alert and oriented to person, place, and time. Mental status is at baseline.   Psychiatric:         Mood and Affect: Mood normal.         Behavior: Behavior normal.          Medical Decision Making & ED Course   Medical Decision Makin y.o. female past medical history of hypertension hyperlipidemia CHF A-fib status post watchman [employer] as well as asthma who presents today for shortness of breath.  Patient shortness of breath does appear to be a mix combination of both asthma exacerbation versus CHF exacerbation.  Given this, we will treat her with DuoNebs as well as magnesium and steroids.  Patient's symptoms did not improve significantly with DuoNebs or steroids, her chest x-ray does not demonstrate  any significant congestion or evidence of infection.  Patient's labs do demonstrate a BNP elevation to 625, which is slightly above patient's baseline.  She does also for mild elevation of troponins at 22 and 20 respectively.  She does have a mild elevation in her white count, however, the patient is on steroids patient's viral swabs are negative, her chest x-ray was relatively unremarkable.  We did scan the patient's abdomen and pelvis given her acute hemoglobin drop, however, the patient does not appear to have any evidence of a GI bleed.  Given fact that she is previously been worked up for this, I do not believe patient requires colonoscopy here.  Is likely that the patient's hemoglobin is at least partially dilutional.  Given patient's degree of shortness of breath with new oxygen requirement with exertion, we will admit the patient.  Patient was accepted to the medicine service at this time.  ----      Differential diagnoses considered include but are not limited to: Dyspnea secondary to anemia, CHF exacerbation, systolic heart failure     Social Determinants of Health which Significantly Impact Care: None identified     EKG Independent Interpretation:  EKG demonstrates sinus rhythm with occasional PVCs with no clear ST elevation or T wave inversion.  Or ST depression.  No significant changes from baseline    Independent Result Review and Interpretation: Relevant laboratory and radiographic results were reviewed and independently interpreted by myself.  As necessary, they are commented on in the ED Course.    Chronic conditions affecting the patient's care: As documented above in [facility]    The patient was discussed with the following consultants/services: None    Care Considerations: As documented above in [facility]    ED Course:  Diagnoses as of 02/10/25 1324   COPD exacerbation (Multi)     Disposition   As a result of their workup, the patient will require admission to the hospital.  The patient was informed of her  diagnosis.  The patient was given the opportunity to ask questions and I answered them. The patient agreed to be admitted to the hospital.    Procedures   Procedures    Patient seen and discussed with ED attending physician.    Jyason Arteaga MD  Emergency Medicine       Jayson Arteaga MD  Resident  02/10/25 [id number]

## 2025-02-10 NOTE — H&P
Internal Medicine - History and Physical Note      Patient: Trina Elias, Age: 74 y.o., SEX: female , MRN:[id number], ROOM:[facility],  Code: Full Code   Admitted On: 2/10/2025   Admitting Dx: No admission diagnoses are documented for this encounter.  PCP: Fredy Elias DO        Attending: Sy Tyler DO        Chief Complaint   Patient: Trina Elias is a 74 y.o. female who presented to the hospital for   Chief Complaint   Patient presents with    Shortness of Breath       HPI   Trina Elias is a 74 y.o. year old female  patient with PMH VT s/p ICD, TAA, mild aortic stenosis, paroxysmal atrial fibrillation s/p PVI x2 and s/p WING Closure 07/16/2024, CMP/heart failure, hyperlipidemia, HTN, GERD, CKD stage 3, iron deficiency anemia, rheumatoid arthritis, asthma, centrilobular emphysema, and BARBARA/central sleep apnea presenting for worsening shortness of breath and dyspnea on exertion.  Symptoms have been present since Thursday. Patient has been unable to walk throughout her house without becoming dyspneic.  Patient also endorses intermittent chest pain that feels like a pressure slightly to the left of her sternum.  Pain occasionally radiates to her back. Currently denies chest pain at this time.  However, when it occurs she does not believe it is affected by movement or deep breathing.  Patient has been hospitalized multiple times last year with similar symptoms.  He from her prior discharge she was recommended to take Bumex 2 mg in the morning and 1 mg in the evening.  She saw her primary care physician and was started on Bumex 2 mg twice a day yesterday.  Patient was also started on prednisone taper with her rheumatologist on 2/3/2025, where she received 3 days of 60 mg of prednisone, 3 days of 40 mg, and is scheduled to finish 20 mg tomorrow.  Patient reports compliance for taper. Since her prior admission, patient has gained approximately 10 pounds, furthermore patient reports that she has gained  approximately 4 pounds since yesterday.  She states that typically she does not experience extremity edema and typically has fluid in her abdomen instead.  Patient reports that she has been attempting to use her home DuoNebs and albuterol rescue inhaler three times daily since Thursday without benefit. Patient has multiple drug allergies including spironolactone, Jardiance, and anaphylaxis with metoprolol.  Patient's hemoglobin was found to have decreased to 9.4 in the ED. she denies rectal bleeding, diarrhea, or melena.    Prior to presenting to the ED patient, had been placed on 2 L of supplemental oxygen by EMS. In the ED, patient was saturating well on room air, however she had desaturated into the mid 80s when attempting to move to the bathroom.  Patient's remaining vitals were stable without fever.  Lab work was significant for elevated white blood cells of 12.1, and hemoglobin of 9.4.  BNP was elevated from patient's baseline at 625.  During prior admission patient's BNP had peaked to 773.  Troponin down trended from 22-20.  EKG indicated atrial fibrillation without RVR.  Influenza, COVID, and RSV PCR were negative.  CT imaging of the abdomen//pelvis was performed to evaluate for possible GI bleed, and did not indicate definite source for acute drop in hemoglobin and recommended CTA of abdomen and pelvis if clinical concern remains.  Furthermore patient was found to have small bilateral pleural effusions, right greater than the left, and cardiomegaly unchanged from her prior study.  Patient received DuoNebs, IV magnesium, prednisone 40 mg, and Bumex 2 mg IV in the ED.    Review of Systems   Constitutional:  Negative for chills and fever.   Respiratory:  Positive for apnea, cough, chest tightness and shortness of breath.         Dry cough   Cardiovascular:  Positive for chest pain.        Intermittent chest pain  Chronic history of atrial fibrillation   Gastrointestinal:  Positive for abdominal pain and  nausea. Negative for anal bleeding, blood in stool, diarrhea and vomiting.        Chronic abdominal pain and nausea status post abdominal hernia surgery over 1 year prior   Genitourinary:  Negative for difficulty urinating and dysuria.        12 Point ROS negative unless otherwise specified above.     ED COURSE:   Vitals - /76   Pulse 76   Temp 36.5 °C (97.7 °F) (Temporal)   Resp 19   Wt 70.3 kg (155 lb)   SpO2 98%   Labs -   Lab Results   Component Value Date    WBC 12.1 (H) 02/10/2025    HGB 9.4 (L) 02/10/2025    HCT 30.5 (L) 02/10/2025    MCV 82 02/10/2025     02/10/2025     Lab Results   Component Value Date    GLUCOSE 156 (H) 02/10/2025    CALCIUM 8.6 02/10/2025     02/10/2025    K 3.7 02/10/2025    CO2 26 02/10/2025     02/10/2025    BUN 18 02/10/2025    CREATININE 0.98 02/10/2025     Lab Results   Component Value Date    TROPHS 20 (H) 02/10/2025     Lab Results   Component Value Date     (H) 02/10/2025       Imaging -   CT abdomen pelvis w IV contrast   Final Result   1.  No definite source for acute drop in hemoglobin level is evident,   although evaluation for acute GI bleed is limited with current study   protocol. If clinical concern for acute GI bleed remains, further   evaluation with triple phase CT angio abdomen pelvis GI bleed   protocol or nuclear medicine red blood cell GI bleed study is   recommended.   2. Similar small bilateral pleural effusions with   atelectasis/infiltrate, right-greater-than-left.   3. Additional chronic and incidental findings as detailed above.             MACRO:   None        Signed by: Kori Gudino 2/10/2025 2:45 PM   Dictation workstation:   [id number]      XR chest 2 views   Final Result   1.  Cardiomegaly, unchanged from the prior study   2. No edema is noted                  MACRO:   None        Signed by: Jasmin Grimaldo 2/10/2025 1:45 PM   Dictation workstation:   [id number]         Interventions -   Medications   aspirin EC  tablet 81 mg (has no administration in time range)   bumetanide (Bumex) tablet 2 mg (has no administration in time range)   baclofen (Lioresal) tablet 10 mg (has no administration in time range)   baclofen (Lioresal) tablet 20 mg (has no administration in time range)   cholecalciferol (Vitamin D-3) tablet 1,000 Units (has no administration in time range)   clopidogrel (Plavix) tablet 75 mg (has no administration in time range)   digoxin (Lanoxin) tablet 125 mcg (has no administration in time range)   fluticasone furoate-vilanteroL (Breo Ellipta) 100-25 mcg/dose inhaler 1 puff (has no administration in time range)   hydroxychloroquine (Plaquenil) tablet 300 mg (has no administration in time range)   ipratropium-albuteroL (Duo-Neb) 0.5-2.5 mg/3 mL nebulizer solution 3 mL (has no administration in time range)   montelukast (Singulair) tablet 10 mg (has no administration in time range)   pantoprazole (ProtoNix) EC tablet 40 mg (has no administration in time range)   predniSONE (Deltasone) tablet 20 mg (has no administration in time range)   rosuvastatin (Crestor) tablet 20 mg (has no administration in time range)   clobetasol (Temovate) 0.05 % cream (has no administration in time range)   enoxaparin (Lovenox) syringe 40 mg (has no administration in time range)   polyethylene glycol (Glycolax, Miralax) packet 17 g (has no administration in time range)   perflutren lipid microspheres (Definity) injection 0.5-10 mL of dilution (has no administration in time range)   sulfur hexafluoride microsphr (Lumason) injection 24.28 mg (has no administration in time range)   perflutren protein A microsphere (Optison) injection 0.5 mL (has no administration in time range)   dilTIAZem CD (Cardizem CD) 24 hr capsule 120 mg (has no administration in time range)   sulfaSALAzine (Azulfidine) tablet 500 mg (has no administration in time range)   ipratropium-albuteroL (Duo-Neb) 0.5-2.5 mg/3 mL nebulizer solution 3 mL (3 mL nebulization Given  2/10/25 1440)   predniSONE (Deltasone) tablet 40 mg (40 mg oral Given 2/10/25 1331)   magnesium sulfate 2 g in sterile water for injection 50 mL (0 g intravenous Stopped 2/10/25 1416)   iohexol (OMNIPaque) 350 mg iodine/mL solution 75 mL (75 mL intravenous Given 2/10/25 1423)   bumetanide (Bumex) injection 2 mg (2 mg intravenous Given 2/10/25 1620)   acetaminophen (Tylenol) tablet 975 mg (975 mg oral Given 2/10/25 1633)         Past Medical History:   Past Medical History:   Diagnosis Date    Arrhythmia     Asthma     Atrial fibrillation (Multi)     Resistent to treatment: s/p DCCVs and ablations    Central sleep apnea     Per PSG 11/20/18; severe central sleep apnea, .6, mild obstructive component    Cervical myofascial pain syndrome     Baclofen    Chronic anemia     CKD stage 3a, GFR 45-59 ml/min (Multi)     COPD (chronic obstructive pulmonary disease) (Multi)     DVT of axillary vein, acute right (Multi) 01/20/2018    When PICC line was removed. U/S + Partial nonocclusive DVT in the axillary and proximal basilic vein in 2018    H/O being hospitalized 02/2024 Feb 24-Mar 5, 2024:  (2/24) admitted for dyspnea and chest pain work-up and found to have pleural effusions.  Found to have acute blood loss anemia s/p 2u PRBC. EGD neg for active bleed.  Cardiology consulted - plan for Watchman device WNIG closure as out pt.    History of Helicobacter pylori infection     3/2013, 1/2021    Hx of syncope     Recurrent Syncope d/t VT- s/p ILR (12/2012) implanted.  (July 2013) Episode of syncope that appears to be correlated with an 11 second round of ventricular tachycardia recorded by the loop recorder.  S/p EPS (June 2014) neg for inducible arrhythmias. (ILR removed when ICD implanted)    Hyperlipidemia     Hypertension     ICD (implantable cardioverter-defibrillator) in place     Placed 06/12/2014    Mitral valve regurgitation     Mild-Moderate per Echo 02/25/2024    Moderate aortic stenosis by prior  echocardiogram     Echo 2/25/2024    Osteopenia 06/26/2023    Personal history of anaphylaxis     See allergy list    Psoriasis     PUD (peptic ulcer disease)     H/O nonbleeding gastric ulcers with small hiatal hernia on 1/6/2021 EGD    Pulmonary hypertension (Multi)     Mild - Moderate per Echo 2/25/2024    Radiculopathy, lumbar region 08/13/2018    Acute lumbar radiculopathy    Restless legs syndrome 11/17/2015    Restless legs syndrome    Rheumatoid arthritis     Seasonal allergic rhinitis due to pollen     Small bowel obstruction (Multi) 06/26/2023    H/O due to Severe Adhesions s/p Adhesiolysis x2 in 2015 and 2020    Wide-complex tachycardia     Per cardiology 3/7/2024: VT is on a basis of triggered activity certainly made worse with anemia and prednisone. Since she is asymptomatic we will continue to observe.     Past Surgical History:   Past Surgical History:   Procedure Laterality Date    ABDOMINAL ADHESION SURGERY  10/22/2015    Exploratory laparotomy. Extensive lysis of adhesions. Small-bowel repair.    ABDOMINAL ADHESION SURGERY  10/18/2020    Exploratory laparotomy, massive lysis of adhesions, small-bowel resection, decompression of small bowel and removal of mesh.    ABLATION OF DYSRHYTHMIC FOCUS  05/02/2022 09/17/2018, 05/02/2022 for A FIB    ACHILLES TENDON SURGERY Right     ANTERIOR CERVICAL DISCECTOMY W/ FUSION  03/21/2016    Anterior C5 and C6 discectomies and interbody fusion of C5-C6, C6-C7    APPENDECTOMY      Open surgery    BREAST BIOPSY Left 09/21/2022    CARDIAC ASSIST DEVICE INSERTION  12/05/2012    Loop recorder placement 2012, Removed in 2014 w/ ICD placed    CARDIAC CATHETERIZATION  02/23/2022    Normal coronary arteries. Tachycardia induced Cardiomyopathy.    CARDIAC CATHETERIZATION N/A 7/16/2024    Procedure: LAAO (Left Atrial Appendage Occlusion);  Surgeon: Reid Dickinson MD;  Location: [place] [address] Cardiac Cath Lab;  Service: Cardiovascular;  Laterality: N/A;  Same day CT at  0815    CARDIAC CATHETERIZATION N/A 2025    Procedure: Cardiomems;  Surgeon: Kalpesh Seaman MD;  Location: [facility];  Service: Cardiovascular;  Laterality: N/A;  notified rep 24    CARDIAC DEFIBRILLATOR PLACEMENT  2014    Dual chamber ICD    CARDIOVERSION  2018, 2018    CARPAL TUNNEL RELEASE Bilateral      SECTION, CLASSIC      x2    CHOLECYSTECTOMY      Open surgery    COLONOSCOPY  2024    Extensive diverticulosis of moderate severity and causing mild luminal narrowing in the sigmoid colon    FINGER SURGERY Left 2008    Left index finger metacarpophalangeal fusion    FOOT SURGERY Bilateral     B/L Tarsal Tunnel Release    IR INJECTION EPIDURAL STEROID  2011    Lumbar spine; , ,     IR INJECTION EPIDURAL STEROID      Cervical spine 2007    LEG SURGERY Right 2006    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  Plantar wart removal R foot.    NERVE SURGERY Right 10/13/2010    Release of Right tarsal tunnel syndrome w/ severe adhesion scar tissue    NISSEN FUNDOPLICATION      Open surgery    PICC LINE INSERTION WO IMAGING  10/24/2015    Placed for stable IV access    REVERSE TOTAL SHOULDER ARTHROPLASTY Bilateral 2024    Right 2023, Left 2024    ROTATOR CUFF REPAIR      ULNAR NERVE TRANSPOSITION Left     VENTRAL HERNIA REPAIR  06/15/2015    Laparoscopic double ventral hernia repair.    WISDOM TOOTH EXTRACTION      WOUND DEBRIDEMENT  2020    Excisional debridement of abdominal wound for wound dehiscence     Allergies:   Allergies   Allergen Reactions    Abatacept Shortness of breath     pulmonary edema, diarrhea, nausea    Hydrocodone-Acetaminophen Anaphylaxis    Hydromorphone Anaphylaxis    Metoprolol Anaphylaxis    Penicillins Hives    Pregabalin Shortness of breath     caused pulmonary edema    Prochlorperazine Itching     paralysis of the mouth with drooping     Methotrexate Hives and Itching     chest pain    Aripiprazole Rash    Beta-Blockers (Beta-Adrenergic Blocking Agts) Other, Palpitations and Wheezing    Bupropion Nausea/vomiting    Cefepime Itching    Ciprofloxacin Hives    Furosemide Itching    Levofloxacin Itching    Pineapple Rash     Family History:   Family History   Problem Relation Name Age of Onset    Asthma Daughter      Asthma Other      Colon cancer Other      Diabetes Other      Other (stroke syndrome) Other       Home Meds:  Prior to Admission medications    Medication Sig Start Date End Date Taking? Authorizing Provider   acetaminophen (Tylenol) 325 mg tablet Take 1 tablet (325 mg) by mouth every 6 hours if needed for mild pain (1 - 3).    Historical Provider, MD   aspirin 81 mg EC tablet Take 1 tablet (81 mg) by mouth once daily. 1/2/25 2/13/25  NAILA Hackett-CNP   baclofen (Lioresal) 10 mg tablet One tablet in the morning and 2 tablets in the evening. 10/21/24   NAILA Carnes-CNP   bumetanide (Bumex) 2 mg tablet Take 1 tablet (2 mg) by mouth 2 times daily (morning and late afternoon). 1/7/25   NAILA Orr-CNP   cholecalciferol (Vitamin D3) 25 MCG (1000 UT) tablet Take 1 tablet (1,000 Units) by mouth once daily.    Historical Provider, MD   clobetasol (Temovate) 0.05 % cream Apply topically 2 times a day. 4/15/24   Fredy Elias DO   clopidogrel (Plavix) 75 mg tablet Take 1 tablet (75 mg) by mouth once daily for 365 doses. 7/16/24 7/16/25  Roberto Carlos Leo MD   cyanocobalamin (Vitamin B-12) 1,000 mcg/mL injection Inject 1 mL (1,000 mcg) into the muscle every 30 (thirty) days. 8/26/24   Fredy Elias DO   diclofenac sodium (Voltaren) 1 % gel Apply 4.5 inches (4 g) topically 2 times a day as needed (joint pain). 4/15/24   Fredy Elias DO   digoxin (Lanoxin) 125 MCG tablet Take 1 tablet (125 mcg) by mouth once daily. 11/1/20   Historical Provider, MD   EPINEPHrine 0.3 mg/0.3 mL injection syringe Inject 0.3 mL (0.3  "mg) into the muscle 1 time if needed for anaphylaxis. 8/12/24   Fredy Elias DO   famotidine (Pepcid) 40 mg tablet Take 1 tablet (40 mg) by mouth once daily. 2/15/24 2/3/25  Fredy Elias DO   fluticasone propion-salmeteroL (Advair HFA) 45-21 mcg/actuation inhaler Inhale 2 puffs 2 times a day. Rinse mouth with water after use to reduce aftertaste and incidence of candidiasis. Do not swallow. 9/21/24   Domenica Manning MD   hydroxychloroquine (Plaquenil) 200 mg tablet Take 1.5 tablets (300 mg) by mouth once daily. 2/3/25 5/4/25  Carlos Harley MD   insulin syringe-needle U-100 (BD Insulin Syringe Ultra-Fine) 1 mL 30 gauge x 1/2\" syringe Use one a month 8/26/24   Fredy Elias DO   ipratropium-albuteroL (Duo-Neb) 0.5-2.5 mg/3 mL nebulizer solution Take 3 mL by nebulization 4 times a day as needed for wheezing or shortness of breath. 9/25/24 9/25/25  Fredy Elias DO   montelukast (Singulair) 10 mg tablet Take 1 tablet (10 mg) by mouth once daily at bedtime. 4/15/24 4/10/25  Fredy Elias DO   naloxone (Narcan) 4 mg/0.1 mL nasal spray Administer 1 spray (4 mg) into affected nostril(s) if needed for opioid reversal or respiratory depression. May repeat every 2-3 minutes if needed, alternating nostrils, until medical assistance becomes available. 7/29/24   NAILA Carnes-CNP   nitroglycerin (Nitrostat) 0.4 mg SL tablet Place 1 tablet (0.4 mg) under the tongue every 5 minutes if needed. 7/9/15   Historical Provider, MD   oxyCODONE-acetaminophen (Percocet) 5-325 mg tablet Take 1 tablet by mouth 3 times a day as needed for severe pain (7 - 10) for up to 28 days. Do not fill before October 29, 2024. 10/29/24 11/26/24  DARSHANA Carnes   oxyCODONE-acetaminophen (Percocet) 5-325 mg tablet Take 1 tablet by mouth 3 times a day as needed for severe pain (7 - 10) for up to 28 days. Do not fill before November 26, 2024. 11/26/24 12/24/24  DARSHANA Carnes   oxyCODONE-acetaminophen (Percocet) " 5-325 mg tablet Take 1 tablet by mouth 3 times a day as needed for severe pain (7 - 10) for up to 28 days. Do not fill before December 24, 2024. 12/24/24 1/21/25  DARSHANA Carnes   pantoprazole (ProtoNix) 40 mg EC tablet TAKE ONE TABLET BY MOUTH TWO TIMES A DAY 12/27/24   Fredy Elias DO   polyethylene glycol (Miralax) 17 gram/dose powder Mix 17 g of powder and drink once daily.    Historical Provider, MD   predniSONE (Deltasone) 20 mg tablet Take 1 tablet (20 mg) by mouth 3 times a day for 3 days, THEN 1 tablet (20 mg) 2 times a day for 3 days, THEN 1 tablet (20 mg) once daily for 3 days. 2/3/25 2/12/25  Carlos Harley MD   rosuvastatin (Crestor) 20 mg tablet Take 1 tablet (20 mg) by mouth once daily. 8/12/24   Fredy Elias DO   saccharomyces boulardii (Florastor) 250 mg capsule Take 1 capsule (250 mg) by mouth 2 times a day.    Historical Provider, MD   sucralfate (Carafate) 100 mg/mL suspension Take 10 mL (1 g) by mouth 4 times a day with meals. 8/12/24 8/12/25  Fredy Elias DO   sulfaSALAzine (Azulfidine) 500 mg tablet Take 2 tablets (1,000 mg) by mouth 2 times a day. 2/3/25 5/4/25  Carlos Harley MD   Tiadylt  mg 24 hr capsule Take 1 capsule (120 mg) by mouth early in the morning.. 10/10/24   Historical Provider, MD   spironolactone (Aldactone) 25 mg tablet Take 0.5 tablets (12.5 mg) by mouth once daily. 1/17/25 2/4/25  NAILA Orr-CNP     Social History:  - Coming from patient  - Tobacco: Never  - Alcohol: Rarely on social occasions  - Illicit Drug: Never      Meds    Scheduled medications  [START ON 2/11/2025] aspirin, 81 mg, oral, Daily  [START ON 2/11/2025] baclofen, 10 mg, oral, q AM  baclofen, 20 mg, oral, q PM  bumetanide, 2 mg, oral, Once  [START ON 2/11/2025] cholecalciferol, 1,000 Units, oral, Daily  clobetasol, , Topical, BID  [START ON 2/11/2025] clopidogrel, 75 mg, oral, Daily  [START ON 2/11/2025] digoxin, 125 mcg, oral, Daily  dilTIAZem CD, 120 mg, oral,  Nightly  enoxaparin, 40 mg, subcutaneous, q24h  [START ON 2/11/2025] fluticasone furoate-vilanteroL, 1 puff, inhalation, Daily  [START ON 2/11/2025] hydroxychloroquine, 300 mg, oral, Daily  lidocaine, 0.5 mL, infiltration, Once  montelukast, 10 mg, oral, Nightly  pantoprazole, 40 mg, oral, BID  perflutren lipid microspheres, 0.5-10 mL of dilution, intravenous, Once in imaging  perflutren protein A microsphere, 0.5 mL, intravenous, Once in imaging  [START ON 2/11/2025] polyethylene glycol, 17 g, oral, Daily  [START ON 2/11/2025] predniSONE, 20 mg, oral, Daily  [START ON 2/11/2025] rosuvastatin, 20 mg, oral, Daily  sulfaSALAzine, 500 mg, oral, BID  sulfur hexafluoride microsphr, 2 mL, intravenous, Once in imaging  triamcinolone acetonide, 20 mg, intralesional, Once      Continuous medications     PRN medications  PRN medications: ipratropium-albuteroL     Objective    Physical Exam  Constitutional:       General: She is in acute distress.   HENT:      Head: Normocephalic.      Mouth/Throat:      Mouth: Mucous membranes are moist.   Eyes:      Extraocular Movements: Extraocular movements intact.      Conjunctiva/sclera: Conjunctivae normal.      Pupils: Pupils are equal, round, and reactive to light.   Cardiovascular:      Rate and Rhythm: Normal rate. Rhythm irregular.      Pulses: Normal pulses.      Heart sounds: No murmur heard.  Pulmonary:      Effort: Respiratory distress present.      Breath sounds: Wheezing and rales present.      Comments: Bilateral diffuse expiratory wheezes upper and lower lung lobes, alongside diffuse coarse breath sounds  Abdominal:      General: Abdomen is flat.      Palpations: Abdomen is soft.      Tenderness: There is abdominal tenderness. There is no guarding or rebound.      Comments: Midline hernia surgical scar present on abdomen, no signs of infection or discoloration chronic tenderness to palpation in that region.  No significant abdominal distention   Musculoskeletal:       "Cervical back: Normal range of motion.      Right lower leg: No edema.      Left lower leg: No edema.      Comments: No lower or upper extremity edema   Skin:     Coloration: Skin is not jaundiced.      Findings: No rash.   Neurological:      Mental Status: She is alert and oriented to person, place, and time. Mental status is at baseline.      Sensory: No sensory deficit.      Motor: No weakness.          Visit Vitals  /76   Pulse 76   Temp 36.5 °C (97.7 °F) (Temporal)   Resp 19   Ht 1.626 m (5' 4\")   Wt 70.3 kg (155 lb)   SpO2 98%   BMI 26.61 kg/m²   OB Status Postmenopausal   Smoking Status Never   BSA 1.78 m²          Intake/Output Summary (Last 24 hours) at 2/10/2025 1912  Last data filed at 2/10/2025 1416  Gross per 24 hour   Intake 50 ml   Output --   Net 50 ml             Labs:   Results from last 72 hours   Lab Units 02/10/25  1302   SODIUM mmol/L 141   POTASSIUM mmol/L 3.7   CHLORIDE mmol/L 103   CO2 mmol/L 26   BUN mg/dL 18   CREATININE mg/dL 0.98   GLUCOSE mg/dL 156*   CALCIUM mg/dL 8.6   ANION GAP mmol/L 16   EGFR mL/min/1.73m*2 61      Results from last 72 hours   Lab Units 02/10/25  1302   WBC AUTO x10*3/uL 12.1*   HEMOGLOBIN g/dL 9.4*   HEMATOCRIT % 30.5*   PLATELETS AUTO x10*3/uL 278   NEUTROS PCT AUTO % 89.5   LYMPHS PCT AUTO % 5.7   MONOS PCT AUTO % 3.3   EOS PCT AUTO % 0.1      Lab Results   Component Value Date    CALCIUM 8.6 02/10/2025    PHOS 4.2 11/11/2024      Lab Results   Component Value Date    CRP 8.5 (H) 02/04/2025       [unfilled]     Micro/ID:   No results found for the last 90 days.                     No lab exists for component: \"AGALPCRNB\"           Assessment and Plan    Trina Elias is a 74 y.o. female admitted on 2/10/2025  with PMH VT s/p ICD, TAA, mild aortic stenosis, paroxysmal atrial fibrillation s/p PVI x2 and s/p WING Closure 07/16/2024, CMP/heart failure, hyperlipidemia, HTN, GERD, CKD stage 3, iron deficiency anemia, rheumatoid arthritis, asthma, " centrilobular emphysema, and BARBARA/central sleep apnea presenting for worsening shortness of breath and dyspnea on exertion.    ACUTE MEDICAL ISSUES:  #Acute on Chronic Decompensated Heart Failure  #hx of Atrial fibrillation s/p Watchmann  #hx of Vt s/p internal Pacemaker/defibrillator   Currently on room air however increased work of breathing  BNP elevated at 625  Prior echo indicated ejection fraction of 45 to 50% in July 2024 with pseudonormal  pattern of left ventricular diastolic filling  - CXR showed cardiomegaly unchanged from prior exam  - in the ED received 2mg of IV bumex  PLAN:  - Received 2 mg of Bumex in the ED, will give an additional 2 mg of  Bumex in the evening, will reassess response  - May require Bumex drip if desaturates  - Multiple allergies including allergic to metoprolol with anaphylactic reaction, and allergies to spironolactone and jardiance  - respiratory regimen: Duonebs as needed for dyspnea/wheezing  - ECHO ordered to assess cardiac status  - Consider Cardiology consult  - Continue home aspirin 81 mg and clopidogrel 75 mg per prior cardiology notes  - Continue home digoxin, diltiazem  - strict I/Os, daily weights, fluid and sodium restriction  - bronchial hygiene with acapella and incentive spirometry    # Acute on chronic COPD/Asthma exacerbation  -Patient with history of COPD who presents with wheezing, worsening cough and dyspnea, concerning for COPD exacerbation without concomitant pneumonia.  -On room air at home, not requiring supplemental oxygen  -Dry cough without sputum at this time. Unlikely to be mucus plug, however cannot rule out  -Influenza, viral panel negative  -Received 40 mg of prednisone in ED  Plan:  Albuterol/Ipratroprium nebulizer q4hr, reassessment  Will give an additional 20 mg tomorrow of prednisone to complete her taper  Continue home Singulair  Consider VBG, Azithro or Doxy x 5 days if patient desaturates  RT consult, IS + Acapella  Consider Pulmonology  consult  Continuous pulse Oxymetry    #Anemia  Hemoglobin of 9.4 today, drop from 10.5 in December 2024.  Baseline Hgb for patient appears to be approximately 8  Prior history of iron deficiency anemia  CT abdomen/pelvis did not indicate acute source of bleed, recommended CTA of abdomen/pelvis if clinical concerns persist  Plan:  Will trend daily hemoglobin  If hemoglobin continues to worsen may obtain iron panel and CTA  Consider transfusion if hemoglobin less than 7.0      Elevated troponin  Downtrending 22-->20  Likely demand ischemia  Telemetry    CHRONIC MEDICAL ISSUES:  #Rheumatoid arthritis-continue home clobetasol cream, prednisone taper, hydroxychloroquine, and sulfasalazine  #Hyperlipidemia-continue home rosuvastatin  #Cervical Myofascial pain Syndrome- Resume home Baclofen  #GERD-continue home Protonix    Fluids: Fluid Restricted 1500 mL  Electrolytes: PRN  Nutrition: Cardiac diet, sodium restricted  Antimicrobials: None indicated  DVT ppx: Lovenox  GI ppx: Protonix  Catheter: None  Lines: PIV  Supplemental Oxygen: RA  HealthCare Providers:  - PCP: Fredy Elias DO   Emergency Contact: Extended Emergency Contact Information  Primary Emergency Contact: HAYDEE BRUNSON  Address: [address] [place] [address] [place]  Home Phone: [phone]  Mobile Phone: [phone]  Relation: Spouse   Code: Full Code     Disposition: ELOS> 48 hours, Patient is undergoing treatment for Decompensated HF, and COPD/Asthma Exacerbation    Allen Fxo DO  Internal Medicine, PGY- 1  02/10/25 at 7:12 PM

## 2025-02-10 NOTE — ED TRIAGE NOTES
pt here from [facility] pcp office for sob, prod cough x1 wk. states she was also told on Thurs her hbg dropped to 8.1 and in the past anemia has caused resp distress. denies fevers/congestion. denies any evidence of bleeding/black tarry stools. is on thinners but has been off for a few days. Dr Arteaga at bedside. per ems, pt was in high 90s pox on RA and was placed on 2L NC by EMS.

## 2025-02-11 ENCOUNTER — APPOINTMENT (OUTPATIENT)
Dept: CARDIOLOGY | Facility: HOSPITAL | Age: 75
DRG: 190 | End: 2025-02-11
Payer: MEDICARE

## 2025-02-11 ENCOUNTER — TELEPHONE (OUTPATIENT)
Dept: PRIMARY CARE | Facility: CLINIC | Age: 75
End: 2025-02-11

## 2025-02-11 LAB
ALBUMIN SERPL BCP-MCNC: 3.8 G/DL (ref 3.4–5)
ALP SERPL-CCNC: 63 U/L (ref 33–136)
ALT SERPL W P-5'-P-CCNC: 15 U/L (ref 7–45)
ANION GAP SERPL CALC-SCNC: 14 MMOL/L (ref 10–20)
AORTIC VALVE MEAN GRADIENT: 17 MMHG
AORTIC VALVE PEAK VELOCITY: 2.66 M/S
AST SERPL W P-5'-P-CCNC: 11 U/L (ref 9–39)
AV PEAK GRADIENT: 28 MMHG
AVA (PEAK VEL): 1.58 CM2
AVA (VTI): 1.5 CM2
BILIRUB SERPL-MCNC: 0.4 MG/DL (ref 0–1.2)
BUN SERPL-MCNC: 24 MG/DL (ref 6–23)
CALCIUM SERPL-MCNC: 9.1 MG/DL (ref 8.6–10.3)
CHLORIDE SERPL-SCNC: 102 MMOL/L (ref 98–107)
CO2 SERPL-SCNC: 30 MMOL/L (ref 21–32)
CREAT SERPL-MCNC: 0.97 MG/DL (ref 0.5–1.05)
EGFRCR SERPLBLD CKD-EPI 2021: 62 ML/MIN/1.73M*2
EJECTION FRACTION APICAL 4 CHAMBER: 55.6
EJECTION FRACTION: 58 %
ERYTHROCYTE [DISTWIDTH] IN BLOOD BY AUTOMATED COUNT: 15.2 % (ref 11.5–14.5)
GLUCOSE SERPL-MCNC: 105 MG/DL (ref 74–99)
HCT VFR BLD AUTO: 26.8 % (ref 36–46)
HGB BLD-MCNC: 8.3 G/DL (ref 12–16)
LEFT ATRIUM VOLUME AREA LENGTH INDEX BSA: 38 ML/M2
LEFT VENTRICLE INTERNAL DIMENSION DIASTOLE: 5.66 CM (ref 3.5–6)
LEFT VENTRICULAR OUTFLOW TRACT DIAMETER: 2 CM
MAGNESIUM SERPL-MCNC: 2.73 MG/DL (ref 1.6–2.4)
MCH RBC QN AUTO: 25.2 PG (ref 26–34)
MCHC RBC AUTO-ENTMCNC: 31 G/DL (ref 32–36)
MCV RBC AUTO: 81 FL (ref 80–100)
MITRAL VALVE E/A RATIO: 2.19
NRBC BLD-RTO: 0 /100 WBCS (ref 0–0)
PHOSPHATE SERPL-MCNC: 5 MG/DL (ref 2.5–4.9)
PLATELET # BLD AUTO: 262 X10*3/UL (ref 150–450)
POTASSIUM SERPL-SCNC: 3.7 MMOL/L (ref 3.5–5.3)
PROT SERPL-MCNC: 6.5 G/DL (ref 6.4–8.2)
RBC # BLD AUTO: 3.3 X10*6/UL (ref 4–5.2)
RIGHT VENTRICLE FREE WALL PEAK S': 18.4 CM/S
RIGHT VENTRICLE PEAK SYSTOLIC PRESSURE: 40.7 MMHG
SODIUM SERPL-SCNC: 142 MMOL/L (ref 136–145)
TRICUSPID ANNULAR PLANE SYSTOLIC EXCURSION: 3.6 CM
WBC # BLD AUTO: 12.3 X10*3/UL (ref 4.4–11.3)

## 2025-02-11 PROCEDURE — 93306 TTE W/DOPPLER COMPLETE: CPT

## 2025-02-11 PROCEDURE — 2500000005 HC RX 250 GENERAL PHARMACY W/O HCPCS: Performed by: INTERNAL MEDICINE

## 2025-02-11 PROCEDURE — 2500000004 HC RX 250 GENERAL PHARMACY W/ HCPCS (ALT 636 FOR OP/ED)

## 2025-02-11 PROCEDURE — 2500000002 HC RX 250 W HCPCS SELF ADMINISTERED DRUGS (ALT 637 FOR MEDICARE OP, ALT 636 FOR OP/ED)

## 2025-02-11 PROCEDURE — 9420000001 HC RT PATIENT EDUCATION 5 MIN

## 2025-02-11 PROCEDURE — 2500000002 HC RX 250 W HCPCS SELF ADMINISTERED DRUGS (ALT 637 FOR MEDICARE OP, ALT 636 FOR OP/ED): Performed by: INTERNAL MEDICINE

## 2025-02-11 PROCEDURE — 1200000002 HC GENERAL ROOM WITH TELEMETRY DAILY

## 2025-02-11 PROCEDURE — 83735 ASSAY OF MAGNESIUM: CPT

## 2025-02-11 PROCEDURE — 93005 ELECTROCARDIOGRAM TRACING: CPT

## 2025-02-11 PROCEDURE — 2500000001 HC RX 250 WO HCPCS SELF ADMINISTERED DRUGS (ALT 637 FOR MEDICARE OP)

## 2025-02-11 PROCEDURE — 84100 ASSAY OF PHOSPHORUS: CPT

## 2025-02-11 PROCEDURE — 94640 AIRWAY INHALATION TREATMENT: CPT

## 2025-02-11 PROCEDURE — 85027 COMPLETE CBC AUTOMATED: CPT

## 2025-02-11 PROCEDURE — 36415 COLL VENOUS BLD VENIPUNCTURE: CPT

## 2025-02-11 PROCEDURE — 94664 DEMO&/EVAL PT USE INHALER: CPT

## 2025-02-11 PROCEDURE — 84075 ASSAY ALKALINE PHOSPHATASE: CPT

## 2025-02-11 PROCEDURE — 94760 N-INVAS EAR/PLS OXIMETRY 1: CPT

## 2025-02-11 PROCEDURE — 99223 1ST HOSP IP/OBS HIGH 75: CPT

## 2025-02-11 RX ORDER — IPRATROPIUM BROMIDE AND ALBUTEROL SULFATE 2.5; .5 MG/3ML; MG/3ML
3 SOLUTION RESPIRATORY (INHALATION) EVERY 2 HOUR PRN
Status: DISCONTINUED | OUTPATIENT
Start: 2025-02-11 | End: 2025-02-13 | Stop reason: HOSPADM

## 2025-02-11 RX ORDER — ACETAMINOPHEN 160 MG/5ML
650 SOLUTION ORAL EVERY 4 HOURS PRN
Status: DISCONTINUED | OUTPATIENT
Start: 2025-02-11 | End: 2025-02-13 | Stop reason: HOSPADM

## 2025-02-11 RX ORDER — FORMOTEROL FUMARATE 20 UG/2ML
20 SOLUTION RESPIRATORY (INHALATION)
Status: DISCONTINUED | OUTPATIENT
Start: 2025-02-11 | End: 2025-02-13 | Stop reason: HOSPADM

## 2025-02-11 RX ORDER — IPRATROPIUM BROMIDE AND ALBUTEROL SULFATE 2.5; .5 MG/3ML; MG/3ML
3 SOLUTION RESPIRATORY (INHALATION)
Status: DISCONTINUED | OUTPATIENT
Start: 2025-02-11 | End: 2025-02-11

## 2025-02-11 RX ORDER — BUMETANIDE 1 MG/1
2 TABLET ORAL
Status: DISCONTINUED | OUTPATIENT
Start: 2025-02-11 | End: 2025-02-12

## 2025-02-11 RX ORDER — BUMETANIDE 1 MG/1
2 TABLET ORAL DAILY
Status: DISCONTINUED | OUTPATIENT
Start: 2025-02-11 | End: 2025-02-11

## 2025-02-11 RX ORDER — ACETAMINOPHEN 650 MG/1
650 SUPPOSITORY RECTAL EVERY 4 HOURS PRN
Status: DISCONTINUED | OUTPATIENT
Start: 2025-02-11 | End: 2025-02-13 | Stop reason: HOSPADM

## 2025-02-11 RX ORDER — ACETAMINOPHEN 325 MG/1
650 TABLET ORAL EVERY 4 HOURS PRN
Status: DISCONTINUED | OUTPATIENT
Start: 2025-02-11 | End: 2025-02-13 | Stop reason: HOSPADM

## 2025-02-11 RX ORDER — IPRATROPIUM BROMIDE AND ALBUTEROL SULFATE 2.5; .5 MG/3ML; MG/3ML
3 SOLUTION RESPIRATORY (INHALATION)
Status: DISCONTINUED | OUTPATIENT
Start: 2025-02-11 | End: 2025-02-13 | Stop reason: HOSPADM

## 2025-02-11 RX ADMIN — BACLOFEN 20 MG: 10 TABLET ORAL at 20:46

## 2025-02-11 RX ADMIN — ACETAMINOPHEN 650 MG: 325 TABLET ORAL at 06:42

## 2025-02-11 RX ADMIN — MONTELUKAST 10 MG: 10 TABLET, FILM COATED ORAL at 20:47

## 2025-02-11 RX ADMIN — FORMOTEROL FUMARATE DIHYDRATE 20 MCG: 20 SOLUTION RESPIRATORY (INHALATION) at 09:01

## 2025-02-11 RX ADMIN — ASPIRIN 81 MG: 81 TABLET, COATED ORAL at 09:23

## 2025-02-11 RX ADMIN — BUMETANIDE 2 MG: 1 TABLET ORAL at 18:01

## 2025-02-11 RX ADMIN — BACLOFEN 10 MG: 10 TABLET ORAL at 09:23

## 2025-02-11 RX ADMIN — ACETAMINOPHEN 650 MG: 325 TABLET ORAL at 02:09

## 2025-02-11 RX ADMIN — ENOXAPARIN SODIUM 40 MG: 40 INJECTION SUBCUTANEOUS at 20:46

## 2025-02-11 RX ADMIN — PANTOPRAZOLE SODIUM 40 MG: 40 TABLET, DELAYED RELEASE ORAL at 20:46

## 2025-02-11 RX ADMIN — BUMETANIDE 2 MG: 1 TABLET ORAL at 13:15

## 2025-02-11 RX ADMIN — Medication 1000 UNITS: at 09:23

## 2025-02-11 RX ADMIN — IPRATROPIUM BROMIDE AND ALBUTEROL SULFATE 3 ML: 2.5; .5 SOLUTION RESPIRATORY (INHALATION) at 21:29

## 2025-02-11 RX ADMIN — ACETAMINOPHEN 650 MG: 325 TABLET ORAL at 20:47

## 2025-02-11 RX ADMIN — SULFASALAZINE 500 MG: 500 TABLET ORAL at 09:22

## 2025-02-11 RX ADMIN — PANTOPRAZOLE SODIUM 40 MG: 40 TABLET, DELAYED RELEASE ORAL at 09:22

## 2025-02-11 RX ADMIN — IPRATROPIUM BROMIDE AND ALBUTEROL SULFATE 3 ML: 2.5; .5 SOLUTION RESPIRATORY (INHALATION) at 09:01

## 2025-02-11 RX ADMIN — Medication 2 L/MIN: at 13:47

## 2025-02-11 RX ADMIN — PREDNISONE 20 MG: 20 TABLET ORAL at 09:22

## 2025-02-11 RX ADMIN — DILTIAZEM HYDROCHLORIDE 120 MG: 120 CAPSULE, COATED, EXTENDED RELEASE ORAL at 20:46

## 2025-02-11 RX ADMIN — IPRATROPIUM BROMIDE AND ALBUTEROL SULFATE 3 ML: 2.5; .5 SOLUTION RESPIRATORY (INHALATION) at 13:45

## 2025-02-11 RX ADMIN — FORMOTEROL FUMARATE DIHYDRATE 20 MCG: 20 SOLUTION RESPIRATORY (INHALATION) at 21:29

## 2025-02-11 RX ADMIN — ROSUVASTATIN CALCIUM 20 MG: 20 TABLET, FILM COATED ORAL at 20:46

## 2025-02-11 RX ADMIN — HYDROXYCHLOROQUINE SULFATE 300 MG: 200 TABLET, FILM COATED ORAL at 09:22

## 2025-02-11 RX ADMIN — BUDESONIDE 0.25 MG: 0.25 INHALANT RESPIRATORY (INHALATION) at 09:01

## 2025-02-11 RX ADMIN — DIGOXIN 125 MCG: 125 TABLET ORAL at 09:22

## 2025-02-11 RX ADMIN — BUDESONIDE 0.25 MG: 0.25 INHALANT RESPIRATORY (INHALATION) at 20:46

## 2025-02-11 RX ADMIN — SULFASALAZINE 500 MG: 500 TABLET ORAL at 20:51

## 2025-02-11 SDOH — SOCIAL STABILITY: SOCIAL INSECURITY: DO YOU FEEL UNSAFE GOING BACK TO THE PLACE WHERE YOU ARE LIVING?: NO

## 2025-02-11 SDOH — SOCIAL STABILITY: SOCIAL INSECURITY
WITHIN THE LAST YEAR, HAVE YOU BEEN KICKED, HIT, SLAPPED, OR OTHERWISE PHYSICALLY HURT BY YOUR PARTNER OR EX-PARTNER?: NO

## 2025-02-11 SDOH — SOCIAL STABILITY: SOCIAL INSECURITY: WITHIN THE LAST YEAR, HAVE YOU BEEN HUMILIATED OR EMOTIONALLY ABUSED IN OTHER WAYS BY YOUR PARTNER OR EX-PARTNER?: NO

## 2025-02-11 SDOH — ECONOMIC STABILITY: INCOME INSECURITY: IN THE PAST 12 MONTHS HAS THE ELECTRIC, GAS, OIL, OR WATER COMPANY THREATENED TO SHUT OFF SERVICES IN YOUR HOME?: NO

## 2025-02-11 SDOH — SOCIAL STABILITY: SOCIAL INSECURITY
WITHIN THE LAST YEAR, HAVE YOU BEEN RAPED OR FORCED TO HAVE ANY KIND OF SEXUAL ACTIVITY BY YOUR PARTNER OR EX-PARTNER?: NO

## 2025-02-11 SDOH — SOCIAL STABILITY: SOCIAL INSECURITY: DO YOU FEEL ANYONE HAS EXPLOITED OR TAKEN ADVANTAGE OF YOU FINANCIALLY OR OF YOUR PERSONAL PROPERTY?: NO

## 2025-02-11 SDOH — SOCIAL STABILITY: SOCIAL INSECURITY: WITHIN THE LAST YEAR, HAVE YOU BEEN AFRAID OF YOUR PARTNER OR EX-PARTNER?: NO

## 2025-02-11 SDOH — SOCIAL STABILITY: SOCIAL INSECURITY: WERE YOU ABLE TO COMPLETE ALL THE BEHAVIORAL HEALTH SCREENINGS?: YES

## 2025-02-11 SDOH — ECONOMIC STABILITY: FOOD INSECURITY: WITHIN THE PAST 12 MONTHS, YOU WORRIED THAT YOUR FOOD WOULD RUN OUT BEFORE YOU GOT THE MONEY TO BUY MORE.: NEVER TRUE

## 2025-02-11 SDOH — SOCIAL STABILITY: SOCIAL INSECURITY: ARE YOU OR HAVE YOU BEEN THREATENED OR ABUSED PHYSICALLY, EMOTIONALLY, OR SEXUALLY BY ANYONE?: NO

## 2025-02-11 SDOH — SOCIAL STABILITY: SOCIAL INSECURITY: DOES ANYONE TRY TO KEEP YOU FROM HAVING/CONTACTING OTHER FRIENDS OR DOING THINGS OUTSIDE YOUR HOME?: NO

## 2025-02-11 SDOH — ECONOMIC STABILITY: FOOD INSECURITY: WITHIN THE PAST 12 MONTHS, THE FOOD YOU BOUGHT JUST DIDN'T LAST AND YOU DIDN'T HAVE MONEY TO GET MORE.: NEVER TRUE

## 2025-02-11 SDOH — SOCIAL STABILITY: SOCIAL INSECURITY: ABUSE: ADULT

## 2025-02-11 SDOH — SOCIAL STABILITY: SOCIAL INSECURITY: ARE THERE ANY APPARENT SIGNS OF INJURIES/BEHAVIORS THAT COULD BE RELATED TO ABUSE/NEGLECT?: NO

## 2025-02-11 SDOH — SOCIAL STABILITY: SOCIAL INSECURITY: HAVE YOU HAD ANY THOUGHTS OF HARMING ANYONE ELSE?: NO

## 2025-02-11 SDOH — SOCIAL STABILITY: SOCIAL INSECURITY: HAVE YOU HAD THOUGHTS OF HARMING ANYONE ELSE?: NO

## 2025-02-11 SDOH — SOCIAL STABILITY: SOCIAL INSECURITY: HAS ANYONE EVER THREATENED TO HURT YOUR FAMILY OR YOUR PETS?: NO

## 2025-02-11 ASSESSMENT — COGNITIVE AND FUNCTIONAL STATUS - GENERAL
MOBILITY SCORE: 14
CLIMB 3 TO 5 STEPS WITH RAILING: A LOT
PATIENT BASELINE BEDBOUND: NO
WALKING IN HOSPITAL ROOM: A LOT
DAILY ACTIVITIY SCORE: 22
DAILY ACTIVITIY SCORE: 19
WALKING IN HOSPITAL ROOM: A LITTLE
STANDING UP FROM CHAIR USING ARMS: A LITTLE
STANDING UP FROM CHAIR USING ARMS: A LITTLE
CLIMB 3 TO 5 STEPS WITH RAILING: A LITTLE
DRESSING REGULAR LOWER BODY CLOTHING: A LITTLE
DRESSING REGULAR LOWER BODY CLOTHING: A LOT
MOVING TO AND FROM BED TO CHAIR: A LOT
HELP NEEDED FOR BATHING: A LITTLE
DRESSING REGULAR UPPER BODY CLOTHING: A LITTLE
MOBILITY SCORE: 21
TOILETING: A LITTLE
WALKING IN HOSPITAL ROOM: A LITTLE
TOILETING: A LITTLE
CLIMB 3 TO 5 STEPS WITH RAILING: A LITTLE
MOBILITY SCORE: 21
DAILY ACTIVITIY SCORE: 22
TOILETING: A LITTLE
STANDING UP FROM CHAIR USING ARMS: A LOT
MOVING FROM LYING ON BACK TO SITTING ON SIDE OF FLAT BED WITH BEDRAILS: A LITTLE
TURNING FROM BACK TO SIDE WHILE IN FLAT BAD: A LITTLE
DRESSING REGULAR LOWER BODY CLOTHING: A LITTLE

## 2025-02-11 ASSESSMENT — ACTIVITIES OF DAILY LIVING (ADL)
WALKS IN HOME: INDEPENDENT
GROOMING: INDEPENDENT
HEARING - RIGHT EAR: FUNCTIONAL
DRESSING YOURSELF: INDEPENDENT
LACK_OF_TRANSPORTATION: NO
TOILETING: INDEPENDENT
BATHING: NEEDS ASSISTANCE
PATIENT'S MEMORY ADEQUATE TO SAFELY COMPLETE DAILY ACTIVITIES?: YES
HEARING - LEFT EAR: FUNCTIONAL
FEEDING YOURSELF: INDEPENDENT
LACK_OF_TRANSPORTATION: NO
JUDGMENT_ADEQUATE_SAFELY_COMPLETE_DAILY_ACTIVITIES: YES
ASSISTIVE_DEVICE: DENTURES UPPER
ADEQUATE_TO_COMPLETE_ADL: YES

## 2025-02-11 ASSESSMENT — PAIN - FUNCTIONAL ASSESSMENT
PAIN_FUNCTIONAL_ASSESSMENT: 0-10

## 2025-02-11 ASSESSMENT — LIFESTYLE VARIABLES
HOW OFTEN DO YOU HAVE A DRINK CONTAINING ALCOHOL: NEVER
HOW OFTEN DO YOU HAVE 6 OR MORE DRINKS ON ONE OCCASION: NEVER
SKIP TO QUESTIONS 9-10: 1
AUDIT-C TOTAL SCORE: 0
HOW MANY STANDARD DRINKS CONTAINING ALCOHOL DO YOU HAVE ON A TYPICAL DAY: PATIENT DOES NOT DRINK
AUDIT-C TOTAL SCORE: 0

## 2025-02-11 ASSESSMENT — PAIN SCALES - GENERAL
PAINLEVEL_OUTOF10: 3
PAINLEVEL_OUTOF10: 1
PAINLEVEL_OUTOF10: 0 - NO PAIN
PAINLEVEL_OUTOF10: 6
PAINLEVEL_OUTOF10: 0 - NO PAIN
PAINLEVEL_OUTOF10: 6
PAINLEVEL_OUTOF10: 6

## 2025-02-11 ASSESSMENT — PATIENT HEALTH QUESTIONNAIRE - PHQ9
2. FEELING DOWN, DEPRESSED OR HOPELESS: NOT AT ALL
1. LITTLE INTEREST OR PLEASURE IN DOING THINGS: NOT AT ALL
SUM OF ALL RESPONSES TO PHQ9 QUESTIONS 1 & 2: 0

## 2025-02-11 ASSESSMENT — PAIN DESCRIPTION - LOCATION
LOCATION: HEAD
LOCATION: HEAD
LOCATION: SHOULDER

## 2025-02-11 ASSESSMENT — PAIN DESCRIPTION - ORIENTATION: ORIENTATION: LEFT

## 2025-02-11 NOTE — PROGRESS NOTES
02/11/25 1555   Discharge Planning   Living Arrangements Spouse/significant other   Support Systems Spouse/significant other   Assistance Needed Alert and oriented x 3, Independent with ADL's, Drives at times, Walker when SOB, walk-in tub, Grab bars, Walk-in shower with seat, Room air at baseline, may need new home O2 upon discharge.   Type of Residence Private residence   Number of Stairs to Enter Residence 0  (Ramps x 2)   Number of Stairs Within Residence 0   Do you have animals or pets at home? No   Who is requesting discharge planning? Provider   Home or Post Acute Services None   Expected Discharge Disposition Home  (Patient may require new home O2 upon discharge.)   Does the patient need discharge transport arranged? No   Financial Resource Strain   How hard is it for you to pay for the very basics like food, housing, medical care, and heating? Not hard   Housing Stability   In the last 12 months, was there a time when you were not able to pay the mortgage or rent on time? N   In the past 12 months, how many times have you moved where you were living? 0   At any time in the past 12 months, were you homeless or living in a shelter (including now)? N   Transportation Needs   In the past 12 months, has lack of transportation kept you from medical appointments or from getting medications? no   In the past 12 months, has lack of transportation kept you from meetings, work, or from getting things needed for daily living? No   Patient Choice   Provider Choice list and CMS website (https://medicare.gov/care-compare#search) for post-acute Quality and Resource Measure Data were provided and reviewed with: Patient   Patient / Family choosing to utilize agency / facility established prior to hospitalization No   Stroke Family Assessment   Stroke Family Assessment Needed No   Intensity of Service   Intensity of Service 0-30 min

## 2025-02-11 NOTE — TELEPHONE ENCOUNTER
"Pt's  called and was very upset that he could not speak with you directly regarding his wife. He would not share what it was he wanted to speak with you about and refused to leave a VM, a message for me to send you, or set up a phone appt to talk with you. He said that they will be here tomorrow and expects to speak with you because they are feeling \"stone-walled.\"  "

## 2025-02-11 NOTE — CARE PLAN
Problem: Pain - Adult  Goal: Verbalizes/displays adequate comfort level or baseline comfort level  Outcome: Progressing     Problem: Safety - Adult  Goal: Free from fall injury  Outcome: Progressing     Problem: Heart Failure  Goal: Improved gas exchange this shift  Outcome: Progressing   The patient's goals for the shift include  decreased SOB    The clinical goals for the shift include patient will have no symptoms of respiratory depression during shift

## 2025-02-11 NOTE — PROGRESS NOTES
Internal Medicine - Daily Progress Note   Hospital Day: 2       Name:Trina Elias, AGE: 74 y.o., GENDER: female, MRN: [id number], ROOM: 234/234-A   CODE STATUS: Full Code  Attending Physician: Nixon Cordova MD  Resident: Allen Fox DO        Chief Complaint     Chief Complaint   Patient presents with    Shortness of Breath        Subjective    Trina Elias is a 74 y.o. year old female patient on Hospital Day: 2 with PMH VT s/p ICD, TAA, mild aortic stenosis, paroxysmal atrial fibrillation s/p PVI x2 and s/p WING Closure 07/16/2024, CMP/heart failure, hyperlipidemia, HTN, GERD, CKD stage 3, iron deficiency anemia, rheumatoid arthritis, asthma, centrilobular emphysema, and BARBARA/central sleep apnea presenting for worsening shortness of breath and dyspnea on exertion.     Overnight events: Patient was placed on 2 L of supplemental oxygen overnight because she desaturated into the 80s when attempting to ambulate.    Patient is saturating in the 90s while at rest this morning.  Reports improvement in respiratory symptoms. She does not report further wheezing on physical exam.  Unfortunately no urine output documented, however patient reports that she has been having good urine amount overnight.  No additional symptoms at this time.          Meds    Scheduled medications  aspirin, 81 mg, oral, Daily  baclofen, 10 mg, oral, q AM  baclofen, 20 mg, oral, q PM  budesonide, 0.25 mg, nebulization, BID  bumetanide, 2 mg, oral, Daily  cholecalciferol, 1,000 Units, oral, Daily  clobetasol, , Topical, BID  clopidogrel, 75 mg, oral, Daily  digoxin, 125 mcg, oral, Daily  dilTIAZem CD, 120 mg, oral, Nightly  enoxaparin, 40 mg, subcutaneous, q24h  formoterol, 20 mcg, nebulization, BID  hydroxychloroquine, 300 mg, oral, Daily  ipratropium-albuteroL, 3 mL, nebulization, q6h while awake  montelukast, 10 mg, oral, Nightly  pantoprazole, 40 mg, oral, BID  perflutren lipid microspheres, 0.5-10 mL of dilution, intravenous, Once in  "imaging  perflutren protein A microsphere, 0.5 mL, intravenous, Once in imaging  polyethylene glycol, 17 g, oral, Daily  predniSONE, 20 mg, oral, Daily  rosuvastatin, 20 mg, oral, Daily  sulfaSALAzine, 500 mg, oral, BID  sulfur hexafluoride microsphr, 2 mL, intravenous, Once in imaging      Continuous medications     PRN medications  PRN medications: acetaminophen **OR** acetaminophen **OR** acetaminophen, ipratropium-albuteroL, oxygen     Objective    Physical Exam  Constitutional:       General: She is not in acute distress.  Cardiovascular:      Rate and Rhythm: Normal rate. Rhythm irregular.      Pulses: Normal pulses.      Heart sounds: Normal heart sounds.   Pulmonary:      Effort: Pulmonary effort is normal. No respiratory distress.      Breath sounds: Normal breath sounds. No wheezing.   Neurological:      Mental Status: She is alert and oriented to person, place, and time. Mental status is at baseline.        Visit Vitals  /57   Pulse 61   Temp 36.5 °C (97.7 °F)   Resp 22   Ht 1.626 m (5' 4\")   Wt 71.3 kg (157 lb 3 oz)   SpO2 99%   BMI 26.98 kg/m²   OB Status Postmenopausal   Smoking Status Never   BSA 1.79 m²        Intake/Output Summary (Last 24 hours) at 2/11/2025 1312  Last data filed at 2/11/2025 1234  Gross per 24 hour   Intake 530 ml   Output --   Net 530 ml       Labs:   Results from last 72 hours   Lab Units 02/11/25  0653 02/10/25  1302   SODIUM mmol/L 142 141   POTASSIUM mmol/L 3.7 3.7   CHLORIDE mmol/L 102 103   CO2 mmol/L 30 26   BUN mg/dL 24* 18   CREATININE mg/dL 0.97 0.98   GLUCOSE mg/dL 105* 156*   CALCIUM mg/dL 9.1 8.6   ANION GAP mmol/L 14 16   EGFR mL/min/1.73m*2 62 61   PHOSPHORUS mg/dL 5.0*  --       Results from last 72 hours   Lab Units 02/11/25  0653 02/10/25  1302   WBC AUTO x10*3/uL 12.3* 12.1*   HEMOGLOBIN g/dL 8.3* 9.4*   HEMATOCRIT % 26.8* 30.5*   PLATELETS AUTO x10*3/uL 262 278   NEUTROS PCT AUTO %  --  89.5   LYMPHS PCT AUTO %  --  5.7   MONOS PCT AUTO %  --  3.3   EOS " "PCT AUTO %  --  0.1      Lab Results   Component Value Date    CALCIUM 9.1 02/11/2025    PHOS 5.0 (H) 02/11/2025      Lab Results   Component Value Date    CRP 8.5 (H) 02/04/2025       [unfilled]     Micro/ID:   No results found for the last 90 days.                   No lab exists for component: \"AGALPCRNB\"     Lab Results   Component Value Date    BLOODCULT  09/02/2023     No Growth at 1 days~No Growth at 2 days~No Growth at 3 days~NO GROWTH at 4 days - FINAL REPORT       Images    ECG 12 lead    Result Date: 2/11/2025  Atrial fibrillation T wave abnormality, consider lateral ischemia Abnormal ECG When compared with ECG of 20-SEP-2024 21:42, QT has shortened    Electrocardiogram, 12-lead PRN ACS symptoms    Result Date: 2/11/2025  Sinus rhythm with Premature supraventricular complexes and Premature ventricular complexes or Fusion complexes Nonspecific ST and T wave abnormality Abnormal ECG When compared with ECG of 10-FEB-2025 12:56, (unconfirmed) Sinus rhythm has replaced Atrial fibrillation Nonspecific T wave abnormality has replaced inverted T waves in Lateral leads    CT abdomen pelvis w IV contrast    Result Date: 2/10/2025  Interpreted By:  Kori Gudino, STUDY: CT ABDOMEN PELVIS W IV CONTRAST;  2/10/2025 2:21 pm   INDICATION: Signs/Symptoms:acute drop in hgb, no clear source, r/o significant GIB.     COMPARISON: CT chest 09/18/2024 CT abdomen pelvis 07/27/2024   ACCESSION NUMBER(S): [id number]   ORDERING CLINICIAN: NITA CHAVEZ   TECHNIQUE: CT of the abdomen and pelvis was performed.  Standard contiguous axial images were obtained at 3 mm slice thickness through the abdomen and pelvis. Coronal and sagittal reconstructions at 3 mm slice thickness were performed.  75 ML of Omnipaque 350 was administered intravenously without immediate complication.   FINDINGS:   LOWER CHEST: The heart is persistently enlarged without evidence of pericardial effusion. Pacemaker/AICD noted. Similar small " bilateral pleural effusions with atelectasis/infiltrate, right-greater-than-left. No concerning lung nodule. The visualized distal esophagus appears unremarkable.   ABDOMEN: Beam hardening artifact from surgical clips in the upper abdomen limits evaluation.   LIVER: The liver is normal in size. No suspicious liver lesion.   BILE DUCTS: Similar prominence of the intrahepatic and extrahepatic bile ducts, likely secondary to cholecystectomy   GALLBLADDER: Cholecystectomy   PANCREAS: The pancreas appears unremarkable without evidence of ductal dilatation or masses.   SPLEEN: The spleen is normal in size.   ADRENAL GLANDS: No adrenal nodularity or thickening.   KIDNEYS AND URETERS: The kidneys are normal in size. Stable hypoattenuating lesions in the bilateral kidneys, likely benign. No hydroureteronephrosis or nephroureterolithiasis is identified.   PELVIS:   BLADDER: The urinary bladder appears normal without abnormal wall thickening.   REPRODUCTIVE ORGANS: No pelvic masses.   BOWEL: The stomach is unremarkable. The small and large bowel are normal in caliber and demonstrate no wall thickening. Suture material is noted in the small bowel in the lower abdomen. The appendix is not definitely visualized. There is however no pericecal stranding or fluid.   VESSELS: Moderate atherosclerotic calcifications of the aortoiliac arteries. No aneurysmal dilatation of the abdominal aorta. The IVC appears normal.   PERITONEUM/RETROPERITONEUM/LYMPH NODES: No ascites or fluid collection. The retroperitoneum is unremarkable. No abdominopelvic lymphadenopathy.   ABDOMINAL WALL: The abdominal wall soft tissues appear normal.   BONES: No acute fracture or malalignment. No suspicious osseous lesions. Discogenic degenerative changes in the lower thoracic and lumbar spine, worse at the L5 to S1 level.       1.  No definite source for acute drop in hemoglobin level is evident, although evaluation for acute GI bleed is limited with current  study protocol. If clinical concern for acute GI bleed remains, further evaluation with triple phase CT angio abdomen pelvis GI bleed protocol or nuclear medicine red blood cell GI bleed study is recommended. 2. Similar small bilateral pleural effusions with atelectasis/infiltrate, right-greater-than-left. 3. Additional chronic and incidental findings as detailed above.     MACRO: None   Signed by: Kori Gudino 2/10/2025 2:45 PM Dictation workstation:   [id number]    XR chest 2 views    Result Date: 2/10/2025  Interpreted By:  Jasmin Grimaldo, STUDY: XR CHEST 2 VIEWS;  2/10/2025 1:16 pm   INDICATION: Signs/Symptoms:exertional dyspnea, CHF, acute hgb drop.     COMPARISON: 09/17/2024   ACCESSION NUMBER(S): [id number]   ORDERING CLINICIAN: MI FORTE   FINDINGS: AP upright and lateral views were obtained with the patient sitting.   ICD is present in the left upper chest. Electrode wires are present in the right atrium and right ventricle.   CARDIOMEDIASTINAL SILHOUETTE: The cardiac silhouette is enlarged but unchanged from the prior study. Watchman device is present in the left atrial appendage. Monitoring device is noted in left pulmonary artery, new since the prior exam.   LUNGS: Lungs are clear.   ABDOMEN: No remarkable upper abdominal findings.   BONES: Bilateral shoulder endoprosthesis are present.       1.  Cardiomegaly, unchanged from the prior study 2. No edema is noted       MACRO: None   Signed by: Jasmin Grimaldo 2/10/2025 1:45 PM Dictation workstation:   [id number]      Assessment and Plan    Trina Elias is a 74 y.o. female admitted on 2/10/2025  with PMH VT s/p ICD, TAA, mild aortic stenosis, paroxysmal atrial fibrillation s/p PVI x2 and s/p WING Closure 07/16/2024, CMP/heart failure, hyperlipidemia, HTN, GERD, CKD stage 3, iron deficiency anemia, rheumatoid arthritis, asthma, centrilobular emphysema, and BARBARA/central sleep apnea presenting for worsening shortness of breath and dyspnea on  exertion.     ACUTE MEDICAL ISSUES:  #Acute on Chronic Decompensated Heart Failure  #hx of Atrial fibrillation s/p Watchmann  #hx of Vt s/p internal Pacemaker/defibrillator   Currently on room air however increased work of breathing  BNP elevated at 625  Prior echo indicated ejection fraction of 45 to 50% in July 2024 with pseudonormal  pattern of left ventricular diastolic filling  - CXR showed cardiomegaly unchanged from prior exam  - in the ED received 2mg of IV bumex  PLAN:  - Continue Bumex 2 mg daily, may increase to BID  - May require Bumex drip if desaturates  - Multiple allergies including allergic to metoprolol with anaphylactic reaction, and allergies to spironolactone and jardiance  - respiratory regimen: Duonebs as needed for dyspnea/wheezing  - ECHO completed pending full read  - Continue home aspirin 81 mg and clopidogrel 75 mg per prior cardiology notes  - Continue home digoxin, diltiazem  - strict I/Os, daily weights, fluid and sodium restriction  - bronchial hygiene with acapella and incentive spirometry     # Acute on chronic COPD/Asthma exacerbation  -Patient with history of COPD who presents with wheezing, worsening cough and dyspnea, concerning for COPD exacerbation without concomitant pneumonia.  -On room air at home  -Dry cough without sputum at this time. Unlikely to be mucus plug, however cannot rule out  -Influenza, viral panel negative  -Received 40 mg of prednisone in ED  Plan:  Albuterol/Ipratroprium nebulizer q4hr, reassessment  Wean oxygen as tolerated.  Will give an additional 20 mg tomorrow of prednisone to complete her taper  Continue home Singulair  Consider VBG, Azithro or Doxy x 5 days if patient desaturates  RT consult, IS + Acapella  Consider Pulmonology consult  Continuous pulse Oxymetry     #Anemia  Hemoglobin of 9.4 today, drop from 10.5 in December 2024.  Baseline Hgb for patient appears to be approximately 8  Prior history of iron deficiency anemia  CT abdomen/pelvis did  not indicate acute source of bleed, recommended CTA of abdomen/pelvis if clinical concerns persist  Plan:  Will trend daily hemoglobin  If hemoglobin continues to worsen may obtain iron panel and CTA  Consider transfusion if hemoglobin less than 7.0        Elevated troponin  Downtrending 22-->20  Likely demand ischemia  Telemetry     CHRONIC MEDICAL ISSUES:  #Rheumatoid arthritis-continue home clobetasol cream, prednisone taper, hydroxychloroquine, and sulfasalazine  #Hyperlipidemia-continue home rosuvastatin  #Cervical Myofascial pain Syndrome- Resume home Baclofen  #GERD-continue home Protonix     Fluids: Fluid Restricted 1500 mL  Electrolytes: PRN  Nutrition: Cardiac diet, sodium restricted  Antimicrobials: None indicated  DVT ppx: Lovenox  GI ppx: Protonix  Catheter: None  Lines: PIV  Supplemental Oxygen: NC  HealthCare Providers:  - PCP: Fredy Elias DO   Emergency Contact: Extended Emergency Contact Information  Primary Emergency Contact: HAYDEE BRUNSON  Address: [address] [place], [place] [address] [place]  Home Phone: [phone]  Mobile Phone: [phone]  Relation: Spouse   Code: Full Code      Disposition: ELOS> 48 hours, Patient is undergoing treatment for Decompensated HF, and COPD/Asthma Exacerbation    Allen Fox DO  Internal Medicine, PGY- 1  02/11/25 at 1:12 PM

## 2025-02-11 NOTE — CARE PLAN
The clinical goals for the shift include wean O2      Problem: Heart Failure  Goal: Report improvement of dyspnea/breathlessness this shift  Outcome: Progressing

## 2025-02-11 NOTE — CONSULTS
"Reason For Consult  Patient consulted for COPD education.    History Of Present Illness  Marcela Elias is a 74 y.o. female presenting with SOB. Patient has a hx of COPD, CHF, FORTINO with ICD and watchman. On evaluation patient is on 3 L NC, SPO2 98%, RR 26-conversational dyspnea noted.   Patient takes Advair and duonebs at home and states gets some relief from them.   Per patient she has never smoked and has been intubated 12 x in [place] for respiratory distress.     She currently does not see a pulmonologist.     Pulmonary Functions Testing Results:    No results found for: \"FEV1\", \"FVC\", \"SNA0AEE\", \"TLC\", \"DLCO\"         Social History  She reports that she has never smoked. She has never used smokeless tobacco. She reports that she does not drink alcohol and does not use drugs.       Last Recorded Vitals  Blood pressure 122/57, pulse 61, temperature 36.5 °C (97.7 °F), resp. rate 22, height 1.626 m (5' 4\"), weight 71.3 kg (157 lb 3 oz), SpO2 99%.         Assessment/Plan     This RT to see patient for COPD consult. The patient was given a COPD booklet with educational materials regarding pulmonary issues. Smoking cessation education reviewed, documentation given. [employer] support group discussed. Flyer given for next month's meeting.    Flutter valve- The patient was given a flutter valve to help facilitate the removal of mucous from the airways. I instructed the patient on how to use the flutter with the proper technique to get the best results. In return, the patient demonstrated flutter training appropriately with a good understanding on how it is utilized.   Pulmonary rehab- Educated patient on pulmonary rehab program. The patient was given information regarding our pulmonary rehabilitation program. Spoke to the patient about the benefits of this program which can offer coping skills and exercise strengthening sessions providing a better quality of life. Patient is receptive to joining the program. Pulmonary " rehab referral placed.         HERB OVIEDO, RRT

## 2025-02-12 LAB
ALBUMIN SERPL BCP-MCNC: 3.6 G/DL (ref 3.4–5)
ALP SERPL-CCNC: 60 U/L (ref 33–136)
ALT SERPL W P-5'-P-CCNC: 12 U/L (ref 7–45)
ANION GAP SERPL CALC-SCNC: 10 MMOL/L (ref 10–20)
AST SERPL W P-5'-P-CCNC: 11 U/L (ref 9–39)
ATRIAL RATE: 95 BPM
BILIRUB SERPL-MCNC: 0.5 MG/DL (ref 0–1.2)
BUN SERPL-MCNC: 22 MG/DL (ref 6–23)
CALCIUM SERPL-MCNC: 8.7 MG/DL (ref 8.6–10.3)
CHLORIDE SERPL-SCNC: 101 MMOL/L (ref 98–107)
CO2 SERPL-SCNC: 34 MMOL/L (ref 21–32)
CREAT SERPL-MCNC: 1.06 MG/DL (ref 0.5–1.05)
DIGOXIN SERPL-MCNC: 1.01 NG/ML (ref 0.8–?)
EGFRCR SERPLBLD CKD-EPI 2021: 56 ML/MIN/1.73M*2
ERYTHROCYTE [DISTWIDTH] IN BLOOD BY AUTOMATED COUNT: 15.3 % (ref 11.5–14.5)
GLUCOSE SERPL-MCNC: 80 MG/DL (ref 74–99)
HCT VFR BLD AUTO: 26.4 % (ref 36–46)
HGB BLD-MCNC: 8.1 G/DL (ref 12–16)
MAGNESIUM SERPL-MCNC: 2.31 MG/DL (ref 1.6–2.4)
MCH RBC QN AUTO: 25.1 PG (ref 26–34)
MCHC RBC AUTO-ENTMCNC: 30.7 G/DL (ref 32–36)
MCV RBC AUTO: 82 FL (ref 80–100)
NRBC BLD-RTO: 0 /100 WBCS (ref 0–0)
P AXIS: 60 DEGREES
P OFFSET: 177 MS
P ONSET: 142 MS
PHOSPHATE SERPL-MCNC: 3.8 MG/DL (ref 2.5–4.9)
PLATELET # BLD AUTO: 248 X10*3/UL (ref 150–450)
POTASSIUM SERPL-SCNC: 3.4 MMOL/L (ref 3.5–5.3)
PR INTERVAL: 158 MS
PROT SERPL-MCNC: 6.1 G/DL (ref 6.4–8.2)
Q ONSET: 221 MS
QRS COUNT: 15 BEATS
QRS DURATION: 82 MS
QT INTERVAL: 326 MS
QTC CALCULATION(BAZETT): 409 MS
QTC FREDERICIA: 379 MS
R AXIS: 33 DEGREES
RBC # BLD AUTO: 3.23 X10*6/UL (ref 4–5.2)
SODIUM SERPL-SCNC: 142 MMOL/L (ref 136–145)
T AXIS: -12 DEGREES
T OFFSET: 384 MS
VENTRICULAR RATE: 95 BPM
WBC # BLD AUTO: 9.3 X10*3/UL (ref 4.4–11.3)

## 2025-02-12 PROCEDURE — 1200000002 HC GENERAL ROOM WITH TELEMETRY DAILY

## 2025-02-12 PROCEDURE — 80162 ASSAY OF DIGOXIN TOTAL: CPT | Performed by: NURSE PRACTITIONER

## 2025-02-12 PROCEDURE — 2500000001 HC RX 250 WO HCPCS SELF ADMINISTERED DRUGS (ALT 637 FOR MEDICARE OP)

## 2025-02-12 PROCEDURE — 9420000001 HC RT PATIENT EDUCATION 5 MIN

## 2025-02-12 PROCEDURE — 83735 ASSAY OF MAGNESIUM: CPT

## 2025-02-12 PROCEDURE — 99232 SBSQ HOSP IP/OBS MODERATE 35: CPT

## 2025-02-12 PROCEDURE — 2500000001 HC RX 250 WO HCPCS SELF ADMINISTERED DRUGS (ALT 637 FOR MEDICARE OP): Performed by: NURSE PRACTITIONER

## 2025-02-12 PROCEDURE — 2500000005 HC RX 250 GENERAL PHARMACY W/O HCPCS: Performed by: INTERNAL MEDICINE

## 2025-02-12 PROCEDURE — 94640 AIRWAY INHALATION TREATMENT: CPT

## 2025-02-12 PROCEDURE — 84100 ASSAY OF PHOSPHORUS: CPT

## 2025-02-12 PROCEDURE — 85027 COMPLETE CBC AUTOMATED: CPT

## 2025-02-12 PROCEDURE — 2500000002 HC RX 250 W HCPCS SELF ADMINISTERED DRUGS (ALT 637 FOR MEDICARE OP, ALT 636 FOR OP/ED)

## 2025-02-12 PROCEDURE — 2500000004 HC RX 250 GENERAL PHARMACY W/ HCPCS (ALT 636 FOR OP/ED)

## 2025-02-12 PROCEDURE — 36415 COLL VENOUS BLD VENIPUNCTURE: CPT

## 2025-02-12 PROCEDURE — 80053 COMPREHEN METABOLIC PANEL: CPT

## 2025-02-12 PROCEDURE — 2500000002 HC RX 250 W HCPCS SELF ADMINISTERED DRUGS (ALT 637 FOR MEDICARE OP, ALT 636 FOR OP/ED): Performed by: NURSE PRACTITIONER

## 2025-02-12 PROCEDURE — 94760 N-INVAS EAR/PLS OXIMETRY 1: CPT

## 2025-02-12 PROCEDURE — 2500000002 HC RX 250 W HCPCS SELF ADMINISTERED DRUGS (ALT 637 FOR MEDICARE OP, ALT 636 FOR OP/ED): Performed by: INTERNAL MEDICINE

## 2025-02-12 RX ORDER — BUMETANIDE 1 MG/1
2 TABLET ORAL DAILY
Status: DISCONTINUED | OUTPATIENT
Start: 2025-02-13 | End: 2025-02-13 | Stop reason: HOSPADM

## 2025-02-12 RX ORDER — CLOPIDOGREL BISULFATE 75 MG/1
75 TABLET ORAL DAILY
Status: DISCONTINUED | OUTPATIENT
Start: 2025-02-13 | End: 2025-02-13 | Stop reason: HOSPADM

## 2025-02-12 RX ORDER — OXYCODONE HYDROCHLORIDE 5 MG/1
5 TABLET ORAL EVERY 6 HOURS PRN
Status: DISCONTINUED | OUTPATIENT
Start: 2025-02-12 | End: 2025-02-13 | Stop reason: HOSPADM

## 2025-02-12 RX ORDER — BUMETANIDE 1 MG/1
1 TABLET ORAL
Status: DISCONTINUED | OUTPATIENT
Start: 2025-02-12 | End: 2025-02-13 | Stop reason: HOSPADM

## 2025-02-12 RX ORDER — PREDNISONE 20 MG/1
20 TABLET ORAL DAILY
Status: DISCONTINUED | OUTPATIENT
Start: 2025-02-22 | End: 2025-02-13 | Stop reason: HOSPADM

## 2025-02-12 RX ORDER — PREDNISONE 10 MG/1
10 TABLET ORAL DAILY
Status: DISCONTINUED | OUTPATIENT
Start: 2025-02-27 | End: 2025-02-13 | Stop reason: HOSPADM

## 2025-02-12 RX ORDER — AZITHROMYCIN 500 MG/1
500 TABLET, FILM COATED ORAL
Status: DISCONTINUED | OUTPATIENT
Start: 2025-02-12 | End: 2025-02-13 | Stop reason: HOSPADM

## 2025-02-12 RX ORDER — PREDNISONE 20 MG/1
40 TABLET ORAL DAILY
Status: DISCONTINUED | OUTPATIENT
Start: 2025-02-13 | End: 2025-02-13 | Stop reason: HOSPADM

## 2025-02-12 RX ORDER — POTASSIUM CHLORIDE 20 MEQ/1
40 TABLET, EXTENDED RELEASE ORAL ONCE
Status: COMPLETED | OUTPATIENT
Start: 2025-02-12 | End: 2025-02-12

## 2025-02-12 RX ORDER — PREDNISONE 20 MG/1
40 TABLET ORAL DAILY
Status: DISCONTINUED | OUTPATIENT
Start: 2025-02-13 | End: 2025-02-12

## 2025-02-12 RX ORDER — OXYCODONE HYDROCHLORIDE 5 MG/1
2.5 TABLET ORAL EVERY 6 HOURS PRN
Status: DISCONTINUED | OUTPATIENT
Start: 2025-02-12 | End: 2025-02-13 | Stop reason: HOSPADM

## 2025-02-12 RX ORDER — PREDNISONE 20 MG/1
20 TABLET ORAL ONCE
Status: COMPLETED | OUTPATIENT
Start: 2025-02-12 | End: 2025-02-12

## 2025-02-12 RX ORDER — OXYCODONE HYDROCHLORIDE 5 MG/1
10 TABLET ORAL EVERY 6 HOURS PRN
Status: DISCONTINUED | OUTPATIENT
Start: 2025-02-12 | End: 2025-02-13 | Stop reason: HOSPADM

## 2025-02-12 RX ADMIN — IPRATROPIUM BROMIDE AND ALBUTEROL SULFATE 3 ML: 2.5; .5 SOLUTION RESPIRATORY (INHALATION) at 08:22

## 2025-02-12 RX ADMIN — PREDNISONE 20 MG: 20 TABLET ORAL at 08:55

## 2025-02-12 RX ADMIN — PANTOPRAZOLE SODIUM 40 MG: 40 TABLET, DELAYED RELEASE ORAL at 08:55

## 2025-02-12 RX ADMIN — IPRATROPIUM BROMIDE AND ALBUTEROL SULFATE 3 ML: 2.5; .5 SOLUTION RESPIRATORY (INHALATION) at 19:51

## 2025-02-12 RX ADMIN — PREDNISONE 20 MG: 20 TABLET ORAL at 14:20

## 2025-02-12 RX ADMIN — Medication 2 L/MIN: at 08:21

## 2025-02-12 RX ADMIN — ACETAMINOPHEN 650 MG: 325 TABLET ORAL at 03:45

## 2025-02-12 RX ADMIN — PANTOPRAZOLE SODIUM 40 MG: 40 TABLET, DELAYED RELEASE ORAL at 21:06

## 2025-02-12 RX ADMIN — ROSUVASTATIN CALCIUM 20 MG: 20 TABLET, FILM COATED ORAL at 21:06

## 2025-02-12 RX ADMIN — SULFASALAZINE 500 MG: 500 TABLET ORAL at 08:55

## 2025-02-12 RX ADMIN — ACETAMINOPHEN 650 MG: 325 TABLET ORAL at 09:05

## 2025-02-12 RX ADMIN — FORMOTEROL FUMARATE DIHYDRATE 20 MCG: 20 SOLUTION RESPIRATORY (INHALATION) at 08:22

## 2025-02-12 RX ADMIN — Medication 2 L/MIN: at 13:12

## 2025-02-12 RX ADMIN — POTASSIUM CHLORIDE 40 MEQ: 1500 TABLET, EXTENDED RELEASE ORAL at 14:20

## 2025-02-12 RX ADMIN — BUDESONIDE 0.25 MG: 0.25 INHALANT RESPIRATORY (INHALATION) at 08:21

## 2025-02-12 RX ADMIN — BUDESONIDE 0.25 MG: 0.25 INHALANT RESPIRATORY (INHALATION) at 19:50

## 2025-02-12 RX ADMIN — DILTIAZEM HYDROCHLORIDE 120 MG: 120 CAPSULE, COATED, EXTENDED RELEASE ORAL at 21:06

## 2025-02-12 RX ADMIN — ENOXAPARIN SODIUM 40 MG: 40 INJECTION SUBCUTANEOUS at 21:05

## 2025-02-12 RX ADMIN — DIGOXIN 125 MCG: 125 TABLET ORAL at 08:55

## 2025-02-12 RX ADMIN — Medication 1000 UNITS: at 08:55

## 2025-02-12 RX ADMIN — AZITHROMYCIN 500 MG: 500 TABLET, FILM COATED ORAL at 10:14

## 2025-02-12 RX ADMIN — Medication 1 L/MIN: at 19:50

## 2025-02-12 RX ADMIN — BACLOFEN 10 MG: 10 TABLET ORAL at 08:55

## 2025-02-12 RX ADMIN — OXYCODONE HYDROCHLORIDE 5 MG: 5 TABLET ORAL at 21:35

## 2025-02-12 RX ADMIN — BUMETANIDE 1 MG: 1 TABLET ORAL at 16:37

## 2025-02-12 RX ADMIN — HYDROXYCHLOROQUINE SULFATE 300 MG: 200 TABLET, FILM COATED ORAL at 08:55

## 2025-02-12 RX ADMIN — ASPIRIN 81 MG: 81 TABLET, COATED ORAL at 08:56

## 2025-02-12 RX ADMIN — ACETAMINOPHEN 650 MG: 325 TABLET ORAL at 21:11

## 2025-02-12 RX ADMIN — BUMETANIDE 2 MG: 1 TABLET ORAL at 08:55

## 2025-02-12 RX ADMIN — BACLOFEN 20 MG: 10 TABLET ORAL at 21:06

## 2025-02-12 RX ADMIN — MONTELUKAST 10 MG: 10 TABLET, FILM COATED ORAL at 21:05

## 2025-02-12 RX ADMIN — IPRATROPIUM BROMIDE AND ALBUTEROL SULFATE 3 ML: 2.5; .5 SOLUTION RESPIRATORY (INHALATION) at 13:12

## 2025-02-12 RX ADMIN — FORMOTEROL FUMARATE DIHYDRATE 20 MCG: 20 SOLUTION RESPIRATORY (INHALATION) at 19:50

## 2025-02-12 RX ADMIN — SULFASALAZINE 500 MG: 500 TABLET ORAL at 21:05

## 2025-02-12 ASSESSMENT — COGNITIVE AND FUNCTIONAL STATUS - GENERAL
DAILY ACTIVITIY SCORE: 21
MOBILITY SCORE: 22
PERSONAL GROOMING: A LITTLE
WALKING IN HOSPITAL ROOM: A LITTLE
TOILETING: A LITTLE
CLIMB 3 TO 5 STEPS WITH RAILING: A LITTLE
DRESSING REGULAR LOWER BODY CLOTHING: A LITTLE

## 2025-02-12 ASSESSMENT — ENCOUNTER SYMPTOMS
WEAKNESS: 1
SHORTNESS OF BREATH: 1
COUGH: 1
WHEEZING: 1
FEVER: 0
CHILLS: 0
DIZZINESS: 0
PALPITATIONS: 0
ABDOMINAL DISTENTION: 1

## 2025-02-12 ASSESSMENT — PAIN DESCRIPTION - LOCATION
LOCATION: SHOULDER

## 2025-02-12 ASSESSMENT — PAIN SCALES - GENERAL
PAINLEVEL_OUTOF10: 3
PAINLEVEL_OUTOF10: 9
PAINLEVEL_OUTOF10: 7
PAINLEVEL_OUTOF10: 8
PAINLEVEL_OUTOF10: 3

## 2025-02-12 ASSESSMENT — PAIN DESCRIPTION - ORIENTATION
ORIENTATION: LEFT

## 2025-02-12 ASSESSMENT — PAIN - FUNCTIONAL ASSESSMENT: PAIN_FUNCTIONAL_ASSESSMENT: 0-10

## 2025-02-12 NOTE — CARE PLAN
The clinical goals for the shift include wean O2      Problem: Heart Failure  Goal: Improved gas exchange this shift  Outcome: Progressing  Goal: Report improvement of dyspnea/breathlessness this shift  Outcome: Progressing

## 2025-02-12 NOTE — CONSULTS
"Inpatient consult to Cardiology  Consult performed by: NAILA Guerra-CNP  Consult ordered by: NAILA Reina-CNP  Reason for consult: CHF          History Of Present Illness  Trina Elias \"Marcela\" is a 74 y.o. female presenting with sudden onset of shortness of breath and weight gain. She states she gained 4.5lbs overnight and then had shortness of breath. Last month she had to have her Bumex increased for concerns for fluid overload. She did have a cardiomems device placed 1/2/25 and was doing her readings everyday until 2/6 when the device last worked. She is waiting for someone to come to the house to look at it. She does have a cough but nonproductive and her abd is distended and firm, denies any chest pain.     Lab work in the ER showed glucose 156, sodium 141, potassium 3.7, BUN/Cr 18/0.98, , troponin 22, INR 1.1, WBC 12.2, H&H 9.4/30.5, CXR showed cardiomegaly, unchanged from prior study, no edema. CT of the abd/pelvis showed no active sites of bleeding, small bilateral pleural effusions with atelectasis/infiltrate, right greater than left.    EKG showed SR, no acute ischemic changes.       Past Medical History  Past Medical History:   Diagnosis Date    Arrhythmia     Asthma     Atrial fibrillation (Multi)     Resistent to treatment: s/p DCCVs and ablations    Central sleep apnea     Per PSG 11/20/18; severe central sleep apnea, .6, mild obstructive component    Cervical myofascial pain syndrome     Baclofen    Chronic anemia     CKD stage 3a, GFR 45-59 ml/min (Multi)     COPD (chronic obstructive pulmonary disease) (Multi)     DVT of axillary vein, acute right (Multi) 01/20/2018    When PICC line was removed. U/S + Partial nonocclusive DVT in the axillary and proximal basilic vein in 2018    H/O being hospitalized 02/2024 Feb 24-Mar 5, 2024:  (2/24) admitted for dyspnea and chest pain work-up and found to have pleural effusions.  Found to have acute blood loss anemia s/p " 2u PRBC. EGD neg for active bleed.  Cardiology consulted - plan for Watchman device WING closure as out pt.    History of Helicobacter pylori infection     3/2013, 1/2021    Hx of syncope     Recurrent Syncope d/t VT- s/p ILR (12/2012) implanted.  (July 2013) Episode of syncope that appears to be correlated with an 11 second round of ventricular tachycardia recorded by the loop recorder.  S/p EPS (June 2014) neg for inducible arrhythmias. (ILR removed when ICD implanted)    Hyperlipidemia     Hypertension     ICD (implantable cardioverter-defibrillator) in place     Placed 06/12/2014    Mitral valve regurgitation     Mild-Moderate per Echo 02/25/2024    Moderate aortic stenosis by prior echocardiogram     Echo 2/25/2024    Osteopenia 06/26/2023    Personal history of anaphylaxis     See allergy list    Psoriasis     PUD (peptic ulcer disease)     H/O nonbleeding gastric ulcers with small hiatal hernia on 1/6/2021 EGD    Pulmonary hypertension (Multi)     Mild - Moderate per Echo 2/25/2024    Radiculopathy, lumbar region 08/13/2018    Acute lumbar radiculopathy    Restless legs syndrome 11/17/2015    Restless legs syndrome    Rheumatoid arthritis     Seasonal allergic rhinitis due to pollen     Small bowel obstruction (Multi) 06/26/2023    H/O due to Severe Adhesions s/p Adhesiolysis x2 in 2015 and 2020    Wide-complex tachycardia     Per cardiology 3/7/2024: VT is on a basis of triggered activity certainly made worse with anemia and prednisone. Since she is asymptomatic we will continue to observe.       Surgical History  Past Surgical History:   Procedure Laterality Date    ABDOMINAL ADHESION SURGERY  10/22/2015    Exploratory laparotomy. Extensive lysis of adhesions. Small-bowel repair.    ABDOMINAL ADHESION SURGERY  10/18/2020    Exploratory laparotomy, massive lysis of adhesions, small-bowel resection, decompression of small bowel and removal of mesh.    ABLATION OF DYSRHYTHMIC FOCUS  05/02/2022 09/17/2018,  2022 for A FIB    ACHILLES TENDON SURGERY Right     ANTERIOR CERVICAL DISCECTOMY W/ FUSION  2016    Anterior C5 and C6 discectomies and interbody fusion of C5-C6, C6-C7    APPENDECTOMY      Open surgery    BREAST BIOPSY Left 2022    CARDIAC ASSIST DEVICE INSERTION  2012    Loop recorder placement , Removed in  w/ ICD placed    CARDIAC CATHETERIZATION  2022    Normal coronary arteries. Tachycardia induced Cardiomyopathy.    CARDIAC CATHETERIZATION N/A 2024    Procedure: LAAO (Left Atrial Appendage Occlusion);  Surgeon: Reid Dickinson MD;  Location: [facility] Cardiac Cath Lab;  Service: Cardiovascular;  Laterality: N/A;  Same day CT at 0815    CARDIAC CATHETERIZATION N/A 2025    Procedure: Cardiomems;  Surgeon: Kalpesh Seaman MD;  Location: [facility] Cardiac Cath Lab;  Service: Cardiovascular;  Laterality: N/A;  notified rep 24    CARDIAC DEFIBRILLATOR PLACEMENT  2014    Dual chamber ICD    CARDIOVERSION  2018, 2018    CARPAL TUNNEL RELEASE Bilateral      SECTION, CLASSIC      x2    CHOLECYSTECTOMY      Open surgery    COLONOSCOPY  2024    Extensive diverticulosis of moderate severity and causing mild luminal narrowing in the sigmoid colon    FINGER SURGERY Left 2008    Left index finger metacarpophalangeal fusion    FOOT SURGERY Bilateral     B/L Tarsal Tunnel Release    IR INJECTION EPIDURAL STEROID  2011    Lumbar spine; , ,     IR INJECTION EPIDURAL STEROID  2007    Cervical spine 2007    LEG SURGERY Right 2006    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  Plantar wart removal R foot.    NERVE SURGERY Right 10/13/2010    Release of Right tarsal tunnel syndrome w/ severe adhesion scar tissue    NISSEN FUNDOPLICATION      Open surgery    PICC LINE INSERTION WO IMAGING  10/24/2015    Placed for stable IV access    REVERSE TOTAL SHOULDER ARTHROPLASTY Bilateral  05/02/2024    Right 1/12/2023, Left 5/2/2024    ROTATOR CUFF REPAIR      ULNAR NERVE TRANSPOSITION Left     VENTRAL HERNIA REPAIR  06/15/2015    Laparoscopic double ventral hernia repair.    WISDOM TOOTH EXTRACTION      WOUND DEBRIDEMENT  11/27/2020    Excisional debridement of abdominal wound for wound dehiscence        Social History  She reports that she has never smoked. She has never used smokeless tobacco. She reports that she does not drink alcohol and does not use drugs.    Family History  Family History   Problem Relation Name Age of Onset    Asthma Daughter      Asthma Other      Colon cancer Other      Diabetes Other      Other (stroke syndrome) Other          Allergies  Abatacept, Hydrocodone-acetaminophen, Hydromorphone, Metoprolol, Penicillins, Pregabalin, Prochlorperazine, Methotrexate, Aripiprazole, Beta-blockers (beta-adrenergic blocking agts), Bupropion, Cefepime, Ciprofloxacin, Furosemide, Levofloxacin, and Pineapple    Review of Systems  Review of Systems   Constitutional:  Negative for chills and fever.   Respiratory:  Positive for cough, shortness of breath and wheezing.    Cardiovascular:  Negative for chest pain, palpitations and leg swelling.   Gastrointestinal:  Positive for abdominal distention.   Musculoskeletal:  Negative for gait problem.   Neurological:  Positive for weakness. Negative for dizziness.   All other systems reviewed and are negative.         Physical Exam  Constitutional: Well developed, awake/alert/oriented x3, no distress, alert and cooperative  Eyes: PERRL, EOMI, clear sclera  ENMT: mucous membranes moist, no apparent injury, no lesions seen  Head/Neck: Neck supple, no apparent injury, thyroid without mass or tenderness, No JVD, trachea midline, no bruits  Respiratory/Thorax: Patent airways, expiratory wheezes throughout with good chest expansion, thorax symmetric  Cardiovascular: Regular, rate and rhythm, 2/26 systolic murmur, 2+ equal pulses of the extremities, normal  "S 1and S 2  Gastrointestinal: Distended, firm, non-tender, no rebound tenderness or guarding, no masses palpable, no organomegaly, +BS, no bruits  Musculoskeletal: ROM intact, no joint swelling, normal strength  Extremities: normal extremities, no cyanosis edema, contusions or wounds, no clubbing  Neurological: alert and oriented x3, intact senses, motor, response and reflexes, normal strength  Lymphatic: No significant lymphadenopathy  Psychological: Appropriate mood and behavior  Skin: Warm and dry, no lesions, no rashes       Last Recorded Vitals  Blood pressure 114/53, pulse 63, temperature 37.2 °C (99 °F), temperature source Temporal, resp. rate 16, height 1.626 m (5' 4\"), weight 70.2 kg (154 lb 12.2 oz), SpO2 97%.    Medications  Scheduled medications  azithromycin, 500 mg, oral, q24h DWIGHT  baclofen, 10 mg, oral, q AM  baclofen, 20 mg, oral, q PM  budesonide, 0.25 mg, nebulization, BID  bumetanide, 1 mg, oral, Daily with evening meal  [START ON 2/13/2025] bumetanide, 2 mg, oral, Daily  cholecalciferol, 1,000 Units, oral, Daily  clobetasol, , Topical, BID  [START ON 2/13/2025] clopidogrel, 75 mg, oral, Daily  digoxin, 125 mcg, oral, Daily  dilTIAZem CD, 120 mg, oral, Nightly  enoxaparin, 40 mg, subcutaneous, q24h  formoterol, 20 mcg, nebulization, BID  hydroxychloroquine, 300 mg, oral, Daily  ipratropium-albuteroL, 3 mL, nebulization, q6h while awake  montelukast, 10 mg, oral, Nightly  pantoprazole, 40 mg, oral, BID  perflutren lipid microspheres, 0.5-10 mL of dilution, intravenous, Once in imaging  perflutren protein A microsphere, 0.5 mL, intravenous, Once in imaging  polyethylene glycol, 17 g, oral, Daily  potassium chloride CR, 40 mEq, oral, Once  [START ON 2/13/2025] predniSONE, 40 mg, oral, Daily   Followed by  [START ON 2/17/2025] predniSONE, 30 mg, oral, Daily   Followed by  [START ON 2/22/2025] predniSONE, 20 mg, oral, Daily   Followed by  [START ON 2/27/2025] predniSONE, 10 mg, oral, Daily  predniSONE, " 20 mg, oral, Once  rosuvastatin, 20 mg, oral, Daily  sulfaSALAzine, 500 mg, oral, BID  sulfur hexafluoride microsphr, 2 mL, intravenous, Once in imaging      Continuous medications     PRN medications  PRN medications: acetaminophen **OR** acetaminophen **OR** acetaminophen, ipratropium-albuteroL, oxygen    Relevant Results  Results for orders placed or performed during the hospital encounter of 02/10/25 (from the past 24 hours)   CBC   Result Value Ref Range    WBC 9.3 4.4 - 11.3 x10*3/uL    nRBC 0.0 0.0 - 0.0 /100 WBCs    RBC 3.23 (L) 4.00 - 5.20 x10*6/uL    Hemoglobin 8.1 (L) 12.0 - 16.0 g/dL    Hematocrit 26.4 (L) 36.0 - 46.0 %    MCV 82 80 - 100 fL    MCH 25.1 (L) 26.0 - 34.0 pg    MCHC 30.7 (L) 32.0 - 36.0 g/dL    RDW 15.3 (H) 11.5 - 14.5 %    Platelets 248 150 - 450 x10*3/uL   Comprehensive metabolic panel   Result Value Ref Range    Glucose 80 74 - 99 mg/dL    Sodium 142 136 - 145 mmol/L    Potassium 3.4 (L) 3.5 - 5.3 mmol/L    Chloride 101 98 - 107 mmol/L    Bicarbonate 34 (H) 21 - 32 mmol/L    Anion Gap 10 10 - 20 mmol/L    Urea Nitrogen 22 6 - 23 mg/dL    Creatinine 1.06 (H) 0.50 - 1.05 mg/dL    eGFR 56 (L) >60 mL/min/1.73m*2    Calcium 8.7 8.6 - 10.3 mg/dL    Albumin 3.6 3.4 - 5.0 g/dL    Alkaline Phosphatase 60 33 - 136 U/L    Total Protein 6.1 (L) 6.4 - 8.2 g/dL    AST 11 9 - 39 U/L    Bilirubin, Total 0.5 0.0 - 1.2 mg/dL    ALT 12 7 - 45 U/L   Magnesium   Result Value Ref Range    Magnesium 2.31 1.60 - 2.40 mg/dL   Phosphorus   Result Value Ref Range    Phosphorus 3.8 2.5 - 4.9 mg/dL     *Note: Due to a large number of results and/or encounters for the requested time period, some results have not been displayed. A complete set of results can be found in Results Review.       Transthoracic Echo (TTE) Complete   Final Result      CT abdomen pelvis w IV contrast   Final Result   1.  No definite source for acute drop in hemoglobin level is evident,   although evaluation for acute GI bleed is limited with  current study   protocol. If clinical concern for acute GI bleed remains, further   evaluation with triple phase CT angio abdomen pelvis GI bleed   protocol or nuclear medicine red blood cell GI bleed study is   recommended.   2. Similar small bilateral pleural effusions with   atelectasis/infiltrate, right-greater-than-left.   3. Additional chronic and incidental findings as detailed above.             MACRO:   None        Signed by: Kori Gudino 2/10/2025 2:45 PM   Dictation workstation:   [id number]      XR chest 2 views   Final Result   1.  Cardiomegaly, unchanged from the prior study   2. No edema is noted                  MACRO:   None        Signed by: Jasmin Grimaldo 2/10/2025 1:45 PM   Dictation workstation:   [id number]          Transthoracic Echo (TTE) Complete    Result Date: 2/11/2025   [facility], [address] [place] [address]               Tel [phone] and Fax [phone] TRANSTHORACIC ECHOCARDIOGRAM REPORT  Patient Name:       VERNON SALDANA VIA        Reading Physician:    [id number] Nohelia Soto MD Study Date:         2/11/2025           Ordering Provider:    [id number] EDNA SANTIZO MRN/PID:            [id number]            Fellow: Accession#:         [id number]        Nurse: Date of Birth/Age:  1950 / 74      Sonographer:          Nano maldonado RDCS Gender assigned at  F                   Additional Staff: Birth: Height:             162.56 cm           Admit Date:           2/10/2025 Weight:             71.22 kg            Admission Status:     Inpatient -                                                               Routine BSA / BMI:          1.76 m2 / 26.95     Encounter#:           [id number]                     kg/m2 Blood Pressure:     122/57 mmHg         Department Location:   [facility]                                                               Invasive Study Type:    TRANSTHORACIC ECHO (TTE) COMPLETE Diagnosis/ICD: Acute on chronic combined systolic (congestive) and diastolic                (congestive) heart failure (CHF)-I50.43 Indication:    Congestive Heart Failure CPT Code:      Echo Complete w Full Doppler-63878 Patient History: Pertinent History: A-Fib, CAD, COPD and Hyperlipidemia. S/P Watchman, CKD. Study Detail: The following Echo studies were performed: 2D, M-Mode, Doppler and               color flow. The patient was awake.  PHYSICIAN INTERPRETATION: Left Ventricle: The left ventricular systolic function is normal, with a visually estimated ejection fraction of 55-60%. There is mild eccentric left ventricular hypertrophy. There are no regional left ventricular wall motion abnormalities. The left ventricular cavity size is normal. There is normal septal and mildly increased posterior left ventricular wall thickness. Spectral Doppler shows an abnormal pattern of left ventricular diastolic filling. Left Atrium: The left atrial size is moderately dilated. Right Ventricle: The right ventricle is normal in size. There is normal right ventricular global systolic function. Right Atrium: The right atrium is normal in size. Aortic Valve: The aortic valve is trileaflet. There is evidence of mild aortic valve stenosis. The aortic valve dimensionless index is 0.48. There is mild to moderate aortic valve regurgitation. The peak instantaneous gradient of the aortic valve is 28 mmHg. The mean gradient of the aortic valve is 17 mmHg. Mitral Valve: The mitral valve is mildly thickened. There is evidence of mild mitral valve stenosis. The doppler estimated mean and peak diastolic pressure gradients are 4 mmHg and 14 mmHg respectively. The peak instantaneous gradient of the mitral valve is 14 mmHg. There is moderate to severe mitral valve regurgitation. The mitral regurgitant orifice  area is 11 mm2. The mitral regurgitant volume is 19.69 ml. Tricuspid Valve: The tricuspid valve is structurally normal. There is mild to moderate tricuspid regurgitation. Pulmonic Valve: The pulmonic valve is not well visualized. The pulmonic valve regurgitation was not well visualized. Pericardium: There is no pericardial effusion noted. Aorta: The aortic root is normal. Pulmonary Artery: The tricuspid regurgitant velocity is 3.07 m/s, and with an estimated right atrial pressure of 3 mmHg, the estimated pulmonary artery pressure is mildly elevated with the RVSP at 40.7 mmHg.  CONCLUSIONS:  1. The left ventricular systolic function is normal, with a visually estimated ejection fraction of 55-60%.  2. Spectral Doppler shows an abnormal pattern of left ventricular diastolic filling.  3. There is normal right ventricular global systolic function.  4. The left atrial size is moderately dilated.  5. There is mild mitral valve stenosis.  6. Moderate to severe mitral valve regurgitation.  7. Mild to moderate tricuspid regurgitation visualized.  8. Mild aortic valve stenosis.  9. Mild to moderate aortic valve regurgitation. QUANTITATIVE DATA SUMMARY:  2D MEASUREMENTS:          Normal Ranges: Ao Root d:       3.40 cm  (2.0-3.7cm) IVSd:            0.68 cm  (0.6-1.1cm) LVPWd:           1.03 cm  (0.6-1.1cm) LVIDd:           5.66 cm  (3.9-5.9cm) LVIDs:           4.93 cm LV Mass Index:   103 g/m2 LVEDV Index:     85 ml/m2 LV % FS          12.9 %  LEFT ATRIUM:                  Normal Ranges: LA Vol A4C:        68.4 ml    (22+/-6mL/m2) LA Vol A2C:        64.0 ml LA Vol BP:         67.0 ml LA Vol Index A4C:  38.7ml/m2 LA Vol Index A2C:  36.3 ml/m2 LA Vol Index BP:   38.0 ml/m2 LA Area A4C:       24.3 cm2 LA Area A2C:       23.2 cm2 LA Major Axis A4C: 7.3 cm LA Major Axis A2C: 7.2 cm LA Volume Index:   36.8 ml/m2 LA Vol A4C:        66.2 ml LA Vol A2C:        62.0 ml LA Vol Index BSA:  36.3 ml/m2  RIGHT ATRIUM:          Normal Ranges:  RA Area A4C:  22.7 cm2  M-MODE MEASUREMENTS:         Normal Ranges: Ao Root:             3.40 cm (2.0-3.7cm) LAs:                 4.80 cm (2.7-4.0cm)  AORTA MEASUREMENTS:         Normal Ranges: Ao Sinus, d:        3.40 cm (2.1-3.5cm) Asc Ao, d:          3.70 cm (2.1-3.4cm)  LV SYSTOLIC FUNCTION:                      Normal Ranges: EF-A4C View:    56 % (>=55%) EF-A2C View:    50 % EF-Biplane:     52 % EF-Visual:      58 % LV EF Reported: 58 %  LV DIASTOLIC FUNCTION:             Normal Ranges: MV Peak E:             1.38 m/s    (0.7-1.2 m/s) MV Peak A:             0.63 m/s    (0.42-0.7 m/s) E/A Ratio:             2.19        (1.0-2.2) MV e'                  0.048 m/s   (>8.0) MV lateral e'          0.05 m/s MV medial e'           0.04 m/s MV A Dur:              172.00 msec E/e' Ratio:            28.63       (<8.0) PulmV Sys Ham:         30.30 cm/s PulmV Alvarez Ham:        72.10 cm/s PulmV S/D Ham:         0.40 PulmV A Revs Ham:      22.50 cm/s PulmV A Revs Dur:      129.00 msec  MITRAL VALVE:           Normal Ranges: MV Vmax:      1.86 m/s  (<=1.3m/s) MV peak P.8 mmHg (<5mmHg) MV mean P.0 mmHg  (<2mmHg) MV DT:        215 msec  (150-240msec)  MITRAL INSUFFICIENCY:             Normal Ranges: PISA Radius:          0.5 cm MR VTI:               175.50 cm MR Vmax:              539.00 cm/s MR Alias Ham:         38.5 cm/s MR Volume:            19.69 ml MR Flow Rt:           60.48 ml/s MR EROA:              11 mm2  AORTIC VALVE:                      Normal Ranges: AoV Vmax:                2.66 m/s  (<=1.7m/s) AoV Peak P.3 mmHg (<20mmHg) AoV Mean P.0 mmHg (1.7-11.5mmHg) LVOT Max Ham:            1.34 m/s  (<=1.1m/s) AoV VTI:                 59.50 cm  (18-25cm) LVOT VTI:                28.50 cm LVOT Diameter:           2.00 cm   (1.8-2.4cm) AoV Area, VTI:           1.50 cm2  (2.5-5.5cm2) AoV Area,Vmax:           1.58 cm2  (2.5-4.5cm2) AoV Dimensionless Index: 0.48  AORTIC  INSUFFICIENCY: AI Vmax:       3.91 m/s AI Half-time:  419 msec AI Decel Rate: 282.00 cm/s2  RIGHT VENTRICLE: RV Basal 3.61 cm RV Mid   2.68 cm RV Major 9.0 cm TAPSE:   35.9 mm RV s'    0.18 m/s  TRICUSPID VALVE/RVSP:          Normal Ranges: Peak TR Velocity:     3.07 m/s RV Syst Pressure:     41 mmHg  (< 30mmHg) IVC Diam:             1.91 cm  PULMONIC VALVE:          Normal Ranges: PV Max Ham:     1.5 m/s  (0.6-0.9m/s) PV Max P.5 mmHg  PULMONARY VEINS: PulmV A Revs Dur: 129.00 msec PulmV A Revs Ham: 22.50 cm/s PulmV Alvarez Ham:   72.10 cm/s PulmV S/D Ham:    0.40 PulmV Sys Ham:    30.30 cm/s  [id number] Nohelia Soto MD Electronically signed on 2025 at 2:47:07 PM  ** Final **     Transthoracic Echo (TTE) Complete    Result Date: 2024   [facility], [address] [place] [address]               Tel [phone] and Fax [phone] TRANSTHORACIC ECHOCARDIOGRAM REPORT  Patient Name:      VERNON SALDANA VIA         Reading Physician:    [id number] Neo Gutierrez MD Study Date:        2024            Ordering Provider:    [id number] STEPHON YARBROUGH MRN/PID:           [id number]             Fellow: Accession#:        [id number]         Nurse: Date of Birth/Age: 1950 / 73 years Sonographer:          Viola Lau RDCS Gender:            F                    Additional Staff: Height:            162.56 cm            Admit Date:           2024 Weight:            72.12 kg             Admission Status:     Inpatient -                                                               Routine BSA / BMI:         1.77 m2 / 27.29      Encounter#:           [id number]                    kg/m2 Blood Pressure:    108/69 mmHg          Department Location:  [facility]                                                                Invasive Study Type:    TRANSTHORACIC ECHO (TTE) COMPLETE Diagnosis/ICD: Chest pain, unspecified-R07.9 Indication:    CP CPT Code:      Echo Complete w Full Doppler-[id number] Patient History: Pertinent History: A-Fib, CAD, Hyperlipidemia, COPD, Chest Pain and S/P Left                    shoulder surgery,Watchman, CKD. Study Detail: The following Echo studies were performed: 2D, M-Mode, Doppler and               color flow. Technically challenging study due to patient lying in               supine position and S/P left shoulder surgery. Patient has a               defibrillator.  PHYSICIAN INTERPRETATION: Left Ventricle: The left ventricular systolic function is mildly decreased, with a visually estimated ejection fraction of 45-50%. There are no regional wall motion abnormalities. The left ventricular cavity size is normal. There is mild concentric left ventricular hypertrophy. Spectral Doppler shows a pseudonormal pattern of left ventricular diastolic filling. Left Atrium: The left atrium is normal in size. Right Ventricle: The right ventricle is normal in size. There is normal right ventricular global systolic function. A device is visualized in the right ventricle. Right Atrium: The right atrium is normal in size. Aortic Valve: The aortic valve is trileaflet. There is mild aortic valve cusp calcification. There is evidence of mildly elevated transaortic gradients consistent with sclerosis of the aortic valve. The aortic valve dimensionless index is 0.71. There is moderate aortic valve regurgitation. The peak instantaneous gradient of the aortic valve is 14.6 mmHg. The mean gradient of the aortic valve is 8.0 mmHg. Mitral Valve: The mitral valve is normal in structure. There is moderate mitral annular calcification. There is mild mitral valve regurgitation. Tricuspid Valve: The tricuspid valve is structurally normal. There is mild tricuspid regurgitation. Pulmonic Valve: The pulmonic valve is  structurally normal. There is physiologic pulmonic valve regurgitation. Pericardium: There is no pericardial effusion noted. Aorta: The aortic root is normal. Pulmonary Artery: The tricuspid regurgitant velocity is 2.43 m/s, and with an estimated right atrial pressure of 3 mmHg, the estimated pulmonary artery pressure is normal with the RVSP at 26.6 mmHg. Pulmonary Veins: The pulmonary veins appear normal and return normally to the left atrium. Systemic Veins: The inferior vena cava appears to be of normal size. There is IVC inspiratory collapse greater than 50%.  CONCLUSIONS:  1. The left ventricular systolic function is mildly decreased, with a visually estimated ejection fraction of 45-50%.  2. Spectral Doppler shows a pseudonormal pattern of left ventricular diastolic filling.  3. There is normal right ventricular global systolic function.  4. There is moderate mitral annular calcification.  5. Aortic valve sclerosis.  6. Moderate aortic valve regurgitation.  7. Normal estimated PASP and CVP. QUANTITATIVE DATA SUMMARY: 2D MEASUREMENTS:                           Normal Ranges: IVSd:          0.97 cm    (0.6-1.1cm) LVPWd:         1.16 cm    (0.6-1.1cm) LVIDd:         5.27 cm    (3.9-5.9cm) LVIDs:         4.48 cm LV Mass Index: 122.0 g/m2 LV % FS        15.0 % LA VOLUME:                               Normal Ranges: LA Vol A4C:        46.9 ml    (22+/-6mL/m2) LA Vol A2C:        46.7 ml LA Vol BP:         46.9 ml LA Vol Index A4C:  26.5ml/m2 LA Vol Index A2C:  26.3 ml/m2 LA Vol Index BP:   26.4 ml/m2 LA Area A4C:       17.6 cm2 LA Area A2C:       17.5 cm2 LA Major Axis A4C: 5.6 cm LA Major Axis A2C: 5.6 cm LA Volume Index:   24.7 ml/m2 LA Vol A4C:        42.8 ml LA Vol A2C:        43.8 ml RA VOLUME BY A/L METHOD:                       Normal Ranges: RA Area A4C: 23.0 cm2 AORTA MEASUREMENTS:                    Normal Ranges: Asc Ao, d: 3.70 cm (2.1-3.4cm) LV SYSTOLIC FUNCTION BY 2D PLANIMETRY (MOD):                       Normal Ranges: EF-A4C View:    43 % (>=55%) EF-A2C View:    42 % EF-Biplane:     44 % EF-Visual:      48 % LV EF Reported: 48 % LV DIASTOLIC FUNCTION:                     Normal Ranges: MV Peak E: 1.46 m/s (0.7-1.2 m/s) MITRAL VALVE:                      Normal Ranges: MV Vmax:    1.31 m/s (<=1.3m/s) MV peak P.9 mmHg (<5mmHg) MV mean PG: 3.0 mmHg (<2mmHg) MV DT:      197 msec (150-240msec) MITRAL INSUFFICIENCY:                           Normal Ranges: PISA Radius:  0.3 cm MR VTI:       178.00 cm MR Vmax:      496.00 cm/s MR Alias Ham: 35.1 cm/s MR Volume:    7.12 ml MR Flow Rt:   19.85 ml/s MR EROA:      0.04 cm2 AORTIC VALVE:                                    Normal Ranges: AoV Vmax:                1.91 m/s  (<=1.7m/s) AoV Peak P.6 mmHg (<20mmHg) AoV Mean P.0 mmHg  (1.7-11.5mmHg) LVOT Max Ham:            1.33 m/s  (<=1.1m/s) AoV VTI:                 37.70 cm  (18-25cm) LVOT VTI:                26.90 cm LVOT Diameter:           2.00 cm   (1.8-2.4cm) AoV Area, VTI:           2.24 cm2  (2.5-5.5cm2) AoV Area,Vmax:           2.19 cm2  (2.5-4.5cm2) AoV Dimensionless Index: 0.71 AORTIC INSUFFICIENCY: AI Vmax:       4.01 m/s AI Half-time:  441 msec AI Decel Rate: 267.00 cm/s2  RIGHT VENTRICLE: RV Basal 3.67 cm RV Mid   2.60 cm RV Major 8.8 cm TAPSE:   28.0 mm RV s'    0.12 m/s TRICUSPID VALVE/RVSP:                             Normal Ranges: Peak TR Velocity: 2.43 m/s RV Syst Pressure: 26.6 mmHg (< 30mmHg) IVC Diam:         1.58 cm PULMONIC VALVE:                      Normal Ranges: PV Max Ham: 1.5 m/s  (0.6-0.9m/s) PV Max P.6 mmHg  [id number] Neo Gutierrez MD Electronically signed on 2024 at 6:23:17 PM  ** Final **     Transthoracic Echo (TTE) Limited    Result Date: 2024   [facility], [address] [place] [address]                Tel [phone] and Fax [phone] TRANSTHORACIC ECHOCARDIOGRAM REPORT  Patient Name:      VERNON LES CARTAGENA          Reading Physician:    [id number] Monica Chen MD Study Date:        7/16/2024            Ordering Provider:    [id number] KRISTAL SNYDER MRN/PID:           [id number]             Fellow: Accession#:        [id number]         Nurse: Date of Birth/Age: 1950 / 73 years Sonographer:          Tobi Carlos RDCS Gender:            F                    Additional Staff: Height:            162.56 cm            Admit Date:           7/16/2024 Weight:            70.31 kg             Admission Status:     Inpatient -                                                               Routine BSA / BMI:         1.76 m2 / 26.61      Encounter#:           [id number]                    kg/m2 Blood Pressure:    /                    Department Location:  [facility]                                                               Cath Lab Study Type:    TRANSTHORACIC ECHO (TTE) LIMITED Diagnosis/ICD: Unspecified atrial fibrillation-I48.91 Indication:    Atrial fibrillation; Preop cardiovascular exam CPT Code:      Echo Limited-09609 Patient History: Pertinent History: A-fib; CAD; BARBARA; HTN; HLD; CKD; PPM. Study Detail: The following Echo studies were performed: 2D and M-Mode.               Technically challenging study due to body habitus and patient               lying in supine position.  PHYSICIAN INTERPRETATION: Left Ventricle: Left ventricular ejection fraction is low normal, by visual estimate at 50-55%. The patient is in atrial fibrillation which may influence the estimate of left ventricular function and transvalvular flows. Wall motion is abnormal. The left ventricular cavity size was not assessed. Left ventricular diastolic filling was not assessed. Possible inferior wall motion hypokinesis. Left Atrium: The left atrium was not assessed. Right  Ventricle: The right ventricle was not well visualized. There is normal right ventricular global systolic function. A device is visualized in the right ventricle. Right Atrium: The right atrium was not assessed. There is a device visualized in the right atrium. Aortic Valve: The aortic valve is trileaflet. There is mild aortic valve cusp calcification. Aortic valve regurgitation was not assessed. Mitral Valve: The mitral valve is mildly thickened. There is moderate mitral annular calcification. Mitral valve regurgitation was not assessed. Tricuspid Valve: The tricuspid valve was not well visualized. Tricuspid regurgitation was not assessed. Pulmonic Valve: The pulmonic valve is not well visualized. Pulmonic valve regurgitation was not assessed. Pericardium: There is a trivial pericardial effusion. Aorta: The aortic root is normal. Systemic Veins: The inferior vena cava appears to be of normal size. There is IVC inspiratory collapse greater than 50%. In comparison to the previous echocardiogram(s): Compared with the prior exam from 2/27/2024 there are no significant changes though today's exam is only a limited study withno assessment of valvular function.  CONCLUSIONS:  1. Left ventricular ejection fraction is low normal, by visual estimate at 50-55%.  2. Possible inferior wall motion hypokinesis.  3. There is normal right ventricular global systolic function.  4. There is moderate mitral annular calcification.  5. Compared with the prior exam from 2/27/2024 there are no significant changes though today's exam is only a limited study withno assessment of valvular function.  6. The patient is in atrial fibrillation which may influence the estimate of left ventricular function and transvalvular flows. QUANTITATIVE DATA SUMMARY: AORTA MEASUREMENTS:                      Normal Ranges: Ao Sinus, d: 3.45 cm (2.1-3.5cm) LV SYSTOLIC FUNCTION BY 2D PLANIMETRY (MOD):                      Normal Ranges: EF-Visual:      53 % LV  EF Reported: 53 % TRICUSPID VALVE/RVSP:                   Normal Ranges: IVC Diam: 2.03 cm  [id number] Monica Chen MD Electronically signed on 7/16/2024 at 3:11:21 PM  ** Final **     Transthoracic Echo (TTE) Limited    Result Date: 7/16/2024   [facility], [address] [place] [address]                Tel [phone] and Fax [phone] TRANSTHORACIC ECHOCARDIOGRAM REPORT  Patient Name:      VERNON SALDANA VIA         Reading Physician:    [id number] Neftali Calvillo MD Study Date:        7/16/2024            Ordering Provider:    [id number] KRISTAL SNYDER MRN/PID:           [id number]             Fellow:               [id number] Lucina Gill MD Accession#:        [id number]         Nurse: Date of Birth/Age: 1950 / 73 years Sonographer:          Tobi Carlos RDCS Gender:            F                    Additional Staff: Height:            162.56 cm            Admit Date:           7/16/2024 Weight:            71.22 kg             Admission Status:     Inpatient -                                                               Routine BSA / BMI:         1.76 m2 / 26.95      Encounter#:           [id number]                    kg/m2 Blood Pressure:    117/55 mmHg          Department Location:  [facility]                                                               Cath Lab Study Type:    TRANSTHORACIC ECHO (TTE) LIMITED Diagnosis/ICD: Other specified postprocedural states-Z98.890 Indication:    Post LAAO CPT Code:      Echo Limited-53118 Patient History: Pertinent History: CAD; A-fib; BARBARA: HTN; HLD; CKD; PPM. Study Detail: The following Echo studies were performed: 2D and M-Mode.               Technically challenging study due to body habitus.  PHYSICIAN  INTERPRETATION: Left Ventricle: Left ventricular ejection fraction is low normal, by visual estimate at 50-55%. There are no regional wall motion abnormalities. The left ventricular cavity size is normal. Left ventricular diastolic filling was not assessed. Left Atrium: The left atrium was not assessed. Right Ventricle: The right ventricle was not assessed. Right ventricular systolic function not assessed. A device is visualized in the right ventricle. Right Atrium: The right atrium was not assessed. There is a device visualized in the right atrium. Aortic Valve: The aortic valve is trileaflet. There is mild aortic valve cusp calcification. Aortic valve regurgitation was not assessed. Mitral Valve: The mitral valve is mildly thickened. There is moderate mitral annular calcification. Mitral valve regurgitation was not assessed. Tricuspid Valve: The tricuspid valve was not well visualized. Tricuspid regurgitation was not assessed. Right ventricular systolic pressure could not be accurately assessed in this patient. Pulmonic Valve: The pulmonic valve was not assessed. Pulmonic valve regurgitation was not assessed. Pericardium: There is a trivial pericardial effusion. Aorta: The aortic root is normal. Systemic Veins: The inferior vena cava was not well visualized. In comparison to the previous echocardiogram(s): Compared with study dated 2/27/2024, no significant change.  CONCLUSIONS:  1. Left ventricular ejection fraction is low normal, by visual estimate at 50-55%.  2. There is moderate mitral annular calcification.  3. There is a trivial pericardial effusion.  4. Compared with study dated 2/27/2024, no significant change. QUANTITATIVE DATA SUMMARY: LV SYSTOLIC FUNCTION BY 2D PLANIMETRY (MOD):                      Normal Ranges: EF-Visual:      53 % LV EF Reported: 53 %  [id number] Neftali Calvillo MD Electronically signed on 7/16/2024 at 3:02:13 PM  ** Final **     Transthoracic Echo (TTE) Complete    Result Date:  2/27/2024   [facility], [address] [address]               Tel [phone] and Fax [phone] TRANSTHORACIC ECHOCARDIOGRAM REPORT  Patient Name:      VERNON SALDANA VIA         Reading Physician:    [id number] Nohelia Soto MD Study Date:        2/27/2024            Ordering Provider:    [id number] NIKOLE LOVE MRN/PID:           [id number]             Fellow: Accession#:        [id number]         Nurse: Date of Birth/Age: 1950 / 73 years Sonographer:          Dai Diaz                                                               RD Gender:            F                    Additional Staff: Height:            162.56 cm            Admit Date:           2/24/2024 Weight:            72.57 kg             Admission Status:     Inpatient -                                                               Routine BSA / BMI:         1.78 m2 / 27.46      Encounter#:           [id number]                    kg/m2                                         Department Location:  [facility]                                                               Invasive Blood Pressure: 131 /59 mmHg Study Type:    TRANSTHORACIC ECHO (TTE) COMPLETE Diagnosis/ICD: Shortness of breath-R06.02 Indication:    Dyspnea CPT Code:      Echo Complete w Full Doppler-[id number] Patient History: Pertinent History: A-Fib and Chest Pain. elevated troponin, elevated BNP, mod                    AS. Study Detail: The following Echo studies were performed: 2D, M-Mode, Doppler and               color flow.  PHYSICIAN INTERPRETATION: Left Ventricle: The left ventricular systolic function is normal, with an estimated ejection fraction of 55-60%. There are no regional wall motion abnormalities. The left ventricular cavity size is normal. Spectral Doppler shows an impaired relaxation pattern of left  ventricular diastolic filling. Left Atrium: The left atrium is mildly dilated. Right Ventricle: The right ventricle is normal in size. There is normal right ventricular global systolic function. Right Atrium: The right atrium is normal in size. Aortic Valve: The aortic valve is trileaflet. There is mild to moderate aortic valve cusp calcification. There is evidence of moderate aortic valve stenosis. There is trivial aortic valve regurgitation. The peak instantaneous gradient of the aortic valve is 27.5 mmHg. The mean gradient of the aortic valve is 16.0 mmHg. Mitral Valve: The mitral valve is mildly thickened. There is evidence of mild mitral valve stenosis. The doppler estimated mean and peak diastolic pressure gradients are 7.2 mmHg and 16.3 mmHg respectively. There is mild mitral annular calcification. There is mild to moderate mitral valve regurgitation. Tricuspid Valve: The tricuspid valve is structurally normal. There is mild to moderate tricuspid regurgitation. Pulmonic Valve: The pulmonic valve is not well visualized. There is trace to mild pulmonic valve regurgitation. Pericardium: There is no pericardial effusion noted. Aorta: The aortic root is normal. Pulmonary Artery: The tricuspid regurgitant velocity is 3.46 m/s, and with an estimated right atrial pressure of 3 mmHg, the estimated pulmonary artery pressure is mild to moderately elevated with the RVSP at 50.9 mmHg.  CONCLUSIONS:  1. Left ventricular systolic function is normal with a 55-60% estimated ejection fraction.  2. Spectral Doppler shows an impaired relaxation pattern of left ventricular diastolic filling.  3. Mild to moderate mitral valve regurgitation.  4. Mild to moderate tricuspid regurgitation visualized.  5. Moderate aortic valve stenosis.  6. Mild to moderately elevated pulmonary artery pressure. QUANTITATIVE DATA SUMMARY: 2D MEASUREMENTS:                           Normal Ranges: IVSd:          0.97 cm    (0.6-1.1cm) LVPWd:         0.98  cm    (0.6-1.1cm) LVIDd:         5.70 cm    (3.9-5.9cm) LVIDs:         4.26 cm LV Mass Index: 123.2 g/m2 LV % FS        25.2 % LA VOLUME:                              Normal Ranges: LA Vol A4C:       77.4 ml    (22+/-6mL/m2) LA Vol A2C:       83.5 ml LA Vol BP:        82.1 ml LA Vol Index A4C: 43.5 ml/m2 LA Vol Index A2C: 46.9 ml/m2 LA Vol Index BP:  46.1 ml/m2 LA Volume Index:  46.1 ml/m2 LA Vol A4C:       72.2 ml LA Vol A2C:       77.0 ml RA VOLUME BY A/L METHOD:                       Normal Ranges: RA Area A4C: 21.8 cm2 M-MODE MEASUREMENTS:                  Normal Ranges: Ao Root: 3.30 cm (2.0-3.7cm) LAs:     4.72 cm (2.7-4.0cm) LV SYSTOLIC FUNCTION BY 2D PLANIMETRY (MOD):                     Normal Ranges: EF-A4C View: 55.9 % (>=55%) EF-A2C View: 56.5 % EF-Biplane:  55.9 % LV DIASTOLIC FUNCTION:                             Normal Ranges: MV Peak E:      1.91 m/s    (0.7-1.2 m/s) MV Peak A:      0.93 m/s    (0.42-0.7 m/s) E/A Ratio:      2.06        (1.0-2.2) MV e'           0.09 m/s    (>8.0) MV lateral e'   0.09 m/s MV medial e'    0.08 m/s E/e' Ratio:     21.21       (<8.0) PulmV Sys Ham:  62.35 cm/s PulmV Alvarez Ham: 121.32 cm/s PulmV S/D Ham:  0.51 MITRAL VALVE:                       Normal Ranges: MV Vmax:    2.02 m/s  (<=1.3m/s) MV peak P.3 mmHg (<5mmHg) MV mean P.2 mmHg  (<2mmHg) MV VTI:     48.44 cm  (10-13cm) MV DT:      167 msec  (150-240msec) MITRAL INSUFFICIENCY:                         Normal Ranges: MR VTI:     192.66 cm MR Vmax:    568.98 cm/s MR Volume:  33.56 ml MR Flow Rt: 99.12 ml/s MR EROA:    0.17 cm2 AORTIC VALVE:                                    Normal Ranges: AoV Vmax:                2.62 m/s  (<=1.7m/s) AoV Peak P.5 mmHg (<20mmHg) AoV Mean P.0 mmHg (1.7-11.5mmHg) LVOT Max Ham:            0.99 m/s  (<=1.1m/s) AoV VTI:                 58.21 cm  (18-25cm) LVOT VTI:                21.96 cm LVOT Diameter:           1.95 cm   (1.8-2.4cm) AoV Area,  VTI:           1.13 cm2  (2.5-5.5cm2) AoV Area,Vmax:           1.13 cm2  (2.5-4.5cm2) AoV Dimensionless Index: 0.38 AORTIC INSUFFICIENCY: AI Vmax:       3.91 m/s AI Half-time:  520 msec AI Decel Time: 1794 msec AI Decel Rate: 218.17 cm/s2  RIGHT VENTRICLE: RV Basal 4.30 cm RV Mid   2.60 cm RV Major 7.7 cm TAPSE:   27.0 mm RV s'    0.18 m/s TRICUSPID VALVE/RVSP:                             Normal Ranges: Peak TR Velocity: 3.46 m/s RV Syst Pressure: 50.9 mmHg (< 30mmHg) PULMONIC VALVE:                      Normal Ranges: PV Max Ham: 1.5 m/s  (0.6-0.9m/s) PV Max P.8 mmHg Pulmonary Veins: PulmV Alvarez Ham: 121.32 cm/s PulmV S/D Ham:  0.51 PulmV Sys Ham:  62.35 cm/s  [id number] Nohelia Soto MD Electronically signed on 2024 at 11:22:42 AM  ** Final **         Assessment/Plan   LHC in  showed normal coronary arteries        Acute on chronic diastolic CHF  -Hx of tachycardia induced CMO and hx of VT  -  -CXR showed cardiomegaly, similar appearance to previous, no effusions or edema  -CT of the abd/pelvis showed small bilateral pleural effusions with atelectasis/infiltrate, right greater than left  -I reviewed the Echocardiograms as above       --Now with moderate to severe mitral regurg  -Strict I & Os  -Daily weights->reports an 4.5lbs weight gain overnight at home  -2gm na diet  -1500mL fluid restriction  -Cont Bumex PO  -Now s/p Cardiomems 25->no readings since  due to machine not working. On  she was under her baseline reading  -Outpt referral for mitral clips     2. Elevated troponins-Non MI elevation  -LHC as above  -Troponin 22, 20  -I reviewed the EKG, no acute changes, ST elevation, or depression  -Cont plavix, statin  -Allergy to BB     3. Asthma/COPD exac  -steroids  -bronchodilators  -Oxygen support  -CTA showed small bilateral pleural effusions with atelectasis/infiltrate, right greater than left  -Bronchial hygiene     4. Paroxysmal atrial fibrillation  -Currently SR  -[facility]  PCM/ICD  -Not on AC, has watchman  -Cont plavix  -Cont digoxin and diltiazem  -Check digoxin level  -Monitor on tele     5. Chronic anemia  -Hgb 8.1  -Prior iron studies reviewed  -pt reports severe constipation with PO iron  -Was to be on DAPT post cardiomems x 6weeks->2/13 is 6 weeks  -Stop aspirin  -Restart plavix only  -Monitor     6. Hyperlipidemia  -Cont statin    7. Hypokalemia  -supplement  -Monitor on tele      NAILA Guerra-CNP

## 2025-02-12 NOTE — PROGRESS NOTES
Internal Medicine - Daily Progress Note   Hospital Day: 3       Name:Trina Elias, AGE: 74 y.o., GENDER: female, MRN: [id number], ROOM: 234/234-A   CODE STATUS: Full Code  Attending Physician: Nixon Cordova MD  Resident: Allen Fox DO        Chief Complaint     Chief Complaint   Patient presents with    Shortness of Breath        Subjective    Trina Elias is a 74 y.o. year old female patient on Hospital Day: 3 with PMH VT s/p ICD, TAA, mild aortic stenosis, paroxysmal atrial fibrillation s/p PVI x2 and s/p WING Closure 07/16/2024, CMP/heart failure, hyperlipidemia, HTN, GERD, CKD stage 3, iron deficiency anemia, rheumatoid arthritis, asthma, centrilobular emphysema, and BARBARA/central sleep apnea presenting for worsening shortness of breath and dyspnea on exertion.     Overnight events: No acute overnight events.    Patient reports improvement with her breathing today, however continues to demonstrate wheezing on physical exam and chest tightness with deep breathing.  Symptoms continue to primarily occur when she attempts to ambulate.  Saturating well, however continues to be on 1 to 2 L of supplemental oxygen as patient demonstrates increased respiratory effort without it.  No additional symptoms at this time such as fever       Meds    Scheduled medications  azithromycin, 500 mg, oral, q24h DWIGHT  baclofen, 10 mg, oral, q AM  baclofen, 20 mg, oral, q PM  budesonide, 0.25 mg, nebulization, BID  bumetanide, 1 mg, oral, Daily with evening meal  [START ON 2/13/2025] bumetanide, 2 mg, oral, Daily  cholecalciferol, 1,000 Units, oral, Daily  clobetasol, , Topical, BID  [START ON 2/13/2025] clopidogrel, 75 mg, oral, Daily  digoxin, 125 mcg, oral, Daily  dilTIAZem CD, 120 mg, oral, Nightly  enoxaparin, 40 mg, subcutaneous, q24h  formoterol, 20 mcg, nebulization, BID  hydroxychloroquine, 300 mg, oral, Daily  ipratropium-albuteroL, 3 mL, nebulization, q6h while awake  montelukast, 10 mg, oral, Nightly  pantoprazole, 40  "mg, oral, BID  perflutren lipid microspheres, 0.5-10 mL of dilution, intravenous, Once in imaging  perflutren protein A microsphere, 0.5 mL, intravenous, Once in imaging  polyethylene glycol, 17 g, oral, Daily  potassium chloride CR, 40 mEq, oral, Once  [START ON 2/13/2025] predniSONE, 40 mg, oral, Daily   Followed by  [START ON 2/17/2025] predniSONE, 30 mg, oral, Daily   Followed by  [START ON 2/22/2025] predniSONE, 20 mg, oral, Daily   Followed by  [START ON 2/27/2025] predniSONE, 10 mg, oral, Daily  predniSONE, 20 mg, oral, Once  rosuvastatin, 20 mg, oral, Daily  sulfaSALAzine, 500 mg, oral, BID  sulfur hexafluoride microsphr, 2 mL, intravenous, Once in imaging      Continuous medications     PRN medications  PRN medications: acetaminophen **OR** acetaminophen **OR** acetaminophen, ipratropium-albuteroL, oxygen     Objective    Physical Exam  Constitutional:       General: She is not in acute distress.  HENT:      Head: Normocephalic.   Cardiovascular:      Rate and Rhythm: Normal rate and regular rhythm.   Pulmonary:      Breath sounds: Wheezing and rales present.   Abdominal:      General: Bowel sounds are normal.      Palpations: Abdomen is soft.      Tenderness: There is abdominal tenderness.      Comments: Midline hernia surgical scar present on abdomen, no signs of infection or discoloration chronic tenderness to palpation in that region.  No significant abdominal distention    Musculoskeletal:      Right lower leg: No edema.      Left lower leg: No edema.   Neurological:      Mental Status: She is alert and oriented to person, place, and time. Mental status is at baseline.        Visit Vitals  /53 (BP Location: Left arm)   Pulse 63   Temp 37.2 °C (99 °F) (Temporal)   Resp 16   Ht 1.626 m (5' 4\")   Wt 70.2 kg (154 lb 12.2 oz)   SpO2 97%   BMI 26.57 kg/m²   OB Status Postmenopausal   Smoking Status Never   BSA 1.78 m²        Intake/Output Summary (Last 24 hours) at 2/12/2025 1421  Last data filed at " "2/12/2025 1323  Gross per 24 hour   Intake 1000 ml   Output 1600 ml   Net -600 ml       Labs:   Results from last 72 hours   Lab Units 02/12/25  0630 02/11/25  0653 02/10/25  1302   SODIUM mmol/L 142 142 141   POTASSIUM mmol/L 3.4* 3.7 3.7   CHLORIDE mmol/L 101 102 103   CO2 mmol/L 34* 30 26   BUN mg/dL 22 24* 18   CREATININE mg/dL 1.06* 0.97 0.98   GLUCOSE mg/dL 80 105* 156*   CALCIUM mg/dL 8.7 9.1 8.6   ANION GAP mmol/L 10 14 16   EGFR mL/min/1.73m*2 56* 62 61   PHOSPHORUS mg/dL 3.8 5.0*  --       Results from last 72 hours   Lab Units 02/12/25 0630 02/11/25 0653 02/10/25  1302   WBC AUTO x10*3/uL 9.3 12.3* 12.1*   HEMOGLOBIN g/dL 8.1* 8.3* 9.4*   HEMATOCRIT % 26.4* 26.8* 30.5*   PLATELETS AUTO x10*3/uL 248 262 278   NEUTROS PCT AUTO %  --   --  89.5   LYMPHS PCT AUTO %  --   --  5.7   MONOS PCT AUTO %  --   --  3.3   EOS PCT AUTO %  --   --  0.1      Lab Results   Component Value Date    CALCIUM 8.7 02/12/2025    PHOS 3.8 02/12/2025      Lab Results   Component Value Date    CRP 8.5 (H) 02/04/2025       [unfilled]     Micro/ID:   No results found for the last 90 days.                   No lab exists for component: \"AGALPCRNB\"     Lab Results   Component Value Date    BLOODCULT  09/02/2023     No Growth at 1 days~No Growth at 2 days~No Growth at 3 days~NO GROWTH at 4 days - FINAL REPORT       Images    Electrocardiogram, 12-lead PRN ACS symptoms    Result Date: 2/12/2025  Sinus rhythm with Premature supraventricular complexes and Premature ventricular complexes or Fusion complexes Nonspecific ST and T wave abnormality Abnormal ECG When compared with ECG of 10-FEB-2025 12:56, (unconfirmed) Sinus rhythm has replaced Atrial fibrillation Nonspecific T wave abnormality has replaced inverted T waves in Lateral leads See ED provider note for full interpretation and clinical correlation Confirmed by Theodora Davila ([id number]) on 2/12/2025 12:08:06 PM    Transthoracic Echo (TTE) Complete    Result Date: 2/11/2025   " [facility], [address] [place] [address]               Tel [phone] and Fax [phone] TRANSTHORACIC ECHOCARDIOGRAM REPORT  Patient Name:       VERNON SALDANA VIA        Reading Physician:    [id number] Nohelia Soto MD Study Date:         2/11/2025           Ordering Provider:    [id number] EDNA SANTIZO MRN/PID:            [id number]            Fellow: Accession#:         [id number]        Nurse: Date of Birth/Age:  1950 / 74      Sonographer:          Nano maldonado RDCS Gender assigned at  F                   Additional Staff: Birth: Height:             162.56 cm           Admit Date:           2/10/2025 Weight:             71.22 kg            Admission Status:     Inpatient -                                                               Routine BSA / BMI:          1.76 m2 / 26.95     Encounter#:           [id number]                     kg/m2 Blood Pressure:     122/57 mmHg         Department Location:  [facility] Non                                                               Invasive Study Type:    TRANSTHORACIC ECHO (TTE) COMPLETE Diagnosis/ICD: Acute on chronic combined systolic (congestive) and diastolic                (congestive) heart failure (CHF)-I50.43 Indication:    Congestive Heart Failure CPT Code:      Echo Complete w Full Doppler-[id number] Patient History: Pertinent History: A-Fib, CAD, COPD and Hyperlipidemia. S/P Watchman, CKD. Study Detail: The following Echo studies were performed: 2D, M-Mode, Doppler and               color flow. The patient was awake.  PHYSICIAN INTERPRETATION: Left Ventricle: The left ventricular systolic function is normal, with a visually estimated ejection fraction of 55-60%. There is mild eccentric left ventricular hypertrophy. There are no regional left ventricular  wall motion abnormalities. The left ventricular cavity size is normal. There is normal septal and mildly increased posterior left ventricular wall thickness. Spectral Doppler shows an abnormal pattern of left ventricular diastolic filling. Left Atrium: The left atrial size is moderately dilated. Right Ventricle: The right ventricle is normal in size. There is normal right ventricular global systolic function. Right Atrium: The right atrium is normal in size. Aortic Valve: The aortic valve is trileaflet. There is evidence of mild aortic valve stenosis. The aortic valve dimensionless index is 0.48. There is mild to moderate aortic valve regurgitation. The peak instantaneous gradient of the aortic valve is 28 mmHg. The mean gradient of the aortic valve is 17 mmHg. Mitral Valve: The mitral valve is mildly thickened. There is evidence of mild mitral valve stenosis. The doppler estimated mean and peak diastolic pressure gradients are 4 mmHg and 14 mmHg respectively. The peak instantaneous gradient of the mitral valve is 14 mmHg. There is moderate to severe mitral valve regurgitation. The mitral regurgitant orifice area is 11 mm2. The mitral regurgitant volume is 19.69 ml. Tricuspid Valve: The tricuspid valve is structurally normal. There is mild to moderate tricuspid regurgitation. Pulmonic Valve: The pulmonic valve is not well visualized. The pulmonic valve regurgitation was not well visualized. Pericardium: There is no pericardial effusion noted. Aorta: The aortic root is normal. Pulmonary Artery: The tricuspid regurgitant velocity is 3.07 m/s, and with an estimated right atrial pressure of 3 mmHg, the estimated pulmonary artery pressure is mildly elevated with the RVSP at 40.7 mmHg.  CONCLUSIONS:  1. The left ventricular systolic function is normal, with a visually estimated ejection fraction of 55-60%.  2. Spectral Doppler shows an abnormal pattern of left ventricular diastolic filling.  3. There is normal right  ventricular global systolic function.  4. The left atrial size is moderately dilated.  5. There is mild mitral valve stenosis.  6. Moderate to severe mitral valve regurgitation.  7. Mild to moderate tricuspid regurgitation visualized.  8. Mild aortic valve stenosis.  9. Mild to moderate aortic valve regurgitation. QUANTITATIVE DATA SUMMARY:  2D MEASUREMENTS:          Normal Ranges: Ao Root d:       3.40 cm  (2.0-3.7cm) IVSd:            0.68 cm  (0.6-1.1cm) LVPWd:           1.03 cm  (0.6-1.1cm) LVIDd:           5.66 cm  (3.9-5.9cm) LVIDs:           4.93 cm LV Mass Index:   103 g/m2 LVEDV Index:     85 ml/m2 LV % FS          12.9 %  LEFT ATRIUM:                  Normal Ranges: LA Vol A4C:        68.4 ml    (22+/-6mL/m2) LA Vol A2C:        64.0 ml LA Vol BP:         67.0 ml LA Vol Index A4C:  38.7ml/m2 LA Vol Index A2C:  36.3 ml/m2 LA Vol Index BP:   38.0 ml/m2 LA Area A4C:       24.3 cm2 LA Area A2C:       23.2 cm2 LA Major Axis A4C: 7.3 cm LA Major Axis A2C: 7.2 cm LA Volume Index:   36.8 ml/m2 LA Vol A4C:        66.2 ml LA Vol A2C:        62.0 ml LA Vol Index BSA:  36.3 ml/m2  RIGHT ATRIUM:          Normal Ranges: RA Area A4C:  22.7 cm2  M-MODE MEASUREMENTS:         Normal Ranges: Ao Root:             3.40 cm (2.0-3.7cm) LAs:                 4.80 cm (2.7-4.0cm)  AORTA MEASUREMENTS:         Normal Ranges: Ao Sinus, d:        3.40 cm (2.1-3.5cm) Asc Ao, d:          3.70 cm (2.1-3.4cm)  LV SYSTOLIC FUNCTION:                      Normal Ranges: EF-A4C View:    56 % (>=55%) EF-A2C View:    50 % EF-Biplane:     52 % EF-Visual:      58 % LV EF Reported: 58 %  LV DIASTOLIC FUNCTION:             Normal Ranges: MV Peak E:             1.38 m/s    (0.7-1.2 m/s) MV Peak A:             0.63 m/s    (0.42-0.7 m/s) E/A Ratio:             2.19        (1.0-2.2) MV e'                  0.048 m/s   (>8.0) MV lateral e'          0.05 m/s MV medial e'           0.04 m/s MV A Dur:              172.00 msec E/e' Ratio:            28.63        (<8.0) PulmV Sys Ham:         30.30 cm/s PulmV Alvarez Ham:        72.10 cm/s PulmV S/D Ham:         0.40 PulmV A Revs Ham:      22.50 cm/s PulmV A Revs Dur:      129.00 msec  MITRAL VALVE:           Normal Ranges: MV Vmax:      1.86 m/s  (<=1.3m/s) MV peak P.8 mmHg (<5mmHg) MV mean P.0 mmHg  (<2mmHg) MV DT:        215 msec  (150-240msec)  MITRAL INSUFFICIENCY:             Normal Ranges: PISA Radius:          0.5 cm MR VTI:               175.50 cm MR Vmax:              539.00 cm/s MR Alias Ham:         38.5 cm/s MR Volume:            19.69 ml MR Flow Rt:           60.48 ml/s MR EROA:              11 mm2  AORTIC VALVE:                      Normal Ranges: AoV Vmax:                2.66 m/s  (<=1.7m/s) AoV Peak P.3 mmHg (<20mmHg) AoV Mean P.0 mmHg (1.7-11.5mmHg) LVOT Max Ham:            1.34 m/s  (<=1.1m/s) AoV VTI:                 59.50 cm  (18-25cm) LVOT VTI:                28.50 cm LVOT Diameter:           2.00 cm   (1.8-2.4cm) AoV Area, VTI:           1.50 cm2  (2.5-5.5cm2) AoV Area,Vmax:           1.58 cm2  (2.5-4.5cm2) AoV Dimensionless Index: 0.48  AORTIC INSUFFICIENCY: AI Vmax:       3.91 m/s AI Half-time:  419 msec AI Decel Rate: 282.00 cm/s2  RIGHT VENTRICLE: RV Basal 3.61 cm RV Mid   2.68 cm RV Major 9.0 cm TAPSE:   35.9 mm RV s'    0.18 m/s  TRICUSPID VALVE/RVSP:          Normal Ranges: Peak TR Velocity:     3.07 m/s RV Syst Pressure:     41 mmHg  (< 30mmHg) IVC Diam:             1.91 cm  PULMONIC VALVE:          Normal Ranges: PV Max Ham:     1.5 m/s  (0.6-0.9m/s) PV Max P.5 mmHg  PULMONARY VEINS: PulmV A Revs Dur: 129.00 msec PulmV A Revs Ham: 22.50 cm/s PulmV Alvarez Ham:   72.10 cm/s PulmV S/D Ham:    0.40 PulmV Sys Ham:    30.30 cm/s  [id number] Nohelia Soto MD Electronically signed on 2025 at 2:47:07 PM  ** Final **     ECG 12 lead    Result Date: 2025  Atrial fibrillation T wave abnormality, consider lateral ischemia Abnormal ECG When compared  with ECG of 20-SEP-2024 21:42, QT has shortened    CT abdomen pelvis w IV contrast    Result Date: 2/10/2025  Interpreted By:  Kori Gudino, STUDY: CT ABDOMEN PELVIS W IV CONTRAST;  2/10/2025 2:21 pm   INDICATION: Signs/Symptoms:acute drop in hgb, no clear source, r/o significant GIB.     COMPARISON: CT chest 09/18/2024 CT abdomen pelvis 07/27/2024   ACCESSION NUMBER(S): [id number]   ORDERING CLINICIAN: NITA CHAVEZ   TECHNIQUE: CT of the abdomen and pelvis was performed.  Standard contiguous axial images were obtained at 3 mm slice thickness through the abdomen and pelvis. Coronal and sagittal reconstructions at 3 mm slice thickness were performed.  75 ML of Omnipaque 350 was administered intravenously without immediate complication.   FINDINGS:   LOWER CHEST: The heart is persistently enlarged without evidence of pericardial effusion. Pacemaker/AICD noted. Similar small bilateral pleural effusions with atelectasis/infiltrate, right-greater-than-left. No concerning lung nodule. The visualized distal esophagus appears unremarkable.   ABDOMEN: Beam hardening artifact from surgical clips in the upper abdomen limits evaluation.   LIVER: The liver is normal in size. No suspicious liver lesion.   BILE DUCTS: Similar prominence of the intrahepatic and extrahepatic bile ducts, likely secondary to cholecystectomy   GALLBLADDER: Cholecystectomy   PANCREAS: The pancreas appears unremarkable without evidence of ductal dilatation or masses.   SPLEEN: The spleen is normal in size.   ADRENAL GLANDS: No adrenal nodularity or thickening.   KIDNEYS AND URETERS: The kidneys are normal in size. Stable hypoattenuating lesions in the bilateral kidneys, likely benign. No hydroureteronephrosis or nephroureterolithiasis is identified.   PELVIS:   BLADDER: The urinary bladder appears normal without abnormal wall thickening.   REPRODUCTIVE ORGANS: No pelvic masses.   BOWEL: The stomach is unremarkable. The small and large  bowel are normal in caliber and demonstrate no wall thickening. Suture material is noted in the small bowel in the lower abdomen. The appendix is not definitely visualized. There is however no pericecal stranding or fluid.   VESSELS: Moderate atherosclerotic calcifications of the aortoiliac arteries. No aneurysmal dilatation of the abdominal aorta. The IVC appears normal.   PERITONEUM/RETROPERITONEUM/LYMPH NODES: No ascites or fluid collection. The retroperitoneum is unremarkable. No abdominopelvic lymphadenopathy.   ABDOMINAL WALL: The abdominal wall soft tissues appear normal.   BONES: No acute fracture or malalignment. No suspicious osseous lesions. Discogenic degenerative changes in the lower thoracic and lumbar spine, worse at the L5 to S1 level.       1.  No definite source for acute drop in hemoglobin level is evident, although evaluation for acute GI bleed is limited with current study protocol. If clinical concern for acute GI bleed remains, further evaluation with triple phase CT angio abdomen pelvis GI bleed protocol or nuclear medicine red blood cell GI bleed study is recommended. 2. Similar small bilateral pleural effusions with atelectasis/infiltrate, right-greater-than-left. 3. Additional chronic and incidental findings as detailed above.     MACRO: None   Signed by: Kori Gudino 2/10/2025 2:45 PM Dictation workstation:   [id number]    XR chest 2 views    Result Date: 2/10/2025  Interpreted By:  Jasmin Grimaldo, STUDY: XR CHEST 2 VIEWS;  2/10/2025 1:16 pm   INDICATION: Signs/Symptoms:exertional dyspnea, CHF, acute hgb drop.     COMPARISON: 09/17/2024   ACCESSION NUMBER(S): [id number]   ORDERING CLINICIAN: MI FORTE   FINDINGS: AP upright and lateral views were obtained with the patient sitting.   ICD is present in the left upper chest. Electrode wires are present in the right atrium and right ventricle.   CARDIOMEDIASTINAL SILHOUETTE: The cardiac silhouette is enlarged but unchanged from the  prior study. Watchman device is present in the left atrial appendage. Monitoring device is noted in left pulmonary artery, new since the prior exam.   LUNGS: Lungs are clear.   ABDOMEN: No remarkable upper abdominal findings.   BONES: Bilateral shoulder endoprosthesis are present.       1.  Cardiomegaly, unchanged from the prior study 2. No edema is noted       MACRO: None   Signed by: Jasmin Grimaldo 2/10/2025 1:45 PM Dictation workstation:   [id number]      Assessment and Plan    Trina Elias is a 74 y.o. female admitted on 2/10/2025  with PMH VT s/p ICD, TAA, mild aortic stenosis, paroxysmal atrial fibrillation s/p PVI x2 and s/p WING Closure 07/16/2024, CMP/heart failure, hyperlipidemia, HTN, GERD, CKD stage 3, iron deficiency anemia, rheumatoid arthritis, asthma, centrilobular emphysema, and BARBARA/central sleep apnea presenting for worsening shortness of breath and dyspnea on exertion.     ACUTE MEDICAL ISSUES:  #Acute on Chronic Decompensated Heart Failure  #Mitral Valve Regurgitation  #hx of Atrial fibrillation s/p Watchmann  #hx of Vt s/p internal Pacemaker/defibrillator   Currently on room air however increased work of breathing  BNP elevated at 625  CXR showed cardiomegaly unchanged from prior exam  TTE (2/11/25) indicated ejection fraction of 55 to 60% with moderate to severe mitral valve regurgitation  LHC in 2022 showed normal coronary arteries  Digoxin level of 1.01   PLAN:  -Cardiology consulted for CHF exacerbation and moderate to severe mitral valve regurgitation, appreciate recs  -Cardiology recommended continuing Bumex po 2 mg daily, 1 mg at night, and outpatient referral for mitral clips  -Per cardiology patient was supposed to have continued Plavix until 2/13 for approximately a total of 6 weeks of treatment.  Patient stated that she had stopped taking clopidogrel approximately 5 days prior to admission.  Will continue clopidogrel and stop aspirin per cardiology recommendations.  - Multiple  allergies including allergic to metoprolol with anaphylactic reaction, and allergies to spironolactone and jardiance  - Continue home digoxin, diltiazem  - strict I/Os, daily weights, fluid and sodium restriction  - bronchial hygiene with acapella and incentive spirometry     # Acute on chronic COPD/Asthma exacerbation  -Patient with history of COPD who presents with wheezing, worsening cough and dyspnea, concerning for COPD exacerbation without concomitant pneumonia.  -On room air at home  -Dry cough without sputum at this time. Unlikely to be mucus plug, however cannot rule out  -Influenza, viral panel negative  -Received 40 mg of prednisone in ED  Plan:  Continue prednisone taper 40-30-20-10 mg for 5 days each  Azithromycin x 3 days  Wean oxygen as tolerated.  Continue home Singulair  RT consult, IS + Acapella  Continuous pulse Oxymetry     #Anemia  Hemoglobin stable   Baseline Hgb for patient appears to be approximately 8  Prior history of iron deficiency anemia, reports that iron supplements make her constipated  CT abdomen/pelvis did not indicate acute source of bleed, recommended CTA of abdomen/pelvis if clinical concerns persist  Plan:  Will trend daily hemoglobin  If hemoglobin continues to worsen may obtain iron panel and CTA  Consider transfusion if hemoglobin less than 7.0        Elevated troponin  Downtrending 22-->20  Likely demand ischemia  Telemetry     CHRONIC MEDICAL ISSUES:  #Rheumatoid arthritis-continue home clobetasol cream, prednisone taper, hydroxychloroquine, and sulfasalazine  #Hyperlipidemia-continue home rosuvastatin  #Cervical Myofascial pain Syndrome- Resume home Baclofen  #GERD-continue home Protonix     Fluids: Fluid Restricted 1500 mL  Electrolytes: PRN  Nutrition: Cardiac diet, sodium restricted  Antimicrobials: Azithromycin (2/12-2/14)  DVT ppx: Lovenox  GI ppx: Protonix  Catheter: None  Lines: PIV  Supplemental Oxygen: NC  HealthCare Providers:  - PCP: Fredy Elias DO    Emergency Contact: Extended Emergency Contact Information  Primary Emergency Contact: HAYDEE BRUNSON  Address: [address] [place] [address]           [address] [facility]           [place] [address] [place]  Home Phone: [phone]  Mobile Phone: [phone]  Relation: Spouse   Code: Full Code        Disposition: ELOS> 48 hours, Patient is undergoing treatment for Decompensated HF, and COPD/Asthma Exacerbation.       Allen Fox,   Internal Medicine, PGY- 1  02/12/25 at 2:21 PM

## 2025-02-12 NOTE — PROGRESS NOTES
02/12/25 1603   Discharge Planning   Living Arrangements Spouse/significant other   Support Systems Spouse/significant other   Assistance Needed Alert and oriented x 3, Independent with ADL's, Drives at times, Walker when SOB, walk-in tub, Grab bars, Walk-in shower with seat, Room air at baseline, may need new home O2 upon discharge.   Type of Residence Private residence   Number of Stairs to Enter Residence 0  (Ramps x 2)   Number of Stairs Within Residence 0   Do you have animals or pets at home? No   Who is requesting discharge planning? Provider   Home or Post Acute Services None   Expected Discharge Disposition Home  (Patient may need to discharge home on new home O2)   Does the patient need discharge transport arranged? No   Patient Choice   Provider Choice list and CMS website (https://medicare.gov/care-compare#search) for post-acute Quality and Resource Measure Data were provided and reviewed with: Patient;Family   Patient / Family choosing to utilize agency / facility established prior to hospitalization No

## 2025-02-13 ENCOUNTER — PHARMACY VISIT (OUTPATIENT)
Dept: PHARMACY | Facility: CLINIC | Age: 75
End: 2025-02-13
Payer: COMMERCIAL

## 2025-02-13 VITALS
RESPIRATION RATE: 19 BRPM | BODY MASS INDEX: 26.42 KG/M2 | OXYGEN SATURATION: 96 % | HEIGHT: 64 IN | WEIGHT: 154.76 LBS | SYSTOLIC BLOOD PRESSURE: 111 MMHG | TEMPERATURE: 97.9 F | HEART RATE: 69 BPM | DIASTOLIC BLOOD PRESSURE: 57 MMHG

## 2025-02-13 PROBLEM — J44.1 COPD EXACERBATION (MULTI): Status: RESOLVED | Noted: 2025-02-10 | Resolved: 2025-02-13

## 2025-02-13 LAB
ALBUMIN SERPL BCP-MCNC: 3.6 G/DL (ref 3.4–5)
ALP SERPL-CCNC: 62 U/L (ref 33–136)
ALT SERPL W P-5'-P-CCNC: 11 U/L (ref 7–45)
ANION GAP SERPL CALC-SCNC: 14 MMOL/L (ref 10–20)
AST SERPL W P-5'-P-CCNC: 11 U/L (ref 9–39)
BILIRUB SERPL-MCNC: 0.5 MG/DL (ref 0–1.2)
BUN SERPL-MCNC: 23 MG/DL (ref 6–23)
CALCIUM SERPL-MCNC: 9.2 MG/DL (ref 8.6–10.3)
CHLORIDE SERPL-SCNC: 103 MMOL/L (ref 98–107)
CO2 SERPL-SCNC: 29 MMOL/L (ref 21–32)
CREAT SERPL-MCNC: 0.98 MG/DL (ref 0.5–1.05)
EGFRCR SERPLBLD CKD-EPI 2021: 61 ML/MIN/1.73M*2
ERYTHROCYTE [DISTWIDTH] IN BLOOD BY AUTOMATED COUNT: 15.5 % (ref 11.5–14.5)
GLUCOSE SERPL-MCNC: 80 MG/DL (ref 74–99)
HCT VFR BLD AUTO: 30 % (ref 36–46)
HGB BLD-MCNC: 8.8 G/DL (ref 12–16)
MAGNESIUM SERPL-MCNC: 2.56 MG/DL (ref 1.6–2.4)
MCH RBC QN AUTO: 25.1 PG (ref 26–34)
MCHC RBC AUTO-ENTMCNC: 29.3 G/DL (ref 32–36)
MCV RBC AUTO: 86 FL (ref 80–100)
NRBC BLD-RTO: 0 /100 WBCS (ref 0–0)
PHOSPHATE SERPL-MCNC: 3.8 MG/DL (ref 2.5–4.9)
PLATELET # BLD AUTO: 252 X10*3/UL (ref 150–450)
POTASSIUM SERPL-SCNC: 3.8 MMOL/L (ref 3.5–5.3)
PROT SERPL-MCNC: 6.4 G/DL (ref 6.4–8.2)
Q ONSET: 221 MS
QRS COUNT: 13 BEATS
QRS DURATION: 82 MS
QT INTERVAL: 352 MS
QTC CALCULATION(BAZETT): 413 MS
QTC FREDERICIA: 392 MS
R AXIS: 5 DEGREES
RBC # BLD AUTO: 3.51 X10*6/UL (ref 4–5.2)
SODIUM SERPL-SCNC: 142 MMOL/L (ref 136–145)
T AXIS: -64 DEGREES
T OFFSET: 397 MS
VENTRICULAR RATE: 83 BPM
WBC # BLD AUTO: 10.5 X10*3/UL (ref 4.4–11.3)

## 2025-02-13 PROCEDURE — RXMED WILLOW AMBULATORY MEDICATION CHARGE

## 2025-02-13 PROCEDURE — 2500000001 HC RX 250 WO HCPCS SELF ADMINISTERED DRUGS (ALT 637 FOR MEDICARE OP)

## 2025-02-13 PROCEDURE — 2500000002 HC RX 250 W HCPCS SELF ADMINISTERED DRUGS (ALT 637 FOR MEDICARE OP, ALT 636 FOR OP/ED)

## 2025-02-13 PROCEDURE — 2500000001 HC RX 250 WO HCPCS SELF ADMINISTERED DRUGS (ALT 637 FOR MEDICARE OP): Performed by: NURSE PRACTITIONER

## 2025-02-13 PROCEDURE — 2500000002 HC RX 250 W HCPCS SELF ADMINISTERED DRUGS (ALT 637 FOR MEDICARE OP, ALT 636 FOR OP/ED): Performed by: INTERNAL MEDICINE

## 2025-02-13 PROCEDURE — 2500000004 HC RX 250 GENERAL PHARMACY W/ HCPCS (ALT 636 FOR OP/ED)

## 2025-02-13 PROCEDURE — 99238 HOSP IP/OBS DSCHRG MGMT 30/<: CPT

## 2025-02-13 PROCEDURE — 84075 ASSAY ALKALINE PHOSPHATASE: CPT

## 2025-02-13 PROCEDURE — 84100 ASSAY OF PHOSPHORUS: CPT

## 2025-02-13 PROCEDURE — 94640 AIRWAY INHALATION TREATMENT: CPT

## 2025-02-13 PROCEDURE — 83735 ASSAY OF MAGNESIUM: CPT

## 2025-02-13 PROCEDURE — 36415 COLL VENOUS BLD VENIPUNCTURE: CPT

## 2025-02-13 PROCEDURE — 85027 COMPLETE CBC AUTOMATED: CPT

## 2025-02-13 PROCEDURE — 94760 N-INVAS EAR/PLS OXIMETRY 1: CPT

## 2025-02-13 RX ORDER — PREDNISONE 10 MG/1
TABLET ORAL
Qty: 42 TABLET | Refills: 0 | Status: SHIPPED | OUTPATIENT
Start: 2025-02-14 | End: 2025-03-04

## 2025-02-13 RX ORDER — AZITHROMYCIN 500 MG/1
500 TABLET, FILM COATED ORAL
Qty: 1 TABLET | Refills: 0 | Status: SHIPPED | OUTPATIENT
Start: 2025-02-14 | End: 2025-02-15

## 2025-02-13 RX ORDER — BUMETANIDE 2 MG/1
2 TABLET ORAL
Start: 2025-02-13 | End: 2026-02-13

## 2025-02-13 RX ORDER — CLOPIDOGREL BISULFATE 75 MG/1
75 TABLET ORAL DAILY
Qty: 90 TABLET | Refills: 3 | Status: SHIPPED | OUTPATIENT
Start: 2025-02-13 | End: 2026-02-13

## 2025-02-13 RX ADMIN — SULFASALAZINE 500 MG: 500 TABLET ORAL at 08:29

## 2025-02-13 RX ADMIN — OXYCODONE HYDROCHLORIDE 10 MG: 5 TABLET ORAL at 06:07

## 2025-02-13 RX ADMIN — OXYCODONE HYDROCHLORIDE 2.5 MG: 5 TABLET ORAL at 13:34

## 2025-02-13 RX ADMIN — HYDROXYCHLOROQUINE SULFATE 300 MG: 200 TABLET, FILM COATED ORAL at 08:29

## 2025-02-13 RX ADMIN — DIGOXIN 125 MCG: 125 TABLET ORAL at 09:00

## 2025-02-13 RX ADMIN — AZITHROMYCIN 500 MG: 500 TABLET, FILM COATED ORAL at 08:28

## 2025-02-13 RX ADMIN — OXYCODONE HYDROCHLORIDE 5 MG: 5 TABLET ORAL at 03:39

## 2025-02-13 RX ADMIN — IPRATROPIUM BROMIDE AND ALBUTEROL SULFATE 3 ML: 2.5; .5 SOLUTION RESPIRATORY (INHALATION) at 07:41

## 2025-02-13 RX ADMIN — CLOPIDOGREL 75 MG: 75 TABLET ORAL at 08:29

## 2025-02-13 RX ADMIN — CLOBETASOL PROPIONATE: 0.5 CREAM TOPICAL at 08:31

## 2025-02-13 RX ADMIN — BUDESONIDE 0.25 MG: 0.25 INHALANT RESPIRATORY (INHALATION) at 07:42

## 2025-02-13 RX ADMIN — Medication 1000 UNITS: at 08:29

## 2025-02-13 RX ADMIN — PREDNISONE 40 MG: 20 TABLET ORAL at 08:29

## 2025-02-13 RX ADMIN — BUMETANIDE 2 MG: 1 TABLET ORAL at 08:36

## 2025-02-13 RX ADMIN — BACLOFEN 10 MG: 10 TABLET ORAL at 08:30

## 2025-02-13 RX ADMIN — FORMOTEROL FUMARATE DIHYDRATE 20 MCG: 20 SOLUTION RESPIRATORY (INHALATION) at 07:43

## 2025-02-13 RX ADMIN — PANTOPRAZOLE SODIUM 40 MG: 40 TABLET, DELAYED RELEASE ORAL at 08:30

## 2025-02-13 ASSESSMENT — COGNITIVE AND FUNCTIONAL STATUS - GENERAL
CLIMB 3 TO 5 STEPS WITH RAILING: A LOT
WALKING IN HOSPITAL ROOM: A LOT
DAILY ACTIVITIY SCORE: 20
MOBILITY SCORE: 15
HELP NEEDED FOR BATHING: A LITTLE
DRESSING REGULAR UPPER BODY CLOTHING: A LITTLE
TOILETING: A LITTLE
MOVING TO AND FROM BED TO CHAIR: A LITTLE
DRESSING REGULAR LOWER BODY CLOTHING: A LITTLE
STANDING UP FROM CHAIR USING ARMS: A LOT
TURNING FROM BACK TO SIDE WHILE IN FLAT BAD: A LITTLE
MOVING FROM LYING ON BACK TO SITTING ON SIDE OF FLAT BED WITH BEDRAILS: A LITTLE

## 2025-02-13 ASSESSMENT — PAIN DESCRIPTION - ORIENTATION
ORIENTATION: LEFT
ORIENTATION: LEFT
ORIENTATION: LEFT;RIGHT

## 2025-02-13 ASSESSMENT — PAIN DESCRIPTION - LOCATION
LOCATION: SHOULDER

## 2025-02-13 ASSESSMENT — PAIN SCALES - GENERAL
PAINLEVEL_OUTOF10: 5 - MODERATE PAIN
PAINLEVEL_OUTOF10: 8
PAINLEVEL_OUTOF10: 5 - MODERATE PAIN
PAINLEVEL_OUTOF10: 8

## 2025-02-13 NOTE — DISCHARGE INSTRUCTIONS
Please, take your home medications as instructed after being discharged from the hospital.     NEW MEDICATIONS:  Please continue your course of prednisone as instructed  Please take an additional pill of 500 mg azithromycin tomorrow (2/14/2025)  Continue your Bumex 2 mg in the morning and 1mg in the evening  OTHER INSTRUCTIONS:  Cardiology will be placing a referral for you to receive mitral valve clips  UPCOMING APPOINTMENTS:     Please, follow-up with your Primary Care within 7 to 14 days after leaving the hospital. Furthermore, a referral to pulmonary rehab has been placed. Please follow-up with your cardiologist as well. /appointment services has been requested to make an appointment for you, however if you do not hear back from them within 1 to 2 days, please call your primary physician's office to schedule an appointment. Bring your photo ID and insurance card to your appointment.   [facility]  services can be reached at [phone].     If you experience any worsening symptoms or have any concerns, please contact your primary care provider to schedule an appointment. If you cannot get in touch with your primary care physician, please return to the nearest emergency room or urgent care clinic for an evaluation and treatment.     Thank you for choosing [facility] and allowing us to partake in your medical care!     - Your [facility] inpatient primary care team.

## 2025-02-13 NOTE — HOSPITAL COURSE
Brief HPI  Trina Elias is a 74 y.o. year old female  patient with PMH VT s/p ICD, TAA, mild aortic stenosis, paroxysmal atrial fibrillation s/p PVI x2 and s/p WING Closure 07/16/2024, CMP/heart failure, hyperlipidemia, HTN, GERD, CKD stage 3, iron deficiency anemia, rheumatoid arthritis, asthma, centrilobular emphysema, and BARBARA/central sleep apnea presenting for worsening shortness of breath and dyspnea on exertion.  Symptoms have been present since Thursday. Patient has been unable to walk throughout her house without becoming dyspneic.     ED Course:  Prior to presenting to the ED patient, had been placed on 2 L of supplemental oxygen by EMS. In the ED, patient was saturating well on room air, however she had desaturated into the mid 80s when attempting to move to the bathroom.  Patient's remaining vitals were stable without fever.  Lab work was significant for elevated white blood cells of 12.1, and hemoglobin of 9.4.  BNP was elevated from patient's baseline at 625.  During prior admission patient's BNP had peaked to 773.  Troponin down trended from 22-20.  EKG indicated atrial fibrillation without RVR.  Influenza, COVID, and RSV PCR were negative.  CT imaging of the abdomen//pelvis was performed to evaluate for possible GI bleed, and did not indicate definite source for acute drop in hemoglobin and recommended CTA of abdomen and pelvis if clinical concern remains.  Furthermore patient was found to have small bilateral pleural effusions, right greater than the left, and cardiomegaly unchanged from her prior study.  Patient received DuoNebs, IV magnesium, prednisone 40 mg, and Bumex 2 mg IV in the ED.     General Floor:  While on the general floor patient was treated with Bumex twice daily and continuing her tapered course of prednisone and azithromycin.  She responded well to treatment with improved work of breathing and wheezing.  Patient underwent TTE which indicated severe mitral valve regurgitation.   Cardiology was consulted and recommended patient receive outpatient referral for mitral valve clips.  Patient's home digoxin and diltiazem was continued. Furthermore, cardiology continued patient's Plavix as she required a total of 6 weeks of treatment and discontinued patient's aspirin.  Patient will be discharged on her home Bumex regiment with new referral to pulmonary rehab and instructions to follow-up with cardiology for Mitral valve clip placement.   Patient did not qualify for home oxygen on RT evaluation. Patient was stable for discharge home on 2/13/2025.

## 2025-02-13 NOTE — PROGRESS NOTES
"Trina Elias \"Marcela\" is a 74 y.o. female on day 2 of admission presenting with COPD exacerbation (Multi).      Subjective   She is resting in the bed on room air. States she feels a lot better today. Abd less distended, shortness of breath improved.       Review of systems:  Constitutional: negative for fever, chills, or malaise  Neuro: negative for dizziness, headache, numbness, tingling  ENT: Negative for nasal congestion or sore throat  CV: negative for chest pain, palpitations  GI: negative for abd pain, nausea, vomiting or diarrhea  : negative for dysuria, frequency, or urgency  Skin: negative for lesions, wounds, or rash  Musculoskeletal: Negative for weakness, myalgia, or arthralgia  Endocrine: Negative for polyuria or polydipsia         Objective   Constitutional: Well developed, awake/alert/oriented x3, no distress, alert and cooperative  Eyes: PERRL, EOMI, clear sclera  ENMT: mucous membranes moist, no apparent injury, no lesions seen  Head/Neck: Neck supple, no apparent injury, thyroid without mass or tenderness, No JVD, trachea midline, no bruits  Respiratory/Thorax: Patent airways, faint expiratory wheezes right side, good chest expansion, thorax symmetric  Cardiovascular: Regular, rate and rhythm, no murmurs, 2+ equal pulses of the extremities, normal S 1and S 2  Gastrointestinal: Nondistended, soft, non-tender, no rebound tenderness or guarding, no masses palpable, no organomegaly, +BS, no bruits  Musculoskeletal: ROM intact, no joint swelling, normal strength  Extremities: normal extremities, no cyanosis edema, contusions or wounds, no clubbing  Neurological: alert and oriented x3, intact senses, motor, response and reflexes, normal strength  Lymphatic: No significant lymphadenopathy  Psychological: Appropriate mood and behavior  Skin: Warm and dry, no lesions, no rashes      Last Recorded Vitals  /61 (BP Location: Right arm)   Pulse 60   Temp 37.1 °C (98.8 °F) (Temporal)   Resp 19   Ht " "1.626 m (5' 4\")   Wt 70.2 kg (154 lb 12.2 oz)   SpO2 91%   BMI 26.57 kg/m²     Intake/Output last 3 Shifts:  I/O last 3 completed shifts:  In: 1140 (16.2 mL/kg) [P.O.:1140]  Out: 2800 (39.9 mL/kg) [Urine:2800 (1.1 mL/kg/hr)]  Weight: 70.2 kg   I/O this shift:  In: 440 [P.O.:440]  Out: 1000 [Urine:1000]    Relevant Results  Scheduled medications  azithromycin, 500 mg, oral, q24h DWIGHT  baclofen, 10 mg, oral, q AM  baclofen, 20 mg, oral, q PM  budesonide, 0.25 mg, nebulization, BID  bumetanide, 1 mg, oral, Daily with evening meal  bumetanide, 2 mg, oral, Daily  cholecalciferol, 1,000 Units, oral, Daily  clobetasol, , Topical, BID  clopidogrel, 75 mg, oral, Daily  digoxin, 125 mcg, oral, Daily  dilTIAZem CD, 120 mg, oral, Nightly  enoxaparin, 40 mg, subcutaneous, q24h  formoterol, 20 mcg, nebulization, BID  hydroxychloroquine, 300 mg, oral, Daily  ipratropium-albuteroL, 3 mL, nebulization, q6h while awake  montelukast, 10 mg, oral, Nightly  pantoprazole, 40 mg, oral, BID  perflutren lipid microspheres, 0.5-10 mL of dilution, intravenous, Once in imaging  perflutren protein A microsphere, 0.5 mL, intravenous, Once in imaging  polyethylene glycol, 17 g, oral, Daily  predniSONE, 40 mg, oral, Daily   Followed by  [START ON 2/17/2025] predniSONE, 30 mg, oral, Daily   Followed by  [START ON 2/22/2025] predniSONE, 20 mg, oral, Daily   Followed by  [START ON 2/27/2025] predniSONE, 10 mg, oral, Daily  rosuvastatin, 20 mg, oral, Daily  sulfaSALAzine, 500 mg, oral, BID  sulfur hexafluoride microsphr, 2 mL, intravenous, Once in imaging      Continuous medications     PRN medications  PRN medications: acetaminophen **OR** acetaminophen **OR** acetaminophen, ipratropium-albuteroL, oxyCODONE, oxyCODONE, oxyCODONE, oxygen    Results for orders placed or performed during the hospital encounter of 02/10/25 (from the past 24 hours)   CBC   Result Value Ref Range    WBC 10.5 4.4 - 11.3 x10*3/uL    nRBC 0.0 0.0 - 0.0 /100 WBCs    RBC 3.51 " (L) 4.00 - 5.20 x10*6/uL    Hemoglobin 8.8 (L) 12.0 - 16.0 g/dL    Hematocrit 30.0 (L) 36.0 - 46.0 %    MCV 86 80 - 100 fL    MCH 25.1 (L) 26.0 - 34.0 pg    MCHC 29.3 (L) 32.0 - 36.0 g/dL    RDW 15.5 (H) 11.5 - 14.5 %    Platelets 252 150 - 450 x10*3/uL   Comprehensive metabolic panel   Result Value Ref Range    Glucose 80 74 - 99 mg/dL    Sodium 142 136 - 145 mmol/L    Potassium 3.8 3.5 - 5.3 mmol/L    Chloride 103 98 - 107 mmol/L    Bicarbonate 29 21 - 32 mmol/L    Anion Gap 14 10 - 20 mmol/L    Urea Nitrogen 23 6 - 23 mg/dL    Creatinine 0.98 0.50 - 1.05 mg/dL    eGFR 61 >60 mL/min/1.73m*2    Calcium 9.2 8.6 - 10.3 mg/dL    Albumin 3.6 3.4 - 5.0 g/dL    Alkaline Phosphatase 62 33 - 136 U/L    Total Protein 6.4 6.4 - 8.2 g/dL    AST 11 9 - 39 U/L    Bilirubin, Total 0.5 0.0 - 1.2 mg/dL    ALT 11 7 - 45 U/L   Magnesium   Result Value Ref Range    Magnesium 2.56 (H) 1.60 - 2.40 mg/dL   Phosphorus   Result Value Ref Range    Phosphorus 3.8 2.5 - 4.9 mg/dL     *Note: Due to a large number of results and/or encounters for the requested time period, some results have not been displayed. A complete set of results can be found in Results Review.       Transthoracic Echo (TTE) Complete   Final Result      CT abdomen pelvis w IV contrast   Final Result   1.  No definite source for acute drop in hemoglobin level is evident,   although evaluation for acute GI bleed is limited with current study   protocol. If clinical concern for acute GI bleed remains, further   evaluation with triple phase CT angio abdomen pelvis GI bleed   protocol or nuclear medicine red blood cell GI bleed study is   recommended.   2. Similar small bilateral pleural effusions with   atelectasis/infiltrate, right-greater-than-left.   3. Additional chronic and incidental findings as detailed above.             MACRO:   None        Signed by: Kori Gudino 2/10/2025 2:45 PM   Dictation workstation:   [id number]      XR chest 2 views   Final Result   1.   Cardiomegaly, unchanged from the prior study   2. No edema is noted                  MACRO:   None        Signed by: Jasmin Grimaldo 2/10/2025 1:45 PM   Dictation workstation:   [id number]          Transthoracic Echo (TTE) Complete    Result Date: 2/11/2025   [facility], [address] [place] [address]               Tel [phone] and Fax [phone] TRANSTHORACIC ECHOCARDIOGRAM REPORT  Patient Name:       VERNON SALDANA VIA        Reading Physician:    [id number] Nohelia Soto MD Study Date:         2/11/2025           Ordering Provider:    [id number] EDNA SANTIZO MRN/PID:            [id number]            Fellow: Accession#:         [id number]        Nurse: Date of Birth/Age:  1950 / 74      Sonographer:          Nano maldonado RDCS Gender assigned at  F                   Additional Staff: Birth: Height:             162.56 cm           Admit Date:           2/10/2025 Weight:             71.22 kg            Admission Status:     Inpatient -                                                               Routine BSA / BMI:          1.76 m2 / 26.95     Encounter#:           [id number]                     kg/m2 Blood Pressure:     122/57 mmHg         Department Location:  [facility]                                                               Invasive Study Type:    TRANSTHORACIC ECHO (TTE) COMPLETE Diagnosis/ICD: 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 S/P Watchman, CKD. Study Detail: The following Echo studies were performed: 2D, M-Mode, Doppler and               color flow.  The patient was awake.  PHYSICIAN INTERPRETATION: Left Ventricle: The left ventricular systolic function is normal, with a visually estimated ejection fraction of 55-60%. There is mild eccentric left ventricular hypertrophy. There are no regional left ventricular wall motion abnormalities. The left ventricular cavity size is normal. There is normal septal and mildly increased posterior left ventricular wall thickness. Spectral Doppler shows an abnormal pattern of left ventricular diastolic filling. Left Atrium: The left atrial size is moderately dilated. Right Ventricle: The right ventricle is normal in size. There is normal right ventricular global systolic function. Right Atrium: The right atrium is normal in size. Aortic Valve: The aortic valve is trileaflet. There is evidence of mild aortic valve stenosis. The aortic valve dimensionless index is 0.48. There is mild to moderate aortic valve regurgitation. The peak instantaneous gradient of the aortic valve is 28 mmHg. The mean gradient of the aortic valve is 17 mmHg. Mitral Valve: The mitral valve is mildly thickened. There is evidence of mild mitral valve stenosis. The doppler estimated mean and peak diastolic pressure gradients are 4 mmHg and 14 mmHg respectively. The peak instantaneous gradient of the mitral valve is 14 mmHg. There is moderate to severe mitral valve regurgitation. The mitral regurgitant orifice area is 11 mm2. The mitral regurgitant volume is 19.69 ml. Tricuspid Valve: The tricuspid valve is structurally normal. There is mild to moderate tricuspid regurgitation. Pulmonic Valve: The pulmonic valve is not well visualized. The pulmonic valve regurgitation was not well visualized. Pericardium: There is no pericardial effusion noted. Aorta: The aortic root is normal. Pulmonary Artery: The tricuspid regurgitant velocity is 3.07 m/s, and with an estimated right atrial pressure of 3 mmHg, the estimated pulmonary artery pressure is mildly elevated with  the RVSP at 40.7 mmHg.  CONCLUSIONS:  1. The left ventricular systolic function is normal, with a visually estimated ejection fraction of 55-60%.  2. Spectral Doppler shows an abnormal pattern of left ventricular diastolic filling.  3. There is normal right ventricular global systolic function.  4. The left atrial size is moderately dilated.  5. There is mild mitral valve stenosis.  6. Moderate to severe mitral valve regurgitation.  7. Mild to moderate tricuspid regurgitation visualized.  8. Mild aortic valve stenosis.  9. Mild to moderate aortic valve regurgitation. QUANTITATIVE DATA SUMMARY:  2D MEASUREMENTS:          Normal Ranges: Ao Root d:       3.40 cm  (2.0-3.7cm) IVSd:            0.68 cm  (0.6-1.1cm) LVPWd:           1.03 cm  (0.6-1.1cm) LVIDd:           5.66 cm  (3.9-5.9cm) LVIDs:           4.93 cm LV Mass Index:   103 g/m2 LVEDV Index:     85 ml/m2 LV % FS          12.9 %  LEFT ATRIUM:                  Normal Ranges: LA Vol A4C:        68.4 ml    (22+/-6mL/m2) LA Vol A2C:        64.0 ml LA Vol BP:         67.0 ml LA Vol Index A4C:  38.7ml/m2 LA Vol Index A2C:  36.3 ml/m2 LA Vol Index BP:   38.0 ml/m2 LA Area A4C:       24.3 cm2 LA Area A2C:       23.2 cm2 LA Major Axis A4C: 7.3 cm LA Major Axis A2C: 7.2 cm LA Volume Index:   36.8 ml/m2 LA Vol A4C:        66.2 ml LA Vol A2C:        62.0 ml LA Vol Index BSA:  36.3 ml/m2  RIGHT ATRIUM:          Normal Ranges: RA Area A4C:  22.7 cm2  M-MODE MEASUREMENTS:         Normal Ranges: Ao Root:             3.40 cm (2.0-3.7cm) LAs:                 4.80 cm (2.7-4.0cm)  AORTA MEASUREMENTS:         Normal Ranges: Ao Sinus, d:        3.40 cm (2.1-3.5cm) Asc Ao, d:          3.70 cm (2.1-3.4cm)  LV SYSTOLIC FUNCTION:                      Normal Ranges: EF-A4C View:    56 % (>=55%) EF-A2C View:    50 % EF-Biplane:     52 % EF-Visual:      58 % LV EF Reported: 58 %  LV DIASTOLIC FUNCTION:             Normal Ranges: MV Peak E:             1.38 m/s    (0.7-1.2 m/s) MV Peak A:              0.63 m/s    (0.42-0.7 m/s) E/A Ratio:             2.19        (1.0-2.2) MV e'                  0.048 m/s   (>8.0) MV lateral e'          0.05 m/s MV medial e'           0.04 m/s MV A Dur:              172.00 msec E/e' Ratio:            28.63       (<8.0) PulmV Sys Ham:         30.30 cm/s PulmV Alvarez Ham:        72.10 cm/s PulmV S/D Ham:         0.40 PulmV A Revs Ham:      22.50 cm/s PulmV A Revs Dur:      129.00 msec  MITRAL VALVE:           Normal Ranges: MV Vmax:      1.86 m/s  (<=1.3m/s) MV peak P.8 mmHg (<5mmHg) MV mean P.0 mmHg  (<2mmHg) MV DT:        215 msec  (150-240msec)  MITRAL INSUFFICIENCY:             Normal Ranges: PISA Radius:          0.5 cm MR VTI:               175.50 cm MR Vmax:              539.00 cm/s MR Alias Ham:         38.5 cm/s MR Volume:            19.69 ml MR Flow Rt:           60.48 ml/s MR EROA:              11 mm2  AORTIC VALVE:                      Normal Ranges: AoV Vmax:                2.66 m/s  (<=1.7m/s) AoV Peak P.3 mmHg (<20mmHg) AoV Mean P.0 mmHg (1.7-11.5mmHg) LVOT Max Ham:            1.34 m/s  (<=1.1m/s) AoV VTI:                 59.50 cm  (18-25cm) LVOT VTI:                28.50 cm LVOT Diameter:           2.00 cm   (1.8-2.4cm) AoV Area, VTI:           1.50 cm2  (2.5-5.5cm2) AoV Area,Vmax:           1.58 cm2  (2.5-4.5cm2) AoV Dimensionless Index: 0.48  AORTIC INSUFFICIENCY: AI Vmax:       3.91 m/s AI Half-time:  419 msec AI Decel Rate: 282.00 cm/s2  RIGHT VENTRICLE: RV Basal 3.61 cm RV Mid   2.68 cm RV Major 9.0 cm TAPSE:   35.9 mm RV s'    0.18 m/s  TRICUSPID VALVE/RVSP:          Normal Ranges: Peak TR Velocity:     3.07 m/s RV Syst Pressure:     41 mmHg  (< 30mmHg) IVC Diam:             1.91 cm  PULMONIC VALVE:          Normal Ranges: PV Max Ham:     1.5 m/s  (0.6-0.9m/s) PV Max P.5 mmHg  PULMONARY VEINS: PulmV A Revs Dur: 129.00 msec PulmV A Revs Ham: 22.50 cm/s PulmV Alvarez Ham:   72.10 cm/s PulmV S/D Ham:     0.40 PulmV Sys Ham:    30.30 cm/s  [id number] Nohelia Soto MD Electronically signed on 2/11/2025 at 2:47:07 PM  ** Final **     Transthoracic Echo (TTE) Complete    Result Date: 7/25/2024   [facility], [address] [place] [address]               Tel [phone] and Fax [phone] TRANSTHORACIC ECHOCARDIOGRAM REPORT  Patient Name:      VERNON SALDANA VIA         Reading Physician:    [id number] Neo Gutierrez MD Study Date:        7/25/2024            Ordering Provider:    [id number] STEPHON YARBROUGH MRN/PID:           [id number]             Fellow: Accession#:        [id number]         Nurse: Date of Birth/Age: 1950 / 73 years Sonographer:          Viola Lau RDCS Gender:            F                    Additional Staff: Height:            162.56 cm            Admit Date:           7/24/2024 Weight:            72.12 kg             Admission Status:     Inpatient -                                                               Routine BSA / BMI:         1.77 m2 / 27.29      Encounter#:           [id number]                    kg/m2 Blood Pressure:    108/69 mmHg          Department Location:  [facility]                                                               Invasive Study Type:    TRANSTHORACIC ECHO (TTE) COMPLETE Diagnosis/ICD: Chest pain, unspecified-R07.9 Indication:    CP CPT Code:      Echo Complete w Full Doppler-[id number] Patient History: Pertinent History: A-Fib, CAD, Hyperlipidemia, COPD, Chest Pain and S/P Left                    shoulder surgery,Watchman, CKD. Study Detail: The following Echo studies were performed: 2D, M-Mode, Doppler and               color flow. Technically challenging study due to patient lying in               supine position and S/P left shoulder surgery. Patient has a                defibrillator.  PHYSICIAN INTERPRETATION: Left Ventricle: The left ventricular systolic function is mildly decreased, with a visually estimated ejection fraction of 45-50%. There are no regional wall motion abnormalities. The left ventricular cavity size is normal. There is mild concentric left ventricular hypertrophy. Spectral Doppler shows a pseudonormal pattern of left ventricular diastolic filling. Left Atrium: The left atrium is normal in size. Right Ventricle: The right ventricle is normal in size. There is normal right ventricular global systolic function. A device is visualized in the right ventricle. Right Atrium: The right atrium is normal in size. Aortic Valve: The aortic valve is trileaflet. There is mild aortic valve cusp calcification. There is evidence of mildly elevated transaortic gradients consistent with sclerosis of the aortic valve. The aortic valve dimensionless index is 0.71. There is moderate aortic valve regurgitation. The peak instantaneous gradient of the aortic valve is 14.6 mmHg. The mean gradient of the aortic valve is 8.0 mmHg. Mitral Valve: The mitral valve is normal in structure. There is moderate mitral annular calcification. There is mild mitral valve regurgitation. Tricuspid Valve: The tricuspid valve is structurally normal. There is mild tricuspid regurgitation. Pulmonic Valve: The pulmonic valve is structurally normal. There is physiologic pulmonic valve regurgitation. Pericardium: There is no pericardial effusion noted. Aorta: The aortic root is normal. Pulmonary Artery: The tricuspid regurgitant velocity is 2.43 m/s, and with an estimated right atrial pressure of 3 mmHg, the estimated pulmonary artery pressure is normal with the RVSP at 26.6 mmHg. Pulmonary Veins: The pulmonary veins appear normal and return normally to the left atrium. Systemic Veins: The inferior vena cava appears to be of normal size. There is IVC inspiratory collapse greater than 50%.   CONCLUSIONS:  1. The left ventricular systolic function is mildly decreased, with a visually estimated ejection fraction of 45-50%.  2. Spectral Doppler shows a pseudonormal pattern of left ventricular diastolic filling.  3. There is normal right ventricular global systolic function.  4. There is moderate mitral annular calcification.  5. Aortic valve sclerosis.  6. Moderate aortic valve regurgitation.  7. Normal estimated PASP and CVP. QUANTITATIVE DATA SUMMARY: 2D MEASUREMENTS:                           Normal Ranges: IVSd:          0.97 cm    (0.6-1.1cm) LVPWd:         1.16 cm    (0.6-1.1cm) LVIDd:         5.27 cm    (3.9-5.9cm) LVIDs:         4.48 cm LV Mass Index: 122.0 g/m2 LV % FS        15.0 % LA VOLUME:                               Normal Ranges: LA Vol A4C:        46.9 ml    (22+/-6mL/m2) LA Vol A2C:        46.7 ml LA Vol BP:         46.9 ml LA Vol Index A4C:  26.5ml/m2 LA Vol Index A2C:  26.3 ml/m2 LA Vol Index BP:   26.4 ml/m2 LA Area A4C:       17.6 cm2 LA Area A2C:       17.5 cm2 LA Major Axis A4C: 5.6 cm LA Major Axis A2C: 5.6 cm LA Volume Index:   24.7 ml/m2 LA Vol A4C:        42.8 ml LA Vol A2C:        43.8 ml RA VOLUME BY A/L METHOD:                       Normal Ranges: RA Area A4C: 23.0 cm2 AORTA MEASUREMENTS:                    Normal Ranges: Asc Ao, d: 3.70 cm (2.1-3.4cm) LV SYSTOLIC FUNCTION BY 2D PLANIMETRY (MOD):                      Normal Ranges: EF-A4C View:    43 % (>=55%) EF-A2C View:    42 % EF-Biplane:     44 % EF-Visual:      48 % LV EF Reported: 48 % LV DIASTOLIC FUNCTION:                     Normal Ranges: MV Peak E: 1.46 m/s (0.7-1.2 m/s) MITRAL VALVE:                      Normal Ranges: MV Vmax:    1.31 m/s (<=1.3m/s) MV peak P.9 mmHg (<5mmHg) MV mean PG: 3.0 mmHg (<2mmHg) MV DT:      197 msec (150-240msec) MITRAL INSUFFICIENCY:                           Normal Ranges: PISA Radius:  0.3 cm MR VTI:       178.00 cm MR Vmax:      496.00 cm/s MR Alias Ham: 35.1 cm/s MR Volume:     7.12 ml MR Flow Rt:   19.85 ml/s MR EROA:      0.04 cm2 AORTIC VALVE:                                    Normal Ranges: AoV Vmax:                1.91 m/s  (<=1.7m/s) AoV Peak P.6 mmHg (<20mmHg) AoV Mean P.0 mmHg  (1.7-11.5mmHg) LVOT Max Ham:            1.33 m/s  (<=1.1m/s) AoV VTI:                 37.70 cm  (18-25cm) LVOT VTI:                26.90 cm LVOT Diameter:           2.00 cm   (1.8-2.4cm) AoV Area, VTI:           2.24 cm2  (2.5-5.5cm2) AoV Area,Vmax:           2.19 cm2  (2.5-4.5cm2) AoV Dimensionless Index: 0.71 AORTIC INSUFFICIENCY: AI Vmax:       4.01 m/s AI Half-time:  441 msec AI Decel Rate: 267.00 cm/s2  RIGHT VENTRICLE: RV Basal 3.67 cm RV Mid   2.60 cm RV Major 8.8 cm TAPSE:   28.0 mm RV s'    0.12 m/s TRICUSPID VALVE/RVSP:                             Normal Ranges: Peak TR Velocity: 2.43 m/s RV Syst Pressure: 26.6 mmHg (< 30mmHg) IVC Diam:         1.58 cm PULMONIC VALVE:                      Normal Ranges: PV Max Ham: 1.5 m/s  (0.6-0.9m/s) PV Max P.6 mmHg  [id number] Neo Gutierrez MD Electronically signed on 2024 at 6:23:17 PM  ** Final **     Transthoracic Echo (TTE) Limited    Result Date: 2024   [facility], [address] [place] [address]                Tel [phone] and Fax [phone] TRANSTHORACIC ECHOCARDIOGRAM REPORT  Patient Name:      VERNON SALDANA VIA         Reading Physician:    [id number] Monica Chen MD Study Date:        2024            Ordering Provider:    [id number] KRISTAL SNYDER MRN/PID:           [id number]             Fellow: Accession#:        [id number]         Nurse: Date of Birth/Age: 1950 / 73 years Sonographer:          Tobi Carlos RDCS Gender:            F                    Additional Staff: Height:             162.56 cm            Admit Date:           7/16/2024 Weight:            70.31 kg             Admission Status:     Inpatient -                                                               Routine BSA / BMI:         1.76 m2 / 26.61      Encounter#:           [id number]                    kg/m2 Blood Pressure:    /                    Department Location:  [facility]                                                               Cath Lab Study Type:    TRANSTHORACIC ECHO (TTE) LIMITED Diagnosis/ICD: Unspecified atrial fibrillation-I48.91 Indication:    Atrial fibrillation; Preop cardiovascular exam CPT Code:      Echo Limited-[id number] Patient History: Pertinent History: A-fib; CAD; BARBARA; HTN; HLD; CKD; PPM. Study Detail: The following Echo studies were performed: 2D and M-Mode.               Technically challenging study due to body habitus and patient               lying in supine position.  PHYSICIAN INTERPRETATION: Left Ventricle: Left ventricular ejection fraction is low normal, by visual estimate at 50-55%. The patient is in atrial fibrillation which may influence the estimate of left ventricular function and transvalvular flows. Wall motion is abnormal. The left ventricular cavity size was not assessed. Left ventricular diastolic filling was not assessed. Possible inferior wall motion hypokinesis. Left Atrium: The left atrium was not assessed. Right Ventricle: The right ventricle was not well visualized. There is normal right ventricular global systolic function. A device is visualized in the right ventricle. Right Atrium: The right atrium was not assessed. There is a device visualized in the right atrium. Aortic Valve: The aortic valve is trileaflet. There is mild aortic valve cusp calcification. Aortic valve regurgitation was not assessed. Mitral Valve: The mitral valve is mildly thickened. There is moderate mitral annular calcification. Mitral valve regurgitation was not assessed. Tricuspid Valve: The  tricuspid valve was not well visualized. Tricuspid regurgitation was not assessed. Pulmonic Valve: The pulmonic valve is not well visualized. Pulmonic valve regurgitation was not assessed. Pericardium: There is a trivial pericardial effusion. Aorta: The aortic root is normal. Systemic Veins: The inferior vena cava appears to be of normal size. There is IVC inspiratory collapse greater than 50%. In comparison to the previous echocardiogram(s): Compared with the prior exam from 2/27/2024 there are no significant changes though today's exam is only a limited study withno assessment of valvular function.  CONCLUSIONS:  1. Left ventricular ejection fraction is low normal, by visual estimate at 50-55%.  2. Possible inferior wall motion hypokinesis.  3. There is normal right ventricular global systolic function.  4. There is moderate mitral annular calcification.  5. Compared with the prior exam from 2/27/2024 there are no significant changes though today's exam is only a limited study withno assessment of valvular function.  6. The patient is in atrial fibrillation which may influence the estimate of left ventricular function and transvalvular flows. QUANTITATIVE DATA SUMMARY: AORTA MEASUREMENTS:                      Normal Ranges: Ao Sinus, d: 3.45 cm (2.1-3.5cm) LV SYSTOLIC FUNCTION BY 2D PLANIMETRY (MOD):                      Normal Ranges: EF-Visual:      53 % LV EF Reported: 53 % TRICUSPID VALVE/RVSP:                   Normal Ranges: IVC Diam: 2.03 cm  [id number] Monica Chen MD Electronically signed on 7/16/2024 at 3:11:21 PM  ** Final **     Transthoracic Echo (TTE) Limited    Result Date: 7/16/2024   [facility], [address] [place] [address]                Tel [phone] and Fax [phone] TRANSTHORACIC ECHOCARDIOGRAM REPORT  Patient Name:      VERNON SALDANA VIA         Reading Physician:    [id number] Neftali Calvillo MD Study Date:         7/16/2024            Ordering Provider:    [id number] KRISTAL SNYDER MRN/PID:           [id number]             Fellow:               [id number] Lucina Gill MD Accession#:        [id number]         Nurse: Date of Birth/Age: 1950 / 73 years Sonographer:          Tobi Carlos RDCS Gender:            F                    Additional Staff: Height:            162.56 cm            Admit Date:           7/16/2024 Weight:            71.22 kg             Admission Status:     Inpatient -                                                               Routine BSA / BMI:         1.76 m2 / 26.95      Encounter#:           [id number]                    kg/m2 Blood Pressure:    117/55 mmHg          Department Location:  [facility]                                                               Cath Lab Study Type:    TRANSTHORACIC ECHO (TTE) LIMITED Diagnosis/ICD: Other specified postprocedural states-Z98.890 Indication:    Post LAAO CPT Code:      Echo Limited-89109 Patient History: Pertinent History: CAD; A-fib; BARBARA: HTN; HLD; CKD; PPM. Study Detail: The following Echo studies were performed: 2D and M-Mode.               Technically challenging study due to body habitus.  PHYSICIAN INTERPRETATION: Left Ventricle: Left ventricular ejection fraction is low normal, by visual estimate at 50-55%. There are no regional wall motion abnormalities. The left ventricular cavity size is normal. Left ventricular diastolic filling was not assessed. Left Atrium: The left atrium was not assessed. Right Ventricle: The right ventricle was not assessed. Right ventricular systolic function not assessed. A device is visualized in the right ventricle. Right Atrium: The right atrium was not assessed. There is a device visualized in the right atrium. Aortic  Valve: The aortic valve is trileaflet. There is mild aortic valve cusp calcification. Aortic valve regurgitation was not assessed. Mitral Valve: The mitral valve is mildly thickened. There is moderate mitral annular calcification. Mitral valve regurgitation was not assessed. Tricuspid Valve: The tricuspid valve was not well visualized. Tricuspid regurgitation was not assessed. Right ventricular systolic pressure could not be accurately assessed in this patient. Pulmonic Valve: The pulmonic valve was not assessed. Pulmonic valve regurgitation was not assessed. Pericardium: There is a trivial pericardial effusion. Aorta: The aortic root is normal. Systemic Veins: The inferior vena cava was not well visualized. In comparison to the previous echocardiogram(s): Compared with study dated 2/27/2024, no significant change.  CONCLUSIONS:  1. Left ventricular ejection fraction is low normal, by visual estimate at 50-55%.  2. There is moderate mitral annular calcification.  3. There is a trivial pericardial effusion.  4. Compared with study dated 2/27/2024, no significant change. QUANTITATIVE DATA SUMMARY: LV SYSTOLIC FUNCTION BY 2D PLANIMETRY (MOD):                      Normal Ranges: EF-Visual:      53 % LV EF Reported: 53 %  [id number] Neftali Calvillo MD Electronically signed on 7/16/2024 at 3:02:13 PM  ** Final **     Transthoracic Echo (TTE) Complete    Result Date: 2/27/2024   [facility], [address] [place] [address]               Tel [phone] and Fax [phone] TRANSTHORACIC ECHOCARDIOGRAM REPORT  Patient Name:      VERNON SALDANA VIA         Reading Physician:    [id number] Nohelia Soto MD Study Date:        2/27/2024            Ordering Provider:    [id number] NIKOLE LOVE MRN/PID:           [id number]             Fellow: Accession#:        [id number]         Nurse: Date of  Birth/Age: 1950 / 73 years Sonographer:          Dai Diaz                                                               [facility] Gender:            F                    Additional Staff: Height:            162.56 cm            Admit Date:           2/24/2024 Weight:            72.57 kg             Admission Status:     Inpatient -                                                               Routine BSA / BMI:         1.78 m2 / 27.46      Encounter#:           [id number]                    kg/m2                                         Department Location:  Critical access hospital Non                                                               Invasive Blood Pressure: 131 /59 mmHg Study Type:    TRANSTHORACIC ECHO (TTE) COMPLETE Diagnosis/ICD: Shortness of breath-R06.02 Indication:    Dyspnea CPT Code:      Echo Complete w Full Doppler-07475 Patient History: Pertinent History: A-Fib and Chest Pain. elevated troponin, elevated BNP, mod                    AS. Study Detail: The following Echo studies were performed: 2D, M-Mode, Doppler and               color flow.  PHYSICIAN INTERPRETATION: Left Ventricle: The left ventricular systolic function is normal, with an estimated ejection fraction of 55-60%. There are no regional wall motion abnormalities. The left ventricular cavity size is normal. Spectral Doppler shows an impaired relaxation pattern of left ventricular diastolic filling. Left Atrium: The left atrium is mildly dilated. Right Ventricle: The right ventricle is normal in size. There is normal right ventricular global systolic function. Right Atrium: The right atrium is normal in size. Aortic Valve: The aortic valve is trileaflet. There is mild to moderate aortic valve cusp calcification. There is evidence of moderate aortic valve stenosis. There is trivial aortic valve regurgitation. The peak instantaneous gradient of the aortic valve is 27.5 mmHg. The mean gradient of the aortic valve is 16.0 mmHg. Mitral  Valve: The mitral valve is mildly thickened. There is evidence of mild mitral valve stenosis. The doppler estimated mean and peak diastolic pressure gradients are 7.2 mmHg and 16.3 mmHg respectively. There is mild mitral annular calcification. There is mild to moderate mitral valve regurgitation. Tricuspid Valve: The tricuspid valve is structurally normal. There is mild to moderate tricuspid regurgitation. Pulmonic Valve: The pulmonic valve is not well visualized. There is trace to mild pulmonic valve regurgitation. Pericardium: There is no pericardial effusion noted. Aorta: The aortic root is normal. Pulmonary Artery: The tricuspid regurgitant velocity is 3.46 m/s, and with an estimated right atrial pressure of 3 mmHg, the estimated pulmonary artery pressure is mild to moderately elevated with the RVSP at 50.9 mmHg.  CONCLUSIONS:  1. Left ventricular systolic function is normal with a 55-60% estimated ejection fraction.  2. Spectral Doppler shows an impaired relaxation pattern of left ventricular diastolic filling.  3. Mild to moderate mitral valve regurgitation.  4. Mild to moderate tricuspid regurgitation visualized.  5. Moderate aortic valve stenosis.  6. Mild to moderately elevated pulmonary artery pressure. QUANTITATIVE DATA SUMMARY: 2D MEASUREMENTS:                           Normal Ranges: IVSd:          0.97 cm    (0.6-1.1cm) LVPWd:         0.98 cm    (0.6-1.1cm) LVIDd:         5.70 cm    (3.9-5.9cm) LVIDs:         4.26 cm LV Mass Index: 123.2 g/m2 LV % FS        25.2 % LA VOLUME:                              Normal Ranges: LA Vol A4C:       77.4 ml    (22+/-6mL/m2) LA Vol A2C:       83.5 ml LA Vol BP:        82.1 ml LA Vol Index A4C: 43.5 ml/m2 LA Vol Index A2C: 46.9 ml/m2 LA Vol Index BP:  46.1 ml/m2 LA Volume Index:  46.1 ml/m2 LA Vol A4C:       72.2 ml LA Vol A2C:       77.0 ml RA VOLUME BY A/L METHOD:                       Normal Ranges: RA Area A4C: 21.8 cm2 M-MODE MEASUREMENTS:                  Normal  Ranges: Ao Root: 3.30 cm (2.0-3.7cm) LAs:     4.72 cm (2.7-4.0cm) LV SYSTOLIC FUNCTION BY 2D PLANIMETRY (MOD):                     Normal Ranges: EF-A4C View: 55.9 % (>=55%) EF-A2C View: 56.5 % EF-Biplane:  55.9 % LV DIASTOLIC FUNCTION:                             Normal Ranges: MV Peak E:      1.91 m/s    (0.7-1.2 m/s) MV Peak A:      0.93 m/s    (0.42-0.7 m/s) E/A Ratio:      2.06        (1.0-2.2) MV e'           0.09 m/s    (>8.0) MV lateral e'   0.09 m/s MV medial e'    0.08 m/s E/e' Ratio:     21.21       (<8.0) PulmV Sys Ham:  62.35 cm/s PulmV Alvarez Ham: 121.32 cm/s PulmV S/D Ham:  0.51 MITRAL VALVE:                       Normal Ranges: MV Vmax:    2.02 m/s  (<=1.3m/s) MV peak P.3 mmHg (<5mmHg) MV mean P.2 mmHg  (<2mmHg) MV VTI:     48.44 cm  (10-13cm) MV DT:      167 msec  (150-240msec) MITRAL INSUFFICIENCY:                         Normal Ranges: MR VTI:     192.66 cm MR Vmax:    568.98 cm/s MR Volume:  33.56 ml MR Flow Rt: 99.12 ml/s MR EROA:    0.17 cm2 AORTIC VALVE:                                    Normal Ranges: AoV Vmax:                2.62 m/s  (<=1.7m/s) AoV Peak P.5 mmHg (<20mmHg) AoV Mean P.0 mmHg (1.7-11.5mmHg) LVOT Max Ham:            0.99 m/s  (<=1.1m/s) AoV VTI:                 58.21 cm  (18-25cm) LVOT VTI:                21.96 cm LVOT Diameter:           1.95 cm   (1.8-2.4cm) AoV Area, VTI:           1.13 cm2  (2.5-5.5cm2) AoV Area,Vmax:           1.13 cm2  (2.5-4.5cm2) AoV Dimensionless Index: 0.38 AORTIC INSUFFICIENCY: AI Vmax:       3.91 m/s AI Half-time:  520 msec AI Decel Time: 1794 msec AI Decel Rate: 218.17 cm/s2  RIGHT VENTRICLE: RV Basal 4.30 cm RV Mid   2.60 cm RV Major 7.7 cm TAPSE:   27.0 mm RV s'    0.18 m/s TRICUSPID VALVE/RVSP:                             Normal Ranges: Peak TR Velocity: 3.46 m/s RV Syst Pressure: 50.9 mmHg (< 30mmHg) PULMONIC VALVE:                      Normal Ranges: PV Max Ham: 1.5 m/s  (0.6-0.9m/s) PV Max P.8  mmHg Pulmonary Veins: PulmV Alvarez Ham: 121.32 cm/s PulmV S/D Ham:  0.51 PulmV Sys Ham:  62.35 cm/s  [id number] Nohelia Soto MD Electronically signed on 2/27/2024 at 11:22:42 AM  ** Final **           Assessment/Plan   [facility] in 2022 showed normal coronary arteries        Acute on chronic diastolic CHF  -Hx of tachycardia induced CMO and hx of VT  -  -CXR showed cardiomegaly, similar appearance to previous, no effusions or edema  -CT of the abd/pelvis showed small bilateral pleural effusions with atelectasis/infiltrate, right greater than left  -I reviewed the Echocardiograms as above       --Now with moderate to severe mitral regurg  -Strict I & Os  -Daily weights->reports an 4.5lbs weight gain overnight at home  -2gm na diet  -1500mL fluid restriction  -Cont Bumex PO  -Now s/p Cardiomems 1/2/25->no readings since 2/6 due to machine not working. On 2/6 she was under her baseline reading  -Outpt referral for mitral clips     2. Elevated troponins-Non MI elevation  -[facility] as above  -Troponin 22, 20  -I reviewed the EKG, no acute changes, ST elevation, or depression  -Cont plavix, statin  -Allergy to BB     3. Asthma/COPD exac  -steroids  -bronchodilators  -Oxygen support  -CTA showed small bilateral pleural effusions with atelectasis/infiltrate, right greater than left  -Bronchial hygiene     4. Paroxysmal atrial fibrillation  -Currently SR  -[facility] PCM/ICD  -Not on AC, has watchman  -Cont plavix  -Cont digoxin and diltiazem  -digoxin level 1.01  -Monitor on tele     5. Chronic anemia  -Hgb 8.1->8.8  -Prior iron studies reviewed  -pt reports severe constipation with PO iron  -Was to be on DAPT post cardiomems x 6weeks->2/13 is 6 weeks  -Stop aspirin  -Cont plavix only  -Monitor     6. Hyperlipidemia  -Cont statin     7. Hypokalemia, resolved  -supplemented        NAILA Guerra-CNP     DISCHARGE

## 2025-02-13 NOTE — NURSING NOTE
Discharge instructions reviewed with patient. No questions at this time. Education given for new homegoing medications with type, uses, and most common side effects. Patient verbalized understanding. Patient denied need for heart failure/COPD booklet - she has at home. Masimo removed and left at bedside. IV removed. Discharged home in stable condition.

## 2025-02-13 NOTE — SIGNIFICANT EVENT
Home O2 eval done. Patient at beside on RA, SpO2 93%. Patient ambulated in [place] on 2 south on RA, SpO2 maintained above 90%. Patient does not qualify for home O2. RN and Dr. Chan made aware.

## 2025-02-13 NOTE — CARE PLAN
The clinical goals for the shift include pt will not have an increased O2 demand during this shift    Problem: Pain - Adult  Goal: Verbalizes/displays adequate comfort level or baseline comfort level  Outcome: Progressing     Problem: Safety - Adult  Goal: Free from fall injury  Outcome: Progressing    Problem: Heart Failure  Goal: Report improvement of dyspnea/breathlessness this shift  Outcome: Progressing

## 2025-02-13 NOTE — DISCHARGE SUMMARY
Discharge Diagnosis  COPD exacerbation (Multi)    Issues Requiring Follow-Up  Mitral Valve Clips    Discharge Meds     Medication List      START taking these medications     azithromycin 500 mg tablet; Commonly known as: Zithromax; Take 1 tablet   (500 mg) by mouth once every 24 hours for 1 dose.; Start taking on:   February 14, 2025     CHANGE how you take these medications     dilTIAZem  mg 24 hr capsule; Commonly known as: Cardizem CD; What   changed: Another medication with the same name was removed. Continue   taking this medication, and follow the directions you see here.   predniSONE 10 mg tablet; Commonly known as: Deltasone; Take 4 tablets   (40 mg) by mouth once daily for 3 days, THEN 3 tablets (30 mg) once daily   for 5 days, THEN 2 tablets (20 mg) once daily for 5 days, THEN 1 tablet   (10 mg) once daily for 5 days.; Start taking on: February 14, 2025; What   changed: medication strength, See the new instructions.     CONTINUE taking these medications     acetaminophen 325 mg tablet; Commonly known as: Tylenol   Advair HFA 45-21 mcg/actuation inhaler; Generic drug: fluticasone   propion-salmeteroL; Inhale 2 puffs 2 times a day. Rinse mouth with water   after use to reduce aftertaste and incidence of candidiasis. Do not   swallow.   baclofen 10 mg tablet; Commonly known as: Lioresal; One tablet in the   morning and 2 tablets in the evening.   bumetanide 2 mg tablet; Commonly known as: Bumex; Take 1 tablet (2 mg)   by mouth 2 times daily (morning and late afternoon). TAKE 1 TABLET BY   MOUTH IN THE MORNING AND 1/2 TABLET IN THE AFTERNOON.   clobetasol 0.05 % cream; Commonly known as: Temovate; Apply topically 2   times a day.   clopidogrel 75 mg tablet; Commonly known as: Plavix; Take 1 tablet (75   mg) by mouth once daily.   cyanocobalamin 1,000 mcg/mL injection; Commonly known as: Vitamin B-12;   Inject 1 mL (1,000 mcg) into the muscle every 30 (thirty) days.   diclofenac sodium 1 % gel; Commonly  "known as: Voltaren; Apply 4.5 inches   (4 g) topically 2 times a day as needed (joint pain).   digoxin 125 MCG tablet; Commonly known as: Lanoxin   EPINEPHrine 0.3 mg/0.3 mL injection syringe; Commonly known as: Epipen;   Inject 0.3 mL (0.3 mg) into the muscle 1 time if needed for anaphylaxis.   hydroxychloroquine 200 mg tablet; Commonly known as: Plaquenil; Take 1.5   tablets (300 mg) by mouth once daily.   insulin syringe-needle U-100 1 mL 30 gauge x 1/2\" syringe; Commonly   known as: BD Insulin Syringe Ultra-Fine; Use one a month   ipratropium-albuteroL 0.5-2.5 mg/3 mL nebulizer solution; Commonly known   as: Duo-Neb; Take 3 mL by nebulization 4 times a day as needed for   wheezing or shortness of breath.   montelukast 10 mg tablet; Commonly known as: Singulair; Take 1 tablet   (10 mg) by mouth once daily at bedtime.   multivitamin tablet   naloxone 4 mg/0.1 mL nasal spray; Commonly known as: Narcan; Administer   1 spray (4 mg) into affected nostril(s) if needed for opioid reversal or   respiratory depression. May repeat every 2-3 minutes if needed,   alternating nostrils, until medical assistance becomes available.   nitroglycerin 0.4 mg SL tablet; Commonly known as: Nitrostat   ondansetron 8 mg tablet; Commonly known as: Zofran   * oxyCODONE-acetaminophen 5-325 mg tablet; Commonly known as: Percocet;   Take 1 tablet by mouth 3 times a day as needed for severe pain (7 - 10)   for up to 28 days. Do not fill before October 29, 2024.   * oxyCODONE-acetaminophen 5-325 mg tablet; Commonly known as: Percocet;   Take 1 tablet by mouth 3 times a day as needed for severe pain (7 - 10)   for up to 28 days. Do not fill before November 26, 2024.   * oxyCODONE-acetaminophen 5-325 mg tablet; Commonly known as: Percocet;   Take 1 tablet by mouth 3 times a day as needed for severe pain (7 - 10)   for up to 28 days. Do not fill before December 24, 2024.   pantoprazole 40 mg EC tablet; Commonly known as: ProtoNix; TAKE ONE "   TABLET BY MOUTH TWO TIMES A DAY   rosuvastatin 20 mg tablet; Commonly known as: Crestor; Take 1 tablet (20   mg) by mouth once daily.   sucralfate 100 mg/mL suspension; Commonly known as: Carafate; Take 10 mL   (1 g) by mouth 4 times a day with meals.   sulfaSALAzine 500 mg tablet; Commonly known as: Azulfidine; Take 2   tablets (1,000 mg) by mouth 2 times a day.   Vitamin D3 25 MCG (1000 UT) tablet; Generic drug: cholecalciferol  * This list has 3 medication(s) that are the same as other medications   prescribed for you. Read the directions carefully, and ask your doctor or   other care provider to review them with you.     STOP taking these medications     aspirin 81 mg EC tablet   famotidine 40 mg tablet; Commonly known as: Pepcid   Miralax 17 gram/dose powder; Generic drug: polyethylene glycol   saccharomyces boulardii 250 mg capsule; Commonly known as: Florastor       Test Results Pending At Discharge  Pending Labs       No current pending labs.            Hospital Course  Brief HPI  Trina Elias is a 74 y.o. year old female  patient with PMH VT s/p ICD, TAA, mild aortic stenosis, paroxysmal atrial fibrillation s/p PVI x2 and s/p WING Closure 07/16/2024, CMP/heart failure, hyperlipidemia, HTN, GERD, CKD stage 3, iron deficiency anemia, rheumatoid arthritis, asthma, centrilobular emphysema, and BARBARA/central sleep apnea presenting for worsening shortness of breath and dyspnea on exertion.  Symptoms have been present since Thursday. Patient has been unable to walk throughout her house without becoming dyspneic.     ED Course:  Prior to presenting to the ED patient, had been placed on 2 L of supplemental oxygen by EMS. In the ED, patient was saturating well on room air, however she had desaturated into the mid 80s when attempting to move to the bathroom.  Patient's remaining vitals were stable without fever.  Lab work was significant for elevated white blood cells of 12.1, and hemoglobin of 9.4.  BNP was elevated  from patient's baseline at 625.  During prior admission patient's BNP had peaked to 773.  Troponin down trended from 22-20.  EKG indicated atrial fibrillation without RVR.  Influenza, COVID, and RSV PCR were negative.  CT imaging of the abdomen//pelvis was performed to evaluate for possible GI bleed, and did not indicate definite source for acute drop in hemoglobin and recommended CTA of abdomen and pelvis if clinical concern remains.  Furthermore patient was found to have small bilateral pleural effusions, right greater than the left, and cardiomegaly unchanged from her prior study.  Patient received DuoNebs, IV magnesium, prednisone 40 mg, and Bumex 2 mg IV in the ED.     General Floor:  While on the general floor patient was treated with Bumex twice daily and continuing her tapered course of prednisone and azithromycin.  She responded well to treatment with improved work of breathing and wheezing.  Patient underwent TTE which indicated severe mitral valve regurgitation.  Cardiology was consulted and recommended patient receive outpatient referral for mitral valve clips.  Patient's home digoxin and diltiazem was continued. Furthermore, cardiology continued patient's Plavix as she required a total of 6 weeks of treatment and discontinued patient's aspirin.  Patient will be discharged on her home Bumex regiment with new referral to pulmonary rehab and instructions to follow-up with cardiology for Mitral valve clip placement.   Patient did not qualify for home oxygen on RT evaluation. Patient was stable for discharge home on 2/13/2025.    Pertinent Physical Exam At Time of Discharge  Physical Exam  Constitutional:       General: She is not in acute distress.  HENT:      Head: Normocephalic.   Cardiovascular:      Rate and Rhythm: Normal rate and regular rhythm.   Pulmonary:     No wheezes present.   Abdominal:      General: Bowel sounds are normal.      Palpations: Abdomen is soft.      Comments: Midline hernia  surgical scar present on abdomen, no signs of infection or discoloration chronic tenderness to palpation in that region.  No significant abdominal distention    Musculoskeletal:      Right lower leg: No edema.      Left lower leg: No edema.   Neurological:      Mental Status: She is alert and oriented to person, place, and time. Mental status is at baseline.   Outpatient Follow-Up  Future Appointments   Date Time Provider Department Center   3/3/2025  9:00 AM Fredy Elias DO [id number]   5/5/2025  1:40 PM Carlos Harley MD [id number]         Allen Fox DO

## 2025-02-13 NOTE — CARE PLAN
The patient's goals for the shift include  decreased pain     The clinical goals for the shift include pt will not need supplemental oxygen when awake

## 2025-02-14 ENCOUNTER — PATIENT OUTREACH (OUTPATIENT)
Dept: PRIMARY CARE | Facility: CLINIC | Age: 75
End: 2025-02-14
Payer: MEDICARE

## 2025-02-14 DIAGNOSIS — Z09 HOSPITAL DISCHARGE FOLLOW-UP: ICD-10-CM

## 2025-02-14 DIAGNOSIS — J44.1 COPD WITH ACUTE EXACERBATION (MULTI): ICD-10-CM

## 2025-02-14 NOTE — PROGRESS NOTES
"TCM completed 02/14/25   Discharge Facility: [facility]  Discharge Diagnosis: COPD exacerbation   Admission Date: 2/10/25  Discharge Date: 2/13/25    PCP Appointment Date: 3/3/25     (Needs seen by: 2/28/25)  Specialist Appointment Date: Rheumatology- 5/5/25    Hospital Encounter and Summary Linked: Yes  ED to Hosp-Admission (Discharged) with Nixon Cordova MD (02/10/2025)                        --See discharge assessment below for further details--      Wrap Up  Wrap Up Additional Comments: TCM initial outreach post discharge completed successfully. Spoke with the patient who states she is \" doing a lot better\" today. Patient denied any acute changes/concerns in her condition since leaving the hospital. Patient denied needing any assistance in the home. Patient denied any questions regarding activity/discharge instructions, medication changes or her hospital stay. Patient confirmed she received her discharge summary and has all medications needed in the home. Patient denied need for DME, HHC or assistance obtaining transportation. TCM phone number was provided to the patient, with the patient encouraged to call back with any non-emergent questions or concerns. Patient verbalized her understanding and states she has no questions or concerns at this time, but will call back if needed. Patient has a PCP follow up appt already scheduled but it is outside of the 14 day window allowed for TCM- patient declined to move up appt. (2/14/2025  3:29 PM)  Call End Time: 1456 (2/14/2025  3:29 PM)    Engagement  Call Start Time: 1454 (2/14/2025  3:29 PM)    Medications  Medications reviewed with patient/caregiver?: Yes (2/14/2025  3:29 PM)  Is the patient having any side effects they believe may be caused by any medication additions or changes?: No (2/14/2025  3:29 PM)  Does the patient have all medications ordered at discharge?: Yes (2/14/2025  3:29 PM)  Care Management Interventions: No intervention needed; Provided patient " education (2/14/2025  3:29 PM)  Prescription Comments: START taking these medications:     Azithromycin 500 mg tablet; Commonly known as: Zithromax; Take 1 tablet (500 mg) by mouth once every 24 hours for 1 dose.     CHANGE how you take these medications:     DilTIAZem  mg 24 hr capsule; Commonly known as: Cardizem CD   PredniSONE 10 mg tablet; Commonly known as: Deltasone; Take 4 tablets (40 mg) by mouth once daily for 3 days, THEN 3 tablets (30 mg) once daily for 5 days, THEN 2 tablets (20 mg) once daily for 5 days, THEN 1 tablet (10 mg) once daily for 5 days.    STOP taking these medications:     Aspirin 81 mg EC tablet   Famotidine 40 mg tablet; Commonly known as: Pepcid   Miralax 17 gram/dose powder; Generic drug: polyethylene glycol   Saccharomyces boulardii 250 mg capsule; Commonly known as: Florastor (2/14/2025  3:29 PM)  Is the patient taking all medications as directed (includes completed medication regime)?: Yes (2/14/2025  3:29 PM)  Care Management Interventions: Provided patient education (2/14/2025  3:29 PM)  Medication Comments: Medications received from  pharmacy prior to discharge. (2/14/2025  3:29 PM)    Appointments  Does the patient have a primary care provider?: Yes (2/14/2025  3:29 PM)  Care Management Interventions: Verified appointment date/time/provider (2/14/2025  3:29 PM)  Has the patient kept scheduled appointments due by today?: Not applicable (2/14/2025  3:29 PM)  Care Management Interventions: Advised patient to keep appointment; Advised to schedule with specialist; Educated on importance of keeping appointment (2/14/2025  3:29 PM)    Self Management  What is the home health agency?: n/a (2/14/2025  3:29 PM)  Has home health visited the patient within 72 hours of discharge?: Not applicable (2/14/2025  3:29 PM)  What Durable Medical Equipment (DME) was ordered?: n/a (2/14/2025  3:29 PM)    Patient Teaching  Does the patient have access to their discharge instructions?: Yes  (2/14/2025  3:29 PM)  Care Management Interventions: Reviewed instructions with patient (2/14/2025  3:29 PM)  What is the patient's perception of their health status since discharge?: Improving (2/14/2025  3:29 PM)  Is the patient/caregiver able to teach back the hierarchy of who to call/visit for symptoms/problems? PCP, Specialist, Home Health nurse, Urgent Care, ED, 911: Yes (2/14/2025  3:29 PM)  Patient/Caregiver Education Comments: Patient denied any questions, concerns or needs from TCM or her PCP at this time. (2/14/2025  3:29 PM)

## 2025-02-17 DIAGNOSIS — J43.2 CENTRILOBULAR EMPHYSEMA (MULTI): Primary | ICD-10-CM

## 2025-02-17 DIAGNOSIS — J45.909 ASTHMA, UNSPECIFIED ASTHMA SEVERITY, UNSPECIFIED WHETHER COMPLICATED, UNSPECIFIED WHETHER PERSISTENT (HHS-HCC): ICD-10-CM

## 2025-02-17 NOTE — SIGNIFICANT EVENT
Follow Up Phone Call    Outgoing phone call    Spoke to: Trina SALDANA Via Relationship:self   Phone number: [phone]      Outcome: contacted patient/ family   Chief Complaint   Patient presents with    Shortness of Breath          Diagnosis:Not applicable    States she is feeling better. No further questions or concerns.

## 2025-02-18 DIAGNOSIS — I34.0 NONRHEUMATIC MITRAL VALVE REGURGITATION: Primary | ICD-10-CM

## 2025-02-19 ENCOUNTER — TELEPHONE (OUTPATIENT)
Dept: CARDIOLOGY | Facility: HOSPITAL | Age: 75
End: 2025-02-19

## 2025-02-19 ENCOUNTER — HOSPITAL ENCOUNTER (OUTPATIENT)
Dept: RESPIRATORY THERAPY | Facility: HOSPITAL | Age: 75
Discharge: HOME | End: 2025-02-19
Payer: MEDICARE

## 2025-02-19 DIAGNOSIS — J43.2 CENTRILOBULAR EMPHYSEMA (MULTI): ICD-10-CM

## 2025-02-19 DIAGNOSIS — J45.909 ASTHMA, UNSPECIFIED ASTHMA SEVERITY, UNSPECIFIED WHETHER COMPLICATED, UNSPECIFIED WHETHER PERSISTENT (HHS-HCC): ICD-10-CM

## 2025-02-19 LAB
MGC ASCENT PFT - FEV1 - POST: 1.75
MGC ASCENT PFT - FEV1 - PRE: 1.47
MGC ASCENT PFT - FEV1 - PREDICTED: 2.07
MGC ASCENT PFT - FVC - POST: 2.4
MGC ASCENT PFT - FVC - PRE: 2.26
MGC ASCENT PFT - FVC - PREDICTED: 2.68

## 2025-02-19 PROCEDURE — 94726 PLETHYSMOGRAPHY LUNG VOLUMES: CPT

## 2025-02-19 PROCEDURE — 94060 EVALUATION OF WHEEZING: CPT

## 2025-02-19 PROCEDURE — 94664 DEMO&/EVAL PT USE INHALER: CPT

## 2025-02-19 PROCEDURE — 94760 N-INVAS EAR/PLS OXIMETRY 1: CPT

## 2025-02-19 PROCEDURE — 94729 DIFFUSING CAPACITY: CPT

## 2025-02-20 NOTE — PROGRESS NOTES
Cardiomems reading is rising quickly.  She had recent hospitalization for acute diastolic heart failure.   Renal function is normal.   She has been intolerant to MRA (abdominal cramping) and Jardiance (states did not work?).    Will increase the Bumex to 2 mg twice a day.  (She reports taking 2 mg in a.m. and 1 mg in afternoon currently).   She will decrease the fluid intake to 1500 ml daily for now.   Avoid high sodium foods and added salt.   Continue to send cardiomems readings.   [place] [facility] [place].    If she continues to have issues with fluid retention,  may need to consider PRN dose Metolazone in addition to loop diuretic since she is unable to tolerate the GDMT options at this time.

## 2025-02-21 ENCOUNTER — APPOINTMENT (OUTPATIENT)
Dept: CARDIAC REHAB | Facility: HOSPITAL | Age: 75
End: 2025-02-21
Payer: MEDICARE

## 2025-02-21 DIAGNOSIS — I50.42 CHRONIC COMBINED SYSTOLIC AND DIASTOLIC CONGESTIVE HEART FAILURE: ICD-10-CM

## 2025-02-21 DIAGNOSIS — J45.909 ASTHMA, UNSPECIFIED ASTHMA SEVERITY, UNSPECIFIED WHETHER COMPLICATED, UNSPECIFIED WHETHER PERSISTENT (HHS-HCC): Primary | ICD-10-CM

## 2025-03-03 ENCOUNTER — APPOINTMENT (OUTPATIENT)
Dept: PRIMARY CARE | Facility: CLINIC | Age: 75
End: 2025-03-03
Payer: MEDICARE

## 2025-03-03 VITALS
OXYGEN SATURATION: 97 % | SYSTOLIC BLOOD PRESSURE: 132 MMHG | BODY MASS INDEX: 26.8 KG/M2 | HEART RATE: 80 BPM | DIASTOLIC BLOOD PRESSURE: 78 MMHG | WEIGHT: 157 LBS | HEIGHT: 64 IN

## 2025-03-03 DIAGNOSIS — K29.00 ACUTE SUPERFICIAL GASTRITIS WITHOUT HEMORRHAGE: ICD-10-CM

## 2025-03-03 DIAGNOSIS — J45.909 ASTHMA, UNSPECIFIED ASTHMA SEVERITY, UNSPECIFIED WHETHER COMPLICATED, UNSPECIFIED WHETHER PERSISTENT (HHS-HCC): ICD-10-CM

## 2025-03-03 DIAGNOSIS — R07.9 CHEST PAIN, UNSPECIFIED TYPE: ICD-10-CM

## 2025-03-03 DIAGNOSIS — I48.91 ATRIAL FIBRILLATION, UNSPECIFIED TYPE (MULTI): Primary | ICD-10-CM

## 2025-03-03 DIAGNOSIS — I50.42 CHRONIC COMBINED SYSTOLIC AND DIASTOLIC CONGESTIVE HEART FAILURE: ICD-10-CM

## 2025-03-03 DIAGNOSIS — J30.1 SEASONAL ALLERGIC RHINITIS DUE TO POLLEN: ICD-10-CM

## 2025-03-03 DIAGNOSIS — M06.9 RHEUMATOID ARTHRITIS INVOLVING MULTIPLE SITES, UNSPECIFIED WHETHER RHEUMATOID FACTOR PRESENT (MULTI): ICD-10-CM

## 2025-03-03 DIAGNOSIS — K21.9 GASTROESOPHAGEAL REFLUX DISEASE WITHOUT ESOPHAGITIS: ICD-10-CM

## 2025-03-03 DIAGNOSIS — K27.9 PUD (PEPTIC ULCER DISEASE): ICD-10-CM

## 2025-03-03 DIAGNOSIS — Z78.0 MENOPAUSE: ICD-10-CM

## 2025-03-03 DIAGNOSIS — N18.31 STAGE 3A CHRONIC KIDNEY DISEASE (MULTI): ICD-10-CM

## 2025-03-03 DIAGNOSIS — J43.2 CENTRILOBULAR EMPHYSEMA (MULTI): ICD-10-CM

## 2025-03-03 DIAGNOSIS — M79.18 CERVICAL MYOFASCIAL PAIN SYNDROME: ICD-10-CM

## 2025-03-03 DIAGNOSIS — J45.40 MODERATE PERSISTENT ASTHMA WITHOUT COMPLICATION (HHS-HCC): ICD-10-CM

## 2025-03-03 DIAGNOSIS — E53.8 VITAMIN B12 DEFICIENCY: ICD-10-CM

## 2025-03-03 DIAGNOSIS — E78.2 MIXED HYPERLIPIDEMIA: ICD-10-CM

## 2025-03-03 DIAGNOSIS — D50.8 IRON DEFICIENCY ANEMIA SECONDARY TO INADEQUATE DIETARY IRON INTAKE: ICD-10-CM

## 2025-03-03 DIAGNOSIS — Z12.31 ENCOUNTER FOR SCREENING MAMMOGRAM FOR BREAST CANCER: ICD-10-CM

## 2025-03-03 DIAGNOSIS — K29.30 CHRONIC SUPERFICIAL GASTRITIS WITHOUT BLEEDING: ICD-10-CM

## 2025-03-03 DIAGNOSIS — J45.51 SEVERE PERSISTENT ASTHMA WITH ACUTE EXACERBATION (MULTI): ICD-10-CM

## 2025-03-03 PROBLEM — J96.12 ACUTE HYPOXIC ON CHRONIC HYPERCAPNIC RESPIRATORY FAILURE (MULTI): Status: RESOLVED | Noted: 2024-09-18 | Resolved: 2025-03-03

## 2025-03-03 PROBLEM — J96.01 ACUTE HYPOXIC ON CHRONIC HYPERCAPNIC RESPIRATORY FAILURE (MULTI): Status: RESOLVED | Noted: 2024-09-18 | Resolved: 2025-03-03

## 2025-03-03 LAB
POC APPEARANCE, URINE: CLEAR
POC BILIRUBIN, URINE: NEGATIVE
POC BLOOD, URINE: NEGATIVE
POC COLOR, URINE: YELLOW
POC GLUCOSE, URINE: NEGATIVE MG/DL
POC KETONES, URINE: NEGATIVE MG/DL
POC LEUKOCYTES, URINE: ABNORMAL
POC NITRITE,URINE: NEGATIVE
POC PH, URINE: 6.5 PH
POC PROTEIN, URINE: NEGATIVE MG/DL
POC SPECIFIC GRAVITY, URINE: 1.01
POC UROBILINOGEN, URINE: 0.2 EU/DL

## 2025-03-03 PROCEDURE — 1160F RVW MEDS BY RX/DR IN RCRD: CPT | Performed by: FAMILY MEDICINE

## 2025-03-03 PROCEDURE — 1036F TOBACCO NON-USER: CPT | Performed by: FAMILY MEDICINE

## 2025-03-03 PROCEDURE — 1123F ACP DISCUSS/DSCN MKR DOCD: CPT | Performed by: FAMILY MEDICINE

## 2025-03-03 PROCEDURE — 1111F DSCHRG MED/CURRENT MED MERGE: CPT | Performed by: FAMILY MEDICINE

## 2025-03-03 PROCEDURE — 1159F MED LIST DOCD IN RCRD: CPT | Performed by: FAMILY MEDICINE

## 2025-03-03 PROCEDURE — G2211 COMPLEX E/M VISIT ADD ON: HCPCS | Performed by: FAMILY MEDICINE

## 2025-03-03 PROCEDURE — 3008F BODY MASS INDEX DOCD: CPT | Performed by: FAMILY MEDICINE

## 2025-03-03 PROCEDURE — 99214 OFFICE O/P EST MOD 30 MIN: CPT | Performed by: FAMILY MEDICINE

## 2025-03-03 PROCEDURE — 81003 URINALYSIS AUTO W/O SCOPE: CPT | Performed by: FAMILY MEDICINE

## 2025-03-03 RX ORDER — METOLAZONE 5 MG/1
TABLET ORAL
COMMUNITY
Start: 2025-02-24 | End: 2025-03-03 | Stop reason: ALTCHOICE

## 2025-03-03 RX ORDER — FLUTICASONE PROPIONATE AND SALMETEROL XINAFOATE 45; 21 UG/1; UG/1
2 AEROSOL, METERED RESPIRATORY (INHALATION)
Qty: 36 G | Refills: 3 | Status: SHIPPED | OUTPATIENT
Start: 2025-03-03

## 2025-03-03 RX ORDER — PANTOPRAZOLE SODIUM 40 MG/1
40 TABLET, DELAYED RELEASE ORAL 2 TIMES DAILY
Qty: 180 TABLET | Refills: 3 | Status: SHIPPED | OUTPATIENT
Start: 2025-03-03

## 2025-03-03 RX ORDER — SYRINGE-NEEDLE,INSULIN,0.5 ML 27GX1/2"
SYRINGE, EMPTY DISPOSABLE MISCELLANEOUS
Qty: 10 EACH | Refills: 1 | Status: SHIPPED | OUTPATIENT
Start: 2025-03-03

## 2025-03-03 RX ORDER — SUCRALFATE 1 G/10ML
1 SUSPENSION ORAL
Qty: 3600 ML | Refills: 3 | Status: SHIPPED | OUTPATIENT
Start: 2025-03-03 | End: 2026-03-03

## 2025-03-03 RX ORDER — MONTELUKAST SODIUM 10 MG/1
10 TABLET ORAL NIGHTLY
Qty: 90 TABLET | Refills: 3 | Status: SHIPPED | OUTPATIENT
Start: 2025-03-03 | End: 2026-02-26

## 2025-03-03 RX ORDER — NITROGLYCERIN 0.4 MG/1
0.4 TABLET SUBLINGUAL EVERY 5 MIN PRN
Qty: 40 TABLET | Refills: 3 | Status: SHIPPED | OUTPATIENT
Start: 2025-03-03

## 2025-03-03 RX ORDER — CLOPIDOGREL BISULFATE 75 MG/1
75 TABLET ORAL DAILY
Qty: 90 TABLET | Refills: 3 | Status: SHIPPED | OUTPATIENT
Start: 2025-03-03 | End: 2026-03-03

## 2025-03-03 RX ORDER — IPRATROPIUM BROMIDE AND ALBUTEROL SULFATE 2.5; .5 MG/3ML; MG/3ML
3 SOLUTION RESPIRATORY (INHALATION) 4 TIMES DAILY PRN
Qty: 360 ML | Refills: 11 | Status: SHIPPED | OUTPATIENT
Start: 2025-03-03 | End: 2026-03-03

## 2025-03-03 RX ORDER — CYANOCOBALAMIN 1000 UG/ML
1000 INJECTION, SOLUTION INTRAMUSCULAR; SUBCUTANEOUS
Qty: 10 ML | Refills: 1 | Status: SHIPPED | OUTPATIENT
Start: 2025-03-03

## 2025-03-03 RX ORDER — DICLOFENAC SODIUM 10 MG/G
4 GEL TOPICAL 2 TIMES DAILY PRN
Qty: 450 G | Refills: 1 | Status: SHIPPED | OUTPATIENT
Start: 2025-03-03

## 2025-03-03 RX ORDER — BACLOFEN 10 MG/1
TABLET ORAL
Qty: 270 TABLET | Refills: 3 | Status: SHIPPED | OUTPATIENT
Start: 2025-03-03

## 2025-03-03 ASSESSMENT — PATIENT HEALTH QUESTIONNAIRE - PHQ9
SUM OF ALL RESPONSES TO PHQ9 QUESTIONS 1 AND 2: 0
2. FEELING DOWN, DEPRESSED OR HOPELESS: NOT AT ALL
1. LITTLE INTEREST OR PLEASURE IN DOING THINGS: NOT AT ALL

## 2025-03-03 NOTE — PROGRESS NOTES
"Subjective   Patient ID: Trina SALDANA Via \"Marcela\" is a 74 y.o. female who presents for hospital f/u  and Annual Exam.  HPI  No SE meds  No CP, HA  Having SOB since came home  No palpitations  Having dizziness when gets up too fast  No numbness, weakness  Occasional vision changes  Having a lot of pain in upper abdomen  Burning pain in upper abdomen     Ophtho- 1/25  Dentist- less then 1 year  Colonoscopy- 2/21- repeat 5 years  HIPOLITO-  FOBT-  UA/Micro- 12/22  Mammo- 4/23  DEXA- 8/22  PAP- N/A  Lung CT- never smoked  Coronary Calcium CT Score-  AAA-  EKG-  Pneumovax- 10/21  Prevnar- refused  Flu- 10/24  Shingrix- recommended  Td-  Hep C-  Advance Directives- does not have  MDD screen- 4/24  ETOH screen- 8/28/23  CV risk- 4/24  AWV- 4/24      Current Outpatient Medications:     acetaminophen (Tylenol) 325 mg tablet, Take 1 tablet (325 mg) by mouth every 6 hours if needed for mild pain (1 - 3)., Disp: , Rfl:     cholecalciferol (Vitamin D3) 25 MCG (1000 UT) tablet, Take 1 tablet (1,000 Units) by mouth once daily., Disp: , Rfl:     clobetasol (Temovate) 0.05 % cream, Apply topically 2 times a day., Disp: 60 g, Rfl: 3    digoxin (Lanoxin) 125 MCG tablet, Take 1 tablet (125 mcg) by mouth once daily., Disp: , Rfl:     dilTIAZem CD (Cardizem CD) 120 mg 24 hr capsule, Take 1 capsule (120 mg) by mouth once daily., Disp: , Rfl:     EPINEPHrine 0.3 mg/0.3 mL injection syringe, Inject 0.3 mL (0.3 mg) into the muscle 1 time if needed for anaphylaxis., Disp: 2 each, Rfl: 1    hydroxychloroquine (Plaquenil) 200 mg tablet, Take 1.5 tablets (300 mg) by mouth once daily., Disp: 135 tablet, Rfl: 0    multivitamin tablet, Take 1 tablet by mouth once daily., Disp: , Rfl:     naloxone (Narcan) 4 mg/0.1 mL nasal spray, Administer 1 spray (4 mg) into affected nostril(s) if needed for opioid reversal or respiratory depression. May repeat every 2-3 minutes if needed, alternating nostrils, until medical assistance becomes available., Disp: 2 each, " "Rfl: 0    ondansetron (Zofran) 8 mg tablet, Take 1 tablet (8 mg) by mouth every 8 hours if needed for nausea or vomiting., Disp: , Rfl:     rosuvastatin (Crestor) 20 mg tablet, Take 1 tablet (20 mg) by mouth once daily., Disp: 90 tablet, Rfl: 3    sulfaSALAzine (Azulfidine) 500 mg tablet, Take 2 tablets (1,000 mg) by mouth 2 times a day., Disp: 360 tablet, Rfl: 0    baclofen (Lioresal) 10 mg tablet, One tablet in the morning and 2 tablets in the evening., Disp: 270 tablet, Rfl: 3    clopidogrel (Plavix) 75 mg tablet, Take 1 tablet (75 mg) by mouth once daily., Disp: 90 tablet, Rfl: 3    cyanocobalamin (Vitamin B-12) 1,000 mcg/mL injection, Inject 1 mL (1,000 mcg) into the muscle every 30 (thirty) days., Disp: 10 mL, Rfl: 1    diclofenac sodium (Voltaren) 1 % gel, Apply 4.5 inches (4 g) topically 2 times a day as needed (joint pain)., Disp: 450 g, Rfl: 1    fluticasone propion-salmeteroL (Advair HFA) 45-21 mcg/actuation inhaler, Inhale 2 puffs 2 times a day. Rinse mouth with water after use to reduce aftertaste and incidence of candidiasis. Do not swallow., Disp: 36 g, Rfl: 3    insulin syringe-needle U-100 (BD Insulin Syringe Ultra-Fine) 1 mL 30 gauge x 1/2\" syringe, Use one a month, Disp: 10 each, Rfl: 1    ipratropium-albuteroL (Combivent Respimat)  mcg/actuation inhaler, Inhale 2 puffs 4 times a day., Disp: 36 g, Rfl: 3    ipratropium-albuteroL (Duo-Neb) 0.5-2.5 mg/3 mL nebulizer solution, Take 3 mL by nebulization 4 times a day as needed for wheezing or shortness of breath., Disp: 360 mL, Rfl: 11    montelukast (Singulair) 10 mg tablet, Take 1 tablet (10 mg) by mouth once daily at bedtime., Disp: 90 tablet, Rfl: 3    nitroglycerin (Nitrostat) 0.4 mg SL tablet, Place 1 tablet (0.4 mg) under the tongue every 5 minutes if needed for chest pain., Disp: 40 tablet, Rfl: 3    oxyCODONE-acetaminophen (Percocet) 5-325 mg tablet, Take 1 tablet by mouth 3 times a day as needed for severe pain (7 - 10) for up to 28 " days. Do not fill before October 29, 2024. (Patient not taking: Reported on 2/10/2025), Disp: 84 tablet, Rfl: 0    oxyCODONE-acetaminophen (Percocet) 5-325 mg tablet, Take 1 tablet by mouth 3 times a day as needed for severe pain (7 - 10) for up to 28 days. Do not fill before November 26, 2024. (Patient not taking: Reported on 2/10/2025), Disp: 84 tablet, Rfl: 0    pantoprazole (ProtoNix) 40 mg EC tablet, Take 1 tablet (40 mg) by mouth 2 times a day., Disp: 180 tablet, Rfl: 3    sucralfate (Carafate) 100 mg/mL suspension, Take 10 mL (1 g) by mouth 4 times a day with meals., Disp: 3600 mL, Rfl: 3   Past Surgical History:   Procedure Laterality Date    ABDOMINAL ADHESION SURGERY  10/22/2015    Exploratory laparotomy. Extensive lysis of adhesions. Small-bowel repair.    ABDOMINAL ADHESION SURGERY  10/18/2020    Exploratory laparotomy, massive lysis of adhesions, small-bowel resection, decompression of small bowel and removal of mesh.    ABLATION OF DYSRHYTHMIC FOCUS  05/02/2022 09/17/2018, 05/02/2022 for A FIB    ACHILLES TENDON SURGERY Right     ANTERIOR CERVICAL DISCECTOMY W/ FUSION  03/21/2016    Anterior C5 and C6 discectomies and interbody fusion of C5-C6, C6-C7    APPENDECTOMY      Open surgery    BREAST BIOPSY Left 09/21/2022    CARDIAC ASSIST DEVICE INSERTION  12/05/2012    Loop recorder placement 2012, Removed in 2014 w/ ICD placed    CARDIAC CATHETERIZATION  02/23/2022    Normal coronary arteries. Tachycardia induced Cardiomyopathy.    CARDIAC CATHETERIZATION N/A 7/16/2024    Procedure: LAAO (Left Atrial Appendage Occlusion);  Surgeon: Reid Dickinson MD;  Location: [facility] Cardiac Cath Lab;  Service: Cardiovascular;  Laterality: N/A;  Same day CT at 0815    CARDIAC CATHETERIZATION N/A 1/2/2025    Procedure: Cardiomems;  Surgeon: Kalpesh Seaman MD;  Location: [facility] Cardiac Cath Lab;  Service: Cardiovascular;  Laterality: N/A;  notified rep 11/25/24    CARDIAC DEFIBRILLATOR PLACEMENT  06/12/2014    Dual  chamber ICD    CARDIOVERSION  2018, 2018    CARPAL TUNNEL RELEASE Bilateral      SECTION, CLASSIC      x2    CHOLECYSTECTOMY      Open surgery    COLONOSCOPY  2024    Extensive diverticulosis of moderate severity and causing mild luminal narrowing in the sigmoid colon    FINGER SURGERY Left 2008    Left index finger metacarpophalangeal fusion    FOOT SURGERY Bilateral     B/L Tarsal Tunnel Release    IR INJECTION EPIDURAL STEROID  2011    Lumbar spine; , ,     IR INJECTION EPIDURAL STEROID      Cervical spine 2007    LEG SURGERY Right 2006    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  Plantar wart removal R foot.    NERVE SURGERY Right 10/13/2010    Release of Right tarsal tunnel syndrome w/ severe adhesion scar tissue    NISSEN FUNDOPLICATION      Open surgery    PICC LINE INSERTION WO IMAGING  10/24/2015    Placed for stable IV access    REVERSE TOTAL SHOULDER ARTHROPLASTY Bilateral 2024    Right 2023, Left 2024    ROTATOR CUFF REPAIR      ULNAR NERVE TRANSPOSITION Left     VENTRAL HERNIA REPAIR  06/15/2015    Laparoscopic double ventral hernia repair.    WISDOM TOOTH EXTRACTION      WOUND DEBRIDEMENT  2020    Excisional debridement of abdominal wound for wound dehiscence      Past Medical History:   Diagnosis Date    Acute hypoxic on chronic hypercapnic respiratory failure (Multi) 2024    Arrhythmia     Asthma     Atrial fibrillation (Multi)     Resistent to treatment: s/p DCCVs and ablations    Central sleep apnea     Per PSG 18; severe central sleep apnea, .6, mild obstructive component    Cervical myofascial pain syndrome     Baclofen    Chronic anemia     CKD stage 3a, GFR 45-59 ml/min (Multi)     COPD (chronic obstructive pulmonary disease) (Multi)     DVT of axillary vein, acute right (Multi) 2018    When PICC line was removed. U/S + Partial nonocclusive  DVT in the axillary and proximal basilic vein in 2018    H/O being hospitalized 02/2024 Feb 24-Mar 5, 2024:  (2/24) admitted for dyspnea and chest pain work-up and found to have pleural effusions.  Found to have acute blood loss anemia s/p 2u PRBC. EGD neg for active bleed.  Cardiology consulted - plan for Watchman device WING closure as out pt.    History of Helicobacter pylori infection     3/2013, 1/2021    Hx of syncope     Recurrent Syncope d/t VT- s/p ILR (12/2012) implanted.  (July 2013) Episode of syncope that appears to be correlated with an 11 second round of ventricular tachycardia recorded by the loop recorder.  S/p EPS (June 2014) neg for inducible arrhythmias. (ILR removed when ICD implanted)    Hyperlipidemia     Hypertension     ICD (implantable cardioverter-defibrillator) in place     Placed 06/12/2014    Mitral valve regurgitation     Mild-Moderate per Echo 02/25/2024    Moderate aortic stenosis by prior echocardiogram     Echo 2/25/2024    Osteopenia 06/26/2023    Personal history of anaphylaxis     See allergy list    Psoriasis     PUD (peptic ulcer disease)     H/O nonbleeding gastric ulcers with small hiatal hernia on 1/6/2021 EGD    Pulmonary hypertension (Multi)     Mild - Moderate per Echo 2/25/2024    Radiculopathy, lumbar region 08/13/2018    Acute lumbar radiculopathy    Restless legs syndrome 11/17/2015    Restless legs syndrome    Rheumatoid arthritis     Seasonal allergic rhinitis due to pollen     Small bowel obstruction (Multi) 06/26/2023    H/O due to Severe Adhesions s/p Adhesiolysis x2 in 2015 and 2020    Wide-complex tachycardia     Per cardiology 3/7/2024: VT is on a basis of triggered activity certainly made worse with anemia and prednisone. Since she is asymptomatic we will continue to observe.     Social History     Tobacco Use    Smoking status: Never    Smokeless tobacco: Never   Vaping Use    Vaping status: Never Used   Substance Use Topics    Alcohol use: Never    Drug  "use: Never      Family History   Problem Relation Name Age of Onset    Asthma Daughter      Asthma Other      Colon cancer Other      Diabetes Other      Other (stroke syndrome) Other        Review of Systems    Objective   /78   Pulse 80   Ht 1.626 m (5' 4\")   Wt 71.2 kg (157 lb)   SpO2 97%   BMI 26.95 kg/m²    Physical Exam  Vitals and nursing note reviewed.   Constitutional:       General: She is not in acute distress.     Appearance: Normal appearance.   HENT:      Head: Normocephalic and atraumatic.      Right Ear: Tympanic membrane, ear canal and external ear normal.      Left Ear: Tympanic membrane, ear canal and external ear normal.      Nose: Nose normal.      Mouth/Throat:      Mouth: Mucous membranes are moist.      Pharynx: Oropharynx is clear.   Eyes:      Extraocular Movements: Extraocular movements intact.      Conjunctiva/sclera: Conjunctivae normal.      Pupils: Pupils are equal, round, and reactive to light.   Neck:      Vascular: No carotid bruit.   Cardiovascular:      Rate and Rhythm: Normal rate and regular rhythm.      Pulses: Normal pulses.      Heart sounds: Murmur heard.      Systolic murmur is present with a grade of 2/6.   Pulmonary:      Effort: Pulmonary effort is normal.      Breath sounds: Normal breath sounds.   Abdominal:      General: Abdomen is flat. Bowel sounds are normal.      Palpations: Abdomen is soft. There is no mass.      Tenderness: There is abdominal tenderness in the epigastric area.   Musculoskeletal:      Cervical back: Normal range of motion and neck supple.   Lymphadenopathy:      Cervical: No cervical adenopathy.   Skin:     Capillary Refill: Capillary refill takes less than 2 seconds.   Neurological:      General: No focal deficit present.      Mental Status: She is alert and oriented to person, place, and time.   Psychiatric:         Mood and Affect: Mood normal.         Behavior: Behavior normal.         Assessment/Plan   Problem List Items Addressed " This Visit       Stage 3a chronic kidney disease (Multi)    Relevant Orders    Comprehensive Metabolic Panel    Severe persistent asthma with acute exacerbation (Multi)    Relevant Medications    ipratropium-albuteroL (Combivent Respimat)  mcg/actuation inhaler    Rheumatoid arthritis    Relevant Medications    diclofenac sodium (Voltaren) 1 % gel    Obstructive sleep apnea    Mixed hyperlipidemia    Relevant Orders    Lipid Panel    Comprehensive Metabolic Panel    Iron deficiency anemia    Relevant Orders    CBC    Iron and TIBC    Gastroesophageal reflux disease without esophagitis    Relevant Orders    POCT UA Automated manually resulted (Completed)    Protein, Urine Random    Combined systolic and diastolic congestive heart failure    Relevant Medications    clopidogrel (Plavix) 75 mg tablet    nitroglycerin (Nitrostat) 0.4 mg SL tablet    Chest pain, unspecified type    Relevant Medications    nitroglycerin (Nitrostat) 0.4 mg SL tablet    Centrilobular emphysema (Multi)    Relevant Medications    ipratropium-albuteroL (Combivent Respimat)  mcg/actuation inhaler    ipratropium-albuteroL (Duo-Neb) 0.5-2.5 mg/3 mL nebulizer solution    Atrial fibrillation, unspecified type (Multi) - Primary    Relevant Medications    clopidogrel (Plavix) 75 mg tablet    nitroglycerin (Nitrostat) 0.4 mg SL tablet    Asthma    Relevant Medications    fluticasone propion-salmeteroL (Advair HFA) 45-21 mcg/actuation inhaler    ipratropium-albuteroL (Combivent Respimat)  mcg/actuation inhaler    ipratropium-albuteroL (Duo-Neb) 0.5-2.5 mg/3 mL nebulizer solution     Other Visit Diagnoses       Encounter for screening mammogram for breast cancer        Relevant Orders    BI mammo bilateral screening tomosynthesis    Menopause        Relevant Orders    XR DEXA bone density    Cervical myofascial pain syndrome        Relevant Medications    baclofen (Lioresal) 10 mg tablet    Vitamin B12 deficiency        Relevant  "Medications    cyanocobalamin (Vitamin B-12) 1,000 mcg/mL injection    insulin syringe-needle U-100 (BD Insulin Syringe Ultra-Fine) 1 mL 30 gauge x 1/2\" syringe    Seasonal allergic rhinitis due to pollen        Relevant Medications    montelukast (Singulair) 10 mg tablet    Acute superficial gastritis without hemorrhage        Relevant Medications    pantoprazole (ProtoNix) 40 mg EC tablet    PUD (peptic ulcer disease)        Relevant Medications    pantoprazole (ProtoNix) 40 mg EC tablet    Chronic superficial gastritis without bleeding        Relevant Medications    sucralfate (Carafate) 100 mg/mL suspension        Renewed/continued rest of medications  Checked labs  Updated Health Maintenance in HPI section  Osteoporosis- DEXA, vitamin D     Obesity- caloric restriction, increase CV exercise     Hyperlipidemia- continue meds, low fat/cholesterol diet     CHF with HD- Bumetanide, Diltiazem     A. Fib/Wide-complex Tachycardia- digoxin, diltiazem, follow with cardiology     RA- Hydroxychloroquine, F/U rheumatology     Asthma- ventolin HFA prn, avoid allergens     Anemia- check CBC, iron levels, iron supplement     Cervical radiculopathy/LBP- f/u with pain management     COPD/Asthma- avoid poor air quality, does not feel like need inhaler     Coagulation Defect- off anticoagulants, will get watchman     CKD- limit NSAID's, increase fluids     GERD- pantoprazole, carafate (suspect will improve once off prednisone)     AR- allergen avoidance, sigulair     Psoriasis- clobetasol, moisturizer    Patient understands and agrees with treatment plan    Fredy Elias DO   Patient was identified as a fall risk. Risk prevention instructions provided.  "

## 2025-03-04 ENCOUNTER — HOSPITAL ENCOUNTER (OUTPATIENT)
Dept: CARDIOLOGY | Facility: CLINIC | Age: 75
Discharge: HOME | End: 2025-03-04
Payer: MEDICARE

## 2025-03-04 ENCOUNTER — TELEPHONE (OUTPATIENT)
Dept: PRIMARY CARE | Facility: CLINIC | Age: 75
End: 2025-03-04
Payer: MEDICARE

## 2025-03-04 DIAGNOSIS — Z95.810 PRESENCE OF AUTOMATIC CARDIOVERTER/DEFIBRILLATOR (AICD): ICD-10-CM

## 2025-03-04 DIAGNOSIS — I47.20 VENTRICULAR TACHYCARDIA (MULTI): ICD-10-CM

## 2025-03-04 LAB
ALBUMIN SERPL-MCNC: 4.5 G/DL (ref 3.6–5.1)
ALP SERPL-CCNC: 68 U/L (ref 37–153)
ALT SERPL-CCNC: 13 U/L (ref 6–29)
ANION GAP SERPL CALCULATED.4IONS-SCNC: 11 MMOL/L (CALC) (ref 7–17)
AST SERPL-CCNC: 16 U/L (ref 10–35)
BILIRUB SERPL-MCNC: 0.4 MG/DL (ref 0.2–1.2)
BUN SERPL-MCNC: 20 MG/DL (ref 7–25)
CALCIUM SERPL-MCNC: 9 MG/DL (ref 8.6–10.4)
CHLORIDE SERPL-SCNC: 102 MMOL/L (ref 98–110)
CHOLEST SERPL-MCNC: 177 MG/DL
CHOLEST/HDLC SERPL: 2.1 (CALC)
CO2 SERPL-SCNC: 30 MMOL/L (ref 20–32)
CREAT SERPL-MCNC: 0.98 MG/DL (ref 0.6–1)
CREAT UR-MCNC: 46 MG/DL (ref 20–275)
EGFRCR SERPLBLD CKD-EPI 2021: 61 ML/MIN/1.73M2
ERYTHROCYTE [DISTWIDTH] IN BLOOD BY AUTOMATED COUNT: 14.5 % (ref 11–15)
GLUCOSE SERPL-MCNC: 102 MG/DL (ref 65–99)
HCT VFR BLD AUTO: 31 % (ref 35–45)
HDLC SERPL-MCNC: 83 MG/DL
HGB BLD-MCNC: 9.3 G/DL (ref 11.7–15.5)
IRON SATN MFR SERPL: 5 % (CALC) (ref 16–45)
IRON SERPL-MCNC: 21 MCG/DL (ref 45–160)
LDLC SERPL CALC-MCNC: 77 MG/DL (CALC)
MCH RBC QN AUTO: 24 PG (ref 27–33)
MCHC RBC AUTO-ENTMCNC: 30 G/DL (ref 32–36)
MCV RBC AUTO: 79.9 FL (ref 80–100)
NONHDLC SERPL-MCNC: 94 MG/DL (CALC)
PLATELET # BLD AUTO: 226 THOUSAND/UL (ref 140–400)
PMV BLD REES-ECKER: 12.4 FL (ref 7.5–12.5)
POTASSIUM SERPL-SCNC: 3.8 MMOL/L (ref 3.5–5.3)
PROT SERPL-MCNC: 6.9 G/DL (ref 6.1–8.1)
PROT UR-MCNC: 7 MG/DL (ref 5–24)
PROT/CREAT UR: 0.15 MG/MG CREAT (ref 0.02–0.18)
PROT/CREAT UR: 152 MG/G CREAT (ref 24–184)
RBC # BLD AUTO: 3.88 MILLION/UL (ref 3.8–5.1)
SODIUM SERPL-SCNC: 143 MMOL/L (ref 135–146)
TIBC SERPL-MCNC: 444 MCG/DL (CALC) (ref 250–450)
TRIGL SERPL-MCNC: 83 MG/DL
WBC # BLD AUTO: 4.9 THOUSAND/UL (ref 3.8–10.8)

## 2025-03-04 NOTE — PROGRESS NOTES
Referral from Dr. Soto. Marcela comes to discuss treatment recommendations for mitral regurgitation with lvef 55-60% . Recent right heart cath show pa 63/27 wedge 21, last diagnostic cath 2022 showing no significant coronary stenosis. Experiencing shortness of breath, History of asthma, copd, emphysema. Additional history of hld, gerd, ckd, rheumatoid arthritis, coagulation defect. A fib/vtach s/p go occluder, cardiomems watchmen placement. Rose Bolivar RN

## 2025-03-04 NOTE — TELEPHONE ENCOUNTER
----- Message from Fredy Elias sent at 3/4/2025 12:25 PM EST -----  Trace WBC in the urine- suspect from from the skin. Liver numbers, kidney function, electrolytes and NF blood sugar is good. Anemia is improving- iron levels low so probably need iron infusions again- can get hematology to order or I can. Cholesterol levels are good.

## 2025-03-05 ENCOUNTER — OFFICE VISIT (OUTPATIENT)
Dept: CARDIOLOGY | Facility: HOSPITAL | Age: 75
End: 2025-03-05
Payer: MEDICARE

## 2025-03-05 ENCOUNTER — HOSPITAL ENCOUNTER (OUTPATIENT)
Dept: RADIOLOGY | Facility: HOSPITAL | Age: 75
Discharge: HOME | End: 2025-03-05
Payer: MEDICARE

## 2025-03-05 ENCOUNTER — OFFICE VISIT (OUTPATIENT)
Dept: CARDIAC SURGERY | Facility: HOSPITAL | Age: 75
End: 2025-03-05
Payer: MEDICARE

## 2025-03-05 VITALS
OXYGEN SATURATION: 100 % | HEIGHT: 64 IN | SYSTOLIC BLOOD PRESSURE: 124 MMHG | WEIGHT: 156.6 LBS | DIASTOLIC BLOOD PRESSURE: 70 MMHG | BODY MASS INDEX: 26.73 KG/M2 | HEART RATE: 78 BPM

## 2025-03-05 DIAGNOSIS — D50.8 IRON DEFICIENCY ANEMIA SECONDARY TO INADEQUATE DIETARY IRON INTAKE: Primary | ICD-10-CM

## 2025-03-05 DIAGNOSIS — I34.0 MITRAL VALVE INSUFFICIENCY, UNSPECIFIED ETIOLOGY: ICD-10-CM

## 2025-03-05 DIAGNOSIS — M81.6 LOCALIZED OSTEOPOROSIS WITHOUT CURRENT PATHOLOGICAL FRACTURE: ICD-10-CM

## 2025-03-05 DIAGNOSIS — I34.0 NONRHEUMATIC MITRAL VALVE REGURGITATION: ICD-10-CM

## 2025-03-05 DIAGNOSIS — I34.0 NONRHEUMATIC MITRAL VALVE REGURGITATION: Primary | ICD-10-CM

## 2025-03-05 PROCEDURE — 2550000001 HC RX 255 CONTRASTS: Performed by: INTERNAL MEDICINE

## 2025-03-05 PROCEDURE — 74174 CTA ABD&PLVS W/CONTRAST: CPT

## 2025-03-05 PROCEDURE — 99214 OFFICE O/P EST MOD 30 MIN: CPT | Performed by: INTERNAL MEDICINE

## 2025-03-05 PROCEDURE — 99215 OFFICE O/P EST HI 40 MIN: CPT | Mod: 25 | Performed by: THORACIC SURGERY (CARDIOTHORACIC VASCULAR SURGERY)

## 2025-03-05 RX ADMIN — IOHEXOL 80 ML: 350 INJECTION, SOLUTION INTRAVENOUS at 11:14

## 2025-03-05 ASSESSMENT — ENCOUNTER SYMPTOMS
OCCASIONAL FEELINGS OF UNSTEADINESS: 1
LOSS OF SENSATION IN FEET: 1
DEPRESSION: 0

## 2025-03-05 ASSESSMENT — PATIENT HEALTH QUESTIONNAIRE - PHQ9
2. FEELING DOWN, DEPRESSED OR HOPELESS: NOT AT ALL
SUM OF ALL RESPONSES TO PHQ9 QUESTIONS 1 AND 2: 0
1. LITTLE INTEREST OR PLEASURE IN DOING THINGS: NOT AT ALL

## 2025-03-05 ASSESSMENT — PAIN SCALES - GENERAL: PAINLEVEL_OUTOF10: 0-NO PAIN

## 2025-03-05 NOTE — PROGRESS NOTES
Trina Elias consented today for [employer] trials  for screeing puposes. Wll share her CT images with sponsors for anatomical suitability.  For any question please call Brionna Rowe at [phone]

## 2025-03-05 NOTE — PROGRESS NOTES
[facility] Questionnaire      1  Heart failure affects different people in different ways. Some feel shortness of breath while others feel fatigue. Please indicate how much you are limited by heart failure (shortness of breath or fatigue) in your ability to do the following activities over the past 2 weeks. PRE PROCEDURE    A.) Showering/bathing  4. Slightly  B.) Walking 1 block on level ground 2. Quite a bit  C.) Hurrying or Jogging   1. Extremely    2.  Over the past 2 weeks, how many times did you have swelling in your feet, ankles or legs when you woke up in the morning? 4. Less than once a week    3.  Over the past 2 weeks, on average, how many times has fatigue limited your ability to do what you wanted? 1. All the time    4.  Over the past 2 weeks, on average, how many times has shortness of breath limited your ability to do what you wanted? 1. All the time    5.  Over the past 2 weeks, on average, how many times have you been forced to sleep sitting up in a chair or with at least 3 pillows to prop you up because of shortness of breath? Every night    6. Over the past 2 weeks, how much has your heart failure limited your enjoyment of life? It has limited my enjoyment of life quite a bit    7. If you had to spend the rest of your life with your heart failure the way it is right now, how would you feel about this? 1. Not at all     8. How much does your heart failure affect your lifestyle? Please indicate how your heart failure may have limited yourparticipation in the following activities over the past 2 weeks    A.)  Hobbies, recreational activities  2. Limited quite a bit    B.) Working or doing household chores  2. Limited quite a bit    C.) Visiting family or friends out of your home  6. Does not apply for other reasons    5 Meter Walk 9 seconds

## 2025-03-05 NOTE — PROGRESS NOTES
PCP: Dr. Elias  Cards: Dr. Soto    HPI    74 y.o. y/o female with PMH of afib, Hyperlipidemia and CAD, presents for evaluation of severe, symptomatic mitral regurgitation.  Patient relates difficulty to adjust heart failure meds even with the cardiomems implant. She was admitted 6 weeks ago for ADHF. She reports worsening fatigue and DOSHI on last months. Gradually doing less and less activity secondary to symptoms.      Denies overt orthopnea, PND, exertional CP.  Has occasional dizziness.  Denies syncope or presyncope.  Denies increased abdominal girth, edema.    Full 14 point ROS complete and negative except as noted above.     Structural Workup:   Echo: EF 55%, severe mitral regurgitation  EKG:  Encounter Date: 02/10/25   Electrocardiogram, 12-lead PRN ACS symptoms   Result Value    Ventricular Rate 95    Atrial Rate 95    W[place] Interval 158    QRS Duration 82    QT Interval 326    QTC Calculation(Bazett) 409    P Axis 60    R Axis 33    T Axis -12    QRS Count 15    Q Onset 221    P Onset 142    P Offset 177    T Offset 384    QTC Fredericia 379    Narrative    Sinus rhythm with Premature supraventricular complexes and Premature ventricular complexes or Fusion complexes  Nonspecific ST and T wave abnormality  Abnormal ECG  When compared with ECG of 10-FEB-2025 12:56, (unconfirmed)  Sinus rhythm has replaced Atrial fibrillation  Nonspecific T wave abnormality has replaced inverted T waves in Lateral leads  See ED provider note for full interpretation and clinical correlation  Confirmed by Theodora Davila ([id number]) on 2/12/2025 12:08:06 PM   - NYHA: 3  - LHC: Pending  - SULY: pending  - dental clearance: edentulous   - EFT 1/5 (anemia)  - STS   Procedure Type: Isolated MVr  Perioperative Outcome Estimate %  Operative Mortality 1.31%  Morbidity & Mortality 7.27%  Stroke 1.51%  Renal Failure 1.17%  Reoperation 2.35%  Prolonged Ventilation 3.74%  Deep Sternal Wound Infection 0.072%  Long Hospital Stay (>14  days) 3.4%  Short Hospital Stay (<6 days)* 44.6%    Procedure Type: Isolated MVR  Perioperative Outcome Estimate %  Operative Mortality 2.62%  Morbidity & Mortality 12.3%  Stroke 1.98%  Renal Failure 2.49%  Reoperation 3.52%  Prolonged Ventilation 7.33%  Deep Sternal Wound Infection 0.071%  Long Hospital Stay (>14 days) 7.7%  Short Hospital Stay (<6 days)* 24.3%      Social History     Tobacco Use    Smoking status: Never    Smokeless tobacco: Never   Substance Use Topics    Alcohol use: Never        Family History   Problem Relation Name Age of Onset    Asthma Daughter      Asthma Other      Colon cancer Other      Diabetes Other      Other (stroke syndrome) Other          Allergies   Allergen Reactions    Abatacept Shortness of breath     pulmonary edema, diarrhea, nausea    Hydrocodone-Acetaminophen Anaphylaxis    Hydromorphone Anaphylaxis    Metoprolol Anaphylaxis    Penicillins Hives    Pregabalin Shortness of breath     caused pulmonary edema    Prochlorperazine Itching     paralysis of the mouth with drooping    Methotrexate Hives and Itching     chest pain    Aripiprazole Rash    Beta-Blockers (Beta-Adrenergic Blocking Agts) Other, Palpitations and Wheezing    Bupropion Nausea/vomiting    Cefepime Itching    Ciprofloxacin Hives    Furosemide Itching    Levofloxacin Itching    Pineapple Rash        Current Outpatient Medications   Medication Instructions    acetaminophen (TYLENOL) 325 mg, Every 6 hours PRN    baclofen (Lioresal) 10 mg tablet One tablet in the morning and 2 tablets in the evening.    cholecalciferol (VITAMIN D3) 1,000 Units, Daily    clobetasol (Temovate) 0.05 % cream Topical, 2 times daily    clopidogrel (PLAVIX) 75 mg, oral, Daily    cyanocobalamin (VITAMIN B-12) 1,000 mcg, intramuscular, Every 30 days    diclofenac sodium (VOLTAREN) 4 g, Topical, 2 times daily PRN    digoxin (LANOXIN) 125 mcg, Daily    dilTIAZem CD (CARDIZEM CD) 120 mg, Daily    EPINEPHrine (EPIPEN) 0.3 mg, intramuscular,  "Once as needed    fluticasone propion-salmeteroL (Advair HFA) 45-21 mcg/actuation inhaler 2 puffs, inhalation, 2 times daily RT, Rinse mouth with water after use to reduce aftertaste and incidence of candidiasis. Do not swallow.    hydroxychloroquine (PLAQUENIL) 300 mg, oral, Daily    insulin syringe-needle U-100 (BD Insulin Syringe Ultra-Fine) 1 mL 30 gauge x 1/2\" syringe Use one a month    ipratropium-albuteroL (Combivent Respimat)  mcg/actuation inhaler 2 puffs, inhalation, 4 times daily RT    ipratropium-albuteroL (Duo-Neb) 0.5-2.5 mg/3 mL nebulizer solution 3 mL, nebulization, 4 times daily PRN    montelukast (SINGULAIR) 10 mg, oral, Nightly    multivitamin tablet 1 tablet, Daily    naloxone (NARCAN) 4 mg, nasal, As needed, May repeat every 2-3 minutes if needed, alternating nostrils, until medical assistance becomes available.    nitroglycerin (NITROSTAT) 0.4 mg, sublingual, Every 5 min PRN    ondansetron (ZOFRAN) 8 mg, Every 8 hours PRN    oxyCODONE-acetaminophen (Percocet) 5-325 mg tablet 1 tablet, oral, 3 times daily PRN    oxyCODONE-acetaminophen (Percocet) 5-325 mg tablet 1 tablet, oral, 3 times daily PRN    pantoprazole (PROTONIX) 40 mg, oral, 2 times daily    rosuvastatin (CRESTOR) 20 mg, oral, Daily    sucralfate (CARAFATE) 1 g, oral, 4 times daily with meals and nightly    sulfaSALAzine (AZULFIDINE) 1,000 mg, oral, 2 times daily        Vitals:    03/05/25 0828   BP: 124/70   Pulse: 78   SpO2: 100%        Physical Exam:     General:  Alert, calm, well-developed   HEENT:  Pupils equal, round, reactive to light and accommodation. Extraocular movements   Neck:  Supple, without lymphadenopathy or thyromegaly. No carotid bruits.  Lymph:  No axillary, cervical, supraclavicular, pre-auricular, submental, or occipital lymphadenopathy,  Cardiovascular:  Regular rate and rhythm, with normal S1 and S2. Mitral murmurs 3/6, no rubs or gallops. JVD. 2+ pulses bilaterally - dorsalis pedis and " radial.  Lungs:  lung clear. No wheezes. No accessory muscle use or cyanosis. No tenderness to palpation.  Abdomen:  Normoactive bowel sounds. Soft, flat, non-tender, and non-distended. No hepatosplenomegaly; liver span approximately 10 cm.  Skin:  Warm, dry, well-perfused. No rashes or other lesions.  Extremities:  2+ pulses in upper and lower extremities. Lower extremity edema; legs are symmetric in appearance.  Neuro:  Alert and oriented to person, place, and time. Able to communicate well. 5/5 strength in all extremities bilaterally. Sensation intact in all extremities. Normal gait.        Lab Results   Component Value    CR 0.98     HGB 9.3     ALBUMIN 4.5               Impression:   74 y.o. y/o female with severe, symptomatic mitral regurgitation.  Patient relates difficulty to adjust heart failure meds and was admitted 6 weeks ago for ADHF. She reports worsening fatigue and DOSHI on last months. We will consider her for TMVR and mTEER therapies after finishing her workup.    Plan:   We will need a LHC  We will get CT TMVR protocol  We will need a SULY    The overall decision regarding the best treatment for this condition is complex.  We discussed options of both surgical mitral valve replacement/ repair, but most of our discussion was focused on transcatheter mitral valve replacement along with mTEER therapy and the risks and benefits involved with both of them in detail.    We discussed all the risks associated with the procedure, including but not limited to stroke, MI, pericardial tamponade, vascular complications, infection and death were discussed with the patient. The patient verbalized understanding and decided to proceed with the procedure.     We will discuss this patient's case at our Valve Team meeting with representatives from Structural Heart and Cardiac Surgery. Our nurse navigators will contact patient with further diagnostic needs and formal plan.

## 2025-03-06 NOTE — PROGRESS NOTES
PCP: Dr. Elias  Cards: Dr. Soto    HPI    74 y.o. y/o female with PMH of afib, Hyperlipidemia and CAD, presents for evaluation of severe, symptomatic mitral regurgitation.  Patient relates difficulty to adjust heart failure meds even with the cardiomems implant. She was admitted 6 weeks ago for ADHF. She reports worsening fatigue and DOSHI on last months. Gradually doing less and less activity secondary to symptoms.      Denies overt orthopnea, PND, exertional CP.  Has occasional dizziness.  Denies syncope or presyncope.  Denies increased abdominal girth, edema.    Full 14 point ROS complete and negative except as noted above.     Structural Workup:   Echo: EF 55%, severe mitral regurgitation  EKG:  Encounter Date: 02/10/25   Electrocardiogram, 12-lead PRN ACS symptoms   Result Value    Ventricular Rate 95    Atrial Rate 95    [place] Interval 158    QRS Duration 82    QT Interval 326    QTC Calculation(Bazett) 409    P Axis 60    R Axis 33    T Axis -12    QRS Count 15    Q Onset 221    P Onset 142    P Offset 177    T Offset 384    QTC Fredericia 379    Narrative    Sinus rhythm with Premature supraventricular complexes and Premature ventricular complexes or Fusion complexes  Nonspecific ST and T wave abnormality  Abnormal ECG  When compared with ECG of 10-FEB-2025 12:56, (unconfirmed)  Sinus rhythm has replaced Atrial fibrillation  Nonspecific T wave abnormality has replaced inverted T waves in Lateral leads  See ED provider note for full interpretation and clinical correlation  Confirmed by Theodora Dvaila ([id number]) on 2/12/2025 12:08:06 PM   - NYHA: 3  - LHC: Pending  - SULY: pending  - dental clearance: edentulous   - EFT 1/5 (anemia)  - STS   Procedure Type: Isolated MVr  Perioperative Outcome Estimate %  Operative Mortality 1.31%  Morbidity & Mortality 7.27%  Stroke 1.51%  Renal Failure 1.17%  Reoperation 2.35%  Prolonged Ventilation 3.74%  Deep Sternal Wound Infection 0.072%  Long Hospital Stay (>14  days) 3.4%  Short Hospital Stay (<6 days)* 44.6%    Procedure Type: Isolated MVR  Perioperative Outcome Estimate %  Operative Mortality 2.62%  Morbidity & Mortality 12.3%  Stroke 1.98%  Renal Failure 2.49%  Reoperation 3.52%  Prolonged Ventilation 7.33%  Deep Sternal Wound Infection 0.071%  Long Hospital Stay (>14 days) 7.7%  Short Hospital Stay (<6 days)* 24.3%      Social History     Tobacco Use    Smoking status: Never    Smokeless tobacco: Never   Substance Use Topics    Alcohol use: Never        Family History   Problem Relation Name Age of Onset    Asthma Daughter      Asthma Other      Colon cancer Other      Diabetes Other      Other (stroke syndrome) Other          Allergies   Allergen Reactions    Abatacept Shortness of breath     pulmonary edema, diarrhea, nausea    Hydrocodone-Acetaminophen Anaphylaxis    Hydromorphone Anaphylaxis    Metoprolol Anaphylaxis    Penicillins Hives    Pregabalin Shortness of breath     caused pulmonary edema    Prochlorperazine Itching     paralysis of the mouth with drooping    Methotrexate Hives and Itching     chest pain    Aripiprazole Rash    Beta-Blockers (Beta-Adrenergic Blocking Agts) Other, Palpitations and Wheezing    Bupropion Nausea/vomiting    Cefepime Itching    Ciprofloxacin Hives    Furosemide Itching    Levofloxacin Itching    Pineapple Rash        Current Outpatient Medications   Medication Instructions    acetaminophen (TYLENOL) 325 mg, Every 6 hours PRN    baclofen (Lioresal) 10 mg tablet One tablet in the morning and 2 tablets in the evening.    cholecalciferol (VITAMIN D3) 1,000 Units, Daily    clobetasol (Temovate) 0.05 % cream Topical, 2 times daily    clopidogrel (PLAVIX) 75 mg, oral, Daily    cyanocobalamin (VITAMIN B-12) 1,000 mcg, intramuscular, Every 30 days    diclofenac sodium (VOLTAREN) 4 g, Topical, 2 times daily PRN    digoxin (LANOXIN) 125 mcg, Daily    dilTIAZem CD (CARDIZEM CD) 120 mg, Daily    EPINEPHrine (EPIPEN) 0.3 mg, intramuscular,  "Once as needed    fluticasone propion-salmeteroL (Advair HFA) 45-21 mcg/actuation inhaler 2 puffs, inhalation, 2 times daily RT, Rinse mouth with water after use to reduce aftertaste and incidence of candidiasis. Do not swallow.    hydroxychloroquine (PLAQUENIL) 300 mg, oral, Daily    insulin syringe-needle U-100 (BD Insulin Syringe Ultra-Fine) 1 mL 30 gauge x 1/2\" syringe Use one a month    ipratropium-albuteroL (Combivent Respimat)  mcg/actuation inhaler 2 puffs, inhalation, 4 times daily RT    ipratropium-albuteroL (Duo-Neb) 0.5-2.5 mg/3 mL nebulizer solution 3 mL, nebulization, 4 times daily PRN    montelukast (SINGULAIR) 10 mg, oral, Nightly    multivitamin tablet 1 tablet, Daily    naloxone (NARCAN) 4 mg, nasal, As needed, May repeat every 2-3 minutes if needed, alternating nostrils, until medical assistance becomes available.    nitroglycerin (NITROSTAT) 0.4 mg, sublingual, Every 5 min PRN    ondansetron (ZOFRAN) 8 mg, Every 8 hours PRN    oxyCODONE-acetaminophen (Percocet) 5-325 mg tablet 1 tablet, oral, 3 times daily PRN    oxyCODONE-acetaminophen (Percocet) 5-325 mg tablet 1 tablet, oral, 3 times daily PRN    pantoprazole (PROTONIX) 40 mg, oral, 2 times daily    rosuvastatin (CRESTOR) 20 mg, oral, Daily    sucralfate (CARAFATE) 1 g, oral, 4 times daily with meals and nightly    sulfaSALAzine (AZULFIDINE) 1,000 mg, oral, 2 times daily        Vitals:    03/05/25 0828   BP: 124/70   Pulse: 78   SpO2: 100%        Physical Exam:     General:  Alert, calm, well-developed   HEENT:  Pupils equal, round, reactive to light and accommodation. Extraocular movements   Neck:  Supple, without lymphadenopathy or thyromegaly. No carotid bruits.  Lymph:  No axillary, cervical, supraclavicular, pre-auricular, submental, or occipital lymphadenopathy,  Cardiovascular:  Regular rate and rhythm, with normal S1 and S2. Mitral murmurs 3/6, no rubs or gallops. JVD. 2+ pulses bilaterally - dorsalis pedis and " radial.  Lungs:  lung clear. No wheezes. No accessory muscle use or cyanosis. No tenderness to palpation.  Abdomen:  Normoactive bowel sounds. Soft, flat, non-tender, and non-distended. No hepatosplenomegaly; liver span approximately 10 cm.  Skin:  Warm, dry, well-perfused. No rashes or other lesions.  Extremities:  2+ pulses in upper and lower extremities. Lower extremity edema; legs are symmetric in appearance.  Neuro:  Alert and oriented to person, place, and time. Able to communicate well. 5/5 strength in all extremities bilaterally. Sensation intact in all extremities. Normal gait.        Lab Results   Component Value    CR 0.98     HGB 9.3     ALBUMIN 4.5               Impression:   74 y.o. y/o female with severe, symptomatic mitral regurgitation.  Patient relates difficulty to adjust heart failure meds and was admitted 6 weeks ago for ADHF. She reports worsening fatigue and DOSHI on last months. We will consider her for TMVR and mTEER therapies after finishing her workup.    Plan:   We will need a LHC  We will get CT TMVR protocol  We will need a SULY    The overall decision regarding the best treatment for this condition is complex.  We discussed options of both surgical mitral valve replacement/ repair, but most of our discussion was focused on transcatheter mitral valve replacement along with mTEER therapy and the risks and benefits involved with both of them in detail.    We discussed all the risks associated with the procedure, including but not limited to stroke, MI, pericardial tamponade, vascular complications, infection and death were discussed with the patient. The patient verbalized understanding and decided to proceed with the procedure.     We will discuss this patient's case at our Valve Team meeting with representatives from Structural Heart and Cardiac Surgery. Our nurse navigators will contact patient with further diagnostic needs and formal plan.

## 2025-03-10 ENCOUNTER — HOSPITAL ENCOUNTER (OUTPATIENT)
Dept: RADIOLOGY | Facility: HOSPITAL | Age: 75
Discharge: HOME | End: 2025-03-10
Payer: MEDICARE

## 2025-03-10 VITALS — HEIGHT: 64 IN | BODY MASS INDEX: 26.63 KG/M2 | WEIGHT: 156 LBS

## 2025-03-10 DIAGNOSIS — Z12.31 ENCOUNTER FOR SCREENING MAMMOGRAM FOR BREAST CANCER: ICD-10-CM

## 2025-03-10 DIAGNOSIS — Z78.0 MENOPAUSE: ICD-10-CM

## 2025-03-10 PROCEDURE — 77080 DXA BONE DENSITY AXIAL: CPT

## 2025-03-10 PROCEDURE — 77067 SCR MAMMO BI INCL CAD: CPT

## 2025-03-10 RX ORDER — IBANDRONATE SODIUM 150 MG/1
150 TABLET, FILM COATED ORAL
Qty: 3 TABLET | Refills: 3 | Status: SHIPPED | OUTPATIENT
Start: 2025-03-10 | End: 2026-03-10

## 2025-03-19 ENCOUNTER — HOSPITAL ENCOUNTER (OUTPATIENT)
Facility: HOSPITAL | Age: 75
Setting detail: OUTPATIENT SURGERY
Discharge: HOME | End: 2025-03-19
Attending: INTERNAL MEDICINE | Admitting: INTERNAL MEDICINE
Payer: MEDICARE

## 2025-03-19 ENCOUNTER — HOSPITAL ENCOUNTER (OUTPATIENT)
Dept: CARDIOLOGY | Facility: HOSPITAL | Age: 75
End: 2025-03-19
Attending: INTERNAL MEDICINE | Admitting: INTERNAL MEDICINE
Payer: MEDICARE

## 2025-03-19 VITALS
SYSTOLIC BLOOD PRESSURE: 122 MMHG | DIASTOLIC BLOOD PRESSURE: 43 MMHG | RESPIRATION RATE: 16 BRPM | OXYGEN SATURATION: 92 % | HEART RATE: 68 BPM

## 2025-03-19 VITALS
OXYGEN SATURATION: 97 % | HEART RATE: 60 BPM | SYSTOLIC BLOOD PRESSURE: 120 MMHG | BODY MASS INDEX: 26.79 KG/M2 | RESPIRATION RATE: 10 BRPM | DIASTOLIC BLOOD PRESSURE: 52 MMHG | WEIGHT: 156.09 LBS

## 2025-03-19 DIAGNOSIS — I34.0 NONRHEUMATIC MITRAL VALVE REGURGITATION: ICD-10-CM

## 2025-03-19 LAB — EJECTION FRACTION: 43 %

## 2025-03-19 PROCEDURE — C1760 CLOSURE DEV, VASC: HCPCS | Performed by: INTERNAL MEDICINE

## 2025-03-19 PROCEDURE — C1751 CATH, INF, PER/CENT/MIDLINE: HCPCS | Performed by: INTERNAL MEDICINE

## 2025-03-19 PROCEDURE — 2720000007 HC OR 272 NO HCPCS: Performed by: INTERNAL MEDICINE

## 2025-03-19 PROCEDURE — 2550000001 HC RX 255 CONTRASTS: Performed by: INTERNAL MEDICINE

## 2025-03-19 PROCEDURE — 2500000004 HC RX 250 GENERAL PHARMACY W/ HCPCS (ALT 636 FOR OP/ED): Performed by: INTERNAL MEDICINE

## 2025-03-19 PROCEDURE — G0269 OCCLUSIVE DEVICE IN VEIN ART: HCPCS | Mod: 59 | Performed by: INTERNAL MEDICINE

## 2025-03-19 PROCEDURE — 99153 MOD SED SAME PHYS/QHP EA: CPT | Performed by: INTERNAL MEDICINE

## 2025-03-19 PROCEDURE — 99152 MOD SED SAME PHYS/QHP 5/>YRS: CPT

## 2025-03-19 PROCEDURE — 2780000003 HC OR 278 NO HCPCS: Performed by: INTERNAL MEDICINE

## 2025-03-19 PROCEDURE — 93460 R&L HRT ART/VENTRICLE ANGIO: CPT | Performed by: INTERNAL MEDICINE

## 2025-03-19 PROCEDURE — C1894 INTRO/SHEATH, NON-LASER: HCPCS | Performed by: INTERNAL MEDICINE

## 2025-03-19 PROCEDURE — 2500000005 HC RX 250 GENERAL PHARMACY W/O HCPCS: Performed by: INTERNAL MEDICINE

## 2025-03-19 PROCEDURE — 93503 INSERT/PLACE HEART CATHETER: CPT | Mod: 59 | Performed by: INTERNAL MEDICINE

## 2025-03-19 PROCEDURE — 7100000009 HC PHASE TWO TIME - INITIAL BASE CHARGE

## 2025-03-19 PROCEDURE — 93319 3D ECHO IMG CGEN CAR ANOMAL: CPT

## 2025-03-19 PROCEDURE — 7100000009 HC PHASE TWO TIME - INITIAL BASE CHARGE: Performed by: INTERNAL MEDICINE

## 2025-03-19 PROCEDURE — 7100000010 HC PHASE TWO TIME - EACH INCREMENTAL 1 MINUTE: Performed by: INTERNAL MEDICINE

## 2025-03-19 PROCEDURE — 99152 MOD SED SAME PHYS/QHP 5/>YRS: CPT | Performed by: INTERNAL MEDICINE

## 2025-03-19 PROCEDURE — 7100000010 HC PHASE TWO TIME - EACH INCREMENTAL 1 MINUTE

## 2025-03-19 RX ORDER — MIDAZOLAM HYDROCHLORIDE 1 MG/ML
INJECTION, SOLUTION INTRAMUSCULAR; INTRAVENOUS AS NEEDED
Status: DISCONTINUED | OUTPATIENT
Start: 2025-03-19 | End: 2025-03-19 | Stop reason: HOSPADM

## 2025-03-19 RX ORDER — LIDOCAINE HYDROCHLORIDE 10 MG/ML
INJECTION, SOLUTION EPIDURAL; INFILTRATION; INTRACAUDAL; PERINEURAL AS NEEDED
Status: DISCONTINUED | OUTPATIENT
Start: 2025-03-19 | End: 2025-03-19 | Stop reason: HOSPADM

## 2025-03-19 RX ORDER — LIDOCAINE HYDROCHLORIDE 20 MG/ML
SOLUTION OROPHARYNGEAL AS NEEDED
Status: DISCONTINUED | OUTPATIENT
Start: 2025-03-19 | End: 2025-03-19 | Stop reason: HOSPADM

## 2025-03-19 RX ORDER — FENTANYL CITRATE 50 UG/ML
INJECTION, SOLUTION INTRAMUSCULAR; INTRAVENOUS AS NEEDED
Status: DISCONTINUED | OUTPATIENT
Start: 2025-03-19 | End: 2025-03-19 | Stop reason: HOSPADM

## 2025-03-19 RX ADMIN — Medication 3 L/MIN: at 09:46

## 2025-03-19 RX ADMIN — BENZOCAINE, BUTAMBEN, AND TETRACAINE HYDROCHLORIDE 1 SPRAY: .028; .004; .004 AEROSOL, SPRAY TOPICAL at 09:45

## 2025-03-19 RX ADMIN — FENTANYL CITRATE 50 MCG: 50 INJECTION, SOLUTION INTRAMUSCULAR; INTRAVENOUS at 09:56

## 2025-03-19 RX ADMIN — LIDOCAINE HYDROCHLORIDE 1.25 ML: 20 SOLUTION ORAL at 09:45

## 2025-03-19 RX ADMIN — MIDAZOLAM 2 MG: 1 INJECTION INTRAMUSCULAR; INTRAVENOUS at 09:56

## 2025-03-19 ASSESSMENT — PAIN - FUNCTIONAL ASSESSMENT
PAIN_FUNCTIONAL_ASSESSMENT: 0-10

## 2025-03-19 ASSESSMENT — COLUMBIA-SUICIDE SEVERITY RATING SCALE - C-SSRS
1. IN THE PAST MONTH, HAVE YOU WISHED YOU WERE DEAD OR WISHED YOU COULD GO TO SLEEP AND NOT WAKE UP?: NO
6. HAVE YOU EVER DONE ANYTHING, STARTED TO DO ANYTHING, OR PREPARED TO DO ANYTHING TO END YOUR LIFE?: NO
2. HAVE YOU ACTUALLY HAD ANY THOUGHTS OF KILLING YOURSELF?: NO

## 2025-03-19 ASSESSMENT — PAIN SCALES - GENERAL
PAINLEVEL_OUTOF10: 6
PAINLEVEL_OUTOF10: 3
PAINLEVEL_OUTOF10: 0 - NO PAIN
PAINLEVEL_OUTOF10: 5 - MODERATE PAIN
PAINLEVEL_OUTOF10: 0 - NO PAIN
PAINLEVEL_OUTOF10: 5 - MODERATE PAIN
PAINLEVEL_OUTOF10: 2
PAINLEVEL_OUTOF10: 0 - NO PAIN

## 2025-03-19 ASSESSMENT — PAIN DESCRIPTION - DESCRIPTORS: DESCRIPTORS: ACHING;DISCOMFORT

## 2025-03-19 NOTE — SIGNIFICANT EVENT
Dr. Larson and spouse at bedside, no change in assessment  
Lunch arrived at bedside.  
No change in assessment, pt eating crackers  
No change in site assessment  
Pt ambulated to restroom and back, no change in assessment. Pt getting dressed, spouse at bedside  
Pt returned to APC 6 from lab 1, right femoral MYNX in place, pt resting comfortably, drinking ginger ale and eating crackers, assisted with positioning for chronic back pain  
Pt sat up. Discharge instructions given to Patient with affirmation of verbal understanding.      
patient

## 2025-03-19 NOTE — POST-PROCEDURE NOTE
Physician Transition of Care Summary  Invasive Cardiovascular Lab    Procedure Date: 3/19/2025  Attending:    Khoa Gutierrez - Primary  Resident/Fellow/Other Assistant: Surgeons and Role:  * No surgeons found with a matching role *    Indications:   Pre-op Diagnosis      * Nonrheumatic mitral valve regurgitation [I34.0]    Post-procedure diagnosis:   Post-op Diagnosis     * Nonrheumatic mitral valve regurgitation [I34.0]    Procedure(s):   Left & Right Heart Cath w Angiography & LV  45515 - W[place] R & L HRT CATH WINJX HRT ART& L VENTR IMG        Procedure Findings:   SULY, [facility] SHOWS MODERATE TO SEVERE MR, NORMAL CORONARIES    Description of the Procedure:   [facility] SULY    Complications:   NONE    Stents/Implants:   Implants       No implant documentation for this case.            Anticoagulation/Antiplatelet Plan:   NA    Estimated Blood Loss:   5 mL    Anesthesia: Moderate Sedation Anesthesia Staff: No anesthesia staff entered.    Any Specimen(s) Removed:   No specimens collected during this procedure.    Disposition:   HOME      Electronically signed by: Neo Gtuierrez MD, 3/19/2025 12:07 PM

## 2025-03-31 ENCOUNTER — CARDIOLOGY CONFERENCE (OUTPATIENT)
Dept: CARDIOLOGY | Facility: HOSPITAL | Age: 75
End: 2025-03-31
Payer: MEDICARE

## 2025-03-31 NOTE — PROGRESS NOTES
Valve and Structural Heart Multidisciplinary Meeting   This note reflects a summary of a case conference discussion between a multidisciplinary team of interventional cardiologists, cardiac surgeons, APPs, nurse navigators, and research team.  The suggestions and impressions in this note reflect group consensus opinion but do not substitute bedside evaluation and management by the primary team.     Attendees:  Jethro Joshua, Dr. Fagan, Mnai Ramirez, Jonathan St, Gillian Locke, Teresita Christensen, Dr. Ojeda, Roberto Carlos Leo, Teresita Christensen, Chhaya Camacho, Evelin Manning, Hannah Perez, Dr. Ghosh, Dr. Cardoso, Dr. Anderson    Trina Elias is a 74 y.o. year old female  Medical record number: [id number]    Referring Provider Dr. Soto    Brief Clinical Summary - clinical presentation/comorbidities:  74 y.o. y/o female with PMH of afib, Hyperlipidemia and CAD, presents for evaluation of severe, symptomatic mitral regurgitation.  Patient relates difficulty to adjust heart failure meds even with the cardiomems implant. She was admitted 6 weeks ago for ADHF. She reports worsening fatigue and DOSHI on last months.   Valve and Structural Heart Work Up  Echo: EF 55%, severe mitral regurgitation   -EKG SR PVCs nonspecific ST/T abnormality  - NYHA: 3  - LHC: 3/19/2025  - SULY:3/19/2025  CT TAVR 3/5/2025 recommend up to date colonoscopy - can proceed with treatment.   - dental clearance: edentulous   - EFT 1/5 (anemia)  - STS   Procedure Type: Isolated MVr  Perioperative OutcomeEstimate %  Operative Mortality1.31%  Procedure Type: Isolated MVR  Perioperative OutcomeEstimate %  Operative Mortality2.62%  [facility]/Walk  Group consensus recommendation: Patient has been approved for enrollment in [employer] trial.  Plan TMVR on 4/17.    To contact the  [facility] contact  Transfer Center or the office [phone].

## 2025-04-02 ENCOUNTER — HOSPITAL ENCOUNTER (OUTPATIENT)
Facility: HOSPITAL | Age: 75
Setting detail: SURGERY ADMIT
DRG: 266 | End: 2025-04-02
Attending: INTERNAL MEDICINE | Admitting: INTERNAL MEDICINE
Payer: MEDICARE

## 2025-04-02 DIAGNOSIS — Z95.2 S/P TRANSCATHETER MITRAL VALVE REPLACEMENT (TMVR): ICD-10-CM

## 2025-04-02 DIAGNOSIS — I34.0 MITRAL REGURGITATION: Primary | ICD-10-CM

## 2025-04-02 DIAGNOSIS — I34.0 MITRAL REGURGITATION: ICD-10-CM

## 2025-04-03 DIAGNOSIS — I50.23 ACUTE ON CHRONIC SYSTOLIC CONGESTIVE HEART FAILURE: ICD-10-CM

## 2025-04-03 DIAGNOSIS — I34.0 NONRHEUMATIC MITRAL VALVE REGURGITATION: ICD-10-CM

## 2025-04-07 ENCOUNTER — HOSPITAL ENCOUNTER (OUTPATIENT)
Dept: CARDIOLOGY | Facility: CLINIC | Age: 75
Discharge: HOME | End: 2025-04-07
Payer: MEDICARE

## 2025-04-07 DIAGNOSIS — I47.29 OTHER VENTRICULAR TACHYCARDIA: ICD-10-CM

## 2025-04-07 DIAGNOSIS — Z95.810 PRESENCE OF AUTOMATIC (IMPLANTABLE) CARDIAC DEFIBRILLATOR: ICD-10-CM

## 2025-04-07 PROCEDURE — 93296 REM INTERROG EVL PM/IDS: CPT

## 2025-04-09 ENCOUNTER — PRE-ADMISSION TESTING (OUTPATIENT)
Dept: PREADMISSION TESTING | Facility: HOSPITAL | Age: 75
End: 2025-04-09
Payer: MEDICARE

## 2025-04-09 VITALS
OXYGEN SATURATION: 97 % | DIASTOLIC BLOOD PRESSURE: 74 MMHG | HEIGHT: 64 IN | BODY MASS INDEX: 27.14 KG/M2 | SYSTOLIC BLOOD PRESSURE: 123 MMHG | WEIGHT: 159 LBS | TEMPERATURE: 98.9 F | HEART RATE: 70 BPM

## 2025-04-09 DIAGNOSIS — I34.0 MITRAL REGURGITATION: ICD-10-CM

## 2025-04-09 DIAGNOSIS — I34.0 MITRAL VALVE INSUFFICIENCY, UNSPECIFIED ETIOLOGY: ICD-10-CM

## 2025-04-09 DIAGNOSIS — Z01.818 PREOPERATIVE EXAMINATION: Primary | ICD-10-CM

## 2025-04-09 DIAGNOSIS — I34.0 NONRHEUMATIC MITRAL VALVE REGURGITATION: ICD-10-CM

## 2025-04-09 DIAGNOSIS — I50.23 ACUTE ON CHRONIC SYSTOLIC CONGESTIVE HEART FAILURE: ICD-10-CM

## 2025-04-09 DIAGNOSIS — R79.9 ABNORMAL FINDING OF BLOOD CHEMISTRY, UNSPECIFIED: ICD-10-CM

## 2025-04-09 LAB
ABO GROUP (TYPE) IN BLOOD: NORMAL
ALBUMIN SERPL BCP-MCNC: 4.4 G/DL (ref 3.4–5)
ALP SERPL-CCNC: 67 U/L (ref 33–136)
ALT SERPL W P-5'-P-CCNC: 13 U/L (ref 7–45)
ANION GAP SERPL CALC-SCNC: 16 MMOL/L (ref 10–20)
ANTIBODY SCREEN: NORMAL
AST SERPL W P-5'-P-CCNC: 16 U/L (ref 9–39)
BASOPHILS # BLD AUTO: 0.04 X10*3/UL (ref 0–0.1)
BASOPHILS NFR BLD AUTO: 0.7 %
BILIRUB SERPL-MCNC: 0.4 MG/DL (ref 0–1.2)
BNP SERPL-MCNC: 381 PG/ML (ref 0–99)
BUN SERPL-MCNC: 27 MG/DL (ref 6–23)
CALCIUM SERPL-MCNC: 9.4 MG/DL (ref 8.6–10.6)
CARDIAC TROPONIN I PNL SERPL HS: 25 NG/L (ref 0–34)
CHLORIDE SERPL-SCNC: 103 MMOL/L (ref 98–107)
CO2 SERPL-SCNC: 29 MMOL/L (ref 21–32)
CREAT SERPL-MCNC: 1.13 MG/DL (ref 0.5–1.05)
EGFRCR SERPLBLD CKD-EPI 2021: 51 ML/MIN/1.73M*2
EOSINOPHIL # BLD AUTO: 0.11 X10*3/UL (ref 0–0.4)
EOSINOPHIL NFR BLD AUTO: 1.8 %
ERYTHROCYTE [DISTWIDTH] IN BLOOD BY AUTOMATED COUNT: 16.2 % (ref 11.5–14.5)
EST. AVERAGE GLUCOSE BLD GHB EST-MCNC: 128 MG/DL
GLUCOSE SERPL-MCNC: 91 MG/DL (ref 74–99)
HBA1C MFR BLD: 6.1 %
HCT VFR BLD AUTO: 34 % (ref 36–46)
HGB BLD-MCNC: 9.9 G/DL (ref 12–16)
IMM GRANULOCYTES # BLD AUTO: 0.03 X10*3/UL (ref 0–0.5)
IMM GRANULOCYTES NFR BLD AUTO: 0.5 % (ref 0–0.9)
INR PPP: 1 (ref 0.9–1.1)
LYMPHOCYTES # BLD AUTO: 1.38 X10*3/UL (ref 0.8–3)
LYMPHOCYTES NFR BLD AUTO: 22.6 %
MCH RBC QN AUTO: 22.4 PG (ref 26–34)
MCHC RBC AUTO-ENTMCNC: 29.1 G/DL (ref 32–36)
MCV RBC AUTO: 77 FL (ref 80–100)
MONOCYTES # BLD AUTO: 0.56 X10*3/UL (ref 0.05–0.8)
MONOCYTES NFR BLD AUTO: 9.2 %
NEUTROPHILS # BLD AUTO: 3.99 X10*3/UL (ref 1.6–5.5)
NEUTROPHILS NFR BLD AUTO: 65.2 %
NRBC BLD-RTO: 0 /100 WBCS (ref 0–0)
PLATELET # BLD AUTO: 278 X10*3/UL (ref 150–450)
POTASSIUM SERPL-SCNC: 3.8 MMOL/L (ref 3.5–5.3)
PROT SERPL-MCNC: 7.2 G/DL (ref 6.4–8.2)
PROTHROMBIN TIME: 10.7 SECONDS (ref 9.8–12.4)
RBC # BLD AUTO: 4.41 X10*6/UL (ref 4–5.2)
RH FACTOR (ANTIGEN D): NORMAL
SODIUM SERPL-SCNC: 144 MMOL/L (ref 136–145)
WBC # BLD AUTO: 6.1 X10*3/UL (ref 4.4–11.3)

## 2025-04-09 PROCEDURE — 85025 COMPLETE CBC W/AUTO DIFF WBC: CPT | Performed by: INTERNAL MEDICINE

## 2025-04-09 PROCEDURE — 80053 COMPREHEN METABOLIC PANEL: CPT | Performed by: INTERNAL MEDICINE

## 2025-04-09 PROCEDURE — 86901 BLOOD TYPING SEROLOGIC RH(D): CPT

## 2025-04-09 PROCEDURE — 83880 ASSAY OF NATRIURETIC PEPTIDE: CPT | Performed by: INTERNAL MEDICINE

## 2025-04-09 PROCEDURE — 85610 PROTHROMBIN TIME: CPT | Performed by: INTERNAL MEDICINE

## 2025-04-09 PROCEDURE — 84484 ASSAY OF TROPONIN QUANT: CPT | Performed by: INTERNAL MEDICINE

## 2025-04-09 PROCEDURE — 87081 CULTURE SCREEN ONLY: CPT

## 2025-04-09 PROCEDURE — 36415 COLL VENOUS BLD VENIPUNCTURE: CPT

## 2025-04-09 PROCEDURE — 99204 OFFICE O/P NEW MOD 45 MIN: CPT

## 2025-04-09 PROCEDURE — 83036 HEMOGLOBIN GLYCOSYLATED A1C: CPT

## 2025-04-09 RX ORDER — CHLORHEXIDINE GLUCONATE 40 MG/ML
SOLUTION TOPICAL DAILY PRN
Qty: 473 ML | Refills: 0 | Status: SHIPPED | OUTPATIENT
Start: 2025-04-09 | End: 2025-04-09

## 2025-04-09 RX ORDER — CHLORHEXIDINE GLUCONATE ORAL RINSE 1.2 MG/ML
15 SOLUTION DENTAL AS NEEDED
Qty: 120 ML | Refills: 0 | Status: SHIPPED | OUTPATIENT
Start: 2025-04-09 | End: 2025-04-22 | Stop reason: HOSPADM

## 2025-04-09 ASSESSMENT — ENCOUNTER SYMPTOMS
SHORTNESS OF BREATH: 0
EYE DISCHARGE: 0
SKIN CHANGES: 0
VOICE CHANGE: 0
WOUND: 0
NUMBNESS: 0
ARTHRALGIAS: 0
FEVER: 0
DIFFICULTY URINATING: 0
POLYDIPSIA: 0
COUGH: 0
VOMITING: 0
TREMORS: 0
LIMITED RANGE OF MOTION: 0
DYSPNEA WITH EXERTION: 1
WEAKNESS: 0
LIGHT-HEADEDNESS: 0
NECK PAIN: 0
NAUSEA: 0
NERVOUS/ANXIOUS: 0
DYSURIA: 0
ABDOMINAL PAIN: 0
NECK STIFFNESS: 0
HEMOPTYSIS: 0
JOINT SWELLING: 0
BLOOD IN STOOL: 0
CONSTIPATION: 0
EXCESSIVE BLEEDING: 0
BRUISES/BLEEDS EASILY: 0
VISUAL CHANGE: 0
NECK SWELLING: 0
ABDOMINAL DISTENTION: 0
PND: 0
RHINORRHEA: 0
CHILLS: 0
SINUS CONGESTION: 0
TROUBLE SWALLOWING: 0
UNEXPECTED WEIGHT CHANGE: 0
CONFUSION: 0
VERTIGO: 0
PALPITATIONS: 0
NECK MASS: 0
DIARRHEA: 0
DYSPNEA AT REST: 0
MYALGIAS: 0
DOUBLE VISION: 0
WHEEZING: 0

## 2025-04-09 ASSESSMENT — DUKE ACTIVITY SCORE INDEX (DASI)
TOTAL_SCORE: 13.45
CAN YOU DO LIGHT WORK AROUND THE HOUSE LIKE DUSTING OR WASHING DISHES: YES
CAN YOU HAVE SEXUAL RELATIONS: NO
CAN YOU RUN A SHORT DISTANCE: NO
CAN YOU WALK INDOORS, SUCH AS AROUND YOUR HOUSE: YES
CAN YOU DO YARD WORK LIKE RAKING LEAVES, WEEDING OR PUSHING A MOWER: NO
CAN YOU DO HEAVY WORK AROUND THE HOUSE LIKE SCRUBBING FLOORS OR LIFTING AND MOVING HEAVY FURNITURE: NO
CAN YOU TAKE CARE OF YOURSELF (EAT, DRESS, BATHE, OR USE TOILET): YES
DASI METS SCORE: 4.4
CAN YOU CLIMB A FLIGHT OF STAIRS OR WALK UP A HILL: NO
CAN YOU DO MODERATE WORK AROUND THE HOUSE LIKE VACUUMING, SWEEPING FLOORS OR CARRYING GROCERIES: YES
CAN YOU WALK A BLOCK OR TWO ON LEVEL GROUND: YES
CAN YOU PARTICIPATE IN MODERATE RECREATIONAL ACTIVITIES LIKE GOLF, BOWLING, DANCING, DOUBLES TENNIS OR THROWING A BASEBALL OR FOOTBALL: NO
CAN YOU PARTICIPATE IN STRENOUS SPORTS LIKE SWIMMING, SINGLES TENNIS, FOOTBALL, BASKETBALL, OR SKIING: NO

## 2025-04-09 ASSESSMENT — LIFESTYLE VARIABLES: SMOKING_STATUS: NONSMOKER

## 2025-04-09 NOTE — CPM/PAT H&P
"CPM/PAT Evaluation       Name: Trina Elias (Trina Elias \"Marcela\")  /Age: 1950/74 y.o.     Visit Type:   In-Person       Chief Complaint: perioperative evaluation     HPI HPI: 75 y/o female scheduled for TMVR (Transcatheter MV Replacement)  on 2025  secondary to Mitral regurgitation  with Dr. Tad Ojeda & Dr Dutch Anderson who referred to CPM.  Presents to CPM today for perioperative risk stratification and optimization. PMHX includes Mitral regurgitation, CAD, Atrial Fibrillation s/p PVI x2 and s/p WING Closure 2024 , VT s/p ICD in place, Watchman in place, HLD, CHF, Pulmonary Hypertension, HTN, TAA, Asthma, centrilobular emphysema, BARBARA (BiPAP), small bilateral pleural  effusions, GERD, CKD3, H/o DVT (2018 after PICC line removal), Iron Deficiency Anemia, Rheumatoid Arthritis, cervical post-laminectomy syndrome, OA, cervical radiculopathy, Restless Leg Syndrome, osteopenia, Psoriasis,     T/S positive for antibodies per phone call from lab, patient is already to be admitted day prior to procedure and will need redrawn T/s on this day. Surgeon notified via staff message.      PCP: Fredy Elias DO   Specialists:   Cardiac Surgery - Dutch Anderson MD , Tad Ojeda MD  Rheumatology - Carlos Harley MD   Primary Cardiologist - Kalpesh Seaman MD   Pain Management - NAILA Carnes-CNP           Past Medical History:   Diagnosis Date    Acute hypoxic on chronic hypercapnic respiratory failure 2024    Aneurysm     Arrhythmia     Arthritis     Asthma     albuterol not required, daily treatment, PCP follows,    Atrial fibrillation (Multi)     Resistent to treatment: s/p DCCVs and ablations    Cataract     Central sleep apnea     Per PSG 18; severe central sleep apnea, .6, mild obstructive component    Cervical myofascial pain syndrome     Baclofen    CHF (congestive heart failure)     Chronic anemia     CKD (chronic kidney disease)     CKD stage 3a, GFR 45-59 " ml/min (Multi)     COPD (chronic obstructive pulmonary disease) (Multi)     DVT of axillary vein, acute right (Multi) 01/20/2018    When PICC line was removed. U/S + Partial nonocclusive DVT in the axillary and proximal basilic vein in 2018    GERD (gastroesophageal reflux disease)     controlled    H/O being hospitalized 02/2024 Feb 24-Mar 5, 2024:  (2/24) admitted for dyspnea and chest pain work-up and found to have pleural effusions.  Found to have acute blood loss anemia s/p 2u PRBC. EGD neg for active bleed.  Cardiology consulted - plan for Watchman device WING closure as out pt.    History of Helicobacter pylori infection     3/2013, 1/2021    Hx of syncope     Recurrent Syncope d/t VT- s/p ILR (12/2012) implanted.  (July 2013) Episode of syncope that appears to be correlated with an 11 second round of ventricular tachycardia recorded by the loop recorder.  S/p EPS (June 2014) neg for inducible arrhythmias. (ILR removed when ICD implanted)    Hyperlipidemia     Hypertension     ICD (implantable cardioverter-defibrillator) in place     Placed 06/12/2014    Mitral valve regurgitation     Mild-Moderate per Echo 02/25/2024    Moderate aortic stenosis by prior echocardiogram     Echo 2/25/2024    Osteopenia 06/26/2023    Peripheral neuropathy     Personal history of anaphylaxis     See allergy list    Psoriasis     PUD (peptic ulcer disease)     H/O nonbleeding gastric ulcers with small hiatal hernia on 1/6/2021 EGD,    Pulmonary hypertension (Multi)     Mild - Moderate per Echo 2/25/2024    Radiculopathy, lumbar region 08/13/2018    Acute lumbar radiculopathy    Restless legs syndrome 11/17/2015    Restless legs syndrome    Rheumatoid arthritis     Seasonal allergic rhinitis due to pollen     Sleep apnea     BiPAP therapy at night    Small bowel obstruction (Multi) 06/26/2023    H/O due to Severe Adhesions s/p Adhesiolysis x2 in 2015 and 2020    Wide-complex tachycardia     Per cardiology 3/7/2024: VT is on a  basis of triggered activity certainly made worse with anemia and prednisone. Since she is asymptomatic we will continue to observe.       Past Surgical History:   Procedure Laterality Date    ABDOMINAL ADHESION SURGERY  10/22/2015    Exploratory laparotomy. Extensive lysis of adhesions. Small-bowel repair.    ABDOMINAL ADHESION SURGERY  10/18/2020    Exploratory laparotomy, massive lysis of adhesions, small-bowel resection, decompression of small bowel and removal of mesh.    ABLATION OF DYSRHYTHMIC FOCUS  05/02/2022 09/17/2018, 05/02/2022 for A FIB    ACHILLES TENDON SURGERY Right     ANTERIOR CERVICAL DISCECTOMY W/ FUSION  03/21/2016    Anterior C5 and C6 discectomies and interbody fusion of C5-C6, C6-C7    APPENDECTOMY      Open surgery    BREAST BIOPSY Left 09/21/2022    benign    CARDIAC ASSIST DEVICE INSERTION  12/05/2012    Loop recorder placement 2012, Removed in 2014 w/ ICD placed    CARDIAC CATHETERIZATION  02/23/2022    Normal coronary arteries. Tachycardia induced Cardiomyopathy.    CARDIAC CATHETERIZATION N/A 07/16/2024    Procedure: LAAO (Left Atrial Appendage Occlusion);  Surgeon: Reid Dickinson MD;  Location: [place] [address] Cardiac Cath Lab;  Service: Cardiovascular;  Laterality: N/A;  Same day CT at 0815    CARDIAC CATHETERIZATION N/A 01/02/2025    Procedure: Cardiomems;  Surgeon: Kalpesh Seaman MD;  Location: [facility] Cardiac Cath Lab;  Service: Cardiovascular;  Laterality: N/A;  notified rep 11/25/24    CARDIAC CATHETERIZATION N/A 3/19/2025    Procedure: Left & Right Heart Cath w Angiography & LV;  Surgeon: Neo Gutierrez MD;  Location: [facility] Cardiac Cath Lab;  Service: Cardiovascular;  Laterality: N/A;  3/19/25--1130 am for [facility] [address]--AM (pre valve surgery) I34.0 along with a SULY [id number] for MR I34.0  Medicare and [employer]:  no PA required  SULY will be at 10:30AM    CARDIAC CATHETERIZATION N/A 3/19/2025    Procedure: [employer] Insertion;  Surgeon: Neo Gutierrez MD;  Location: [facility] Cardiac Cath  Lab;  Service: Cardiovascular;  Laterality: N/A;    CARDIAC DEFIBRILLATOR PLACEMENT  2014    Dual chamber ICD    CARDIOVERSION  2018, 2018    CARPAL TUNNEL RELEASE Bilateral      SECTION, CLASSIC      x2    CHOLECYSTECTOMY      Open surgery    COLONOSCOPY  2024    Extensive diverticulosis of moderate severity and causing mild luminal narrowing in the sigmoid colon    FINGER SURGERY Left 2008    Left index finger metacarpophalangeal fusion    FOOT SURGERY Bilateral     B/L Tarsal Tunnel Release    INSERT / REPLACE / REMOVE PACEMAKER      IR INJECTION EPIDURAL STEROID  2011    Lumbar spine; , ,     IR INJECTION EPIDURAL STEROID      Cervical spine 2007    LEG SURGERY Right 2006    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  Plantar wart removal R foot.    NERVE SURGERY Right 10/13/2010    Release of Right tarsal tunnel syndrome w/ severe adhesion scar tissue    NISSEN FUNDOPLICATION      Open surgery    PICC LINE INSERTION WO IMAGING  10/24/2015    Placed for stable IV access    REVERSE TOTAL SHOULDER ARTHROPLASTY Bilateral 2024    Right 2023, Left 2024    ROTATOR CUFF REPAIR      ULNAR NERVE TRANSPOSITION Left     VENTRAL HERNIA REPAIR  06/15/2015    Laparoscopic double ventral hernia repair.    WISDOM TOOTH EXTRACTION      WOUND DEBRIDEMENT  2020    Excisional debridement of abdominal wound for wound dehiscence       Patient Sexual activity questions deferred to the physician.    Family History   Problem Relation Name Age of Onset    Stroke Father      Stroke Brother dante     Heart attack Brother dante     Stroke Brother vic     Heart attack Brother vic     Asthma Daughter      Asthma Other      Colon cancer Other      Diabetes Other      Other (stroke syndrome) Other         Allergies   Allergen Reactions    Abatacept Shortness of breath     pulmonary edema, diarrhea, nausea     Beta-Blockers (Beta-Adrenergic Blocking Agts) Anaphylaxis    Hydrocodone-Acetaminophen Anaphylaxis    Hydromorphone Anaphylaxis    Metoprolol Anaphylaxis    Penicillins Hives    Pregabalin Shortness of breath     caused pulmonary edema    Prochlorperazine Itching     paralysis of the mouth with drooping    Methotrexate Hives and Itching     chest pain    Aripiprazole Rash    Bupropion Nausea/vomiting    Cefepime Itching    Ciprofloxacin Hives    Furosemide Itching    Levofloxacin Itching    Pineapple Rash       Prior to Admission medications    Medication Sig Start Date End Date Taking? Authorizing Provider   acetaminophen (Tylenol) 325 mg tablet Take 1 tablet (325 mg) by mouth every 6 hours if needed for mild pain (1 - 3).    Historical Provider, MD   baclofen (Lioresal) 10 mg tablet One tablet in the morning and 2 tablets in the evening. 3/3/25   Fredy Elias DO   cholecalciferol (Vitamin D3) 25 MCG (1000 UT) tablet Take 1 tablet (1,000 Units) by mouth once daily.    Historical Provider, MD   clobetasol (Temovate) 0.05 % cream Apply topically 2 times a day. 4/15/24   Fredy Elias DO   clopidogrel (Plavix) 75 mg tablet Take 1 tablet (75 mg) by mouth once daily. 3/3/25 3/3/26  Fredy Elias DO   cyanocobalamin (Vitamin B-12) 1,000 mcg/mL injection Inject 1 mL (1,000 mcg) into the muscle every 30 (thirty) days. 3/3/25   Fredy Elias DO   diclofenac sodium (Voltaren) 1 % gel Apply 4.5 inches (4 g) topically 2 times a day as needed (joint pain). 3/3/25   Fredy Elias DO   digoxin (Lanoxin) 125 MCG tablet Take 1 tablet (125 mcg) by mouth once daily. 11/1/20   Historical Provider, MD   dilTIAZem CD (Cardizem CD) 120 mg 24 hr capsule Take 1 capsule (120 mg) by mouth once daily.    Historical Provider, MD   EPINEPHrine 0.3 mg/0.3 mL injection syringe Inject 0.3 mL (0.3 mg) into the muscle 1 time if needed for anaphylaxis. 8/12/24   Fredy Elias DO   fluticasone propion-salmeteroL (Advair HFA)  "45-21 mcg/actuation inhaler Inhale 2 puffs 2 times a day. Rinse mouth with water after use to reduce aftertaste and incidence of candidiasis. Do not swallow. 3/3/25   Fredy Elias DO   hydroxychloroquine (Plaquenil) 200 mg tablet Take 1.5 tablets (300 mg) by mouth once daily. 2/3/25 5/4/25  Carlos Harley MD   ibandronate (Boniva) 150 mg tablet Take 1 tablet (150 mg) by mouth every 30 (thirty) days. 3/10/25 3/10/26  Fredy Elias DO   insulin syringe-needle U-100 (BD Insulin Syringe Ultra-Fine) 1 mL 30 gauge x 1/2\" syringe Use one a month 3/3/25   Fredy Elias DO   ipratropium-albuteroL (Combivent Respimat)  mcg/actuation inhaler Inhale 2 puffs 4 times a day. 3/3/25 3/3/26  Fredy Elias DO   ipratropium-albuteroL (Duo-Neb) 0.5-2.5 mg/3 mL nebulizer solution Take 3 mL by nebulization 4 times a day as needed for wheezing or shortness of breath. 3/3/25 3/3/26  Fredy Elias DO   montelukast (Singulair) 10 mg tablet Take 1 tablet (10 mg) by mouth once daily at bedtime. 3/3/25 2/26/26  Fredy Elias DO   multivitamin tablet Take 1 tablet by mouth once daily.  Patient not taking: Reported on 3/5/2025    Historical Provider, MD   naloxone (Narcan) 4 mg/0.1 mL nasal spray Administer 1 spray (4 mg) into affected nostril(s) if needed for opioid reversal or respiratory depression. May repeat every 2-3 minutes if needed, alternating nostrils, until medical assistance becomes available.  Patient not taking: Reported on 3/5/2025 7/29/24   NAILA Carnes-CNP   nitroglycerin (Nitrostat) 0.4 mg SL tablet Place 1 tablet (0.4 mg) under the tongue every 5 minutes if needed for chest pain. 3/3/25   Fredy Elias DO   ondansetron (Zofran) 8 mg tablet Take 1 tablet (8 mg) by mouth every 8 hours if needed for nausea or vomiting.    Historical Provider, MD   oxyCODONE-acetaminophen (Percocet) 5-325 mg tablet Take 1 tablet by mouth 3 times a day as needed for severe pain (7 - 10) for up to 28 days. Do " not fill before October 29, 2024.  Patient not taking: Reported on 2/10/2025 10/29/24 11/26/24  DARSHANA Carnes   oxyCODONE-acetaminophen (Percocet) 5-325 mg tablet Take 1 tablet by mouth 3 times a day as needed for severe pain (7 - 10) for up to 28 days. Do not fill before November 26, 2024.  Patient not taking: Reported on 2/10/2025 11/26/24 12/24/24  DARSHANA Carnes   pantoprazole (ProtoNix) 40 mg EC tablet Take 1 tablet (40 mg) by mouth 2 times a day. 3/3/25   Fredy Elias DO   rosuvastatin (Crestor) 20 mg tablet Take 1 tablet (20 mg) by mouth once daily. 8/12/24   Fredy Elias DO   sucralfate (Carafate) 100 mg/mL suspension Take 10 mL (1 g) by mouth 4 times a day with meals. 3/3/25 3/3/26  Fredy Elias DO   sulfaSALAzine (Azulfidine) 500 mg tablet Take 2 tablets (1,000 mg) by mouth 2 times a day. 2/3/25 5/4/25  Carlos Harley MD        PAT ROS:   Constitutional:    no fever   no chills   no unexpected weight change  Neuro/Psych:    no numbness   no weakness   no light-headedness   no tremors   no confusion   not nervous/anxious   no self-injury   no suicidal ideation  Eyes:    no discharge   no vision loss   no diplopia   no visual disturbance   use of corrective lenses (readers)  Ears:    no ear pain   no hearing loss   no vertigo   no tinnitus   no hearing aides  Nose:    no nasal discharge   no sinus congestion   no epistaxis  Mouth:    no dental issues   no mouth pain   no oral bleeding   no mouth lesions  Throat:    no throat pain   no dysphagia   no voice change  Neck:    Limited neck movement (h/o cervical fusion)   no neck pain   neck swelling   no neck stiffness   no neck masses  Cardio:    chest pain (transient, discomfort with exertion, reports for months and unchanged.)   no palpitations   no peripheral edema   no dyspnea   DOSHI (transient, discomfort with exertion, reports for months and unchanged.)   no paroxysmal nocturnal dyspnea  Respiratory:    no cough   no  wheezing   no hemoptysis   no shortness of breath  Endocrine:    no cold intolerance   no heat intolerance   no polydipsia  GI:    no abdominal distention   no abdominal pain   no constipation   no diarrhea   no nausea   no vomiting   no blood in stool  :    no difficulty urinating   no dysuria   no oliguria   no polyuria  Musculoskeletal:    no arthralgias   no myalgias   no decreased ROM   no swelling  Hematologic:    does not bruise/bleed easily   no excessive bleeding   no history of blood transfusion   no blood clots  Skin:   no skin changes   no sores/wound   no rash      Physical Exam  Vitals reviewed. Physical exam within normal limits.   Constitutional:       General: She is not in acute distress.     Appearance: Normal appearance. She is normal weight. She is not ill-appearing, toxic-appearing or diaphoretic.   HENT:      Head: Normocephalic.      Nose: Nose normal. No congestion or rhinorrhea.      Mouth/Throat:      Mouth: Mucous membranes are moist.      Pharynx: Oropharynx is clear. No oropharyngeal exudate or posterior oropharyngeal erythema.   Eyes:      General: No scleral icterus.        Right eye: No discharge.         Left eye: No discharge.      Conjunctiva/sclera: Conjunctivae normal.   Neck:      Vascular: No carotid bruit.      Comments: Limited neck movement (h/o cervical fusion)  Cardiovascular:      Rate and Rhythm: Normal rate and regular rhythm.      Pulses: Normal pulses.      Heart sounds: Murmur (systolic murmur, loudest at apex) heard.      No friction rub. No gallop.   Pulmonary:      Effort: Pulmonary effort is normal. No respiratory distress.      Breath sounds: Normal breath sounds. No stridor. No wheezing, rhonchi or rales.   Chest:      Chest wall: No tenderness.   Musculoskeletal:         General: No swelling or tenderness. Normal range of motion.      Cervical back: Neck supple. No rigidity or tenderness.   Skin:     General: Skin is warm and dry.   Neurological:       "General: No focal deficit present.      Mental Status: She is alert.      Motor: No weakness.   Psychiatric:         Mood and Affect: Mood normal.         Behavior: Behavior normal.         Thought Content: Thought content normal.         Judgment: Judgment normal.          PAT AIRWAY:   Airway:     Mallampati::  I    TM distance::  >3 FB    Neck ROM::  Limited   Bottom teeth all implants   upper dentures and implants        Visit Vitals  /74   Pulse 70   Temp 37.2 °C (98.9 °F) (Temporal)   Ht 1.626 m (5' 4\")   Wt 72.1 kg (159 lb)   SpO2 97%   BMI 27.29 kg/m²   OB Status Postmenopausal   Smoking Status Never   BSA 1.8 m²       DASI Risk Score      Flowsheet Row Pre-Admission Testing from 4/9/2025 in [facility] Pre-Admission Testing from 4/22/2024 in [facility]   Can you take care of yourself (eat, dress, bathe, or use toilet)?  2.75 filed at 04/09/2025 0756 2.75 filed at 04/22/2024 0734   Can you walk indoors, such as around your house? 1.75 filed at 04/09/2025 0756 1.75 filed at 04/22/2024 0734   Can you walk a block or two on level ground?  2.75 filed at 04/09/2025 0756 2.75 filed at 04/22/2024 0734   Can you climb a flight of stairs or walk up a hill? 0 filed at 04/09/2025 0756 5.5 filed at 04/22/2024 0734   Can you run a short distance? 0 filed at 04/09/2025 0756 8 filed at 04/22/2024 0734   Can you do light work around the house like dusting or washing dishes? 2.7 filed at 04/09/2025 0756 2.7 filed at 04/22/2024 0734   Can you do moderate work around the house like vacuuming, sweeping floors or carrying groceries? 3.5 filed at 04/09/2025 0756 3.5 filed at 04/22/2024 0734   Can you do heavy work around the house like scrubbing floors or lifting and moving heavy furniture?  0 filed at 04/09/2025 0756 0 filed at 04/22/2024 0734   Can you do yard work like raking leaves, weeding or pushing a mower? 0 filed at 04/09/2025 0756 4.5 filed at 04/22/2024 0734   Can you have sexual " relations? 0 filed at 04/09/2025 0756 5.25 filed at 04/22/2024 0734   Can you participate in moderate recreational activities like golf, bowling, dancing, doubles tennis or throwing a baseball or football? 0 filed at 04/09/2025 0756 6 filed at 04/22/2024 0734   Can you participate in strenous sports like swimming, singles tennis, football, basketball, or skiing? 0 filed at 04/09/2025 0756 0 filed at 04/22/2024 0734   DASI SCORE 13.45 filed at 04/09/2025 0756 42.7 filed at 04/22/2024 0734   METS Score (Will be calculated only when all the questions are answered) 4.4 filed at 04/09/2025 0756 8 filed at 04/22/2024 0734          Caprini DVT Assessment      Flowsheet Row Pre-Admission Testing from 4/9/2025 in [facility] ED to Hosp-Admission (Discharged) from 2/10/2025 in [address] with Nixon Cordova MD   DVT Score (IF A SCORE IS NOT CALCULATING, MUST SELECT A BMI TO COMPLETE) 11 filed at 04/09/2025 0926 8 filed at 02/10/2025 1739   Medical Factors History of DVT/PE filed at 04/09/2025 0926 Congestive Heart Failure < 1 month, COPD, History of DVT/PE filed at 02/10/2025 1739   Surgical Factors Major surgery planned, lasting over 3 hours filed at 04/09/2025 0926 --   BMI (BMI MUST BE CHOSEN) 30 or less filed at 04/09/2025 0926 30 or less filed at 02/10/2025 1739          Modified Frailty Index      Flowsheet Row Pre-Admission Testing from 4/9/2025 in [facility]   Non-independent functional status (problems with dressing, bathing, personal grooming, or cooking) 0 filed at 04/09/2025 0928   History of diabetes mellitus  0 filed at 04/09/2025 0928   History of COPD 0 filed at 04/09/2025 0928   History of CHF 0.0909 filed at 04/09/2025 0928   History of MI 0 filed at 04/09/2025 0928   History of Percutaneous Coronary Intervention, Cardiac Surgery, or Angina 0.0909 filed at 04/09/2025 0928   Hypertension requiring the use of medication  0.0909 filed at 04/09/2025 0928    Peripheral vascular disease 0 filed at 04/09/2025 0928   Impaired sensorium (cognitive impairement or loss, clouding, or delirium) 0 filed at 04/09/2025 0928   TIA or CVA withouy residual deficit 0 filed at 04/09/2025 0928   Cerebrovascular accident with deficit 0 filed at 04/09/2025 0928   Modified Frailty Index Calculator .2727 filed at 04/09/2025 0928          [id number] Stroke Risk Points  Current as of about an hour ago        4 0 to 9 Points:      No Change          The [id number] risk score (Gabino ENGLISH, et al. 2009. © 2010 [employer]) quantifies the risk of stroke for a patient with atrial fibrillation. For patients without atrial fibrillation or under the age of 18 this score appears as N/A. Higher score values generally indicate higher risk of stroke.          Points Metrics   1 Has Congestive Heart Failure:  Yes     Patients with congestive heart failure get 1 point.    Current as of about an hour ago   1 Has Hypertension:  Yes     Patients with hypertension get 1 point.    Current as of about an hour ago   1 Age:  74     Patients 65 to 74 years old get 1 point, or patients 75 years and older get 2 points.    Current as of about an hour ago   0 Has Diabetes:  No     Patients with diabetes get 1 point.    Current as of about an hour ago   0 Had Stroke:  No  Had TIA:  No  Had Thromboembolism:  No     Patients who have had a stroke, TIA, or thromboembolism get 2 points.    Current as of about an hour ago   0 Has Vascular Disease:  No     Patients with vascular disease get 1 point.    Current as of about an hour ago   1 Clinically Relevant Sex:  Female     Patients with a clinically relevant sex of Female get 1 point.    Current as of about an hour ago             Revised Cardiac Risk Index      Flowsheet Row Pre-Admission Testing from 4/9/2025 in [facility] Pre-Admission Testing from 4/22/2024 in [facility]   High-Risk Surgery (Intraperitoneal,  Intrathoracic,Suprainguinal vascular) 1 filed at 04/09/2025 0924 0 filed at 04/22/2024 0843   History of ischemic heart disease (History of MI, History of positive exercuse test, Current chest paint considered due to myocardial ischemia, Use of nitrate therapy, ECG with pathological Q Waves) 0 filed at 04/09/2025 0924 1 filed at 04/22/2024 0843   History of congestive heart failure (pulmonary edemia, bilateral rales or S3 gallop, Paroxysmal nocturnal dyspnea, CXR showing pulmonary vascular redistribution) 1 filed at 04/09/2025 0924 1 filed at 04/22/2024 0843   History of cerebrovascular disease (Prior TIA or stroke) 0 filed at 04/09/2025 0924 0 filed at 04/22/2024 0843   Pre-operative insulin treatment 0 filed at 04/09/2025 0924 0 filed at 04/22/2024 0843   Pre-operative creatinine>2 mg/dl 0 filed at 04/09/2025 0924 0 filed at 04/22/2024 0843   Revised Cardiac Risk Calculator 2 filed at 04/09/2025 0924 2 filed at 04/22/2024 0843          Apfel Simplified Score      Flowsheet Row Pre-Admission Testing from 4/9/2025 in [facility]   Smoking status 1 filed at 04/09/2025 0927   History of motion sickness or PONV  0 filed at 04/09/2025 0927   Use of postoperative opioids 1 filed at 04/09/2025 0927   Gender - Female 1=Yes filed at 04/09/2025 0927   Apfel Simplified Score Calculator 3 filed at 04/09/2025 0927          Risk Analysis Index Results This Encounter    No data found in the last 10 encounters.       Stop Bang Score      Flowsheet Row Pre-Admission Testing from 4/9/2025 in [facility] Admission (Discharged) from 1/2/2025 in [facility] with Kalpesh Seaman MD   Do you snore loudly? 1 filed at 04/09/2025 0757 1 filed at 01/02/2025 0733   Do you often feel tired or fatigued after your sleep? 1 filed at 04/09/2025 0757 1 filed at 01/02/2025 0733   Has anyone ever observed you stop breathing in your sleep? 1  [BIPAP  use x 4 years] filed at 04/09/2025 0757 1 filed at  01/02/2025 0733   Do you have or are you being treated for high blood pressure? 1 filed at 04/09/2025 0757 1 filed at 01/02/2025 0733   Recent BMI (Calculated) 26.8 filed at 04/09/2025 0757 26.6 filed at 01/02/2025 0733   Is BMI greater than 35 kg/m2? 0=No filed at 04/09/2025 0757 0=No filed at 01/02/2025 0733   Age older than 50 years old? 1=Yes filed at 04/09/2025 0757 1=Yes filed at 01/02/2025 0733   Is your neck circumference greater than 17 inches (Male) or 16 inches (Female)? 0 filed at 04/09/2025 0757 0 filed at 01/02/2025 0733   Gender - Male 0=No filed at 04/09/2025 0757 0=No filed at 01/02/2025 0733   STOP-BANG Total Score 5 filed at 04/09/2025 0757 5 filed at 01/02/2025 0733          Prodigy: High Risk  Total Score: 19              Prodigy Age Score      Prodigy CHF score          ARISCAT Score for Postoperative Pulmonary Complications      Flowsheet Row Pre-Admission Testing from 4/9/2025 in [facility]   Age Calculated Score 3 filed at 04/09/2025 0926   Preoperative SpO2 0 filed at 04/09/2025 0926   Respiratory infection in the last month Either upper or lower (i.e., URI, bronchitis, pneumonia), with fever and antibiotic treatment 0 filed at 04/09/2025 0926   Preoperative anemia (Hgb less than 10 g/dl) 0 filed at 04/09/2025 0926   Surgical incision  24 filed at 04/09/2025 0926   Duration of surgery  23 filed at 04/09/2025 0926   Emergency Procedure  0 filed at 04/09/2025 0926   ARISCAT Total Score  50 filed at 04/09/2025 0926          [place] Perioperative Risk for Myocardial Infarction or Cardiac Arrest (MAYRA)    No data to display       T/S positive for antibodies per phone call from lab, patient is already to be admitted day prior to procedure and will need redrawn T/s on this day. Surgeon notified via staff message.      Assessment and Plan:   Anesthesia/Airway:  No anesthesia complications      Neuro:    No neurologic diagnoses, however, the patient is at an increased risk for post  operative delirium secondary to age >/= 65, decreased functinoal status, and type and duration of surgery.  Preoperative brain exercise educational handout provided to patient.    The patient is at an increased risk for perioperative stroke secondary to a-fib, cardiac disease, increased age, HTN, HLD, female sex , cardiac surgery, general anesthesia, hypercoagulable state, and op time >2.5 hours.       HEENT/Airway:    No HEENT diagnosis or significant findings on chart review or clinical presentation and evaluation. No further preoperative testing/intervention indicated at this time.      Cardiovascular:    #Mitral regurgitation, CAD, Atrial Fibrillation s/p PVI x2 and s/p WING Closure 07/16/2024 , VT s/p ICD in place, Watchman in place, HLD, CHF, Pulmonary Hypertension, HTN, TAA,  -  medicated with Plavix (last dose 4/11), digoxin (continue), diltiazem (continue), rosuvastatin (continue) and follows with Cardiology. BP today is 123/74 and reports unchanged, chronic DOSHI and transient chest pain/discomfort with activity. Physical exam is benign. METS is >4 and scheduled for cardiac surgery.  No additional preoperative testing is currently indicated. ICD in place, last device check 4/7/2025. Card for watchman, cardiomems, and ICD scanned into media. Patient has had history of multiple ADHF visits to ED.     METS are 4.4    RCRI  2 which is 10.1% 30 day risk of MACE (risk for cardiac death, nonfatal myocardial infarction, and nonfactal cardiac arrest    MAYRA score which indicates a   0.9 % risk of intraoperative or 30-day postoperative MACE    EKG 2/10/25  Atrial fibrillation  T wave abnormality, consider lateral ischemia  Abnormal ECG  When compared with ECG of 20-SEP-2024 21:42,  QT has shortened    Cardiac Cath 3/19/2025  Coronary Angiography:  The coronary circulation is right dominant.  Left Main Coronary Artery:  The left main coronary artery is a large caliber vessel. The left main arises normally from the left  coronary sinus of Valsalva. The left main coronary artery showed a normal vessel  Left Anterior Descending Coronary Artery Distribution:  The left anterior descending coronary artery is a large caliber vessel. The LAD arises normally from the left main coronary artery. The LAD demonstrated a normal vessel.  Circumflex Coronary Artery Distribution:  The circumflex coronary artery is a large caliber vessel. The circumflex arises normally from the left main coronary artery. The circumflex revealed a normal vessel.  Right Heart Catheterization:  A balloon tipped catheter was advanced through the right heart to record pressures. Cardiac output was calculated via the Taryn method and measured via thermodilution. Elevated left sided filling pressures with normal cardiac output. Elevated ventricular filling pressure. Cardiac output is normal. Preserved cardiac output at rest. Elevated pulmonary vascular resistance. Elevated systemic vascular resistance. Pulmonary arterial hypertension and pulmonary venous hypertension.  Right Coronary Artery Distribution:  The right coronary artery is a large caliber vessel. The RCA arises normally from the right sinus of Valsalva. The RCA showed a normal vessel.    CT TAVR 3/5/2025  IMPRESSION:  1. Extensive atherosclerotic changes involving the thoracoabdominal  aorta and its branches.  2. Mild dilation of the ascending thoracic aorta measuring up to 4.1  cm, unchanged from prior. Recommend continued imaging surveillance as  per clinical guidelines.  3. Dilated main pulmonary artery, recommend correlation with concern  for pulmonary arterial hypertension.  4. Segment of circumferential wall thickening of the sigmoid colon  may be due to peristalsis. Correlate with up-to-date colonoscopy to  exclude underlying sigmoid colonic lesion.  5. Mild upper lung predominant emphysema.  6. Postsurgical changes and additional chronic and incidental  findings as described above.    Echo (SULY)  3/19/25  CONCLUSIONS:   1. There is global hypokinesis of the left ventricle with minor regional variations.   2. Spectral Doppler shows a Grade III (restrictive pattern) of left ventricular diastolic filling with an elevated left atrial pressure.   3. Left ventricular cavity size is mild to moderately dilated.   4. There is normal right ventricular global systolic function.   5. The left atrium is mildly dilated.   6. Moderate to severe mitral valve regurgitation. Pulmonary vein systolic flow blunted or absent.   7. Mild aortic valve regurgitation.   8. There is plaque visualized in the descending aorta.   9. Well positioned LAAO device, no heike-device leaks and no device associated thrombus.  10. The left ventricular systolic function is mildly decreased, with a visually estimated ejection fraction of 40-45%.    CXR 2/10/25  IMPRESSION:  1.  Cardiomegaly, unchanged from the prior study  2. No edema is noted        Pulmonary:    #Asthma, centrilobular emphysema, BARBARA (BiPAP), small bilateral pleural  effusions,- medicated with advair (continue), reports she does not use her as needed albuterol at all.  Patient has had COPD exacerbation in February 2025, and establishing with pulm in May.  Patient has no history of supplemental oxygen use, she currently wears a BiPAP for her sleep apnea.  Recent PFT below noted airflow obstruction defect and restrictive ventilatory impairment.  Denies any respiratory symptoms aside from dyspnea on exertion as noted in cardiology.  Physical exam is benign  #Admission 2/11/-2/13/25- COPD exacerbation.  Preoperative deep breathing educational handout provided to patient.    PFT 4/9/25  Conclusion: Reduced FEV1/FVC with a normal FVC indicates a possible airflow obstructive defect. Lung volumes by plethysmography indicate a restrictive ventilatory impairment. DLCO values are consistent with loss of alveoli capillary structure with loss of lung volume     Patient is at increased risk of  perioperative complications secondary to  age > 60, heart failure, pulmonary hypertension, site of surgery, major surgery, duration of surgery > 2 hours, types of anesthetic    ARISCAT:    50  points which is a high (42.1%) risk of in-hospital post-op pulmonary complications     PRODIGY:  24  points which is a high risk of post op opioid induced respiratory depression episodes    STOP BAN   points which is a high risk for moderate to severe BARBARA    Pumonary toilet education discussed, patient also provided deep breathing exercises and incentive spirometry educational handout      Renal:   #CKD3 - patient denies this history, does not follow with nephrology. Creatinine wnl and repeat today.     Patient is at increase risk for perioperative renal complications secondary to  Age equal to or greater than 56, HTN, cardiac surgery, use of an ace, arb, or NSAID     DPS if cardiac surgery  Pt at High risk for perioperative TYRELL based on Dynamic Predictive Scoring Tool for Perioperative TYRELL      Endocrine:   No endocrine diagnosis or significant findings on chart review or clinical presentation and evaluation. No further testing or intervention is indicated at this time.      Hematologic:      #H/o DVT (2018 after PICC line removal), Iron Deficiency Anemia, - no recurrent since, no history of PE.  Baseline hemoglobin 8-9.  Hematocrit varies between 27-31.  Platelet within normal limits. Repeat blood work today.     Preoperative DVT educational handout provided to patient.  Caprini Score:  11  points which is a highest risk of perioperative VTE      Gastrointestinal:   #GERD - medicated with pantoprazole (Continue), carafate (hold day of surgery), and follows with PCP.      EAT-10 score of    0  : self-perceived oropharyngeal dysphagia scale (0-40)     Apfel: 3 points 61% risk for post operative N/V      Infectious disease:     No infectious diagnosis or significant findings on chart review or clinical presentation and  evaluation.   Prescription provided for CHG body wash and dental rinse. CHG use instructions reviewed and provided to patient.  Staph screen collected      Musculoskeletal:    #Rheumatoid Arthritis, cervical post-laminectomy syndrome, OA, cervical radiculopathy, Restless Leg Syndrome, osteopenia, Psoriasis,  -medicated with sulfasalazine (continue), ibandronate (hold day of surgery), Plaquenil (continue), Voltaren gel (last dose 7 days prior to surgery), follows with rheumatology.  No further need for perioperative intervention      Other:     Hold all vitamins and supplements 7 days prior to surgery  Tylenol okay to continue, please hold Aleve/naproxen/ibuprofen (NSAIDs) for 7 days prior to surgery        Labs ordered  cbc, comp, coag, t/s, A1c, and MSSA/MRSA culture      Medication instructions and NPO guidelines reviewed with the patient.  All questions or concerns discussed and addressed.      Ewelina Pinon PA-C          x10*3/uL    Monocytes Absolute 0.56 0.05 - 0.80 x10*3/uL    Eosinophils Absolute 0.11 0.00 - 0.40 x10*3/uL    Basophils Absolute 0.04 0.00 - 0.10 x10*3/uL   Comprehensive metabolic panel    Collection Time: 04/09/25  8:21 AM   Result Value Ref Range    Glucose 91 74 - 99 mg/dL    Sodium 144 136 - 145 mmol/L    Potassium 3.8 3.5 - 5.3 mmol/L    Chloride 103 98 - 107 mmol/L    Bicarbonate 29 21 - 32 mmol/L    Anion Gap 16 10 - 20 mmol/L    Urea Nitrogen 27 (H) 6 - 23 mg/dL    Creatinine 1.13 (H) 0.50 - 1.05 mg/dL    eGFR 51 (L) >60 mL/min/1.73m*2    Calcium 9.4 8.6 - 10.6 mg/dL    Albumin 4.4 3.4 - 5.0 g/dL    Alkaline Phosphatase 67 33 - 136 U/L    Total Protein 7.2 6.4 - 8.2 g/dL    AST 16 9 - 39 U/L    Bilirubin, Total 0.4 0.0 - 1.2 mg/dL    ALT 13 7 - 45 U/L   Protime-INR    Collection Time: 04/09/25  8:21 AM   Result Value Ref Range    Protime 10.7 9.8 - 12.4 seconds    INR 1.0 0.9 - 1.1   Staphylococcus aureus/MRSA colonization, Culture    Collection Time: 04/09/25  8:21 AM    Specimen: Nares/Axilla/Groin; Swab   Result Value Ref Range    Staph/MRSA Screen Culture No Staphylococcus aureus isolated    Hemoglobin A1C    Collection Time: 04/09/25  8:21 AM   Result Value Ref Range    Hemoglobin A1C 6.1 (H) See comment %    Estimated Average Glucose 128 Not Established mg/dL   Type And Screen Is this order related to pregnancy or an upcoming surgery? Yes; Where will this surgery/delivery be performed? [facility]; What is the date of the surgery? 4/17/2025; Has this patient ever had a transfusion? No; Has t...    Collection Time: 04/09/25  8:21 AM   Result Value Ref Range    ABO TYPE A     Rh TYPE POS     ANTIBODY SCREEN NEG            Medication instructions and NPO guidelines reviewed with the patient.  All questions or concerns discussed and addressed.      Ewelina Pinon PA-C

## 2025-04-09 NOTE — NURSING NOTE
Pt seen in PAT today.  Labs drawn : T/S, HA1C, Mrsa swab, PT/INR, CMP, CMP/DIFF,   TROP AND BNP.  Per PA-C  VS taken.  DC instructions provided per PA-C      [id number] contacted patient regarding + ANTIBODIES in T/S.  Per PA-C PT may have redraw done upon upcoming admission 4/16/25 ( prior to 4/17/25 SX)   Pt voiced appreciation and understanding.

## 2025-04-09 NOTE — PREPROCEDURE INSTRUCTIONS
"      Thank you for visiting [facility] ([facility]) today for your pre-procedure evaluation, you were seen by     Ewelina Pinon PA-C   Department of Anesthesiology and Perioperative Medicine  Main phone [phone]  Fax [phone]      This summary includes instructions and information to aid you during your perioperative period.  Please read carefully. If you have any questions about your visit today, please call the number listed above.  If you become ill or have any changes to your health before your surgery, please contact your primary care provider and alert your surgeon.    Preparing for Surgery       Preoperative Fasting Guidelines    Why must I stop eating and drinking near surgery time?  With sedation, food or liquid in your stomach can enter your lungs causing serious complications  Food can increase nausea and vomiting  When do I need to stop eating and drinking before my surgery?  Do not eat any food after midnight the night before your surgery/procedure.  You may have up to 13.5 ounces of clear liquid until TWO hours before your instructed arrival time to the hospital.  This includes water, black tea/coffee, (no milk or cream) apple juice, and electrolyte drinks (Gatorade)  You may chew gum until TWO hours before your surgery/procedure    Tobacco and Alcohol;  Do not drink alcohol or smoke within 24 hours of surgery.  It is best to quit smoking for as long as possible before any surgery or procedure.       Other Instructions  Why did I have my nose, under my arms, and groin swabbed? The purpose of the swab is to identify Staphylococcus aureus inside your nose or on your skin.  The swab was sent to the laboratory for culture.  A positive swab/culture for Staphylococcus aureus is called colonization or carriage.   What is Staphylococcus aureus? Staphylococcus aureus, also known as \"staph\", is a germ found on the skin or in the nose of healthy people.  Sometimes Staphylococcus " aureus can get into the body and cause an infection.  This can be minor (such as pimples, boils, or other skin problems).  It might also be serious (such as a blood infection, pneumonia, or a surgical site infection). What is Staphylococcus aureus colonization or carriage? Colonization or carriage means that a person has the germ but is not sick from it.  These bacteria can be spread on the hands or when breathing or sneezing. How is Staphylococcus aureus spread? It is most often spread by close contact with a person or item that carries it. What happens if my culture is positive for Staphylococcus aureus? Your doctor/medical team will use this information to guide any antibiotic treatment which may be necessary.  Regardless of the culture results, we will clean the inside of your nose with a betadine swab just before you have your surgery. Will I get an infection if I have Staphylococcus aureus in my nose or on my skin? Anyone can get an infection with Staphylococcus aureus.  However, the best way to reduce your risk of infection is to follow the instructions provided to you for the use of your CHG soap and dental rinse.  , Body Wash; What is a home preoperative antibacterial shower? This shower is a way of cleaning the skin with a germ-killing solution before surgery.  The solution contains chlorhexidine, commonly known as CHG.  CHG is a skin cleanser with germ-killing ability.  Let your doctor know if you are allergic to chlorhexidine. Why do I need to take a preoperative antibacterial shower? Skin is not sterile.  It is best to try to make your skin as free of germs as possible before surgery.  Proper cleansing with a germ-killing soap before surgery can lower the number of germs on your skin.  This helps to reduce the risk of infection at the surgical site.  Following the instructions listed below will help you prepare your skin for surgery.   How do I use the solution? Steps:  Begin using your CHG soap 5 days  before your scheduled surgery on ___________.   First, wash and rinse your hair using the CHG soap. Keep CHG soap away from ear canals and eyes.  Rinse completely, do not condition.  Hair extensions should be removed. , Oral/Dental Rinse: What is oral/dental rinse?  It is a mouthwash. It is a way of cleaning the mouth with a germ-killing solution before your surgery.  The solution contains chlorhexidine, commonly known as CHG. It is used inside the mouth to kill a bacteria known as Staphylococcus aureus.  Let your doctor know if you are allergic to Chlorhexidine. Why do I need to use CHG oral/dental rinse? The CHG oral/dental rinse helps to kill a bacteria in your mouth known as Staphylococcus aureus.  This reduces the risk of infection at the surgical site.  Using your CHG oral/dental rinse STEPS: Use your CHG oral/dental rinse after you brush your teeth the night before (at bedtime) and the morning of your surgery.  Follow all directions on your prescription label.  Use the cap on the container to measure 15 ml.  Swish (gargle if you can) the mouthwash in your mouth for at least 30 seconds, (do not swallow) and spit out.  After you use your CHG rinse, do not rinse your mouth with water, drink or eat.  Please refer to the prescription label for the appropriate time to resume oral intake What side effects might I have using the CHG oral/dental rinse? CHG rinse will stick to plaque on the teeth.  Brush and floss just before use.  Teeth brushing will help avoid staining of plaque during use.       The Week before Surgery        Seven days before Surgery  Check your [facility] medication instructions  Do the exercises provided to you by [facility]   Arrange for a responsible, adult licensed  to take you home after surgery and stay with you for 24 hours.  You will not be permitted to drive yourself home if you have received any anesthetic/sedation  Five days before surgery  Check your [facility] medication instructions  Do the  exercises provided to you by [facility]   Start using Chlorhexidene (CHG) body wash if prescribed (Continue till day of surgery)      The Day before Surgery       Check your [facility] medication and all other [facility] instructions including when to stop eating and drinking  You will be called with your arrival time for surgery in the late afternoon.  If you do not receive a call please reach out to your surgeon's office.  Do not smoke or drink 24 hours before surgery  Prepare items to bring with you to the hospital  Shower with your chlorhexidine wash if prescribed  Brush your teeth and use your chlorhexidine dental rinse if prescribed    The Day of Surgery       Check your [facility] medication instructions  Ensure you follow the instructions for when to stop eating and drinking  Shower, if prescribed use CHG.  Do not apply any lotions, creams, moisturizers, perfume or deodorant  Brush your teeth and use your CHG dental rinse if prescribed  Wear loose comfortable clothing  Avoid make-up  Remove  jewelry and piercings, consider professional piercing removal with a plastic spacer if needed  Bring photo ID and Insurance card  Bring an accurate medication list that includes medication dose, frequency and allergies  Bring a copy of your advanced directives (will, health care power of )  Bring any devices and controllers as well as medical devices you have been provided with for surgery (CPAP, slings, braces, etc.)  Dentures, eyeglasses, and contacts will be removed before surgery, please bring cases for contacts or glasses    Preoperative Deep Breathing Exercises    Why it is important to do deep breathing exercises before my surgery?  Deep breathing exercises strengthen your breathing muscles.  This helps you to recover after your surgery and decreases the chance of breathing complications.    How are the deep breathing exercises done?  Sit straight with your back supported.  Breathe in deeply and slowly through your nose. Your lower  rib cage should expand and your abdomen may move forward.  Hold that breath for 3 to 5 seconds.  Breathe out through pursed lips, slowly and completely.  Rest and repeat 10 times every hour while awake.  Rest longer if you become dizzy or lightheaded.      Preoperative Brain Exercises    What are brain exercises?  A brain exercise is any activity that engages your thinking (cognitive) skills.    What types of activities are considered brain exercises?  Jigsaw puzzles, crossword puzzles, word jumble, memory games, word search, and many more.  Many can be found free online or on your phone via a mobile dmitry.    Why should I do brain exercises before my surgery?  More recent research has shown brain exercise before surgery can lower the risk of postoperative delirium (confusion) which can be especially important for older adults.  Patients who did brain exercises for 5 to 10 hours the days before surgery, cut their risk of postoperative delirium in half up to 1 week after surgery.    Sit-to-Stand Exercise    What is the sit-to-stand exercise?  The sit-to-stand exercise strengthens the muscles of your lower body and muscles in the center of your body (core muscles for stability) helping to maintain and improve your strength and mobility.  How do I do the sit-to-stand exercise?  The goal is to do this exercise without using your arms or hands.  If this is too difficult, use your arms and hands or a chair with armrests to help slowly push yourself to the standing position and lower yourself back to the sitting position. As the movement becomes easier use your arms and hands less.    Steps to the sit-to-stand exercise  Sit up tall in a sturdy chair, knees bent, feet flat on the floor shoulder-width apart.  Shift your hips/pelvis forward in the chair to correctly position yourself for the next movement.  Lean forward at your hips.  Stand up straight putting equal weight on both feet.  Check to be sure you are properly  aligned with the chair, in a slow controlled movement sit back down.  Repeat this exercise 10-15 times.  If needed you can do it fewer times until your strength improves.  Rest for 1 minute.  Do another 10-15 sit-to-stand exercises.  Try to do this in the morning and evening.       Simple things you can do to help prevent blood clots     Blood clots are blockages that can form in the body's veins. When a blood clot forms in your deep veins, it may be called a deep vein thrombosis, or DVT for short. Blood clots can happen in any part of the body where blood flows, but they are most common in the arms and legs. If a piece of a blood clot breaks free and travels to the lungs, it is called a pulmonary embolus (PE). A PE can be a very serious problem.      Being in the hospital or having surgery can raise your chances of getting a blood clot because you may not be well enough to move around as much as you normally do.         Ways you can help prevent blood clots in the hospital       Wearing SCDs  SCDs stands for Sequential Compression Devices.   SCDs are special sleeves that wrap around your legs. They attach to a pump that fills them with air to gently squeeze your legs every few minutes.  This helps return the blood in your legs to your heart.   SCDs should only be taken off when walking or bathing. SCDs may not be comfortable, but they can help save your life.              Pump SCD leg sleeves  Wearing compression stockings - if your doctor orders them. These special snug-fitting stockings gently squeeze your legs to help blood flow.       Walking. Walking helps move the blood in your legs.   If your doctor says it is ok, try walking the halls at least   5 times a day. Ask us to help you get up, so you don't fall.      Taking any blood-thinning medicines your doctor orders.              Ways you can help prevent blood clots at home         Wearing compression stockings - if your doctor orders them.   Walking - to  help move the blood in your legs.    Taking any blood-thinning medicines your doctor orders.      Signs of a blood clot or PE    Tell your doctor or nurse right away if you have any of the problems listed below.         If you are at home, seek medical care right away. Call 911 for chest pain or problems breathing.            Signs of a blood clot (DVT) - such as pain, swelling, redness, or warmth in your arm or legs.  Signs of a pulmonary embolism (PE) - such as chest pain or feeling short of breath

## 2025-04-10 LAB — STAPHYLOCOCCUS SPEC CULT: NORMAL

## 2025-04-16 ENCOUNTER — APPOINTMENT (OUTPATIENT)
Dept: RADIOLOGY | Facility: HOSPITAL | Age: 75
DRG: 266 | End: 2025-04-16
Payer: MEDICARE

## 2025-04-16 ENCOUNTER — HOSPITAL ENCOUNTER (INPATIENT)
Facility: HOSPITAL | Age: 75
DRG: 266 | End: 2025-04-16
Attending: INTERNAL MEDICINE | Admitting: PHYSICIAN ASSISTANT
Payer: MEDICARE

## 2025-04-16 ENCOUNTER — ANESTHESIA EVENT (OUTPATIENT)
Dept: CARDIOLOGY | Facility: HOSPITAL | Age: 75
End: 2025-04-16
Payer: MEDICARE

## 2025-04-16 ENCOUNTER — HOSPITAL ENCOUNTER (OUTPATIENT)
Dept: CARDIOLOGY | Facility: HOSPITAL | Age: 75
Discharge: HOME | End: 2025-04-16
Payer: MEDICARE

## 2025-04-16 ENCOUNTER — APPOINTMENT (OUTPATIENT)
Dept: CARDIOLOGY | Facility: HOSPITAL | Age: 75
DRG: 266 | End: 2025-04-16
Payer: MEDICARE

## 2025-04-16 DIAGNOSIS — Z95.2 S/P TRANSCATHETER MITRAL VALVE REPLACEMENT (TMVR): ICD-10-CM

## 2025-04-16 DIAGNOSIS — Z79.01 ANTICOAGULATION MANAGEMENT ENCOUNTER: ICD-10-CM

## 2025-04-16 DIAGNOSIS — I47.20 VENTRICULAR TACHYARRHYTHMIA (MULTI): ICD-10-CM

## 2025-04-16 DIAGNOSIS — I50.43 ACUTE ON CHRONIC COMBINED SYSTOLIC (CONGESTIVE) AND DIASTOLIC (CONGESTIVE) HEART FAILURE: ICD-10-CM

## 2025-04-16 DIAGNOSIS — I34.0 NONRHEUMATIC MITRAL VALVE REGURGITATION: Primary | ICD-10-CM

## 2025-04-16 DIAGNOSIS — Z51.81 ANTICOAGULATION MANAGEMENT ENCOUNTER: ICD-10-CM

## 2025-04-16 DIAGNOSIS — I34.0 MITRAL REGURGITATION: ICD-10-CM

## 2025-04-16 DIAGNOSIS — I05.9 MITRAL VALVE DISEASE: Primary | ICD-10-CM

## 2025-04-16 DIAGNOSIS — I34.0 MITRAL VALVE INSUFFICIENCY, UNSPECIFIED ETIOLOGY: ICD-10-CM

## 2025-04-16 DIAGNOSIS — Z95.810 CARDIAC DEFIBRILLATOR IN PLACE: ICD-10-CM

## 2025-04-16 DIAGNOSIS — I50.42 CHRONIC COMBINED SYSTOLIC AND DIASTOLIC CONGESTIVE HEART FAILURE: ICD-10-CM

## 2025-04-16 DIAGNOSIS — Z01.818 ENCOUNTER FOR OTHER PREPROCEDURAL EXAMINATION: ICD-10-CM

## 2025-04-16 LAB
ABO GROUP (TYPE) IN BLOOD: NORMAL
ALBUMIN SERPL BCP-MCNC: 4.2 G/DL (ref 3.4–5)
ALP SERPL-CCNC: 65 U/L (ref 33–136)
ALT SERPL W P-5'-P-CCNC: 15 U/L (ref 7–45)
ANION GAP SERPL CALC-SCNC: 16 MMOL/L (ref 10–20)
ANTIBODY SCREEN: NORMAL
AST SERPL W P-5'-P-CCNC: 20 U/L (ref 9–39)
BILIRUB SERPL-MCNC: 0.5 MG/DL (ref 0–1.2)
BNP SERPL-MCNC: 341 PG/ML (ref 0–99)
BUN SERPL-MCNC: 21 MG/DL (ref 6–23)
CALCIUM SERPL-MCNC: 9.4 MG/DL (ref 8.6–10.6)
CHLORIDE SERPL-SCNC: 104 MMOL/L (ref 98–107)
CO2 SERPL-SCNC: 28 MMOL/L (ref 21–32)
CREAT SERPL-MCNC: 1.11 MG/DL (ref 0.5–1.05)
EGFRCR SERPLBLD CKD-EPI 2021: 53 ML/MIN/1.73M*2
EJECTION FRACTION APICAL 4 CHAMBER: 41.2
EJECTION FRACTION: 48 %
ERYTHROCYTE [DISTWIDTH] IN BLOOD BY AUTOMATED COUNT: 16 % (ref 11.5–14.5)
GLUCOSE SERPL-MCNC: 75 MG/DL (ref 74–99)
HCT VFR BLD AUTO: 30 % (ref 36–46)
HGB BLD-MCNC: 8.8 G/DL (ref 12–16)
LEFT ATRIUM VOLUME AREA LENGTH INDEX BSA: 37.7 ML/M2
LEFT VENTRICLE INTERNAL DIMENSION DIASTOLE: 6.1 CM (ref 3.5–6)
LEFT VENTRICULAR OUTFLOW TRACT DIAMETER: 1.94 CM
MAGNESIUM SERPL-MCNC: 2.48 MG/DL (ref 1.6–2.4)
MCH RBC QN AUTO: 22.3 PG (ref 26–34)
MCHC RBC AUTO-ENTMCNC: 29.3 G/DL (ref 32–36)
MCV RBC AUTO: 76 FL (ref 80–100)
NRBC BLD-RTO: 0 /100 WBCS (ref 0–0)
PLATELET # BLD AUTO: 228 X10*3/UL (ref 150–450)
POTASSIUM SERPL-SCNC: 3.5 MMOL/L (ref 3.5–5.3)
PROT SERPL-MCNC: 6.7 G/DL (ref 6.4–8.2)
RBC # BLD AUTO: 3.95 X10*6/UL (ref 4–5.2)
RH FACTOR (ANTIGEN D): NORMAL
RIGHT VENTRICLE FREE WALL PEAK S': 14 CM/S
SODIUM SERPL-SCNC: 144 MMOL/L (ref 136–145)
TRICUSPID ANNULAR PLANE SYSTOLIC EXCURSION: 2.6 CM
WBC # BLD AUTO: 5.5 X10*3/UL (ref 4.4–11.3)

## 2025-04-16 PROCEDURE — 86922 COMPATIBILITY TEST ANTIGLOB: CPT

## 2025-04-16 PROCEDURE — 71046 X-RAY EXAM CHEST 2 VIEWS: CPT

## 2025-04-16 PROCEDURE — 93325 DOPPLER ECHO COLOR FLOW MAPG: CPT

## 2025-04-16 PROCEDURE — 93010 ELECTROCARDIOGRAM REPORT: CPT | Performed by: INTERNAL MEDICINE

## 2025-04-16 PROCEDURE — 71046 X-RAY EXAM CHEST 2 VIEWS: CPT | Performed by: RADIOLOGY

## 2025-04-16 PROCEDURE — 80053 COMPREHEN METABOLIC PANEL: CPT | Performed by: PHYSICIAN ASSISTANT

## 2025-04-16 PROCEDURE — 2500000005 HC RX 250 GENERAL PHARMACY W/O HCPCS: Performed by: PHYSICIAN ASSISTANT

## 2025-04-16 PROCEDURE — 2500000004 HC RX 250 GENERAL PHARMACY W/ HCPCS (ALT 636 FOR OP/ED): Mod: TB | Performed by: PHYSICIAN ASSISTANT

## 2025-04-16 PROCEDURE — 1200000002 HC GENERAL ROOM WITH TELEMETRY DAILY

## 2025-04-16 PROCEDURE — 2500000001 HC RX 250 WO HCPCS SELF ADMINISTERED DRUGS (ALT 637 FOR MEDICARE OP): Performed by: PHYSICIAN ASSISTANT

## 2025-04-16 PROCEDURE — 85027 COMPLETE CBC AUTOMATED: CPT | Performed by: PHYSICIAN ASSISTANT

## 2025-04-16 PROCEDURE — 93005 ELECTROCARDIOGRAM TRACING: CPT

## 2025-04-16 PROCEDURE — 83735 ASSAY OF MAGNESIUM: CPT | Performed by: PHYSICIAN ASSISTANT

## 2025-04-16 PROCEDURE — 83880 ASSAY OF NATRIURETIC PEPTIDE: CPT | Performed by: PHYSICIAN ASSISTANT

## 2025-04-16 PROCEDURE — 86901 BLOOD TYPING SEROLOGIC RH(D): CPT | Performed by: PHYSICIAN ASSISTANT

## 2025-04-16 PROCEDURE — 86902 BLOOD TYPE ANTIGEN DONOR EA: CPT

## 2025-04-16 PROCEDURE — 2500000002 HC RX 250 W HCPCS SELF ADMINISTERED DRUGS (ALT 637 FOR MEDICARE OP, ALT 636 FOR OP/ED): Performed by: PHYSICIAN ASSISTANT

## 2025-04-16 PROCEDURE — 2500000001 HC RX 250 WO HCPCS SELF ADMINISTERED DRUGS (ALT 637 FOR MEDICARE OP): Performed by: STUDENT IN AN ORGANIZED HEALTH CARE EDUCATION/TRAINING PROGRAM

## 2025-04-16 PROCEDURE — 36415 COLL VENOUS BLD VENIPUNCTURE: CPT | Performed by: PHYSICIAN ASSISTANT

## 2025-04-16 RX ORDER — DILTIAZEM HYDROCHLORIDE 120 MG/1
120 CAPSULE, COATED, EXTENDED RELEASE ORAL DAILY
Status: DISCONTINUED | OUTPATIENT
Start: 2025-04-17 | End: 2025-04-17

## 2025-04-16 RX ORDER — HYDROXYCHLOROQUINE SULFATE 200 MG/1
300 TABLET, FILM COATED ORAL DAILY
Status: DISCONTINUED | OUTPATIENT
Start: 2025-04-17 | End: 2025-04-22 | Stop reason: HOSPADM

## 2025-04-16 RX ORDER — TALC
3 POWDER (GRAM) TOPICAL NIGHTLY PRN
Status: DISCONTINUED | OUTPATIENT
Start: 2025-04-16 | End: 2025-04-22 | Stop reason: HOSPADM

## 2025-04-16 RX ORDER — IPRATROPIUM BROMIDE AND ALBUTEROL SULFATE 2.5; .5 MG/3ML; MG/3ML
3 SOLUTION RESPIRATORY (INHALATION) 4 TIMES DAILY PRN
Status: DISCONTINUED | OUTPATIENT
Start: 2025-04-16 | End: 2025-04-22 | Stop reason: HOSPADM

## 2025-04-16 RX ORDER — FLUTICASONE FUROATE AND VILANTEROL 100; 25 UG/1; UG/1
1 POWDER RESPIRATORY (INHALATION)
Status: DISCONTINUED | OUTPATIENT
Start: 2025-04-16 | End: 2025-04-22 | Stop reason: HOSPADM

## 2025-04-16 RX ORDER — SUCRALFATE 1 G/10ML
1 SUSPENSION ORAL
Status: DISCONTINUED | OUTPATIENT
Start: 2025-04-16 | End: 2025-04-22 | Stop reason: HOSPADM

## 2025-04-16 RX ORDER — NITROGLYCERIN 0.4 MG/1
0.4 TABLET SUBLINGUAL EVERY 5 MIN PRN
Status: DISCONTINUED | OUTPATIENT
Start: 2025-04-16 | End: 2025-04-22 | Stop reason: HOSPADM

## 2025-04-16 RX ORDER — ENOXAPARIN SODIUM 100 MG/ML
40 INJECTION SUBCUTANEOUS DAILY
Status: DISCONTINUED | OUTPATIENT
Start: 2025-04-16 | End: 2025-04-17

## 2025-04-16 RX ORDER — BUMETANIDE 1 MG/1
2 TABLET ORAL
Status: DISCONTINUED | OUTPATIENT
Start: 2025-04-16 | End: 2025-04-18

## 2025-04-16 RX ORDER — ONDANSETRON 4 MG/1
8 TABLET, FILM COATED ORAL EVERY 8 HOURS PRN
Status: DISCONTINUED | OUTPATIENT
Start: 2025-04-16 | End: 2025-04-22 | Stop reason: HOSPADM

## 2025-04-16 RX ORDER — SULFASALAZINE 500 MG/1
1000 TABLET ORAL 2 TIMES DAILY
Status: DISCONTINUED | OUTPATIENT
Start: 2025-04-16 | End: 2025-04-22 | Stop reason: HOSPADM

## 2025-04-16 RX ORDER — MONTELUKAST SODIUM 10 MG/1
10 TABLET ORAL NIGHTLY
Status: DISCONTINUED | OUTPATIENT
Start: 2025-04-16 | End: 2025-04-17

## 2025-04-16 RX ORDER — PANTOPRAZOLE SODIUM 40 MG/1
40 TABLET, DELAYED RELEASE ORAL 2 TIMES DAILY
Status: DISCONTINUED | OUTPATIENT
Start: 2025-04-16 | End: 2025-04-17

## 2025-04-16 RX ORDER — ROSUVASTATIN CALCIUM 20 MG/1
20 TABLET, COATED ORAL NIGHTLY
Status: DISCONTINUED | OUTPATIENT
Start: 2025-04-16 | End: 2025-04-17

## 2025-04-16 RX ORDER — CLOPIDOGREL BISULFATE 75 MG/1
75 TABLET ORAL DAILY
Status: DISCONTINUED | OUTPATIENT
Start: 2025-04-17 | End: 2025-04-22 | Stop reason: HOSPADM

## 2025-04-16 RX ORDER — AMOXICILLIN 250 MG
2 CAPSULE ORAL NIGHTLY PRN
Status: DISCONTINUED | OUTPATIENT
Start: 2025-04-16 | End: 2025-04-20

## 2025-04-16 RX ORDER — IPRATROPIUM BROMIDE AND ALBUTEROL SULFATE 2.5; .5 MG/3ML; MG/3ML
3 SOLUTION RESPIRATORY (INHALATION)
Status: DISCONTINUED | OUTPATIENT
Start: 2025-04-16 | End: 2025-04-19

## 2025-04-16 RX ORDER — DIGOXIN 125 MCG
125 TABLET ORAL DAILY
Status: DISCONTINUED | OUTPATIENT
Start: 2025-04-17 | End: 2025-04-22 | Stop reason: HOSPADM

## 2025-04-16 RX ORDER — POLYETHYLENE GLYCOL 3350 17 G/17G
17 POWDER, FOR SOLUTION ORAL DAILY PRN
Status: DISCONTINUED | OUTPATIENT
Start: 2025-04-16 | End: 2025-04-20

## 2025-04-16 RX ORDER — BUMETANIDE 2 MG/1
2 TABLET ORAL
COMMUNITY

## 2025-04-16 RX ORDER — BUMETANIDE 0.25 MG/ML
2 INJECTION, SOLUTION INTRAMUSCULAR; INTRAVENOUS ONCE
Status: COMPLETED | OUTPATIENT
Start: 2025-04-16 | End: 2025-04-16

## 2025-04-16 RX ORDER — ACETAMINOPHEN 325 MG/1
650 TABLET ORAL EVERY 6 HOURS PRN
Status: DISCONTINUED | OUTPATIENT
Start: 2025-04-16 | End: 2025-04-22 | Stop reason: HOSPADM

## 2025-04-16 RX ADMIN — IPRATROPIUM BROMIDE AND ALBUTEROL SULFATE 3 ML: .5; 3 SOLUTION RESPIRATORY (INHALATION) at 20:13

## 2025-04-16 RX ADMIN — MONTELUKAST 10 MG: 10 TABLET, FILM COATED ORAL at 20:13

## 2025-04-16 RX ADMIN — ACETAMINOPHEN 650 MG: 325 TABLET, FILM COATED ORAL at 20:15

## 2025-04-16 RX ADMIN — PANTOPRAZOLE SODIUM 40 MG: 40 TABLET, DELAYED RELEASE ORAL at 20:13

## 2025-04-16 RX ADMIN — ROSUVASTATIN CALCIUM 20 MG: 20 TABLET, FILM COATED ORAL at 20:13

## 2025-04-16 RX ADMIN — BUMETANIDE 2 MG: 0.25 INJECTION INTRAMUSCULAR; INTRAVENOUS at 18:19

## 2025-04-16 RX ADMIN — SULFASALAZINE 1000 MG: 500 TABLET ORAL at 20:13

## 2025-04-16 RX ADMIN — SUCRALFATE 1 G: 1 SUSPENSION ORAL at 20:13

## 2025-04-16 SDOH — SOCIAL STABILITY: SOCIAL INSECURITY: HAVE YOU HAD ANY THOUGHTS OF HARMING ANYONE ELSE?: NO

## 2025-04-16 SDOH — SOCIAL STABILITY: SOCIAL INSECURITY: DO YOU FEEL UNSAFE GOING BACK TO THE PLACE WHERE YOU ARE LIVING?: NO

## 2025-04-16 SDOH — SOCIAL STABILITY: SOCIAL INSECURITY: ABUSE: ADULT

## 2025-04-16 SDOH — SOCIAL STABILITY: SOCIAL INSECURITY: HAVE YOU HAD THOUGHTS OF HARMING ANYONE ELSE?: YES

## 2025-04-16 SDOH — SOCIAL STABILITY: SOCIAL INSECURITY: WERE YOU ABLE TO COMPLETE ALL THE BEHAVIORAL HEALTH SCREENINGS?: YES

## 2025-04-16 SDOH — SOCIAL STABILITY: SOCIAL INSECURITY: ARE THERE ANY APPARENT SIGNS OF INJURIES/BEHAVIORS THAT COULD BE RELATED TO ABUSE/NEGLECT?: NO

## 2025-04-16 SDOH — SOCIAL STABILITY: SOCIAL INSECURITY: DO YOU FEEL ANYONE HAS EXPLOITED OR TAKEN ADVANTAGE OF YOU FINANCIALLY OR OF YOUR PERSONAL PROPERTY?: NO

## 2025-04-16 SDOH — SOCIAL STABILITY: SOCIAL INSECURITY: HAS ANYONE EVER THREATENED TO HURT YOUR FAMILY OR YOUR PETS?: NO

## 2025-04-16 SDOH — SOCIAL STABILITY: SOCIAL INSECURITY: DOES ANYONE TRY TO KEEP YOU FROM HAVING/CONTACTING OTHER FRIENDS OR DOING THINGS OUTSIDE YOUR HOME?: NO

## 2025-04-16 SDOH — SOCIAL STABILITY: SOCIAL INSECURITY: ARE YOU OR HAVE YOU BEEN THREATENED OR ABUSED PHYSICALLY, EMOTIONALLY, OR SEXUALLY BY ANYONE?: NO

## 2025-04-16 ASSESSMENT — COGNITIVE AND FUNCTIONAL STATUS - GENERAL
PATIENT BASELINE BEDBOUND: NO
MOBILITY SCORE: 24
DAILY ACTIVITIY SCORE: 24

## 2025-04-16 ASSESSMENT — ENCOUNTER SYMPTOMS
NAUSEA: 0
ABDOMINAL PAIN: 0
CHILLS: 0
RHINORRHEA: 0
LIGHT-HEADEDNESS: 0
ARTHRALGIAS: 0
FEVER: 0
PALPITATIONS: 0
DYSURIA: 0
VOMITING: 0
SHORTNESS OF BREATH: 1
CHEST TIGHTNESS: 1
DIARRHEA: 0

## 2025-04-16 ASSESSMENT — ACTIVITIES OF DAILY LIVING (ADL)
DRESSING YOURSELF: INDEPENDENT
ASSISTIVE_DEVICE: DENTURES UPPER
FEEDING YOURSELF: INDEPENDENT
GROOMING: INDEPENDENT
HEARING - RIGHT EAR: FUNCTIONAL
HEARING - LEFT EAR: FUNCTIONAL
WALKS IN HOME: INDEPENDENT
ADEQUATE_TO_COMPLETE_ADL: YES
PATIENT'S MEMORY ADEQUATE TO SAFELY COMPLETE DAILY ACTIVITIES?: YES
TOILETING: INDEPENDENT
BATHING: INDEPENDENT
JUDGMENT_ADEQUATE_SAFELY_COMPLETE_DAILY_ACTIVITIES: YES

## 2025-04-16 ASSESSMENT — PAIN SCALES - GENERAL
PAINLEVEL_OUTOF10: 2
PAINLEVEL_OUTOF10: 0 - NO PAIN
PAINLEVEL_OUTOF10: 5 - MODERATE PAIN

## 2025-04-16 ASSESSMENT — LIFESTYLE VARIABLES
HOW OFTEN DO YOU HAVE A DRINK CONTAINING ALCOHOL: NEVER
AUDIT-C TOTAL SCORE: 0
SKIP TO QUESTIONS 9-10: 1
AUDIT-C TOTAL SCORE: 0
HOW OFTEN DO YOU HAVE 6 OR MORE DRINKS ON ONE OCCASION: NEVER
HOW MANY STANDARD DRINKS CONTAINING ALCOHOL DO YOU HAVE ON A TYPICAL DAY: PATIENT DOES NOT DRINK

## 2025-04-16 ASSESSMENT — PAIN - FUNCTIONAL ASSESSMENT
PAIN_FUNCTIONAL_ASSESSMENT: 0-10

## 2025-04-16 ASSESSMENT — PAIN DESCRIPTION - DESCRIPTORS: DESCRIPTORS: HEADACHE

## 2025-04-16 NOTE — Clinical Note
Temporary pacemaker initiates rapid pacing. Temporary pacemaker location through left femoral vein. Heart rate: 140. Current 20 (mA). Ramped up to 160 and 180

## 2025-04-16 NOTE — Clinical Note
Temporary pacemaker initiates rapid pacing. Temporary pacemaker location through left femoral vein. Heart rate: 180. Current 20 (mA).

## 2025-04-16 NOTE — Clinical Note
Temporary pacemaker turned on backup pacing. Temporary pacemaker location through left femoral vein. Rapid pacing discontinued

## 2025-04-16 NOTE — Clinical Note
Temporary pacemaker initiates rapid pacing. Temporary pacemaker location through left femoral vein. Heart rate: 180. Current 10 (mA).

## 2025-04-16 NOTE — Clinical Note
Patient Clipped and Prepped: groin, chest, abdomen, subclavians, popliteals and bilateral flank. Prepped with ChloraPrep, a minimum of 3 minute dry time, longer if needed, no pooling noted, patient draped in sterile fashion.

## 2025-04-16 NOTE — Clinical Note
Inserted RFV None available None available None available None available None available None available None available None available

## 2025-04-16 NOTE — Clinical Note
[facility] Mirtral Valve Replacement System Bioprosthesis  [id number]  42 mm  SN: [id number]  Expiration: 2025-10-03

## 2025-04-16 NOTE — PROGRESS NOTES
"Pharmacy Medication History Review    Trina SALDANA Via \"Marcela\" is a 74 y.o. female who is planned to be admitted for Mitral regurgitation. Pharmacy called the patient prior to their scheduled procedure and reviewed the patient's [id number] medications for accuracy.    Medications ADDED:  Bumetanide 2mg  Medications CHANGED:  Diltiazem CD 120mg TO diltiazem ER 120mg  Combivent respimat directions from #2QID to #2BID on most days can use up to QID  Medications REMOVED:   Multivitamin  Vitamin D3    Please review updated prior to admission medication list and comments regarding how patient may be taking medications differently by going to Admission tab --> Admission Orders --> Admit Orders / Review prior to admission medications.     Preferred pharmacy, last doses of medications, and allergies to be confirmed with patient by nursing the day of procedure.     Sources used to complete the med history include:  [facility]  Pharmacy dispense history  Patient Interview Good historian  Chart Review  Care Everywhere     Below are additional concerns with the patient's PTA list.  Patient states they are taking #1 tablet of bumetanide 2mg twice daily. L.F. 03/07/25 #180/90d  Patient states they are inhaling #2 puffs of combivent respimat twice daily on most days, but can use up to 4 times a day if needed. L.F. 03/03/25 #3/90d  Patient states they are taking #1 capsule of diltiazem ER 120mg once daily. L.F. 04/05/25 #180/90d. Prescription states #1BID. (There is NO fill history of cardizem CD 120mg version of diltiazem)    [facility]  Please reach out via Secure Chat for questions   "

## 2025-04-16 NOTE — H&P
History Of Present Illness:    74 y.o. y/o female with PMH of atrial fibrillation s/p LAAO, VT/VF s/p ICD, Hyperlipidemia , CAD,  PUD, SBO s/p lysis of adhesions, GERD s/p nissen fundoplication, provoked DVT 2018, Rheumatoid arthritis, psoriasis, asthma, chronic anemia, severe MR, caridoMEMs implant and CKD presents for evaluation of severe, symptomatic progressive mitral regurgitation admitted for optimization prior to scheduled TMVR 4/17 with Dr. Sanjana Ojeda.    Here with . Feeling SOB. Minimal ankle swelling. No illnesses (cold, flus, fevers) in last couple weeks.  Feels she is retaining fluid.      Last Recorded Vitals:  Vitals:    04/16/25 1651   BP: 141/63   BP Location: Left arm   Patient Position: Sitting   Pulse: 74   Resp: 17   Temp: 36.4 °C (97.5 °F)   TempSrc: Temporal   SpO2: 97%       Last Labs:  CBC - 4/9/2025:  8:21 AM  6.1 9.9 278    34.0      CMP - 4/9/2025:  8:21 AM  9.4 7.2 16 --- 0.4   3.8 4.4 13 67      PTT - 2/10/2025:  1:02 PM  1.0   10.7 26     Troponin I, High Sensitivity   Date/Time Value Ref Range Status   02/10/2025 04:16 PM 20 (H) 0 - 13 ng/L Final   02/10/2025 01:02 PM 22 (H) 0 - 13 ng/L Final   09/18/2024 01:47 AM 34 (H) 0 - 13 ng/L Final     Troponin I, High Sensitivity ([facility])   Date/Time Value Ref Range Status   04/09/2025 08:21 AM 25 0 - 34 ng/L Final     BNP   Date/Time Value Ref Range Status   04/09/2025 08:21  (H) 0 - 99 pg/mL Final   02/10/2025 01:02  (H) 0 - 99 pg/mL Final     Hemoglobin A1C   Date/Time Value Ref Range Status   04/09/2025 08:21 AM 6.1 (H) See comment % Final     LDL-CHOLESTEROL   Date/Time Value Ref Range Status   03/03/2025 09:33 AM 77 mg/dL (calc) Final     Comment:     Reference range: <100     Desirable range <100 mg/dL for primary prevention;    <70 mg/dL for patients with CHD or diabetic patients   with > or = 2 CHD risk factors.     LDL-C is now calculated using the Walt-Nj   calculation, which is a validated novel  method providing   better accuracy than the Friedewald equation in the   estimation of LDL-C.   Walt CHAN et al. FARIHA. 2013;310(38): 5259-6123   (http://[employer].CloudWork/faq/PCF339)       LDL Calculated   Date/Time Value Ref Range Status   04/15/2024 11:28 AM 68 <=99 mg/dL Final     Comment:                                 Near   Borderline      AGE      Desirable  Optimal    High     High     Very High     0-19 Y     0 - 109     ---    110-129   >/= 130     ----    20-24 Y     0 - 119     ---    120-159   >/= 160     ----      >24 Y     0 -  99   100-129  130-159   160-189     >/=190       VLDL   Date/Time Value Ref Range Status   04/15/2024 11:28 AM 21 0 - 40 mg/dL Final   08/28/2023 09:33 AM 24 0 - 40 mg/dL Final   02/28/2023 09:50 AM 30 0 - 40 mg/dL Final   12/19/2022 05:21 PM 27 0 - 40 mg/dL Final      Last I/O:  No intake/output data recorded.        Past Medical History:  She has a past medical history of Acute hypoxic on chronic hypercapnic respiratory failure (09/18/2024), Aneurysm, Arrhythmia, Arthritis, Asthma, Atrial fibrillation (Multi), Cataract, Central sleep apnea, Cervical myofascial pain syndrome, CHF (congestive heart failure), Chronic anemia, CKD (chronic kidney disease), CKD stage 3a, GFR 45-59 ml/min (Multi), COPD (chronic obstructive pulmonary disease) (Multi), DVT of axillary vein, acute right (Multi) (01/20/2018), GERD (gastroesophageal reflux disease), H/O being hospitalized (02/2024), History of Helicobacter pylori infection, syncope, Hyperlipidemia, Hypertension, ICD (implantable cardioverter-defibrillator) in place, Mitral valve regurgitation, Moderate aortic stenosis by prior echocardiogram, Osteopenia (06/26/2023), Peripheral neuropathy, Personal history of anaphylaxis, Psoriasis, PUD (peptic ulcer disease), Pulmonary hypertension (Multi), Radiculopathy, lumbar region (08/13/2018), Restless legs syndrome (11/17/2015), Rheumatoid arthritis, Seasonal allergic rhinitis due  to pollen, Sleep apnea, Small bowel obstruction (Multi) (2023), and Wide-complex tachycardia.    She has no past medical history of Chronic kidney disease or Personal history of irradiation.    Past Surgical History:  She has a past surgical history that includes Cholecystectomy; Appendectomy; Cardiac catheterization (2022);  section, classic; Rotator cuff repair; Abdominal adhesion surgery (10/22/2015); Abdominal adhesion surgery (10/18/2020); Ventral hernia repair (06/15/2015); Nerve Surgery (Right, 10/13/2010); Nissen fundoplication (); Finger surgery (Left, 2008); Leg Surgery (Right, 2006); Breast biopsy (Left, 2022); Ablation of dysrhythmic focus (2022); Wound debridement (2020); Cardioversion (2018); Cardiac assist device insertion (2012); Reardan tooth extraction; IR injection epidural steroid (2011); IR injection epidural steroid (); Reverse total shoulder arthroplasty (Bilateral, 2024); Cardiac defibrillator placement (2014); Carpal tunnel release (Bilateral); Foot surgery (Bilateral); Ulnar nerve transposition (Left); Achilles tendon surgery (Right); Anterior cervical discectomy w/ fusion (2016); Colonoscopy (2024); picc line insertion wo imaging (10/24/2015); Cardiac catheterization (N/A, 2024); Cardiac catheterization (N/A, 2025); Insert / replace / remove pacemaker; Cardiac catheterization (N/A, 3/19/2025); and Cardiac catheterization (N/A, 3/19/2025).      Social History:  She reports that she has never smoked. She has never used smokeless tobacco. She reports that she does not drink alcohol and does not use drugs.    Family History:  Family History[1]     Allergies:  Abatacept, Beta-blockers (beta-adrenergic blocking agts), Hydrocodone-acetaminophen, Hydromorphone, Metoprolol, Penicillins, Pregabalin, Prochlorperazine, Methotrexate, Aripiprazole, Bupropion, Cefepime, Ciprofloxacin,  "Furosemide, Levofloxacin, and Pineapple    Inpatient Medications:  Scheduled Medications[2]  PRN Medications[3]  Continuous Medications[4]  Outpatient Medications:  Current Outpatient Medications   Medication Instructions    acetaminophen (TYLENOL) 325 mg, Every 6 hours PRN    baclofen (Lioresal) 10 mg tablet One tablet in the morning and 2 tablets in the evening.    bumetanide (BUMEX) 2 mg, oral, 2 times daily (morning and late afternoon)    clobetasol (Temovate) 0.05 % cream Topical, 2 times daily    clopidogrel (PLAVIX) 75 mg, oral, Daily    cyanocobalamin (VITAMIN B-12) 1,000 mcg, intramuscular, Every 30 days    diclofenac sodium (VOLTAREN) 4 g, Topical, 2 times daily PRN    digoxin (LANOXIN) 125 mcg, Daily    dilTIAZem ER (TIAZAC) 120 mg, Daily    EPINEPHrine (EPIPEN) 0.3 mg, intramuscular, Once as needed    fluticasone propion-salmeteroL (Advair HFA) 45-21 mcg/actuation inhaler 2 puffs, inhalation, 2 times daily RT, Rinse mouth with water after use to reduce aftertaste and incidence of candidiasis. Do not swallow.    hydroxychloroquine (PLAQUENIL) 300 mg, oral, Daily    ibandronate (BONIVA) 150 mg, oral, Every 30 days    insulin syringe-needle U-100 (BD Insulin Syringe Ultra-Fine) 1 mL 30 gauge x 1/2\" syringe Use one a month    ipratropium-albuteroL (Combivent Respimat)  mcg/actuation inhaler 2 puffs, inhalation, 4 times daily RT    ipratropium-albuteroL (Duo-Neb) 0.5-2.5 mg/3 mL nebulizer solution 3 mL, nebulization, 4 times daily PRN    montelukast (SINGULAIR) 10 mg, oral, Nightly    naloxone (NARCAN) 4 mg, nasal, As needed, May repeat every 2-3 minutes if needed, alternating nostrils, until medical assistance becomes available.    nitroglycerin (NITROSTAT) 0.4 mg, sublingual, Every 5 min PRN    ondansetron (ZOFRAN) 8 mg, Every 8 hours PRN    oxyCODONE-acetaminophen (Percocet) 5-325 mg tablet 1 tablet, oral, 3 times daily PRN    oxyCODONE-acetaminophen (Percocet) 5-325 mg tablet 1 tablet, oral, 3 times " daily PRN    pantoprazole (PROTONIX) 40 mg, oral, 2 times daily    rosuvastatin (CRESTOR) 20 mg, oral, Daily    sucralfate (CARAFATE) 1 g, oral, 4 times daily with meals and nightly    sulfaSALAzine (AZULFIDINE) 1,000 mg, oral, 2 times daily     Review of Systems   Constitutional:  Negative for chills and fever.   HENT:  Negative for congestion and rhinorrhea.    Respiratory:  Positive for chest tightness and shortness of breath.    Cardiovascular:  Negative for chest pain, palpitations and leg swelling.   Gastrointestinal:  Negative for abdominal pain, diarrhea, nausea and vomiting.   Endocrine: Negative for heat intolerance.   Genitourinary:  Negative for dysuria.   Musculoskeletal:  Negative for arthralgias.   Neurological:  Negative for light-headedness.       Physical Exam:  General: NAD  Head/ neck: + JVD  Cardiac: RRR, regular S1 S2 , 1/6 systolic  murmur, no rub, no gallop  Pulm: Mild WOB/ wheezing. Quiet lung field bilaterally   Vascular: Radial 2+ bilaterally, dorsalis pedis 2+  GI: soft, non distended  Extremities: no LE edema   Neuro: no focal neuro deficits   Psych: appropriate mood and behavior   Skin: warm and dry     Cardiac procedures prior to admit:  - CT TAVR 3/2025: Asc thoracic ao 4.1cm mildly dilated, extensive atherosclerosis of thoracoabdominal aorta and branches, thickening of sigmoid colon, mild upper lung emphysema    - SULY 3/19/25: EF 40-45%, gHK of LV, G3DD, LV mild to mod dilation, normal RVSP, mild LAE, mod to severe MR, mild AR, mild plaque in descending aorta, well positioned LAAO without PVL.     -  [facility] 3/19/25: R dom, normal coronaries    Assessment/Plan   74 y.o. y/o female with PMH of atrial fibrillation s/p LAAO, VT/VF s/p ICD, Hyperlipidemia , CAD,  PUD, SBO s/p lysis of adhesions, GERD s/p nissen fundoplication, provoked DVT 2018, Rheumatoid arthritis, psoriasis, asthma, chronic anemia, and CKD presents for evaluation of severe, symptomatic progressive mitral regurgitation  admitted for optimization prior to scheduled TMVR 4/17 with Dr. Sanjana Ojeda.     Symptomatic moderate to severe mitral regurgitation  CardioMEMs in place  - TTE 4/16: EF 45-50%, gHK of LV, G2DD, normal RVSP, mod MAC with mild MS, mod to severe MR, severely decrease posterior MV leaflet mobility, mild aortic stenosis with mod AR  - BNP pending  - CXR 4/16 pending  - Clinically hypervolemic. (Vs component of asthma) Home home bumex 2mg BID and give bumex 2mg IV x1 this evening.  - TMVR tomorrow with Dr. Pizarro. NPO midnight.    Atrial fibrillation s/p LAAO 1/2025  Hx of DCCV and ablation  Hx of VT/VF s/p dc ICD 2014 ([employer], [employer])  - Last check 4/7: Normal lead parameters (chronic high shock impedance 154ohms), battery 11mos  - LAAO without PVL on SULY 3/2025  - Not on anticoagluation  - Diltiazem, digoxin for rate control    Asthma  - breo ellipta  - duonebs PRN  - singulair  - Mild wheezing on exam. Possibly 2/2 volume overload.     PUD  Hx of recurrent SBO 2/2 adhesion lysis 2023  GERD s/p Nissen fundoplication  - protonix BID  - constipation ppx  - zofran PRN    Hx of provoked DVT in R axillary vein 2018  - after PICC line removed  - no longer on anticoagulation    Rheumatoid arthritis  Psoriasis  - sulfasalazine, plaquenil    Chronic anemia  CKD  - BMP pending  - CBC pending    Hyperlipidemia  - Crestor.     DVT ppx: lovenox  DISPO: pending TMVR  Code Status: Full Code    I spent 60- minutes in the professional and overall care of this patient.  Patient to be seen and discussed with attending physician in the morning  Sully Keenan PA-C         [1]   Family History  Problem Relation Name Age of Onset    Stroke Father      Stroke Brother dante     Heart attack Brother dante     Stroke Brother rhyshy     Heart attack Brother vic     Asthma Daughter      Asthma Other      Colon cancer Other      Diabetes Other      Other (stroke syndrome) Other     [2]   Scheduled medications   Medication Dose  Route Frequency    [START ON 4/17/2025] clopidogrel  75 mg oral Daily    [START ON 4/17/2025] digoxin  125 mcg oral Daily    [START ON 4/17/2025] dilTIAZem ER  120 mg oral Daily    enoxaparin  40 mg subcutaneous Daily    fluticasone furoate-vilanteroL  1 puff inhalation Daily    [START ON 4/17/2025] hydroxychloroquine  300 mg oral Daily    ipratropium-albuteroL  3 mL nebulization 4x daily    montelukast  10 mg oral Nightly    pantoprazole  40 mg oral BID    rosuvastatin  20 mg oral Nightly    sucralfate  1 g oral With meals & nightly    sulfaSALAzine  1,000 mg oral BID   [3]   PRN medications   Medication    ipratropium-albuteroL    melatonin    nitroglycerin    ondansetron    polyethylene glycol    sennosides-docusate sodium   [4]   Continuous Medications   Medication Dose Last Rate

## 2025-04-16 NOTE — Clinical Note
Temporary pacemaker turned on backup pacing. Rapid pacing ramped down to 160, 140, 120, 100 80 and then rapid pacing disconintued

## 2025-04-17 ENCOUNTER — APPOINTMENT (OUTPATIENT)
Dept: RADIOLOGY | Facility: HOSPITAL | Age: 75
DRG: 266 | End: 2025-04-17
Payer: MEDICARE

## 2025-04-17 ENCOUNTER — APPOINTMENT (OUTPATIENT)
Dept: CARDIOLOGY | Facility: HOSPITAL | Age: 75
DRG: 266 | End: 2025-04-17
Payer: MEDICARE

## 2025-04-17 ENCOUNTER — ANESTHESIA (OUTPATIENT)
Dept: CARDIOLOGY | Facility: HOSPITAL | Age: 75
End: 2025-04-17
Payer: MEDICARE

## 2025-04-17 DIAGNOSIS — Z95.2 S/P TRANSCATHETER MITRAL VALVE REPLACEMENT (TMVR): ICD-10-CM

## 2025-04-17 LAB
ACT BLD: 145 SEC (ref 83–199)
ACT BLD: 155 SEC (ref 83–199)
ACT BLD: 261 SEC (ref 83–199)
ACT BLD: 265 SEC (ref 83–199)
ACT BLD: 274 SEC (ref 83–199)
ACT BLD: 274 SEC (ref 83–199)
ACT BLD: 313 SEC (ref 83–199)
ACT BLD: 331 SEC (ref 83–199)
ALBUMIN SERPL BCP-MCNC: 4.2 G/DL (ref 3.4–5)
ALP SERPL-CCNC: 68 U/L (ref 33–136)
ALT SERPL W P-5'-P-CCNC: 14 U/L (ref 7–45)
ANION GAP SERPL CALC-SCNC: 17 MMOL/L (ref 10–20)
APTT PPP: 33 SECONDS (ref 26–36)
AST SERPL W P-5'-P-CCNC: 24 U/L (ref 9–39)
BASOPHILS # BLD AUTO: 0.03 X10*3/UL (ref 0–0.1)
BASOPHILS NFR BLD AUTO: 0.3 %
BILIRUB DIRECT SERPL-MCNC: 0.1 MG/DL (ref 0–0.3)
BILIRUB SERPL-MCNC: 0.5 MG/DL (ref 0–1.2)
BUN SERPL-MCNC: 19 MG/DL (ref 6–23)
CALCIUM SERPL-MCNC: 9.3 MG/DL (ref 8.6–10.6)
CHLORIDE SERPL-SCNC: 106 MMOL/L (ref 98–107)
CO2 SERPL-SCNC: 24 MMOL/L (ref 21–32)
CREAT SERPL-MCNC: 1.04 MG/DL (ref 0.5–1.05)
EGFRCR SERPLBLD CKD-EPI 2021: 57 ML/MIN/1.73M*2
EOSINOPHIL # BLD AUTO: 0.01 X10*3/UL (ref 0–0.4)
EOSINOPHIL NFR BLD AUTO: 0.1 %
ERYTHROCYTE [DISTWIDTH] IN BLOOD BY AUTOMATED COUNT: 16.3 % (ref 11.5–14.5)
GLUCOSE SERPL-MCNC: 144 MG/DL (ref 74–99)
HAPTOGLOB SERPL NEPH-MCNC: 159 MG/DL (ref 30–200)
HCT VFR BLD AUTO: 29.8 % (ref 36–46)
HGB BLD-MCNC: 9 G/DL (ref 12–16)
HGB RETIC QN: 25 PG (ref 28–38)
IMM GRANULOCYTES # BLD AUTO: 0.04 X10*3/UL (ref 0–0.5)
IMM GRANULOCYTES NFR BLD AUTO: 0.4 % (ref 0–0.9)
IMMATURE RETIC FRACTION: 24.9 %
INR PPP: 1.1 (ref 0.9–1.1)
LDH SERPL L TO P-CCNC: 250 U/L (ref 84–246)
LYMPHOCYTES # BLD AUTO: 0.61 X10*3/UL (ref 0.8–3)
LYMPHOCYTES NFR BLD AUTO: 6.7 %
MAGNESIUM SERPL-MCNC: 2.35 MG/DL (ref 1.6–2.4)
MCH RBC QN AUTO: 22.8 PG (ref 26–34)
MCHC RBC AUTO-ENTMCNC: 30.2 G/DL (ref 32–36)
MCV RBC AUTO: 76 FL (ref 80–100)
MONOCYTES # BLD AUTO: 0.17 X10*3/UL (ref 0.05–0.8)
MONOCYTES NFR BLD AUTO: 1.9 %
NEUTROPHILS # BLD AUTO: 8.25 X10*3/UL (ref 1.6–5.5)
NEUTROPHILS NFR BLD AUTO: 90.6 %
NRBC BLD-RTO: 0 /100 WBCS (ref 0–0)
PHOSPHATE SERPL-MCNC: 4.2 MG/DL (ref 2.5–4.9)
PLATELET # BLD AUTO: 219 X10*3/UL (ref 150–450)
POTASSIUM SERPL-SCNC: 3.8 MMOL/L (ref 3.5–5.3)
PROT SERPL-MCNC: 6.6 G/DL (ref 6.4–8.2)
PROTHROMBIN TIME: 12.7 SECONDS (ref 9.8–12.4)
RBC # BLD AUTO: 3.94 X10*6/UL (ref 4–5.2)
RETICS #: 0.06 X10*6/UL (ref 0.02–0.11)
RETICS/RBC NFR AUTO: 1.6 % (ref 0.5–2)
SODIUM SERPL-SCNC: 143 MMOL/L (ref 136–145)
WBC # BLD AUTO: 9.1 X10*3/UL (ref 4.4–11.3)

## 2025-04-17 PROCEDURE — 2500000001 HC RX 250 WO HCPCS SELF ADMINISTERED DRUGS (ALT 637 FOR MEDICARE OP): Performed by: PHYSICIAN ASSISTANT

## 2025-04-17 PROCEDURE — 82248 BILIRUBIN DIRECT: CPT

## 2025-04-17 PROCEDURE — 2500000002 HC RX 250 W HCPCS SELF ADMINISTERED DRUGS (ALT 637 FOR MEDICARE OP, ALT 636 FOR OP/ED): Performed by: NURSE PRACTITIONER

## 2025-04-17 PROCEDURE — 3700000002 HC GENERAL ANESTHESIA TIME - EACH INCREMENTAL 1 MINUTE: Performed by: INTERNAL MEDICINE

## 2025-04-17 PROCEDURE — 2720000007 HC OR 272 NO HCPCS: Performed by: INTERNAL MEDICINE

## 2025-04-17 PROCEDURE — 85045 AUTOMATED RETICULOCYTE COUNT: CPT

## 2025-04-17 PROCEDURE — C1760 CLOSURE DEV, VASC: HCPCS | Performed by: INTERNAL MEDICINE

## 2025-04-17 PROCEDURE — C1727 CATH, BAL TIS DIS, NON-VAS: HCPCS | Performed by: INTERNAL MEDICINE

## 2025-04-17 PROCEDURE — C1893 INTRO/SHEATH, FIXED,NON-PEEL: HCPCS | Performed by: INTERNAL MEDICINE

## 2025-04-17 PROCEDURE — 83010 ASSAY OF HAPTOGLOBIN QUANT: CPT

## 2025-04-17 PROCEDURE — 85347 COAGULATION TIME ACTIVATED: CPT

## 2025-04-17 PROCEDURE — 2500000004 HC RX 250 GENERAL PHARMACY W/ HCPCS (ALT 636 FOR OP/ED): Mod: TB | Performed by: NURSE PRACTITIONER

## 2025-04-17 PROCEDURE — 85025 COMPLETE CBC W/AUTO DIFF WBC: CPT

## 2025-04-17 PROCEDURE — 2500000004 HC RX 250 GENERAL PHARMACY W/ HCPCS (ALT 636 FOR OP/ED): Mod: JZ

## 2025-04-17 PROCEDURE — 99291 CRITICAL CARE FIRST HOUR: CPT

## 2025-04-17 PROCEDURE — 93287 PERI-PX DEVICE EVAL & PRGR: CPT

## 2025-04-17 PROCEDURE — 2500000004 HC RX 250 GENERAL PHARMACY W/ HCPCS (ALT 636 FOR OP/ED)

## 2025-04-17 PROCEDURE — 85347 COAGULATION TIME ACTIVATED: CPT | Performed by: INTERNAL MEDICINE

## 2025-04-17 PROCEDURE — 71045 X-RAY EXAM CHEST 1 VIEW: CPT

## 2025-04-17 PROCEDURE — 37799 UNLISTED PX VASCULAR SURGERY: CPT

## 2025-04-17 PROCEDURE — C1725 CATH, TRANSLUMIN NON-LASER: HCPCS | Performed by: INTERNAL MEDICINE

## 2025-04-17 PROCEDURE — 2500000005 HC RX 250 GENERAL PHARMACY W/O HCPCS

## 2025-04-17 PROCEDURE — 3700000001 HC GENERAL ANESTHESIA TIME - INITIAL BASE CHARGE: Performed by: INTERNAL MEDICINE

## 2025-04-17 PROCEDURE — 80048 BASIC METABOLIC PNL TOTAL CA: CPT

## 2025-04-17 PROCEDURE — 94640 AIRWAY INHALATION TREATMENT: CPT

## 2025-04-17 PROCEDURE — 2500000002 HC RX 250 W HCPCS SELF ADMINISTERED DRUGS (ALT 637 FOR MEDICARE OP, ALT 636 FOR OP/ED)

## 2025-04-17 PROCEDURE — 99222 1ST HOSP IP/OBS MODERATE 55: CPT | Performed by: INTERNAL MEDICINE

## 2025-04-17 PROCEDURE — 93355 ECHO TRANSESOPHAGEAL (TEE): CPT

## 2025-04-17 PROCEDURE — A4312 CATH W/O BAG 2-WAY SILICONE: HCPCS | Performed by: INTERNAL MEDICINE

## 2025-04-17 PROCEDURE — 02RG38Z REPLACEMENT OF MITRAL VALVE WITH ZOOPLASTIC TISSUE, PERCUTANEOUS APPROACH: ICD-10-PCS | Performed by: THORACIC SURGERY (CARDIOTHORACIC VASCULAR SURGERY)

## 2025-04-17 PROCEDURE — 83615 LACTATE (LD) (LDH) ENZYME: CPT

## 2025-04-17 PROCEDURE — 2500000002 HC RX 250 W HCPCS SELF ADMINISTERED DRUGS (ALT 637 FOR MEDICARE OP, ALT 636 FOR OP/ED): Performed by: PHYSICIAN ASSISTANT

## 2025-04-17 PROCEDURE — C1756 CATH, PACING, TRANSESOPH: HCPCS | Performed by: INTERNAL MEDICINE

## 2025-04-17 PROCEDURE — 93005 ELECTROCARDIOGRAM TRACING: CPT

## 2025-04-17 PROCEDURE — 85610 PROTHROMBIN TIME: CPT

## 2025-04-17 PROCEDURE — 71045 X-RAY EXAM CHEST 1 VIEW: CPT | Performed by: RADIOLOGY

## 2025-04-17 PROCEDURE — 0483T TMVI PERCUTANEOUS APPROACH: CPT | Performed by: THORACIC SURGERY (CARDIOTHORACIC VASCULAR SURGERY)

## 2025-04-17 PROCEDURE — 0483T TMVI PERCUTANEOUS APPROACH: CPT | Performed by: INTERNAL MEDICINE

## 2025-04-17 PROCEDURE — C1894 INTRO/SHEATH, NON-LASER: HCPCS | Performed by: INTERNAL MEDICINE

## 2025-04-17 PROCEDURE — 83735 ASSAY OF MAGNESIUM: CPT

## 2025-04-17 PROCEDURE — 93010 ELECTROCARDIOGRAM REPORT: CPT | Performed by: INTERNAL MEDICINE

## 2025-04-17 PROCEDURE — 2500000001 HC RX 250 WO HCPCS SELF ADMINISTERED DRUGS (ALT 637 FOR MEDICARE OP)

## 2025-04-17 PROCEDURE — 33418 REPAIR TCAT MITRAL VALVE: CPT | Performed by: INTERNAL MEDICINE

## 2025-04-17 PROCEDURE — C1769 GUIDE WIRE: HCPCS | Performed by: INTERNAL MEDICINE

## 2025-04-17 PROCEDURE — C1900 LEAD, CORONARY VENOUS: HCPCS | Performed by: INTERNAL MEDICINE

## 2025-04-17 PROCEDURE — 2780000003 HC OR 278 NO HCPCS: Performed by: INTERNAL MEDICINE

## 2025-04-17 PROCEDURE — 84100 ASSAY OF PHOSPHORUS: CPT

## 2025-04-17 PROCEDURE — 2020000001 HC ICU ROOM DAILY

## 2025-04-17 DEVICE — IMPLANTABLE DEVICE: Type: IMPLANTABLE DEVICE | Site: HEART | Status: FUNCTIONAL

## 2025-04-17 DEVICE — FLOW DIRECTED PACING CATHETER
Type: IMPLANTABLE DEVICE | Site: HEART | Status: NON-FUNCTIONAL
Brand: PACEL™

## 2025-04-17 RX ORDER — NITROGLYCERIN 40 MG/100ML
INJECTION INTRAVENOUS AS NEEDED
Status: DISCONTINUED | OUTPATIENT
Start: 2025-04-17 | End: 2025-04-17

## 2025-04-17 RX ORDER — ONDANSETRON 4 MG/1
4 TABLET, FILM COATED ORAL EVERY 8 HOURS PRN
Status: DISCONTINUED | OUTPATIENT
Start: 2025-04-17 | End: 2025-04-22 | Stop reason: HOSPADM

## 2025-04-17 RX ORDER — ENOXAPARIN SODIUM 100 MG/ML
40 INJECTION SUBCUTANEOUS DAILY
Status: DISCONTINUED | OUTPATIENT
Start: 2025-04-18 | End: 2025-04-18

## 2025-04-17 RX ORDER — MORPHINE SULFATE 4 MG/ML
2 INJECTION INTRAVENOUS ONCE
Status: COMPLETED | OUTPATIENT
Start: 2025-04-17 | End: 2025-04-17

## 2025-04-17 RX ORDER — FENTANYL CITRATE 50 UG/ML
50 INJECTION, SOLUTION INTRAMUSCULAR; INTRAVENOUS EVERY 5 MIN PRN
Refills: 0 | Status: CANCELLED | OUTPATIENT
Start: 2025-04-17

## 2025-04-17 RX ORDER — HEPARIN SODIUM 1000 [USP'U]/ML
INJECTION, SOLUTION INTRAVENOUS; SUBCUTANEOUS AS NEEDED
Status: DISCONTINUED | OUTPATIENT
Start: 2025-04-17 | End: 2025-04-17

## 2025-04-17 RX ORDER — CEFAZOLIN 1 G/1
INJECTION, POWDER, FOR SOLUTION INTRAVENOUS AS NEEDED
Status: DISCONTINUED | OUTPATIENT
Start: 2025-04-17 | End: 2025-04-17

## 2025-04-17 RX ORDER — PANTOPRAZOLE SODIUM 40 MG/1
40 TABLET, DELAYED RELEASE ORAL 2 TIMES DAILY
Status: DISCONTINUED | OUTPATIENT
Start: 2025-04-18 | End: 2025-04-22 | Stop reason: HOSPADM

## 2025-04-17 RX ORDER — ONDANSETRON 4 MG/1
4 TABLET, FILM COATED ORAL EVERY 8 HOURS PRN
Status: DISCONTINUED | OUTPATIENT
Start: 2025-04-17 | End: 2025-04-17

## 2025-04-17 RX ORDER — MONTELUKAST SODIUM 10 MG/1
10 TABLET ORAL NIGHTLY
Status: DISCONTINUED | OUTPATIENT
Start: 2025-04-18 | End: 2025-04-22 | Stop reason: HOSPADM

## 2025-04-17 RX ORDER — OXYCODONE HYDROCHLORIDE 5 MG/1
5 TABLET ORAL EVERY 8 HOURS PRN
Refills: 0 | Status: DISCONTINUED | OUTPATIENT
Start: 2025-04-17 | End: 2025-04-18

## 2025-04-17 RX ORDER — ASPIRIN 81 MG/1
81 TABLET ORAL DAILY
Status: DISCONTINUED | OUTPATIENT
Start: 2025-04-18 | End: 2025-04-22

## 2025-04-17 RX ORDER — ONDANSETRON HYDROCHLORIDE 2 MG/ML
4 INJECTION, SOLUTION INTRAVENOUS EVERY 8 HOURS PRN
Status: DISCONTINUED | OUTPATIENT
Start: 2025-04-17 | End: 2025-04-17

## 2025-04-17 RX ORDER — FENTANYL CITRATE 50 UG/ML
12.5 INJECTION, SOLUTION INTRAMUSCULAR; INTRAVENOUS EVERY 5 MIN PRN
Refills: 0 | Status: CANCELLED | OUTPATIENT
Start: 2025-04-17

## 2025-04-17 RX ORDER — NORETHINDRONE AND ETHINYL ESTRADIOL 0.5-0.035
KIT ORAL AS NEEDED
Status: DISCONTINUED | OUTPATIENT
Start: 2025-04-17 | End: 2025-04-17

## 2025-04-17 RX ORDER — POTASSIUM CHLORIDE 20 MEQ/1
40 TABLET, EXTENDED RELEASE ORAL ONCE
Status: COMPLETED | OUTPATIENT
Start: 2025-04-17 | End: 2025-04-17

## 2025-04-17 RX ORDER — LIDOCAINE HYDROCHLORIDE 10 MG/ML
0.1 INJECTION, SOLUTION EPIDURAL; INFILTRATION; INTRACAUDAL; PERINEURAL ONCE
Status: DISCONTINUED | OUTPATIENT
Start: 2025-04-17 | End: 2025-04-21

## 2025-04-17 RX ORDER — DOCUSATE SODIUM 100 MG/1
100 CAPSULE, LIQUID FILLED ORAL 2 TIMES DAILY
Status: DISCONTINUED | OUTPATIENT
Start: 2025-04-17 | End: 2025-04-17

## 2025-04-17 RX ORDER — ROSUVASTATIN CALCIUM 20 MG/1
20 TABLET, COATED ORAL NIGHTLY
Status: DISCONTINUED | OUTPATIENT
Start: 2025-04-18 | End: 2025-04-22 | Stop reason: HOSPADM

## 2025-04-17 RX ORDER — DILTIAZEM HYDROCHLORIDE 120 MG/1
120 CAPSULE, COATED, EXTENDED RELEASE ORAL DAILY
Status: DISCONTINUED | OUTPATIENT
Start: 2025-04-18 | End: 2025-04-21

## 2025-04-17 RX ORDER — PROPOFOL 10 MG/ML
INJECTION, EMULSION INTRAVENOUS CONTINUOUS PRN
Status: DISCONTINUED | OUTPATIENT
Start: 2025-04-17 | End: 2025-04-17

## 2025-04-17 RX ORDER — LIDOCAINE HYDROCHLORIDE 20 MG/ML
INJECTION, SOLUTION INFILTRATION; PERINEURAL AS NEEDED
Status: DISCONTINUED | OUTPATIENT
Start: 2025-04-17 | End: 2025-04-17

## 2025-04-17 RX ORDER — FENTANYL CITRATE 50 UG/ML
INJECTION, SOLUTION INTRAMUSCULAR; INTRAVENOUS AS NEEDED
Status: DISCONTINUED | OUTPATIENT
Start: 2025-04-17 | End: 2025-04-17

## 2025-04-17 RX ORDER — MORPHINE SULFATE 4 MG/ML
4 INJECTION INTRAVENOUS ONCE
Status: COMPLETED | OUTPATIENT
Start: 2025-04-17 | End: 2025-04-17

## 2025-04-17 RX ORDER — PROTAMINE SULFATE 10 MG/ML
INJECTION, SOLUTION INTRAVENOUS AS NEEDED
Status: DISCONTINUED | OUTPATIENT
Start: 2025-04-17 | End: 2025-04-17

## 2025-04-17 RX ORDER — SODIUM CHLORIDE, SODIUM LACTATE, POTASSIUM CHLORIDE, CALCIUM CHLORIDE 600; 310; 30; 20 MG/100ML; MG/100ML; MG/100ML; MG/100ML
100 INJECTION, SOLUTION INTRAVENOUS CONTINUOUS
Status: CANCELLED | OUTPATIENT
Start: 2025-04-17 | End: 2025-04-18

## 2025-04-17 RX ORDER — BUMETANIDE 0.25 MG/ML
2 INJECTION, SOLUTION INTRAMUSCULAR; INTRAVENOUS ONCE
Status: COMPLETED | OUTPATIENT
Start: 2025-04-17 | End: 2025-04-17

## 2025-04-17 RX ORDER — DROPERIDOL 2.5 MG/ML
0.62 INJECTION, SOLUTION INTRAMUSCULAR; INTRAVENOUS ONCE AS NEEDED
Status: CANCELLED | OUTPATIENT
Start: 2025-04-17

## 2025-04-17 RX ORDER — ONDANSETRON HYDROCHLORIDE 2 MG/ML
4 INJECTION, SOLUTION INTRAVENOUS ONCE AS NEEDED
Status: CANCELLED | OUTPATIENT
Start: 2025-04-17

## 2025-04-17 RX ORDER — MIDAZOLAM HYDROCHLORIDE 1 MG/ML
INJECTION INTRAMUSCULAR; INTRAVENOUS AS NEEDED
Status: DISCONTINUED | OUTPATIENT
Start: 2025-04-17 | End: 2025-04-17

## 2025-04-17 RX ORDER — LIDOCAINE HYDROCHLORIDE AND EPINEPHRINE 10; 20 UG/ML; MG/ML
5 INJECTION, SOLUTION INFILTRATION; PERINEURAL ONCE AS NEEDED
Status: DISCONTINUED | OUTPATIENT
Start: 2025-04-17 | End: 2025-04-22 | Stop reason: HOSPADM

## 2025-04-17 RX ORDER — ONDANSETRON HYDROCHLORIDE 2 MG/ML
4 INJECTION, SOLUTION INTRAVENOUS EVERY 8 HOURS PRN
Status: DISCONTINUED | OUTPATIENT
Start: 2025-04-17 | End: 2025-04-22 | Stop reason: HOSPADM

## 2025-04-17 RX ORDER — ROCURONIUM BROMIDE 10 MG/ML
INJECTION, SOLUTION INTRAVENOUS AS NEEDED
Status: DISCONTINUED | OUTPATIENT
Start: 2025-04-17 | End: 2025-04-17

## 2025-04-17 RX ORDER — FENTANYL CITRATE 50 UG/ML
25 INJECTION, SOLUTION INTRAMUSCULAR; INTRAVENOUS EVERY 5 MIN PRN
Refills: 0 | Status: CANCELLED | OUTPATIENT
Start: 2025-04-17

## 2025-04-17 RX ADMIN — ONDANSETRON 4 MG: 2 INJECTION INTRAMUSCULAR; INTRAVENOUS at 22:45

## 2025-04-17 RX ADMIN — EPHEDRINE SULFATE 5 MG: 50 INJECTION, SOLUTION INTRAVENOUS at 17:21

## 2025-04-17 RX ADMIN — PROPOFOL 50 MG: 10 INJECTION, EMULSION INTRAVENOUS at 17:34

## 2025-04-17 RX ADMIN — SUCRALFATE 1 G: 1 SUSPENSION ORAL at 20:22

## 2025-04-17 RX ADMIN — NOREPINEPHRINE BITARTRATE 16 MCG: 1 INJECTION, SOLUTION, CONCENTRATE INTRAVENOUS at 17:26

## 2025-04-17 RX ADMIN — FLUTICASONE FUROATE AND VILANTEROL TRIFENATATE 1 PUFF: 100; 25 POWDER RESPIRATORY (INHALATION) at 07:54

## 2025-04-17 RX ADMIN — MORPHINE SULFATE 4 MG: 4 INJECTION, SOLUTION INTRAMUSCULAR; INTRAVENOUS at 22:35

## 2025-04-17 RX ADMIN — HYDROXYCHLOROQUINE SULFATE 300 MG: 200 TABLET, FILM COATED ORAL at 08:54

## 2025-04-17 RX ADMIN — HEPARIN SODIUM 5000 UNITS: 1000 INJECTION, SOLUTION INTRAVENOUS; SUBCUTANEOUS at 16:25

## 2025-04-17 RX ADMIN — BUMETANIDE 2 MG: 0.25 INJECTION INTRAMUSCULAR; INTRAVENOUS at 09:08

## 2025-04-17 RX ADMIN — DIGOXIN 125 MCG: 125 TABLET ORAL at 08:53

## 2025-04-17 RX ADMIN — ACETAMINOPHEN 650 MG: 325 TABLET, FILM COATED ORAL at 20:10

## 2025-04-17 RX ADMIN — NOREPINEPHRINE BITARTRATE 16 MCG: 1 INJECTION, SOLUTION, CONCENTRATE INTRAVENOUS at 17:29

## 2025-04-17 RX ADMIN — IPRATROPIUM BROMIDE AND ALBUTEROL SULFATE 3 ML: .5; 3 SOLUTION RESPIRATORY (INHALATION) at 07:54

## 2025-04-17 RX ADMIN — PANTOPRAZOLE SODIUM 40 MG: 40 TABLET, DELAYED RELEASE ORAL at 08:53

## 2025-04-17 RX ADMIN — HEPARIN SODIUM 7500 UNITS: 1000 INJECTION, SOLUTION INTRAVENOUS; SUBCUTANEOUS at 16:03

## 2025-04-17 RX ADMIN — SUGAMMADEX 200 MG: 100 INJECTION, SOLUTION INTRAVENOUS at 18:29

## 2025-04-17 RX ADMIN — PROPOFOL 30 MG: 10 INJECTION, EMULSION INTRAVENOUS at 15:48

## 2025-04-17 RX ADMIN — PROTAMINE SULFATE 50 MG: 10 INJECTION, SOLUTION INTRAVENOUS at 18:07

## 2025-04-17 RX ADMIN — SODIUM CHLORIDE, SODIUM LACTATE, POTASSIUM CHLORIDE, AND CALCIUM CHLORIDE: 600; 310; 30; 20 INJECTION, SOLUTION INTRAVENOUS at 14:41

## 2025-04-17 RX ADMIN — POTASSIUM CHLORIDE 40 MEQ: 1500 TABLET, EXTENDED RELEASE ORAL at 08:53

## 2025-04-17 RX ADMIN — FENTANYL CITRATE 100 MCG: 50 INJECTION, SOLUTION INTRAMUSCULAR; INTRAVENOUS at 15:09

## 2025-04-17 RX ADMIN — MIDAZOLAM HYDROCHLORIDE 1 MG: 2 INJECTION, SOLUTION INTRAMUSCULAR; INTRAVENOUS at 15:09

## 2025-04-17 RX ADMIN — EPHEDRINE SULFATE 10 MG: 50 INJECTION, SOLUTION INTRAVENOUS at 17:57

## 2025-04-17 RX ADMIN — CEFAZOLIN 2 G: 1 INJECTION, POWDER, FOR SOLUTION INTRAMUSCULAR; INTRAVENOUS at 15:24

## 2025-04-17 RX ADMIN — ROCURONIUM BROMIDE 10 MG: 10 INJECTION INTRAVENOUS at 17:12

## 2025-04-17 RX ADMIN — PROPOFOL 30 MG: 10 INJECTION, EMULSION INTRAVENOUS at 15:38

## 2025-04-17 RX ADMIN — DEXAMETHASONE SODIUM PHOSPHATE 8 MG: 4 INJECTION INTRA-ARTICULAR; INTRALESIONAL; INTRAMUSCULAR; INTRAVENOUS; SOFT TISSUE at 15:27

## 2025-04-17 RX ADMIN — PROPOFOL 100 MCG/KG/MIN: 10 INJECTION, EMULSION INTRAVENOUS at 15:08

## 2025-04-17 RX ADMIN — HEPARIN SODIUM 3000 UNITS: 1000 INJECTION, SOLUTION INTRAVENOUS; SUBCUTANEOUS at 16:14

## 2025-04-17 RX ADMIN — PROPOFOL 150 MG: 10 INJECTION, EMULSION INTRAVENOUS at 15:09

## 2025-04-17 RX ADMIN — MORPHINE SULFATE 2 MG: 4 INJECTION, SOLUTION INTRAMUSCULAR; INTRAVENOUS at 20:39

## 2025-04-17 RX ADMIN — SULFASALAZINE 1000 MG: 500 TABLET ORAL at 08:53

## 2025-04-17 RX ADMIN — MIDAZOLAM HYDROCHLORIDE 1 MG: 2 INJECTION, SOLUTION INTRAMUSCULAR; INTRAVENOUS at 14:57

## 2025-04-17 RX ADMIN — LIDOCAINE HYDROCHLORIDE 50 MG: 20 INJECTION, SOLUTION INFILTRATION; PERINEURAL at 15:09

## 2025-04-17 RX ADMIN — CLOPIDOGREL BISULFATE 75 MG: 75 TABLET, FILM COATED ORAL at 08:53

## 2025-04-17 RX ADMIN — SULFASALAZINE 1000 MG: 500 TABLET ORAL at 20:21

## 2025-04-17 RX ADMIN — NITROGLYCERIN 100 MCG: 10 INJECTION INTRAVENOUS at 17:35

## 2025-04-17 RX ADMIN — NOREPINEPHRINE BITARTRATE 32 MCG: 1 INJECTION, SOLUTION, CONCENTRATE INTRAVENOUS at 17:33

## 2025-04-17 RX ADMIN — ROCURONIUM BROMIDE 100 MG: 10 INJECTION INTRAVENOUS at 15:10

## 2025-04-17 SDOH — SOCIAL STABILITY: SOCIAL INSECURITY: WITHIN THE LAST YEAR, HAVE YOU BEEN AFRAID OF YOUR PARTNER OR EX-PARTNER?: NO

## 2025-04-17 SDOH — ECONOMIC STABILITY: HOUSING INSECURITY: IN THE LAST 12 MONTHS, WAS THERE A TIME WHEN YOU WERE NOT ABLE TO PAY THE MORTGAGE OR RENT ON TIME?: NO

## 2025-04-17 SDOH — ECONOMIC STABILITY: INCOME INSECURITY: IN THE PAST 12 MONTHS HAS THE ELECTRIC, GAS, OIL, OR WATER COMPANY THREATENED TO SHUT OFF SERVICES IN YOUR HOME?: NO

## 2025-04-17 SDOH — ECONOMIC STABILITY: FOOD INSECURITY: WITHIN THE PAST 12 MONTHS, YOU WORRIED THAT YOUR FOOD WOULD RUN OUT BEFORE YOU GOT THE MONEY TO BUY MORE.: NEVER TRUE

## 2025-04-17 SDOH — SOCIAL STABILITY: SOCIAL INSECURITY: WITHIN THE LAST YEAR, HAVE YOU BEEN HUMILIATED OR EMOTIONALLY ABUSED IN OTHER WAYS BY YOUR PARTNER OR EX-PARTNER?: NO

## 2025-04-17 SDOH — ECONOMIC STABILITY: HOUSING INSECURITY: IN THE PAST 12 MONTHS, HOW MANY TIMES HAVE YOU MOVED WHERE YOU WERE LIVING?: 0

## 2025-04-17 SDOH — ECONOMIC STABILITY: FOOD INSECURITY: WITHIN THE PAST 12 MONTHS, THE FOOD YOU BOUGHT JUST DIDN'T LAST AND YOU DIDN'T HAVE MONEY TO GET MORE.: NEVER TRUE

## 2025-04-17 SDOH — ECONOMIC STABILITY: HOUSING INSECURITY: AT ANY TIME IN THE PAST 12 MONTHS, WERE YOU HOMELESS OR LIVING IN A SHELTER (INCLUDING NOW)?: NO

## 2025-04-17 SDOH — ECONOMIC STABILITY: TRANSPORTATION INSECURITY: IN THE PAST 12 MONTHS, HAS LACK OF TRANSPORTATION KEPT YOU FROM MEDICAL APPOINTMENTS OR FROM GETTING MEDICATIONS?: NO

## 2025-04-17 SDOH — ECONOMIC STABILITY: FOOD INSECURITY: HOW HARD IS IT FOR YOU TO PAY FOR THE VERY BASICS LIKE FOOD, HOUSING, MEDICAL CARE, AND HEATING?: NOT HARD AT ALL

## 2025-04-17 ASSESSMENT — COGNITIVE AND FUNCTIONAL STATUS - GENERAL
DAILY ACTIVITIY SCORE: 24
MOBILITY SCORE: 24

## 2025-04-17 ASSESSMENT — PAIN SCALES - GENERAL
PAINLEVEL_OUTOF10: 10 - WORST POSSIBLE PAIN
PAINLEVEL_OUTOF10: 9
PAIN_LEVEL: 2
PAINLEVEL_OUTOF10: 0 - NO PAIN
PAINLEVEL_OUTOF10: 0 - NO PAIN

## 2025-04-17 ASSESSMENT — ACTIVITIES OF DAILY LIVING (ADL)
LACK_OF_TRANSPORTATION: NO
LACK_OF_TRANSPORTATION: NO

## 2025-04-17 ASSESSMENT — PAIN - FUNCTIONAL ASSESSMENT
PAIN_FUNCTIONAL_ASSESSMENT: 0-10
PAIN_FUNCTIONAL_ASSESSMENT: 0-10

## 2025-04-17 ASSESSMENT — PAIN DESCRIPTION - LOCATION
LOCATION: BACK
LOCATION: BACK

## 2025-04-17 NOTE — H&P
Cardiac Intensive Care - History and Physical   Subjective    Trina Elias is a 74 y.o. year old female patient admitted on 4/16/2025 with following ICU needs: Mitral Valve Replacement    HPI:    74 y.o. y/o female with PMH of atrial fibrillation s/p LAAO, VT/VF s/p ICD, Hyperlipidemia , CAD,  PUD, SBO s/p lysis of adhesions, GERD s/p nissen fundoplication, provoked DVT 2018, Rheumatoid arthritis, psoriasis, asthma, chronic anemia, severe MR, caridoMEMs implant and CKD admitted to CICU for monitoring post TMVR 4/17.    Direct admit last evening for pre-TMVR optimization (hypervolemia). Received IVP Bumex 2mg x1 overnight, voided ~1.7L, symptomatically improved (DOSHI and BLE swelling).     Pre-operative TTE (04/16):   1. The left ventricular systolic function is mildly decreased, with a visually estimated ejection fraction of 45-50%.   2. There is global hypokinesis of the left ventricle with minor regional variations.   3. Spectral Doppler shows a Grade II (pseudonormal pattern) of left ventricular diastolic filling with an elevated left atrial pressure.   4. There is normal right ventricular global systolic function.   5. There is mild mitral valve stenosis.   6. There is moderate mitral annular calcification.   7. Moderate to severe mitral valve regurgitation.   8. Severely decreased mitral valve posterior leaflet mobility.   9. Mild aortic valve stenosis.  10. Moderate aortic valve regurgitation.    Pre-operative SULY (3/19/2025):   1. There is global hypokinesis of the left ventricle with minor regional variations.   2. Spectral Doppler shows a Grade III (restrictive pattern) of left ventricular diastolic filling with an elevated left atrial pressure.   3. Left ventricular cavity size is mild to moderately dilated.   4. There is normal right ventricular global systolic function.   5. The left atrium is mildly dilated.   6. Moderate to severe mitral valve regurgitation. Pulmonary vein systolic flow blunted or  absent.   7. Mild aortic valve regurgitation.   8. There is plaque visualized in the descending aorta.   9. Well positioned LAAO device, no heike-device leaks and no device associated thrombus.  10. The left ventricular systolic function is mildly decreased, with a visually estimated ejection fraction of 40-45%.    Cardiac Catheterization Procedure (03/19/2025):  Post Procedure Diagnosis: Normal coronaries, normal cardiac output, moderate  pulmonary HTN, elevated PCWP.  Blood Loss: Estimated blood loss during the procedure was None  mls.  Specimens Removed: Number of specimen(s) removed: none.    Recommendations:  Maximize medical therapy.  Agressive risk factor modification efforts.  Follow-up with cardiology clinic.  Follow-up with primary care physician.  Refer for structural intervention on mitral valve.    She was recently hospitalized in February for COPD exacerbation. She was treated with Bumex twice daily and tapered course of prednisone and azithromycin. She responded well to treatment with improved work of breathing and wheezing. Patient underwent TTE which indicated severe mitral valve regurgitation. Cardiology was consulted and recommended patient receive outpatient referral for mitral valve clips. Patient's home digoxin and diltiazem was continued.     Description of the Procedure:   General anesthesia procedure  Post TMVR with Intrepid via RCFV mod-severe MR PVL   LVOT gradient peak post PVC 11mmHg invasive and SULY   Iatrogenic ASD closed kept open.      RCFV 37F perclose x 2 perclose devices  LCFV 7F manual pressure  LCFA 6F perclose     Complications:   PVL at anterior region of mitral valve     On arrival to CICU, pt with no acute complaints other than back pain. HDS. Arterial line in place.       Meds    Home medications:  Current Outpatient Medications   Medication Instructions    acetaminophen (TYLENOL) 325 mg, Every 6 hours PRN    baclofen (Lioresal) 10 mg tablet One tablet in the morning and 2  "tablets in the evening.    bumetanide (BUMEX) 2 mg, oral, 2 times daily (morning and late afternoon)    clobetasol (Temovate) 0.05 % cream Topical, 2 times daily    clopidogrel (PLAVIX) 75 mg, oral, Daily    cyanocobalamin (VITAMIN B-12) 1,000 mcg, intramuscular, Every 30 days    diclofenac sodium (VOLTAREN) 4 g, Topical, 2 times daily PRN    digoxin (LANOXIN) 125 mcg, Daily    dilTIAZem ER (TIAZAC) 120 mg, Daily    EPINEPHrine (EPIPEN) 0.3 mg, intramuscular, Once as needed    fluticasone propion-salmeteroL (Advair HFA) 45-21 mcg/actuation inhaler 2 puffs, inhalation, 2 times daily RT, Rinse mouth with water after use to reduce aftertaste and incidence of candidiasis. Do not swallow.    hydroxychloroquine (PLAQUENIL) 300 mg, oral, Daily    ibandronate (BONIVA) 150 mg, oral, Every 30 days    insulin syringe-needle U-100 (BD Insulin Syringe Ultra-Fine) 1 mL 30 gauge x 1/2\" syringe Use one a month    ipratropium-albuteroL (Combivent Respimat)  mcg/actuation inhaler 2 puffs, inhalation, 4 times daily RT    ipratropium-albuteroL (Duo-Neb) 0.5-2.5 mg/3 mL nebulizer solution 3 mL, nebulization, 4 times daily PRN    montelukast (SINGULAIR) 10 mg, oral, Nightly    naloxone (NARCAN) 4 mg, nasal, As needed, May repeat every 2-3 minutes if needed, alternating nostrils, until medical assistance becomes available.    nitroglycerin (NITROSTAT) 0.4 mg, sublingual, Every 5 min PRN    ondansetron (ZOFRAN) 8 mg, Every 8 hours PRN    oxyCODONE-acetaminophen (Percocet) 5-325 mg tablet 1 tablet, oral, 3 times daily PRN    oxyCODONE-acetaminophen (Percocet) 5-325 mg tablet 1 tablet, oral, 3 times daily PRN    pantoprazole (PROTONIX) 40 mg, oral, 2 times daily    rosuvastatin (CRESTOR) 20 mg, oral, Daily    sucralfate (CARAFATE) 1 g, oral, 4 times daily with meals and nightly    sulfaSALAzine (AZULFIDINE) 1,000 mg, oral, 2 times daily        Inpatient medications:  Scheduled medications  Scheduled Medications[1]  Continuous " "medications  Continuous Medications[2]  PRN medications  PRN Medications[3]     Objective    Blood pressure 127/70, pulse 74, temperature 36.6 °C (97.9 °F), temperature source Temporal, resp. rate 18, height 1.676 m (5' 6\"), weight 71.7 kg (158 lb 1.1 oz), SpO2 99%.     Physical Exam   General: NAD  Cardiac: RRR  Pulm: CTAB, no resp distress  GI: soft, non distended  Extremities: trace LE edema   Neuro: no focal neuro deficits, a+ox4  Psych: appropriate mood and behavior, somnolent (in light of sedation)  Skin: warm and dry throughout         Intake/Output Summary (Last 24 hours) at 4/17/2025 1706  Last data filed at 4/17/2025 0415  Gross per 24 hour   Intake --   Output 1700 ml   Net -1700 ml     Labs:   Results from last 72 hours   Lab Units 04/16/25  1807   SODIUM mmol/L 144   POTASSIUM mmol/L 3.5   CHLORIDE mmol/L 104   CO2 mmol/L 28   BUN mg/dL 21   CREATININE mg/dL 1.11*   GLUCOSE mg/dL 75   CALCIUM mg/dL 9.4   ANION GAP mmol/L 16   EGFR mL/min/1.73m*2 53*      Results from last 72 hours   Lab Units 04/16/25  1807   WBC AUTO x10*3/uL 5.5   HEMOGLOBIN g/dL 8.8*   HEMATOCRIT % 30.0*   PLATELETS AUTO x10*3/uL 228                 Micro/ID:     Lab Results   Component Value Date    BLOODCULT  09/02/2023     No Growth at 1 days~No Growth at 2 days~No Growth at 3 days~NO GROWTH at 4 days - FINAL REPORT       Summary of Key Imaging Results  CXR 4/16: No acute cardiopulmonary abnormalities    Assessment and Plan     Assessment:  74 y.o. y/o female with PMH of atrial fibrillation s/p LAAO, VT/VF s/p ICD, Hyperlipidemia , CAD,  PUD, SBO s/p lysis of adhesions, GERD s/p nissen fundoplication, provoked DVT 2018, Rheumatoid arthritis, psoriasis, asthma, chronic anemia, severe MR, caridoMEMs implant and CKD presents for evaluation of severe, symptomatic progressive mitral regurgitation admitted for  TMVR 4/17 with Dr. Sanjana Ojeda. Transferred to CICU for post-procedural monitoring. "       Plan:    CARDIOVASCULAR:  #Severe Mitral Regurgitation, s/p repair  #A Fib s/p LAAO  #Hx of VT/V Fib s/p ICD placement  Management:  Continue digoxin and diltiazem. Continue crestor  Not on AC       PULMONARY:  Dx:  Asthma  Management:  breo ellipta  duonebs PRN  singulair    RENAL/GENITOURINARY:  Dx:  CKD (baseline Cr 1.1)  Management:  CTM      HEMATOLOGY:  Dx:  Chronic anemia (baseline hb 8-10)  Management:  CTM    MUSCULOSKELETAL:  Dx:  Rheumatoid arthritis  Psoriasis   Management:  Continue sulfasalazine, plaquenil      ICU Check List       FEN  Fluids:   Electrolytes:   Nutrition:   Prophylaxis:  DVT ppx: restart 4/18  GI ppx:   Bowel care:   Hardware:                Arterial Line 04/17/25 Left Radial (Active)   Placement Date/Time: 04/17/25 (c) 1505   Size: 20 G  Orientation: Left  Location: Radial  Securement Method: Transparent dressing  Patient Tolerance: Tolerated well   Number of days: 0       Arterial Sheath 04/17/25 1548 6 Fr. Left Femoral (Active)   Placement Date/Time: 04/17/25 1548   Hand Hygiene Completed: Yes  Sheath Size: 6 Fr.  Line Orientation: Left  Sheath Insertion Site: Femoral  Sutured: No  Site Prep: Chlorhexidine   Free Flowing Blood Return: Yes   Number of days: 0       Arterial Sheath 04/17/25 1548 Other (Comment) Right Femoral (Active)   Placement Date/Time: 04/17/25 1548   Hand Hygiene Completed: Yes  Sheath Size: Other (Comment)  Line Orientation: Right  Sheath Insertion Site: Femoral  Sutured: No  Site Prep: Chlorhexidine   Free Flowing Blood Return: Yes   Number of days: 0       Venous Sheath 04/17/25 1548 8 Fr. Left Femoral (Active)   Placement Date/Time: 04/17/25 1548   Hand Hygiene Completed: Yes  Sheath Size: 8 Fr.  Line Orientation: Left  Sheath Insertion Site: Femoral  Sutured: No  Site Prep: Chlorhexidine   Free Flowing Blood Return: Yes   Number of days: 0       ETT  7 mm (Active)   Placement Date/Time: 04/17/25 (c) 1513   Mask Ventilation: Vent by mask + OA or  adjuvant +/- NMBA  Technique: Direct laryngoscopy  ETT Type: ETT - single  Single Lumen Tube Size: 7 mm  Cuffed: Yes  Laryngoscope: (c)   Blade Size: 3  Location: Oral  Gr...   Number of days: 0       Social:  Code: Full Code    HPOA:     Martinez Franklin (Spouse)  [phone] (Mobile)     Disposition: CICU             [1] [Held by provider] bumetanide, 2 mg, oral, BID  clopidogrel, 75 mg, oral, Daily  digoxin, 125 mcg, oral, Daily  dilTIAZem ER, 120 mg, oral, Daily  enoxaparin, 40 mg, subcutaneous, Daily  fluticasone furoate-vilanteroL, 1 puff, inhalation, Daily  hydroxychloroquine, 300 mg, oral, Daily  ipratropium-albuteroL, 3 mL, nebulization, 4x daily  montelukast, 10 mg, oral, Nightly  pantoprazole, 40 mg, oral, BID  rosuvastatin, 20 mg, oral, Nightly  sucralfate, 1 g, oral, With meals & nightly  sulfaSALAzine, 1,000 mg, oral, BID  [2]    [3] PRN medications: acetaminophen, ipratropium-albuteroL, melatonin, nitroglycerin, ondansetron, polyethylene glycol, sennosides-docusate sodium

## 2025-04-17 NOTE — PROGRESS NOTES
04/17/25 1140   Rapid Rounds   Attendance Provider;Nurse;Care Transitions   Expected Discharge Disposition Home   Today we still await: Clinical stability;Diagnostic workup   Review at Escalation Rounds Clinically complex

## 2025-04-17 NOTE — ANESTHESIA PROCEDURE NOTES
Arterial Line:    Date/Time: 4/17/2025 3:05 PM    Staffing  Performed: TENISHA and attending   Authorized by: Aleks Vincent MD    Performed by: ALEXANDRA Fontenot    An arterial line was placed. Procedure performed using surface landmarks.in the OR for the following indication(s): continuous blood pressure monitoring and blood sampling needed.    A 20 gauge (size), 1 and 3/4 inch (length), Angiocath (type) catheter was placed into the Left radial artery, secured by Tegaderm,   Seldinger technique used.  Events:  patient tolerated procedure well with no complications.

## 2025-04-17 NOTE — OP NOTE
Operation Note    Date of the Operation: 04/17/25   Name: Trina Elias is a 74 y.o. year old female patient with severe Mitral regurgitation referred for Transcutaneous mitral replacement (TMVR).   MRN: [id number]    Preoperative Diagnosis:  #1 severe mitral regurgitation  #2 prohibitive surgical risk  Postoperative Diagnosis:   The same  Operation Procedures:  #1 RFV percutaneous access and preclosure device placement(perclose x2): 32Fr sheath placement  #2 LFV percutaneous access: 6Fr sheath  #3 Transeptal access and balloon septostomy ([id number] [place])  #4 RJV TPMW placement  #5 TMVR: 42mm Intrepid Medtronic Valve   Surgeon: Dutch Anderson MD  Cardiologist: SEGUN Hamilton/ RODRIGUEZ Gupta  Assistants: Roberto Carlos    Anesthesia Type:  General endotracheal anesthesia  Complications:  None     Estimated Blood loss: Nothing significant    Indication for Surgery: This is a 74 y.o. female patient who was diagnosed with severe mitral regurgitation and secondary chronic heart failure.  The patient was deemed to be very high risk for any potential surgical approach.  The case was thoroughly discussed in our structural meeting and we decided to offer a percutaneous mitral replacement.  The patient and the family understood they agreed to proceed and they signed the consent.    Operative Findings:    SULY: Severe mitral regurgitation.    Direct: Not applicable    Operation Description: The patient was brought to the operating room in a stable condition.  The patient was then placed in a supine position on the operative table.  The patient was then monitored and general endotracheal anesthesia was induced and then the patient was prepped and draped as usual.  We started with percutaneous access through both groins.  We obtained percutaneous left femoral artery access with a 6 Bulgarian sheath.  We obtained left femoral vein access with a 6 Bulgarian sheath.  This access was used for preoperative right heart catheterization and also for the TPMW  placement.  The heparin was given to obtain ACT over 300 seconds.  Using fluoroscopy, Seldinger technique and with progressive dilatation we used a 14 [place] sheath through the right femoral vein first Dr. Hamilton/Candy performed transseptal access, after the transseptal access was secured a septostomy was performed with a [id number] [place] balloon.  The wire was kept in the left atrium and then we exchanged to a double curve Lunderquist wire.  Then we pushed the 32 [place] delivery sheath guided with fluoroscopy all the way up into the right atrium, across the septum and into the left atrium.  We finally used the preselected valve, 38 mm Intrepid.  The valve was pushed through the sheath up into the left atrium, once in position the sheath was retrieved and left at the level of the IVC.  Using the standard maneuvers and guided with echocardiogram and fluoroscopy the valve was progressively positioned across the valve and then after the position was accepted we used rapid pacing and the valve was deployed in position.  The valve looks well-seated.  There is no significant PLV.  The patient is a stable.  The delivery system is removed.  Then the decision was made about closing the septum with an Amplatzer device.  The protamine is given and the access are perclosed.  We got good hemostasis on both groins.  The patient is a stable, and is awakened and extubated in the operating room and then transferred to the CICU to continue with their postoperative management.  Dictated by Dr. Dutch Anderson.

## 2025-04-17 NOTE — POST-PROCEDURE NOTE
Physician Transition of Care Summary  Invasive Cardiovascular Lab    Procedure Date: 4/17/2025  Attending: Panel 1:     * Tad Ojeda - Primary  Panel 2:     * Dutch Anderson - Primary  Resident/Fellow/Other Assistant: Surgeons and Role:  Panel 1:     * Roberto Carlos Leo MD - Fellow    Indications:   Pre-op Diagnosis      * Mitral regurgitation [I34.0]    Post-procedure diagnosis:   Post-op Diagnosis     * Mitral regurgitation [I34.0]    Procedure(s):   TMVR (Transcatheter MV Replacement)  0483T - AK TMVI W/PROSTHETIC VALVE PERCUTANEOUS APPROACH    TMVR (Transcatheter MV Replacement)  0483T - AK TMVI W/PROSTHETIC VALVE PERCUTANEOUS APPROACH      Description of the Procedure:   General anesthesia procedure  Post TMVR with Intrepid via RCFV mod-severe MR PVL   LVOT gradient peak post PVC 11mmHg invasive and SULY   Iatrogenic ASD closed kept open.      RCFV 37F perclose x 2 perclose devices  LCFV 7F manual pressure  LCFA 6F perclose    Complications:   PVL at anterior region of mitral valve     Stents/Implants:   Implants          Pacemaker          Catheter, Pacing, Pacel, Flow Directed, 5 Fr X 110 Cm - [id number] - Used, Not Implanted        Inventory item: CATHETER, PACING, PACEL, FLOW DIRECTED, 5 FR X 110 CM Model/Cat number: [id number]    : [employer] Lot number: [id number]    Device identifier: [id number] Implant Date: 4/17/2025      GUDID Information       Request status Successful        Brand name: Pacel™ Version/Model: [id number]    Company name: [employer] MRI safety info as of 4/17/25: Labeling does not contain MRI Safety Information    Contains dry or latex rubber: Yes      GMDN P.T. name: Temporary cardiac pacing balloon catheter                As of 4/17/2025       Status: Used, Not Implanted                              Anticoagulation/Antiplatelet Plan:   Coumadin starting tomorrow     Estimated Blood Loss:   40 mL    Anesthesia: General Anesthesia  Staff: Anesthesiologist: Aleks Vincent MD; Mike Ramos MD; Sheng Urrutia MD  C-AA: ALEXANDRA Fontenot    Any Specimen(s) Removed:   Order Name Source Comment Collection Info Order Time   BASIC METABOLIC PANEL Blood, Venous Please have blood drawn 4-7 days after your procedure. You do NOT have to fast.  4/17/2025  2:00 AM     Release result to [facility]   Immediate        CBC Blood, Venous   4/17/2025  2:00 AM     Release result to MyChart   Immediate            Disposition:   CICU  Bedrest x 4 hours  Track for hemolysis at today and tomorrow labs.       Electronically signed by: Roberto Carlos Leo MD, 4/17/2025 6:42 PM

## 2025-04-17 NOTE — PROGRESS NOTES
04/17/25 0834   Discharge Planning   Living Arrangements Spouse/significant other   Support Systems Spouse/significant other   Assistance Needed none   Type of Residence Private residence   Number of Stairs to Enter Residence 0   Number of Stairs Within Residence 0   Do you have animals or pets at home? No   Who is requesting discharge planning? Provider   Home or Post Acute Services None   Expected Discharge Disposition Home   Does the patient need discharge transport arranged? No   Financial Resource Strain   How hard is it for you to pay for the very basics like food, housing, medical care, and heating? Not very   Housing Stability   In the last 12 months, was there a time when you were not able to pay the mortgage or rent on time? N   In the past 12 months, how many times have you moved where you were living? 1   At any time in the past 12 months, were you homeless or living in a shelter (including now)? N   Transportation Needs   In the past 12 months, has lack of transportation kept you from medical appointments or from getting medications? no   In the past 12 months, has lack of transportation kept you from meetings, work, or from getting things needed for daily living? No   Patient Choice   Provider Choice list and CMS website (https://medicare.gov/care-compare#search) for post-acute Quality and Resource Measure Data were provided and reviewed with: Patient   Patient / Family choosing to utilize agency / facility established prior to hospitalization No   Stroke Family Assessment   Stroke Family Assessment Needed No     Transitional Care Coordination Progress Note:  Patient discussed during interdisciplinary rounds.   Team members present: RN TCC MD   Plan per Medical/Surgical team: TAVR 4-  Payor: MEDICARE   Discharge disposition: Home   Potential Barriers: None   ADOD: 4-      Previous Home Care: None   DME: None   Pharmacy: [facility]   Falls: None   PCP: SONJA SIMON     Dialysis:  None

## 2025-04-17 NOTE — ANESTHESIA PROCEDURE NOTES
Airway  Date/Time: 4/17/2025 3:13 PM  Reason: elective    Airway not difficult    Staffing  Performed: CAA and TENISHA   Authorized by: Aleks Vincent MD    Performed by: ALEXANDRA Fontenot  Patient location during procedure: OR    Patient Condition  Indications for airway management: anesthesia and airway protection  Patient position: sniffing  MILS not maintained throughout  Sedation level: deep     Final Airway Details   Preoxygenated: yes  Final airway type: endotracheal airway  Successful airway: ETT  Cuffed: yes   Successful intubation technique: direct laryngoscopy  Adjuncts used in placement: intubating stylet  Endotracheal tube insertion site: oral  Blade type: SALCIDO.  Blade size: #3  ETT size (mm): 7.0  Cormack-Lehane Classification: grade I - full view of glottis  Placement verified by: chest auscultation and capnometry   Measured from: lips  ETT to lips (cm): 21  Number of attempts at approach: 1

## 2025-04-17 NOTE — PROGRESS NOTES
Subjective Data:  Direct admit last evening for pre-TMVR optimization (hypervolemia) scheduled w/Dr. Ojeda. Received IVP Bumex 2mg x1 overnight, voided ~1.7L, symptomatically improved (DSOHI and BLE swelling).     - give IVP Bumex 2mg x1  - TMVR w/Dr. Ojeda @1130am today, CICU afterwards    Overnight Events:    No acute events overnight.      Objective Data:  Last Recorded Vitals:  Vitals:    04/16/25 2028 04/16/25 2336 04/17/25 0417 04/17/25 0700   BP: 112/65 110/58 124/71 126/67   BP Location: Left arm Left arm Left arm Left arm   Patient Position: Lying Lying Lying Sitting   Pulse: 72 67 60 71   Resp: 18 17 18 14   Temp: 36.4 °C (97.5 °F) 36 °C (96.8 °F) 36 °C (96.8 °F) 36.6 °C (97.9 °F)   TempSrc: Temporal Temporal Temporal Temporal   SpO2: 95% 96% 97% 96%   Weight:   71.7 kg (158 lb 1.1 oz)    Height:         Last Labs:  CBC - 4/16/2025:  6:07 PM  5.5 8.8 228    30.0      CMP - 4/16/2025:  6:07 PM  9.4 6.7 20 --- 0.5   3.8 4.2 15 65      PTT - 2/10/2025:  1:02 PM  1.0   10.7 26     TROPHS   Date/Time Value Ref Range Status   04/09/2025 08:21 AM 25 0 - 34 ng/L Final   02/10/2025 04:16 PM 20 0 - 13 ng/L Final   02/10/2025 01:02 PM 22 0 - 13 ng/L Final   09/18/2024 01:47 AM 34 0 - 13 ng/L Final     BNP   Date/Time Value Ref Range Status   04/16/2025 06:07  0 - 99 pg/mL Final   04/09/2025 08:21  0 - 99 pg/mL Final     HGBA1C   Date/Time Value Ref Range Status   04/09/2025 08:21 AM 6.1 See comment % Final     LDLCALC   Date/Time Value Ref Range Status   03/03/2025 09:33 AM 77 mg/dL (calc) Final     Comment:     Reference range: <100     Desirable range <100 mg/dL for primary prevention;    <70 mg/dL for patients with CHD or diabetic patients   with > or = 2 CHD risk factors.     LDL-C is now calculated using the Sukhi   calculation, which is a validated novel method providing   better accuracy than the Friedewald equation in the   estimation of LDL-C.   Walt CHAN et al. FARIHA. 2013;310(19):  2061-2068   (http://education.ZOCKO/faq/UKT876)     04/15/2024 11:28 AM 68 <=99 mg/dL Final     Comment:                                 Near   Borderline      AGE      Desirable  Optimal    High     High     Very High     0-19 Y     0 - 109     ---    110-129   >/= 130     ----    20-24 Y     0 - 119     ---    120-159   >/= 160     ----      >24 Y     0 -  99   100-129  130-159   160-189     >/=190       VLDL   Date/Time Value Ref Range Status   04/15/2024 11:28 AM 21 0 - 40 mg/dL Final   08/28/2023 09:33 AM 24 0 - 40 mg/dL Final   02/28/2023 09:50 AM 30 0 - 40 mg/dL Final   12/19/2022 05:21 PM 27 0 - 40 mg/dL Final      Last I/O:  I/O last 3 completed shifts:  In: 0 (0 mL/kg)   Out: 1700 (23.7 mL/kg) [Urine:1700 (0.7 mL/kg/hr)]  Weight: 71.7 kg     Past Cardiology Tests (Last 3 Years):  EKG:  ECG 12 lead 04/16/2025 (Preliminary)    Echo:  Transthoracic echo (TTE) complete 04/16/2025:  1. The left ventricular systolic function is mildly decreased, with a visually estimated ejection fraction of 45-50%.  2. There is global hypokinesis of the left ventricle with minor regional variations.  3. Spectral Doppler shows a Grade II (pseudonormal pattern) of left ventricular diastolic filling with an elevated left atrial pressure.  4. There is normal right ventricular global systolic function.  5. There is mild mitral valve stenosis.  6. There is moderate mitral annular calcification.  7. Moderate to severe mitral valve regurgitation.  8. Severely decreased mitral valve posterior leaflet mobility.  9. Mild aortic valve stenosis.  10. Moderate aortic valve regurgitation.     Transesophageal Echo (SULY) 03/19/2025:  1. There is global hypokinesis of the left ventricle with minor regional variations.  2. Spectral Doppler shows a Grade III (restrictive pattern) of left ventricular diastolic filling with an elevated left atrial pressure.  3. Left ventricular cavity size is mild to moderately dilated.  4. There is  normal right ventricular global systolic function.  5. The left atrium is mildly dilated.  6. Moderate to severe mitral valve regurgitation. Pulmonary vein systolic flow blunted or absent.  7. Mild aortic valve regurgitation.  8. There is plaque visualized in the descending aorta.  9. Well positioned LAAO device, no heike-device leaks and no device associated thrombus.  10. The left ventricular systolic function is mildly decreased, with a visually estimated ejection fraction of 40-45%.     Ejection Fractions:  EF   Date/Time Value Ref Range Status   04/16/2025 04:42 PM 48 %    03/19/2025 10:09 AM 43 %    02/11/2025 12:08 PM 58 %      Cath:  Cardiac Catheterization Procedure 03/19/2025:  Cardiac Cath Post Procedure Notes:  Post Procedure Diagnosis: Normal coronaries, normal cardiac output, moderate  pulmonary HTN, elevated PCWP.  Blood Loss: Estimated blood loss during the procedure was None  mls.  Specimens Removed: Number of specimen(s) removed: none.    Recommendations:  Maximize medical therapy.  Agressive risk factor modification efforts.  Follow-up with cardiology clinic.  Follow-up with primary care physician.  Refer for structural intervention on mitral valve. .     Stress Test:  No results found for this or any previous visit from the past 1095 days.    Cardiac Imaging:  No results found for this or any previous visit from the past 1095 days.    Inpatient Medications:  Scheduled Medications[1]  PRN Medications[2]  Continuous Medications[3]    Physical Exam:  General: NAD  Head/ neck: + JVD  Cardiac: RRR, regular S1 S2 , 1/6 systolic  murmur, no rub, no gallop  Pulm: Mild WOB/ wheezing (improving). Quiet lung field bilaterally   Vascular: Radial 2+ bilaterally, dorsalis pedis 2+  GI: soft, non distended  Extremities: trace LE edema (pitting edema resolved)  Neuro: no focal neuro deficits, a+ox4  Psych: appropriate mood and behavior, very pleasant   Skin: warm and dry throughout     Assessment/Plan   74 y.o.  y/o female with PMH of atrial fibrillation s/p LAAO, VT/VF s/p ICD, Hyperlipidemia , CAD,  PUD, SBO s/p lysis of adhesions, GERD s/p nissen fundoplication, provoked DVT 2018, Rheumatoid arthritis, psoriasis, asthma, chronic anemia, and CKD presents for evaluation of severe, symptomatic progressive mitral regurgitation & HFmidREF admitted for optimization prior to scheduled TMVR 4/17 with Dr. Sanjana Ojeda.     Symptomatic moderate to severe mitral regurgitation  CardioMEMs in place  Acute on chronic systolic and diastolic heart failure with mid-range EF  - TTE 4/16: EF 45-50%, gHK of LV, G2DD, normal RVSP, mod MAC with mild MS, mod to severe MR, severely decrease posterior MV leaflet mobility, mild aortic stenosis with mod AR  -  (600-700s in past)  - CXR 4/16: read pending, appears to have mild pulm edema  - (4/16) s/p IVP Bumex 2mg x1, voided 1.7L. Give IVP Bumex 2mg x1  - TMVR today with Dr. Pizarro at 1130am     Atrial fibrillation s/p LAAO 1/2025  Hx of DCCV and ablation  Hx of VT/VF s/p dc ICD 2014 ([employer], [place])  - Last check (4/7): Normal lead parameters (chronic high shock impedance 154ohms), battery 11mos  - LAAO without PVL on SULY 3/2025  - Not on anticoagluation  - Diltiazem, digoxin for rate control     Asthma  - breo ellipta  - duonebs PRN  - singulair  - Mild wheezing on exam. Possibly 2/2 volume overload; symptoms improving w/diuresis, plan as above     PUD  Hx of recurrent SBO 2/2 adhesion lysis 2023  GERD s/p Nissen fundoplication  - protonix BID  - constipation ppx  - zofran PRN     Hx of provoked DVT in R axillary vein 2018  - after PICC line removed  - no longer on anticoagulation     Rheumatoid arthritis  Psoriasis  - sulfasalazine, plaquenil     Chronic anemia  CKD  - Cr at baseline at 1.11   - Hgb at baseline at 8.8 (~8-10)     Hyperlipidemia  - Crestor.     DVT ppx: on HOLD pending procedure  DISPO: pending TMVR w/Dr. Ojeda today, recovering in CICU afterwards    All  labs, vital signs, tests & imaging results, and medications were reviewed.    Seen and discussed with Dr. Christine    Code Status:  Full Code    Maddie Zurita, APRN-CNP         [1]   Scheduled medications   Medication Dose Route Frequency    [Held by provider] bumetanide  2 mg oral BID    clopidogrel  75 mg oral Daily    digoxin  125 mcg oral Daily    dilTIAZem ER  120 mg oral Daily    enoxaparin  40 mg subcutaneous Daily    fluticasone furoate-vilanteroL  1 puff inhalation Daily    hydroxychloroquine  300 mg oral Daily    ipratropium-albuteroL  3 mL nebulization 4x daily    montelukast  10 mg oral Nightly    pantoprazole  40 mg oral BID    rosuvastatin  20 mg oral Nightly    sucralfate  1 g oral With meals & nightly    sulfaSALAzine  1,000 mg oral BID   [2]   PRN medications   Medication    acetaminophen    ipratropium-albuteroL    melatonin    nitroglycerin    ondansetron    polyethylene glycol    sennosides-docusate sodium   [3]   Continuous Medications   Medication Dose Last Rate

## 2025-04-17 NOTE — HOSPITAL COURSE
74 y.o. y/o female with PMH of atrial fibrillation s/p LAAO, VT/VF s/p ICD, Hyperlipidemia , CAD,  PUD, SBO s/p lysis of adhesions, GERD s/p nissen fundoplication, provoked DVT 2018, Rheumatoid arthritis, psoriasis, asthma, chronic anemia, and CKD presents for evaluation of severe, symptomatic progressive mitral regurgitation & HFmidREF admitted for optimization prior to scheduled TMVR.    Floor Course:  Diuresed w/IVP Bumex boluses prior to TMVR on 4/17 with Dr. Sanjana Ojeda.     CICU Course:  Taken to CICU for closer monitoring post-procedurally. Was doing well on 4/18 AM until session with PT/OT during which she got orthostatic and fatigued. She was kept for the day for observation. She had nausea and pallor, serial EKG's unremarkable for ST segment changes, Trop trend 500 --> 400. Nausea was amenable to Zofran and Phenergan.     Noted to be anemic, with Hgb drop to 7.8. Hemolysis labs initially concerning but ultimately stabilized. 4/19 transfused for symptomatic anemia. HDS stable but severely fatigued. Review of TTE post-procedurally was not concerning for a perivaluvlar leak. Conversation with echo and structural team we felt that symptoms may be secondary to afterload mismatch post TMVR.     On 4/20 her nausea was persistent. Ordered CT Scan W/WO.

## 2025-04-17 NOTE — RESEARCH NOTES
Trina Elias  is enrolled in [facility].  Please we need the following assessment per protocol  Cardiac enzymes (CK or troponin) recommended to assessed 12-24 hours post-procedure per clinical judgement     Discharge (at time of Discharge or within 14 days Post Procedure, whichever comes first)   Physical Exam   Laboratory tests  ? Complete blood count  ? Creatinine  ? B-type Natriuretic Peptide (BNP) or NT-proBNP  ? INR, if [id number]  ? Hgb is recommended to be assessed 24 hours post-procedure for all subjects   TTE   12-lead ECG   Chest x-ray  Per protocol   Antithrombotic medication is recommended to be initiated in accordance with local practice and  no earlier than Day 1 post-operatively. The recommended post-implant regimen for all subjects  is dual anticoagulation/antiplatelet therapy for a minimum of 6 months with a target INR of 2.5.  Dual warfarin and aspirin (81mg) therapy is recommended.  For any question please call Brionna Rowe at [phone]

## 2025-04-17 NOTE — ANESTHESIA PROCEDURE NOTES
Peripheral IV  Date/Time: 4/17/2025 3:19 PM      Placement  Needle size: 16 G  Laterality: left  Location: external jugular  Site prep: alcohol  Technique: anatomical landmarks  Attempts: 1

## 2025-04-17 NOTE — ANESTHESIA PREPROCEDURE EVALUATION
"Patient: Trina SALDANA Via \"Marcela\"    Procedure Information       Anesthesia Start Date/Time: 04/17/25 1441    Procedures:       TMVR (Transcatheter MV Replacement) - [facility] OR team/perfusion/GA/SULY [place] rep/APOLLO TMVR      TMVR (Transcatheter MV Replacement)    Location: [facility] Cardiac Cath Lab    Providers: Tad Ojeda MD; Dutch Anderson MD            Relevant Problems   Cardiac   (+) Acute chest pain   (+) Atrial fibrillation (Multi)   (+) Atrial fibrillation, unspecified type (Multi)   (+) Chest pain, unspecified type   (+) Combined systolic and diastolic congestive heart failure   (+) Coronary artery disease involving native coronary artery   (+) Mitral regurgitation   (+) Mitral valve disease   (+) Mixed hyperlipidemia   (+) Pacemaker      Pulmonary   (+) Asthma   (+) Centrilobular emphysema (Multi)   (+) Severe persistent asthma with acute exacerbation (Multi)      Neuro   (+) Cervical radiculopathy   (+) Cubital tunnel syndrome on right   (+) Displacement of lumbar disc with radiculopathy      GI   (+) Gastroesophageal reflux disease without esophagitis      Hematology   (+) Anticoagulant long-term use   (+) Coagulation defect, unspecified   (+) Iron deficiency anemia      Musculoskeletal   (+) Chronic bilateral low back pain without sciatica   (+) Displacement of lumbar disc with radiculopathy   (+) Primary osteoarthritis of right shoulder   (+) Rheumatoid arthritis       Clinical information reviewed:    Allergies  Meds     OB Status           NPO Detail:  No data recorded     Physical Exam    Airway  Mallampati: I  TM distance: >3 FB  Neck ROM: full  Mouth opening: 3 or more finger widths     Cardiovascular    Dental     (+) upper dentures     Pulmonary    Abdominal            Anesthesia Plan    History of general anesthesia?: yes  History of complications of general anesthesia?: no    ASA 4     general   (Preinduction Abi)  intravenous induction "   Anesthetic plan and risks discussed with patient.    Plan discussed with CAA.

## 2025-04-17 NOTE — PRE-SEDATION DOCUMENTATION
Patient: Marcela SALDANA Via  MRN: [id number]    NPO guidelines met: Yes    Physical Exam    Airway  Mallampati: III     Cardiovascular    Dental    Pulmonary        Plan    ASA 3     Mild

## 2025-04-17 NOTE — CARE PLAN
Problem: Pain - Adult  Goal: Verbalizes/displays adequate comfort level or baseline comfort level  Outcome: Progressing     Problem: Safety - Adult  Goal: Free from fall injury  Outcome: Progressing     Problem: Discharge Planning  Goal: Discharge to home or other facility with appropriate resources  Outcome: Progressing     Problem: Chronic Conditions and Co-morbidities  Goal: Patient's chronic conditions and co-morbidity symptoms are monitored and maintained or improved  Outcome: Progressing     Problem: Nutrition  Goal: Nutrient intake appropriate for maintaining nutritional needs  Outcome: Progressing    The clinical goals for the shift include Pt will diurese 1L this shift

## 2025-04-18 ENCOUNTER — APPOINTMENT (OUTPATIENT)
Dept: CARDIOLOGY | Facility: HOSPITAL | Age: 75
DRG: 266 | End: 2025-04-18
Payer: MEDICARE

## 2025-04-18 ENCOUNTER — APPOINTMENT (OUTPATIENT)
Dept: RADIOLOGY | Facility: HOSPITAL | Age: 75
DRG: 266 | End: 2025-04-18
Payer: MEDICARE

## 2025-04-18 LAB
ALBUMIN SERPL BCP-MCNC: 4.1 G/DL (ref 3.4–5)
ALP SERPL-CCNC: 65 U/L (ref 33–136)
ALT SERPL W P-5'-P-CCNC: 16 U/L (ref 7–45)
ANION GAP BLDA CALCULATED.4IONS-SCNC: 6 MMO/L (ref 10–25)
ANION GAP SERPL CALC-SCNC: 14 MMOL/L (ref 10–20)
ANION GAP SERPL CALC-SCNC: 17 MMOL/L (ref 10–20)
AORTIC VALVE MEAN GRADIENT: 15 MMHG
AORTIC VALVE MEAN GRADIENT: 8 MMHG
AORTIC VALVE PEAK VELOCITY: 1.9 M/S
AORTIC VALVE PEAK VELOCITY: 2.52 M/S
APPEARANCE UR: CLEAR
AST SERPL W P-5'-P-CCNC: 34 U/L (ref 9–39)
ATRIAL RATE: 68 BPM
AV PEAK GRADIENT: 14 MMHG
AV PEAK GRADIENT: 25 MMHG
AVA (PEAK VEL): 1.73 CM2
AVA (VTI): 1.75 CM2
BASE EXCESS BLDA CALC-SCNC: 2.2 MMOL/L (ref -2–3)
BASOPHILS # BLD AUTO: 0.02 X10*3/UL (ref 0–0.1)
BASOPHILS NFR BLD AUTO: 0.2 %
BILIRUB DIRECT SERPL-MCNC: 0.1 MG/DL (ref 0–0.3)
BILIRUB SERPL-MCNC: 0.6 MG/DL (ref 0–1.2)
BILIRUB SERPL-MCNC: 0.6 MG/DL (ref 0–1.2)
BILIRUB UR STRIP.AUTO-MCNC: NEGATIVE MG/DL
BODY TEMPERATURE: 37 DEGREES CELSIUS
BUN SERPL-MCNC: 22 MG/DL (ref 6–23)
BUN SERPL-MCNC: 23 MG/DL (ref 6–23)
CA-I BLDA-SCNC: 1.18 MMOL/L (ref 1.1–1.33)
CALCIUM SERPL-MCNC: 8.9 MG/DL (ref 8.6–10.6)
CALCIUM SERPL-MCNC: 9.2 MG/DL (ref 8.6–10.6)
CARDIAC TROPONIN I PNL SERPL HS: 478 NG/L (ref 0–34)
CARDIAC TROPONIN I PNL SERPL HS: 514 NG/L (ref 0–34)
CHLORIDE BLDA-SCNC: 108 MMOL/L (ref 98–107)
CHLORIDE SERPL-SCNC: 105 MMOL/L (ref 98–107)
CHLORIDE SERPL-SCNC: 106 MMOL/L (ref 98–107)
CO2 SERPL-SCNC: 24 MMOL/L (ref 21–32)
CO2 SERPL-SCNC: 26 MMOL/L (ref 21–32)
COLOR UR: YELLOW
CREAT SERPL-MCNC: 1.04 MG/DL (ref 0.5–1.05)
CREAT SERPL-MCNC: 1.06 MG/DL (ref 0.5–1.05)
EGFRCR SERPLBLD CKD-EPI 2021: 56 ML/MIN/1.73M*2
EGFRCR SERPLBLD CKD-EPI 2021: 57 ML/MIN/1.73M*2
EJECTION FRACTION APICAL 4 CHAMBER: 39.8
EJECTION FRACTION: 38 %
EJECTION FRACTION: 40 %
EOSINOPHIL # BLD AUTO: 0.01 X10*3/UL (ref 0–0.4)
EOSINOPHIL NFR BLD AUTO: 0.1 %
ERYTHROCYTE [DISTWIDTH] IN BLOOD BY AUTOMATED COUNT: 16 % (ref 11.5–14.5)
ERYTHROCYTE [DISTWIDTH] IN BLOOD BY AUTOMATED COUNT: 16.6 % (ref 11.5–14.5)
GLUCOSE BLDA-MCNC: 117 MG/DL (ref 74–99)
GLUCOSE SERPL-MCNC: 104 MG/DL (ref 74–99)
GLUCOSE SERPL-MCNC: 133 MG/DL (ref 74–99)
GLUCOSE UR STRIP.AUTO-MCNC: NORMAL MG/DL
HAPTOGLOB SERPL NEPH-MCNC: 38 MG/DL (ref 30–200)
HAPTOGLOB SERPL NEPH-MCNC: 67 MG/DL (ref 30–200)
HCO3 BLDA-SCNC: 26.5 MMOL/L (ref 22–26)
HCT VFR BLD AUTO: 26.9 % (ref 36–46)
HCT VFR BLD AUTO: 27 % (ref 36–46)
HCT VFR BLD EST: 25 % (ref 36–46)
HGB BLD-MCNC: 7.9 G/DL (ref 12–16)
HGB BLD-MCNC: 8.6 G/DL (ref 12–16)
HGB BLDA-MCNC: 8.4 G/DL (ref 12–16)
HGB RETIC QN: 25 PG (ref 28–38)
IMM GRANULOCYTES # BLD AUTO: 0.05 X10*3/UL (ref 0–0.5)
IMM GRANULOCYTES NFR BLD AUTO: 0.5 % (ref 0–0.9)
IMMATURE RETIC FRACTION: 27 %
INHALED O2 CONCENTRATION: 37 %
INR PPP: 1 (ref 0.9–1.1)
KETONES UR STRIP.AUTO-MCNC: NEGATIVE MG/DL
LACTATE BLDA-SCNC: 1 MMOL/L (ref 0.4–2)
LACTATE SERPL-SCNC: 1.1 MMOL/L (ref 0.4–2)
LDH SERPL L TO P-CCNC: 335 U/L (ref 84–246)
LDH SERPL L TO P-CCNC: 374 U/L (ref 84–246)
LEFT ATRIUM VOLUME AREA LENGTH INDEX BSA: 42 ML/M2
LEFT VENTRICLE INTERNAL DIMENSION DIASTOLE: 6.2 CM (ref 3.5–6)
LEFT VENTRICULAR OUTFLOW TRACT DIAMETER: 2.1 CM
LEUKOCYTE ESTERASE UR QL STRIP.AUTO: ABNORMAL
LYMPHOCYTES # BLD AUTO: 0.41 X10*3/UL (ref 0.8–3)
LYMPHOCYTES NFR BLD AUTO: 3.9 %
MAGNESIUM SERPL-MCNC: 2.26 MG/DL (ref 1.6–2.4)
MAGNESIUM SERPL-MCNC: 2.42 MG/DL (ref 1.6–2.4)
MCH RBC QN AUTO: 22.6 PG (ref 26–34)
MCH RBC QN AUTO: 22.8 PG (ref 26–34)
MCHC RBC AUTO-ENTMCNC: 29.3 G/DL (ref 32–36)
MCHC RBC AUTO-ENTMCNC: 32 G/DL (ref 32–36)
MCV RBC AUTO: 71 FL (ref 80–100)
MCV RBC AUTO: 77 FL (ref 80–100)
MONOCYTES # BLD AUTO: 0.68 X10*3/UL (ref 0.05–0.8)
MONOCYTES NFR BLD AUTO: 6.4 %
NEUTROPHILS # BLD AUTO: 9.44 X10*3/UL (ref 1.6–5.5)
NEUTROPHILS NFR BLD AUTO: 88.9 %
NITRITE UR QL STRIP.AUTO: NEGATIVE
NRBC BLD-RTO: 0 /100 WBCS (ref 0–0)
NRBC BLD-RTO: 0 /100 WBCS (ref 0–0)
OXYHGB MFR BLDA: 96.6 % (ref 94–98)
PATH REVIEW-CBC DIFFERENTIAL: NORMAL
PCO2 BLDA: 39 MM HG (ref 38–42)
PH BLDA: 7.44 PH (ref 7.38–7.42)
PH UR STRIP.AUTO: 5.5 [PH]
PLATELET # BLD AUTO: 173 X10*3/UL (ref 150–450)
PLATELET # BLD AUTO: 222 X10*3/UL (ref 150–450)
PO2 BLDA: 125 MM HG (ref 85–95)
POTASSIUM BLDA-SCNC: 3.4 MMOL/L (ref 3.5–5.3)
POTASSIUM SERPL-SCNC: 3.6 MMOL/L (ref 3.5–5.3)
POTASSIUM SERPL-SCNC: 4.1 MMOL/L (ref 3.5–5.3)
PROT SERPL-MCNC: 6.4 G/DL (ref 6.4–8.2)
PROT UR STRIP.AUTO-MCNC: NEGATIVE MG/DL
PROTHROMBIN TIME: 11.5 SECONDS (ref 9.8–12.4)
Q ONSET: 219 MS
QRS COUNT: 12 BEATS
QRS DURATION: 84 MS
QT INTERVAL: 404 MS
QTC CALCULATION(BAZETT): 442 MS
QTC FREDERICIA: 429 MS
R AXIS: 83 DEGREES
RBC # BLD AUTO: 3.5 X10*6/UL (ref 4–5.2)
RBC # BLD AUTO: 3.77 X10*6/UL (ref 4–5.2)
RBC # UR STRIP.AUTO: ABNORMAL MG/DL
RBC #/AREA URNS AUTO: NORMAL /HPF
RETICS #: 0.06 X10*6/UL (ref 0.02–0.11)
RETICS/RBC NFR AUTO: 1.6 % (ref 0.5–2)
RIGHT VENTRICLE FREE WALL PEAK S': 18 CM/S
RIGHT VENTRICLE PEAK SYSTOLIC PRESSURE: 33.9 MMHG
SAO2 % BLDA: 98 % (ref 94–100)
SODIUM BLDA-SCNC: 137 MMOL/L (ref 136–145)
SODIUM SERPL-SCNC: 141 MMOL/L (ref 136–145)
SODIUM SERPL-SCNC: 143 MMOL/L (ref 136–145)
SP GR UR STRIP.AUTO: 1.02
T AXIS: 13 DEGREES
T OFFSET: 421 MS
TRICUSPID ANNULAR PLANE SYSTOLIC EXCURSION: 3 CM
UROBILINOGEN UR STRIP.AUTO-MCNC: NORMAL MG/DL
VENTRICULAR RATE: 72 BPM
WBC # BLD AUTO: 10.6 X10*3/UL (ref 4.4–11.3)
WBC # BLD AUTO: 9.7 X10*3/UL (ref 4.4–11.3)
WBC #/AREA URNS AUTO: NORMAL /HPF

## 2025-04-18 PROCEDURE — 1200000002 HC GENERAL ROOM WITH TELEMETRY DAILY

## 2025-04-18 PROCEDURE — 81001 URINALYSIS AUTO W/SCOPE: CPT

## 2025-04-18 PROCEDURE — 80053 COMPREHEN METABOLIC PANEL: CPT

## 2025-04-18 PROCEDURE — 85025 COMPLETE CBC W/AUTO DIFF WBC: CPT

## 2025-04-18 PROCEDURE — 83010 ASSAY OF HAPTOGLOBIN QUANT: CPT | Performed by: STUDENT IN AN ORGANIZED HEALTH CARE EDUCATION/TRAINING PROGRAM

## 2025-04-18 PROCEDURE — 37799 UNLISTED PX VASCULAR SURGERY: CPT

## 2025-04-18 PROCEDURE — 83605 ASSAY OF LACTIC ACID: CPT

## 2025-04-18 PROCEDURE — 83735 ASSAY OF MAGNESIUM: CPT

## 2025-04-18 PROCEDURE — 93306 TTE W/DOPPLER COMPLETE: CPT | Performed by: SPECIALIST

## 2025-04-18 PROCEDURE — 94640 AIRWAY INHALATION TREATMENT: CPT

## 2025-04-18 PROCEDURE — 76376 3D RENDER W/INTRP POSTPROCES: CPT

## 2025-04-18 PROCEDURE — 71045 X-RAY EXAM CHEST 1 VIEW: CPT

## 2025-04-18 PROCEDURE — 99231 SBSQ HOSP IP/OBS SF/LOW 25: CPT | Performed by: NURSE PRACTITIONER

## 2025-04-18 PROCEDURE — 2500000005 HC RX 250 GENERAL PHARMACY W/O HCPCS

## 2025-04-18 PROCEDURE — 2500000002 HC RX 250 W HCPCS SELF ADMINISTERED DRUGS (ALT 637 FOR MEDICARE OP, ALT 636 FOR OP/ED)

## 2025-04-18 PROCEDURE — 71045 X-RAY EXAM CHEST 1 VIEW: CPT | Performed by: RADIOLOGY

## 2025-04-18 PROCEDURE — 83615 LACTATE (LD) (LDH) ENZYME: CPT | Performed by: STUDENT IN AN ORGANIZED HEALTH CARE EDUCATION/TRAINING PROGRAM

## 2025-04-18 PROCEDURE — 93005 ELECTROCARDIOGRAM TRACING: CPT

## 2025-04-18 PROCEDURE — 93287 PERI-PX DEVICE EVAL & PRGR: CPT

## 2025-04-18 PROCEDURE — 82435 ASSAY OF BLOOD CHLORIDE: CPT

## 2025-04-18 PROCEDURE — 80048 BASIC METABOLIC PNL TOTAL CA: CPT | Mod: CCI

## 2025-04-18 PROCEDURE — 97161 PT EVAL LOW COMPLEX 20 MIN: CPT | Mod: GP

## 2025-04-18 PROCEDURE — 2500000001 HC RX 250 WO HCPCS SELF ADMINISTERED DRUGS (ALT 637 FOR MEDICARE OP)

## 2025-04-18 PROCEDURE — 97530 THERAPEUTIC ACTIVITIES: CPT | Mod: GP

## 2025-04-18 PROCEDURE — 85610 PROTHROMBIN TIME: CPT

## 2025-04-18 PROCEDURE — 84484 ASSAY OF TROPONIN QUANT: CPT

## 2025-04-18 PROCEDURE — 2500000004 HC RX 250 GENERAL PHARMACY W/ HCPCS (ALT 636 FOR OP/ED): Mod: JZ

## 2025-04-18 PROCEDURE — 2500000004 HC RX 250 GENERAL PHARMACY W/ HCPCS (ALT 636 FOR OP/ED)

## 2025-04-18 PROCEDURE — 85045 AUTOMATED RETICULOCYTE COUNT: CPT

## 2025-04-18 PROCEDURE — 97166 OT EVAL MOD COMPLEX 45 MIN: CPT | Mod: GO

## 2025-04-18 PROCEDURE — 2500000002 HC RX 250 W HCPCS SELF ADMINISTERED DRUGS (ALT 637 FOR MEDICARE OP, ALT 636 FOR OP/ED): Mod: JZ

## 2025-04-18 PROCEDURE — 97530 THERAPEUTIC ACTIVITIES: CPT | Mod: GO

## 2025-04-18 PROCEDURE — 82247 BILIRUBIN TOTAL: CPT

## 2025-04-18 PROCEDURE — 84484 ASSAY OF TROPONIN QUANT: CPT | Performed by: NURSE PRACTITIONER

## 2025-04-18 PROCEDURE — 82040 ASSAY OF SERUM ALBUMIN: CPT

## 2025-04-18 PROCEDURE — 83010 ASSAY OF HAPTOGLOBIN QUANT: CPT

## 2025-04-18 PROCEDURE — 86880 COOMBS TEST DIRECT: CPT

## 2025-04-18 PROCEDURE — 83615 LACTATE (LD) (LDH) ENZYME: CPT

## 2025-04-18 PROCEDURE — 85027 COMPLETE CBC AUTOMATED: CPT | Performed by: STUDENT IN AN ORGANIZED HEALTH CARE EDUCATION/TRAINING PROGRAM

## 2025-04-18 PROCEDURE — 37799 UNLISTED PX VASCULAR SURGERY: CPT | Performed by: NURSE PRACTITIONER

## 2025-04-18 RX ORDER — DICLOFENAC SODIUM 10 MG/G
4 GEL TOPICAL 4 TIMES DAILY PRN
Status: DISCONTINUED | OUTPATIENT
Start: 2025-04-18 | End: 2025-04-22 | Stop reason: HOSPADM

## 2025-04-18 RX ORDER — WARFARIN SODIUM 5 MG/1
5 TABLET ORAL DAILY
Status: COMPLETED | OUTPATIENT
Start: 2025-04-18 | End: 2025-04-20

## 2025-04-18 RX ORDER — LIDOCAINE 560 MG/1
1 PATCH PERCUTANEOUS; TOPICAL; TRANSDERMAL DAILY
Status: DISCONTINUED | OUTPATIENT
Start: 2025-04-18 | End: 2025-04-18

## 2025-04-18 RX ORDER — MORPHINE SULFATE 4 MG/ML
2 INJECTION INTRAVENOUS EVERY 4 HOURS PRN
Status: DISCONTINUED | OUTPATIENT
Start: 2025-04-18 | End: 2025-04-22 | Stop reason: HOSPADM

## 2025-04-18 RX ORDER — HEPARIN SODIUM 10000 [USP'U]/100ML
0-4000 INJECTION, SOLUTION INTRAVENOUS CONTINUOUS
Status: DISCONTINUED | OUTPATIENT
Start: 2025-04-18 | End: 2025-04-18

## 2025-04-18 RX ORDER — MAGNESIUM SULFATE HEPTAHYDRATE 40 MG/ML
2 INJECTION, SOLUTION INTRAVENOUS ONCE
Status: COMPLETED | OUTPATIENT
Start: 2025-04-18 | End: 2025-04-18

## 2025-04-18 RX ORDER — POTASSIUM CHLORIDE 1.5 G/1.58G
40 POWDER, FOR SOLUTION ORAL ONCE
Status: DISCONTINUED | OUTPATIENT
Start: 2025-04-18 | End: 2025-04-18

## 2025-04-18 RX ORDER — OXYCODONE HYDROCHLORIDE 5 MG/1
5 TABLET ORAL EVERY 6 HOURS PRN
Refills: 0 | Status: DISCONTINUED | OUTPATIENT
Start: 2025-04-18 | End: 2025-04-22 | Stop reason: HOSPADM

## 2025-04-18 RX ORDER — BACLOFEN 5 MG/1
5 TABLET ORAL 2 TIMES DAILY
Status: DISCONTINUED | OUTPATIENT
Start: 2025-04-18 | End: 2025-04-22 | Stop reason: HOSPADM

## 2025-04-18 RX ORDER — POTASSIUM CHLORIDE 20 MEQ/1
20 TABLET, EXTENDED RELEASE ORAL ONCE
Status: COMPLETED | OUTPATIENT
Start: 2025-04-18 | End: 2025-04-18

## 2025-04-18 RX ORDER — POTASSIUM CHLORIDE 1.5 G/1.58G
20 POWDER, FOR SOLUTION ORAL ONCE
Status: DISCONTINUED | OUTPATIENT
Start: 2025-04-18 | End: 2025-04-18

## 2025-04-18 RX ORDER — OXYCODONE HYDROCHLORIDE 5 MG/1
10 TABLET ORAL EVERY 6 HOURS PRN
Refills: 0 | Status: DISCONTINUED | OUTPATIENT
Start: 2025-04-18 | End: 2025-04-22 | Stop reason: HOSPADM

## 2025-04-18 RX ORDER — LIDOCAINE 560 MG/1
1 PATCH PERCUTANEOUS; TOPICAL; TRANSDERMAL DAILY
Status: DISCONTINUED | OUTPATIENT
Start: 2025-04-18 | End: 2025-04-22 | Stop reason: HOSPADM

## 2025-04-18 RX ORDER — BACLOFEN 10 MG/1
20 TABLET ORAL ONCE
Status: COMPLETED | OUTPATIENT
Start: 2025-04-18 | End: 2025-04-18

## 2025-04-18 RX ORDER — POTASSIUM CHLORIDE 20 MEQ/1
TABLET, EXTENDED RELEASE ORAL
Status: DISPENSED
Start: 2025-04-18 | End: 2025-04-19

## 2025-04-18 RX ADMIN — OXYCODONE HYDROCHLORIDE 5 MG: 5 TABLET ORAL at 13:59

## 2025-04-18 RX ADMIN — SULFASALAZINE 1000 MG: 500 TABLET ORAL at 09:29

## 2025-04-18 RX ADMIN — ONDANSETRON HYDROCHLORIDE 8 MG: 4 TABLET, FILM COATED ORAL at 12:46

## 2025-04-18 RX ADMIN — Medication 3 L/MIN: at 14:47

## 2025-04-18 RX ADMIN — ROSUVASTATIN CALCIUM 20 MG: 20 TABLET, FILM COATED ORAL at 20:53

## 2025-04-18 RX ADMIN — CLOPIDOGREL BISULFATE 75 MG: 75 TABLET, FILM COATED ORAL at 09:29

## 2025-04-18 RX ADMIN — WARFARIN SODIUM 5 MG: 5 TABLET ORAL at 18:27

## 2025-04-18 RX ADMIN — SUCRALFATE 1 G: 1 SUSPENSION ORAL at 11:50

## 2025-04-18 RX ADMIN — SULFASALAZINE 1000 MG: 500 TABLET ORAL at 20:52

## 2025-04-18 RX ADMIN — ONDANSETRON 4 MG: 2 INJECTION INTRAMUSCULAR; INTRAVENOUS at 13:59

## 2025-04-18 RX ADMIN — BACLOFEN 5 MG: 5 TABLET ORAL at 09:29

## 2025-04-18 RX ADMIN — BUMETANIDE 2 MG: 1 TABLET ORAL at 09:27

## 2025-04-18 RX ADMIN — SUCRALFATE 1 G: 1 SUSPENSION ORAL at 09:28

## 2025-04-18 RX ADMIN — MAGNESIUM SULFATE HEPTAHYDRATE 2 G: 40 INJECTION, SOLUTION INTRAVENOUS at 18:28

## 2025-04-18 RX ADMIN — LIDOCAINE 4% 1 PATCH: 40 PATCH TOPICAL at 02:20

## 2025-04-18 RX ADMIN — HYDROXYCHLOROQUINE SULFATE 300 MG: 200 TABLET, FILM COATED ORAL at 09:27

## 2025-04-18 RX ADMIN — DIGOXIN 125 MCG: 125 TABLET ORAL at 09:29

## 2025-04-18 RX ADMIN — OXYCODONE HYDROCHLORIDE 5 MG: 5 TABLET ORAL at 02:06

## 2025-04-18 RX ADMIN — PANTOPRAZOLE SODIUM 40 MG: 40 TABLET, DELAYED RELEASE ORAL at 09:29

## 2025-04-18 RX ADMIN — BACLOFEN 5 MG: 5 TABLET ORAL at 20:53

## 2025-04-18 RX ADMIN — ACETAMINOPHEN 650 MG: 325 TABLET, FILM COATED ORAL at 09:28

## 2025-04-18 RX ADMIN — ACETAMINOPHEN 650 MG: 325 TABLET, FILM COATED ORAL at 19:15

## 2025-04-18 RX ADMIN — ONDANSETRON 4 MG: 2 INJECTION INTRAMUSCULAR; INTRAVENOUS at 18:27

## 2025-04-18 RX ADMIN — FLUTICASONE FUROATE AND VILANTEROL TRIFENATATE 1 PUFF: 100; 25 POWDER RESPIRATORY (INHALATION) at 08:39

## 2025-04-18 RX ADMIN — OXYCODONE HYDROCHLORIDE 10 MG: 5 TABLET ORAL at 09:28

## 2025-04-18 RX ADMIN — PANTOPRAZOLE SODIUM 40 MG: 40 TABLET, DELAYED RELEASE ORAL at 20:53

## 2025-04-18 RX ADMIN — SUCRALFATE 1 G: 1 SUSPENSION ORAL at 18:27

## 2025-04-18 RX ADMIN — DILTIAZEM HYDROCHLORIDE 120 MG: 120 CAPSULE, COATED, EXTENDED RELEASE ORAL at 20:53

## 2025-04-18 RX ADMIN — IPRATROPIUM BROMIDE AND ALBUTEROL SULFATE 3 ML: .5; 3 SOLUTION RESPIRATORY (INHALATION) at 14:47

## 2025-04-18 RX ADMIN — ASPIRIN 81 MG: 81 TABLET, COATED ORAL at 09:29

## 2025-04-18 RX ADMIN — POTASSIUM CHLORIDE 20 MEQ: 1500 TABLET, EXTENDED RELEASE ORAL at 18:27

## 2025-04-18 RX ADMIN — OXYCODONE HYDROCHLORIDE 5 MG: 5 TABLET ORAL at 20:57

## 2025-04-18 RX ADMIN — MONTELUKAST 10 MG: 10 TABLET, FILM COATED ORAL at 20:53

## 2025-04-18 RX ADMIN — IPRATROPIUM BROMIDE AND ALBUTEROL SULFATE 3 ML: .5; 3 SOLUTION RESPIRATORY (INHALATION) at 08:30

## 2025-04-18 RX ADMIN — DICLOFENAC SODIUM 4 G: 10 GEL TOPICAL at 09:27

## 2025-04-18 RX ADMIN — BACLOFEN 20 MG: 10 TABLET ORAL at 02:09

## 2025-04-18 RX ADMIN — SUCRALFATE 1 G: 1 SUSPENSION ORAL at 20:52

## 2025-04-18 ASSESSMENT — PAIN SCALES - GENERAL
PAINLEVEL_OUTOF10: 6
PAINLEVEL_OUTOF10: 9
PAINLEVEL_OUTOF10: 0 - NO PAIN
PAINLEVEL_OUTOF10: 10 - WORST POSSIBLE PAIN
PAINLEVEL_OUTOF10: 7
PAINLEVEL_OUTOF10: 8
PAINLEVEL_OUTOF10: 0 - NO PAIN
PAINLEVEL_OUTOF10: 3
PAINLEVEL_OUTOF10: 5 - MODERATE PAIN
PAINLEVEL_OUTOF10: 0 - NO PAIN
PAINLEVEL_OUTOF10: 6

## 2025-04-18 ASSESSMENT — PAIN - FUNCTIONAL ASSESSMENT
PAIN_FUNCTIONAL_ASSESSMENT: 0-10

## 2025-04-18 ASSESSMENT — COGNITIVE AND FUNCTIONAL STATUS - GENERAL
MOVING TO AND FROM BED TO CHAIR: A LITTLE
WALKING IN HOSPITAL ROOM: A LITTLE
DRESSING REGULAR UPPER BODY CLOTHING: A LITTLE
TURNING FROM BACK TO SIDE WHILE IN FLAT BAD: A LITTLE
STANDING UP FROM CHAIR USING ARMS: A LITTLE
MOBILITY SCORE: 17
DRESSING REGULAR LOWER BODY CLOTHING: A LITTLE
PERSONAL GROOMING: A LITTLE
MOVING FROM LYING ON BACK TO SITTING ON SIDE OF FLAT BED WITH BEDRAILS: A LITTLE
DAILY ACTIVITIY SCORE: 19
TOILETING: A LITTLE
CLIMB 3 TO 5 STEPS WITH RAILING: A LOT
HELP NEEDED FOR BATHING: A LITTLE

## 2025-04-18 ASSESSMENT — ACTIVITIES OF DAILY LIVING (ADL)
ADL_ASSISTANCE: INDEPENDENT
ADL_ASSISTANCE: INDEPENDENT

## 2025-04-18 ASSESSMENT — PAIN DESCRIPTION - LOCATION
LOCATION: BACK
LOCATION: BACK
LOCATION: HEAD

## 2025-04-18 NOTE — SIGNIFICANT EVENT
Patient was set to be transferred to floor this morning as post TMVR 4/17 was successful and no acute complications. Hemodynamically stable.     During working with PT/OT she became dyspneic and orthostatic, hypoxic on exertion. She did not pass out but felt bad. Also endorsing nausea.    Exam notable for pallor and tachypnea. EKG at 12:00 was NSR with no ST segment abnormalities, but Trop elevated to 500s. Repeat Trop in 400s.     CBC check notable for decreasing Hgb and haptoglobin, 7.9 and 39 respectively. LDH persistently high. After lengthy review and discussion with Structural colleagues, it was felt that the repeat TTE this AM did not show a paravalvular leak, and what was felt to be para-valvular leak was likely AI.     Structural team concerned about possible afterload mismatch with closure of MR and worsening LV function. However, down trending Hgb is still curious, particularly with concomitant Haptoglobin trend and exam findings consistent with acute anemia like pallor, exertional angina and dyspnea, tachypnea, and extreme fatigue.    Decision was made to keep her overnight in CICU for further monitoring. Will continue to investigate the anemia with T.Bili, Urine Bili. Will still give Warfarin tonight. HD stable.    Exam:   General: Ill appearing, pallor noted  Cardiac: RRR  Pulm: CTAB, tachypnea with increased work of breathing  GI: soft, non distended  Extremities: trace LE edema   Neuro: no focal neuro deficits, a+ox4  Psych: appropriate mood and behavior, however anxious  Skin: warm and dry throughout    Fabian Ayala MD  Internal Medicine

## 2025-04-18 NOTE — PROGRESS NOTES
"Occupational Therapy    Evaluation and Treatment    Patient Name: Trina SALDANA Via \"Marcela\"  MRN: [id number]  Today's Date: 4/18/2025  Room: 09/09  Time Calculation  Start Time: 0953  Stop Time: 1024  Time Calculation (min): 31 min    Assessment  IP OT Assessment  OT Assessment: Pt presents s/p MVR, demonstrating decreased activity tolerance and balance and demonstrates SOB and reduced ADL/IADL performance. Pt woulod benefit from low intensity OT to increase safety and IND with ADLs, IADLs, mobility, and transfers and EC/WS principles to facilitate safe return home to prior living environment.  Prognosis: Good  Barriers to Discharge Home: No anticipated barriers  Evaluation/Treatment Tolerance: Patient limited by fatigue  Medical Staff Made Aware: Yes  End of Session Communication: Bedside nurse, Physician  End of Session Patient Position: Bed, 3 rail up, Alarm off, not on at start of session (MD and RN present to address pt hypotension and symptoms.)  Plan:  Inpatient Plan  Treatment Interventions: ADL retraining, Functional transfer training, UE strengthening/ROM, Endurance training, Patient/family training, Equipment evaluation/education, Neuromuscular reeducation, Compensatory technique education  OT Frequency: 3 times per week  OT Discharge Recommendations: Low intensity level of continued care  Equipment Recommended upon Discharge: Wheeled walker  OT Recommended Transfer Status:  (CGA)  OT - OK to Discharge: Yes  OT Assessment  OT Assessment Results: Decreased ADL status, Decreased upper extremity strength, Decreased safe judgment during ADL, Decreased endurance, Decreased functional mobility, Decreased IADLs  Prognosis: Good  Evaluation/Treatment Tolerance: Patient limited by fatigue  Medical Staff Made Aware: Yes    Subjective   Current Problem:  1. Mitral valve disease        2. Mitral regurgitation  Cardiac Catheterization Procedure    Cardiac Catheterization Procedure    Cardiac Catheterization Procedure    " Cardiac Catheterization Procedure      3. S/P transcatheter mitral valve replacement (TMVR)  Transthoracic echo (TTE) complete    Intraoperative SULY (Anesthesia please do not use)    Intraoperative SULY (Anesthesia please do not use)    Transthoracic Echo (TTE) Complete    Transthoracic Echo (TTE) Complete    CANCELED: Transthoracic Echo (TTE) Limited    CANCELED: Transthoracic Echo (TTE) Limited      4. Ventricular tachyarrhythmia (Multi)  Cardiac Device Check - Surgery    Cardiac Device Check - Surgery    Cardiac Device Check - Surgery    Cardiac Device Check - Surgery    CANCELED: Cardiac Device Check - Surgery    CANCELED: Cardiac Device Check - Surgery      5. Mitral valve insufficiency, unspecified etiology        6. Cardiac defibrillator in place  Cardiac Device Check - Surgery    Cardiac Device Check - Surgery        General:  Reason for Referral: s/p TVMR 4/17, transferred to CICU for monitoring.  Past Medical History Relevant to Rehab: atrial fibrillation s/p LAAO, VT/VF s/p ICD, Hyperlipidemia , CAD,  PUD, SBO s/p lysis of adhesions, GERD s/p nissen fundoplication, provoked DVT 2018, Rheumatoid arthritis, psoriasis, asthma, chronic anemia, severe MR, caridoMEMs implant and CKD  Prior to Session Communication: Bedside nurse  Patient Position Received: Up in chair, Alarm off, not on at start of session  Family/Caregiver Present: No  General Comment: Pt pleasant and agreeable to therapy. Pt with SOB with activity and O2 88-98% O2 with cues needed for rest breaks. Pt with hypotension at end of session after returing to bed with MAP dropping to 55. RN aware and present. Pt reporting dizziness upon returning supine. MD present at end of session.   Precautions:  Medical Precautions: Cardiac precautions, Fall precautions  Vital Signs:    Vital Signs     Vitals Session Pre OT During OT Post OT   Heart Rate 83  76   Resp       SpO2 96 88-98 100   /56 107/61, repeat 125/52 after mobility  After laying in bed  SBP dropped to 90s 123/48   MAP 76 75, 72,   Laying in bed MAP dropped to 50s, RN and MD aware and pt symptomatic 72         Pain:  Pain Assessment  Pain Assessment: 0-10  0-10 (Numeric) Pain Score: 5 - Moderate pain  Pain Type: Acute pain  Pain Location: Back  Pain Orientation: Lower  Lines/Tubes/Drains:  Arterial Line 04/17/25 Left Radial (Active)   Number of days: 1       External Urinary Catheter Female (Active)   Number of days: 0         Objective   Cognition:  Overall Cognitive Status: Within Functional Limits  Arousal/Alertness: Appropriate responses to stimuli  Orientation Level: Oriented X4  Following Commands: Follows one step commands without difficulty  Insight: Mild  Impulsive: Within functional limits  Processing Speed: Within funtional limits     Confusion Assessment Method (CAM)  Acute Onset and Fluctuating Course (1A): No  Serrato Agitation Sedation Scale  Serrato Agitation Sedation Scale (RASS): Alert and calm  Home Living:  Type of Home: House  Lives With: Spouse  Home Adaptive Equipment:  (rollator)  Home Layout: One level  Home Access: Level entry  Bathroom Shower/Tub: Walk-in shower  Bathroom Toilet: Standard  Bathroom Equipment: Grab bars in shower, Built-in shower seat   Prior Function:  Level of [place]: Independent with ADLs and functional transfers  Receives Help From:  (has hired help for cleaning the house every 3 weeks.)  ADL Assistance: Independent  Homemaking Assistance: Independent (shared with  but increased difficulty recently)  Ambulatory Assistance: Independent  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)  Leisure: baking and quilt-making  Hand Dominance: Right  Prior Function Comments: (-) falls, uses rollator on occasion  IADL History:     ADL:  Eating Assistance: Independent  Grooming Assistance: Stand by  Bathing Assistance:  (CGA)  UE Dressing Assistance: Stand by  LE Dressing Assistance:  (CGA)  Toileting Assistance with Device:  (CGA)  Activity  Tolerance:  Endurance: Tolerates 10 - 20 min exercise with multiple rests  Early Mobility/Exercise Safety Screen: Proceed with mobilization - No exclusion criteria met  Balance:  Dynamic Sitting Balance  Dynamic Sitting-Level of Assistance: Close supervision  Dynamic Standing Balance  Dynamic Standing-Level of Assistance: Contact guard  Static Sitting Balance  Static Sitting-Level of Assistance: Independent  Static Standing Balance  Static Standing-Level of Assistance: Contact guard  Bed Mobility/Transfers: Bed Mobility/Transfers: Bed Mobility  Bed Mobility: Yes  Bed Mobility 1  Bed Mobility 1: Sitting to supine  Level of Assistance 1: Contact guard  Bed Mobility Comments 1: cues for sequencing  Functional Mobility  Functional Mobility Performed: Yes  Functional Mobility 1  Comments 1: Pt completed functional mobility with CGA without AD but significant SOB noted.   and Transfers  Transfer: Yes  Transfer 1  Technique 1: Sit to stand, Stand to sit  Transfer Device 1:  (no AD)  Transfer Level of Assistance 1: Contact guard  Trials/Comments 1: cues for proper hand placement  IADL's:      Vision: Vision - Basic Assessment  Current Vision: No visual deficits   and    Sensation:  Light Touch: No apparent deficits  Strength:  Strength Comments: B UE strength grossly >/= 4/5, except L shld limited 3-/5  Perception:  Inattention/Neglect: Appears intact  Coordination:  Movements are Fluid and Coordinated: Yes   Hand Function:  Hand Function  Gross Grasp: Functional  Coordination: Functional  Extremities:   RUE   RUE : Within Functional Limits, LUE   LUE: Within Functional Limits (L shld limited- ~070 degrees flex, limited IR/ER from shld surgery. distal UE AROM WFL.), RLE   RLE : Within Functional Limits, and LLE   LLE : Within Functional Limits      Outcome Measures: [facility] Daily Activity  Putting on and taking off regular lower body clothing: A little  Bathing (including washing, rinsing, drying): A little  Putting on and  taking off regular upper body clothing: A little  Toileting, which includes using toilet, bedpan or urinal: A little  Taking care of personal grooming such as brushing teeth: A little  Eating Meals: None  Daily Activity - Total Score: 19    Confusion Assessment Method-ICU (CAM-ICU)  Feature 3: Altered Level of Consciousness: Negative   ICU Mobility Screen  Early Mobility/Exercise Safety Screen: Proceed with mobilization - No exclusion criteria met  ICU Mobility Scale: Walking with assistance of 1 person,   Serrato Agitation Sedation Scale  Serrato Agitation Sedation Scale (RASS): Alert and calm      Education Documentation  Body Mechanics, taught by Antonia Weeks OT at 4/18/2025  3:10 PM.  Learner: Patient  Readiness: Acceptance  Method: Explanation  Response: Verbalizes Understanding  Comment: OT POC    Precautions, taught by Antonia Weeks OT at 4/18/2025  3:10 PM.  Learner: Patient  Readiness: Acceptance  Method: Explanation  Response: Verbalizes Understanding  Comment: OT POC    ADL Training, taught by Antonia Weeks OT at 4/18/2025  3:10 PM.  Learner: Patient  Readiness: Acceptance  Method: Explanation  Response: Verbalizes Understanding  Comment: OT POC    Education Comments  No comments found.        Goals:   Encounter Problems       Encounter Problems (Active)       ADLs       Patient will perform UB and LB bathing with Mod I.       Start:  04/18/25    Expected End:  05/02/25            Patient with complete upper body dressing with IND.       Start:  04/18/25    Expected End:  05/02/25            Patient with complete lower body dressing with Mod I.       Start:  04/18/25    Expected End:  05/02/25            Patient will complete daily grooming tasks with IND.       Start:  04/18/25    Expected End:  05/02/25            Patient will complete toileting including hygiene clothing management/hygiene with Mod I.       Start:  04/18/25    Expected End:  05/02/25            Pt will perform  simulated IADL tasks with Mod I using EC/WS principles as needed and demonstrating good safety awareness for safe return home to prior living environment.         Start:  04/18/25    Expected End:  05/02/25               MOBILITY       Pt will perform functional mobility household distances with Mod I using LRAD without LOB and demonstrating good safety awareness.        Start:  04/18/25    Expected End:  05/02/25               TRANSFERS       Pt will perform functional transfers on various surfaces with Mod I using LRAD with good safety awareness.        Start:  04/18/25    Expected End:  05/02/25                   Treatment Completed on Evaluation    Activities of Daily Living:                LE Dressing  LE Dressing: Yes  Pants Level of Assistance: Contact guard  LE Dressing Where Assessed: Chair  LE Dressing Comments: Pt donned pants with CGA with cues for4 technique to reduce SOB, using figure four position and cues for rest break.  Toileting  Toileting Level of Assistance: Contact guard  Where Assessed: Toilet  Toileting Comments: Pt completed heike hygiene management in sitting with SBA, stood with CGA to manage clothing.        Therapy/Activity:     Therapeutic Activity  Therapeutic Activity Performed: Yes  Therapeutic Activity 1: Pt completed 2nd trial sit <> stand from toilet with CGA and 3rd trial sit <> stand with CGA. Cues provided for proper hand placement.  Therapeutic Activity 2: Pt completed functional mobiity from chair to toilet and from toilet to chair/bed with CGA without AD but pt flexed forward. SOB noted during mobility with desaturation to 88%, cues for breathing technique and rest break.          04/18/25 at 3:11 PM   Antonia Weeks, OT   Rehab Office: [phone]

## 2025-04-18 NOTE — PROGRESS NOTES
Physical Therapy    Physical Therapy Evaluation & Treatment    Patient Name: Marcela Elias  MRN: [id number]  Department: [facility] [id number]  Room: 09/09-A  Today's Date: 4/18/2025   Time Calculation  Start Time: 0853  Stop Time: 0933  Time Calculation (min): 40 min    Assessment/Plan   PT Assessment  PT Assessment Results: Decreased strength, Decreased range of motion, Decreased endurance, Impaired balance, Decreased mobility, Pain  Rehab Prognosis: Excellent  Barriers to Discharge Home: No anticipated barriers  Evaluation/Treatment Tolerance: Patient limited by pain  Medical Staff Made Aware: Yes  End of Session Communication: Bedside nurse  Assessment Comment: Did not complete balance outcome measure this date 2/2 increased pain during mobility. Will attempt outcome measure performance next session.  End of Session Patient Position: Up in chair, Alarm off, not on at start of session   IP OR SWING BED PT PLAN  Inpatient or Swing Bed: Inpatient  PT Plan  Treatment/Interventions: Bed mobility, Transfer training, Gait training, Balance training, Strengthening, Endurance training, Range of motion, Therapeutic exercise, Therapeutic activity, Home exercise program, Positioning  PT Plan: Ongoing PT  PT Frequency: 3 times per week  PT Discharge Recommendations: Low intensity level of continued care (outpatient PT)  PT Recommended Transfer Status: Assist x1  PT - OK to Discharge: Yes      Subjective     General Visit Information:  General  Reason for Referral: s/p TVMR 4/17, transferred to CICU for monitoring.  Past Medical History Relevant to Rehab: atrial fibrillation s/p LAAO, VT/VF s/p ICD, Hyperlipidemia , CAD,  PUD, SBO s/p lysis of adhesions, GERD s/p nissen fundoplication, provoked DVT 2018, Rheumatoid arthritis, psoriasis, asthma, chronic anemia, severe MR, caridoMEMs implant and CKD  Family/Caregiver Present: No  Prior to Session Communication: Bedside nurse  Patient Position Received: Bed, 3 rail up, Alarm off, not  on at start of session  Preferred Learning Style: auditory, verbal  General Comment: Pt was agreeable and eager for therapy this date. Had severe back pain that was present prior to hopsitalization limiting session overall. B groin incisions assessed and stable throughout session.  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,  has been baking for pt's business)  Receives Help From: Family, Other (Comment) (hired cleaning personnel)  ADL Assistance: Independent  Homemaking Assistance: Needs assistance  Ambulatory Assistance: Independent  Vocational: Self employed (baker)  Leisure: baking  Prior Function Comments: community ambulator, rollator off and on, no falls, drives  Precautions:  Precautions  Medical Precautions: Cardiac precautions, Fall precautions    Lines/Tubes:   Arterial Line   Telemetry                             Vital Signs     Vitals Session Pre PT During PT Post PT   Heart Rate 82  77   SpO2 97%  92%   /57  124/55           Objective   Pain:  Pain Assessment  Pain Assessment: 0-10  0-10 (Numeric) Pain Score: 8 (At rest laying supine, pt reported 8/10 in low back. reached 10/10 during ambulation and RN was notified.)  Cognition:  Cognition  Overall Cognitive Status: Within Functional Limits  Arousal/Alertness: Appropriate responses to stimuli  Orientation Level: Oriented X4  Following Commands: Follows one step commands without difficulty  Insight: Within function limits  Impulsive:  "Within functional limits      Activity Tolerance  Endurance: Tolerates 10 - 20 min exercise with multiple rests  Early Mobility/Exercise Safety Screen: Proceed with mobilization - No exclusion criteria met  Activity Tolerance Comments: Pt reported some SOB during ambulation this date (up to 5/10 on scale). Back pain present during session that increased during ambulation (10/10). RN made aware.    Sensation  Light Touch:  (intact sensation BLE)    Strength  Strength Comments: BLE >3+/5 based on mobility  Coordination  Movements are Fluid and Coordinated: Yes    Postural Control  Postural Control: Within Functional Limits    Static Sitting Balance  Static Sitting-Balance Support: Bilateral upper extremity supported, Feet supported  Static Sitting-Level of Assistance: Close supervision  Dynamic Sitting Balance  Dynamic Sitting-Balance Support: Feet supported  Dynamic Sitting-Level of Assistance: Close supervision    Static Standing Balance  Static Standing-Balance Support: No upper extremity supported  Static Standing-Level of Assistance: Contact guard (x1)  Dynamic Standing Balance  Dynamic Standing-Balance Support: No upper extremity supported  Dynamic Standing-Level of Assistance: Contact guard (x1)  Functional Assessments:  Bed Mobility  Bed Mobility: Yes  Bed Mobility 1  Bed Mobility 1: Supine to sitting  Level of Assistance 1: Close supervision  Bed Mobility Comments 1: Pt required increased time with reported \"back pain and stiffness\", min cues provided for hand placement for proper mechanics    Transfers  Transfer: Yes  Transfer 1  Technique 1: Sit to stand, Stand to sit  Transfer Level of Assistance 1: Contact guard (x1)  Trials/Comments 1: Demonstrated increased time for full stand with grimacing which pt attributed to low back pain. Vitals remained stable.    Ambulation/Gait Training  Ambulation/Gait Training Performed: Yes  Ambulation/Gait Training 1  Surface 1: Level tile  Device 1: No device  Assistance " 1: Contact guard (x1)  Quality of Gait 1: Inconsistent stride length, Forward flexed posture  Comments/Distance (ft) 1: ~270ft with standing breaks ~15 sec each 2/2 back pain per pt. Pt reported 2/10 SOB during walk towards end.  Extremity/Trunk Assessments:  RLE   RLE : Within Functional Limits  LLE   LLE : Within Functional Limits  Treatments:  Therapeutic Activity  Therapeutic Activity Performed: Yes  Therapeutic Activity 1: Pt completed ~250ft with CGAx1. Two standing rest break ~10 seconds each. Pt reported SOB 5/10 with bout of ambulation.  Therapeutic Activity 2: Pt completed ~10 minute EOB/chair sitting with close supervision and cues for upright posture. Demonstrated deep breathing technique when seated.  Therapeutic Activity 3: Completed 1 trial STS with CGAx1 and increased time for full stand.  Outcome Measures:  [facility] Basic Mobility  Turning from your back to your side while in a flat bed without using bedrails: A little  Moving from lying on your back to sitting on the side of a flat bed without using bedrails: A little  Moving to and from bed to chair (including a wheelchair): A little  Standing up from a chair using your arms (e.g. wheelchair or bedside chair): A little  To walk in hospital room: A little  Climbing 3-5 steps with railing: A lot  Basic Mobility - Total Score: 17    Confusion Assessment Method-ICU (CAM-ICU)  Feature 1: Acute Onset or Fluctuating Course: Negative  Overall CAM-ICU: Negative    FSS-ICU  Ambulation: Walks >/ or equal to 150 feet with minimal assistance x1  Rolling: Supervision or set-up only  Sitting: Supervision or set-up only  Transfer Sit-to-Stand: Minimal assistance (performs 75% or more of task)  Transfer Supine-to-Sit: Supervision or set-up only  Total Score: 23      Early Mobility/Exercise Safety Screen: Proceed with mobilization - No exclusion criteria met  ICU Mobility Scale: Walking with assistance of 1 person [8]    Encounter Problems       Encounter Problems  (Active)       Balance       Patient will score a >22/30 on FGA for improved balance and decreased risk of falls.        Start:  04/18/25    Expected End:  05/02/25               Mobility       Patient will ambulate >600ft during a 6 minute walk test with close supervision and LRAD.        Start:  04/18/25    Expected End:  05/02/25               PT Transfers       Patient will perform bed mobility with distant supervision and no cueing for improved independence       Start:  04/18/25    Expected End:  05/02/25            Patient will transfer sit to and from stand with distant supervision and LRAD for improved independence.        Start:  04/18/25    Expected End:  05/02/25               Pain - Adult              Education Documentation  Body Mechanics, taught by FACUNDO Maria at 4/18/2025 10:21 AM.  Learner: Patient  Readiness: Acceptance  Method: Explanation  Response: Verbalizes Understanding, Demonstrated Understanding  Comment: mobility precautions and body mechanics during mobility    Mobility Training, taught by FACUNDO Maria at 4/18/2025 10:21 AM.  Learner: Patient  Readiness: Acceptance  Method: Explanation  Response: Verbalizes Understanding, Demonstrated Understanding  Comment: mobility precautions and body mechanics during mobility    Education Comments  No comments found.

## 2025-04-18 NOTE — RESEARCH NOTES
[facility] AE form    Subject ID: [id number]  AE start date: 17 Apr 2025    What is the relative time from the procedure?    [] Pre-procedure       [x] During procedure        [] Post-procedure     Date of site awareness: 17 Apr 2025    AE term/diagnosis: MOD-SEVERE MR PVL    Event description     FTER DEPLOYING THE VALVE, THE INTRA SULY SHOWED MODERATED  TRENA PARA VALVULAR LEAKAGE             Is the report any of the following:   [x] None   [] Bleeding event (complete bleeding event questions)  [] Neuro event (complete neurological event questions)  [] Thrombosis/Embolism    Bleeding Event (delete if not applicable)   What was the area of bleeding -- Choose only 1    [] Chest tube  [] Intracranial  [] Pericardial  [] Compartment Syndrome  [] Intraocular  [] Unidentified  [] GI Bleed  [] Intraspinal  [] Other, specify:     Did the bleeding lead to:  []Hypovolemic shock  []Severe Hypotension  [] None  Did the subject have a chest tube: No   If yes, date chest tube removed:    Time chest tube removed:   Total drainage from arrival in ICU to removal:  cc  Measurement Result (g/dL) Date of test (dd-mmm-yyyy)    Hgb at time of admission      [facility] Hgb      Hgb at time of d/c       Neurological Event (delete if not applicable)   Type of Event -- Choose only 1    [] Confirmed TIA  [] Confirmed Stroke  [] Hemorrhagic  [] Ischemic  [] Unknown  [] Confirmed not a stroke or TIA  [] Undetermined  What was the duration of longest deficit?     Antithrombotic Therapy questions following are only required for events marked as Bleeding event, Neuro event, and/or Thrombosis/Embolism :   Is patient on warfarin?     If yes, what was the INR at the time of the event (if n/a, most recent INR to the event)   Result Date of test (dd-mmm-yyyy)         Antithrombotic therapy at time of the event:      If yes, specify:         Other anticoagulants, specify:      Other antiplatelets, specify:  Action taken/ treatment    Were any diagnostic  actions taken? Yes     Diagnostic test Result  Date of test (dd-mmm-yyyy)   marci                   Did the AE result in hospitalization? No   Did the AE result in a prolongation (>24 hours) of an existing hospitalization?     If yes, what is underlying cause of hospitalization?     []HF hospitalization  []Other cardiovascular hospitalization  []Non-cardiac hospitalization  NOTE: Ensure that a Healthcare Utilization CRF is completed for every new admission/ hospitalization.     Did the AE result in any treatment? No    Was the AE treated with a blood transfusion of RBCs or whole blood? No    If yes, what are the number of units of RBCs or whole blood given:    Was concomitant or additional medication given? No    If yes:     Was medication given intravenously? No     Is the subject on IV Hemodynamic Support Medication? No  (e.g. inotropes, pressors)  Did the event result in any change in Heart Failure medication to treat Heart Failure? No   (NOTE: If Yes, please comp the Heart Failure Management AE CRF)  Was the AE treated with a permanent pacemaker device? No  If yes, Date of implant:   Specify type: []CRT-D       []CRT-P       []ICD       []Pacemaker  MDT (device) or other?   MDT device? specify serial number:   Other? Specify :  Was AE treated with ultrafiltration (e.g. ECMO)? No  Was the AE treated with hemodynamic assist device? No  (e.g. BVAD, IABP, LVAD)   Was the AE treated with a percutaneous intervention? No  If yes, date of intervention:   Was the intervention on the coronary arteries (ie, PCI) performed? No  Was the intervention on the mitral valve? No  Procedure  [] Balloon dilation of the TMVR   [] Implantation of another TMVR, valve-in-valve   If Valve-in-Valve, specify device type   If Valve-in-Valve, specify device size    (mm)   [] Placement of the vascular plug for paravalvular leak   [] Repair or alteration of the TMVR valve, TMVR valve preserved in place   []Other,  describe:    Clinical indication for procedure (check all that apply)    []Device migration  [] Frame fracture  [] Hemolysis   []Paravalvular regurgitation []Prosthetic valve/repair endocarditis  []Prosthetic valve/repair stenosis []Transvalvular regurgitation  []Other, describe:  Was the AE treated with a surgical procedure No  If Yes, Date of intervention:  Was the intervention on the coronary arteries (i.e., CABG) performed No  Was the intervention on the mitral valve No     Procedure   [] Explant and replacement of the mitral device   [] Repair or alteration of the mitral device, mitral device preserved in place   [] Other, describe:     Clinical indication for procedure (check all that apply)   []Device migration  [] Frame fracture  [] Hemolysis   []Paravalvular regurgitation []Prosthetic valve/repair endocarditis  []Prosthetic valve/repair stenosis []Transvalvular regurgitation  []Other, describe:      Was the AE treated in another manner?No  If Yes, what other action was taken in response to the AE:       Did the AE meet any of the following criteria (a single yes response defines this event as an MADY)   Led to death No  Led to serious deterioration in the health of the subject that resulted in:   Life threatening illness or injury No   A permanent impairment of a body structure or body function, including chronic diseases No   In-patient or prolonged hospitalization No  NOTE: If new hospitalization, be sure to complete the Healthcare Utilization CRF. This does not include Index Procedure admission.   Medical or surgical intervention to prevent life threatening illness or injury or permanent impairment to a body structure or a body function Yes  Led to fetal distress, foetal death or congenital abnormality or birth defect No    Relationship    Note: Please reference CIP Section Classification of Causal Relationships for all relationship definitions   Not related The relationship to the TMVR, DCS, crimper,  pre?crimp tool, mitralvalve surgical implant or TMVR / surgical mitral valve implant procedure can be excluded when:    The event is not a known side effect of the product category the TMVR, DCS, crimper, pre?crimp tool or surgical mitral valve implant belongs to or of similar devices;    The event has no temporal relationship with the TMVR, DCS, crimper, pre?crimp tool, mitral valve surgical implant or TMVR/surgical mitral valve implant procedure    The event does not follow a known response pattern to the TMVR, DCS, crimper, pre?crimp tool, mitral valve surgical implant or TMVR / surgical mitral valve implant procedure and is biologically implausible;    The event involves a body?site or an organ not expected     In order to establish non?relatedness, not all the criteria listed above might be met at the same time   Unlikely to be related The relationship to the TMVR, DCS, crimper, pre?crimp tool, mitral valve surgical implant or TMVR / surgical mitral valve implant procedure seems not relevant and/or the event can be reasonably explained by another cause, but additional information may be obtained.   Possibly related The relationship to the TMVR, DCS, crimper, pre?crimp tool, mitral valve surgical implant or TMVR / surgical mitral valve implant procedure is weak but cannot be ruled out completely. Alternative causes are also possible (e.g., an underlying or concurrent illness/clinical condition or/and an effect of another device, drug or treatment). Cases were relatedness cannot be assessed or no information has been obtained should also be classified as possible.   Probably related The relationship to the TMVR, DCS, crimper, pre?crimp tool, mitral valve surgical implant or TMVR / surgical mitral valve implant procedure seems relevant and/or the event cannot reasonably explained by another cause, but additional information may be obtained   Causal relationship The event is associated to the T TMVR, DCS, crimper,  pre?crimp tool, mitral valve surgical implant or TMVR / surgical mitral valve implant procedure beyond reasonable doubt when:    the event is a known side effect of the product category the TMVR, DCS, crimper, pre-crimp tool or surgical mitral valve implant belongs to or of similar devices;    the event has a temporal relationship with investigational device use/application or procedures;    the event involves a body-site or organ that o the TMVR, DCS or surgical implant is applied to;   o the TMVR, DCS or surgical implant has an effect on;      the event follows a known response pattern to the TMVR, DCS or surgical implant;    other possible causes (e.g., an underlying or concurrent illness/clinical condition or an effect of another device, drug, or treatment) have been adequately ruled out    harm to the subject is due to error in use     In order to establish relatedness, not all the criteria listed above might be met at the same time.    Is the AE related to the Implant Procedure  []Not Related   []Unlikely   []Possible   []Probable   [x]Causal Relationship    Is the AE related to TMVR Valve  []Not Related   []Unlikely   []Possible   []Probable   [x]Causal Relationship    Is the AE related to the Delivery Catheter System (TMVR)  []Not Related   []Unlikely   []Possible   []Probable   [x]Causal Relationship      Is the AE related to the Crimper (TMVR)   NOTE: Crimper is equivalent to the Funnel Loading System  [x]Not Related   []Unlikely   []Possible   []Probable   []Causal Relationship    Is the AE related to the Pre-Crimp Tool (TMVR)   NOTE: Pre-Crimp Tool is equivalent to the Funnel Loading Tool   If Pre-crimp tool was not used, Not Related should be selected  [x]Not Related   []Unlikely   []Possible   []Probable   []Causal Relationship    Is the AE related to the Surgical Device (SMVR)   Note: For v8.0, select Not Related, unless subject was converted to open heart surgery. If subject was converted, select  "appropriate AE relatedness to the surgical device.  [x]Not Related   []Unlikely   []Possible   []Probable   []Causal Relationship      What was the final outcome of this AE   []Fatal []Not recovered/ Not resolved []Recovered/Resolved   [] Recovered/Resolved with Sequelae   [] Recovering/Resolving  [x] Unknown  *Complete AE end date if \"Fatal,\" \"Recovered/Resolved,\" or \"Recovered/Resolved with Sequelae\" are checked above.     What date did the AE end:   "

## 2025-04-18 NOTE — PROGRESS NOTES
ICU to [facility] Transfer Summary     I:  ICU Admission Reason & Brief ICU Course:    74 y.o. y/o female with PMH of atrial fibrillation s/p LAAO, VT/VF s/p ICD, Hyperlipidemia , CAD,  PUD, SBO s/p lysis of adhesions, GERD s/p nissen fundoplication, provoked DVT 2018, Rheumatoid arthritis, psoriasis, asthma, chronic anemia, severe MR, caridoMEMs implant and CKD admitted to CICU for monitoring post TMVR 4/17.  Overnight did well with no acute events. Initially there was concern about paravalvular leak seen on post-op SULY, however hemolysis labs and lack of symptoms of heart failure reassuring and no plans for re-intervention per structural team. Restarted home bumex 2mg BID. This morning was slightly orthostatic when working with PT/OT, MAPS in 60s, recovered without any intervention upon lying back down       C: Code Status/DPOA Info/Goals of Care/ACP Note    Full Code  DPOA/Contact Number:   Martinez Gravesjie (Spouse)  [phone] (Mobile)     U: Unprescribing & Pertinent High-Risk Medications    Changes to home meds:   Starting Warfarin inpatient     Anticoagulation: Yes - Therapeutic Warfarin    Antibiotics:   [x] N/A - no current planned antimicrobioals      P: Pending Tests at the Time of Transfer   None      A: Active consultants, including Rehab:   []  Subspecialty Consultants:  None  [x]  PT  [x]  OT  []  SLP  []  Wound Care    U: Uncertainty Measure/Diagnostic Pause:    Working diagnosis at the time of transfer mitral valve regurgitation, s/p repair.     Diagnosis Degree of Certainty: 1. High degree of certainty about the clinical diagnosis.     S: Summary of Major Problems and To-Dos:     CARDIOVASCULAR:  #Severe Mitral Regurgitation, s/p repair  #A Fib s/p LAAO  #Hx of VT/V Fib s/p ICD placement  Management:  Continue digoxin and diltiazem. Continue crestor  Continue Warfarin  Continue Bumex 2mg BID  Continue Aspirin and Plavix     PULMONARY:  Dx:  Asthma  Management:  breo ellipta  duonebs PRN  singulair      RENAL/GENITOURINARY:  Dx:  CKD (baseline Cr 1.1)  Management:  CTM     HEMATOLOGY:  Dx:  Chronic anemia (baseline hb 8-10)  Management:  CTM     MUSCULOSKELETAL:  Dx:  Rheumatoid arthritis  Psoriasis             Chronic Lumbar Back Pain  Management:  Continue sulfasalazine, plaquenil  Can continue current multi-modal pain regimen on floor including diclofenac, lidocaine patch, tylenol, and oxycodone and morphine prn    To-do list prior to transfer:  []Continue Warfarin until therapeutic INR goal of 2.5     E: Exam, including Lines/Drains/Airways & Data Review:   General: NAD  Cardiac: RRR  Pulm: CTAB, no resp distress  GI: soft, non distended  Extremities: trace LE edema   Neuro: no focal neuro deficits, a+ox4  Psych: appropriate mood and behavior, somnolent (in light of sedation)  Skin: warm and dry throughout  Difficult airway? No  Lines/drains assessed for removal? Yes, describe: Removing L EJ    Within 30 minutes of the patient physically leaving the floor, a Floor Readiness Note needs to be placed with updated vitals.

## 2025-04-18 NOTE — CARE PLAN
The patient's goals for the shift include controlled back pain    The clinical goals for the shift include patient will remain hds throughout shift      Problem: Pain - Adult  Goal: Verbalizes/displays adequate comfort level or baseline comfort level  Outcome: Progressing     Problem: Safety - Adult  Goal: Free from fall injury  Outcome: Progressing     Problem: Discharge Planning  Goal: Discharge to home or other facility with appropriate resources  Outcome: Progressing     Problem: Chronic Conditions and Co-morbidities  Goal: Patient's chronic conditions and co-morbidity symptoms are monitored and maintained or improved  Outcome: Progressing     Problem: Nutrition  Goal: Nutrient intake appropriate for maintaining nutritional needs  Outcome: Progressing     Problem: Skin  Goal: Decreased wound size/increased tissue granulation at next dressing change  Outcome: Progressing  Flowsheets (Taken 4/17/2025 2141)  Decreased wound size/increased tissue granulation at next dressing change: Protective dressings over bony prominences  Goal: Participates in plan/prevention/treatment measures  Outcome: Progressing  Flowsheets (Taken 4/17/2025 2141)  Participates in plan/prevention/treatment measures: Elevate heels  Goal: Prevent/manage excess moisture  Outcome: Progressing  Flowsheets (Taken 4/17/2025 2141)  Prevent/manage excess moisture: Moisturize dry skin  Goal: Prevent/minimize sheer/friction injuries  Outcome: Progressing  Flowsheets (Taken 4/17/2025 2141)  Prevent/minimize sheer/friction injuries: Turn/reposition every 2 hours/use positioning/transfer devices  Goal: Promote/optimize nutrition  Outcome: Progressing  Flowsheets (Taken 4/17/2025 2141)  Promote/optimize nutrition: Offer water/supplements/favorite foods  Goal: Promote skin healing  Outcome: Progressing  Flowsheets (Taken 4/17/2025 2141)  Promote skin healing: Turn/reposition every 2 hours/use positioning/transfer devices

## 2025-04-18 NOTE — ANESTHESIA POSTPROCEDURE EVALUATION
"Patient: Trina SALDANA Via \"Marcela\"    Procedure Summary       Date: 04/17/25 Room / Location: [facility]29 Cardiac Cath Lab    Anesthesia Start: 1441 Anesthesia Stop: 1841    Procedures:       TMVR (Transcatheter MV Replacement)      TMVR (Transcatheter MV Replacement) Diagnosis:       Mitral regurgitation      (Mitral regurgitation [I34.0])    Providers: Tad Ojeda MD; Dutch Anderson MD Responsible Provider: Mike Ramos MD    Anesthesia Type: general ASA Status: 4            Anesthesia Type: general    Vitals Value Taken Time   /70 04/17/25 19:00   Temp 36.1 °C (97 °F) 04/17/25 20:00   Pulse 81 04/17/25 20:05   Resp 14 04/17/25 19:11   SpO2 95 % 04/17/25 20:05   Vitals shown include unfiled device data.    Anesthesia Post Evaluation    Patient location during evaluation: bedside  Patient participation: complete - patient participated  Level of consciousness: awake and alert  Pain score: 2  Pain management: adequate  Airway patency: patent  Cardiovascular status: acceptable  Respiratory status: acceptable  Hydration status: acceptable  Postoperative Nausea and Vomiting: none        No notable events documented.    "

## 2025-04-18 NOTE — PROGRESS NOTES
Structural Heart Progress Note    HPI   Trina Elias is a 74 y.o. female with a PMH of atrial fibrillation s/p LAAO, VT/VF s/p ICD, Hyperlipidemia , CAD,  PUD, SBO s/p lysis of adhesions, GERD s/p nissen fundoplication, provoked DVT 2018, Rheumatoid arthritis, psoriasis, asthma, chronic anemia, severe MR, caridoMEMs implant and CKD presents for evaluation of severe, symptomatic progressive mitral regurgitation admitted for optimization prior to scheduled TMVR 4/17 with Dr. Sanjana Ojeda.     Patient Active Problem List    Diagnosis Date Noted    Mitral valve disease 04/16/2025    Severe persistent asthma with acute exacerbation (Multi) 03/03/2025    Shortness of breath 08/08/2024    Constipation 07/28/2024    Acute chest pain 07/24/2024    Atrial fibrillation, unspecified type (Multi) 07/15/2024    Arthritis of left shoulder region 05/02/2024    Chest pain, unspecified type 02/24/2024    Pacemaker 12/06/2023    Anticoagulant long-term use 12/06/2023    Stage 3a chronic kidney disease (Multi) 08/29/2023    Centrilobular emphysema (Multi) 08/28/2023    Coagulation defect, unspecified 08/28/2023    Coronary artery disease involving native coronary artery 06/26/2023    Asthma 06/26/2023    Atrial fibrillation (Multi) 06/26/2023    Myofascial pain 06/26/2023    Cardiac aneurysm 06/26/2023    Cardiac defibrillator in place 06/26/2023    Obstructive sleep apnea 06/26/2023    Central sleep apnea in conditions classified elsewhere 06/26/2023    Cervical post-laminectomy syndrome 06/26/2023    Cervical radiculopathy 06/26/2023    Chronic bilateral low back pain without sciatica 06/26/2023    Chronic right sacroiliac pain 06/26/2023    Colon polyp 06/26/2023    Cubital tunnel syndrome on right 06/26/2023    Displacement of lumbar disc with radiculopathy 06/26/2023    Gastroesophageal reflux disease without esophagitis 06/26/2023    Mixed hyperlipidemia 06/26/2023    Iron deficiency anemia 06/26/2023    Irregular cardiac  rhythm 06/26/2023    Osteoporosis 06/26/2023    Primary osteoarthritis of right shoulder 06/26/2023    Psoriasis 06/26/2023    Rheumatoid arthritis 06/26/2023    Umbilical hernia 06/26/2023    Wide-complex tachycardia 06/26/2023    Hypertensive heart disease with heart failure 06/26/2023    Personal history of other venous thrombosis and embolism 11/02/2020    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/02/2020    Mitral regurgitation 04/02/2025    Chronic systolic heart failure 11/25/2024        LOS: 2 days     Objective     Vitals 24 hour ranges:  Temp:  [36 °C (96.8 °F)-36.2 °C (97.2 °F)] 36 °C (96.8 °F)  Heart Rate:  [74-98] 75  Resp:  [15] 15  BP: (103-129)/(49-87) 106/53  SpO2:  [90 %-100 %] 95 %  Arterial Line BP 1: (104-130)/(41-64) 109/51  Medical Gas Therapy: None (Room air)  Medical Gas Delivery Method: Nasal cannula     Intake/Output last 3 Shifts:    Intake/Output Summary (Last 24 hours) at 4/18/2025 1335  Last data filed at 4/18/2025 0800  Gross per 24 hour   Intake 500 ml   Output 840 ml   Net -340 ml       LDA:  Peripheral IV 04/16/25 22 G Anterior;Right Forearm (Active)   Placement Date/Time: 04/16/25 1816   Hand Hygiene Completed: Yes  Size (Gauge): (c) 22 G  Orientation: Anterior;Right  Location: Forearm  Site Prep: Chlorhexidine   Local Anesthetic: None  Technique: Ultrasound guidance  Placed by: yordy stanley RN ...   Number of days: 1       Peripheral IV 04/17/25 16 G Left External Jugular (Active)   Placement Date/Time: 04/17/25 (c) 1519   Size (Gauge): 16 G  Orientation: Left  Location: External Jugular  Site Prep: Alcohol  Technique: Anatomical landmarks  Insertion attempts: 1   Number of days: 0       Arterial Line 04/17/25 Left Radial (Active)   Placement Date/Time: 04/17/25 (c) 1505   Size: 20 G  Orientation: Left  Location: Radial  Securement Method: Transparent dressing  Patient Tolerance: Tolerated well   Number of days: 0       External Urinary Catheter Female  (Active)   Placement Date/Time: 04/17/25 1900   Hand Hygiene Completed: Yes  External Catheter Type: Female   Number of days: 0        Vent settings:       Lab Results:  Recent Results (from the past 48 hours)   Transthoracic echo (TTE) complete    Collection Time: 04/16/25  4:42 PM   Result Value Ref Range    LVOT diam 1.94 cm    LA vol index A/L 37.7 ml/m2    Tricuspid annular plane systolic excursion 2.6 cm    LV EF 48 %    RV free wall pk S' 14.00 cm/s    LVIDd 6.10 cm    LV A4C EF 41.2    ECG 12 lead    Collection Time: 04/16/25  5:21 PM   Result Value Ref Range    Ventricular Rate 72 BPM    Atrial Rate 68 BPM    QRS Duration 84 ms    QT Interval 404 ms    QTC Calculation(Bazett) 442 ms    R Axis 83 degrees    T Axis 13 degrees    QRS Count 12 beats    Q Onset 219 ms    T Offset 421 ms    QTC Fredericia 429 ms   B-type natriuretic peptide    Collection Time: 04/16/25  6:07 PM   Result Value Ref Range     (H) 0 - 99 pg/mL   Comprehensive metabolic panel    Collection Time: 04/16/25  6:07 PM   Result Value Ref Range    Glucose 75 74 - 99 mg/dL    Sodium 144 136 - 145 mmol/L    Potassium 3.5 3.5 - 5.3 mmol/L    Chloride 104 98 - 107 mmol/L    Bicarbonate 28 21 - 32 mmol/L    Anion Gap 16 10 - 20 mmol/L    Urea Nitrogen 21 6 - 23 mg/dL    Creatinine 1.11 (H) 0.50 - 1.05 mg/dL    eGFR 53 (L) >60 mL/min/1.73m*2    Calcium 9.4 8.6 - 10.6 mg/dL    Albumin 4.2 3.4 - 5.0 g/dL    Alkaline Phosphatase 65 33 - 136 U/L    Total Protein 6.7 6.4 - 8.2 g/dL    AST 20 9 - 39 U/L    Bilirubin, Total 0.5 0.0 - 1.2 mg/dL    ALT 15 7 - 45 U/L   CBC    Collection Time: 04/16/25  6:07 PM   Result Value Ref Range    WBC 5.5 4.4 - 11.3 x10*3/uL    nRBC 0.0 0.0 - 0.0 /100 WBCs    RBC 3.95 (L) 4.00 - 5.20 x10*6/uL    Hemoglobin 8.8 (L) 12.0 - 16.0 g/dL    Hematocrit 30.0 (L) 36.0 - 46.0 %    MCV 76 (L) 80 - 100 fL    MCH 22.3 (L) 26.0 - 34.0 pg    MCHC 29.3 (L) 32.0 - 36.0 g/dL    RDW 16.0 (H) 11.5 - 14.5 %    Platelets 228 150 -  450 x10*3/uL   Magnesium    Collection Time: 04/16/25  6:07 PM   Result Value Ref Range    Magnesium 2.48 (H) 1.60 - 2.40 mg/dL   Type And Screen    Collection Time: 04/16/25  6:07 PM   Result Value Ref Range    ABO TYPE A     Rh TYPE POS     ANTIBODY SCREEN NEG    Prepare RBC    Collection Time: 04/16/25 10:32 PM   Result Value Ref Range    PRODUCT CODE K3544O17     Unit Number [id number]     Unit ABO A     Unit RH NEG     XM INTEP COMP     Dispense Status XM     Blood Expiration Date 5/19/2025 11:59:00 PM EDT     PRODUCT BLOOD TYPE 0600     UNIT VOLUME 350     PRODUCT CODE B0782O39     Unit Number [id number]     Unit ABO O     Unit RH POS     XM INTEP COMP     Dispense Status XM     Blood Expiration Date 5/1/2025 11:59:00 PM EDT     PRODUCT BLOOD TYPE 5100     UNIT VOLUME 350    ACTIVATED CLOTTING TIME LOW    Collection Time: 04/17/25  3:29 PM   Result Value Ref Range    POCT Activated Clotting Time Low Range 145 83 - 199 sec   ACTIVATED CLOTTING TIME LOW    Collection Time: 04/17/25  4:00 PM   Result Value Ref Range    POCT Activated Clotting Time Low Range 155 83 - 199 sec   ACTIVATED CLOTTING TIME LOW    Collection Time: 04/17/25  4:09 PM   Result Value Ref Range    POCT Activated Clotting Time Low Range 261 (H) 83 - 199 sec   ACTIVATED CLOTTING TIME LOW    Collection Time: 04/17/25  4:18 PM   Result Value Ref Range    POCT Activated Clotting Time Low Range 274 (H) 83 - 199 sec   ACTIVATED CLOTTING TIME LOW    Collection Time: 04/17/25  4:29 PM   Result Value Ref Range    POCT Activated Clotting Time Low Range 331 (H) 83 - 199 sec   ACTIVATED CLOTTING TIME LOW    Collection Time: 04/17/25  4:56 PM   Result Value Ref Range    POCT Activated Clotting Time Low Range 313 (H) 83 - 199 sec   ACTIVATED CLOTTING TIME LOW    Collection Time: 04/17/25  5:25 PM   Result Value Ref Range    POCT Activated Clotting Time Low Range 274 (H) 83 - 199 sec   ACTIVATED CLOTTING TIME LOW    Collection Time: 04/17/25  5:55  PM   Result Value Ref Range    POCT Activated Clotting Time Low Range 265 (H) 83 - 199 sec   Intraoperative SULY (Anesthesia please do not use)    Collection Time: 04/17/25  6:53 PM   Result Value Ref Range    AV pk denilson 1.90 m/s    AV mn grad 8 mmHg    LV EF 40 %    AV pk grad 14 mmHg   CBC and Auto Differential    Collection Time: 04/17/25  7:18 PM   Result Value Ref Range    WBC 9.1 4.4 - 11.3 x10*3/uL    nRBC 0.0 0.0 - 0.0 /100 WBCs    RBC 3.94 (L) 4.00 - 5.20 x10*6/uL    Hemoglobin 9.0 (L) 12.0 - 16.0 g/dL    Hematocrit 29.8 (L) 36.0 - 46.0 %    MCV 76 (L) 80 - 100 fL    MCH 22.8 (L) 26.0 - 34.0 pg    MCHC 30.2 (L) 32.0 - 36.0 g/dL    RDW 16.3 (H) 11.5 - 14.5 %    Platelets 219 150 - 450 x10*3/uL    Neutrophils % 90.6 40.0 - 80.0 %    Immature Granulocytes %, Automated 0.4 0.0 - 0.9 %    Lymphocytes % 6.7 13.0 - 44.0 %    Monocytes % 1.9 2.0 - 10.0 %    Eosinophils % 0.1 0.0 - 6.0 %    Basophils % 0.3 0.0 - 2.0 %    Neutrophils Absolute 8.25 (H) 1.60 - 5.50 x10*3/uL    Immature Granulocytes Absolute, Automated 0.04 0.00 - 0.50 x10*3/uL    Lymphocytes Absolute 0.61 (L) 0.80 - 3.00 x10*3/uL    Monocytes Absolute 0.17 0.05 - 0.80 x10*3/uL    Eosinophils Absolute 0.01 0.00 - 0.40 x10*3/uL    Basophils Absolute 0.03 0.00 - 0.10 x10*3/uL   Magnesium    Collection Time: 04/17/25  7:18 PM   Result Value Ref Range    Magnesium 2.35 1.60 - 2.40 mg/dL   Hepatic Function Panel    Collection Time: 04/17/25  7:18 PM   Result Value Ref Range    Albumin 4.2 3.4 - 5.0 g/dL    Bilirubin, Total 0.5 0.0 - 1.2 mg/dL    Bilirubin, Direct 0.1 0.0 - 0.3 mg/dL    Alkaline Phosphatase 68 33 - 136 U/L    ALT 14 7 - 45 U/L    AST 24 9 - 39 U/L    Total Protein 6.6 6.4 - 8.2 g/dL   Coagulation Screen    Collection Time: 04/17/25  7:18 PM   Result Value Ref Range    Protime 12.7 (H) 9.8 - 12.4 seconds    INR 1.1 0.9 - 1.1    aPTT 33 26 - 36 seconds   Phosphorus    Collection Time: 04/17/25  7:18 PM   Result Value Ref Range    Phosphorus 4.2  2.5 - 4.9 mg/dL   Basic Metabolic Panel    Collection Time: 04/17/25  7:18 PM   Result Value Ref Range    Glucose 144 (H) 74 - 99 mg/dL    Sodium 143 136 - 145 mmol/L    Potassium 3.8 3.5 - 5.3 mmol/L    Chloride 106 98 - 107 mmol/L    Bicarbonate 24 21 - 32 mmol/L    Anion Gap 17 10 - 20 mmol/L    Urea Nitrogen 19 6 - 23 mg/dL    Creatinine 1.04 0.50 - 1.05 mg/dL    eGFR 57 (L) >60 mL/min/1.73m*2    Calcium 9.3 8.6 - 10.6 mg/dL   Reticulocytes    Collection Time: 04/17/25  7:19 PM   Result Value Ref Range    Retic % 1.6 0.5 - 2.0 %    Retic Absolute 0.062 0.017 - 0.110 x10*6/uL    Reticulocyte Hemoglobin 25 (L) 28 - 38 pg    Immature Retic fraction 24.9 (H) <=16.0 %   Lactate Dehydrogenase    Collection Time: 04/17/25  7:19 PM   Result Value Ref Range     (H) 84 - 246 U/L   Haptoglobin    Collection Time: 04/17/25  7:19 PM   Result Value Ref Range    Haptoglobin 159 30 - 200 mg/dL   Lactate Dehydrogenase    Collection Time: 04/18/25  1:27 AM   Result Value Ref Range     (H) 84 - 246 U/L   Haptoglobin    Collection Time: 04/18/25  1:27 AM   Result Value Ref Range    Haptoglobin 67 30 - 200 mg/dL   Reticulocytes    Collection Time: 04/18/25  1:27 AM   Result Value Ref Range    Retic % 1.6 0.5 - 2.0 %    Retic Absolute 0.060 0.017 - 0.110 x10*6/uL    Reticulocyte Hemoglobin 25 (L) 28 - 38 pg    Immature Retic fraction 27.0 (H) <=16.0 %   CBC and Auto Differential    Collection Time: 04/18/25  1:27 AM   Result Value Ref Range    WBC 10.6 4.4 - 11.3 x10*3/uL    nRBC 0.0 0.0 - 0.0 /100 WBCs    RBC 3.77 (L) 4.00 - 5.20 x10*6/uL    Hemoglobin 8.6 (L) 12.0 - 16.0 g/dL    Hematocrit 26.9 (L) 36.0 - 46.0 %    MCV 71 (L) 80 - 100 fL    MCH 22.8 (L) 26.0 - 34.0 pg    MCHC 32.0 32.0 - 36.0 g/dL    RDW 16.0 (H) 11.5 - 14.5 %    Platelets 222 150 - 450 x10*3/uL    Neutrophils % 88.9 40.0 - 80.0 %    Immature Granulocytes %, Automated 0.5 0.0 - 0.9 %    Lymphocytes % 3.9 13.0 - 44.0 %    Monocytes % 6.4 2.0 - 10.0 %     Eosinophils % 0.1 0.0 - 6.0 %    Basophils % 0.2 0.0 - 2.0 %    Neutrophils Absolute 9.44 (H) 1.60 - 5.50 x10*3/uL    Immature Granulocytes Absolute, Automated 0.05 0.00 - 0.50 x10*3/uL    Lymphocytes Absolute 0.41 (L) 0.80 - 3.00 x10*3/uL    Monocytes Absolute 0.68 0.05 - 0.80 x10*3/uL    Eosinophils Absolute 0.01 0.00 - 0.40 x10*3/uL    Basophils Absolute 0.02 0.00 - 0.10 x10*3/uL   Basic Metabolic Panel    Collection Time: 04/18/25  1:27 AM   Result Value Ref Range    Glucose 133 (H) 74 - 99 mg/dL    Sodium 143 136 - 145 mmol/L    Potassium 4.1 3.5 - 5.3 mmol/L    Chloride 106 98 - 107 mmol/L    Bicarbonate 24 21 - 32 mmol/L    Anion Gap 17 10 - 20 mmol/L    Urea Nitrogen 22 6 - 23 mg/dL    Creatinine 1.06 (H) 0.50 - 1.05 mg/dL    eGFR 56 (L) >60 mL/min/1.73m*2    Calcium 9.2 8.6 - 10.6 mg/dL   Magnesium    Collection Time: 04/18/25  1:27 AM   Result Value Ref Range    Magnesium 2.42 (H) 1.60 - 2.40 mg/dL   Protime-INR    Collection Time: 04/18/25  1:27 AM   Result Value Ref Range    Protime 11.5 9.8 - 12.4 seconds    INR 1.0 0.9 - 1.1   Transthoracic Echo (TTE) Complete    Collection Time: 04/18/25  8:28 AM   Result Value Ref Range    BSA 1.83 m2   Troponin I, High Sensitivity    Collection Time: 04/18/25  9:55 AM   Result Value Ref Range    Troponin I, High Sensitivity (CMC) 514 (HH) 0 - 34 ng/L       Medications:  Scheduled medications  Scheduled Medications[1]  Continuous medications  Continuous Medications[2]  PRN medications  PRN Medications[3]       Physical Exam:  Constitutional: Well developed, awake/alert/oriented x3, no distress, cooperative  Eyes: PERRL, EOMI, clear sclera  ENMT: mucous membranes moist  Head/Neck: Neck supple  Respiratory/Thorax: Good chest expansion, thorax symmetric, lung sounds clear but with diminished bases  Cardiovascular: Regular rate and rhythm, no murmurs, normal S1 and S2  Gastrointestinal: Nondistended, soft, non-tender, no rebound tenderness or guarding, no masses  palpable, no organomegaly, +BS  Extremities: trace BLE edema, no cyanosis, bilat groins c/d/i with dsd no s/s of hematoma  Neurological: alert and oriented x3, intact senses, motor, response and reflexes, generalized weakness  Psychological: Appropriate mood and behavior   Skin: Warm and dry, intact.       Assessment/Plan   Trina Elias is now s/p TMVR with 42mm Intrepid via RCFV on 4/17/25 with Dr. Ojeda and Dr. Anderson.  Final procedural SULY showed mod-severe MR PVL, LVOT gradient peak post PVC 11mmHg invasive and SULY, Iatrogenic ASD closed kept open.   Pt transferred to the CICU for monitoring overnight.    POD 1 - No issues overnight.  Vitals, labs (concerns for hemolysis d/t elevated LDH and down-trending Haptoglobin), neuro assessment, and groins remained stable.  Pt denies CP, SOB, abd/groin pain, numbness or tingling of BLE.  Acute/chronic combined heart failure present as evidence by mild FVO on assessment with trace BLE edema, mostly clear lungs with diminished bases, sats stable on RA, CXR showed mild pulm congestion, with bibasilar atelectasis.  POD 1 EKG - SR 86 (, ).  No events observed on Tele.  POD 1 ECHO - EF 35-40%, severe LVH, norm RV, mild AS with mild/mod AI, MV gradients 16/6, no MR, mild TR, no LVOTO, triv PC effusion.   Pt stated she was about to walk around the CICU for 2.5 laps, but then become short of breath (sats in the 80s) and dizzy.  After her walk she c/o increased fatigue and had increased O2 requirements.  Originally NYHA 1, but then worsened to class 3-4.    Plan:  - Continue to trend H/H, Haptoglobin, LDH  - Cont ASA (for life)  - Start Warfarin today 4/18/2025  (INR goal 2.5, range 2.0-3.0 for 6 months)                  - Needs to be set up with Anticoagulation clinic for out-patient management  - Needs HF optimization with improved HR control and resumption of GDMT  - follow up with Structural Heart clinic per [facility] protocol  - f/w Dr. Soto (Primary Cards)  as scheduled or in 6-10 weeks  - f/w Fredy Elias, DO in 1-2 weeks  - pt given Lab recs (1 week) and ECHO rec (1 month)      D/w Dr. Ojeda and CICU team    I spent 60 minutes in the professional and overall care of this patient.     Jonathan St MSN, [facility]  Acute Care Nurse Practitioner  Structural Heart / TAVR Team  Structural Pager: [phone]  (Nights) ED fellow pager: [phone]          [1] aspirin, 81 mg, oral, Daily  baclofen, 5 mg, oral, BID  bumetanide, 2 mg, oral, BID  clopidogrel, 75 mg, oral, Daily  digoxin, 125 mcg, oral, Daily  dilTIAZem ER, 120 mg, oral, Daily  fluticasone furoate-vilanteroL, 1 puff, inhalation, Daily  hydroxychloroquine, 300 mg, oral, Daily  ipratropium-albuteroL, 3 mL, nebulization, 4x daily  lidocaine, 1 patch, transdermal, Daily  lidocaine, 0.1 mL, subcutaneous, Once  montelukast, 10 mg, oral, Nightly  pantoprazole, 40 mg, oral, BID  perflutren lipid microspheres, 0.5-10 mL of dilution, intravenous, Once in imaging  perflutren lipid microspheres, 0.5-10 mL of dilution, intravenous, Once in imaging  rosuvastatin, 20 mg, oral, Nightly  sucralfate, 1 g, oral, With meals & nightly  sulfaSALAzine, 1,000 mg, oral, BID  warfarin, 5 mg, oral, Daily  [2]    [3] PRN medications: acetaminophen, benzocaine-menthol, diclofenac sodium, ipratropium-albuteroL, lidocaine-epinephrine, melatonin, morphine, nitroglycerin, ondansetron **OR** ondansetron, ondansetron, oxyCODONE, oxyCODONE, oxygen, polyethylene glycol, sennosides-docusate sodium

## 2025-04-19 ENCOUNTER — APPOINTMENT (OUTPATIENT)
Dept: CARDIOLOGY | Facility: HOSPITAL | Age: 75
DRG: 266 | End: 2025-04-19
Payer: MEDICARE

## 2025-04-19 LAB
ALBUMIN SERPL BCP-MCNC: 3.8 G/DL (ref 3.4–5)
ALBUMIN SERPL BCP-MCNC: 3.8 G/DL (ref 3.4–5)
ALP SERPL-CCNC: 61 U/L (ref 33–136)
ALP SERPL-CCNC: 62 U/L (ref 33–136)
ALT SERPL W P-5'-P-CCNC: 13 U/L (ref 7–45)
ALT SERPL W P-5'-P-CCNC: 14 U/L (ref 7–45)
ANION GAP SERPL CALC-SCNC: 11 MMOL/L (ref 10–20)
ANION GAP SERPL CALC-SCNC: 12 MMOL/L (ref 10–20)
AST SERPL W P-5'-P-CCNC: 29 U/L (ref 9–39)
AST SERPL W P-5'-P-CCNC: 30 U/L (ref 9–39)
BASOPHILS # BLD AUTO: 0.02 X10*3/UL (ref 0–0.1)
BASOPHILS # BLD AUTO: 0.02 X10*3/UL (ref 0–0.1)
BASOPHILS # BLD AUTO: 0.03 X10*3/UL (ref 0–0.1)
BASOPHILS NFR BLD AUTO: 0.2 %
BASOPHILS NFR BLD AUTO: 0.2 %
BASOPHILS NFR BLD AUTO: 0.4 %
BILIRUB SERPL-MCNC: 0.5 MG/DL (ref 0–1.2)
BILIRUB SERPL-MCNC: 0.5 MG/DL (ref 0–1.2)
BUN SERPL-MCNC: 18 MG/DL (ref 6–23)
BUN SERPL-MCNC: 21 MG/DL (ref 6–23)
CALCIUM SERPL-MCNC: 8.7 MG/DL (ref 8.6–10.6)
CALCIUM SERPL-MCNC: 8.8 MG/DL (ref 8.6–10.6)
CHLORIDE SERPL-SCNC: 104 MMOL/L (ref 98–107)
CHLORIDE SERPL-SCNC: 104 MMOL/L (ref 98–107)
CO2 SERPL-SCNC: 27 MMOL/L (ref 21–32)
CO2 SERPL-SCNC: 28 MMOL/L (ref 21–32)
CREAT SERPL-MCNC: 0.94 MG/DL (ref 0.5–1.05)
CREAT SERPL-MCNC: 1.09 MG/DL (ref 0.5–1.05)
DAT-POLYSPECIFIC: NORMAL
EGFRCR SERPLBLD CKD-EPI 2021: 54 ML/MIN/1.73M*2
EGFRCR SERPLBLD CKD-EPI 2021: 64 ML/MIN/1.73M*2
EOSINOPHIL # BLD AUTO: 0.02 X10*3/UL (ref 0–0.4)
EOSINOPHIL # BLD AUTO: 0.03 X10*3/UL (ref 0–0.4)
EOSINOPHIL # BLD AUTO: 0.04 X10*3/UL (ref 0–0.4)
EOSINOPHIL NFR BLD AUTO: 0.3 %
EOSINOPHIL NFR BLD AUTO: 0.4 %
EOSINOPHIL NFR BLD AUTO: 0.5 %
ERYTHROCYTE [DISTWIDTH] IN BLOOD BY AUTOMATED COUNT: 16.5 % (ref 11.5–14.5)
ERYTHROCYTE [DISTWIDTH] IN BLOOD BY AUTOMATED COUNT: 16.6 % (ref 11.5–14.5)
ERYTHROCYTE [DISTWIDTH] IN BLOOD BY AUTOMATED COUNT: 16.8 % (ref 11.5–14.5)
GLUCOSE SERPL-MCNC: 118 MG/DL (ref 74–99)
GLUCOSE SERPL-MCNC: 94 MG/DL (ref 74–99)
HAPTOGLOB SERPL NEPH-MCNC: 70 MG/DL (ref 30–200)
HAPTOGLOB SERPL NEPH-MCNC: 70 MG/DL (ref 30–200)
HCT VFR BLD AUTO: 24.1 % (ref 36–46)
HCT VFR BLD AUTO: 24.5 % (ref 36–46)
HCT VFR BLD AUTO: 26.5 % (ref 36–46)
HGB BLD-MCNC: 7.6 G/DL (ref 12–16)
HGB BLD-MCNC: 7.6 G/DL (ref 12–16)
HGB BLD-MCNC: 7.7 G/DL (ref 12–16)
IMM GRANULOCYTES # BLD AUTO: 0.03 X10*3/UL (ref 0–0.5)
IMM GRANULOCYTES # BLD AUTO: 0.04 X10*3/UL (ref 0–0.5)
IMM GRANULOCYTES # BLD AUTO: 0.05 X10*3/UL (ref 0–0.5)
IMM GRANULOCYTES NFR BLD AUTO: 0.4 % (ref 0–0.9)
IMM GRANULOCYTES NFR BLD AUTO: 0.5 % (ref 0–0.9)
IMM GRANULOCYTES NFR BLD AUTO: 0.6 % (ref 0–0.9)
INR PPP: 1.1 (ref 0.9–1.1)
LDH SERPL L TO P-CCNC: 274 U/L (ref 84–246)
LDH SERPL L TO P-CCNC: 315 U/L (ref 84–246)
LYMPHOCYTES # BLD AUTO: 1.02 X10*3/UL (ref 0.8–3)
LYMPHOCYTES # BLD AUTO: 1.15 X10*3/UL (ref 0.8–3)
LYMPHOCYTES # BLD AUTO: 1.21 X10*3/UL (ref 0.8–3)
LYMPHOCYTES NFR BLD AUTO: 12.9 %
LYMPHOCYTES NFR BLD AUTO: 13.5 %
LYMPHOCYTES NFR BLD AUTO: 14.5 %
MAGNESIUM SERPL-MCNC: 2.53 MG/DL (ref 1.6–2.4)
MAGNESIUM SERPL-MCNC: 2.8 MG/DL (ref 1.6–2.4)
MCH RBC QN AUTO: 22.4 PG (ref 26–34)
MCHC RBC AUTO-ENTMCNC: 28.7 G/DL (ref 32–36)
MCHC RBC AUTO-ENTMCNC: 31.4 G/DL (ref 32–36)
MCHC RBC AUTO-ENTMCNC: 31.5 G/DL (ref 32–36)
MCV RBC AUTO: 71 FL (ref 80–100)
MCV RBC AUTO: 71 FL (ref 80–100)
MCV RBC AUTO: 78 FL (ref 80–100)
MONOCYTES # BLD AUTO: 0.81 X10*3/UL (ref 0.05–0.8)
MONOCYTES # BLD AUTO: 0.95 X10*3/UL (ref 0.05–0.8)
MONOCYTES # BLD AUTO: 1.09 X10*3/UL (ref 0.05–0.8)
MONOCYTES NFR BLD AUTO: 12 %
MONOCYTES NFR BLD AUTO: 12.7 %
MONOCYTES NFR BLD AUTO: 9.7 %
NEUTROPHILS # BLD AUTO: 5.88 X10*3/UL (ref 1.6–5.5)
NEUTROPHILS # BLD AUTO: 6.2 X10*3/UL (ref 1.6–5.5)
NEUTROPHILS # BLD AUTO: 6.21 X10*3/UL (ref 1.6–5.5)
NEUTROPHILS NFR BLD AUTO: 72.6 %
NEUTROPHILS NFR BLD AUTO: 74 %
NEUTROPHILS NFR BLD AUTO: 74.6 %
NRBC BLD-RTO: 0 /100 WBCS (ref 0–0)
PLATELET # BLD AUTO: 155 X10*3/UL (ref 150–450)
PLATELET # BLD AUTO: 165 X10*3/UL (ref 150–450)
PLATELET # BLD AUTO: 174 X10*3/UL (ref 150–450)
POTASSIUM SERPL-SCNC: 4 MMOL/L (ref 3.5–5.3)
POTASSIUM SERPL-SCNC: 4.1 MMOL/L (ref 3.5–5.3)
PROT SERPL-MCNC: 5.9 G/DL (ref 6.4–8.2)
PROT SERPL-MCNC: 5.9 G/DL (ref 6.4–8.2)
PROTHROMBIN TIME: 12.2 SECONDS (ref 9.8–12.4)
RBC # BLD AUTO: 3.39 X10*6/UL (ref 4–5.2)
RBC # BLD AUTO: 3.4 X10*6/UL (ref 4–5.2)
RBC # BLD AUTO: 3.44 X10*6/UL (ref 4–5.2)
SODIUM SERPL-SCNC: 138 MMOL/L (ref 136–145)
SODIUM SERPL-SCNC: 140 MMOL/L (ref 136–145)
WBC # BLD AUTO: 7.9 X10*3/UL (ref 4.4–11.3)
WBC # BLD AUTO: 8.3 X10*3/UL (ref 4.4–11.3)
WBC # BLD AUTO: 8.6 X10*3/UL (ref 4.4–11.3)

## 2025-04-19 PROCEDURE — 2500000002 HC RX 250 W HCPCS SELF ADMINISTERED DRUGS (ALT 637 FOR MEDICARE OP, ALT 636 FOR OP/ED)

## 2025-04-19 PROCEDURE — 86902 BLOOD TYPE ANTIGEN DONOR EA: CPT

## 2025-04-19 PROCEDURE — 2500000004 HC RX 250 GENERAL PHARMACY W/ HCPCS (ALT 636 FOR OP/ED): Mod: JZ

## 2025-04-19 PROCEDURE — 2500000001 HC RX 250 WO HCPCS SELF ADMINISTERED DRUGS (ALT 637 FOR MEDICARE OP)

## 2025-04-19 PROCEDURE — 37799 UNLISTED PX VASCULAR SURGERY: CPT

## 2025-04-19 PROCEDURE — 94640 AIRWAY INHALATION TREATMENT: CPT

## 2025-04-19 PROCEDURE — 2500000002 HC RX 250 W HCPCS SELF ADMINISTERED DRUGS (ALT 637 FOR MEDICARE OP, ALT 636 FOR OP/ED): Mod: JZ

## 2025-04-19 PROCEDURE — 36430 TRANSFUSION BLD/BLD COMPNT: CPT

## 2025-04-19 PROCEDURE — 93005 ELECTROCARDIOGRAM TRACING: CPT

## 2025-04-19 PROCEDURE — 1200000002 HC GENERAL ROOM WITH TELEMETRY DAILY

## 2025-04-19 PROCEDURE — 85025 COMPLETE CBC W/AUTO DIFF WBC: CPT

## 2025-04-19 PROCEDURE — P9016 RBC LEUKOCYTES REDUCED: HCPCS

## 2025-04-19 PROCEDURE — 99291 CRITICAL CARE FIRST HOUR: CPT

## 2025-04-19 PROCEDURE — 80053 COMPREHEN METABOLIC PANEL: CPT

## 2025-04-19 PROCEDURE — 83010 ASSAY OF HAPTOGLOBIN QUANT: CPT

## 2025-04-19 PROCEDURE — 83735 ASSAY OF MAGNESIUM: CPT

## 2025-04-19 PROCEDURE — 83615 LACTATE (LD) (LDH) ENZYME: CPT

## 2025-04-19 PROCEDURE — 2500000005 HC RX 250 GENERAL PHARMACY W/O HCPCS

## 2025-04-19 PROCEDURE — 85610 PROTHROMBIN TIME: CPT

## 2025-04-19 RX ORDER — IPRATROPIUM BROMIDE AND ALBUTEROL SULFATE 2.5; .5 MG/3ML; MG/3ML
3 SOLUTION RESPIRATORY (INHALATION) 3 TIMES DAILY
Status: DISCONTINUED | OUTPATIENT
Start: 2025-04-19 | End: 2025-04-20

## 2025-04-19 RX ADMIN — ASPIRIN 81 MG: 81 TABLET, COATED ORAL at 08:48

## 2025-04-19 RX ADMIN — SUCRALFATE 1 G: 1 SUSPENSION ORAL at 08:47

## 2025-04-19 RX ADMIN — OXYCODONE HYDROCHLORIDE 10 MG: 5 TABLET ORAL at 08:48

## 2025-04-19 RX ADMIN — SUCRALFATE 1 G: 1 SUSPENSION ORAL at 16:51

## 2025-04-19 RX ADMIN — DILTIAZEM HYDROCHLORIDE 120 MG: 120 CAPSULE, COATED, EXTENDED RELEASE ORAL at 20:37

## 2025-04-19 RX ADMIN — ACETAMINOPHEN 650 MG: 325 TABLET, FILM COATED ORAL at 01:20

## 2025-04-19 RX ADMIN — OXYCODONE HYDROCHLORIDE 5 MG: 5 TABLET ORAL at 05:20

## 2025-04-19 RX ADMIN — ROSUVASTATIN CALCIUM 20 MG: 20 TABLET, FILM COATED ORAL at 20:33

## 2025-04-19 RX ADMIN — ONDANSETRON 4 MG: 2 INJECTION INTRAMUSCULAR; INTRAVENOUS at 11:02

## 2025-04-19 RX ADMIN — DICLOFENAC SODIUM 4 G: 10 GEL TOPICAL at 08:49

## 2025-04-19 RX ADMIN — MONTELUKAST 10 MG: 10 TABLET, FILM COATED ORAL at 20:34

## 2025-04-19 RX ADMIN — BACLOFEN 5 MG: 5 TABLET ORAL at 08:48

## 2025-04-19 RX ADMIN — WARFARIN SODIUM 5 MG: 5 TABLET ORAL at 17:40

## 2025-04-19 RX ADMIN — SULFASALAZINE 1000 MG: 500 TABLET ORAL at 20:33

## 2025-04-19 RX ADMIN — DIGOXIN 125 MCG: 125 TABLET ORAL at 08:48

## 2025-04-19 RX ADMIN — HYDROXYCHLOROQUINE SULFATE 300 MG: 200 TABLET, FILM COATED ORAL at 08:48

## 2025-04-19 RX ADMIN — IPRATROPIUM BROMIDE AND ALBUTEROL SULFATE 3 ML: .5; 3 SOLUTION RESPIRATORY (INHALATION) at 20:14

## 2025-04-19 RX ADMIN — IPRATROPIUM BROMIDE AND ALBUTEROL SULFATE 3 ML: .5; 3 SOLUTION RESPIRATORY (INHALATION) at 08:04

## 2025-04-19 RX ADMIN — SULFASALAZINE 1000 MG: 500 TABLET ORAL at 08:48

## 2025-04-19 RX ADMIN — BACLOFEN 5 MG: 5 TABLET ORAL at 20:33

## 2025-04-19 RX ADMIN — ACETAMINOPHEN 650 MG: 325 TABLET, FILM COATED ORAL at 20:34

## 2025-04-19 RX ADMIN — PANTOPRAZOLE SODIUM 40 MG: 40 TABLET, DELAYED RELEASE ORAL at 08:49

## 2025-04-19 RX ADMIN — FLUTICASONE FUROATE AND VILANTEROL TRIFENATATE 1 PUFF: 100; 25 POWDER RESPIRATORY (INHALATION) at 08:06

## 2025-04-19 RX ADMIN — CLOPIDOGREL BISULFATE 75 MG: 75 TABLET, FILM COATED ORAL at 08:48

## 2025-04-19 RX ADMIN — OXYCODONE HYDROCHLORIDE 10 MG: 5 TABLET ORAL at 17:40

## 2025-04-19 RX ADMIN — ACETAMINOPHEN 650 MG: 325 TABLET, FILM COATED ORAL at 08:48

## 2025-04-19 RX ADMIN — PROMETHAZINE HYDROCHLORIDE 12.5 MG: 25 INJECTION INTRAMUSCULAR; INTRAVENOUS at 18:00

## 2025-04-19 RX ADMIN — PANTOPRAZOLE SODIUM 40 MG: 40 TABLET, DELAYED RELEASE ORAL at 20:33

## 2025-04-19 RX ADMIN — MORPHINE SULFATE 2 MG: 4 INJECTION, SOLUTION INTRAMUSCULAR; INTRAVENOUS at 16:27

## 2025-04-19 RX ADMIN — LIDOCAINE 4% 1 PATCH: 40 PATCH TOPICAL at 08:49

## 2025-04-19 RX ADMIN — PROMETHAZINE HYDROCHLORIDE 12.5 MG: 25 INJECTION INTRAMUSCULAR; INTRAVENOUS at 11:24

## 2025-04-19 ASSESSMENT — PAIN DESCRIPTION - DESCRIPTORS: DESCRIPTORS: HEADACHE

## 2025-04-19 ASSESSMENT — PAIN - FUNCTIONAL ASSESSMENT
PAIN_FUNCTIONAL_ASSESSMENT: 0-10

## 2025-04-19 ASSESSMENT — PAIN SCALES - GENERAL
PAINLEVEL_OUTOF10: 3
PAINLEVEL_OUTOF10: 1
PAINLEVEL_OUTOF10: 8
PAINLEVEL_OUTOF10: 0 - NO PAIN
PAINLEVEL_OUTOF10: 6
PAINLEVEL_OUTOF10: 6
PAINLEVEL_OUTOF10: 0 - NO PAIN
PAINLEVEL_OUTOF10: 5 - MODERATE PAIN
PAINLEVEL_OUTOF10: 3
PAINLEVEL_OUTOF10: 1

## 2025-04-19 ASSESSMENT — PAIN DESCRIPTION - LOCATION
LOCATION: BACK

## 2025-04-19 NOTE — DISCHARGE INSTRUCTIONS
####  POST TMVR PROCEDURE DISCHARGE INSTRUCTIONS   ####    - Please weigh yourself daily (first thing in the morning) and record reading  - Please take and record your blood pressure 2 times a day (at least 60-90 mins AFTER you take your meds)  PLEASE HAVE THESE READINGS AVAILABLE DURING YOUR FOLLOW-UP APPOINTMENTS!!    - NO DRIVING OR LIFTING/PULLING/PUSHING GREATER THAN 10 POUNDS FOR 1 WEEK FROM YOUR PROCEDURE DATE.    - A lab requisition has been provided for you to draw a CBC, BMP, and INR.  Please take this rec to your local lab to have your labs drawn between 4-7 days post discharge.     - You have been discharged on Warfarin (Coumadin) , a blood thinner, you will remain on this medication for at least 6 months after your valve procedure.  This medication requires frequent blood draws to check your blood thinness level, known as an INR.  Your INR goal is 2.0-3.0 (ideally 2.5).  After the 6 months you will need to be on at least a 81mg Aspirin, however you may be kept on full blood thinners if you have another reason to have your blood therapeutically thinned (A-fib/flutter, Mechanical valve, hx of DVT/PE, etc.).    - You have been given the order requisition for the 1 month ECHO at the time of your discharge.  Please call and schedule this ECHO at your LOCAL center (no sooner than 23 days after your procedure date).    - You will have multiple research follow-ups with our team, that are vital to your post procedure care, these will be scheduled for you IF YOU NEED TO CHANGE YOUR APPOINTMENT TIME/DATE, PLEASE CALL [phone]   - Please follow up with your PCP within 7-14 days of discharge  - You will follow up with your primary cardiologist in 6-10 weeks    **** No elective dental procedures or cleanings for 3 months post procedure - You will need dental prophylaxis (oral antibiotic) prior to dental work/cleanings for life *****    Please call the STRUCTURAL HEART TEAM LINE if you have any questions or  concerns - [phone] (M-F 9am-4pm)  On-Call Cardiology Fellow: [phone] (ONLY for urgent issues on nights/weekends/holidays)    Jonathan St MSN, AGACN-BC  Acute Care Nurse Practitioner  Structural Heart / TAVR Team  [phone] (ph)  [phone] (fax)      ****   CALL PROVIDER IF:   ****  - Breathing faster than normal.   - Breathing harder than normal or having retractions.   - Fever of 100.4 F (38 C) or higher.   - Chills  - Drinking less than normal.   - Urinating less than normal, over 1 day.   - Acting very sleepy and difficult to awaken.   - Vomiting (throwing up) and not able to eat or drink for 12 hours.   - 3 or more loose, watery bowel movements in 24 hours (diarrhea).    -Any new concerning symptoms.    - If you develop difficulty breathing, rash, hives, severe nausea, vomiting, light-headedness or any signs of infection, immediately contact your doctor and go to the nearest emergency room.      #####   MISC. HOME-GOING INFO   #####  - DO NOT drink any alcoholic drinks or take any non-prescriptive medications that contain alcohol for the first 24 hours.   - DO NOT make any important decisions for the first 24 hours.      ACTIVITY:  - You are advised to go directly home from the hospital.   - DO NOT lift anything heavier than 10 pounds for one week, this allows for proper healing of the groin.   - No excessive exercise or treadmill use for one week. You may walk and do stairs, slowly.   - No sexual activities for 24 hours after you arrive home.      WOUND CARE:  - If slight bleeding should occur at site, lie down and have someone apply firm pressure just above the puncture site for 5 minutes.  If it continues or is profuse, call [phone]. Always notify your doctor if bleeding occurs.   - Keep site clean and dry. Let air dry or you may use a simple bandaid.   - Gently cleanse the puncture site in your groin with soap and water only.   - You may experience some tenderness, bruising or minimal  inflammation.  If you have any concerns, you may contact the Cath Lab or if any of these symptoms become excessive, contact your cardiologist or go to the emergency room.   - No tub baths, soaking, or swimming for one week.   - May shower the next day after your procedure.      DIET:  - You may resume your normal diet. However, be mindful of your sodium intake.  Ideally you should try to limit your daily intake of sodium to 2-3g a day      HEART FAILURE SPECIFIC INSTRUCTIONS:   - CALL 911 IF YOU HAVE ANY OF THE SIGNS AND SYMPTOMS OF HEART FAILURE:   1. Chest pain   2. Significant Shortness of breath   3. Fainting.   - Notify your physician immediately if you have shortness of breath; weight gain of 3 lbs. or more; fatigue and loss of energy; swelling of lower extremities or abdomen; dizziness or fainting; change of appetite; and frequent coughing.   - Daily weight on the same scale, same time after voiding and before eating.   - Maintain daily weight log.     triv PC effusion.   Pt stated she was about to walk around the CICU for 2.5 laps, but then become short of breath (sats in the 80s) and dizzy.  After her walk she c/o increased fatigue and had increased O2 requirements.  Originally NYHA 1, but then worsened to class 3-4.     4/19-4/20:  hemolysis labs stable over the weekend.  CTA over the weekend showed no signs of RP bleed, did have some pulmonary edema, no PL effusions.     4/21/25 - POD 4:  Appears mostly euvolemic with no BLE edema, sats stable on RA.  HR/BP stable.  H/H stable but continues to be low, other labs stable.  Hemolysis still stable, T.bili remains normal, so very likely pt not hemolyzing.  Groin sites healing well without issue.  Pt states she is feeling much better today, states she feels better now than she did before her procedure.  NYHA 1-2.     4/22/25 - POD 5:  Appears euvolemic on assessment.  Denies SOB, DOSHI, CP, groin pain.  Pt reports walking around the nursing unit without issue/no symptoms, consistent with NYHA 1.  H/H responded very well to PRBC transfusion, now 10.1/33.7, potassium and renal indices remain stable.  INR is now 2.9 (4/17 1.1, 4/18 1.0, 4/19 1.1, 4/20 1.6, 4/21 - , 4/22 2.9), 5mg of Coumadin has been given every day 4/18 through 4/21.  Pt states she is feeling very well and ready to go home today.          04/16 04/17 04/18 04/19 04/20 04/21 04/22     24 hr:  0000 0000 0000 0000 0000 0000 0000        Labs    INR   1.1 1.0 1.1 1.6 - 2.9     PTT   33          Medications    Heparin INJ (Units)   15,500          Warfarin ORAL (mg)    5 5 5 5 1mg              Plan:  - Cont Clopidogrel, no need for triple therapy (no ASA)  - Cont Warfarin 1mg daily (INR goal 2.5, range 2.0-3.0 for 6 months), reduced to 1mg daily d/t sharp increase in INR.           - Referral placed for [facility], Dr. Elias agreed to be the managing physician  - Continue home dose Digoxin for A-fib  - Entresto 24-26mg BID for combined  systolic/diastolic heart failure  - Continue home dose Bumetanide 2mg BID for now for chronic heart failure  - Stopped Diltiazem  - Continue home regimen otherwise  - follow up with Structural Heart clinic per [place] [facility] protocol  - f/w Dr. Soto (Primary Cards) as scheduled or in 6-10 weeks  - f/w Fredy Elias, DO in 1-2 weeks  - pt given Lab recs (1 week) and ECHO rec (1 month)    Jonathan St MSN, AGACNP-BC  Acute Care Nurse Practitioner  Structural Heart / TAVR Team  [phone] (ph)  [phone] (fax)

## 2025-04-19 NOTE — PROGRESS NOTES
"Cardiac Intensive Care - Daily Progress Note   Subjective    Trina Elias is a 74 y.o. year old female patient admitted on 4/16/2025 with following ICU needs: Severe mitral regurgitation s/p TMVR    Interval History:    No acute events overnight. Hgb remained stable, noEKG changes. No findings concerning for hemolysis, still having back pain and nausea.     Meds    Scheduled medications  Scheduled Medications[1]  Continuous medications  Continuous Medications[2]  PRN medications  PRN Medications[3]     Objective    Blood pressure 117/61, pulse 74, temperature 35.4 °C (95.7 °F), resp. rate 18, height 1.676 m (5' 6\"), weight 73.2 kg (161 lb 4.8 oz), SpO2 100%.     Physical Exam   General: Ill appearing, pallor noted  Cardiac: RRR  Pulm: CTAB, normal work of breathing  GI: soft, non distended  Extremities: trace LE edema   Neuro: no focal neuro deficits, a+ox4  Psych: appropriate mood and behavior, however anxious  Skin: warm and dry throughout    Intake/Output Summary (Last 24 hours) at 4/19/2025 1027  Last data filed at 4/19/2025 0800  Gross per 24 hour   Intake 170 ml   Output 675 ml   Net -505 ml     Labs:   Results from last 72 hours   Lab Units 04/19/25  0103 04/18/25  1500 04/18/25  0127 04/17/25  1918   SODIUM mmol/L 140 141 143 143   POTASSIUM mmol/L 4.0 3.6 4.1 3.8   CHLORIDE mmol/L 104 105 106 106   CO2 mmol/L 28 26 24 24   BUN mg/dL 21 23 22 19   CREATININE mg/dL 1.09* 1.04 1.06* 1.04   GLUCOSE mg/dL 118* 104* 133* 144*   CALCIUM mg/dL 8.7 8.9 9.2 9.3   ANION GAP mmol/L 12 14 17 17   EGFR mL/min/1.73m*2 54* 57* 56* 57*   PHOSPHORUS mg/dL  --   --   --  4.2      Results from last 72 hours   Lab Units 04/19/25  0510 04/19/25  0103 04/18/25  1500 04/18/25  0127   WBC AUTO x10*3/uL 8.6 7.9 9.7 10.6   HEMOGLOBIN g/dL 7.7* 7.6* 7.9* 8.6*   HEMATOCRIT % 24.5* 24.1* 27.0* 26.9*   PLATELETS AUTO x10*3/uL 165 155 173 222   NEUTROS PCT AUTO % 72.6 74.0  --  88.9   LYMPHS PCT AUTO % 13.5 12.9  --  3.9   MONOS PCT AUTO " % 12.7 12.0  --  6.4   EOS PCT AUTO % 0.5 0.3  --  0.1      Results from last 72 hours   Lab Units 04/18/25  1809   POCT PH, ARTERIAL pH 7.44*   POCT PCO2, ARTERIAL mm Hg 39   POCT PO2, ARTERIAL mm Hg 125*   POCT SO2, ARTERIAL % 98            Micro/ID:     Lab Results   Component Value Date    BLOODCULT  09/02/2023     No Growth at 1 days~No Growth at 2 days~No Growth at 3 days~NO GROWTH at 4 days - FINAL REPORT       Summary of key imaging results from the last 24 hours  No imaging to review     Assessment and Plan     74 y.o. y/o female with PMH of atrial fibrillation s/p LAAO, VT/VF s/p ICD, Hyperlipidemia , CAD,  PUD, SBO s/p lysis of adhesions, GERD s/p nissen fundoplication, provoked DVT 2018, Rheumatoid arthritis, psoriasis, asthma, chronic anemia, severe MR, caridoMEMs implant and CKD presents for evaluation of severe, symptomatic progressive mitral regurgitation admitted for  TMVR 4/17 with Dr. Sanjana Ojeda. Transferred to CICU for post-procedural monitoring.       Summary for 04/19/25  :  Trending hemolysis labs  Monitor in CICU for today  Goal to get up to chair, up from bed  Labs unremarkable for hemolysis as of now    CARDIOVASCULAR:  #Severe Mitral Regurgitation, s/p repair  #A Fib s/p LAAO  #Hx of VT/V Fib s/p ICD placement  Management:  Continue digoxin and diltiazem. Continue crestor  Continue Warfarin  Continue Bumex 2mg BID  Continue Aspirin and Plavix     PULMONARY:  Dx:  Asthma  Management:  breo ellipta  duonebs PRN  singulair     RENAL/GENITOURINARY:  Dx:  CKD (baseline Cr 1.1)  Management:  CTM     HEMATOLOGY:  Dx:  Chronic anemia (baseline hb 8-10)  Management:  CTM     MUSCULOSKELETAL:  Dx:  Rheumatoid arthritis  Psoriasis             Chronic Lumbar Back Pain  Management:  Continue sulfasalazine, plaquenil  Can continue current multi-modal pain regimen on floor including diclofenac, lidocaine patch, tylenol, and oxycodone and morphine prn       ICU Check List       FEN  Fluids:  PO  Electrolytes: PRN  Nutrition: Cardiac diet  Prophylaxis:  DVT ppx: Warfarin  Bowel care: Miralax  Hardware:         Arterial Line 04/17/25 Left Radial (Active)   Placement Date/Time: 04/17/25 (c) 1505   Size: 20 G  Orientation: Left  Location: Radial  Securement Method: Transparent dressing  Patient Tolerance: Tolerated well   Number of days: 1       External Urinary Catheter Female (Active)   Placement Date/Time: 04/17/25 1900   Hand Hygiene Completed: Yes  External Catheter Type: Female   Number of days: 1       Social:  Code: Full Code    HPOA: Matrinez Franklin (Spouse)  [phone] (Mobile)   Disposition: BLANQUITA Ayala MD   04/19/25 at 10:27 AM     Disclaimer: Documentation completed with the information available at the time of input. The times in the chart may not be reflective of actual patient care times, interventions, or procedures. Documentation occurs after the physical care of the patient.             [1] aspirin, 81 mg, oral, Daily  baclofen, 5 mg, oral, BID  clopidogrel, 75 mg, oral, Daily  digoxin, 125 mcg, oral, Daily  dilTIAZem ER, 120 mg, oral, Daily  fluticasone furoate-vilanteroL, 1 puff, inhalation, Daily  hydroxychloroquine, 300 mg, oral, Daily  ipratropium-albuteroL, 3 mL, nebulization, TID  lidocaine, 1 patch, transdermal, Daily  lidocaine, 0.1 mL, subcutaneous, Once  montelukast, 10 mg, oral, Nightly  pantoprazole, 40 mg, oral, BID  perflutren lipid microspheres, 0.5-10 mL of dilution, intravenous, Once in imaging  perflutren lipid microspheres, 0.5-10 mL of dilution, intravenous, Once in imaging  rosuvastatin, 20 mg, oral, Nightly  sucralfate, 1 g, oral, With meals & nightly  sulfaSALAzine, 1,000 mg, oral, BID  warfarin, 5 mg, oral, Daily  [2]    [3] PRN medications: acetaminophen, benzocaine-menthol, diclofenac sodium, ipratropium-albuteroL, lidocaine-epinephrine, melatonin, morphine, nitroglycerin, ondansetron **OR** ondansetron, ondansetron, oxyCODONE, oxyCODONE, oxygen,  polyethylene glycol, sennosides-docusate sodium

## 2025-04-19 NOTE — PROGRESS NOTES
Subjective Data:  Patient reports her work of breathing has improved over the past 24 hours.    Overnight Events:    No acute events overnight.     Objective Data:  Last Recorded Vitals:  Vitals:    04/19/25 0700 04/19/25 0800 04/19/25 0806 04/19/25 0900   BP:       Patient Position:       Pulse: 70 75 68 74   Resp:   18    Temp: 35.4 °C (95.7 °F)      TempSrc:       SpO2: 94% 91% 97% 100%   Weight:       Height:           Last Labs:  CBC - 4/19/2025:  5:10 AM  8.6 7.7 165    24.5      CMP - 4/19/2025:  1:03 AM  8.7 5.9 30 --- 0.5   4.2 3.8 14 62      PTT - 4/17/2025:  7:18 PM  1.1   12.2 33     TROPHS   Date/Time Value Ref Range Status   04/18/2025 01:12  0 - 34 ng/L Final     Comment:     Previous result verified on 4/18/2025 1048 on specimen/case [id number] called with component [facility] for procedure Troponin I, High Sensitivity with value 514 ng/L.   04/18/2025 09:55  0 - 34 ng/L Final   04/09/2025 08:21 AM 25 0 - 34 ng/L Final   02/10/2025 04:16 PM 20 0 - 13 ng/L Final   02/10/2025 01:02 PM 22 0 - 13 ng/L Final   09/18/2024 01:47 AM 34 0 - 13 ng/L Final     BNP   Date/Time Value Ref Range Status   04/16/2025 06:07  0 - 99 pg/mL Final   04/09/2025 08:21  0 - 99 pg/mL Final     HGBA1C   Date/Time Value Ref Range Status   04/09/2025 08:21 AM 6.1 See comment % Final     LDLCALC   Date/Time Value Ref Range Status   03/03/2025 09:33 AM 77 mg/dL (calc) Final     Comment:     Reference range: <100     Desirable range <100 mg/dL for primary prevention;    <70 mg/dL for patients with CHD or diabetic patients   with > or = 2 CHD risk factors.     LDL-C is now calculated using the Walt-Nj   calculation, which is a validated novel method providing   better accuracy than the Friedewald equation in the   estimation of LDL-C.   Walt SS et al. FARIHA. 2013;310(19): 0094-1343   (http://education.[employer]el[employer]/faq/NJD474)     04/15/2024 11:28 AM 68 <=99 mg/dL Final     Comment:                                  Near   Borderline      AGE      Desirable  Optimal    High     High     Very High     0-19 Y     0 - 109     ---    110-129   >/= 130     ----    20-24 Y     0 - 119     ---    120-159   >/= 160     ----      >24 Y     0 -  99   100-129  130-159   160-189     >/=190       VLDL   Date/Time Value Ref Range Status   04/15/2024 11:28 AM 21 0 - 40 mg/dL Final   08/28/2023 09:33 AM 24 0 - 40 mg/dL Final   02/28/2023 09:50 AM 30 0 - 40 mg/dL Final   12/19/2022 05:21 PM 27 0 - 40 mg/dL Final      Last I/O:  I/O last 3 completed shifts:  In: 50 (0.7 mL/kg) [I.V.:50 (0.7 mL/kg)]  Out: 1575 (21.5 mL/kg) [Urine:1500 (0.6 mL/kg/hr); Emesis/NG output:75]  Weight: 73.2 kg     Past Cardiology Tests (Last 3 Years):  EKG:  ECG 12 lead 04/16/2025      Electrocardiogram, 12-lead PRN ACS symptoms 02/11/2025      ECG 12 lead 02/10/2025      ECG 12 Lead 11/25/2024      ECG 12 lead 09/18/2024      ECG 12 lead 09/18/2024      ECG 12 lead  (Preliminary)      ECG 12 lead 08/09/2024 (Preliminary)      ECG 12 Lead 08/08/2024      ECG 12 lead 07/26/2024      ECG 12 lead 07/25/2024      ECG 12 lead 07/24/2024      ECG 12 lead 07/24/2024      ECG 12 lead 07/24/2024      ECG 12 lead 07/16/2024      ECG 12 lead (Clinic Performed) 03/07/2024      ECG 12 lead 02/25/2024      ECG 12 lead 02/24/2024      ECG 12 lead 02/24/2024    Echo:  Transthoracic Echo (TTE) Complete 04/18/2025      Intraoperative SULY (Anesthesia please do not use) 04/17/2025      Transthoracic echo (TTE) complete 04/16/2025      Transesophageal Echo (SULY) 03/19/2025      Transthoracic Echo (TTE) Complete 02/11/2025      Transthoracic Echo (TTE) Complete 07/25/2024      Transthoracic Echo (TTE) Limited 07/16/2024      Transthoracic Echo (TTE) Limited 07/16/2024      Transthoracic Echo (TTE) Complete 02/27/2024    Ejection Fractions:  EF   Date/Time Value Ref Range Status   04/18/2025 08:28 AM 38 %    04/17/2025 06:53 PM 40 %    04/16/2025 04:42 PM 48 %       Cath:  Cardiac Catheterization Procedure 04/17/2025      Cardiac Catheterization Procedure 03/19/2025      Cardiac Catheterization Procedure 01/02/2025      Cardiac Catheterization Procedure 07/16/2024    Stress Test:  No results found for this or any previous visit from the past 1095 days.    Cardiac Imaging:  No results found for this or any previous visit from the past 1095 days.      Inpatient Medications:  Scheduled Medications[1]  PRN Medications[2]  Continuous Medications[3]    Physical Exam:  Vitals:    04/19/25 0900   BP:    Pulse: 74   Resp:    Temp:    SpO2: 100%     General: I appearing fatigued  HEENT: Moist mucous membranes.  Nasal cannula in nares.  TVP access site clean dry and bandaged.  Cardiovascular: Regular rate and rhythm, no murmurs rubs or gallops, no JVD  Pulmonology: Lungs clear to auscultation bilaterally, no crackles, wheezes or rhonchi  Abdomen: Soft and nondistended, no tenderness palpation, + bowel sounds  Extremities: bilateral groin access sites clean dry and bandaged without hematoma.  Neuro: Alert and orientated by 3       Assessment/Plan   74-year-old female with a past medical history of atrial fibrillation status post LAAO, VT/VF status post ICD, hyperlipidemia, coronary artery disease, provoked DVT in 2019, severe mitral regurgitation and CKD who underwent TMVR on 4/17/2025 with Dr. Ojeda.  Patient remains symptomatic from a respiratory standpoint.  There is concern for hemolysis given that she has anterior perivalvular leak.  However her hemolysis labs remained stable.  Her shortness of breath very well may be multifactorial given her LV dysfunction, remaining aortic valve disease and anemia.  We will continue to monitor in the CICU over the weekend and consider possible relook with transesophageal echocardiogram next week.    Recommendations:  - Continue to trend hemolysis labs  - Ambulation as tolerated  - Structural team will continue to follow along.  -    I spent  30 minutes in the professional and overall care of this patient.        Nixon Armstrong MD       [1]   Scheduled medications   Medication Dose Route Frequency    aspirin  81 mg oral Daily    baclofen  5 mg oral BID    clopidogrel  75 mg oral Daily    digoxin  125 mcg oral Daily    dilTIAZem ER  120 mg oral Daily    fluticasone furoate-vilanteroL  1 puff inhalation Daily    hydroxychloroquine  300 mg oral Daily    ipratropium-albuteroL  3 mL nebulization TID    lidocaine  1 patch transdermal Daily    lidocaine  0.1 mL subcutaneous Once    montelukast  10 mg oral Nightly    pantoprazole  40 mg oral BID    perflutren lipid microspheres  0.5-10 mL of dilution intravenous Once in imaging    perflutren lipid microspheres  0.5-10 mL of dilution intravenous Once in imaging    rosuvastatin  20 mg oral Nightly    sucralfate  1 g oral With meals & nightly    sulfaSALAzine  1,000 mg oral BID    warfarin  5 mg oral Daily   [2]   PRN medications   Medication    acetaminophen    benzocaine-menthol    diclofenac sodium    ipratropium-albuteroL    lidocaine-epinephrine    melatonin    morphine    nitroglycerin    ondansetron    Or    ondansetron    ondansetron    oxyCODONE    oxyCODONE    oxygen    polyethylene glycol    promethazine    sennosides-docusate sodium   [3]   Continuous Medications   Medication Dose Last Rate

## 2025-04-19 NOTE — CARE PLAN
The patient's goals for the shift include controlled pain and rest    The clinical goals for the shift include patient will have controlled pain throughout shift      Problem: Pain - Adult  Goal: Verbalizes/displays adequate comfort level or baseline comfort level  Outcome: Progressing     Problem: Safety - Adult  Goal: Free from fall injury  Outcome: Progressing     Problem: Discharge Planning  Goal: Discharge to home or other facility with appropriate resources  Outcome: Progressing     Problem: Chronic Conditions and Co-morbidities  Goal: Patient's chronic conditions and co-morbidity symptoms are monitored and maintained or improved  Outcome: Progressing     Problem: Nutrition  Goal: Nutrient intake appropriate for maintaining nutritional needs  Outcome: Progressing     Problem: Skin  Goal: Decreased wound size/increased tissue granulation at next dressing change  Outcome: Progressing  Flowsheets (Taken 4/19/2025 0256)  Decreased wound size/increased tissue granulation at next dressing change: Protective dressings over bony prominences  Goal: Participates in plan/prevention/treatment measures  Outcome: Progressing  Flowsheets (Taken 4/19/2025 0256)  Participates in plan/prevention/treatment measures: Elevate heels  Goal: Prevent/manage excess moisture  Outcome: Progressing  Flowsheets (Taken 4/19/2025 0256)  Prevent/manage excess moisture: Cleanse incontinence/protect with barrier cream  Goal: Prevent/minimize sheer/friction injuries  Outcome: Progressing  Flowsheets (Taken 4/19/2025 0256)  Prevent/minimize sheer/friction injuries: Turn/reposition every 2 hours/use positioning/transfer devices  Goal: Promote/optimize nutrition  Outcome: Progressing  Flowsheets (Taken 4/19/2025 0256)  Promote/optimize nutrition: Offer water/supplements/favorite foods  Goal: Promote skin healing  Outcome: Progressing  Flowsheets (Taken 4/19/2025 0256)  Promote skin healing: Turn/reposition every 2 hours/use positioning/transfer  devices

## 2025-04-20 ENCOUNTER — APPOINTMENT (OUTPATIENT)
Dept: RADIOLOGY | Facility: HOSPITAL | Age: 75
DRG: 266 | End: 2025-04-20
Payer: MEDICARE

## 2025-04-20 VITALS
WEIGHT: 161.3 LBS | RESPIRATION RATE: 20 BRPM | OXYGEN SATURATION: 96 % | SYSTOLIC BLOOD PRESSURE: 124 MMHG | HEIGHT: 66 IN | HEART RATE: 72 BPM | BODY MASS INDEX: 25.92 KG/M2 | DIASTOLIC BLOOD PRESSURE: 53 MMHG | TEMPERATURE: 97.5 F

## 2025-04-20 LAB
ALBUMIN SERPL BCP-MCNC: 3.4 G/DL (ref 3.4–5)
ALBUMIN SERPL BCP-MCNC: 3.7 G/DL (ref 3.4–5)
ALP SERPL-CCNC: 61 U/L (ref 33–136)
ALP SERPL-CCNC: 63 U/L (ref 33–136)
ALT SERPL W P-5'-P-CCNC: 12 U/L (ref 7–45)
ALT SERPL W P-5'-P-CCNC: 12 U/L (ref 7–45)
ANION GAP SERPL CALC-SCNC: 11 MMOL/L (ref 10–20)
ANION GAP SERPL CALC-SCNC: 11 MMOL/L (ref 10–20)
AST SERPL W P-5'-P-CCNC: 21 U/L (ref 9–39)
AST SERPL W P-5'-P-CCNC: 21 U/L (ref 9–39)
BASOPHILS # BLD AUTO: 0.04 X10*3/UL (ref 0–0.1)
BASOPHILS NFR BLD AUTO: 0.6 %
BILIRUB SERPL-MCNC: 0.3 MG/DL (ref 0–1.2)
BILIRUB SERPL-MCNC: 0.4 MG/DL (ref 0–1.2)
BLOOD EXPIRATION DATE: NORMAL
BUN SERPL-MCNC: 18 MG/DL (ref 6–23)
BUN SERPL-MCNC: 21 MG/DL (ref 6–23)
CALCIUM SERPL-MCNC: 8.2 MG/DL (ref 8.6–10.6)
CALCIUM SERPL-MCNC: 8.8 MG/DL (ref 8.6–10.6)
CHLORIDE SERPL-SCNC: 104 MMOL/L (ref 98–107)
CHLORIDE SERPL-SCNC: 104 MMOL/L (ref 98–107)
CO2 SERPL-SCNC: 25 MMOL/L (ref 21–32)
CO2 SERPL-SCNC: 27 MMOL/L (ref 21–32)
CREAT SERPL-MCNC: 0.92 MG/DL (ref 0.5–1.05)
CREAT SERPL-MCNC: 1.18 MG/DL (ref 0.5–1.05)
DISPENSE STATUS: NORMAL
EGFRCR SERPLBLD CKD-EPI 2021: 49 ML/MIN/1.73M*2
EGFRCR SERPLBLD CKD-EPI 2021: 66 ML/MIN/1.73M*2
EOSINOPHIL # BLD AUTO: 0.12 X10*3/UL (ref 0–0.4)
EOSINOPHIL NFR BLD AUTO: 1.9 %
ERYTHROCYTE [DISTWIDTH] IN BLOOD BY AUTOMATED COUNT: 16.4 % (ref 11.5–14.5)
ERYTHROCYTE [DISTWIDTH] IN BLOOD BY AUTOMATED COUNT: 16.9 % (ref 11.5–14.5)
GLUCOSE SERPL-MCNC: 101 MG/DL (ref 74–99)
GLUCOSE SERPL-MCNC: 102 MG/DL (ref 74–99)
HCT VFR BLD AUTO: 25.2 % (ref 36–46)
HCT VFR BLD AUTO: 25.9 % (ref 36–46)
HGB BLD-MCNC: 7.7 G/DL (ref 12–16)
HGB BLD-MCNC: 8.1 G/DL (ref 12–16)
IMM GRANULOCYTES # BLD AUTO: 0.02 X10*3/UL (ref 0–0.5)
IMM GRANULOCYTES NFR BLD AUTO: 0.3 % (ref 0–0.9)
INR PPP: 1.6 (ref 0.9–1.1)
LYMPHOCYTES # BLD AUTO: 1.07 X10*3/UL (ref 0.8–3)
LYMPHOCYTES NFR BLD AUTO: 16.7 %
MAGNESIUM SERPL-MCNC: 2.21 MG/DL (ref 1.6–2.4)
MAGNESIUM SERPL-MCNC: 2.55 MG/DL (ref 1.6–2.4)
MCH RBC QN AUTO: 22.7 PG (ref 26–34)
MCH RBC QN AUTO: 23 PG (ref 26–34)
MCHC RBC AUTO-ENTMCNC: 30.6 G/DL (ref 32–36)
MCHC RBC AUTO-ENTMCNC: 31.3 G/DL (ref 32–36)
MCV RBC AUTO: 73 FL (ref 80–100)
MCV RBC AUTO: 75 FL (ref 80–100)
MONOCYTES # BLD AUTO: 0.85 X10*3/UL (ref 0.05–0.8)
MONOCYTES NFR BLD AUTO: 13.3 %
NEUTROPHILS # BLD AUTO: 4.29 X10*3/UL (ref 1.6–5.5)
NEUTROPHILS NFR BLD AUTO: 67.2 %
NRBC BLD-RTO: 0 /100 WBCS (ref 0–0)
NRBC BLD-RTO: 0 /100 WBCS (ref 0–0)
PLATELET # BLD AUTO: 141 X10*3/UL (ref 150–450)
PLATELET # BLD AUTO: 148 X10*3/UL (ref 150–450)
POTASSIUM SERPL-SCNC: 3.9 MMOL/L (ref 3.5–5.3)
POTASSIUM SERPL-SCNC: 4 MMOL/L (ref 3.5–5.3)
PRODUCT BLOOD TYPE: 5100
PRODUCT BLOOD TYPE: 600
PRODUCT BLOOD TYPE: 600
PRODUCT CODE: NORMAL
PROT SERPL-MCNC: 5.4 G/DL (ref 6.4–8.2)
PROT SERPL-MCNC: 5.8 G/DL (ref 6.4–8.2)
PROTHROMBIN TIME: 17.2 SECONDS (ref 9.8–12.4)
RBC # BLD AUTO: 3.35 X10*6/UL (ref 4–5.2)
RBC # BLD AUTO: 3.57 X10*6/UL (ref 4–5.2)
SODIUM SERPL-SCNC: 136 MMOL/L (ref 136–145)
SODIUM SERPL-SCNC: 138 MMOL/L (ref 136–145)
UNIT ABO: NORMAL
UNIT NUMBER: NORMAL
UNIT RH: NORMAL
UNIT VOLUME: 350
WBC # BLD AUTO: 6.4 X10*3/UL (ref 4.4–11.3)
WBC # BLD AUTO: 6.8 X10*3/UL (ref 4.4–11.3)
XM INTEP: NORMAL

## 2025-04-20 PROCEDURE — 37799 UNLISTED PX VASCULAR SURGERY: CPT

## 2025-04-20 PROCEDURE — 2500000002 HC RX 250 W HCPCS SELF ADMINISTERED DRUGS (ALT 637 FOR MEDICARE OP, ALT 636 FOR OP/ED)

## 2025-04-20 PROCEDURE — 85610 PROTHROMBIN TIME: CPT

## 2025-04-20 PROCEDURE — 2500000001 HC RX 250 WO HCPCS SELF ADMINISTERED DRUGS (ALT 637 FOR MEDICARE OP)

## 2025-04-20 PROCEDURE — 83735 ASSAY OF MAGNESIUM: CPT

## 2025-04-20 PROCEDURE — 2500000004 HC RX 250 GENERAL PHARMACY W/ HCPCS (ALT 636 FOR OP/ED)

## 2025-04-20 PROCEDURE — 71260 CT THORAX DX C+: CPT | Performed by: RADIOLOGY

## 2025-04-20 PROCEDURE — 74177 CT ABD & PELVIS W/CONTRAST: CPT

## 2025-04-20 PROCEDURE — 80053 COMPREHEN METABOLIC PANEL: CPT

## 2025-04-20 PROCEDURE — 2500000002 HC RX 250 W HCPCS SELF ADMINISTERED DRUGS (ALT 637 FOR MEDICARE OP, ALT 636 FOR OP/ED): Mod: JZ

## 2025-04-20 PROCEDURE — 71260 CT THORAX DX C+: CPT

## 2025-04-20 PROCEDURE — 85027 COMPLETE CBC AUTOMATED: CPT

## 2025-04-20 PROCEDURE — 94640 AIRWAY INHALATION TREATMENT: CPT

## 2025-04-20 PROCEDURE — 85025 COMPLETE CBC W/AUTO DIFF WBC: CPT

## 2025-04-20 PROCEDURE — 99291 CRITICAL CARE FIRST HOUR: CPT

## 2025-04-20 PROCEDURE — 74177 CT ABD & PELVIS W/CONTRAST: CPT | Performed by: RADIOLOGY

## 2025-04-20 PROCEDURE — 2550000001 HC RX 255 CONTRASTS: Performed by: STUDENT IN AN ORGANIZED HEALTH CARE EDUCATION/TRAINING PROGRAM

## 2025-04-20 PROCEDURE — 1100000001 HC PRIVATE ROOM DAILY

## 2025-04-20 PROCEDURE — 2500000004 HC RX 250 GENERAL PHARMACY W/ HCPCS (ALT 636 FOR OP/ED): Mod: JZ

## 2025-04-20 PROCEDURE — 2500000005 HC RX 250 GENERAL PHARMACY W/O HCPCS

## 2025-04-20 RX ORDER — POLYETHYLENE GLYCOL 3350 17 G/17G
17 POWDER, FOR SOLUTION ORAL DAILY
Status: DISCONTINUED | OUTPATIENT
Start: 2025-04-20 | End: 2025-04-20

## 2025-04-20 RX ORDER — AMOXICILLIN 250 MG
2 CAPSULE ORAL NIGHTLY
Status: DISCONTINUED | OUTPATIENT
Start: 2025-04-20 | End: 2025-04-22 | Stop reason: HOSPADM

## 2025-04-20 RX ORDER — BISACODYL 10 MG/1
10 SUPPOSITORY RECTAL ONCE
Status: DISCONTINUED | OUTPATIENT
Start: 2025-04-20 | End: 2025-04-21

## 2025-04-20 RX ORDER — POLYETHYLENE GLYCOL 3350 17 G/17G
17 POWDER, FOR SOLUTION ORAL 2 TIMES DAILY
Status: DISCONTINUED | OUTPATIENT
Start: 2025-04-20 | End: 2025-04-22 | Stop reason: HOSPADM

## 2025-04-20 RX ORDER — IPRATROPIUM BROMIDE AND ALBUTEROL SULFATE 2.5; .5 MG/3ML; MG/3ML
3 SOLUTION RESPIRATORY (INHALATION) EVERY 12 HOURS SCHEDULED
Status: DISCONTINUED | OUTPATIENT
Start: 2025-04-20 | End: 2025-04-22 | Stop reason: HOSPADM

## 2025-04-20 RX ADMIN — POLYETHYLENE GLYCOL 3350 17 G: 17 POWDER, FOR SOLUTION ORAL at 18:45

## 2025-04-20 RX ADMIN — ONDANSETRON 4 MG: 2 INJECTION INTRAMUSCULAR; INTRAVENOUS at 19:33

## 2025-04-20 RX ADMIN — WARFARIN SODIUM 5 MG: 5 TABLET ORAL at 18:46

## 2025-04-20 RX ADMIN — SUCRALFATE 1 G: 1 SUSPENSION ORAL at 08:18

## 2025-04-20 RX ADMIN — BACLOFEN 5 MG: 5 TABLET ORAL at 20:36

## 2025-04-20 RX ADMIN — MORPHINE SULFATE 2 MG: 4 INJECTION, SOLUTION INTRAMUSCULAR; INTRAVENOUS at 08:18

## 2025-04-20 RX ADMIN — SULFASALAZINE 1000 MG: 500 TABLET ORAL at 10:00

## 2025-04-20 RX ADMIN — SODIUM CHLORIDE, SODIUM LACTATE, POTASSIUM CHLORIDE, AND CALCIUM CHLORIDE 1000 ML: .6; .31; .03; .02 INJECTION, SOLUTION INTRAVENOUS at 11:02

## 2025-04-20 RX ADMIN — ASPIRIN 81 MG: 81 TABLET, COATED ORAL at 10:00

## 2025-04-20 RX ADMIN — POLYETHYLENE GLYCOL 3350 17 G: 17 POWDER, FOR SOLUTION ORAL at 11:00

## 2025-04-20 RX ADMIN — SULFASALAZINE 1000 MG: 500 TABLET ORAL at 20:37

## 2025-04-20 RX ADMIN — OXYCODONE HYDROCHLORIDE 5 MG: 5 TABLET ORAL at 20:36

## 2025-04-20 RX ADMIN — OXYCODONE HYDROCHLORIDE 10 MG: 5 TABLET ORAL at 00:09

## 2025-04-20 RX ADMIN — LIDOCAINE 4% 1 PATCH: 40 PATCH TOPICAL at 10:00

## 2025-04-20 RX ADMIN — PANTOPRAZOLE SODIUM 40 MG: 40 TABLET, DELAYED RELEASE ORAL at 10:00

## 2025-04-20 RX ADMIN — PROMETHAZINE HYDROCHLORIDE 12.5 MG: 25 INJECTION INTRAMUSCULAR; INTRAVENOUS at 10:47

## 2025-04-20 RX ADMIN — MONTELUKAST 10 MG: 10 TABLET, FILM COATED ORAL at 20:37

## 2025-04-20 RX ADMIN — FLUTICASONE FUROATE AND VILANTEROL TRIFENATATE 1 PUFF: 100; 25 POWDER RESPIRATORY (INHALATION) at 07:55

## 2025-04-20 RX ADMIN — PANTOPRAZOLE SODIUM 40 MG: 40 TABLET, DELAYED RELEASE ORAL at 20:36

## 2025-04-20 RX ADMIN — BACLOFEN 5 MG: 5 TABLET ORAL at 10:00

## 2025-04-20 RX ADMIN — DILTIAZEM HYDROCHLORIDE 120 MG: 120 CAPSULE, COATED, EXTENDED RELEASE ORAL at 20:37

## 2025-04-20 RX ADMIN — ACETAMINOPHEN 650 MG: 325 TABLET, FILM COATED ORAL at 18:46

## 2025-04-20 RX ADMIN — IOHEXOL 120 ML: 350 INJECTION, SOLUTION INTRAVENOUS at 16:47

## 2025-04-20 RX ADMIN — IPRATROPIUM BROMIDE AND ALBUTEROL SULFATE 3 ML: .5; 3 SOLUTION RESPIRATORY (INHALATION) at 07:54

## 2025-04-20 RX ADMIN — PROMETHAZINE HYDROCHLORIDE 12.5 MG: 25 INJECTION INTRAMUSCULAR; INTRAVENOUS at 17:04

## 2025-04-20 RX ADMIN — ONDANSETRON 4 MG: 2 INJECTION INTRAMUSCULAR; INTRAVENOUS at 10:01

## 2025-04-20 RX ADMIN — ROSUVASTATIN CALCIUM 20 MG: 20 TABLET, FILM COATED ORAL at 20:36

## 2025-04-20 RX ADMIN — SUCRALFATE 1 G: 1 SUSPENSION ORAL at 20:37

## 2025-04-20 RX ADMIN — ACETAMINOPHEN 650 MG: 325 TABLET, FILM COATED ORAL at 04:20

## 2025-04-20 RX ADMIN — HYDROXYCHLOROQUINE SULFATE 300 MG: 200 TABLET, FILM COATED ORAL at 10:00

## 2025-04-20 RX ADMIN — CLOPIDOGREL BISULFATE 75 MG: 75 TABLET, FILM COATED ORAL at 10:00

## 2025-04-20 RX ADMIN — MORPHINE SULFATE 2 MG: 4 INJECTION, SOLUTION INTRAMUSCULAR; INTRAVENOUS at 17:06

## 2025-04-20 RX ADMIN — DIGOXIN 125 MCG: 125 TABLET ORAL at 10:00

## 2025-04-20 ASSESSMENT — PAIN - FUNCTIONAL ASSESSMENT
PAIN_FUNCTIONAL_ASSESSMENT: 0-10

## 2025-04-20 ASSESSMENT — PAIN DESCRIPTION - LOCATION
LOCATION: HEAD
LOCATION: BACK

## 2025-04-20 ASSESSMENT — PAIN SCALES - GENERAL
PAINLEVEL_OUTOF10: 8
PAINLEVEL_OUTOF10: 9
PAINLEVEL_OUTOF10: 0 - NO PAIN
PAINLEVEL_OUTOF10: 5 - MODERATE PAIN
PAINLEVEL_OUTOF10: 0 - NO PAIN
PAINLEVEL_OUTOF10: 2
PAINLEVEL_OUTOF10: 3
PAINLEVEL_OUTOF10: 8
PAINLEVEL_OUTOF10: 4

## 2025-04-20 ASSESSMENT — PAIN DESCRIPTION - ORIENTATION: ORIENTATION: LEFT;RIGHT

## 2025-04-20 NOTE — PROGRESS NOTES
"Cardiac Intensive Care - Daily Progress Note   Subjective    Trina Elias is a 74 y.o. year old female patient admitted on 4/16/2025 with following ICU needs: severe mitral regurgitation s/p TMVR    Interval History:  Ongoing nausea, no abdominal pain. Hgb and hemolysis labs stable. Afebrile and HDS overnight. No chest pain, dyspnea, or palpitations.     Meds    Scheduled medications  Scheduled Medications[1]  Continuous medications  Continuous Medications[2]  PRN medications  PRN Medications[3]     Objective    Blood pressure 124/53, pulse 83, temperature 36.5 °C (97.7 °F), temperature source Temporal, resp. rate 18, height 1.676 m (5' 6\"), weight 73.2 kg (161 lb 4.8 oz), SpO2 98%.     Physical Exam   General: Ill appearing, pallor noted  Cardiac: RRR  Pulm: CTAB, normal work of breathing  GI: soft, non distended  Extremities: trace LE edema   Neuro: no focal neuro deficits, a+ox4  Psych: appropriate mood and behavior, however anxious  Skin: warm and dry throughout       Intake/Output Summary (Last 24 hours) at 4/20/2025 1142  Last data filed at 4/20/2025 1102  Gross per 24 hour   Intake 475.58 ml   Output 750 ml   Net -274.42 ml     Labs:   Results from last 72 hours   Lab Units 04/20/25  0417 04/19/25  1746 04/19/25  0103 04/18/25  0127 04/17/25  1918   SODIUM mmol/L 138 138 140   < > 143   POTASSIUM mmol/L 4.0 4.1 4.0   < > 3.8   CHLORIDE mmol/L 104 104 104   < > 106   CO2 mmol/L 27 27 28   < > 24   BUN mg/dL 21 18 21   < > 19   CREATININE mg/dL 1.18* 0.94 1.09*   < > 1.04   GLUCOSE mg/dL 102* 94 118*   < > 144*   CALCIUM mg/dL 8.8 8.8 8.7   < > 9.3   ANION GAP mmol/L 11 11 12   < > 17   EGFR mL/min/1.73m*2 49* 64 54*   < > 57*   PHOSPHORUS mg/dL  --   --   --   --  4.2    < > = values in this interval not displayed.      Results from last 72 hours   Lab Units 04/20/25 0417 04/19/25  0925 04/19/25  0510   WBC AUTO x10*3/uL 6.4 8.3 8.6   HEMOGLOBIN g/dL 8.1* 7.6* 7.7*   HEMATOCRIT % 25.9* 26.5* 24.5*   PLATELETS " AUTO x10*3/uL 148* 174 165   NEUTROS PCT AUTO % 67.2 74.6 72.6   LYMPHS PCT AUTO % 16.7 14.5 13.5   MONOS PCT AUTO % 13.3 9.7 12.7   EOS PCT AUTO % 1.9 0.4 0.5      Results from last 72 hours   Lab Units 04/18/25  1809   POCT PH, ARTERIAL pH 7.44*   POCT PCO2, ARTERIAL mm Hg 39   POCT PO2, ARTERIAL mm Hg 125*   POCT SO2, ARTERIAL % 98            Micro/ID:     Lab Results   Component Value Date    BLOODCULT  09/02/2023     No Growth at 1 days~No Growth at 2 days~No Growth at 3 days~NO GROWTH at 4 days - FINAL REPORT       Assessment and Plan     74 y.o. y/o female with PMH of atrial fibrillation s/p LAAO, VT/VF s/p ICD, Hyperlipidemia , CAD,  PUD, SBO s/p lysis of adhesions, GERD s/p nissen fundoplication, provoked DVT 2018, Rheumatoid arthritis, psoriasis, asthma, chronic anemia, severe MR, caridoMEMs implant and CKD presents for evaluation of severe, symptomatic progressive mitral regurgitation admitted for  TMVR 4/17 with Dr. Sanjana Ojeda. Transferred to CICU for post-procedural monitoring.         Summary for 04/20/25  :  Ongoing nausea, passing gas but no bowel movement  Will obtain CT AP and pending read increase bowel regimen  Observe overnight, make NPO for possible marci in AM  INR 1.6 today, continue on warfarin 5 mg     CARDIOVASCULAR:  #Severe Mitral Regurgitation, s/p repair  #A Fib s/p LAAO  #Hx of VT/V Fib s/p ICD placement  Management:  Continue digoxin and diltiazem. Continue crestor  Continue Warfarin  Continue Bumex 2mg BID  Continue Aspirin and Plavix     PULMONARY:  Dx:  Asthma  Management:  breo ellipta  duonebs PRN  singulair     RENAL/GENITOURINARY:  Dx:  CKD (baseline Cr 1.1)  Management:  CTM     HEMATOLOGY:  Dx:  Chronic anemia (baseline hb 8-10)  Management:  CTM     MUSCULOSKELETAL:  Dx:  Rheumatoid arthritis  Psoriasis             Chronic Lumbar Back Pain  Management:  Continue sulfasalazine, plaquenil  Can continue current multi-modal pain regimen on floor including diclofenac,  lidocaine patch, tylenol, and oxycodone and morphine prn    ICU Check List       FEN:  Fluids: PO  Electrolytes: PRN  Nutrition: Cardiac diet  Prophylaxis:  DVT ppx: Warfarin  Bowel care: Miralax  Social:  Code: Full Code    HPOA: Martinez Franklin (Spouse)  [phone] (Mobile)   Disposition: BLANQUITA Ayala MD   04/20/25 at 11:42 AM     Disclaimer: Documentation completed with the information available at the time of input. The times in the chart may not be reflective of actual patient care times, interventions, or procedures. Documentation occurs after the physical care of the patient.           [1] aspirin, 81 mg, oral, Daily  baclofen, 5 mg, oral, BID  clopidogrel, 75 mg, oral, Daily  digoxin, 125 mcg, oral, Daily  dilTIAZem ER, 120 mg, oral, Daily  fluticasone furoate-vilanteroL, 1 puff, inhalation, Daily  hydroxychloroquine, 300 mg, oral, Daily  ipratropium-albuteroL, 3 mL, nebulization, TID  lactated Ringer's, 1,000 mL, intravenous, Once  lidocaine, 1 patch, transdermal, Daily  lidocaine, 0.1 mL, subcutaneous, Once  montelukast, 10 mg, oral, Nightly  pantoprazole, 40 mg, oral, BID  perflutren lipid microspheres, 0.5-10 mL of dilution, intravenous, Once in imaging  perflutren lipid microspheres, 0.5-10 mL of dilution, intravenous, Once in imaging  rosuvastatin, 20 mg, oral, Nightly  sucralfate, 1 g, oral, With meals & nightly  sulfaSALAzine, 1,000 mg, oral, BID  warfarin, 5 mg, oral, Daily  [2]    [3] PRN medications: acetaminophen, benzocaine-menthol, diclofenac sodium, ipratropium-albuteroL, lidocaine-epinephrine, melatonin, morphine, nitroglycerin, ondansetron **OR** ondansetron, ondansetron, oxyCODONE, oxyCODONE, oxygen, polyethylene glycol, promethazine, sennosides-docusate sodium

## 2025-04-20 NOTE — PROGRESS NOTES
Subjective Data:  Patient reports her work of breathing has improved over the past 24 hours.    Overnight Events:    No acute events overnight.  Hemolysis labs stable.  Hemoglobin stable.     Objective Data:  Last Recorded Vitals:  Vitals:    04/20/25 0755 04/20/25 0800 04/20/25 0900 04/20/25 1000   BP:       Patient Position:       Pulse: 80 76 90 85   Resp: 18 18     Temp:  36.5 °C (97.7 °F)     TempSrc:  Temporal     SpO2: 95% 100% 98% 97%   Weight:       Height:           Last Labs:  CBC - 4/20/2025:  4:17 AM  6.4 8.1 148    25.9      CMP - 4/20/2025:  4:17 AM  8.8 5.8 21 --- 0.3   4.2 3.7 12 63      PTT - 4/17/2025:  7:18 PM  1.6   17.2 33     TROPHS   Date/Time Value Ref Range Status   04/18/2025 01:12  0 - 34 ng/L Final     Comment:     Previous result verified on 4/18/2025 1048 on specimen/case [id number] called with component [facility] for procedure Troponin I, High Sensitivity with value 514 ng/L.   04/18/2025 09:55  0 - 34 ng/L Final   04/09/2025 08:21 AM 25 0 - 34 ng/L Final   02/10/2025 04:16 PM 20 0 - 13 ng/L Final   02/10/2025 01:02 PM 22 0 - 13 ng/L Final   09/18/2024 01:47 AM 34 0 - 13 ng/L Final     BNP   Date/Time Value Ref Range Status   04/16/2025 06:07  0 - 99 pg/mL Final   04/09/2025 08:21  0 - 99 pg/mL Final     HGBA1C   Date/Time Value Ref Range Status   04/09/2025 08:21 AM 6.1 See comment % Final     LDLCALC   Date/Time Value Ref Range Status   03/03/2025 09:33 AM 77 mg/dL (calc) Final     Comment:     Reference range: <100     Desirable range <100 mg/dL for primary prevention;    <70 mg/dL for patients with CHD or diabetic patients   with > or = 2 CHD risk factors.     LDL-C is now calculated using the Walt-Nj   calculation, which is a validated novel method providing   better accuracy than the Friedewald equation in the   estimation of LDL-C.   Walt CHAN et al. FARIHA. 2013;310(19): 0556-9910   (http://education.[employer]/faq/FKQ532)     04/15/2024  11:28 AM 68 <=99 mg/dL Final     Comment:                                 Near   Borderline      AGE      Desirable  Optimal    High     High     Very High     0-19 Y     0 - 109     ---    110-129   >/= 130     ----    20-24 Y     0 - 119     ---    120-159   >/= 160     ----      >24 Y     0 -  99   100-129  130-159   160-189     >/=190       VLDL   Date/Time Value Ref Range Status   04/15/2024 11:28 AM 21 0 - 40 mg/dL Final   08/28/2023 09:33 AM 24 0 - 40 mg/dL Final   02/28/2023 09:50 AM 30 0 - 40 mg/dL Final   12/19/2022 05:21 PM 27 0 - 40 mg/dL Final      Last I/O:  I/O last 3 completed shifts:  In: 595.1 (8.1 mL/kg) [P.O.:120; I.V.:50 (0.7 mL/kg); Blood:374.6; IV Piggyback:50.5]  Out: 750 (10.3 mL/kg) [Urine:750 (0.3 mL/kg/hr)]  Weight: 73.2 kg     Past Cardiology Tests (Last 3 Years):  EKG:  ECG 12 lead 04/16/2025      Electrocardiogram, 12-lead PRN ACS symptoms 02/11/2025      ECG 12 lead 02/10/2025      ECG 12 Lead 11/25/2024      ECG 12 lead 09/18/2024      ECG 12 lead 09/18/2024      ECG 12 lead  (Preliminary)      ECG 12 lead 08/09/2024 (Preliminary)      ECG 12 Lead 08/08/2024      ECG 12 lead 07/26/2024      ECG 12 lead 07/25/2024      ECG 12 lead 07/24/2024      ECG 12 lead 07/24/2024      ECG 12 lead 07/24/2024      ECG 12 lead 07/16/2024      ECG 12 lead (Clinic Performed) 03/07/2024      ECG 12 lead 02/25/2024      ECG 12 lead 02/24/2024      ECG 12 lead 02/24/2024    Echo:  Transthoracic Echo (TTE) Complete 04/18/2025      Intraoperative SULY (Anesthesia please do not use) 04/17/2025      Transthoracic echo (TTE) complete 04/16/2025      Transesophageal Echo (SULY) 03/19/2025      Transthoracic Echo (TTE) Complete 02/11/2025      Transthoracic Echo (TTE) Complete 07/25/2024      Transthoracic Echo (TTE) Limited 07/16/2024      Transthoracic Echo (TTE) Limited 07/16/2024      Transthoracic Echo (TTE) Complete 02/27/2024    Ejection Fractions:  EF   Date/Time Value Ref Range Status   04/18/2025 08:28  AM 38 %    04/17/2025 06:53 PM 40 %    04/16/2025 04:42 PM 48 %      Cath:  Cardiac Catheterization Procedure 04/17/2025      Cardiac Catheterization Procedure 03/19/2025      Cardiac Catheterization Procedure 01/02/2025      Cardiac Catheterization Procedure 07/16/2024    Stress Test:  No results found for this or any previous visit from the past 1095 days.    Cardiac Imaging:  No results found for this or any previous visit from the past 1095 days.      Inpatient Medications:  Scheduled Medications[1]  PRN Medications[2]  Continuous Medications[3]    Physical Exam:  Vitals:    04/20/25 1000   BP:    Pulse: 85   Resp:    Temp:    SpO2: 97%       General: I appearing fatigued  HEENT: Moist mucous membranes.  Nasal cannula in nares.  TVP access site clean dry and bandaged.  Cardiovascular: Regular rate and rhythm, no murmurs rubs or gallops, no JVD  Pulmonology: Lungs clear to auscultation bilaterally, no crackles, wheezes or rhonchi  Abdomen: Soft and nondistended, no tenderness palpation, + bowel sounds  Extremities: bilateral groin access sites clean dry and bandaged without hematoma.  Neuro: Alert and orientated by 3       Assessment/Plan   74-year-old female with a past medical history of atrial fibrillation status post LAAO, VT/VF status post ICD, hyperlipidemia, coronary artery disease, provoked DVT in 2019, severe mitral regurgitation and CKD who underwent TMVR on 4/17/2025 with Dr. Ojeda.  Patient remains symptomatic from a respiratory standpoint.  There is concern for hemolysis given that she has anterior perivalvular leak.  However her hemolysis labs remained stable.  Her shortness of breath very well may be multifactorial given her LV dysfunction, remaining aortic valve disease and anemia.  We will continue to monitor in the CICU over the weekend and consider possible relook with transesophageal echocardiogram next week.    Recommendations:  - Continue to trend hemolysis labs  - Ambulation as  tolerated  - Structural team will continue to follow along.  - N.p.o. at midnight for possible SULY tomorrow.    I spent 30 minutes in the professional and overall care of this patient.        Nixon Armstrong MD         [1]   Scheduled medications   Medication Dose Route Frequency    aspirin  81 mg oral Daily    baclofen  5 mg oral BID    clopidogrel  75 mg oral Daily    digoxin  125 mcg oral Daily    dilTIAZem ER  120 mg oral Daily    fluticasone furoate-vilanteroL  1 puff inhalation Daily    hydroxychloroquine  300 mg oral Daily    ipratropium-albuteroL  3 mL nebulization TID    lidocaine  1 patch transdermal Daily    lidocaine  0.1 mL subcutaneous Once    montelukast  10 mg oral Nightly    pantoprazole  40 mg oral BID    perflutren lipid microspheres  0.5-10 mL of dilution intravenous Once in imaging    perflutren lipid microspheres  0.5-10 mL of dilution intravenous Once in imaging    rosuvastatin  20 mg oral Nightly    sucralfate  1 g oral With meals & nightly    sulfaSALAzine  1,000 mg oral BID    warfarin  5 mg oral Daily   [2]   PRN medications   Medication    acetaminophen    benzocaine-menthol    diclofenac sodium    ipratropium-albuteroL    lidocaine-epinephrine    melatonin    morphine    nitroglycerin    ondansetron    Or    ondansetron    ondansetron    oxyCODONE    oxyCODONE    oxygen    polyethylene glycol    promethazine    sennosides-docusate sodium   [3]   Continuous Medications   Medication Dose Last Rate

## 2025-04-21 LAB
ABO GROUP (TYPE) IN BLOOD: NORMAL
ALBUMIN SERPL BCP-MCNC: 3.5 G/DL (ref 3.4–5)
ALBUMIN SERPL BCP-MCNC: 3.5 G/DL (ref 3.4–5)
ALP SERPL-CCNC: 65 U/L (ref 33–136)
ALP SERPL-CCNC: 68 U/L (ref 33–136)
ALT SERPL W P-5'-P-CCNC: 12 U/L (ref 7–45)
ALT SERPL W P-5'-P-CCNC: 12 U/L (ref 7–45)
ANION GAP SERPL CALC-SCNC: 13 MMOL/L (ref 10–20)
ANION GAP SERPL CALC-SCNC: 14 MMOL/L (ref 10–20)
ANTIBODY SCREEN: NORMAL
AST SERPL W P-5'-P-CCNC: 20 U/L (ref 9–39)
AST SERPL W P-5'-P-CCNC: 21 U/L (ref 9–39)
ATRIAL RATE: 326 BPM
ATRIAL RATE: 74 BPM
ATRIAL RATE: 91 BPM
BASOPHILS # BLD AUTO: 0.02 X10*3/UL (ref 0–0.1)
BASOPHILS NFR BLD AUTO: 0.3 %
BILIRUB SERPL-MCNC: 0.3 MG/DL (ref 0–1.2)
BILIRUB SERPL-MCNC: 0.6 MG/DL (ref 0–1.2)
BLOOD EXPIRATION DATE: NORMAL
BUN SERPL-MCNC: 18 MG/DL (ref 6–23)
BUN SERPL-MCNC: 21 MG/DL (ref 6–23)
CALCIUM SERPL-MCNC: 8.2 MG/DL (ref 8.6–10.6)
CALCIUM SERPL-MCNC: 8.7 MG/DL (ref 8.6–10.6)
CHLORIDE SERPL-SCNC: 106 MMOL/L (ref 98–107)
CHLORIDE SERPL-SCNC: 106 MMOL/L (ref 98–107)
CO2 SERPL-SCNC: 24 MMOL/L (ref 21–32)
CO2 SERPL-SCNC: 25 MMOL/L (ref 21–32)
CREAT SERPL-MCNC: 0.96 MG/DL (ref 0.5–1.05)
CREAT SERPL-MCNC: 1.01 MG/DL (ref 0.5–1.05)
DISPENSE STATUS: NORMAL
EGFRCR SERPLBLD CKD-EPI 2021: 59 ML/MIN/1.73M*2
EGFRCR SERPLBLD CKD-EPI 2021: 63 ML/MIN/1.73M*2
EOSINOPHIL # BLD AUTO: 0.19 X10*3/UL (ref 0–0.4)
EOSINOPHIL NFR BLD AUTO: 3.3 %
ERYTHROCYTE [DISTWIDTH] IN BLOOD BY AUTOMATED COUNT: 16.9 % (ref 11.5–14.5)
ERYTHROCYTE [DISTWIDTH] IN BLOOD BY AUTOMATED COUNT: 17.2 % (ref 11.5–14.5)
GLUCOSE SERPL-MCNC: 107 MG/DL (ref 74–99)
GLUCOSE SERPL-MCNC: 112 MG/DL (ref 74–99)
HCT VFR BLD AUTO: 26.4 % (ref 36–46)
HCT VFR BLD AUTO: 28.9 % (ref 36–46)
HGB BLD-MCNC: 8 G/DL (ref 12–16)
HGB BLD-MCNC: 9.1 G/DL (ref 12–16)
IMM GRANULOCYTES # BLD AUTO: 0.02 X10*3/UL (ref 0–0.5)
IMM GRANULOCYTES NFR BLD AUTO: 0.3 % (ref 0–0.9)
LYMPHOCYTES # BLD AUTO: 1 X10*3/UL (ref 0.8–3)
LYMPHOCYTES NFR BLD AUTO: 17.3 %
MAGNESIUM SERPL-MCNC: 2.1 MG/DL (ref 1.6–2.4)
MCH RBC QN AUTO: 23 PG (ref 26–34)
MCH RBC QN AUTO: 23.3 PG (ref 26–34)
MCHC RBC AUTO-ENTMCNC: 30.3 G/DL (ref 32–36)
MCHC RBC AUTO-ENTMCNC: 31.5 G/DL (ref 32–36)
MCV RBC AUTO: 74 FL (ref 80–100)
MCV RBC AUTO: 76 FL (ref 80–100)
MONOCYTES # BLD AUTO: 0.69 X10*3/UL (ref 0.05–0.8)
MONOCYTES NFR BLD AUTO: 11.9 %
NEUTROPHILS # BLD AUTO: 3.86 X10*3/UL (ref 1.6–5.5)
NEUTROPHILS NFR BLD AUTO: 66.9 %
NRBC BLD-RTO: 0 /100 WBCS (ref 0–0)
NRBC BLD-RTO: 0 /100 WBCS (ref 0–0)
P AXIS: 100 DEGREES
P OFFSET: 172 MS
P ONSET: 146 MS
PATH REVIEW-CBC DIFFERENTIAL: NORMAL
PLATELET # BLD AUTO: 149 X10*3/UL (ref 150–450)
PLATELET # BLD AUTO: 164 X10*3/UL (ref 150–450)
POTASSIUM SERPL-SCNC: 4.1 MMOL/L (ref 3.5–5.3)
POTASSIUM SERPL-SCNC: 4.2 MMOL/L (ref 3.5–5.3)
PR INTERVAL: 138 MS
PRODUCT BLOOD TYPE: 5100
PRODUCT CODE: NORMAL
PROT SERPL-MCNC: 5.6 G/DL (ref 6.4–8.2)
PROT SERPL-MCNC: 5.9 G/DL (ref 6.4–8.2)
Q ONSET: 214 MS
Q ONSET: 215 MS
Q ONSET: 217 MS
QRS COUNT: 12 BEATS
QRS COUNT: 13 BEATS
QRS COUNT: 15 BEATS
QRS DURATION: 100 MS
QRS DURATION: 94 MS
QRS DURATION: 96 MS
QT INTERVAL: 350 MS
QT INTERVAL: 364 MS
QT INTERVAL: 376 MS
QTC CALCULATION(BAZETT): 404 MS
QTC CALCULATION(BAZETT): 425 MS
QTC CALCULATION(BAZETT): 428 MS
QTC FREDERICIA: 390 MS
QTC FREDERICIA: 399 MS
QTC FREDERICIA: 410 MS
R AXIS: 31 DEGREES
R AXIS: 34 DEGREES
R AXIS: 47 DEGREES
RBC # BLD AUTO: 3.48 X10*6/UL (ref 4–5.2)
RBC # BLD AUTO: 3.9 X10*6/UL (ref 4–5.2)
RH FACTOR (ANTIGEN D): NORMAL
SODIUM SERPL-SCNC: 140 MMOL/L (ref 136–145)
SODIUM SERPL-SCNC: 140 MMOL/L (ref 136–145)
T AXIS: 225 DEGREES
T AXIS: 227 DEGREES
T AXIS: 237 DEGREES
T OFFSET: 389 MS
T OFFSET: 397 MS
T OFFSET: 405 MS
UNIT ABO: NORMAL
UNIT NUMBER: NORMAL
UNIT RH: NORMAL
UNIT VOLUME: 350
VENTRICULAR RATE: 74 BPM
VENTRICULAR RATE: 78 BPM
VENTRICULAR RATE: 89 BPM
WBC # BLD AUTO: 5.8 X10*3/UL (ref 4.4–11.3)
WBC # BLD AUTO: 6.4 X10*3/UL (ref 4.4–11.3)
XM INTEP: NORMAL

## 2025-04-21 PROCEDURE — 37799 UNLISTED PX VASCULAR SURGERY: CPT

## 2025-04-21 PROCEDURE — 97530 THERAPEUTIC ACTIVITIES: CPT | Mod: GP

## 2025-04-21 PROCEDURE — P9016 RBC LEUKOCYTES REDUCED: HCPCS

## 2025-04-21 PROCEDURE — 2500000002 HC RX 250 W HCPCS SELF ADMINISTERED DRUGS (ALT 637 FOR MEDICARE OP, ALT 636 FOR OP/ED)

## 2025-04-21 PROCEDURE — 2500000001 HC RX 250 WO HCPCS SELF ADMINISTERED DRUGS (ALT 637 FOR MEDICARE OP)

## 2025-04-21 PROCEDURE — 80053 COMPREHEN METABOLIC PANEL: CPT

## 2025-04-21 PROCEDURE — 2500000004 HC RX 250 GENERAL PHARMACY W/ HCPCS (ALT 636 FOR OP/ED)

## 2025-04-21 PROCEDURE — 2500000004 HC RX 250 GENERAL PHARMACY W/ HCPCS (ALT 636 FOR OP/ED): Mod: JZ

## 2025-04-21 PROCEDURE — 1200000002 HC GENERAL ROOM WITH TELEMETRY DAILY

## 2025-04-21 PROCEDURE — 37799 UNLISTED PX VASCULAR SURGERY: CPT | Performed by: STUDENT IN AN ORGANIZED HEALTH CARE EDUCATION/TRAINING PROGRAM

## 2025-04-21 PROCEDURE — 94640 AIRWAY INHALATION TREATMENT: CPT

## 2025-04-21 PROCEDURE — 86922 COMPATIBILITY TEST ANTIGLOB: CPT

## 2025-04-21 PROCEDURE — 2500000004 HC RX 250 GENERAL PHARMACY W/ HCPCS (ALT 636 FOR OP/ED): Mod: JZ,TB

## 2025-04-21 PROCEDURE — 85027 COMPLETE CBC AUTOMATED: CPT

## 2025-04-21 PROCEDURE — 2500000002 HC RX 250 W HCPCS SELF ADMINISTERED DRUGS (ALT 637 FOR MEDICARE OP, ALT 636 FOR OP/ED): Mod: JZ

## 2025-04-21 PROCEDURE — 36430 TRANSFUSION BLD/BLD COMPNT: CPT

## 2025-04-21 PROCEDURE — 97116 GAIT TRAINING THERAPY: CPT | Mod: GP

## 2025-04-21 PROCEDURE — 86901 BLOOD TYPING SEROLOGIC RH(D): CPT

## 2025-04-21 PROCEDURE — 99233 SBSQ HOSP IP/OBS HIGH 50: CPT

## 2025-04-21 PROCEDURE — 85025 COMPLETE CBC W/AUTO DIFF WBC: CPT | Performed by: STUDENT IN AN ORGANIZED HEALTH CARE EDUCATION/TRAINING PROGRAM

## 2025-04-21 PROCEDURE — 99231 SBSQ HOSP IP/OBS SF/LOW 25: CPT | Performed by: NURSE PRACTITIONER

## 2025-04-21 PROCEDURE — 83735 ASSAY OF MAGNESIUM: CPT

## 2025-04-21 RX ORDER — BUMETANIDE 0.25 MG/ML
0.5 INJECTION, SOLUTION INTRAMUSCULAR; INTRAVENOUS ONCE
Status: DISCONTINUED | OUTPATIENT
Start: 2025-04-21 | End: 2025-04-21

## 2025-04-21 RX ORDER — FUROSEMIDE 10 MG/ML
20 INJECTION INTRAMUSCULAR; INTRAVENOUS ONCE
Status: DISCONTINUED | OUTPATIENT
Start: 2025-04-21 | End: 2025-04-21

## 2025-04-21 RX ORDER — WARFARIN SODIUM 5 MG/1
5 TABLET ORAL DAILY
Status: DISCONTINUED | OUTPATIENT
Start: 2025-04-21 | End: 2025-04-22

## 2025-04-21 RX ORDER — BUMETANIDE 0.25 MG/ML
1 INJECTION, SOLUTION INTRAMUSCULAR; INTRAVENOUS ONCE
Status: COMPLETED | OUTPATIENT
Start: 2025-04-21 | End: 2025-04-21

## 2025-04-21 RX ADMIN — DICLOFENAC SODIUM 4 G: 10 GEL TOPICAL at 04:38

## 2025-04-21 RX ADMIN — PANTOPRAZOLE SODIUM 40 MG: 40 TABLET, DELAYED RELEASE ORAL at 09:05

## 2025-04-21 RX ADMIN — HYDROXYCHLOROQUINE SULFATE 300 MG: 200 TABLET, FILM COATED ORAL at 09:05

## 2025-04-21 RX ADMIN — CLOPIDOGREL BISULFATE 75 MG: 75 TABLET, FILM COATED ORAL at 09:04

## 2025-04-21 RX ADMIN — ROSUVASTATIN CALCIUM 20 MG: 20 TABLET, FILM COATED ORAL at 20:08

## 2025-04-21 RX ADMIN — BACLOFEN 5 MG: 5 TABLET ORAL at 20:08

## 2025-04-21 RX ADMIN — ASPIRIN 81 MG: 81 TABLET, COATED ORAL at 09:04

## 2025-04-21 RX ADMIN — BACLOFEN 5 MG: 5 TABLET ORAL at 09:04

## 2025-04-21 RX ADMIN — BUMETANIDE 1 MG: 0.25 INJECTION INTRAMUSCULAR; INTRAVENOUS at 15:34

## 2025-04-21 RX ADMIN — SULFASALAZINE 1000 MG: 500 TABLET ORAL at 09:05

## 2025-04-21 RX ADMIN — FLUTICASONE FUROATE AND VILANTEROL TRIFENATATE 1 PUFF: 100; 25 POWDER RESPIRATORY (INHALATION) at 08:15

## 2025-04-21 RX ADMIN — POLYETHYLENE GLYCOL 3350 17 G: 17 POWDER, FOR SOLUTION ORAL at 20:08

## 2025-04-21 RX ADMIN — DIGOXIN 125 MCG: 125 TABLET ORAL at 09:04

## 2025-04-21 RX ADMIN — PANTOPRAZOLE SODIUM 40 MG: 40 TABLET, DELAYED RELEASE ORAL at 20:08

## 2025-04-21 RX ADMIN — MONTELUKAST 10 MG: 10 TABLET, FILM COATED ORAL at 20:08

## 2025-04-21 RX ADMIN — SUCRALFATE 1 G: 1 SUSPENSION ORAL at 20:08

## 2025-04-21 RX ADMIN — SACUBITRIL AND VALSARTAN 0.5 TABLET: 24; 26 TABLET, FILM COATED ORAL at 10:33

## 2025-04-21 RX ADMIN — WARFARIN SODIUM 5 MG: 5 TABLET ORAL at 17:12

## 2025-04-21 RX ADMIN — MORPHINE SULFATE 2 MG: 4 INJECTION, SOLUTION INTRAMUSCULAR; INTRAVENOUS at 00:48

## 2025-04-21 RX ADMIN — SACUBITRIL AND VALSARTAN 0.5 TABLET: 24; 26 TABLET, FILM COATED ORAL at 20:07

## 2025-04-21 RX ADMIN — IPRATROPIUM BROMIDE AND ALBUTEROL SULFATE 3 ML: .5; 3 SOLUTION RESPIRATORY (INHALATION) at 08:14

## 2025-04-21 RX ADMIN — SULFASALAZINE 1000 MG: 500 TABLET ORAL at 20:07

## 2025-04-21 RX ADMIN — SUCRALFATE 1 G: 1 SUSPENSION ORAL at 17:12

## 2025-04-21 ASSESSMENT — PAIN - FUNCTIONAL ASSESSMENT
PAIN_FUNCTIONAL_ASSESSMENT: 0-10

## 2025-04-21 ASSESSMENT — COGNITIVE AND FUNCTIONAL STATUS - GENERAL
MOBILITY SCORE: 23
MOBILITY SCORE: 17
MOVING TO AND FROM BED TO CHAIR: A LITTLE
WALKING IN HOSPITAL ROOM: A LITTLE
STANDING UP FROM CHAIR USING ARMS: A LITTLE
MOVING FROM LYING ON BACK TO SITTING ON SIDE OF FLAT BED WITH BEDRAILS: A LITTLE
TURNING FROM BACK TO SIDE WHILE IN FLAT BAD: A LITTLE
CLIMB 3 TO 5 STEPS WITH RAILING: A LITTLE
DAILY ACTIVITIY SCORE: 24
CLIMB 3 TO 5 STEPS WITH RAILING: A LOT

## 2025-04-21 ASSESSMENT — PAIN SCALES - GENERAL
PAINLEVEL_OUTOF10: 5 - MODERATE PAIN
PAINLEVEL_OUTOF10: 7
PAINLEVEL_OUTOF10: 0 - NO PAIN
PAINLEVEL_OUTOF10: 5 - MODERATE PAIN

## 2025-04-21 ASSESSMENT — PAIN DESCRIPTION - LOCATION: LOCATION: HIP

## 2025-04-21 NOTE — CARE PLAN
Problem: Safety - Adult  Goal: Free from fall injury  Outcome: Progressing     Problem: Skin  Goal: Decreased wound size/increased tissue granulation at next dressing change  Outcome: Progressing  Flowsheets (Taken 4/19/2025 0256 by Gissel Vega RN)  Decreased wound size/increased tissue granulation at next dressing change: Protective dressings over bony prominences  Goal: Participates in plan/prevention/treatment measures  Outcome: Progressing  Flowsheets (Taken 4/19/2025 0256 by Gissel Vega RN)  Participates in plan/prevention/treatment measures: Elevate heels  Goal: Prevent/manage excess moisture  Outcome: Progressing  Goal: Prevent/minimize sheer/friction injuries  Outcome: Progressing  Goal: Promote/optimize nutrition  Outcome: Progressing  Goal: Promote skin healing  Outcome: Progressing   The patient's goals for the shift include remain hds, no complications s/p procedure    The clinical goals for the shift include Pt will have controlled pain levels.

## 2025-04-21 NOTE — PROGRESS NOTES
CICU to [facility] Transfer Summary     I:  ICU Admission Reason & Brief ICU Course:    Trina Elias is a 74 y.o. year old female patient admitted on 4/16/2025 with following ICU needs: severe mitral regurgitation s/p TMVR     74 y.o. y/o female with PMH of atrial fibrillation s/p LAAO, VT/VF s/p ICD, Hyperlipidemia , CAD,  PUD, SBO s/p lysis of adhesions, GERD s/p nissen fundoplication, provoked DVT 2018, Rheumatoid arthritis, psoriasis, asthma, chronic anemia, and CKD presents for evaluation of severe, symptomatic progressive mitral regurgitation & HFmrEF admitted for optimization prior to scheduled TMVR.       Pre-operative SULY 3/19/25      - global hypokinesis of the left ventricle with minor regional variations.       - Grade III (restrictive pattern) of LV diastolic filling with an elevated LA pressure      - left ventricular systolic function is mildly decreased, with estimated EF of 40-45%.       - Moderate to severe mitral valve regurgitation. Mild aortic valve regurgitation.        Cardiac Catheterization Procedure (03/19/2025):      Post Procedure Diagnosis: Normal coronaries, normal cardiac output, moderate      pulmonary HTN, elevated PCWP.        Pre-operative TTE 4/16/25      - LV systolic function is mildly decreased, with estimated ejection fraction of 45-50%      - global hypokinesis of the left ventricle with minor regional variations.       - Moderate to severe mitral valve regurgitation. Moderate mitral annular calcification      - Mild aortic valve stenosis. Moderate aortic valve regurgitation.    Was diuresed with IV bumex boluses prior TMVR on 4/17 with Dr. Sanjana Ojeda and later admitted to CICU for closer monitoring post-procedurally.    Patient developed fatigue and orthostatic during session with PT/OT on 4/18 and feel nauseous and pallor. Serial EKG unremarkable. Nausea improved with Zofran and Phenergan. Found to have dropping Hgb to 7.6-7.8 with stabilized hemolysis labs. She received  one unit of PRBC for symptomatic anemia. Post-procedure TTE not concerning for paravalvular leak. LVEF 35-40% post-procedure.    Upon discussion with echo and structural team, felt that the symptoms may be secondary to afterload mismatch after TMVR and symptomatic anemia. CT abdomen w/wo contrast negative for esophagitis or surgical conditions. Positive for colonic dilation with stool. Her nausea and SOB symptoms improve on 4/21/25.     Today, discussion with structural heart team and plan to transfuse PRBC to the goal of 10 g/dL with IV bumex diuresis to keep balance fluid. GDMT with low-dose entresto 12/13 mg bid is started with cautions of orthostatic symptoms. Will continue to adjust GDMT medications prior hospital discharge.     C: Code Status/DPOA Info/Goals of Care/ACP Note    Full Code  DPOA/Contact Number: Martinez Franklin (Spouse) [phone] (Mobile)     U: Unprescribing & Pertinent High-Risk Medications    Changes to home meds:   - aspirin 81 mg daily (plan to drop ASA once INR >1.9)  - clopidogrel 75 mg daily  - warfarin 5 mg daily     Anticoagulation: Yes - Therapeutic Warfarin 5 mg/day    Antibiotics:   [x] N/A - no current planned antimicrobioals    P: Pending Tests at the Time of Transfer   -    A: Active consultants, including Rehab:   [x]  Subspecialty Consultants: cardiology  [x]  PT  [x]  OT  []  SLP  []  Wound Care    U: Uncertainty Measure/Diagnostic Pause:    Working diagnosis at the time of transfer Severe mitral regurgitation with heart failure with reduced EF    Diagnosis Degree of Certainty: 1. High degree of certainty about the clinical diagnosis.     S: Summary of Major Problems and To-Dos:     74 y.o. y/o female with PMH of atrial fibrillation s/p LAAO, VT/VF s/p ICD, Hyperlipidemia , CAD,  PUD, SBO s/p lysis of adhesions, GERD s/p nissen fundoplication, provoked DVT 2018, Rheumatoid arthritis, psoriasis, asthma, chronic anemia, severe MR, caridoMEMs implant and CKD presents for  evaluation of severe, symptomatic progressive mitral regurgitation admitted for  TMVR 4/17 with Dr. Sanjana Ojeda. Planning for GDMT medication adjustment.    #Severe Mitral Regurgitation, s/p repair  #A Fib s/p LAAO  #Hx of VT/V Fib s/p ICD placement  #Heart failure with reduced ejection fraction (LVEF 35-40%)  Management:  Continue digoxin and diltiazem. Continue crestor  Continue Warfarin 5 mg  Continue Bumex 2mg BID  Continue Aspirin and Plavix (will drop Aspirin if INR >1.9)  Start entresto 12/13 mg BID 4/21/25    Per Structural heart team plan:  - Need to be set up with anticoagulation clinic outpatient  - Follow-up with structural heart clinic per [place] trial protocol  - F/w Dr. Soto (primary cardiology) as scheduled or in 6-10 weeks  - F/U with Dr. Fredy Elias, DO in 1-2 weeks  - Patient given lab recs 1 week and Echo rec 1 month          #Asthma  Management: continue  breo ellipta  duonebs PRN  singulair          #CKD (baseline Cr 1.1)  Management:  CTM     #Chronic anemia (baseline hb 8-10), symptomatic    - stabilized hemolysis labs, post-procedure TTE not c/f paravalvular leakage  Management:  As discussed with structural heart team, goal of transfusion to keep Hgb >10    - ordered 1 Unit of PRBC transfusion today with following bumex 1 mg IV     - will likely need 1 additional unit for the total of 2 units to reach Hgb goal  CTM CBC     #Rheumatoid arthritis  #Psoriasis  #Chronic lumbar back pain  Management:  Continue sulfasalazine, plaquenil  Can continue current multi-modal pain regimen on floor including diclofenac, lidocaine patch, tylenol, and oxycodone and morphine prn    E: Exam, including Lines/Drains/Airways & Data Review:     Physical Exam   General: Mild pallor, not ill-appearing, no acute respiratory distress  Cardiac: RRR  Pulm: CTAB, normal work of breathing  GI: soft, non distended  Extremities: trace LE edema   Neuro: no focal neuro deficits, a+ox4  Psych: appropriate mood  and behavior, however anxious  Skin: warm and dry throughout     Intake/Output Summary (Last 24 hours) at 4/21/2025 1010  Last data filed at 4/21/2025 0700      Gross per 24 hour   Intake 1101 ml   Output 1550 ml   Net -449 ml      Labs:          Results from last 72 hours   Lab Units 04/21/25  0438 04/20/25 1726 04/20/25  0417   SODIUM mmol/L 140 136 138   POTASSIUM mmol/L 4.2 3.9 4.0   CHLORIDE mmol/L 106 104 104   CO2 mmol/L 25 25 27   BUN mg/dL 21 18 21   CREATININE mg/dL 1.01 0.92 1.18*   GLUCOSE mg/dL 107* 101* 102*   CALCIUM mg/dL 8.7 8.2* 8.8   ANION GAP mmol/L 13 11 11   EGFR mL/min/1.73m*2 59* 66 49*              Results from last 72 hours   Lab Units 04/21/25  0438 04/20/25 1726 04/20/25  0417 04/19/25  0925   WBC AUTO x10*3/uL 5.8 6.8 6.4 8.3   HEMOGLOBIN g/dL 8.0* 7.7* 8.1* 7.6*   HEMATOCRIT % 26.4* 25.2* 25.9* 26.5*   PLATELETS AUTO x10*3/uL 149* 141* 148* 174   NEUTROS PCT AUTO % 66.9  --  67.2 74.6   LYMPHS PCT AUTO % 17.3  --  16.7 14.5   MONOS PCT AUTO % 11.9  --  13.3 9.7   EOS PCT AUTO % 3.3  --  1.9 0.4           Results from last 72 hours   Lab Units 04/18/25  1809   POCT PH, ARTERIAL pH 7.44*   POCT PCO2, ARTERIAL mm Hg 39   POCT PO2, ARTERIAL mm Hg 125*   POCT SO2, ARTERIAL % 98      Difficult airway? N/A  Lines/drains assessed for removal? No    Ramona Sheriff MD  PGY-1, Internal Medicine/Medical Genetics    Within 30 minutes of the patient physically leaving the floor, a Floor Readiness Note needs to be placed with updated vitals.

## 2025-04-21 NOTE — PROGRESS NOTES
Structural Heart Progress Note    HPI   Trina Elias is a 74 y.o. female with a PMH of atrial fibrillation s/p LAAO, VT/VF s/p ICD, Hyperlipidemia , CAD,  PUD, SBO s/p lysis of adhesions, GERD s/p nissen fundoplication, provoked DVT 2018, Rheumatoid arthritis, psoriasis, asthma, chronic anemia, severe MR, caridoMEMs implant and CKD presents for evaluation of severe, symptomatic progressive mitral regurgitation admitted for optimization prior to scheduled TMVR 4/17 with Dr. Sanjana Ojeda.     Patient Active Problem List    Diagnosis Date Noted    Mitral valve disease 04/16/2025    Severe persistent asthma with acute exacerbation (Multi) 03/03/2025    Shortness of breath 08/08/2024    Constipation 07/28/2024    Acute chest pain 07/24/2024    Atrial fibrillation, unspecified type (Multi) 07/15/2024    Arthritis of left shoulder region 05/02/2024    Chest pain, unspecified type 02/24/2024    Pacemaker 12/06/2023    Anticoagulant long-term use 12/06/2023    Stage 3a chronic kidney disease (Multi) 08/29/2023    Centrilobular emphysema (Multi) 08/28/2023    Coagulation defect, unspecified 08/28/2023    Coronary artery disease involving native coronary artery 06/26/2023    Asthma 06/26/2023    Atrial fibrillation (Multi) 06/26/2023    Myofascial pain 06/26/2023    Cardiac aneurysm 06/26/2023    Cardiac defibrillator in place 06/26/2023    Obstructive sleep apnea 06/26/2023    Central sleep apnea in conditions classified elsewhere 06/26/2023    Cervical post-laminectomy syndrome 06/26/2023    Cervical radiculopathy 06/26/2023    Chronic bilateral low back pain without sciatica 06/26/2023    Chronic right sacroiliac pain 06/26/2023    Colon polyp 06/26/2023    Cubital tunnel syndrome on right 06/26/2023    Displacement of lumbar disc with radiculopathy 06/26/2023    Gastroesophageal reflux disease without esophagitis 06/26/2023    Mixed hyperlipidemia 06/26/2023    Iron deficiency anemia 06/26/2023    Irregular cardiac  rhythm 06/26/2023    Osteoporosis 06/26/2023    Primary osteoarthritis of right shoulder 06/26/2023    Psoriasis 06/26/2023    Rheumatoid arthritis 06/26/2023    Umbilical hernia 06/26/2023    Wide-complex tachycardia 06/26/2023    Hypertensive heart disease with heart failure 06/26/2023    Personal history of other venous thrombosis and embolism 11/02/2020    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/02/2020    Mitral regurgitation 04/02/2025    Chronic systolic heart failure 11/25/2024        LOS: 5 days     Objective     Vitals 24 hour ranges:  Temp:  [36 °C (96.8 °F)-37.4 °C (99.3 °F)] 36.2 °C (97.2 °F)  Heart Rate:  [60-83] 81  Resp:  [16-21] 20  SpO2:  [88 %-99 %] 99 %  Arterial Line BP 1: (100-148)/(45-69) 148/59  Medical Gas Therapy: None (Room air)  Medical Gas Delivery Method: Nasal cannula     Intake/Output last 3 Shifts:    Intake/Output Summary (Last 24 hours) at 4/21/2025 1141  Last data filed at 4/21/2025 0700  Gross per 24 hour   Intake 1000 ml   Output 1400 ml   Net -400 ml       LDA:  Peripheral IV 04/16/25 22 G Anterior;Right Forearm (Active)   Placement Date/Time: 04/16/25 1816   Hand Hygiene Completed: Yes  Size (Gauge): (c) 22 G  Orientation: Anterior;Right  Location: Forearm  Site Prep: Chlorhexidine   Local Anesthetic: None  Technique: Ultrasound guidance  Placed by: yordy Manley..   Number of days: 1       Peripheral IV 04/17/25 16 G Left External Jugular (Active)   Placement Date/Time: 04/17/25 (c) 1519   Size (Gauge): 16 G  Orientation: Left  Location: External Jugular  Site Prep: Alcohol  Technique: Anatomical landmarks  Insertion attempts: 1   Number of days: 0       Arterial Line 04/17/25 Left Radial (Active)   Placement Date/Time: 04/17/25 (c) 1505   Size: 20 G  Orientation: Left  Location: Radial  Securement Method: Transparent dressing  Patient Tolerance: Tolerated well   Number of days: 0       External Urinary Catheter Female (Active)   Placement  Date/Time: 04/17/25 1900   Hand Hygiene Completed: Yes  External Catheter Type: Female   Number of days: 0        Vent settings:       Lab Results:  Recent Results (from the past 48 hours)   Prepare RBC: 1 Units    Collection Time: 04/19/25  3:25 PM   Result Value Ref Range    PRODUCT CODE X3845M10     Unit Number [id number]     Unit ABO O     Unit RH POS     XM INTEP COMP     Dispense Status RE     Blood Expiration Date 5/5/2025 11:59:00 PM EDT     PRODUCT BLOOD TYPE 5100     UNIT VOLUME 350    Magnesium    Collection Time: 04/19/25  5:46 PM   Result Value Ref Range    Magnesium 2.53 (H) 1.60 - 2.40 mg/dL   Comprehensive metabolic panel    Collection Time: 04/19/25  5:46 PM   Result Value Ref Range    Glucose 94 74 - 99 mg/dL    Sodium 138 136 - 145 mmol/L    Potassium 4.1 3.5 - 5.3 mmol/L    Chloride 104 98 - 107 mmol/L    Bicarbonate 27 21 - 32 mmol/L    Anion Gap 11 10 - 20 mmol/L    Urea Nitrogen 18 6 - 23 mg/dL    Creatinine 0.94 0.50 - 1.05 mg/dL    eGFR 64 >60 mL/min/1.73m*2    Calcium 8.8 8.6 - 10.6 mg/dL    Albumin 3.8 3.4 - 5.0 g/dL    Alkaline Phosphatase 61 33 - 136 U/L    Total Protein 5.9 (L) 6.4 - 8.2 g/dL    AST 29 9 - 39 U/L    Bilirubin, Total 0.5 0.0 - 1.2 mg/dL    ALT 13 7 - 45 U/L   Lactate dehydrogenase    Collection Time: 04/19/25  5:46 PM   Result Value Ref Range     (H) 84 - 246 U/L   Haptoglobin    Collection Time: 04/19/25  5:46 PM   Result Value Ref Range    Haptoglobin 70 30 - 200 mg/dL   CBC and Auto Differential    Collection Time: 04/20/25  4:17 AM   Result Value Ref Range    WBC 6.4 4.4 - 11.3 x10*3/uL    nRBC 0.0 0.0 - 0.0 /100 WBCs    RBC 3.57 (L) 4.00 - 5.20 x10*6/uL    Hemoglobin 8.1 (L) 12.0 - 16.0 g/dL    Hematocrit 25.9 (L) 36.0 - 46.0 %    MCV 73 (L) 80 - 100 fL    MCH 22.7 (L) 26.0 - 34.0 pg    MCHC 31.3 (L) 32.0 - 36.0 g/dL    RDW 16.4 (H) 11.5 - 14.5 %    Platelets 148 (L) 150 - 450 x10*3/uL    Neutrophils % 67.2 40.0 - 80.0 %    Immature Granulocytes %,  Automated 0.3 0.0 - 0.9 %    Lymphocytes % 16.7 13.0 - 44.0 %    Monocytes % 13.3 2.0 - 10.0 %    Eosinophils % 1.9 0.0 - 6.0 %    Basophils % 0.6 0.0 - 2.0 %    Neutrophils Absolute 4.29 1.60 - 5.50 x10*3/uL    Immature Granulocytes Absolute, Automated 0.02 0.00 - 0.50 x10*3/uL    Lymphocytes Absolute 1.07 0.80 - 3.00 x10*3/uL    Monocytes Absolute 0.85 (H) 0.05 - 0.80 x10*3/uL    Eosinophils Absolute 0.12 0.00 - 0.40 x10*3/uL    Basophils Absolute 0.04 0.00 - 0.10 x10*3/uL   Magnesium    Collection Time: 04/20/25  4:17 AM   Result Value Ref Range    Magnesium 2.55 (H) 1.60 - 2.40 mg/dL   Protime-INR    Collection Time: 04/20/25  4:17 AM   Result Value Ref Range    Protime 17.2 (H) 9.8 - 12.4 seconds    INR 1.6 (H) 0.9 - 1.1   Comprehensive metabolic panel    Collection Time: 04/20/25  4:17 AM   Result Value Ref Range    Glucose 102 (H) 74 - 99 mg/dL    Sodium 138 136 - 145 mmol/L    Potassium 4.0 3.5 - 5.3 mmol/L    Chloride 104 98 - 107 mmol/L    Bicarbonate 27 21 - 32 mmol/L    Anion Gap 11 10 - 20 mmol/L    Urea Nitrogen 21 6 - 23 mg/dL    Creatinine 1.18 (H) 0.50 - 1.05 mg/dL    eGFR 49 (L) >60 mL/min/1.73m*2    Calcium 8.8 8.6 - 10.6 mg/dL    Albumin 3.7 3.4 - 5.0 g/dL    Alkaline Phosphatase 63 33 - 136 U/L    Total Protein 5.8 (L) 6.4 - 8.2 g/dL    AST 21 9 - 39 U/L    Bilirubin, Total 0.3 0.0 - 1.2 mg/dL    ALT 12 7 - 45 U/L   CBC    Collection Time: 04/20/25  5:26 PM   Result Value Ref Range    WBC 6.8 4.4 - 11.3 x10*3/uL    nRBC 0.0 0.0 - 0.0 /100 WBCs    RBC 3.35 (L) 4.00 - 5.20 x10*6/uL    Hemoglobin 7.7 (L) 12.0 - 16.0 g/dL    Hematocrit 25.2 (L) 36.0 - 46.0 %    MCV 75 (L) 80 - 100 fL    MCH 23.0 (L) 26.0 - 34.0 pg    MCHC 30.6 (L) 32.0 - 36.0 g/dL    RDW 16.9 (H) 11.5 - 14.5 %    Platelets 141 (L) 150 - 450 x10*3/uL   Magnesium    Collection Time: 04/20/25  5:26 PM   Result Value Ref Range    Magnesium 2.21 1.60 - 2.40 mg/dL   Comprehensive metabolic panel    Collection Time: 04/20/25  5:26 PM    Result Value Ref Range    Glucose 101 (H) 74 - 99 mg/dL    Sodium 136 136 - 145 mmol/L    Potassium 3.9 3.5 - 5.3 mmol/L    Chloride 104 98 - 107 mmol/L    Bicarbonate 25 21 - 32 mmol/L    Anion Gap 11 10 - 20 mmol/L    Urea Nitrogen 18 6 - 23 mg/dL    Creatinine 0.92 0.50 - 1.05 mg/dL    eGFR 66 >60 mL/min/1.73m*2    Calcium 8.2 (L) 8.6 - 10.6 mg/dL    Albumin 3.4 3.4 - 5.0 g/dL    Alkaline Phosphatase 61 33 - 136 U/L    Total Protein 5.4 (L) 6.4 - 8.2 g/dL    AST 21 9 - 39 U/L    Bilirubin, Total 0.4 0.0 - 1.2 mg/dL    ALT 12 7 - 45 U/L   Comprehensive metabolic panel    Collection Time: 04/21/25  4:38 AM   Result Value Ref Range    Glucose 107 (H) 74 - 99 mg/dL    Sodium 140 136 - 145 mmol/L    Potassium 4.2 3.5 - 5.3 mmol/L    Chloride 106 98 - 107 mmol/L    Bicarbonate 25 21 - 32 mmol/L    Anion Gap 13 10 - 20 mmol/L    Urea Nitrogen 21 6 - 23 mg/dL    Creatinine 1.01 0.50 - 1.05 mg/dL    eGFR 59 (L) >60 mL/min/1.73m*2    Calcium 8.7 8.6 - 10.6 mg/dL    Albumin 3.5 3.4 - 5.0 g/dL    Alkaline Phosphatase 65 33 - 136 U/L    Total Protein 5.9 (L) 6.4 - 8.2 g/dL    AST 20 9 - 39 U/L    Bilirubin, Total 0.3 0.0 - 1.2 mg/dL    ALT 12 7 - 45 U/L   CBC and Auto Differential    Collection Time: 04/21/25  4:38 AM   Result Value Ref Range    WBC 5.8 4.4 - 11.3 x10*3/uL    nRBC 0.0 0.0 - 0.0 /100 WBCs    RBC 3.48 (L) 4.00 - 5.20 x10*6/uL    Hemoglobin 8.0 (L) 12.0 - 16.0 g/dL    Hematocrit 26.4 (L) 36.0 - 46.0 %    MCV 76 (L) 80 - 100 fL    MCH 23.0 (L) 26.0 - 34.0 pg    MCHC 30.3 (L) 32.0 - 36.0 g/dL    RDW 17.2 (H) 11.5 - 14.5 %    Platelets 149 (L) 150 - 450 x10*3/uL    Neutrophils % 66.9 40.0 - 80.0 %    Immature Granulocytes %, Automated 0.3 0.0 - 0.9 %    Lymphocytes % 17.3 13.0 - 44.0 %    Monocytes % 11.9 2.0 - 10.0 %    Eosinophils % 3.3 0.0 - 6.0 %    Basophils % 0.3 0.0 - 2.0 %    Neutrophils Absolute 3.86 1.60 - 5.50 x10*3/uL    Immature Granulocytes Absolute, Automated 0.02 0.00 - 0.50 x10*3/uL     "Lymphocytes Absolute 1.00 0.80 - 3.00 x10*3/uL    Monocytes Absolute 0.69 0.05 - 0.80 x10*3/uL    Eosinophils Absolute 0.19 0.00 - 0.40 x10*3/uL    Basophils Absolute 0.02 0.00 - 0.10 x10*3/uL       Medications:  Scheduled medications  Scheduled Medications[1]  Continuous medications  Continuous Medications[2]  PRN medications  PRN Medications[3]       Physical Exam:  Constitutional: Well developed, awake/alert/oriented x3, no distress, cooperative  Eyes: PERRL, EOMI, clear sclera  ENMT: mucous membranes moist  Head/Neck: Neck supple  Respiratory/Thorax: Good chest expansion, thorax symmetric,  Right lung sounds \"tight\" with expiratory wheeze, left lung clear   Cardiovascular: Regular rate and rhythm, no murmurs, normal S1 and S2  Gastrointestinal: Nondistended, soft, non-tender, no rebound tenderness or guarding, no masses palpable, no organomegaly, +BS  Extremities: trace BLE edema, no cyanosis, bilat groins c/d/i with dsd no s/s of hematoma  Neurological: alert and oriented x3, intact senses, motor, response and reflexes, generalized weakness  Psychological: Appropriate mood and behavior   Skin: Warm and dry, intact.       Assessment/Plan   Trina E Via is now s/p TMVR with 42mm Intrepid via RCFV on 4/17/25 with Dr. Ojeda and Dr. Anderson.  Final procedural SULY showed mod-severe MR PVL, LVOT gradient peak post PVC 11mmHg invasive and SULY, Iatrogenic ASD closed kept open.   Pt transferred to the CICU for monitoring overnight.    POD 1 - No issues overnight.  Vitals, labs (concerns for hemolysis d/t elevated LDH and down-trending Haptoglobin), neuro assessment, and groins remained stable.  Pt denies CP, SOB, abd/groin pain, numbness or tingling of BLE.  Acute/chronic combined heart failure present as evidence by mild FVO on assessment with trace BLE edema, mostly clear lungs with diminished bases, sats stable on RA, CXR showed mild pulm congestion, with bibasilar atelectasis.  POD 1 EKG - SR 86 (, ).  " No events observed on Tele.  POD 1 ECHO - EF 35-40%, severe LVH, norm RV, mild AS with mild/mod AI, MV gradients 16/6, no MR, mild TR, no LVOTO, triv PC effusion.   Pt stated she was about to walk around the CICU for 2.5 laps, but then become short of breath (sats in the 80s) and dizzy.  After her walk she c/o increased fatigue and had increased O2 requirements.  Originally NYHA 1, but then worsened to class 3-4.    4/19-4/20:  hemolysis labs stable over the weekend.  CTA over the weekend showed no signs of RP bleed, did have some pulmonary edema, no PL effusions.    4/21/25 - POD 4:  Appears mostly euvolemic with no BLE edema, sats stable on RA.  HR/BP stable.  H/H stable but continues to be low, other labs stable.  Hemolysis still stable, T.bili remains normal, so very likely pt not hemolyzing.   Groin sites healing well without issue.  Pt states she is feeling much better today, states she feels better now than she did before her procedure.  NYHA 1-2.    Plan:  - No SULY  - Transfuse 2 units of PRBC (HGB goal of 10), jonatan with diuretics  - Cont ASA and Clopidogrel, drop ASA once INR > 1.9  - Cont Warfarin (INR goal 2.5, range 2.0-3.0 for 6 months)                  - Needs to be set up with Anticoagulation clinic for out-patient management  - Cont to optimize HF regimen  - follow up with Structural Heart clinic per [place] Trial protocol  - f/w Dr. Soto (Primary Cards) as scheduled or in 6-10 weeks  - f/w Fredy Elias, DO in 1-2 weeks  - pt given Lab recs (1 week) and ECHO rec (1 month)  - Structural will continue to follow      D/w Dr. Ojeda and CICU team    I spent 35 minutes in the professional and overall care of this patient.     Jonathan St MSN, [facility]  Acute Care Nurse Practitioner  Structural Heart / TAVR Team  Structural Pager: [phone]  (Nights) ED fellow pager: [phone]          [1] aspirin, 81 mg, oral, Daily  baclofen, 5 mg, oral, BID  bisacodyl, 10 mg, rectal, Once  clopidogrel, 75 mg,  oral, Daily  digoxin, 125 mcg, oral, Daily  dilTIAZem ER, 120 mg, oral, Daily  fluticasone furoate-vilanteroL, 1 puff, inhalation, Daily  furosemide, 20 mg, intravenous, Once  hydroxychloroquine, 300 mg, oral, Daily  ipratropium-albuteroL, 3 mL, nebulization, q12h DWIGHT  lidocaine, 1 patch, transdermal, Daily  lidocaine, 0.1 mL, subcutaneous, Once  montelukast, 10 mg, oral, Nightly  pantoprazole, 40 mg, oral, BID  perflutren lipid microspheres, 0.5-10 mL of dilution, intravenous, Once in imaging  perflutren lipid microspheres, 0.5-10 mL of dilution, intravenous, Once in imaging  polyethylene glycol, 17 g, oral, BID  rosuvastatin, 20 mg, oral, Nightly  sacubitriL-valsartan, 0.5 tablet, oral, BID  sennosides-docusate sodium, 2 tablet, oral, Nightly  sucralfate, 1 g, oral, With meals & nightly  sulfaSALAzine, 1,000 mg, oral, BID  warfarin, 5 mg, oral, Daily     [2]    [3] PRN medications: acetaminophen, benzocaine-menthol, diclofenac sodium, ipratropium-albuteroL, lidocaine-epinephrine, melatonin, morphine, nitroglycerin, ondansetron **OR** ondansetron, ondansetron, oxyCODONE, oxyCODONE, oxygen, promethazine

## 2025-04-21 NOTE — PROGRESS NOTES
"Cardiac Intensive Care - Daily Progress Note   Subjective    Trina Elias is a 74 y.o. year old female patient admitted on 4/16/2025 with following ICU needs: severe mitral regurgitation s/p TMVR    Interval History:  Nausea improving, no vomiting, no abdominal pain. Had one bowel movement last night. Afebrile and hemodynamically stable overnight. No chest pain, dyspnea, lightheadedness, or palpitations.     Meds    Scheduled medications  Scheduled Medications[1]  Continuous medications  Continuous Medications[2]  PRN medications  PRN Medications[3]     Objective    Blood pressure 124/53, pulse 81, temperature 36.2 °C (97.2 °F), temperature source Temporal, resp. rate 20, height 1.676 m (5' 6\"), weight 73.2 kg (161 lb 4.8 oz), SpO2 99%.     Physical Exam   General: Ill appearing, pallor noted  Cardiac: RRR  Pulm: CTAB, normal work of breathing  GI: soft, non distended  Extremities: trace LE edema   Neuro: no focal neuro deficits, a+ox4  Psych: appropriate mood and behavior, however anxious  Skin: warm and dry throughout       Intake/Output Summary (Last 24 hours) at 4/21/2025 1010  Last data filed at 4/21/2025 0700  Gross per 24 hour   Intake 1101 ml   Output 1550 ml   Net -449 ml     Labs:   Results from last 72 hours   Lab Units 04/21/25 0438 04/20/25 1726 04/20/25  0417   SODIUM mmol/L 140 136 138   POTASSIUM mmol/L 4.2 3.9 4.0   CHLORIDE mmol/L 106 104 104   CO2 mmol/L 25 25 27   BUN mg/dL 21 18 21   CREATININE mg/dL 1.01 0.92 1.18*   GLUCOSE mg/dL 107* 101* 102*   CALCIUM mg/dL 8.7 8.2* 8.8   ANION GAP mmol/L 13 11 11   EGFR mL/min/1.73m*2 59* 66 49*      Results from last 72 hours   Lab Units 04/21/25 0438 04/20/25 1726 04/20/25 0417 04/19/25  0925   WBC AUTO x10*3/uL 5.8 6.8 6.4 8.3   HEMOGLOBIN g/dL 8.0* 7.7* 8.1* 7.6*   HEMATOCRIT % 26.4* 25.2* 25.9* 26.5*   PLATELETS AUTO x10*3/uL 149* 141* 148* 174   NEUTROS PCT AUTO % 66.9  --  67.2 74.6   LYMPHS PCT AUTO % 17.3  --  16.7 14.5   MONOS PCT AUTO % " 11.9  --  13.3 9.7   EOS PCT AUTO % 3.3  --  1.9 0.4      Results from last 72 hours   Lab Units 04/18/25  1809   POCT PH, ARTERIAL pH 7.44*   POCT PCO2, ARTERIAL mm Hg 39   POCT PO2, ARTERIAL mm Hg 125*   POCT SO2, ARTERIAL % 98            Micro/ID:     Lab Results   Component Value Date    BLOODCULT  09/02/2023     No Growth at 1 days~No Growth at 2 days~No Growth at 3 days~NO GROWTH at 4 days - FINAL REPORT       Assessment and Plan     74 y.o. y/o female with PMH of atrial fibrillation s/p LAAO, VT/VF s/p ICD, Hyperlipidemia , CAD,  PUD, SBO s/p lysis of adhesions, GERD s/p nissen fundoplication, provoked DVT 2018, Rheumatoid arthritis, psoriasis, asthma, chronic anemia, severe MR, caridoMEMs implant and CKD presents for evaluation of severe, symptomatic progressive mitral regurgitation admitted for  TMVR 4/17 with Dr. Sanjana Ojeda. Transferred to CICU for post-procedural monitoring.       Summary for 04/21/25  :  INR 1.6 today, continue on warfarin 5 mg  Start GDMT: entresto 12/13 mg bid  Discussed with structural heart team; plan to transfuse PRBC with goal of Hgb of 10 g/dL    - will transfuse 1 Unit today with IV lasix 20 mg during the transfusion  Plan transfer to floor if stable hemodynamics     CARDIOVASCULAR:  #Severe Mitral Regurgitation, s/p repair  #A Fib s/p LAAO  #Hx of VT/V Fib s/p ICD placement  Management:  Continue digoxin and diltiazem. Continue crestor  Continue Warfarin 5 mg  Continue Bumex 2mg BID  Continue Aspirin and Plavix  Start emtresto 12/13 mg BID 4/21/25     PULMONARY:  Dx:  Asthma  Management:  breo ellipta  duonebs PRN  singulair     RENAL/GENITOURINARY:  Dx:  CKD (baseline Cr 1.1)  Management:  CTM     HEMATOLOGY:  Dx:  Chronic anemia (baseline hb 8-10)  Management:  CTM     MUSCULOSKELETAL:  Dx:  Rheumatoid arthritis  Psoriasis             Chronic Lumbar Back Pain  Management:  Continue sulfasalazine, plaquenil  Can continue current multi-modal pain regimen on floor  including diclofenac, lidocaine patch, tylenol, and oxycodone and morphine prn    ICU Check List     FEN:  Fluids: PO  Electrolytes: PRN  Nutrition: Cardiac diet  Prophylaxis:  DVT ppx: Warfarin  Bowel care: Miralax,, senna-docusate sodium  Social:  Code: Full Code    HPOA: Martinez Franklin (Spouse)  [phone] (Mobile)   Disposition: BLANQUITA Sheriff MD   04/21/25 at 10:10 AM          [1] aspirin, 81 mg, oral, Daily  baclofen, 5 mg, oral, BID  bisacodyl, 10 mg, rectal, Once  clopidogrel, 75 mg, oral, Daily  digoxin, 125 mcg, oral, Daily  dilTIAZem ER, 120 mg, oral, Daily  fluticasone furoate-vilanteroL, 1 puff, inhalation, Daily  hydroxychloroquine, 300 mg, oral, Daily  ipratropium-albuteroL, 3 mL, nebulization, q12h DWIGHT  lidocaine, 1 patch, transdermal, Daily  lidocaine, 0.1 mL, subcutaneous, Once  montelukast, 10 mg, oral, Nightly  pantoprazole, 40 mg, oral, BID  perflutren lipid microspheres, 0.5-10 mL of dilution, intravenous, Once in imaging  perflutren lipid microspheres, 0.5-10 mL of dilution, intravenous, Once in imaging  polyethylene glycol, 17 g, oral, BID  rosuvastatin, 20 mg, oral, Nightly  sacubitriL-valsartan, 0.5 tablet, oral, BID  sennosides-docusate sodium, 2 tablet, oral, Nightly  sucralfate, 1 g, oral, With meals & nightly  sulfaSALAzine, 1,000 mg, oral, BID  warfarin, 5 mg, oral, Daily     [2]    [3] PRN medications: acetaminophen, benzocaine-menthol, diclofenac sodium, ipratropium-albuteroL, lidocaine-epinephrine, melatonin, morphine, nitroglycerin, ondansetron **OR** ondansetron, ondansetron, oxyCODONE, oxyCODONE, oxygen, promethazine

## 2025-04-21 NOTE — RESEARCH NOTES
[facility] AE form    Subject ID: [id number]  AE start date: 18 April 2025    What is the relative time from the procedure?    [] Pre-procedure       [] During procedure        [x] Post-procedure     Date of site awareness: 21 apr 2025    AE term/diagnosis: anemia    Event description   ON aPRIL 18, 2025, HER HGB dropped to 7.9. She felt dyspneic and look pallor. So received  1 unit PRBC               Is the report any of the following:   [x] None   [] Bleeding event (complete bleeding event questions)  [] Neuro event (complete neurological event questions)  [] Thrombosis/Embolism    Bleeding Event (delete if not applicable)   What was the area of bleeding -- Choose only 1    [] Chest tube  [] Intracranial  [] Pericardial  [] Compartment Syndrome  [] Intraocular  [] Unidentified  [] GI Bleed  [] Intraspinal  [] Other, specify:     Did the bleeding lead to:  []Hypovolemic shock  []Severe Hypotension  [] None  Did the subject have a chest tube:    If yes, date chest tube removed:    Time chest tube removed:   Total drainage from arrival in ICU to removal:  cc  Measurement Result (g/dL) Date of test (dd-mmm-yyyy)    Hgb at time of admission      Domingo Hgb      Hgb at time of d/c       Neurological Event (delete if not applicable)   Type of Event -- Choose only 1    [] Confirmed TIA  [] Confirmed Stroke  [] Hemorrhagic  [] Ischemic  [] Unknown  [] Confirmed not a stroke or TIA  [] Undetermined  What was the duration of longest deficit?     Antithrombotic Therapy questions following are only required for events marked as Bleeding event, Neuro event, and/or Thrombosis/Embolism :   Is patient on warfarin?     If yes, what was the INR at the time of the event (if n/a, most recent INR to the event)   Result Date of test (dd-mmm-yyyy)         Antithrombotic therapy at time of the event:      If yes, specify:         Other anticoagulants, specify:      Other antiplatelets, specify:  Action taken/ treatment    Were any diagnostic  actions taken? Yes      Diagnostic test Result  Date of test (dd-mmm-yyyy)   hgb 7.9 g/dl 18 April 2025                 Did the AE result in hospitalization?    Did the AE result in a prolongation (>24 hours) of an existing hospitalization?     If yes, what is underlying cause of hospitalization?     []HF hospitalization  []Other cardiovascular hospitalization  []Non-cardiac hospitalization  NOTE: Ensure that a Healthcare Utilization CRF is completed for every new admission/ hospitalization.     Did the AE result in any treatment? Yes    Was the AE treated with a blood transfusion of RBCs or whole blood? Yes    If yes, what are the number of units of RBCs or whole blood given: 2   Was concomitant or additional medication given? No    If yes:     Was medication given intravenously? No     Is the subject on IV Hemodynamic Support Medication? No  (e.g. inotropes, pressors)  Did the event result in any change in Heart Failure medication to treat Heart Failure? No   (NOTE: If Yes, please comp the Heart Failure Management AE CRF)  Was the AE treated with a permanent pacemaker device? No  If yes, Date of implant:   Specify type: []CRT-D       []CRT-P       []ICD       []Pacemaker  MDT (device) or other?   MDT device? specify serial number:   Other? Specify :  Was AE treated with ultrafiltration (e.g. ECMO)? No  Was the AE treated with hemodynamic assist device? No  (e.g. BVAD, IABP, LVAD)   Was the AE treated with a percutaneous intervention? No  If yes, date of intervention:   Was the intervention on the coronary arteries (ie, PCI) performed? No  Was the intervention on the mitral valve? No  Procedure  [] Balloon dilation of the TMVR   [] Implantation of another TMVR, valve-in-valve   If Valve-in-Valve, specify device type   If Valve-in-Valve, specify device size    (mm)   [] Placement of the vascular plug for paravalvular leak   [] Repair or alteration of the TMVR valve, TMVR valve preserved in place    []Other, describe:    Clinical indication for procedure (check all that apply)    []Device migration  [] Frame fracture  [] Hemolysis   []Paravalvular regurgitation []Prosthetic valve/repair endocarditis  []Prosthetic valve/repair stenosis []Transvalvular regurgitation  []Other, describe:  Was the AE treated with a surgical procedure No  If Yes, Date of intervention:  Was the intervention on the coronary arteries (i.e., CABG) performed No  Was the intervention on the mitral valve No     Procedure   [] Explant and replacement of the mitral device   [] Repair or alteration of the mitral device, mitral device preserved in place   [] Other, describe:     Clinical indication for procedure (check all that apply)   []Device migration  [] Frame fracture  [] Hemolysis   []Paravalvular regurgitation []Prosthetic valve/repair endocarditis  []Prosthetic valve/repair stenosis []Transvalvular regurgitation  []Other, describe:      Was the AE treated in another manner?No  If Yes, what other action was taken in response to the AE:       Did the AE meet any of the following criteria (a single yes response defines this event as an MADY)   Led to death No  Led to serious deterioration in the health of the subject that resulted in:   Life threatening illness or injury No   A permanent impairment of a body structure or body function, including chronic diseases No   In-patient or prolonged hospitalization No  NOTE: If new hospitalization, be sure to complete the Healthcare Utilization CRF. This does not include Index Procedure admission.   Medical or surgical intervention to prevent life threatening illness or injury or permanent impairment to a body structure or a body function Yes  Led to fetal distress, foetal death or congenital abnormality or birth defect No    Relationship    Note: Please reference CIP Section Classification of Causal Relationships for all relationship definitions   Not related The relationship to the TMVR, DCS,  crimper, pre?crimp tool, mitralvalve surgical implant or TMVR / surgical mitral valve implant procedure can be excluded when:    The event is not a known side effect of the product category the TMVR, DCS, crimper, pre?crimp tool or surgical mitral valve implant belongs to or of similar devices;    The event has no temporal relationship with the TMVR, DCS, crimper, pre?crimp tool, mitral valve surgical implant or TMVR/surgical mitral valve implant procedure    The event does not follow a known response pattern to the TMVR, DCS, crimper, pre?crimp tool, mitral valve surgical implant or TMVR / surgical mitral valve implant procedure and is biologically implausible;    The event involves a body?site or an organ not expected     In order to establish non?relatedness, not all the criteria listed above might be met at the same time   Unlikely to be related The relationship to the TMVR, DCS, crimper, pre?crimp tool, mitral valve surgical implant or TMVR / surgical mitral valve implant procedure seems not relevant and/or the event can be reasonably explained by another cause, but additional information may be obtained.   Possibly related The relationship to the TMVR, DCS, crimper, pre?crimp tool, mitral valve surgical implant or TMVR / surgical mitral valve implant procedure is weak but cannot be ruled out completely. Alternative causes are also possible (e.g., an underlying or concurrent illness/clinical condition or/and an effect of another device, drug or treatment). Cases were relatedness cannot be assessed or no information has been obtained should also be classified as possible.   Probably related The relationship to the TMVR, DCS, crimper, pre?crimp tool, mitral valve surgical implant or TMVR / surgical mitral valve implant procedure seems relevant and/or the event cannot reasonably explained by another cause, but additional information may be obtained   Causal relationship The event is associated to the T TMVR, DCS,  crimper, pre?crimp tool, mitral valve surgical implant or TMVR / surgical mitral valve implant procedure beyond reasonable doubt when:    the event is a known side effect of the product category the TMVR, DCS, crimper, pre-crimp tool or surgical mitral valve implant belongs to or of similar devices;    the event has a temporal relationship with investigational device use/application or procedures;    the event involves a body-site or organ that o the TMVR, DCS or surgical implant is applied to;   o the TMVR, DCS or surgical implant has an effect on;      the event follows a known response pattern to the TMVR, DCS or surgical implant;    other possible causes (e.g., an underlying or concurrent illness/clinical condition or an effect of another device, drug, or treatment) have been adequately ruled out    harm to the subject is due to error in use     In order to establish relatedness, not all the criteria listed above might be met at the same time.    Is the AE related to the Implant Procedure  []Not Related   [x]Unlikely   []Possible   []Probable   []Causal Relationship    Is the AE related to TMVR Valve  [x]Not Related   []Unlikely   []Possible   []Probable   []Causal Relationship    Is the AE related to the Delivery Catheter System (TMVR)  [x]Not Related   []Unlikely   []Possible   []Probable   []Causal Relationship      Is the AE related to the Crimper (TMVR)   NOTE: Crimper is equivalent to the Funnel Loading System  [x]Not Related   []Unlikely   []Possible   []Probable   []Causal Relationship    Is the AE related to the Pre-Crimp Tool (TMVR)   NOTE: Pre-Crimp Tool is equivalent to the Funnel Loading Tool   If Pre-crimp tool was not used, Not Related should be selected  [x]Not Related   []Unlikely   []Possible   []Probable   []Causal Relationship    Is the AE related to the Surgical Device (SMVR)   Note: For v8.0, select Not Related, unless subject was converted to open heart surgery. If subject was converted,  "select appropriate AE relatedness to the surgical device.  [x]Not Related   []Unlikely   []Possible   []Probable   []Causal Relationship      What was the final outcome of this AE   []Fatal []Not recovered/ Not resolved []Recovered/Resolved   [] Recovered/Resolved with Sequelae   [x] Recovering/Resolving  [] Unknown  *Complete AE end date if \"Fatal,\" \"Recovered/Resolved,\" or \"Recovered/Resolved with Sequelae\" are checked above.     What date did the AE end:   "

## 2025-04-21 NOTE — PROGRESS NOTES
"Physical Therapy    Physical Therapy Treatment    Patient Name: Trina SALDANA Via \"Marcela\"  MRN: [id number]  Department: [facility]  Room: 09/09-A  Today's Date: 4/21/2025  Time Calculation  Start Time: 0938  Stop Time: 1016  Time Calculation (min): 38 min         Assessment/Plan   PT Assessment  Barriers to Discharge Home: No anticipated barriers  Evaluation/Treatment Tolerance: Patient tolerated treatment well  End of Session Communication: Bedside nurse  End of Session Patient Position: Up in chair, Alarm off, not on at start of session  PT Plan  Inpatient/Swing Bed or Outpatient: Inpatient  PT Plan  Treatment/Interventions: Bed mobility, Transfer training, Gait training, Balance training, Strengthening, Endurance training, Range of motion, Therapeutic exercise, Therapeutic activity, Home exercise program, Positioning  PT Plan: Ongoing PT  PT Frequency: 3 times per week  PT Discharge Recommendations: Low intensity level of continued care (outpatient PT)  PT Recommended Transfer Status: Assist x1  PT - OK to Discharge: Yes      General Visit Information:   PT  Visit  PT Received On: 04/21/25  General  Family/Caregiver Present: No  Prior to Session Communication: Bedside nurse  Patient Position Received: Up in chair, Alarm off, not on at start of session  Preferred Learning Style: auditory, verbal  General Comment: Pt was agreeable to therapy this date. Stated they were \"feeling better today\" and ready to walk.    Subjective   Precautions:  Precautions  Medical Precautions: Cardiac precautions, Fall precautions    Lines/Tubes:   Telemetry   Arterial Line        Vital Signs     Vitals Session Pre PT During PT Post PT   Heart Rate 67  65   SpO2 98%  99%   /56  150/45            Objective   Pain:  Pain Assessment  Pain Assessment: 0-10  0-10 (Numeric) Pain Score: 5 - Moderate pain  Pain Location:  (low back)  Cognition:  Cognition  Overall Cognitive Status: Within Functional Limits  Arousal/Alertness: Appropriate " "responses to stimuli  Orientation Level: Oriented X4  Following Commands: Follows one step commands without difficulty  Coordination:  Movements are Fluid and Coordinated: Yes  Postural Control:  Postural Control  Postural Control: Within Functional Limits  Static Sitting Balance  Static Sitting-Balance Support: Bilateral upper extremity supported, Feet supported  Static Sitting-Level of Assistance: Close supervision  Dynamic Sitting Balance  Dynamic Sitting-Balance Support: Feet supported  Dynamic Sitting-Level of Assistance: Close supervision  Static Standing Balance  Static Standing-Balance Support: No upper extremity supported  Static Standing-Level of Assistance: Contact guard (x1)  Dynamic Standing Balance  Dynamic Standing-Balance Support: No upper extremity supported  Dynamic Standing-Level of Assistance: Contact guard (x1)    Activity Tolerance:  Activity Tolerance  Endurance: Tolerates 10 - 20 min exercise with multiple rests  Early Mobility/Exercise Safety Screen: Proceed with mobilization - No exclusion criteria met  Activity Tolerance Comments: Pt demonstrated some \"fuzzy\" symtpoms that pt reports has been present at baseline at home. Pt reported that these symtpoms would occur at times throughout the day for the last 2 months (reported 90s/30s with BP taken at home). RN was made aware.  Treatments:  Therapeutic Activity  Therapeutic Activity Performed: Yes  Therapeutic Activity 1: pt completed 10 minutes total sitting in chair in close supervision while vitals were monitored throughout session for seated rest breaks.  Therapeutic Activity 2: Pt completed a total of 5 repeated STS from chair with close supervision x1. Cues provided for proper hand placement during transfers.    Ambulation/Gait Training  Ambulation/Gait Training Performed: Yes  Ambulation/Gait Training 1  Surface 1: Level tile  Device 1: No device  Assistance 1: Contact guard (x1)  Quality of Gait 1: Inconsistent stride length, Forward " "flexed posture  Comments/Distance (ft) 1: pt ambulated ~140ft with one standing break about half way for 10 sec as pt reported feeling \"fuzzy\". Continued ambulation once symtpoms subsided. SBP started in 140s at start of walk and went to 110s at end of walk. Returned to baseline after sitting.  Ambulation/Gait Training 2  Surface 2: Level tile  Device 2: No device  Assistance 2: Contact guard (x1)  Quality of Gait 2: Inconsistent stride length, Forward flexed posture  Comments/Distance (ft) 2: Pt ambulated ~110ft. Cues for breathing with upright posture. SBP 110s during but pt reported no symptoms.  Transfers  Transfer: Yes  Transfer 1  Technique 1: Sit to stand, Stand to sit  Transfer Level of Assistance 1: Contact guard (x1)  Trials/Comments 1: Completed two trials with stable vitals. Demonstrated increased time for full stand with grimacing 2/2 low back pain    Outcome Measures:  [facility] Basic Mobility  Turning from your back to your side while in a flat bed without using bedrails: A little  Moving from lying on your back to sitting on the side of a flat bed without using bedrails: A little  Moving to and from bed to chair (including a wheelchair): A little  Standing up from a chair using your arms (e.g. wheelchair or bedside chair): A little  To walk in hospital room: A little  Climbing 3-5 steps with railing: A lot  Basic Mobility - Total Score: 17    Confusion Assessment Method-ICU (CAM-ICU)  Feature 1: Acute Onset or Fluctuating Course: Negative  Overall CAM-ICU: Negative    FSS-ICU  Ambulation: Walks >/ or equal to 150 feet with minimal assistance x1  Rolling: Supervision or set-up only  Sitting: Supervision or set-up only  Transfer Sit-to-Stand: Minimal assistance (performs 75% or more of task)  Transfer Supine-to-Sit: Supervision or set-up only  Total Score: 23      Early Mobility/Exercise Safety Screen: Proceed with mobilization - No exclusion criteria met  ICU Mobility Scale: Walking with assistance of 1 " person [8]    Education Documentation  Body Mechanics, taught by FACUNDO Maria at 4/21/2025 12:43 PM.  Learner: Patient  Readiness: Acceptance  Method: Explanation  Response: Demonstrated Understanding, Needs Reinforcement  Comment: mobility precautions and hand placement during transfers    Mobility Training, taught by FACUNDO Maria at 4/21/2025 12:43 PM.  Learner: Patient  Readiness: Acceptance  Method: Explanation  Response: Demonstrated Understanding, Needs Reinforcement  Comment: mobility precautions and hand placement during transfers    Education Comments  No comments found.      OP EDUCATION:       Encounter Problems       Encounter Problems (Active)       Balance       Patient will score a >22/30 on FGA for improved balance and decreased risk of falls.  (Progressing)       Start:  04/18/25    Expected End:  05/02/25               Mobility       Patient will ambulate >600ft during a 6 minute walk test with close supervision and LRAD with no rest breaks required.  (Progressing)       Start:  04/18/25    Expected End:  05/02/25               PT Transfers       Patient will perform bed mobility with distant supervision and no cueing for improved independence (Progressing)       Start:  04/18/25    Expected End:  05/02/25            Patient will transfer sit to and from stand with distant supervision and LRAD for improved independence.  (Progressing)       Start:  04/18/25    Expected End:  05/02/25               Pain - Adult

## 2025-04-22 ENCOUNTER — PHARMACY VISIT (OUTPATIENT)
Dept: PHARMACY | Facility: CLINIC | Age: 75
End: 2025-04-22
Payer: COMMERCIAL

## 2025-04-22 VITALS
WEIGHT: 163.36 LBS | HEART RATE: 85 BPM | SYSTOLIC BLOOD PRESSURE: 102 MMHG | RESPIRATION RATE: 16 BRPM | DIASTOLIC BLOOD PRESSURE: 65 MMHG | OXYGEN SATURATION: 96 % | BODY MASS INDEX: 26.25 KG/M2 | HEIGHT: 66 IN | TEMPERATURE: 97.3 F

## 2025-04-22 PROBLEM — Z79.01 ANTICOAGULATED ON COUMADIN: Status: ACTIVE | Noted: 2025-04-22

## 2025-04-22 PROBLEM — I34.0 MITRAL REGURGITATION: Status: RESOLVED | Noted: 2025-04-02 | Resolved: 2025-04-22

## 2025-04-22 PROBLEM — I50.42 CHRONIC COMBINED SYSTOLIC (CONGESTIVE) AND DIASTOLIC (CONGESTIVE) HEART FAILURE: Status: ACTIVE | Noted: 2025-04-22

## 2025-04-22 PROBLEM — Z95.2 S/P TRANSCATHETER MITRAL VALVE REPLACEMENT (TMVR): Status: ACTIVE | Noted: 2025-04-22

## 2025-04-22 PROBLEM — I05.9 MITRAL VALVE DISEASE: Status: RESOLVED | Noted: 2025-04-16 | Resolved: 2025-04-22

## 2025-04-22 LAB
ALBUMIN SERPL BCP-MCNC: 3.5 G/DL (ref 3.4–5)
ALP SERPL-CCNC: 68 U/L (ref 33–136)
ALT SERPL W P-5'-P-CCNC: 13 U/L (ref 7–45)
ANION GAP SERPL CALC-SCNC: 15 MMOL/L (ref 10–20)
AST SERPL W P-5'-P-CCNC: 21 U/L (ref 9–39)
BILIRUB SERPL-MCNC: 0.5 MG/DL (ref 0–1.2)
BUN SERPL-MCNC: 16 MG/DL (ref 6–23)
CALCIUM SERPL-MCNC: 8.3 MG/DL (ref 8.6–10.6)
CHLORIDE SERPL-SCNC: 106 MMOL/L (ref 98–107)
CO2 SERPL-SCNC: 23 MMOL/L (ref 21–32)
CREAT SERPL-MCNC: 0.94 MG/DL (ref 0.5–1.05)
EGFRCR SERPLBLD CKD-EPI 2021: 64 ML/MIN/1.73M*2
ERYTHROCYTE [DISTWIDTH] IN BLOOD BY AUTOMATED COUNT: 17.1 % (ref 11.5–14.5)
GLUCOSE SERPL-MCNC: 112 MG/DL (ref 74–99)
HCT VFR BLD AUTO: 33.7 % (ref 36–46)
HGB BLD-MCNC: 10.1 G/DL (ref 12–16)
INR PPP: 2.9 (ref 0.9–1.1)
MAGNESIUM SERPL-MCNC: 2.25 MG/DL (ref 1.6–2.4)
MCH RBC QN AUTO: 23.3 PG (ref 26–34)
MCHC RBC AUTO-ENTMCNC: 30 G/DL (ref 32–36)
MCV RBC AUTO: 78 FL (ref 80–100)
NRBC BLD-RTO: 0 /100 WBCS (ref 0–0)
PLATELET # BLD AUTO: 166 X10*3/UL (ref 150–450)
POTASSIUM SERPL-SCNC: 4.1 MMOL/L (ref 3.5–5.3)
PROT SERPL-MCNC: 5.8 G/DL (ref 6.4–8.2)
PROTHROMBIN TIME: 32.4 SECONDS (ref 9.8–12.4)
RBC # BLD AUTO: 4.34 X10*6/UL (ref 4–5.2)
SODIUM SERPL-SCNC: 140 MMOL/L (ref 136–145)
WBC # BLD AUTO: 6.3 X10*3/UL (ref 4.4–11.3)

## 2025-04-22 PROCEDURE — 2500000002 HC RX 250 W HCPCS SELF ADMINISTERED DRUGS (ALT 637 FOR MEDICARE OP, ALT 636 FOR OP/ED)

## 2025-04-22 PROCEDURE — 2500000001 HC RX 250 WO HCPCS SELF ADMINISTERED DRUGS (ALT 637 FOR MEDICARE OP)

## 2025-04-22 PROCEDURE — 94640 AIRWAY INHALATION TREATMENT: CPT

## 2025-04-22 PROCEDURE — RXMED WILLOW AMBULATORY MEDICATION CHARGE

## 2025-04-22 PROCEDURE — 85610 PROTHROMBIN TIME: CPT

## 2025-04-22 PROCEDURE — 2500000002 HC RX 250 W HCPCS SELF ADMINISTERED DRUGS (ALT 637 FOR MEDICARE OP, ALT 636 FOR OP/ED): Mod: JZ

## 2025-04-22 PROCEDURE — 99239 HOSP IP/OBS DSCHRG MGMT >30: CPT | Performed by: NURSE PRACTITIONER

## 2025-04-22 PROCEDURE — 36415 COLL VENOUS BLD VENIPUNCTURE: CPT

## 2025-04-22 PROCEDURE — 84075 ASSAY ALKALINE PHOSPHATASE: CPT

## 2025-04-22 PROCEDURE — 85027 COMPLETE CBC AUTOMATED: CPT

## 2025-04-22 PROCEDURE — 83735 ASSAY OF MAGNESIUM: CPT

## 2025-04-22 RX ORDER — ACETAMINOPHEN 325 MG/1
650 TABLET ORAL EVERY 6 HOURS PRN
COMMUNITY
Start: 2025-04-22

## 2025-04-22 RX ORDER — WARFARIN 1 MG/1
TABLET ORAL
Qty: 30 TABLET | Refills: 1 | Status: SHIPPED | OUTPATIENT
Start: 2025-04-22 | End: 2025-04-24 | Stop reason: SDUPTHER

## 2025-04-22 RX ORDER — WARFARIN 1 MG/1
1 TABLET ORAL DAILY
Status: DISCONTINUED | OUTPATIENT
Start: 2025-04-22 | End: 2025-04-22 | Stop reason: HOSPADM

## 2025-04-22 RX ADMIN — CLOPIDOGREL BISULFATE 75 MG: 75 TABLET, FILM COATED ORAL at 08:41

## 2025-04-22 RX ADMIN — ASPIRIN 81 MG: 81 TABLET, COATED ORAL at 08:41

## 2025-04-22 RX ADMIN — FLUTICASONE FUROATE AND VILANTEROL TRIFENATATE 1 PUFF: 100; 25 POWDER RESPIRATORY (INHALATION) at 08:02

## 2025-04-22 RX ADMIN — SUCRALFATE 1 G: 1 SUSPENSION ORAL at 12:30

## 2025-04-22 RX ADMIN — SULFASALAZINE 1000 MG: 500 TABLET ORAL at 08:41

## 2025-04-22 RX ADMIN — SUCRALFATE 1 G: 1 SUSPENSION ORAL at 08:42

## 2025-04-22 RX ADMIN — DIGOXIN 125 MCG: 125 TABLET ORAL at 08:41

## 2025-04-22 RX ADMIN — IPRATROPIUM BROMIDE AND ALBUTEROL SULFATE 3 ML: .5; 3 SOLUTION RESPIRATORY (INHALATION) at 08:02

## 2025-04-22 RX ADMIN — SACUBITRIL AND VALSARTAN 0.5 TABLET: 24; 26 TABLET, FILM COATED ORAL at 08:41

## 2025-04-22 RX ADMIN — HYDROXYCHLOROQUINE SULFATE 300 MG: 200 TABLET, FILM COATED ORAL at 08:41

## 2025-04-22 RX ADMIN — BACLOFEN 5 MG: 5 TABLET ORAL at 08:41

## 2025-04-22 RX ADMIN — PANTOPRAZOLE SODIUM 40 MG: 40 TABLET, DELAYED RELEASE ORAL at 06:40

## 2025-04-22 NOTE — DISCHARGE SUMMARY
STRUCTURAL HEART INPATIENT DISCHARGE SUMMARY    BRIEF OVERVIEW  Admitting Provider: Sully Keenan PA-C  Discharge Provider: Tad Ojeda MD  Primary Care Physician at Discharge: Fredy Elias [phone]     Admission Date: 4/16/2025     Discharge Date: 4/22/2025    Primary Discharge Diagnosis  Mitral regurgitation    Discharge Disposition  Home  Code Status at Discharge: Full    Active Issues Requiring Follow-up  Warfarin Management    Outpatient Follow-Up  Future Appointments   Date Time Provider Department Center   4/25/2025  1:30 PM  KIA [id number] [id number] [facility]   5/6/2025 11:00 AM Juanita Escalante PA-C [id number] [facility]   5/8/2025  9:00 AM Amie Pimentel MD [id number] [facility]   5/28/2025 10:00 AM [facility] CA[facility] CT 1 [id number] [facility]   5/28/2025 10:30 AM [facility] ECHO 1 [id number] [facility]   5/28/2025 11:45 AM Tad Ojeda MD [id number] [facility]   7/14/2025  3:20 PM Raymond Coburn MD [id number] [facility]   9/8/2025  8:30 AM Fredy Elias DO [id number] [facility]   10/22/2025 10:00 AM [facility] ECHO 3 [id number] [facility]   10/22/2025 11:00 AM Tad Ojeda MD [id number] [facility]       Recent Results (from the past 48 hours)   CBC    Collection Time: 04/20/25  5:26 PM   Result Value Ref Range    WBC 6.8 4.4 - 11.3 x10*3/uL    nRBC 0.0 0.0 - 0.0 /100 WBCs    RBC 3.35 (L) 4.00 - 5.20 x10*6/uL    Hemoglobin 7.7 (L) 12.0 - 16.0 g/dL    Hematocrit 25.2 (L) 36.0 - 46.0 %    MCV 75 (L) 80 - 100 fL    MCH 23.0 (L) 26.0 - 34.0 pg    MCHC 30.6 (L) 32.0 - 36.0 g/dL    RDW 16.9 (H) 11.5 - 14.5 %    Platelets 141 (L) 150 - 450 x10*3/uL   Magnesium    Collection Time: 04/20/25  5:26 PM   Result Value Ref Range    Magnesium 2.21 1.60 - 2.40 mg/dL   Comprehensive metabolic panel    Collection Time: 04/20/25  5:26 PM   Result Value Ref Range    Glucose 101 (H) 74 - 99 mg/dL    Sodium 136 136 - 145 mmol/L    Potassium 3.9 3.5 - 5.3 mmol/L    Chloride 104 98 - 107 mmol/L    Bicarbonate  25 21 - 32 mmol/L    Anion Gap 11 10 - 20 mmol/L    Urea Nitrogen 18 6 - 23 mg/dL    Creatinine 0.92 0.50 - 1.05 mg/dL    eGFR 66 >60 mL/min/1.73m*2    Calcium 8.2 (L) 8.6 - 10.6 mg/dL    Albumin 3.4 3.4 - 5.0 g/dL    Alkaline Phosphatase 61 33 - 136 U/L    Total Protein 5.4 (L) 6.4 - 8.2 g/dL    AST 21 9 - 39 U/L    Bilirubin, Total 0.4 0.0 - 1.2 mg/dL    ALT 12 7 - 45 U/L   Comprehensive metabolic panel    Collection Time: 04/21/25  4:38 AM   Result Value Ref Range    Glucose 107 (H) 74 - 99 mg/dL    Sodium 140 136 - 145 mmol/L    Potassium 4.2 3.5 - 5.3 mmol/L    Chloride 106 98 - 107 mmol/L    Bicarbonate 25 21 - 32 mmol/L    Anion Gap 13 10 - 20 mmol/L    Urea Nitrogen 21 6 - 23 mg/dL    Creatinine 1.01 0.50 - 1.05 mg/dL    eGFR 59 (L) >60 mL/min/1.73m*2    Calcium 8.7 8.6 - 10.6 mg/dL    Albumin 3.5 3.4 - 5.0 g/dL    Alkaline Phosphatase 65 33 - 136 U/L    Total Protein 5.9 (L) 6.4 - 8.2 g/dL    AST 20 9 - 39 U/L    Bilirubin, Total 0.3 0.0 - 1.2 mg/dL    ALT 12 7 - 45 U/L   CBC and Auto Differential    Collection Time: 04/21/25  4:38 AM   Result Value Ref Range    WBC 5.8 4.4 - 11.3 x10*3/uL    nRBC 0.0 0.0 - 0.0 /100 WBCs    RBC 3.48 (L) 4.00 - 5.20 x10*6/uL    Hemoglobin 8.0 (L) 12.0 - 16.0 g/dL    Hematocrit 26.4 (L) 36.0 - 46.0 %    MCV 76 (L) 80 - 100 fL    MCH 23.0 (L) 26.0 - 34.0 pg    MCHC 30.3 (L) 32.0 - 36.0 g/dL    RDW 17.2 (H) 11.5 - 14.5 %    Platelets 149 (L) 150 - 450 x10*3/uL    Neutrophils % 66.9 40.0 - 80.0 %    Immature Granulocytes %, Automated 0.3 0.0 - 0.9 %    Lymphocytes % 17.3 13.0 - 44.0 %    Monocytes % 11.9 2.0 - 10.0 %    Eosinophils % 3.3 0.0 - 6.0 %    Basophils % 0.3 0.0 - 2.0 %    Neutrophils Absolute 3.86 1.60 - 5.50 x10*3/uL    Immature Granulocytes Absolute, Automated 0.02 0.00 - 0.50 x10*3/uL    Lymphocytes Absolute 1.00 0.80 - 3.00 x10*3/uL    Monocytes Absolute 0.69 0.05 - 0.80 x10*3/uL    Eosinophils Absolute 0.19 0.00 - 0.40 x10*3/uL    Basophils Absolute 0.02 0.00 -  0.10 x10*3/uL   Prepare RBC: 1 Units    Collection Time: 04/21/25 11:29 AM   Result Value Ref Range    PRODUCT CODE G6085D87     Unit Number [id number]     Unit ABO O     Unit RH POS     XM INTEP COMP     Dispense Status IS     Blood Expiration Date 5/1/2025 11:59:00 PM EDT     PRODUCT BLOOD TYPE 5100     UNIT VOLUME 350    Type and screen    Collection Time: 04/21/25 11:50 AM   Result Value Ref Range    ABO TYPE A     Rh TYPE POS     ANTIBODY SCREEN NEG    CBC    Collection Time: 04/21/25  7:04 PM   Result Value Ref Range    WBC 6.4 4.4 - 11.3 x10*3/uL    nRBC 0.0 0.0 - 0.0 /100 WBCs    RBC 3.90 (L) 4.00 - 5.20 x10*6/uL    Hemoglobin 9.1 (L) 12.0 - 16.0 g/dL    Hematocrit 28.9 (L) 36.0 - 46.0 %    MCV 74 (L) 80 - 100 fL    MCH 23.3 (L) 26.0 - 34.0 pg    MCHC 31.5 (L) 32.0 - 36.0 g/dL    RDW 16.9 (H) 11.5 - 14.5 %    Platelets 164 150 - 450 x10*3/uL   Magnesium    Collection Time: 04/21/25  7:04 PM   Result Value Ref Range    Magnesium 2.10 1.60 - 2.40 mg/dL   Comprehensive metabolic panel    Collection Time: 04/21/25  7:04 PM   Result Value Ref Range    Glucose 112 (H) 74 - 99 mg/dL    Sodium 140 136 - 145 mmol/L    Potassium 4.1 3.5 - 5.3 mmol/L    Chloride 106 98 - 107 mmol/L    Bicarbonate 24 21 - 32 mmol/L    Anion Gap 14 10 - 20 mmol/L    Urea Nitrogen 18 6 - 23 mg/dL    Creatinine 0.96 0.50 - 1.05 mg/dL    eGFR 63 >60 mL/min/1.73m*2    Calcium 8.2 (L) 8.6 - 10.6 mg/dL    Albumin 3.5 3.4 - 5.0 g/dL    Alkaline Phosphatase 68 33 - 136 U/L    Total Protein 5.6 (L) 6.4 - 8.2 g/dL    AST 21 9 - 39 U/L    Bilirubin, Total 0.6 0.0 - 1.2 mg/dL    ALT 12 7 - 45 U/L   Comprehensive metabolic panel    Collection Time: 04/22/25  7:40 AM   Result Value Ref Range    Glucose 112 (H) 74 - 99 mg/dL    Sodium 140 136 - 145 mmol/L    Potassium 4.1 3.5 - 5.3 mmol/L    Chloride 106 98 - 107 mmol/L    Bicarbonate 23 21 - 32 mmol/L    Anion Gap 15 10 - 20 mmol/L    Urea Nitrogen 16 6 - 23 mg/dL    Creatinine 0.94 0.50 - 1.05  mg/dL    eGFR 64 >60 mL/min/1.73m*2    Calcium 8.3 (L) 8.6 - 10.6 mg/dL    Albumin 3.5 3.4 - 5.0 g/dL    Alkaline Phosphatase 68 33 - 136 U/L    Total Protein 5.8 (L) 6.4 - 8.2 g/dL    AST 21 9 - 39 U/L    Bilirubin, Total 0.5 0.0 - 1.2 mg/dL    ALT 13 7 - 45 U/L   Magnesium    Collection Time: 04/22/25  7:40 AM   Result Value Ref Range    Magnesium 2.25 1.60 - 2.40 mg/dL   Protime-INR    Collection Time: 04/22/25  7:40 AM   Result Value Ref Range    Protime 32.4 (H) 9.8 - 12.4 seconds    INR 2.9 (H) 0.9 - 1.1   CBC    Collection Time: 04/22/25  7:40 AM   Result Value Ref Range    WBC 6.3 4.4 - 11.3 x10*3/uL    nRBC 0.0 0.0 - 0.0 /100 WBCs    RBC 4.34 4.00 - 5.20 x10*6/uL    Hemoglobin 10.1 (L) 12.0 - 16.0 g/dL    Hematocrit 33.7 (L) 36.0 - 46.0 %    MCV 78 (L) 80 - 100 fL    MCH 23.3 (L) 26.0 - 34.0 pg    MCHC 30.0 (L) 32.0 - 36.0 g/dL    RDW 17.1 (H) 11.5 - 14.5 %    Platelets 166 150 - 450 x10*3/uL         Test Results Pending at Discharge  Pending Labs       No current pending labs.                 Your medication list        START taking these medications        Instructions Last Dose Given Next Dose Due   sacubitriL-valsartan 24-26 mg tablet  Commonly known as: Entresto      Take 0.5 tablets by mouth 2 times a day.       warfarin 1 mg tablet  Commonly known as: Coumadin      Take 1 tablet by mouth once dailly or as directed per After Visit Summary.              CHANGE how you take these medications        Instructions Last Dose Given Next Dose Due   acetaminophen 325 mg tablet  Commonly known as: Tylenol  What changed:   how much to take  reasons to take this      Take 2 tablets (650 mg) by mouth every 6 hours if needed for moderate pain (4 - 6), mild pain (1 - 3), headaches or fever (temp greater than 38.0 C).              CONTINUE taking these medications        Instructions Last Dose Given Next Dose Due   baclofen 10 mg tablet  Commonly known as: Lioresal      One tablet in the morning and 2 tablets in the  "evening.       bumetanide 2 mg tablet  Commonly known as: Bumex           clobetasol 0.05 % cream  Commonly known as: Temovate      Apply topically 2 times a day.       clopidogrel 75 mg tablet  Commonly known as: Plavix      Take 1 tablet (75 mg) by mouth once daily.       cyanocobalamin 1,000 mcg/mL injection  Commonly known as: Vitamin B-12      Inject 1 mL (1,000 mcg) into the muscle every 30 (thirty) days.       diclofenac sodium 1 % gel  Commonly known as: Voltaren      Apply 4.5 inches (4 g) topically 2 times a day as needed (joint pain).       digoxin 125 MCG tablet  Commonly known as: Lanoxin           EPINEPHrine 0.3 mg/0.3 mL injection syringe  Commonly known as: Epipen      Inject 0.3 mL (0.3 mg) into the muscle 1 time if needed for anaphylaxis.       fluticasone propion-salmeteroL 45-21 mcg/actuation inhaler  Commonly known as: Advair HFA      Inhale 2 puffs 2 times a day. Rinse mouth with water after use to reduce aftertaste and incidence of candidiasis. Do not swallow.       hydroxychloroquine 200 mg tablet  Commonly known as: Plaquenil      Take 1.5 tablets (300 mg) by mouth once daily.       insulin syringe-needle U-100 1 mL 30 gauge x 1/2\" syringe  Commonly known as: BD Insulin Syringe Ultra-Fine      Use one a month       ipratropium-albuteroL  mcg/actuation inhaler  Commonly known as: Combivent Respimat      Inhale 2 puffs 4 times a day.       ipratropium-albuteroL 0.5-2.5 mg/3 mL nebulizer solution  Commonly known as: Duo-Neb      Take 3 mL by nebulization 4 times a day as needed for wheezing or shortness of breath.       montelukast 10 mg tablet  Commonly known as: Singulair      Take 1 tablet (10 mg) by mouth once daily at bedtime.       nitroglycerin 0.4 mg SL tablet  Commonly known as: Nitrostat      Place 1 tablet (0.4 mg) under the tongue every 5 minutes if needed for chest pain.       ondansetron 8 mg tablet  Commonly known as: Zofran           pantoprazole 40 mg EC tablet  Commonly " known as: ProtoNix      Take 1 tablet (40 mg) by mouth 2 times a day.       rosuvastatin 20 mg tablet  Commonly known as: Crestor      Take 1 tablet (20 mg) by mouth once daily.       sucralfate 100 mg/mL suspension  Commonly known as: Carafate      Take 10 mL (1 g) by mouth 4 times a day with meals.       sulfaSALAzine 500 mg tablet  Commonly known as: Azulfidine      Take 2 tablets (1,000 mg) by mouth 2 times a day.              STOP taking these medications      chlorhexidine 0.12 % solution  Commonly known as: Peridex        dilTIAZem  mg 24 hr capsule  Commonly known as: Tiazac        ibandronate 150 mg tablet  Commonly known as: Boniva        naloxone 4 mg/0.1 mL nasal spray  Commonly known as: Narcan        oxyCODONE-acetaminophen 5-325 mg tablet  Commonly known as: Percocet                  Where to Get Your Medications        These medications were sent to [facility]  [address], [address], [place] [address]      Hours: 8AM to 6PM Mon-Fri, 8AM to 4PM Sat, 9AM to 1PM Sun Phone: [phone]   sacubitriL-valsartan 24-26 mg tablet  warfarin 1 mg tablet       You can get these medications from any pharmacy    You don't need a prescription for these medications  acetaminophen 325 mg tablet            DETAILS OF HOSPITAL STAY    Presenting Problem  Patient Active Problem List    Diagnosis Date Noted    Mitral valve disease 04/16/2025    Severe persistent asthma with acute exacerbation (Multi) 03/03/2025    Shortness of breath 08/08/2024    Constipation 07/28/2024    Acute chest pain 07/24/2024    Atrial fibrillation, unspecified type (Multi) 07/15/2024    Arthritis of left shoulder region 05/02/2024    Chest pain, unspecified type 02/24/2024    Pacemaker 12/06/2023    Anticoagulant long-term use 12/06/2023    Stage 3a chronic kidney disease (Multi) 08/29/2023    Centrilobular emphysema (Multi) 08/28/2023    Coagulation defect, unspecified 08/28/2023    Coronary artery disease  involving native coronary artery 06/26/2023    Asthma 06/26/2023    Atrial fibrillation (Multi) 06/26/2023    Myofascial pain 06/26/2023    Cardiac aneurysm 06/26/2023    Cardiac defibrillator in place 06/26/2023    Obstructive sleep apnea 06/26/2023    Central sleep apnea in conditions classified elsewhere 06/26/2023    Cervical post-laminectomy syndrome 06/26/2023    Cervical radiculopathy 06/26/2023    Chronic bilateral low back pain without sciatica 06/26/2023    Chronic right sacroiliac pain 06/26/2023    Colon polyp 06/26/2023    Cubital tunnel syndrome on right 06/26/2023    Displacement of lumbar disc with radiculopathy 06/26/2023    Gastroesophageal reflux disease without esophagitis 06/26/2023    Mixed hyperlipidemia 06/26/2023    Iron deficiency anemia 06/26/2023    Irregular cardiac rhythm 06/26/2023    Osteoporosis 06/26/2023    Primary osteoarthritis of right shoulder 06/26/2023    Psoriasis 06/26/2023    Rheumatoid arthritis 06/26/2023    Umbilical hernia 06/26/2023    Wide-complex tachycardia 06/26/2023    Hypertensive heart disease with heart failure 06/26/2023    Personal history of other venous thrombosis and embolism 11/02/2020    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/02/2020    Mitral regurgitation 04/02/2025    Chronic systolic heart failure 11/25/2024        LOS: 6 days     Objective     Vital signs in last 24 hours:  Temp:  [36 °C (96.8 °F)-36.9 °C (98.4 °F)] 36.3 °C (97.3 °F)  Heart Rate:  [63-85] 85  Resp:  [12-22] 16  BP: ()/(50-65) 102/65  Arterial Line BP 1: (114-140)/(48-63) 128/57    Physical Exam at Discharge  Discharge Condition: good  Heart Rate: 85  Resp: 16  BP: 102/65  Temp: 36.3 °C (97.3 °F)  Weight: 74.1 kg (163 lb 5.8 oz)    Physical Assessment  Constitutional: Well developed, awake/alert/oriented x3, no distress, cooperative  Eyes: PERRL, EOMI, clear sclera  ENMT: mucous membranes moist  Head/Neck: Neck supple, No JVD  Respiratory/Thorax: Good  chest expansion, thorax symmetric, lung sounds clear but with diminished bases  Cardiovascular: Regular rate and rhythm, no murmurs, normal S1 and S2  Gastrointestinal: Nondistended, soft, non-tender, no rebound tenderness or guarding, no masses palpable, no organomegaly, +BS  Extremities: trace BLE edema, no cyanosis, bilat groins c/d/i with dsd no s/s of hematoma  Neurological: alert and oriented x3, intact senses, motor, response and reflexes, normal strength  Psychological: Appropriate mood and behavior   Skin: Warm and dry, intact.       History of Present Illness  Trina Elias is a 74 y.o. female with a PMH of atrial fibrillation s/p LAAO, VT/VF s/p ICD, Hyperlipidemia , CAD,  PUD, SBO s/p lysis of adhesions, GERD s/p nissen fundoplication, provoked DVT 2018, Rheumatoid arthritis, psoriasis, asthma, chronic anemia, severe MR, caridoMEMs implant and CKD presents for evaluation of severe, symptomatic progressive mitral regurgitation admitted for optimization prior to scheduled TMVR 4/17 with Dr. Sanjana Ojeda.     Hospital Course  Trina Elias is a 74 y.o. year old female patient admitted on 4/16/2025 with following ICU needs: severe mitral regurgitation s/p TMVR     74 y.o. y/o female with PMH of atrial fibrillation s/p LAAO, VT/VF s/p ICD, Hyperlipidemia , CAD,  PUD, SBO s/p lysis of adhesions, GERD s/p nissen fundoplication, provoked DVT 2018, Rheumatoid arthritis, psoriasis, asthma, chronic anemia, and CKD presents for evaluation of severe, symptomatic progressive mitral regurgitation & HFmrEF admitted for optimization prior to scheduled TMVR. Was diuresed with IV bumex boluses prior TMVR on 4/17 with Dr. Sanjana Ojeda and later admitted to CICU for closer monitoring post-procedurally.    Patient developed fatigue and orthostatic during session with PT/OT on 4/18 and feel nauseous and pallor. Serial EKG unremarkable. Nausea improved with Zofran and Phenergan. Found to have dropping Hgb to 7.6-7.8  with stabilized hemolysis labs. She received one unit of PRBC for symptomatic anemia. Post-procedure TTE not concerning for paravalvular leak. LVEF 35-40% post-procedure.    Upon discussion with echo and structural team, felt that the symptoms may be secondary to afterload mismatch after TMVR and symptomatic anemia. CT abdomen w/wo contrast negative for esophagitis or surgical conditions. Positive for colonic dilation with stool. Her nausea and SOB symptoms improve on 4/21/25.     Today, discussion with structural heart team and plan to transfuse PRBC to the goal of 10 g/dL with IV bumex diuresis to keep balance fluid. GDMT with low-dose entresto 12/13 mg bid is started with cautions of orthostatic symptoms. Will continue to adjust GDMT medications prior hospital discharge.     Structural Heart Hospital Course  Trina Elias is now s/p TMVR with 42mm Intrepid via RCFV on 4/17/25 with Dr. Ojeda and Dr. Anderson.  Final procedural SULY showed mod-severe MR PVL, LVOT gradient peak post PVC 11mmHg invasive and SULY, Iatrogenic ASD closed kept open.   Pt transferred to the CICU for monitoring overnight.     POD 1 - No issues overnight.  Vitals, labs (concerns for hemolysis d/t elevated LDH and down-trending Haptoglobin), neuro assessment, and groins remained stable.  Pt denies CP, SOB, abd/groin pain, numbness or tingling of BLE.  Acute/chronic combined heart failure present as evidence by mild FVO on assessment with trace BLE edema, mostly clear lungs with diminished bases, sats stable on RA, CXR showed mild pulm congestion, with bibasilar atelectasis.  POD 1 EKG - SR 86 (, ).  No events observed on Tele.  POD 1 ECHO - EF 35-40%, severe LVH, norm RV, mild AS with mild/mod AI, MV gradients 16/6, no MR, mild TR, no LVOTO, triv PC effusion.   Pt stated she was about to walk around the CICU for 2.5 laps, but then become short of breath (sats in the 80s) and dizzy.  After her walk she c/o increased fatigue and had  increased O2 requirements.  Originally NYHA 1, but then worsened to class 3-4.     4/19-4/20:  hemolysis labs stable over the weekend.  CTA over the weekend showed no signs of RP bleed, did have some pulmonary edema, no PL effusions.     4/21/25 - POD 4:  Appears mostly euvolemic with no BLE edema, sats stable on RA.  HR/BP stable.  H/H stable but continues to be low, other labs stable.  Hemolysis still stable, T.bili remains normal, so very likely pt not hemolyzing.  Groin sites healing well without issue.  Pt states she is feeling much better today, states she feels better now than she did before her procedure.  NYHA 1-2.     4/22/25 - POD 5:  Appears euvolemic on assessment.  Denies SOB, DOSHI, CP, groin pain.  Pt reports walking around the nursing unit without issue/no symptoms, consistent with NYHA 1.  H/H responded very well to PRBC transfusion, now 10.1/33.7, potassium and renal indices remain stable.  INR is now 2.9 (4/17 1.1, 4/18 1.0, 4/19 1.1, 4/20 1.6, 4/21 - , 4/22 2.9), 5mg of Coumadin has been given every day 4/18 through 4/21.  Pt states she is feeling very well and ready to go home today.          04/16 04/17 04/18 04/19 04/20 04/21 04/22     24 hr:  0000 0000 0000 0000 0000 0000 0000        Labs    INR   1.1 1.0 1.1 1.6 - 2.9     PTT   33          Medications    Heparin INJ (Units)   15,500          Warfarin ORAL (mg)    5 5 5 5 1mg              Plan:  - Cont Clopidogrel, no need for triple therapy (no ASA)  - Cont Warfarin 1mg daily (INR goal 2.5, range 2.0-3.0 for 6 months), reduced to 1mg daily d/t sharp increase in INR.           - Referral placed for [facility], Dr. Elias agreed to be the managing physician  - Continue home dose Digoxin for A-fib  - Entresto 24-26mg BID for combined systolic/diastolic heart failure  - Continue home dose Bumetanide 2mg BID for now for chronic heart failure  - Stopped Diltiazem  - Continue home regimen otherwise  - follow up with Structural Heart  clinic per [place] [facility] protocol  - f/w Dr. Soto (Primary Cards) as scheduled or in 6-10 weeks  - f/w Fredy Elias, DO in 1-2 weeks  - pt given Lab recs (1 week) and ECHO rec (1 month)      D/w Dr. Lynette CHARLES spent 60 minutes in the professional and overall care of this patient.     Jonathan St MSN, [facility]-BC  Acute Care Nurse Practitioner  Structural Heart / TAVR Team  Structural Pager: [phone]  (Nights) [facility] fellow pager: [phone]

## 2025-04-22 NOTE — PROGRESS NOTES
Structural Heart Progress Note    HPI   Trina Elias is a 74 y.o. female with a PMH of atrial fibrillation s/p LAAO, VT/VF s/p ICD, Hyperlipidemia , CAD,  PUD, SBO s/p lysis of adhesions, GERD s/p nissen fundoplication, provoked DVT 2018, Rheumatoid arthritis, psoriasis, asthma, chronic anemia, severe MR, caridoMEMs implant and CKD presents for evaluation of severe, symptomatic progressive mitral regurgitation admitted for optimization prior to scheduled TMVR 4/17 with Dr. Sanjana Ojeda.     Patient Active Problem List    Diagnosis Date Noted    Mitral valve disease 04/16/2025    Severe persistent asthma with acute exacerbation (Multi) 03/03/2025    Shortness of breath 08/08/2024    Constipation 07/28/2024    Acute chest pain 07/24/2024    Atrial fibrillation, unspecified type (Multi) 07/15/2024    Arthritis of left shoulder region 05/02/2024    Chest pain, unspecified type 02/24/2024    Pacemaker 12/06/2023    Anticoagulant long-term use 12/06/2023    Stage 3a chronic kidney disease (Multi) 08/29/2023    Centrilobular emphysema (Multi) 08/28/2023    Coagulation defect, unspecified 08/28/2023    Coronary artery disease involving native coronary artery 06/26/2023    Asthma 06/26/2023    Atrial fibrillation (Multi) 06/26/2023    Myofascial pain 06/26/2023    Cardiac aneurysm 06/26/2023    Cardiac defibrillator in place 06/26/2023    Obstructive sleep apnea 06/26/2023    Central sleep apnea in conditions classified elsewhere 06/26/2023    Cervical post-laminectomy syndrome 06/26/2023    Cervical radiculopathy 06/26/2023    Chronic bilateral low back pain without sciatica 06/26/2023    Chronic right sacroiliac pain 06/26/2023    Colon polyp 06/26/2023    Cubital tunnel syndrome on right 06/26/2023    Displacement of lumbar disc with radiculopathy 06/26/2023    Gastroesophageal reflux disease without esophagitis 06/26/2023    Mixed hyperlipidemia 06/26/2023    Iron deficiency anemia 06/26/2023    Irregular cardiac  rhythm 06/26/2023    Osteoporosis 06/26/2023    Primary osteoarthritis of right shoulder 06/26/2023    Psoriasis 06/26/2023    Rheumatoid arthritis 06/26/2023    Umbilical hernia 06/26/2023    Wide-complex tachycardia 06/26/2023    Hypertensive heart disease with heart failure 06/26/2023    Personal history of other venous thrombosis and embolism 11/02/2020    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/02/2020    Mitral regurgitation 04/02/2025    Chronic systolic heart failure 11/25/2024        LOS: 6 days     Objective     Vitals 24 hour ranges:  Temp:  [36 °C (96.8 °F)-36.9 °C (98.4 °F)] 36.3 °C (97.3 °F)  Heart Rate:  [62-85] 85  Resp:  [12-22] 16  BP: ()/(50-65) 102/65  SpO2:  [93 %-98 %] 96 %  Arterial Line BP 1: (102-140)/(45-63) 128/57  Medical Gas Therapy: None (Room air)  Medical Gas Delivery Method: Nasal cannula     Intake/Output last 3 Shifts:    Intake/Output Summary (Last 24 hours) at 4/22/2025 1342  Last data filed at 4/22/2025 1151  Gross per 24 hour   Intake 350 ml   Output 1700 ml   Net -1350 ml       LDA:  Peripheral IV 04/16/25 22 G Anterior;Right Forearm (Active)   Placement Date/Time: 04/16/25 1816   Hand Hygiene Completed: Yes  Size (Gauge): (c) 22 G  Orientation: Anterior;Right  Location: Forearm  Site Prep: Chlorhexidine   Local Anesthetic: None  Technique: Ultrasound guidance  Placed by: yordy stanley RN ...   Number of days: 1       Peripheral IV 04/17/25 16 G Left External Jugular (Active)   Placement Date/Time: 04/17/25 (c) 1519   Size (Gauge): 16 G  Orientation: Left  Location: External Jugular  Site Prep: Alcohol  Technique: Anatomical landmarks  Insertion attempts: 1   Number of days: 0       Arterial Line 04/17/25 Left Radial (Active)   Placement Date/Time: 04/17/25 (c) 1505   Size: 20 G  Orientation: Left  Location: Radial  Securement Method: Transparent dressing  Patient Tolerance: Tolerated well   Number of days: 0       External Urinary Catheter Female  (Active)   Placement Date/Time: 04/17/25 1900   Hand Hygiene Completed: Yes  External Catheter Type: Female   Number of days: 0        Vent settings:       Lab Results:  Recent Results (from the past 48 hours)   CBC    Collection Time: 04/20/25  5:26 PM   Result Value Ref Range    WBC 6.8 4.4 - 11.3 x10*3/uL    nRBC 0.0 0.0 - 0.0 /100 WBCs    RBC 3.35 (L) 4.00 - 5.20 x10*6/uL    Hemoglobin 7.7 (L) 12.0 - 16.0 g/dL    Hematocrit 25.2 (L) 36.0 - 46.0 %    MCV 75 (L) 80 - 100 fL    MCH 23.0 (L) 26.0 - 34.0 pg    MCHC 30.6 (L) 32.0 - 36.0 g/dL    RDW 16.9 (H) 11.5 - 14.5 %    Platelets 141 (L) 150 - 450 x10*3/uL   Magnesium    Collection Time: 04/20/25  5:26 PM   Result Value Ref Range    Magnesium 2.21 1.60 - 2.40 mg/dL   Comprehensive metabolic panel    Collection Time: 04/20/25  5:26 PM   Result Value Ref Range    Glucose 101 (H) 74 - 99 mg/dL    Sodium 136 136 - 145 mmol/L    Potassium 3.9 3.5 - 5.3 mmol/L    Chloride 104 98 - 107 mmol/L    Bicarbonate 25 21 - 32 mmol/L    Anion Gap 11 10 - 20 mmol/L    Urea Nitrogen 18 6 - 23 mg/dL    Creatinine 0.92 0.50 - 1.05 mg/dL    eGFR 66 >60 mL/min/1.73m*2    Calcium 8.2 (L) 8.6 - 10.6 mg/dL    Albumin 3.4 3.4 - 5.0 g/dL    Alkaline Phosphatase 61 33 - 136 U/L    Total Protein 5.4 (L) 6.4 - 8.2 g/dL    AST 21 9 - 39 U/L    Bilirubin, Total 0.4 0.0 - 1.2 mg/dL    ALT 12 7 - 45 U/L   Comprehensive metabolic panel    Collection Time: 04/21/25  4:38 AM   Result Value Ref Range    Glucose 107 (H) 74 - 99 mg/dL    Sodium 140 136 - 145 mmol/L    Potassium 4.2 3.5 - 5.3 mmol/L    Chloride 106 98 - 107 mmol/L    Bicarbonate 25 21 - 32 mmol/L    Anion Gap 13 10 - 20 mmol/L    Urea Nitrogen 21 6 - 23 mg/dL    Creatinine 1.01 0.50 - 1.05 mg/dL    eGFR 59 (L) >60 mL/min/1.73m*2    Calcium 8.7 8.6 - 10.6 mg/dL    Albumin 3.5 3.4 - 5.0 g/dL    Alkaline Phosphatase 65 33 - 136 U/L    Total Protein 5.9 (L) 6.4 - 8.2 g/dL    AST 20 9 - 39 U/L    Bilirubin, Total 0.3 0.0 - 1.2 mg/dL    ALT 12  7 - 45 U/L   CBC and Auto Differential    Collection Time: 04/21/25  4:38 AM   Result Value Ref Range    WBC 5.8 4.4 - 11.3 x10*3/uL    nRBC 0.0 0.0 - 0.0 /100 WBCs    RBC 3.48 (L) 4.00 - 5.20 x10*6/uL    Hemoglobin 8.0 (L) 12.0 - 16.0 g/dL    Hematocrit 26.4 (L) 36.0 - 46.0 %    MCV 76 (L) 80 - 100 fL    MCH 23.0 (L) 26.0 - 34.0 pg    MCHC 30.3 (L) 32.0 - 36.0 g/dL    RDW 17.2 (H) 11.5 - 14.5 %    Platelets 149 (L) 150 - 450 x10*3/uL    Neutrophils % 66.9 40.0 - 80.0 %    Immature Granulocytes %, Automated 0.3 0.0 - 0.9 %    Lymphocytes % 17.3 13.0 - 44.0 %    Monocytes % 11.9 2.0 - 10.0 %    Eosinophils % 3.3 0.0 - 6.0 %    Basophils % 0.3 0.0 - 2.0 %    Neutrophils Absolute 3.86 1.60 - 5.50 x10*3/uL    Immature Granulocytes Absolute, Automated 0.02 0.00 - 0.50 x10*3/uL    Lymphocytes Absolute 1.00 0.80 - 3.00 x10*3/uL    Monocytes Absolute 0.69 0.05 - 0.80 x10*3/uL    Eosinophils Absolute 0.19 0.00 - 0.40 x10*3/uL    Basophils Absolute 0.02 0.00 - 0.10 x10*3/uL   Prepare RBC: 1 Units    Collection Time: 04/21/25 11:29 AM   Result Value Ref Range    PRODUCT CODE I1242S00     Unit Number [id number]     Unit ABO O     Unit RH POS     XM INTEP COMP     Dispense Status IS     Blood Expiration Date 5/1/2025 11:59:00 PM EDT     PRODUCT BLOOD TYPE 5100     UNIT VOLUME 350    Type and screen    Collection Time: 04/21/25 11:50 AM   Result Value Ref Range    ABO TYPE A     Rh TYPE POS     ANTIBODY SCREEN NEG    CBC    Collection Time: 04/21/25  7:04 PM   Result Value Ref Range    WBC 6.4 4.4 - 11.3 x10*3/uL    nRBC 0.0 0.0 - 0.0 /100 WBCs    RBC 3.90 (L) 4.00 - 5.20 x10*6/uL    Hemoglobin 9.1 (L) 12.0 - 16.0 g/dL    Hematocrit 28.9 (L) 36.0 - 46.0 %    MCV 74 (L) 80 - 100 fL    MCH 23.3 (L) 26.0 - 34.0 pg    MCHC 31.5 (L) 32.0 - 36.0 g/dL    RDW 16.9 (H) 11.5 - 14.5 %    Platelets 164 150 - 450 x10*3/uL   Magnesium    Collection Time: 04/21/25  7:04 PM   Result Value Ref Range    Magnesium 2.10 1.60 - 2.40 mg/dL    Comprehensive metabolic panel    Collection Time: 04/21/25  7:04 PM   Result Value Ref Range    Glucose 112 (H) 74 - 99 mg/dL    Sodium 140 136 - 145 mmol/L    Potassium 4.1 3.5 - 5.3 mmol/L    Chloride 106 98 - 107 mmol/L    Bicarbonate 24 21 - 32 mmol/L    Anion Gap 14 10 - 20 mmol/L    Urea Nitrogen 18 6 - 23 mg/dL    Creatinine 0.96 0.50 - 1.05 mg/dL    eGFR 63 >60 mL/min/1.73m*2    Calcium 8.2 (L) 8.6 - 10.6 mg/dL    Albumin 3.5 3.4 - 5.0 g/dL    Alkaline Phosphatase 68 33 - 136 U/L    Total Protein 5.6 (L) 6.4 - 8.2 g/dL    AST 21 9 - 39 U/L    Bilirubin, Total 0.6 0.0 - 1.2 mg/dL    ALT 12 7 - 45 U/L   Comprehensive metabolic panel    Collection Time: 04/22/25  7:40 AM   Result Value Ref Range    Glucose 112 (H) 74 - 99 mg/dL    Sodium 140 136 - 145 mmol/L    Potassium 4.1 3.5 - 5.3 mmol/L    Chloride 106 98 - 107 mmol/L    Bicarbonate 23 21 - 32 mmol/L    Anion Gap 15 10 - 20 mmol/L    Urea Nitrogen 16 6 - 23 mg/dL    Creatinine 0.94 0.50 - 1.05 mg/dL    eGFR 64 >60 mL/min/1.73m*2    Calcium 8.3 (L) 8.6 - 10.6 mg/dL    Albumin 3.5 3.4 - 5.0 g/dL    Alkaline Phosphatase 68 33 - 136 U/L    Total Protein 5.8 (L) 6.4 - 8.2 g/dL    AST 21 9 - 39 U/L    Bilirubin, Total 0.5 0.0 - 1.2 mg/dL    ALT 13 7 - 45 U/L   Magnesium    Collection Time: 04/22/25  7:40 AM   Result Value Ref Range    Magnesium 2.25 1.60 - 2.40 mg/dL   Protime-INR    Collection Time: 04/22/25  7:40 AM   Result Value Ref Range    Protime 32.4 (H) 9.8 - 12.4 seconds    INR 2.9 (H) 0.9 - 1.1   CBC    Collection Time: 04/22/25  7:40 AM   Result Value Ref Range    WBC 6.3 4.4 - 11.3 x10*3/uL    nRBC 0.0 0.0 - 0.0 /100 WBCs    RBC 4.34 4.00 - 5.20 x10*6/uL    Hemoglobin 10.1 (L) 12.0 - 16.0 g/dL    Hematocrit 33.7 (L) 36.0 - 46.0 %    MCV 78 (L) 80 - 100 fL    MCH 23.3 (L) 26.0 - 34.0 pg    MCHC 30.0 (L) 32.0 - 36.0 g/dL    RDW 17.1 (H) 11.5 - 14.5 %    Platelets 166 150 - 450 x10*3/uL       Medications:  Scheduled medications  Scheduled  "Medications[1]  Continuous medications  Continuous Medications[2]  PRN medications  PRN Medications[3]       Physical Exam:  Constitutional: Well developed, awake/alert/oriented x3, no distress, cooperative  Eyes: PERRL, EOMI, clear sclera  ENMT: mucous membranes moist  Head/Neck: Neck supple  Respiratory/Thorax: Good chest expansion, thorax symmetric,  Right lung sounds \"tight\" with expiratory wheeze, left lung clear   Cardiovascular: Regular rate and rhythm, no murmurs, normal S1 and S2  Gastrointestinal: Nondistended, soft, non-tender, no rebound tenderness or guarding, no masses palpable, no organomegaly, +BS  Extremities: trace BLE edema, no cyanosis, bilat groins c/d/i with dsd no s/s of hematoma  Neurological: alert and oriented x3, intact senses, motor, response and reflexes, generalized weakness  Psychological: Appropriate mood and behavior   Skin: Warm and dry, intact.       Assessment/Plan   Trina E Via is now s/p TMVR with 42mm Intrepid via RCFV on 4/17/25 with Dr. Ojeda and Dr. Anderson.  Final procedural SULY showed mod-severe MR PVL, LVOT gradient peak post PVC 11mmHg invasive and SULY, Iatrogenic ASD closed kept open.   Pt transferred to the CICU for monitoring overnight.    POD 1 - No issues overnight.  Vitals, labs (concerns for hemolysis d/t elevated LDH and down-trending Haptoglobin), neuro assessment, and groins remained stable.  Pt denies CP, SOB, abd/groin pain, numbness or tingling of BLE.  Acute/chronic combined heart failure present as evidence by mild FVO on assessment with trace BLE edema, mostly clear lungs with diminished bases, sats stable on RA, CXR showed mild pulm congestion, with bibasilar atelectasis.  POD 1 EKG - SR 86 (, ).  No events observed on Tele.  POD 1 ECHO - EF 35-40%, severe LVH, norm RV, mild AS with mild/mod AI, MV gradients 16/6, no MR, mild TR, no LVOTO, triv PC effusion.   Pt stated she was about to walk around the CICU for 2.5 laps, but then become " short of breath (sats in the 80s) and dizzy.  After her walk she c/o increased fatigue and had increased O2 requirements.  Originally NYHA 1, but then worsened to class 3-4.    4/19-4/20:  hemolysis labs stable over the weekend.  CTA over the weekend showed no signs of RP bleed, did have some pulmonary edema, no PL effusions.    4/21/25 - POD 4:  Appears mostly euvolemic with no BLE edema, sats stable on RA.  HR/BP stable.  H/H stable but continues to be low, other labs stable.  Hemolysis still stable, T.bili remains normal, so very likely pt not hemolyzing.   Groin sites healing well without issue.  Pt states she is feeling much better today, states she feels better now than she did before her procedure.  NYHA 1-2.    4/22/25 - POD 5:  Appears euvolemic on assessment.  Denies SOB, DOSHI, CP, groin pain.  Pt reports walking around the nursing unit without issue/no symptoms, consistent with NYHA 1.  H/H responded very well to transfusion, now 10.1/33.7, potassium and renal indices remain stable.  INR is now 2.9 (4/17 1.1, 4/18 1.0, 4/19 1.1, 4/20 1.6, 4/21 - , 4/22 2.9), 5mg of Coumadin has been given every day 4/18 through 4/21.    Plan:  - Cont Clopidogrel, no need for triple therapy (no ASA)  - Cont Warfarin (INR goal 2.5, range 2.0-3.0 for 6 months)                    - Cont to optimize HF regimen  - follow up with Structural Heart clinic per [facility] protocol  - f/w Dr. Soto (Primary Cards) as scheduled or in 6-10 weeks  - f/w Fredy Elias, DO in 1-2 weeks  - pt given Lab recs (1 week) and ECHO rec (1 month)  - Structural will continue to follow      D/w Dr. Ojeda and CICU team    I spent 35 minutes in the professional and overall care of this patient.     Jonathan St MSN, [facility]-BC  Acute Care Nurse Practitioner  Structural Heart / TAVR Team  Structural Pager: [phone]  (Nights) [facility] fellow pager: [phone]          [1] aspirin, 81 mg, oral, Daily  baclofen, 5 mg, oral, BID  clopidogrel, 75 mg, oral,  Daily  digoxin, 125 mcg, oral, Daily  fluticasone furoate-vilanteroL, 1 puff, inhalation, Daily  hydroxychloroquine, 300 mg, oral, Daily  ipratropium-albuteroL, 3 mL, nebulization, q12h DWIGHT  lidocaine, 1 patch, transdermal, Daily  montelukast, 10 mg, oral, Nightly  pantoprazole, 40 mg, oral, BID  polyethylene glycol, 17 g, oral, BID  rosuvastatin, 20 mg, oral, Nightly  sacubitriL-valsartan, 0.5 tablet, oral, BID  sennosides-docusate sodium, 2 tablet, oral, Nightly  sucralfate, 1 g, oral, With meals & nightly  sulfaSALAzine, 1,000 mg, oral, BID  warfarin, 5 mg, oral, Daily     [2]    [3] PRN medications: acetaminophen, benzocaine-menthol, diclofenac sodium, ipratropium-albuteroL, lidocaine-epinephrine, melatonin, morphine, nitroglycerin, ondansetron **OR** ondansetron, ondansetron, oxyCODONE, oxyCODONE, oxygen, promethazine

## 2025-04-22 NOTE — PROGRESS NOTES
"Trina Elias \"Marcela\" is a 74 y.o. female on day 5 of admission presenting with Mitral regurgitation.    Subjective   74 y.o. y/o female with PMH of atrial fibrillation s/p LAAO, VT/VF s/p ICD, Hyperlipidemia , CAD,  PUD, SBO s/p lysis of adhesions, GERD s/p nissen fundoplication, provoked DVT 2018, Rheumatoid arthritis, psoriasis, asthma, chronic anemia, severe MR, caridoMEMs implant and CKD presents for evaluation of severe, symptomatic progressive mitral regurgitation admitted for  TMVR 4/17 with Dr. Sanjana Ojeda. Transferred to the floor on 4/21 for GDMT adjustment.    Upon my evaluation, pt has no concerns. She has no sob, cp, palpitations, n/v, dizziness.          Objective     Physical Exam  Constitutional:       Appearance: Normal appearance.   HENT:      Head: Normocephalic and atraumatic.   Cardiovascular:      Rate and Rhythm: Normal rate and regular rhythm.      Heart sounds: Normal heart sounds. No murmur heard.     No friction rub. No gallop.   Pulmonary:      Effort: Pulmonary effort is normal. No respiratory distress.      Breath sounds: Normal breath sounds. No wheezing.   Abdominal:      General: Abdomen is flat. There is no distension.      Palpations: Abdomen is soft.      Tenderness: There is no abdominal tenderness.   Skin:     General: Skin is warm and dry.   Neurological:      General: No focal deficit present.      Mental Status: She is alert and oriented to person, place, and time.   Psychiatric:         Mood and Affect: Mood normal.         Behavior: Behavior normal.         Thought Content: Thought content normal.         Judgment: Judgment normal.         Last Recorded Vitals  Blood pressure 99/60, pulse 66, temperature 36.4 °C (97.5 °F), temperature source Temporal, resp. rate 18, height 1.676 m (5' 6\"), weight 73.2 kg (161 lb 4.8 oz), SpO2 95%.  Intake/Output last 3 Shifts:  I/O last 3 completed shifts:  In: 1451 (19.8 mL/kg) [Blood:350; IV Piggyback:1101]  Out: 2650 (36.2 mL/kg) " [Urine:2650 (1 mL/kg/hr)]  Weight: 73.2 kg     Relevant Results    Results for orders placed or performed during the hospital encounter of 04/16/25 (from the past 24 hours)   Comprehensive metabolic panel   Result Value Ref Range    Glucose 107 (H) 74 - 99 mg/dL    Sodium 140 136 - 145 mmol/L    Potassium 4.2 3.5 - 5.3 mmol/L    Chloride 106 98 - 107 mmol/L    Bicarbonate 25 21 - 32 mmol/L    Anion Gap 13 10 - 20 mmol/L    Urea Nitrogen 21 6 - 23 mg/dL    Creatinine 1.01 0.50 - 1.05 mg/dL    eGFR 59 (L) >60 mL/min/1.73m*2    Calcium 8.7 8.6 - 10.6 mg/dL    Albumin 3.5 3.4 - 5.0 g/dL    Alkaline Phosphatase 65 33 - 136 U/L    Total Protein 5.9 (L) 6.4 - 8.2 g/dL    AST 20 9 - 39 U/L    Bilirubin, Total 0.3 0.0 - 1.2 mg/dL    ALT 12 7 - 45 U/L   CBC and Auto Differential   Result Value Ref Range    WBC 5.8 4.4 - 11.3 x10*3/uL    nRBC 0.0 0.0 - 0.0 /100 WBCs    RBC 3.48 (L) 4.00 - 5.20 x10*6/uL    Hemoglobin 8.0 (L) 12.0 - 16.0 g/dL    Hematocrit 26.4 (L) 36.0 - 46.0 %    MCV 76 (L) 80 - 100 fL    MCH 23.0 (L) 26.0 - 34.0 pg    MCHC 30.3 (L) 32.0 - 36.0 g/dL    RDW 17.2 (H) 11.5 - 14.5 %    Platelets 149 (L) 150 - 450 x10*3/uL    Neutrophils % 66.9 40.0 - 80.0 %    Immature Granulocytes %, Automated 0.3 0.0 - 0.9 %    Lymphocytes % 17.3 13.0 - 44.0 %    Monocytes % 11.9 2.0 - 10.0 %    Eosinophils % 3.3 0.0 - 6.0 %    Basophils % 0.3 0.0 - 2.0 %    Neutrophils Absolute 3.86 1.60 - 5.50 x10*3/uL    Immature Granulocytes Absolute, Automated 0.02 0.00 - 0.50 x10*3/uL    Lymphocytes Absolute 1.00 0.80 - 3.00 x10*3/uL    Monocytes Absolute 0.69 0.05 - 0.80 x10*3/uL    Eosinophils Absolute 0.19 0.00 - 0.40 x10*3/uL    Basophils Absolute 0.02 0.00 - 0.10 x10*3/uL   Prepare RBC: 1 Units   Result Value Ref Range    PRODUCT CODE I9634Q46     Unit Number [id number]     Unit ABO O     Unit RH POS     XM INTEP COMP     Dispense Status IS     Blood Expiration Date 5/1/2025 11:59:00 PM EDT     PRODUCT BLOOD TYPE 5100     UNIT  VOLUME 350    Type and screen   Result Value Ref Range    ABO TYPE A     Rh TYPE POS     ANTIBODY SCREEN NEG    CBC   Result Value Ref Range    WBC 6.4 4.4 - 11.3 x10*3/uL    nRBC 0.0 0.0 - 0.0 /100 WBCs    RBC 3.90 (L) 4.00 - 5.20 x10*6/uL    Hemoglobin 9.1 (L) 12.0 - 16.0 g/dL    Hematocrit 28.9 (L) 36.0 - 46.0 %    MCV 74 (L) 80 - 100 fL    MCH 23.3 (L) 26.0 - 34.0 pg    MCHC 31.5 (L) 32.0 - 36.0 g/dL    RDW 16.9 (H) 11.5 - 14.5 %    Platelets 164 150 - 450 x10*3/uL   Magnesium   Result Value Ref Range    Magnesium 2.10 1.60 - 2.40 mg/dL   Comprehensive metabolic panel   Result Value Ref Range    Glucose 112 (H) 74 - 99 mg/dL    Sodium 140 136 - 145 mmol/L    Potassium 4.1 3.5 - 5.3 mmol/L    Chloride 106 98 - 107 mmol/L    Bicarbonate 24 21 - 32 mmol/L    Anion Gap 14 10 - 20 mmol/L    Urea Nitrogen 18 6 - 23 mg/dL    Creatinine 0.96 0.50 - 1.05 mg/dL    eGFR 63 >60 mL/min/1.73m*2    Calcium 8.2 (L) 8.6 - 10.6 mg/dL    Albumin 3.5 3.4 - 5.0 g/dL    Alkaline Phosphatase 68 33 - 136 U/L    Total Protein 5.6 (L) 6.4 - 8.2 g/dL    AST 21 9 - 39 U/L    Bilirubin, Total 0.6 0.0 - 1.2 mg/dL    ALT 12 7 - 45 U/L     *Note: Due to a large number of results and/or encounters for the requested time period, some results have not been displayed. A complete set of results can be found in Results Review.      Current Medications[1]   Electrocardiogram, 12-lead PRN ACS symptoms  Result Date: 4/21/2025  Accelerated Junctional rhythm ST & T wave abnormality, consider lateral ischemia Abnormal ECG When compared with ECG of 17-APR-2025 19:01, Previous ECG has undetermined rhythm, needs review Confirmed by Nathan Rice ([id number]) on 4/21/2025 2:02:12 PM    ECG 12 lead daily  Result Date: 4/21/2025  Accelerated Junctional rhythm ST & T wave abnormality, consider inferolateral ischemia Abnormal ECG When compared with ECG of 17-APR-2025 19:01, No significant change was found    Electrocardiogram, 12-lead PRN ACS symptoms  Result Date:  4/21/2025  Normal sinus rhythm Cannot rule out Anterior infarct , age undetermined ST & T wave abnormality, consider inferior ischemia Abnormal ECG When compared with ECG of 18-APR-2025 10:31, Sinus rhythm has replaced Junctional rhythm    CT chest w IV contrast  Result Date: 4/20/2025  Interpreted By:  Ugo Márquez  and Glenn Vega STUDY: CT CHEST W IV CONTRAST;  4/20/2025 4:58 pm   INDICATION: Signs/Symptoms:R/O esophagitis.   COMPARISON: CT TAVR 03/05/2025   ACCESSION NUMBER(S): [id number]   ORDERING CLINICIAN: KRYSTA CRAMER   TECHNIQUE: Helical data acquisition of the chest was obtained following intravenous administration of 120 ML Omnipaque 350.  Images were reformatted in axial, coronal, and sagittal planes.   FINDINGS: There is extensive beam hardening artifact from bilateral shoulder arthroplasties, left chest wall ICD, and postsurgical changes from valve repair and Nissen fundoplication. Within these limitations:   LUNGS AND AIRWAYS: The trachea and central airways are patent. No endobronchial lesion is seen.Mild diffuse peribronchial thickening.   Bibasilar atelectasis and trace bilateral pleural effusions. Slight increase in interlobular septal thickening in the dependent lungs, may suggest component of pulmonary edema. No suspicious pulmonary nodules. No focal consolidation or pneumothorax. Mild apical predominant paraseptal emphysema.   MEDIASTINUM AND KENN, LOWER NECK AND AXILLA: The visualized thyroid gland is within normal limits. No evidence of thoracic lymphadenopathy by CT criteria. Esophagus appears within normal limits as seen. There is no significant esophageal wall thickening. Postsurgical changes of Nissen fundoplication. Exam is somewhat limited by streak artifact from bilateral shoulder arthroplasties.   HEART AND VESSELS: The thoracic aorta normal in course and caliber. Main pulmonary artery is dilated measuring 4.0 cm in transverse dimension, previously 3.8 cm. CardioMEMS  device in the left lower lobar pulmonary artery. Mild coronary artery calcifications are seen.Please note,the study is not optimized for evaluation of coronary arteries. The severe cardiomegalywith four chamber enlargement.Postsurgical changes of mitral valve repair and left atrial appendage closure device. Left chest wall ICD distal leads terminate the expected locations of the right atrial appendage and right ventricle. There is no pericardial effusion seen.   UPPER ABDOMEN: Gallbladder surgically absent. Postsurgical changes from Nissen fundoplication. Multiple clips throughout the upper abdomen.   CHEST WALL AND OSSEOUS STRUCTURES: Left chest wall ICD is noted. Patient is status post bilateral shoulder arthroplasties. Partially visualized hardware cervical spine. No acute osseous pathology.There are no suspicious osseous lesions.       1. No obvious evidence of esophageal wall thickening to suggest esophagitis in the setting of prior Nissen fundoplication. Note that a portion of the upper esophagus is obscured by beam hardening artifact. If there is ongoing concern for esophagitis, direct endoscopic correlation may be considered. 2. Postprocedural changes of mitral valve replacement. 3. Slight increase in interlobular septal thickening of the dependent lungs is most consistent with pulmonary edema. There also small bilateral pleural effusions. 4. Dilated main pulmonary artery which can be seen with pulmonary hypertension. 5. Four-chamber cardiomegaly. 6. Additional stable chronic/postsurgical changes described above.   I personally reviewed the images/study and I agree with the findings as stated by resident physician Gary Elizabeth MD. This study was interpreted at [facility], [place], [place].   Signed by: Ugo Márquez 4/20/2025 7:33 PM Dictation workstation:   [id number]    CT abdomen pelvis w IV contrast  Result Date: 4/20/2025  Interpreted By:  Ishmael Cerna and Liu Scott  STUDY: CT ABDOMEN PELVIS W IV CONTRAST;  4/20/2025 4:58 pm   INDICATION: Signs/Symptoms:Ongoing nausea.   Per EMR: 74-year-old female with history of Nissen fundoplication, SBO/B lysis of adhesions admitted for mitral regurgitation s/p TMVR 4/17, with nausea, passing gas but no bowel movement.   COMPARISON: CT TAVR 03/05/2025   ACCESSION NUMBER(S): [id number]   ORDERING CLINICIAN: KRYSTA CRAMER   TECHNIQUE: CT of the abdomen and pelvis was performed.  Standard contiguous axial images were obtained at 3 mm slice thickness through the abdomen and pelvis. Coronal and sagittal reconstructions at 3 mm slice thickness were performed.  120 ML of Omnipaque 350 was administered intravenously without immediate complication.   FINDINGS: LOWER CHEST: Trace bilateral pleural effusions and bibasilar atelectasis. Partially visualized postsurgical changes of mitral valve repair and ICD. Please refer to dedicated same-day CT chest.   ABDOMEN:   LIVER: Liver is enlarged measuring 19.1 cm in craniocaudal dimension, previously 16.5 cm. No focal lesions.   BILE DUCTS: Intrahepatic and extrahepatic bile ducts are prominent which is within normal limits given the status post cholecystectomy.   GALLBLADDER: Surgically absent.   PANCREAS: The pancreas appears unremarkable without evidence of ductal dilatation or masses.   SPLEEN: No significant abnormality.   ADRENAL GLANDS: Bilateral adrenal glands appear normal.   KIDNEYS AND URETERS: The kidneys are normal in size and enhance symmetrically.  No hydroureteronephrosis or nephroureterolithiasis is identified. Subcentimeter bilateral renal hypodensities, too small to characterize likely simple renal cysts.   PELVIS:   BLADDER: The urinary bladder appears normal without abnormal wall thickening.   REPRODUCTIVE ORGANS: No pelvic masses.   BOWEL: Multiple radiopaque clips in the region of the GE junction likely represent postsurgical changes from Nissen fundoplication. Small bowel  is normal caliber. There are postsurgical changes small-bowel resection just deep to the abdominal midline. Small bowel is normal in caliber without wall thickening. Scattered colonic diverticuli without adjacent inflammatory changes. There is stool within the entire length of the colon, which is otherwise normal caliber without wall thickening. The rectum is distended with air. Appendix not definitely visualized.   VESSELS: There is no aneurysmal dilatation of the abdominal aorta. The IVC appears normal. Aortoiliac calcifications.   PERITONEUM/RETROPERITONEUM/LYMPH NODES: No ascites or free air, no fluid collection.  No abdominopelvic lymphadenopathy is present.   BONES AND ABDOMINAL WALL: No suspicious osseous lesions are identified. Degenerative discogenic disease is noted in the lower thoracic and lumbar spine.  Rectus sheath diastasis with small bowel protruding into the defect. No evidence of obstruction.       1.  Large colonic stool burden without evidence of abnormal  wall thickening of the small and large bowel. 2. Stable chronic and postsurgical findings described above.   I personally reviewed the images/study and I agree with the findings as stated by resident physician Gary Elizabeth MD. This study was interpreted at [facility], [place], [place].   MACRO: None   Signed by: Ishmael Cerna 4/20/2025 5:41 PM Dictation workstation:   [id number]    XR chest 1 view  Result Date: 4/19/2025  Interpreted By:  Ugo Márquez and Booth Cameron STUDY: XR CHEST 1 VIEW;  4/18/2025 4:28 pm   INDICATION: Signs/Symptoms:Dypsnea.     COMPARISON: XR CHEST 1 VIEW 4/18/2025, CT TAVR FULL CONTRAST CHEST ABDOMEN PELVIS 3/5/2025   ACCESSION NUMBER(S): [id number]   ORDERING CLINICIAN: KRYSTA CRAMER   FINDINGS: AP radiograph of the chest was provided.       CARDIOMEDIASTINAL SILHOUETTE: Cardiomediastinal silhouette is stable in size and configuration. Left chest wall pacer/ICD with  stable positioning of leads. Evidence of mitral valve replacement and watchman device. CardioMEMS device projects over the left perihilar region.   LUNGS: Stable bibasilar streaky opacities. Unchanged size of small left pleural effusion. No sizable pneumothorax.   ABDOMEN: No remarkable upper abdominal findings. Scattered surgical clips project over the upper abdomen.   BONES: No acute osseous changes. Bilateral total glenohumeral joint arthroplasties.       Stable small left pleural effusion and bibasilar atelectasis without evidence of new cardiopulmonary process.   I personally reviewed the image(s)/study and interpretation by Darrion Escamilla MD (resident).   MACRO: None   Signed by: Ugo Márquez 4/19/2025 7:16 AM Dictation workstation:   [id number]    XR chest 1 view  Result Date: 4/19/2025  Interpreted By:  Ugo Márquez and Booth Cameron STUDY: XR CHEST 1 VIEW;  4/18/2025 6:52 am   INDICATION: Signs/Symptoms:s/p valve procedure.     COMPARISON: XR CHEST 1 VIEW 4/17/2025, CT TAVR FULL CONTRAST CHEST ABDOMEN PELVIS 3/5/2025   ACCESSION NUMBER(S): [id number]   ORDERING CLINICIAN: KRYSTA CRAMER   FINDINGS: AP radiograph of the chest was provided.       CARDIOMEDIASTINAL SILHOUETTE: Cardiomediastinal silhouette is stable in size and configuration. Left chest wall pacer/ICD with stable positioning of leads. Evidence of mitral valve replacement and watchman device. CardioMEMS device is again noted over the left hilum.   LUNGS: Similar left-greater-than-right basilar streaky opacities. Unchanged size of small left pleural effusion. No sizable pneumothorax.   ABDOMEN: No remarkable upper abdominal findings. Scattered surgical clips project over the upper abdomen.   BONES: No acute osseous changes. Bilateral total glenohumeral joint arthroplasties.       1.  Left-greater-than-right basilar atelectasis with stable small left pleural effusion. 2. Medical devices as above.   I personally reviewed the  image(s)/study and interpretation by Darrion Escamilla MD (resident).   MACRO: None   Signed by: Ugo Márquez 4/19/2025 7:15 AM Dictation workstation:   [id number]    Intraoperative SULY (Anesthesia please do not use)  Result Date: 4/18/2025   [facility], [address] [place] [address]                Tel [phone] and Fax [phone] TRANSESOPHAGEAL ECHOCARDIOGRAM REPORT  Patient Name:       VERNON SALDANA VIA        Reading Physician:    [id number] Rishi Crane MD Study Date:         4/17/2025           Ordering Provider:    [id number] LORRAINE DYE MRN/PID:            [id number]            Fellow: Accession#:         [id number]        Nurse: Date of Birth/Age:  1950 / 74      Sonographer:          Connie Bell RDCS                     years Gender assigned at  F                   Additional Staff: Birth: Height:             167.64 cm           Admit Date: Weight:             71.67 kg            Admission Status:     Inpatient -                                                               Routine BSA / BMI:          1.81 m2 / 25.50     Encounter#:           [id number]                     kg/m2 Blood Pressure:     126/67 mmHg         Department Location:  [facility]                                                               Cath Lab Study Type:    INTRAOPERATIVE SULY Diagnosis/ICD: Nonrheumatic mitral (valve) insufficiency-I34.0 Indication:    TMVR Procedure CPT Code:      Echocardiography, Transesophageal (SULY) for Guidance of a                Transcatheter Intracardiac or Great Vessel(s) Structural                Intervention(s)-15858 Patient History: Pertinent History: A-fib, CAD, COPD, HL, S/p Watchman, CKD. Study Detail: The following Echo studies were performed: 2D, Doppler, color flow               and 3D.  PHYSICIAN INTERPRETATION: SULY Details:  Technically adequate omniplane transesophageal echocardiogram performed. SULY Medication: The patient was sedated by Anesthesia; please refer to anesthesia flow sheet for medications used. SULY Procedure: The probe was passed without difficulty. Complications encountered during procedure: Patient tolerated the procedure well without any apparent complications. Left Ventricle: Left ventricular ejection fraction is moderately decreased, by visual estimate at 40%. The left ventricular cavity size was not assessed. Left ventricular diastolic filling cannot be determined due to severe mitral regurgitation. Left Atrium: The left atrium is enlarged. A device is noted within the left atrial appendage. Right Ventricle: The right ventricle is small in size. Right ventricular systolic function not assessed. A device is visualized in the right ventricle. Right Atrium: The right atrial size was not assessed. There is a device visualized in the right atrium. Aortic Valve: The aortic valve is trileaflet. There is mild aortic valve cusp calcification. The aortic valve dimensionless index is 0.93. There is moderate aortic valve regurgitation. The peak instantaneous gradient of the aortic valve is 14 mmHg. The mean gradient of the aortic valve is 8 mmHg. PreCase LVOT max gradient: 3mmHg - LVOT mean gradient: 2mmHg - SV: 77.8ml - CO: 4.670ml/min - HR 60 PostCase LVOT max gradient: 10mmHg - LVOT mean gradient: 5mmHg - SV: 77.4ml - CO: 5.882ml/min - HR 76. Mitral Valve: The mitral valve is normal in structure. There is Medtronic Intrepid bioprosthetic type mitral valve prosthesis. The peak and mean gradients are 6 mmHg and 3 mmHg respectively. The peak instantaneous gradient of the mitral valve is 6 mmHg. There is moderate heike-prosthetic mitral valve regurgitation. There is no prosthetic mitral valve regurgitation. There is moderate to severe mitral valve regurgitation. There is blunted systolic flow in the pulmonary veins. PreCase  mitral valve Max gradient: 4mmHg - mean gradient: 1mmHg PostCase mitral valve Max gradient: 9mmHg - mean gradient: 4mmHg. Tricuspid Valve: The tricuspid valve is structurally normal. There is mild tricuspid regurgitation. Pulmonic Valve: The pulmonic valve is not well visualized. Pulmonic valve regurgitation was not assessed. Pericardium: Trivial pericardial effusion. Aorta: The aortic root is normal.  CONCLUSIONS:  1. Left ventricular ejection fraction is moderately decreased, by visual estimate at 40%.  2. The left atrium is enlarged.  3. There is Medtronic Intrepid bioprosthetic type mitral valve prosthesis. The peak and mean gradients are 6 mmHg and 3 mmHg respectively.  4. Moderate to severe mitral valve regurgitation.  5. Moderate aortic valve regurgitation.  6. Procedure: Under SULY guidance, the transseptal puncture site was identified and navigated using biplanar 2D and 3D imaging. A height of 4.06 cm was measured from the puncture site to the mitral valve leaflets. A septostomy balloon was advanced across the interatrial septum, and the puncture site was dilated under echocardiographic guidance. The valve delivery system was advanced until reaching 4.83 cm into the left atrium. Using 2D, 3D, and 3D-MPR imaging, the capsule was then guided and positioned at the mitral valve. After confirming that the brim was 20 mm above the annulus, balloon inflation was performed until the brim/fixation ring junction was visualized. Then, using 2D, 3D, and 3D-MPR imaging, the valve was positioned over the mitral annulus, and its placement was confirmed to be adequate under rapid pacing. Then, under rapid pacing, the valve was advanced to the annulus and successfully deployed. Using 2D and 3D image guidance, the capsule was successfully retracted into the left atrium, followed by retraction of the delivery system. Post-procedure, the valve was evaluated using 2D and 3D imaging with and without color Doppler. No valvular  regurgitation was observed.  7. However, moderate paravalvular regurgitation was noted along the anterior aspect of the valve (corresponding to the 10_12 o'clock position). The mean mitral gradient post-procedure was 4 mmHg. An iatrogenic atrial septal defect (ASD) with left-to-right shunt was visualized. There was no change in the trace pericardial effusion. QUANTITATIVE DATA SUMMARY:  LV SYSTOLIC FUNCTION:                      Normal Ranges: EF-Visual:      40 % LV EF Reported: 40 %  MITRAL VALVE:          Normal Ranges: MV Vmax:      1.25 m/s (<=1.3m/s) MV peak P.2 mmHg (<5mmHg) MV mean P.5 mmHg (<2mmHg)  MITRAL INSUFFICIENCY:             Normal Ranges: PISA Radius:          0.6 cm MR VTI:               165.00 cm MR Vmax:              516.00 cm/s  AORTIC VALVE:                      Normal Ranges: AoV Vmax:                1.90 m/s  (<=1.7m/s) AoV Peak P.4 mmHg (<20mmHg) AoV Mean P.0 mmHg  (1.7-11.5mmHg) LVOT Max Ham:            1.83 m/s  (<=1.1m/s) AoV VTI:                 37.90 cm  (18-25cm) LVOT VTI:                35.20 cm AoV Dimensionless Index: 0.93  [id number] Rishi Crane MD Electronically signed on 2025 at 10:41:23 AM  ** Final (Updated) **     Transthoracic Echo (TTE) Complete  Result Date: 2025   [facility] ([facility] [address] [place] [address]                Tel [phone] and Fax [phone] TRANSTHORACIC ECHOCARDIOGRAM REPORT  Patient Name:       VERNON SALDANA VIA        Reading Physician:    [id number] Lawrence Pitt MD Study Date:         2025           Ordering Provider:    [id number] HERACLIO HUBBARD MRN/PID:            [id number]            Fellow: Accession#:         [id number]        Nurse: Date of Birth/Age:  1950      Sonographer:          Michelle Duffy RDCS                     years Gender  assigned at  F                   Additional Staff: Birth: Height:             167.64 cm           Admit Date:           4/16/2025 Weight:             71.67 kg            Admission Status:     Inpatient -                                                               Routine BSA / BMI:          1.81 m2 / 25.50     Encounter#:           [id number]                     kg/m2 Blood Pressure:     121/65 mmHg         Department Location:  [facility] Study Type:    TRANSTHORACIC ECHO (TTE) COMPLETE Diagnosis/ICD: Presence of prosthetic heart valve-Z95.2 Indication:    s/p TMVR CPT Code:      Echo Complete w Full Doppler-24603; 3D Rendering w/o independent                workstation-[id number] Patient History: Pacer/Defib:       AICD Pertinent History: A-Fib and CAD. s/p TMVR (04/17/2025), HFrEF 40-45%, mild AS,                    CKD, BARBARA. Study Detail: The following Echo studies were performed: 2D, M-Mode, Doppler,               color flow and 3D. Technically challenging study due to patient               lying in supine position.  PHYSICIAN INTERPRETATION: Left Ventricle: The left ventricular systolic function is moderately decreased, with a visually estimated ejection fraction of 35-40%. There is severely increased eccentric left ventricular hypertrophy. There is global hypokinesis of the left ventricle with minor regional variations. The left ventricular cavity size is normal. There is normal septal and normal posterior left ventricular wall thickness. Left ventricular diastolic filling cannot be determined due to mitral valve repair/replacement. Left Atrium: The left atrial size is moderately dilated. Right Ventricle: The right ventricle is normal in size. There is normal right ventricular global systolic function. A device is visualized in the right ventricle. Right Atrium: The right atrium is normal in size. There is a device visualized in the right atrium. Aortic Valve: The aortic valve is trileaflet. There is mild  aortic valve cusp calcification. There is evidence of mild aortic valve stenosis. The aortic valve dimensionless index is 0.50. There is mild to moderate aortic valve regurgitation. The peak instantaneous gradient of the aortic valve is 25 mmHg. The mean gradient of the aortic valve is 15 mmHg. Mitral Valve: There is a prosthetic mitral valve present. There is a Medtronic TMVR mitral valve prosthesis with a 42 reported size. The peak and mean gradients are 16 mmHg and 6 mmHg respectively. The peak instantaneous gradient of the mitral valve is 16 mmHg. There is no evidence of mitral valve regurgitation. Intrepid Medtronic Valve (42 mm reported size) in the mitral position (TMVR) with normal appearance and no regurgitation (shadowing can reduce sensitivity of detection). Mean gradient is 7 mmHg. Tricuspid Valve: The tricuspid valve is structurally normal. There is mild tricuspid regurgitation. The Doppler estimated RVSP is slightly elevated at 33.9 mmHg. Pulmonic Valve: The pulmonic valve is structurally normal. There is trace pulmonic valve regurgitation. Pericardium: Trivial pericardial effusion. Aorta: The aortic root is normal. In comparison to the previous echocardiogram(s): Compared with the prior study dated 4/17/2025 (procedural SULY), there is no perivalvular mitral regurgitation seen. Mean diastolic gradients as mildly increased.  CONCLUSIONS:  1. The left ventricular systolic function is moderately decreased, with a visually estimated ejection fraction of 35-40%.  2. There is global hypokinesis of the left ventricle with minor regional variations.  3. There is severely increased eccentric left ventricular hypertrophy.  4. There is normal right ventricular global systolic function.  5. The left atrial size is moderately dilated.  6. Intrepid Medtronic Valve (42 mm reported size) in the mitral position (TMVR) with normal appearance and no regurgitation (shadowing can reduce sensitivity of detection). Mean  gradient is 7 mmHg.  7. Slightly elevated right ventricular systolic pressure.  8. Mild aortic valve stenosis.  9. Mild to moderate aortic valve regurgitation. 10. Compared with the prior study dated 2025 (procedural SULY), there is no perivalvular mitral regurgitation seen. Mean diastolic gradients as mildly increased. RECOMMENDATIONS:  QUANTITATIVE DATA SUMMARY:  2D MEASUREMENTS:           Normal Ranges: IVSd:            0.90 cm   (0.6-1.1cm) LVPWd:           0.90 cm   (0.6-1.1cm) LVIDd:           6.20 cm   (3.9-5.9cm) LVIDs:           5.00 cm LV Mass Index:   126 g/m2 LVEDV Index:     108 ml/m2 LV % FS          19.4 %  LEFT ATRIUM:                  Normal Ranges: LA Vol A4C:        57.4 ml    (22+/-6mL/m2) LA Vol A2C:        92.0 ml LA Vol BP:         76.0 ml LA Vol Index A4C:  31.7ml/m2 LA Vol Index A2C:  50.8 ml/m2 LA Vol Index BP:   42.0 ml/m2 LA Area A4C:       19.5 cm2 LA Area A2C:       25.8 cm2 LA Major Axis A4C: 5.6 cm LA Major Axis A2C: 6.2 cm  RIGHT ATRIUM:          Normal Ranges: RA Area A4C:  19.3 cm2  LV SYSTOLIC FUNCTION:                      Normal Ranges: EF-A4C View:    40 % (>=55%) EF-A2C View:    50 % EF-Biplane:     46 % EF-Visual:      38 % LV EF Reported: 38 %  LV DIASTOLIC FUNCTION:           Normal Ranges: MV Peak E:             1.81 m/s  (0.7-1.2 m/s) MV e'                  0.034 m/s (>8.0) MV lateral e'          0.03 m/s MV medial e'           0.04 m/s E/e' Ratio:            52.46     (<8.0) MV DT:                 225 msec  (150-240 msec)  MITRAL VALVE:           Normal Ranges: MV Vmax:      2.01 m/s  (<=1.3m/s) MV peak P.1 mmHg (<5mmHg) MV mean P.7 mmHg  (<2mmHg) MV DT:        225 msec  (150-240msec)  AORTIC VALVE:                      Normal Ranges: AoV Vmax:                2.52 m/s  (<=1.7m/s) AoV Peak P.4 mmHg (<20mmHg) AoV Mean PG:             15.0 mmHg (1.7-11.5mmHg) LVOT Max Ham:            1.26 m/s  (<=1.1m/s) AoV VTI:                 52.70 cm   (18-25cm) LVOT VTI:                26.60 cm LVOT Diameter:           2.10 cm   (1.8-2.4cm) AoV Area, VTI:           1.75 cm2  (2.5-5.5cm2) AoV Area,Vmax:           1.73 cm2  (2.5-4.5cm2) AoV Dimensionless Index: 0.50  AORTIC INSUFFICIENCY: AI Vmax:       4.00 m/s AI Half-time:  604 msec AI Decel Rate: 204.50 cm/s2  RIGHT VENTRICLE: RV Basal 5.20 cm RV Mid   3.20 cm RV Major 7.7 cm TAPSE:   29.5 mm RV s'    0.18 m/s  TRICUSPID VALVE/RVSP:          Normal Ranges: Peak TR Velocity:     2.78 m/s RV Syst Pressure:     34 mmHg  (< 30mmHg) IVC Diam:             1.50 cm  [id number] Lawrence Pitt MD Electronically signed on 4/18/2025 at 2:59:39 PM  ** Final **     ECG 12 lead  Result Date: 4/18/2025  Atrial fibrillation with premature ventricular or aberrantly conducted complexes ST & T wave abnormality, consider inferolateral ischemia Abnormal ECG When compared with ECG of 16-APR-2025 16:59, Nonspecific T wave abnormality has replaced inverted T waves in Inferior leads Confirmed by Sydnie Hammond ([id number]) on 4/18/2025 1:50:27 PM    XR chest 1 view  Result Date: 4/18/2025  Interpreted By:  Martinez Krause  and Carlos Holt STUDY: XR CHEST 1 VIEW;  4/17/2025 8:20 pm   INDICATION: Signs/Symptoms:s/p valve procedure.   COMPARISON: Chest radiograph 04/16/2025.   ACCESSION NUMBER(S): [id number]   ORDERING CLINICIAN: LORRAINE DYE   FINDINGS: Supine AP radiograph of the chest was provided.   AICD entering from the left is again noted with stable position of right atrial and right ventricular leads. CardioMEMS device is again apparent on the left. Watchman device is again noted.   Patient is recent status post TAVR.     CARDIOMEDIASTINAL SILHOUETTE: Cardiomediastinal silhouette is enlarged however stable in size and configuration.   LUNGS: Bilateral perihilar and interstitial prominence, likely representing interstitial edema. No sizable pleural effusion. No pneumothorax.   ABDOMEN: No remarkable upper abdominal  findings.   BONES: Bilateral shoulder arthroplasty.       1.  Stable cardiomegaly with bilateral perihilar and interstitial prominence likely representing interstitial edema. No sizable pleural effusion. 2. Status post TAVR.   I personally reviewed the images/study and I agree with the findings as stated. This study was interpreted at [place], [place].   MACRO: None   Signed by: Martinez Krause 4/18/2025 9:51 AM Dictation workstation:   [id number]    XR chest 2 views  Result Date: 4/18/2025  Interpreted By:  Rony Kowalski, STUDY: XR CHEST 2 VIEWS   INDICATION: Signs/Symptoms:pre op planning. Mitral valve disease.   COMPARISON: February 10   ACCESSION NUMBER(S): [id number]   ORDERING CLINICIAN: LYNNE WELCH   FINDINGS: Cardiomegaly with pacemaker. No consolidation, effusion, edema, pneumothorax.       Cardiomegaly with pacemaker. No evidence of acute intrathoracic abnormality.   Signed by: Rony Kowalski 4/18/2025 6:07 AM Dictation workstation:   [id number]    Cardiac Catheterization Procedure  Result Date: 4/17/2025  This study is a surgery completed in an invasive cardiovascular space. Please see OpNote on Notes tab for findings.    Transthoracic echo (TTE) complete  Result Date: 4/16/2025   [facility], [address] [place] [address]                Tel [phone] and Fax [phone] TRANSTHORACIC ECHOCARDIOGRAM REPORT  Patient Name:       VERNON SALDANA VIA        Reading Physician:    [id number] Lakshmi Mascorro MD Study Date:         4/16/2025           Ordering Provider:    [id number] LORRAINE DYE MRN/PID:            [id number]            Fellow: Accession#:         [id number]        Nurse: Date of Birth/Age:  1950 / 74      Sonographer:          Tobi maldonado                                      RDCS Gender assigned at  F                   Additional Staff: Birth: Height:             162.56 cm           Admit Date: Weight:             72.12 kg            Admission Status:     Outpatient BSA / BMI:          1.77 m2 / 27.29     Encounter#:           [id number]                     kg/m2 Blood Pressure:     126/70 mmHg         Department Location:  [facility]                                                               Non Invasive Study Type:    TRANSTHORACIC ECHO (TTE) LIMITED Diagnosis/ICD: Encounter for other preprocedural examination-Z01.818 Indication:    pre TMVR - [employer] research study CPT Code:      Echo Limited-44914; Doppler Limited-68987; Color Doppler-59881 Patient History: Pertinent History: A-Fib, CAD, COPD and Hyperlipidemia. S/P Watchman, CKD. Study Detail: The following Echo studies were performed: 2D, M-Mode, Doppler and               color flow. Technically challenging study due to body habitus.  PHYSICIAN INTERPRETATION: Left Ventricle: The left ventricular systolic function is mildly decreased, with a visually estimated ejection fraction of 45-50%. There is mild eccentric left ventricular hypertrophy. There is global hypokinesis of the left ventricle with minor regional variations. The left ventricular cavity size was not assessed. There is normal septal and normal posterior left ventricular wall thickness. Spectral Doppler shows a Grade II (pseudonormal pattern) of left ventricular diastolic filling with an elevated left atrial pressure. Left Atrium: The left atrial size was not assessed. Right Ventricle: The right ventricle was not assessed. There is normal right ventricular global systolic function. A device is visualized in the right ventricle. Right Atrium: The right atrial size was not assessed. There is a device visualized in the right atrium. Aortic Valve: The aortic valve is trileaflet. There is mild to moderate aortic valve cusp calcification. There is evidence of mild  aortic valve stenosis. There is moderate aortic valve regurgitation. Mitral Valve: The mitral valve is mild to moderately thickened. There is evidence of mild mitral valve stenosis. There is severely decreased mitral valve posterior leaflet mobility. There is moderate mitral annular calcification. There is moderate to severe mitral valve regurgitation. There is blunted systolic flow in the pulmonary veins. Tricuspid Valve: The tricuspid valve is structurally normal. There is mild tricuspid regurgitation. The right ventricular systolic pressure is unable to be estimated. Pulmonic Valve: The pulmonic valve is structurally normal. There is mild pulmonic valve regurgitation. Pericardium: There is no pericardial effusion noted. Aorta: The aortic root was not assessed.  CONCLUSIONS:  1. The left ventricular systolic function is mildly decreased, with a visually estimated ejection fraction of 45-50%.  2. There is global hypokinesis of the left ventricle with minor regional variations.  3. Spectral Doppler shows a Grade II (pseudonormal pattern) of left ventricular diastolic filling with an elevated left atrial pressure.  4. There is normal right ventricular global systolic function.  5. There is mild mitral valve stenosis.  6. There is moderate mitral annular calcification.  7. Moderate to severe mitral valve regurgitation.  8. Severely decreased mitral valve posterior leaflet mobility.  9. Mild aortic valve stenosis. 10. Moderate aortic valve regurgitation. RECOMMENDATIONS:  QUANTITATIVE DATA SUMMARY:  2D MEASUREMENTS:          Normal Ranges: Ao Root d:       3.54 cm  (2.0-3.7cm) LAs:             4.18 cm  (2.7-4.0cm) IVSd:            0.80 cm  (0.6-1.1cm) LVPWd:           0.80 cm  (0.6-1.1cm) LVIDd:           6.10 cm  (3.9-5.9cm) LVIDs:           5.13 cm LV Mass Index:   108 g/m2 LV % FS          15.9 %  LEFT ATRIUM:                  Normal Ranges: LA Vol A4C:        81.3 ml    (22+/-6mL/m2) LA Vol A2C:        51.7 ml LA  Vol BP:         66.8 ml LA Vol Index A4C:  45.8ml/m2 LA Vol Index A2C:  29.1 ml/m2 LA Vol Index BP:   37.7 ml/m2 LA Area A4C:       24.0 cm2 LA Area A2C:       18.5 cm2 LA Major Axis A4C: 6.0 cm LA Major Axis A2C: 5.6 cm  LV SYSTOLIC FUNCTION:                      Normal Ranges: EF-A4C View:    41 % (>=55%) EF-A2C View:    46 % EF-Visual:      48 % LV EF Reported: 48 %  LV DIASTOLIC FUNCTION:           Normal Ranges: MV Peak E:             1.56 m/s  (0.7-1.2 m/s) MV e'                  0.065 m/s (>8.0) MV lateral e'          0.07 m/s MV medial e'           0.06 m/s E/e' Ratio:            24.00     (<8.0) MV DT:                 165 msec  (150-240 msec)  MITRAL VALVE:          Normal Ranges: MV mean PG:   3.5 mmHg (<2mmHg)  AORTIC VALVE:          Normal Ranges: LVOT Diameter: 1.94 cm (1.8-2.4cm)  RIGHT VENTRICLE: TAPSE: 25.9 mm RV s'  0.14 m/s  PULMONIC VALVE:         Normal Ranges: PV Accel Time:  85 msec (>120ms)  [id number] Lakshmi Mascorro MD Electronically signed on 4/16/2025 at 5:00:22 PM  ** Final **       Scheduled medications  Scheduled Medications[2]      Continuous medications  Continuous Medications[3]      PRN medications  PRN Medications[4]       Assessment/Plan   Assessment & Plan  Mitral regurgitation    Mitral valve disease    74 y.o. y/o female with PMH of atrial fibrillation s/p LAAO, VT/VF s/p ICD, Hyperlipidemia , CAD,  PUD, SBO s/p lysis of adhesions, GERD s/p nissen fundoplication, provoked DVT 2018, Rheumatoid arthritis, psoriasis, asthma, chronic anemia, severe MR, caridoMEMs implant and CKD presents for evaluation of severe, symptomatic progressive mitral regurgitation admitted for  TMVR 4/17 with Dr. Sanjana Ojeda. Transferred to the floor on 4/21 for GDMT adjustment. So far, the CICU has made the following changes to her home meds: ASA 81 daily (plan to drop once INR > 1.9) and warfarin 5mg daily       #Severe Mitral Regurgitation, s/p repair  #A Fib s/p LAAO  #Hx of VT/V Fib s/p ICD  placement  #Heart failure with reduced ejection fraction (LVEF 35-40%)  -Continue digoxin and diltiazem. Continue statin   -Continue Warfarin 5 mg  -Continue Bumex   -Continue Aspirin and Plavix (plan to drop Aspirin if INR >1.9)  -Started entresto 12/13 mg BID 4/21/25     Per Structural heart team plan:  - Need to be set up with anticoagulation clinic outpatient  - Follow-up with structural heart clinic per Kaplan trial protocol  - F/w Dr. Soto (primary cardiology) as scheduled or in 6-10 weeks  - F/U with Dr. Fredy Elias, DO in 1-2 weeks  - Patient given lab recs 1 week and Echo rec 1 month           #Asthma  Management: continue  -breo ellipta  -duonebs PRN  -singulair           #CKD (baseline Cr ~1)  -daily RFP      #Chronic anemia (baseline hgb 8-10)  -stabilized hemolysis labs, post-procedure TTE not c/f paravalvular leakage  Plan:   -As discussed with structural heart team, goal of transfusion to keep Hgb >10  -CTM CBC     #Rheumatoid arthritis  #Psoriasis  #Chronic lumbar back pain  -Continue sulfasalazine, plaquenil  -Can continue current multi-modal pain regimen on floor including diclofenac, lidocaine patch, tylenol, and oxycodone and morphine prn      F: prn  E: prn  N: low sodium diet     GI: ppi   DVT: warfarin      Code status: FULL code   POC: spouse Martinez Franklin [phone]      Pt to be staffed with attending in AM.       Amairani Kulkarni MD  IM PGY-3            [1]   Current Facility-Administered Medications:     acetaminophen (Tylenol) tablet 650 mg, 650 mg, oral, q6h PRN, Amairani Kulkarni MD, 650 mg at 04/20/25 1846    aspirin EC tablet 81 mg, 81 mg, oral, Daily, Amairani Kulkarni MD, 81 mg at 04/21/25 0904    baclofen (Lioresal) tablet 5 mg, 5 mg, oral, BID, Amairani Kulkarni MD, 5 mg at 04/21/25 2008    benzocaine-menthol (Cepastat Sore Throat) lozenge 1 lozenge, 1 lozenge, Mouth/Throat, q2h PRN, Amairani Kulkarni MD    clopidogrel (Plavix) tablet 75 mg, 75 mg, oral, Daily, Amairani Kulkarni,  MD, 75 mg at 04/21/25 0904    diclofenac sodium (Voltaren) 1 % gel 4 g, 4 g, Topical, 4x daily PRN, Amairani Kulkarni MD, 4 g at 04/21/25 0438    digoxin (Lanoxin) tablet 125 mcg, 125 mcg, oral, Daily, Amairani Kulkarni MD, 125 mcg at 04/21/25 0904    fluticasone furoate-vilanteroL (Breo Ellipta) 100-25 mcg/dose inhaler 1 puff, 1 puff, inhalation, Daily, Amairani Kulkarni MD, 1 puff at 04/21/25 0815    hydroxychloroquine (Plaquenil) tablet 300 mg, 300 mg, oral, Daily, Amairani Kulkarni MD, 300 mg at 04/21/25 0905    ipratropium-albuteroL (Duo-Neb) 0.5-2.5 mg/3 mL nebulizer solution 3 mL, 3 mL, nebulization, 4x daily PRN, Amairani Kulkarni MD, 3 mL at 04/16/25 2013    ipratropium-albuteroL (Duo-Neb) 0.5-2.5 mg/3 mL nebulizer solution 3 mL, 3 mL, nebulization, q12h DWIGHT, Amairani Kulkarni MD, 3 mL at 04/21/25 0814    lidocaine 4 % patch 1 patch, 1 patch, transdermal, Daily, Amairani Kulkarni MD, 1 patch at 04/20/25 1000    lidocaine-epinephrine (Xylocaine W/EPI) 2 %-1:100,000 injection 5 mL, 5 mL, subcutaneous, Once PRN, Amairani Kulkarni MD    melatonin tablet 3 mg, 3 mg, oral, Nightly PRN, Amairani Kulkarni MD    montelukast (Singulair) tablet 10 mg, 10 mg, oral, Nightly, Amairani Kulkarni MD, 10 mg at 04/21/25 2008    morphine injection 2 mg, 2 mg, intravenous, q4h PRN, Amairani Kulkarni MD, 2 mg at 04/21/25 0048    nitroglycerin (Nitrostat) SL tablet 0.4 mg, 0.4 mg, sublingual, q5 min PRN, Amairani Kulkarni MD    ondansetron (Zofran) tablet 4 mg, 4 mg, oral, q8h PRN **OR** ondansetron (Zofran) injection 4 mg, 4 mg, intravenous, q8h PRN, Amairani Kulkarni MD, 4 mg at 04/20/25 1933    ondansetron (Zofran) tablet 8 mg, 8 mg, oral, q8h PRN, Amairani Kulkarni MD, 8 mg at 04/18/25 1246    oxyCODONE (Roxicodone) immediate release tablet 10 mg, 10 mg, oral, q6h PRN, Amairani Kulkarni MD, 10 mg at 04/20/25 0009    oxyCODONE (Roxicodone) immediate release tablet 5 mg, 5 mg, oral, q6h PRN, Amairani Kulkarni MD, 5 mg at 04/20/25 2036    oxygen (O2) therapy,  , inhalation, Continuous PRN - O2/gases, Amairani Kulkarni MD, 1 L/min at 04/20/25 0755    pantoprazole (ProtoNix) EC tablet 40 mg, 40 mg, oral, BID, Amairani Kulkarni MD, 40 mg at 04/21/25 2008    polyethylene glycol (Glycolax, Miralax) packet 17 g, 17 g, oral, BID, Amairani Kulkarni MD, 17 g at 04/21/25 2008    promethazine (Phenergan) 12.5 mg in sodium chloride 0.9% 50 mL IV (Ordered as: promethazine), 12.5 mg, intravenous, q6h PRN, Amairani Kulkarni MD, Stopped at 04/20/25 1728    rosuvastatin (Crestor) tablet 20 mg, 20 mg, oral, Nightly, Amairani Kulkarni MD, 20 mg at 04/21/25 2008    sacubitriL-valsartan (Entresto) 24-26 mg per tablet 0.5 tablet, 0.5 tablet, oral, BID, Amairani Kulkarni MD, 0.5 tablet at 04/21/25 2007    sennosides-docusate sodium (Lily-Colace) 8.6-50 mg per tablet 2 tablet, 2 tablet, oral, Nightly, Amairani Kulkarni MD    sucralfate (Carafate) suspension 1 g, 1 g, oral, With meals & nightly, Amairani Kulkarni MD, 1 g at 04/21/25 2008    sulfaSALAzine (Azulfidine) tablet 1,000 mg, 1,000 mg, oral, BID, Amairani Kulkarni MD, 1,000 mg at 04/21/25 2007    warfarin (Coumadin) tablet 5 mg, 5 mg, oral, Daily, Amairani Kulkarni MD, 5 mg at 04/21/25 1712  [2] aspirin, 81 mg, oral, Daily  baclofen, 5 mg, oral, BID  clopidogrel, 75 mg, oral, Daily  digoxin, 125 mcg, oral, Daily  fluticasone furoate-vilanteroL, 1 puff, inhalation, Daily  hydroxychloroquine, 300 mg, oral, Daily  ipratropium-albuteroL, 3 mL, nebulization, q12h DWIGHT  lidocaine, 1 patch, transdermal, Daily  montelukast, 10 mg, oral, Nightly  pantoprazole, 40 mg, oral, BID  polyethylene glycol, 17 g, oral, BID  rosuvastatin, 20 mg, oral, Nightly  sacubitriL-valsartan, 0.5 tablet, oral, BID  sennosides-docusate sodium, 2 tablet, oral, Nightly  sucralfate, 1 g, oral, With meals & nightly  sulfaSALAzine, 1,000 mg, oral, BID  warfarin, 5 mg, oral, Daily     [3]    [4] PRN medications: acetaminophen, benzocaine-menthol, diclofenac sodium, ipratropium-albuteroL,  lidocaine-epinephrine, melatonin, morphine, nitroglycerin, ondansetron **OR** ondansetron, ondansetron, oxyCODONE, oxyCODONE, oxygen, promethazine

## 2025-04-23 ENCOUNTER — PATIENT OUTREACH (OUTPATIENT)
Dept: PRIMARY CARE | Facility: CLINIC | Age: 75
End: 2025-04-23

## 2025-04-23 ENCOUNTER — HOSPITAL ENCOUNTER (OUTPATIENT)
Dept: CARDIOLOGY | Facility: CLINIC | Age: 75
Discharge: HOME | End: 2025-04-23
Payer: MEDICARE

## 2025-04-23 ENCOUNTER — ANTICOAGULATION - WARFARIN VISIT (OUTPATIENT)
Dept: CARDIOLOGY | Facility: CLINIC | Age: 75
End: 2025-04-23
Payer: MEDICARE

## 2025-04-23 DIAGNOSIS — Z95.2 S/P TRANSCATHETER MITRAL VALVE REPLACEMENT (TMVR): ICD-10-CM

## 2025-04-23 DIAGNOSIS — I47.29 OTHER VENTRICULAR TACHYCARDIA: ICD-10-CM

## 2025-04-23 DIAGNOSIS — Z79.01 ANTICOAGULATION MANAGEMENT ENCOUNTER: ICD-10-CM

## 2025-04-23 DIAGNOSIS — Z51.81 ANTICOAGULATION MANAGEMENT ENCOUNTER: ICD-10-CM

## 2025-04-23 DIAGNOSIS — Z95.810 PRESENCE OF AUTOMATIC (IMPLANTABLE) CARDIAC DEFIBRILLATOR: ICD-10-CM

## 2025-04-23 LAB
POC INR: 1.9 (ref 0.9–1.1)
POC PROTHROMBIN TIME: ABNORMAL (ref 9.3–12.5)

## 2025-04-23 PROCEDURE — 85610 PROTHROMBIN TIME: CPT

## 2025-04-23 PROCEDURE — 99211 OFF/OP EST MAY X REQ PHY/QHP: CPT | Performed by: FAMILY MEDICINE

## 2025-04-23 NOTE — PROGRESS NOTES
Discharge Facility: [facility]  Discharge Diagnosis: Mitral regurgitation   Admission Date:4/16/2025  Procedure Date: 4/17/2025 TMVR (Transcatheter MV Replacement)  Discharge Date: 4/22/2025    PCP Appointment Date: Declined to schedule  Specialist Appointment Date: Appointment with DARSHANA Barker (04/25/2025) Cardiology,   Appointment with Juanita Escalante PA-C (05/06/2025) Hem/Oncology,  Appointment with Amie Pimentel MD (05/08/2025) PulBannerology  Hospital Encounter and Summary Linked: Yes  Admission (Discharged) with Tad Ojeda MD (04/16/2025)     See discharge assessment below for further details:    Wrap Up  Wrap Up Additional Comments: 74yoF with PMHx of atrial fibrillation s/p LAAO, VT/VF s/p ICD, Hyperlipidemia, CAD, PUD, SBO s/p lysis of adhesions, GERD s/p nissen fundoplication, provoked DVT 2018, rheumatoid arthritis, psoriasis, asthma, chronic anemia, severe MR, caridoMEMs implant and CKD admitted following a scheduled TMVR. At time of call, the patient stated she feels more SOB than she did prior to her procedure. Pulse Ox 95%. Denied chest pain. Patient taking medications as directed. Patient has a virtual Cardiology follow up tomorrow. Advised discussing concern with provider at appt. Patient declined to schedule a primary care hospital follow due to having mulitple upcoming specialty appts. Patient stated she will call Dr. Elias's office directly with any future needs. (4/24/2025 10:37 AM)    Medications  Medications reviewed with patient/caregiver?: Yes (Patient) (4/24/2025 10:37 AM)  Is the patient having any side effects they believe may be caused by any medication additions or changes?: No (4/24/2025 10:37 AM)  Does the patient have all medications ordered at discharge?: Yes (4/24/2025 10:37 AM)  Care Management Interventions: No intervention needed (4/24/2025 10:37 AM)  Prescription Comments: NEW: Entresto.  STOPPED: diltiazem. CHANGED: warfarin (4/24/2025  10:37 AM)  Is the patient taking all medications as directed (includes completed medication regime)?: Yes (4/24/2025 10:37 AM)  Care Management Interventions: Provided patient education (4/24/2025 10:37 AM)  Medication Comments: Verbalized understanding of medication changes. Patient called office for warfarin Rx due to dosage change. (4/24/2025 10:37 AM)    Appointments  Does the patient have a primary care provider?: Yes (4/24/2025 10:37 AM)  Care Management Interventions: -- (Declined to schedule.) (4/24/2025 10:37 AM)  Has the patient kept scheduled appointments due by today?: Not applicable (4/24/2025 10:37 AM)    Self Management  What is the home health agency?: N/A (4/24/2025 10:37 AM)  What Durable Medical Equipment (DME) was ordered?: N/A (4/24/2025 10:37 AM)    Patient Teaching  Does the patient have access to their discharge instructions?: Yes (4/24/2025 10:37 AM)  Care Management Interventions: Reviewed instructions with patient (4/24/2025 10:37 AM)  What is the patient's perception of their health status since discharge?: Same (4/24/2025 10:37 AM)  Is the patient/caregiver able to teach back the hierarchy of who to call/visit for symptoms/problems? PCP, Specialist, Home Health nurse, Urgent Care, ED, 911: Yes (4/24/2025 10:37 AM)  Patient/Caregiver Education Comments: Patient verbalized understanding of discharge instructions. Provided contact information for nonurgent questions or concerns. (4/24/2025 10:37 AM)

## 2025-04-23 NOTE — PROGRESS NOTES
Patient identification verified with 2 identifiers.    Location: [facility] (Building [address]) [id number] [place], [place] [id number] [phone]     Referring Physician: Dr Blakely  Enrollment/ Re-enrollment date: 2026   INR Goal: 2.0-3.0  INR monitoring is per [facility] protocol.  Anticoagulation Medication: warfarin  Indication: Mitral Valve Replacement  Target end date is 6 mos 10/17/25    Subjective   Bleeding signs/symptoms: No    Bruising: No   Major bleeding event: No  Thrombosis signs/symptoms: No  Thromboembolic event: No  Missed doses: No  Extra doses: No  Medication changes: No  Dietary changes: No  Change in health: No  Change in activity: No  Alcohol: No  Other concerns: No    Upcoming Procedures:  Does the Patient Have any upcoming procedures that require interruption in anticoagulation therapy? no  Does the patient require bridging? no      Anticoagulation Summary  As of 2025      INR goal:  2.0-3.0   TTR:  --   INR used for dosin.90 (2025)   Weekly warfarin total:  18 mg               Assessment/Plan     Subtherapeutic     1. New dose:  increase     2. Next INR:  5 days      Education provided to patient during the visit:  Patient instructed to call in interim with questions, concerns and changes.   Patient educated on interactions between medications and warfarin.   Patient educated on dietary consistency in vitamin k consumption.   Patient educated on affects of alcohol consumption while taking warfarin.   Patient educated on signs of bleeding/clotting.   Patient educated on compliance with dosing, follow up appointments, and prescribed plan of care.

## 2025-04-24 DIAGNOSIS — Z95.2 S/P TRANSCATHETER MITRAL VALVE REPLACEMENT (TMVR): ICD-10-CM

## 2025-04-24 DIAGNOSIS — Z51.81 ANTICOAGULATION MANAGEMENT ENCOUNTER: ICD-10-CM

## 2025-04-24 DIAGNOSIS — Z79.01 ANTICOAGULATION MANAGEMENT ENCOUNTER: ICD-10-CM

## 2025-04-24 RX ORDER — WARFARIN 1 MG/1
TABLET ORAL
Qty: 30 TABLET | Refills: 1 | Status: SHIPPED | OUTPATIENT
Start: 2025-04-24 | End: 2026-04-24

## 2025-04-25 ENCOUNTER — TELEMEDICINE (OUTPATIENT)
Dept: CARDIOLOGY | Facility: HOSPITAL | Age: 75
End: 2025-04-25
Payer: MEDICARE

## 2025-04-25 DIAGNOSIS — D59.8 OTHER ACQUIRED HEMOLYTIC ANEMIAS: Primary | ICD-10-CM

## 2025-04-25 DIAGNOSIS — Z95.2 S/P TRANSCATHETER MITRAL VALVE REPLACEMENT (TMVR): ICD-10-CM

## 2025-04-25 DIAGNOSIS — I50.43 ACUTE ON CHRONIC COMBINED SYSTOLIC (CONGESTIVE) AND DIASTOLIC (CONGESTIVE) HEART FAILURE: ICD-10-CM

## 2025-04-25 NOTE — PROGRESS NOTES
Structural Heart Follow up visit    Trina Cartagena is a 74 y.o. female presents today for 1 week follow up s/p TMVR      reports SOB,DOSHI, fatigue  Recent Hospitalizations  No    patient with Medical History[1]    Results for orders placed or performed in visit on 04/23/25 (from the past 96 hours)   POCT INR manually resulted   Result Value Ref Range    POC INR 1.90 (A) 0.90 - 1.10    POC Prothrombin Time  9.30 - 12.50     *Note: Due to a large number of results and/or encounters for the requested time period, some results have not been displayed. A complete set of results can be found in Results Review.        Intraoperative SULY (Anesthesia please do not use)  Result Date: 4/18/2025   [facility], [address] [place] [address]                Tel [phone] and Fax [phone] TRANSESOPHAGEAL ECHOCARDIOGRAM REPORT  Patient Name:       TRINA CARTAGENA        Reading Physician:    [id number] Rishi Crane MD Study Date:         4/17/2025           Ordering Provider:    [id number] LORRAINE DYE MRN/PID:            [id number]            Fellow: Accession#:         [id number]        Nurse: Date of Birth/Age:  1950 / 74      Sonographer:          Connie Bell RDCS                     years Gender assigned at  F                   Additional Staff: Birth: Height:             167.64 cm           Admit Date: Weight:             71.67 kg            Admission Status:     Inpatient -                                                               Routine BSA / BMI:          1.81 m2 / 25.50     Encounter#:           [id number]                     kg/m2 Blood Pressure:     126/67 mmHg         Department Location:  [facility]                                                               Cath Lab Study Type:    INTRAOPERATIVE SULY Diagnosis/ICD: Nonrheumatic mitral (valve)  insufficiency-I34.0 Indication:    TMVR Procedure CPT Code:      Echocardiography, Transesophageal (SULY) for Guidance of a                Transcatheter Intracardiac or Great Vessel(s) Structural                Intervention(s)-46243 Patient History: Pertinent History: A-fib, CAD, COPD, HL, S/p Watchman, CKD. Study Detail: The following Echo studies were performed: 2D, Doppler, color flow               and 3D.  PHYSICIAN INTERPRETATION: SULY Details: Technically adequate omniplane transesophageal echocardiogram performed. SULY Medication: The patient was sedated by Anesthesia; please refer to anesthesia flow sheet for medications used. SULY Procedure: The probe was passed without difficulty. Complications encountered during procedure: Patient tolerated the procedure well without any apparent complications. Left Ventricle: Left ventricular ejection fraction is moderately decreased, by visual estimate at 40%. The left ventricular cavity size was not assessed. Left ventricular diastolic filling cannot be determined due to severe mitral regurgitation. Left Atrium: The left atrium is enlarged. A device is noted within the left atrial appendage. Right Ventricle: The right ventricle is small in size. Right ventricular systolic function not assessed. A device is visualized in the right ventricle. Right Atrium: The right atrial size was not assessed. There is a device visualized in the right atrium. Aortic Valve: The aortic valve is trileaflet. There is mild aortic valve cusp calcification. The aortic valve dimensionless index is 0.93. There is moderate aortic valve regurgitation. The peak instantaneous gradient of the aortic valve is 14 mmHg. The mean gradient of the aortic valve is 8 mmHg. PreCase LVOT max gradient: 3mmHg - LVOT mean gradient: 2mmHg - SV: 77.8ml - CO: 4.670ml/min - HR 60 PostCase LVOT max gradient: 10mmHg - LVOT mean gradient: 5mmHg - SV: 77.4ml - CO: 5.882ml/min - HR 76. Mitral Valve: The mitral valve is  normal in structure. There is Medtronic Intrepid bioprosthetic type mitral valve prosthesis. The peak and mean gradients are 6 mmHg and 3 mmHg respectively. The peak instantaneous gradient of the mitral valve is 6 mmHg. There is moderate heike-prosthetic mitral valve regurgitation. There is no prosthetic mitral valve regurgitation. There is moderate to severe mitral valve regurgitation. There is blunted systolic flow in the pulmonary veins. PreCase mitral valve Max gradient: 4mmHg - mean gradient: 1mmHg PostCase mitral valve Max gradient: 9mmHg - mean gradient: 4mmHg. Tricuspid Valve: The tricuspid valve is structurally normal. There is mild tricuspid regurgitation. Pulmonic Valve: The pulmonic valve is not well visualized. Pulmonic valve regurgitation was not assessed. Pericardium: Trivial pericardial effusion. Aorta: The aortic root is normal.  CONCLUSIONS:  1. Left ventricular ejection fraction is moderately decreased, by visual estimate at 40%.  2. The left atrium is enlarged.  3. There is Medtronic Intrepid bioprosthetic type mitral valve prosthesis. The peak and mean gradients are 6 mmHg and 3 mmHg respectively.  4. Moderate to severe mitral valve regurgitation.  5. Moderate aortic valve regurgitation.  6. Procedure: Under SULY guidance, the transseptal puncture site was identified and navigated using biplanar 2D and 3D imaging. A height of 4.06 cm was measured from the puncture site to the mitral valve leaflets. A septostomy balloon was advanced across the interatrial septum, and the puncture site was dilated under echocardiographic guidance. The valve delivery system was advanced until reaching 4.83 cm into the left atrium. Using 2D, 3D, and 3D-MPR imaging, the capsule was then guided and positioned at the mitral valve. After confirming that the brim was 20 mm above the annulus, balloon inflation was performed until the brim/fixation ring junction was visualized. Then, using 2D, 3D, and 3D-MPR imaging, the  valve was positioned over the mitral annulus, and its placement was confirmed to be adequate under rapid pacing. Then, under rapid pacing, the valve was advanced to the annulus and successfully deployed. Using 2D and 3D image guidance, the capsule was successfully retracted into the left atrium, followed by retraction of the delivery system. Post-procedure, the valve was evaluated using 2D and 3D imaging with and without color Doppler. No valvular regurgitation was observed.  7. However, moderate paravalvular regurgitation was noted along the anterior aspect of the valve (corresponding to the 10_12 o'clock position). The mean mitral gradient post-procedure was 4 mmHg. An iatrogenic atrial septal defect (ASD) with left-to-right shunt was visualized. There was no change in the trace pericardial effusion. QUANTITATIVE DATA SUMMARY:  LV SYSTOLIC FUNCTION:                      Normal Ranges: EF-Visual:      40 % LV EF Reported: 40 %  MITRAL VALVE:          Normal Ranges: MV Vmax:      1.25 m/s (<=1.3m/s) MV peak P.2 mmHg (<5mmHg) MV mean P.5 mmHg (<2mmHg)  MITRAL INSUFFICIENCY:             Normal Ranges: PISA Radius:          0.6 cm MR VTI:               165.00 cm MR Vmax:              516.00 cm/s  AORTIC VALVE:                      Normal Ranges: AoV Vmax:                1.90 m/s  (<=1.7m/s) AoV Peak P.4 mmHg (<20mmHg) AoV Mean P.0 mmHg  (1.7-11.5mmHg) LVOT Max Ham:            1.83 m/s  (<=1.1m/s) AoV VTI:                 37.90 cm  (18-25cm) LVOT VTI:                35.20 cm AoV Dimensionless Index: 0.93  [id number] Rishi Crane MD Electronically signed on 2025 at 10:41:23 AM  ** Final (Updated) **     Transthoracic Echo (TTE) Complete  Result Date: 2025   [facility], [address] [place] [address]                Tel [phone] and Fax [phone] TRANSTHORACIC ECHOCARDIOGRAM REPORT  Patient Name:       VERNON SALDANA MITZI Abdalla  Physician:    [id number] Lawrence Pitt MD Study Date:         4/18/2025           Ordering Provider:    [id number] HERACLIO HUBBARD MRN/PID:            [id number]            Fellow: Accession#:         [id number]        Nurse: Date of Birth/Age:  1950 / 74      Sonographer:          Michelle Duffy RDCS                     years Gender assigned at  F                   Additional Staff: Birth: Height:             167.64 cm           Admit Date:           4/16/2025 Weight:             71.67 kg            Admission Status:     Inpatient -                                                               Routine BSA / BMI:          1.81 m2 / 25.50     Encounter#:           [id number]                     kg/m2 Blood Pressure:     121/65 mmHg         Department Location:  [facility] Study Type:    TRANSTHORACIC ECHO (TTE) COMPLETE Diagnosis/ICD: Presence of prosthetic heart valve-Z95.2 Indication:    s/p TMVR CPT Code:      Echo Complete w Full Doppler-41876; 3D Rendering w/o independent                workstation-[id number] Patient History: Pacer/Defib:       AICD Pertinent History: A-Fib and CAD. s/p TMVR (04/17/2025), HFrEF 40-45%, mild AS,                    CKD, BARBARA. Study Detail: The following Echo studies were performed: 2D, M-Mode, Doppler,               color flow and 3D. Technically challenging study due to patient               lying in supine position.  PHYSICIAN INTERPRETATION: Left Ventricle: The left ventricular systolic function is moderately decreased, with a visually estimated ejection fraction of 35-40%. There is severely increased eccentric left ventricular hypertrophy. There is global hypokinesis of the left ventricle with minor regional variations. The left ventricular cavity size is normal. There is normal septal and normal posterior left ventricular wall thickness. Left ventricular diastolic  filling cannot be determined due to mitral valve repair/replacement. Left Atrium: The left atrial size is moderately dilated. Right Ventricle: The right ventricle is normal in size. There is normal right ventricular global systolic function. A device is visualized in the right ventricle. Right Atrium: The right atrium is normal in size. There is a device visualized in the right atrium. Aortic Valve: The aortic valve is trileaflet. There is mild aortic valve cusp calcification. There is evidence of mild aortic valve stenosis. The aortic valve dimensionless index is 0.50. There is mild to moderate aortic valve regurgitation. The peak instantaneous gradient of the aortic valve is 25 mmHg. The mean gradient of the aortic valve is 15 mmHg. Mitral Valve: There is a prosthetic mitral valve present. There is a Medtronic TMVR mitral valve prosthesis with a 42 reported size. The peak and mean gradients are 16 mmHg and 6 mmHg respectively. The peak instantaneous gradient of the mitral valve is 16 mmHg. There is no evidence of mitral valve regurgitation. Intrepid Medtronic Valve (42 mm reported size) in the mitral position (TMVR) with normal appearance and no regurgitation (shadowing can reduce sensitivity of detection). Mean gradient is 7 mmHg. Tricuspid Valve: The tricuspid valve is structurally normal. There is mild tricuspid regurgitation. The Doppler estimated RVSP is slightly elevated at 33.9 mmHg. Pulmonic Valve: The pulmonic valve is structurally normal. There is trace pulmonic valve regurgitation. Pericardium: Trivial pericardial effusion. Aorta: The aortic root is normal. In comparison to the previous echocardiogram(s): Compared with the prior study dated 4/17/2025 (procedural SULY), there is no perivalvular mitral regurgitation seen. Mean diastolic gradients as mildly increased.  CONCLUSIONS:  1. The left ventricular systolic function is moderately decreased, with a visually estimated ejection fraction of 35-40%.  2.  There is global hypokinesis of the left ventricle with minor regional variations.  3. There is severely increased eccentric left ventricular hypertrophy.  4. There is normal right ventricular global systolic function.  5. The left atrial size is moderately dilated.  6. Intrepid Medtronic Valve (42 mm reported size) in the mitral position (TMVR) with normal appearance and no regurgitation (shadowing can reduce sensitivity of detection). Mean gradient is 7 mmHg.  7. Slightly elevated right ventricular systolic pressure.  8. Mild aortic valve stenosis.  9. Mild to moderate aortic valve regurgitation. 10. Compared with the prior study dated 4/17/2025 (procedural SULY), there is no perivalvular mitral regurgitation seen. Mean diastolic gradients as mildly increased. RECOMMENDATIONS:  QUANTITATIVE DATA SUMMARY:  2D MEASUREMENTS:           Normal Ranges: IVSd:            0.90 cm   (0.6-1.1cm) LVPWd:           0.90 cm   (0.6-1.1cm) LVIDd:           6.20 cm   (3.9-5.9cm) LVIDs:           5.00 cm LV Mass Index:   126 g/m2 LVEDV Index:     108 ml/m2 LV % FS          19.4 %  LEFT ATRIUM:                  Normal Ranges: LA Vol A4C:        57.4 ml    (22+/-6mL/m2) LA Vol A2C:        92.0 ml LA Vol BP:         76.0 ml LA Vol Index A4C:  31.7ml/m2 LA Vol Index A2C:  50.8 ml/m2 LA Vol Index BP:   42.0 ml/m2 LA Area A4C:       19.5 cm2 LA Area A2C:       25.8 cm2 LA Major Axis A4C: 5.6 cm LA Major Axis A2C: 6.2 cm  RIGHT ATRIUM:          Normal Ranges: RA Area A4C:  19.3 cm2  LV SYSTOLIC FUNCTION:                      Normal Ranges: EF-A4C View:    40 % (>=55%) EF-A2C View:    50 % EF-Biplane:     46 % EF-Visual:      38 % LV EF Reported: 38 %  LV DIASTOLIC FUNCTION:           Normal Ranges: MV Peak E:             1.81 m/s  (0.7-1.2 m/s) MV e'                  0.034 m/s (>8.0) MV lateral e'          0.03 m/s MV medial e'           0.04 m/s E/e' Ratio:            52.46     (<8.0) MV DT:                 225 msec  (150-240 msec)  MITRAL  VALVE:           Normal Ranges: MV Vmax:      2.01 m/s  (<=1.3m/s) MV peak P.1 mmHg (<5mmHg) MV mean P.7 mmHg  (<2mmHg) MV DT:        225 msec  (150-240msec)  AORTIC VALVE:                      Normal Ranges: AoV Vmax:                2.52 m/s  (<=1.7m/s) AoV Peak P.4 mmHg (<20mmHg) AoV Mean PG:             15.0 mmHg (1.7-11.5mmHg) LVOT Max Ham:            1.26 m/s  (<=1.1m/s) AoV VTI:                 52.70 cm  (18-25cm) LVOT VTI:                26.60 cm LVOT Diameter:           2.10 cm   (1.8-2.4cm) AoV Area, VTI:           1.75 cm2  (2.5-5.5cm2) AoV Area,Vmax:           1.73 cm2  (2.5-4.5cm2) AoV Dimensionless Index: 0.50  AORTIC INSUFFICIENCY: AI Vmax:       4.00 m/s AI Half-time:  604 msec AI Decel Rate: 204.50 cm/s2  RIGHT VENTRICLE: RV Basal 5.20 cm RV Mid   3.20 cm RV Major 7.7 cm TAPSE:   29.5 mm RV s'    0.18 m/s  TRICUSPID VALVE/RVSP:          Normal Ranges: Peak TR Velocity:     2.78 m/s RV Syst Pressure:     34 mmHg  (< 30mmHg) IVC Diam:             1.50 cm  [id number] Lawrecne Pitt MD Electronically signed on 2025 at 2:59:39 PM  ** Final **     Transthoracic echo (TTE) complete  Result Date: 2025   [facility], [address] [place] [address]                Tel [phone] and Fax [phone] TRANSTHORACIC ECHOCARDIOGRAM REPORT  Patient Name:       VERNON SALDANA VIA        Reading Physician:    [id number] Lakshmi Mascorro MD Study Date:         2025           Ordering Provider:    [id number] LORRAINE DYE MRN/PID:            [id number]            Fellow: Accession#:         [id number]        Nurse: Date of Birth/Age:  1950      Sonographer:          Tobi maldonado                                     RDCS Gender assigned at  F                   Additional Staff: Birth: Height:             162.56  cm           Admit Date: Weight:             72.12 kg            Admission Status:     Outpatient BSA / BMI:          1.77 m2 / 27.29     Encounter#:           [id number]                     kg/m2 Blood Pressure:     126/70 mmHg         Department Location:  [facility]                                                               Non Invasive Study Type:    TRANSTHORACIC ECHO (TTE) LIMITED Diagnosis/ICD: Encounter for other preprocedural examination-Z01.818 Indication:    pre TMVR - [place] research study CPT Code:      Echo Limited-86559; Doppler Limited-71513; Color Doppler-26588 Patient History: Pertinent History: A-Fib, CAD, COPD and Hyperlipidemia. S/P Watchman, CKD. Study Detail: The following Echo studies were performed: 2D, M-Mode, Doppler and               color flow. Technically challenging study due to body habitus.  PHYSICIAN INTERPRETATION: Left Ventricle: The left ventricular systolic function is mildly decreased, with a visually estimated ejection fraction of 45-50%. There is mild eccentric left ventricular hypertrophy. There is global hypokinesis of the left ventricle with minor regional variations. The left ventricular cavity size was not assessed. There is normal septal and normal posterior left ventricular wall thickness. Spectral Doppler shows a Grade II (pseudonormal pattern) of left ventricular diastolic filling with an elevated left atrial pressure. Left Atrium: The left atrial size was not assessed. Right Ventricle: The right ventricle was not assessed. There is normal right ventricular global systolic function. A device is visualized in the right ventricle. Right Atrium: The right atrial size was not assessed. There is a device visualized in the right atrium. Aortic Valve: The aortic valve is trileaflet. There is mild to moderate aortic valve cusp calcification. There is evidence of mild aortic valve stenosis. There is moderate aortic valve regurgitation. Mitral Valve: The mitral valve is  mild to moderately thickened. There is evidence of mild mitral valve stenosis. There is severely decreased mitral valve posterior leaflet mobility. There is moderate mitral annular calcification. There is moderate to severe mitral valve regurgitation. There is blunted systolic flow in the pulmonary veins. Tricuspid Valve: The tricuspid valve is structurally normal. There is mild tricuspid regurgitation. The right ventricular systolic pressure is unable to be estimated. Pulmonic Valve: The pulmonic valve is structurally normal. There is mild pulmonic valve regurgitation. Pericardium: There is no pericardial effusion noted. Aorta: The aortic root was not assessed.  CONCLUSIONS:  1. The left ventricular systolic function is mildly decreased, with a visually estimated ejection fraction of 45-50%.  2. There is global hypokinesis of the left ventricle with minor regional variations.  3. Spectral Doppler shows a Grade II (pseudonormal pattern) of left ventricular diastolic filling with an elevated left atrial pressure.  4. There is normal right ventricular global systolic function.  5. There is mild mitral valve stenosis.  6. There is moderate mitral annular calcification.  7. Moderate to severe mitral valve regurgitation.  8. Severely decreased mitral valve posterior leaflet mobility.  9. Mild aortic valve stenosis. 10. Moderate aortic valve regurgitation. RECOMMENDATIONS:  QUANTITATIVE DATA SUMMARY:  2D MEASUREMENTS:          Normal Ranges: Ao Root d:       3.54 cm  (2.0-3.7cm) LAs:             4.18 cm  (2.7-4.0cm) IVSd:            0.80 cm  (0.6-1.1cm) LVPWd:           0.80 cm  (0.6-1.1cm) LVIDd:           6.10 cm  (3.9-5.9cm) LVIDs:           5.13 cm LV Mass Index:   108 g/m2 LV % FS          15.9 %  LEFT ATRIUM:                  Normal Ranges: LA Vol A4C:        81.3 ml    (22+/-6mL/m2) LA Vol A2C:        51.7 ml LA Vol BP:         66.8 ml LA Vol Index A4C:  45.8ml/m2 LA Vol Index A2C:  29.1 ml/m2 LA Vol Index BP:    37.7 ml/m2 LA Area A4C:       24.0 cm2 LA Area A2C:       18.5 cm2 LA Major Axis A4C: 6.0 cm LA Major Axis A2C: 5.6 cm  LV SYSTOLIC FUNCTION:                      Normal Ranges: EF-A4C View:    41 % (>=55%) EF-A2C View:    46 % EF-Visual:      48 % LV EF Reported: 48 %  LV DIASTOLIC FUNCTION:           Normal Ranges: MV Peak E:             1.56 m/s  (0.7-1.2 m/s) MV e'                  0.065 m/s (>8.0) MV lateral e'          0.07 m/s MV medial e'           0.06 m/s E/e' Ratio:            24.00     (<8.0) MV DT:                 165 msec  (150-240 msec)  MITRAL VALVE:          Normal Ranges: MV mean PG:   3.5 mmHg (<2mmHg)  AORTIC VALVE:          Normal Ranges: LVOT Diameter: 1.94 cm (1.8-2.4cm)  RIGHT VENTRICLE: TAPSE: 25.9 mm RV s'  0.14 m/s  PULMONIC VALVE:         Normal Ranges: PV Accel Time:  85 msec (>120ms)  [id number] Lakshmi Mascorro MD Electronically signed on 4/16/2025 at 5:00:22 PM  ** Final **     Transesophageal Echo (SULY)  Result Date: 3/19/2025   [facility], [address] [place] [address]               Tel [phone] and Fax [phone] TRANSESOPHAGEAL ECHOCARDIOGRAM REPORT  Patient Name:       VERNON SALDANA VIA        Reading Physician:    Deepa Gutierrez MD Study Date:         3/19/2025           Ordering Provider:    Deepa GUTIERREZ MRN/PID:            [id number]            Fellow: Accession#:         [id number]        Nurse:                Nano Guzmán RN Date of Birth/Age:  1950 / 74      Sonographer:          Dai maldonado RDCS Gender assigned at  F                   Additional Staff: Birth: Height:             162.56 cm           Admit Date: Weight:             70.76 kg            Admission Status:      Outpatient BSA / BMI:          1.76 m2 / 26.78     Encounter#:           [id number]                     kg/m2 Blood Pressure:     124/70 mmHg         Department Location:  [facility] Cath                                                               Lab Study Type:    TRANSESOPHAGEAL ECHO (SULY) Diagnosis/ICD: Nonrheumatic mitral (valve) insufficiency-I34.0 Indication:    Mitral Regurgitation CPT Code:      SULY Complete-70725; Doppler Limited-68253; Color Doppler-89443;                Moderate Sedation Services initial 15 minutes patient >5                years-81401; Moderate Sedation Services 1st additional 15 minutes                patient >5 years-71487; Echocardiography, SULY add on 3D post                processing-56216 Patient History: Pacer/Defib:       AICD/Perment pacemaker Pertinent History: Watchman Device. Study Detail: The following Echo studies were performed: 2D, Doppler, color flow               and 3D.  PHYSICIAN INTERPRETATION: SULY Details: The SULY probe used was [employer] 6VT #[id number]. Technically adequate omniplane transesophageal echocardiogram performed. Color flow Doppler echo was performed to assess for the presence of a patent foramen ovale. SULY Medication: The pharynx was anesthetized with Cetacaine spray and viscous lidocaine. The patient was sedated using moderate sedation. Total intraservice time for moderate sedation was 15 minutes. Midazolam and Fentanyl was used to sedate the patient for this exam. SULY Procedure: The probe was passed without difficulty. Complications encountered during procedure: Patient tolerated the procedure well without any apparent complications. Left Ventricle: The left ventricular systolic function is mildly decreased, with a visually estimated ejection fraction of 40-45%. There is mild concentric left ventricular hypertrophy. There is global hypokinesis of the left ventricle with minor regional variations. The left ventricular cavity size is mild to moderately  dilated. Spectral Doppler shows a Grade III (restrictive pattern) of left ventricular diastolic filling with an elevated left atrial pressure. Left Atrium: The left atrium is mildly dilated. There is a small atrial septal defect with predominantly left to right shunting across the atrial septum; demonstrated using color Doppler. A device is noted within the left atrial appendage. Right Ventricle: The right ventricle is normal in size. There is normal right ventricular global systolic function. Right Atrium: The right atrium is normal in size. Aortic Valve: The aortic valve is trileaflet. There is mild aortic valve cusp calcification. There is mild aortic valve regurgitation. Mitral Valve: The mitral valve is mildly thickened. There is mild to moderate mitral annular calcification. The peak instantaneous gradient of the mitral valve is 10 mmHg. There is moderate to severe mitral valve regurgitation. The mitral regurgitant orifice area is 10 mm2. The mitral regurgitant volume is 21.06 ml. Tricuspid Valve: The tricuspid valve is structurally normal. There is mild tricuspid regurgitation. Pulmonic Valve: The pulmonic valve is structurally normal. There is no indication of pulmonic valve regurgitation. Pericardium: There is no pericardial effusion noted. Aorta: The aortic root is normal. There is plaque visualized in the descending aorta which is classified as a Grade 2 [mild (focal or diffuse) intimal thickening of 2-3 mm] atherosclerosis. Pulmonary Veins: There is blunted systolic flow in the pulmonary veins.  CONCLUSIONS:  1. There is global hypokinesis of the left ventricle with minor regional variations.  2. Spectral Doppler shows a Grade III (restrictive pattern) of left ventricular diastolic filling with an elevated left atrial pressure.  3. Left ventricular cavity size is mild to moderately dilated.  4. There is normal right ventricular global systolic function.  5. The left atrium is mildly dilated.  6. Moderate  to severe mitral valve regurgitation. Pulmonary vein systolic flow blunted or absent.  7. Mild aortic valve regurgitation.  8. There is plaque visualized in the descending aorta.  9. Well positioned LAAO device, no heike-device leaks and no device associated thrombus. 10. The left ventricular systolic function is mildly decreased, with a visually estimated ejection fraction of 40-45%. QUANTITATIVE DATA SUMMARY:  LV SYSTOLIC FUNCTION:                      Normal Ranges: EF-Visual:      43 % LV EF Reported: 43 %  LV DIASTOLIC FUNCTION:          Normal Ranges: MV Peak E:             1.31 m/s (0.7-1.2 m/s)  MITRAL VALVE:           Normal Ranges: MV Vmax:      1.61 m/s  (<=1.3m/s) MV peak PG:   10.3 mmHg (<5mmHg) MV mean PG:   3.3 mmHg  (<2mmHg) MV VTI:       43.78 cm  (10-13cm) MV DT:        120 msec  (150-240msec)  MITRAL INSUFFICIENCY:             Normal Ranges: PISA Radius:          0.5 cm MR VTI:               217.57 cm MR Vmax:              589.10 cm/s MR Alias Ham:         36.0 cm/s MR Volume:            21.06 ml MR Flow Rt:           57.03 ml/s MR EROA:              10 mm2  [id number] Neo Gutierrez MD Electronically signed on 3/19/2025 at 2:30:16 PM  ** Final **     Transthoracic Echo (TTE) Complete  Result Date: 2/11/2025   [facility], [address] [place] [address]               Tel [phone] and Fax [phone] TRANSTHORACIC ECHOCARDIOGRAM REPORT  Patient Name:       VERNON SALDANA VIA        Reading Physician:    [id number] Nohelia Soto MD Study Date:         2/11/2025           Ordering Provider:    [id number] EDNA SANTIZO MRN/PID:            [id number]            Fellow: Accession#:         [id number]        Nurse: Date of Birth/Age:  1950 / 74      Sonographer:          Nano maldonado                                     RDCS Gender  assigned at  F                   Additional Staff: Birth: Height:             162.56 cm           Admit Date:           2/10/2025 Weight:             71.22 kg            Admission Status:     Inpatient -                                                               Routine BSA / BMI:          1.76 m2 / 26.95     Encounter#:           [id number]                     kg/m2 Blood Pressure:     122/57 mmHg         Department Location:  [facility]                                                               Invasive Study Type:    TRANSTHORACIC ECHO (TTE) COMPLETE Diagnosis/ICD: Acute on chronic combined systolic (congestive) and diastolic                (congestive) heart failure (CHF)-I50.43 Indication:    Congestive Heart Failure CPT Code:      Echo Complete w Full Doppler-11613 Patient History: Pertinent History: A-Fib, CAD, COPD and Hyperlipidemia. S/P Watchman, CKD. Study Detail: The following Echo studies were performed: 2D, M-Mode, Doppler and               color flow. The patient was awake.  PHYSICIAN INTERPRETATION: Left Ventricle: The left ventricular systolic function is normal, with a visually estimated ejection fraction of 55-60%. There is mild eccentric left ventricular hypertrophy. There are no regional left ventricular wall motion abnormalities. The left ventricular cavity size is normal. There is normal septal and mildly increased posterior left ventricular wall thickness. Spectral Doppler shows an abnormal pattern of left ventricular diastolic filling. Left Atrium: The left atrial size is moderately dilated. Right Ventricle: The right ventricle is normal in size. There is normal right ventricular global systolic function. Right Atrium: The right atrium is normal in size. Aortic Valve: The aortic valve is trileaflet. There is evidence of mild aortic valve stenosis. The aortic valve dimensionless index is 0.48. There is mild to moderate aortic valve regurgitation. The peak instantaneous gradient of  the aortic valve is 28 mmHg. The mean gradient of the aortic valve is 17 mmHg. Mitral Valve: The mitral valve is mildly thickened. There is evidence of mild mitral valve stenosis. The doppler estimated mean and peak diastolic pressure gradients are 4 mmHg and 14 mmHg respectively. The peak instantaneous gradient of the mitral valve is 14 mmHg. There is moderate to severe mitral valve regurgitation. The mitral regurgitant orifice area is 11 mm2. The mitral regurgitant volume is 19.69 ml. Tricuspid Valve: The tricuspid valve is structurally normal. There is mild to moderate tricuspid regurgitation. Pulmonic Valve: The pulmonic valve is not well visualized. The pulmonic valve regurgitation was not well visualized. Pericardium: There is no pericardial effusion noted. Aorta: The aortic root is normal. Pulmonary Artery: The tricuspid regurgitant velocity is 3.07 m/s, and with an estimated right atrial pressure of 3 mmHg, the estimated pulmonary artery pressure is mildly elevated with the RVSP at 40.7 mmHg.  CONCLUSIONS:  1. The left ventricular systolic function is normal, with a visually estimated ejection fraction of 55-60%.  2. Spectral Doppler shows an abnormal pattern of left ventricular diastolic filling.  3. There is normal right ventricular global systolic function.  4. The left atrial size is moderately dilated.  5. There is mild mitral valve stenosis.  6. Moderate to severe mitral valve regurgitation.  7. Mild to moderate tricuspid regurgitation visualized.  8. Mild aortic valve stenosis.  9. Mild to moderate aortic valve regurgitation. QUANTITATIVE DATA SUMMARY:  2D MEASUREMENTS:          Normal Ranges: Ao Root d:       3.40 cm  (2.0-3.7cm) IVSd:            0.68 cm  (0.6-1.1cm) LVPWd:           1.03 cm  (0.6-1.1cm) LVIDd:           5.66 cm  (3.9-5.9cm) LVIDs:           4.93 cm LV Mass Index:   103 g/m2 LVEDV Index:     85 ml/m2 LV % FS          12.9 %  LEFT ATRIUM:                  Normal Ranges: LA Vol A4C:         68.4 ml    (22+/-6mL/m2) LA Vol A2C:        64.0 ml LA Vol BP:         67.0 ml LA Vol Index A4C:  38.7ml/m2 LA Vol Index A2C:  36.3 ml/m2 LA Vol Index BP:   38.0 ml/m2 LA Area A4C:       24.3 cm2 LA Area A2C:       23.2 cm2 LA Major Axis A4C: 7.3 cm LA Major Axis A2C: 7.2 cm LA Volume Index:   36.8 ml/m2 LA Vol A4C:        66.2 ml LA Vol A2C:        62.0 ml LA Vol Index BSA:  36.3 ml/m2  RIGHT ATRIUM:          Normal Ranges: RA Area A4C:  22.7 cm2  M-MODE MEASUREMENTS:         Normal Ranges: Ao Root:             3.40 cm (2.0-3.7cm) LAs:                 4.80 cm (2.7-4.0cm)  AORTA MEASUREMENTS:         Normal Ranges: Ao Sinus, d:        3.40 cm (2.1-3.5cm) Asc Ao, d:          3.70 cm (2.1-3.4cm)  LV SYSTOLIC FUNCTION:                      Normal Ranges: EF-A4C View:    56 % (>=55%) EF-A2C View:    50 % EF-Biplane:     52 % EF-Visual:      58 % LV EF Reported: 58 %  LV DIASTOLIC FUNCTION:             Normal Ranges: MV Peak E:             1.38 m/s    (0.7-1.2 m/s) MV Peak A:             0.63 m/s    (0.42-0.7 m/s) E/A Ratio:             2.19        (1.0-2.2) MV e'                  0.048 m/s   (>8.0) MV lateral e'          0.05 m/s MV medial e'           0.04 m/s MV A Dur:              172.00 msec E/e' Ratio:            28.63       (<8.0) PulmV Sys Ham:         30.30 cm/s PulmV Alvarez Ham:        72.10 cm/s PulmV S/D Ham:         0.40 PulmV A Revs Ham:      22.50 cm/s PulmV A Revs Dur:      129.00 msec  MITRAL VALVE:           Normal Ranges: MV Vmax:      1.86 m/s  (<=1.3m/s) MV peak P.8 mmHg (<5mmHg) MV mean P.0 mmHg  (<2mmHg) MV DT:        215 msec  (150-240msec)  MITRAL INSUFFICIENCY:             Normal Ranges: PISA Radius:          0.5 cm MR VTI:               175.50 cm MR Vmax:              539.00 cm/s MR Alias Ham:         38.5 cm/s MR Volume:            19.69 ml MR Flow Rt:           60.48 ml/s MR EROA:              11 mm2  AORTIC VALVE:                      Normal Ranges: AoV Vmax:                 2.66 m/s  (<=1.7m/s) AoV Peak P.3 mmHg (<20mmHg) AoV Mean P.0 mmHg (1.7-11.5mmHg) LVOT Max Ham:            1.34 m/s  (<=1.1m/s) AoV VTI:                 59.50 cm  (18-25cm) LVOT VTI:                28.50 cm LVOT Diameter:           2.00 cm   (1.8-2.4cm) AoV Area, VTI:           1.50 cm2  (2.5-5.5cm2) AoV Area,Vmax:           1.58 cm2  (2.5-4.5cm2) AoV Dimensionless Index: 0.48  AORTIC INSUFFICIENCY: AI Vmax:       3.91 m/s AI Half-time:  419 msec AI Decel Rate: 282.00 cm/s2  RIGHT VENTRICLE: RV Basal 3.61 cm RV Mid   2.68 cm RV Major 9.0 cm TAPSE:   35.9 mm RV s'    0.18 m/s  TRICUSPID VALVE/RVSP:          Normal Ranges: Peak TR Velocity:     3.07 m/s RV Syst Pressure:     41 mmHg  (< 30mmHg) IVC Diam:             1.91 cm  PULMONIC VALVE:          Normal Ranges: PV Max Ham:     1.5 m/s  (0.6-0.9m/s) PV Max P.5 mmHg  PULMONARY VEINS: PulmV A Revs Dur: 129.00 msec PulmV A Revs Ham: 22.50 cm/s PulmV Alvarez Ham:   72.10 cm/s PulmV S/D Ham:    0.40 PulmV Sys Ham:    30.30 cm/s  [id number] Nohelia Soto MD Electronically signed on 2025 at 2:47:07 PM  ** Final **     Transthoracic Echo (TTE) Complete  Result Date: 2024   [facility], [address] [place] [address]               Tel [phone] and Fax [phone] TRANSTHORACIC ECHOCARDIOGRAM REPORT  Patient Name:      VERNON SALDANA MITZI         Reading Physician:    [id number] Neo Gutierrez MD Study Date:        2024            Ordering Provider:    [id number] STEPHON YARBROUGH MRN/PID:           [id number]             Fellow: Accession#:        [id number]         Nurse: Date of Birth/Age: 1950 / 73 years Sonographer:          Viola Lau RDCS Gender:            F                    Additional Staff: Height:             162.56 cm            Admit Date:           7/24/2024 Weight:            72.12 kg             Admission Status:     Inpatient -                                                               Routine BSA / BMI:         1.77 m2 / 27.29      Encounter#:           [id number]                    kg/m2 Blood Pressure:    108/69 mmHg          Department Location:  [facility]                                                               Invasive Study Type:    TRANSTHORACIC ECHO (TTE) COMPLETE Diagnosis/ICD: Chest pain, unspecified-R07.9 Indication:    CP CPT Code:      Echo Complete w Full Doppler-26747 Patient History: Pertinent History: A-Fib, CAD, Hyperlipidemia, COPD, Chest Pain and S/P Left                    shoulder surgery,Watchman, CKD. Study Detail: The following Echo studies were performed: 2D, M-Mode, Doppler and               color flow. Technically challenging study due to patient lying in               supine position and S/P left shoulder surgery. Patient has a               defibrillator.  PHYSICIAN INTERPRETATION: Left Ventricle: The left ventricular systolic function is mildly decreased, with a visually estimated ejection fraction of 45-50%. There are no regional wall motion abnormalities. The left ventricular cavity size is normal. There is mild concentric left ventricular hypertrophy. Spectral Doppler shows a pseudonormal pattern of left ventricular diastolic filling. Left Atrium: The left atrium is normal in size. Right Ventricle: The right ventricle is normal in size. There is normal right ventricular global systolic function. A device is visualized in the right ventricle. Right Atrium: The right atrium is normal in size. Aortic Valve: The aortic valve is trileaflet. There is mild aortic valve cusp calcification. There is evidence of mildly elevated transaortic gradients consistent with sclerosis of the aortic valve. The aortic valve dimensionless index is 0.71. There is moderate aortic valve  regurgitation. The peak instantaneous gradient of the aortic valve is 14.6 mmHg. The mean gradient of the aortic valve is 8.0 mmHg. Mitral Valve: The mitral valve is normal in structure. There is moderate mitral annular calcification. There is mild mitral valve regurgitation. Tricuspid Valve: The tricuspid valve is structurally normal. There is mild tricuspid regurgitation. Pulmonic Valve: The pulmonic valve is structurally normal. There is physiologic pulmonic valve regurgitation. Pericardium: There is no pericardial effusion noted. Aorta: The aortic root is normal. Pulmonary Artery: The tricuspid regurgitant velocity is 2.43 m/s, and with an estimated right atrial pressure of 3 mmHg, the estimated pulmonary artery pressure is normal with the RVSP at 26.6 mmHg. Pulmonary Veins: The pulmonary veins appear normal and return normally to the left atrium. Systemic Veins: The inferior vena cava appears to be of normal size. There is IVC inspiratory collapse greater than 50%.  CONCLUSIONS:  1. The left ventricular systolic function is mildly decreased, with a visually estimated ejection fraction of 45-50%.  2. Spectral Doppler shows a pseudonormal pattern of left ventricular diastolic filling.  3. There is normal right ventricular global systolic function.  4. There is moderate mitral annular calcification.  5. Aortic valve sclerosis.  6. Moderate aortic valve regurgitation.  7. Normal estimated PASP and CVP. QUANTITATIVE DATA SUMMARY: 2D MEASUREMENTS:                           Normal Ranges: IVSd:          0.97 cm    (0.6-1.1cm) LVPWd:         1.16 cm    (0.6-1.1cm) LVIDd:         5.27 cm    (3.9-5.9cm) LVIDs:         4.48 cm LV Mass Index: 122.0 g/m2 LV % FS        15.0 % LA VOLUME:                               Normal Ranges: LA Vol A4C:        46.9 ml    (22+/-6mL/m2) LA Vol A2C:        46.7 ml LA Vol BP:         46.9 ml LA Vol Index A4C:  26.5ml/m2 LA Vol Index A2C:  26.3 ml/m2 LA Vol Index BP:   26.4 ml/m2 LA Area  A4C:       17.6 cm2 LA Area A2C:       17.5 cm2 LA Major Axis A4C: 5.6 cm LA Major Axis A2C: 5.6 cm LA Volume Index:   24.7 ml/m2 LA Vol A4C:        42.8 ml LA Vol A2C:        43.8 ml RA VOLUME BY A/L METHOD:                       Normal Ranges: RA Area A4C: 23.0 cm2 AORTA MEASUREMENTS:                    Normal Ranges: Asc Ao, d: 3.70 cm (2.1-3.4cm) LV SYSTOLIC FUNCTION BY 2D PLANIMETRY (MOD):                      Normal Ranges: EF-A4C View:    43 % (>=55%) EF-A2C View:    42 % EF-Biplane:     44 % EF-Visual:      48 % LV EF Reported: 48 % LV DIASTOLIC FUNCTION:                     Normal Ranges: MV Peak E: 1.46 m/s (0.7-1.2 m/s) MITRAL VALVE:                      Normal Ranges: MV Vmax:    1.31 m/s (<=1.3m/s) MV peak P.9 mmHg (<5mmHg) MV mean PG: 3.0 mmHg (<2mmHg) MV DT:      197 msec (150-240msec) MITRAL INSUFFICIENCY:                           Normal Ranges: PISA Radius:  0.3 cm MR VTI:       178.00 cm MR Vmax:      496.00 cm/s MR Alias Ham: 35.1 cm/s MR Volume:    7.12 ml MR Flow Rt:   19.85 ml/s MR EROA:      0.04 cm2 AORTIC VALVE:                                    Normal Ranges: AoV Vmax:                1.91 m/s  (<=1.7m/s) AoV Peak P.6 mmHg (<20mmHg) AoV Mean P.0 mmHg  (1.7-11.5mmHg) LVOT Max Ham:            1.33 m/s  (<=1.1m/s) AoV VTI:                 37.70 cm  (18-25cm) LVOT VTI:                26.90 cm LVOT Diameter:           2.00 cm   (1.8-2.4cm) AoV Area, VTI:           2.24 cm2  (2.5-5.5cm2) AoV Area,Vmax:           2.19 cm2  (2.5-4.5cm2) AoV Dimensionless Index: 0.71 AORTIC INSUFFICIENCY: AI Vmax:       4.01 m/s AI Half-time:  441 msec AI Decel Rate: 267.00 cm/s2  RIGHT VENTRICLE: RV Basal 3.67 cm RV Mid   2.60 cm RV Major 8.8 cm TAPSE:   28.0 mm RV s'    0.12 m/s TRICUSPID VALVE/RVSP:                             Normal Ranges: Peak TR Velocity: 2.43 m/s RV Syst Pressure: 26.6 mmHg (< 30mmHg) IVC Diam:         1.58 cm PULMONIC VALVE:                      Normal  Ranges: PV Max Ham: 1.5 m/s  (0.6-0.9m/s) PV Max P.6 mmHg  [id number] Neo Gutierrez MD Electronically signed on 2024 at 6:23:17 PM  ** Final **     Transthoracic Echo (TTE) Limited  Result Date: 2024   [facility], [address] [place] [address]                Tel [phone] and Fax [phone] TRANSTHORACIC ECHOCARDIOGRAM REPORT  Patient Name:      VERNON SALDANA VIA         Reading Physician:    [id number] Monica Chen MD Study Date:        2024            Ordering Provider:    [id number] KRISTAL SNYDER MRN/PID:           [id number]             Fellow: Accession#:        [id number]         Nurse: Date of Birth/Age: 1950 / 73 years Sonographer:          Tobi Carlos RDCS Gender:            F                    Additional Staff: Height:            162.56 cm            Admit Date:           2024 Weight:            70.31 kg             Admission Status:     Inpatient -                                                               Routine BSA / BMI:         1.76 m2 / 26.61      Encounter#:           [id number]                    kg/m2 Blood Pressure:    /                    Department Location:  [facility]                                                               Cath Lab Study Type:    TRANSTHORACIC ECHO (TTE) LIMITED Diagnosis/ICD: Unspecified atrial fibrillation-I48.91 Indication:    Atrial fibrillation; Preop cardiovascular exam CPT Code:      Echo Limited-71741 Patient History: Pertinent History: A-fib; CAD; BARBARA; HTN; HLD; CKD; PPM. Study Detail: The following Echo studies were performed: 2D and M-Mode.               Technically challenging study due to body habitus and patient               lying in supine position.  PHYSICIAN INTERPRETATION: Left Ventricle: Left  ventricular ejection fraction is low normal, by visual estimate at 50-55%. The patient is in atrial fibrillation which may influence the estimate of left ventricular function and transvalvular flows. Wall motion is abnormal. The left ventricular cavity size was not assessed. Left ventricular diastolic filling was not assessed. Possible inferior wall motion hypokinesis. Left Atrium: The left atrium was not assessed. Right Ventricle: The right ventricle was not well visualized. There is normal right ventricular global systolic function. A device is visualized in the right ventricle. Right Atrium: The right atrium was not assessed. There is a device visualized in the right atrium. Aortic Valve: The aortic valve is trileaflet. There is mild aortic valve cusp calcification. Aortic valve regurgitation was not assessed. Mitral Valve: The mitral valve is mildly thickened. There is moderate mitral annular calcification. Mitral valve regurgitation was not assessed. Tricuspid Valve: The tricuspid valve was not well visualized. Tricuspid regurgitation was not assessed. Pulmonic Valve: The pulmonic valve is not well visualized. Pulmonic valve regurgitation was not assessed. Pericardium: There is a trivial pericardial effusion. Aorta: The aortic root is normal. Systemic Veins: The inferior vena cava appears to be of normal size. There is IVC inspiratory collapse greater than 50%. In comparison to the previous echocardiogram(s): Compared with the prior exam from 2/27/2024 there are no significant changes though today's exam is only a limited study withno assessment of valvular function.  CONCLUSIONS:  1. Left ventricular ejection fraction is low normal, by visual estimate at 50-55%.  2. Possible inferior wall motion hypokinesis.  3. There is normal right ventricular global systolic function.  4. There is moderate mitral annular calcification.  5. Compared with the prior exam from 2/27/2024 there are no significant changes though  today's exam is only a limited study withno assessment of valvular function.  6. The patient is in atrial fibrillation which may influence the estimate of left ventricular function and transvalvular flows. QUANTITATIVE DATA SUMMARY: AORTA MEASUREMENTS:                      Normal Ranges: Ao Sinus, d: 3.45 cm (2.1-3.5cm) LV SYSTOLIC FUNCTION BY 2D PLANIMETRY (MOD):                      Normal Ranges: EF-Visual:      53 % LV EF Reported: 53 % TRICUSPID VALVE/RVSP:                   Normal Ranges: IVC Diam: 2.03 cm  [id number] Monica Chen MD Electronically signed on 7/16/2024 at 3:11:21 PM  ** Final **     Transthoracic Echo (TTE) Limited  Result Date: 7/16/2024   [facility], [address] [place] [address]                Tel [phone] and Fax [phone] TRANSTHORACIC ECHOCARDIOGRAM REPORT  Patient Name:      VERNON SALDANA VIA         Reading Physician:    [id number] Calvillo MD Study Date:        7/16/2024            Ordering Provider:    [id number] KRISTAL SNYDER MRN/PID:           [id number]             Fellow:               [id number] Lucina Gill MD Accession#:        [id number]         Nurse: Date of Birth/Age: 1950 / 73 years Sonographer:          Tobi Carlos RDCS Gender:            F                    Additional Staff: Height:            162.56 cm            Admit Date:           7/16/2024 Weight:            71.22 kg             Admission Status:     Inpatient -                                                               Routine BSA / BMI:         1.76 m2 / 26.95      Encounter#:           [id number]                    kg/m2 Blood Pressure:    117/55 mmHg          Department Location:  [facility]                                                                Cath Lab Study Type:    TRANSTHORACIC ECHO (TTE) LIMITED Diagnosis/ICD: Other specified postprocedural states-Z98.890 Indication:    Post LAAO CPT Code:      Echo Limited-61010 Patient History: Pertinent History: CAD; A-fib; BARBARA: HTN; HLD; CKD; PPM. Study Detail: The following Echo studies were performed: 2D and M-Mode.               Technically challenging study due to body habitus.  PHYSICIAN INTERPRETATION: Left Ventricle: Left ventricular ejection fraction is low normal, by visual estimate at 50-55%. There are no regional wall motion abnormalities. The left ventricular cavity size is normal. Left ventricular diastolic filling was not assessed. Left Atrium: The left atrium was not assessed. Right Ventricle: The right ventricle was not assessed. Right ventricular systolic function not assessed. A device is visualized in the right ventricle. Right Atrium: The right atrium was not assessed. There is a device visualized in the right atrium. Aortic Valve: The aortic valve is trileaflet. There is mild aortic valve cusp calcification. Aortic valve regurgitation was not assessed. Mitral Valve: The mitral valve is mildly thickened. There is moderate mitral annular calcification. Mitral valve regurgitation was not assessed. Tricuspid Valve: The tricuspid valve was not well visualized. Tricuspid regurgitation was not assessed. Right ventricular systolic pressure could not be accurately assessed in this patient. Pulmonic Valve: The pulmonic valve was not assessed. Pulmonic valve regurgitation was not assessed. Pericardium: There is a trivial pericardial effusion. Aorta: The aortic root is normal. Systemic Veins: The inferior vena cava was not well visualized. In comparison to the previous echocardiogram(s): Compared with study dated 2/27/2024, no significant change.  CONCLUSIONS:  1. Left ventricular ejection fraction is low normal, by visual estimate at 50-55%.  2. There is moderate mitral annular  calcification.  3. There is a trivial pericardial effusion.  4. Compared with study dated 2/27/2024, no significant change. QUANTITATIVE DATA SUMMARY: LV SYSTOLIC FUNCTION BY 2D PLANIMETRY (MOD):                      Normal Ranges: EF-Visual:      53 % LV EF Reported: 53 %  [id number] Neftali Calvillo MD Electronically signed on 7/16/2024 at 3:02:13 PM  ** Final **        Medications Ordered Prior to Encounter[2]                Heart Failure Follow up    NYHA class 2-3    Edema Denies  Dyspnea on Exertion Worse   Fatigue Worse   Exercise Intolerance Worse   Orthopnea Stable  PND Stable    Chest pain No  Syncope No  Palpitations Yes    All organ systems normal except: PAWEL Elias is a 74 y.o. female with a PMH of atrial fibrillation s/p LAAO, VT/VF s/p ICD, Hyperlipidemia , CAD, PUD, SBO s/p lysis of adhesions, GERD s/p nissen fundoplication, provoked DVT 2018, Rheumatoid arthritis, psoriasis, asthma, chronic anemia, severe MR, caridoMEMs implant and CKD presents for 1 week evaluation s/p TMVR - with 42mm Intrepid via RCFV on 4/17/25 with Dr. Ojeda and Dr. Anderson. Final procedural SULY showed mod-severe MR PVL, LVOT gradient peak post PVC 11mmHg invasive and SULY.  POD 1 ECHO showed EF 35-40%, severe LVH, norm RV, mild AS with mild/mod AI, MV gradients 16/6, no MR, mild TR, no LVOTO, triv PC effusion.  Pt eventually discharged on 4/22/25 after optimization of her HF medications and anemia improved.    Impression  - 1 week s/p TMVR  - states she is feeling very short of breath with minimal exertion  - confirms persistent dizziness/LH when standing up  - vitals:  HR 62, /50, wt stable.  - groins:  healing well  - labs:  INR 1.9  - Given pt's symptoms, stopping the Entresto with hopes of increasing her BP, to allow for better diuresis    Plan:   - Stop Entresto to allow BP to increase  - Take second dose of Bumex now  - if breathing does not improve shortly, pt instructed to go to ER  - Daily  coumadin for 6 months (INR goal 2.0-3.0)  - Cont current medication regimen otherwise  - CBC, CMP, INR, Haptoglobin, LDH on Monday 4/28/25  - Ok to increase activity  - F/w with Structural Team per [facility]  - F/w with Dr. Soto (Primary Cards) as scheduled     I personally spent 15 minutes with this patient via phone (unable to perform video visit), of which >50% of time was spent counseling and coordination of care     Jonathan St MSN, [facility]  Acute Care Nurse Practitioner  Structural Heart / TAVR Team  [phone] (ph)  [phone] (fax)           [1]   Past Medical History:  Diagnosis Date    Acute hypoxic on chronic hypercapnic respiratory failure 09/18/2024    Aneurysm     Arrhythmia     Arthritis     Asthma     albuterol not required, daily treatment, PCP follows,    Atrial fibrillation (Multi)     Resistent to treatment: s/p DCCVs and ablations    Cataract     Central sleep apnea     Per PSG 11/20/18; severe central sleep apnea, .6, mild obstructive component    Cervical myofascial pain syndrome     Baclofen    CHF (congestive heart failure)     Chronic anemia     CKD (chronic kidney disease)     CKD stage 3a, GFR 45-59 ml/min (Multi)     COPD (chronic obstructive pulmonary disease) (Multi)     DVT of axillary vein, acute right (Multi) 01/20/2018    When PICC line was removed. U/S + Partial nonocclusive DVT in the axillary and proximal basilic vein in 2018    GERD (gastroesophageal reflux disease)     controlled    H/O being hospitalized 02/2024 Feb 24-Mar 5, 2024:  (2/24) admitted for dyspnea and chest pain work-up and found to have pleural effusions.  Found to have acute blood loss anemia s/p 2u PRBC. EGD neg for active bleed.  Cardiology consulted - plan for Watchman device WING closure as out pt.    History of Helicobacter pylori infection     3/2013, 1/2021    Hx of syncope     Recurrent Syncope d/t VT- s/p ILR (12/2012) implanted.  (July 2013) Episode of syncope that appears  to be correlated with an 11 second round of ventricular tachycardia recorded by the loop recorder.  S/p EPS (June 2014) neg for inducible arrhythmias. (ILR removed when ICD implanted)    Hyperlipidemia     Hypertension     ICD (implantable cardioverter-defibrillator) in place     Placed 06/12/2014    Mitral valve regurgitation     Mild-Moderate per Echo 02/25/2024    Moderate aortic stenosis by prior echocardiogram     Echo 2/25/2024    Osteopenia 06/26/2023    Peripheral neuropathy     Personal history of anaphylaxis     See allergy list    Psoriasis     PUD (peptic ulcer disease)     H/O nonbleeding gastric ulcers with small hiatal hernia on 1/6/2021 EGD,    Pulmonary hypertension (Multi)     Mild - Moderate per Echo 2/25/2024    Radiculopathy, lumbar region 08/13/2018    Acute lumbar radiculopathy    Restless legs syndrome 11/17/2015    Restless legs syndrome    Rheumatoid arthritis     Seasonal allergic rhinitis due to pollen     Sleep apnea     BiPAP therapy at night    Small bowel obstruction (Multi) 06/26/2023    H/O due to Severe Adhesions s/p Adhesiolysis x2 in 2015 and 2020    Wide-complex tachycardia     Per cardiology 3/7/2024: VT is on a basis of triggered activity certainly made worse with anemia and prednisone. Since she is asymptomatic we will continue to observe.   [2]   Current Outpatient Medications on File Prior to Visit   Medication Sig Dispense Refill    acetaminophen (Tylenol) 325 mg tablet Take 2 tablets (650 mg) by mouth every 6 hours if needed for moderate pain (4 - 6), mild pain (1 - 3), headaches or fever (temp greater than 38.0 C).      baclofen (Lioresal) 10 mg tablet One tablet in the morning and 2 tablets in the evening. 270 tablet 3    bumetanide (Bumex) 2 mg tablet Take 1 tablet (2 mg) by mouth 2 times daily (morning and late afternoon).      clobetasol (Temovate) 0.05 % cream Apply topically 2 times a day. 60 g 3    clopidogrel (Plavix) 75 mg tablet Take 1 tablet (75 mg) by mouth  "once daily. 90 tablet 3    cyanocobalamin (Vitamin B-12) 1,000 mcg/mL injection Inject 1 mL (1,000 mcg) into the muscle every 30 (thirty) days. 10 mL 1    diclofenac sodium (Voltaren) 1 % gel Apply 4.5 inches (4 g) topically 2 times a day as needed (joint pain). 450 g 1    digoxin (Lanoxin) 125 MCG tablet Take 1 tablet (125 mcg) by mouth once daily.      EPINEPHrine 0.3 mg/0.3 mL injection syringe Inject 0.3 mL (0.3 mg) into the muscle 1 time if needed for anaphylaxis. 2 each 1    fluticasone propion-salmeteroL (Advair HFA) 45-21 mcg/actuation inhaler Inhale 2 puffs 2 times a day. Rinse mouth with water after use to reduce aftertaste and incidence of candidiasis. Do not swallow. 36 g 3    hydroxychloroquine (Plaquenil) 200 mg tablet Take 1.5 tablets (300 mg) by mouth once daily. 135 tablet 0    insulin syringe-needle U-100 (BD Insulin Syringe Ultra-Fine) 1 mL 30 gauge x 1/2\" syringe Use one a month 10 each 1    ipratropium-albuteroL (Combivent Respimat)  mcg/actuation inhaler Inhale 2 puffs 4 times a day. 36 g 3    ipratropium-albuteroL (Duo-Neb) 0.5-2.5 mg/3 mL nebulizer solution Take 3 mL by nebulization 4 times a day as needed for wheezing or shortness of breath. 360 mL 11    montelukast (Singulair) 10 mg tablet Take 1 tablet (10 mg) by mouth once daily at bedtime. 90 tablet 3    nitroglycerin (Nitrostat) 0.4 mg SL tablet Place 1 tablet (0.4 mg) under the tongue every 5 minutes if needed for chest pain. 40 tablet 3    ondansetron (Zofran) 8 mg tablet Take 1 tablet (8 mg) by mouth every 8 hours if needed for nausea or vomiting.      pantoprazole (ProtoNix) 40 mg EC tablet Take 1 tablet (40 mg) by mouth 2 times a day. 180 tablet 3    rosuvastatin (Crestor) 20 mg tablet Take 1 tablet (20 mg) by mouth once daily. 90 tablet 3    sacubitriL-valsartan (Entresto) 24-26 mg tablet Take 0.5 tablets by mouth 2 times a day. 90 tablet 1    sucralfate (Carafate) 100 mg/mL suspension Take 10 mL (1 g) by mouth 4 times a day " with meals. 3600 mL 3    sulfaSALAzine (Azulfidine) 500 mg tablet Take 2 tablets (1,000 mg) by mouth 2 times a day. 360 tablet 0    warfarin (Coumadin) 1 mg tablet Take 1 tablet by mouth once dailly or as directed per After Visit Summary. 30 tablet 1    [DISCONTINUED] acetaminophen (Tylenol) 325 mg tablet Take 1 tablet (325 mg) by mouth every 6 hours if needed for mild pain (1 - 3). (Patient not taking: Reported on 4/9/2025)      [DISCONTINUED] chlorhexidine (Peridex) 0.12 % solution Use 15 mL in the mouth or throat if needed for wound care for up to 2 days. Use as mouth wash for night before and morning of surgery 120 mL 0    [DISCONTINUED] dilTIAZem ER (Tiazac) 120 mg 24 hr capsule Take 1 capsule (120 mg) by mouth once daily.      [DISCONTINUED] ibandronate (Boniva) 150 mg tablet Take 1 tablet (150 mg) by mouth every 30 (thirty) days. (Patient not taking: Reported on 4/9/2025) 3 tablet 3    [DISCONTINUED] naloxone (Narcan) 4 mg/0.1 mL nasal spray Administer 1 spray (4 mg) into affected nostril(s) if needed for opioid reversal or respiratory depression. May repeat every 2-3 minutes if needed, alternating nostrils, until medical assistance becomes available. (Patient not taking: Reported on 3/5/2025) 2 each 0    [DISCONTINUED] oxyCODONE-acetaminophen (Percocet) 5-325 mg tablet Take 1 tablet by mouth 3 times a day as needed for severe pain (7 - 10) for up to 28 days. Do not fill before October 29, 2024. (Patient not taking: Reported on 4/9/2025) 84 tablet 0    [DISCONTINUED] oxyCODONE-acetaminophen (Percocet) 5-325 mg tablet Take 1 tablet by mouth 3 times a day as needed for severe pain (7 - 10) for up to 28 days. Do not fill before November 26, 2024. (Patient not taking: Reported on 2/10/2025) 84 tablet 0    [DISCONTINUED] warfarin (Coumadin) 1 mg tablet Take 1 tablet by mouth once dailly or as directed per After Visit Summary. 30 tablet 1     No current facility-administered medications on file prior to visit.

## 2025-04-28 ENCOUNTER — LAB REQUISITION (OUTPATIENT)
Dept: LAB | Facility: HOSPITAL | Age: 75
End: 2025-04-28
Payer: MEDICARE

## 2025-04-28 ENCOUNTER — LAB (OUTPATIENT)
Dept: LAB | Facility: HOSPITAL | Age: 75
End: 2025-04-28
Payer: MEDICARE

## 2025-04-28 ENCOUNTER — DOCUMENTATION (OUTPATIENT)
Dept: CARDIOLOGY | Facility: HOSPITAL | Age: 75
End: 2025-04-28

## 2025-04-28 ENCOUNTER — ANTICOAGULATION - WARFARIN VISIT (OUTPATIENT)
Dept: CARDIOLOGY | Facility: CLINIC | Age: 75
End: 2025-04-28
Payer: MEDICARE

## 2025-04-28 DIAGNOSIS — Z51.81 ANTICOAGULATION MANAGEMENT ENCOUNTER: ICD-10-CM

## 2025-04-28 DIAGNOSIS — I34.0 NONRHEUMATIC MITRAL (VALVE) INSUFFICIENCY: ICD-10-CM

## 2025-04-28 DIAGNOSIS — Z79.01 ANTICOAGULATION MANAGEMENT ENCOUNTER: ICD-10-CM

## 2025-04-28 DIAGNOSIS — Z95.2 S/P TRANSCATHETER MITRAL VALVE REPLACEMENT (TMVR): ICD-10-CM

## 2025-04-28 DIAGNOSIS — I50.23 ACUTE ON CHRONIC SYSTOLIC (CONGESTIVE) HEART FAILURE: ICD-10-CM

## 2025-04-28 DIAGNOSIS — I34.0 MITRAL REGURGITATION: ICD-10-CM

## 2025-04-28 LAB
ALBUMIN SERPL BCP-MCNC: 4.1 G/DL (ref 3.4–5)
ALP SERPL-CCNC: 66 U/L (ref 33–136)
ALT SERPL W P-5'-P-CCNC: 14 U/L (ref 7–45)
ANION GAP SERPL CALC-SCNC: 14 MMOL/L (ref 10–20)
AST SERPL W P-5'-P-CCNC: 16 U/L (ref 9–39)
BASOPHILS # BLD AUTO: 0.04 X10*3/UL (ref 0–0.1)
BASOPHILS NFR BLD AUTO: 0.6 %
BILIRUB SERPL-MCNC: 0.6 MG/DL (ref 0–1.2)
BNP SERPL-MCNC: 675 PG/ML (ref 0–99)
BUN SERPL-MCNC: 16 MG/DL (ref 6–23)
CALCIUM SERPL-MCNC: 9.1 MG/DL (ref 8.6–10.3)
CARDIAC TROPONIN I PNL SERPL HS: 37 NG/L (ref 0–13)
CHLORIDE SERPL-SCNC: 102 MMOL/L (ref 98–107)
CO2 SERPL-SCNC: 29 MMOL/L (ref 21–32)
CREAT SERPL-MCNC: 1.02 MG/DL (ref 0.5–1.05)
EGFRCR SERPLBLD CKD-EPI 2021: 58 ML/MIN/1.73M*2
EOSINOPHIL # BLD AUTO: 0.14 X10*3/UL (ref 0–0.4)
EOSINOPHIL NFR BLD AUTO: 2.1 %
ERYTHROCYTE [DISTWIDTH] IN BLOOD BY AUTOMATED COUNT: 18.2 % (ref 11.5–14.5)
GLUCOSE SERPL-MCNC: 86 MG/DL (ref 74–99)
HCT VFR BLD AUTO: 32 % (ref 36–46)
HGB BLD-MCNC: 9.9 G/DL (ref 12–16)
IMM GRANULOCYTES # BLD AUTO: 0.07 X10*3/UL (ref 0–0.5)
IMM GRANULOCYTES NFR BLD AUTO: 1.1 % (ref 0–0.9)
INR PPP: 1.3 (ref 0.9–1.1)
LYMPHOCYTES # BLD AUTO: 1.22 X10*3/UL (ref 0.8–3)
LYMPHOCYTES NFR BLD AUTO: 18.7 %
MCH RBC QN AUTO: 24.3 PG (ref 26–34)
MCHC RBC AUTO-ENTMCNC: 30.9 G/DL (ref 32–36)
MCV RBC AUTO: 79 FL (ref 80–100)
MONOCYTES # BLD AUTO: 0.75 X10*3/UL (ref 0.05–0.8)
MONOCYTES NFR BLD AUTO: 11.5 %
NEUTROPHILS # BLD AUTO: 4.31 X10*3/UL (ref 1.6–5.5)
NEUTROPHILS NFR BLD AUTO: 66 %
NRBC BLD-RTO: 0 /100 WBCS (ref 0–0)
PLATELET # BLD AUTO: 243 X10*3/UL (ref 150–450)
POC INR: 1.2 (ref 0.9–1.1)
POC PROTHROMBIN TIME: ABNORMAL (ref 9.3–12.5)
POTASSIUM SERPL-SCNC: 3.4 MMOL/L (ref 3.5–5.3)
PROT SERPL-MCNC: 6.4 G/DL (ref 6.4–8.2)
PROTHROMBIN TIME: 14.6 SECONDS (ref 9.8–12.4)
RBC # BLD AUTO: 4.07 X10*6/UL (ref 4–5.2)
SODIUM SERPL-SCNC: 142 MMOL/L (ref 136–145)
WBC # BLD AUTO: 6.5 X10*3/UL (ref 4.4–11.3)

## 2025-04-28 PROCEDURE — 99211 OFF/OP EST MAY X REQ PHY/QHP: CPT | Performed by: FAMILY MEDICINE

## 2025-04-28 PROCEDURE — 80053 COMPREHEN METABOLIC PANEL: CPT

## 2025-04-28 PROCEDURE — 85025 COMPLETE CBC W/AUTO DIFF WBC: CPT

## 2025-04-28 PROCEDURE — 36415 COLL VENOUS BLD VENIPUNCTURE: CPT

## 2025-04-28 PROCEDURE — 83880 ASSAY OF NATRIURETIC PEPTIDE: CPT

## 2025-04-28 PROCEDURE — 85610 PROTHROMBIN TIME: CPT

## 2025-04-28 PROCEDURE — 84484 ASSAY OF TROPONIN QUANT: CPT

## 2025-04-28 RX ORDER — WARFARIN 1 MG/1
TABLET ORAL
Qty: 30 TABLET | Refills: 11 | Status: SHIPPED | OUTPATIENT
Start: 2025-04-28 | End: 2026-04-28

## 2025-04-28 RX ORDER — WARFARIN SODIUM 5 MG/1
TABLET ORAL
Qty: 30 TABLET | Refills: 11 | Status: SHIPPED | OUTPATIENT
Start: 2025-04-28

## 2025-04-28 NOTE — PROGRESS NOTES
[facility] AE form    Subject ID: [id number]  AE start date: 18 April 2025    What is the relative time from the procedure?    [] Pre-procedure       [] During procedure        [x] Post-procedure     Date of site awareness: 28 April 2025    AE term/diagnosis: pulmonary edema     Event description   ON APRIL 18, 2025 WHEN STARETED  TO WALK  BECOME SHORT OF BREATH (SATS IN THE 80S) AND DIZZY.  AFTER HER WALK SHE C/O INCREASED FATIGUE AND HAD INCREASED O2 REQUIREMENTS  ON APRIL 18, 2025CXR SHOWED MILD PULM CONGESTION, WITH BIBASILAR ATELECTASIS.ER SHORTNESS OF BREATH VERY WELL MAY BE MULTIFACTORIAL GIVEN HER LV DYSFUNCTION, REMAINING AORTIC VALVE DISEASE AND ANEMIA  SHE WAS ON BUMEX BID, APPEARS MOSTLY EUVOLEMIC WITH NO BLE EDEMA, SATS STABLE ON  ROOM AIR                 Is the report any of the following:   [x] None   [] Bleeding event (complete bleeding event questions)  [] Neuro event (complete neurological event questions)  [] Thrombosis/Embolism    Bleeding Event (delete if not applicable)   What was the area of bleeding -- Choose only 1    [] Chest tube  [] Intracranial  [] Pericardial  [] Compartment Syndrome  [] Intraocular  [] Unidentified  [] GI Bleed  [] Intraspinal  [] Other, specify:     Did the bleeding lead to:  []Hypovolemic shock  []Severe Hypotension  [] None  Did the subject have a chest tube:    If yes, date chest tube removed:    Time chest tube removed:   Total drainage from arrival in ICU to removal:  cc  Measurement Result (g/dL) Date of test (dd-mmm-yyyy)    Hgb at time of admission      Domingo Hgb      Hgb at time of d/c       Neurological Event (delete if not applicable)   Type of Event -- Choose only 1    [] Confirmed TIA  [] Confirmed Stroke  [] Hemorrhagic  [] Ischemic  [] Unknown  [] Confirmed not a stroke or TIA  [] Undetermined  What was the duration of longest deficit?     Antithrombotic Therapy questions following are only required for events marked as Bleeding event, Neuro event, and/or  Thrombosis/Embolism :   Is patient on warfarin?     If yes, what was the INR at the time of the event (if n/a, most recent INR to the event)   Result Date of test (dd-mmm-yyyy)         Antithrombotic therapy at time of the event:      If yes, specify:         Other anticoagulants, specify:      Other antiplatelets, specify:  Action taken/ treatment    Were any diagnostic actions taken? Yes     Diagnostic test Result  Date of test (dd-mmm-yyyy)   CHEST X-RAY TABLE SMALL LEFT PLEURAL EFFUSION AND BIBASILAR ATELECTASI 18 April 2025   Ct chest SLIGHT INCREASE IN INTERLOBULAR SEPTAL THICKENING OF THE DEPENDENT  LUNGS IS MOST CONSISTENT WITH PULMONARY EDEMA. THERE ALSO SMALL  BILATERAL PLEURAL EFFUSIONS. 20 April 2025            Did the AE result in hospitalization? No   Did the AE result in a prolongation (>24 hours) of an existing hospitalization? Yes    If yes, what is underlying cause of hospitalization?     []HF hospitalization  []Other cardiovascular hospitalization  []Non-cardiac hospitalization  NOTE: Ensure that a Healthcare Utilization CRF is completed for every new admission/ hospitalization.     Did the AE result in any treatment? Yes    Was the AE treated with a blood transfusion of RBCs or whole blood? No    If yes, what are the number of units of RBCs or whole blood given:    Was concomitant or additional medication given? Yes    If yes:     Was medication given intravenously? Yes     Is the subject on IV Hemodynamic Support Medication? No  (e.g. inotropes, pressors)  Did the event result in any change in Heart Failure medication to treat Heart Failure? No   (NOTE: If Yes, please comp the Heart Failure Management AE CRF)  Was the AE treated with a permanent pacemaker device? No  If yes, Date of implant:   Specify type: []CRT-D       []CRT-P       []ICD       []Pacemaker  MDT (device) or other?   MDT device? specify serial number:   Other? Specify :  Was AE treated with ultrafiltration (e.g.  ECMO)? No  Was the AE treated with hemodynamic assist device? No  (e.g. BVAD, IABP, LVAD)   Was the AE treated with a percutaneous intervention? No  If yes, date of intervention:   Was the intervention on the coronary arteries (ie, PCI) performed? No  Was the intervention on the mitral valve? No  Procedure  [] Balloon dilation of the TMVR   [] Implantation of another TMVR, valve-in-valve   If Valve-in-Valve, specify device type   If Valve-in-Valve, specify device size    (mm)   [] Placement of the vascular plug for paravalvular leak   [] Repair or alteration of the TMVR valve, TMVR valve preserved in place   []Other, describe:    Clinical indication for procedure (check all that apply)    []Device migration  [] Frame fracture  [] Hemolysis   []Paravalvular regurgitation []Prosthetic valve/repair endocarditis  []Prosthetic valve/repair stenosis []Transvalvular regurgitation  []Other, describe:  Was the AE treated with a surgical procedure No  If Yes, Date of intervention:  Was the intervention on the coronary arteries (i.e., CABG) performed No  Was the intervention on the mitral valve No     Procedure   [] Explant and replacement of the mitral device   [] Repair or alteration of the mitral device, mitral device preserved in place   [] Other, describe:     Clinical indication for procedure (check all that apply)   []Device migration  [] Frame fracture  [] Hemolysis   []Paravalvular regurgitation []Prosthetic valve/repair endocarditis  []Prosthetic valve/repair stenosis []Transvalvular regurgitation  []Other, describe:      Was the AE treated in another manner?No  If Yes, what other action was taken in response to the AE:       Did the AE meet any of the following criteria (a single yes response defines this event as an MADY)   Led to death No  Led to serious deterioration in the health of the subject that resulted in:   Life threatening illness or injury No   A permanent impairment of a body structure or body function,  including chronic diseases No   In-patient or prolonged hospitalization Yes  NOTE: If new hospitalization, be sure to complete the Healthcare Utilization CRF. This does not include Index Procedure admission.   Medical or surgical intervention to prevent life threatening illness or injury or permanent impairment to a body structure or a body function Yes  Led to fetal distress, foetal death or congenital abnormality or birth defect No    Relationship    Note: Please reference CIP Section Classification of Causal Relationships for all relationship definitions   Not related The relationship to the TMVR, DCS, crimper, pre?crimp tool, mitralvalve surgical implant or TMVR / surgical mitral valve implant procedure can be excluded when:    The event is not a known side effect of the product category the TMVR, DCS, crimper, pre?crimp tool or surgical mitral valve implant belongs to or of similar devices;    The event has no temporal relationship with the TMVR, DCS, crimper, pre?crimp tool, mitral valve surgical implant or TMVR/surgical mitral valve implant procedure    The event does not follow a known response pattern to the TMVR, DCS, crimper, pre?crimp tool, mitral valve surgical implant or TMVR / surgical mitral valve implant procedure and is biologically implausible;    The event involves a body?site or an organ not expected     In order to establish non?relatedness, not all the criteria listed above might be met at the same time   Unlikely to be related The relationship to the TMVR, DCS, crimper, pre?crimp tool, mitral valve surgical implant or TMVR / surgical mitral valve implant procedure seems not relevant and/or the event can be reasonably explained by another cause, but additional information may be obtained.   Possibly related The relationship to the TMVR, DCS, crimper, pre?crimp tool, mitral valve surgical implant or TMVR / surgical mitral valve implant procedure is weak but cannot be ruled out completely.  Alternative causes are also possible (e.g., an underlying or concurrent illness/clinical condition or/and an effect of another device, drug or treatment). Cases were relatedness cannot be assessed or no information has been obtained should also be classified as possible.   Probably related The relationship to the TMVR, DCS, crimper, pre?crimp tool, mitral valve surgical implant or TMVR / surgical mitral valve implant procedure seems relevant and/or the event cannot reasonably explained by another cause, but additional information may be obtained   Causal relationship The event is associated to the T TMVR, DCS, crimper, pre?crimp tool, mitral valve surgical implant or TMVR / surgical mitral valve implant procedure beyond reasonable doubt when:    the event is a known side effect of the product category the TMVR, DCS, crimper, pre-crimp tool or surgical mitral valve implant belongs to or of similar devices;    the event has a temporal relationship with investigational device use/application or procedures;    the event involves a body-site or organ that o the TMVR, DCS or surgical implant is applied to;   o the TMVR, DCS or surgical implant has an effect on;      the event follows a known response pattern to the TMVR, DCS or surgical implant;    other possible causes (e.g., an underlying or concurrent illness/clinical condition or an effect of another device, drug, or treatment) have been adequately ruled out    harm to the subject is due to error in use     In order to establish relatedness, not all the criteria listed above might be met at the same time.    Is the AE related to the Implant Procedure  []Not Related   []Unlikely   []Possible   []Probable   []Causal Relationship    Is the AE related to TMVR Valve  []Not Related   []Unlikely   [x]Possible   []Probable   []Causal Relationship    Is the AE related to the Delivery Catheter System (TMVR)  [x]Not Related   []Unlikely   []Possible   []Probable   []Causal  "Relationship      Is the AE related to the Crimper (TMVR)   NOTE: Crimper is equivalent to the Funnel Loading System  [x]Not Related   []Unlikely   []Possible   []Probable   []Causal Relationship    Is the AE related to the Pre-Crimp Tool (TMVR)   NOTE: Pre-Crimp Tool is equivalent to the Funnel Loading Tool   If Pre-crimp tool was not used, Not Related should be selected  [x]Not Related   []Unlikely   []Possible   []Probable   []Causal Relationship    Is the AE related to the Surgical Device (SMVR)   Note: For v8.0, select Not Related, unless subject was converted to open heart surgery. If subject was converted, select appropriate AE relatedness to the surgical device.  [x]Not Related   []Unlikely   []Possible   []Probable   []Causal Relationship      What was the final outcome of this AE   []Fatal []Not recovered/ Not resolved [x]Recovered/Resolved   [] Recovered/Resolved with Sequelae   [] Recovering/Resolving  [] Unknown  *Complete AE end date if \"Fatal,\" \"Recovered/Resolved,\" or \"Recovered/Resolved with Sequelae\" are checked above.     What date did the AE end: 21 April 2025  "

## 2025-04-28 NOTE — PROGRESS NOTES
Patient identification verified with 2 identifiers.    Location: [facility] (Building [address]) [address] [place] [address] [phone]     Referring Physician: Dr Ott  Enrollment/ Re-enrollment date: 2026   INR Goal: 2.0-3.0  INR monitoring is per [facility] protocol.  Anticoagulation Medication: warfarin  Indication: Mitral Valve Replacement  Target end date is 6 mos 10/17/25    Subjective   Bleeding signs/symptoms: No    Bruising: No   Major bleeding event: No  Thrombosis signs/symptoms: No  Thromboembolic event: No  Missed doses: No  Extra doses: No  Medication changes: No  Dietary changes: No  Change in health: Yes  COMPLAINS OF HEADACHE SINCE STARTING WARFARIN  Change in activity: No  Alcohol: No  Other concerns: No    Upcoming Procedures:  Does the Patient Have any upcoming procedures that require interruption in anticoagulation therapy? no  Does the patient require bridging? no      Anticoagulation Summary  As of 2025      INR goal:  2.0-3.0   TTR:  --   INR used for dosin.20 (2025)   Weekly warfarin total:  26 mg               Assessment/Plan     Subtherapeutic     1. New dose: INCREASE DOSE  2. Next INR:  2 days      Education provided to patient during the visit:  Patient instructed to call in interim with questions, concerns and changes.   Patient educated on interactions between medications and warfarin.   Patient educated on dietary consistency in vitamin k consumption.   Patient educated on affects of alcohol consumption while taking warfarin.   Patient educated on signs of bleeding/clotting.   Patient educated on compliance with dosing, follow up appointments, and prescribed plan of care.

## 2025-04-29 ENCOUNTER — DOCUMENTATION (OUTPATIENT)
Dept: CARDIOLOGY | Facility: HOSPITAL | Age: 75
End: 2025-04-29
Payer: MEDICARE

## 2025-04-29 DIAGNOSIS — E87.6 DIURETIC-INDUCED HYPOKALEMIA: Primary | ICD-10-CM

## 2025-04-29 DIAGNOSIS — I50.42 CHRONIC COMBINED SYSTOLIC AND DIASTOLIC CONGESTIVE HEART FAILURE: ICD-10-CM

## 2025-04-29 DIAGNOSIS — Z95.2 S/P TRANSCATHETER MITRAL VALVE REPLACEMENT (TMVR): ICD-10-CM

## 2025-04-29 DIAGNOSIS — T50.2X5A DIURETIC-INDUCED HYPOKALEMIA: Primary | ICD-10-CM

## 2025-04-29 LAB
HAPTOGLOB SERPL-MCNC: 167 MG/DL (ref 43–212)
LDH SERPL P TO L-CCNC: 303 U/L (ref 120–250)

## 2025-04-29 RX ORDER — CALCIUM CARBONATE 300MG(750)
400 TABLET,CHEWABLE ORAL DAILY
Qty: 30 TABLET | Refills: 2 | Status: SHIPPED | OUTPATIENT
Start: 2025-04-29

## 2025-04-29 RX ORDER — POTASSIUM CHLORIDE 20 MEQ/1
20 TABLET, EXTENDED RELEASE ORAL DAILY
Qty: 30 TABLET | Refills: 2 | Status: SHIPPED | OUTPATIENT
Start: 2025-04-29

## 2025-04-29 NOTE — PROGRESS NOTES
Called and spoke to Mrs. Elias to check on patient after concerning symptoms on 4/25/2025, of increased SOB/DOSHI and hypotension.  Had patient stopped her Entresto, but continue with twice daily diuretics.    Today 4/29/2025 she reports she is feeling substantially better, denies breathing issues.  Reports she was able to clean her house without issue.  Reviewed her blood work completed 4/28 -H&H stable, INR 1.3 (Coumadin clinic adjusted dose), kidney function stable, potassium 3.4 other electrolytes are stable.  BNP slightly elevated.  Patient reports her blood pressure is 113/60, which is far improved, reports increased energy level.    Plan:  Continue holding Entresto  Continue twice daily diuretic  Added 20 meq potassium chloride daily  Added 400 mg Mag-Ox daily  Encouraged to eat a potassium rich diet  Follow-up with structural heart as scheduled    Patient instructed to call me if any issues arise      Jonathan St MSN, [facility]  Acute Care Nurse Practitioner  Structural Heart / TAVR Team  [phone]  [phone] (fax)

## 2025-04-30 ENCOUNTER — APPOINTMENT (OUTPATIENT)
Dept: CARDIOLOGY | Facility: CLINIC | Age: 75
End: 2025-04-30
Payer: MEDICARE

## 2025-04-30 LAB
ATRIAL RATE: 74 BPM
ATRIAL RATE: 91 BPM
P AXIS: 100 DEGREES
P OFFSET: 172 MS
P ONSET: 146 MS
PR INTERVAL: 138 MS
Q ONSET: 214 MS
Q ONSET: 215 MS
QRS COUNT: 12 BEATS
QRS COUNT: 15 BEATS
QRS DURATION: 100 MS
QRS DURATION: 96 MS
QT INTERVAL: 350 MS
QT INTERVAL: 364 MS
QTC CALCULATION(BAZETT): 404 MS
QTC CALCULATION(BAZETT): 425 MS
QTC FREDERICIA: 390 MS
QTC FREDERICIA: 399 MS
R AXIS: 31 DEGREES
R AXIS: 34 DEGREES
T AXIS: 227 DEGREES
T AXIS: 237 DEGREES
T OFFSET: 389 MS
T OFFSET: 397 MS
VENTRICULAR RATE: 74 BPM
VENTRICULAR RATE: 89 BPM

## 2025-05-01 ENCOUNTER — ANTICOAGULATION - WARFARIN VISIT (OUTPATIENT)
Dept: CARDIOLOGY | Facility: CLINIC | Age: 75
End: 2025-05-01
Payer: MEDICARE

## 2025-05-01 DIAGNOSIS — Z95.2 S/P TRANSCATHETER MITRAL VALVE REPLACEMENT (TMVR): ICD-10-CM

## 2025-05-01 DIAGNOSIS — Z51.81 ANTICOAGULATION MANAGEMENT ENCOUNTER: ICD-10-CM

## 2025-05-01 DIAGNOSIS — Z79.01 ANTICOAGULATION MANAGEMENT ENCOUNTER: ICD-10-CM

## 2025-05-01 LAB
POC INR: 1.6 (ref 0.9–1.1)
POC PROTHROMBIN TIME: ABNORMAL (ref 9.3–12.5)

## 2025-05-01 PROCEDURE — 85610 PROTHROMBIN TIME: CPT | Mod: QW

## 2025-05-01 NOTE — PROGRESS NOTES
Patient identification verified with 2 identifiers.    Location: [facility] (Building #2) [address] [place], [place] [id number] [phone]     Referring Physician: Dr Fredy Elias  Enrollment/ Re-enrollment date: 2026   INR Goal: 2.0-3.0  INR monitoring is per [facility] protocol.  Anticoagulation Medication: warfarin  Indication: Mitral Valve Replacement  Target end date is 6 mos 10/17/25    Subjective   Bleeding signs/symptoms: No    Bruising: No   Major bleeding event: No  Thrombosis signs/symptoms: No  Thromboembolic event: No  Missed doses: No  Extra doses: No  Medication changes: No  Dietary changes: No  Change in health: No  Change in activity: No  Alcohol: No  Other concerns: No    Upcoming Procedures:  Does the Patient Have any upcoming procedures that require interruption in anticoagulation therapy? no  Does the patient require bridging? no      Anticoagulation Summary  As of 2025      INR goal:  2.0-3.0   TTR:  --   INR used for dosin.60 (2025)   Weekly warfarin total:  39 mg               Assessment/Plan     Subtherapeutic     1. New dose: INCREASE DOSE  2. Next INR: 4 days due to weekend      Education provided to patient during the visit:  Patient instructed to call in interim with questions, concerns and changes.   Patient educated on interactions between medications and warfarin.   Patient educated on dietary consistency in vitamin k consumption.   Patient educated on affects of alcohol consumption while taking warfarin.   Patient educated on signs of bleeding/clotting.   Patient educated on compliance with dosing, follow up appointments, and prescribed plan of care.

## 2025-05-05 ENCOUNTER — APPOINTMENT (OUTPATIENT)
Dept: RHEUMATOLOGY | Facility: CLINIC | Age: 75
End: 2025-05-05
Payer: MEDICARE

## 2025-05-05 ENCOUNTER — ANTICOAGULATION - WARFARIN VISIT (OUTPATIENT)
Dept: CARDIOLOGY | Facility: CLINIC | Age: 75
End: 2025-05-05
Payer: MEDICARE

## 2025-05-05 DIAGNOSIS — Z95.2 S/P TRANSCATHETER MITRAL VALVE REPLACEMENT (TMVR): ICD-10-CM

## 2025-05-05 DIAGNOSIS — Z51.81 ANTICOAGULATION MANAGEMENT ENCOUNTER: ICD-10-CM

## 2025-05-05 DIAGNOSIS — Z79.01 ANTICOAGULATION MANAGEMENT ENCOUNTER: ICD-10-CM

## 2025-05-05 DIAGNOSIS — D50.9 IRON DEFICIENCY ANEMIA, UNSPECIFIED IRON DEFICIENCY ANEMIA TYPE: Primary | ICD-10-CM

## 2025-05-05 LAB
POC INR: 1.9 (ref 0.9–1.1)
POC PROTHROMBIN TIME: ABNORMAL (ref 9.3–12.5)

## 2025-05-05 PROCEDURE — 85610 PROTHROMBIN TIME: CPT | Mod: QW

## 2025-05-05 PROCEDURE — 99211 OFF/OP EST MAY X REQ PHY/QHP: CPT | Performed by: FAMILY MEDICINE

## 2025-05-05 NOTE — PROGRESS NOTES
Patient identification verified with 2 identifiers.    Location: [facility] (Building #[address]) [address] [place], [place] [id number] [phone]     Referring Physician: Dr Fredy Elias  Enrollment/ Re-enrollment date: 2026   INR Goal: 2.0-3.0  INR monitoring is per [facility] protocol.  Anticoagulation Medication: warfarin  Indication: Mitral Valve Replacement  Target end date is 6 mos 10/17/25    Subjective   Bleeding signs/symptoms: No    Bruising: No   Major bleeding event: No  Thrombosis signs/symptoms: No  Thromboembolic event: No  Missed doses: No  Extra doses: No  Medication changes: No  Dietary changes: No  Change in health: No  Change in activity: No  Alcohol: No  Other concerns: No    Upcoming Procedures:  Does the Patient Have any upcoming procedures that require interruption in anticoagulation therapy? no  Does the patient require bridging? no      Anticoagulation Summary  As of 2025      INR goal:  2.0-3.0   TTR:  0.0% (2 d)   INR used for dosin.90 (2025)   Weekly warfarin total:  39 mg               Assessment/Plan     Subtherapeutic     1. New dose: INCREASE DOSE  2. Next INR: 3 days      Education provided to patient during the visit:  Patient instructed to call in interim with questions, concerns and changes.   Patient educated on interactions between medications and warfarin.   Patient educated on dietary consistency in vitamin k consumption.   Patient educated on affects of alcohol consumption while taking warfarin.   Patient educated on signs of bleeding/clotting.   Patient educated on compliance with dosing, follow up appointments, and prescribed plan of care.

## 2025-05-06 ENCOUNTER — TELEPHONE (OUTPATIENT)
Dept: CARDIOLOGY | Facility: CLINIC | Age: 75
End: 2025-05-06

## 2025-05-06 ENCOUNTER — APPOINTMENT (OUTPATIENT)
Dept: HEMATOLOGY/ONCOLOGY | Facility: CLINIC | Age: 75
End: 2025-05-06
Payer: MEDICARE

## 2025-05-06 VITALS
DIASTOLIC BLOOD PRESSURE: 71 MMHG | SYSTOLIC BLOOD PRESSURE: 124 MMHG | WEIGHT: 159.17 LBS | HEART RATE: 72 BPM | BODY MASS INDEX: 29.29 KG/M2 | OXYGEN SATURATION: 97 % | HEIGHT: 62 IN | TEMPERATURE: 98.2 F | RESPIRATION RATE: 17 BRPM

## 2025-05-06 DIAGNOSIS — D50.8 IRON DEFICIENCY ANEMIA SECONDARY TO INADEQUATE DIETARY IRON INTAKE: ICD-10-CM

## 2025-05-06 DIAGNOSIS — K90.9 IDIOPATHIC STEATORRHEA (HHS-HCC): Primary | ICD-10-CM

## 2025-05-06 DIAGNOSIS — D50.9 IRON DEFICIENCY ANEMIA, UNSPECIFIED IRON DEFICIENCY ANEMIA TYPE: ICD-10-CM

## 2025-05-06 LAB
ALBUMIN SERPL BCP-MCNC: 4.2 G/DL (ref 3.4–5)
ALP SERPL-CCNC: 69 U/L (ref 33–136)
ALT SERPL W P-5'-P-CCNC: 12 U/L (ref 7–45)
ANION GAP SERPL CALC-SCNC: 15 MMOL/L (ref 10–20)
AST SERPL W P-5'-P-CCNC: 18 U/L (ref 9–39)
BASOPHILS # BLD AUTO: 0.04 X10*3/UL (ref 0–0.1)
BASOPHILS NFR BLD AUTO: 0.7 %
BILIRUB SERPL-MCNC: 0.5 MG/DL (ref 0–1.2)
BUN SERPL-MCNC: 20 MG/DL (ref 6–23)
CALCIUM SERPL-MCNC: 8.9 MG/DL (ref 8.6–10.3)
CHLORIDE SERPL-SCNC: 102 MMOL/L (ref 98–107)
CO2 SERPL-SCNC: 28 MMOL/L (ref 21–32)
CREAT SERPL-MCNC: 1.16 MG/DL (ref 0.5–1.05)
EGFRCR SERPLBLD CKD-EPI 2021: 50 ML/MIN/1.73M*2
EOSINOPHIL # BLD AUTO: 0.08 X10*3/UL (ref 0–0.4)
EOSINOPHIL NFR BLD AUTO: 1.5 %
ERYTHROCYTE [DISTWIDTH] IN BLOOD BY AUTOMATED COUNT: 18.5 % (ref 11.5–14.5)
FERRITIN SERPL-MCNC: 24 NG/ML (ref 8–150)
FOLATE SERPL-MCNC: 17.7 NG/ML
GLUCOSE SERPL-MCNC: 110 MG/DL (ref 74–99)
HAPTOGLOB SERPL NEPH-MCNC: 182 MG/DL (ref 30–200)
HBV CORE AB SER QL: NONREACTIVE
HBV SURFACE AB SER-ACNC: <3.1 MIU/ML
HBV SURFACE AG SERPL QL IA: NONREACTIVE
HCT VFR BLD AUTO: 33.6 % (ref 36–46)
HCV AB SER QL: NONREACTIVE
HGB BLD-MCNC: 9.4 G/DL (ref 12–16)
HGB RETIC QN: 23 PG (ref 28–38)
IGA SERPL-MCNC: 198 MG/DL (ref 70–400)
IGG SERPL-MCNC: 1020 MG/DL (ref 700–1600)
IGM SERPL-MCNC: 15 MG/DL (ref 40–230)
IMM GRANULOCYTES # BLD AUTO: 0.01 X10*3/UL (ref 0–0.5)
IMM GRANULOCYTES NFR BLD AUTO: 0.2 % (ref 0–0.9)
IMMATURE RETIC FRACTION: 22.3 %
IRON SATN MFR SERPL: 7 % (ref 25–45)
IRON SERPL-MCNC: 29 UG/DL (ref 35–150)
LDH SERPL L TO P-CCNC: 267 U/L (ref 84–246)
LYMPHOCYTES # BLD AUTO: 1.23 X10*3/UL (ref 0.8–3)
LYMPHOCYTES NFR BLD AUTO: 22.4 %
MCH RBC QN AUTO: 23.6 PG (ref 26–34)
MCHC RBC AUTO-ENTMCNC: 28 G/DL (ref 32–36)
MCV RBC AUTO: 84 FL (ref 80–100)
MONOCYTES # BLD AUTO: 0.58 X10*3/UL (ref 0.05–0.8)
MONOCYTES NFR BLD AUTO: 10.6 %
NEUTROPHILS # BLD AUTO: 3.54 X10*3/UL (ref 1.6–5.5)
NEUTROPHILS NFR BLD AUTO: 64.6 %
NRBC BLD-RTO: ABNORMAL /100{WBCS}
PLATELET # BLD AUTO: 238 X10*3/UL (ref 150–450)
POTASSIUM SERPL-SCNC: 4 MMOL/L (ref 3.5–5.3)
PROT SERPL-MCNC: 6.4 G/DL (ref 6.4–8.2)
PROT SERPL-MCNC: 6.8 G/DL (ref 6.4–8.2)
RBC # BLD AUTO: 3.99 X10*6/UL (ref 4–5.2)
RETICS #: 0.05 X10*6/UL (ref 0.02–0.11)
RETICS/RBC NFR AUTO: 1.4 % (ref 0.5–2)
SODIUM SERPL-SCNC: 141 MMOL/L (ref 136–145)
TIBC SERPL-MCNC: 394 UG/DL (ref 240–445)
UIBC SERPL-MCNC: 365 UG/DL (ref 110–370)
VIT B12 SERPL-MCNC: 661 PG/ML (ref 211–911)
WBC # BLD AUTO: 5.5 X10*3/UL (ref 4.4–11.3)

## 2025-05-06 PROCEDURE — 85025 COMPLETE CBC W/AUTO DIFF WBC: CPT | Performed by: PHYSICIAN ASSISTANT

## 2025-05-06 PROCEDURE — 86704 HEP B CORE ANTIBODY TOTAL: CPT | Mod: GEALAB | Performed by: PHYSICIAN ASSISTANT

## 2025-05-06 PROCEDURE — 82607 VITAMIN B-12: CPT | Mod: GEALAB | Performed by: PHYSICIAN ASSISTANT

## 2025-05-06 PROCEDURE — 86334 IMMUNOFIX E-PHORESIS SERUM: CPT | Mod: GEALAB | Performed by: PHYSICIAN ASSISTANT

## 2025-05-06 PROCEDURE — 87340 HEPATITIS B SURFACE AG IA: CPT | Mod: GEALAB | Performed by: PHYSICIAN ASSISTANT

## 2025-05-06 PROCEDURE — 82746 ASSAY OF FOLIC ACID SERUM: CPT | Mod: GEALAB | Performed by: PHYSICIAN ASSISTANT

## 2025-05-06 PROCEDURE — 84075 ASSAY ALKALINE PHOSPHATASE: CPT | Performed by: PHYSICIAN ASSISTANT

## 2025-05-06 PROCEDURE — 82728 ASSAY OF FERRITIN: CPT | Performed by: PHYSICIAN ASSISTANT

## 2025-05-06 PROCEDURE — 83521 IG LIGHT CHAINS FREE EACH: CPT | Mod: GEALAB | Performed by: PHYSICIAN ASSISTANT

## 2025-05-06 PROCEDURE — 83010 ASSAY OF HAPTOGLOBIN QUANT: CPT | Mod: GEALAB | Performed by: PHYSICIAN ASSISTANT

## 2025-05-06 PROCEDURE — 82784 ASSAY IGA/IGD/IGG/IGM EACH: CPT | Mod: GEALAB | Performed by: PHYSICIAN ASSISTANT

## 2025-05-06 PROCEDURE — 85045 AUTOMATED RETICULOCYTE COUNT: CPT | Performed by: PHYSICIAN ASSISTANT

## 2025-05-06 PROCEDURE — 82668 ASSAY OF ERYTHROPOIETIN: CPT | Performed by: PHYSICIAN ASSISTANT

## 2025-05-06 PROCEDURE — 86803 HEPATITIS C AB TEST: CPT | Mod: GEALAB | Performed by: PHYSICIAN ASSISTANT

## 2025-05-06 PROCEDURE — 86706 HEP B SURFACE ANTIBODY: CPT | Mod: GEALAB | Performed by: PHYSICIAN ASSISTANT

## 2025-05-06 PROCEDURE — 36415 COLL VENOUS BLD VENIPUNCTURE: CPT | Performed by: PHYSICIAN ASSISTANT

## 2025-05-06 PROCEDURE — 83615 LACTATE (LD) (LDH) ENZYME: CPT | Performed by: PHYSICIAN ASSISTANT

## 2025-05-06 PROCEDURE — 83540 ASSAY OF IRON: CPT | Performed by: PHYSICIAN ASSISTANT

## 2025-05-06 PROCEDURE — 84155 ASSAY OF PROTEIN SERUM: CPT | Mod: 59,GEALAB | Performed by: PHYSICIAN ASSISTANT

## 2025-05-06 RX ORDER — DIPHENHYDRAMINE HYDROCHLORIDE 50 MG/ML
50 INJECTION, SOLUTION INTRAMUSCULAR; INTRAVENOUS AS NEEDED
OUTPATIENT
Start: 2025-05-12

## 2025-05-06 RX ORDER — ALBUTEROL SULFATE 0.83 MG/ML
3 SOLUTION RESPIRATORY (INHALATION) AS NEEDED
OUTPATIENT
Start: 2025-05-12

## 2025-05-06 RX ORDER — FAMOTIDINE 10 MG/ML
20 INJECTION, SOLUTION INTRAVENOUS ONCE AS NEEDED
OUTPATIENT
Start: 2025-05-12

## 2025-05-06 RX ORDER — EPINEPHRINE 0.3 MG/.3ML
0.3 INJECTION SUBCUTANEOUS EVERY 5 MIN PRN
OUTPATIENT
Start: 2025-05-12

## 2025-05-06 SDOH — ECONOMIC STABILITY: FOOD INSECURITY: WITHIN THE PAST 12 MONTHS, THE FOOD YOU BOUGHT JUST DIDN'T LAST AND YOU DIDN'T HAVE MONEY TO GET MORE.: NEVER TRUE

## 2025-05-06 SDOH — ECONOMIC STABILITY: FOOD INSECURITY: WITHIN THE PAST 12 MONTHS, YOU WORRIED THAT YOUR FOOD WOULD RUN OUT BEFORE YOU GOT THE MONEY TO BUY MORE.: NEVER TRUE

## 2025-05-06 ASSESSMENT — PAIN SCALES - GENERAL: PAINLEVEL_OUTOF10: 4

## 2025-05-06 NOTE — PROGRESS NOTES
"Reason for Visit  Trina SALDANA Via \"Marcela\" is a 74 y.o. female with PMH s/f CKD, PUD, GERD s/p nissen fundoplication referred by Dr. Elias for GRACIE.    PMH/PSH: atrial fibrillation s/p LAAO, VT/VF s/p ICD, Hyperlipidemia , CAD, PUD, SBO s/p lysis of adhesions, GERD s/p nissen fundoplication, provoked DVT 2018, Rheumatoid arthritis, psoriasis, asthma, chronic anemia, severe MR, caridoMEMs implant and CKD, provoked DVT 2018, psoriasis, asthma, CTS, cholecystectomy, appendectomy, b/l shoulder replacements, tarsal surgery  FH: father-colon cancer, brother with throat cancer, sister with ovarian.  Soc Hx: denies smoking, ETOH, illicit drugs; Leonarda, , 3 kids    History of Present Illness:  Upon review of labs, noted to GRACIE since 2022. Patient received Venofer 200 mg x 3 doses in 9/2024 with good response but continues to have GRACIE. S/P EGD and c-scope in 2/2024, noted to have diverticula otherwise unremarkable.     On assessment, recently had TMVR for severe mitral regurgitation in 4/2025. Notes improvement in DOSHI/SOB but continues to be severely fatigued. Of note, she is on Coumadin and Plavix, denies bleeding from any source. She is on 3 numerous medications for GERD including PPI and Carafate. Otherwise doing well.    Review of Systems: All of the systems have been reviewed and are negative for complaints except what is stated in the HPI and/or past medical history.    Allergies and Intolerances:  Allergies[1]         Outpatient Medication Profile:  Current Medications[2]     Vitals and Measurements:       4/21/2025     8:57 PM 4/21/2025    11:16 PM 4/22/2025     4:40 AM 4/22/2025     7:44 AM 4/22/2025    11:51 AM 5/6/2025    10:40 AM 5/8/2025     8:59 AM   Vitals   Systolic 105 99 96 102 102 124 124   Diastolic 50 60 58 65 65 71 71   BP Location Left arm Right arm Right arm Left arm Left arm Left arm    Heart Rate 69 66 72 67 85 72 60   Temp 36 °C (96.8 °F) 36.4 °C (97.5 °F) 36.7 °C (98.1 °F) 36.3 °C (97.3 °F) 36.3 " "°C (97.3 °F) 36.8 °C (98.2 °F)    Resp 16 18 20 17 16 17 16   Height      1.565 m (5' 1.61\")     Weight (lb)   163.36   159.17 159.9   BMI   26.37 kg/m2   29.48 kg/m2 29.61 kg/m2   BSA (m2)   1.86 m2   1.77 m2 1.78 m2   Visit Report      Report Report       Significant value     Physical Exam:   Constitutional: alert, awake and oriented, not in acute distress   HEENT: moist mucous membranes, normal nose   Neck: supple, no lymphadenopathy   EYES: PERRL, EOM intact, conjunctiva normal  Skin: no jaundice, rash or erythema  Neurological: AAOx3, no gross focal deficit   Psychiatric: normal mood and behavior     Lab Results:  Labs:  Lab Results   Component Value Date    WBC 5.5 05/06/2025    NEUTROABS 3.54 05/06/2025    IGABSOL 0.01 05/06/2025    LYMPHSABS 1.23 05/06/2025    MONOSABS 0.58 05/06/2025    EOSABS 0.08 05/06/2025    BASOSABS 0.04 05/06/2025    RBC 3.99 (L) 05/06/2025    MCV 84 05/06/2025    MCHC 28.0 (L) 05/06/2025    HGB 9.4 (L) 05/06/2025    HCT 33.6 (L) 05/06/2025     05/06/2025     Lab Results   Component Value Date    RETICCTPCT 1.6 04/18/2025      Lab Results   Component Value Date    CREATININE 1.02 04/28/2025    BUN 16 04/28/2025    EGFR 58 (L) 04/28/2025     04/28/2025    K 3.4 (L) 04/28/2025     04/28/2025    CO2 29 04/28/2025      Lab Results   Component Value Date    ALT 14 04/28/2025    AST 16 04/28/2025    ALKPHOS 66 04/28/2025    BILITOT 0.6 04/28/2025      Lab Results   Component Value Date    TSH 0.92 04/15/2024     Lab Results   Component Value Date    TSH 0.92 04/15/2024     Lab Results   Component Value Date    IRON 21 (L) 03/03/2025    TIBC 444 03/03/2025    FERRITIN 62 11/11/2024      Lab Results   Component Value Date    XEANPAMU27 315 09/04/2023      Lab Results   Component Value Date    FOLATE 19.8 09/04/2023     Lab Results   Component Value Date     (H) 08/13/2018    SEDRATE 19 02/04/2025      Lab Results   Component Value Date    CRP 8.5 (H) 02/04/2025    "   Lab Results   Component Value Date     (H) 04/28/2025     Lab Results   Component Value Date    HAPTOGLOBIN 167 04/28/2025     Assessment:    74 y.o. female with PMH s/f CKD, PUD, GERD s/p nissen fundoplication referred for GRACIE.    S/p TMVR for severe mitral regurgitation in 4/2025  Plan:    Reviewed and discussed lab, imaging, and pathology results with patient in detail as well as diagnosis, prognosis, and treatment options.    Discussed malabsorption as likely cause of GRACIE    Will precert for Venofer 200 mg x 5 doses    Continue monthly Vitamin B12    Discussed role of BMBx    F/U w/PCP and cards    RTC in 2 months    Patient verbalized understanding, and all her questions were answered to her satisfaction    60 min spent with patient greater than 50 % of which was spent in consultation and coordination of care.         [1]   Allergies  Allergen Reactions    Abatacept Shortness of breath     pulmonary edema, diarrhea, nausea    Beta-Blockers (Beta-Adrenergic Blocking Agts) Anaphylaxis    Hydrocodone-Acetaminophen Anaphylaxis    Hydromorphone Anaphylaxis    Metoprolol Anaphylaxis    Penicillins Hives    Pregabalin Shortness of breath     caused pulmonary edema    Prochlorperazine Itching     paralysis of the mouth with drooping    Methotrexate Hives and Itching     chest pain    Aripiprazole Rash    Bupropion Nausea/vomiting    Cefepime Itching    Ciprofloxacin Hives    Furosemide Itching    Levofloxacin Itching    Pineapple Rash   [2]   Current Outpatient Medications   Medication Sig Dispense Refill    acetaminophen (Tylenol) 325 mg tablet Take 2 tablets (650 mg) by mouth every 6 hours if needed for moderate pain (4 - 6), mild pain (1 - 3), headaches or fever (temp greater than 38.0 C).      baclofen (Lioresal) 10 mg tablet One tablet in the morning and 2 tablets in the evening. 270 tablet 3    bumetanide (Bumex) 2 mg tablet Take 1 tablet (2 mg) by mouth 2 times daily (morning and late afternoon).       "clobetasol (Temovate) 0.05 % cream Apply topically 2 times a day. 60 g 3    clopidogrel (Plavix) 75 mg tablet Take 1 tablet (75 mg) by mouth once daily. 90 tablet 3    cyanocobalamin (Vitamin B-12) 1,000 mcg/mL injection Inject 1 mL (1,000 mcg) into the muscle every 30 (thirty) days. 10 mL 1    diclofenac sodium (Voltaren) 1 % gel Apply 4.5 inches (4 g) topically 2 times a day as needed (joint pain). 450 g 1    digoxin (Lanoxin) 125 MCG tablet Take 1 tablet (125 mcg) by mouth once daily.      EPINEPHrine 0.3 mg/0.3 mL injection syringe Inject 0.3 mL (0.3 mg) into the muscle 1 time if needed for anaphylaxis. 2 each 1    fluticasone propion-salmeteroL (Advair HFA) 45-21 mcg/actuation inhaler Inhale 2 puffs 2 times a day. Rinse mouth with water after use to reduce aftertaste and incidence of candidiasis. Do not swallow. 36 g 3    hydroxychloroquine (Plaquenil) 200 mg tablet Take 1.5 tablets (300 mg) by mouth once daily. 135 tablet 0    insulin syringe-needle U-100 (BD Insulin Syringe Ultra-Fine) 1 mL 30 gauge x 1/2\" syringe Use one a month 10 each 1    ipratropium-albuteroL (Combivent Respimat)  mcg/actuation inhaler Inhale 2 puffs 4 times a day. 36 g 3    ipratropium-albuteroL (Duo-Neb) 0.5-2.5 mg/3 mL nebulizer solution Take 3 mL by nebulization 4 times a day as needed for wheezing or shortness of breath. 360 mL 11    magnesium oxide (Mag-Ox) 400 mg tablet Take 1 tablet (400 mg) by mouth once daily. 30 tablet 2    montelukast (Singulair) 10 mg tablet Take 1 tablet (10 mg) by mouth once daily at bedtime. 90 tablet 3    nitroglycerin (Nitrostat) 0.4 mg SL tablet Place 1 tablet (0.4 mg) under the tongue every 5 minutes if needed for chest pain. 40 tablet 3    ondansetron (Zofran) 8 mg tablet Take 1 tablet (8 mg) by mouth every 8 hours if needed for nausea or vomiting.      pantoprazole (ProtoNix) 40 mg EC tablet Take 1 tablet (40 mg) by mouth 2 times a day. 180 tablet 3    potassium chloride CR (Klor-Con M20) 20 " mEq ER tablet Take 1 tablet (20 mEq) by mouth once daily. Do not crush or chew. 30 tablet 2    rosuvastatin (Crestor) 20 mg tablet Take 1 tablet (20 mg) by mouth once daily. 90 tablet 3    sacubitriL-valsartan (Entresto) 24-26 mg tablet Take 0.5 tablets by mouth 2 times a day. Holding currently d/t hypotension 90 tablet 1    sucralfate (Carafate) 100 mg/mL suspension Take 10 mL (1 g) by mouth 4 times a day with meals. 3600 mL 3    sulfaSALAzine (Azulfidine) 500 mg tablet Take 2 tablets (1,000 mg) by mouth 2 times a day. 360 tablet 0    warfarin (Coumadin) 1 mg tablet Take 1 tablet by mouth once dailly 30 tablet 11    warfarin (Coumadin) 5 mg tablet 1 po qday 30 tablet 11     No current facility-administered medications for this visit.

## 2025-05-06 NOTE — TELEPHONE ENCOUNTER
Patient called to report that she was taken off of warfarin and put on xaerlto at her cardiologist appointment yesterday.  Will cancel upcoming apt and resolve episode.

## 2025-05-07 LAB
KAPPA LC SERPL-MCNC: 2.24 MG/DL (ref 0.33–1.94)
KAPPA LC/LAMBDA SER: 1.21 {RATIO} (ref 0.26–1.65)
LAMBDA LC SERPL-MCNC: 1.85 MG/DL (ref 0.57–2.63)

## 2025-05-08 ENCOUNTER — APPOINTMENT (OUTPATIENT)
Dept: CARDIOLOGY | Facility: CLINIC | Age: 75
End: 2025-05-08
Payer: MEDICARE

## 2025-05-08 ENCOUNTER — OFFICE VISIT (OUTPATIENT)
Dept: PULMONOLOGY | Facility: CLINIC | Age: 75
End: 2025-05-08
Payer: MEDICARE

## 2025-05-08 VITALS
WEIGHT: 159.9 LBS | SYSTOLIC BLOOD PRESSURE: 124 MMHG | BODY MASS INDEX: 29.61 KG/M2 | DIASTOLIC BLOOD PRESSURE: 71 MMHG | OXYGEN SATURATION: 99 % | HEART RATE: 60 BPM | RESPIRATION RATE: 16 BRPM

## 2025-05-08 DIAGNOSIS — J44.1 COPD EXACERBATION (MULTI): ICD-10-CM

## 2025-05-08 DIAGNOSIS — J96.12 ACUTE HYPOXIC ON CHRONIC HYPERCAPNIC RESPIRATORY FAILURE: ICD-10-CM

## 2025-05-08 DIAGNOSIS — G47.33 OBSTRUCTIVE SLEEP APNEA: ICD-10-CM

## 2025-05-08 DIAGNOSIS — J45.909 ASTHMA, UNSPECIFIED ASTHMA SEVERITY, UNSPECIFIED WHETHER COMPLICATED, UNSPECIFIED WHETHER PERSISTENT (HHS-HCC): ICD-10-CM

## 2025-05-08 DIAGNOSIS — J96.01 ACUTE HYPOXIC ON CHRONIC HYPERCAPNIC RESPIRATORY FAILURE: ICD-10-CM

## 2025-05-08 DIAGNOSIS — G47.33 OSA TREATED WITH BIPAP: Primary | ICD-10-CM

## 2025-05-08 LAB — EPO SERPL-ACNC: 43 MU/ML (ref 4–27)

## 2025-05-08 PROCEDURE — 99214 OFFICE O/P EST MOD 30 MIN: CPT | Performed by: INTERNAL MEDICINE

## 2025-05-08 ASSESSMENT — ENCOUNTER SYMPTOMS: DEPRESSION: 0

## 2025-05-08 ASSESSMENT — PATIENT HEALTH QUESTIONNAIRE - PHQ9
1. LITTLE INTEREST OR PLEASURE IN DOING THINGS: NOT AT ALL
SUM OF ALL RESPONSES TO PHQ9 QUESTIONS 1 AND 2: 0
2. FEELING DOWN, DEPRESSED OR HOPELESS: NOT AT ALL

## 2025-05-08 ASSESSMENT — PAIN SCALES - GENERAL: PAINLEVEL_OUTOF10: 0-NO PAIN

## 2025-05-08 NOTE — PROGRESS NOTES
Department of Medicine  Division of Pulmonary, Critical Care, and Sleep Medicine  Follow-Up Visit  [facility] - [address], [address]    Physician HPI (5/8/2025):  74-year-old woman with history of severe MR post TMVR, A-fib, CKD, RA, iron deficiency anemia, asthma, BARBARA presenting for follow-up.  Patient hospitalized in September and treated for respiratory failure/asthma exacerbation, post TMVR in April.  Patient still with dyspnea on exertion especially going up stairs, denied any productive cough, no wheezing, no chest pain or hemoptysis.  Her Lasix was increased yesterday by cardiology.  Using Advair twice a day, Combivent twice a day on average.    Never smoked.        Immunization History   Administered Date(s) Administered    Flu vaccine, trivalent, preservative free, HIGH-DOSE, age 65y+ (Fluzone) 02/21/2018, 10/18/2021, 01/13/2023, 10/04/2024    Flu vaccine, trivalent, preservative free, age 6 months and greater (Fluarix/Fluzone/Flulaval) 01/26/2014    Influenza, Unspecified 10/02/2009, 01/13/2023    Influenza, seasonal, injectable 10/02/2009, 10/05/2011, 09/13/2016    Moderna SARS-CoV-2 Vaccination 03/26/2021, 04/23/2021, 12/01/2021    Pneumococcal polysaccharide vaccine, 23-valent, age 2 years and older (PNEUMOVAX 23) 10/18/2021       Current Medications:  Current Outpatient Medications   Medication Instructions    acetaminophen (TYLENOL) 650 mg, oral, Every 6 hours PRN    baclofen (Lioresal) 10 mg tablet One tablet in the morning and 2 tablets in the evening.    bumetanide (BUMEX) 2 mg, 2 times daily (morning and late afternoon)    clobetasol (Temovate) 0.05 % cream Topical, 2 times daily    clopidogrel (PLAVIX) 75 mg, oral, Daily    cyanocobalamin (VITAMIN B-12) 1,000 mcg, intramuscular, Every 30 days    diclofenac sodium (VOLTAREN) 4 g, Topical, 2 times daily PRN    digoxin (LANOXIN) 125 mcg, Daily    EPINEPHrine (EPIPEN) 0.3 mg, intramuscular, Once as needed    fluticasone  "propion-salmeteroL (Advair HFA) 45-21 mcg/actuation inhaler 2 puffs, inhalation, 2 times daily RT, Rinse mouth with water after use to reduce aftertaste and incidence of candidiasis. Do not swallow.    hydroxychloroquine (PLAQUENIL) 300 mg, oral, Daily    insulin syringe-needle U-100 (BD Insulin Syringe Ultra-Fine) 1 mL 30 gauge x 1/2\" syringe Use one a month    ipratropium-albuteroL (Combivent Respimat)  mcg/actuation inhaler 2 puffs, inhalation, 4 times daily RT    ipratropium-albuteroL (Duo-Neb) 0.5-2.5 mg/3 mL nebulizer solution 3 mL, nebulization, 4 times daily PRN    magnesium oxide (MAG-OX) 400 mg, oral, Daily    montelukast (SINGULAIR) 10 mg, oral, Nightly    nitroglycerin (NITROSTAT) 0.4 mg, sublingual, Every 5 min PRN    ondansetron (ZOFRAN) 8 mg, Every 8 hours PRN    pantoprazole (PROTONIX) 40 mg, oral, 2 times daily    potassium chloride CR (Klor-Con M20) 20 mEq ER tablet 20 mEq, oral, Daily, Do not crush or chew.    rivaroxaban (Xarelto) 20 mg tablet Take by mouth. Take with food.    rosuvastatin (CRESTOR) 20 mg, oral, Daily    sacubitriL-valsartan (Entresto) 24-26 mg tablet 0.5 tablets, oral, 2 times daily, Holding currently d/t hypotension    sucralfate (CARAFATE) 1 g, oral, 4 times daily with meals and nightly    sulfaSALAzine (AZULFIDINE) 1,000 mg, oral, 2 times daily    warfarin (Coumadin) 1 mg tablet Take 1 tablet by mouth once dailly    warfarin (Coumadin) 5 mg tablet 1 po qday        Drug Allergies/Intolerances:  RX Allergies[1]       Visit Vitals  /71   Pulse 60   Resp 16   Wt 72.5 kg (159 lb 14.4 oz)   SpO2 99%   BMI 29.61 kg/m²   OB Status Postmenopausal   Smoking Status Never   BSA 1.78 m²        Physical exam  Constitutional: Normal appearance.  HEENT: Normocephalic and atraumatic.  Cardiovascular: Normal rate and regular rhythm.  Pulmonary: Normal respiratory effort, bilateral clear breath sounds, no wheezing or rhonchi.  Musculoskeletal: No edema, no cyanosis.  Neurological: " "Awake, alert and oriented x3.  Psychiatric: Normal behavior, mood and affect.      Pulmonary Function Test Results     Pulmonary Functions Testing Results:    No results found for: \"FEV1\", \"FVC\", \"RXY0BGF\", \"TLC\", \"DLCO\"      Chest Radiograph     XR chest 1 view 04/18/2025    Narrative  Interpreted By:  Ugo Márquez and Booth Cameron  STUDY:  XR CHEST 1 VIEW;  4/18/2025 4:28 pm    INDICATION:  Signs/Symptoms:Dypsnea.      COMPARISON:  XR CHEST 1 VIEW 4/18/2025, CT TAVR FULL CONTRAST CHEST ABDOMEN PELVIS  3/5/2025    ACCESSION NUMBER(S):  [id number]    ORDERING CLINICIAN:  KRYSTA CRAMER    FINDINGS:  AP radiograph of the chest was provided.        CARDIOMEDIASTINAL SILHOUETTE:  Cardiomediastinal silhouette is stable in size and configuration.  Left chest wall pacer/ICD with stable positioning of leads. Evidence  of mitral valve replacement and watchman device. CardioMEMS device  projects over the left perihilar region.    LUNGS:  Stable bibasilar streaky opacities. Unchanged size of small left  pleural effusion. No sizable pneumothorax.    ABDOMEN:  No remarkable upper abdominal findings. Scattered surgical clips  project over the upper abdomen.    BONES:  No acute osseous changes. Bilateral total glenohumeral joint  arthroplasties.    Impression  Stable small left pleural effusion and bibasilar atelectasis without  evidence of new cardiopulmonary process.    I personally reviewed the image(s)/study and interpretation by  Darrion Escamilla MD (resident).    MACRO:  None    Signed by: Ugo Márquez 4/19/2025 7:16 AM  Dictation workstation:   [id number]      Echocardiogram     No results found for this or any previous visit from the past 365 days.       Chest CT Scan     CT angio chest for pulmonary embolism 09/18/2024    Narrative  Interpreted By:  Campbell Pearson,  STUDY:  CT ANGIO CHEST FOR PULMONARY EMBOLISM;  9/18/2024 12:31 am    INDICATION:  Signs/Symptoms:SOB CHEST " "PAIN.    COMPARISON:  08/08/2024    ACCESSION NUMBER(S):  [id number]    ORDERING CLINICIAN:  JAMMIE SPENCE    TECHNIQUE:  Contiguous axial images of the chest were obtained after the  intravenous administration of iodinated contrast using angiographic  PE protocol. Coronal and sagittal reformatted images were  reconstructed from the axial data. MIP images were created on an  independent workstation and reviewed.    FINDINGS:  There is streak artifact from the arms which were not raised.    No significant pulmonary artery embolism.    Moderate cardiomegaly is seen. Left-sided AICD present.    Ectasia ascending aorta measuring 4.2 cm in diameter.    Moderate right pleural effusion.    There is some areas of ground-glass opacity both lungs which may  reflect atelectasis/edema. Correlate with CHF.    Multilevel moderate spondylotic degeneration seen thoracic spine.    Impression  No significant pulmonary embolism.    Moderate cardiomegaly/suspected CHF with moderate right effusion.    MACRO:  None.    Signed by: Campbell Pearson 9/18/2024 12:58 AM  Dictation workstation:   [id number]       Laboratory Studies     Lab Results   Component Value Date    WBC 5.5 05/06/2025    HGB 9.4 (L) 05/06/2025    HCT 33.6 (L) 05/06/2025    MCV 84 05/06/2025     05/06/2025      Lab Results   Component Value Date    GLUCOSE 110 (H) 05/06/2025    CALCIUM 8.9 05/06/2025     05/06/2025    K 4.0 05/06/2025    CO2 28 05/06/2025     05/06/2025    BUN 20 05/06/2025    CREATININE 1.16 (H) 05/06/2025      Lab Results   Component Value Date    ALT 12 05/06/2025    AST 18 05/06/2025    ALKPHOS 69 05/06/2025    BILITOT 0.5 05/06/2025        Sputum Culture     No results found for: \"AFBCX\"   No results found for: \"RESPCULTCYFI\"  No results found for the last 90 days.          Assessment and Plan / Recommendations     74-year-old woman with history of severe MR post TMVR, A-fib, CKD, RA, asthma, BARBARA presenting for follow-up.    1.  " Asthma  PFT from 02/2025 with mild obstruction, reduced TLC and DLCO.  CT chest from 04/2025 with dependent intralobular septal thickening and subpleural atelectasis versus edema.  -Continue Advair twice daily, albuterol as needed  - Age-appropriate vaccinations  - Following with cardiology, her Lasix increased yesterday, likely will repeat CT next visit to assure improvement/rule out ILD given PFT findings as above    2.  BARBARA on auto BiPAP, not tolerating BiPAP well  -Will try DreamWear mask, download with next visit    Return to clinic in 4 to 6-month or sooner with any concerns.    This dictation was created using voice recognition software. Phonetic and/or minor grammatical errors may exist.        Amie Pimentel MD   05/08/2025         [1]   Allergies  Allergen Reactions    Abatacept Shortness of breath     pulmonary edema, diarrhea, nausea    Beta-Blockers (Beta-Adrenergic Blocking Agts) Anaphylaxis    Hydrocodone-Acetaminophen Anaphylaxis    Hydromorphone Anaphylaxis    Metoprolol Anaphylaxis    Penicillins Hives    Pregabalin Shortness of breath     caused pulmonary edema    Prochlorperazine Itching     paralysis of the mouth with drooping    Methotrexate Hives and Itching     chest pain    Aripiprazole Rash    Bupropion Nausea/vomiting    Cefepime Itching    Ciprofloxacin Hives    Furosemide Itching    Levofloxacin Itching    Pineapple Rash

## 2025-05-09 LAB
ALBUMIN: 3.7 G/DL (ref 3.4–5)
ALPHA 1 GLOBULIN: 0.4 G/DL (ref 0.2–0.6)
ALPHA 2 GLOBULIN: 0.8 G/DL (ref 0.4–1.1)
BETA GLOBULIN: 0.9 G/DL (ref 0.5–1.2)
GAMMA GLOBULIN: 0.7 G/DL (ref 0.5–1.4)
IMMUNOFIXATION COMMENT: NORMAL
PATH REVIEW - SERUM IMMUNOFIXATION: NORMAL
PATH REVIEW-SERUM PROTEIN ELECTROPHORESIS: NORMAL
PROTEIN ELECTROPHORESIS COMMENT: NORMAL

## 2025-05-12 ENCOUNTER — HOSPITAL ENCOUNTER (OUTPATIENT)
Dept: CARDIOLOGY | Facility: CLINIC | Age: 75
Discharge: HOME | End: 2025-05-12
Payer: MEDICARE

## 2025-05-12 DIAGNOSIS — I47.29 OTHER VENTRICULAR TACHYCARDIA: ICD-10-CM

## 2025-05-12 DIAGNOSIS — Z95.810 PRESENCE OF AUTOMATIC (IMPLANTABLE) CARDIAC DEFIBRILLATOR: ICD-10-CM

## 2025-05-13 PROBLEM — Z79.01 ANTICOAGULATION MANAGEMENT ENCOUNTER: Status: RESOLVED | Noted: 2025-04-23 | Resolved: 2025-05-13

## 2025-05-13 PROBLEM — Z51.81 ANTICOAGULATION MANAGEMENT ENCOUNTER: Status: RESOLVED | Noted: 2025-04-23 | Resolved: 2025-05-13

## 2025-05-13 PROBLEM — Z95.2 S/P TRANSCATHETER MITRAL VALVE REPLACEMENT (TMVR): Status: RESOLVED | Noted: 2025-04-22 | Resolved: 2025-05-13

## 2025-05-13 NOTE — PROGRESS NOTES
"Subjective   Patient ID: Trina Elias \"Marcela\" is a 74 y.o. female who presents for No chief complaint on file..  HPI    Review of Systems    Objective   Physical Exam    Assessment/Plan            Fredy Elias DO 05/13/25 1:09 PM   "

## 2025-05-14 ENCOUNTER — INFUSION (OUTPATIENT)
Dept: HEMATOLOGY/ONCOLOGY | Facility: CLINIC | Age: 75
End: 2025-05-14
Payer: MEDICARE

## 2025-05-14 VITALS
RESPIRATION RATE: 17 BRPM | HEART RATE: 74 BPM | SYSTOLIC BLOOD PRESSURE: 132 MMHG | WEIGHT: 160.5 LBS | DIASTOLIC BLOOD PRESSURE: 74 MMHG | TEMPERATURE: 97.7 F | BODY MASS INDEX: 29.72 KG/M2 | OXYGEN SATURATION: 97 %

## 2025-05-14 DIAGNOSIS — K90.9 IDIOPATHIC STEATORRHEA (HHS-HCC): ICD-10-CM

## 2025-05-14 DIAGNOSIS — D50.8 IRON DEFICIENCY ANEMIA SECONDARY TO INADEQUATE DIETARY IRON INTAKE: ICD-10-CM

## 2025-05-14 PROCEDURE — 96374 THER/PROPH/DIAG INJ IV PUSH: CPT | Mod: INF

## 2025-05-14 PROCEDURE — 2500000004 HC RX 250 GENERAL PHARMACY W/ HCPCS (ALT 636 FOR OP/ED): Mod: JZ | Performed by: PHYSICIAN ASSISTANT

## 2025-05-14 RX ORDER — EPINEPHRINE 0.3 MG/.3ML
0.3 INJECTION SUBCUTANEOUS EVERY 5 MIN PRN
OUTPATIENT
Start: 2025-05-17

## 2025-05-14 RX ORDER — EPINEPHRINE 0.3 MG/.3ML
0.3 INJECTION SUBCUTANEOUS EVERY 5 MIN PRN
Status: DISCONTINUED | OUTPATIENT
Start: 2025-05-14 | End: 2025-05-14 | Stop reason: HOSPADM

## 2025-05-14 RX ORDER — ALBUTEROL SULFATE 0.83 MG/ML
3 SOLUTION RESPIRATORY (INHALATION) AS NEEDED
OUTPATIENT
Start: 2025-05-17

## 2025-05-14 RX ORDER — DIPHENHYDRAMINE HYDROCHLORIDE 50 MG/ML
50 INJECTION, SOLUTION INTRAMUSCULAR; INTRAVENOUS AS NEEDED
Status: DISCONTINUED | OUTPATIENT
Start: 2025-05-14 | End: 2025-05-14 | Stop reason: HOSPADM

## 2025-05-14 RX ORDER — DIPHENHYDRAMINE HYDROCHLORIDE 50 MG/ML
50 INJECTION, SOLUTION INTRAMUSCULAR; INTRAVENOUS AS NEEDED
OUTPATIENT
Start: 2025-05-17

## 2025-05-14 RX ORDER — FAMOTIDINE 10 MG/ML
20 INJECTION, SOLUTION INTRAVENOUS ONCE AS NEEDED
Status: DISCONTINUED | OUTPATIENT
Start: 2025-05-14 | End: 2025-05-14 | Stop reason: HOSPADM

## 2025-05-14 RX ORDER — ALBUTEROL SULFATE 0.83 MG/ML
3 SOLUTION RESPIRATORY (INHALATION) AS NEEDED
Status: DISCONTINUED | OUTPATIENT
Start: 2025-05-14 | End: 2025-05-14 | Stop reason: HOSPADM

## 2025-05-14 RX ORDER — FAMOTIDINE 10 MG/ML
20 INJECTION, SOLUTION INTRAVENOUS ONCE AS NEEDED
OUTPATIENT
Start: 2025-05-17

## 2025-05-14 RX ADMIN — IRON SUCROSE 200 MG: 20 INJECTION, SOLUTION INTRAVENOUS at 08:24

## 2025-05-14 ASSESSMENT — PAIN SCALES - GENERAL: PAINLEVEL_OUTOF10: 0-NO PAIN

## 2025-05-15 DIAGNOSIS — L40.9 PSORIASIS: ICD-10-CM

## 2025-05-16 ENCOUNTER — APPOINTMENT (OUTPATIENT)
Dept: CARDIOLOGY | Facility: HOSPITAL | Age: 75
End: 2025-05-16
Payer: MEDICARE

## 2025-05-16 DIAGNOSIS — K27.9 PUD (PEPTIC ULCER DISEASE): Primary | ICD-10-CM

## 2025-05-16 DIAGNOSIS — K29.00 ACUTE SUPERFICIAL GASTRITIS WITHOUT HEMORRHAGE: ICD-10-CM

## 2025-05-16 RX ORDER — HYDROXYCHLOROQUINE SULFATE 200 MG/1
TABLET, FILM COATED ORAL DAILY
Qty: 135 TABLET | Refills: 0 | Status: SHIPPED | OUTPATIENT
Start: 2025-05-16

## 2025-05-16 RX ORDER — ONDANSETRON HYDROCHLORIDE 8 MG/1
8 TABLET, FILM COATED ORAL EVERY 8 HOURS PRN
Qty: 20 TABLET | Refills: 3 | Status: SHIPPED | OUTPATIENT
Start: 2025-05-16

## 2025-05-19 ENCOUNTER — APPOINTMENT (OUTPATIENT)
Dept: HEMATOLOGY/ONCOLOGY | Facility: CLINIC | Age: 75
End: 2025-05-19
Payer: MEDICARE

## 2025-05-21 ENCOUNTER — APPOINTMENT (OUTPATIENT)
Dept: RHEUMATOLOGY | Facility: CLINIC | Age: 75
End: 2025-05-21
Payer: MEDICARE

## 2025-05-21 ENCOUNTER — INFUSION (OUTPATIENT)
Dept: HEMATOLOGY/ONCOLOGY | Facility: CLINIC | Age: 75
End: 2025-05-21
Payer: MEDICARE

## 2025-05-21 VITALS
DIASTOLIC BLOOD PRESSURE: 50 MMHG | WEIGHT: 157.85 LBS | OXYGEN SATURATION: 97 % | HEART RATE: 72 BPM | RESPIRATION RATE: 16 BRPM | SYSTOLIC BLOOD PRESSURE: 105 MMHG | TEMPERATURE: 98.4 F | BODY MASS INDEX: 29.23 KG/M2

## 2025-05-21 DIAGNOSIS — D50.8 IRON DEFICIENCY ANEMIA SECONDARY TO INADEQUATE DIETARY IRON INTAKE: ICD-10-CM

## 2025-05-21 DIAGNOSIS — K90.9 IDIOPATHIC STEATORRHEA (HHS-HCC): ICD-10-CM

## 2025-05-21 PROCEDURE — 96365 THER/PROPH/DIAG IV INF INIT: CPT | Mod: INF

## 2025-05-21 PROCEDURE — 2500000004 HC RX 250 GENERAL PHARMACY W/ HCPCS (ALT 636 FOR OP/ED): Mod: JZ | Performed by: PHYSICIAN ASSISTANT

## 2025-05-21 PROCEDURE — 96375 TX/PRO/DX INJ NEW DRUG ADDON: CPT | Mod: INF

## 2025-05-21 RX ORDER — DIPHENHYDRAMINE HYDROCHLORIDE 50 MG/ML
50 INJECTION, SOLUTION INTRAMUSCULAR; INTRAVENOUS AS NEEDED
Status: DISCONTINUED | OUTPATIENT
Start: 2025-05-21 | End: 2025-05-21 | Stop reason: HOSPADM

## 2025-05-21 RX ORDER — ONDANSETRON HYDROCHLORIDE 2 MG/ML
8 INJECTION, SOLUTION INTRAVENOUS ONCE
Status: COMPLETED | OUTPATIENT
Start: 2025-05-21 | End: 2025-05-21

## 2025-05-21 RX ORDER — DIPHENHYDRAMINE HYDROCHLORIDE 50 MG/ML
50 INJECTION, SOLUTION INTRAMUSCULAR; INTRAVENOUS AS NEEDED
OUTPATIENT
Start: 2025-05-23

## 2025-05-21 RX ORDER — FAMOTIDINE 10 MG/ML
40 INJECTION, SOLUTION INTRAVENOUS ONCE
OUTPATIENT
Start: 2025-05-23 | End: 2025-05-23

## 2025-05-21 RX ORDER — FAMOTIDINE 10 MG/ML
20 INJECTION, SOLUTION INTRAVENOUS ONCE AS NEEDED
Status: DISCONTINUED | OUTPATIENT
Start: 2025-05-21 | End: 2025-05-21 | Stop reason: HOSPADM

## 2025-05-21 RX ORDER — EPINEPHRINE 0.3 MG/.3ML
0.3 INJECTION SUBCUTANEOUS EVERY 5 MIN PRN
Status: DISCONTINUED | OUTPATIENT
Start: 2025-05-21 | End: 2025-05-21 | Stop reason: HOSPADM

## 2025-05-21 RX ORDER — ALBUTEROL SULFATE 0.83 MG/ML
3 SOLUTION RESPIRATORY (INHALATION) AS NEEDED
OUTPATIENT
Start: 2025-05-23

## 2025-05-21 RX ORDER — FAMOTIDINE 10 MG/ML
40 INJECTION, SOLUTION INTRAVENOUS ONCE
Status: COMPLETED | OUTPATIENT
Start: 2025-05-21 | End: 2025-05-21

## 2025-05-21 RX ORDER — EPINEPHRINE 0.3 MG/.3ML
0.3 INJECTION SUBCUTANEOUS EVERY 5 MIN PRN
OUTPATIENT
Start: 2025-05-23

## 2025-05-21 RX ORDER — FAMOTIDINE 10 MG/ML
20 INJECTION, SOLUTION INTRAVENOUS ONCE AS NEEDED
OUTPATIENT
Start: 2025-05-23

## 2025-05-21 RX ORDER — ONDANSETRON HYDROCHLORIDE 2 MG/ML
8 INJECTION, SOLUTION INTRAVENOUS ONCE
OUTPATIENT
Start: 2025-05-23 | End: 2025-05-23

## 2025-05-21 RX ORDER — ONDANSETRON HYDROCHLORIDE 2 MG/ML
INJECTION, SOLUTION INTRAVENOUS
Status: DISCONTINUED
Start: 2025-05-21 | End: 2025-05-21 | Stop reason: HOSPADM

## 2025-05-21 RX ORDER — ALBUTEROL SULFATE 0.83 MG/ML
3 SOLUTION RESPIRATORY (INHALATION) AS NEEDED
Status: DISCONTINUED | OUTPATIENT
Start: 2025-05-21 | End: 2025-05-21 | Stop reason: HOSPADM

## 2025-05-21 RX ADMIN — ONDANSETRON 8 MG: 2 INJECTION INTRAMUSCULAR; INTRAVENOUS at 10:29

## 2025-05-21 RX ADMIN — IRON SUCROSE 300 MG: 20 INJECTION, SOLUTION INTRAVENOUS at 11:02

## 2025-05-21 RX ADMIN — FAMOTIDINE 40 MG: 10 INJECTION, SOLUTION INTRAVENOUS at 10:40

## 2025-05-21 ASSESSMENT — PAIN SCALES - GENERAL: PAINLEVEL_OUTOF10: 4

## 2025-05-21 NOTE — PROGRESS NOTES
Patient c/o having a lot of nausea and vomiting for 3 days after the last Venofer treatment, Patient said, that she had the same symptoms after the very first iron infusion, but she did not have any problems after others except after the last one again. Juanita CORREA made aware, ordered pre-meds Zofran 8 mg IVP and Pepcid 40 mg IVP, Patient tolerated Venofer infusion well, educated if will start having any s/effects at home, call our office, patient verbalized understanding and agree with the plan of care.

## 2025-05-27 ENCOUNTER — INFUSION (OUTPATIENT)
Dept: HEMATOLOGY/ONCOLOGY | Facility: CLINIC | Age: 75
End: 2025-05-27
Payer: MEDICARE

## 2025-05-27 VITALS
DIASTOLIC BLOOD PRESSURE: 74 MMHG | TEMPERATURE: 98.4 F | HEART RATE: 73 BPM | RESPIRATION RATE: 16 BRPM | SYSTOLIC BLOOD PRESSURE: 126 MMHG | BODY MASS INDEX: 29.52 KG/M2 | OXYGEN SATURATION: 94 % | WEIGHT: 159.4 LBS

## 2025-05-27 DIAGNOSIS — D50.8 IRON DEFICIENCY ANEMIA SECONDARY TO INADEQUATE DIETARY IRON INTAKE: ICD-10-CM

## 2025-05-27 DIAGNOSIS — K90.9 IDIOPATHIC STEATORRHEA (HHS-HCC): ICD-10-CM

## 2025-05-27 PROCEDURE — 2500000004 HC RX 250 GENERAL PHARMACY W/ HCPCS (ALT 636 FOR OP/ED): Mod: JZ | Performed by: INTERNAL MEDICINE

## 2025-05-27 PROCEDURE — 2500000004 HC RX 250 GENERAL PHARMACY W/ HCPCS (ALT 636 FOR OP/ED): Mod: JZ | Performed by: PHYSICIAN ASSISTANT

## 2025-05-27 PROCEDURE — 96365 THER/PROPH/DIAG IV INF INIT: CPT | Mod: INF

## 2025-05-27 PROCEDURE — 96375 TX/PRO/DX INJ NEW DRUG ADDON: CPT | Mod: INF

## 2025-05-27 RX ORDER — DIPHENHYDRAMINE HYDROCHLORIDE 50 MG/ML
50 INJECTION, SOLUTION INTRAMUSCULAR; INTRAVENOUS AS NEEDED
Status: DISCONTINUED | OUTPATIENT
Start: 2025-05-27 | End: 2025-05-27 | Stop reason: HOSPADM

## 2025-05-27 RX ORDER — DIPHENHYDRAMINE HYDROCHLORIDE 50 MG/ML
50 INJECTION, SOLUTION INTRAMUSCULAR; INTRAVENOUS AS NEEDED
OUTPATIENT
Start: 2025-05-30

## 2025-05-27 RX ORDER — FAMOTIDINE 10 MG/ML
20 INJECTION, SOLUTION INTRAVENOUS ONCE AS NEEDED
OUTPATIENT
Start: 2025-05-30

## 2025-05-27 RX ORDER — EPINEPHRINE 0.3 MG/.3ML
0.3 INJECTION SUBCUTANEOUS EVERY 5 MIN PRN
Status: DISCONTINUED | OUTPATIENT
Start: 2025-05-27 | End: 2025-05-27 | Stop reason: HOSPADM

## 2025-05-27 RX ORDER — FAMOTIDINE 10 MG/ML
40 INJECTION, SOLUTION INTRAVENOUS ONCE
Status: COMPLETED | OUTPATIENT
Start: 2025-05-27 | End: 2025-05-27

## 2025-05-27 RX ORDER — ALBUTEROL SULFATE 0.83 MG/ML
3 SOLUTION RESPIRATORY (INHALATION) AS NEEDED
OUTPATIENT
Start: 2025-05-30

## 2025-05-27 RX ORDER — EPINEPHRINE 0.3 MG/.3ML
0.3 INJECTION SUBCUTANEOUS EVERY 5 MIN PRN
OUTPATIENT
Start: 2025-05-30

## 2025-05-27 RX ORDER — ONDANSETRON HYDROCHLORIDE 2 MG/ML
8 INJECTION, SOLUTION INTRAVENOUS ONCE
OUTPATIENT
Start: 2025-05-30 | End: 2025-05-30

## 2025-05-27 RX ORDER — FAMOTIDINE 10 MG/ML
20 INJECTION, SOLUTION INTRAVENOUS ONCE AS NEEDED
Status: DISCONTINUED | OUTPATIENT
Start: 2025-05-27 | End: 2025-05-27 | Stop reason: HOSPADM

## 2025-05-27 RX ORDER — ONDANSETRON HYDROCHLORIDE 2 MG/ML
8 INJECTION, SOLUTION INTRAVENOUS ONCE
Status: COMPLETED | OUTPATIENT
Start: 2025-05-27 | End: 2025-05-27

## 2025-05-27 RX ORDER — FAMOTIDINE 10 MG/ML
40 INJECTION, SOLUTION INTRAVENOUS ONCE
OUTPATIENT
Start: 2025-05-30 | End: 2025-05-30

## 2025-05-27 RX ORDER — ALBUTEROL SULFATE 0.83 MG/ML
3 SOLUTION RESPIRATORY (INHALATION) AS NEEDED
Status: DISCONTINUED | OUTPATIENT
Start: 2025-05-27 | End: 2025-05-27 | Stop reason: HOSPADM

## 2025-05-27 RX ADMIN — FAMOTIDINE 40 MG: 10 INJECTION, SOLUTION INTRAVENOUS at 13:28

## 2025-05-27 RX ADMIN — ONDANSETRON 8 MG: 2 INJECTION INTRAMUSCULAR; INTRAVENOUS at 13:35

## 2025-05-27 RX ADMIN — IRON SUCROSE 300 MG: 20 INJECTION, SOLUTION INTRAVENOUS at 13:54

## 2025-05-27 ASSESSMENT — PAIN SCALES - GENERAL: PAINLEVEL_OUTOF10: 6

## 2025-05-27 NOTE — PROGRESS NOTES
Patient received dose 2 of 3 Venofer 300 mg, tolerated well, left ambulatory in stable condition.

## 2025-05-28 ENCOUNTER — HOSPITAL ENCOUNTER (OUTPATIENT)
Dept: RADIOLOGY | Facility: HOSPITAL | Age: 75
Discharge: HOME | End: 2025-05-28
Payer: MEDICARE

## 2025-05-28 ENCOUNTER — HOSPITAL ENCOUNTER (OUTPATIENT)
Dept: CARDIOLOGY | Facility: HOSPITAL | Age: 75
Discharge: HOME | End: 2025-05-28
Payer: MEDICARE

## 2025-05-28 ENCOUNTER — OFFICE VISIT (OUTPATIENT)
Dept: CARDIOLOGY | Facility: HOSPITAL | Age: 75
End: 2025-05-28
Payer: MEDICARE

## 2025-05-28 VITALS
OXYGEN SATURATION: 95 % | HEART RATE: 75 BPM | SYSTOLIC BLOOD PRESSURE: 105 MMHG | DIASTOLIC BLOOD PRESSURE: 66 MMHG | HEIGHT: 64 IN | BODY MASS INDEX: 27.31 KG/M2 | WEIGHT: 160 LBS

## 2025-05-28 DIAGNOSIS — Z95.2 S/P TRANSCATHETER MITRAL VALVE REPLACEMENT (TMVR): ICD-10-CM

## 2025-05-28 DIAGNOSIS — I50.22 CHRONIC SYSTOLIC HEART FAILURE: ICD-10-CM

## 2025-05-28 LAB
AORTIC VALVE MEAN GRADIENT: 12 MMHG
AORTIC VALVE PEAK VELOCITY: 2.39 M/S
AV PEAK GRADIENT: 23 MMHG
EJECTION FRACTION APICAL 4 CHAMBER: 51.2
EJECTION FRACTION: 40 %
LEFT VENTRICLE INTERNAL DIMENSION DIASTOLE: 5.3 CM (ref 3.5–6)
RIGHT VENTRICLE PEAK SYSTOLIC PRESSURE: 39 MMHG
TRICUSPID ANNULAR PLANE SYSTOLIC EXCURSION: 2.2 CM

## 2025-05-28 PROCEDURE — 76376 3D RENDER W/INTRP POSTPROCES: CPT

## 2025-05-28 PROCEDURE — 99212 OFFICE O/P EST SF 10 MIN: CPT | Mod: 25 | Performed by: INTERNAL MEDICINE

## 2025-05-28 PROCEDURE — 71275 CT ANGIOGRAPHY CHEST: CPT

## 2025-05-28 PROCEDURE — 2550000001 HC RX 255 CONTRASTS: Mod: JZ | Performed by: INTERNAL MEDICINE

## 2025-05-28 RX ADMIN — IOHEXOL 80 ML: 350 INJECTION, SOLUTION INTRAVENOUS at 09:21

## 2025-05-28 ASSESSMENT — PAIN SCALES - GENERAL: PAINLEVEL_OUTOF10: 6

## 2025-05-28 NOTE — PROGRESS NOTES
[facility] Questionnaire      1  Heart failure affects different people in different ways. Some feel shortness of breath while others feel fatigue. Please indicate how much you are limited by heart failure (shortness of breath or fatigue) in your ability to do the following activities over the past 2 weeks. PRE PROCEDURE    A.) Showering/bathing  5. Not at All  B.) Walking 1 block on level ground 4. Slightly  C.) Hurrying or Jogging   6. Limited for other reastons    2.  Over the past 2 weeks, how many times did you have swelling in your feet, ankles or legs when you woke up in the morning? 4. Less than once a week    3.  Over the past 2 weeks, on average, how many times has fatigue limited your ability to do what you wanted? 5. 1-2 times a week    4.  Over the past 2 weeks, on average, how many times has shortness of breath limited your ability to do what you wanted? 4. 3 or more times a week but not every day    5.  Over the past 2 weeks, on average, how many times have you been forced to sleep sitting up in a chair or with at least 3 pillows to prop you up because of shortness of breath? Never    6. Over the past 2 weeks, how much has your heart failure limited your enjoyment of life? It has not limited my enjoyment of life    7. If you had to spend the rest of your life with your heart failure the way it is right now, how would you feel about this? 5. Completely satisfied    8. How much does your heart failure affect your lifestyle? Please indicate how your heart failure may have limited yourparticipation in the following activities over the past 2 weeks    A.)  Hobbies, recreational activities  6. Does not apply for other reasons    B.) Working or doing household chores  4. Slightly limited    C.) Visiting family or friends out of your home  6. Does not apply for other reasons    5 Meter Walk___9.9_______seconds

## 2025-05-28 NOTE — PROGRESS NOTES
1-month Follow-up s/p TMVR via APOLLO trial    HPI  Trnia Elias is a 74 y.o. female with a PMH of atrial fibrillation s/p LAAO, VT/VF s/p ICD, Hyperlipidemia , CAD, PUD, SBO s/p lysis of adhesions, GERD s/p nissen fundoplication, provoked DVT 2018, Rheumatoid arthritis, psoriasis, asthma, chronic anemia, severe MR, caridoMEMs implant and CKD presents for 1 week evaluation s/p TMVR - with 42mm Intrepid via RCFV on 4/17/25 with Dr. Ojeda and Dr. Anderson. Final procedural SULY showed mod-severe MR PVL, LVOT gradient peak post PVC 11mmHg invasive and SULY.  POD 1 ECHO showed EF 35-40%, severe LVH, norm RV, mild AS with mild/mod AI, MV gradients 16/6, no MR, mild TR, no LVOTO, triv PC effusion.  Pt eventually discharged on 4/22/25 after optimization of her HF medications and anemia improved.     Today she comes for 1-month follow-up as part of APOLLO protocol. She reports feeling great. Denies any symptoms, SOB, CP, DOSHI or leg swelling. She did have some abdominal cramps from her coumadin and she was switched to Xarelto.    NYHA I.     Past Medical History:  She has a past medical history of Acute hypoxic on chronic hypercapnic respiratory failure (09/18/2024), Aneurysm, Arrhythmia, Arthritis, Asthma, Atrial fibrillation (Multi), Cataract, Central sleep apnea, Cervical myofascial pain syndrome, CHF (congestive heart failure), Chronic anemia, CKD (chronic kidney disease), CKD stage 3a, GFR 45-59 ml/min (Multi), COPD (chronic obstructive pulmonary disease) (Multi), DVT of axillary vein, acute right (Multi) (01/20/2018), GERD (gastroesophageal reflux disease), H/O being hospitalized (02/2024), History of Helicobacter pylori infection, syncope, Hyperlipidemia, Hypertension, ICD (implantable cardioverter-defibrillator) in place, Mitral valve regurgitation, Moderate aortic stenosis by prior echocardiogram, Osteopenia (06/26/2023), Peripheral neuropathy, Personal history of anaphylaxis, Psoriasis, PUD (peptic ulcer disease),  Pulmonary hypertension (Multi), Radiculopathy, lumbar region (2018), Restless legs syndrome (2015), Rheumatoid arthritis, Seasonal allergic rhinitis due to pollen, Sleep apnea, Small bowel obstruction (Multi) (2023), and Wide-complex tachycardia.    She has no past medical history of Chronic kidney disease or Personal history of irradiation.    Past Surgical History:  She has a past surgical history that includes Cholecystectomy; Appendectomy; Cardiac catheterization (2022);  section, classic; Rotator cuff repair; Abdominal adhesion surgery (10/22/2015); Abdominal adhesion surgery (10/18/2020); Ventral hernia repair (06/15/2015); Nerve Surgery (Right, 10/13/2010); Nissen fundoplication (); Finger surgery (Left, 2008); Leg Surgery (Right, 2006); Breast biopsy (Left, 2022); Ablation of dysrhythmic focus (2022); Wound debridement (2020); Cardioversion (2018); Cardiac assist device insertion (2012); Jena tooth extraction; IR injection epidural steroid (2011); IR injection epidural steroid (); Reverse total shoulder arthroplasty (Bilateral, 2024); Cardiac defibrillator placement (2014); Carpal tunnel release (Bilateral); Foot surgery (Bilateral); Ulnar nerve transposition (Left); Achilles tendon surgery (Right); Anterior cervical discectomy w/ fusion (2016); Colonoscopy (2024); picc line insertion wo imaging (10/24/2015); Cardiac catheterization (N/A, 2024); Cardiac catheterization (N/A, 2025); Insert / replace / remove pacemaker; Cardiac catheterization (N/A, 3/19/2025); Cardiac catheterization (N/A, 3/19/2025); Anomalous pulmonary venous return repair, total (N/A, 2025); and Anomalous pulmonary venous return repair, total (N/A, 2025).                       Social History     Tobacco Use    Smoking status: Never    Smokeless tobacco: Never   Substance Use Topics    Alcohol use: Never     "    Family History[1]     RX Allergies[2]     Current Outpatient Medications   Medication Instructions    acetaminophen (TYLENOL) 650 mg, oral, Every 6 hours PRN    baclofen (Lioresal) 10 mg tablet One tablet in the morning and 2 tablets in the evening.    bumetanide (BUMEX) 2 mg, 2 times daily (morning and late afternoon)    clobetasol (Temovate) 0.05 % cream Topical, 2 times daily    clopidogrel (PLAVIX) 75 mg, oral, Daily    cyanocobalamin (VITAMIN B-12) 1,000 mcg, intramuscular, Every 30 days    diclofenac sodium (VOLTAREN) 4 g, Topical, 2 times daily PRN    digoxin (LANOXIN) 125 mcg, Daily    EPINEPHrine (EPIPEN) 0.3 mg, intramuscular, Once as needed    fluticasone propion-salmeteroL (Advair HFA) 45-21 mcg/actuation inhaler 2 puffs, inhalation, 2 times daily RT, Rinse mouth with water after use to reduce aftertaste and incidence of candidiasis. Do not swallow.    hydroxychloroquine (Plaquenil) 200 mg tablet oral, Daily    insulin syringe-needle U-100 (BD Insulin Syringe Ultra-Fine) 1 mL 30 gauge x 1/2\" syringe Use one a month    ipratropium-albuteroL (Combivent Respimat)  mcg/actuation inhaler 2 puffs, inhalation, 4 times daily RT    ipratropium-albuteroL (Duo-Neb) 0.5-2.5 mg/3 mL nebulizer solution 3 mL, nebulization, 4 times daily PRN    magnesium oxide (MAG-OX) 400 mg, oral, Daily    montelukast (SINGULAIR) 10 mg, oral, Nightly    nitroglycerin (NITROSTAT) 0.4 mg, sublingual, Every 5 min PRN    ondansetron (ZOFRAN) 8 mg, oral, Every 8 hours PRN    pantoprazole (PROTONIX) 40 mg, oral, 2 times daily    potassium chloride CR (Klor-Con M20) 20 mEq ER tablet 20 mEq, oral, Daily, Do not crush or chew.    rivaroxaban (Xarelto) 20 mg tablet Take by mouth. Take with food.    rosuvastatin (CRESTOR) 20 mg, oral, Daily    sacubitriL-valsartan (Entresto) 24-26 mg tablet 0.5 tablets, oral, 2 times daily, Holding currently d/t hypotension    sucralfate (CARAFATE) 1 g, oral, 4 times daily with meals and nightly    " sulfaSALAzine (AZULFIDINE) 1,000 mg, oral, 2 times daily        Vitals:    05/28/25 1028   BP: 105/66   Pulse: 75   SpO2: 95%        Physical Exam:  Constitutional: alert and in no acute distress.   Pulmonary: no increased work of breathing or signs of respiratory distress , lungs clear to auscultation.   Cardiovascular: RRR, S1S2,  no pitting edema,  Neurologic: non-focal neurologic examination.        Last Labs:  CBC - 5/6/2025: 10:11 AM  5.5 9.4 238    33.6      BMP  141  102  20                  ----------------<110     4.0  28  1.16     CMP - 5/6/2025: 10:11 AM  8.9 6.8; 6.4 18 --- 0.5   4.2 4.2 12 69      PTT - 4/17/2025:  7:18 PM  1.90   14.6 33     Troponin I, High Sensitivity   Date/Time Value Ref Range Status   04/28/2025 09:28 AM 37 (H) 0 - 13 ng/L Final     BNP   Date/Time Value Ref Range Status   04/28/2025 09:28  (H) 0 - 99 pg/mL Final       SULY reviewed with normal EF, s/p TMVR with valve in good position, no PVL, mean gradient of 6-7 mmHg.     Impression:   Trina Elias is a 74 y.o. female with a PMH of atrial fibrillation s/p LAAO, VT/VF s/p ICD, Hyperlipidemia , CAD, PUD, SBO s/p lysis of adhesions, GERD, provoked DVT 2018, Rheumatoid arthritis, psoriasis, asthma, chronic anemia, CKD, s/p TMVR - with 42mm Intrepid via RCFV on 4/17/25 with Dr. Ojeda and Dr. Anderson. She is seen today for 1-month follow-up as part of APOLLO protocol. TTE without PVL or significant gradient. NYHA I.    Plan:   Continue follow-up per APOLLO protocol       [1]   Family History  Problem Relation Name Age of Onset    Stroke Father      Stroke Brother dante     Heart attack Brother dante     Stroke Brother vic     Heart attack Brother vic     Asthma Daughter      Asthma Other      Colon cancer Other      Diabetes Other      Other (stroke syndrome) Other     [2]   Allergies  Allergen Reactions    Abatacept Shortness of breath     pulmonary edema, diarrhea, nausea    Beta-Blockers (Beta-Adrenergic Blocking  Agts) Anaphylaxis    Hydrocodone-Acetaminophen Anaphylaxis    Hydromorphone Anaphylaxis    Metoprolol Anaphylaxis    Penicillins Hives    Pregabalin Shortness of breath     caused pulmonary edema    Prochlorperazine Itching     paralysis of the mouth with drooping    Methotrexate Hives and Itching     chest pain    Aripiprazole Rash    Bupropion Nausea/vomiting    Cefepime Itching    Ciprofloxacin Hives    Furosemide Itching    Levofloxacin Itching    Pineapple Rash

## 2025-06-02 ENCOUNTER — INFUSION (OUTPATIENT)
Dept: HEMATOLOGY/ONCOLOGY | Facility: CLINIC | Age: 75
End: 2025-06-02
Payer: MEDICARE

## 2025-06-02 VITALS
BODY MASS INDEX: 26.98 KG/M2 | RESPIRATION RATE: 16 BRPM | SYSTOLIC BLOOD PRESSURE: 108 MMHG | WEIGHT: 157.19 LBS | OXYGEN SATURATION: 92 % | HEART RATE: 74 BPM | DIASTOLIC BLOOD PRESSURE: 48 MMHG | TEMPERATURE: 97.5 F

## 2025-06-02 DIAGNOSIS — D50.8 IRON DEFICIENCY ANEMIA SECONDARY TO INADEQUATE DIETARY IRON INTAKE: ICD-10-CM

## 2025-06-02 DIAGNOSIS — K90.9 IDIOPATHIC STEATORRHEA (HHS-HCC): ICD-10-CM

## 2025-06-02 LAB
INR PPP: 1.6 (ref 0.9–1.1)
PROTHROMBIN TIME: 18 SECONDS (ref 9.8–12.4)

## 2025-06-02 PROCEDURE — 2500000004 HC RX 250 GENERAL PHARMACY W/ HCPCS (ALT 636 FOR OP/ED): Performed by: PHYSICIAN ASSISTANT

## 2025-06-02 PROCEDURE — 96365 THER/PROPH/DIAG IV INF INIT: CPT | Mod: INF

## 2025-06-02 PROCEDURE — 85610 PROTHROMBIN TIME: CPT | Performed by: INTERNAL MEDICINE

## 2025-06-02 PROCEDURE — 36415 COLL VENOUS BLD VENIPUNCTURE: CPT | Performed by: INTERNAL MEDICINE

## 2025-06-02 PROCEDURE — 96375 TX/PRO/DX INJ NEW DRUG ADDON: CPT | Mod: INF

## 2025-06-02 RX ORDER — ONDANSETRON HYDROCHLORIDE 2 MG/ML
8 INJECTION, SOLUTION INTRAVENOUS ONCE
Status: COMPLETED | OUTPATIENT
Start: 2025-06-02 | End: 2025-06-02

## 2025-06-02 RX ORDER — ALBUTEROL SULFATE 0.83 MG/ML
3 SOLUTION RESPIRATORY (INHALATION) AS NEEDED
OUTPATIENT
Start: 2025-06-05

## 2025-06-02 RX ORDER — ALBUTEROL SULFATE 0.83 MG/ML
3 SOLUTION RESPIRATORY (INHALATION) AS NEEDED
Status: DISCONTINUED | OUTPATIENT
Start: 2025-06-02 | End: 2025-06-02 | Stop reason: HOSPADM

## 2025-06-02 RX ORDER — FAMOTIDINE 10 MG/ML
40 INJECTION, SOLUTION INTRAVENOUS ONCE
Status: COMPLETED | OUTPATIENT
Start: 2025-06-02 | End: 2025-06-02

## 2025-06-02 RX ORDER — EPINEPHRINE 0.3 MG/.3ML
0.3 INJECTION SUBCUTANEOUS EVERY 5 MIN PRN
Status: DISCONTINUED | OUTPATIENT
Start: 2025-06-02 | End: 2025-06-02 | Stop reason: HOSPADM

## 2025-06-02 RX ORDER — DIPHENHYDRAMINE HYDROCHLORIDE 50 MG/ML
50 INJECTION, SOLUTION INTRAMUSCULAR; INTRAVENOUS AS NEEDED
Status: DISCONTINUED | OUTPATIENT
Start: 2025-06-02 | End: 2025-06-02 | Stop reason: HOSPADM

## 2025-06-02 RX ORDER — FAMOTIDINE 10 MG/ML
20 INJECTION, SOLUTION INTRAVENOUS ONCE AS NEEDED
Status: DISCONTINUED | OUTPATIENT
Start: 2025-06-02 | End: 2025-06-02 | Stop reason: HOSPADM

## 2025-06-02 RX ORDER — EPINEPHRINE 0.3 MG/.3ML
0.3 INJECTION SUBCUTANEOUS EVERY 5 MIN PRN
OUTPATIENT
Start: 2025-06-05

## 2025-06-02 RX ORDER — DIPHENHYDRAMINE HYDROCHLORIDE 50 MG/ML
50 INJECTION, SOLUTION INTRAMUSCULAR; INTRAVENOUS AS NEEDED
OUTPATIENT
Start: 2025-06-05

## 2025-06-02 RX ORDER — FAMOTIDINE 10 MG/ML
40 INJECTION, SOLUTION INTRAVENOUS ONCE
OUTPATIENT
Start: 2025-06-05 | End: 2025-06-05

## 2025-06-02 RX ORDER — FAMOTIDINE 10 MG/ML
20 INJECTION, SOLUTION INTRAVENOUS ONCE AS NEEDED
OUTPATIENT
Start: 2025-06-05

## 2025-06-02 RX ORDER — ONDANSETRON HYDROCHLORIDE 2 MG/ML
8 INJECTION, SOLUTION INTRAVENOUS ONCE
OUTPATIENT
Start: 2025-06-05 | End: 2025-06-05

## 2025-06-02 RX ADMIN — IRON SUCROSE 300 MG: 20 INJECTION, SOLUTION INTRAVENOUS at 11:30

## 2025-06-02 RX ADMIN — FAMOTIDINE 40 MG: 10 INJECTION, SOLUTION INTRAVENOUS at 11:14

## 2025-06-02 RX ADMIN — ONDANSETRON 8 MG: 2 INJECTION INTRAMUSCULAR; INTRAVENOUS at 11:08

## 2025-06-02 ASSESSMENT — PAIN SCALES - GENERAL: PAINLEVEL_OUTOF10: 0-NO PAIN

## 2025-06-06 ENCOUNTER — APPOINTMENT (OUTPATIENT)
Dept: HEMATOLOGY/ONCOLOGY | Facility: CLINIC | Age: 75
End: 2025-06-06
Payer: MEDICARE

## 2025-06-16 ENCOUNTER — HOSPITAL ENCOUNTER (OUTPATIENT)
Dept: CARDIOLOGY | Facility: CLINIC | Age: 75
Discharge: HOME | End: 2025-06-16
Payer: MEDICARE

## 2025-06-16 DIAGNOSIS — I47.29 OTHER VENTRICULAR TACHYCARDIA: ICD-10-CM

## 2025-06-16 DIAGNOSIS — Z95.810 PRESENCE OF AUTOMATIC (IMPLANTABLE) CARDIAC DEFIBRILLATOR: ICD-10-CM

## 2025-06-27 ENCOUNTER — DOCUMENTATION (OUTPATIENT)
Dept: CARDIOLOGY | Facility: HOSPITAL | Age: 75
End: 2025-06-27
Payer: MEDICARE

## 2025-06-27 NOTE — PROGRESS NOTES
[facility] AE form    Subject ID: [id number]  AE start date: 18 Apr2025    What is the relative time from the procedure?    [] Pre-procedure       [] During procedure        [x] Post-procedure     Date of site awareness: 27 jun2025    AE term/diagnosis: MODERATE LV  Event description     TTE ON APRIL 18, 2025 SHOWED MDERATE LV SHE DEVELOPED  MORE DYSPNEIC AND HYPOXIC ON EXERTIONHE STARTED ON ENTRESTO, ON MAY 28, 2025  TTE SHOWED                 Is the report any of the following:   [x] None   [] Bleeding event (complete bleeding event questions)  [] Neuro event (complete neurological event questions)  [] Thrombosis/Embolism    Bleeding Event (delete if not applicable)   What was the area of bleeding -- Choose only 1    [] Chest tube  [] Intracranial  [] Pericardial  [] Compartment Syndrome  [] Intraocular  [] Unidentified  [] GI Bleed  [] Intraspinal  [] Other, specify:     Did the bleeding lead to:  []Hypovolemic shock  []Severe Hypotension  [] None  Did the subject have a chest tube:    If yes, date chest tube removed:    Time chest tube removed:   Total drainage from arrival in ICU to removal:  cc  Measurement Result (g/dL) Date of test (dd-mmm-yyyy)    Hgb at time of admission      Domingo Hgb      Hgb at time of d/c       Neurological Event (delete if not applicable)   Type of Event -- Choose only 1    [] Confirmed TIA  [] Confirmed Stroke  [] Hemorrhagic  [] Ischemic  [] Unknown  [] Confirmed not a stroke or TIA  [] Undetermined  What was the duration of longest deficit?     Antithrombotic Therapy questions following are only required for events marked as Bleeding event, Neuro event, and/or Thrombosis/Embolism :   Is patient on warfarin?     If yes, what was the INR at the time of the event (if n/a, most recent INR to the event)   Result Date of test (dd-mmm-yyyy)         Antithrombotic therapy at time of the event:      If yes, specify:         Other anticoagulants, specify:      Other antiplatelets,  specify:  Action taken/ treatment    Were any diagnostic actions taken? Yes     Diagnostic test Result  Date of test (dd-mmm-yyyy)   TTE LEFT VENTRICULAR SYSTOLIC FUNCTION IS MODERATELY DECREASED 18 aPR 2025   TTE Left VENTRICULAR EJECTION FRACTION IS MODERATELY DECREASED  28 May 2025            Did the AE result in hospitalization? no   Did the AE result in a prolongation (>24 hours) of an existing hospitalization? yes    If yes, what is underlying cause of hospitalization?     []HF hospitalization  []Other cardiovascular hospitalization  []Non-cardiac hospitalization  NOTE: Ensure that a Healthcare Utilization CRF is completed for every new admission/ hospitalization.     Did the AE result in any treatment?     Was the AE treated with a blood transfusion of RBCs or whole blood? No    If yes, what are the number of units of RBCs or whole blood given:    Was concomitant or additional medication given? No    If yes:     Was medication given intravenously? No     Is the subject on IV Hemodynamic Support Medication? No  (e.g. inotropes, pressors)  Did the event result in any change in Heart Failure medication to treat Heart Failure? Yes   (NOTE: If Yes, please comp the Heart Failure Management AE CRF)  Was the AE treated with a permanent pacemaker device? No  If yes, Date of implant:   Specify type: []CRT-D       []CRT-P       []ICD       []Pacemaker  MDT (device) or other?   MDT device? specify serial number:   Other? Specify :  Was AE treated with ultrafiltration (e.g. ECMO)? No  Was the AE treated with hemodynamic assist device? No  (e.g. BVAD, IABP, LVAD)   Was the AE treated with a percutaneous intervention? No  If yes, date of intervention:   Was the intervention on the coronary arteries (ie, PCI) performed? No  Was the intervention on the mitral valve? No  Procedure  [] Balloon dilation of the TMVR   [] Implantation of another TMVR, valve-in-valve   If Valve-in-Valve, specify device type   If  Valve-in-Valve, specify device size    (mm)   [] Placement of the vascular plug for paravalvular leak   [] Repair or alteration of the TMVR valve, TMVR valve preserved in place   []Other, describe:    Clinical indication for procedure (check all that apply)    []Device migration  [] Frame fracture  [] Hemolysis   []Paravalvular regurgitation []Prosthetic valve/repair endocarditis  []Prosthetic valve/repair stenosis []Transvalvular regurgitation  []Other, describe:  Was the AE treated with a surgical procedure No  If Yes, Date of intervention:  Was the intervention on the coronary arteries (i.e., CABG) performed No  Was the intervention on the mitral valve No     Procedure   [] Explant and replacement of the mitral device   [] Repair or alteration of the mitral device, mitral device preserved in place   [] Other, describe:     Clinical indication for procedure (check all that apply)   []Device migration  [] Frame fracture  [] Hemolysis   []Paravalvular regurgitation []Prosthetic valve/repair endocarditis  []Prosthetic valve/repair stenosis []Transvalvular regurgitation  []Other, describe:      Was the AE treated in another manner?No  If Yes, what other action was taken in response to the AE:       Did the AE meet any of the following criteria (a single yes response defines this event as an MADY)   Led to death No  Led to serious deterioration in the health of the subject that resulted in:   Life threatening illness or injury No   A permanent impairment of a body structure or body function, including chronic diseases No   In-patient or prolonged hospitalization Yes  NOTE: If new hospitalization, be sure to complete the Healthcare Utilization CRF. This does not include Index Procedure admission.   Medical or surgical intervention to prevent life threatening illness or injury or permanent impairment to a body structure or a body function No  Led to fetal distress, foetal death or congenital abnormality or birth defect  No    Relationship    Note: Please reference CIP Section Classification of Causal Relationships for all relationship definitions   Not related The relationship to the TMVR, DCS, crimper, pre?crimp tool, mitralvalve surgical implant or TMVR / surgical mitral valve implant procedure can be excluded when:    The event is not a known side effect of the product category the TMVR, DCS, crimper, pre?crimp tool or surgical mitral valve implant belongs to or of similar devices;    The event has no temporal relationship with the TMVR, DCS, crimper, pre?crimp tool, mitral valve surgical implant or TMVR/surgical mitral valve implant procedure    The event does not follow a known response pattern to the TMVR, DCS, crimper, pre?crimp tool, mitral valve surgical implant or TMVR / surgical mitral valve implant procedure and is biologically implausible;    The event involves a body?site or an organ not expected     In order to establish non?relatedness, not all the criteria listed above might be met at the same time   Unlikely to be related The relationship to the TMVR, DCS, crimper, pre?crimp tool, mitral valve surgical implant or TMVR / surgical mitral valve implant procedure seems not relevant and/or the event can be reasonably explained by another cause, but additional information may be obtained.   Possibly related The relationship to the TMVR, DCS, crimper, pre?crimp tool, mitral valve surgical implant or TMVR / surgical mitral valve implant procedure is weak but cannot be ruled out completely. Alternative causes are also possible (e.g., an underlying or concurrent illness/clinical condition or/and an effect of another device, drug or treatment). Cases were relatedness cannot be assessed or no information has been obtained should also be classified as possible.   Probably related The relationship to the TMVR, DCS, crimper, pre?crimp tool, mitral valve surgical implant or TMVR / surgical mitral valve implant procedure seems  relevant and/or the event cannot reasonably explained by another cause, but additional information may be obtained   Causal relationship The event is associated to the T TMVR, DCS, crimper, pre?crimp tool, mitral valve surgical implant or TMVR / surgical mitral valve implant procedure beyond reasonable doubt when:    the event is a known side effect of the product category the TMVR, DCS, crimper, pre-crimp tool or surgical mitral valve implant belongs to or of similar devices;    the event has a temporal relationship with investigational device use/application or procedures;    the event involves a body-site or organ that o the TMVR, DCS or surgical implant is applied to;   o the TMVR, DCS or surgical implant has an effect on;      the event follows a known response pattern to the TMVR, DCS or surgical implant;    other possible causes (e.g., an underlying or concurrent illness/clinical condition or an effect of another device, drug, or treatment) have been adequately ruled out    harm to the subject is due to error in use     In order to establish relatedness, not all the criteria listed above might be met at the same time.    Is the AE related to the Implant Procedure  []Not Related   []Unlikely   [x]Possible   []Probable   []Causal Relationship    Is the AE related to TMVR Valve  []Not Related   []Unlikely   [x]Possible   []Probable   []Causal Relationship    Is the AE related to the Delivery Catheter System (TMVR)  [x]Not Related   []Unlikely   []Possible   []Probable   []Causal Relationship      Is the AE related to the Crimper (TMVR)   NOTE: Crimper is equivalent to the Funnel Loading System  [x]Not Related   []Unlikely   []Possible   []Probable   []Causal Relationship    Is the AE related to the Pre-Crimp Tool (TMVR)   NOTE: Pre-Crimp Tool is equivalent to the Funnel Loading Tool   If Pre-crimp tool was not used, Not Related should be selected  [x]Not Related   []Unlikely   []Possible   []Probable   []Causal  "Relationship    Is the AE related to the Surgical Device (SMVR)   Note: For v8.0, select Not Related, unless subject was converted to open heart surgery. If subject was converted, select appropriate AE relatedness to the surgical device.  [x]Not Related   []Unlikely   []Possible   []Probable   []Causal Relationship      What was the final outcome of this AE   []Fatal []Not recovered/ Not resolved []Recovered/Resolved   [] Recovered/Resolved with Sequelae   [x] Recovering/Resolving  [] Unknown  *Complete AE end date if \"Fatal,\" \"Recovered/Resolved,\" or \"Recovered/Resolved with Sequelae\" are checked above.     What date did the AE end:     "

## 2025-07-13 ASSESSMENT — RHEUMATOLOGY NEW PATIENT QUESTIONNAIRE
HOARSE VOICE: N
DIFFICULTY BREATHING LYING DOWN: Y
EYE PAIN: N
UNEXPLAINED HEARING LOSS: N
FEVER: N
SUN SENSITIVE (SUN ALLERGY): N
RASH OR ULCERS: N
DOUBLE OR BLURRED VISION: Y
BEHAVIORAL CHANGES: N
INCREASED SUSCEPTIBILITY TO INFECTION: N
COUGH: N
COLOR CHANGES OF HANDS OR FEET IN THE COLD: N
SORES IN MOUTH OR NOSE: N
BLOOD IN STOOLS: N
EYE DRYNESS: Y
DRYNESS OF MOUTH: Y
MEMORY LOSS: N
EYE REDNESS: N
HOW WOULD YOU DESCRIBE YOUR STIFFNESS ON AVERAGE: SEVERE
PAIN OR BURNING ON URINATION: N
LOSS OF CONSCIOUSNESS: N
SWOLLEN OR TENDER GLANDS: N
HEARTBURN OR REFLUX: Y
SWOLLEN LEGS OR FEET: N
RASH: N
EASILY LOSING TEMPER: N
UNUSUAL FATIGUE: N
EASY BRUISING: Y
JOINT SWELLING: Y
PERSISTENT DIARRHEA: N
SKIN TIGHTNESS: N
ANXIETY: N
STOMACH PAIN: Y
MORNING STIFFNESS IN LOWER BACK: Y
SEIZURES: N
MUSCLE WEAKNESS: N
NUMBNESS OR TINGLING IN HANDS OR FEET: Y
JAUNDICE: N
LOSS OF VISION: N
UNEXPLAINED WEIGHT CHANGE: N
ANEMIA: Y
CHEST PAIN: N
JOINT PAIN: Y
MORNING STIFFNESS: Y
UNUSUALLY RAPID OR SLOWED HEART RATE: N
EXCESSIVE HAIR LOSS (MORE THAN YOUR NORM): N
DEPRESSION: N
SHORTNESS OF BREATH: Y
VAGINAL DRYNESS: N
NAUSEA: Y
DIFFICULTY FALLING ASLEEP: Y
UNUSUAL BLEEDING: N
HEADACHES: Y
BLACK STOOLS: N
DIFFICULTY STAYING ASLEEP: Y
SKIN REDNESS: N
FAINTING: N
NODULES/BUMPS: N
DIFFICULTY SWALLOWING: N
VOMITING OF BLOOD OR COFFEE GROUND CONSISTENCY MATERIAL: N
AGITATION: N
NIGHT SWEATS: Y
ABNORMAL URINE: N

## 2025-07-14 ENCOUNTER — APPOINTMENT (OUTPATIENT)
Dept: RHEUMATOLOGY | Facility: CLINIC | Age: 75
End: 2025-07-14
Payer: MEDICARE

## 2025-07-14 ENCOUNTER — LAB (OUTPATIENT)
Dept: LAB | Facility: HOSPITAL | Age: 75
End: 2025-07-14
Payer: MEDICARE

## 2025-07-14 VITALS
SYSTOLIC BLOOD PRESSURE: 116 MMHG | WEIGHT: 161 LBS | HEART RATE: 67 BPM | BODY MASS INDEX: 27.49 KG/M2 | TEMPERATURE: 98.2 F | HEIGHT: 64 IN | DIASTOLIC BLOOD PRESSURE: 71 MMHG | OXYGEN SATURATION: 94 %

## 2025-07-14 DIAGNOSIS — D50.8 IRON DEFICIENCY ANEMIA SECONDARY TO INADEQUATE DIETARY IRON INTAKE: ICD-10-CM

## 2025-07-14 DIAGNOSIS — L40.9 PSORIASIS: ICD-10-CM

## 2025-07-14 DIAGNOSIS — K90.9 IDIOPATHIC STEATORRHEA (HHS-HCC): ICD-10-CM

## 2025-07-14 DIAGNOSIS — M05.79 RHEUMATOID ARTHRITIS INVOLVING MULTIPLE SITES WITH POSITIVE RHEUMATOID FACTOR (MULTI): ICD-10-CM

## 2025-07-14 LAB
ERYTHROCYTE [DISTWIDTH] IN BLOOD BY AUTOMATED COUNT: 17.9 % (ref 11.5–14.5)
FERRITIN SERPL-MCNC: 94 NG/ML (ref 8–150)
HCT VFR BLD AUTO: 38.1 % (ref 36–46)
HGB BLD-MCNC: 12 G/DL (ref 12–16)
IRON SATN MFR SERPL: 9 % (ref 25–45)
IRON SERPL-MCNC: 29 UG/DL (ref 35–150)
MCH RBC QN AUTO: 26.7 PG (ref 26–34)
MCHC RBC AUTO-ENTMCNC: 31.5 G/DL (ref 32–36)
MCV RBC AUTO: 85 FL (ref 80–100)
PLATELET # BLD AUTO: 181 X10*3/UL (ref 150–450)
RBC # BLD AUTO: 4.5 X10*6/UL (ref 4–5.2)
TIBC SERPL-MCNC: 314 UG/DL (ref 240–445)
UIBC SERPL-MCNC: 285 UG/DL (ref 110–370)
VIT B12 SERPL-MCNC: 453 PG/ML (ref 211–911)
WBC # BLD AUTO: 5.6 X10*3/UL (ref 4.4–11.3)

## 2025-07-14 PROCEDURE — 85027 COMPLETE CBC AUTOMATED: CPT

## 2025-07-14 PROCEDURE — 36415 COLL VENOUS BLD VENIPUNCTURE: CPT

## 2025-07-14 PROCEDURE — 82728 ASSAY OF FERRITIN: CPT

## 2025-07-14 PROCEDURE — 83550 IRON BINDING TEST: CPT

## 2025-07-14 PROCEDURE — 82607 VITAMIN B-12: CPT | Mod: GEALAB

## 2025-07-14 RX ORDER — SULFASALAZINE 500 MG/1
1000 TABLET ORAL 2 TIMES DAILY
Qty: 360 TABLET | Refills: 2 | Status: SHIPPED | OUTPATIENT
Start: 2025-07-14 | End: 2026-07-14

## 2025-07-14 RX ORDER — HYDROXYCHLOROQUINE SULFATE 200 MG/1
300 TABLET, FILM COATED ORAL DAILY
Qty: 135 TABLET | Refills: 3 | Status: SHIPPED | OUTPATIENT
Start: 2025-07-14

## 2025-07-14 ASSESSMENT — ENCOUNTER SYMPTOMS
OCCASIONAL FEELINGS OF UNSTEADINESS: 1
DEPRESSION: 0
LOSS OF SENSATION IN FEET: 1

## 2025-07-14 ASSESSMENT — PATIENT HEALTH QUESTIONNAIRE - PHQ9
2. FEELING DOWN, DEPRESSED OR HOPELESS: NOT AT ALL
1. LITTLE INTEREST OR PLEASURE IN DOING THINGS: NOT AT ALL
SUM OF ALL RESPONSES TO PHQ9 QUESTIONS 1 AND 2: 0

## 2025-07-14 ASSESSMENT — PAIN SCALES - GENERAL: PAINLEVEL_OUTOF10: 9

## 2025-07-14 NOTE — PROGRESS NOTES
Informants: Patient and EMR.  PP: 74-year-old female with history of CCP and RF positive rheumatoid arthritis, renal insufficiency, peptic ulcer disease, atrial fibrillation, psoriasis/eczema.  CC: Pain and stiffness in the hands and lower back.  [place]: She has been having pain involving multiple joints for prolonged period of time as well as pain involving her lower back.  She has morning stiffness, intermittent eye dryness and persistent mouth dryness.  She has recurrent dry rashes on extremities.  She has not noted any change in her bowel or bladder habits other than having frequent urination taking a diuretic.  She notes improvement in the joint pain and joint stiffness with taking sulfasalazine and hydroxychloroquine.  She was unable to tolerate Orencia due to shortness of breath and pulmonary edema, methotrexate due to pruritus, or Lyrica due to shortness of breath.  PH: Allergies: Abatacept (pulmonary edema/shortness of breath), beta-blockers (anaphylaxis), hydrocodone/acetaminophen (anaphylaxis), hydromorphone (anaphylaxis), penicillin (hives), pregabalin (shortness of breath), prochlorperazine pruritus, (paralysis of mouth).  Methotrexate (pleuritis), aripiprazole (rash), bupropion (nausea/vomiting), cephalexin (itching), ciprofloxacin (hives), furosemide (pleuritis), levothyroxine (pleuritis), pineapple (rash); illnesses: Renal insufficiency, peptic ulcer disease, GERD, atrial fibrillation, hyperlipidemia, coronary artery disease, status post treatment for small bowel obstruction, rheumatoid arthritis (with positive HLA-B27, rheumatoid factor, CCP), anterior cervical discectomy, CardioMEMS placement, pacemaker defibrillator placement, TMVT (transcatheter mitral valve replacement for severe mitral regurgitation [4/17/2025]), bilateral shoulder replacement surgeries.  SH: She does not use any tobacco alcohol or illicit drugs.  She is employed as a baker.  FH: Parental and sibling family history unknown since  she did not grow up with them.  She has 3 daughters all of whom are healthy.  She has no sons.  PX: She is a well-developed, well-nourished, white female.  The lungs are clear to auscultation.  Heart has a regular rhythm with normal heart sounds.  Abdomen is benign.  Extremities are without edema.  The musculoskeletal examination shows bony prominence of the DIP and PIP joints of the digits of both hands.  There is mildly limited flexion and extension of the wrist.  There is preserved range of motion of the elbows shoulders and knees.  She has pain with anterior flexion of the lumbar spine.  Schober test 10-12.5 cm limited by low back pain.  Laboratory (6/2/2025)PT 18.0, INR 1.6, (5/6/2025) serum protein electrophoresis normal, IgG 1020, IgA 198, IgM 15, reticulocyte count 1.4, vitamin B12 661, hepatitis B surface antigen nonreactive, hepatitis B surface antibody <3.1, hepatitis B core antibody total nonreactive, hepatitis C antibody nonreactive, haptoglobin 182, folate 17.7, erythropoietin 43, ferritin 24, iron 29, TIBC 394, saturation 7%, , WBC 5.5, hemoglobin 9.4, hematocrit 33.6, MCV 84, MCHC 28.0, platelets 238, glucose 110, BUN 20, creatinine 1.16, potassium 4.0, bicarbonate 28, calcium 8.9, albumin 4.2, alkaline phosphatase 69, AST 18, ALT 12 (8/13/2018) rheumatoid factor 233, CCP >300.  Chest CT scan (4/20/2025) Extensive beam hardening artifact from bilateral shoulder arthroplasty, left chest wall ICD, and postsurgical changes from valve repair and Bakari fundoplication.  Mild diffuse peribronchial thickening.  Bibasal atelectasis and trace bilateral pleural effusions.  Mild apical predominant paraseptal emphysema.  Cardio mems device in the left lower pulmonary artery.  Mild coronary artery calcifications.  Severe cardiomegaly involving four-chamber enlargement.  Changes of mitral valve repair and left atrial appendage closure device.  No pericardial effusion.  Multiple clips throughout the upper  abdomen.  Status post Nissen fundoplication and cholecystectomy, Watchman procedure.  DEXA bone density T-score (3/10/2025) (8/8/2022)  L1-L4............................................... 0.3................... 0.5  Left total femur......................... -0.9................... 0.1  Left femoral neck..................... -2.3................. -2.4  X-ray hands (5/13/2024) prior left metacarpal phalangeal joint procedure.  Degenerative changes with joint space narrowing in the interphalangeal joints bilaterally and first CMC joint bilaterally.  Impression: 74-year-old white female with history of atrial fibrillation, CCP and RF positive rheumatoid arthritis, positive HLA-B27, status post transcatheter mitral valve replacement for severe mitral regurgitation, she has extensive arthritis involving the DIP and PIP joints of the digits of both hands as well as extensive arthritis changes involving the lower cervical and thoracic spines.  She has normocytic anemia, osteopenia, coronary artery disease atrial fibrillation.  Changes in the spine are suspicious for possible DISH.  She has mildly elevated serum creatinine.  She has history of side effects with methotrexate and abatacept.  Plan: She is to continue sulfasalazine 1000 mg twice daily and hydroxychloroquine 200 mg tablet 1-1/2 tablets daily.  She is to have repeat laboratory for basic metabolic panel in 1 week to assess for any improvement in serum creatinine.  Consider biologic therapy with tocilizumab (Actemra) for rheumatoid arthritis or secukinumab (Cosentyx) for psoriatic arthritis.  She is to return for follow-up evaluation in 6 months.

## 2025-07-16 ENCOUNTER — OFFICE VISIT (OUTPATIENT)
Dept: HEMATOLOGY/ONCOLOGY | Facility: CLINIC | Age: 75
End: 2025-07-16
Payer: MEDICARE

## 2025-07-16 ENCOUNTER — INFUSION (OUTPATIENT)
Dept: HEMATOLOGY/ONCOLOGY | Facility: CLINIC | Age: 75
End: 2025-07-16
Payer: MEDICARE

## 2025-07-16 VITALS
DIASTOLIC BLOOD PRESSURE: 67 MMHG | OXYGEN SATURATION: 95 % | HEART RATE: 60 BPM | SYSTOLIC BLOOD PRESSURE: 114 MMHG | RESPIRATION RATE: 16 BRPM | TEMPERATURE: 96.8 F

## 2025-07-16 VITALS
SYSTOLIC BLOOD PRESSURE: 123 MMHG | WEIGHT: 158.07 LBS | TEMPERATURE: 97.2 F | DIASTOLIC BLOOD PRESSURE: 72 MMHG | OXYGEN SATURATION: 96 % | RESPIRATION RATE: 16 BRPM | BODY MASS INDEX: 27.13 KG/M2 | HEART RATE: 60 BPM

## 2025-07-16 DIAGNOSIS — K90.9 IDIOPATHIC STEATORRHEA (HHS-HCC): ICD-10-CM

## 2025-07-16 DIAGNOSIS — D50.8 IRON DEFICIENCY ANEMIA SECONDARY TO INADEQUATE DIETARY IRON INTAKE: ICD-10-CM

## 2025-07-16 DIAGNOSIS — D50.8 IRON DEFICIENCY ANEMIA SECONDARY TO INADEQUATE DIETARY IRON INTAKE: Primary | ICD-10-CM

## 2025-07-16 LAB
ANION GAP SERPL CALC-SCNC: 13 MMOL/L (ref 10–20)
BUN SERPL-MCNC: 24 MG/DL (ref 6–23)
CALCIUM SERPL-MCNC: 9.5 MG/DL (ref 8.6–10.3)
CHLORIDE SERPL-SCNC: 99 MMOL/L (ref 98–107)
CO2 SERPL-SCNC: 33 MMOL/L (ref 21–32)
CREAT SERPL-MCNC: 1.21 MG/DL (ref 0.5–1.05)
EGFRCR SERPLBLD CKD-EPI 2021: 47 ML/MIN/1.73M*2
GLUCOSE SERPL-MCNC: 93 MG/DL (ref 74–99)
POTASSIUM SERPL-SCNC: 4.5 MMOL/L (ref 3.5–5.3)
SODIUM SERPL-SCNC: 140 MMOL/L (ref 136–145)

## 2025-07-16 PROCEDURE — 36415 COLL VENOUS BLD VENIPUNCTURE: CPT | Performed by: INTERNAL MEDICINE

## 2025-07-16 PROCEDURE — 99214 OFFICE O/P EST MOD 30 MIN: CPT | Mod: 25 | Performed by: PHYSICIAN ASSISTANT

## 2025-07-16 PROCEDURE — 80048 BASIC METABOLIC PNL TOTAL CA: CPT | Performed by: INTERNAL MEDICINE

## 2025-07-16 PROCEDURE — 96365 THER/PROPH/DIAG IV INF INIT: CPT | Mod: INF

## 2025-07-16 PROCEDURE — 96375 TX/PRO/DX INJ NEW DRUG ADDON: CPT | Mod: INF

## 2025-07-16 PROCEDURE — 2500000004 HC RX 250 GENERAL PHARMACY W/ HCPCS (ALT 636 FOR OP/ED): Performed by: PHYSICIAN ASSISTANT

## 2025-07-16 RX ORDER — ONDANSETRON HYDROCHLORIDE 2 MG/ML
8 INJECTION, SOLUTION INTRAVENOUS ONCE
OUTPATIENT
Start: 2025-07-19 | End: 2025-07-19

## 2025-07-16 RX ORDER — FAMOTIDINE 10 MG/ML
40 INJECTION, SOLUTION INTRAVENOUS ONCE
Status: CANCELLED | OUTPATIENT
Start: 2025-07-16 | End: 2025-07-16

## 2025-07-16 RX ORDER — FAMOTIDINE 10 MG/ML
20 INJECTION, SOLUTION INTRAVENOUS ONCE AS NEEDED
OUTPATIENT
Start: 2025-07-19

## 2025-07-16 RX ORDER — ONDANSETRON HYDROCHLORIDE 2 MG/ML
8 INJECTION, SOLUTION INTRAVENOUS ONCE
Status: CANCELLED | OUTPATIENT
Start: 2025-07-16 | End: 2025-07-16

## 2025-07-16 RX ORDER — ONDANSETRON HYDROCHLORIDE 2 MG/ML
8 INJECTION, SOLUTION INTRAVENOUS ONCE
Status: COMPLETED | OUTPATIENT
Start: 2025-07-16 | End: 2025-07-16

## 2025-07-16 RX ORDER — EPINEPHRINE 0.3 MG/.3ML
0.3 INJECTION SUBCUTANEOUS EVERY 5 MIN PRN
Status: DISCONTINUED | OUTPATIENT
Start: 2025-07-16 | End: 2025-07-16 | Stop reason: HOSPADM

## 2025-07-16 RX ORDER — DIPHENHYDRAMINE HYDROCHLORIDE 50 MG/ML
50 INJECTION, SOLUTION INTRAMUSCULAR; INTRAVENOUS AS NEEDED
Status: DISCONTINUED | OUTPATIENT
Start: 2025-07-16 | End: 2025-07-16 | Stop reason: HOSPADM

## 2025-07-16 RX ORDER — FAMOTIDINE 10 MG/ML
40 INJECTION, SOLUTION INTRAVENOUS ONCE
Status: COMPLETED | OUTPATIENT
Start: 2025-07-16 | End: 2025-07-16

## 2025-07-16 RX ORDER — FAMOTIDINE 10 MG/ML
40 INJECTION, SOLUTION INTRAVENOUS ONCE
OUTPATIENT
Start: 2025-07-19 | End: 2025-07-19

## 2025-07-16 RX ORDER — DIPHENHYDRAMINE HYDROCHLORIDE 50 MG/ML
50 INJECTION, SOLUTION INTRAMUSCULAR; INTRAVENOUS AS NEEDED
OUTPATIENT
Start: 2025-07-19

## 2025-07-16 RX ORDER — ALBUTEROL SULFATE 0.83 MG/ML
3 SOLUTION RESPIRATORY (INHALATION) AS NEEDED
Status: DISCONTINUED | OUTPATIENT
Start: 2025-07-16 | End: 2025-07-16 | Stop reason: HOSPADM

## 2025-07-16 RX ORDER — EPINEPHRINE 0.3 MG/.3ML
0.3 INJECTION SUBCUTANEOUS EVERY 5 MIN PRN
OUTPATIENT
Start: 2025-07-19

## 2025-07-16 RX ORDER — FAMOTIDINE 10 MG/ML
20 INJECTION, SOLUTION INTRAVENOUS ONCE AS NEEDED
Status: DISCONTINUED | OUTPATIENT
Start: 2025-07-16 | End: 2025-07-16 | Stop reason: HOSPADM

## 2025-07-16 RX ORDER — ALBUTEROL SULFATE 0.83 MG/ML
3 SOLUTION RESPIRATORY (INHALATION) AS NEEDED
OUTPATIENT
Start: 2025-07-19

## 2025-07-16 RX ADMIN — ONDANSETRON 8 MG: 2 INJECTION INTRAMUSCULAR; INTRAVENOUS at 09:36

## 2025-07-16 RX ADMIN — FAMOTIDINE 40 MG: 10 INJECTION, SOLUTION INTRAVENOUS at 09:37

## 2025-07-16 RX ADMIN — IRON SUCROSE 300 MG: 20 INJECTION, SOLUTION INTRAVENOUS at 10:00

## 2025-07-16 ASSESSMENT — PAIN SCALES - GENERAL: PAINLEVEL_OUTOF10: 9

## 2025-07-16 NOTE — PROGRESS NOTES
Patient arrived for venofer infusion,  saw provider prior. Patient tolerated infusion without incident. Patient was monitored for 30 minutes post infusion, VS obtained and stable. Reviewed treatment schedule with patient who verbalized understanding. Trina Eliasdenied any questions or concerns upon discharge.

## 2025-07-16 NOTE — PROGRESS NOTES
"Reason for Visit  Trina SALDANA Via \"Marcela\" is a 74 y.o. female with PMH s/f CKD, PUD, GERD s/p nissen fundoplication referred by Dr. Elias for GRACIE.    Upon review of labs, noted to GRACIE since 2022. Patient received Venofer 200 mg x 3 doses in 9/2024 with good response but continues to have GRACIE. S/P EGD and c-scope in 2/2024, noted to have diverticula otherwise unremarkable.     On assessment, recently had TMVR for severe mitral regurgitation in 4/2025. Notes improvement in DOSHI/SOB but continues to be severely fatigued. Of note, she is on Coumadin and Plavix, denies bleeding from any source. She is on 3 medications for GERD including PPI and Carafate. Otherwise doing well.    PMH/PSH: atrial fibrillation s/p LAAO, VT/VF s/p ICD, Hyperlipidemia , CAD, PUD, SBO s/p lysis of adhesions, GERD s/p nissen fundoplication, provoked DVT 2018, Rheumatoid arthritis, psoriasis, asthma, chronic anemia, severe MR, caridoMEMs implant and CKD, provoked DVT 2018, psoriasis, asthma, CTS, cholecystectomy, appendectomy, b/l shoulder replacements, tarsal surgery  FH: father-colon cancer, brother with throat cancer, sister with ovarian.  Soc Hx: denies smoking, ETOH, illicit drugs; [place], , 3 kids    History of Present Illness:  S/P 3 doses of Venofer 300 mg, hd some nausea with it so premedicated with Zofran and Pepcid IV and last dose given was 6/2/2024. Reports having more energy and stamina but feels like she's getting fatigued again. Otherwise doing well.    Review of Systems: All of the systems have been reviewed and are negative for complaints except what is stated in the HPI and/or past medical history.    Allergies and Intolerances:  Allergies[1]         Outpatient Medication Profile:  Current Medications[2]     Vitals and Measurements:       5/27/2025     1:02 PM 5/27/2025     3:55 PM 5/28/2025    10:28 AM 6/2/2025    10:09 AM 6/2/2025     1:30 PM 7/14/2025     3:15 PM 7/16/2025     8:31 AM   Vitals   Systolic 125 126 105 131 " "108 116 123   Diastolic 70 74 66 71 48 71 72   BP Location Right arm  Left arm Right arm  Right arm Right arm   Heart Rate 75 73 75 71 74 67 60   Temp 36 °C (96.8 °F) 36.9 °C (98.4 °F)  36.4 °C (97.5 °F)  36.8 °C (98.2 °F) 36.2 °C (97.2 °F)   Resp 16   16   16   Height   1.626 m (5' 4\")   1.626 m (5' 4\")    Weight (lb) 159.4  160 157.19  161 158.07   BMI 29.52 kg/m2  27.46 kg/m2 26.98 kg/m2  27.64 kg/m2 27.13 kg/m2   BSA (m2) 1.77 m2  1.81 m2 1.79 m2  1.82 m2 1.8 m2   Visit Report   Report   Report Report     Physical Exam:   Constitutional: alert, awake and oriented, not in acute distress   HEENT: moist mucous membranes, normal nose   Neck: supple, no lymphadenopathy   EYES: PERRL, EOM intact, conjunctiva normal  Skin: no jaundice, rash or erythema  Neurological: AAOx3, no gross focal deficit   Psychiatric: normal mood and behavior     Lab Results:  Labs:  Lab Results   Component Value Date    WBC 5.6 07/14/2025    NEUTROABS 3.54 05/06/2025    IGABSOL 0.01 05/06/2025    LYMPHSABS 1.23 05/06/2025    MONOSABS 0.58 05/06/2025    EOSABS 0.08 05/06/2025    BASOSABS 0.04 05/06/2025    RBC 4.50 07/14/2025    MCV 85 07/14/2025    MCHC 31.5 (L) 07/14/2025    HGB 12.0 07/14/2025    HCT 38.1 07/14/2025     07/14/2025     Lab Results   Component Value Date    RETICCTPCT 1.4 05/06/2025      Lab Results   Component Value Date    CREATININE 1.16 (H) 05/06/2025    BUN 20 05/06/2025    EGFR 50 (L) 05/06/2025     05/06/2025    K 4.0 05/06/2025     05/06/2025    CO2 28 05/06/2025      Lab Results   Component Value Date    ALT 12 05/06/2025    AST 18 05/06/2025    ALKPHOS 69 05/06/2025    BILITOT 0.5 05/06/2025      Lab Results   Component Value Date    TSH 0.92 04/15/2024     Lab Results   Component Value Date    TSH 0.92 04/15/2024     Lab Results   Component Value Date    IRON 29 (L) 07/14/2025    TIBC 314 07/14/2025    FERRITIN 94 07/14/2025      Lab Results   Component Value Date    WRHDUEYC36 453 07/14/2025    "   Lab Results   Component Value Date    FOLATE 17.7 05/06/2025     Lab Results   Component Value Date     (H) 08/13/2018    SEDRATE 19 02/04/2025      Lab Results   Component Value Date    CRP 8.5 (H) 02/04/2025      Lab Results   Component Value Date     (H) 05/06/2025     Lab Results   Component Value Date    HAPTOGLOBIN 182 05/06/2025     Assessment:    74 y.o. female with PMH s/f RA, CKD, PUD, GERD s/p nissen fundoplication referred for GRACIE.    S/p TMVR for severe mitral regurgitation in 4/2025    Discussed malabsorption as likely cause of GRACIE  Plan:    Reviewed and discussed lab, imaging, and pathology results with patient in detail as well as diagnosis, prognosis, and treatment options.    Continue with Venofer 300 mg x 3 doses with premeds (Pepcid 40 mg and Zofran 8 mg IV)    Continue monthly Vitamin B12 injections; recommend sublingual vitamin B12 as well.     Discussed role of BMBx    F/U w/PCP and cards    RTC in 4 months    Patient verbalized understanding, and all her questions were answered to her satisfaction    30 min spent with patient greater than 50 % of which was spent in consultation and coordination of care.         [1]   Allergies  Allergen Reactions    Abatacept Shortness of breath     pulmonary edema, diarrhea, nausea    Beta-Blockers (Beta-Adrenergic Blocking Agts) Anaphylaxis    Hydrocodone-Acetaminophen Anaphylaxis    Hydromorphone Anaphylaxis    Metoprolol Anaphylaxis    Penicillins Hives    Pregabalin Shortness of breath     caused pulmonary edema    Prochlorperazine Itching     paralysis of the mouth with drooping    Methotrexate Hives and Itching     chest pain    Aripiprazole Rash    Bupropion Nausea/vomiting    Cefepime Itching    Ciprofloxacin Hives    Furosemide Itching    Levofloxacin Itching    Pineapple Rash   [2]   Current Outpatient Medications   Medication Sig Dispense Refill    acetaminophen (Tylenol) 325 mg tablet Take 2 tablets (650 mg) by mouth every 6 hours  "if needed for moderate pain (4 - 6), mild pain (1 - 3), headaches or fever (temp greater than 38.0 C).      baclofen (Lioresal) 10 mg tablet One tablet in the morning and 2 tablets in the evening. 270 tablet 3    bumetanide (Bumex) 2 mg tablet Take 1 tablet (2 mg) by mouth 2 times daily (morning and late afternoon).      clobetasol (Temovate) 0.05 % cream Apply topically 2 times a day. 60 g 3    clopidogrel (Plavix) 75 mg tablet Take 1 tablet (75 mg) by mouth once daily. 90 tablet 3    cyanocobalamin (Vitamin B-12) 1,000 mcg/mL injection Inject 1 mL (1,000 mcg) into the muscle every 30 (thirty) days. 10 mL 1    diclofenac sodium (Voltaren) 1 % gel Apply 4.5 inches (4 g) topically 2 times a day as needed (joint pain). 450 g 1    digoxin (Lanoxin) 125 MCG tablet Take 1 tablet (125 mcg) by mouth once daily.      EPINEPHrine 0.3 mg/0.3 mL injection syringe Inject 0.3 mL (0.3 mg) into the muscle 1 time if needed for anaphylaxis. 2 each 1    fluticasone propion-salmeteroL (Advair HFA) 45-21 mcg/actuation inhaler Inhale 2 puffs 2 times a day. Rinse mouth with water after use to reduce aftertaste and incidence of candidiasis. Do not swallow. 36 g 3    hydroxychloroquine (Plaquenil) 200 mg tablet Take 1.5 tablets (300 mg) by mouth once daily. 135 tablet 3    insulin syringe-needle U-100 (BD Insulin Syringe Ultra-Fine) 1 mL 30 gauge x 1/2\" syringe Use one a month 10 each 1    ipratropium-albuteroL (Combivent Respimat)  mcg/actuation inhaler Inhale 2 puffs 4 times a day. 36 g 3    ipratropium-albuteroL (Duo-Neb) 0.5-2.5 mg/3 mL nebulizer solution Take 3 mL by nebulization 4 times a day as needed for wheezing or shortness of breath. 360 mL 11    magnesium oxide (Mag-Ox) 400 mg tablet Take 1 tablet (400 mg) by mouth once daily. 30 tablet 2    montelukast (Singulair) 10 mg tablet Take 1 tablet (10 mg) by mouth once daily at bedtime. 90 tablet 3    nitroglycerin (Nitrostat) 0.4 mg SL tablet Place 1 tablet (0.4 mg) under the " tongue every 5 minutes if needed for chest pain. 40 tablet 3    ondansetron (Zofran) 8 mg tablet Take 1 tablet (8 mg) by mouth every 8 hours if needed for nausea or vomiting. 20 tablet 3    pantoprazole (ProtoNix) 40 mg EC tablet Take 1 tablet (40 mg) by mouth 2 times a day. 180 tablet 3    potassium chloride CR (Klor-Con M20) 20 mEq ER tablet Take 1 tablet (20 mEq) by mouth once daily. Do not crush or chew. 30 tablet 2    rivaroxaban (Xarelto) 20 mg tablet Take by mouth. Take with food.      rosuvastatin (Crestor) 20 mg tablet Take 1 tablet (20 mg) by mouth once daily. 90 tablet 3    sucralfate (Carafate) 100 mg/mL suspension Take 10 mL (1 g) by mouth 4 times a day with meals. 3600 mL 3    sulfaSALAzine (Azulfidine) 500 mg tablet Take 2 tablets (1,000 mg) by mouth 2 times a day. 360 tablet 2    sacubitriL-valsartan (Entresto) 24-26 mg tablet Take 0.5 tablets by mouth 2 times a day. Holding currently d/t hypotension 90 tablet 1     No current facility-administered medications for this visit.

## 2025-07-21 ENCOUNTER — HOSPITAL ENCOUNTER (OUTPATIENT)
Dept: CARDIOLOGY | Facility: CLINIC | Age: 75
Discharge: HOME | End: 2025-07-21
Payer: MEDICARE

## 2025-07-21 DIAGNOSIS — I47.29 OTHER VENTRICULAR TACHYCARDIA: ICD-10-CM

## 2025-07-21 DIAGNOSIS — Z95.810 PRESENCE OF AUTOMATIC (IMPLANTABLE) CARDIAC DEFIBRILLATOR: ICD-10-CM

## 2025-07-21 PROCEDURE — 93296 REM INTERROG EVL PM/IDS: CPT

## 2025-07-24 ENCOUNTER — TELEPHONE (OUTPATIENT)
Dept: HEMATOLOGY/ONCOLOGY | Facility: CLINIC | Age: 75
End: 2025-07-24
Payer: MEDICARE

## 2025-07-24 DIAGNOSIS — E53.8 VITAMIN B12 DEFICIENCY: ICD-10-CM

## 2025-07-24 RX ORDER — CYANOCOBALAMIN 1000 UG/ML
1000 INJECTION, SOLUTION INTRAMUSCULAR; SUBCUTANEOUS
Qty: 10 ML | Refills: 3 | Status: SHIPPED | OUTPATIENT
Start: 2025-07-24

## 2025-07-24 NOTE — TELEPHONE ENCOUNTER
Reason for Conversation  medication    Background   Calling to ask for B-12 prescription to be sent to [facility] in [place]    Disposition   No disposition on file.

## 2025-07-25 ENCOUNTER — APPOINTMENT (OUTPATIENT)
Dept: HEMATOLOGY/ONCOLOGY | Facility: CLINIC | Age: 75
End: 2025-07-25
Payer: MEDICARE

## 2025-07-28 ENCOUNTER — INFUSION (OUTPATIENT)
Dept: HEMATOLOGY/ONCOLOGY | Facility: CLINIC | Age: 75
End: 2025-07-28
Payer: MEDICARE

## 2025-07-28 VITALS
RESPIRATION RATE: 16 BRPM | SYSTOLIC BLOOD PRESSURE: 118 MMHG | TEMPERATURE: 96.8 F | BODY MASS INDEX: 27.28 KG/M2 | DIASTOLIC BLOOD PRESSURE: 67 MMHG | HEART RATE: 64 BPM | OXYGEN SATURATION: 97 % | WEIGHT: 158.95 LBS

## 2025-07-28 DIAGNOSIS — K90.9 IDIOPATHIC STEATORRHEA (HHS-HCC): Primary | ICD-10-CM

## 2025-07-28 DIAGNOSIS — D50.8 IRON DEFICIENCY ANEMIA SECONDARY TO INADEQUATE DIETARY IRON INTAKE: ICD-10-CM

## 2025-07-28 DIAGNOSIS — K90.9 IDIOPATHIC STEATORRHEA (HHS-HCC): ICD-10-CM

## 2025-07-28 PROCEDURE — 2500000004 HC RX 250 GENERAL PHARMACY W/ HCPCS (ALT 636 FOR OP/ED): Performed by: PHYSICIAN ASSISTANT

## 2025-07-28 PROCEDURE — 96375 TX/PRO/DX INJ NEW DRUG ADDON: CPT | Mod: INF

## 2025-07-28 PROCEDURE — 96365 THER/PROPH/DIAG IV INF INIT: CPT | Mod: INF

## 2025-07-28 RX ORDER — FAMOTIDINE 10 MG/ML
20 INJECTION, SOLUTION INTRAVENOUS ONCE AS NEEDED
Status: DISCONTINUED | OUTPATIENT
Start: 2025-07-28 | End: 2025-07-28 | Stop reason: HOSPADM

## 2025-07-28 RX ORDER — ALBUTEROL SULFATE 0.83 MG/ML
3 SOLUTION RESPIRATORY (INHALATION) AS NEEDED
OUTPATIENT
Start: 2025-07-31

## 2025-07-28 RX ORDER — ALBUTEROL SULFATE 0.83 MG/ML
3 SOLUTION RESPIRATORY (INHALATION) AS NEEDED
Status: DISCONTINUED | OUTPATIENT
Start: 2025-07-28 | End: 2025-07-28 | Stop reason: HOSPADM

## 2025-07-28 RX ORDER — ONDANSETRON HYDROCHLORIDE 2 MG/ML
8 INJECTION, SOLUTION INTRAVENOUS ONCE
Status: COMPLETED | OUTPATIENT
Start: 2025-07-28 | End: 2025-07-28

## 2025-07-28 RX ORDER — FAMOTIDINE 10 MG/ML
40 INJECTION, SOLUTION INTRAVENOUS ONCE
OUTPATIENT
Start: 2025-07-31 | End: 2025-07-31

## 2025-07-28 RX ORDER — FAMOTIDINE 10 MG/ML
40 INJECTION, SOLUTION INTRAVENOUS ONCE
Status: COMPLETED | OUTPATIENT
Start: 2025-07-28 | End: 2025-07-28

## 2025-07-28 RX ORDER — ONDANSETRON HYDROCHLORIDE 2 MG/ML
8 INJECTION, SOLUTION INTRAVENOUS ONCE
OUTPATIENT
Start: 2025-07-31 | End: 2025-07-31

## 2025-07-28 RX ORDER — FAMOTIDINE 10 MG/ML
20 INJECTION, SOLUTION INTRAVENOUS ONCE AS NEEDED
OUTPATIENT
Start: 2025-07-31

## 2025-07-28 RX ORDER — EPINEPHRINE 0.3 MG/.3ML
0.3 INJECTION SUBCUTANEOUS EVERY 5 MIN PRN
OUTPATIENT
Start: 2025-07-31

## 2025-07-28 RX ORDER — EPINEPHRINE 0.3 MG/.3ML
0.3 INJECTION SUBCUTANEOUS EVERY 5 MIN PRN
Status: DISCONTINUED | OUTPATIENT
Start: 2025-07-28 | End: 2025-07-28 | Stop reason: HOSPADM

## 2025-07-28 RX ORDER — DIPHENHYDRAMINE HYDROCHLORIDE 50 MG/ML
50 INJECTION, SOLUTION INTRAMUSCULAR; INTRAVENOUS AS NEEDED
Status: DISCONTINUED | OUTPATIENT
Start: 2025-07-28 | End: 2025-07-28 | Stop reason: HOSPADM

## 2025-07-28 RX ORDER — MAGNESIUM 200 MG
1 TABLET ORAL DAILY
Qty: 90 TABLET | Refills: 1 | Status: SHIPPED | OUTPATIENT
Start: 2025-07-28

## 2025-07-28 RX ORDER — DIPHENHYDRAMINE HYDROCHLORIDE 50 MG/ML
50 INJECTION, SOLUTION INTRAMUSCULAR; INTRAVENOUS AS NEEDED
OUTPATIENT
Start: 2025-07-31

## 2025-07-28 RX ADMIN — ONDANSETRON 8 MG: 2 INJECTION INTRAMUSCULAR; INTRAVENOUS at 12:13

## 2025-07-28 RX ADMIN — FAMOTIDINE 40 MG: 10 INJECTION, SOLUTION INTRAVENOUS at 12:13

## 2025-07-28 RX ADMIN — IRON SUCROSE 300 MG: 20 INJECTION, SOLUTION INTRAVENOUS at 12:43

## 2025-07-28 ASSESSMENT — PAIN SCALES - GENERAL: PAINLEVEL_OUTOF10: 0-NO PAIN

## 2025-07-28 NOTE — PROGRESS NOTES
Trina Elias self ambulated to [facility] for Venofer .  Pt tolerated treatment without incident.  Gait steady upon DC home.  Trina Elias denies further questions/concerns/comments and agrees to POC going forward.

## 2025-08-04 ENCOUNTER — INFUSION (OUTPATIENT)
Dept: HEMATOLOGY/ONCOLOGY | Facility: CLINIC | Age: 75
End: 2025-08-04
Payer: MEDICARE

## 2025-08-04 VITALS
BODY MASS INDEX: 27.85 KG/M2 | WEIGHT: 162.26 LBS | TEMPERATURE: 97.5 F | RESPIRATION RATE: 18 BRPM | SYSTOLIC BLOOD PRESSURE: 133 MMHG | HEART RATE: 65 BPM | OXYGEN SATURATION: 96 % | DIASTOLIC BLOOD PRESSURE: 54 MMHG

## 2025-08-04 DIAGNOSIS — K90.9 IDIOPATHIC STEATORRHEA (HHS-HCC): ICD-10-CM

## 2025-08-04 DIAGNOSIS — D50.8 IRON DEFICIENCY ANEMIA SECONDARY TO INADEQUATE DIETARY IRON INTAKE: ICD-10-CM

## 2025-08-04 PROCEDURE — 96375 TX/PRO/DX INJ NEW DRUG ADDON: CPT | Mod: INF

## 2025-08-04 PROCEDURE — 2500000004 HC RX 250 GENERAL PHARMACY W/ HCPCS (ALT 636 FOR OP/ED): Performed by: PHYSICIAN ASSISTANT

## 2025-08-04 PROCEDURE — 96365 THER/PROPH/DIAG IV INF INIT: CPT | Mod: INF

## 2025-08-04 RX ORDER — ALBUTEROL SULFATE 0.83 MG/ML
3 SOLUTION RESPIRATORY (INHALATION) AS NEEDED
OUTPATIENT
Start: 2025-08-06

## 2025-08-04 RX ORDER — DIPHENHYDRAMINE HYDROCHLORIDE 50 MG/ML
50 INJECTION, SOLUTION INTRAMUSCULAR; INTRAVENOUS AS NEEDED
OUTPATIENT
Start: 2025-08-06

## 2025-08-04 RX ORDER — FAMOTIDINE 10 MG/ML
40 INJECTION, SOLUTION INTRAVENOUS ONCE
Status: COMPLETED | OUTPATIENT
Start: 2025-08-04 | End: 2025-08-04

## 2025-08-04 RX ORDER — FAMOTIDINE 10 MG/ML
20 INJECTION, SOLUTION INTRAVENOUS ONCE AS NEEDED
OUTPATIENT
Start: 2025-08-06

## 2025-08-04 RX ORDER — ONDANSETRON HYDROCHLORIDE 2 MG/ML
8 INJECTION, SOLUTION INTRAVENOUS ONCE
OUTPATIENT
Start: 2025-08-06 | End: 2025-08-06

## 2025-08-04 RX ORDER — FAMOTIDINE 10 MG/ML
40 INJECTION, SOLUTION INTRAVENOUS ONCE
OUTPATIENT
Start: 2025-08-06 | End: 2025-08-06

## 2025-08-04 RX ORDER — FAMOTIDINE 10 MG/ML
20 INJECTION, SOLUTION INTRAVENOUS ONCE AS NEEDED
Status: DISCONTINUED | OUTPATIENT
Start: 2025-08-04 | End: 2025-08-04 | Stop reason: HOSPADM

## 2025-08-04 RX ORDER — EPINEPHRINE 0.3 MG/.3ML
0.3 INJECTION SUBCUTANEOUS EVERY 5 MIN PRN
OUTPATIENT
Start: 2025-08-06

## 2025-08-04 RX ORDER — ONDANSETRON HYDROCHLORIDE 2 MG/ML
8 INJECTION, SOLUTION INTRAVENOUS ONCE
Status: COMPLETED | OUTPATIENT
Start: 2025-08-04 | End: 2025-08-04

## 2025-08-04 RX ORDER — DIPHENHYDRAMINE HYDROCHLORIDE 50 MG/ML
50 INJECTION, SOLUTION INTRAMUSCULAR; INTRAVENOUS AS NEEDED
Status: DISCONTINUED | OUTPATIENT
Start: 2025-08-04 | End: 2025-08-04 | Stop reason: HOSPADM

## 2025-08-04 RX ORDER — ALBUTEROL SULFATE 0.83 MG/ML
3 SOLUTION RESPIRATORY (INHALATION) AS NEEDED
Status: DISCONTINUED | OUTPATIENT
Start: 2025-08-04 | End: 2025-08-04 | Stop reason: HOSPADM

## 2025-08-04 RX ORDER — EPINEPHRINE 0.3 MG/.3ML
0.3 INJECTION SUBCUTANEOUS EVERY 5 MIN PRN
Status: DISCONTINUED | OUTPATIENT
Start: 2025-08-04 | End: 2025-08-04 | Stop reason: HOSPADM

## 2025-08-04 RX ORDER — ONDANSETRON HYDROCHLORIDE 2 MG/ML
8 INJECTION, SOLUTION INTRAVENOUS ONCE
Status: CANCELLED | OUTPATIENT
Start: 2025-08-06 | End: 2025-08-06

## 2025-08-04 RX ORDER — FAMOTIDINE 10 MG/ML
40 INJECTION, SOLUTION INTRAVENOUS ONCE
Status: CANCELLED | OUTPATIENT
Start: 2025-08-06 | End: 2025-08-06

## 2025-08-04 RX ADMIN — FAMOTIDINE 40 MG: 10 INJECTION INTRAVENOUS at 11:27

## 2025-08-04 RX ADMIN — IRON SUCROSE 300 MG: 20 INJECTION, SOLUTION INTRAVENOUS at 12:14

## 2025-08-04 RX ADMIN — ONDANSETRON 8 MG: 2 INJECTION INTRAMUSCULAR; INTRAVENOUS at 11:28

## 2025-08-04 ASSESSMENT — PAIN SCALES - GENERAL: PAINLEVEL_OUTOF10: 6

## 2025-08-04 NOTE — PROGRESS NOTES
Patient tolerated Venofer infusion. Lab results and schedule were reviewed with patient and she verbalized understanding. Patient appeared to be stable after 30 minute post observation period and left via ambulatory.

## 2025-08-04 NOTE — PROGRESS NOTES
Education Documentation  Returning to Work/School/Activities, taught by Christina Vaughn RN at 8/4/2025  1:56 PM.  Learner: Patient  Readiness: Acceptance  Method: Explanation  Response: Verbalizes Understanding    Stress Management, taught by Christina Vaughn RN at 8/4/2025  1:56 PM.  Learner: Patient  Readiness: Acceptance  Method: Explanation  Response: Verbalizes Understanding    Changing Role with Self and Family, taught by Christina Vaughn RN at 8/4/2025  1:56 PM.  Learner: Patient  Readiness: Acceptance  Method: Explanation  Response: Verbalizes Understanding    Leisure Education, taught by Christina Vaughn RN at 8/4/2025  1:56 PM.  Learner: Patient  Readiness: Acceptance  Method: Explanation  Response: Verbalizes Understanding    Healthy Lifestyle, taught by Christina Vaughn RN at 8/4/2025  1:56 PM.  Learner: Patient  Readiness: Acceptance  Method: Explanation  Response: Verbalizes Understanding    Monitoring Weight, taught by Christina Vaughn RN at 8/4/2025  1:56 PM.  Learner: Patient  Readiness: Acceptance  Method: Explanation  Response: Verbalizes Understanding    Tips for Daily Living, taught by Chirstina Vaughn RN at 8/4/2025  1:56 PM.  Learner: Patient  Readiness: Acceptance  Method: Explanation  Response: Verbalizes Understanding    Nutrition/Diet, taught by Christina Vaughn RN at 8/4/2025  1:56 PM.  Learner: Patient  Readiness: Acceptance  Method: Explanation  Response: Verbalizes Understanding    Food-Drug Interactions, taught by Christina Vaughn RN at 8/4/2025  1:56 PM.  Learner: Patient  Readiness: Acceptance  Method: Explanation  Response: Verbalizes Understanding    Nutrition Related Education, taught by Christina Vaughn RN at 8/4/2025  1:56 PM.  Learner: Patient  Readiness: Acceptance  Method: Explanation  Response: Verbalizes Understanding    Pain Medication Actions & Side Effects, taught by Christina Vaughn RN at 8/4/2025  1:56 PM.  Learner: Patient  Readiness: Acceptance  Method: Explanation  Response: Verbalizes  Understanding    Patient Controlled Analgesia, taught by Christina Vaughn RN at 8/4/2025  1:56 PM.  Learner: Patient  Readiness: Acceptance  Method: Explanation  Response: Verbalizes Understanding    Discuss the Use of Pain Control Measures Before Pain Becomes Severe, taught by Christina Vaughn RN at 8/4/2025  1:56 PM.  Learner: Patient  Readiness: Acceptance  Method: Explanation  Response: Verbalizes Understanding    Pain Management, taught by Christina Vaughn RN at 8/4/2025  1:56 PM.  Learner: Patient  Readiness: Acceptance  Method: Explanation  Response: Verbalizes Understanding    Pain Rating Scale, taught by Christina Vaughn RN at 8/4/2025  1:56 PM.  Learner: Patient  Readiness: Acceptance  Method: Explanation  Response: Verbalizes Understanding    Constipation, taught by Christina Vaughn RN at 8/4/2025  1:56 PM.  Learner: Patient  Readiness: Acceptance  Method: Explanation  Response: Verbalizes Understanding    Nausea Management, taught by Christina Vaughn RN at 8/4/2025  1:56 PM.  Learner: Patient  Readiness: Acceptance  Method: Explanation  Response: Verbalizes Understanding    Fatigue, taught by Christina Vaughn RN at 8/4/2025  1:56 PM.  Learner: Patient  Readiness: Acceptance  Method: Explanation  Response: Verbalizes Understanding    Venofer, taught by Christina Vaughn RN at 8/4/2025  1:56 PM.  Learner: Patient  Readiness: Acceptance  Method: Explanation  Response: Verbalizes Understanding    Treatment Plan and Schedule, taught by Christina Vaughn RN at 8/4/2025  1:56 PM.  Learner: Patient  Readiness: Acceptance  Method: Explanation  Response: Verbalizes Understanding    General Medication Information, taught by Christina Vaughn RN at 8/4/2025  1:56 PM.  Learner: Patient  Readiness: Acceptance  Method: Explanation  Response: Verbalizes Understanding    Supportive Medications, taught by Christina Vaughn RN at 8/4/2025  1:56 PM.  Learner: Patient  Readiness: Acceptance  Method: Explanation  Response: Verbalizes Understanding    Education Comments  No  comments found.

## 2025-08-07 ENCOUNTER — DOCUMENTATION (OUTPATIENT)
Dept: CARDIOLOGY | Facility: HOSPITAL | Age: 75
End: 2025-08-07
Payer: MEDICARE

## 2025-08-07 NOTE — PROGRESS NOTES
SUBJECT ID:  [id number]     VISIT DATE:  07 -Aug-2025       [facility]  [] In-Person  [] Mail  [x] Telephone    MEDICATIONS:    Antiarrhythmics (any)  []No [x]Yes    Anticoagulants  []No [x]Yes    Apixaban  [x]No []Yes    Edoxaban  [x]No []Yes    Rivaroxaban  []No [x]Yes    Warfarin  [x]No []Yes    Other, Specify       Antiplatelets  []No [x]Yes    Aspirin  [x]No []Yes    Clopidogrel  []No [x]Yes    Prasugrel  [x]No []Yes    Ticagrelor  [x]No []Yes    Vorapaxar  [x]No []Yes    Other, Specify

## 2025-08-25 ENCOUNTER — HOSPITAL ENCOUNTER (OUTPATIENT)
Dept: CARDIOLOGY | Facility: CLINIC | Age: 75
Discharge: HOME | End: 2025-08-25
Payer: MEDICARE

## 2025-08-25 DIAGNOSIS — I47.29 OTHER VENTRICULAR TACHYCARDIA: ICD-10-CM

## 2025-08-25 DIAGNOSIS — Z95.810 PRESENCE OF AUTOMATIC (IMPLANTABLE) CARDIAC DEFIBRILLATOR: ICD-10-CM

## 2025-08-26 DIAGNOSIS — D50.8 IRON DEFICIENCY ANEMIA SECONDARY TO INADEQUATE DIETARY IRON INTAKE: ICD-10-CM

## 2025-08-26 DIAGNOSIS — K90.9 IDIOPATHIC STEATORRHEA (HHS-HCC): Primary | ICD-10-CM

## 2025-09-01 LAB
ATRIAL RATE: 60 BPM
PR INTERVAL: 260 MS
Q ONSET: 217 MS
Q ONSET: 222 MS
QRS COUNT: 11 BEATS
QRS COUNT: 9 BEATS
QRS DURATION: 80 MS
QRS DURATION: 94 MS
QT INTERVAL: 392 MS
QT INTERVAL: 418 MS
QTC CALCULATION(BAZETT): 410 MS
QTC CALCULATION(BAZETT): 418 MS
QTC FREDERICIA: 405 MS
QTC FREDERICIA: 418 MS
R AXIS: 42 DEGREES
R AXIS: 51 DEGREES
T AXIS: 205 DEGREES
T AXIS: 65 DEGREES
T OFFSET: 418 MS
T OFFSET: 426 MS
VENTRICULAR RATE: 60 BPM
VENTRICULAR RATE: 66 BPM

## 2025-09-08 ENCOUNTER — APPOINTMENT (OUTPATIENT)
Dept: PRIMARY CARE | Facility: CLINIC | Age: 75
End: 2025-09-08
Payer: MEDICARE

## 2026-02-03 ENCOUNTER — APPOINTMENT (OUTPATIENT)
Dept: RHEUMATOLOGY | Facility: CLINIC | Age: 76
End: 2026-02-03
Payer: MEDICARE

## 2026-04-17 ENCOUNTER — APPOINTMENT (OUTPATIENT)
Dept: CARDIOLOGY | Facility: HOSPITAL | Age: 76
End: 2026-04-17
Payer: MEDICARE

## (undated) DEVICE — CATHETER, THERMODILUTION, 7FR X 110CM, MULTIFLEX

## (undated) DEVICE — CATHETER, THORACIC, STRAIGHT, ADULT, 28 FR, PVC

## (undated) DEVICE — SHEATH, PINNACLE, W/O GUIDEWIRE, 8FR INTRODUCER,  8FR DIA, 2.5 CM DILATOR

## (undated) DEVICE — DRESSING, MEPILEX, BORDER, SACRUM, 8.7 X 9.8 IN

## (undated) DEVICE — ACCESS KIT, MINI MAK, 4 FR X 10CM, 0.018 X 40CM, NITINOL/PLATINUM, STD NEEDLE

## (undated) DEVICE — PACING CABLE, EXTENSION, 12 FT BLUE, DISPOSABLE

## (undated) DEVICE — TOWEL, OR, XRAY DETECT 5 PK, WHITE, 17X26, W/DMT TAG, ST

## (undated) DEVICE — WIRE, NITINOL,.018 X 40CM, PLATINUM COIL

## (undated) DEVICE — ACCESS KIT, S-MAK MINI, 4FR 10CM 0.018IN 40CM, NT/PT, ECHO ENHANCE NEEDLE

## (undated) DEVICE — MASK, FACE, TENET, FOAM POSITIONING, DISPOSABLE

## (undated) DEVICE — SHEATH, PINNACLE, 10 CM,  7FR INTRODUCER, 7FR DIA, 2.5 CM DIALATOR

## (undated) DEVICE — CATHETER, THERMODILUTION, SWAN GANZ, 7 FR, 110CM, STANDARD

## (undated) DEVICE — Device

## (undated) DEVICE — SUTURE, NUROLON, 0, 18 IN, CT1, DETACHABLE, MULTIPACK, BLACK

## (undated) DEVICE — NEEDLE, ELECTRODE, ELECTROSURGICAL, INSULATED

## (undated) DEVICE — INTRODUCER, FAST-CATH, 8 FR X 12 CM, W/LUER-LOCK

## (undated) DEVICE — ANGIO KIT, LEFT HEART, LF, CUSTOM

## (undated) DEVICE — SPONGE, GAUZE, XRAY DECT, 16 PLY, 4 X 4, W/MASTER DMT,STERILE

## (undated) DEVICE — CATHETER, DRAINAGE, NASOGASTRIC, DOUBLE LUMEN, FUNNEL END, SUMP, SALEM, 18 FR, 48 IN, PVC, STERILE

## (undated) DEVICE — SUTURE, SILK, 5-0, 18 IN TF, BLACK

## (undated) DEVICE — CATHETER, DIAGNOSTIC, DXTERITY, 5FR PIG145, 110CM.6 SH

## (undated) DEVICE — LEAD, PACING, MYOCARDIAL, BIPOLAR, TEMPORARY

## (undated) DEVICE — KIT, TOURNIQUET, 7"

## (undated) DEVICE — ADAPTER, CORONARY, PERFUSION, Y, 34.3 CM

## (undated) DEVICE — SUTURE, CTD, VICRYL, 2-0, UND, BR, CT-2

## (undated) DEVICE — BLANKET, LOWER BODY, VHA PLUS, ADULT

## (undated) DEVICE — DRAPE, SHEET, FAN FOLDED, HALF, 44 X 58 IN, DISPOSABLE, LF, STERILE

## (undated) DEVICE — SPONGE, LAP, XRAY DECT, 18IN X 18IN, W/LOOP, STERILE

## (undated) DEVICE — DRAPE PACK, UNIVERSAL II

## (undated) DEVICE — CANNULA. CORONARY OSTIA, 20FR

## (undated) DEVICE — TUBING, SUCTION, 6MM X 10, CLEAN N-COND

## (undated) DEVICE — COLLECTION UNIT, DRAINAGE, THORACIC, SINGLE TUBE, DRY SUCTION, ATS COMPATIBLE, OASIS 3600, LF

## (undated) DEVICE — GOWN, SURGICAL, SMARTGOWN, XLARGE, STERILE

## (undated) DEVICE — CATHETER, ANGIO, IMPULSE, FL4, 5 FR X 100 CM

## (undated) DEVICE — DRAPE, ANGIOGRAPHY, FEMORAL, XLARGE

## (undated) DEVICE — SUTURE, VICRYL, 4-0, 18 IN, UNDYED BR PS-2

## (undated) DEVICE — CATHETER, ANGIO, IMPULSE, PIGTAIL, 6 FR X 110 CM

## (undated) DEVICE — SUTURE, SURGICAL STEEL, STERNUM 7, 18 IN, KV40, SINGL-WIRE

## (undated) DEVICE — CANNULA, RCSP, SELF-INFLATE, SHAPEABLE STYLET, 18 MM BALLOON, 14 FR X 27 CM

## (undated) DEVICE — DRESSING, MEPILEX FOAM BORDER AG, SILVER, 4 X 4

## (undated) DEVICE — TUBING, SMOKE EVAC, 3/8 X 10 FT

## (undated) DEVICE — SUTURE, PROLENE 4-0, TAPER POINT, SH-1 BLUE 30 INCH

## (undated) DEVICE — DRAPE, SHEET, CARDIOVASCULAR, ANTIMICROBIAL, W/ANESTHESIA SCREEN, IOBAN 2, STERI DRAPE, 107 X 133 IN, DISPOSABLE, FABRIC, BLUE, STERILE

## (undated) DEVICE — HOOD, SURGICAL, FLYTE HYBRID

## (undated) DEVICE — DRESSING, MEPILEX, BORDER, HEEL, 8.7 X 9.1 IN

## (undated) DEVICE — CATHETER, INFINITI DIAGNOSTIC, 5 FR 100CM 3DRC, WILLIAMS RIGHT OR NO TORQUE

## (undated) DEVICE — MANIFOLD, 4 PORT NEPTUNE STANDARD

## (undated) DEVICE — GLOVE, SURGICAL, BIOGEL, OPTIFIT, SZ 8.0

## (undated) DEVICE — ELECTRODE, ELECTROSURGICAL, BLADE, INSULATED, ENT/IMA, STERILE

## (undated) DEVICE — MARKER, SKIN, DUAL TIP INK W/9 LABEL AND REMOVABLE TIME OUT SLEEVE

## (undated) DEVICE — COVER, CART, 45 X 27 X 48 IN, CLEAR

## (undated) DEVICE — VERSACROSS KIT, ACCESS SOLUTION, RF WIRE 180CM

## (undated) DEVICE — SUTURE, VICRYL, 0, 36 IN, CT-1, UNDYED

## (undated) DEVICE — PENCIL, ELECTROSURG, W/BUTTON SWITCH & HOLSTER, EZ CLEAN, DISP

## (undated) DEVICE — CONTROL WIRE, .018 X 300

## (undated) DEVICE — CATHETER, ACUSON ACUNAV ULTRASOUND, 8FR 90CM

## (undated) DEVICE — DRESSING, MEPILEX BORDER, POST-OP AG, 4 X 10 IN

## (undated) DEVICE — ACCESS KIT, MINI MAK, 5FR X 10CM, 0.018 X 40CM, SS/SS, STANDARD NEEDLE

## (undated) DEVICE — DRAPE, INSTRUMENT, W/POUCH, STERI DRAPE, 7 X 11 IN, DISPOSABLE, STERILE

## (undated) DEVICE — TAPE, POLYESTER, BRAIDED, PRE-CUT 2 X 30, WHITE"

## (undated) DEVICE — GUIDEWIRE, AMPLATZ SUPER STIFF, STR, .035 IN X 75CM

## (undated) DEVICE — GUIDEWIRE, J-TIP, SUPER STIFF, AMPLATZ, 0.035 IN X 260 CM

## (undated) DEVICE — GUIDEWIRE, INQWIRE, 3MM J, .035, 150

## (undated) DEVICE — PAD, GROUNDING, ELECTROSURGICAL, W/9 FT CABLE, POLYHESIVE II, ADULT, LF

## (undated) DEVICE — SUTURE, SILK, 2-0, 30 IN, SH, CONTROL RELEASE, MULTIPACK, BLACK

## (undated) DEVICE — GUIDEWIRE, INQWIRE, 0.018 X 150CM, FIXED CORE, 3MM J-TIP

## (undated) DEVICE — CATHETER, THERMODILUTION, SWAN GANZ, HI-SHORE, COATED, W/GUIDEWIRE, 7 FR, 110 CM

## (undated) DEVICE — PACING CABLE, EXTENSION, 12 FT BEIGE, DISPOSABLE

## (undated) DEVICE — GUIDEWIRE, INQWIRE, 0.025 X 150CM, FIXED CORE, 3MM J-TIP

## (undated) DEVICE — BLADE, SAW, SAGITTAL, 25.0 X 1.27 X 90MM

## (undated) DEVICE — CLOSURE SYSTEM, VASCULAR, MVP 6-12FR, VENOUS

## (undated) DEVICE — SUTURE, MONOCRYL, 3-0, 18 IN, PS2, UNDYED

## (undated) DEVICE — APPLICATOR, CHLORAPREP, W/ORANGE TINT, 26ML

## (undated) DEVICE — SUTURE, PROLENE, 4-0, 36 IN, RB1, DA, BLUE

## (undated) DEVICE — CLIPPER, SURGICAL BLADE ASSEMBLY, GENERAL PURPOSE, SINGLE USE

## (undated) DEVICE — INTRODUCER, CATHETER, HEMOSTASIS, FAST-CATH, 12 FR X 12 CM

## (undated) DEVICE — DRESSING, ISLAND, ADHESIVE, TELFA, 4 X 8 IN

## (undated) DEVICE — YANKAUER, RIGID, STRAIGHT, OPEN TIP, NO VENT

## (undated) DEVICE — TRAY, SURESTEP, URINE METER, 14FR, SILICONE

## (undated) DEVICE — TOWEL, SURGICAL, NEURO, O/R, 16 X 26, BLUE, STERILE

## (undated) DEVICE — PACING WIRE, 1/2 CIRCLE, 26MM NEEDLE, WHITE

## (undated) DEVICE — DRESSING, ADHESIVE, ISLAND, TELFA, 4 X 14 IN

## (undated) DEVICE — INTRODUCER, VASCULAR, HEMOSTASIS, 14 FR, 12 CM

## (undated) DEVICE — SYSTEM, ACCESS, FXD DBL CURVE, WATCHMAN

## (undated) DEVICE — CLEANER, ELECTROSURGICAL, TIP, 5 X 5 CM, LF

## (undated) DEVICE — NEEDLE, TRANSSEPTAL, BROCKENBROUGH CURVE, ADULT, 18 G X 71 CM

## (undated) DEVICE — DRAPE, FLUID WARMER

## (undated) DEVICE — SUTURE, ETHIBOND, XTRA, 30 IN, 0, CT-1, GREEN

## (undated) DEVICE — CLOSURE DEVICE, VASCULAR, MYNX, 5FR

## (undated) DEVICE — DRESSING, ISLAND, TELFA, 4 X 5 IN

## (undated) DEVICE — DEVICE, CLOSURE, PERCLOSE, PROSTYLE

## (undated) DEVICE — SHEATH, PINNACLE, 10 CM,  6FR INTRODUCER, 6FR DIA, 2.5 CM DIALATOR

## (undated) DEVICE — CONNECTOR, STRAIGHT, 0.5 X 0.375 IN

## (undated) DEVICE — BLADE, SAW STERNUM, STERILE

## (undated) DEVICE — SOLUTION, IRRIGATION, X RX SODIUM CHL 0.9%, 1000ML BTL

## (undated) DEVICE — SCREW DRILL, PERIPHERAL, 2.7MM

## (undated) DEVICE — WOUND SYSTEM, DEBRIDEMENT & CLEANING, O.R DUOPAK

## (undated) DEVICE — SUTURE, SILK, 2-0, 30 IN, SH, BLACK

## (undated) DEVICE — CATHETER, ANGIO, IMPULSE, PIG 145, 6 FR X 110 CM (5 PK)

## (undated) DEVICE — CABLE, PACING PATIENT BIPOLAR 8'

## (undated) DEVICE — SHEATH, PINNACLE, W/.038 GUIDEWIRE, 10 CM,  7FR INTRODUCER, 7FR DIA, 2.5 CM DIALATOR

## (undated) DEVICE — PLEDGET, PTFE, SOFT, LARGE, 3/8 X 3/16 X 1/16 IN

## (undated) DEVICE — SUTURE, VICRYL 0, TAPER POINT, CT-1 VIOLET 27 INCH

## (undated) DEVICE — SHEATH, PINNACLE, 10 CM,  5FR INTRODUCER, 5FR DIA, 2.5 CM DIALATOR

## (undated) DEVICE — DRESSING, ADHESIVE, ISLAND, TELFA, 2 X 3.75 IN, LF

## (undated) DEVICE — CANNULA, CARDIAC SUMP

## (undated) DEVICE — SHEATH, PINNACLE, 10 CM,  8FR INTRODUCER, 8FR DIA, 2.5 CM DIALATOR

## (undated) DEVICE — SOLUTION, IRRIGATION, 0.9% SODIUM CHLORIDE, 1000 ML, HANG BOTTLE

## (undated) DEVICE — SHEATH, PINNACLE, 10 CM,  4FR INTRODUCER, 4FR DIA, 2.5 CM DIALATOR

## (undated) DEVICE — SUTURE, PROLENE, 3-0, 36 IN, SH, DA, BLUE

## (undated) DEVICE — INFLATION KIT, ADVANTAGE, ENCORE 26 (1/BOX)

## (undated) DEVICE — SUTURE, PROLENE, 5-0, 36 IN, RB1, DA, BLUE

## (undated) DEVICE — SUTURE, VICRYL, 2-0, 27 IN, CT-1, VIOLET